# Patient Record
Sex: FEMALE | Race: WHITE | NOT HISPANIC OR LATINO | Employment: PART TIME | URBAN - METROPOLITAN AREA
[De-identification: names, ages, dates, MRNs, and addresses within clinical notes are randomized per-mention and may not be internally consistent; named-entity substitution may affect disease eponyms.]

---

## 2018-06-12 ENCOUNTER — APPOINTMENT (EMERGENCY)
Dept: RADIOLOGY | Facility: HOSPITAL | Age: 28
End: 2018-06-12

## 2018-06-12 ENCOUNTER — HOSPITAL ENCOUNTER (EMERGENCY)
Facility: HOSPITAL | Age: 28
Discharge: HOME | End: 2018-06-12

## 2018-06-12 VITALS
OXYGEN SATURATION: 100 % | WEIGHT: 239 LBS | HEIGHT: 62 IN | TEMPERATURE: 98 F | HEART RATE: 85 BPM | SYSTOLIC BLOOD PRESSURE: 150 MMHG | BODY MASS INDEX: 43.98 KG/M2 | DIASTOLIC BLOOD PRESSURE: 70 MMHG | RESPIRATION RATE: 15 BRPM

## 2018-06-12 DIAGNOSIS — R07.9 CHEST PAIN: Primary | ICD-10-CM

## 2018-06-12 LAB
ALBUMIN SERPL BCP-MCNC: 3.8 G/DL (ref 3.5–5)
ALP SERPL-CCNC: 121 U/L (ref 46–116)
ALT SERPL W P-5'-P-CCNC: 68 U/L (ref 12–78)
ANION GAP SERPL CALCULATED.3IONS-SCNC: 9 MMOL/L (ref 4–13)
AST SERPL W P-5'-P-CCNC: 38 U/L (ref 5–45)
ATRIAL RATE: 87 BPM
BASOPHILS # BLD AUTO: 0.02 THOUSANDS/ΜL (ref 0–0.1)
BASOPHILS NFR BLD AUTO: 0 % (ref 0–1)
BILIRUB SERPL-MCNC: 0.4 MG/DL (ref 0.2–1)
BUN SERPL-MCNC: 15 MG/DL (ref 5–25)
CALCIUM SERPL-MCNC: 9 MG/DL (ref 8.3–10.1)
CHLORIDE SERPL-SCNC: 101 MMOL/L (ref 100–108)
CO2 SERPL-SCNC: 28 MMOL/L (ref 21–32)
CREAT SERPL-MCNC: 0.63 MG/DL (ref 0.6–1.3)
EOSINOPHIL # BLD AUTO: 0.16 THOUSAND/ΜL (ref 0–0.61)
EOSINOPHIL NFR BLD AUTO: 2 % (ref 0–6)
ERYTHROCYTE [DISTWIDTH] IN BLOOD BY AUTOMATED COUNT: 14.9 % (ref 11.6–15.1)
EXT PREG TEST URINE: NEGATIVE
GFR SERPL CREATININE-BSD FRML MDRD: 121 ML/MIN/1.73SQ M
GLUCOSE SERPL-MCNC: 237 MG/DL (ref 65–140)
HCT VFR BLD AUTO: 39.2 % (ref 34.8–46.1)
HGB BLD-MCNC: 12.8 G/DL (ref 11.5–15.4)
LYMPHOCYTES # BLD AUTO: 2.07 THOUSANDS/ΜL (ref 0.6–4.47)
LYMPHOCYTES NFR BLD AUTO: 22 % (ref 14–44)
MCH RBC QN AUTO: 25.4 PG (ref 26.8–34.3)
MCHC RBC AUTO-ENTMCNC: 32.7 G/DL (ref 31.4–37.4)
MCV RBC AUTO: 78 FL (ref 82–98)
MONOCYTES # BLD AUTO: 0.75 THOUSAND/ΜL (ref 0.17–1.22)
MONOCYTES NFR BLD AUTO: 8 % (ref 4–12)
NEUTROPHILS # BLD AUTO: 6.56 THOUSANDS/ΜL (ref 1.85–7.62)
NEUTS SEG NFR BLD AUTO: 68 % (ref 43–75)
P AXIS: 75 DEGREES
PLATELET # BLD AUTO: 221 THOUSANDS/UL (ref 149–390)
PMV BLD AUTO: 11.3 FL (ref 8.9–12.7)
POTASSIUM SERPL-SCNC: 3.9 MMOL/L (ref 3.5–5.3)
PR INTERVAL: 166 MS
PROT SERPL-MCNC: 7.7 G/DL (ref 6.4–8.2)
QRS AXIS: 64 DEGREES
QRSD INTERVAL: 146 MS
QT INTERVAL: 408 MS
QTC INTERVAL: 490 MS
RBC # BLD AUTO: 5.04 MILLION/UL (ref 3.81–5.12)
SODIUM SERPL-SCNC: 138 MMOL/L (ref 136–145)
T WAVE AXIS: 36 DEGREES
TROPONIN I SERPL-MCNC: <0.02 NG/ML
VENTRICULAR RATE: 87 BPM
WBC # BLD AUTO: 9.56 THOUSAND/UL (ref 4.31–10.16)

## 2018-06-12 PROCEDURE — 93010 ELECTROCARDIOGRAM REPORT: CPT | Performed by: INTERNAL MEDICINE

## 2018-06-12 PROCEDURE — 85025 COMPLETE CBC W/AUTO DIFF WBC: CPT

## 2018-06-12 PROCEDURE — 99285 EMERGENCY DEPT VISIT HI MDM: CPT

## 2018-06-12 PROCEDURE — 81025 URINE PREGNANCY TEST: CPT

## 2018-06-12 PROCEDURE — 71046 X-RAY EXAM CHEST 2 VIEWS: CPT

## 2018-06-12 PROCEDURE — 93005 ELECTROCARDIOGRAM TRACING: CPT

## 2018-06-12 PROCEDURE — 84484 ASSAY OF TROPONIN QUANT: CPT

## 2018-06-12 PROCEDURE — 80053 COMPREHEN METABOLIC PANEL: CPT

## 2018-06-12 PROCEDURE — 36415 COLL VENOUS BLD VENIPUNCTURE: CPT

## 2018-06-12 NOTE — DISCHARGE INSTRUCTIONS

## 2018-06-12 NOTE — ED PROVIDER NOTES
History  Chief Complaint   Patient presents with    Chest Pain     Patient stated that she has been having chest pain since this morning  C/o of a stabbing pain midsternal  Hx of cardiac disease     23-year-old female presenting here with a chief complaint of chest pain states she woke up with chest pain and that is pretty much subsided by now  She denies any other associated symptoms no dizziness no nausea no vomiting no diaphoresis  She reports that it is somewhat reproducible with certain movements  Denies any difficulty breathing  No fever no chills  None       Past Medical History:   Diagnosis Date    Cardiac disease     Pulmonary artery congenital abnormality        Past Surgical History:   Procedure Laterality Date    CHOLECYSTECTOMY      CORONARY ARTERY BYPASS GRAFT         History reviewed  No pertinent family history  I have reviewed and agree with the history as documented  Social History   Substance Use Topics    Smoking status: Current Some Day Smoker    Smokeless tobacco: Never Used    Alcohol use No        Review of Systems   Constitutional: Negative for activity change, fatigue and fever  HENT: Negative for congestion, ear pain, rhinorrhea and sore throat  Eyes: Negative  Respiratory: Negative for cough, shortness of breath and wheezing  Cardiovascular: Positive for chest pain  Gastrointestinal: Negative for abdominal pain, diarrhea, nausea and vomiting  Endocrine: Negative  Genitourinary: Negative for difficulty urinating, dyspareunia, dysuria, flank pain, frequency, menstrual problem, pelvic pain, urgency, vaginal bleeding, vaginal discharge and vaginal pain  Musculoskeletal: Negative for arthralgias and myalgias  Skin: Negative for color change and pallor  Neurological: Negative for dizziness, speech difficulty, weakness and headaches  Hematological: Negative for adenopathy  Psychiatric/Behavioral: Negative for confusion         Physical Exam  Physical Exam   Constitutional: She is oriented to person, place, and time  She appears well-developed and well-nourished  She is cooperative  Non-toxic appearance  She does not have a sickly appearance  She does not appear ill  No distress  HENT:   Head: Normocephalic and atraumatic  Right Ear: Tympanic membrane and external ear normal    Left Ear: Tympanic membrane and external ear normal    Nose: No rhinorrhea, sinus tenderness or nasal deformity  No epistaxis  Right sinus exhibits no maxillary sinus tenderness and no frontal sinus tenderness  Left sinus exhibits no maxillary sinus tenderness and no frontal sinus tenderness  Mouth/Throat: Oropharynx is clear and moist and mucous membranes are normal  Normal dentition  Eyes: EOM are normal  Pupils are equal, round, and reactive to light  Neck: Normal range of motion  Neck supple  Cardiovascular: Normal rate, regular rhythm and normal heart sounds  No murmur heard  Pulmonary/Chest: Effort normal and breath sounds normal  No accessory muscle usage  No respiratory distress  She has no wheezes  She has no rales  She exhibits no tenderness  Abdominal: Soft  She exhibits no distension  There is no guarding  Musculoskeletal: Normal range of motion  She exhibits no edema or tenderness  Lymphadenopathy:     She has no cervical adenopathy  Neurological: She is alert and oriented to person, place, and time  She exhibits normal muscle tone  Skin: Skin is warm and dry  No rash noted  No erythema  Psychiatric: She has a normal mood and affect  Nursing note and vitals reviewed        Vital Signs  ED Triage Vitals   Temperature Pulse Respirations Blood Pressure SpO2   06/12/18 0912 06/12/18 0912 06/12/18 0912 06/12/18 0912 06/12/18 0912   98 °F (36 7 °C) 86 22 158/74 98 %      Temp Source Heart Rate Source Patient Position - Orthostatic VS BP Location FiO2 (%)   06/12/18 0912 06/12/18 0912 06/12/18 0912 06/12/18 0912 --   Oral Monitor Sitting Right arm       Pain Score       06/12/18 0909       6           Vitals:    06/12/18 0912 06/12/18 1040 06/12/18 1117   BP: 158/74 143/78 150/70   Pulse: 86 86 85   Patient Position - Orthostatic VS: Sitting Sitting Lying       Visual Acuity      ED Medications  Medications - No data to display    Diagnostic Studies  Results Reviewed     Procedure Component Value Units Date/Time    Comprehensive metabolic panel [[id number]]  (Abnormal) Collected:  06/12/18 0915    Lab Status:  Final result Specimen:  Blood from Arm, Left Updated:  06/12/18 0955     Sodium 138 mmol/L      Potassium 3 9 mmol/L      Chloride 101 mmol/L      CO2 28 mmol/L      Anion Gap 9 mmol/L      BUN 15 mg/dL      Creatinine 0 63 mg/dL      Glucose 237 (H) mg/dL      Calcium 9 0 mg/dL      AST 38 U/L      ALT 68 U/L      Alkaline Phosphatase 121 (H) U/L      Total Protein 7 7 g/dL      Albumin 3 8 g/dL      Total Bilirubin 0 40 mg/dL      eGFR 121 ml/min/1 73sq m     Narrative:         National Kidney Disease Education Program recommendations are as follows:  GFR calculation is accurate only with a steady state creatinine  Chronic Kidney disease less than 60 ml/min/1 73 sq  meters  Kidney failure less than 15 ml/min/1 73 sq  meters  POCT pregnancy, urine [[id number]]  (Normal) Resulted:  06/12/18 0953    Lab Status:  Final result Updated:  06/12/18 0953     EXT PREG TEST UR (Ref: Negative) Negative    Troponin I [[id number]]  (Normal) Collected:  06/12/18 0915    Lab Status:  Final result Specimen:  Blood from Arm, Left Updated:  06/12/18 0941     Troponin I <0 02 ng/mL     Narrative:         [employer] Chemistry analyzer 99% cutoff is > 0 04 ng/mL in network labs    o cTnI 99% cutoff is useful only when applied to patients in the clinical setting of myocardial ischemia  o cTnI 99% cutoff should be interpreted in the context of clinical history, ECG findings and possibly cardiac imaging to establish correct diagnosis    o cTnI 99% cutoff may be suggestive but clearly not indicative of a coronary event without the clinical setting of myocardial ischemia  CBC and differential [[id number]]  (Abnormal) Collected:  06/12/18 0915    Lab Status:  Final result Specimen:  Blood from Arm, Left Updated:  06/12/18 0921     WBC 9 56 Thousand/uL      RBC 5 04 Million/uL      Hemoglobin 12 8 g/dL      Hematocrit 39 2 %      MCV 78 (L) fL      MCH 25 4 (L) pg      MCHC 32 7 g/dL      RDW 14 9 %      MPV 11 3 fL      Platelets 852 Thousands/uL      Neutrophils Relative 68 %      Lymphocytes Relative 22 %      Monocytes Relative 8 %      Eosinophils Relative 2 %      Basophils Relative 0 %      Neutrophils Absolute 6 56 Thousands/µL      Lymphocytes Absolute 2 07 Thousands/µL      Monocytes Absolute 0 75 Thousand/µL      Eosinophils Absolute 0 16 Thousand/µL      Basophils Absolute 0 02 Thousands/µL                  X-ray chest 2 views   Final Result by Jaja Julian MD (06/12 1043)      No acute cardiopulmonary disease              Workstation performed: [id number]                    Procedures  Procedures       Phone Contacts  ED Phone Contact    ED Course         HEART Risk Score      Most Recent Value   History  0 Filed at: 06/12/2018 1113   ECG  0 Filed at: 06/12/2018 1113   Age  0 Filed at: 06/12/2018 1113   Risk Factors  1 Filed at: 06/12/2018 1113   Troponin  0 Filed at: 06/12/2018 1113   Heart Score Risk Calculator   History  0 Filed at: 06/12/2018 1113   ECG  0 Filed at: 06/12/2018 1113   Age  0 Filed at: 06/12/2018 1113   Risk Factors  1 Filed at: 06/12/2018 1113   Troponin  0 Filed at: 06/12/2018 1113   HEART Score  1 Filed at: 06/12/2018 1113   HEART Score  1 Filed at: 06/12/2018 1113                            MDM  Number of Diagnoses or Management Options  Chest pain:     CritCare Time    Disposition  Final diagnoses:   Chest pain     Time reflects when diagnosis was documented in both MDM as applicable and the Disposition within this note     Time User Action Codes Description Comment    6/12/2018 11:08 AM Briseida Nesbitt Add [R07 9] Chest pain       ED Disposition     ED Disposition Condition Comment    Discharge  [facility] [place] discharge to home/self care  Condition at discharge: Good        Follow-up Information     Follow up With Specialties Details Why Contact Info Additional Information    [facility] Emergency Department Emergency Medicine  As needed [address] [address]  [phone] [place] ED, [address] [place], [place], [address]  [facility] Schedule an appointment as soon as possible for a visit For Continued Evaluation [address] [id number] [phone]           There are no discharge medications for this patient  No discharge procedures on file      ED Provider  Electronically Signed by           REJI Zimmerman  06/12/18 0632

## 2018-06-14 ENCOUNTER — TELEPHONE (OUTPATIENT)
Dept: CARDIOLOGY CLINIC | Facility: CLINIC | Age: 28
End: 2018-06-14

## 2018-06-14 NOTE — TELEPHONE ENCOUNTER
PT called to change her appointment time to 4pm  Patient still was complaining of chest pains  PT stated she was on her way to the ER

## 2018-06-14 NOTE — TELEPHONE ENCOUNTER
PT CALLED & SAID THAT SHE NEEDS TO MAKE A HOSP F/UP W/ CARDIO & SAID SHE WENT TO [facility] BUT ONLY WAS IN THE ER  PT SAID THAT SHE IS STILL HAVING CHEST PAINS  SPOKE TO JAQUELIN & SHE SAID TO TELL PT TO GO TO THE ER; RELAYED THE MSG TO PT & SHE SAID THAT SHE WILL GO BUT STILL WANTED TO MAKE A F/UP APPT   PT IS GOING TO [place] ON 7/12/18 TO SEE VK6   PT HAS NO INSUR

## 2018-07-12 ENCOUNTER — OFFICE VISIT (OUTPATIENT)
Dept: CARDIOLOGY CLINIC | Facility: CLINIC | Age: 28
End: 2018-07-12

## 2018-07-12 VITALS
BODY MASS INDEX: 44.2 KG/M2 | SYSTOLIC BLOOD PRESSURE: 142 MMHG | OXYGEN SATURATION: 99 % | DIASTOLIC BLOOD PRESSURE: 76 MMHG | WEIGHT: 240.2 LBS | HEIGHT: 62 IN | HEART RATE: 80 BPM

## 2018-07-12 DIAGNOSIS — Q24.9 CONGENITAL HEART DISEASE IN ADULT: ICD-10-CM

## 2018-07-12 DIAGNOSIS — Q21.0 VSD (VENTRICULAR SEPTAL DEFECT) AND COARCTATION OF AORTA: Primary | ICD-10-CM

## 2018-07-12 DIAGNOSIS — Q25.1 VSD (VENTRICULAR SEPTAL DEFECT) AND COARCTATION OF AORTA: Primary | ICD-10-CM

## 2018-07-12 PROBLEM — Z87.74 H/O AORTIC COARCTATION REPAIR: Status: ACTIVE | Noted: 2018-07-12

## 2018-07-12 PROBLEM — Z87.74 S/P VSD REPAIR: Status: ACTIVE | Noted: 2018-07-12

## 2018-07-12 PROCEDURE — 99215 OFFICE O/P EST HI 40 MIN: CPT | Performed by: INTERNAL MEDICINE

## 2018-07-12 RX ORDER — LISINOPRIL 5 MG/1
20 TABLET ORAL DAILY
COMMUNITY
End: 2018-10-24 | Stop reason: ALTCHOICE

## 2018-07-12 NOTE — PROGRESS NOTES
Cardiology Consultation     Myesha Mtz  [id number]  1990  [address]  [facility] [address] [place] [address]    1  VSD (ventricular septal defect) and coarctation of aorta  Echo complete with contrast if indicated    MRI cardiac  w wo contrast   2  Congenital heart disease in adult       Chief Complaint   Patient presents with    Follow-up     s/p hospital       HPI:  Patient presents to the office for cardiac evaluation after recent ER visit  Patient states she went to the ER because of chest pain  She describes the chest pain in the center of her chest feels like a stabbing  She states it occurs when 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  She also gets dizzy and short of breath  She also has pain through to the back  Patient denies getting the pain with any other activity  She has a personal history of coarctation with repair x2 and VSD with repair as a child  She was following with pediatric cardiologist until age 19 and saw an adult cardiologist 1 time  She denies any recent cardiac testing  Past Medical History:   Diagnosis Date    Hepatitis C     Hypertension     Pulmonary artery congenital abnormality      Social History     Social History    Marital status: Single     Spouse name: N/A    Number of children: N/A    Years of education: N/A     Occupational History    Not on file  Social History Main Topics    Smoking status: Current Some Day Smoker    Smokeless tobacco: Never Used    Alcohol use No    Drug use: Unknown    Sexual activity: Not on file     Other Topics Concern    Not on file     Social History Narrative    No narrative on file      History reviewed  No pertinent family history    Past Surgical History:   Procedure Laterality Date    CARDIAC CATHETERIZATION      no CAD    CHOLECYSTECTOMY      COARCTATION OF AORTA EXCISION Age 7    VSD REPAIR      As a child       Current Outpatient Prescriptions:     lisinopril (ZESTRIL) 5 mg tablet, Take 5 mg by mouth daily, Disp: , Rfl:   Allergies   Allergen Reactions    Prednisone Swelling     Vitals:    07/12/18 1558   BP: 142/76   Pulse: 80   SpO2: 99%   Weight: 109 kg (240 lb 3 2 oz)   Height: 5' 2" (1 575 m)       Imaging: No results found  Review of Systems:  Review of Systems   Constitutional: Negative  HENT: Negative  Respiratory: Negative  Cardiovascular: Positive for chest pain  Genitourinary: Negative  Neurological: Negative  Hematological: Negative  All other systems reviewed and are negative  Physical Exam:  Physical Exam   Constitutional: She is oriented to person, place, and time  She appears well-developed and well-nourished  HENT:   Head: Normocephalic and atraumatic  Cardiovascular: Normal rate, regular rhythm and normal heart sounds  Pulmonary/Chest: Effort normal and breath sounds normal    Musculoskeletal: Normal range of motion  She exhibits no edema  Neurological: She is alert and oriented to person, place, and time  Psychiatric: She has a normal mood and affect  Her behavior is normal  Judgment and thought content normal    Nursing note and vitals reviewed  Discussion/Summary:  1- history of VSD repair as a child with no recent cardiac testing  Check echocardiogram to assess LV function and assess the septum  2-history of coarctation repair x2  Check cardiac MRI to assess the repair  Also refer the patient to dr Rosemarie Root, who will follow-up for congenital heart disease    Check echocardiogram  Check cardiac MRI  Continue all medications  Previous studies reviewed with patient, medications reviewed and possible side effects discussed  Continue risk factor modification  Optimize weight, regular exercise and follow up with appropriate specialists and primary care physician as discussed  All questions answered   Patient advised to report any problems prompting to medical attention   Return for follow up visit in 6 months or earlier if needed

## 2018-07-12 NOTE — PATIENT INSTRUCTIONS
Check echo  Check cardiac MRI  Continue all medications  Previous studies reviewed with patient, medications reviewed and possible side effects discussed  Continue risk factor modification  Optimize weight, regular exercise and follow up with appropriate specialists and primary care physician as discussed  All questions answered  Patient advised to report any problems prompting to medical attention   Return for follow up visit in 6 months or earlier if needed

## 2018-07-17 ENCOUNTER — HOSPITAL ENCOUNTER (EMERGENCY)
Facility: HOSPITAL | Age: 28
Discharge: HOME | End: 2018-07-17
Attending: FAMILY MEDICINE | Admitting: FAMILY MEDICINE

## 2018-07-17 ENCOUNTER — APPOINTMENT (EMERGENCY)
Dept: RADIOLOGY | Facility: HOSPITAL | Age: 28
End: 2018-07-17

## 2018-07-17 ENCOUNTER — TELEPHONE (OUTPATIENT)
Dept: CARDIOLOGY CLINIC | Facility: CLINIC | Age: 28
End: 2018-07-17

## 2018-07-17 VITALS
OXYGEN SATURATION: 95 % | BODY MASS INDEX: 44.16 KG/M2 | HEIGHT: 62 IN | HEART RATE: 99 BPM | DIASTOLIC BLOOD PRESSURE: 71 MMHG | TEMPERATURE: 98.6 F | RESPIRATION RATE: 18 BRPM | WEIGHT: 240 LBS | SYSTOLIC BLOOD PRESSURE: 148 MMHG

## 2018-07-17 DIAGNOSIS — R07.89 ATYPICAL CHEST PAIN: Primary | ICD-10-CM

## 2018-07-17 LAB
ANION GAP SERPL CALCULATED.3IONS-SCNC: 7 MMOL/L (ref 4–13)
BASOPHILS # BLD AUTO: 0.1 THOUSANDS/ΜL (ref 0–0.1)
BASOPHILS NFR BLD AUTO: 1 % (ref 0–2)
BNP SERPL-MCNC: 17 PG/ML (ref 1–100)
BUN SERPL-MCNC: 10 MG/DL (ref 7–25)
CALCIUM SERPL-MCNC: 9.4 MG/DL (ref 8.6–10.5)
CHLORIDE SERPL-SCNC: 102 MMOL/L (ref 98–107)
CO2 SERPL-SCNC: 26 MMOL/L (ref 21–31)
CREAT SERPL-MCNC: 0.71 MG/DL (ref 0.6–1.2)
EOSINOPHIL # BLD AUTO: 0.1 THOUSAND/ΜL (ref 0–0.61)
EOSINOPHIL NFR BLD AUTO: 1 % (ref 0–5)
ERYTHROCYTE [DISTWIDTH] IN BLOOD BY AUTOMATED COUNT: 15.7 % (ref 11.5–14.5)
GFR SERPL CREATININE-BSD FRML MDRD: 115 ML/MIN/1.73SQ M
GLUCOSE SERPL-MCNC: 367 MG/DL (ref 65–99)
HCT VFR BLD AUTO: 38.8 % (ref 34.8–46.1)
HGB BLD-MCNC: 12.9 G/DL (ref 12–16)
LYMPHOCYTES # BLD AUTO: 2.6 THOUSANDS/ΜL (ref 0.6–4.47)
LYMPHOCYTES NFR BLD AUTO: 24 % (ref 21–51)
MCH RBC QN AUTO: 25.3 PG (ref 26–34)
MCHC RBC AUTO-ENTMCNC: 33.3 G/DL (ref 31–37)
MCV RBC AUTO: 76 FL (ref 81–99)
MONOCYTES # BLD AUTO: 0.8 THOUSAND/ΜL (ref 0.17–1.22)
MONOCYTES NFR BLD AUTO: 7 % (ref 2–12)
NEUTROPHILS # BLD AUTO: 7.6 THOUSANDS/ΜL (ref 1.4–6.5)
NEUTS SEG NFR BLD AUTO: 68 % (ref 42–75)
NRBC BLD AUTO-RTO: 0 /100 WBCS
PLATELET # BLD AUTO: 208 THOUSANDS/UL (ref 149–390)
PMV BLD AUTO: 9.5 FL (ref 8.6–11.7)
POTASSIUM SERPL-SCNC: 3.4 MMOL/L (ref 3.5–5.5)
RBC # BLD AUTO: 5.1 MILLION/UL (ref 3.9–5.2)
SODIUM SERPL-SCNC: 135 MMOL/L (ref 134–143)
TROPONIN I SERPL-MCNC: <0.03 NG/ML
WBC # BLD AUTO: 11.2 THOUSAND/UL (ref 4.8–10.8)

## 2018-07-17 PROCEDURE — 96374 THER/PROPH/DIAG INJ IV PUSH: CPT

## 2018-07-17 PROCEDURE — 85025 COMPLETE CBC W/AUTO DIFF WBC: CPT | Performed by: FAMILY MEDICINE

## 2018-07-17 PROCEDURE — 83880 ASSAY OF NATRIURETIC PEPTIDE: CPT | Performed by: FAMILY MEDICINE

## 2018-07-17 PROCEDURE — 80048 BASIC METABOLIC PNL TOTAL CA: CPT | Performed by: FAMILY MEDICINE

## 2018-07-17 PROCEDURE — 99285 EMERGENCY DEPT VISIT HI MDM: CPT

## 2018-07-17 PROCEDURE — 71045 X-RAY EXAM CHEST 1 VIEW: CPT

## 2018-07-17 PROCEDURE — 36415 COLL VENOUS BLD VENIPUNCTURE: CPT | Performed by: FAMILY MEDICINE

## 2018-07-17 PROCEDURE — 84484 ASSAY OF TROPONIN QUANT: CPT | Performed by: FAMILY MEDICINE

## 2018-07-17 PROCEDURE — 93005 ELECTROCARDIOGRAM TRACING: CPT

## 2018-07-17 RX ORDER — POTASSIUM CHLORIDE 20 MEQ/1
40 TABLET, EXTENDED RELEASE ORAL ONCE
Status: DISCONTINUED | OUTPATIENT
Start: 2018-07-17 | End: 2018-07-17 | Stop reason: CLARIF

## 2018-07-17 RX ORDER — POTASSIUM CHLORIDE 750 MG/1
40 TABLET, FILM COATED, EXTENDED RELEASE ORAL ONCE
Status: COMPLETED | OUTPATIENT
Start: 2018-07-17 | End: 2018-07-17

## 2018-07-17 RX ORDER — POTASSIUM CHLORIDE 750 MG/1
10 TABLET, EXTENDED RELEASE ORAL 2 TIMES DAILY
Qty: 6 TABLET | Refills: 0 | Status: SHIPPED | OUTPATIENT
Start: 2018-07-17 | End: 2018-10-10

## 2018-07-17 RX ORDER — KETOROLAC TROMETHAMINE 30 MG/ML
30 INJECTION, SOLUTION INTRAMUSCULAR; INTRAVENOUS ONCE
Status: COMPLETED | OUTPATIENT
Start: 2018-07-17 | End: 2018-07-17

## 2018-07-17 RX ADMIN — KETOROLAC TROMETHAMINE 30 MG: 30 INJECTION, SOLUTION INTRAMUSCULAR; INTRAVENOUS at 14:40

## 2018-07-17 RX ADMIN — POTASSIUM CHLORIDE 40 MEQ: 750 TABLET, FILM COATED, EXTENDED RELEASE ORAL at 16:29

## 2018-07-17 NOTE — DISCHARGE INSTRUCTIONS
Chest Wall Pain, Ambulatory Care   GENERAL INFORMATION:   Chest wall pain  may be caused by problems with the muscles, cartilage, or bones of the chest wall  Chest wall pain may also be caused by pain that spreads to your chest from another part of your body  The pain may be aching, severe, dull, or sharp  It may come and go, or it may be constant  The pain may be worse when you move in certain ways, breathe deeply, or cough  Seek immediate care for the following symptoms:   · Severe pain    · You have any of the following signs of a heart attack:      ¨ Squeezing, pressure, or pain in your chest that lasts longer than 5 minutes or returns    ¨ Discomfort or pain in your back, neck, jaw, stomach, or arm     ¨ Trouble breathing    ¨ Nausea or vomiting    ¨ Lightheadedness or a sudden cold sweat, especially with chest pain or trouble breathing  Treatment  depends on the cause of your chest wall pain  You may need any of the following:  · NSAIDs  help decrease swelling and pain or fever  This medicine is available with or without a doctor's order  NSAIDs can cause stomach bleeding or kidney problems in certain people  If you take blood thinner medicine, always ask your healthcare provider if NSAIDs are safe for you  Always read the medicine label and follow directions  · Acetaminophen  decreases pain  It is available without a doctor's order  Ask how much to take and how often to take it  Follow directions  Acetaminophen can cause liver damage if not taken correctly  · Apply heat  on your chest for 20 to 30 minutes every 2 hours for as many days as directed  Heat helps decrease pain and muscle spasms  · Apply ice  on your chest for 15 to 20 minutes every hour or as directed  Use an ice pack, or put crushed ice in a plastic bag  Cover it with a towel  Ice helps prevent tissue damage and decreases swelling and pain    Follow up with your healthcare provider as directed:  Write down your questions so you remember to ask them during your visits  CARE AGREEMENT:   You have the right to help plan your care  Learn about your health condition and how it may be treated  Discuss treatment options with your caregivers to decide what care you want to receive  You always have the right to refuse treatment  The above information is an  only  It is not intended as medical advice for individual conditions or treatments  Talk to your doctor, nurse or pharmacist before following any medical regimen to see if it is safe and effective for you  © 2014 [address] is for End User's use only and may not be sold, redistributed or otherwise used for commercial purposes  All illustrations and images included in CareNotes® are the copyrighted property of [employer]  or Fran Dumont

## 2018-07-17 NOTE — ED PROVIDER NOTES
History  Chief Complaint   Patient presents with    Chest Pain     Sudden onset at 0800 partient developed 9/10 sharp substernal chest pain radiating to her LUE worsened with deep breathing        History provided by:  Patient and EMS personnel   used: No    Chest Pain   Pain location:  L chest  Pain quality: dull    Pain radiates to:  Does not radiate  Pain radiates to the back: no    Pain severity:  Moderate  Onset quality:  Sudden  Duration:  6 hours  Timing:  Intermittent  Progression:  Waxing and waning  Chronicity:  Recurrent  Context: at rest    Context: not breathing, no drug use, not eating, no intercourse, not lifting, no movement, not raising an arm, no stress and no trauma    Relieved by:  Nitroglycerin  Worsened by:  Deep breathing  Ineffective treatments:  Aspirin and nitroglycerin  Associated symptoms: no abdominal pain, no AICD problem, no altered mental status, no anorexia, no anxiety, no back pain, no claudication, no cough, no diaphoresis, no dizziness, no dysphagia, no fatigue, no fever, no headache, no heartburn, no lower extremity edema, no nausea, no near-syncope, no numbness, no orthopnea, no palpitations, no PND, no shortness of breath, no syncope, not vomiting and no weakness    Risk factors: diabetes mellitus, obesity, smoking and surgery    Risk factors: no birth control, no coronary artery disease, no hypertension, no immobilization and not pregnant     This is a 25year old female presented to ED with complaint of atypical chest pain  Patient had multiple visits to ED for chest pain now was seen by cardiologist   Patient states that she was a advised to get stress test done but did not schedule an appointment  Patient pain is reproducible  Prior to Admission Medications   Prescriptions Last Dose Informant Patient Reported?  Taking?   lisinopril (ZESTRIL) 5 mg tablet 7/17/2018 at Unknown time  Yes Yes   Sig: Take 5 mg by mouth daily      Facility-Administered Medications: None       Past Medical History:   Diagnosis Date    Hepatitis C     Hypertension     Pulmonary artery congenital abnormality        Past Surgical History:   Procedure Laterality Date    CARDIAC CATHETERIZATION      no CAD    CHOLECYSTECTOMY      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 8    LIVER BIOPSY      VSD REPAIR      As a child       No family history on file  I have reviewed and agree with the history as documented  Social History   Substance Use Topics    Smoking status: Former Smoker    Smokeless tobacco: Never Used    Alcohol use No        Review of Systems   Constitutional: Negative for diaphoresis, fatigue and fever  HENT: Negative for trouble swallowing  Respiratory: Negative for cough and shortness of breath  Cardiovascular: Positive for chest pain  Negative for palpitations, orthopnea, claudication, syncope, PND and near-syncope  Gastrointestinal: Negative for abdominal pain, anorexia, heartburn, nausea and vomiting  Musculoskeletal: Negative for back pain  Neurological: Negative for dizziness, weakness, numbness and headaches  Physical Exam  Physical Exam   Constitutional: She is oriented to person, place, and time  She appears well-developed and well-nourished  HENT:   Head: Normocephalic and atraumatic  Right Ear: External ear normal    Left Ear: External ear normal    Nose: Nose normal    Mouth/Throat: Oropharynx is clear and moist  No oropharyngeal exudate  Eyes: Conjunctivae and EOM are normal  Pupils are equal, round, and reactive to light  Right eye exhibits no discharge  Left eye exhibits no discharge  No scleral icterus  Neck: Normal range of motion  Neck supple  Cardiovascular: Regular rhythm  Tachycardia present  Pulmonary/Chest: Effort normal and breath sounds normal  No respiratory distress  She has no wheezes  Abdominal: Soft  Bowel sounds are normal    Lymphadenopathy:     She has no cervical adenopathy     Neurological: She is alert and oriented to person, place, and time  Skin: Skin is warm and dry  Capillary refill takes less than 2 seconds  Psychiatric: She has a normal mood and affect  Her behavior is normal    Nursing note and vitals reviewed  Vital Signs  ED Triage Vitals [07/17/18 1419]   Temperature Pulse Respirations Blood Pressure SpO2   99 2 °F (37 3 °C) (!) 112 18 166/86 98 %      Temp Source Heart Rate Source Patient Position - Orthostatic VS BP Location FiO2 (%)   Temporal Monitor Lying Right arm --      Pain Score       8           Vitals:    07/17/18 1500 07/17/18 1515 07/17/18 1530 07/17/18 1545   BP:  146/69 148/71    Pulse: (!) 111 103 105 (!) 110   Patient Position - Orthostatic VS:           Visual Acuity      ED Medications  Medications   potassium chloride (K-DUR,KLOR-CON) CR tablet 40 mEq (not administered)   ketorolac (TORADOL) injection 30 mg (30 mg Intravenous Given 7/17/18 1440)       Diagnostic Studies  Results Reviewed     Procedure Component Value Units Date/Time    B-Type Natriuretic Peptide [facility] ONLY) [[id number]]  (Normal) Collected:  07/17/18 1435    Lab Status:  Final result Specimen:  Blood from Arm, Left Updated:  07/17/18 1512     BNP 17 pg/mL     Basic metabolic panel [[id number]]  (Abnormal) Collected:  07/17/18 1435    Lab Status:  Final result Specimen:  Blood from Arm, Left Updated:  07/17/18 1512     Sodium 135 mmol/L      Potassium 3 4 (L) mmol/L      Chloride 102 mmol/L      CO2 26 mmol/L      Anion Gap 7 mmol/L      BUN 10 mg/dL      Creatinine 0 71 mg/dL      Glucose 367 (H) mg/dL      Calcium 9 4 mg/dL      eGFR 115 ml/min/1 73sq m     Narrative:         National Kidney Disease Education Program recommendations are as follows:  GFR calculation is accurate only with a steady state creatinine  Chronic Kidney disease less than 60 ml/min/1 73 sq  meters  Kidney failure less than 15 ml/min/1 73 sq  meters      Troponin I [[id number]]  (Normal) Collected:  07/17/18 1435 Lab Status:  Final result Specimen:  Blood from Arm, Left Updated:  07/17/18 1512     Troponin I <0 03 ng/mL     CBC and differential [[id number]]  (Abnormal) Collected:  07/17/18 1435    Lab Status:  Final result Specimen:  Blood from Arm, Left Updated:  07/17/18 1448     WBC 11 20 (H) Thousand/uL      RBC 5 10 Million/uL      Hemoglobin 12 9 g/dL      Hematocrit 38 8 %      MCV 76 (L) fL      MCH 25 3 (L) pg      MCHC 33 3 g/dL      RDW 15 7 (H) %      MPV 9 5 fL      Platelets 743 Thousands/uL      nRBC 0 /100 WBCs      Neutrophils Relative 68 %      Lymphocytes Relative 24 %      Monocytes Relative 7 %      Eosinophils Relative 1 %      Basophils Relative 1 %      Neutrophils Absolute 7 60 (H) Thousands/µL      Lymphocytes Absolute 2 60 Thousands/µL      Monocytes Absolute 0 80 Thousand/µL      Eosinophils Absolute 0 10 Thousand/µL      Basophils Absolute 0 10 Thousands/µL                  XR chest 1 view portable   Final Result by Maria Del Carmen Feliciano (07/17 1511)        No acute findings  No change  Signed by Maria Del Carmen Feliciano                 Procedures  Procedures       Phone Contacts  ED Phone Contact    ED Course  ED Course as of Jul 17 1613   Tue Jul 17, 2018   1607 Lab results are discussed with patient    [phone] I offered admission for observation patient states she is feeling better will discharge home advised to follow up with her cardiologist and get stress test done  Patient verbalized understand the planned treatment  MDM  CritCare Time    Disposition  Final diagnoses:   Atypical chest pain     Time reflects when diagnosis was documented in both MDM as applicable and the Disposition within this note     Time User Action Codes Description Comment    7/17/2018  4:11 PM Janie Mustafa Add [R07 89] Atypical chest pain       ED Disposition     ED Disposition Condition Comment    Discharge  [facility] [place] discharge to home/self care      Condition at discharge: Stable        Follow-up Information     Follow up With Specialties Details Why Contact Info      In 2 days If symptoms worsen Cardiologist          Patient's Medications   Discharge Prescriptions    POTASSIUM CHLORIDE (K-DUR,KLOR-CON) 10 MEQ TABLET    Take 1 tablet (10 mEq total) by mouth 2 (two) times a day for 3 days       Start Date: 7/17/2018 End Date: 7/20/2018       Order Dose: 10 mEq       Quantity: 6 tablet    Refills: 0     No discharge procedures on file      ED Provider  Electronically Signed by           Rafaela Liu MD  07/17/18 5846

## 2018-07-17 NOTE — TELEPHONE ENCOUNTER
Call was transferred to me, pts BP is 180/100, she is dizzy and having chest pain  Pt states that her job sent her home from work  Advised to go to the ER and not to drive  Pt states that her friend will take her to the ER

## 2018-07-18 LAB
ATRIAL RATE: 108 BPM
P AXIS: 63 DEGREES
PR INTERVAL: 180 MS
QRS AXIS: 65 DEGREES
QRSD INTERVAL: 150 MS
QT INTERVAL: 374 MS
QTC INTERVAL: 501 MS
T WAVE AXIS: 43 DEGREES
VENTRICULAR RATE: 108 BPM

## 2018-10-10 ENCOUNTER — HOSPITAL ENCOUNTER (EMERGENCY)
Facility: HOSPITAL | Age: 28
Discharge: HOME | End: 2018-10-10

## 2018-10-10 ENCOUNTER — APPOINTMENT (EMERGENCY)
Dept: CT IMAGING | Facility: HOSPITAL | Age: 28
End: 2018-10-10

## 2018-10-10 ENCOUNTER — APPOINTMENT (EMERGENCY)
Dept: ULTRASOUND IMAGING | Facility: HOSPITAL | Age: 28
End: 2018-10-10

## 2018-10-10 VITALS
OXYGEN SATURATION: 100 % | DIASTOLIC BLOOD PRESSURE: 90 MMHG | HEIGHT: 62 IN | WEIGHT: 245 LBS | TEMPERATURE: 97.5 F | HEART RATE: 98 BPM | BODY MASS INDEX: 45.08 KG/M2 | RESPIRATION RATE: 20 BRPM | SYSTOLIC BLOOD PRESSURE: 144 MMHG

## 2018-10-10 DIAGNOSIS — N83.209 OVARIAN CYST: Primary | ICD-10-CM

## 2018-10-10 LAB
ALBUMIN SERPL BCP-MCNC: 4 G/DL (ref 3.5–5.7)
ALP SERPL-CCNC: 98 U/L (ref 40–150)
ALT SERPL W P-5'-P-CCNC: 81 U/L (ref 7–52)
ANION GAP SERPL CALCULATED.3IONS-SCNC: 9 MMOL/L (ref 4–13)
AST SERPL W P-5'-P-CCNC: 55 U/L (ref 13–39)
BASOPHILS # BLD AUTO: 0.1 THOUSANDS/ΜL (ref 0–0.1)
BASOPHILS NFR BLD AUTO: 1 % (ref 0–2)
BILIRUB SERPL-MCNC: 0.5 MG/DL (ref 0.2–1)
BILIRUB UR QL STRIP: NEGATIVE
BUN SERPL-MCNC: 9 MG/DL (ref 7–25)
CALCIUM SERPL-MCNC: 9.4 MG/DL (ref 8.6–10.5)
CHLORIDE SERPL-SCNC: 99 MMOL/L (ref 98–107)
CLARITY UR: CLEAR
CO2 SERPL-SCNC: 26 MMOL/L (ref 21–31)
COLOR UR: YELLOW
CREAT SERPL-MCNC: 0.58 MG/DL (ref 0.6–1.2)
EOSINOPHIL # BLD AUTO: 0.1 THOUSAND/ΜL (ref 0–0.61)
EOSINOPHIL NFR BLD AUTO: 1 % (ref 0–5)
ERYTHROCYTE [DISTWIDTH] IN BLOOD BY AUTOMATED COUNT: 15.3 % (ref 11.5–14.5)
EXT PREG TEST URINE: NEGATIVE
GFR SERPL CREATININE-BSD FRML MDRD: 124 ML/MIN/1.73SQ M
GLUCOSE SERPL-MCNC: 315 MG/DL (ref 65–99)
GLUCOSE UR STRIP-MCNC: ABNORMAL MG/DL
HCT VFR BLD AUTO: 44.9 % (ref 34.8–46.1)
HGB BLD-MCNC: 14.9 G/DL (ref 12–16)
HGB UR QL STRIP.AUTO: NEGATIVE
KETONES UR STRIP-MCNC: NEGATIVE MG/DL
LEUKOCYTE ESTERASE UR QL STRIP: NEGATIVE
LYMPHOCYTES # BLD AUTO: 2.6 THOUSANDS/ΜL (ref 0.6–4.47)
LYMPHOCYTES NFR BLD AUTO: 24 % (ref 21–51)
MCH RBC QN AUTO: 26.5 PG (ref 26–34)
MCHC RBC AUTO-ENTMCNC: 33.2 G/DL (ref 31–37)
MCV RBC AUTO: 80 FL (ref 81–99)
MONOCYTES # BLD AUTO: 0.8 THOUSAND/ΜL (ref 0.17–1.22)
MONOCYTES NFR BLD AUTO: 7 % (ref 2–12)
NEUTROPHILS # BLD AUTO: 7.5 THOUSANDS/ΜL (ref 1.4–6.5)
NEUTS SEG NFR BLD AUTO: 67 % (ref 42–75)
NITRITE UR QL STRIP: NEGATIVE
NRBC BLD AUTO-RTO: 0 /100 WBCS
PH UR STRIP.AUTO: 6 [PH] (ref 5–8)
PLATELET # BLD AUTO: 197 THOUSANDS/UL (ref 149–390)
PMV BLD AUTO: 9.5 FL (ref 8.6–11.7)
POTASSIUM SERPL-SCNC: 3.6 MMOL/L (ref 3.5–5.5)
PROT SERPL-MCNC: 7.3 G/DL (ref 6.4–8.9)
PROT UR STRIP-MCNC: NEGATIVE MG/DL
RBC # BLD AUTO: 5.62 MILLION/UL (ref 3.9–5.2)
SODIUM SERPL-SCNC: 134 MMOL/L (ref 134–143)
SP GR UR STRIP.AUTO: 1.01 (ref 1–1.03)
UROBILINOGEN UR QL STRIP.AUTO: 0.2 E.U./DL
WBC # BLD AUTO: 11.1 THOUSAND/UL (ref 4.8–10.8)

## 2018-10-10 PROCEDURE — 36415 COLL VENOUS BLD VENIPUNCTURE: CPT | Performed by: PHYSICIAN ASSISTANT

## 2018-10-10 PROCEDURE — 85025 COMPLETE CBC W/AUTO DIFF WBC: CPT | Performed by: PHYSICIAN ASSISTANT

## 2018-10-10 PROCEDURE — 76830 TRANSVAGINAL US NON-OB: CPT

## 2018-10-10 PROCEDURE — 81003 URINALYSIS AUTO W/O SCOPE: CPT | Performed by: PHYSICIAN ASSISTANT

## 2018-10-10 PROCEDURE — 81025 URINE PREGNANCY TEST: CPT | Performed by: PHYSICIAN ASSISTANT

## 2018-10-10 PROCEDURE — 96374 THER/PROPH/DIAG INJ IV PUSH: CPT

## 2018-10-10 PROCEDURE — 74176 CT ABD & PELVIS W/O CONTRAST: CPT

## 2018-10-10 PROCEDURE — 76856 US EXAM PELVIC COMPLETE: CPT

## 2018-10-10 PROCEDURE — 99284 EMERGENCY DEPT VISIT MOD MDM: CPT

## 2018-10-10 PROCEDURE — 80053 COMPREHEN METABOLIC PANEL: CPT | Performed by: PHYSICIAN ASSISTANT

## 2018-10-10 RX ORDER — KETOROLAC TROMETHAMINE 30 MG/ML
30 INJECTION, SOLUTION INTRAMUSCULAR; INTRAVENOUS ONCE
Status: COMPLETED | OUTPATIENT
Start: 2018-10-10 | End: 2018-10-10

## 2018-10-10 RX ORDER — TRAMADOL HYDROCHLORIDE 50 MG/1
50 TABLET ORAL EVERY 6 HOURS PRN
Qty: 10 TABLET | Refills: 0 | Status: SHIPPED | OUTPATIENT
Start: 2018-10-10 | End: 2018-11-08

## 2018-10-10 RX ADMIN — KETOROLAC TROMETHAMINE 30 MG: 30 INJECTION, SOLUTION INTRAMUSCULAR at 19:29

## 2018-10-10 NOTE — ED PROVIDER NOTES
History  Chief Complaint   Patient presents with    Abdominal Pain     patient has left lower abdominal pain  this began this morning and has had mild diarrhea today  Patient reports she developed left lower quadrant pain in her abdomen today radiating to her left side of her back  On exam patient has left lower quadrant pain to palpation  There is no distention  She also has pain to percussion of her left lower back  Prior to Admission Medications   Prescriptions Last Dose Informant Patient Reported? Taking?   lisinopril (ZESTRIL) 5 mg tablet   Yes No   Sig: Take 5 mg by mouth daily      Facility-Administered Medications: None       Past Medical History:   Diagnosis Date    Hepatitis C     Hypertension     Pulmonary artery congenital abnormality        Past Surgical History:   Procedure Laterality Date    CARDIAC CATHETERIZATION      no CAD    CHOLECYSTECTOMY      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 8    LIVER BIOPSY      VSD REPAIR      As a child       History reviewed  No pertinent family history  I have reviewed and agree with the history as documented  Social History   Substance Use Topics    Smoking status: Former Smoker    Smokeless tobacco: Never Used    Alcohol use No        Review of Systems   Constitutional: Positive for activity change  Negative for fatigue and fever  HENT: Negative for congestion, rhinorrhea, sinus pain and sinus pressure  Eyes: Negative for pain, discharge and redness  Respiratory: Negative for apnea, shortness of breath and stridor  Cardiovascular: Negative for chest pain, palpitations and leg swelling  Gastrointestinal: Positive for abdominal pain  Negative for abdominal distention, anal bleeding, constipation, diarrhea, nausea and vomiting  Genitourinary: Negative for difficulty urinating, flank pain and urgency  Musculoskeletal: Negative for back pain, gait problem and joint swelling     Skin: Negative for color change, pallor, rash and wound  Neurological: Negative for dizziness, seizures and light-headedness  Hematological: Negative for adenopathy  Does not bruise/bleed easily  Psychiatric/Behavioral: Negative for agitation and behavioral problems  Physical Exam  Physical Exam   Constitutional: She appears well-developed and well-nourished  HENT:   Head: Normocephalic and atraumatic  Right Ear: External ear normal    Left Ear: External ear normal    Nose: Nose normal    Mouth/Throat: Oropharynx is clear and moist    Eyes: Pupils are equal, round, and reactive to light  EOM are normal    Neck: Normal range of motion  Neck supple  Cardiovascular: Normal rate, regular rhythm, normal heart sounds and intact distal pulses  Pulmonary/Chest: Effort normal and breath sounds normal    Abdominal: Soft  Bowel sounds are normal  She exhibits no distension and no mass  There is tenderness  There is no rebound and no guarding  Musculoskeletal: Normal range of motion  Neurological: She is alert  Skin: Skin is warm  Capillary refill takes less than 2 seconds  Psychiatric: She has a normal mood and affect   Her behavior is normal  Judgment and thought content normal        Vital Signs  ED Triage Vitals [10/10/18 1643]   Temperature Pulse Respirations Blood Pressure SpO2   (!) 97 1 °F (36 2 °C) 99 18 144/77 97 %      Temp Source Heart Rate Source Patient Position - Orthostatic VS BP Location FiO2 (%)   Temporal Monitor Sitting Left arm --      Pain Score       9           Vitals:    10/10/18 1643   BP: 144/77   Pulse: 99   Patient Position - Orthostatic VS: Sitting       Visual Acuity      ED Medications  Medications - No data to display    Diagnostic Studies  Results Reviewed     Procedure Component Value Units Date/Time    Comprehensive metabolic panel [[id number]]  (Abnormal) Collected:  10/10/18 9817    Lab Status:  Final result Specimen:  Blood from Arm, Left Updated:  10/10/18 1220     Sodium 134 mmol/L      Potassium 3 6 mmol/L      Chloride 99 mmol/L      CO2 26 mmol/L      ANION GAP 9 mmol/L      BUN 9 mg/dL      Creatinine 0 58 (L) mg/dL      Glucose 315 (H) mg/dL      Calcium 9 4 mg/dL      AST 55 (H) U/L      ALT 81 (H) U/L      Alkaline Phosphatase 98 U/L      Total Protein 7 3 g/dL      Albumin 4 0 g/dL      Total Bilirubin 0 50 mg/dL      eGFR 124 ml/min/1 73sq m     Narrative:         National Kidney Disease Education Program recommendations are as follows:  GFR calculation is accurate only with a steady state creatinine  Chronic Kidney disease less than 60 ml/min/1 73 sq  meters  Kidney failure less than 15 ml/min/1 73 sq  meters      CBC and differential [[id number]]  (Abnormal) Collected:  10/10/18 1714    Lab Status:  Final result Specimen:  Blood from Arm, Left Updated:  10/10/18 1734     WBC 11 10 (H) Thousand/uL      RBC 5 62 (H) Million/uL      Hemoglobin 14 9 g/dL      Hematocrit 44 9 %      MCV 80 (L) fL      MCH 26 5 pg      MCHC 33 2 g/dL      RDW 15 3 (H) %      MPV 9 5 fL      Platelets 464 Thousands/uL      nRBC 0 /100 WBCs      Neutrophils Relative 67 %      Lymphocytes Relative 24 %      Monocytes Relative 7 %      Eosinophils Relative 1 %      Basophils Relative 1 %      Neutrophils Absolute 7 50 (H) Thousands/µL      Lymphocytes Absolute 2 60 Thousands/µL      Monocytes Absolute 0 80 Thousand/µL      Eosinophils Absolute 0 10 Thousand/µL      Basophils Absolute 0 10 Thousands/µL     UA w Reflex to Microscopic w Reflex to Culture [[id number]]  (Abnormal) Collected:  10/10/18 1724    Lab Status:  Final result Specimen:  Urine from Urine, Clean Catch Updated:  10/10/18 1733     Color, UA Yellow     Clarity, UA Clear     Specific Gravity, UA 1 015     pH, UA 6 0     Leukocytes, UA Negative     Nitrite, UA Negative     Protein, UA Negative mg/dl      Glucose, UA 3+ (A) mg/dl      Ketones, UA Negative mg/dl      Urobilinogen, UA 0 2 E U /dl      Bilirubin, UA Negative     Blood, UA Negative    POCT pregnancy, urine [[id number]]  (Normal) Resulted:  10/10/18 1724    Lab Status:  Final result Specimen:  Urine Updated:  10/10/18 1724     EXT PREG TEST UR (Ref: Negative) negative                 CT abdomen pelvis wo contrast    (Results Pending)   US pelvis complete w transvaginal    (Results Pending)              Procedures  Procedures       Phone Contacts  ED Phone Contact    ED Course                               [facility]  CritCare Time    Disposition  Final diagnoses:   None     ED Disposition     None      Follow-up Information    None         Patient's Medications   Discharge Prescriptions    No medications on file     No discharge procedures on file      ED Provider  Electronically Signed by           Es Delgado PA-C  10/10/18 [address] [place] [address],4 [place]  10/10/18 [address] [place] [address],4 [place]  10/10/18 1944

## 2018-10-10 NOTE — DISCHARGE INSTRUCTIONS
Ovarian Cyst   WHAT YOU NEED TO KNOW:   An ovarian cyst is a sac that grows on an ovary  This sac usually contains fluid, but may sometimes have blood or tissue in it  Most ovarian cysts are harmless and go away without treatment in a few months  Some cysts can grow large, cause pain, or break open  DISCHARGE INSTRUCTIONS:   Call 911 for any of the following:   · You are too weak or dizzy to stand up  Return to the emergency department if:   · You have severe abdominal pain  The pain may be sharp and sudden  · You have a fever  Contact your healthcare provider if:   · Your periods are early, late, or more painful than usual     · You have bleeding from your vagina that is not your period  · You have abdominal pain all the time  · Your abdomen is swollen  · You have feelings of fullness, pressure, or discomfort in your abdomen  · You have trouble urinating or emptying your bladder completely  · You have pain during sex  · You are losing weight without trying  · You have questions or concerns about your condition or care  Medicines: You may need any of the following:  · NSAIDs , such as ibuprofen, help decrease swelling, pain, and fever  This medicine is available with or without a doctor's order  NSAIDs can cause stomach bleeding or kidney problems in certain people  If you take blood thinner medicine, always ask if NSAIDs are safe for you  Always read the medicine label and follow directions  Do not give these medicines to children under 10months of age without direction from your child's healthcare provider  · Birth control pills  may help to control your periods, prevent cysts, or cause them to shrink  · Take your medicine as directed  Contact your healthcare provider if you think your medicine is not helping or if you have side effects  Tell him or her if you are allergic to any medicine  Keep a list of the medicines, vitamins, and herbs you take   Include the amounts, and when and why you take them  Bring the list or the pill bottles to follow-up visits  Carry your medicine list with you in case of an emergency  Follow up with your healthcare provider as directed:  Write down your questions so you remember to ask them during your visits  Apply heat to decrease pain and cramping:  Sit in a warm bath, or place a heating pad (turned on low) or a hot water bottle on your abdomen  Do this for 15 to 20 minutes every hour for as many days as directed  © 2017 [address] [employer] Information is for End User's use only and may not be sold, redistributed or otherwise used for commercial purposes  All illustrations and images included in CareNotes® are the copyrighted property of A D A M , Inc  or Fran Dumont  The above information is an  only  It is not intended as medical advice for individual conditions or treatments  Talk to your doctor, nurse or pharmacist before following any medical regimen to see if it is safe and effective for you  Ovarian Cyst   WHAT YOU NEED TO KNOW:   An ovarian cyst is a sac that grows on an ovary  This sac usually contains fluid, but may sometimes have blood or tissue in it  Most ovarian cysts are harmless and go away without treatment in a few months  Some cysts can grow large, cause pain, or break open  DISCHARGE INSTRUCTIONS:   Call 911 for any of the following:   · You are too weak or dizzy to stand up  Seek care immediately if:   · You have severe abdominal pain  The pain may be sharp and sudden  · You have a fever  Contact your healthcare provider if:   · Your periods are early, late, or more painful than usual     · You have bleeding from your vagina that is not your period  · You have abdominal pain all the time  · Your abdomen is swollen  · You have feelings of fullness, pressure, or discomfort in your abdomen  · You have trouble urinating or emptying your bladder completely      · You have Detail Level: Detailed pain during sex  · You are losing weight without trying  · You have questions or concerns about your condition or care  Medicines: You may need any of the following:  · NSAIDs , such as ibuprofen, help decrease swelling, pain, and fever  This medicine is available with or without a doctor's order  NSAIDs can cause stomach bleeding or kidney problems in certain people  If you take blood thinner medicine, always ask if NSAIDs are safe for you  Always read the medicine label and follow directions  Do not give these medicines to children under 10months of age without direction from your child's healthcare provider  · Birth control pills  may help to control your periods, prevent cysts, or cause them to shrink  · Take your medicine as directed  Contact your healthcare provider if you think your medicine is not helping or if you have side effects  Tell him or her if you are allergic to any medicine  Keep a list of the medicines, vitamins, and herbs you take  Include the amounts, and when and why you take them  Bring the list or the pill bottles to follow-up visits  Carry your medicine list with you in case of an emergency  Follow up with your healthcare provider as directed:  Write down your questions so you remember to ask them during your visits  Apply heat to decrease pain and cramping:  Sit in a warm bath, or place a heating pad (turned on low) or a hot water bottle on your abdomen  Do this for 15 to 20 minutes every hour for as many days as directed  © 2017 [address] Information is for End User's use only and may not be sold, redistributed or otherwise used for commercial purposes  All illustrations and images included in CareNotes® are the copyrighted property of A D A M , Inc  or Fran Dumont  The above information is an  only  It is not intended as medical advice for individual conditions or treatments   Talk to your doctor, nurse or pharmacist Isotretinoin Counseling: Patient should get monthly blood tests, not donate blood, not drive at night if vision affected, not share medication, and not undergo elective surgery for 6 months after tx completed. Side effects reviewed, pt to contact office should one occur. before following any medical regimen to see if it is safe and effective for you  Ovarian Cyst   Alfie GL : Evaluation of Pelvic Pain in the Reproductive Age Patient  In: Jose Maria Delacruz LM, Tal et al, [place]  Collin's Ultrasonography in Obstetrics and Gynecology, 6th ed  [address] [facility], [place], [place], 2017  Lexi Pulling [address] [facility] & Najmaandrew Butler CARL : Benign Conditions and Congenital Anomalies of the Ovaries and Fallopian Tubes  In: Reid Shaikh NF, Adeel Witt CJ, eds  [address] of Obstetrics and Gynecology, 6th ed  [address] [facility], [place], [place], 2016  Marcela GEORGE : Obstetrics and Gynaecology Emergencies  In: Ehsan BURDEN et al, eds  Textbook of Adult Emergency Medicine, 4th ed  [address] [facility], [place], [place], [address]158  King MURPHY & Cony Monreal LM : Ovarian Cyst/Torsion  In: Cynthia Villanueva RM, [employer], et al, eds  [address] [employer], 5th ed  [address] [facility],[address] Floor [facility], [place], [place], [address]  [facility]: Ovarian cysts  Office on [facility], [address] [place] [address]  Available from URL: [employer] es  html#  As accessed 2016-10-14  © 2017 [address] [facility] Information is for End User's use only and may not be sold, redistributed or otherwise used for commercial purposes  All illustrations and images included in CareNotes® are the copyrighted property of A D A [employer]  or Fran Dumont  The above information is an  only  It is not intended as medical advice for individual conditions or treatments  Talk to your doctor, nurse or pharmacist before following any medical regimen to see if it is safe and effective for you  Ovarian Cyst   AMBULATORY CARE:   An ovarian cyst  is a sac that grows on an ovary  This sac usually contains fluid, but may sometimes have blood or tissue in it   Most ovarian cysts are harmless and go away without treatment in a few Topical Sulfur Applications Counseling: Topical Sulfur Counseling: Patient counseled that this medication may cause skin irritation or allergic reactions.  In the event of skin irritation, the patient was advised to reduce the amount of the drug applied or use it less frequently.   The patient verbalized understanding of the proper use and possible adverse effects of topical sulfur application.  All of the patient's questions and concerns were addressed. months  Some cysts can grow large, cause pain, or break open  Signs and symptoms of an ovarian cyst:  You may not feel anything or know that you have a cyst, or you may have any of the following:  · Severe pain in your lower abdomen and pelvic area that may be sharp and sudden or feel like a dull ache    · Fullness and swelling in your lower abdomen     · Infertility (not able to get pregnant)    · Late or painful periods    · Small amounts of bleeding between your periods    · Lower abdominal or pelvic pain during sex    · Nausea or vomiting  Call 911 for any of the following:   · You are too weak or dizzy to stand up  Seek care immediately if:   · You have severe abdominal pain  The pain may be sharp and sudden  · You have a fever  Contact your healthcare provider if:   · Your periods are early, late, or more painful than usual     · You have bleeding from your vagina that is not your period  · You have abdominal pain all the time  · Your abdomen is swollen  · You have feelings of fullness, pressure, or discomfort in your abdomen  · You have trouble urinating or emptying your bladder completely  · You have pain during intercourse  · You are losing weight without trying  · You have questions or concerns about your condition or care  Treatment  will depend on your age, test results, and the kind of cyst you have  Your healthcare provider may wait to see if your ovarian cyst will go away without treatment  You may be given hormone medicine, such as birth control pills  This medicine may help to control your periods, shrink a cyst, and prevent new cysts from forming  You may need surgery to have the cyst removed  Apply heat to decrease pain and cramping:  Sit in a warm bath, or place a heating pad (turned on low) or a hot water bottle on your abdomen  Do this for 15 to 20 minutes every hour for as many days as directed     Follow up with your healthcare provider as directed:  Write down your questions so you remember to ask them during your visits  © 2017 [address] Information is for End User's use only and may not be sold, redistributed or otherwise used for commercial purposes  All illustrations and images included in CareNotes® are the copyrighted property of A D A M , Inc  or Fran Dumont  The above information is an  only  It is not intended as medical advice for individual conditions or treatments  Talk to your doctor, nurse or pharmacist before following any medical regimen to see if it is safe and effective for you  Ovarian Cyst   WHAT YOU NEED TO KNOW:   What is an ovarian cyst?  An ovarian cyst is a sac that grows on an ovary  This sac usually contains fluid, but may sometimes have blood or tissue in it  Most ovarian cysts are harmless and go away without treatment in a few months  Some cysts can grow large, cause pain, or break open  What causes an ovarian cyst?  Most ovarian cysts form during or after ovulation  Right before ovulation, your ovary forms a follicle  A follicle is a fluid-filled blister that has an egg inside  During ovulation, the follicle breaks open and releases the egg  A cyst may form if the follicle does not break open to release the egg  A cyst may form if the follicle opens to release the egg, but then closes and collects fluid  Endometriosis can also cause a cyst to form  Endometriosis is a condition in which tissue from your uterus grows on your ovaries   Some of this tissue may develop into a cyst   What are the signs and symptoms of an ovarian cyst?  You may not feel anything or know that you have a cyst, or you may have any of the following:  · Severe pain in your lower abdomen and pelvic area that may be sharp and sudden or feel like a dull ache    · Fullness and swelling in your lower abdomen     · Infertility (not able to get pregnant)    · Late or painful periods    · Small amounts of bleeding between your Topical Retinoid Pregnancy And Lactation Text: This medication is Pregnancy Category C. It is unknown if this medication is excreted in breast milk. periods    · Lower abdominal or pelvic pain during sex    · Nausea or vomiting  How is an ovarian cyst diagnosed? · Blood tests  may include hormone levels, tests for cancer, and a pregnancy test      · Ultrasound pictures  may show a cyst on your ovary  For this test, an ultrasound wand is inserted into your vagina and guided up toward your uterus  This helps your healthcare provider get a closer look at your ovaries  How is an ovarian cyst treated? Treatment will depend on your age, test results, and the kind of cyst you have  Your healthcare provider may wait to see if your ovarian cyst will go away without treatment  You may be given hormone medicine, such as birth control pills  This medicine may help to control your periods, shrink a cyst, and prevent new cysts from forming  You may need surgery to have the cyst removed  Call 911 for any of the following:   · You are too weak or dizzy to stand up  When should I seek immediate care? · You have severe abdominal pain  The pain may be sharp and sudden  · You have a fever  When should I contact my healthcare provider? · Your periods are early, late, or more painful than usual     · You have bleeding from your vagina that is not your period  · You have abdominal pain all the time  · Your abdomen is swollen  · You have feelings of fullness, pressure, or discomfort in your abdomen  · You have trouble urinating or emptying your bladder completely  · You have pain during intercourse  · You are losing weight without trying  · You have questions or concerns about your condition or care  CARE AGREEMENT:   You have the right to help plan your care  Learn about your health condition and how it may be treated  Discuss treatment options with your caregivers to decide what care you want to receive  You always have the right to refuse treatment  The above information is an  only   It is not intended as medical advice for Tazorac Pregnancy And Lactation Text: This medication is not safe during pregnancy. It is unknown if this medication is excreted in breast milk. individual conditions or treatments  Talk to your doctor, nurse or pharmacist before following any medical regimen to see if it is safe and effective for you  © 2017 [address] [employer] Information is for End User's use only and may not be sold, redistributed or otherwise used for commercial purposes  All illustrations and images included in CareNotes® are the copyrighted property of A D A M , Inc  or Fran Dumont  Benzoyl Peroxide Counseling: Patient counseled that medicine may cause skin irritation and bleach clothing.  In the event of skin irritation, the patient was advised to reduce the amount of the drug applied or use it less frequently.   The patient verbalized understanding of the proper use and possible adverse effects of benzoyl peroxide.  All of the patient's questions and concerns were addressed. Isotretinoin Pregnancy And Lactation Text: This medication is Pregnancy Category X and is considered extremely dangerous during pregnancy. It is unknown if it is excreted in breast milk. Birth Control Pills Pregnancy And Lactation Text: This medication should be avoided if pregnant and for the first 30 days post-partum. Detail Level: Zone High Dose Vitamin A Counseling: Side effects reviewed, pt to contact office should one occur. Erythromycin Pregnancy And Lactation Text: This medication is Pregnancy Category B and is considered safe during pregnancy. It is also excreted in breast milk. Tetracycline Pregnancy And Lactation Text: This medication is Pregnancy Category D and not consider safe during pregnancy. It is also excreted in breast milk. Tetracycline Counseling: Patient counseled regarding possible photosensitivity and increased risk for sunburn.  Patient instructed to avoid sunlight, if possible.  When exposed to sunlight, patients should wear protective clothing, sunglasses, and sunscreen.  The patient was instructed to call the office immediately if the following severe adverse effects occur:  hearing changes, easy bruising/bleeding, severe headache, or vision changes.  The patient verbalized understanding of the proper use and possible adverse effects of tetracycline.  All of the patient's questions and concerns were addressed. Patient understands to avoid pregnancy while on therapy due to potential birth defects. Bactrim Counseling:  I discussed with the patient the risks of sulfa antibiotics including but not limited to GI upset, allergic reaction, drug rash, diarrhea, dizziness, photosensitivity, and yeast infections.  Rarely, more serious reactions can occur including but not limited to aplastic anemia, agranulocytosis, methemoglobinemia, blood dyscrasias, liver or kidney failure, lung infiltrates or desquamative/blistering drug rashes. Dapsone Pregnancy And Lactation Text: This medication is Pregnancy Category C and is not considered safe during pregnancy or breast feeding. Topical Retinoid counseling:  Patient advised to apply a pea-sized amount only at bedtime and wait 30 minutes after washing their face before applying.  If too drying, patient may add a non-comedogenic moisturizer. The patient verbalized understanding of the proper use and possible adverse effects of retinoids.  All of the patient's questions and concerns were addressed. Spironolactone Counseling: Patient advised regarding risks of diarrhea, abdominal pain, hyperkalemia, birth defects (for female patients), liver toxicity and renal toxicity. The patient may need blood work to monitor liver and kidney function and potassium levels while on therapy. The patient verbalized understanding of the proper use and possible adverse effects of spironolactone.  All of the patient's questions and concerns were addressed. Topical Clindamycin Pregnancy And Lactation Text: This medication is Pregnancy Category B and is considered safe during pregnancy. It is unknown if it is excreted in breast milk. Erythromycin Counseling:  I discussed with the patient the risks of erythromycin including but not limited to GI upset, allergic reaction, drug rash, diarrhea, increase in liver enzymes, and yeast infections. Doxycycline Pregnancy And Lactation Text: This medication is Pregnancy Category D and not consider safe during pregnancy. It is also excreted in breast milk but is considered safe for shorter treatment courses. Topical Sulfur Applications Pregnancy And Lactation Text: This medication is Pregnancy Category C and has an unknown safety profile during pregnancy. It is unknown if this topical medication is excreted in breast milk. Birth Control Pills Counseling: Birth Control Pill Counseling: I discussed with the patient the potential side effects of OCPs including but not limited to increased risk of stroke, heart attack, thrombophlebitis, deep venous thrombosis, hepatic adenomas, breast changes, GI upset, headaches, and depression.  The patient verbalized understanding of the proper use and possible adverse effects of OCPs. All of the patient's questions and concerns were addressed. Tazorac Counseling:  Patient advised that medication is irritating and drying.  Patient may need to apply sparingly and wash off after an hour before eventually leaving it on overnight.  The patient verbalized understanding of the proper use and possible adverse effects of tazorac.  All of the patient's questions and concerns were addressed. Spironolactone Pregnancy And Lactation Text: This medication can cause feminization of the male fetus and should be avoided during pregnancy. The active metabolite is also found in breast milk. Dapsone Counseling: I discussed with the patient the risks of dapsone including but not limited to hemolytic anemia, agranulocytosis, rashes, methemoglobinemia, kidney failure, peripheral neuropathy, headaches, GI upset, and liver toxicity.  Patients who start dapsone require monitoring including baseline LFTs and weekly CBCs for the first month, then every month thereafter.  The patient verbalized understanding of the proper use and possible adverse effects of dapsone.  All of the patient's questions and concerns were addressed. Use Enhanced Medication Counseling?: No Doxycycline Counseling:  Patient counseled regarding possible photosensitivity and increased risk for sunburn.  Patient instructed to avoid sunlight, if possible.  When exposed to sunlight, patients should wear protective clothing, sunglasses, and sunscreen.  The patient was instructed to call the office immediately if the following severe adverse effects occur:  hearing changes, easy bruising/bleeding, severe headache, or vision changes.  The patient verbalized understanding of the proper use and possible adverse effects of doxycycline.  All of the patient's questions and concerns were addressed. Azithromycin Pregnancy And Lactation Text: This medication is considered safe during pregnancy and is also secreted in breast milk. Benzoyl Peroxide Pregnancy And Lactation Text: This medication is Pregnancy Category C. It is unknown if benzoyl peroxide is excreted in breast milk. High Dose Vitamin A Pregnancy And Lactation Text: High dose vitamin A therapy is contraindicated during pregnancy and breast feeding. Azithromycin Counseling:  I discussed with the patient the risks of azithromycin including but not limited to GI upset, allergic reaction, drug rash, diarrhea, and yeast infections. Bactrim Pregnancy And Lactation Text: This medication is Pregnancy Category D and is known to cause fetal risk.  It is also excreted in breast milk. Minocycline Counseling: Patient advised regarding possible photosensitivity and discoloration of the teeth, skin, lips, tongue and gums.  Patient instructed to avoid sunlight, if possible.  When exposed to sunlight, patients should wear protective clothing, sunglasses, and sunscreen.  The patient was instructed to call the office immediately if the following severe adverse effects occur:  hearing changes, easy bruising/bleeding, severe headache, or vision changes.  The patient verbalized understanding of the proper use and possible adverse effects of minocycline.  All of the patient's questions and concerns were addressed. Topical Clindamycin Counseling: Patient counseled that this medication may cause skin irritation or allergic reactions.  In the event of skin irritation, the patient was advised to reduce the amount of the drug applied or use it less frequently.   The patient verbalized understanding of the proper use and possible adverse effects of clindamycin.  All of the patient's questions and concerns were addressed.

## 2018-10-12 ENCOUNTER — HOSPITAL ENCOUNTER (EMERGENCY)
Facility: HOSPITAL | Age: 28
End: 2018-10-12
Attending: FAMILY MEDICINE | Admitting: FAMILY MEDICINE
Payer: COMMERCIAL

## 2018-10-12 ENCOUNTER — HOSPITAL ENCOUNTER (INPATIENT)
Facility: HOSPITAL | Age: 28
LOS: 2 days | Discharge: HOME | DRG: 663 | End: 2018-10-14
Attending: INTERNAL MEDICINE | Admitting: INTERNAL MEDICINE
Payer: COMMERCIAL

## 2018-10-12 VITALS
DIASTOLIC BLOOD PRESSURE: 83 MMHG | BODY MASS INDEX: 45.08 KG/M2 | HEART RATE: 89 BPM | TEMPERATURE: 98.3 F | SYSTOLIC BLOOD PRESSURE: 151 MMHG | RESPIRATION RATE: 18 BRPM | WEIGHT: 245 LBS | HEIGHT: 62 IN | OXYGEN SATURATION: 95 %

## 2018-10-12 DIAGNOSIS — R16.1 SPLENOMEGALY: ICD-10-CM

## 2018-10-12 DIAGNOSIS — N83.201 RIGHT OVARIAN CYST: ICD-10-CM

## 2018-10-12 DIAGNOSIS — R73.9 HYPERGLYCEMIA: ICD-10-CM

## 2018-10-12 DIAGNOSIS — I10 HYPERTENSION: ICD-10-CM

## 2018-10-12 DIAGNOSIS — R52 UNCONTROLLED PAIN: Primary | ICD-10-CM

## 2018-10-12 DIAGNOSIS — R10.32 ABDOMINAL PAIN, ACUTE, LEFT LOWER QUADRANT: ICD-10-CM

## 2018-10-12 DIAGNOSIS — R73.09 ELEVATED GLUCOSE: ICD-10-CM

## 2018-10-12 DIAGNOSIS — B19.20 HEPATITIS C: Chronic | ICD-10-CM

## 2018-10-12 DIAGNOSIS — R16.1 SPLENOMEGALY: Primary | ICD-10-CM

## 2018-10-12 DIAGNOSIS — B19.20 HEPATITIS C: ICD-10-CM

## 2018-10-12 DIAGNOSIS — N94.9 ADNEXAL CYST: ICD-10-CM

## 2018-10-12 PROBLEM — E87.1 HYPONATREMIA: Status: ACTIVE | Noted: 2018-10-12

## 2018-10-12 PROBLEM — R10.9 ABDOMINAL PAIN: Status: ACTIVE | Noted: 2018-10-12

## 2018-10-12 LAB
ALBUMIN SERPL BCP-MCNC: 4 G/DL (ref 3.5–5.7)
ALP SERPL-CCNC: 91 U/L (ref 40–150)
ALT SERPL W P-5'-P-CCNC: 86 U/L (ref 7–52)
ANION GAP SERPL CALCULATED.3IONS-SCNC: 6 MMOL/L (ref 4–13)
AST SERPL W P-5'-P-CCNC: 72 U/L (ref 13–39)
BASOPHILS # BLD AUTO: 0 THOUSANDS/ΜL (ref 0–0.1)
BASOPHILS NFR BLD AUTO: 1 % (ref 0–2)
BILIRUB SERPL-MCNC: 0.5 MG/DL (ref 0.2–1)
BILIRUB UR QL STRIP: NEGATIVE
BUN SERPL-MCNC: 9 MG/DL (ref 7–25)
CALCIUM SERPL-MCNC: 9.6 MG/DL (ref 8.6–10.5)
CHLORIDE SERPL-SCNC: 101 MMOL/L (ref 98–107)
CLARITY UR: CLEAR
CO2 SERPL-SCNC: 26 MMOL/L (ref 21–31)
COLOR UR: YELLOW
CREAT SERPL-MCNC: 0.54 MG/DL (ref 0.6–1.2)
EOSINOPHIL # BLD AUTO: 0.1 THOUSAND/ΜL (ref 0–0.61)
EOSINOPHIL NFR BLD AUTO: 2 % (ref 0–5)
ERYTHROCYTE [DISTWIDTH] IN BLOOD BY AUTOMATED COUNT: 15.1 % (ref 11.5–14.5)
GFR SERPL CREATININE-BSD FRML MDRD: 127 ML/MIN/1.73SQ M
GLUCOSE SERPL-MCNC: 269 MG/DL (ref 65–140)
GLUCOSE SERPL-MCNC: 385 MG/DL (ref 65–99)
GLUCOSE UR STRIP-MCNC: ABNORMAL MG/DL
HCT VFR BLD AUTO: 42.9 % (ref 34.8–46.1)
HGB BLD-MCNC: 14.6 G/DL (ref 12–16)
HGB UR QL STRIP.AUTO: NEGATIVE
KETONES UR STRIP-MCNC: NEGATIVE MG/DL
LEUKOCYTE ESTERASE UR QL STRIP: NEGATIVE
LYMPHOCYTES # BLD AUTO: 1.6 THOUSANDS/ΜL (ref 0.6–4.47)
LYMPHOCYTES NFR BLD AUTO: 23 % (ref 21–51)
MCH RBC QN AUTO: 27.2 PG (ref 26–34)
MCHC RBC AUTO-ENTMCNC: 34 G/DL (ref 31–37)
MCV RBC AUTO: 80 FL (ref 81–99)
MONOCYTES # BLD AUTO: 0.7 THOUSAND/ΜL (ref 0.17–1.22)
MONOCYTES NFR BLD AUTO: 10 % (ref 2–12)
NEUTROPHILS # BLD AUTO: 4.4 THOUSANDS/ΜL (ref 1.4–6.5)
NEUTS SEG NFR BLD AUTO: 65 % (ref 42–75)
NITRITE UR QL STRIP: NEGATIVE
NRBC BLD AUTO-RTO: 0 /100 WBCS
PH UR STRIP.AUTO: 5 [PH] (ref 5–8)
PLATELET # BLD AUTO: 175 THOUSANDS/UL (ref 149–390)
PMV BLD AUTO: 9.2 FL (ref 8.6–11.7)
POTASSIUM SERPL-SCNC: 3.8 MMOL/L (ref 3.5–5.5)
PROT SERPL-MCNC: 7.2 G/DL (ref 6.4–8.9)
PROT UR STRIP-MCNC: NEGATIVE MG/DL
RBC # BLD AUTO: 5.38 MILLION/UL (ref 3.9–5.2)
SODIUM SERPL-SCNC: 133 MMOL/L (ref 134–143)
SP GR UR STRIP.AUTO: <=1.005 (ref 1–1.03)
UROBILINOGEN UR QL STRIP.AUTO: 0.2 E.U./DL
WBC # BLD AUTO: 6.8 THOUSAND/UL (ref 4.8–10.8)

## 2018-10-12 PROCEDURE — 96375 TX/PRO/DX INJ NEW DRUG ADDON: CPT

## 2018-10-12 PROCEDURE — 86665 EPSTEIN-BARR CAPSID VCA: CPT | Performed by: PHYSICIAN ASSISTANT

## 2018-10-12 PROCEDURE — 96374 THER/PROPH/DIAG INJ IV PUSH: CPT

## 2018-10-12 PROCEDURE — 99284 EMERGENCY DEPT VISIT MOD MDM: CPT

## 2018-10-12 PROCEDURE — 99223 1ST HOSP IP/OBS HIGH 75: CPT | Performed by: PHYSICIAN ASSISTANT

## 2018-10-12 PROCEDURE — 86645 CMV ANTIBODY IGM: CPT | Performed by: PHYSICIAN ASSISTANT

## 2018-10-12 PROCEDURE — 86644 CMV ANTIBODY: CPT | Performed by: PHYSICIAN ASSISTANT

## 2018-10-12 PROCEDURE — 36415 COLL VENOUS BLD VENIPUNCTURE: CPT | Performed by: PHYSICIAN ASSISTANT

## 2018-10-12 PROCEDURE — 85025 COMPLETE CBC W/AUTO DIFF WBC: CPT | Performed by: PHYSICIAN ASSISTANT

## 2018-10-12 PROCEDURE — 86664 EPSTEIN-BARR NUCLEAR ANTIGEN: CPT | Performed by: PHYSICIAN ASSISTANT

## 2018-10-12 PROCEDURE — 86663 EPSTEIN-BARR ANTIBODY: CPT | Performed by: PHYSICIAN ASSISTANT

## 2018-10-12 PROCEDURE — 80053 COMPREHEN METABOLIC PANEL: CPT | Performed by: PHYSICIAN ASSISTANT

## 2018-10-12 PROCEDURE — 81003 URINALYSIS AUTO W/O SCOPE: CPT | Performed by: PHYSICIAN ASSISTANT

## 2018-10-12 PROCEDURE — 82948 REAGENT STRIP/BLOOD GLUCOSE: CPT

## 2018-10-12 RX ORDER — SODIUM CHLORIDE 9 MG/ML
50 INJECTION, SOLUTION INTRAVENOUS CONTINUOUS
Status: DISCONTINUED | OUTPATIENT
Start: 2018-10-12 | End: 2018-10-14 | Stop reason: HOSPADM

## 2018-10-12 RX ORDER — KETOROLAC TROMETHAMINE 30 MG/ML
30 INJECTION, SOLUTION INTRAMUSCULAR; INTRAVENOUS ONCE
Status: COMPLETED | OUTPATIENT
Start: 2018-10-12 | End: 2018-10-12

## 2018-10-12 RX ORDER — ONDANSETRON 2 MG/ML
4 INJECTION INTRAMUSCULAR; INTRAVENOUS EVERY 6 HOURS PRN
Status: DISCONTINUED | OUTPATIENT
Start: 2018-10-12 | End: 2018-10-14 | Stop reason: HOSPADM

## 2018-10-12 RX ORDER — KETOROLAC TROMETHAMINE 30 MG/ML
15 INJECTION, SOLUTION INTRAMUSCULAR; INTRAVENOUS EVERY 6 HOURS PRN
Status: DISCONTINUED | OUTPATIENT
Start: 2018-10-12 | End: 2018-10-14 | Stop reason: HOSPADM

## 2018-10-12 RX ADMIN — SODIUM CHLORIDE 50 ML/HR: 0.9 INJECTION, SOLUTION INTRAVENOUS at 21:25

## 2018-10-12 RX ADMIN — KETOROLAC TROMETHAMINE 30 MG: 30 INJECTION, SOLUTION INTRAMUSCULAR at 14:48

## 2018-10-12 RX ADMIN — INSULIN LISPRO 2 UNITS: 100 INJECTION, SOLUTION INTRAVENOUS; SUBCUTANEOUS at 21:24

## 2018-10-12 RX ADMIN — MORPHINE SULFATE 2 MG: 2 INJECTION, SOLUTION INTRAMUSCULAR; INTRAVENOUS at 17:20

## 2018-10-12 NOTE — LETTER
[address]  Dept: [phone]    October 14, 2018     Patient: Janessa Suresh   YOB: 1990   Date of Visit: 10/12/2018       To Whom it May Concern:    Janessa Suresh is under my professional care  She was seen in the hospital from 10/12/2018   to 10/14/18  She may return to work on 10/15/2018 with no restrictions  If you have any questions or concerns, please don't hesitate to call           Sincerely,          Darshan Yost, DO

## 2018-10-12 NOTE — ED PROVIDER NOTES
History  Chief Complaint   Patient presents with    Abdominal Pain     lower left abdominal pain  seen here two days ago for same  patient denies N,V,D     Patient presents emergency room with complaint of left sided abdominal pain  She states this began 2 days ago she presented emergency room at that time had a full workup with lab work CT abdomen and pelvis as well as ultrasound of the pelvic region right-sided ovarian supposed cyst was noted but otherwise no acute findings particularly in the left lower quadrant no acute findings  Patient denies fevers or chills she denies nausea vomiting or diarrhea she denies any additional symptoms of lightheaded dizziness chest pain shortness of breath  She has complaints only left lower quadrant pain which she says radiates to her lower back  No history of kidney stone she denies any urinary complaints dysuria frequency urgency gross hematuria  Patient states she has been trying tramadol at home with no improvement  History provided by:  Patient   used: No        Allergies   Allergen Reactions    Prednisone Swelling         Prior to Admission Medications   Prescriptions Last Dose Informant Patient Reported? Taking?   lisinopril (ZESTRIL) 5 mg tablet   Yes No   Sig: Take 5 mg by mouth daily   traMADol (ULTRAM) 50 mg tablet   No No   Sig: Take 1 tablet (50 mg total) by mouth every 6 (six) hours as needed for moderate pain for up to 10 doses      Facility-Administered Medications: None       Past Medical History:   Diagnosis Date    Hepatitis C     Hypertension     Pulmonary artery congenital abnormality        Past Surgical History:   Procedure Laterality Date    CARDIAC CATHETERIZATION      no CAD    CHOLECYSTECTOMY      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 8    LIVER BIOPSY      VSD REPAIR      As a child       History reviewed  No pertinent family history  I have reviewed and agree with the history as documented      Social History   Substance Use Topics    Smoking status: Former Smoker    Smokeless tobacco: Never Used    Alcohol use No        Review of Systems   Constitutional: Negative for chills, diaphoresis, fatigue and fever  HENT: Negative for congestion, ear pain, rhinorrhea, sneezing and sore throat  Respiratory: Negative for cough, shortness of breath, wheezing and stridor  Cardiovascular: Negative for chest pain, palpitations and leg swelling  Gastrointestinal: Positive for abdominal pain  Negative for abdominal distention, blood in stool, constipation, diarrhea, nausea and vomiting  Genitourinary: Negative for difficulty urinating, dysuria, frequency, hematuria and urgency  Musculoskeletal: Positive for back pain (lower)  Negative for gait problem, myalgias and neck pain  Skin: Negative for rash  Neurological: Negative for dizziness, syncope, weakness, light-headedness and headaches  All other systems reviewed and are negative  Physical Exam  Physical Exam   Constitutional: She is oriented to person, place, and time  She appears well-developed and well-nourished  HENT:   Head: Normocephalic and atraumatic  Eyes: Pupils are equal, round, and reactive to light  Conjunctivae and EOM are normal    Neck: Normal range of motion  Neck supple  No tracheal deviation present  Cardiovascular: Normal rate, regular rhythm, normal heart sounds and intact distal pulses  No murmur heard  Pulmonary/Chest: Effort normal and breath sounds normal  No respiratory distress  She has no wheezes  She has no rales  Abdominal: Soft  Bowel sounds are normal  She exhibits no distension and no mass  There is tenderness in the left upper quadrant and left lower quadrant  There is no tenderness at McBurney's point and negative Graf's sign  Musculoskeletal: Normal range of motion  She exhibits no edema  Lumbar back: She exhibits tenderness  She exhibits normal range of motion and no swelling  Neurological: She is alert and oriented to person, place, and time  Skin: Skin is warm and dry  No rash noted  No erythema  Nursing note and vitals reviewed  Vital Signs  ED Triage Vitals [10/12/18 1318]   Temperature Pulse Respirations Blood Pressure SpO2   98 3 °F (36 8 °C) 101 16 145/70 96 %      Temp Source Heart Rate Source Patient Position - Orthostatic VS BP Location FiO2 (%)   Temporal Monitor Sitting Left arm --      Pain Score       9           Vitals:    10/12/18 1318   BP: 145/70   Pulse: 101   Patient Position - Orthostatic VS: Sitting       Visual Acuity      ED Medications  Medications   morphine injection 2 mg (not administered)   ketorolac (TORADOL) injection 30 mg (30 mg Intravenous Given 10/12/18 1448)       Diagnostic Studies  Results Reviewed     Procedure Component Value Units Date/Time    Comprehensive metabolic panel [[id number]]  (Abnormal) Collected:  10/12/18 1430    Lab Status:  Final result Specimen:  Blood from Arm, Right Updated:  10/12/18 1457     Sodium 133 (L) mmol/L      Potassium 3 8 mmol/L      Chloride 101 mmol/L      CO2 26 mmol/L      ANION GAP 6 mmol/L      BUN 9 mg/dL      Creatinine 0 54 (L) mg/dL      Glucose 385 (H) mg/dL      Calcium 9 6 mg/dL      AST 72 (H) U/L      ALT 86 (H) U/L      Alkaline Phosphatase 91 U/L      Total Protein 7 2 g/dL      Albumin 4 0 g/dL      Total Bilirubin 0 50 mg/dL      eGFR 127 ml/min/1 73sq m     Narrative:         National Kidney Disease Education Program recommendations are as follows:  GFR calculation is accurate only with a steady state creatinine  Chronic Kidney disease less than 60 ml/min/1 73 sq  meters  Kidney failure less than 15 ml/min/1 73 sq  meters      CBC and differential [[id number]]  (Abnormal) Collected:  10/12/18 1430    Lab Status:  Final result Specimen:  Blood from Arm, Right Updated:  10/12/18 1449     WBC 6 80 Thousand/uL      RBC 5 38 (H) Million/uL      Hemoglobin 14 6 g/dL      Hematocrit 42 9 %      MCV 80 (L) fL      MCH 27 2 pg      MCHC 34 0 g/dL      RDW 15 1 (H) %      MPV 9 2 fL      Platelets 585 Thousands/uL      nRBC 0 /100 WBCs      Neutrophils Relative 65 %      Lymphocytes Relative 23 %      Monocytes Relative 10 %      Eosinophils Relative 2 %      Basophils Relative 1 %      Neutrophils Absolute 4 40 Thousands/µL      Lymphocytes Absolute 1 60 Thousands/µL      Monocytes Absolute 0 70 Thousand/µL      Eosinophils Absolute 0 10 Thousand/µL      Basophils Absolute 0 00 Thousands/µL     UA w Reflex to Microscopic w Reflex to Culture [[id number]]  (Abnormal) Collected:  10/12/18 1404    Lab Status:  Final result Specimen:  Urine from Urine, Clean Catch Updated:  10/12/18 1433     Color, UA Yellow     Clarity, UA Clear     Specific Gravity, UA <=1 005 (L)     pH, UA 5 0     Leukocytes, UA Negative     Nitrite, UA Negative     Protein, UA Negative mg/dl      Glucose, UA 3+ (A) mg/dl      Ketones, UA Negative mg/dl      Urobilinogen, UA 0 2 E U /dl      Bilirubin, UA Negative     Blood, UA Negative    EBV acute panel [[id number]] Collected:  10/12/18 1430    Lab Status: In process Specimen:  Blood from Arm, Right Updated:  10/12/18 1433    CMV IgG/IgM Antibodies [[id number]] Collected:  10/12/18 1430    Lab Status: In process Specimen:  Blood from Arm, Right Updated:  10/12/18 1433                 No orders to display              Procedures  Procedures       Phone Contacts  ED Phone Contact    ED Course  ED Course as of Oct 12 1622   Fri Oct 12, 2018   1409 Call placed to Dr Raisa Porter of surgery regarding splenomegaly noted on CT and continued pain  Discussed case in detail reviewed labs and CT findings from 10/10/18 ER visit  Albertina Darby recommended admission to Medicine with a hematology consultation  Will contact medicine for admission  [phone] Call placed to hospitalist for admission, awaiting decision based on if hem/onc is available      1538 Return call from hospitalist, heme/onc not available today or this weekend recommended transfer  1558 D/w transfer center, and Dr Roopa Armstrong hospitalist from  [facility]  Awaiting return call if hem/onc is available on the weekends there  If not, will admit here for pain control  1613 Return call from transfer center Dr Roopa Armstrong hospitalist at [address] is admitting physician  1614 Patient's pain is returned will provide 2 mg IV morphine  MDM  Number of Diagnoses or Management Options     Amount and/or Complexity of Data Reviewed  Clinical lab tests: ordered and reviewed  Tests in the radiology section of CPT®: ordered and reviewed  Review and summarize past medical records: yes  Independent visualization of images, tracings, or specimens: yes    Risk of Complications, Morbidity, and/or Mortality  Presenting problems: moderate  Diagnostic procedures: moderate  Management options: moderate    Patient Progress  Patient progress: stable    CritCare Time    Disposition  Final diagnoses:   Uncontrolled pain   Splenomegaly   Hypertension   Hepatitis C   Elevated glucose     Time reflects when diagnosis was documented in both MDM as applicable and the Disposition within this note     Time User Action Codes Description Comment    10/12/2018  3:40 PM Draper Colla Add [R52] Uncontrolled pain     10/12/2018  3:40 PM Draper Colla Add [R16 1] Splenomegaly     10/12/2018  3:40 PM Draper Colla Add [I10] Hypertension     10/12/2018  3:40 PM Leocadia Masker M Add [B19 20] Hepatitis C     10/12/2018  3:40 PM Leocadia Masker M Modify [B19 20] Hepatitis C     10/12/2018  4:19 PM Leocadia Masker M Add [R73 09] Elevated glucose       ED Disposition     ED Disposition Condition Comment    Transfer to [address] should be transferred out to [facility], Dr Roopa Armstrong accepting physician         MD Documentation      Most Recent Value   Patient Condition  The patient has been stabilized such that within reasonable medical probability, no material deterioration of the patient condition or the condition of the unborn child(mayda) is likely to result from the transfer   Reason for Transfer  Other (Include comment)____________________ [hem/onc consult]   Benefits of Transfer  Specialized equipment and/or services available at the receiving facility (Include comment)________________________ [hem/onc]   Risks of Transfer  Potential for delay in receiving treatment, Potential deterioration of medical condition, Increased discomfort during transfer, Loss of IV, Possible worsening of condition or death during transfer, Other: (Include comment)__________________________ [splenic rupture]   Accepting Physician  Dr [facility] [address], [place], [place]    (Name & Tel number)  Fady Lawson [phone]   Transported by (Company and Unit #)  [address] [facility] MD Claribel Wilson, Dr Halina Fernandez attending   Provider Certification  General risk, such as traffic hazards, adverse weather conditions, rough terrain or turbulence, possible failure of equipment (including vehicle or aircraft), or consequences of actions of persons outside the control of the transport personnel      RN Documentation      [address] [facility] [address], [place], [place]    (Name & Tel number)  Fady Lawson [phone]   Transported by (Company and Unit #)  [place]      Follow-up Information    None         Patient's Medications   Discharge Prescriptions    No medications on file     No discharge procedures on file      ED Provider  Electronically Signed by           Alice Rm PA-C  10/12/18 2978

## 2018-10-12 NOTE — ASSESSMENT & PLAN NOTE
Asked for admission and transfer from [place]  In the setting of obesity and known hepatitis C w/hepatic steatosis evidenced by Ct A/P  Surgery was asked to comment at [facility] and felt unlikely risk for spontaneous rupture or infarct of spleen but recommended IP admission and hem/onc consult which is reason for transfer here  -Unlikely source for pt's abdominal pain as pt pain is LLQ and NONPLEURITIC W/O REFERRED SHOULDER PAIN  CT A/P w/o contrast negative for any evidence of infarction or abscess although limited w/o contrast     -no anemia although pt has chronic low mcv so doubt hemolytic process or scd  -no leukocytosis concerning for underlying blood malignancy  -no thrombocytopenia  -no trauma  -test for HIV as noted under hep c  Will check dedicated RUQ u/s to r/o cirrhosis and splenomegaly as early complication of portal HTN  D/w ginette Bob    Will defer hem/onc consultation at this time in lieu of gi consultation     -Consider hem/onc if pt develops evidence for hemolytic anemia, thrombocytopenia, or leukocytosis concerning for splenic sequestration

## 2018-10-12 NOTE — EMTALA/ACUTE CARE TRANSFER
[address] [facility] [address]  [address] [phone]  [phone]  Dept: [phone]      EMTALA TRANSFER CONSENT    NAME Terry Rebollar                                         1990                              MRN [id number]    I have been informed of my rights regarding examination, treatment, and transfer   by Dr Angel Julian MD    Benefits: Specialized equipment and/or services available at the receiving facility (Include comment)________________________ (hem/onc)    Risks: Potential for delay in receiving treatment, Potential deterioration of medical condition, Increased discomfort during transfer, Loss of IV, Possible worsening of condition or death during transfer, Other: (Include comment)__________________________ (splenic rupture)      Consent for Transfer:  I acknowledge that my medical condition has been evaluated and explained to me by the emergency department physician or other qualified medical person and/or my attending physician, who has recommended that I be transferred to the service of  Accepting Physician: Dr Saul Saez at [address], [address] : [place], [place]  The above potential benefits of such transfer, the potential risks associated with such transfer, and the probable risks of not being transferred have been explained to me, and I fully understand them  The doctor has explained that, in my case, the benefits of transfer outweigh the risks  I agree to be transferred  I authorize the performance of emergency medical procedures and treatments upon me in both transit and upon arrival at the receiving facility  Additionally, I authorize the release of any and all medical records to the receiving facility and request they be transported with me, if possible  I understand that the safest mode of transportation during a medical emergency is an ambulance and that the Hospital advocates the use of this mode of transport   Risks of traveling to the receiving facility by car, including absence of medical control, life sustaining equipment, such as oxygen, and medical personnel has been explained to me and I fully understand them  (SARY CORRECT BOX BELOW)  [  ]  I consent to the stated transfer and to be transported by ambulance/helicopter  [  ]  I consent to the stated transfer, but refuse transportation by ambulance and accept full responsibility for my transportation by car  I understand the risks of non-ambulance transfers and I exonerate the Hospital and its staff from any deterioration in my condition that results from this refusal     X___________________________________________    DATE  10/12/18  TIME________  Signature of patient or legally responsible individual signing on patient behalf           RELATIONSHIP TO PATIENT_________________________          Provider Certification    NAME Ally Jesus                                        [facility] 1990                              MRN [id number]    A medical screening exam was performed on the above named patient  Based on the examination:    Condition Necessitating Transfer The primary encounter diagnosis was Uncontrolled pain  Diagnoses of Splenomegaly, Hypertension, Hepatitis C, and Elevated glucose were also pertinent to this visit      Patient Condition: The patient has been stabilized such that within reasonable medical probability, no material deterioration of the patient condition or the condition of the unborn child(mayda) is likely to result from the transfer    Reason for Transfer: Other (Include comment)____________________ (hem/onc consult)    Transfer Requirements: [address]   · Space available and qualified personnel available for treatment as acknowledged by Sanjiv Ruby [phone]  · Agreed to accept transfer and to provide appropriate medical treatment as acknowledged by       Dr Nhan Marques  · Appropriate medical records of the examination and treatment of the patient are provided at the time of transfer   [address] _______  · Transfer will be performed by qualified personnel from [place]  and appropriate transfer equipment as required, including the use of necessary and appropriate life support measures  Provider Certification: I have examined the patient and explained the following risks and benefits of being transferred/refusing transfer to the patient/family:  General risk, such as traffic hazards, adverse weather conditions, rough terrain or turbulence, possible failure of equipment (including vehicle or aircraft), or consequences of actions of persons outside the control of the transport personnel      Based on these reasonable risks and benefits to the patient and/or the unborn child(mayda), and based upon the information available at the time of the patients examination, I certify that the medical benefits reasonably to be expected from the provision of appropriate medical treatments at another medical facility outweigh the increasing risks, if any, to the individuals medical condition, and in the case of labor to the unborn child, from effecting the transfer      X____________________________________________ DATE 10/12/18        TIME_______      ORIGINAL - SEND TO MEDICAL RECORDS   COPY - SEND WITH PATIENT DURING TRANSFER

## 2018-10-12 NOTE — ED NOTES
Multiple attempts made to give nurse to nurse report to  Y499 to phone number [phone]  Busy tones  Will continue to try to attempt to give report        Sandie Keller RN  10/12/18 3549

## 2018-10-13 ENCOUNTER — APPOINTMENT (INPATIENT)
Dept: ULTRASOUND IMAGING | Facility: HOSPITAL | Age: 28
DRG: 663 | End: 2018-10-13
Payer: COMMERCIAL

## 2018-10-13 LAB
ALBUMIN SERPL BCP-MCNC: 2.9 G/DL (ref 3.5–5)
ALP SERPL-CCNC: 98 U/L (ref 46–116)
ALT SERPL W P-5'-P-CCNC: 109 U/L (ref 12–78)
ANION GAP SERPL CALCULATED.3IONS-SCNC: 7 MMOL/L (ref 4–13)
AST SERPL W P-5'-P-CCNC: 94 U/L (ref 5–45)
BILIRUB SERPL-MCNC: 0.49 MG/DL (ref 0.2–1)
BUN SERPL-MCNC: 8 MG/DL (ref 5–25)
CALCIUM SERPL-MCNC: 8.8 MG/DL (ref 8.3–10.1)
CHLORIDE SERPL-SCNC: 103 MMOL/L (ref 100–108)
CMV IGG SERPL IA-ACNC: <0.6 U/ML (ref 0–0.59)
CMV IGM SERPL IA-ACNC: <30 AU/ML (ref 0–29.9)
CO2 SERPL-SCNC: 25 MMOL/L (ref 21–32)
CREAT SERPL-MCNC: 0.57 MG/DL (ref 0.6–1.3)
EBV EA IGG SER-ACNC: <9 U/ML (ref 0–8.9)
EBV NA IGG SER IA-ACNC: >600 U/ML (ref 0–17.9)
EBV PATRN SPEC IB-IMP: ABNORMAL
EBV VCA IGG SER IA-ACNC: 132 U/ML (ref 0–17.9)
EBV VCA IGM SER IA-ACNC: <36 U/ML (ref 0–35.9)
ERYTHROCYTE [DISTWIDTH] IN BLOOD BY AUTOMATED COUNT: 14.6 % (ref 11.6–15.1)
EST. AVERAGE GLUCOSE BLD GHB EST-MCNC: 240 MG/DL
GFR SERPL CREATININE-BSD FRML MDRD: 125 ML/MIN/1.73SQ M
GLUCOSE SERPL-MCNC: 220 MG/DL (ref 65–140)
GLUCOSE SERPL-MCNC: 220 MG/DL (ref 65–140)
GLUCOSE SERPL-MCNC: 229 MG/DL (ref 65–140)
GLUCOSE SERPL-MCNC: 253 MG/DL (ref 65–140)
GLUCOSE SERPL-MCNC: 279 MG/DL (ref 65–140)
HBA1C MFR BLD: 10 % (ref 4.2–6.3)
HBV CORE AB SER QL: ABNORMAL
HBV CORE IGM SER QL: ABNORMAL
HBV SURFACE AG SER QL: ABNORMAL
HCT VFR BLD AUTO: 40.9 % (ref 34.8–46.1)
HCV AB SER QL: ABNORMAL
HGB BLD-MCNC: 13.5 G/DL (ref 11.5–15.4)
HIV 1+2 AB+HIV1 P24 AG SERPL QL IA: NORMAL
HIV1 P24 AG SER QL: NORMAL
MCH RBC QN AUTO: 26.6 PG (ref 26.8–34.3)
MCHC RBC AUTO-ENTMCNC: 33 G/DL (ref 31.4–37.4)
MCV RBC AUTO: 81 FL (ref 82–98)
PLATELET # BLD AUTO: 195 THOUSANDS/UL (ref 149–390)
PMV BLD AUTO: 11 FL (ref 8.9–12.7)
POTASSIUM SERPL-SCNC: 3.7 MMOL/L (ref 3.5–5.3)
PROT SERPL-MCNC: 6.7 G/DL (ref 6.4–8.2)
RBC # BLD AUTO: 5.07 MILLION/UL (ref 3.81–5.12)
SODIUM SERPL-SCNC: 135 MMOL/L (ref 136–145)
WBC # BLD AUTO: 7.85 THOUSAND/UL (ref 4.31–10.16)

## 2018-10-13 PROCEDURE — 99253 IP/OBS CNSLTJ NEW/EST LOW 45: CPT | Performed by: INTERNAL MEDICINE

## 2018-10-13 PROCEDURE — 86803 HEPATITIS C AB TEST: CPT | Performed by: PHYSICIAN ASSISTANT

## 2018-10-13 PROCEDURE — 83036 HEMOGLOBIN GLYCOSYLATED A1C: CPT | Performed by: PHYSICIAN ASSISTANT

## 2018-10-13 PROCEDURE — 80053 COMPREHEN METABOLIC PANEL: CPT | Performed by: PHYSICIAN ASSISTANT

## 2018-10-13 PROCEDURE — 85027 COMPLETE CBC AUTOMATED: CPT | Performed by: PHYSICIAN ASSISTANT

## 2018-10-13 PROCEDURE — 76700 US EXAM ABDOM COMPLETE: CPT

## 2018-10-13 PROCEDURE — 86705 HEP B CORE ANTIBODY IGM: CPT | Performed by: PHYSICIAN ASSISTANT

## 2018-10-13 PROCEDURE — 86704 HEP B CORE ANTIBODY TOTAL: CPT | Performed by: PHYSICIAN ASSISTANT

## 2018-10-13 PROCEDURE — 87340 HEPATITIS B SURFACE AG IA: CPT | Performed by: PHYSICIAN ASSISTANT

## 2018-10-13 PROCEDURE — 99232 SBSQ HOSP IP/OBS MODERATE 35: CPT | Performed by: HOSPITALIST

## 2018-10-13 PROCEDURE — 82948 REAGENT STRIP/BLOOD GLUCOSE: CPT

## 2018-10-13 PROCEDURE — 87806 HIV AG W/HIV1&2 ANTB W/OPTIC: CPT | Performed by: PHYSICIAN ASSISTANT

## 2018-10-13 PROCEDURE — 87522 HEPATITIS C REVRS TRNSCRPJ: CPT | Performed by: PHYSICIAN ASSISTANT

## 2018-10-13 RX ADMIN — KETOROLAC TROMETHAMINE 15 MG: 30 INJECTION, SOLUTION INTRAMUSCULAR at 19:48

## 2018-10-13 RX ADMIN — INSULIN LISPRO 2 UNITS: 100 INJECTION, SOLUTION INTRAVENOUS; SUBCUTANEOUS at 17:14

## 2018-10-13 RX ADMIN — KETOROLAC TROMETHAMINE 15 MG: 30 INJECTION, SOLUTION INTRAMUSCULAR at 13:00

## 2018-10-13 RX ADMIN — INSULIN LISPRO 1 UNITS: 100 INJECTION, SOLUTION INTRAVENOUS; SUBCUTANEOUS at 07:43

## 2018-10-13 RX ADMIN — INSULIN LISPRO 2 UNITS: 100 INJECTION, SOLUTION INTRAVENOUS; SUBCUTANEOUS at 11:48

## 2018-10-13 RX ADMIN — KETOROLAC TROMETHAMINE 15 MG: 30 INJECTION, SOLUTION INTRAMUSCULAR at 05:13

## 2018-10-13 RX ADMIN — INSULIN LISPRO 2 UNITS: 100 INJECTION, SOLUTION INTRAVENOUS; SUBCUTANEOUS at 20:59

## 2018-10-13 NOTE — ASSESSMENT & PLAN NOTE
2* pediatric blood transfusion  Hx of s/p intereferon and addt'l therapy 5 years prior  Has been noncompliant for unclear reasons however    Last bx 5 years prior with myofibrosis

## 2018-10-13 NOTE — PROGRESS NOTES
Progress Note - Martha Ziegler 1990, 25 y o  female MRN: [id number]    Unit/Bed#: E5 -01 Encounter: [id number]    Primary Care Provider: No primary care provider on file  Date and time admitted to hospital: 10/12/2018  6:32 PM        Hepatitis C   Assessment & Plan    2* pediatric blood transfusion  Hx of s/p intereferon and addt'l therapy 5 years prior  Has been noncompliant for unclear reasons however  Last bx 5 years prior with myofibrosis       * Splenomegaly   Assessment & Plan    She complains of LLQ pain  Too me it seems too low to be related to splenomegally  However, I don't see another obvious source  Appreciate gyn and gi eval  Will ask heme onc to see             Subjective:   Still has some mild LLQ abdominal pain  She is having normal stools  No trauma  No fever/chills        Objective:     Vitals:   Temp (24hrs), Av 9 °F (36 6 °C), Min:97 3 °F (36 3 °C), Max:98 3 °F (36 8 °C)    Temp:  [97 3 °F (36 3 °C)-98 3 °F (36 8 °C)] 98 3 °F (36 8 °C)  HR:  [83-95] 95  Resp:  [18-20] 18  BP: (131-155)/(63-83) 155/74  SpO2:  [94 %-96 %] 94 %  There is no height or weight on file to calculate BMI  Input and Output Summary (last 24 hours):     No intake or output data in the 24 hours ending 10/13/18 1501    Physical Exam:     Physical Exam   HENT:   Head: Normocephalic and atraumatic  Eyes: Pupils are equal, round, and reactive to light  EOM are normal    Cardiovascular: Normal rate and regular rhythm  Exam reveals no gallop and no friction rub  No murmur heard  Pulmonary/Chest: Effort normal and breath sounds normal  She has no wheezes  She has no rales  Abdominal: Soft  There is tenderness (mild LLQ pain to deep palpation  ? hard area of the colon)  Musculoskeletal: She exhibits no edema  Nursing note and vitals reviewed              Additional Data:     Labs:      Results from last 7 days  Lab Units 10/13/18  0511 10/12/18  1430   WBC Thousand/uL 7 85 6 80   HEMOGLOBIN g/dL 13 5 14 6   HEMATOCRIT % 40 9 42 9   PLATELETS Thousands/uL 195 175   NEUTROS PCT %  --  65   LYMPHS PCT %  --  23   MONOS PCT %  --  10   EOS PCT %  --  2       Results from last 7 days  Lab Units 10/13/18  0511   SODIUM mmol/L 135*   POTASSIUM mmol/L 3 7   CHLORIDE mmol/L 103   CO2 mmol/L 25   BUN mg/dL 8   CREATININE mg/dL 0 57*   CALCIUM mg/dL 8 8   ALK PHOS U/L 98   ALT U/L 109*   AST U/L 94*           Results from last 7 days  Lab Units 10/13/18  1145 10/13/18  0741 10/12/18  2059   POC GLUCOSE mg/dl 279* 220* 269*       Results from last 7 days  Lab Units 10/13/18  0511   HEMOGLOBIN A1C % 10 0*           * I Have Reviewed All Lab Data     Recent Cultures (last 7 days):             Last 24 Hours Medication List:     Current Facility-Administered Medications:  enoxaparin 40 mg Subcutaneous Daily Laya Zelaya PA-C    insulin lispro 1-5 Units Subcutaneous 4x Daily (AC & HS) Laya Zelaya PA-C    ketorolac 15 mg Intravenous Q6H PRN Laya Zelaya PA-C    ondansetron 4 mg Intravenous Q6H PRN Laya Zelaya PA-C    sodium chloride 50 mL/hr Intravenous Continuous Laya Zelaya PA-C Last Rate: 50 mL/hr (10/12/18 2125)         VTE Pharmacologic Prophylaxis:   Pharmacologic: Enoxaparin (Lovenox)      Current Length of Stay: 1 day(s)    Current Patient Status: Inpatient       Discharge Plan:   Code Status: Level 1 - Full Code           Today, Patient Was Seen By: Melissa Duran DO    ** Please Note: Dictation voice to text software may have been used in the creation of this document   **

## 2018-10-13 NOTE — ASSESSMENT & PLAN NOTE
She complains of LLQ pain  Too me it seems too low to be related to splenomegally    However, I don't see another obvious source  Appreciate gyn and gi taqueria  Will ask heme onc to see

## 2018-10-13 NOTE — ASSESSMENT & PLAN NOTE
LLQ mild abdominal pain of unclear etiology  Consider msk, less likely PID  CT A/P unremarkable, no aortic aneurysm given hx of coarctation of aorta and vsd   Doubt splenic etiology  May be mittelschmerz however given 2 days duration less likely      Worse w/bowel movements but no diarrhea, no constipation  Continue to monitor w/anti-inflammatories  Will kindly ask ob/gyn for evaluation of right adnexal cyst abd pelvic exam to r/o chandalier sign/PID

## 2018-10-13 NOTE — CONSULTS
Consultation - Gynecology   Kvng Quevedo 25 y o  female MRN: [id number]  Unit/Bed#: E5 -01 Encounter: [id number]    Assessment/Plan     Assessment:    25year old [de-identified] with left lower quadrant pain and splenomegaly with an incidentally found complex right ovarian cyst     Plan:  1) complex right ovarian cyst  -likely dermoid  -not likely the source of patient's current pain as the cyst is located in the patient's right ovary her pain is on her lower left  -patient will follow up with her OBGYN as an outpatient for discussion of management:  Surgical versus observation; no need for surgical intervention at this time    History of Present Illness   Physician Requesting Consult: Rocio Lugo DO  Reason for Consult / Principal Problem: Right ovarian cyst  Subspeciality: General GYN  HPI: Kvng Quevedo is a 25y o  year old [de-identified] female with LLQ pain who presents with splenomegaly in the setting of known Hepatitis C with hepatic steatosis  She reports left lower quadrant pain that does not radiate to her right side, pelvis, or back  She reports since her admission her pain has improved  On her imaging there was noted to be a "complex lesion with calcifications noted within the rightadnexa  This lesion measures 3 5 x 2 9 x 1 6 cm  This is partially calcified on the earlier CT and may a partially calcified hemorrhagic cyst or a dermoid " Patient denies knowledge of this in the past  She reports having PCOS; however, has been on Implanon for birth control, which causes irregular periods  She is not currently on her period  She has never been pregnant  She is currently sexually active, but denies any history of STDs or abnormal pap smears  She has follow up with her OBGYN in the near future and understands that her cyst will likely need to surgically be removed, as dermoid cysts tend to grow in size over time without self resolution  Consults    Review of Systems   Constitutional: Negative for chills and fever  Eyes: Negative for visual disturbance  Respiratory: Negative for chest tightness and shortness of breath  Cardiovascular: Negative for chest pain  Gastrointestinal: Positive for abdominal pain  Negative for nausea and vomiting  In LLQ   Genitourinary: Negative for pelvic pain and vaginal bleeding  Neurological: Negative for dizziness and headaches  Historical Information   Past Medical History:   Diagnosis Date    Hepatitis C     Hypertension     Pulmonary artery congenital abnormality      Past Surgical History:   Procedure Laterality Date    CARDIAC CATHETERIZATION      no CAD    CHOLECYSTECTOMY      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 8    LIVER BIOPSY      VSD REPAIR      As a child     OB History   No data available     No family history on file  Social History   History   Alcohol Use No     History   Drug Use No     History   Smoking Status    Former Smoker   Smokeless Tobacco    Never Used       Meds/Allergies   all current active meds have been reviewed    Allergies   Allergen Reactions    Prednisone Swelling       Objective   Vitals: Blood pressure 155/74, pulse 95, temperature 98 3 °F (36 8 °C), temperature source Temporal, resp  rate 18, SpO2 94 %, not currently breastfeeding  There is no height or weight on file to calculate BMI  No intake or output data in the 24 hours ending 10/13/18 1203    Invasive Devices     Peripheral Intravenous Line            Peripheral IV 10/13/18 Left Forearm less than 1 day                Physical Exam   Constitutional: She is oriented to person, place, and time  She appears well-developed and well-nourished  No distress  HENT:   Head: Normocephalic and atraumatic  Neck: Normal range of motion  Cardiovascular: Normal rate and regular rhythm  Exam reveals no gallop and no friction rub  No murmur heard  Pulmonary/Chest: Effort normal  No respiratory distress  She has no wheezes  She has no rales  She exhibits no tenderness  Right breast exhibits no mass  Left breast exhibits no mass  Abdominal: Soft  She exhibits no distension and no mass  There is tenderness  There is no rebound and no guarding  milkd tenderness in LLQ to deep palpation   Musculoskeletal: Normal range of motion  Neurological: She is alert and oriented to person, place, and time  Skin: Skin is warm and dry  She is not diaphoretic  Psychiatric: She has a normal mood and affect  Her behavior is normal        Lab Results: I have personally reviewed pertinent reports  Imaging Studies: I have personally reviewed pertinent reports  EKG, Pathology, and Other Studies: I have personally reviewed pertinent reports        Code Status: Level 1 - Full Code

## 2018-10-13 NOTE — H&P
H&P- Georges iLsa 1990, 25 y o  female MRN: [id number]    Unit/Bed#: E5 -01 Encounter: [id number]    Primary Care Provider: No primary care provider on file  Date and time admitted to hospital: 10/12/2018  6:32 PM    History and Physical - [facility]    Patient Information: Georges Lisa 25 y o  female MRN: [id number]  Unit/Bed#: E5 -01 Encounter: [id number]  Admitting Physician: Rancho Molina PA-C  PCP: No primary care provider on file  Date of Admission:  10/12/18    Assessment/Plan:    Hospital Problem List:     Principal Problem:    Splenomegaly  Active Problems:    Abdominal pain    Hyperglycemia    Hyponatremia    Hypertension    Hepatitis C          * Splenomegaly   Assessment & Plan    Asked for admission and transfer from [place]  In the setting of obesity and known hepatitis C w/hepatic steatosis evidenced by Ct A/P  Surgery was asked to comment at [facility] and felt unlikely risk for spontaneous rupture or infarct of spleen but recommended IP admission and hem/onc consult which is reason for transfer here  -Unlikely source for pt's abdominal pain as pt pain is LLQ and NONPLEURITIC W/O REFERRED SHOULDER PAIN  CT A/P w/o contrast negative for any evidence of infarction or abscess although limited w/o contrast     -no anemia although pt has chronic low mcv so doubt hemolytic process or scd  -no leukocytosis concerning for underlying blood malignancy  -no thrombocytopenia  -no trauma  -test for HIV as noted under hep c  Will check dedicated RUQ u/s to r/o cirrhosis and splenomegaly as early complication of portal HTN  D/w nocturnist D  Prechtel  Will defer hem/onc consultation at this time in lieu of gi consultation     -Consider hem/onc if pt develops evidence for hemolytic anemia, thrombocytopenia, or leukocytosis concerning for splenic sequestration       Abdominal pain   Assessment & Plan    LLQ mild abdominal pain of unclear etiology    Consider msk, less likely PID  CT A/P unremarkable, no aortic aneurysm given hx of coarctation of aorta and vsd   Doubt splenic etiology  May be mittelschmerz however given 2 days duration less likely  Worse w/bowel movements but no diarrhea, no constipation  Continue to monitor w/anti-inflammatories  Will kindly ask ob/gyn for evaluation of right adnexal cyst abd pelvic exam to r/o chandalier sign/PID         Hepatitis C   Assessment & Plan    2* pediatric blood transfusion  Hx of s/p intereferon and addt'l therapy 5 years prior  Has been noncompliant for unclear reasons however  Last bx 5 years prior with myofibrosis  Will check chronic hep c panel for confirmation, check viral load, ruq u/s  Last hiv screen 5 years ago, pt interested in repeat screen although has not been having high risk activities  Consult GI per attending request as pt interested in re-establishment of care  Will appreciate GI recommendations in respect to splenomegaly and whether hem/onc consultation necessarya       Hypertension   Assessment & Plan    Continue ace     Hyponatremia   Assessment & Plan    Mild, likely pseudohyponatremia 2* hyperglycemia  Gentle ivf hydration repeat bmp in am     Hyperglycemia   Assessment & Plan    Undiagnosed DM likely  Check hgb a1c, consult nutrition  Start ssi and poc bs             VTE Prophylaxis: Enoxaparin (Lovenox)  / reason for no mechanical VTE prophylaxis vte screen   Code Status: fc  POLST: There is no POLST form on file for this patient (pre-hospital)    Anticipated Length of Stay:  Patient will be admitted on an Inpatient basis with an anticipated length of stay of  Greater than 2 midnights  Justification for Hospital Stay: splenomegaly, abd pain untreated hep c    Total Time for Visit, including Counseling / Coordination of Care: 45 minutes  Greater than 50% of this total time spent on direct patient counseling and coordination of care      Chief Complaint:   LLQ abd pain x 2 days    History of Present Illness:    Lida Duarte is a 25 y o  female who presents with 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, core taken to the order status post repair, congenital VSD status post repair, hepatitis C in the setting of Pediatric blood transfusion coming to [facility] for LLQ pain x 2 days  Patient was 1st in the on 10/10/2018 for left lower quadrant abdominal pain she reports that sharp constant and severe in slightly worse with bowel movements  She reports her bowel movements are soft due to 3 episodes a day without diarrhea or constipation  She has urinary urgency  She has had 1 episode similar to this 1 year prior which was initially worked up in the ER and the patient was discharged and then found to have a UTI  Her her 1st day of last menstrual period was on 10/1/2018  Her  Last 2 days in duration and typically is anywhere from every one to every one and 1/2 months between cycles  She was diagnosed w/right adnexal cyst, splenomegaly, hepatic steatosis in d/c'd home  She returned to er today for persistence of pain  ER PA at [place] was concerned for possible spontaneous splenic rupture or underlying etiology of splenic nature contributing to her pain  This was then d/w surgeon there who recommended no surgical evaluation at this time but IP admission and possible hem/onc evaluation  The case was d/w [facility] attending and pt was transferred here  On further discussion the pt has no pleuritic component to her pain or referred ipsilateral shoulder pain  No dyspareunia, no vaginal d/c or odor  She minimal nausea 2* not eating all day  She is sexually active w/1 partner whom is male and has vaginal intercourse w/condom use every time  She does not participate in anal intercourse      She was diagnosed w/hepatitis C in 2001 which was ultimately attributed to a transfusion the pt received when she was undergoing pediatric evaluation for 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 s/p repair and pulmonary artery abn s/p 'banding' per pt  She has smoked about 15 pack years and stopped smoking 1 mo ago  Minimal alcohol use  No hx of drug use  No recent hx of risky sexual activities  Review of Systems:    Review of Systems   Constitutional: Negative for appetite change, chills and fever  Respiratory: Negative for cough and shortness of breath  Cardiovascular: Negative for chest pain  Gastrointestinal: Positive for abdominal pain and nausea  Negative for anal bleeding, blood in stool, diarrhea and vomiting  Genitourinary: Positive for urgency  Negative for dyspareunia, dysuria, flank pain, frequency, vaginal bleeding, vaginal discharge and vaginal pain  Musculoskeletal: Negative for neck pain  Neurological: Negative for headaches  All other systems reviewed and are negative  Past Medical and Surgical History:     Past Medical History:   Diagnosis Date    Hepatitis C     Hypertension     Pulmonary artery congenital abnormality        Past Surgical History:   Procedure Laterality Date    CARDIAC CATHETERIZATION      no CAD    CHOLECYSTECTOMY      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 8    LIVER BIOPSY      VSD REPAIR      As a child       Meds/Allergies:    Prior to Admission medications    Medication Sig Start Date End Date Taking? Authorizing Provider   lisinopril (ZESTRIL) 5 mg tablet Take 5 mg by mouth daily    Historical Provider, MD   traMADol (ULTRAM) 50 mg tablet Take 1 tablet (50 mg total) by mouth every 6 (six) hours as needed for moderate pain for up to 10 doses 10/10/18   Tita Snellen, PA-C     I have reviewed home medications with patient personally  Allergies:    Allergies   Allergen Reactions    Prednisone Swelling       Social History:     Marital Status: Single   Occupation:   Patient Pre-hospital Living Situation: Patient Pre-hospital Level of Mobility:   Patient Pre-hospital Diet Restrictions:   Substance Use History:   History   Alcohol Use No     History   Smoking Status    Former Smoker   Smokeless Tobacco    Never Used     History   Drug Use No       Family History:    No family history on file  No family hx of hemolytic anemia thrombocytopenia leukemia  Physical Exam:     Vitals:   Blood Pressure: 145/82 (10/12/18 1842)  Pulse: 90 (10/12/18 1842)  Temperature: 98 1 °F (36 7 °C) (10/12/18 1842)  Temp Source: Temporal (10/12/18 1842)  Respirations: 20 (10/12/18 1842)  SpO2: 96 % (10/12/18 1842)    Physical Exam   Constitutional: She appears well-developed  No distress  Obese  Stated age  Appears in nad   HENT:   Head: Normocephalic and atraumatic  Right Ear: External ear normal    Left Ear: External ear normal    Mouth/Throat: No oropharyngeal exudate  Eyes: Conjunctivae are normal  Right eye exhibits no discharge  Left eye exhibits no discharge  No scleral icterus  Neck: Normal range of motion  Cardiovascular: Normal rate, regular rhythm and normal heart sounds  Exam reveals no gallop and no friction rub  No murmur heard  Pulmonary/Chest: Effort normal and breath sounds normal  No respiratory distress  She has no wheezes  She has no rales  Abdominal: Soft  She exhibits no distension  There is tenderness (mild generalized abd pain w/deep palpation  slightly worse in LLQ   )  There is no rebound and no guarding  No palpable splenomegaly or hepatomegaly 2* habitus  Abdomen is obese   Musculoskeletal: She exhibits no edema  Neurological: She is alert  Skin: Skin is warm and dry  She is not diaphoretic  Psychiatric: She has a normal mood and affect  Vitals reviewed  (  Be Sure to Include Physical Exam: Delete this entire line when you have entered your exam)    Additional Data:     Lab Results: I have personally reviewed pertinent reports          Results from last 7 days  Lab Units 10/12/18  1430   WBC Thousand/uL 6 80   HEMOGLOBIN g/dL 14 6   HEMATOCRIT % 42 9   PLATELETS Thousands/uL 175   NEUTROS PCT % 65   LYMPHS PCT % 23   MONOS PCT % 10   EOS PCT % 2       Results from last 7 days  Lab Units 10/12/18  1430   SODIUM mmol/L 133*   POTASSIUM mmol/L 3 8   CHLORIDE mmol/L 101   CO2 mmol/L 26   BUN mg/dL 9   CREATININE mg/dL 0 54*   CALCIUM mg/dL 9 6   ALK PHOS U/L 91   ALT U/L 86*   AST U/L 72*           Imaging: I have personally reviewed pertinent reports  Ct Abdomen Pelvis Wo Contrast    Result Date: 10/10/2018  Narrative: INDICATION:  Left lower quadrant abdominal pain  ORDERING PROVIDER:  Ami Arredondo  TECHNIQUE:  CT of the abdomen and pelvis was performed without intravenous contrast   RADIATION AMOUNT:  1158 1 mGy-cm  COMPARISON:  12/5/2017 CT AP  FINDINGS: Abdomen: The lung bases are clear  Hepatic steatosis is present  Gallbladder is absent  Spleen is enlarged, measuring 15 3 cm AP  The pancreas, adrenal glands, and kidneys are suboptimally evaluated on these unenhanced images, but demonstrate no acute pathology  There is no free air or lymph node enlargement  Abdominal aorta is not aneurysmal  Pelvis: There is no bowel wall thickening or obstruction  Appendix images normally  There is no free fluid  Lymph nodes are not enlarged  Urinary bladder is unremarkable  Partially calcified right adnexal lesion noted, similar to prior study  This likely represents a complex cyst with prior hemorrhage  It measures 3 4 cm maximally  Skeleton:  There are no acute fractures  No suspicious bony lesions  Impression: 1  Hepatic steatosis  Splenomegaly  Prior cholecystectomy  2   Partially calcified right adnexal lesion, likely benign   Signed by Josephine Sheehan MD    Us Pelvis Complete W Transvaginal    Result Date: 10/10/2018  Narrative: INDICATION:  Left pelvic pain, family history of PCOS Additional History:  LMP: 10/03/2018 TECHNIQUE:  Transabdominal and transvaginal ultrasonography of the pelvis was performed with spectral Doppler evaluation of the ovaries  COMPARISON:  CT 10/10/2018 FINDINGS:    The uterus is anteverted in position and measures 6 9 x 3 2 x 2 3 cm  The uterus demonstrates a normal, homogeneous echotexture  There are no discrete fibroids present  The endometrium measures 0 3 cm  There is a complex lesion with calcifications noted within the right adnexa  This lesion measures 3 5 x 2 9 x 1 6 cm  This is partially calcified on the earlier CT and may a partially calcified hemorrhagic cyst or a dermoid  The left ovary is not visualized  No significant fluid is present within the cul-de-sac  Impression: Complex lesion measuring 3 5 x 2 9 x 1 6 cm seen within the right adnexa is likely ovarian in nature  Concordant with prior CT imaging  The left ovary is not visualized  Signed by Michelle Jenkins MD      EKG, Pathology, and Other Studies Reviewed on Admission:   · EKG:     Allscripts / Epic Records Reviewed: Yes     ** Please Note: This note has been constructed using a voice recognition system   **

## 2018-10-13 NOTE — ASSESSMENT & PLAN NOTE
2* pediatric blood transfusion  Hx of s/p intereferon and addt'l therapy 5 years prior  Has been noncompliant for unclear reasons however  Last bx 5 years prior with myofibrosis  Will check chronic hep c panel for confirmation, check viral load, ruq u/s  Last hiv screen 5 years ago, pt interested in repeat screen although has not been having high risk activities  Consult GI per attending request as pt interested in re-establishment of care    Will appreciate GI recommendations in respect to splenomegaly and whether hem/onc consultation necessarya

## 2018-10-13 NOTE — CONSULTS
Consultation - [facility]  Jaya Ferguson 25 y o  female MRN: [id number]  Unit/Bed#: E5 -01 Encounter: [id number]        ASSESSMENT/PLAN:   1  Splenomegaly  2  Hepatic steatosis   -she presented to the ED for left lower abdominal pain and was found to have splenomegaly with spleen measuring 15 3 cm on CT  Hepatic steatosis was also noted, which has been seen in the past    -Abdominal ultrasound pending  -EBV/CMP pending   -HIV negative   -WBC and hgb WNL, platelet count WNL  -Her labs are not consistent with cirrhosis, features of cirrhosis not noted on CT however this was without contrast and so evaluation was limited  -She notes that she began to have a cold 3 days ago, denies history of mononucleosis  -Follow up on above pending workup and continue to monitor LFTs    3  Hepatitis C   -she has known hepatitis C which she states is from transfusion as a child, she has been treated with interferon and ribavirin in the past but states that she had a high viral load despite treatment  -Viral load pending   -LFTs mildly elevated with , ALT 94, phos 90 and total bilirubin 0 49   -Recommend follow up for discussion regarding treatment of hepatitis C if viral load is positive    -She would benefit from healthy weight loss  4  LLQ abd pain   -GYN evaluation has been requested given ovarian cyst though this is on the right    -She does note some loose stools, denies diarrhea    -Unrelated to food or bowel movements  History is not consistent with GI origin   No bowel abnormalities noted on CT    -Possibly musculoskeletal    Inpatient consult to gastroenterology  Consult performed by: Tim Ames  Consult ordered by: Akosua Narvaez          Reason for Consult / Principal Problem: Splenomegaly    HPI: Jaya Ferguson is a 25y o  year old female with a PMH of chronic hepatitis-C, congenital heart defects and hepatic steatosis presents to the hospital for left lower quadrant abdominal pain  She was found to have splenomegaly and so GI evaluation was requested  She reports that she began to have a cold about 3 days ago with a cough and sore throat, she admits to some loose stools but denies diarrhea  She states the abdominal pain is constant and unchanged by food or bowel movements  She believes it is worsened by movement  It is been ongoing for about 3 days  She denies any nausea, vomiting, fevers, chills, change in appetite, unintended weight loss, early satiety, hematochezia, melena, difficulty swallowing or jaundice  She reports she has a history of hepatitis C secondary to transfusion which she was young, she was treated with interferon and ribavirin in the past but states that she continue to have a high viral load despite this  She has known hepatic steatosis  She does not recall being told that she had splenomegaly in the past   She denies any sick contacts at home  She denies history of mononucleosis  Review of Systems: as per HPI  Review of Systems   Constitutional: Negative for activity change, appetite change, chills, fatigue, fever and unexpected weight change  HENT: Negative for mouth sores, sore throat and trouble swallowing  Respiratory: Negative for shortness of breath  Cardiovascular: Negative for chest pain  Gastrointestinal: Negative for abdominal distention, abdominal pain, blood in stool, constipation, diarrhea, nausea and vomiting  Skin: Negative for color change, pallor, rash and wound  Neurological: Negative for tremors and syncope  All other systems reviewed and are negative        Historical Information   Past Medical History:   Diagnosis Date    Hepatitis C     Hypertension     Pulmonary artery congenital abnormality      Past Surgical History:   Procedure Laterality Date    CARDIAC CATHETERIZATION      no CAD    CHOLECYSTECTOMY      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 8    LIVER BIOPSY      VSD REPAIR      As a child     Social History   History   Alcohol Use No     History   Drug Use No     History   Smoking Status    Former Smoker   Smokeless Tobacco    Never Used     No family history on file  Meds/Allergies     Prescriptions Prior to Admission   Medication    lisinopril (ZESTRIL) 5 mg tablet    traMADol (ULTRAM) 50 mg tablet     Current Facility-Administered Medications   Medication Dose Route Frequency    enoxaparin (LOVENOX) subcutaneous injection 40 mg  40 mg Subcutaneous Daily    insulin lispro (HumaLOG) 100 units/mL subcutaneous injection 1-5 Units  1-5 Units Subcutaneous 4x Daily (AC & HS)    ketorolac (TORADOL) injection 15 mg  15 mg Intravenous Q6H PRN    ondansetron (ZOFRAN) injection 4 mg  4 mg Intravenous Q6H PRN    sodium chloride 0 9 % infusion  50 mL/hr Intravenous Continuous       Allergies   Allergen Reactions    Prednisone Swelling       Objective     Blood pressure 155/74, pulse 95, temperature 98 3 °F (36 8 °C), temperature source Temporal, resp  rate 18, SpO2 94 %, not currently breastfeeding  No intake or output data in the 24 hours ending 10/13/18 1305    PHYSICAL EXAM     Physical Exam   Constitutional: She is oriented to person, place, and time  No distress  Morbidly obese   HENT:   Head: Normocephalic and atraumatic  Eyes: Right eye exhibits no discharge  Left eye exhibits no discharge  No scleral icterus  Neck: Neck supple  No tracheal deviation present  Cardiovascular: Normal rate, regular rhythm and normal heart sounds  Exam reveals no gallop and no friction rub  No murmur heard  Pulmonary/Chest: Effort normal and breath sounds normal  No respiratory distress  She has no wheezes  She has no rales  Abdominal: Soft  Bowel sounds are normal  She exhibits no distension and no mass  There is tenderness (LLQ)  There is no rebound and no guarding  Neurological: She is alert and oriented to person, place, and time  Skin: Skin is warm and dry     Psychiatric: She has a normal mood and affect  Lab Results:   CBC:   Lab Results   Component Value Date    WBC 7 85 10/13/2018    HGB 13 5 10/13/2018    HCT 40 9 10/13/2018    MCV 81 (L) 10/13/2018     10/13/2018    MCH 26 6 (L) 10/13/2018    MCHC 33 0 10/13/2018    RDW 14 6 10/13/2018    MPV 11 0 10/13/2018    NRBC 0 10/12/2018   ,   CMP:   Lab Results   Component Value Date     (L) 10/13/2018    K 3 7 10/13/2018     10/13/2018    CO2 25 10/13/2018    BUN 8 10/13/2018    CREATININE 0 57 (L) 10/13/2018    CALCIUM 8 8 10/13/2018    AST 94 (H) 10/13/2018     (H) 10/13/2018    ALKPHOS 98 10/13/2018    EGFR 125 10/13/2018   ,   Lipase: No results found for: LIPASE,  PT/INR: No results found for: PT, INR,   Troponin: No results found for: TROPONINI,   Magnesium: No results found for: MAG,   Phosphorous: No results found for: PHOS  Imaging Studies: I have personally reviewed pertinent reports  INDICATION:  Left lower quadrant abdominal pain        ORDERING PROVIDER:  Fady Uribe      TECHNIQUE:  CT of the abdomen and pelvis was performed without intravenous  contrast        RADIATION AMOUNT:  1158 1 mGy-cm      COMPARISON:  12/5/2017 CT AP      FINDINGS:   Abdomen: The lung bases are clear  Hepatic steatosis is present  Gallbladder is  absent  Spleen is enlarged, measuring 15 3 cm AP  The pancreas, adrenal  glands, and kidneys are suboptimally evaluated on these unenhanced images,  but demonstrate no acute pathology      There is no free air or lymph node enlargement  Abdominal aorta is not  aneurysmal      Pelvis: There is no bowel wall thickening or obstruction  Appendix images  normally  There is no free fluid  Lymph nodes are not enlarged  Urinary  bladder is unremarkable      Partially calcified right adnexal lesion noted, similar to prior study  This likely represents a complex cyst with prior hemorrhage    It measures  3 4 cm maximally      Skeleton:    There are no acute fractures  No suspicious bony lesions      IMPRESSION:  1  Hepatic steatosis  Splenomegaly  Prior cholecystectomy      2   Partially calcified right adnexal lesion, likely benign  INDICATION:  Left pelvic pain, family history of PCOS     Additional History:  LMP: 10/03/2018     TECHNIQUE:  Transabdominal and transvaginal ultrasonography of the pelvis  was performed with spectral Doppler evaluation of the ovaries      COMPARISON:  CT 10/10/2018     FINDINGS:      The uterus is anteverted in position and measures 6 9 x 3 2 x 2 3 cm  The  uterus demonstrates a normal, homogeneous echotexture  There are no  discrete fibroids present  The endometrium measures 0 3 cm        There is a complex lesion with calcifications noted within the right  adnexa  This lesion measures 3 5 x 2 9 x 1 6 cm  This is partially  calcified on the earlier CT and may a partially calcified hemorrhagic cyst  or a dermoid      The left ovary is not visualized      No significant fluid is present within the cul-de-sac      IMPRESSION:  Complex lesion measuring 3 5 x 2 9 x 1 6 cm seen within the right adnexa  is likely ovarian in nature  Concordant with prior CT imaging      The left ovary is not visualized        Patient was seen and examined by Dr Andrey Tineo  All driver medical decisions were made by Dr Andrey Tineo  Thank you for allowing us to participate in the care of this present patient  We will follow-up with you closely

## 2018-10-14 VITALS
DIASTOLIC BLOOD PRESSURE: 90 MMHG | OXYGEN SATURATION: 95 % | TEMPERATURE: 97.9 F | HEART RATE: 87 BPM | SYSTOLIC BLOOD PRESSURE: 169 MMHG | RESPIRATION RATE: 16 BRPM

## 2018-10-14 LAB
ALBUMIN SERPL BCP-MCNC: 2.7 G/DL (ref 3.5–5)
ALP SERPL-CCNC: 93 U/L (ref 46–116)
ALT SERPL W P-5'-P-CCNC: 98 U/L (ref 12–78)
ANION GAP SERPL CALCULATED.3IONS-SCNC: 9 MMOL/L (ref 4–13)
AST SERPL W P-5'-P-CCNC: 72 U/L (ref 5–45)
BASOPHILS # BLD AUTO: 0.04 THOUSANDS/ΜL (ref 0–0.1)
BASOPHILS NFR BLD AUTO: 1 % (ref 0–1)
BILIRUB SERPL-MCNC: 0.43 MG/DL (ref 0.2–1)
BUN SERPL-MCNC: 12 MG/DL (ref 5–25)
CALCIUM SERPL-MCNC: 8.9 MG/DL (ref 8.3–10.1)
CHLORIDE SERPL-SCNC: 104 MMOL/L (ref 100–108)
CO2 SERPL-SCNC: 23 MMOL/L (ref 21–32)
CREAT SERPL-MCNC: 0.53 MG/DL (ref 0.6–1.3)
EOSINOPHIL # BLD AUTO: 0.25 THOUSAND/ΜL (ref 0–0.61)
EOSINOPHIL NFR BLD AUTO: 3 % (ref 0–6)
ERYTHROCYTE [DISTWIDTH] IN BLOOD BY AUTOMATED COUNT: 14.4 % (ref 11.6–15.1)
GFR SERPL CREATININE-BSD FRML MDRD: 128 ML/MIN/1.73SQ M
GLUCOSE SERPL-MCNC: 226 MG/DL (ref 65–140)
GLUCOSE SERPL-MCNC: 242 MG/DL (ref 65–140)
GLUCOSE SERPL-MCNC: 246 MG/DL (ref 65–140)
HCT VFR BLD AUTO: 38.5 % (ref 34.8–46.1)
HGB BLD-MCNC: 12.8 G/DL (ref 11.5–15.4)
IMM GRANULOCYTES # BLD AUTO: 0.03 THOUSAND/UL (ref 0–0.2)
IMM GRANULOCYTES NFR BLD AUTO: 0 % (ref 0–2)
LYMPHOCYTES # BLD AUTO: 1.93 THOUSANDS/ΜL (ref 0.6–4.47)
LYMPHOCYTES NFR BLD AUTO: 22 % (ref 14–44)
MAGNESIUM SERPL-MCNC: 1.8 MG/DL (ref 1.6–2.6)
MCH RBC QN AUTO: 26.6 PG (ref 26.8–34.3)
MCHC RBC AUTO-ENTMCNC: 33.2 G/DL (ref 31.4–37.4)
MCV RBC AUTO: 80 FL (ref 82–98)
MONOCYTES # BLD AUTO: 0.85 THOUSAND/ΜL (ref 0.17–1.22)
MONOCYTES NFR BLD AUTO: 10 % (ref 4–12)
NEUTROPHILS # BLD AUTO: 5.64 THOUSANDS/ΜL (ref 1.85–7.62)
NEUTS SEG NFR BLD AUTO: 64 % (ref 43–75)
NRBC BLD AUTO-RTO: 0 /100 WBCS
PLATELET # BLD AUTO: 180 THOUSANDS/UL (ref 149–390)
PMV BLD AUTO: 11 FL (ref 8.9–12.7)
POTASSIUM SERPL-SCNC: 3.9 MMOL/L (ref 3.5–5.3)
PROT SERPL-MCNC: 6.3 G/DL (ref 6.4–8.2)
RBC # BLD AUTO: 4.81 MILLION/UL (ref 3.81–5.12)
SODIUM SERPL-SCNC: 136 MMOL/L (ref 136–145)
WBC # BLD AUTO: 8.74 THOUSAND/UL (ref 4.31–10.16)

## 2018-10-14 PROCEDURE — 80053 COMPREHEN METABOLIC PANEL: CPT | Performed by: HOSPITALIST

## 2018-10-14 PROCEDURE — 83735 ASSAY OF MAGNESIUM: CPT | Performed by: HOSPITALIST

## 2018-10-14 PROCEDURE — 82948 REAGENT STRIP/BLOOD GLUCOSE: CPT

## 2018-10-14 PROCEDURE — 99239 HOSP IP/OBS DSCHRG MGMT >30: CPT | Performed by: HOSPITALIST

## 2018-10-14 PROCEDURE — 99252 IP/OBS CONSLTJ NEW/EST SF 35: CPT | Performed by: INTERNAL MEDICINE

## 2018-10-14 PROCEDURE — 85025 COMPLETE CBC W/AUTO DIFF WBC: CPT | Performed by: HOSPITALIST

## 2018-10-14 RX ADMIN — INSULIN LISPRO 2 UNITS: 100 INJECTION, SOLUTION INTRAVENOUS; SUBCUTANEOUS at 08:27

## 2018-10-14 RX ADMIN — INSULIN LISPRO 2 UNITS: 100 INJECTION, SOLUTION INTRAVENOUS; SUBCUTANEOUS at 11:39

## 2018-10-14 NOTE — UTILIZATION REVIEW
Thank you,  Galileo Godwinn  Utilization Review Department  Phone: [phone]; Fax [phone]  ATTENTION: Please call with any questions or concerns to [phone]  and carefully follow the prompts so that you are directed to the right person  Send all requests for admission clinical reviews, approved or denied determinations and any other requests to fax [phone]  All voicemails are confidential    Initial Clinical Review    Admission: Date/Time/Statement:INPT  10/12/18 @ 1856    Orders Placed This Encounter   Procedures    Inpatient Admission     Standing Status:   Standing     Number of Occurrences:   1     Order Specific Question:   Admitting Physician     Answer:   Marv Jolley [[id number]]     Order Specific Question:   Level of Care     Answer:   Med Surg [[id number]]     Order Specific Question:   Estimated length of stay     Answer:   More than 2 Midnights     Order Specific Question:   Certification     Answer:   I certify that inpatient services are medically necessary for this patient for a duration of greater than two midnights  See H&P and MD Progress Notes for additional information about the patient's course of treatment  ED: Date/Time/Mode of Arrival:   ED Arrival Information     Patient not seen in ED                       Chief Complaint: No chief complaint on file  History of Illness: [id number] y o  female who presents with 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, core taken to the order status post repair, congenital VSD status post repair, hepatitis C in the setting of Pediatric blood transfusion coming to [facility] for LLQ pain x 2 days  Patient was 1st in the on 10/10/2018 for left lower quadrant abdominal pain she reports that sharp constant and severe in slightly worse with bowel movements  She reports her bowel movements are soft due to 3 episodes a day without diarrhea or constipation    She has urinary urgency  She has had 1 episode similar to this 1 year prior which was initially worked up in the ER and the patient was discharged and then found to have a UTI  Her her 1st day of last menstrual period was on 10/1/2018  Her  Last 2 days in duration and typically is anywhere from every one to every one and 1/2 months between cycles  She was diagnosed w/right adnexal cyst, splenomegaly, hepatic steatosis in d/c'd home  She returned to er today for persistence of pain  ER PA at [place] was concerned for possible spontaneous splenic rupture or underlying etiology of splenic nature contributing to her pain  This was then d/w surgeon there who recommended no surgical evaluation at this time but IP admission and possible hem/onc evaluation  The case was d/w [facility] attending and pt was transferred here        On further discussion the pt has no pleuritic component to her pain or referred ipsilateral shoulder pain  No dyspareunia, no vaginal d/c or odor  She minimal nausea 2* not eating all day  She is sexually active w/1 partner whom is male and has vaginal intercourse w/condom use every time  She does not participate in anal intercourse      She was diagnosed w/hepatitis C in 2001 which was ultimately attributed to a transfusion the pt received when she was undergoing pediatric evaluation for 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 s/p repair and pulmonary artery abn s/p 'banding' per pt  She has smoked about 15 pack years and stopped smoking 1 mo ago    Minimal alcohol use    ED Vital Signs:   ED Triage Vitals [10/12/18 1842]   Temperature Pulse Respirations Blood Pressure SpO2   98 1 °F (36 7 °C) 90 20 145/82 96 %      Temp Source Heart Rate Source Patient Position - Orthostatic VS BP Location FiO2 (%)   Temporal Monitor Sitting Left arm --      Pain Score       No Pain        Wt Readings from Last 1 Encounters:   10/12/18 111 kg (245 lb)       Vital Signs (abnormal):   10/13/18 1530  98 °F (36 7 °C)  88  18  153/75  96 %  None (Room air)  Lying   10/13/18 0806  98 3 °F (36 8 °C)  95  18  155/74  94 %  None (Room air)  Lying   10/12/18 2315   97 3 °F (36 3 °C)  83  20  131/63  94 %  --  Lying   10/12/18 1842  98 1 °F (36 7 °C)  90  20  145/82  96 %  None (Room air)  Sitting         Abnormal Labs/Diagnostic Test Results:   Na 133  Creat 0 54  Alt 86  ast 72  ALT 98      CT ABD 10/10:1   Hepatic steatosis  Splenomegaly  Prior cholecystectomy  2   Partially calcified right adnexal lesion, likely benign  Us Pelvis Complete W Transvaginal 10/10:Complex lesion measuring 3 5 x 2 9 x 1 6 cm seen within the right adnexa is likely ovarian in nature  Concordant with prior CT imaging  The left ovary is not visualized       U/S ABD:1  No evidence of focal hepatic lesion  Stable hepatomegaly  Mildly increased echogenicity may indicate steatosis  2   Stable splenomegaly    ED Treatment:   Medication Administration - No Administrations Displayed (No Start Event Found)     None          Past Medical/Surgical History: Active Ambulatory Problems     Diagnosis Date Noted    S/P VSD repair 07/12/2018    H/O aortic coarctation repair 07/12/2018     Resolved Ambulatory Problems     Diagnosis Date Noted    No Resolved Ambulatory Problems     Past Medical History:   Diagnosis Date    Hepatitis C     Hypertension     Pulmonary artery congenital abnormality        Admitting Diagnosis: Splenomegaly [R16 1]    Age/Sex: 25 y o  female    Assessment/Plan: 24 yo female [facility] ED FOR SPLENOMEGALY, LLQ ABD PAIN  Assessment/Plan:     Hospital Problem List:      Principal Problem:    Splenomegaly  Active Problems:    Abdominal pain    Hyperglycemia    Hyponatremia    Hypertension    Hepatitis C                  * Splenomegaly   Assessment & Plan     Asked for admission and transfer from [place]    In the setting of obesity and known hepatitis C w/hepatic steatosis evidenced by Ct A/P  Surgery was asked to comment at [facility] and felt unlikely risk for spontaneous rupture or infarct of spleen but recommended IP admission and hem/onc consult which is reason for transfer here  -Unlikely source for pt's abdominal pain as pt pain is LLQ and NONPLEURITIC W/O REFERRED SHOULDER PAIN  CT A/P w/o contrast negative for any evidence of infarction or abscess although limited w/o contrast     -no anemia although pt has chronic low mcv so doubt hemolytic process or scd  -no leukocytosis concerning for underlying blood malignancy  -no thrombocytopenia  -no trauma  -test for HIV as noted under hep c  Will check dedicated RUQ u/s to r/o cirrhosis and splenomegaly as early complication of portal HTN  D/w nocturnist D  Prechtel  Will defer hem/onc consultation at this time in lieu of gi consultation     -Consider hem/onc if pt develops evidence for hemolytic anemia, thrombocytopenia, or leukocytosis concerning for splenic sequestration         Abdominal pain   Assessment & Plan     LLQ mild abdominal pain of unclear etiology  Consider msk, less likely PID  CT A/P unremarkable, no aortic aneurysm given hx of coarctation of aorta and vsd   Doubt splenic etiology  May be mittelschmerz however given 2 days duration less likely  Worse w/bowel movements but no diarrhea, no constipation  Continue to monitor w/anti-inflammatories  Will kindly ask ob/gyn for evaluation of right adnexal cyst abd pelvic exam to r/o chandalier sign/PID            Hepatitis C   Assessment & Plan     2* pediatric blood transfusion  Hx of s/p intereferon and addt'l therapy 5 years prior  Has been noncompliant for unclear reasons however  Last bx 5 years prior with myofibrosis  Will check chronic hep c panel for confirmation, check viral load, ruq u/s  Last hiv screen 5 years ago, pt interested in repeat screen although has not been having high risk activities    Consult GI per attending request as pt interested in re-establishment of care  Will appreciate GI recommendations in respect to splenomegaly and whether hem/onc consultation necessarya         Hypertension   Assessment & Plan     Continue ace      Hyponatremia   Assessment & Plan     Mild, likely pseudohyponatremia 2* hyperglycemia  Gentle ivf hydration repeat bmp in am      Hyperglycemia   Assessment & Plan     Undiagnosed DM likely  Check hgb a1c, consult nutrition  Start ssi and poc bs            Anticipated Length of Stay:  Patient will be admitted on an Inpatient basis with an anticipated length of stay of  Greater than 2 midnights  Justification for Hospital Stay: splenomegaly, abd pain untreated hep c       Consultation - Gynecology   Magdalena Saul 25 y o  female MRN: [id number]  Unit/Bed#: E5 [facility]3-01 Encounter: [id number]        Assessment/Plan         Assessment:     25year old [de-identified] with left lower quadrant pain and splenomegaly with an incidentally found complex right ovarian cyst      Plan:  1) complex right ovarian cyst  -likely dermoid  -not likely the source of patient's current pain as the cyst is located in the patient's right ovary her pain is on her lower left  -patient will follow up with her OBGYN as an outpatient for discussion of management:  Surgical versus observation; no need for surgical intervention at this time      Consultation -  [facility]  Magdalena Saul 25 y o  female MRN: [id number]  Unit/Bed#: E5 [facility]3-01 Encounter: [id number]           ASSESSMENT/PLAN:   1  Splenomegaly  2  Hepatic steatosis              -she presented to the ED for left lower abdominal pain and was found to have splenomegaly with spleen measuring 15 3 cm on CT  Hepatic steatosis was also noted, which has been seen in the past               -Abdominal ultrasound pending  -EBV/CMP pending              -HIV negative              -WBC and hgb WNL, platelet count WNL                -Her labs are not consistent with cirrhosis, features of cirrhosis not noted on CT however this was without contrast and so evaluation was limited  -She notes that she began to have a cold 3 days ago, denies history of mononucleosis  -Follow up on above pending workup and continue to monitor LFTs     3  Hepatitis C              -she has known hepatitis C which she states is from transfusion as a child, she has been treated with interferon and ribavirin in the past but states that she had a high viral load despite treatment  -Viral load pending              -LFTs mildly elevated with , ALT 94, phos 90 and total bilirubin 0 49              -Recommend follow up for discussion regarding treatment of hepatitis C if viral load is positive               -She would benefit from healthy weight loss      4  LLQ abd pain              -GYN evaluation has been requested given ovarian cyst though this is on the right               -She does note some loose stools, denies diarrhea               -Unrelated to food or bowel movements  History is not consistent with GI origin   No bowel abnormalities noted on CT               -Possibly musculoskeletal      Admission Orders:  [address] [facility]  CONS CARB DIET  CONSULT GYN    Scheduled Meds:   Current Facility-Administered Medications:  enoxaparin 40 mg Subcutaneous Daily Laya Zelaya PA-C    insulin lispro 1-5 Units Subcutaneous 4x Daily (AC & HS) Laya Zelaya PA-C    ketorolac 15 mg Intravenous Q6H PRN Laya Zelaya PA-C    ondansetron 4 mg Intravenous Q6H PRN Laya Zelaya PA-C    sodium chloride 50 mL/hr Intravenous Continuous Laya Zelaya PA-C Last Rate: Stopped (10/13/18 1716)     Continuous Infusions:   sodium chloride 50 mL/hr Last Rate: Stopped (10/13/18 1716)     PRN Meds: ketorolac IV X 3    ondansetron

## 2018-10-14 NOTE — ASSESSMENT & PLAN NOTE
This has resolved on its own  Unclear if this was due to splenomegally  She wants to go home as she is asymptomatic  I will have her follow up with GI and hematology as an outpatient

## 2018-10-14 NOTE — CONSULTS
Consultation - Lena Taylor 25 y o  female MRN: [id number]    Unit/Bed#: E5 -01 Encounter: [id number]      Assessment/Plan     Assessment:  In summary, this is a 15-year-old female history of splenomegaly  I do not believe her left lower quadrant drain is related to splenomegaly  Etiology of splenomegaly is unclear  She does have a history of hepatitis C  Imaging showed hepatic steatosis without any suggestion of cirrhosis, however  She tells me she had a liver biopsy which showed myofibrosis  I am not familiar with that disorder  My highest suspicion is that her splenomegaly is secondary to hepatic pathology  Differential diagnosis would include Liver disease, infectious processes, congestion or inflammation, hematologic malignancy such as lymphoma, splenic vein thrombosis, unknown  Given that she has hepatitis-C I am suspicious that this is the cause of her splenomegaly  Observation is favored  Repeat ultrasound in 3-4 months is favored  We will arrange imaging and follow-up in our [address] [place] [address] office at that time  Plan:    See above  History of Present Illness     HPI: Lena Taylor is a 25y o  year old female who presents with Left lower quadrant pain for 2 days prior to admission  Patient has past medical history significant for 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  She also has a history of hepatitis C secondary to pediatric blood transfusion at that time  She also tells me that she had a previous biopsy of the liver which showed myofibrosis  Patient presents to the emergency room with these findings and symptoms  CT of the abdomen pelvis on October 10th showed splenomegaly to 15 3 cm  Partially calcified right adnexal cyst unchanged since December 2017  Hepatic steatosis  WBC is 8 7, hemoglobin 12 8, platelet count 104, normal differential   HIV screen negative    Hepatitis-C antibody positive, hepatitis panel otherwise negative  EBV studies suggest past infection  CMV antibodies negative  Inpatient consult to Hematology  Consult performed by: [facility]  Consult ordered by: Emily Bravo          Review of Systems   Appetite is good, weight stable  She has no fevers chills or sweats  She has no nausea vomiting constipation or diarrhea  She has no urinary complaints  She has no bone pain  She had some diarrhea prior to admission  This has resolved  She has no bleeding symptoms  She had left lower quadrant pain which has improved  She has had this on and off over months lasting for few days and then resolving  She has no melena or hematochezia  She has no shortness of breath or cough  She has no bleeding symptoms  She has no paresthesias  She has no rash  Historical Information   Past Medical History:   Diagnosis Date    Hepatitis C     Hypertension     Pulmonary artery congenital abnormality      Past Surgical History:   Procedure Laterality Date    CARDIAC CATHETERIZATION      no CAD    CHOLECYSTECTOMY      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 7   [facility] LIVER BIOPSY      VSD REPAIR      As a child     Social History   History   Alcohol Use No     History   Drug Use No     History   Smoking Status    Former Smoker   Smokeless Tobacco    Never Used     Family History: No family history on file  Meds/Allergies   all current active meds have been reviewed  Allergies   Allergen Reactions    Prednisone Swelling       Objective   Vitals: Blood pressure 169/90, pulse 87, temperature 97 9 °F (36 6 °C), temperature source Temporal, resp  rate 16, SpO2 95 %, not currently breastfeeding  No intake or output data in the 24 hours ending 10/14/18 1320  Invasive Devices     Peripheral Intravenous Line            Peripheral IV 10/13/18 Left Forearm 1 day                Physical Exam  Patient is awake and alert  The oral close voiced that lesion  The heart is regular rate rhythm    Lungs are clear to auscultation  The abdomen is soft  No rebound  The extremities show no edema  Lymphatics nonpalpable  Skin is anicteric  Lab Results: I have personally reviewed pertinent reports  Imaging Studies: I have personally reviewed pertinent reports  EKG, Pathology, and Other Studies: I have personally reviewed pertinent reports  Code Status: Level 1 - Full Code  Advance Directive and Living Will:      Power of :    POLST:      Counseling / Coordination of Care  Total floor / unit time spent today 40 minutes  Greater than 50% of total time was spent with the patient and / or family counseling and / or coordination of care  A description of the counseling / coordination of care: see note

## 2018-10-14 NOTE — CONSULTS
Pt wasn't willing to discuss diet education during my interview  Family members were in the room at this time  Will return on Monday to follow-up with her

## 2018-10-14 NOTE — DISCHARGE SUMMARY
Discharge- Sharyne Organ 1990, 25 y o  female MRN: [id number]    Unit/Bed#: E5 -01 Encounter: [id number]    Primary Care Provider: No primary care provider on file  Date and time admitted to hospital: 10/12/2018  6:32 PM        Abdominal pain   Assessment & Plan    This has resolved on its own  Unclear if this was due to splenomegally  She wants to go home as she is asymptomatic  I will have her follow up with GI and hematology as an outpatient         Hepatitis C   Assessment & Plan    I would like her to follow up with GI as an outpatient to discuss treatment options     * Splenomegaly   Assessment & Plan    likley related to liver disease  Patient will be worked up as an outpt by GI and hematology           Discharging Physician / Practitioner: Glen Barlow DO  PCP: No primary care provider on file  Admission Date:   Admission Orders     Ordered        10/12/18 1856  Inpatient Admission  Once             Discharge Date: 10/14/18    Resolved Problems  Date Reviewed: 10/14/2018    None            Consultations During Hospital Stay:  · GI  · Hematology      Procedures Performed:     · CT abd at outside hospital      Reason for Admission: Left lower quadrant pain      Hospital Course:     Terry Rebollar is a 25 y o  female patient who originally presented to the hospital on 10/12/2018 due to left lower quadrant pain  Patient was at an outside hospital    She had acute onset mild LLQ pain  She went to the local ED  CT abd was done and showed splenomegaly of unclear cause  They did not have Hematology at the hospital and therefore she was transferred to [facility]  She was seen here by GI and Hematology  It is unclear what the cause of her left lower quadrant pain is  It actually was self-limited and resolved on its own  It was very low in the abdomen  So unclear if this was related to the splenomegaly  Regardless, she still needs this plan likely early worked up    She has started a workup here in the hospital   But as she is asymptomatic now, she would like to go home and continue this as an outpatient workup  I will have her see GI and Hematology as an outpatient in the office  Please see above list of diagnoses and related plan for additional information  Condition at Discharge: good       Discharge Day Visit / Exam:     Subjective:  Feels better  No abdominal pain  Vitals: Blood Pressure: 169/90 (10/14/18 0812)  Pulse: 87 (10/14/18 0812)  Temperature: 97 9 °F (36 6 °C) (10/14/18 0812)  Temp Source: Temporal (10/14/18 8335)  Respirations: 16 (10/14/18 0812)  SpO2: 95 % (10/14/18 4559)    Exam:     Physical Exam   HENT:   Head: Normocephalic and atraumatic  Eyes: Pupils are equal, round, and reactive to light  EOM are normal    Cardiovascular: Normal rate and regular rhythm  Exam reveals no gallop and no friction rub  No murmur heard  Pulmonary/Chest: Effort normal and breath sounds normal  She has no wheezes  She has no rales  Abdominal: Soft  There is no tenderness  Musculoskeletal: She exhibits no edema  Nursing note and vitals reviewed            Discharge instructions/Information to patient and family:   See after visit summary for information provided to patient and family  Provisions for Follow-Up Care:  See after visit summary for information related to follow-up care and any pertinent home health orders  Disposition:     Home       Discharge Statement:  I spent 36 minutes discharging the patient  This time was spent on the day of discharge  I had direct contact with the patient on the day of discharge  Greater than 50% of the total time was spent examining patient, answering all patient questions, arranging and discussing plan of care with patient as well as directly providing post-discharge instructions  Additional time then spent on discharge activities      Discharge Medications:  See after visit summary for reconciled discharge medications provided to patient and family        ** Please Note: This note has been constructed using a voice recognition system **

## 2018-10-15 DIAGNOSIS — R16.1 SPLENOMEGALY: Primary | ICD-10-CM

## 2018-10-15 NOTE — PHYSICIAN ADVISOR
Current patient class: Inpatient  The patient is currently on Hospital Day: 3 at [facility]      The patient was admitted to the hospital at [address] [place] [address] on 10/12/18 for the following diagnosis:  Splenomegaly [R16 1]       There is documentation in the medical record of an expected length of stay of at least 2 midnights  The patient is therefore expected to satisfy the 2 midnight benchmark and given the 2 midnight presumption is appropriate for INPATIENT ADMISSION  Given this expectation of a satisfying stay, CMS instructs us that the patient is most often appropriate for inpatient admission under part A provided medical necessity is documented in the chart  After review of the relevant documentation, labs, vital signs and test results, the patient is appropriate for INPATIENT ADMISSION  Admission to the hospital as an inpatient is a complex decision making process which requires the practitioner to consider the patients presenting complaint, history and physical examination and all relevant testing  With this in mind, in this case, the patient was deemed appropriate for INPATIENT ADMISSION  After review of the documentation and testing available at the time of the admission I concur with this clinical determination of medical necessity  Rationale is as follows: The patient is a 25 yrs old Female who presented to the ED at 10/12/2018  6:32 PM with a chief complaint of splenomegaly and abdominal pain  Given the need for further hospitalization, and along with the documentation of medical necessity present in the chart, the patient is appropriate for inpatient admission  The patient is expected to satisfy the 2 midnight benchmark, and will require further acute medical care  The patient does have comorbid conditions which increases the risk for significant adverse outcome  Given this the patient is appropriate for inpatient admission        The patients vitals on arrival were ED Triage Vitals [10/12/18 1842]   Temperature Pulse Respirations Blood Pressure SpO2   98 1 °F (36 7 °C) 90 20 145/82 96 %      Temp Source Heart Rate Source Patient Position - Orthostatic VS BP Location FiO2 (%)   Temporal Monitor Sitting Left arm --      Pain Score       No Pain           Past Medical History:   Diagnosis Date    Hepatitis C     Hypertension     Pulmonary artery congenital abnormality      Past Surgical History:   Procedure Laterality Date    CARDIAC CATHETERIZATION      no CAD    CHOLECYSTECTOMY      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 7   [facility] LIVER BIOPSY      VSD REPAIR      As a child           Consults have been placed to:   IP CONSULT TO NUTRITION SERVICES  IP CONSULT TO GASTROENTEROLOGY    Vitals:    10/13/18 0806 10/13/18 1530 10/13/18 2328 10/14/18 0812   BP: 155/74 153/75 134/63 169/90   BP Location: Right arm Right arm Right arm Right arm   Pulse: 95 88 76 87   Resp: 18 18 18 16   Temp: 98 3 °F (36 8 °C) 98 °F (36 7 °C) 98 °F (36 7 °C) 97 9 °F (36 6 °C)   TempSrc: Temporal Temporal Temporal Temporal   SpO2: 94% 96% 95% 95%       Most recent labs:    Recent Labs      10/14/18   0510   WBC  8 74   HGB  12 8   HCT  38 5   PLT  180   K  3 9   NA  136   CALCIUM  8 9   BUN  12   CREATININE  0 53*   AST  72*   ALT  98*   ALKPHOS  93       Scheduled Meds:  Continuous Infusions:  No current facility-administered medications for this encounter  PRN Meds:      Surgical procedures (if appropriate):

## 2018-10-16 LAB
HCV RNA SERPL NAA+PROBE-ACNC: NORMAL IU/ML
HCV RNA SERPL NAA+PROBE-LOG IU: 5.87 LOG10 IU/ML
TEST INFORMATION: NORMAL

## 2018-10-17 ENCOUNTER — HOSPITAL ENCOUNTER (EMERGENCY)
Facility: HOSPITAL | Age: 28
Discharge: HOME | End: 2018-10-18
Attending: EMERGENCY MEDICINE | Admitting: EMERGENCY MEDICINE
Payer: COMMERCIAL

## 2018-10-17 DIAGNOSIS — I10 HYPERTENSION: ICD-10-CM

## 2018-10-17 DIAGNOSIS — R79.89 ELEVATED LFTS: ICD-10-CM

## 2018-10-17 DIAGNOSIS — R10.9 ABDOMINAL PAIN: Primary | ICD-10-CM

## 2018-10-17 DIAGNOSIS — R73.9 HYPERGLYCEMIA: ICD-10-CM

## 2018-10-17 PROCEDURE — 99284 EMERGENCY DEPT VISIT MOD MDM: CPT

## 2018-10-17 PROCEDURE — 81025 URINE PREGNANCY TEST: CPT | Performed by: EMERGENCY MEDICINE

## 2018-10-17 RX ORDER — MORPHINE SULFATE 4 MG/ML
4 INJECTION, SOLUTION INTRAMUSCULAR; INTRAVENOUS ONCE
Status: COMPLETED | OUTPATIENT
Start: 2018-10-18 | End: 2018-10-18

## 2018-10-17 RX ORDER — KETOROLAC TROMETHAMINE 30 MG/ML
30 INJECTION, SOLUTION INTRAMUSCULAR; INTRAVENOUS ONCE
Status: COMPLETED | OUTPATIENT
Start: 2018-10-18 | End: 2018-10-18

## 2018-10-17 RX ORDER — ONDANSETRON 2 MG/ML
4 INJECTION INTRAMUSCULAR; INTRAVENOUS ONCE
Status: COMPLETED | OUTPATIENT
Start: 2018-10-18 | End: 2018-10-18

## 2018-10-18 ENCOUNTER — APPOINTMENT (EMERGENCY)
Dept: CT IMAGING | Facility: HOSPITAL | Age: 28
End: 2018-10-18
Payer: COMMERCIAL

## 2018-10-18 VITALS
WEIGHT: 241.6 LBS | DIASTOLIC BLOOD PRESSURE: 68 MMHG | HEART RATE: 83 BPM | RESPIRATION RATE: 16 BRPM | BODY MASS INDEX: 44.46 KG/M2 | HEIGHT: 62 IN | OXYGEN SATURATION: 99 % | SYSTOLIC BLOOD PRESSURE: 120 MMHG | TEMPERATURE: 97 F

## 2018-10-18 LAB
ALBUMIN SERPL BCP-MCNC: 4.1 G/DL (ref 3.5–5.7)
ALP SERPL-CCNC: 91 U/L (ref 40–150)
ALT SERPL W P-5'-P-CCNC: 78 U/L (ref 7–52)
ANION GAP SERPL CALCULATED.3IONS-SCNC: 9 MMOL/L (ref 4–13)
APTT PPP: 32 SECONDS (ref 24–36)
AST SERPL W P-5'-P-CCNC: 48 U/L (ref 13–39)
BASOPHILS # BLD AUTO: 0.1 THOUSANDS/ΜL (ref 0–0.1)
BASOPHILS NFR BLD AUTO: 1 % (ref 0–2)
BILIRUB SERPL-MCNC: 0.5 MG/DL (ref 0.2–1)
BILIRUB UR QL STRIP: NEGATIVE
BUN SERPL-MCNC: 13 MG/DL (ref 7–25)
CALCIUM SERPL-MCNC: 9.7 MG/DL (ref 8.6–10.5)
CHLORIDE SERPL-SCNC: 99 MMOL/L (ref 98–107)
CLARITY UR: CLEAR
CO2 SERPL-SCNC: 24 MMOL/L (ref 21–31)
COLOR UR: YELLOW
CREAT SERPL-MCNC: 0.61 MG/DL (ref 0.6–1.2)
EOSINOPHIL # BLD AUTO: 0.2 THOUSAND/ΜL (ref 0–0.61)
EOSINOPHIL NFR BLD AUTO: 2 % (ref 0–5)
ERYTHROCYTE [DISTWIDTH] IN BLOOD BY AUTOMATED COUNT: 15 % (ref 11.5–14.5)
EXT PREG TEST URINE: NORMAL
GFR SERPL CREATININE-BSD FRML MDRD: 122 ML/MIN/1.73SQ M
GLUCOSE SERPL-MCNC: 298 MG/DL (ref 65–99)
GLUCOSE UR STRIP-MCNC: ABNORMAL MG/DL
HCT VFR BLD AUTO: 43.8 % (ref 34.8–46.1)
HGB BLD-MCNC: 14.9 G/DL (ref 12–16)
HGB UR QL STRIP.AUTO: NEGATIVE
INR PPP: 1.08 (ref 0.9–1.5)
KETONES UR STRIP-MCNC: NEGATIVE MG/DL
LEUKOCYTE ESTERASE UR QL STRIP: NEGATIVE
LIPASE SERPL-CCNC: 21 U/L (ref 11–82)
LYMPHOCYTES # BLD AUTO: 2.7 THOUSANDS/ΜL (ref 0.6–4.47)
LYMPHOCYTES NFR BLD AUTO: 26 % (ref 21–51)
MCH RBC QN AUTO: 26.7 PG (ref 26–34)
MCHC RBC AUTO-ENTMCNC: 34.1 G/DL (ref 31–37)
MCV RBC AUTO: 78 FL (ref 81–99)
MONOCYTES # BLD AUTO: 0.7 THOUSAND/ΜL (ref 0.17–1.22)
MONOCYTES NFR BLD AUTO: 7 % (ref 2–12)
NEUTROPHILS # BLD AUTO: 6.6 THOUSANDS/ΜL (ref 1.4–6.5)
NEUTS SEG NFR BLD AUTO: 64 % (ref 42–75)
NITRITE UR QL STRIP: NEGATIVE
NRBC BLD AUTO-RTO: 0 /100 WBCS
PH UR STRIP.AUTO: 6 [PH] (ref 5–8)
PLATELET # BLD AUTO: 216 THOUSANDS/UL (ref 149–390)
PMV BLD AUTO: 9.3 FL (ref 8.6–11.7)
POTASSIUM SERPL-SCNC: 3.6 MMOL/L (ref 3.5–5.5)
PROT SERPL-MCNC: 7.4 G/DL (ref 6.4–8.9)
PROT UR STRIP-MCNC: NEGATIVE MG/DL
PROTHROMBIN TIME: 12.6 SECONDS (ref 10.1–12.9)
RBC # BLD AUTO: 5.58 MILLION/UL (ref 3.9–5.2)
SODIUM SERPL-SCNC: 132 MMOL/L (ref 134–143)
SP GR UR STRIP.AUTO: 1.01 (ref 1–1.03)
UROBILINOGEN UR QL STRIP.AUTO: 0.2 E.U./DL
WBC # BLD AUTO: 10.3 THOUSAND/UL (ref 4.8–10.8)

## 2018-10-18 PROCEDURE — 81003 URINALYSIS AUTO W/O SCOPE: CPT | Performed by: EMERGENCY MEDICINE

## 2018-10-18 PROCEDURE — 74177 CT ABD & PELVIS W/CONTRAST: CPT

## 2018-10-18 PROCEDURE — 85610 PROTHROMBIN TIME: CPT | Performed by: EMERGENCY MEDICINE

## 2018-10-18 PROCEDURE — 96375 TX/PRO/DX INJ NEW DRUG ADDON: CPT

## 2018-10-18 PROCEDURE — 96361 HYDRATE IV INFUSION ADD-ON: CPT

## 2018-10-18 PROCEDURE — 96374 THER/PROPH/DIAG INJ IV PUSH: CPT

## 2018-10-18 PROCEDURE — 85025 COMPLETE CBC W/AUTO DIFF WBC: CPT | Performed by: EMERGENCY MEDICINE

## 2018-10-18 PROCEDURE — 80053 COMPREHEN METABOLIC PANEL: CPT | Performed by: EMERGENCY MEDICINE

## 2018-10-18 PROCEDURE — 36415 COLL VENOUS BLD VENIPUNCTURE: CPT | Performed by: EMERGENCY MEDICINE

## 2018-10-18 PROCEDURE — 83690 ASSAY OF LIPASE: CPT | Performed by: EMERGENCY MEDICINE

## 2018-10-18 PROCEDURE — 85730 THROMBOPLASTIN TIME PARTIAL: CPT | Performed by: EMERGENCY MEDICINE

## 2018-10-18 RX ADMIN — IOHEXOL 80 ML: 350 INJECTION, SOLUTION INTRAVENOUS at 01:06

## 2018-10-18 RX ADMIN — MORPHINE SULFATE 4 MG: 4 INJECTION INTRAVENOUS at 00:03

## 2018-10-18 RX ADMIN — KETOROLAC TROMETHAMINE 30 MG: 30 INJECTION, SOLUTION INTRAMUSCULAR at 00:03

## 2018-10-18 RX ADMIN — SODIUM CHLORIDE 1000 ML: 0.9 INJECTION, SOLUTION INTRAVENOUS at 00:03

## 2018-10-18 RX ADMIN — ONDANSETRON HYDROCHLORIDE 4 MG: 2 INJECTION INTRAMUSCULAR; INTRAVENOUS at 00:03

## 2018-10-18 NOTE — UTILIZATION REVIEW
URGENT/EMERGENT  INPATIENT/SPU AUTHORIZATION REQUEST    Date: 10/18/18            # Pages in this Request:     X New Request   Additional Information for PA#:     Office Contact Name:  Verna Contreras Title: Utilization Review, Dorie Nurse     Phone: [phone]  Ext  Availability (Date/Time): Wednesday - Friday 8 am- 4 pm    X Inpatient Review  SPU Review        Current       X Late Pick-up   · How your facility was first notified of the Late Pick-up: PATHS  · When your facility was first notified of the Late Pick-up (date): 10/17         RECIPIENT INFORMATION    Recipient TW#:[id number]  Recipient Name: Lonnie Stockton     YOB: 1990  25 y o  Recipient Alias:     Gender:   Male X Female Medicaid Eligibility ([address] [place] [address]): INSURANCE INFORMATION    (All other private or governmental health insurance benefits must be utilized prior to billing the [place] Program)    Was this admission the result of an MVA, other accident, assault, injury, fall, gunshot, bite etc ? Yes X No                   If yes, provide a brief description of the incident  Does the recipient have other insurance coverage? Yes X No        Insurance Company Name/Policy #      Did that insurance pay on this claim? Yes  No        Did that insurance deny this claim? Yes  No    If yes, reason for denial:      Does the recipient have Medicare? Yes X No        Did Medicare exhaust prior to this admission? Yes  No            Did Medicare partially pay this claim? Yes  No        Did that insurance deny this claim? Yes  No    If yes, reason for denial:          Was the recipient a prisoner at the time of admission?   Yes X No            PROVIDER INFORMATION    Hospital Name: [facility] Provider ID#: [id number]    [address] [place] [address] Physician Name: Regulo Rosas Provider ID#: [id number]        ADMISSION INFORMATION    Type of Admission: (please choose one)     ED      Direct    If yes, from where?     X Transfer    If yes, transferring hospital (inpatient, rehab, or psych) Provider Name/Provider ID#: TRANSFER FROM [facility] ED -[id number]    Admission Floor or Unit Type: MED-SURG    Dates/Times:        ED Date/Time:        Observation Date/Time:         Admission Date/Time: 10/12/18 1856        Discharge or Transfer Date/Time: 10/14/2018  1428        DIAGNOSIS/PROCEDURE CODES    Primary Diagnosis Code/Primary Diagnosis Code description: Splenomegaly [R16 1], E87 1 HYPO-OSMOLALITY AND HYPONATREMIA, R73 9 HYPERGLYCEMIA, B19 20 UNSPECIFIED VIRAL HEPATITIS C  (MAY RE-ORDER DX CODES)  Additional Diagnosis Code(s) and Description(s)-(up to three additional codes):     Procedure Code (one) and description: NONE        CLINICAL INFORMATION - [facility]    Is there a prior admission with a discharge date within 30 days of the date of this admission? X No (Proceed to the next section - "Clinical Information - General Review Checklist:)      Yes (Provide the following information)     Prior admission dates:     MA Prior Authorization Number:         Review Outcome:     Diagnosis Code(s)/Description:     Procedure Code/Description:     Findings:      Treatment:     Condition on Discharge:    Vitals:    Labs:   Imaging:   Medications: Follow-up Instructions:     Disposition:         CLINICAL INFORMATION - GENERAL REVIEW CHECKLIST    EMERGENCY DEPARTMENT: (Proceed to "ADMISSION" if Direct Admission)    Presenting Signs/Symptoms:     Medication/treatment prior to arrival in the ED:     Past Medical History:    Active Ambulatory Problems     Diagnosis Date Noted    S/P VSD repair 07/12/2018    H/O aortic coarctation repair 07/12/2018     Resolved Ambulatory Problems     Diagnosis Date Noted    No Resolved Ambulatory Problems     Past Medical History:   Diagnosis Date    Cyst of ovary, right     Hepatitis C     Hypertension     Pulmonary artery congenital abnormality     Spleen enlarged Clinical Exam:     Initial Vital Signs: (Temp, Pulse, Resp, and BP)       Pertinent Repeat Vital Signs: (include times they were obtained)    Pertinent Sustained Findings: (include times they were obtained)    Weight in Kilograms:  Wt Readings from Last 1 Encounters:   10/17/18 110 kg (241 lb 9 6 oz)       Pertinent Labs (results):    Radiology (results):    EKG (results): Other tests (results):    Tests pending final results:    Treatment in the ED:   Medication Administration - No Administrations Displayed (No Start Event Found)     None           Meds: Name, dose, route, time, number of doses given        Nebulizer treatments: Type, total number given      IVs: Type, rate, total amt  given          Other treatments:       Change in condition while in the ED:       Response to ED Treatment:           OBSERVATION: (Proceed to "ADMISSION" if Direct Admission)    Orders written during the observation period  Meds Name, dose, route, time, how may doses given:  PRN Meds Name, dose, route, time, how many doses given within the first 24 hrs :  IVs Type, rate, and total amt  ordered/given:  Labs, imaging, other:  Consults and findings:    Test Results during the observation period  Pertinent Lab tests (dates/results):  Culture results (blood, urine, spinal, wound, respiratory, etc ):  Imaging tests (dates/results):  EKG (dates/results):   Other test (dates/results):  Tests pending (dates/results):    Surgical or Invasive Procedures during the observation period  Name of surgery/procedure:  Date & Time:  Patient Response:  Post-operative orders:  Operative Report/Findings:    Response to Treatment, Major Change in Condition, Major Charge in Treatment during the observation period          ADMISSION:    DIRECT Admissions Only:    Presenting Signs/Symptoms: 25 y o  female who presents with 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, core taken to the order status post repair, congenital VSD status post repair, hepatitis C in the setting of Pediatric blood transfusion coming to [facility] for LLQ pain x 2 days   Patient was 1st in the on 10/10/2018 for left lower quadrant abdominal pain she reports that sharp constant and severe in slightly worse with bowel movements   She reports her bowel movements are soft due to 3 episodes a day without diarrhea or constipation   She has urinary urgency  Rock Boston has had 1 episode similar to this 1 year prior which was initially worked up in the ER and the patient was discharged and then found to have a UTI  Louie Soriano her 1st day of last menstrual period was on 10/1/2018  Shea Smith 2 days in duration and typically is anywhere from every one to every one and 1/2 months between cycles   She was diagnosed w/right adnexal cyst, splenomegaly, hepatic steatosis in d/c'd home   She returned to er today for persistence of pain   ER PA at [facility] was concerned for possible spontaneous splenic rupture or underlying etiology of splenic nature contributing to her pain   This was then d/w surgeon there who recommended no surgical evaluation at this time but IP admission and possible hem/onc evaluation     The case was d/w [facility] attending and pt was transferred here        On further discussion the pt has no pleuritic component to her pain or referred ipsilateral shoulder pain   No dyspareunia, no vaginal d/c or odor   She minimal nausea 2* not eating all day  Rock Boston is sexually active w/1 partner whom is male and has vaginal intercourse w/condom use every time  Rock Boston does not participate in anal intercourse      · She was diagnosed w/hepatitis C in 2001 which was ultimately attributed to a transfusion the pt received when she was undergoing pediatric evaluation for 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 s/p repair and pulmonary artery abn s/p 'banding' per pt   She has smoked about 15 pack years and stopped smoking 1 mo ago  Mor Tompkins alcohol use  ·   · Medication/treatment prior to arrival:  ·   · Past Medical History:  ·   · Clinical Exam on admission:  ·   · Vital Signs on admission: (Temp, Pulse, Resp, and BP)  ED Triage Vitals [10/12/18 1842]   Temperature Pulse Respirations Blood Pressure SpO2   98 1 °F (36 7 °C) 90 20 145/82 96 %      Temp Source Heart Rate Source Patient Position - Orthostatic VS BP Location FiO2 (%)   Temporal Monitor Sitting Left arm --      Pain Score       No Pain       ·   · Weight in kilograms:   10/13/18 1530   98 °F (36 7 °C)   88   18   153/75   96 %   None (Room air)   Lying   10/13/18 0806   98 3 °F (36 8 °C)   95   18   155/74   94 %   None (Room air)   Lying   10/12/18 2315    97 3 °F (36 3 °C)   83   20   131/63   94 %   --   Lying   10/12/18 1842   98 1 °F (36 7 °C)   90   20   145/82   96 %   None (Room air)   Sitting       ALL Admissions:    Admission Orders and Other Orders written within the first 24 hrs after admission  INPT  OOB  CONSULT HEMATOLOGY  CONSULT GI  CONS [address] Name, dose, route, time, how may doses given:  enoxaparin 40 mg Subcutaneous Daily Laya Zelaya PA-C     insulin lispro 1-5 Units Subcutaneous 4x Daily (AC & HS) Laya Zelaya PA-C     ketorolac 15 mg Intravenous Q6H PRN Laya Zelaya PA-C     ondansetron 4 mg Intravenous Q6H PRN Laya Zelaya PA-C     sodium chloride 50 mL/hr Intravenous Continuous Laya Zelaya PA-C Last Rate: Stopped (10/13/18 1716)        PRN Meds Name, dose, route, time, how many doses given within the first 24 hrs : ketorolac IV X 3    ondansetron    IVs Type, rate, and total amt   ordered/given:  sodium chloride 50 mL/hr Last Rate: Stopped (10/13/18 1716)       Labs, imaging, other:  Na 133  Creat 0 54  Alt 86  ast 72  ALT 98      Consults and findings: Consultation -  [facility]  Milly Perera y o  female MRN: [id number]  Unit/Bed#: E5 MS 553-01 Encounter: [id number]           ASSESSMENT/PLAN:   1  Splenomegaly  2  Hepatic steatosis              -she presented to the ED for left lower abdominal pain and was found to have splenomegaly with spleen measuring 15 3 cm on CT   Hepatic steatosis was also noted, which has been seen in the past               -Abdominal ultrasound pending               -EBV/CMP pending              -HIV negative              -WBC and hgb WNL, platelet count WNL              -UEU labs are not consistent with cirrhosis, features of cirrhosis not noted on CT however this was without contrast and so evaluation was limited               -She notes that she began to have a cold 3 days ago, denies history of mononucleosis              -Follow up on above pending workup and continue to monitor LFTs     3  Hepatitis C              -she has known hepatitis C which she states is from transfusion as a child, she has been treated with interferon and ribavirin in the past but states that she had a high viral load despite treatment               -Viral load pending              -LFTs mildly elevated with , ALT 94, phos 90 and total bilirubin 0 49              -Recommend follow up for discussion regarding treatment of hepatitis C if viral load is positive               -She would benefit from healthy weight loss      4  LLQ abd pain              -GYN evaluation has been requested given ovarian cyst though this is on the right               -She does note some loose stools, denies diarrhea               -Unrelated to food or bowel movements  History is not consistent with GI origin  No bowel abnormalities noted on CT               -Possibly musculoskeletal      ONCOLOGY CONSULT:  Assessment:  In summary, this is a 42-year-old female history of splenomegaly  I do not believe her left lower quadrant drain is related to splenomegaly  Etiology of splenomegaly is unclear  She does have a history of hepatitis C   Imaging showed hepatic steatosis without any suggestion of cirrhosis, however  She tells me she had a liver biopsy which showed myofibrosis  I am not familiar with that disorder  My highest suspicion is that her splenomegaly is secondary to hepatic pathology  Differential diagnosis would include Liver disease, infectious processes, congestion or inflammation, hematologic malignancy such as lymphoma, splenic vein thrombosis, unknown  Given that she has hepatitis-C I am suspicious that this is the cause of her splenomegaly  Observation is favored  Repeat ultrasound in 3-4 months is favored  We will arrange imaging and follow-up in our [place] office at that time  Plan:    See above  Test Results after admission  Pertinent Lab tests (dates/results):  Culture results (blood, urine, spinal, wound, respiratory, etc ):    Imaging tests (dates/results):CT ABD 10/10:1   Hepatic steatosis   Splenomegaly   Prior cholecystectomy  2   Partially calcified right adnexal lesion, likely benign       Us Pelvis Complete W Transvaginal 10/10:Complex lesion measuring 3 5 x 2 9 x 1 6 cm seen within the right adnexa is likely ovarian in nature   Concordant with prior CT imaging  The left ovary is not visualized        U/S ABD:1   No evidence of focal hepatic lesion   Stable hepatomegaly   Mildly increased echogenicity may indicate steatosis  2   Stable splenomegaly  EKG (dates/results):   Other test (dates/results):  Tests pending (dates/results):    Surgical or Invasive Procedures  Name of surgery/procedure:  Date & Time:  Patient Response:  Post-operative orders:  Operative Report/Findings:    Response to Treatment, Major Change in Condition, Major Charge in Treatment anytime during admission    Disposition on Discharge  Home, Rehab, SNF, LTC, Shelter, etc : Home/Self Care    Cease to Breathe (CTB)  If a patient expires during an admission, in addition to the above information, please include:    Summary/timeline of the patient's decline in condition:    Medications and treatment:    Patient response to treatment:    Date and time patient ceased to breathe:        Is there a Readmission that follows this admission? Yes X No    If yes, reason for denial:          InterQual Review    InterQual Criteria Met:  Yes X No  N/A        Please include the InterQual Review, InterQual year/version used, and the criteria selected:         PLEASE SUBMIT THE COMPLETED FORM TO [address] [place] [address] [phone] or VIA E-MAIL TO [email or web address]    Signature: David Obando Date:  10/18/18    Confidentiality Notice: The documents accompanying this telecopy may contain confidential information belonging to the sender  The information is intended only for the use of the individual named above  If you are not the intended recipient, you are hereby notified  That any disclosure, copying, distribution or taking of any telecopy is strictly prohibited

## 2018-10-18 NOTE — ED NOTES
Patient given fluids, taking fluids well  Ginger ale and crackers given to patient  MD made aware of same       Anni Caldwell RN  10/18/18 1931

## 2018-10-18 NOTE — DISCHARGE INSTRUCTIONS
Hyperglycemia, Non-Diabetic   WHAT YOU NEED TO KNOW:   Hyperglycemia is a blood glucose (sugar) level that is higher than normal  Hyperglycemia can be short-term, or it can become a long-term condition that leads to diabetes  DISCHARGE INSTRUCTIONS:   Medicines: You may  need any of the following:  · Hypoglycemic medicine  helps to decrease the amount of sugar in your blood  This medicine helps your body move the sugar to your cells, where it is needed for energy  Your healthcare provider will tell you how often to take this medicine and how long to take it  · Insulin  helps to decrease blood sugar levels  You may need 1 or more shots of insulin each day  You or a family member will be taught how to give the insulin shots  Your healthcare provider will tell you how often you need to inject insulin each day  He will also tell you how long you will need to take it  · Take your medicine as directed  Contact your healthcare provider if you think your medicine is not helping or if you have side effects  Tell him of her if you are allergic to any medicine  Keep a list of the medicines, vitamins, and herbs you take  Include the amounts, and when and why you take them  Bring the list or the pill bottles to follow-up visits  Carry your medicine list with you in case of an emergency  Follow up with your healthcare provider as directed: You may need to return for more tests  Your healthcare provider may also need to refer you to a specialist, such as a dietitian  Write down your questions so you remember to ask them during your visits  Manage hyperglycemia:  The following may help keep your blood sugar at the level recommended by your healthcare provider:  · Get regular exercise  This can help to lower your blood sugar levels  It can also improve your heart health and help you stay at a healthy weight  Get at least 30 minutes of exercise 5 days each week   Ask your healthcare provider about the best exercise plan for you  · Lose weight if you are overweight  Even a small loss of 5% to 10% of your body weight can help to decrease your blood sugar levels  It can also improve your heart health  · Eat healthy foods  Include foods that are high in fiber, such as vegetables, fruits, whole grains, and beans  Also include foods that are low in fat, such as low-fat dairy (milk, yogurt, and cheese), fish, and lean meat  Limit foods that are high in calories and sugar, such as sweet desserts, potato chips, and candy  Limit foods that are high in sodium, such as table salt and salty foods  Your healthcare provider may suggest that you limit carbohydrates to lower your blood sugar levels  · Do not smoke  If you smoke, it is never too late to quit  Smoking can worsen the problems that can occur with hyperglycemia  Ask your healthcare provider for information if you need help quitting  · Limit alcohol  Women should limit alcohol to 1 drink a day  Men should limit alcohol to 2 drinks a day  A drink of alcohol is 12 ounces of beer, 5 ounces of wine, or 1½ ounces of liquor  How to check your blood sugar level: You may need to check your blood sugar level with a blood glucose meter  If you take insulin, you may need to check your blood sugar level at least 3 times each day  Ask your healthcare provider when and how often to check during the day  Ask what your blood sugar levels should be before and after you eat  You may need to check for ketones in your urine or blood if your blood sugar level is high  Write down your results and show them to your healthcare provider  Contact your healthcare provider if:   · You have a fever  · Your blood sugar levels continue to be higher than you were told they should be  · You continue to urinate more often than usual      · You continue to be more thirsty than usual      · You continue to have nausea and vomiting       · You have a wound that has signs of infection, such as redness, swelling, and pus  · You have questions or concerns about your condition or care  Return to the emergency department if:   · Your arm or leg feels warm, tender, and painful  It may look swollen and red  · You feel lightheaded, short of breath, and have chest pain  · You cough up blood  © 2017 [employer] Information is for End User's use only and may not be sold, redistributed or otherwise used for commercial purposes  All illustrations and images included in CareNotes® are the copyrighted property of A D [employer] M , Inc  or [employer]  The above information is an  only  It is not intended as medical advice for individual conditions or treatments  Talk to your doctor, nurse or pharmacist before following any medical regimen to see if it is safe and effective for you  Abdominal Pain   WHAT YOU NEED TO KNOW:   Abdominal pain can be dull, achy, or sharp  You may have pain in one area of your abdomen, or in your entire abdomen  Your pain may be caused by a condition such as constipation, food sensitivity or poisoning, infection, or a blockage  Abdominal pain can also be from a hernia, appendicitis, or an ulcer  Liver, gallbladder, or kidney conditions can also cause abdominal pain  The cause of your abdominal pain may be unknown  DISCHARGE INSTRUCTIONS:   Return to the emergency department if:   · You have new chest pain or shortness of breath  · You have pulsing pain in your upper abdomen or lower back that suddenly becomes constant  · Your pain is in the right lower abdominal area and worsens with movement  · You have a fever over 100 4°F (38°C) or shaking chills  · You are vomiting and cannot keep food or liquids down  · Your pain does not improve or gets worse over the next 8 to 12 hours  · You see blood in your vomit or bowel movements, or they look black and tarry       · Your skin or the whites of your eyes turn yellow  · You are a woman and have a large amount of vaginal bleeding that is not your monthly period  Contact your healthcare provider if:   · You have pain in your lower back  · You are a man and have pain in your testicles  · You have pain when you urinate  · You have questions or concerns about your condition or care  Follow up with your healthcare provider within 24 hours or as directed:  Write down your questions so you remember to ask them during your visits  Medicines:   · Medicines  may be given to calm your stomach and prevent vomiting or to decrease pain  Ask how to take pain medicine safely  · Take your medicine as directed  Contact your healthcare provider if you think your medicine is not helping or if you have side effects  Tell him of her if you are allergic to any medicine  Keep a list of the medicines, vitamins, and herbs you take  Include the amounts, and when and why you take them  Bring the list or the pill bottles to follow-up visits  Carry your medicine list with you in case of an emergency  © 2017 [employer] Information is for End User's use only and may not be sold, redistributed or otherwise used for commercial purposes  All illustrations and images included in CareNotes® are the copyrighted property of [employer]  or [employer]  The above information is an  only  It is not intended as medical advice for individual conditions or treatments  Talk to your doctor, nurse or pharmacist before following any medical regimen to see if it is safe and effective for you

## 2018-10-18 NOTE — ED PROVIDER NOTES
History  Chief Complaint   Patient presents with    Abdominal Pain     Left flank pain for 1 week   + nausea and headache  Patient is a 53-year-old female who was recently seen in the ED here for abdominal pain and noted to have enlarged spleen on CT scan was transferred to [address] [place] [address] for admission for further evaluation and treatment of this she was found to have EBV positive and hep C positive and scheduled to follow up with Hematology and GI she returns to the ED today with ongoing recurrent abdominal pain similar to that prior at the time at which she was hospitalized  Abdominal Pain   Pain location:  LLQ, LUQ and epigastric  Pain quality: aching and cramping    Pain radiates to:  Epigastric region  Pain severity:  Moderate  Onset quality:  Gradual  Duration:  1 week  Timing:  Intermittent  Progression:  Waxing and waning  Chronicity:  New  Associated symptoms: nausea    Associated symptoms: no chest pain, no chills, no constipation, no cough, no diarrhea, no dysuria, no fatigue, no fever, no hematuria, no shortness of breath, no sore throat and no vomiting        Prior to Admission Medications   Prescriptions Last Dose Informant Patient Reported? Taking?   lisinopril (ZESTRIL) 5 mg tablet   Yes No   Sig: Take 5 mg by mouth daily   traMADol (ULTRAM) 50 mg tablet   No No   Sig: Take 1 tablet (50 mg total) by mouth every 6 (six) hours as needed for moderate pain for up to 10 doses      Facility-Administered Medications: None       Past Medical History:   Diagnosis Date    Cyst of ovary, right     Hepatitis C     Hypertension     Pulmonary artery congenital abnormality     Spleen enlarged        Past Surgical History:   Procedure Laterality Date    CARDIAC CATHETERIZATION      no CAD    CHOLECYSTECTOMY      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 8    LIVER BIOPSY      LIVER BIOPSY      VSD REPAIR      As a child       History reviewed  No pertinent family history    I have reviewed and agree with the history as documented  Social History   Substance Use Topics    Smoking status: Former Smoker    Smokeless tobacco: Never Used    Alcohol use Yes        Review of Systems   Constitutional: Negative for activity change, appetite change, chills, fatigue and fever  HENT: Negative for congestion, ear pain, rhinorrhea and sore throat  Eyes: Negative for discharge, redness and visual disturbance  Respiratory: Negative for cough, chest tightness, shortness of breath and wheezing  Cardiovascular: Negative for chest pain and palpitations  Gastrointestinal: Positive for abdominal pain and nausea  Negative for constipation, diarrhea and vomiting  Endocrine: Negative for polydipsia and polyuria  Genitourinary: Negative for difficulty urinating, dysuria, frequency, hematuria and urgency  Musculoskeletal: Negative for arthralgias and myalgias  Skin: Negative for color change, pallor and rash  Neurological: Positive for headaches  Negative for dizziness, weakness, light-headedness and numbness  Hematological: Negative for adenopathy  Does not bruise/bleed easily  All other systems reviewed and are negative  Physical Exam  Physical Exam   Constitutional: She is oriented to person, place, and time  She appears well-developed and well-nourished  HENT:   Head: Normocephalic and atraumatic  Right Ear: External ear normal    Left Ear: External ear normal    Nose: Nose normal    Mouth/Throat: Oropharynx is clear and moist    Eyes: Pupils are equal, round, and reactive to light  Conjunctivae and EOM are normal    Neck: Normal range of motion  Neck supple  Cardiovascular: Normal rate, regular rhythm, normal heart sounds and intact distal pulses  Pulmonary/Chest: Effort normal and breath sounds normal  No respiratory distress  She has no wheezes  She has no rales  She exhibits no tenderness  Abdominal: Soft  Bowel sounds are normal  She exhibits no distension   There is tenderness in the right upper quadrant, epigastric area and left upper quadrant  There is no rigidity, no rebound and no guarding  Musculoskeletal: Normal range of motion  Neurological: She is alert and oriented to person, place, and time  No cranial nerve deficit or sensory deficit  Skin: Skin is warm and dry  Psychiatric: She has a normal mood and affect  Nursing note and vitals reviewed        Vital Signs  ED Triage Vitals   Temperature Pulse Respirations Blood Pressure SpO2   10/17/18 2336 10/17/18 2336 10/17/18 2336 10/17/18 2337 10/17/18 2336   (!) 97 °F (36 1 °C) 91 22 (!) 156/101 96 %      Temp Source Heart Rate Source Patient Position - Orthostatic VS BP Location FiO2 (%)   10/17/18 2336 10/17/18 2336 10/17/18 2336 10/17/18 2336 --   Temporal Monitor Sitting Left arm       Pain Score       10/17/18 2336       9           Vitals:    10/17/18 2336 10/17/18 2337   BP:  (!) 156/101   Pulse: 91    Patient Position - Orthostatic VS: Sitting Sitting       Visual Acuity      ED Medications  Medications   sodium chloride 0 9 % bolus 1,000 mL (0 mL Intravenous Stopped 10/18/18 0132)   ketorolac (TORADOL) injection 30 mg (30 mg Intravenous Given 10/18/18 0003)   morphine (PF) 4 mg/mL injection 4 mg (4 mg Intravenous Given 10/18/18 0003)   ondansetron (ZOFRAN) injection 4 mg (4 mg Intravenous Given 10/18/18 0003)   iohexol (OMNIPAQUE) 350 MG/ML injection (MULTI-DOSE) 80 mL (80 mL Intravenous Given 10/18/18 0106)       Diagnostic Studies  Results Reviewed     Procedure Component Value Units Date/Time    Comprehensive metabolic panel [[id number]]  (Abnormal) Collected:  10/18/18 0002    Lab Status:  Final result Specimen:  Blood from Arm, Left Updated:  10/18/18 0037     Sodium 132 (L) mmol/L      Potassium 3 6 mmol/L      Chloride 99 mmol/L      CO2 24 mmol/L      ANION GAP 9 mmol/L      BUN 13 mg/dL      Creatinine 0 61 mg/dL      Glucose 298 (H) mg/dL      Calcium 9 7 mg/dL      AST 48 (H) U/L      ALT 78 (H) U/L      Alkaline Phosphatase 91 U/L      Total Protein 7 4 g/dL      Albumin 4 1 g/dL      Total Bilirubin 0 50 mg/dL      eGFR 122 ml/min/1 73sq m     Narrative:         National Kidney Disease Education Program recommendations are as follows:  GFR calculation is accurate only with a steady state creatinine  Chronic Kidney disease less than 60 ml/min/1 73 sq  meters  Kidney failure less than 15 ml/min/1 73 sq  meters      Lipase [[id number]]  (Normal) Collected:  10/18/18 0002    Lab Status:  Final result Specimen:  Blood from Arm, Left Updated:  10/18/18 0037     Lipase 21 u/L     Protime-INR [[id number]]  (Normal) Collected:  10/18/18 0002    Lab Status:  Final result Specimen:  Blood from Arm, Left Updated:  10/18/18 0028     Protime 12 6 seconds      INR 1 08    APTT [[id number]]  (Normal) Collected:  10/18/18 0002    Lab Status:  Final result Specimen:  Blood from Arm, Left Updated:  10/18/18 0028     PTT 32 seconds     CBC and differential [[id number]]  (Abnormal) Collected:  10/18/18 0002    Lab Status:  Final result Specimen:  Blood from Arm, Left Updated:  10/18/18 0022     WBC 10 30 Thousand/uL      RBC 5 58 (H) Million/uL      Hemoglobin 14 9 g/dL      Hematocrit 43 8 %      MCV 78 (L) fL      MCH 26 7 pg      MCHC 34 1 g/dL      RDW 15 0 (H) %      MPV 9 3 fL      Platelets 202 Thousands/uL      nRBC 0 /100 WBCs      Neutrophils Relative 64 %      Lymphocytes Relative 26 %      Monocytes Relative 7 %      Eosinophils Relative 2 %      Basophils Relative 1 %      Neutrophils Absolute 6 60 (H) Thousands/µL      Lymphocytes Absolute 2 70 Thousands/µL      Monocytes Absolute 0 70 Thousand/µL      Eosinophils Absolute 0 20 Thousand/µL      Basophils Absolute 0 10 Thousands/µL     UA w Reflex to Microscopic w Reflex to Culture [[id number]]  (Abnormal) Collected:  10/18/18 0002    Lab Status:  Final result Specimen:  Urine from Urine, Clean Catch Updated:  10/18/18 0015     Color, UA Yellow     Clarity, UA Clear Specific Gravity, UA 1 015     pH, UA 6 0     Leukocytes, UA Negative     Nitrite, UA Negative     Protein, UA Negative mg/dl      Glucose, UA 3+ (A) mg/dl      Ketones, UA Negative mg/dl      Urobilinogen, UA 0 2 E U /dl      Bilirubin, UA Negative     Blood, UA Negative    POCT pregnancy, urine [[id number]]  (Normal) Resulted:  10/18/18 0014    Lab Status:  Final result Updated:  10/18/18 0014     EXT PREG TEST UR (Ref: Negative) negative for pregnancy                 CT abdomen pelvis with contrast   Final Result by Tommy Virk (10/18 0206)   No evidence of acute inflammatory process in the abdomen or pelvis  Signed by Tommy Virk MD                 Procedures  Procedures       Phone Contacts  ED Phone Contact    ED Course                               MDM  Number of Diagnoses or Management Options  Abdominal pain: new and requires workup  Elevated LFTs: established and improving  Hyperglycemia: established and improving  Hypertension:   Diagnosis management comments: Patient remained stable in the emergency department her abdominal pain was improved with IV meds and fluids given in the emergency department she tolerated oral intake in the ED with no vomiting and felt improved and wished to return home  Repeat CT scan with IV contrast was obtained given hematology differential which included splenic vein thrombosis there is no acute abnormality noted on repeat CT scan and blood work was unchanged from prior workups patient was made aware of her hyperglycemia which she is already aware of and she was given insulin for at recent hospitalization at outside hospital she states that she is attempting to follow up with Dr Richad Nageotte for primary care at this point as well she is working on obtaining follow-up with GI and Hematology as was recommended at her previous hospitalization and discharge     The patient was provided with local referrals for Hematology and Gastroenterology advised to follow up promptly with primary care physician and to keep an eye on her blood sugars and watch her sugar intake and drink plenty of fluids  Advised supportive care for abdominal pain of unknown cause for now return precautions and anticipatory guidance discussed  Amount and/or Complexity of Data Reviewed  Clinical lab tests: ordered and reviewed  Tests in the radiology section of CPT®: ordered and reviewed  Tests in the medicine section of CPT®: ordered and reviewed  Decide to obtain previous medical records or to obtain history from someone other than the patient: yes  Review and summarize past medical records: yes  Independent visualization of images, tracings, or specimens: yes    Risk of Complications, Morbidity, and/or Mortality  Presenting problems: moderate  Management options: moderate    Patient Progress  Patient progress: stable    CritCare Time    Disposition  Final diagnoses:   Abdominal pain   Hyperglycemia   Elevated LFTs   Hypertension     Time reflects when diagnosis was documented in both MDM as applicable and the Disposition within this note     Time User Action Codes Description Comment    10/18/2018 12:45 AM Michael Lipoma Add [R10 9] Abdominal pain     10/18/2018 12:45 AM Jl Valenzuela Add [R73 9] Hyperglycemia     10/18/2018  1:13 AM Michael Lipoma Add [Z87 74] H/O aortic coarctation repair     10/18/2018  1:13 AM Tahir Dykes [place] [Z87 74] H/O aortic coarctation repair     10/18/2018  1:13 AM Daniel Dykes Add [B19 20] Hepatitis C virus infection without hepatic coma, unspecified chronicity     10/18/2018  1:13 AM Jl Dykes Remove [B19 20] Hepatitis C virus infection without hepatic coma, unspecified chronicity     10/18/2018  1:14 AM Jl Dykes Add [R94 5] Elevated LFTs     10/18/2018  1:14 AM Michael Lipoma Add [I10] Hypertension       ED Disposition     ED Disposition Condition Comment    Discharge  [facility] [place] discharge to home/self care      Condition at discharge: Stable Follow-up Information     Follow up With Specialties Details Why Contact Info    Valdo Restrepo MD Nephrology, Internal Medicine Schedule an appointment as soon as possible for a visit in 2 days  [address] [facility]      Kenn Luis MD Gastroenterology Schedule an appointment as soon as possible for a visit in 1 week  [address] [facility] [address]      Vin Gutiérrez MD Hematology and Oncology, Hematology, Oncology Schedule an appointment as soon as possible for a visit in 1 week  [address]  [address] [facility] [address] [facility]  [phone]            Patient's Medications   Discharge Prescriptions    No medications on file     No discharge procedures on file      ED Provider  Electronically Signed by           Adilia Garrido DO  10/18/18 9842

## 2018-10-19 ENCOUNTER — TELEPHONE (OUTPATIENT)
Dept: HEMATOLOGY ONCOLOGY | Facility: CLINIC | Age: 28
End: 2018-10-19

## 2018-10-19 NOTE — TELEPHONE ENCOUNTER
Called Nunu Santos October 16 @ 10:15 am and left message also  I  left a message on Oct 19th @ 9:33 am stating to sched  A follow up appt  int [address] [facility] office for a 3 month follow   Up from seeing Dr Lizbeth Sagastume in the hospital

## 2018-10-22 ENCOUNTER — PATIENT OUTREACH (OUTPATIENT)
Dept: FAMILY MEDICINE CLINIC | Facility: CLINIC | Age: 28
End: 2018-10-22

## 2018-10-22 NOTE — PROGRESS NOTES
Outpatient Care Management Note:  Spoke briefly with patient  She has made an appointment with Dr Suazo Amen  Could not complete assessment because patient did not have time

## 2018-10-23 ENCOUNTER — PATIENT OUTREACH (OUTPATIENT)
Dept: FAMILY MEDICINE CLINIC | Facility: CLINIC | Age: 28
End: 2018-10-23

## 2018-10-23 NOTE — PROGRESS NOTES
Outpatient Care Management Note: Received return call from patient  She would like to establish care in [place]  Scheduled an appointment with Dr Archana Leos on 11/1/18  Her pain is much better but she still has it occasionally  Using Ultram with good results  She has scheduled her follow up appointment with Hematology/Oncology  She is independent with her ADL's and will be starting a new job on 11/29/18  She has no needs at this time  She is aware that she still needs to schedule a GI appointment  Verified that she has my contact information  Encouraged to call me with a change in symptoms,  questions or concerns

## 2018-10-24 ENCOUNTER — TELEPHONE (OUTPATIENT)
Dept: GASTROENTEROLOGY | Facility: CLINIC | Age: 28
End: 2018-10-24

## 2018-10-24 ENCOUNTER — TRANSITIONAL CARE MANAGEMENT (OUTPATIENT)
Dept: FAMILY MEDICINE CLINIC | Facility: CLINIC | Age: 28
End: 2018-10-24

## 2018-10-24 ENCOUNTER — TELEPHONE (OUTPATIENT)
Dept: FAMILY MEDICINE CLINIC | Facility: CLINIC | Age: 28
End: 2018-10-24

## 2018-10-24 ENCOUNTER — OFFICE VISIT (OUTPATIENT)
Dept: FAMILY MEDICINE CLINIC | Facility: CLINIC | Age: 28
End: 2018-10-24
Payer: COMMERCIAL

## 2018-10-24 VITALS
BODY MASS INDEX: 44.16 KG/M2 | HEART RATE: 90 BPM | HEIGHT: 62 IN | SYSTOLIC BLOOD PRESSURE: 158 MMHG | OXYGEN SATURATION: 98 % | DIASTOLIC BLOOD PRESSURE: 88 MMHG | WEIGHT: 240 LBS | RESPIRATION RATE: 16 BRPM | TEMPERATURE: 98.8 F

## 2018-10-24 DIAGNOSIS — B18.2 CHRONIC HEPATITIS C WITHOUT HEPATIC COMA (HCC): ICD-10-CM

## 2018-10-24 DIAGNOSIS — E11.9 TYPE 2 DIABETES MELLITUS WITHOUT COMPLICATION, WITHOUT LONG-TERM CURRENT USE OF INSULIN (HCC): ICD-10-CM

## 2018-10-24 DIAGNOSIS — R73.9 HYPERGLYCEMIA: ICD-10-CM

## 2018-10-24 DIAGNOSIS — R10.32 LEFT LOWER QUADRANT PAIN: Primary | ICD-10-CM

## 2018-10-24 DIAGNOSIS — I10 ESSENTIAL HYPERTENSION: ICD-10-CM

## 2018-10-24 PROCEDURE — 3725F SCREEN DEPRESSION PERFORMED: CPT | Performed by: INTERNAL MEDICINE

## 2018-10-24 PROCEDURE — 99204 OFFICE O/P NEW MOD 45 MIN: CPT | Performed by: INTERNAL MEDICINE

## 2018-10-24 RX ORDER — LANCETS
EACH MISCELLANEOUS
Qty: 100 EACH | Refills: 3 | Status: SHIPPED | OUTPATIENT
Start: 2018-10-24 | End: 2020-11-02 | Stop reason: ALTCHOICE

## 2018-10-24 RX ORDER — LISINOPRIL 20 MG/1
20 TABLET ORAL DAILY
Qty: 30 TABLET | Refills: 3 | Status: SHIPPED | OUTPATIENT
Start: 2018-10-24 | End: 2019-05-01

## 2018-10-24 RX ORDER — INSULIN GLARGINE 100 [IU]/ML
20 INJECTION, SOLUTION SUBCUTANEOUS
Status: DISCONTINUED | OUTPATIENT
Start: 2018-10-25 | End: 2019-11-21

## 2018-10-24 NOTE — TELEPHONE ENCOUNTER
Patient called asking for us to fax a letter to the [facility] stating that she  Is under your care and cleared for work  States she discussed this with you at her appt?   Fax number is [phone] attdasha [email or web address]

## 2018-10-24 NOTE — PROGRESS NOTES
Assessment/Plan:  1  Untreated uncontrolled diabetes of type 2 new onset  Will start on basal insulin Lantus 16-20 units daily with gradual increase depending on fasting sugar  Also started metformin 500 twice daily  2   Uncontrolled hypertension  Will increase lisinopril to 20 mg daily  3   Splenomegaly cause unclear to be evaluated further by Hematology Oncology  4   Untreated hepatitis C with heavy viral load referred to GI for possible treatment  Plan  Patient was instructed about monitoring her blood sugar twice daily  Gradually adjusting Lantus by 2 units every 3 days depending on fasting sugar  Diet instructions  Recheck and follow-up with Dr Marika Carmona as scheduled in 2 weeks         Diagnoses and all orders for this visit:    Left lower quadrant pain    Type 2 diabetes mellitus without complication, without long-term current use of insulin (HCC)  -     insulin glargine (LANTUS) subcutaneous injection 20 Units 0 2 mL; Inject 20 Units under the skin daily at bedtime     Hyperglycemia    Essential hypertension  -     lisinopril (ZESTRIL) 20 mg tablet; Take 1 tablet (20 mg total) by mouth daily  -     metFORMIN (GLUCOPHAGE) 500 mg tablet; Take 1 tablet (500 mg total) by mouth 2 (two) times a day with meals    Chronic hepatitis C without hepatic coma ([facility] )  -     Ambulatory referral to Gastroenterology; Future          Subjective:      Patient ID: Bruno Chapin is a 25 y o  female  This is a 49-year-old female who comes after recent hospital discharge from [address] [id number]  Patient's hospital records were reviewed  In summary this 49-year-old female was admitted for left lower quadrant pain subsequent workup revealed presence of splenomegaly and evidence of chronic active hepatitis C and diabetes  Patient was seen evaluated and seen by GI and Hematology at the hospital and is scheduled to be followed as an outpatient for treatment of hepatitis C    However patient tells me that she was not given any treatment for her diabetes  Her history of diabetes goes back to several months when she was told she has a borderline diabetic her last blood sugar in June was 154 however in the hospital her blood sugar was over 200 and she received insulin coverage  But somehow patient was not given any treatment to be followed at home and hence she came in as a part of transitional care management  Patient's past medical history is quite extensive and is reviewed in detail  Patient has 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 coarctation of aorta operated with open heart surgery  Patient during dose surgical procedures received blood transfusions which according to the patient gave her hepatitis C  patient was treated with interferon therapy at young age and then stop treatment  Patient has not been recently evaluated for hepatitis C  as per her hospital records she she had a significant viral overload with evidence of mildly elevated AST and ALT however her CT scan of the abdomen and ultrasounds did not confirm presence of cirrhosis  Etiology of splenomegaly is unclear from her recent studies  She is scheduled for follow-up with Hematology and Oncology  The meantime patient has diabetes also needs treatment and hence she presented to my office for treatment of diabetes  On my evaluation her blood pressure was also noted to be high and I decided to increase her lisinopril to 20 mg daily  My plan with this patient is to start her on basal insulin with Lantus starting with 16 units daily in the evening  Monitoring of blood sugar twice a day  Gradually titrate Lantus by 2 units every 3 days until her fasting sugar is below 130  Patient was also given metformin 500 twice daily  She was given diabetic supplies with testing and glucometer  She was also given instructions about 1500 calorie diet          The following portions of the patient's history were reviewed and updated as appropriate: She  has a past medical history of Cyst of ovary, right; Hepatitis C; Hypertension; Pulmonary artery congenital abnormality; and Spleen enlarged  Patient Active Problem List    Diagnosis Date Noted    Splenomegaly 10/12/2018    Hyperglycemia 10/12/2018    Hyponatremia 10/12/2018    Hypertension 10/12/2018    Hepatitis C 10/12/2018    Abdominal pain 10/12/2018    S/P VSD repair 07/12/2018    H/O aortic coarctation repair 07/12/2018     She  has a past surgical history that includes Cholecystectomy; VSD repair; Coarctation of aorta excision; Liver biopsy; Cardiac catheterization; and Liver biopsy  Her family history includes Diabetes in her father; Heart attack in her father; Hypertension in her father and mother; Kidney failure in her father; Migraines in her mother; Polycystic kidney disease in her father and paternal grandmother  She  reports that she has quit smoking  She has never used smokeless tobacco  She reports that she drinks alcohol  She reports that she does not use drugs    Current Outpatient Prescriptions   Medication Sig Dispense Refill    traMADol (ULTRAM) 50 mg tablet Take 1 tablet (50 mg total) by mouth every 6 (six) hours as needed for moderate pain for up to 10 doses 10 tablet 0    lisinopril (ZESTRIL) 20 mg tablet Take 1 tablet (20 mg total) by mouth daily 30 tablet 3    metFORMIN (GLUCOPHAGE) 500 mg tablet Take 1 tablet (500 mg total) by mouth 2 (two) times a day with meals 60 tablet 3     Current Facility-Administered Medications   Medication Dose Route Frequency Provider Last Rate Last Dose    [START ON 10/25/2018] insulin glargine (LANTUS) subcutaneous injection 20 Units 0 2 mL  20 Units Subcutaneous HS Saintclair Balboa, MD         Current Outpatient Prescriptions on File Prior to Visit   Medication Sig    traMADol (ULTRAM) 50 mg tablet Take 1 tablet (50 mg total) by mouth every 6 (six) hours as needed for moderate pain for up to 10 doses    [DISCONTINUED] lisinopril (ZESTRIL) 5 mg tablet Take 20 mg by mouth daily       No current facility-administered medications on file prior to visit  She is allergic to prednisone       Review of Systems   Constitutional: Negative  HENT: Negative  Eyes: Negative  Respiratory: Negative  Cardiovascular: Negative  Gastrointestinal: Positive for abdominal pain  Endocrine: Negative  Genitourinary: Negative  Musculoskeletal: Negative  Skin: Negative  Allergic/Immunologic: Negative  Neurological: Negative  Hematological: Negative  Psychiatric/Behavioral: Negative  Objective:      /88   Pulse 90   Temp 98 8 °F (37 1 °C) (Tympanic)   Resp 16   Ht 5' 2" (1 575 m)   Wt 109 kg (240 lb)   LMP 10/03/2018 (Approximate)   SpO2 98%   BMI 43 90 kg/m²          Physical Exam   Constitutional: She is oriented to person, place, and time  She appears well-developed and well-nourished  HENT:   Head: Normocephalic and atraumatic  Eyes: Pupils are equal, round, and reactive to light  Neck: Normal range of motion  Neck supple  No JVD present  No thyromegaly present  Cardiovascular: Normal rate and regular rhythm  Exam reveals no friction rub  Murmur heard  Cardiac examination reveals a well-healed midsternal scar  His systolic ejection murmur grade 2/6 murmur heard all over the precordium  Pulmonary/Chest: Effort normal and breath sounds normal  No respiratory distress  She has no wheezes  She has no rales  Abdominal: Soft  Bowel sounds are normal  She exhibits no mass  There is no tenderness  There is no rebound  Abdominal examination revealed presence of splenomegaly without any tenderness  No rigidity guarding or rebound noted in the abdominal examination  Musculoskeletal: Normal range of motion  She exhibits no edema or tenderness  Lymphadenopathy:     She has no cervical adenopathy  Neurological: She is alert and oriented to person, place, and time   She has normal reflexes  Skin: Skin is warm and dry  Psychiatric: She has a normal mood and affect           Admission on 10/17/2018, Discharged on 10/18/2018   Component Date Value Ref Range Status    WBC 10/18/2018 10 30  4 80 - 10 80 Thousand/uL Final    RBC 10/18/2018 5 58* 3 90 - 5 20 Million/uL Final    Hemoglobin 10/18/2018 14 9  12 0 - 16 0 g/dL Final    Hematocrit 10/18/2018 43 8  34 8 - 46 1 % Final    MCV 10/18/2018 78* 81 - 99 fL Final    MCH 10/18/2018 26 7  26 0 - 34 0 pg Final    MCHC 10/18/2018 34 1  31 0 - 37 0 g/dL Final    RDW 10/18/2018 15 0* 11 5 - 14 5 % Final    MPV 10/18/2018 9 3  8 6 - 11 7 fL Final    Platelets 52/36/9114 216  149 - 390 Thousands/uL Final    nRBC 10/18/2018 0  /100 WBCs Final    Neutrophils Relative 10/18/2018 64  42 - 75 % Final    Lymphocytes Relative 10/18/2018 26  21 - 51 % Final    Monocytes Relative 10/18/2018 7  2 - 12 % Final    Eosinophils Relative 10/18/2018 2  0 - 5 % Final    Basophils Relative 10/18/2018 1  0 - 2 % Final    Neutrophils Absolute 10/18/2018 6 60* 1 40 - 6 50 Thousands/µL Final    Lymphocytes Absolute 10/18/2018 2 70  0 60 - 4 47 Thousands/µL Final    Monocytes Absolute 10/18/2018 0 70  0 17 - 1 22 Thousand/µL Final    Eosinophils Absolute 10/18/2018 0 20  0 00 - 0 61 Thousand/µL Final    Basophils Absolute 10/18/2018 0 10  0 00 - 0 10 Thousands/µL Final    Protime 10/18/2018 12 6  10 1 - 12 9 seconds Final         INR 10/18/2018 1 08  0 90 - 1 50 Final         PTT 10/18/2018 32  24 - 36 seconds Final    Therapeutic Heparin Range =  60-90 seconds    Sodium 10/18/2018 132* 134 - 143 mmol/L Final    Potassium 10/18/2018 3 6  3 5 - 5 5 mmol/L Final    Chloride 10/18/2018 99  98 - 107 mmol/L Final    CO2 10/18/2018 24  21 - 31 mmol/L Final    ANION GAP 10/18/2018 9  4 - 13 mmol/L Final    BUN 10/18/2018 13  7 - 25 mg/dL Final    Creatinine 10/18/2018 0 61  0 60 - 1 20 mg/dL Final    Standardized to IDMS reference method    Glucose 10/18/2018 298* 65 - 99 mg/dL Final      If the patient is fasting, the ADA then defines impaired fasting glucose as > 100 mg/dL and diabetes as > or equal to 123 mg/dL  Specimen collection should occur prior to Sulfasalazine administration due to the potential for falsely depressed results  Specimen collection should occur prior to Sulfapyridine administration due to the potential for falsely elevated results   Calcium 10/18/2018 9 7  8 6 - 10 5 mg/dL Final    AST 10/18/2018 48* 13 - 39 U/L Final      Specimen collection should occur prior to Sulfasalazine administration due to the potential for falsely depressed results   ALT 10/18/2018 78* 7 - 52 U/L Final      Specimen collection should occur prior to Sulfasalazine administration due to the potential for falsely depressed results       Alkaline Phosphatase 10/18/2018 91  40 - 150 U/L Final    Total Protein 10/18/2018 7 4  6 4 - 8 9 g/dL Final    Albumin 10/18/2018 4 1  3 5 - 5 7 g/dL Final    Total Bilirubin 10/18/2018 0 50  0 20 - 1 00 mg/dL Final    eGFR 10/18/2018 122  ml/min/1 73sq m Final    Lipase 10/18/2018 21  11 - 82 u/L Final    Color, UA 10/18/2018 Yellow  Yellow, Straw Final    Clarity, UA 10/18/2018 Clear  Hazy, Clear Final    Specific Gravity, UA 10/18/2018 1 015  >1 005-<1 030 Final    pH, UA 10/18/2018 6 0  5 0-<8 0 Final    Leukocytes, UA 10/18/2018 Negative  Negative Final    Nitrite, UA 10/18/2018 Negative  Negative Final    Protein, UA 10/18/2018 Negative  Negative, Interference- unable to analyze mg/dl Final    Glucose, UA 10/18/2018 3+* Negative mg/dl Final    Ketones, UA 10/18/2018 Negative  Negative mg/dl Final    Urobilinogen, UA 10/18/2018 0 2  0 2, 1 0 E U /dl E U /dl Final    Bilirubin, UA 10/18/2018 Negative  Negative Final    Blood, UA 10/18/2018 Negative  Negative Final    EXT PREG TEST UR (Ref: Negative) 10/18/2018 negative for pregnancy   Final   Admission on 10/12/2018, Discharged on 10/14/2018 Component Date Value Ref Range Status    POC Glucose 10/12/2018 269* 65 - 140 mg/dl Final    Hemoglobin A1C 10/13/2018 10 0* 4 2 - 6 3 % Final    EAG 10/13/2018 240  mg/dl Final    Sodium 10/13/2018 135* 136 - 145 mmol/L Final    Potassium 10/13/2018 3 7  3 5 - 5 3 mmol/L Final    Chloride 10/13/2018 103  100 - 108 mmol/L Final    CO2 10/13/2018 25  21 - 32 mmol/L Final    ANION GAP 10/13/2018 7  4 - 13 mmol/L Final    BUN 10/13/2018 8  5 - 25 mg/dL Final    Creatinine 10/13/2018 0 57* 0 60 - 1 30 mg/dL Final    Standardized to IDMS reference method    Glucose 10/13/2018 220* 65 - 140 mg/dL Final      If the patient is fasting, the ADA then defines impaired fasting glucose as > 100 mg/dL and diabetes as > or equal to 123 mg/dL  Specimen collection should occur prior to Sulfasalazine administration due to the potential for falsely depressed results  Specimen collection should occur prior to Sulfapyridine administration due to the potential for falsely elevated results   Calcium 10/13/2018 8 8  8 3 - 10 1 mg/dL Final    AST 10/13/2018 94* 5 - 45 U/L Final      Specimen collection should occur prior to Sulfasalazine administration due to the potential for falsely depressed results   ALT 10/13/2018 109* 12 - 78 U/L Final      Specimen collection should occur prior to Sulfasalazine administration due to the potential for falsely depressed results       Alkaline Phosphatase 10/13/2018 98  46 - 116 U/L Final    Total Protein 10/13/2018 6 7  6 4 - 8 2 g/dL Final    Albumin 10/13/2018 2 9* 3 5 - 5 0 g/dL Final    Total Bilirubin 10/13/2018 0 49  0 20 - 1 00 mg/dL Final    eGFR 10/13/2018 125  ml/min/1 73sq m Final    WBC 10/13/2018 7 85  4 31 - 10 16 Thousand/uL Final    RBC 10/13/2018 5 07  3 81 - 5 12 Million/uL Final    Hemoglobin 10/13/2018 13 5  11 5 - 15 4 g/dL Final    Hematocrit 10/13/2018 40 9  34 8 - 46 1 % Final    MCV 10/13/2018 81* 82 - 98 fL Final    MCH 10/13/2018 26 6* 26 8 - 34 3 pg Final    MCHC 10/13/2018 33 0  31 4 - 37 4 g/dL Final    RDW 10/13/2018 14 6  11 6 - 15 1 % Final    Platelets 65/08/0177 195  149 - 390 Thousands/uL Final    MPV 10/13/2018 11 0  8 9 - 12 7 fL Final    Hepatitis B Surface Ag 10/13/2018 Non-reactive  Non-reactive, NonReactive - Confirmed Final    Hepatitis C Ab 10/13/2018 High Reactive* Non-reactive Final    Hep B C IgM 10/13/2018 Non-reactive  Non-reactive Final    Hep B Core Total Ab 10/13/2018 Non-reactive  Non-reactive Final    HCV PCR Quantitative 10/13/2018 987551  IU/mL Final    Test Information 10/13/2018 Comment   Final    The quantitative range of this assay is 15 IU/mL to 100 million IU/mL      HCV Quantitative Log 10/13/2018 5 873  log10 IU/mL Final    Rapid HIV 1 AND 2 10/13/2018 Non-Reactive  Non-Reactive Final    HIV-1 P24 Ag Screen 10/13/2018 Non-Reactive  Non-Reactive Final    POC Glucose 10/13/2018 220* 65 - 140 mg/dl Final    POC Glucose 10/13/2018 279* 65 - 140 mg/dl Final    POC Glucose 10/13/2018 229* 65 - 140 mg/dl Final    POC Glucose 10/13/2018 253* 65 - 140 mg/dl Final    WBC 10/14/2018 8 74  4 31 - 10 16 Thousand/uL Final    RBC 10/14/2018 4 81  3 81 - 5 12 Million/uL Final    Hemoglobin 10/14/2018 12 8  11 5 - 15 4 g/dL Final    Hematocrit 10/14/2018 38 5  34 8 - 46 1 % Final    MCV 10/14/2018 80* 82 - 98 fL Final    MCH 10/14/2018 26 6* 26 8 - 34 3 pg Final    MCHC 10/14/2018 33 2  31 4 - 37 4 g/dL Final    RDW 10/14/2018 14 4  11 6 - 15 1 % Final    MPV 10/14/2018 11 0  8 9 - 12 7 fL Final    Platelets 53/68/1118 180  149 - 390 Thousands/uL Final    nRBC 10/14/2018 0  /100 WBCs Final    Neutrophils Relative 10/14/2018 64  43 - 75 % Final    Immat GRANS % 10/14/2018 0  0 - 2 % Final    Lymphocytes Relative 10/14/2018 22  14 - 44 % Final    Monocytes Relative 10/14/2018 10  4 - 12 % Final    Eosinophils Relative 10/14/2018 3  0 - 6 % Final    Basophils Relative 10/14/2018 1  0 - 1 % Final    Neutrophils Absolute 10/14/2018 5 64  1 85 - 7 62 Thousands/µL Final    Immature Grans Absolute 10/14/2018 0 03  0 00 - 0 20 Thousand/uL Final    Lymphocytes Absolute 10/14/2018 1 93  0 60 - 4 47 Thousands/µL Final    Monocytes Absolute 10/14/2018 0 85  0 17 - 1 22 Thousand/µL Final    Eosinophils Absolute 10/14/2018 0 25  0 00 - 0 61 Thousand/µL Final    Basophils Absolute 10/14/2018 0 04  0 00 - 0 10 Thousands/µL Final    Sodium 10/14/2018 136  136 - 145 mmol/L Final    Potassium 10/14/2018 3 9  3 5 - 5 3 mmol/L Final    Chloride 10/14/2018 104  100 - 108 mmol/L Final    CO2 10/14/2018 23  21 - 32 mmol/L Final    ANION GAP 10/14/2018 9  4 - 13 mmol/L Final    BUN 10/14/2018 12  5 - 25 mg/dL Final    Creatinine 10/14/2018 0 53* 0 60 - 1 30 mg/dL Final    Standardized to IDMS reference method    Glucose 10/14/2018 226* 65 - 140 mg/dL Final      If the patient is fasting, the ADA then defines impaired fasting glucose as > 100 mg/dL and diabetes as > or equal to 123 mg/dL  Specimen collection should occur prior to Sulfasalazine administration due to the potential for falsely depressed results  Specimen collection should occur prior to Sulfapyridine administration due to the potential for falsely elevated results   Calcium 10/14/2018 8 9  8 3 - 10 1 mg/dL Final    AST 10/14/2018 72* 5 - 45 U/L Final      Specimen collection should occur prior to Sulfasalazine administration due to the potential for falsely depressed results   ALT 10/14/2018 98* 12 - 78 U/L Final      Specimen collection should occur prior to Sulfasalazine administration due to the potential for falsely depressed results       Alkaline Phosphatase 10/14/2018 93  46 - 116 U/L Final    Total Protein 10/14/2018 6 3* 6 4 - 8 2 g/dL Final    Albumin 10/14/2018 2 7* 3 5 - 5 0 g/dL Final    Total Bilirubin 10/14/2018 0 43  0 20 - 1 00 mg/dL Final    eGFR 10/14/2018 128  ml/min/1 73sq m Final    Magnesium 10/14/2018 1 8  1 6 - 2 6 mg/dL Final    POC Glucose 10/14/2018 242* 65 - 140 mg/dl Final    POC Glucose 10/14/2018 246* 65 - 140 mg/dl Final   Admission on 10/12/2018, Discharged on 10/12/2018   Component Date Value Ref Range Status    WBC 10/12/2018 6 80  4 80 - 10 80 Thousand/uL Final    RBC 10/12/2018 5 38* 3 90 - 5 20 Million/uL Final    Hemoglobin 10/12/2018 14 6  12 0 - 16 0 g/dL Final    Hematocrit 10/12/2018 42 9  34 8 - 46 1 % Final    MCV 10/12/2018 80* 81 - 99 fL Final    MCH 10/12/2018 27 2  26 0 - 34 0 pg Final    MCHC 10/12/2018 34 0  31 0 - 37 0 g/dL Final    RDW 10/12/2018 15 1* 11 5 - 14 5 % Final    MPV 10/12/2018 9 2  8 6 - 11 7 fL Final    Platelets 39/12/5946 175  149 - 390 Thousands/uL Final    nRBC 10/12/2018 0  /100 WBCs Final    Neutrophils Relative 10/12/2018 65  42 - 75 % Final    Lymphocytes Relative 10/12/2018 23  21 - 51 % Final    Monocytes Relative 10/12/2018 10  2 - 12 % Final    Eosinophils Relative 10/12/2018 2  0 - 5 % Final    Basophils Relative 10/12/2018 1  0 - 2 % Final    Neutrophils Absolute 10/12/2018 4 40  1 40 - 6 50 Thousands/µL Final    Lymphocytes Absolute 10/12/2018 1 60  0 60 - 4 47 Thousands/µL Final    Monocytes Absolute 10/12/2018 0 70  0 17 - 1 22 Thousand/µL Final    Eosinophils Absolute 10/12/2018 0 10  0 00 - 0 61 Thousand/µL Final    Basophils Absolute 10/12/2018 0 00  0 00 - 0 10 Thousands/µL Final    Sodium 10/12/2018 133* 134 - 143 mmol/L Final    Potassium 10/12/2018 3 8  3 5 - 5 5 mmol/L Final    Chloride 10/12/2018 101  98 - 107 mmol/L Final    CO2 10/12/2018 26  21 - 31 mmol/L Final    ANION GAP 10/12/2018 6  4 - 13 mmol/L Final    BUN 10/12/2018 9  7 - 25 mg/dL Final    Creatinine 10/12/2018 0 54* 0 60 - 1 20 mg/dL Final    Standardized to IDMS reference method    Glucose 10/12/2018 385* 65 - 99 mg/dL Final      If the patient is fasting, the ADA then defines impaired fasting glucose as > 100 mg/dL and diabetes as > or equal to 123 mg/dL  Specimen collection should occur prior to Sulfasalazine administration due to the potential for falsely depressed results  Specimen collection should occur prior to Sulfapyridine administration due to the potential for falsely elevated results   Calcium 10/12/2018 9 6  8 6 - 10 5 mg/dL Final    AST 10/12/2018 72* 13 - 39 U/L Final      Specimen collection should occur prior to Sulfasalazine administration due to the potential for falsely depressed results   ALT 10/12/2018 86* 7 - 52 U/L Final      Specimen collection should occur prior to Sulfasalazine administration due to the potential for falsely depressed results       Alkaline Phosphatase 10/12/2018 91  40 - 150 U/L Final    Total Protein 10/12/2018 7 2  6 4 - 8 9 g/dL Final    Albumin 10/12/2018 4 0  3 5 - 5 7 g/dL Final    Total Bilirubin 10/12/2018 0 50  0 20 - 1 00 mg/dL Final    eGFR 10/12/2018 127  ml/min/1 73sq m Final    Color, UA 10/12/2018 Yellow  Yellow, Straw Final    Clarity, UA 10/12/2018 Clear  Hazy, Clear Final    Specific Gravity, UA 10/12/2018 <=1 005* >1 005-<1 030 Final    pH, UA 10/12/2018 5 0  5 0-<8 0 Final    Leukocytes, UA 10/12/2018 Negative  Negative Final    Nitrite, UA 10/12/2018 Negative  Negative Final    Protein, UA 10/12/2018 Negative  Negative, Interference- unable to analyze mg/dl Final    Glucose, UA 10/12/2018 3+* Negative mg/dl Final    Ketones, UA 10/12/2018 Negative  Negative mg/dl Final    Urobilinogen, UA 10/12/2018 0 2  0 2, 1 0 E U /dl E U /dl Final    Bilirubin, UA 10/12/2018 Negative  Negative Final    Blood, UA 10/12/2018 Negative  Negative Final    CMV IGG 10/12/2018 <0 60  0 00 - 0 59 U/mL Final                                   Negative          <0 60                                 Equivocal   0 60 - 0 69                                 Positive          >0 69    CMV IgM 10/12/2018 <30 0  0 0 - 29 9 AU/mL Final                                    Negative         <30 0 Equivocal  30 0 - 34 9                                  Positive         >34 9  A positive result is generally indicative of acute  infection, reactivation or persistent IgM production   EBV Early Antigen Ab, IgG 10/12/2018 <9 0  0 0 - 8 9 U/mL Final                                     Negative        < 9 0                                   Equivocal  9 0 - 10 9                                   Positive        >10 9    EBV VCA IgG 10/12/2018 132 0* 0 0 - 17 9 U/mL Final                                     Negative        <18 0                                   Equivocal 18 0 - 21 9                                   Positive        >21 9    EBV VCA IgM 10/12/2018 <36 0  0 0 - 35 9 U/mL Final                                     Negative        <36 0                                   Equivocal 36 0 - 43 9                                   Positive        >43 9    EBV Nuclear Ag Ab 10/12/2018 >600 0* 0 0 - 17 9 U/mL Final                                     Negative        <18 0                                   Equivocal 18 0 - 21 9                                   Positive        >21 9    EBV Interp   10/12/2018 Comment   Final                   EBV Interpretation Chart  Interpretation   EBV-IgM  EA(D)-IgG  VCA-IgG  EBNA-IgG  EBV Seronegative    -        -         -          -  Early Phase         +        -         -          -  Acute Primary       +       +or-       +          -  Infection  Convalescence/Past  -       +or-       +          +  Infection  Reactivated        +or-      +         +          +  Infection         + Antibody Present      - Antibody Absent   Admission on 10/10/2018, Discharged on 10/10/2018   Component Date Value Ref Range Status    WBC 10/10/2018 11 10* 4 80 - 10 80 Thousand/uL Final    RBC 10/10/2018 5 62* 3 90 - 5 20 Million/uL Final    Hemoglobin 10/10/2018 14 9  12 0 - 16 0 g/dL Final    Hematocrit 10/10/2018 44 9  34 8 - 46 1 % Final    MCV 10/10/2018 80* 81 - 99 fL Final    MCH 10/10/2018 26 5  26 0 - 34 0 pg Final    MCHC 10/10/2018 33 2  31 0 - 37 0 g/dL Final    RDW 10/10/2018 15 3* 11 5 - 14 5 % Final    MPV 10/10/2018 9 5  8 6 - 11 7 fL Final    Platelets 79/99/2777 197  149 - 390 Thousands/uL Final    nRBC 10/10/2018 0  /100 WBCs Final    Neutrophils Relative 10/10/2018 67  42 - 75 % Final    Lymphocytes Relative 10/10/2018 24  21 - 51 % Final    Monocytes Relative 10/10/2018 7  2 - 12 % Final    Eosinophils Relative 10/10/2018 1  0 - 5 % Final    Basophils Relative 10/10/2018 1  0 - 2 % Final    Neutrophils Absolute 10/10/2018 7 50* 1 40 - 6 50 Thousands/µL Final    Lymphocytes Absolute 10/10/2018 2 60  0 60 - 4 47 Thousands/µL Final    Monocytes Absolute 10/10/2018 0 80  0 17 - 1 22 Thousand/µL Final    Eosinophils Absolute 10/10/2018 0 10  0 00 - 0 61 Thousand/µL Final    Basophils Absolute 10/10/2018 0 10  0 00 - 0 10 Thousands/µL Final    Sodium 10/10/2018 134  134 - 143 mmol/L Final    Potassium 10/10/2018 3 6  3 5 - 5 5 mmol/L Final    Chloride 10/10/2018 99  98 - 107 mmol/L Final    CO2 10/10/2018 26  21 - 31 mmol/L Final    ANION GAP 10/10/2018 9  4 - 13 mmol/L Final    BUN 10/10/2018 9  7 - 25 mg/dL Final    Creatinine 10/10/2018 0 58* 0 60 - 1 20 mg/dL Final    Standardized to IDMS reference method    Glucose 10/10/2018 315* 65 - 99 mg/dL Final      If the patient is fasting, the ADA then defines impaired fasting glucose as > 100 mg/dL and diabetes as > or equal to 123 mg/dL  Specimen collection should occur prior to Sulfasalazine administration due to the potential for falsely depressed results  Specimen collection should occur prior to Sulfapyridine administration due to the potential for falsely elevated results      Calcium 10/10/2018 9 4  8 6 - 10 5 mg/dL Final    AST 10/10/2018 55* 13 - 39 U/L Final      Specimen collection should occur prior to Sulfasalazine administration due to the potential for falsely depressed results   ALT 10/10/2018 81* 7 - 52 U/L Final      Specimen collection should occur prior to Sulfasalazine administration due to the potential for falsely depressed results       Alkaline Phosphatase 10/10/2018 98  40 - 150 U/L Final    Total Protein 10/10/2018 7 3  6 4 - 8 9 g/dL Final    Albumin 10/10/2018 4 0  3 5 - 5 7 g/dL Final    Total Bilirubin 10/10/2018 0 50  0 20 - 1 00 mg/dL Final    eGFR 10/10/2018 124  ml/min/1 73sq m Final    EXT PREG TEST UR (Ref: Negative) 10/10/2018 negative   Final    Color, UA 10/10/2018 Yellow  Yellow, Straw Final    Clarity, UA 10/10/2018 Clear  Hazy, Clear Final    Specific Gravity, UA 10/10/2018 1 015  >1 005-<1 030 Final    pH, UA 10/10/2018 6 0  5 0-<8 0 Final    Leukocytes, UA 10/10/2018 Negative  Negative Final    Nitrite, UA 10/10/2018 Negative  Negative Final    Protein, UA 10/10/2018 Negative  Negative, Interference- unable to analyze mg/dl Final    Glucose, UA 10/10/2018 3+* Negative mg/dl Final    Ketones, UA 10/10/2018 Negative  Negative mg/dl Final    Urobilinogen, UA 10/10/2018 0 2  0 2, 1 0 E U /dl E U /dl Final    Bilirubin, UA 10/10/2018 Negative  Negative Final    Blood, UA 10/10/2018 Negative  Negative Final   Admission on 07/17/2018, Discharged on 07/17/2018   Component Date Value Ref Range Status    WBC 07/17/2018 11 20* 4 80 - 10 80 Thousand/uL Final    RBC 07/17/2018 5 10  3 90 - 5 20 Million/uL Final    Hemoglobin 07/17/2018 12 9  12 0 - 16 0 g/dL Final    Hematocrit 07/17/2018 38 8  34 8 - 46 1 % Final    MCV 07/17/2018 76* 81 - 99 fL Final    MCH 07/17/2018 25 3* 26 0 - 34 0 pg Final    MCHC 07/17/2018 33 3  31 0 - 37 0 g/dL Final    RDW 07/17/2018 15 7* 11 5 - 14 5 % Final    MPV 07/17/2018 9 5  8 6 - 11 7 fL Final    Platelets 68/08/2916 208  149 - 390 Thousands/uL Final    nRBC 07/17/2018 0  /100 WBCs Final    Neutrophils Relative 07/17/2018 68  42 - 75 % Final    Lymphocytes Relative 07/17/2018 24 21 - 51 % Final    Monocytes Relative 07/17/2018 7  2 - 12 % Final    Eosinophils Relative 07/17/2018 1  0 - 5 % Final    Basophils Relative 07/17/2018 1  0 - 2 % Final    Neutrophils Absolute 07/17/2018 7 60* 1 40 - 6 50 Thousands/µL Final    Lymphocytes Absolute 07/17/2018 2 60  0 60 - 4 47 Thousands/µL Final    Monocytes Absolute 07/17/2018 0 80  0 17 - 1 22 Thousand/µL Final    Eosinophils Absolute 07/17/2018 0 10  0 00 - 0 61 Thousand/µL Final    Basophils Absolute 07/17/2018 0 10  0 00 - 0 10 Thousands/µL Final    Sodium 07/17/2018 135  134 - 143 mmol/L Final    Potassium 07/17/2018 3 4* 3 5 - 5 5 mmol/L Final    Chloride 07/17/2018 102  98 - 107 mmol/L Final    CO2 07/17/2018 26  21 - 31 mmol/L Final    ANION GAP 07/17/2018 7  4 - 13 mmol/L Final    BUN 07/17/2018 10  7 - 25 mg/dL Final    Creatinine 07/17/2018 0 71  0 60 - 1 20 mg/dL Final    Standardized to IDMS reference method    Glucose 07/17/2018 367* 65 - 99 mg/dL Final      If the patient is fasting, the ADA then defines impaired fasting glucose as > 100 mg/dL and diabetes as > or equal to 123 mg/dL  Specimen collection should occur prior to Sulfasalazine administration due to the potential for falsely depressed results  Specimen collection should occur prior to Sulfapyridine administration due to the potential for falsely elevated results   Calcium 07/17/2018 9 4  8 6 - 10 5 mg/dL Final    eGFR 07/17/2018 115  ml/min/1 73sq m Final    Troponin I 07/17/2018 <0 03  <=0 03 ng/mL Final    BNP 07/17/2018 17  1 - 100 pg/mL Final    Ventricular Rate 07/17/2018 108  BPM Final    Atrial Rate 07/17/2018 108  BPM Final    AR Interval 07/17/2018 180  ms Final    QRSD Interval 07/17/2018 150  ms Final    QT Interval 07/17/2018 374  ms Final    QTC Interval 07/17/2018 501  ms Final    P Axis 07/17/2018 63  degrees Final    QRS Axis 07/17/2018 65  degrees Final    T Wave Axis 07/17/2018 43  degrees Final       No results found

## 2018-10-24 NOTE — TELEPHONE ENCOUNTER
Patient will be new to the practice  She needs an appt for Hep c, preferably with Dr Enrique Draper  Please return her call to schedule   [phone]

## 2018-10-24 NOTE — PROGRESS NOTES
Patient was hopsitalized at:  [facility]  Date of admission:  10/12/18  Date of discharge:  10/14/18  Diagnosis:  splenomegaly, dm  Disposition:  Home  Current symptoms:  None  Post hospital issues:  None  Should patient be enrolled in anticoag monitoring?:  No  Scheduled for follow up?:  Yes  Patients specialists:  Other (comment)  Other specialists Name:  dr Jose Lima  Other specialists contact #:  [email or web address]  Did you obtain your prescribed medications:  Yes  Do you need help managing your perscriptions or medications:  No  Is transportation to your appointments needed:  No  Living Arrangements:  Spouse or Significiant other  Support System:  Family  The type of support provided:  Emotional, Physical  Do you have social support:  Yes, as much as I need  Are you recieving outpatient services:  No  Are you recieving home care services:  No  Are you using any community resources:  No  Current waiver service:  No  Have you fallen in the last 12 months:  No  Interperter language line required?:  No  Counseling:  Patient

## 2018-10-25 ENCOUNTER — HOSPITAL ENCOUNTER (EMERGENCY)
Facility: HOSPITAL | Age: 28
Discharge: HOME | End: 2018-10-25
Attending: EMERGENCY MEDICINE
Payer: COMMERCIAL

## 2018-10-25 ENCOUNTER — PATIENT OUTREACH (OUTPATIENT)
Dept: FAMILY MEDICINE CLINIC | Facility: CLINIC | Age: 28
End: 2018-10-25

## 2018-10-25 VITALS
OXYGEN SATURATION: 96 % | TEMPERATURE: 99.1 F | HEIGHT: 62 IN | SYSTOLIC BLOOD PRESSURE: 152 MMHG | HEART RATE: 100 BPM | DIASTOLIC BLOOD PRESSURE: 72 MMHG | BODY MASS INDEX: 44.16 KG/M2 | WEIGHT: 240 LBS | RESPIRATION RATE: 18 BRPM

## 2018-10-25 DIAGNOSIS — L73.9 FOLLICULITIS: Primary | ICD-10-CM

## 2018-10-25 DIAGNOSIS — E11.9 TYPE 2 DIABETES MELLITUS WITHOUT COMPLICATION, WITH LONG-TERM CURRENT USE OF INSULIN (HCC): Primary | ICD-10-CM

## 2018-10-25 DIAGNOSIS — Z79.4 TYPE 2 DIABETES MELLITUS WITHOUT COMPLICATION, WITH LONG-TERM CURRENT USE OF INSULIN (HCC): Primary | ICD-10-CM

## 2018-10-25 DIAGNOSIS — L02.811 ABSCESS, SCALP: ICD-10-CM

## 2018-10-25 LAB — GLUCOSE SERPL-MCNC: 287 MG/DL (ref 65–140)

## 2018-10-25 PROCEDURE — 82948 REAGENT STRIP/BLOOD GLUCOSE: CPT

## 2018-10-25 PROCEDURE — 99283 EMERGENCY DEPT VISIT LOW MDM: CPT

## 2018-10-25 RX ORDER — ACETAMINOPHEN 325 MG/1
650 TABLET ORAL ONCE
Status: COMPLETED | OUTPATIENT
Start: 2018-10-25 | End: 2018-10-25

## 2018-10-25 RX ORDER — SULFAMETHOXAZOLE AND TRIMETHOPRIM 800; 160 MG/1; MG/1
1 TABLET ORAL ONCE
Status: COMPLETED | OUTPATIENT
Start: 2018-10-25 | End: 2018-10-25

## 2018-10-25 RX ORDER — SULFAMETHOXAZOLE AND TRIMETHOPRIM 800; 160 MG/1; MG/1
1 TABLET ORAL 2 TIMES DAILY
Qty: 14 TABLET | Refills: 0 | Status: SHIPPED | OUTPATIENT
Start: 2018-10-25 | End: 2018-11-01

## 2018-10-25 RX ORDER — CEPHALEXIN 500 MG/1
500 CAPSULE ORAL ONCE
Status: COMPLETED | OUTPATIENT
Start: 2018-10-25 | End: 2018-10-25

## 2018-10-25 RX ORDER — CEPHALEXIN 500 MG/1
500 CAPSULE ORAL 4 TIMES DAILY
Qty: 28 CAPSULE | Refills: 0 | Status: SHIPPED | OUTPATIENT
Start: 2018-10-25 | End: 2018-11-01

## 2018-10-25 RX ADMIN — ACETAMINOPHEN 650 MG: 325 TABLET ORAL at 07:46

## 2018-10-25 RX ADMIN — SULFAMETHOXAZOLE AND TRIMETHOPRIM 1 TABLET: 800; 160 TABLET ORAL at 07:46

## 2018-10-25 RX ADMIN — CEPHALEXIN 500 MG: 500 CAPSULE ORAL at 07:46

## 2018-10-25 NOTE — ED PROVIDER NOTES
History  Chief Complaint   Patient presents with    Abscess     25 yr female with c/o painful lump on top of her head for several days, now draining some pus  States her forehead was swollen yesterday, less swollen today  No f/c or n/v   Last Td 6 yrs ago        History provided by:  Patient  Rash   Location:  Head/neck  Relieved by:  Nothing  Worsened by:  Nothing  Associated symptoms: induration    Associated symptoms: no fever, no headaches, no nausea, no periorbital edema, no sore throat, no URI and not vomiting        Prior to Admission Medications   Prescriptions Last Dose Informant Patient Reported? Taking?    Lancets (ACCU-CHEK MULTICLIX) lancets   No No   Sig: Use as instructed   lisinopril (ZESTRIL) 20 mg tablet   No No   Sig: Take 1 tablet (20 mg total) by mouth daily   metFORMIN (GLUCOPHAGE) 500 mg tablet   No No   Sig: Take 1 tablet (500 mg total) by mouth 2 (two) times a day with meals   traMADol (ULTRAM) 50 mg tablet   No No   Sig: Take 1 tablet (50 mg total) by mouth every 6 (six) hours as needed for moderate pain for up to 10 doses      Facility-Administered Medications Last Administration Doses Remaining   insulin glargine (LANTUS) subcutaneous injection 20 Units 0 2 mL None recorded           Past Medical History:   Diagnosis Date    Cyst of ovary, right     Hepatitis C     Hypertension     Pulmonary artery congenital abnormality     Spleen enlarged        Past Surgical History:   Procedure Laterality Date    CARDIAC CATHETERIZATION      no CAD    CHOLECYSTECTOMY      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 8    LIVER BIOPSY      LIVER BIOPSY      VSD REPAIR      As a child       Family History   Problem Relation Age of Onset    Hypertension Mother     Migraines Mother     Diabetes Father     Hypertension Father     Kidney failure Father     Polycystic kidney disease Father     Heart attack Father     Polycystic kidney disease Paternal Grandmother      I have reviewed and agree with the history as documented  Social History   Substance Use Topics    Smoking status: Former Smoker    Smokeless tobacco: Never Used    Alcohol use Yes        Review of Systems   Constitutional: Negative for chills and fever  HENT: Negative for sore throat  Respiratory: Negative for cough  Gastrointestinal: Negative for nausea and vomiting  Skin: Positive for rash  Draining lesion top of head   Neurological: Negative for headaches  Physical Exam  Physical Exam   Constitutional: She is oriented to person, place, and time  She appears well-developed and well-nourished  HENT:   Nose: Nose normal    Mouth/Throat: Oropharynx is clear and moist    Nontoxic appearing; top of head with 1cm raised, indurated area, mild erythema and tenderness, draining small amount of pus, honey-crusted area; minimal edema super aspect of mid-forehead, no erythema   Eyes: Pupils are equal, round, and reactive to light  Conjunctivae are normal    Neck: Normal range of motion  Neck supple  Cardiovascular: Normal rate, regular rhythm and normal heart sounds  No murmur heard  Pulmonary/Chest: Effort normal and breath sounds normal  No respiratory distress  She has no wheezes  She has no rales  Musculoskeletal: Normal range of motion  She exhibits no edema  Neurological: She is alert and oriented to person, place, and time  Skin: Skin is warm and dry  See scalp exam   Psychiatric: She has a normal mood and affect  Her behavior is normal    Nursing note and vitals reviewed        Vital Signs  ED Triage Vitals [10/25/18 0722]   Temperature Pulse Respirations Blood Pressure SpO2   99 1 °F (37 3 °C) 100 18 152/72 96 %      Temp Source Heart Rate Source Patient Position - Orthostatic VS BP Location FiO2 (%)   Tympanic Monitor Lying Left arm --      Pain Score       8           Vitals:    10/25/18 0722   BP: 152/72   Pulse: 100   Patient Position - Orthostatic VS: Lying       Visual Acuity      ED Medications  Medications   sulfamethoxazole-trimethoprim (BACTRIM DS) 800-160 mg per tablet 1 tablet (1 tablet Oral Given 10/25/18 0746)   cephalexin (KEFLEX) capsule 500 mg (500 mg Oral Given 10/25/18 0746)   acetaminophen (TYLENOL) tablet 650 mg (650 mg Oral Given 10/25/18 0746)       Diagnostic Studies  Results Reviewed     Procedure Component Value Units Date/Time    Fingerstick Glucose (POCT) [[id number]]  (Abnormal) Collected:  10/25/18 0746    Lab Status:  Final result Updated:  10/25/18 0746     POC Glucose 287 (H) mg/dl                  No orders to display              Procedures  Procedures       Phone Contacts  ED Phone Contact    ED Course  ED Course as of Oct 25 0813   Thu Oct 25, 2018   0757 Pt showed me a picture of the scalp yesterday - clearly a draining wound, and her superior forehead edema was much worse than today  Pt with folliculitis and small abscess present - abscess is draining  Will rx keflex and bactrim DS  Pt instructed to watch BS closely, return immediately if she worsens                                MDM  CritCare Time    Disposition  Final diagnoses: Folliculitis   Abscess, scalp     Time reflects when diagnosis was documented in both MDM as applicable and the Disposition within this note     Time User Action Codes Description Comment    10/25/2018  7:54 AM Stewart GEORGE Add [G26 6] Folliculitis     07/25/6902  7:54 AM Stewart GEORGE Add [Q40 366] Abscess, scalp       ED Disposition     ED Disposition Condition Comment    Discharge  [facility] [place] discharge to home/self care  Condition at discharge: Stable        Follow-up Information     Follow up With Specialties Details Why José Miguel Arizmendi DO Family Medicine Schedule an appointment as soon as possible for a visit in 1 day Have your scalp rechecked in 1-2 days  Watch your blood sugar closely  Return immediately if you worsen or any new problems occur   [address] [place] [id number]  [phone]            Discharge Medication List as of 10/25/2018  7:57 AM      START taking these medications    Details   cephalexin (KEFLEX) 500 mg capsule Take 1 capsule (500 mg total) by mouth 4 (four) times a day for 7 days, Starting Thu 10/25/2018, Until Thu 11/1/2018, Normal      sulfamethoxazole-trimethoprim (BACTRIM DS) 800-160 mg per tablet Take 1 tablet by mouth 2 (two) times a day for 7 days smx-tmp DS (BACTRIM) 800-160 mg tabs (1tab q12 D10), Starting Thu 10/25/2018, Until Thu 11/1/2018, Normal         CONTINUE these medications which have NOT CHANGED    Details   Lancets (ACCU-CHEK MULTICLIX) lancets Use as instructed, Normal      lisinopril (ZESTRIL) 20 mg tablet Take 1 tablet (20 mg total) by mouth daily, Starting Wed 10/24/2018, Print      metFORMIN (GLUCOPHAGE) 500 mg tablet Take 1 tablet (500 mg total) by mouth 2 (two) times a day with meals, Starting Wed 10/24/2018, Print      traMADol (ULTRAM) 50 mg tablet Take 1 tablet (50 mg total) by mouth every 6 (six) hours as needed for moderate pain for up to 10 doses, Starting Wed 10/10/2018, Print           No discharge procedures on file      ED Provider  Electronically Signed by           Callie Jean Baptiste MD  10/25/18 1331

## 2018-10-25 NOTE — ED TRIAGE NOTES
Patient arrives with complaints of abscess to top of head for the past "few days"  Area is scabbed but states has previously been draining  States new one is forming to forehead  No drainage or open area noted

## 2018-10-25 NOTE — DISCHARGE INSTRUCTIONS
Folliculitis   WHAT YOU NEED TO KNOW:   Folliculitis is inflammation of your hair follicles  A hair follicle is a sac under your skin  Your hair grows out of the follicle  Folliculitis is caused by bacteria or fungus, most commonly a germ called Staph  Folliculitis can occur anywhere you have hair  DISCHARGE INSTRUCTIONS:   Medicines:   · Antibiotics: This medicine is given to fight or prevent an infection caused by bacteria  It may be given as an ointment that you apply to your skin or as a pill  Always take your antibiotics exactly as ordered by your healthcare provider  Never save antibiotics or take leftover antibiotics that were given to you for another illness  · Antifungal medicine: This medicine helps kill fungus that may be causing your folliculitis  It may be given as an cream that you apply to your skin or as a pill  · NSAIDs , such as ibuprofen, help decrease swelling, pain, and fever  This medicine is available with or without a doctor's order  NSAIDs can cause stomach bleeding or kidney problems in certain people  If you take blood thinner medicine, always ask if NSAIDs are safe for you  Always read the medicine label and follow directions  Do not give these medicines to children under 10months of age without direction from your child's healthcare provider  · Antihistamines: This medicine may be given to help decrease itching  · Take your medicine as directed  Contact your healthcare provider if you think your medicine is not helping or if you have side effects  Tell him of her if you are allergic to any medicine  Keep a list of the medicines, vitamins, and herbs you take  Include the amounts, and when and why you take them  Bring the list or the pill bottles to follow-up visits  Carry your medicine list with you in case of an emergency    Follow up with your healthcare provider or dermatologist as directed:  Write down your questions so you remember to ask them during your visits  Manage folliculitis:   · Use warm compresses:  Wet a washcloth with warm water and apply it to the infected skin area to help decrease pain and swelling  Warm compresses may also help drain pus and improve healing  · Clean the area:  Use antibacterial soap to wash the affected area  Change your washcloths and towels every day  · Avoid shaving the area: If possible, do not shave areas that have folliculitis  If you must shave, use an electric razor or new blade every time you shave  Prevent folliculitis:   · Do not share personal items:  Do not share towels, soap, or any personal items with other people  · Do not wear tight clothing:  Do not wear tight-fitting clothes that rub against and irritate your skin  · Treat skin injuries right away:  Treat injuries such as cuts and scrapes right away  Wash them with warm, soapy water, and cover the area to prevent infection  Contact your healthcare provider or dermatologist if:  · You have a fever  · You have foul-smelling pus coming from the bumps on your skin  · Your rash is spreading  · You have questions or concerns about your condition or care  Return to the emergency department if:  · You develop large areas of red, warm, tender skin around the folliculitis  · You develop boils  © 2017 [address] Information is for End User's use only and may not be sold, redistributed or otherwise used for commercial purposes  All illustrations and images included in CareNotes® are the copyrighted property of [employer]  or [employer]  The above information is an  only  It is not intended as medical advice for individual conditions or treatments  Talk to your doctor, nurse or pharmacist before following any medical regimen to see if it is safe and effective for you  Abscess   WHAT YOU NEED TO KNOW:   A warm compress may help your abscess drain   Your healthcare provider may make a cut in the abscess so it can drain  You may need surgery to remove an abscess that is on your hands or buttocks  YOUR ABSCESS IS DRAINING - RETURN IF IT WORSENS  DISCHARGE INSTRUCTIONS:   Return to the emergency department if:   · The area around your abscess becomes very painful, warm, or has red streaks  · You have a fever and chills  · Your heart is beating faster than usual      · You feel faint or confused  Contact your healthcare provider if:   · Your abscess gets bigger or does not get better  · Your abscess returns  · You have questions or concerns about your condition or care  Medicines: You may  need any of the following:  · Antibiotics  help treat a bacterial infection  · Acetaminophen  decreases pain and fever  It is available without a doctor's order  Ask how much to take and how often to take it  Follow directions  Acetaminophen can cause liver damage if not taken correctly  · NSAIDs , such as ibuprofen, help decrease swelling, pain, and fever  This medicine is available with or without a doctor's order  NSAIDs can cause stomach bleeding or kidney problems in certain people  If you take blood thinner medicine, always ask your healthcare provider if NSAIDs are safe for you  Always read the medicine label and follow directions  · Take your medicine as directed  Contact your healthcare provider if you think your medicine is not helping or if you have side effects  Tell him or her if you are allergic to any medicine  Keep a list of the medicines, vitamins, and herbs you take  Include the amounts, and when and why you take them  Bring the list or the pill bottles to follow-up visits  Carry your medicine list with you in case of an emergency  Self-care:   · Apply a warm compress to your abscess  This will help it open and drain  Wet a washcloth in warm, but not hot, water  Apply the compress for 10 minutes  Repeat this 4 times each day   Do not  press on an abscess or try to open it with a needle  You may push the bacteria deeper or into your blood  · Do not share your clothes, towels, or sheets with anyone  This can spread the infection to others  · Wash your hands often  This can help prevent the spread of germs  Use soap and water or an alcohol-based hand rub  Care for your wound after it is drained:   · Care for your wound as directed  If your healthcare provider says it is okay, carefully remove the bandage and gauze packing  You may need to soak the gauze to get it out of your wound  Clean your wound and the area around it as directed  Dry the area and put on new, clean bandages  Change your bandages when they get wet or dirty  · Ask your healthcare provider how to change the gauze in your wound  Keep track of how many pieces of gauze are placed inside the wound  Do not put too much packing in the wound  Do not pack the gauze too tightly in your wound  Follow up with your healthcare provider in 1 to 3 days: You may need to have your packing removed or your bandage changed  Write down your questions so you remember to ask them during your visits  © 2017 [address] Information is for End User's use only and may not be sold, redistributed or otherwise used for commercial purposes  All illustrations and images included in CareNotes® are the copyrighted property of [employer]  or [employer]  The above information is an  only  It is not intended as medical advice for individual conditions or treatments  Talk to your doctor, nurse or pharmacist before following any medical regimen to see if it is safe and effective for you

## 2018-10-25 NOTE — PROGRESS NOTES
Outpatient Care Management Note:  Patient seen by PCP yesterday, started on Metformin and accuchecks  Seen in ED today for raised reddened areas, draining small amount of pus, given scripts for antibiotics  Spoke with patient, she has not picked up any of her medications or glucose monitor at this time  Re-enforced importance of managing her blood sugars  Mailed information on DM and diabetic diet to patient  Verbalized understanding of the importance of picking medications and glucometer up from the pharmacy  Verified that she has my contact information

## 2018-10-25 NOTE — Clinical Note
FYI-see my note  She has an appointment with you in [facility] on 11/8/18  Could probably benefit from a diabetic education consult

## 2018-10-26 ENCOUNTER — HOSPITAL ENCOUNTER (EMERGENCY)
Facility: HOSPITAL | Age: 28
Discharge: HOME | End: 2018-10-26
Attending: FAMILY MEDICINE
Payer: COMMERCIAL

## 2018-10-26 VITALS
HEIGHT: 62 IN | DIASTOLIC BLOOD PRESSURE: 70 MMHG | WEIGHT: 240 LBS | BODY MASS INDEX: 44.16 KG/M2 | SYSTOLIC BLOOD PRESSURE: 140 MMHG | HEART RATE: 96 BPM | RESPIRATION RATE: 18 BRPM | TEMPERATURE: 98.6 F | OXYGEN SATURATION: 98 %

## 2018-10-26 DIAGNOSIS — L73.9 FOLLICULITIS: Primary | ICD-10-CM

## 2018-10-26 PROCEDURE — 99283 EMERGENCY DEPT VISIT LOW MDM: CPT

## 2018-10-26 RX ORDER — INSULIN GLARGINE 100 [IU]/ML
20 INJECTION, SOLUTION SUBCUTANEOUS
Qty: 10 ML | Refills: 3 | Status: SHIPPED | OUTPATIENT
Start: 2018-10-26 | End: 2019-02-22

## 2018-10-26 NOTE — ED PROVIDER NOTES
History  Chief Complaint   Patient presents with    Mass     on top of head (right side) and mid forehead; pt was seen here yesterday and was told to return if it worsens    this is a 76-year-old female presented to ED with the worsening abscess on her scalp  Patient was seen in the ED yesterday and was started on antibiotics  She states that she came to the ED should she notice her abscess is enlarging in size  Denies any fever or chills  She states she did not use warm compress her  Patient is stable patient was diagnosed with folliculitis  Abscess is draining well  History provided by:  Patient   used: No        Prior to Admission Medications   Prescriptions Last Dose Informant Patient Reported? Taking?    Lancets (ACCU-CHEK MULTICLIX) lancets 10/26/2018 at Unknown time  No Yes   Sig: Use as instructed   cephalexin (KEFLEX) 500 mg capsule 10/26/2018 at Unknown time  No Yes   Sig: Take 1 capsule (500 mg total) by mouth 4 (four) times a day for 7 days   insulin glargine (LANTUS) 100 units/mL subcutaneous injection Not Taking at Unknown time  No No   Sig: Inject 20 Units under the skin daily at bedtime   Patient not taking: Reported on 10/26/2018    lisinopril (ZESTRIL) 20 mg tablet 10/26/2018 at Unknown time  No Yes   Sig: Take 1 tablet (20 mg total) by mouth daily   metFORMIN (GLUCOPHAGE) 500 mg tablet 10/26/2018 at Unknown time  No Yes   Sig: Take 1 tablet (500 mg total) by mouth 2 (two) times a day with meals   sulfamethoxazole-trimethoprim (BACTRIM DS) 800-160 mg per tablet 10/26/2018 at Unknown time  No Yes   Sig: Take 1 tablet by mouth 2 (two) times a day for 7 days smx-tmp DS (BACTRIM) 800-160 mg tabs (1tab q12 D10)   traMADol (ULTRAM) 50 mg tablet Not Taking at Unknown time  No No   Sig: Take 1 tablet (50 mg total) by mouth every 6 (six) hours as needed for moderate pain for up to 10 doses   Patient not taking: Reported on 10/26/2018       Facility-Administered Medications Last Administration Doses Remaining   insulin glargine (LANTUS) subcutaneous injection 20 Units 0 2 mL None recorded           Past Medical History:   Diagnosis Date    Cyst of ovary, right     Hepatitis C     Hypertension     Pulmonary artery congenital abnormality     Spleen enlarged        Past Surgical History:   Procedure Laterality Date    CARDIAC CATHETERIZATION      no CAD    CHOLECYSTECTOMY      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 8    LIVER BIOPSY      LIVER BIOPSY      VSD REPAIR      As a child       Family History   Problem Relation Age of Onset    Hypertension Mother     Migraines Mother     Diabetes Father     Hypertension Father     Kidney failure Father     Polycystic kidney disease Father     Heart attack Father     Polycystic kidney disease Paternal Grandmother      I have reviewed and agree with the history as documented  Social History   Substance Use Topics    Smoking status: Current Some Day Smoker     Packs/day: 0 20     Types: Cigarettes    Smokeless tobacco: Never Used    Alcohol use Yes        Review of Systems   Constitutional: Negative  HENT: Negative  Respiratory: Negative  Cardiovascular: Negative  Skin: Positive for wound  Physical Exam  Physical Exam   Constitutional: She appears well-developed and well-nourished  HENT:   Head: Normocephalic  1 x 1 cm abscess is present fluctuant and draining well  Cardiovascular: Normal rate, regular rhythm and normal heart sounds  Pulmonary/Chest: Effort normal and breath sounds normal    Nursing note and vitals reviewed        Vital Signs  ED Triage Vitals [10/26/18 1835]   Temperature Pulse Respirations Blood Pressure SpO2   98 6 °F (37 °C) 96 18 140/70 98 %      Temp Source Heart Rate Source Patient Position - Orthostatic VS BP Location FiO2 (%)   Temporal Monitor Sitting Left arm --      Pain Score       8           Vitals:    10/26/18 1835   BP: 140/70   Pulse: 96   Patient Position - Orthostatic VS: Sitting       Visual Acuity      ED Medications  Medications - No data to display    Diagnostic Studies  Results Reviewed     None                 No orders to display              Procedures  Procedures       Phone Contacts  ED Phone Contact    ED Course        I manually drain the abscess which was draining yellow bloody discharge  Patient tolerated the procedure well states she is feeling much better pressure had relieved   I recommend that she should apply warm compress her on the abscess                      [facility]  CritCare Time    Disposition  Final diagnoses: Folliculitis     Time reflects when diagnosis was documented in both MDM as applicable and the Disposition within this note     Time User Action Codes Description Comment    10/26/2018  8:09 PM Risa Solares Add [L00 1] Folliculitis       ED Disposition     ED Disposition Condition Comment    Discharge  [facility] discharge to home/self care  Condition at discharge: Stable        Follow-up Information     Follow up With Specialties Details Why Contact Osman Glass, DO Family Medicine In 2 days If symptoms worsen, For wound re-check [facility] [address] [place]  [phone]            Patient's Medications   Discharge Prescriptions    No medications on file     No discharge procedures on file      ED Provider  Electronically Signed by           Joe Jarquin MD  10/26/18 2011

## 2018-10-26 NOTE — TELEPHONE ENCOUNTER
Dr Janae Gale does not have any availability, Will contact when her schedule becomes available and put on cancellation list

## 2018-10-27 NOTE — DISCHARGE INSTRUCTIONS
Folliculitis   WHAT YOU NEED TO KNOW:   Folliculitis is inflammation of your hair follicles  A hair follicle is a sac under your skin  Your hair grows out of the follicle  Folliculitis is caused by bacteria or fungus, most commonly a germ called Staph  Folliculitis can occur anywhere you have hair  DISCHARGE INSTRUCTIONS:   Medicines:   · Antibiotics: This medicine is given to fight or prevent an infection caused by bacteria  It may be given as an ointment that you apply to your skin or as a pill  Always take your antibiotics exactly as ordered by your healthcare provider  Never save antibiotics or take leftover antibiotics that were given to you for another illness  · Antifungal medicine: This medicine helps kill fungus that may be causing your folliculitis  It may be given as an cream that you apply to your skin or as a pill  · NSAIDs , such as ibuprofen, help decrease swelling, pain, and fever  This medicine is available with or without a doctor's order  NSAIDs can cause stomach bleeding or kidney problems in certain people  If you take blood thinner medicine, always ask if NSAIDs are safe for you  Always read the medicine label and follow directions  Do not give these medicines to children under 10months of age without direction from your child's healthcare provider  · Antihistamines: This medicine may be given to help decrease itching  · Take your medicine as directed  Contact your healthcare provider if you think your medicine is not helping or if you have side effects  Tell him of her if you are allergic to any medicine  Keep a list of the medicines, vitamins, and herbs you take  Include the amounts, and when and why you take them  Bring the list or the pill bottles to follow-up visits  Carry your medicine list with you in case of an emergency    Follow up with your healthcare provider or dermatologist as directed:  Write down your questions so you remember to ask them during your visits  Manage folliculitis:   · Use warm compresses:  Wet a washcloth with warm water and apply it to the infected skin area to help decrease pain and swelling  Warm compresses may also help drain pus and improve healing  · Clean the area:  Use antibacterial soap to wash the affected area  Change your washcloths and towels every day  · Avoid shaving the area: If possible, do not shave areas that have folliculitis  If you must shave, use an electric razor or new blade every time you shave  Prevent folliculitis:   · Do not share personal items:  Do not share towels, soap, or any personal items with other people  · Do not wear tight clothing:  Do not wear tight-fitting clothes that rub against and irritate your skin  · Treat skin injuries right away:  Treat injuries such as cuts and scrapes right away  Wash them with warm, soapy water, and cover the area to prevent infection  Contact your healthcare provider or dermatologist if:  · You have a fever  · You have foul-smelling pus coming from the bumps on your skin  · Your rash is spreading  · You have questions or concerns about your condition or care  Return to the emergency department if:  · You develop large areas of red, warm, tender skin around the folliculitis  · You develop boils  © 2017 [employer]  Information is for End User's use only and may not be sold, redistributed or otherwise used for commercial purposes  All illustrations and images included in CareNotes® are the copyrighted property of [employer]  or [employer]  The above information is an  only  It is not intended as medical advice for individual conditions or treatments  Talk to your doctor, nurse or pharmacist before following any medical regimen to see if it is safe and effective for you

## 2018-10-29 DIAGNOSIS — E10.9 TYPE 1 DIABETES MELLITUS WITHOUT COMPLICATION (HCC): Primary | ICD-10-CM

## 2018-10-30 ENCOUNTER — PATIENT OUTREACH (OUTPATIENT)
Dept: FAMILY MEDICINE CLINIC | Facility: CLINIC | Age: 28
End: 2018-10-30

## 2018-11-01 ENCOUNTER — PATIENT OUTREACH (OUTPATIENT)
Dept: FAMILY MEDICINE CLINIC | Facility: CLINIC | Age: 28
End: 2018-11-01

## 2018-11-06 DIAGNOSIS — E10.9 TYPE 1 DIABETES MELLITUS WITHOUT COMPLICATION (HCC): Primary | ICD-10-CM

## 2018-11-07 ENCOUNTER — PATIENT OUTREACH (OUTPATIENT)
Dept: FAMILY MEDICINE CLINIC | Facility: CLINIC | Age: 28
End: 2018-11-07

## 2018-11-07 NOTE — PROGRESS NOTES
Outpatient Care Management Note:  Oumar Contreras is checking her blood sugars three times a day  They have continued to run in the 200 and 300's at times  This morning it was 163  Reminded to take a list of her blood sugars and insulin usage to her PCP appointment tomorrow morning  She did receive the information on diabetic diet that I mailed her and is attempting to follow it  She did have an episode of left sided stabbing pain last evening but it is back down to a 4/10 today-will discuss with PCP tomorrow  Reminded to call GI back to schedule an appointment with Dr Margarita Harris, she and the office have been playing "phone tag " She is independent with her ADL's and recently started a new job  The abscess on her forehead has cleared up and she is finished her antibiotics  Verified that she has my contact information  Encouraged to call with a change in symptoms, questions or concerns

## 2018-11-08 ENCOUNTER — OFFICE VISIT (OUTPATIENT)
Dept: FAMILY MEDICINE CLINIC | Facility: CLINIC | Age: 28
End: 2018-11-08
Payer: COMMERCIAL

## 2018-11-08 VITALS
OXYGEN SATURATION: 99 % | HEART RATE: 94 BPM | TEMPERATURE: 98.6 F | WEIGHT: 243 LBS | BODY MASS INDEX: 44.72 KG/M2 | DIASTOLIC BLOOD PRESSURE: 76 MMHG | HEIGHT: 62 IN | RESPIRATION RATE: 16 BRPM | SYSTOLIC BLOOD PRESSURE: 142 MMHG

## 2018-11-08 DIAGNOSIS — Z79.4 TYPE 2 DIABETES MELLITUS WITHOUT COMPLICATION, WITH LONG-TERM CURRENT USE OF INSULIN (HCC): Primary | ICD-10-CM

## 2018-11-08 DIAGNOSIS — B18.2 CHRONIC HEPATITIS C WITHOUT HEPATIC COMA (HCC): ICD-10-CM

## 2018-11-08 DIAGNOSIS — I10 ESSENTIAL HYPERTENSION: ICD-10-CM

## 2018-11-08 DIAGNOSIS — E11.9 TYPE 2 DIABETES MELLITUS WITHOUT COMPLICATION, WITH LONG-TERM CURRENT USE OF INSULIN (HCC): Primary | ICD-10-CM

## 2018-11-08 DIAGNOSIS — E10.9 TYPE 1 DIABETES MELLITUS WITHOUT COMPLICATION (HCC): ICD-10-CM

## 2018-11-08 DIAGNOSIS — G62.9 NEUROPATHY: ICD-10-CM

## 2018-11-08 PROCEDURE — 99214 OFFICE O/P EST MOD 30 MIN: CPT | Performed by: FAMILY MEDICINE

## 2018-11-08 RX ORDER — ONDANSETRON 4 MG/1
4 TABLET, FILM COATED ORAL
COMMUNITY
Start: 2016-10-28 | End: 2018-11-08

## 2018-11-08 RX ORDER — BACLOFEN 10 MG/1
10 TABLET ORAL
COMMUNITY
Start: 2016-10-28 | End: 2018-11-08

## 2018-11-08 RX ORDER — TOPIRAMATE 100 MG/1
TABLET, FILM COATED ORAL
COMMUNITY
Start: 2016-08-16 | End: 2018-11-08

## 2018-11-08 RX ORDER — DICLOFENAC SODIUM 75 MG/1
75 TABLET, DELAYED RELEASE ORAL
COMMUNITY
Start: 2016-08-31 | End: 2018-11-08

## 2018-11-08 RX ORDER — GABAPENTIN 100 MG/1
100 CAPSULE ORAL 2 TIMES DAILY
Qty: 60 CAPSULE | Refills: 3 | Status: SHIPPED | OUTPATIENT
Start: 2018-11-08 | End: 2018-12-17 | Stop reason: SDUPTHER

## 2018-11-08 RX ORDER — QUETIAPINE FUMARATE 400 MG/1
400 TABLET, FILM COATED ORAL
COMMUNITY
End: 2018-11-08

## 2018-11-08 RX ORDER — SERTRALINE HYDROCHLORIDE 100 MG/1
TABLET, FILM COATED ORAL
COMMUNITY
Start: 2016-05-21 | End: 2018-11-08

## 2018-11-08 NOTE — PROGRESS NOTES
Assessment/Plan:    No problem-specific Assessment & Plan notes found for this encounter  Diagnoses and all orders for this visit:    Type 2 diabetes mellitus without complication, with long-term current use of insulin (HCC)  -     C-peptide; Future  -     Glucose, fasting; Future  -     Hemoglobin A1C; Future    Type 1 diabetes mellitus without complication (HCC)  -     Diabetic foot exam; Future    Essential hypertension    Chronic hepatitis C without hepatic coma (HCC)  Comments:  pt set up with gastro in January    Neuropathy  -     gabapentin (NEURONTIN) 100 mg capsule; Take 1 capsule (100 mg total) by mouth 2 (two) times a day    Other orders  -     Discontinue: baclofen 10 mg tablet; Take 10 mg by mouth  -     Discontinue: diclofenac (VOLTAREN) 75 mg EC tablet; Take 75 mg by mouth  -     Discontinue: ondansetron (ZOFRAN) 4 mg tablet; Take 4 mg by mouth  -     Discontinue: QUEtiapine (SEROquel) 400 MG tablet; Take 400 mg by mouth  -     Discontinue: sertraline (ZOLOFT) 100 mg tablet;   -     Discontinue: topiramate (TOPAMAX) 100 mg tablet;           Subjective:      Patient ID: Lida Duarte is a 25 y o  female  New pt with diabetes diagnosed last month, pt is taking metformin 500 mg bid, and lantus 22 units    Diabetes   She presents for her follow-up diabetic visit  She has type 2 diabetes mellitus  There are no hypoglycemic associated symptoms  Pertinent negatives for diabetes include no chest pain  There are no hypoglycemic complications  Symptoms are stable  When asked about current treatments, none were reported  She is following a diabetic diet  An ACE inhibitor/angiotensin II receptor blocker is being taken         The following portions of the patient's history were reviewed and updated as appropriate: allergies, current medications, past family history, past medical history, past social history, past surgical history and problem list     Review of Systems   Constitutional: Negative for chills and fever  Eyes: Negative for visual disturbance  Cardiovascular: Negative for chest pain and palpitations  Gastrointestinal: Negative for abdominal pain, nausea and vomiting  Genitourinary: Positive for frequency  Skin: Negative for wound  Neurological: Negative for numbness  Objective:      /76 (BP Location: Left arm, Patient Position: Supine, Cuff Size: Large)   Pulse 94   Temp 98 6 °F (37 °C) (Tympanic)   Resp 16   Ht 5' 2" (1 575 m)   Wt 110 kg (243 lb)   LMP 10/23/2018   SpO2 99%   BMI 44 45 kg/m²          Physical Exam   Constitutional: She is oriented to person, place, and time  She appears well-developed and well-nourished  No distress  HENT:   Head: Normocephalic and atraumatic  Eyes: Pupils are equal, round, and reactive to light  Conjunctivae and EOM are normal  No scleral icterus  Neck: Normal range of motion  Neck supple  Cardiovascular: Normal rate, regular rhythm and normal heart sounds  Pulses are no weak pulses  No murmur heard  Pulmonary/Chest: Effort normal and breath sounds normal  No respiratory distress  She has no wheezes  She has no rales  Abdominal: Soft  Bowel sounds are normal  She exhibits no distension and no mass  There is no tenderness  There is no rebound and no guarding  Musculoskeletal: Normal range of motion  She exhibits no edema or deformity  Feet:   Right Foot:   Skin Integrity: Negative for ulcer, skin breakdown, erythema, warmth, callus or dry skin  Left Foot:   Skin Integrity: Negative for ulcer, skin breakdown, erythema, warmth, callus or dry skin  Lymphadenopathy:     She has no cervical adenopathy  Neurological: She is alert and oriented to person, place, and time  She exhibits normal muscle tone  Skin: Skin is warm and dry  No rash noted  She is not diaphoretic  No erythema  No pallor  Psychiatric: She has a normal mood and affect   Her behavior is normal  Judgment and thought content normal    Nursing note and vitals reviewed  Patient's shoes and socks removed  Right Foot/Ankle   Right Foot Inspection  Skin Exam: skin normal and skin intact no dry skin, no warmth, no callus, no erythema, no maceration, no abnormal color, no pre-ulcer, no ulcer and no callus                          Toe Exam: ROM and strength within normal limits    Vascular  Capillary refills: < 3 seconds      Left Foot/Ankle  Left Foot Inspection  Skin Exam: skin normal and skin intactno dry skin, no warmth, no erythema, no maceration, normal color, no pre-ulcer, no ulcer and no callus                         Toe Exam: ROM and strength within normal limits                     Vascular  Capillary refills: < 3 seconds    Assign Risk Category:  No deformity present; No loss of protective sensation;  No weak pulses       Risk: 0

## 2018-11-16 ENCOUNTER — TELEPHONE (OUTPATIENT)
Dept: FAMILY MEDICINE CLINIC | Facility: CLINIC | Age: 28
End: 2018-11-16

## 2018-11-20 ENCOUNTER — PATIENT OUTREACH (OUTPATIENT)
Dept: FAMILY MEDICINE CLINIC | Facility: CLINIC | Age: 28
End: 2018-11-20

## 2018-11-20 NOTE — PROGRESS NOTES
Outpatient Care Management Note: Blood sugars have improved  Sees Dr Deysi Mcfadden tomorrow  She is going to be attending Diabetic education classes but has not heard anything yet  Instructed to remind Dr Deysi Mcfadden to place an order so they call and schedule it  Doing OK with her diet  Abdominal pain is minimal at this time  Independent with her ADL's and has no needs at this time  Verified that she has my contact information  Encouraged to call with any change in symptoms, questions or concerns

## 2018-11-21 ENCOUNTER — TRANSCRIBE ORDERS (OUTPATIENT)
Dept: LAB | Facility: CLINIC | Age: 28
End: 2018-11-21

## 2018-11-21 ENCOUNTER — OFFICE VISIT (OUTPATIENT)
Dept: FAMILY MEDICINE CLINIC | Facility: CLINIC | Age: 28
End: 2018-11-21
Payer: COMMERCIAL

## 2018-11-21 ENCOUNTER — APPOINTMENT (OUTPATIENT)
Dept: LAB | Facility: HOSPITAL | Age: 28
End: 2018-11-21
Payer: COMMERCIAL

## 2018-11-21 ENCOUNTER — TRANSCRIBE ORDERS (OUTPATIENT)
Dept: ADMINISTRATIVE | Facility: HOSPITAL | Age: 28
End: 2018-11-21

## 2018-11-21 VITALS
SYSTOLIC BLOOD PRESSURE: 126 MMHG | HEIGHT: 62 IN | WEIGHT: 238 LBS | DIASTOLIC BLOOD PRESSURE: 72 MMHG | TEMPERATURE: 98.5 F | OXYGEN SATURATION: 98 % | BODY MASS INDEX: 43.79 KG/M2 | HEART RATE: 97 BPM

## 2018-11-21 DIAGNOSIS — B19.20 HEPATITIS C VIRUS INFECTION WITHOUT HEPATIC COMA, UNSPECIFIED CHRONICITY: Primary | ICD-10-CM

## 2018-11-21 DIAGNOSIS — E13.9 DIABETES MELLITUS OF OTHER TYPE WITHOUT COMPLICATION, UNSPECIFIED WHETHER LONG TERM INSULIN USE (HCC): Primary | ICD-10-CM

## 2018-11-21 DIAGNOSIS — Z79.4 TYPE 2 DIABETES MELLITUS WITHOUT COMPLICATION, WITH LONG-TERM CURRENT USE OF INSULIN (HCC): Primary | ICD-10-CM

## 2018-11-21 DIAGNOSIS — E11.9 TYPE 2 DIABETES MELLITUS WITHOUT COMPLICATION, WITH LONG-TERM CURRENT USE OF INSULIN (HCC): Primary | ICD-10-CM

## 2018-11-21 DIAGNOSIS — E11.9 TYPE 2 DIABETES MELLITUS WITHOUT COMPLICATION, WITH LONG-TERM CURRENT USE OF INSULIN (HCC): ICD-10-CM

## 2018-11-21 DIAGNOSIS — Z79.4 TYPE 2 DIABETES MELLITUS WITHOUT COMPLICATION, WITH LONG-TERM CURRENT USE OF INSULIN (HCC): ICD-10-CM

## 2018-11-21 DIAGNOSIS — B18.2 CHRONIC HEPATITIS C WITHOUT HEPATIC COMA (HCC): ICD-10-CM

## 2018-11-21 DIAGNOSIS — I10 ESSENTIAL HYPERTENSION: ICD-10-CM

## 2018-11-21 DIAGNOSIS — B19.20 HEPATITIS C VIRUS INFECTION WITHOUT HEPATIC COMA, UNSPECIFIED CHRONICITY: ICD-10-CM

## 2018-11-21 LAB
ALBUMIN SERPL BCP-MCNC: 4.2 G/DL (ref 3.5–5.7)
ALP SERPL-CCNC: 84 U/L (ref 40–150)
ALT SERPL W P-5'-P-CCNC: 70 U/L (ref 7–52)
AST SERPL W P-5'-P-CCNC: 45 U/L (ref 13–39)
BASOPHILS # BLD AUTO: 0 THOUSANDS/ΜL (ref 0–0.1)
BASOPHILS NFR BLD AUTO: 1 % (ref 0–2)
BILIRUB DIRECT SERPL-MCNC: 0.1 MG/DL (ref 0–0.2)
BILIRUB SERPL-MCNC: 0.5 MG/DL (ref 0.2–1)
EOSINOPHIL # BLD AUTO: 0.1 THOUSAND/ΜL (ref 0–0.61)
EOSINOPHIL NFR BLD AUTO: 1 % (ref 0–5)
ERYTHROCYTE [DISTWIDTH] IN BLOOD BY AUTOMATED COUNT: 14.9 % (ref 11.5–14.5)
EST. AVERAGE GLUCOSE BLD GHB EST-MCNC: 229 MG/DL
GLUCOSE P FAST SERPL-MCNC: 222 MG/DL (ref 65–99)
HAV IGM SER QL: ABNORMAL
HBA1C MFR BLD: 9.6 % (ref 4.2–6.3)
HBV CORE IGM SER QL: ABNORMAL
HBV SURFACE AG SER QL: ABNORMAL
HCT VFR BLD AUTO: 42.3 % (ref 34.8–46.1)
HCV AB SER QL: ABNORMAL
HGB BLD-MCNC: 13.8 G/DL (ref 12–16)
INR PPP: 1.08 (ref 0.9–1.5)
LYMPHOCYTES # BLD AUTO: 2.2 THOUSANDS/ΜL (ref 0.6–4.47)
LYMPHOCYTES NFR BLD AUTO: 24 % (ref 21–51)
MCH RBC QN AUTO: 26.2 PG (ref 26–34)
MCHC RBC AUTO-ENTMCNC: 32.6 G/DL (ref 31–37)
MCV RBC AUTO: 80 FL (ref 81–99)
MONOCYTES # BLD AUTO: 0.7 THOUSAND/ΜL (ref 0.17–1.22)
MONOCYTES NFR BLD AUTO: 8 % (ref 2–12)
NEUTROPHILS # BLD AUTO: 5.8 THOUSANDS/ΜL (ref 1.4–6.5)
NEUTS SEG NFR BLD AUTO: 66 % (ref 42–75)
NRBC BLD AUTO-RTO: 0 /100 WBCS
PLATELET # BLD AUTO: 231 THOUSANDS/UL (ref 149–390)
PMV BLD AUTO: 9.3 FL (ref 8.6–11.7)
PROT SERPL-MCNC: 7.6 G/DL (ref 6.4–8.9)
PROTHROMBIN TIME: 12.5 SECONDS (ref 10.2–13)
RBC # BLD AUTO: 5.28 MILLION/UL (ref 3.9–5.2)
WBC # BLD AUTO: 8.8 THOUSAND/UL (ref 4.8–10.8)

## 2018-11-21 PROCEDURE — 83010 ASSAY OF HAPTOGLOBIN QUANT: CPT

## 2018-11-21 PROCEDURE — 84460 ALANINE AMINO (ALT) (SGPT): CPT

## 2018-11-21 PROCEDURE — 82977 ASSAY OF GGT: CPT

## 2018-11-21 PROCEDURE — 82172 ASSAY OF APOLIPOPROTEIN: CPT

## 2018-11-21 PROCEDURE — 87522 HEPATITIS C REVRS TRNSCRPJ: CPT

## 2018-11-21 PROCEDURE — 80074 ACUTE HEPATITIS PANEL: CPT

## 2018-11-21 PROCEDURE — 82947 ASSAY GLUCOSE BLOOD QUANT: CPT

## 2018-11-21 PROCEDURE — 80076 HEPATIC FUNCTION PANEL: CPT

## 2018-11-21 PROCEDURE — 84681 ASSAY OF C-PEPTIDE: CPT

## 2018-11-21 PROCEDURE — 83036 HEMOGLOBIN GLYCOSYLATED A1C: CPT

## 2018-11-21 PROCEDURE — 3008F BODY MASS INDEX DOCD: CPT | Performed by: FAMILY MEDICINE

## 2018-11-21 PROCEDURE — 36415 COLL VENOUS BLD VENIPUNCTURE: CPT

## 2018-11-21 PROCEDURE — 85025 COMPLETE CBC W/AUTO DIFF WBC: CPT

## 2018-11-21 PROCEDURE — 83883 ASSAY NEPHELOMETRY NOT SPEC: CPT

## 2018-11-21 PROCEDURE — 99213 OFFICE O/P EST LOW 20 MIN: CPT | Performed by: FAMILY MEDICINE

## 2018-11-21 PROCEDURE — 82247 BILIRUBIN TOTAL: CPT

## 2018-11-21 PROCEDURE — 85610 PROTHROMBIN TIME: CPT

## 2018-11-21 RX ORDER — LISINOPRIL 20 MG/1
20 TABLET ORAL DAILY
Qty: 30 TABLET | Refills: 0 | Status: CANCELLED | OUTPATIENT
Start: 2018-11-21

## 2018-11-21 NOTE — PROGRESS NOTES
Assessment/Plan:    No problem-specific Assessment & Plan notes found for this encounter  Diagnoses and all orders for this visit:    Type 2 diabetes mellitus without complication, with long-term current use of insulin (HCC)  Comments:  HbA1c is decreasing no change in the medication  Orders:  -     Microalbumin / creatinine urine ratio  -     C-peptide; Future  -     Glucose, fasting; Future  -     Hemoglobin A1C; Future    Chronic hepatitis C without hepatic coma (HCC)  Comments:  pt is being set up for antiviral therapy          Subjective:      Patient ID: Vandana Nickerson is a 25 y o  female  Follow up for chronic hepatitis C, pt had recent lab work      Diabetes   She presents for her follow-up diabetic visit  She has type 2 diabetes mellitus  Her disease course has been improving  There are no hypoglycemic associated symptoms  Pertinent negatives for diabetes include no chest pain  There are no hypoglycemic complications  Symptoms are stable  Current diabetic treatment includes oral agent (monotherapy) and insulin injections  She is compliant with treatment most of the time  Her overall blood glucose range is 180-200 mg/dl  An ACE inhibitor/angiotensin II receptor blocker is being taken  The following portions of the patient's history were reviewed and updated as appropriate: allergies, current medications, past family history, past medical history, past social history, past surgical history and problem list     Review of Systems   Eyes: Negative for visual disturbance  Cardiovascular: Negative for chest pain and palpitations  Gastrointestinal: Negative for abdominal pain, nausea and vomiting  Genitourinary: Negative for frequency  Skin: Negative for wound  Neurological: Negative for numbness           Objective:      /72 (BP Location: Left arm, Patient Position: Sitting, Cuff Size: Large)   Pulse 97   Temp 98 5 °F (36 9 °C) (Tympanic)   Ht 5' 2" (1 575 m)   Wt 108 kg (238 lb) LMP 10/23/2018   SpO2 98%   BMI 43 53 kg/m²          Physical Exam   Constitutional: She is oriented to person, place, and time  She appears well-developed and well-nourished  No distress  HENT:   Head: Normocephalic and atraumatic  Eyes: Pupils are equal, round, and reactive to light  Conjunctivae and EOM are normal  No scleral icterus  Neck: Normal range of motion  Neck supple  Cardiovascular: Normal rate, regular rhythm and normal heart sounds  No murmur heard  Pulmonary/Chest: Effort normal and breath sounds normal  No respiratory distress  She has no wheezes  She has no rales  Abdominal: Soft  Bowel sounds are normal  She exhibits no distension and no mass  There is no tenderness  There is no rebound and no guarding  Musculoskeletal: Normal range of motion  She exhibits no edema or deformity  Lymphadenopathy:     She has no cervical adenopathy  Neurological: She is alert and oriented to person, place, and time  She exhibits normal muscle tone  Skin: Skin is warm and dry  No rash noted  She is not diaphoretic  No erythema  No pallor  Psychiatric: She has a normal mood and affect  Her behavior is normal  Judgment and thought content normal    Nursing note and vitals reviewed

## 2018-11-22 LAB — C PEPTIDE SERPL-MCNC: 3.6 NG/ML (ref 1.1–4.4)

## 2018-11-24 LAB
A2 MACROGLOB SERPL-MCNC: 239 MG/DL (ref 110–276)
ALT SERPL W P-5'-P-CCNC: 88 IU/L (ref 0–40)
APO A-I SERPL-MCNC: 133 MG/DL (ref 116–209)
BILIRUB SERPL-MCNC: 0.3 MG/DL (ref 0–1.2)
COMMENT: ABNORMAL
FIBROSIS SCORING:: ABNORMAL
FIBROSIS STAGE SERPL QL: ABNORMAL
GGT SERPL-CCNC: 39 IU/L (ref 0–60)
HAPTOGLOB SERPL-MCNC: 147 MG/DL (ref 34–200)
INTERPRETATIONS: ABNORMAL
LIVER FIBR SCORE SERPL CALC.FIBROSURE: 0.13 (ref 0–0.21)
NECROINFLAMM ACTIVITY SCORING:: ABNORMAL
NECROINFLAMMATORY ACT GRADE SERPL QL: ABNORMAL
NECROINFLAMMATORY ACT SCORE SERPL: 0.45 (ref 0–0.17)
SERVICE CMNT-IMP: ABNORMAL

## 2018-11-26 LAB
HCV RNA SERPL NAA+PROBE-ACNC: NORMAL IU/ML
HCV RNA SERPL NAA+PROBE-LOG IU: 5.75 LOG10 IU/ML
TEST INFORMATION: NORMAL

## 2018-11-30 ENCOUNTER — APPOINTMENT (OUTPATIENT)
Dept: LAB | Facility: HOSPITAL | Age: 28
End: 2018-11-30
Attending: PHYSICIAN ASSISTANT
Payer: COMMERCIAL

## 2018-11-30 ENCOUNTER — OFFICE VISIT (OUTPATIENT)
Dept: FAMILY MEDICINE CLINIC | Facility: CLINIC | Age: 28
End: 2018-11-30
Payer: COMMERCIAL

## 2018-11-30 VITALS
RESPIRATION RATE: 16 BRPM | HEIGHT: 62 IN | BODY MASS INDEX: 44.27 KG/M2 | TEMPERATURE: 96.1 F | OXYGEN SATURATION: 94 % | DIASTOLIC BLOOD PRESSURE: 78 MMHG | SYSTOLIC BLOOD PRESSURE: 122 MMHG | HEART RATE: 97 BPM | WEIGHT: 240.6 LBS

## 2018-11-30 DIAGNOSIS — A08.4 VIRAL GASTROENTERITIS: Primary | ICD-10-CM

## 2018-11-30 DIAGNOSIS — B19.20 HEPATITIS C VIRUS INFECTION WITHOUT HEPATIC COMA, UNSPECIFIED CHRONICITY: ICD-10-CM

## 2018-11-30 LAB
CREAT UR-MCNC: 68.6 MG/DL
MICROALBUMIN UR-MCNC: 5.9 MG/L (ref 0–20)
MICROALBUMIN/CREAT 24H UR: 9 MG/G CREATININE (ref 0–30)

## 2018-11-30 PROCEDURE — 87902 NFCT AGT GNTYP ALYS HEP C: CPT

## 2018-11-30 PROCEDURE — 99213 OFFICE O/P EST LOW 20 MIN: CPT | Performed by: NURSE PRACTITIONER

## 2018-11-30 PROCEDURE — 82043 UR ALBUMIN QUANTITATIVE: CPT | Performed by: FAMILY MEDICINE

## 2018-11-30 PROCEDURE — 82570 ASSAY OF URINE CREATININE: CPT | Performed by: FAMILY MEDICINE

## 2018-11-30 PROCEDURE — 36415 COLL VENOUS BLD VENIPUNCTURE: CPT

## 2018-11-30 RX ORDER — ONDANSETRON 4 MG/1
4 TABLET, FILM COATED ORAL EVERY 4 HOURS PRN
Qty: 20 TABLET | Refills: 0 | Status: SHIPPED | OUTPATIENT
Start: 2018-11-30 | End: 2019-02-28

## 2018-11-30 NOTE — PATIENT INSTRUCTIONS
Discussed viral vs bacterial infection  Continue OTC antidiarrhea medications as directed  Call or return for problems/concerns  Increase fluids/rest  Zofran as directed if needed for nausea/vomiting  Work note provided for today

## 2018-11-30 NOTE — PROGRESS NOTES
[address] [place] [address] Primary Care        NAME: Vandana Nickerson is a 25 y o  female  : 1990    MRN: [id number]  DATE: 2018  TIME: 2:15 PM    Assessment and Plan   Viral gastroenteritis [A08 4]  1  Viral gastroenteritis  ondansetron (ZOFRAN) 4 mg tablet         Patient Instructions     Patient Instructions   Discussed viral vs bacterial infection  Continue OTC antidiarrhea medications as directed  Call or return for problems/concerns  Increase fluids/rest  Zofran as directed if needed for nausea/vomiting  Work note provided for today            Chief Complaint     Chief Complaint   Patient presents with    Nausea    Diarrhea         History of Present Illness       Patient here with complaints of nausea that started this morning  One episode of emesis today after eating soup  Also complaining of diarrhea  She had a pap smear done earlier today which made her feel worse  Reports that she had similar s/s when she had the flu  Has taken Zofran in the past for nausea  Review of Systems   Review of Systems   Constitutional: Negative for activity change, chills, fatigue and fever  HENT: Negative for congestion, ear pain, postnasal drip, rhinorrhea, sinus pressure and sore throat  Eyes: Negative for pain, discharge and redness  Respiratory: Negative for cough and wheezing  Cardiovascular: Negative for chest pain  Gastrointestinal: Positive for diarrhea, nausea and vomiting  Negative for abdominal pain and constipation  Musculoskeletal: Negative for myalgias  Skin: Negative for rash  Neurological: Negative for dizziness and headaches           Current Medications       Current Outpatient Prescriptions:     etonogestrel (NEXPLANON) subdermal implant, Inject 68 mg under the skin, Disp: , Rfl:     gabapentin (NEURONTIN) 100 mg capsule, Take 1 capsule (100 mg total) by mouth 2 (two) times a day, Disp: 60 capsule, Rfl: 3    glucose blood (ACCU-CHEK GUIDE) test strip, Test three times per day, Disp: 100 each, Rfl: 0    insulin glargine (LANTUS) 100 units/mL subcutaneous injection, Inject 20 Units under the skin daily at bedtime, Disp: 10 mL, Rfl: 3    Insulin Pen Needle 31G X 8 MM MISC, by Does not apply route daily, Disp: 100 each, Rfl: 0    Lancets (ACCU-CHEK MULTICLIX) lancets, Use as instructed, Disp: 100 each, Rfl: 3    lisinopril (ZESTRIL) 20 mg tablet, Take 1 tablet (20 mg total) by mouth daily, Disp: 30 tablet, Rfl: 3    metFORMIN (GLUCOPHAGE) 500 mg tablet, Take 1 tablet (500 mg total) by mouth 2 (two) times a day with meals, Disp: 60 tablet, Rfl: 3    ondansetron (ZOFRAN) 4 mg tablet, Take 1 tablet (4 mg total) by mouth every 4 (four) hours as needed for nausea or vomiting, Disp: 20 tablet, Rfl: 0    Current Facility-Administered Medications:     insulin glargine (LANTUS) subcutaneous injection 20 Units 0 2 mL, 20 Units, Subcutaneous, HS, Darvin Frazier MD    Current Allergies     Allergies as of 11/30/2018 - Reviewed 11/30/2018   Allergen Reaction Noted    Corticosteroids Anaphylaxis 01/13/2009    Prednisone Swelling 06/12/2018            The following portions of the patient's history were reviewed and updated as appropriate: allergies, current medications, past family history, past medical history, past social history, past surgical history and problem list      Past Medical History:   Diagnosis Date    Cyst of ovary, right     Hepatitis C     Hypertension     Pulmonary artery congenital abnormality     Spleen enlarged        Past Surgical History:   Procedure Laterality Date    CARDIAC CATHETERIZATION      no CAD    CHOLECYSTECTOMY      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 8    LIVER BIOPSY      LIVER BIOPSY      VSD REPAIR      As a child       Family History   Problem Relation Age of Onset    Hypertension Mother     Migraines Mother     Diabetes Father     Hypertension Father     Kidney failure Father     Polycystic kidney disease Father     Heart attack Father     Polycystic kidney disease Paternal Grandmother          Medications have been verified  Objective   /78   Pulse 97   Temp (!) 96 1 °F (35 6 °C) (Tympanic)   Resp 16   Ht 5' 2" (1 575 m)   Wt 109 kg (240 lb 9 6 oz)   SpO2 94%   BMI 44 01 kg/m²        Physical Exam     Physical Exam   Constitutional: She is oriented to person, place, and time  She appears well-developed and well-nourished  No distress  HENT:   Head: Normocephalic and atraumatic  Neck: Normal range of motion  Cardiovascular: Normal rate and regular rhythm  Exam reveals no gallop and no friction rub  Murmur heard  Systolic murmur is present with a grade of 3/6   Pulmonary/Chest: Effort normal and breath sounds normal  No respiratory distress  She has no wheezes  She has no rales  Abdominal: Soft  Bowel sounds are normal  She exhibits no distension and no mass  There is no tenderness  There is no rebound and no guarding  Musculoskeletal: Normal range of motion  She exhibits no edema, tenderness or deformity  Neurological: She is alert and oriented to person, place, and time  No cranial nerve deficit  Coordination normal    Skin: Skin is warm and dry  No rash noted  She is not diaphoretic  No erythema  Psychiatric: She has a normal mood and affect  Her behavior is normal  Judgment and thought content normal    Nursing note and vitals reviewed

## 2018-12-02 ENCOUNTER — APPOINTMENT (EMERGENCY)
Dept: CT IMAGING | Facility: HOSPITAL | Age: 28
End: 2018-12-02
Payer: COMMERCIAL

## 2018-12-02 ENCOUNTER — HOSPITAL ENCOUNTER (EMERGENCY)
Facility: HOSPITAL | Age: 28
Discharge: HOME | End: 2018-12-02
Payer: COMMERCIAL

## 2018-12-02 VITALS
WEIGHT: 240.3 LBS | DIASTOLIC BLOOD PRESSURE: 81 MMHG | HEIGHT: 62 IN | RESPIRATION RATE: 20 BRPM | TEMPERATURE: 98 F | SYSTOLIC BLOOD PRESSURE: 138 MMHG | BODY MASS INDEX: 44.22 KG/M2 | OXYGEN SATURATION: 100 % | HEART RATE: 92 BPM

## 2018-12-02 DIAGNOSIS — R51.9 HEADACHE: Primary | ICD-10-CM

## 2018-12-02 LAB
ALBUMIN SERPL BCP-MCNC: 4 G/DL (ref 3.5–5.7)
ALP SERPL-CCNC: 89 U/L (ref 40–150)
ALT SERPL W P-5'-P-CCNC: 57 U/L (ref 7–52)
ANION GAP SERPL CALCULATED.3IONS-SCNC: 9 MMOL/L (ref 4–13)
APTT PPP: 34 SECONDS (ref 26–38)
AST SERPL W P-5'-P-CCNC: 40 U/L (ref 13–39)
BASOPHILS # BLD AUTO: 0.1 THOUSANDS/ΜL (ref 0–0.1)
BASOPHILS NFR BLD AUTO: 1 % (ref 0–2)
BILIRUB SERPL-MCNC: 0.5 MG/DL (ref 0.2–1)
BUN SERPL-MCNC: 10 MG/DL (ref 7–25)
CALCIUM SERPL-MCNC: 9.5 MG/DL (ref 8.6–10.5)
CHLORIDE SERPL-SCNC: 101 MMOL/L (ref 98–107)
CO2 SERPL-SCNC: 25 MMOL/L (ref 21–31)
CREAT SERPL-MCNC: 0.57 MG/DL (ref 0.6–1.2)
EOSINOPHIL # BLD AUTO: 0.1 THOUSAND/ΜL (ref 0–0.61)
EOSINOPHIL NFR BLD AUTO: 1 % (ref 0–5)
ERYTHROCYTE [DISTWIDTH] IN BLOOD BY AUTOMATED COUNT: 15 % (ref 11.5–14.5)
GFR SERPL CREATININE-BSD FRML MDRD: 125 ML/MIN/1.73SQ M
GLUCOSE SERPL-MCNC: 207 MG/DL (ref 65–140)
GLUCOSE SERPL-MCNC: 233 MG/DL (ref 65–99)
HCG SERPL QL: NEGATIVE
HCT VFR BLD AUTO: 42.5 % (ref 34.8–46.1)
HGB BLD-MCNC: 14.2 G/DL (ref 12–16)
INR PPP: 1.06 (ref 0.9–1.5)
LYMPHOCYTES # BLD AUTO: 2.6 THOUSANDS/ΜL (ref 0.6–4.47)
LYMPHOCYTES NFR BLD AUTO: 22 % (ref 21–51)
MCH RBC QN AUTO: 26.6 PG (ref 26–34)
MCHC RBC AUTO-ENTMCNC: 33.4 G/DL (ref 31–37)
MCV RBC AUTO: 80 FL (ref 81–99)
MONOCYTES # BLD AUTO: 0.8 THOUSAND/ΜL (ref 0.17–1.22)
MONOCYTES NFR BLD AUTO: 7 % (ref 2–12)
NEUTROPHILS # BLD AUTO: 8 THOUSANDS/ΜL (ref 1.4–6.5)
NEUTS SEG NFR BLD AUTO: 70 % (ref 42–75)
NRBC BLD AUTO-RTO: 0 /100 WBCS
PLATELET # BLD AUTO: 204 THOUSANDS/UL (ref 149–390)
PMV BLD AUTO: 9.3 FL (ref 8.6–11.7)
POTASSIUM SERPL-SCNC: 3.8 MMOL/L (ref 3.5–5.5)
PROT SERPL-MCNC: 6.9 G/DL (ref 6.4–8.9)
PROTHROMBIN TIME: 12.3 SECONDS (ref 10.2–13)
RBC # BLD AUTO: 5.32 MILLION/UL (ref 3.9–5.2)
SODIUM SERPL-SCNC: 135 MMOL/L (ref 134–143)
WBC # BLD AUTO: 11.5 THOUSAND/UL (ref 4.8–10.8)

## 2018-12-02 PROCEDURE — 96375 TX/PRO/DX INJ NEW DRUG ADDON: CPT

## 2018-12-02 PROCEDURE — 96374 THER/PROPH/DIAG INJ IV PUSH: CPT

## 2018-12-02 PROCEDURE — 82948 REAGENT STRIP/BLOOD GLUCOSE: CPT

## 2018-12-02 PROCEDURE — 70450 CT HEAD/BRAIN W/O DYE: CPT

## 2018-12-02 PROCEDURE — 36415 COLL VENOUS BLD VENIPUNCTURE: CPT

## 2018-12-02 PROCEDURE — 99284 EMERGENCY DEPT VISIT MOD MDM: CPT

## 2018-12-02 PROCEDURE — 85610 PROTHROMBIN TIME: CPT

## 2018-12-02 PROCEDURE — 80053 COMPREHEN METABOLIC PANEL: CPT

## 2018-12-02 PROCEDURE — 85025 COMPLETE CBC W/AUTO DIFF WBC: CPT

## 2018-12-02 PROCEDURE — 84703 CHORIONIC GONADOTROPIN ASSAY: CPT

## 2018-12-02 PROCEDURE — 96361 HYDRATE IV INFUSION ADD-ON: CPT

## 2018-12-02 PROCEDURE — 85730 THROMBOPLASTIN TIME PARTIAL: CPT

## 2018-12-02 RX ORDER — METOCLOPRAMIDE HYDROCHLORIDE 5 MG/ML
10 INJECTION INTRAMUSCULAR; INTRAVENOUS ONCE
Status: COMPLETED | OUTPATIENT
Start: 2018-12-02 | End: 2018-12-02

## 2018-12-02 RX ORDER — KETOROLAC TROMETHAMINE 30 MG/ML
30 INJECTION, SOLUTION INTRAMUSCULAR; INTRAVENOUS ONCE
Status: COMPLETED | OUTPATIENT
Start: 2018-12-02 | End: 2018-12-02

## 2018-12-02 RX ORDER — DIPHENHYDRAMINE HYDROCHLORIDE 50 MG/ML
50 INJECTION INTRAMUSCULAR; INTRAVENOUS ONCE
Status: COMPLETED | OUTPATIENT
Start: 2018-12-02 | End: 2018-12-02

## 2018-12-02 RX ADMIN — DIPHENHYDRAMINE HYDROCHLORIDE 50 MG: 50 INJECTION, SOLUTION INTRAMUSCULAR; INTRAVENOUS at 16:39

## 2018-12-02 RX ADMIN — METOCLOPRAMIDE 10 MG: 5 INJECTION, SOLUTION INTRAMUSCULAR; INTRAVENOUS at 16:38

## 2018-12-02 RX ADMIN — KETOROLAC TROMETHAMINE 30 MG: 30 INJECTION, SOLUTION INTRAMUSCULAR; INTRAVENOUS at 16:38

## 2018-12-02 RX ADMIN — SODIUM CHLORIDE 2000 ML: 0.9 INJECTION, SOLUTION INTRAVENOUS at 16:33

## 2018-12-02 NOTE — DISCHARGE INSTRUCTIONS
Acute Headache   WHAT YOU NEED TO KNOW:   An acute headache is pain or discomfort that starts suddenly and gets worse quickly  You may have an acute headache only when you feel stress or eat certain foods  Other acute headache pain can happen every day, and sometimes several times a day  DISCHARGE INSTRUCTIONS:   Return to the emergency department if:   · You have severe pain  · You have numbness or weakness on one side of your face or body  · You have a headache that occurs after a blow to the head, a fall, or other trauma  · You have a headache, are forgetful or confused, or have trouble speaking  · You have a headache, stiff neck, and a fever  Contact your healthcare provider if:   · You have a constant headache and are vomiting  · You have a headache each day that does not get better, even after treatment  · You have changes in your headaches, or new symptoms that occur when you have a headache  · You have questions or concerns about your condition or care  Medicines: You may need any of the following:  · Prescription pain medicine  may be given  The medicine your healthcare provider recommends will depend on the kind of headaches you have  You will need to take prescription headache medicines as directed to prevent a problem called rebound headache  These headaches happen with regular use of pain relievers for headache disorders  · NSAIDs , such as ibuprofen, help decrease swelling, pain, and fever  This medicine is available with or without a doctor's order  NSAIDs can cause stomach bleeding or kidney problems in certain people  If you take blood thinner medicine, always ask your healthcare provider if NSAIDs are safe for you  Always read the medicine label and follow directions  · Acetaminophen  decreases pain and fever  It is available without a doctor's order  Ask how much to take and how often to take it  Follow directions   Read the labels of all other medicines you are using to see if they also contain acetaminophen, or ask your doctor or pharmacist  Acetaminophen can cause liver damage if not taken correctly  Do not use more than 3 grams (3,000 milligrams) total of acetaminophen in one day  · Antidepressants  may be given for some kinds of headaches  · Take your medicine as directed  Contact your healthcare provider if you think your medicine is not helping or if you have side effects  Tell him or her if you are allergic to any medicine  Keep a list of the medicines, vitamins, and herbs you take  Include the amounts, and when and why you take them  Bring the list or the pill bottles to follow-up visits  Carry your medicine list with you in case of an emergency  Manage your symptoms:   · Apply heat or ice  on the headache area  Use a heat or ice pack  For an ice pack, you can also put crushed ice in a plastic bag  Cover the pack or bag with a towel before you apply it to your skin  Ice and heat both help decrease pain, and heat also helps decrease muscle spasms  Apply heat for 20 to 30 minutes every 2 hours  Apply ice for 15 to 20 minutes every hour  Apply heat or ice for as long and for as many days as directed  You may alternate heat and ice  · Relax your muscles  Lie down in a comfortable position and close your eyes  Relax your muscles slowly  Start at your toes and work your way up your body  · Keep a record of your headaches  Write down when your headaches start and stop  Include your symptoms and what you were doing when the headache began  Record what you ate or drank for 24 hours before the headache started  Describe the pain and where it hurts  Keep track of what you did to treat your headache and if it worked  Prevent an acute headache:   · Avoid anything that triggers an acute headache  Examples include exposure to chemicals, going to high altitude, or not getting enough sleep  Create a regular sleep routine   Go to sleep at the same time and wake up at the same time each day  Do not use electronic devices before bedtime  These may trigger a headache or prevent you from sleeping well  · Do not smoke  Nicotine and other chemicals in cigarettes and cigars can trigger an acute headache or make it worse  Ask your healthcare provider for information if you currently smoke and need help to quit  E-cigarettes or smokeless tobacco still contain nicotine  Talk to your healthcare provider before you use these products  · Limit alcohol as directed  Alcohol can trigger an acute headache or make it worse  If you have cluster headaches, do not drink alcohol during an episode  For other types of headaches, ask your healthcare provider if it is safe for you to drink alcohol  Ask how much is safe for you to drink, and how often  · Exercise as directed  Exercise can reduce tension and help with headache pain  Aim for 30 minutes of physical activity on most days of the week  Your healthcare provider can help you create an exercise plan  · Eat a variety of healthy foods  Healthy foods include fruits, vegetables, low-fat dairy products, lean meats, fish, whole grains, and cooked beans  Your healthcare provider or dietitian can help you create meals plans if you need to avoid foods that trigger headaches  Follow up with your healthcare provider as directed:  Bring your headache record with you when you see your healthcare provider  Write down your questions so you remember to ask them during your visits  © 2017 [facility] Information is for End User's use only and may not be sold, redistributed or otherwise used for commercial purposes  All illustrations and images included in CareNotes® are the copyrighted property of [employer]  or [employer]  The above information is an  only  It is not intended as medical advice for individual conditions or treatments   Talk to your doctor, nurse or pharmacist before following any medical regimen to see if it is safe and effective for you  Acute Headache   WHAT YOU NEED TO KNOW:   What is an acute headache? An acute headache is pain or discomfort that starts suddenly and gets worse quickly  You may have an acute headache only when you feel stress or eat certain foods  Other acute headache pain can happen every day, and sometimes several times a day  What are the most common types of acute headache? · Tension headache  is the most common type of headache  These headaches typically occur in the late afternoon and go away by evening  The pain is usually mild or moderate  You may have problems tolerating bright light or loud noise  The pain is usually across the forehead or in the back of the head, often only on one side  These headaches may occur every day  · Migraine headaches  cause moderate or severe pain  The headache generally lasts from 1 to 3 days and tends to come back  Pain is usually on only one side, but it may change sides  Migraines often occur in the temple, the back of the head, or behind the eye  The pain may throb or be sharp and steady  · A migraine with aura  means you see or feel something before a migraine  You may see a small spot surrounded by bright zigzag lines  Other signs or symptoms may follow the aura  · Cluster headache  pain is usually only on one side  It often causes severe pain, and can last for 30 minutes to 2 hours  These headaches may occur 1 or 2 times each day, more often at night  The pain may wake you  What causes acute headaches? The cause of your headache may not be known   The following conditions can trigger a headache:  · Stress or tension, hours or even days after stressful events    · Fatigue, a lack of sleep or changes in your usual sleep pattern, or a nap during the day    · Menstruation, especially after pregnancy, or use of birth control pills or hormone replacement therapy    · Food such as cured meats, artificial sweeteners, alcohol, dark chocolate, and MSG     · Suddenly not having caffeine if you usually have larger amounts    · A medical problem, such as an infection, tooth pain, neck or sinus pain, thyroid problems, or a tumor    · A head injury  How is the type of acute headache diagnosed and treated? Your healthcare provider will ask you to describe your pain and rate it on a scale from 1 to 10  Tell the provider how often you have headaches and how long they last  Also describe any other symptoms you have along with headaches, such as dizziness or blurred vision  · Medicines  may be given to manage or prevent headaches  The medicine will depend on the type of acute headache you have  Do not wait until the pain is severe before you take your medicine  You may be able to take over-the-counter pain medicines as needed  Examples include NSAIDs and acetaminophen  Ask your healthcare provider which medicine is right for you  Ask how much to take and when to take it  Follow directions  These medicines can cause stomach bleeding or kidney or liver damage if not taken correctly  · Biofeedback  may be used to help you manage stress  Electrodes (wires) are placed on your body and attached to a monitor  You will learn how to change stress reactions  For example, you learn to slow your heart rate when you become upset  · Cognitive behavior therapy,  or stress management, may be used with other therapies to prevent headaches  What can I do to manage my symptoms? · Apply heat or ice  on the headache area  Use a heat or ice pack  For an ice pack, you can also put crushed ice in a plastic bag  Cover the pack or bag with a towel before you apply it to your skin  Ice and heat both help decrease pain, and heat also helps decrease muscle spasms  Apply heat for 20 to 30 minutes every 2 hours  Apply ice for 15 to 20 minutes every hour  Apply heat or ice for as long and for as many days as directed   You may alternate heat and ice     · Relax your muscles  Lie down in a comfortable position and close your eyes  Relax your muscles slowly  Start at your toes and work your way up your body  · Keep a record of your headaches  Write down when your headaches start and stop  Include your symptoms and what you were doing when the headache began  Record what you ate or drank for 24 hours before the headache started  Describe the pain and where it hurts  Keep track of what you did to treat your headache and if it worked  What can I do to prevent an acute headache? · Avoid anything that triggers an acute headache  Examples include exposure to chemicals, going to high altitude, or not getting enough sleep  Create a regular sleep routine  Go to sleep at the same time and wake up at the same time each day  Do not use electronic devices before bedtime  These may trigger a headache or prevent you from sleeping well  · Do not smoke  Nicotine and other chemicals in cigarettes and cigars can trigger an acute headache or make it worse  Ask your healthcare provider for information if you currently smoke and need help to quit  E-cigarettes or smokeless tobacco still contain nicotine  Talk to your healthcare provider before you use these products  · Limit alcohol as directed  Alcohol can trigger an acute headache or make it worse  If you have cluster headaches, do not drink alcohol during an episode  For other types of headaches, ask your healthcare provider if it is safe for you to drink alcohol  Ask how much is safe for you to drink, and how often  · Exercise as directed  Exercise can reduce tension and help with headache pain  Aim for 30 minutes of physical activity on most days of the week  Your healthcare provider can help you create an exercise plan  · Eat a variety of healthy foods  Healthy foods include fruits, vegetables, low-fat dairy products, lean meats, fish, whole grains, and cooked beans   Your healthcare provider or dietitian can help you create meals plans if you need to avoid foods that trigger headaches  When should I seek immediate care? · You have severe pain  · You have numbness or weakness on one side of your face or body  · You have a headache that occurs after a blow to the head, a fall, or other trauma  · You have a headache, are forgetful or confused, or have trouble speaking  · You have a headache, stiff neck, and a fever  When should I contact my healthcare provider? · You have a constant headache and are vomiting  · You have a headache each day that does not get better, even after treatment  · You have changes in your headaches, or new symptoms that occur when you have a headache  · You have questions or concerns about your condition or care  CARE AGREEMENT:   You have the right to help plan your care  Learn about your health condition and how it may be treated  Discuss treatment options with your caregivers to decide what care you want to receive  You always have the right to refuse treatment  The above information is an  only  It is not intended as medical advice for individual conditions or treatments  Talk to your doctor, nurse or pharmacist before following any medical regimen to see if it is safe and effective for you  © 2017 [employer]  Information is for End User's use only and may not be sold, redistributed or otherwise used for commercial purposes  All illustrations and images included in CareNotes® are the copyrighted property of [employer]  or [employer]

## 2018-12-02 NOTE — ED PROVIDER NOTES
History  Chief Complaint   Patient presents with    Headache     Nita Alegria is a 56-year-old female who came to the emergency department due to worsening headache for the last 4 days prior to arrival   He was associated with vomiting  There was no diarrhea  She denies fever or chills  History provided by:  Patient   used: No    Headache   Pain location:  Generalized  Quality:  Sharp  Radiates to:  Does not radiate  Severity currently:  10/10  Severity at highest:  10/10  Onset quality:  Gradual  Duration:  4 days  Timing:  Constant  Progression:  Unchanged  Chronicity:  Recurrent  Similar to prior headaches: no    Context: not activity, not exposure to bright light, not caffeine, not coughing, not defecating, not eating, not stress, not exposure to cold air, not intercourse, not loud noise and not straining    Relieved by:  Nothing  Worsened by:  Nothing  Ineffective treatments:  None tried  Associated symptoms: no abdominal pain, no back pain, no blurred vision, no congestion, no cough, no diarrhea, no dizziness, no drainage, no ear pain, no eye pain, no facial pain, no fatigue, no fever, no focal weakness, no hearing loss, no loss of balance, no myalgias, no nausea, no near-syncope, no neck pain, no neck stiffness, no numbness, no paresthesias, no photophobia, no seizures, no sinus pressure, no sore throat, no swollen glands, no syncope, no tingling, no URI, no visual change, no vomiting and no weakness        Prior to Admission Medications   Prescriptions Last Dose Informant Patient Reported? Taking?    Insulin Pen Needle 31G X 8 MM MISC   No Yes   Sig: by Does not apply route daily   Lancets (ACCU-CHEK MULTICLIX) lancets   No Yes   Sig: Use as instructed   etonogestrel (NEXPLANON) subdermal implant   Yes Yes   Sig: Inject 68 mg under the skin   gabapentin (NEURONTIN) 100 mg capsule   No Yes   Sig: Take 1 capsule (100 mg total) by mouth 2 (two) times a day   glucose blood (ACCU-CHEK GUIDE) test strip   No Yes   Sig: Test three times per day   insulin glargine (LANTUS) 100 units/mL subcutaneous injection   No Yes   Sig: Inject 20 Units under the skin daily at bedtime   lisinopril (ZESTRIL) 20 mg tablet   No Yes   Sig: Take 1 tablet (20 mg total) by mouth daily   metFORMIN (GLUCOPHAGE) 500 mg tablet   No Yes   Sig: Take 1 tablet (500 mg total) by mouth 2 (two) times a day with meals   ondansetron (ZOFRAN) 4 mg tablet   No Yes   Sig: Take 1 tablet (4 mg total) by mouth every 4 (four) hours as needed for nausea or vomiting      Facility-Administered Medications Last Administration Doses Remaining   insulin glargine (LANTUS) subcutaneous injection 20 Units 0 2 mL None recorded           Past Medical History:   Diagnosis Date    Cyst of ovary, right     Hepatitis C     Hypertension     Pulmonary artery congenital abnormality     Spleen enlarged        Past Surgical History:   Procedure Laterality Date    CARDIAC CATHETERIZATION      no CAD    CHOLECYSTECTOMY      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 8    LIVER BIOPSY      LIVER BIOPSY      VSD REPAIR      As a child       Family History   Problem Relation Age of Onset    Hypertension Mother     Migraines Mother     Diabetes Father     Hypertension Father     Kidney failure Father     Polycystic kidney disease Father     Heart attack Father     Polycystic kidney disease Paternal Grandmother      I have reviewed and agree with the history as documented  Social History   Substance Use Topics    Smoking status: Former Smoker     Packs/day: 0 20     Types: Cigarettes    Smokeless tobacco: Never Used    Alcohol use Yes        Review of Systems   Constitutional: Negative for fatigue and fever  HENT: Negative for congestion, ear pain, hearing loss, postnasal drip, sinus pressure and sore throat  Eyes: Negative for blurred vision, photophobia and pain  Respiratory: Negative for cough      Cardiovascular: Negative for syncope and near-syncope  Gastrointestinal: Negative for abdominal pain, diarrhea, nausea and vomiting  Endocrine: Negative  Genitourinary: Negative  Musculoskeletal: Negative for back pain, myalgias, neck pain and neck stiffness  Skin: Negative  Allergic/Immunologic: Negative  Neurological: Positive for headaches  Negative for dizziness, focal weakness, seizures, weakness, numbness, paresthesias and loss of balance  Hematological: Negative  Psychiatric/Behavioral: Negative  Physical Exam  Physical Exam   Constitutional: She is oriented to person, place, and time  She appears well-developed and well-nourished  No distress  HENT:   Head: Normocephalic and atraumatic  Right Ear: External ear normal    Left Ear: External ear normal    Nose: Nose normal    Mouth/Throat: Oropharynx is clear and moist  No oropharyngeal exudate  Eyes: Pupils are equal, round, and reactive to light  Conjunctivae and EOM are normal  Right eye exhibits no discharge  Left eye exhibits no discharge  No scleral icterus  Neck: Normal range of motion  Neck supple  No tracheal deviation present  No thyromegaly present  Cardiovascular: Normal rate, regular rhythm, normal heart sounds and intact distal pulses  Pulmonary/Chest: Effort normal and breath sounds normal  No respiratory distress  Abdominal: Soft  Bowel sounds are normal  She exhibits no distension  Musculoskeletal: Normal range of motion  She exhibits no edema, tenderness or deformity  Lymphadenopathy:     She has no cervical adenopathy  Neurological: She is alert and oriented to person, place, and time  No cranial nerve deficit  Coordination normal    Skin: Skin is warm and dry  No rash noted  She is not diaphoretic  No erythema  No pallor  Psychiatric: She has a normal mood and affect  Her behavior is normal  Judgment and thought content normal    Nursing note and vitals reviewed        Vital Signs  ED Triage Vitals   Temperature Pulse Respirations Blood Pressure SpO2   12/02/18 1603 12/02/18 1603 12/02/18 1603 12/02/18 1603 12/02/18 1603   98 °F (36 7 °C) 98 20 135/73 100 %      Temp Source Heart Rate Source Patient Position - Orthostatic VS BP Location FiO2 (%)   12/02/18 1601 -- 12/02/18 1603 12/02/18 1603 --   Temporal  Sitting Left arm       Pain Score       12/02/18 1638       8           Vitals:    12/02/18 1603 12/02/18 1615   BP: 135/73 135/73   Pulse: 98    Patient Position - Orthostatic VS: Sitting        Visual Acuity      ED Medications  Medications   sodium chloride 0 9 % bolus 2,000 mL (2,000 mL Intravenous New Bag 12/2/18 1633)   metoclopramide (REGLAN) injection 10 mg (10 mg Intravenous Given 12/2/18 1638)   diphenhydrAMINE (BENADRYL) injection 50 mg (50 mg Intravenous Given 12/2/18 1639)   ketorolac (TORADOL) injection 30 mg (30 mg Intravenous Given 12/2/18 1638)       Diagnostic Studies  Results Reviewed     Procedure Component Value Units Date/Time    hCG, qualitative pregnancy [[id number]]  (Normal) Collected:  12/02/18 1637    Lab Status:  Final result Specimen:  Blood from Arm, Left Updated:  12/02/18 1726     Preg, Serum Negative    Comprehensive metabolic panel [[id number]]  (Abnormal) Collected:  12/02/18 1637    Lab Status:  Final result Specimen:  Blood from Arm, Left Updated:  12/02/18 1721     Sodium 135 mmol/L      Potassium 3 8 mmol/L      Chloride 101 mmol/L      CO2 25 mmol/L      ANION GAP 9 mmol/L      BUN 10 mg/dL      Creatinine 0 57 (L) mg/dL      Glucose 233 (H) mg/dL      Calcium 9 5 mg/dL      AST 40 (H) U/L      ALT 57 (H) U/L      Alkaline Phosphatase 89 U/L      Total Protein 6 9 g/dL      Albumin 4 0 g/dL      Total Bilirubin 0 50 mg/dL      eGFR 125 ml/min/1 73sq m     Narrative:         National Kidney Disease Education Program recommendations are as follows:  GFR calculation is accurate only with a steady state creatinine  Chronic Kidney disease less than 60 ml/min/1 73 sq  meters  Kidney failure less than 15 ml/min/1 73 sq  meters  Protime-INR [[id number]]  (Normal) Collected:  12/02/18 1637    Lab Status:  Final result Specimen:  Blood from Arm, Left Updated:  12/02/18 1719     Protime 12 3 seconds      INR 1 06    APTT [[id number]]  (Normal) Collected:  12/02/18 1637    Lab Status:  Final result Specimen:  Blood from Arm, Left Updated:  12/02/18 1719     PTT 34 seconds     CBC and differential [[id number]]  (Abnormal) Collected:  12/02/18 1637    Lab Status:  Final result Specimen:  Blood from Arm, Left Updated:  12/02/18 1658     WBC 11 50 (H) Thousand/uL      RBC 5 32 (H) Million/uL      Hemoglobin 14 2 g/dL      Hematocrit 42 5 %      MCV 80 (L) fL      MCH 26 6 pg      MCHC 33 4 g/dL      RDW 15 0 (H) %      MPV 9 3 fL      Platelets 107 Thousands/uL      nRBC 0 /100 WBCs      Neutrophils Relative 70 %      Lymphocytes Relative 22 %      Monocytes Relative 7 %      Eosinophils Relative 1 %      Basophils Relative 1 %      Neutrophils Absolute 8 00 (H) Thousands/µL      Lymphocytes Absolute 2 60 Thousands/µL      Monocytes Absolute 0 80 Thousand/µL      Eosinophils Absolute 0 10 Thousand/µL      Basophils Absolute 0 10 Thousands/µL     Fingerstick Glucose (POCT) [[id number]]  (Abnormal) Collected:  12/02/18 1648    Lab Status:  Final result Updated:  12/02/18 1650     POC Glucose 207 (H) mg/dl                  CT head without contrast   Final Result by Jovon Naik (12/02 1837)   No acute intracranial process is identified  The cause of the patient's headache is not identified on this examination             Signed by Jovon Naik MD                 Procedures  Procedures       Phone Contacts  ED Phone Contact    ED Course  ED Course as of Dec 02 1852   Sun Dec 02, 2018   [facility] Patient is sitting comfortably on the stretcher and is feeling better  I discussed with her the results of the CT scan of her head as well as the results of her blood work                                  MDM  Number of Diagnoses or Management Options  Headache: established and worsening     Amount and/or Complexity of Data Reviewed  Clinical lab tests: ordered and reviewed  Tests in the radiology section of CPT®: ordered and reviewed  Tests in the medicine section of CPT®: ordered and reviewed  Decide to obtain previous medical records or to obtain history from someone other than the patient: yes  Review and summarize past medical records: yes  Independent visualization of images, tracings, or specimens: yes    Risk of Complications, Morbidity, and/or Mortality  Presenting problems: low  Diagnostic procedures: low  Management options: low    Patient Progress  Patient progress: improved    CritCare Time    Disposition  Final diagnoses:   Headache     Time reflects when diagnosis was documented in both MDM as applicable and the Disposition within this note     Time User Action Codes Description Comment    12/2/2018  6:51 PM Montana Hannah Add [R51] Headache       ED Disposition     ED Disposition Condition Comment    Discharge  [facility] discharge to home/self care  Condition at discharge: Good        Follow-up Information     Follow up With Specialties Details Why [address],  Family Medicine In 3 days  [address] [facility] [address]  [phone]            Patient's Medications   Discharge Prescriptions    No medications on file     No discharge procedures on file      ED Provider  Electronically Signed by           Jazzmine Orellana MD  12/02/18 6426

## 2018-12-02 NOTE — ED NOTES
Ct head completed    Pt in tariq, iv fluids infusing, pt reports small amount head pain relief after meds     Leonardo Boyd, RN  12/02/18 5955

## 2018-12-03 ENCOUNTER — OFFICE VISIT (OUTPATIENT)
Dept: FAMILY MEDICINE CLINIC | Facility: CLINIC | Age: 28
End: 2018-12-03
Payer: COMMERCIAL

## 2018-12-03 ENCOUNTER — PATIENT OUTREACH (OUTPATIENT)
Dept: FAMILY MEDICINE CLINIC | Facility: CLINIC | Age: 28
End: 2018-12-03

## 2018-12-03 VITALS
DIASTOLIC BLOOD PRESSURE: 90 MMHG | BODY MASS INDEX: 44.72 KG/M2 | TEMPERATURE: 96.7 F | WEIGHT: 243 LBS | RESPIRATION RATE: 16 BRPM | SYSTOLIC BLOOD PRESSURE: 168 MMHG | OXYGEN SATURATION: 98 % | HEART RATE: 95 BPM | HEIGHT: 62 IN

## 2018-12-03 DIAGNOSIS — G89.29 CHRONIC NONINTRACTABLE HEADACHE, UNSPECIFIED HEADACHE TYPE: Primary | ICD-10-CM

## 2018-12-03 DIAGNOSIS — R51.9 CHRONIC NONINTRACTABLE HEADACHE, UNSPECIFIED HEADACHE TYPE: Primary | ICD-10-CM

## 2018-12-03 PROCEDURE — 3008F BODY MASS INDEX DOCD: CPT | Performed by: NURSE PRACTITIONER

## 2018-12-03 PROCEDURE — 99214 OFFICE O/P EST MOD 30 MIN: CPT | Performed by: NURSE PRACTITIONER

## 2018-12-03 RX ORDER — BUTALBITAL, ACETAMINOPHEN AND CAFFEINE 50; 325; 40 MG/1; MG/1; MG/1
1 TABLET ORAL EVERY 4 HOURS PRN
Qty: 30 TABLET | Refills: 0 | Status: SHIPPED | OUTPATIENT
Start: 2018-12-03 | End: 2019-03-08 | Stop reason: ALTCHOICE

## 2018-12-03 NOTE — PATIENT INSTRUCTIONS
Discussed headaches may possibly be related to food sensitivities   Information provided regarding gluten free dietary options  Reviewed benefits of healthy diet and lifestyle changes to decrease headaches and complications of diabetes  Start Fioricet as directed for headaches  Can follow-up with eye doctor for blurry vision if persists, may be related to hyperglycemia and headaches

## 2018-12-03 NOTE — PROGRESS NOTES
[facility]        NAME: Magdalena Saul is a 25 y o  female  : 1990    MRN: [id number]  DATE: 2018  TIME: 9:22 AM    Assessment and Plan   Chronic nonintractable headache, unspecified headache type [R51]  1  Chronic nonintractable headache, unspecified headache type  butalbital-acetaminophen-caffeine (FIORICET,ESGIC) -40 mg per tablet         Patient Instructions     Patient Instructions   Discussed headaches may possibly be related to food sensitivities  Information provided regarding gluten free dietary options  Reviewed benefits of healthy diet and lifestyle changes to decrease headaches and complications of diabetes  Start Fioricet as directed for headaches  Can follow-up with eye doctor for blurry vision if persists, may be related to hyperglycemia and headaches            Chief Complaint     Chief Complaint   Patient presents with    Headache     C/O worsening headache  Patient was seen in [facility] 2018   Blurred Vision     C/O nausea with headaches  Patient states the Zofran does not help  Has not had an eye exam for a few years  Does wear prescription eye glasses  Patient C/O the headache originating in the frontal lobe and radiation around the sides to occipital          History of Present Illness       Patient here d/t complaints of persistent headache x4-5 days  Was seen in the ED yesterday for this  CT scan of head was performed and was WNL  She was given Zofran, Toradol, and Benadryl cocktail which provided some relief  Headache is 9/10 at its worse, currently 5/10  Headache is associated with blurry vision, no light sensitivity  History of migraines, took Topamax as a child  Headache episodes have been ongoing since age 13  Has never seen a neurologist  Also continues to experience nausea, unrelieved with Zofran  Patient eager to discuss lifestyle changes and nutrition          Review of Systems   Review of Systems   Constitutional: Negative for activity change, diaphoresis, fatigue and fever  HENT: Negative for congestion, facial swelling, hearing loss, rhinorrhea, sinus pain, sinus pressure, sneezing, sore throat and voice change  Eyes: Positive for visual disturbance (blurry vision associated with headache)  Negative for photophobia and discharge  Respiratory: Negative for cough, choking, chest tightness, shortness of breath, wheezing and stridor  Cardiovascular: Negative for chest pain, palpitations and leg swelling  Gastrointestinal: Positive for nausea  Negative for abdominal distention, abdominal pain, constipation, diarrhea and vomiting  Endocrine: Negative for polydipsia, polyphagia and polyuria  Genitourinary: Negative for difficulty urinating, dysuria, frequency and urgency  Musculoskeletal: Negative for arthralgias, back pain, gait problem, joint swelling, myalgias, neck pain and neck stiffness  Skin: Negative for color change, rash and wound  Neurological: Positive for headaches  Negative for dizziness, syncope, speech difficulty, weakness and light-headedness  Hematological: Negative for adenopathy  Does not bruise/bleed easily  Psychiatric/Behavioral: Negative for agitation, behavioral problems, confusion, hallucinations, sleep disturbance and suicidal ideas  The patient is not nervous/anxious            Current Medications       Current Outpatient Prescriptions:     etonogestrel (NEXPLANON) subdermal implant, Inject 68 mg under the skin, Disp: , Rfl:     glucose blood (ACCU-CHEK GUIDE) test strip, Test three times per day, Disp: 100 each, Rfl: 0    insulin glargine (LANTUS) 100 units/mL subcutaneous injection, Inject 20 Units under the skin daily at bedtime, Disp: 10 mL, Rfl: 3    Insulin Pen Needle 31G X 8 MM MISC, by Does not apply route daily, Disp: 100 each, Rfl: 0    Lancets (ACCU-CHEK MULTICLIX) lancets, Use as instructed, Disp: 100 each, Rfl: 3    lisinopril (ZESTRIL) 20 mg tablet, Take 1 tablet (20 mg total) by mouth daily, Disp: 30 tablet, Rfl: 3    metFORMIN (GLUCOPHAGE) 500 mg tablet, Take 1 tablet (500 mg total) by mouth 2 (two) times a day with meals, Disp: 60 tablet, Rfl: 3    ondansetron (ZOFRAN) 4 mg tablet, Take 1 tablet (4 mg total) by mouth every 4 (four) hours as needed for nausea or vomiting, Disp: 20 tablet, Rfl: 0    butalbital-acetaminophen-caffeine (FIORICET,ESGIC) -40 mg per tablet, Take 1 tablet by mouth every 4 (four) hours as needed for headaches, Disp: 30 tablet, Rfl: 0    gabapentin (NEURONTIN) 100 mg capsule, Take 3 capsules (300 mg total) by mouth 2 (two) times a day, Disp: 60 capsule, Rfl: 1    ibuprofen (MOTRIN) 600 mg tablet, Take 1 tablet (600 mg total) by mouth every 8 (eight) hours as needed for moderate pain or headaches, Disp: 90 tablet, Rfl: 1    Current Facility-Administered Medications:     insulin glargine (LANTUS) subcutaneous injection 20 Units 0 2 mL, 20 Units, Subcutaneous, HS, Vivek Flynn MD    Current Allergies     Allergies as of 12/03/2018 - Reviewed 12/03/2018   Allergen Reaction Noted    Corticosteroids Anaphylaxis 01/13/2009    Prednisone Swelling 06/12/2018            The following portions of the patient's history were reviewed and updated as appropriate: allergies, current medications, past family history, past medical history, past social history, past surgical history and problem list      Past Medical History:   Diagnosis Date    Allergic     Arthritis     Cyst of ovary, right     Diabetes mellitus ([facility] [address] ) 10/10/18    Heart murmur 01/19/88    Hepatitis C     Hypertension     Obesity 1995    Pulmonary artery congenital abnormality     Spleen enlarged        Past Surgical History:   Procedure Laterality Date    CARDIAC CATHETERIZATION      no CAD    CHOLECYSTECTOMY      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 8    LIVER BIOPSY      LIVER BIOPSY      VSD REPAIR      As a child       Family History   Problem Relation Age of Onset    Hypertension Mother     Migraines Mother     Diabetes Father     Hypertension Father     Kidney failure Father     Polycystic kidney disease Father     Heart attack Father     Arthritis Father     Stroke Father     Polycystic kidney disease Paternal Grandmother     Stroke Paternal Grandmother     Arthritis Sister     Asthma Sister     Arthritis Maternal Grandmother     Cancer Maternal Grandmother     Learning disabilities Cousin     Learning disabilities Sister     ADD / ADHD Cousin          Medications have been verified  Objective   /90   Pulse 95   Temp (!) 96 7 °F (35 9 °C) (Tympanic)   Resp 16   Ht 5' 2" (1 575 m)   Wt 110 kg (243 lb)   LMP 11/19/2018   SpO2 98%   BMI 44 45 kg/m²        Physical Exam     Physical Exam   Constitutional: She is oriented to person, place, and time  Vital signs are normal  She appears well-developed and well-nourished  She is active and cooperative  No distress  Eyes: EOM are normal    Cardiovascular: Normal rate and regular rhythm  Murmur (3/6 systolic) heard  Pulmonary/Chest: Effort normal and breath sounds normal  No respiratory distress  She has no wheezes  Neurological: She is alert and oriented to person, place, and time  Skin: Skin is warm and dry  No rash noted  She is not diaphoretic  No erythema  Psychiatric: She has a normal mood and affect  Her behavior is normal  Judgment and thought content normal    Nursing note and vitals reviewed

## 2018-12-04 ENCOUNTER — PATIENT OUTREACH (OUTPATIENT)
Dept: FAMILY MEDICINE CLINIC | Facility: CLINIC | Age: 28
End: 2018-12-04

## 2018-12-04 ENCOUNTER — TELEPHONE (OUTPATIENT)
Dept: FAMILY MEDICINE CLINIC | Facility: CLINIC | Age: 28
End: 2018-12-04

## 2018-12-04 LAB
HCV GENTYP SERPL NAA+PROBE: NORMAL
HCV PLEASE NOTE: NORMAL

## 2018-12-04 NOTE — TELEPHONE ENCOUNTER
Patient was started on Fioricet yesterday and she noticed that the prescription warning says to be careful if driving  She wants to make sure it is ok if she drives and works while taking the medication

## 2018-12-04 NOTE — TELEPHONE ENCOUNTER
She can drive and work while taking Fioricet, If she is concerned, she should take it when she does not need to drive or work for 8 hours to see if it makes her feel drowsy  She can take a 1/2 tablet if she prefers, that would make drowsiness less

## 2018-12-04 NOTE — PROGRESS NOTES
Outpatient Care Management Note:  Continues to have HA, currently 8/10 but has not taken Fioricet yet this morning  Encouraged to eat and take her medication  Has not had an eye exam in several years  Discussed importance of having her eyes checked as a potential source of the headaches  Doing OK with her diabetic diet, meets with nutrition on 12/11/18  Has no other needs currently  Verified that she has my contact information  Encouraged to call with a change in symptoms, questions or concerns

## 2018-12-11 ENCOUNTER — CLINICAL SUPPORT (OUTPATIENT)
Dept: NUTRITION | Facility: HOSPITAL | Age: 28
End: 2018-12-11
Payer: COMMERCIAL

## 2018-12-11 VITALS — HEIGHT: 62 IN | BODY MASS INDEX: 44.5 KG/M2 | WEIGHT: 241.8 LBS

## 2018-12-11 DIAGNOSIS — E13.9 DIABETES MELLITUS OF OTHER TYPE WITHOUT COMPLICATION, UNSPECIFIED WHETHER LONG TERM INSULIN USE (HCC): Primary | ICD-10-CM

## 2018-12-11 PROCEDURE — 97802 MEDICAL NUTRITION INDIV IN: CPT | Performed by: DIETITIAN, REGISTERED

## 2018-12-11 NOTE — PROGRESS NOTES
Initial Nutrition Assessment Form    Patient Name: Ana Schneider    YOB: 1990    Sex: Female     Assessment Date: 12/11/2018  Start Time: 9:10 Stop Time: 9:55 Total Minutes: 39     Data:  Present at session: self   Parent Concerns:    Medical Dx/Reason for Referral: Diabetes  Of other type without complications and without specification long-term insulin use   Past Medical History:   Diagnosis Date    Cyst of ovary, right     Hepatitis C     Hypertension     Pulmonary artery congenital abnormality     Spleen enlarged        Current Outpatient Prescriptions   Medication Sig Dispense Refill    butalbital-acetaminophen-caffeine (FIORICET,ESGIC) -40 mg per tablet Take 1 tablet by mouth every 4 (four) hours as needed for headaches 30 tablet 0    etonogestrel (NEXPLANON) subdermal implant Inject 68 mg under the skin      gabapentin (NEURONTIN) 100 mg capsule Take 1 capsule (100 mg total) by mouth 2 (two) times a day 60 capsule 3    glucose blood (ACCU-CHEK GUIDE) test strip Test three times per day 100 each 0    insulin glargine (LANTUS) 100 units/mL subcutaneous injection Inject 20 Units under the skin daily at bedtime 10 mL 3    Insulin Pen Needle 31G X 8 MM MISC by Does not apply route daily 100 each 0    Lancets (ACCU-CHEK MULTICLIX) lancets Use as instructed 100 each 3    lisinopril (ZESTRIL) 20 mg tablet Take 1 tablet (20 mg total) by mouth daily 30 tablet 3    metFORMIN (GLUCOPHAGE) 500 mg tablet Take 1 tablet (500 mg total) by mouth 2 (two) times a day with meals 60 tablet 3    ondansetron (ZOFRAN) 4 mg tablet Take 1 tablet (4 mg total) by mouth every 4 (four) hours as needed for nausea or vomiting 20 tablet 0     Current Facility-Administered Medications   Medication Dose Route Frequency Provider Last Rate Last Dose    insulin glargine (LANTUS) subcutaneous injection 20 Units 0 2 mL  20 Units Subcutaneous HS Sammie Robles MD            Additional Meds/Supplements: no Special Learning Needs: Visual and verbal learner   Height: HC Readings from Last 3 Encounters:   No data found for [facility]       Weight: Wt Readings from Last 3 Encounters:   12/11/18 110 kg (241 lb 12 8 oz)   12/03/18 110 kg (243 lb)   12/02/18 109 kg (240 lb 4 8 oz)     Body mass index is 44 23 kg/m²  Recent Weight Change: []Yes     [x]No  Amount:       Energy Needs: No calculations performed for this visit   Allergies   Allergen Reactions    Corticosteroids Anaphylaxis    Prednisone Swelling    Milk-Related Compounds Sneezing       History   Alcohol Use    Yes       History   Smoking Status    Former Smoker    Packs/day: 0 20    Types: Cigarettes   Smokeless Tobacco    Never Used       Who shops? patient and boyfriend   Who cooks? patient and boyfriend   Exercise: None    Prior Counseling? [x]Yes     []No  When: 7 years ago   Why:  consideration of weight loss surgery         Diet Hx:  Breakfast: a m  no breakfast    Lunch: 11 a m  Usually gets up at 10 on days that works different shift  Toast, or leftovers from dinner  Chicken, pork, hot dogs or burgers leftover mashed potatoes apple sauce        Dinner: 6 p m  Tacos, pork chops, chicken hamburgers or spaghetti  Sometimes garlic bread  Try for at least one vegetable or starch with other meals, except tacos(soft shell tacos)        Snacks: AM - none  PM - half hour after eat  Chips, recently eating fruit or peanuts   HS - bowl of ice cream after dinner 2-3 scoops of ice cream with pb in it          Nutrition Diagnosis:   Excess carbohydrate intake  related to Physiological causes requiring modified carbohydrate intake (i e  diabetes mellitus) as  evidenced by Hemoglobin A1C >6%       Medical Nutrition Therapy Intervention:  []Individualized Meal Plan []Understanding Lab Values   []Basic Pathophysiology of Disease []Food/Medication Interactions   []Food Diary []Exercise   []Lifestyle/Behavior Modification Techniques []Medication, Mechanism of Action []Label Reading []Self Blood Glucose Monitoring   []Weight/BMI Goals []Other -    Other Notes: Drinks sugary beverages , regular soda, zimmermans iced tea  Only sleeps 3-4 hours, wakes up and is tired  Sometimes 12 hour shifts  Works five days a week  On days off bakes homemade cookies  Doesn't like spinach, asparagus and egg plant  Drinks 3 tea pints and 4 16 oz sodas per day, some flavored water  Patient interested portion control and lifestyle modifications  Comprehension: [x]Excellent  []Very Good  []Good  []Fair   []Poor    Receptivity: []Excellent  [x]Very Good  []Good  []Fair   []Poor    Expected Compliance: []Excellent  [x]Very Good  []Good  []Fair   []Poor        Goals:  1  Patient to choose greek or soy yogurt 6 oz portion with 0 5 cup blueberries every other night instead of choosing ice cream with peanut butter  2  Patient to use a smaller plate 9-7 inch at meals daily for her meals  3   Patient to choose a casimiro in place of chips at snack times          December 27, 2018      Labs:  [facility]  Lab Results   Component Value Date     06/14/2018    K 3 8 12/02/2018     12/02/2018    CO2 25 12/02/2018    ANIONGAP 13 0 06/14/2018    BUN 10 12/02/2018    CREATININE 0 57 (L) 12/02/2018    GLUF 222 (H) 11/21/2018    CALCIUM 9 5 12/02/2018    AST 40 (H) 12/02/2018    ALT 57 (H) 12/02/2018    ALKPHOS 89 12/02/2018    EGFR 125 12/02/2018       BMP  Lab Results   Component Value Date    CALCIUM 9 5 12/02/2018     06/14/2018    K 3 8 12/02/2018    CO2 25 12/02/2018     12/02/2018    BUN 10 12/02/2018    CREATININE 0 57 (L) 12/02/2018       Lipids  No results found for: CHOL  No results found for: HDL  No results found for: LDLCALC  No results found for: TRIG  No results found for: CHOLHDL    Hemoglobin A1C  Lab Results   Component Value Date    HGBA1C 9 6 (H) 11/21/2018       Fasting Glucose  Lab Results   Component Value Date    GLUF 222 (H) 11/21/2018       Insulin Thyroid  No results found for: TSH, Q4RIKAO, E4BMUUM, THYROIDAB    Hepatic Function Panel  Lab Results   Component Value Date    ALT 57 (H) 12/02/2018    AST 40 (H) 12/02/2018    GGT 39 11/21/2018    ALKPHOS 89 12/02/2018       Celiac Disease Antibody Panel  No results found for: ENDOMYSIAL IGA, GLIADIN IGA, GLIADIN IGG, IGA, TISSUE TRANSGLUT AB, TTG IGA   Iron  No results found for: IRON, TIBC, FERRITIN    Vitamins  No results found for: VITAMIN B2   No results found for: NICOTINAMIDE, NICOTINIC ACID   No results found for: VITAMINB6  No results found for: ATWVJOGS64  No results found for: VITB5  No results found for: B7MMADZA  No results found for: THYROGLB  No results found for: VITAMIN K   No results found for: 25-HYDROXY VIT D   No components found for: GUSTAVO Clemente RDN LDN  [address] [facility] CLINICAL NUTRITION SERVICES  [facility] [address]  [address] [facility] [address] [phone]

## 2018-12-11 NOTE — LETTER
12/11/2018  Viktor Mayo    1990     Dear Dr Malcom Ceron,     Thank you for referring Viktor Mayo to the Outpatient Nutrition Program at [address] [place] [address]  Medical Nutrition Therapy was delivered on Visit date not found and included individualized nutritional diagnostic therapy and counseling for the purposes of managing the following dx/disease:   1  Diabetes mellitus of other type without complication, unspecified whether long term insulin use ([address] )  Ambulatory referral to Nutrition Services     Nutrition Diagnosis and Intervention:     Nutrition Diagnosis:   Excess carbohydrate intake  related to Physiological causes requiring modified carbohydrate intake (i e  diabetes mellitus) as  evidenced by Hemoglobin A1C >6%     Therapy Goals:      Goals:  1  Patient to choose greek or soy yogurt 6 oz portion with 0 5 cup blueberries every other night instead of choosing ice cream with peanut butter  2  Patient to use a smaller plate 9-7 inch at meals daily for her meals  3   Patient to choose a casimiro in place of chips at snack times  Follow up planned for monitoring and evaluation:  December 27, 2018    Please contact me with questions/concerns  Thank you for supporting Medical Nutrition Therapy  Katy DECKERN   [address] [facility] CLINICAL NUTRITION SERVICES  [facility] [address] [facility] [address] [phone]

## 2018-12-17 ENCOUNTER — OFFICE VISIT (OUTPATIENT)
Dept: FAMILY MEDICINE CLINIC | Facility: CLINIC | Age: 28
End: 2018-12-17
Payer: COMMERCIAL

## 2018-12-17 VITALS
OXYGEN SATURATION: 98 % | HEIGHT: 62 IN | HEART RATE: 96 BPM | RESPIRATION RATE: 16 BRPM | BODY MASS INDEX: 43.98 KG/M2 | TEMPERATURE: 98.2 F | WEIGHT: 239 LBS | SYSTOLIC BLOOD PRESSURE: 140 MMHG | DIASTOLIC BLOOD PRESSURE: 88 MMHG

## 2018-12-17 DIAGNOSIS — M79.671 PAIN IN BOTH FEET: ICD-10-CM

## 2018-12-17 DIAGNOSIS — G62.9 NEUROPATHY: ICD-10-CM

## 2018-12-17 DIAGNOSIS — F31.32 BIPOLAR AFFECTIVE DISORDER, CURRENTLY DEPRESSED, MODERATE (HCC): ICD-10-CM

## 2018-12-17 DIAGNOSIS — G44.221 CHRONIC TENSION-TYPE HEADACHE, INTRACTABLE: Primary | ICD-10-CM

## 2018-12-17 DIAGNOSIS — M79.672 PAIN IN BOTH FEET: ICD-10-CM

## 2018-12-17 PROCEDURE — 3008F BODY MASS INDEX DOCD: CPT | Performed by: NURSE PRACTITIONER

## 2018-12-17 PROCEDURE — 99214 OFFICE O/P EST MOD 30 MIN: CPT | Performed by: NURSE PRACTITIONER

## 2018-12-17 RX ORDER — IBUPROFEN 600 MG/1
600 TABLET ORAL EVERY 8 HOURS PRN
Qty: 90 TABLET | Refills: 1 | Status: SHIPPED | OUTPATIENT
Start: 2018-12-17 | End: 2019-03-14

## 2018-12-17 RX ORDER — GABAPENTIN 100 MG/1
300 CAPSULE ORAL 2 TIMES DAILY
Qty: 60 CAPSULE | Refills: 1 | Status: SHIPPED | OUTPATIENT
Start: 2018-12-17 | End: 2018-12-17 | Stop reason: SDUPTHER

## 2018-12-17 RX ORDER — GABAPENTIN 300 MG/1
300 CAPSULE ORAL 2 TIMES DAILY
Qty: 90 CAPSULE | Refills: 1 | Status: SHIPPED | OUTPATIENT
Start: 2018-12-17 | End: 2019-11-21

## 2018-12-17 NOTE — PATIENT INSTRUCTIONS
1  Headaches- neurology consult as requested by pt  Stop Fioricet if not working, consider gluten elimination, consider getting sugar levels under control for possible cause of Headaches, ibuprofen as directed  2  Keep appt with Nutritionist- every 2 weeks if possible  3  Continue to monitor blood sugar levels, attempt to keep around 120-130 at all times, less carbs, more protein and vegetables, get blood work as directed by Dr Gwen Sheth  4  Increase Gabapentin as directed for foot pain, add ibuprofen when needed as directed  5  Make appt with Psychiatry asap, info/referral given- if symptoms worsen, call our office- I offered to start pt   On medication- but with her many side effects we both decided she could wait until seen by Psychiatry  Return in 2 weeks for f/u  Call or return sooner if needed

## 2018-12-18 ENCOUNTER — PATIENT OUTREACH (OUTPATIENT)
Dept: FAMILY MEDICINE CLINIC | Facility: CLINIC | Age: 28
End: 2018-12-18

## 2018-12-18 NOTE — PROGRESS NOTES
Outpatient Care Management Note:  Received return call from patient  She is doing well  She met with the dietician and they developed a plan for her to decrease her sugar intake as well as add some fruits and vegetables  She has cut her soda intake by half and is replacing it with flavored water  She is eating yogurt with fruit for an evening snack every other night instead of ice cream   Saw her PCP yesterday and has lost 4 pounds since she began the changes  She continues to have chronic headaches and will be seeing neurology in the future for same  Her morning blood sugars are 120-130 but continue to be higher at night  Encouraged to continue to make small changes in her diet and complimented on her positive changes to date  Reviewed upcoming appointments  Verified that she has my contact information  Encouraged to call with a change in symptoms, questions or concerns  Outpatient Care Management Note:  Message left for patient to please return call  Contact information left on message

## 2018-12-21 ENCOUNTER — HOSPITAL ENCOUNTER (OUTPATIENT)
Dept: NON INVASIVE DIAGNOSTICS | Facility: CLINIC | Age: 28
Discharge: HOME | End: 2018-12-21
Payer: COMMERCIAL

## 2018-12-21 DIAGNOSIS — Q21.0 VSD (VENTRICULAR SEPTAL DEFECT) AND COARCTATION OF AORTA: ICD-10-CM

## 2018-12-21 DIAGNOSIS — Q25.1 VSD (VENTRICULAR SEPTAL DEFECT) AND COARCTATION OF AORTA: ICD-10-CM

## 2018-12-21 PROCEDURE — 93306 TTE W/DOPPLER COMPLETE: CPT

## 2018-12-21 RX ADMIN — PERFLUTREN 0.8 ML/MIN: 6.52 INJECTION, SUSPENSION INTRAVENOUS at 14:44

## 2018-12-23 PROCEDURE — 93325 DOPPLER ECHO COLOR FLOW MAPG: CPT | Performed by: INTERNAL MEDICINE

## 2018-12-23 PROCEDURE — 93321 DOPPLER ECHO F-UP/LMTD STD: CPT | Performed by: INTERNAL MEDICINE

## 2018-12-23 PROCEDURE — 93308 TTE F-UP OR LMTD: CPT | Performed by: INTERNAL MEDICINE

## 2018-12-27 ENCOUNTER — CLINICAL SUPPORT (OUTPATIENT)
Dept: NUTRITION | Facility: HOSPITAL | Age: 28
End: 2018-12-27
Payer: COMMERCIAL

## 2018-12-27 VITALS — BODY MASS INDEX: 44.5 KG/M2 | WEIGHT: 241.8 LBS | HEIGHT: 62 IN

## 2018-12-27 DIAGNOSIS — E13.9 DIABETES MELLITUS OF OTHER TYPE WITHOUT COMPLICATION, UNSPECIFIED WHETHER LONG TERM INSULIN USE (HCC): ICD-10-CM

## 2018-12-27 PROCEDURE — 97803 MED NUTRITION INDIV SUBSEQ: CPT

## 2018-12-27 NOTE — PROGRESS NOTES
Follow-Up Nutrition Assessment Form    Patient Name: Nestor Palmer    YOB: 1990    Sex: Female      Follow Up Date: 12/27/2018  Start Time: 9:50 Stop Time: 10:20 Total Minutes: 30     Data:  Present at session: self   Parent Concerns:    Medical Dx/Reason for Referral:     Diabetes  Of other type without complications and without specification long-term insulin      Past Medical History:   Diagnosis Date    Allergic     Arthritis     Cyst of ovary, right     Diabetes mellitus ([address] ) 10/10/18    Heart murmur 01/19/88    Hepatitis C     Hypertension     Obesity 1995    Pulmonary artery congenital abnormality     Spleen enlarged        Current Outpatient Prescriptions   Medication Sig Dispense Refill    butalbital-acetaminophen-caffeine (FIORICET,ESGIC) -40 mg per tablet Take 1 tablet by mouth every 4 (four) hours as needed for headaches 30 tablet 0    etonogestrel (NEXPLANON) subdermal implant Inject 68 mg under the skin      gabapentin (NEURONTIN) 300 mg capsule Take 1 capsule (300 mg total) by mouth 2 (two) times a day 90 capsule 1    glucose blood (ACCU-CHEK GUIDE) test strip Test three times per day 100 each 0    ibuprofen (MOTRIN) 600 mg tablet Take 1 tablet (600 mg total) by mouth every 8 (eight) hours as needed for moderate pain or headaches 90 tablet 1    insulin glargine (LANTUS) 100 units/mL subcutaneous injection Inject 20 Units under the skin daily at bedtime 10 mL 3    Insulin Pen Needle 31G X 8 MM MISC by Does not apply route daily 100 each 0    Lancets (ACCU-CHEK MULTICLIX) lancets Use as instructed 100 each 3    lisinopril (ZESTRIL) 20 mg tablet Take 1 tablet (20 mg total) by mouth daily 30 tablet 3    metFORMIN (GLUCOPHAGE) 500 mg tablet Take 1 tablet (500 mg total) by mouth 2 (two) times a day with meals 60 tablet 3    ondansetron (ZOFRAN) 4 mg tablet Take 1 tablet (4 mg total) by mouth every 4 (four) hours as needed for nausea or vomiting 20 tablet 0 Current Facility-Administered Medications   Medication Dose Route Frequency Provider Last Rate Last Dose    insulin glargine (LANTUS) subcutaneous injection 20 Units 0 2 mL  20 Units Subcutaneous HS Jane Neville MD            Additional Meds/Supplements:    Barriers to Learning: None   Labs:    Height: Ht Readings from Last 3 Encounters:   12/27/18 5' 2" (1 575 m)   12/17/18 5' 2" (1 575 m)   12/11/18 5' 2" (1 575 m)      Weight: Wt Readings from Last 3 Encounters:   12/27/18 110 kg (241 lb 12 8 oz)   12/17/18 108 kg (239 lb)   12/11/18 110 kg (241 lb 12 8 oz)     Body mass index is 44 23 kg/m²  Wt  Change Since Last Visit: []Yes     [x]No  Amount:       Energy Needs: No calculations performed for this visit   Pain Screen: Are you having pain now? Location: yes; R side, L lower abdominal  Severity: DISCOMFORT SCALE NUMBER 6/ 10 for pain      Goals Achieved:     New Goals:   1  Leonard Petmarky to choose a side dish vegetable at dinner daily  2  Leonard Petite to choose air popped popcorn instead of packaged popcorn for snack at night  3  Leonard Petite to walk 10 minutes on the treadmill three days per week  Initial PES:    Excess carbohydrate intake  related to Physiological causes requiring modified carbohydrate intake (i e  diabetes mellitus) as  evidenced by Hemoglobin A1C >6%   New PES: No Change      New Problem List:  1  Patient substituting high sodium foods/lower calorie foods for high calorie foods    2  Patient concerned about muscle soreness from trying new exercises I e  Sit-ups    3  Assessment:  Only drank 4 bottles of soda, drinking more flavored water  Drank less than half of the iced tea half gallon since last visit two weeks ago  However, she did report choosing gatorade sometimes  This was discussed as an unhealthy choice to due to added sugars similar to soda  Leonard Berg stated she is more aware of portion sizes  She is choosing to eat more fruit    Not eating ice cream at night, eating prepackaged popcorn, (half a bag) instead  Nohleia Vilchis is going to get a planner for next year to help her track days she exercises, help her with meal planning choices  She stated her boyfriend is supporting her by eating meals she is preparing and trying to get healthy with her  He unfortunately is not able to exercise with her due to a foot injury  She stated she has stopped baking cookies at home to help decrease her sugar intake   She is concerned that she needs to bake today to take items to a family member's       Medical Nutrition Therapy Intervention:  [x]Individualized Meal Plan []Understanding Lab Values   []Basic Pathophysiology of Disease []Food/Medication Interactions   []Food Diary [x]Exercise   [x]Lifestyle/Behavior Modification Techniques []Medication, Mechanism of Action   []Label Reading []Self Blood Glucose Monitoring   []Weight/BMI Goals []Other -    Other Notes:        Comprehension: [x]Excellent  []Very Good  []Good  []Fair   []Poor    Receptivity: []Excellent  [x]Very Good  []Good  []Fair   []Poor    Expected Compliance: []Excellent  [x]Very Good  []Good  []Fair   []Poor      Labs:  CMP  Lab Results   Component Value Date     2018    K 3 8 2018     2018    CO2 25 2018    ANIONGAP 13 0 2018    BUN 10 2018    CREATININE 0 57 (L) 2018    GLUF 222 (H) 2018    CALCIUM 9 5 2018    AST 40 (H) 2018    ALT 57 (H) 2018    ALKPHOS 89 2018    EGFR 125 2018       BMP  Lab Results   Component Value Date    CALCIUM 9 5 2018     2018    K 3 8 2018    CO2 25 2018     2018    BUN 10 2018    CREATININE 0 57 (L) 2018       Lipids  No results found for: CHOL  No results found for: HDL  No results found for: LDLCALC  No results found for: TRIG  No results found for: CHOLHDL    Hemoglobin A1C  Lab Results   Component Value Date    HGBA1C 9 6 (H) 2018 Fasting Glucose  Lab Results   Component Value Date    KIIZ 596 (H) 11/21/2018       Insulin     Thyroid  No results found for: TSH, S5XDSZW, F9USIKT, THYROIDAB    Hepatic Function Panel  Lab Results   Component Value Date    ALT 57 (H) 12/02/2018    AST 40 (H) 12/02/2018    GGT 39 11/21/2018    ALKPHOS 89 12/02/2018       Celiac Disease Antibody Panel  No results found for: ENDOMYSIAL IGA, GLIADIN IGA, GLIADIN IGG, IGA, TISSUE TRANSGLUT AB, TTG IGA   Iron  No results found for: IRON, TIBC, FERRITIN    Vitamins  No results found for: VITAMIN B2   No results found for: NICOTINAMIDE, NICOTINIC ACID   No results found for: VITAMINB6  No results found for: [id number]  No results found for: VITB5  No results found for: H2OSIHDX  No results found for: THYROGLB  No results found for: VITAMIN K   No results found for: 25-HYDROXY VIT D   No components found for: VITAMINE     January 10, 2019 follow up    [facility]   Chyna DECKERN  [address] [facility] CLINICAL NUTRITION SERVICES  [facility] [address]  [address] [facility] [address] [phone]

## 2018-12-27 NOTE — LETTER
12/27/2018  Margot Buchanan    1990     Dear Dr Salinas Jones,     Thank you for referring Margot Buchanan to the Outpatient Nutrition Program at [address] [place] [address]  Medical Nutrition Therapy was delivered on 12/27/2018 and included individualized nutritional diagnostic therapy and counseling for the purposes of managing the following dx/disease: No diagnosis found       Therapy Goals:     Goals Achieved:     New Goals:   1  Jose Carlos Alvarez to choose a side dish vegetable at dinner daily  2  Jose Carlos Alvarez to choose air popped popcorn instead of packaged popcorn for snack at night  3  Jose Carlos Alvarez to walk 10 minutes on the treadmill three days per week  New Problem List:     New Problem List:  1  Patient substituting high sodium foods/lower calorie foods for high calorie foods    2  Patient concerned about muscle soreness from trying new exercises I e  Sit-ups    3  Follow up planned for monitoring and evaluation: January 10, 2019    Please contact me with questions/concerns  Thank you for supporting Medical Nutrition Therapy  Katy Price RD [address] [facility] CLINICAL NUTRITION SERVICES  [address] [address] [phone]

## 2018-12-31 DIAGNOSIS — Q25.1 COARCTATION OF AORTA: Primary | ICD-10-CM

## 2018-12-31 DIAGNOSIS — Q25.42 VSD (VENTRICULAR SEPTAL DEFECT AND AORTIC ARCH HYPOPLASIA: ICD-10-CM

## 2018-12-31 DIAGNOSIS — Q21.0 VSD (VENTRICULAR SEPTAL DEFECT AND AORTIC ARCH HYPOPLASIA: ICD-10-CM

## 2018-12-31 DIAGNOSIS — R93.1 ABNORMAL ECHOCARDIOGRAM: ICD-10-CM

## 2019-01-03 ENCOUNTER — PATIENT OUTREACH (OUTPATIENT)
Dept: FAMILY MEDICINE CLINIC | Facility: CLINIC | Age: 29
End: 2019-01-03

## 2019-01-03 NOTE — PROGRESS NOTES
Outpatient Care Management Note:  Received return call from [facility]  She is doing "ok" with her diet  Her blood sugars are running 130-200  Discussed the importance of blood sugar control and the effects high blood sugars can have on the rest of the body  Verbalized understanding of same  Encouraged the positive changes that she has made in her diet as well as the added exercise she has been doing  She saw GI and was started on medication for her Hepatitis C  She had a brief period of non radiating chest pain a few days ago  Encouraged to call her PCP if it occurs again  Reviewed the rest of her medications and upcoming appointments  Verified that she has my contact information  Encouraged to call with a change in symptoms, questions or concerns  Outpatient Care Management Note:  Message left for patient to please return call  Contact information left on message

## 2019-01-07 ENCOUNTER — HOSPITAL ENCOUNTER (EMERGENCY)
Facility: HOSPITAL | Age: 29
Discharge: HOME | End: 2019-01-08
Payer: COMMERCIAL

## 2019-01-07 ENCOUNTER — HOSPITAL ENCOUNTER (OUTPATIENT)
Dept: ULTRASOUND IMAGING | Facility: HOSPITAL | Age: 29
Discharge: HOME | End: 2019-01-07
Payer: COMMERCIAL

## 2019-01-07 VITALS
OXYGEN SATURATION: 96 % | DIASTOLIC BLOOD PRESSURE: 97 MMHG | SYSTOLIC BLOOD PRESSURE: 181 MMHG | HEART RATE: 105 BPM | RESPIRATION RATE: 16 BRPM | BODY MASS INDEX: 43.98 KG/M2 | HEIGHT: 62 IN | WEIGHT: 239 LBS | TEMPERATURE: 97.9 F

## 2019-01-07 DIAGNOSIS — R16.1 SPLENOMEGALY: ICD-10-CM

## 2019-01-07 DIAGNOSIS — H60.90 OTITIS EXTERNA: Primary | ICD-10-CM

## 2019-01-07 PROCEDURE — 99282 EMERGENCY DEPT VISIT SF MDM: CPT

## 2019-01-07 PROCEDURE — 76700 US EXAM ABDOM COMPLETE: CPT

## 2019-01-07 RX ORDER — SULFAMETHOXAZOLE AND TRIMETHOPRIM 800; 160 MG/1; MG/1
1 TABLET ORAL 2 TIMES DAILY
Qty: 14 TABLET | Refills: 0 | Status: SHIPPED | OUTPATIENT
Start: 2019-01-07 | End: 2019-01-10 | Stop reason: ALTCHOICE

## 2019-01-08 NOTE — DISCHARGE INSTRUCTIONS
Otitis Externa   WHAT YOU NEED TO KNOW:   Otitis externa, or swimmer's ear, is an infection in the outer ear canal  This canal goes from the outside of the ear to the eardrum  DISCHARGE INSTRUCTIONS:   Return to the emergency department if:   · You have severe ear pain  · You are suddenly unable to hear at all  · You have new swelling in your face, behind your ears, or in your neck  · You suddenly cannot move part of your face  · Your face suddenly feels numb  Contact your healthcare provider if:   · You have a fever  · Your signs and symptoms do not get better after 2 days of treatment  · Your signs and symptoms go away for a time, but then come back  · You have questions or concerns about your condition or care  Medicines:   · NSAIDs , such as ibuprofen, help decrease swelling, pain, and fever  This medicine is available with or without a doctor's order  NSAIDs can cause stomach bleeding or kidney problems in certain people  If you take blood thinner medicine, always ask if NSAIDs are safe for you  Always read the medicine label and follow directions  Do not give these medicines to children under 10months of age without direction from your child's healthcare provider  · Acetaminophen  decreases pain and fever  It is available without a doctor's order  Ask how much to take and how often to take it  Follow directions  Acetaminophen can cause liver damage if not taken correctly  · Ear drops  that contain an antibiotic may be given  The antibiotic helps treat a bacterial infection  You may also be given steroid medicine  The steroid helps decrease redness, swelling, and pain  · Take your medicine as directed  Contact your healthcare provider if you think your medicine is not helping or if you have side effects  Tell him or her if you are allergic to any medicine  Keep a list of the medicines, vitamins, and herbs you take  Include the amounts, and when and why you take them  Bring the list or the pill bottles to follow-up visits  Carry your medicine list with you in case of an emergency  Follow up with your healthcare provider as directed:  Write down your questions so you remember to ask them during your visits  How to use eardrops:   · Lie down on your side with your infected ear facing up  · Carefully drip the correct number of eardrops into your ear  Have another person help you if possible  · Gently move the outside part of your ear back and forth to help the medicine reach your ear canal      · Stay lying down in the same position (with your ear facing up) for 3 to 5 minutes  Prevent otitis externa:   · Do not put cotton swabs or foreign objects in your ears  · Wrap a clean moist washcloth around your finger, and use it to clean your outer ear and remove extra ear wax  · Use ear plugs when you swim  Dry your outer ears completely after you swim or bathe  © 2017 [facility] [place] Information is for End User's use only and may not be sold, redistributed or otherwise used for commercial purposes  All illustrations and images included in CareNotes® are the copyrighted property of [employer]  or [employer]  The above information is an  only  It is not intended as medical advice for individual conditions or treatments  Talk to your doctor, nurse or pharmacist before following any medical regimen to see if it is safe and effective for you  Otitis Externa   WHAT YOU NEED TO KNOW:   What is otitis externa? Otitis externa, or swimmer's ear, is an infection in the outer ear canal  This canal goes from the outside of the ear to the eardrum  What causes otitis externa? Otitis externa is most commonly caused by bacteria  It can also be caused by damage to the skin lining your outer ear canal  You can scratch or damage the skin lining when you put cotton swabs or other objects in your ears     What increases my risk for otitis externa? · Swimming    · Hot, humid weather    · Hearing aid use    · A lot of ear wax    · Allergic skin disorders, such as eczema    · Medical conditions that make it easier to get infections, such as diabetes  What are the signs and symptoms of otitis externa? · You have ear pain  · Your outer ear canal is red and swollen  · You have clear fluid or pus leaking out of your ear  · Your outer ear canal is itchy and you see a rash  · You have trouble hearing because your ear is plugged  · You feel a bump in your ear canal, called a polyp  · Flakes of skin fall from your ear  How is otitis externa diagnosed? Your healthcare provider will ask about your signs and symptoms  He will look inside your ears  He may blow a puff of air inside your ears  These tests tell healthcare providers if your eardrums look healthy  You may also need a hearing test    How is otitis externa treated? · NSAIDs , such as ibuprofen, help decrease swelling, pain, and fever  This medicine is available with or without a doctor's order  NSAIDs can cause stomach bleeding or kidney problems in certain people  If you take blood thinner medicine, always ask if NSAIDs are safe for you  Always read the medicine label and follow directions  Do not give these medicines to children under 10months of age without direction from your child's healthcare provider  · Acetaminophen  decreases pain and fever  It is available without a doctor's order  Ask how much to take and how often to take it  Follow directions  Acetaminophen can cause liver damage if not taken correctly  · Ear drops  that contain an antibiotic may be given  The antibiotic helps treat a bacterial infection  You may also be given steroid medicine  The steroid helps decrease redness, swelling, and pain  · Ear wicking  removes fluid or wax from your outer ear canal  Healthcare providers may insert a small tube, called a wick, into your ear to help drain fluid  A wick also may be used to put medicine into your ear canal if the canal is blocked  How do I use eardrops? · Lie down on your side with your infected ear facing up  · Carefully drip the correct number of eardrops into your ear  Have another person help you if possible  · Gently move the outside part of your ear back and forth to help the medicine reach your ear canal      · Stay lying down in the same position (with your ear facing up) for 3 to 5 minutes  How can I prevent otitis externa? · Do not put cotton swabs or foreign objects in your ears  · Wrap a clean moist washcloth around your finger, and use it to clean your outer ear and remove extra ear wax  · Use ear plugs when you swim  Dry your outer ears completely after you swim or bathe  When should I seek immediate care? · You have severe ear pain  · You are suddenly unable to hear at all  · You have new swelling in your face, behind your ears, or in your neck  · You suddenly cannot move part of your face  · Your face suddenly feels numb  When should I contact my healthcare provider? · You have a fever  · Your signs and symptoms do not get better after 2 days of treatment  · Your signs and symptoms go away for a time, but then come back  · You have questions or concerns about your condition or care  CARE AGREEMENT:   You have the right to help plan your care  Learn about your health condition and how it may be treated  Discuss treatment options with your caregivers to decide what care you want to receive  You always have the right to refuse treatment  The above information is an  only  It is not intended as medical advice for individual conditions or treatments  Talk to your doctor, nurse or pharmacist before following any medical regimen to see if it is safe and effective for you    © 2017 [employer] Information is for End User's use only and may not be sold, redistributed or otherwise used for commercial purposes  All illustrations and images included in CareNotes® are the copyrighted property of A [employer] A M [employer] Inc  or [employer]

## 2019-01-08 NOTE — ED PROVIDER NOTES
History  Chief Complaint   Patient presents with    Earache     SAMANTA Araya Salvage is a 49-year-old female came to the emergency department due to pain and mild swelling on the right ear which started about several days prior to arrival   Patient denies ear discharge, fever or chills  History provided by:  Patient   used: No    Earache   Location:  Right  Behind ear:  Redness and swelling  Quality:  Aching  Severity:  Moderate  Onset quality:  Gradual  Duration: Several   Timing:  Constant  Progression:  Worsening  Chronicity:  New  Context: not direct blow, not elevation change, not foreign body in ear, not loud noise, not recent URI and not water in ear    Relieved by:  Nothing  Worsened by:  Nothing  Ineffective treatments:  None tried  Associated symptoms: no abdominal pain, no congestion, no cough, no diarrhea, no ear discharge, no fever, no headaches, no hearing loss, no neck pain, no rash, no rhinorrhea, no sore throat, no tinnitus and no vomiting    Risk factors: no recent travel, no chronic ear infection and no prior ear surgery        Prior to Admission Medications   Prescriptions Last Dose Informant Patient Reported? Taking?    Glecaprevir-Pibrentasvir (MAVYRET PO)   Yes Yes   Sig: Take by mouth   Insulin Pen Needle 31G X 8 MM MISC   No Yes   Sig: by Does not apply route daily   Lancets (ACCU-CHEK MULTICLIX) lancets   No Yes   Sig: Use as instructed   butalbital-acetaminophen-caffeine (FIORICET,ESGIC) -40 mg per tablet   No Yes   Sig: Take 1 tablet by mouth every 4 (four) hours as needed for headaches   etonogestrel (NEXPLANON) subdermal implant   Yes Yes   Sig: Inject 68 mg under the skin   gabapentin (NEURONTIN) 300 mg capsule   No Yes   Sig: Take 1 capsule (300 mg total) by mouth 2 (two) times a day   glucose blood (ACCU-CHEK GUIDE) test strip   No Yes   Sig: Test three times per day   ibuprofen (MOTRIN) 600 mg tablet   No Yes   Sig: Take 1 tablet (600 mg total) by mouth every 8 (eight) hours as needed for moderate pain or headaches   insulin glargine (LANTUS) 100 units/mL subcutaneous injection   No Yes   Sig: Inject 20 Units under the skin daily at bedtime   lisinopril (ZESTRIL) 20 mg tablet   No Yes   Sig: Take 1 tablet (20 mg total) by mouth daily   metFORMIN (GLUCOPHAGE) 500 mg tablet   No Yes   Sig: Take 1 tablet (500 mg total) by mouth 2 (two) times a day with meals   ondansetron (ZOFRAN) 4 mg tablet   No Yes   Sig: Take 1 tablet (4 mg total) by mouth every 4 (four) hours as needed for nausea or vomiting      Facility-Administered Medications Last Administration Doses Remaining   insulin glargine (LANTUS) subcutaneous injection 20 Units 0 2 mL None recorded           Past Medical History:   Diagnosis Date    Allergic     Arthritis     Cyst of ovary, right     Diabetes mellitus ([facility] [address] ) 10/10/18    Heart murmur 01/19/88    Hepatitis C     Hypertension     Obesity 1995    Pulmonary artery congenital abnormality     Spleen enlarged        Past Surgical History:   Procedure Laterality Date    CARDIAC CATHETERIZATION      no CAD    CHOLECYSTECTOMY      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 8    LIVER BIOPSY      LIVER BIOPSY      VSD REPAIR      As a child       Family History   Problem Relation Age of Onset    Hypertension Mother     Migraines Mother     Diabetes Father     Hypertension Father     Kidney failure Father     Polycystic kidney disease Father     Heart attack Father     Arthritis Father     Stroke Father     Polycystic kidney disease Paternal Grandmother     Stroke Paternal Grandmother     Arthritis Sister     Asthma Sister     Arthritis Maternal Grandmother     Cancer Maternal Grandmother     Learning disabilities Cousin     Learning disabilities Sister     ADD / ADHD Cousin      I have reviewed and agree with the history as documented      Social History   Substance Use Topics    Smoking status: Current Some Day Smoker Packs/day: 0 20     Types: Cigarettes    Smokeless tobacco: Never Used    Alcohol use Yes        Review of Systems   Constitutional: Negative for fever  HENT: Positive for ear pain  Negative for congestion, ear discharge, hearing loss, rhinorrhea, sore throat and tinnitus  Eyes: Negative  Respiratory: Negative for cough  Gastrointestinal: Negative for abdominal pain, diarrhea and vomiting  Endocrine: Negative  Genitourinary: Negative  Musculoskeletal: Negative for neck pain  Skin: Negative for rash  Allergic/Immunologic: Negative  Neurological: Negative for headaches  Hematological: Negative  Psychiatric/Behavioral: Negative  Physical Exam  Physical Exam   Constitutional: She is oriented to person, place, and time  She appears well-developed and well-nourished  No distress  HENT:   Head: Normocephalic and atraumatic  Left Ear: External ear normal    Nose: Nose normal    Mouth/Throat: No oropharyngeal exudate  Erythematous tender post stool on the right external auditory canal anteriorly  Eyes: Pupils are equal, round, and reactive to light  Conjunctivae and EOM are normal  Right eye exhibits no discharge  Left eye exhibits no discharge  No scleral icterus  Neck: Normal range of motion  Neck supple  No tracheal deviation present  No thyromegaly present  Cardiovascular: Normal rate, regular rhythm, normal heart sounds and intact distal pulses  Pulmonary/Chest: Effort normal and breath sounds normal  No respiratory distress  Abdominal: Soft  Bowel sounds are normal  She exhibits no distension  There is no tenderness  Musculoskeletal: Normal range of motion  She exhibits no edema, tenderness or deformity  Lymphadenopathy:     She has no cervical adenopathy  Neurological: She is alert and oriented to person, place, and time  No cranial nerve deficit or sensory deficit  She exhibits normal muscle tone  Coordination normal    Skin: Skin is warm and dry   No rash noted  She is not diaphoretic  No erythema  No pallor  Psychiatric: She has a normal mood and affect  Her behavior is normal  Judgment and thought content normal    Nursing note and vitals reviewed  Vital Signs  ED Triage Vitals [01/07/19 2143]   Temperature Pulse Respirations Blood Pressure SpO2   97 9 °F (36 6 °C) 105 16 (!) 181/97 96 %      Temp src Heart Rate Source Patient Position - Orthostatic VS BP Location FiO2 (%)   -- -- -- -- --      Pain Score       9           Vitals:    01/07/19 2143   BP: (!) 181/97   Pulse: 105       Visual Acuity      ED Medications  Medications - No data to display    Diagnostic Studies  Results Reviewed     None                 No orders to display              Procedures  Procedures       Phone Contacts  ED Phone Contact    ED Course                               MDM  Number of Diagnoses or Management Options  Otitis externa: minor  Risk of Complications, Morbidity, and/or Mortality  Presenting problems: minimal  Management options: minimal    Patient Progress  Patient progress: stable    CritCare Time    Disposition  Final diagnoses:   Otitis externa     Time reflects when diagnosis was documented in both MDM as applicable and the Disposition within this note     Time User Action Codes Description Comment    1/7/2019 11:56 PM Lucina Burnett Add [H60 90] Otitis externa       ED Disposition     ED Disposition Condition Comment    Discharge  [facility] [place] discharge to home/self care      Condition at discharge: Good        Follow-up Information     Follow up With Specialties Details Why Contact Osman Glass DO Family Medicine In 3 days  [address] [address]  [phone]            Patient's Medications   Discharge Prescriptions    SULFAMETHOXAZOLE-TRIMETHOPRIM (BACTRIM DS) 800-160 MG PER TABLET    Take 1 tablet by mouth 2 (two) times a day for 7 days smx-tmp DS (BACTRIM) 800-160 mg tabs (1tab q12 D10)       Start Date: 1/7/2019  End Date: 1/14/2019       Order Dose: 1 tablet       Quantity: 14 tablet    Refills: 0     No discharge procedures on file      ED Provider  Electronically Signed by           Alcon Castillo MD  01/08/19 0001

## 2019-01-09 PROCEDURE — 99282 EMERGENCY DEPT VISIT SF MDM: CPT

## 2019-01-10 ENCOUNTER — HOSPITAL ENCOUNTER (EMERGENCY)
Facility: HOSPITAL | Age: 29
Discharge: HOME | End: 2019-01-10
Attending: EMERGENCY MEDICINE | Admitting: EMERGENCY MEDICINE
Payer: COMMERCIAL

## 2019-01-10 ENCOUNTER — PATIENT OUTREACH (OUTPATIENT)
Dept: FAMILY MEDICINE CLINIC | Facility: CLINIC | Age: 29
End: 2019-01-10

## 2019-01-10 ENCOUNTER — OFFICE VISIT (OUTPATIENT)
Dept: FAMILY MEDICINE CLINIC | Facility: CLINIC | Age: 29
End: 2019-01-10
Payer: COMMERCIAL

## 2019-01-10 VITALS
RESPIRATION RATE: 22 BRPM | HEIGHT: 62 IN | OXYGEN SATURATION: 98 % | HEART RATE: 98 BPM | BODY MASS INDEX: 44.09 KG/M2 | DIASTOLIC BLOOD PRESSURE: 72 MMHG | TEMPERATURE: 98.5 F | WEIGHT: 239.6 LBS | SYSTOLIC BLOOD PRESSURE: 144 MMHG

## 2019-01-10 VITALS
OXYGEN SATURATION: 96 % | DIASTOLIC BLOOD PRESSURE: 88 MMHG | TEMPERATURE: 97.3 F | SYSTOLIC BLOOD PRESSURE: 158 MMHG | WEIGHT: 240.3 LBS | BODY MASS INDEX: 43.95 KG/M2 | RESPIRATION RATE: 16 BRPM | HEART RATE: 112 BPM

## 2019-01-10 DIAGNOSIS — L02.93 CARBUNCLE: Primary | ICD-10-CM

## 2019-01-10 DIAGNOSIS — L50.9 URTICARIA: Primary | ICD-10-CM

## 2019-01-10 PROCEDURE — 99213 OFFICE O/P EST LOW 20 MIN: CPT | Performed by: FAMILY MEDICINE

## 2019-01-10 RX ORDER — DIPHENHYDRAMINE HCL 25 MG
25 TABLET ORAL ONCE
Status: COMPLETED | OUTPATIENT
Start: 2019-01-10 | End: 2019-01-10

## 2019-01-10 RX ORDER — FAMOTIDINE 20 MG/1
20 TABLET, FILM COATED ORAL ONCE
Status: COMPLETED | OUTPATIENT
Start: 2019-01-10 | End: 2019-01-10

## 2019-01-10 RX ORDER — CEPHALEXIN 500 MG/1
500 CAPSULE ORAL EVERY 8 HOURS SCHEDULED
Qty: 21 CAPSULE | Refills: 0 | Status: SHIPPED | OUTPATIENT
Start: 2019-01-10 | End: 2019-01-17

## 2019-01-10 RX ORDER — DIPHENHYDRAMINE HCL 25 MG
25 TABLET ORAL ONCE
Qty: 30 TABLET | Refills: 0 | Status: SHIPPED | OUTPATIENT
Start: 2019-01-10 | End: 2019-02-28

## 2019-01-10 RX ADMIN — FAMOTIDINE 20 MG: 20 TABLET, FILM COATED ORAL at 00:55

## 2019-01-10 RX ADMIN — DIPHENHYDRAMINE HCL 25 MG: 25 TABLET ORAL at 00:55

## 2019-01-10 NOTE — ED PROVIDER NOTES
History  Chief Complaint   Patient presents with    Rash     started bactrim today     23-year-old female presents to the ED complaining of a rash after starting Bactrim today for infection on the external ear  Urticaria   Location:  Upper back and chest  Severity:  Mild  Onset quality:  Gradual  Duration:  1 day  Progression:  Unchanged  Chronicity:  New  Context:  Started on Bactrim  Relieved by:  Did not take anything  Associated symptoms: rash    Associated symptoms: no abdominal pain, no chest pain, no congestion, no cough, no diarrhea, no fatigue, no fever, no headaches, no loss of consciousness, no myalgias, no nausea, no shortness of breath, no sore throat, no vomiting and no wheezing        Prior to Admission Medications   Prescriptions Last Dose Informant Patient Reported? Taking?    Glecaprevir-Pibrentasvir (MAVYRET PO)   Yes No   Sig: Take by mouth   Insulin Pen Needle 31G X 8 MM MISC   No No   Sig: by Does not apply route daily   Lancets (ACCU-CHEK MULTICLIX) lancets   No No   Sig: Use as instructed   butalbital-acetaminophen-caffeine (FIORICET,ESGIC) -40 mg per tablet   No No   Sig: Take 1 tablet by mouth every 4 (four) hours as needed for headaches   etonogestrel (NEXPLANON) subdermal implant   Yes No   Sig: Inject 68 mg under the skin   gabapentin (NEURONTIN) 300 mg capsule   No No   Sig: Take 1 capsule (300 mg total) by mouth 2 (two) times a day   glucose blood (ACCU-CHEK GUIDE) test strip   No No   Sig: Test three times per day   ibuprofen (MOTRIN) 600 mg tablet   No No   Sig: Take 1 tablet (600 mg total) by mouth every 8 (eight) hours as needed for moderate pain or headaches   insulin glargine (LANTUS) 100 units/mL subcutaneous injection   No No   Sig: Inject 20 Units under the skin daily at bedtime   lisinopril (ZESTRIL) 20 mg tablet   No No   Sig: Take 1 tablet (20 mg total) by mouth daily   metFORMIN (GLUCOPHAGE) 500 mg tablet   No No   Sig: Take 1 tablet (500 mg total) by mouth 2 (two) times a day with meals   ondansetron (ZOFRAN) 4 mg tablet   No No   Sig: Take 1 tablet (4 mg total) by mouth every 4 (four) hours as needed for nausea or vomiting   sulfamethoxazole-trimethoprim (BACTRIM DS) 800-160 mg per tablet   No No   Sig: Take 1 tablet by mouth 2 (two) times a day for 7 days smx-tmp DS (BACTRIM) 800-160 mg tabs (1tab q12 D10)      Facility-Administered Medications Last Administration Doses Remaining   insulin glargine (LANTUS) subcutaneous injection 20 Units 0 2 mL None recorded           Past Medical History:   Diagnosis Date    Allergic     Arthritis     Cyst of ovary, right     Diabetes mellitus ([facility] [address] ) 10/10/18    Heart murmur 01/19/88    Hepatitis C     Hypertension     Obesity 1995    Pulmonary artery congenital abnormality     Spleen enlarged        Past Surgical History:   Procedure Laterality Date    CARDIAC CATHETERIZATION      no CAD    CHOLECYSTECTOMY      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 8    LIVER BIOPSY      LIVER BIOPSY      VSD REPAIR      As a child       Family History   Problem Relation Age of Onset    Hypertension Mother     Migraines Mother     Diabetes Father     Hypertension Father     Kidney failure Father     Polycystic kidney disease Father     Heart attack Father     Arthritis Father     Stroke Father     Polycystic kidney disease Paternal Grandmother     Stroke Paternal Grandmother     Arthritis Sister     Asthma Sister     Arthritis Maternal Grandmother     Cancer Maternal Grandmother     Learning disabilities Cousin     Learning disabilities Sister     ADD / ADHD Cousin      I have reviewed and agree with the history as documented  Social History   Substance Use Topics    Smoking status: Current Some Day Smoker     Packs/day: 0 20     Types: Cigarettes    Smokeless tobacco: Never Used    Alcohol use Yes        Review of Systems   Constitutional: Negative for fatigue and fever     HENT: Negative for congestion, drooling, sore throat, trouble swallowing and voice change  Respiratory: Negative for cough, shortness of breath, wheezing and stridor  Cardiovascular: Negative for chest pain  Gastrointestinal: Negative for abdominal pain, diarrhea, nausea and vomiting  Musculoskeletal: Negative for myalgias  Skin: Positive for rash  Neurological: Negative for dizziness, loss of consciousness and headaches  Physical Exam  Physical Exam   Constitutional: She is oriented to person, place, and time  She appears well-developed and well-nourished  No distress  HENT:   Head: Normocephalic and atraumatic  Right Ear: External ear normal    Left Ear: External ear normal    Mouth/Throat: Oropharynx is clear and moist    There is no swelling of the tongues, lips, uvula   Neck: Normal range of motion  Neck supple  No thyromegaly present  Cardiovascular: Normal rate, regular rhythm, normal heart sounds and intact distal pulses  Pulmonary/Chest: Effort normal and breath sounds normal  No stridor  No respiratory distress  She has no wheezes  She has no rales  Abdominal: Soft  She exhibits no distension  There is no tenderness  There is no guarding  Musculoskeletal: She exhibits no edema or deformity  Neurological: She is alert and oriented to person, place, and time  Strength is 5/5 x4 extremities  Patient is ambulating with strong and steady gait   Skin: Capillary refill takes less than 2 seconds  Rash noted  Diffuse urticarial rash on the back and upper trunk, no petechiae, no involvement of the palms or soles of the feet         Vital Signs  ED Triage Vitals [01/09/19 2224]   Temperature Pulse Respirations Blood Pressure SpO2   (!) 97 3 °F (36 3 °C) (!) 112 16 158/88 96 %      Temp src Heart Rate Source Patient Position - Orthostatic VS BP Location FiO2 (%)   -- -- -- -- --      Pain Score       No Pain           Vitals:    01/09/19 2224   BP: 158/88   Pulse: (!) 112       Visual Acuity      ED Medications  Medications   diphenhydrAMINE (BENADRYL) tablet 25 mg (25 mg Oral Given 1/10/19 0055)   famotidine (PEPCID) tablet 20 mg (20 mg Oral Given 1/10/19 0055)       Diagnostic Studies  Results Reviewed     None                 No orders to display              Procedures  Procedures       Phone Contacts  ED Phone Contact    ED Course                               MDM  Number of Diagnoses or Management Options  Urticaria:   Diagnosis management comments: Presenting symptoms consistent with allergic reaction  There is no airway involvement nor difficulty swallowing  Patient looks great  The patient is tolerating fluids  The patient looks great, the findings are minimal and due to nonprogression of symptoms here the patient is safe to discharge home  The patient feels comfortable with plan and will return immediately if symptoms begin to worsen  The rash is not consistent with erythema muliforme at this time  The patient is to continue histamine 1 and 2 blockade  The patient was instructed to discontinue Bactrim and to follow up with their regular physician within 1 days for recheck and antibiotic change, and follow up with allergy specialist for definitive allergy testing  The patient agrees with plan  Risk of Complications, Morbidity, and/or Mortality  Presenting problems: low  Diagnostic procedures: low  Management options: low    Patient Progress  Patient progress: stable    CritCare Time    Disposition  Final diagnoses:   Urticaria     Time reflects when diagnosis was documented in both MDM as applicable and the Disposition within this note     Time User Action Codes Description Comment    1/10/2019  2:14 AM Janet Stone Add [L50 9] Urticaria       ED Disposition     ED Disposition Condition Comment    Discharge  [facility] [place] discharge to home/self care      Condition at discharge: Good        Follow-up Information    None         Discharge Medication List as of 1/10/2019  2:18 AM      START taking these medications    Details   diphenhydrAMINE (BENADRYL) 25 mg tablet Take 1 tablet (25 mg total) by mouth once for 1 dose, Starting Thu 1/10/2019, Normal         CONTINUE these medications which have NOT CHANGED    Details   butalbital-acetaminophen-caffeine (FIORICET,ESGIC) -40 mg per tablet Take 1 tablet by mouth every 4 (four) hours as needed for headaches, Starting Mon 12/3/2018, Normal      etonogestrel (NEXPLANON) subdermal implant Inject 68 mg under the skin, Historical Med      gabapentin (NEURONTIN) 300 mg capsule Take 1 capsule (300 mg total) by mouth 2 (two) times a day, Starting Mon 12/17/2018, Normal      Glecaprevir-Pibrentasvir (MAVYRET PO) Take by mouth, Historical Med      glucose blood (ACCU-CHEK GUIDE) test strip Test three times per day, Normal      ibuprofen (MOTRIN) 600 mg tablet Take 1 tablet (600 mg total) by mouth every 8 (eight) hours as needed for moderate pain or headaches, Starting Mon 12/17/2018, Normal      insulin glargine (LANTUS) 100 units/mL subcutaneous injection Inject 20 Units under the skin daily at bedtime, Starting Fri 10/26/2018, Normal      Insulin Pen Needle 31G X 8 MM MISC by Does not apply route daily, Starting Mon 10/29/2018, Normal      Lancets (ACCU-CHEK MULTICLIX) lancets Use as instructed, Normal      lisinopril (ZESTRIL) 20 mg tablet Take 1 tablet (20 mg total) by mouth daily, Starting Wed 10/24/2018, Print      metFORMIN (GLUCOPHAGE) 500 mg tablet Take 1 tablet (500 mg total) by mouth 2 (two) times a day with meals, Starting Wed 10/24/2018, Print      ondansetron (ZOFRAN) 4 mg tablet Take 1 tablet (4 mg total) by mouth every 4 (four) hours as needed for nausea or vomiting, Starting Fri 11/30/2018, Normal      sulfamethoxazole-trimethoprim (BACTRIM DS) 800-160 mg per tablet Take 1 tablet by mouth 2 (two) times a day for 7 days smx-tmp DS (BACTRIM) 800-160 mg tabs (1tab q12 D10), Starting Mon 1/7/2019, Until Mon 1/14/2019, Print           No discharge procedures on file      ED Provider  Electronically Signed by           Brianna Loza,   01/10/19 3232

## 2019-01-10 NOTE — PROGRESS NOTES
Assessment/Plan:    No problem-specific Assessment & Plan notes found for this encounter  Diagnoses and all orders for this visit:    Carbuncle  -     cephalexin (KEFLEX) 500 mg capsule; Take 1 capsule (500 mg total) by mouth every 8 (eight) hours for 7 days          Subjective:      Patient ID: Gisela Gunderson is a 25 y o  female  ER follow up for boil in the right ear, pt was given bactrim pt took the medication an broke out in hives, pt went back to the ER and was given benadryl, pt is still itchy        The following portions of the patient's history were reviewed and updated as appropriate: allergies, current medications, past family history, past medical history, past social history, past surgical history and problem list     Review of Systems   Constitutional: Negative for chills and fever  Respiratory: Negative for shortness of breath and wheezing  Objective:      /72   Pulse 98   Temp 98 5 °F (36 9 °C) (Tympanic)   Resp 22   Ht 5' 2" (1 575 m)   Wt 109 kg (239 lb 9 6 oz)   LMP 12/16/2018   SpO2 98%   BMI 43 82 kg/m²          Physical Exam   Constitutional: She is oriented to person, place, and time  She appears well-developed and well-nourished  No distress  HENT:   Head: Normocephalic and atraumatic  Right Ear: External ear normal    Left Ear: External ear normal    Nose: Nose normal    Mouth/Throat: Oropharynx is clear and moist    Eyes: Pupils are equal, round, and reactive to light  Conjunctivae and EOM are normal  No scleral icterus  Neck: Normal range of motion  Neck supple  Cardiovascular: Normal rate, regular rhythm and normal heart sounds  No murmur heard  Pulmonary/Chest: Effort normal and breath sounds normal  No respiratory distress  She has no wheezes  She has no rales  Musculoskeletal: She exhibits no edema  Lymphadenopathy:     She has no cervical adenopathy  Neurological: She is alert and oriented to person, place, and time   She exhibits normal muscle tone  Skin: Skin is warm and dry  She is not diaphoretic  Psychiatric: She has a normal mood and affect  Her behavior is normal  Judgment and thought content normal    Nursing note and vitals reviewed

## 2019-01-10 NOTE — DISCHARGE INSTRUCTIONS
Urticaria   WHAT YOU NEED TO KNOW:   Urticaria is also called hives  Hives can change size and shape, and appear anywhere on your skin  They can be mild or severe and last from a few minutes to a few days  Hives may be a sign of a severe allergic reaction called anaphylaxis that needs immediate treatment  Urticaria that lasts longer than 6 weeks may be a chronic condition that needs long-term treatment  DISCHARGE INSTRUCTIONS:   Call 911 for signs or symptoms of anaphylaxis,  such as trouble breathing, swelling in your mouth or throat, or wheezing  You may also have itching, a rash, or feel like you are going to faint  Return to the emergency department if:   · Your heart is beating faster than it normally does  · You have cramping or severe pain in your abdomen  Contact your healthcare provider if:   · You have a fever  · Your skin still itches 24 hours after you take your medicine  · You still have hives after 7 days  · Your joints are painful and swollen  · You have questions or concerns about your condition or care  Medicines:   · Epinephrine  is used to treat severe allergic reactions such as anaphylaxis  · Antihistamines  decrease mild symptoms such as itching or a rash  · Steroids  decrease redness, pain, and swelling  · Take your medicine as directed  Contact your healthcare provider if you think your medicine is not helping or if you have side effects  Tell him of her if you are allergic to any medicine  Keep a list of the medicines, vitamins, and herbs you take  Include the amounts, and when and why you take them  Bring the list or the pill bottles to follow-up visits  Carry your medicine list with you in case of an emergency  Steps to take for signs or symptoms of anaphylaxis:   · Immediately  give 1 shot of epinephrine only into the outer thigh muscle  · Leave the shot in place  as directed   Your healthcare provider may recommend you leave it in place for up to 10 seconds before you remove it  This helps make sure all of the epinephrine is delivered  · Call 911 and go to the emergency department,  even if the shot improved symptoms  Do not drive yourself  Bring the used epinephrine shot with you  Safety precautions to take if you are at risk for anaphylaxis:   · Keep 2 shots of epinephrine with you at all times  You may need a second shot, because epinephrine only works for about 20 minutes and symptoms may return  Your healthcare provider can show you and family members how to give the shot  Check the expiration date every month and replace it before it expires  · Create an action plan  Your healthcare provider can help you create a written plan that explains the allergy and an emergency plan to treat a reaction  The plan explains when to give a second epinephrine shot if symptoms return or do not improve after the first  Give copies of the action plan and emergency instructions to family members, work and school staff, and  providers  Show them how to give a shot of epinephrine  · Be careful when you exercise  If you have had exercise-induced anaphylaxis, do not exercise right after you eat  Stop exercising right away if you start to develop any signs or symptoms of anaphylaxis  You may first feel tired, warm, or have itchy skin  Hives, swelling, and severe breathing problems may develop if you continue to exercise  · Carry medical alert identification  Wear medical alert jewelry or carry a card that explains the allergy  Ask your healthcare provider where to get these items  · Keep a record of triggers and symptoms  Record everything you eat, drink, or apply to your skin for 3 weeks  Include stressful events and what you were doing right before your hives started  Bring the record with you to follow-up visits with your healthcare provider  Manage urticaria:   · Cool your skin  This may help decrease itching  Apply a cool pack to your hives  Dip a hand towel in cool water, wring it out, and place it on your hives  You may also soak your skin in a cool oatmeal bath  · Do not rub your hives  This can irritate your skin and cause more hives  · Wear loose clothing  Tight clothes may irritate your skin and cause more hives  · Manage stress  Stress may trigger hives, or make them worse  Learn new ways to relax, such as deep breathing  Follow up with your healthcare provider as directed:  Write down your questions so you remember to ask them during your visits  © 2017 [employer] Information is for End User's use only and may not be sold, redistributed or otherwise used for commercial purposes  All illustrations and images included in CareNotes® are the copyrighted property of [employer]  or [employer]  The above information is an  only  It is not intended as medical advice for individual conditions or treatments  Talk to your doctor, nurse or pharmacist before following any medical regimen to see if it is safe and effective for you

## 2019-01-14 ENCOUNTER — OFFICE VISIT (OUTPATIENT)
Dept: FAMILY MEDICINE CLINIC | Facility: CLINIC | Age: 29
End: 2019-01-14
Payer: COMMERCIAL

## 2019-01-14 VITALS
WEIGHT: 242 LBS | HEART RATE: 81 BPM | HEIGHT: 62 IN | DIASTOLIC BLOOD PRESSURE: 84 MMHG | TEMPERATURE: 97.6 F | RESPIRATION RATE: 16 BRPM | OXYGEN SATURATION: 98 % | SYSTOLIC BLOOD PRESSURE: 140 MMHG | BODY MASS INDEX: 44.53 KG/M2

## 2019-01-14 DIAGNOSIS — J32.9 CHRONIC CONGESTION OF PARANASAL SINUS: ICD-10-CM

## 2019-01-14 DIAGNOSIS — E11.9 TYPE 2 DIABETES MELLITUS WITHOUT COMPLICATION, WITHOUT LONG-TERM CURRENT USE OF INSULIN (HCC): Primary | ICD-10-CM

## 2019-01-14 PROCEDURE — 99214 OFFICE O/P EST MOD 30 MIN: CPT | Performed by: NURSE PRACTITIONER

## 2019-01-14 NOTE — PATIENT INSTRUCTIONS
Keep appt with Nutritionist- keep food diary until you see her- take with you so she can help you come up with better choices  Continue Metformin BID as ordered  Referral to Allergist given  Call or return for problems/concerns  Encouraged pt   To continue weight loss and improving lifestyle (eating/exercise)

## 2019-01-14 NOTE — PROGRESS NOTES
[address] [place] [address] Primary Care        NAME: Luc Alvarado is a 25 y o  female  : 1990    MRN: [id number]  DATE: 2019  TIME: 10:17 AM    Assessment and Plan   Type 2 diabetes mellitus without complication, without long-term current use of insulin (HCC) [E11 9]  1  Type 2 diabetes mellitus without complication, without long-term current use of insulin (HCC)  metFORMIN (GLUCOPHAGE) 500 mg tablet   2  Chronic congestion of paranasal sinus  Ambulatory referral to Allergy         Patient Instructions     Patient Instructions   Keep appt with Nutritionist- keep food diary until you see her- take with you so she can help you come up with better choices  Continue Metformin BID as ordered  Referral to Allergist given  Call or return for problems/concerns  Encouraged pt  To continue weight loss and improving lifestyle (eating/exercise)          Chief Complaint     Chief Complaint   Patient presents with    Follow-up     headaches         History of Present Illness       Headaches have improved  Continues to eat primarily carbohydrates (had cheerios with sprinkled Splenda on for breakfast or eats toast)  Has met with the nutritionist- she has her next appt on   Has not been keeping a food diary to take with her  Needs her Metformin refilled  Pt  Would also like a referral to an allergist- would like to know what her allergies are because she has chronic congestion year round for years        Review of Systems   Review of Systems   Constitutional: Negative for activity change, diaphoresis, fatigue and fever  HENT: Positive for congestion and rhinorrhea  Negative for facial swelling, hearing loss, sinus pain, sinus pressure, sneezing, sore throat and voice change  Eyes: Negative for discharge and visual disturbance  Respiratory: Negative for cough, choking, chest tightness, shortness of breath, wheezing and stridor  Cardiovascular: Negative for chest pain, palpitations and leg swelling  Gastrointestinal: Negative for abdominal distention, abdominal pain, constipation, diarrhea, nausea and vomiting  Endocrine: Negative for polydipsia, polyphagia and polyuria  Genitourinary: Negative for difficulty urinating, dysuria, frequency and urgency  Musculoskeletal: Negative for arthralgias, back pain, gait problem, joint swelling, myalgias, neck pain and neck stiffness  Skin: Negative for color change, rash and wound  Neurological: Positive for headaches (improving)  Negative for dizziness, syncope, speech difficulty, weakness and light-headedness  Hematological: Negative for adenopathy  Does not bruise/bleed easily  Psychiatric/Behavioral: Negative for agitation, behavioral problems, confusion, hallucinations, sleep disturbance and suicidal ideas  The patient is not nervous/anxious            Current Medications       Current Outpatient Prescriptions:     butalbital-acetaminophen-caffeine (FIORICET,ESGIC) -40 mg per tablet, Take 1 tablet by mouth every 4 (four) hours as needed for headaches, Disp: 30 tablet, Rfl: 0    cephalexin (KEFLEX) 500 mg capsule, Take 1 capsule (500 mg total) by mouth every 8 (eight) hours for 7 days, Disp: 21 capsule, Rfl: 0    etonogestrel (NEXPLANON) subdermal implant, Inject 68 mg under the skin, Disp: , Rfl:     gabapentin (NEURONTIN) 300 mg capsule, Take 1 capsule (300 mg total) by mouth 2 (two) times a day, Disp: 90 capsule, Rfl: 1    Glecaprevir-Pibrentasvir (MAVYRET PO), Take by mouth, Disp: , Rfl:     glucose blood (ACCU-CHEK GUIDE) test strip, Test three times per day, Disp: 100 each, Rfl: 0    ibuprofen (MOTRIN) 600 mg tablet, Take 1 tablet (600 mg total) by mouth every 8 (eight) hours as needed for moderate pain or headaches, Disp: 90 tablet, Rfl: 1    insulin glargine (LANTUS) 100 units/mL subcutaneous injection, Inject 20 Units under the skin daily at bedtime, Disp: 10 mL, Rfl: 3    Insulin Pen Needle 31G X 8 MM MISC, by Does not apply route daily, Disp: 100 each, Rfl: 0    Lancets (ACCU-CHEK MULTICLIX) lancets, Use as instructed, Disp: 100 each, Rfl: 3    lisinopril (ZESTRIL) 20 mg tablet, Take 1 tablet (20 mg total) by mouth daily, Disp: 30 tablet, Rfl: 3    ondansetron (ZOFRAN) 4 mg tablet, Take 1 tablet (4 mg total) by mouth every 4 (four) hours as needed for nausea or vomiting, Disp: 20 tablet, Rfl: 0    diphenhydrAMINE (BENADRYL) 25 mg tablet, Take 1 tablet (25 mg total) by mouth once for 1 dose, Disp: 30 tablet, Rfl: 0    metFORMIN (GLUCOPHAGE) 500 mg tablet, Take 1 tablet (500 mg total) by mouth 2 (two) times a day with meals, Disp: 60 tablet, Rfl: 2    Current Facility-Administered Medications:     insulin glargine (LANTUS) subcutaneous injection 20 Units 0 2 mL, 20 Units, Subcutaneous, HS, Nandini Hicks MD    Current Allergies     Allergies as of 01/14/2019 - Reviewed 01/14/2019   Allergen Reaction Noted    Corticosteroids Anaphylaxis 01/13/2009    Prednisone Swelling 06/12/2018    Sulfa antibiotics Hives 01/14/2019    Milk-related compounds Sneezing 12/11/2018            The following portions of the patient's history were reviewed and updated as appropriate: allergies, current medications, past family history, past medical history, past social history, past surgical history and problem list      Past Medical History:   Diagnosis Date    Allergic     Arthritis     Cyst of ovary, right     Diabetes mellitus ([facility] [address] ) 10/10/18    Heart murmur 01/19/88    Hepatitis C     Hypertension     Obesity 1995    Pulmonary artery congenital abnormality     Spleen enlarged        Past Surgical History:   Procedure Laterality Date    CARDIAC CATHETERIZATION      no CAD    CHOLECYSTECTOMY      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 8    LIVER BIOPSY      LIVER BIOPSY      VSD REPAIR      As a child       Family History   Problem Relation Age of Onset    Hypertension Mother     Migraines Mother     Diabetes Father    Lisa Faustin Hypertension Father     Kidney failure Father     Polycystic kidney disease Father     Heart attack Father     Arthritis Father     Stroke Father     Polycystic kidney disease Paternal Grandmother     Stroke Paternal Grandmother     Arthritis Sister     Asthma Sister     Arthritis Maternal Grandmother     Cancer Maternal Grandmother     Learning disabilities Cousin     Learning disabilities Sister     ADD / ADHD Cousin          Medications have been verified  Objective   /84 (BP Location: Right arm, Patient Position: Sitting, Cuff Size: Large)   Pulse 81   Temp 97 6 °F (36 4 °C) (Tympanic)   Resp 16   Ht 5' 2" (1 575 m)   Wt 110 kg (242 lb)   LMP 12/16/2018   SpO2 98%   BMI 44 26 kg/m²        Physical Exam     Physical Exam   Constitutional: She is oriented to person, place, and time  Vital signs are normal  She appears well-developed and well-nourished  She is active and cooperative  No distress  Eyes: EOM are normal    Cardiovascular: Normal rate, regular rhythm and normal heart sounds  No murmur heard  Pulmonary/Chest: Effort normal and breath sounds normal  No respiratory distress  She has no wheezes  Neurological: She is alert and oriented to person, place, and time  Skin: Skin is warm and dry  No rash noted  She is not diaphoretic  No erythema  Psychiatric: She has a normal mood and affect  Her behavior is normal  Judgment and thought content normal    Nursing note and vitals reviewed

## 2019-01-15 ENCOUNTER — OFFICE VISIT (OUTPATIENT)
Dept: HEMATOLOGY ONCOLOGY | Facility: CLINIC | Age: 29
End: 2019-01-15
Payer: COMMERCIAL

## 2019-01-15 VITALS
OXYGEN SATURATION: 97 % | DIASTOLIC BLOOD PRESSURE: 80 MMHG | TEMPERATURE: 98.5 F | WEIGHT: 241.2 LBS | BODY MASS INDEX: 44.39 KG/M2 | HEART RATE: 98 BPM | HEIGHT: 62 IN | SYSTOLIC BLOOD PRESSURE: 124 MMHG | RESPIRATION RATE: 18 BRPM

## 2019-01-15 DIAGNOSIS — R16.1 SPLENOMEGALY: Primary | ICD-10-CM

## 2019-01-15 PROCEDURE — 99244 OFF/OP CNSLTJ NEW/EST MOD 40: CPT | Performed by: INTERNAL MEDICINE

## 2019-01-15 NOTE — PROGRESS NOTES
Hematology/Oncology Outpatient Follow-up  Viktor Mayo 25 y o  female 1990 [id number]    Date:  1/15/2019    Assessment and Plan:  1  Splenomegaly  The patient was told that her splenomegaly has improved in size in comparison to the previous measurement according to the most recent ultrasound  The etiology of her mild splenomegaly is most likely related to her liver disease/hepatitis-C with inflammatory process  The patient was told that we will repeat the ultrasound in of 4 months from now for close monitoring  I did advise her to consider healthy dieting and the continuation of the hepatitis C treatment  She also was told that if she continues to have left abdominal pain was lifting heavy objects, spinal evaluation should be considered for the pain etiology  - CBC and differential; Future  - Comprehensive metabolic panel; Future  - LD,Blood; Future  - C-reactive protein; Future  - Sedimentation rate, automated; Future  - US abdomen limited; Future      HPI:  This is a 78-year-old female with history of diabetes mellitus, hypertension, obesity, pulmonary artery congenital abnormality, history of hepatitis C which was transmitted after multiple heart surgeries during her childhood for ventricular septal defect  She is status post 2 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  She was treated for her hepatitis C on 2 different occasions in the past without success  She is now on a new anti hepatitis treatment orally  Geraldene Staple was started about 4 weeks ago for hepatitis C treatment by the GI team will be continued for a total of 8 weeks  The patient apparently had significant abdominal pain in October mainly located in the left upper quadrant area which was investigated through multiple CT scans of the abdomen and pelvis  She was then admitted to the hospital on the 12 of October 2018 where she had an ultrasound of the abdomen a day later    The ultrasound showed a spleen measuring 15 9 cm in greatest dimension with the mild hepatomegaly possibly due to steatosis  The most recent CT scan of the abdomen and pelvis was with contrast on the 18th of October and did not reveal any hint of adenopathy or abnormalities in the liver or spleen  She did have a complex cyst on the right ovary which is being monitored by her GYN  She then had a repeated ultrasound of the abdomen on the 7th of January 2019 which showed a decrease of the size of the spleen down from 15 9 to 14 2 cm in greatest dimension which was thought to be borderline prominent splenic size  She also was found to have mildly prominent liver size as well without lesions  Interval history:  The patient came in today for further evaluation of her mild hepatosplenomegaly  She continues to have some pain in the left upper quadrant area when she lifts heavy objects  ROS: Review of Systems   Constitutional: Positive for fatigue  Negative for activity change, appetite change, chills, diaphoresis, fever and unexpected weight change  HENT: Negative for congestion, dental problem, ear discharge, ear pain, facial swelling, hearing loss, mouth sores, nosebleeds, postnasal drip, sore throat, tinnitus and trouble swallowing  Eyes: Negative for discharge, redness, itching and visual disturbance  Respiratory: Positive for cough and shortness of breath  Negative for chest tightness and wheezing  Cardiovascular: Negative for chest pain, palpitations and leg swelling  Gastrointestinal: Positive for abdominal pain (Occasional left upper quadrant abdominal pain when she carries heavy objects) and diarrhea  Negative for abdominal distention, anal bleeding, blood in stool, constipation, nausea and vomiting  Genitourinary: Negative for difficulty urinating, dysuria, flank pain, frequency, hematuria and urgency  Musculoskeletal: Negative for arthralgias, back pain, gait problem, joint swelling, myalgias and neck pain  Skin: Negative for color change, pallor, rash and wound  Neurological: Positive for numbness and headaches  Negative for dizziness, syncope, speech difficulty, weakness and light-headedness  Hematological: Negative for adenopathy  Does not bruise/bleed easily  Psychiatric/Behavioral: Positive for sleep disturbance  Negative for agitation, behavioral problems and confusion         Past Medical History:   Diagnosis Date    Allergic     Arthritis     Cyst of ovary, right     Diabetes mellitus ([facility] [address] ) 10/10/18    Heart murmur 01/19/88    Hepatitis C     Hypertension     Obesity 1995    Pulmonary artery congenital abnormality     Spleen enlarged        Past Surgical History:   Procedure Laterality Date    CARDIAC CATHETERIZATION      no CAD    CHOLECYSTECTOMY      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 7   [facility] LIVER BIOPSY      LIVER BIOPSY      VSD REPAIR      As a child       Social History     Social History    Marital status: Single     Spouse name: N/A    Number of children: N/A    Years of education: N/A     Social History Main Topics    Smoking status: Current Some Day Smoker     Packs/day: 0 20     Types: Cigarettes    Smokeless tobacco: Never Used    Alcohol use Yes    Drug use: No    Sexual activity: Yes     Partners: Male     Birth control/ protection: Condom Male, Implant     Other Topics Concern    None     Social History Narrative    None       Family History   Problem Relation Age of Onset    Hypertension Mother     Migraines Mother     Diabetes Father     Hypertension Father     Kidney failure Father     Polycystic kidney disease Father     Heart attack Father     Arthritis Father     Stroke Father     Polycystic kidney disease Paternal [de-identified]     Stroke Paternal Grandmother     Arthritis Sister     Asthma Sister     Arthritis Maternal Grandmother     Cancer Maternal Grandmother     Learning disabilities Cousin     Learning disabilities Sister     ADD / ADHD Cousin        Allergies   Allergen Reactions    Corticosteroids Anaphylaxis    Prednisone Swelling    Sulfa Antibiotics Hives    Milk-Related Compounds Sneezing         Current Outpatient Prescriptions:     butalbital-acetaminophen-caffeine (FIORICET,ESGIC) -40 mg per tablet, Take 1 tablet by mouth every 4 (four) hours as needed for headaches, Disp: 30 tablet, Rfl: 0    cephalexin (KEFLEX) 500 mg capsule, Take 1 capsule (500 mg total) by mouth every 8 (eight) hours for 7 days, Disp: 21 capsule, Rfl: 0    etonogestrel (NEXPLANON) subdermal implant, Inject 68 mg under the skin, Disp: , Rfl:     gabapentin (NEURONTIN) 300 mg capsule, Take 1 capsule (300 mg total) by mouth 2 (two) times a day, Disp: 90 capsule, Rfl: 1    Glecaprevir-Pibrentasvir (MAVYRET PO), Take by mouth, Disp: , Rfl:     glucose blood (ACCU-CHEK GUIDE) test strip, Test three times per day, Disp: 100 each, Rfl: 0    ibuprofen (MOTRIN) 600 mg tablet, Take 1 tablet (600 mg total) by mouth every 8 (eight) hours as needed for moderate pain or headaches, Disp: 90 tablet, Rfl: 1    insulin glargine (LANTUS) 100 units/mL subcutaneous injection, Inject 20 Units under the skin daily at bedtime, Disp: 10 mL, Rfl: 3    Insulin Pen Needle 31G X 8 MM MISC, by Does not apply route daily, Disp: 100 each, Rfl: 0    Lancets (ACCU-CHEK MULTICLIX) lancets, Use as instructed, Disp: 100 each, Rfl: 3    lisinopril (ZESTRIL) 20 mg tablet, Take 1 tablet (20 mg total) by mouth daily, Disp: 30 tablet, Rfl: 3    metFORMIN (GLUCOPHAGE) 500 mg tablet, Take 1 tablet (500 mg total) by mouth 2 (two) times a day with meals, Disp: 60 tablet, Rfl: 2    ondansetron (ZOFRAN) 4 mg tablet, Take 1 tablet (4 mg total) by mouth every 4 (four) hours as needed for nausea or vomiting, Disp: 20 tablet, Rfl: 0    diphenhydrAMINE (BENADRYL) 25 mg tablet, Take 1 tablet (25 mg total) by mouth once for 1 dose, Disp: 30 tablet, Rfl: 0    Current Facility-Administered Medications:     insulin glargine (LANTUS) subcutaneous injection 20 Units 0 2 mL, 20 Units, Subcutaneous, HS, Woody Evans MD      Physical Exam:  /80 (BP Location: Left arm, Cuff Size: Large)   Pulse 98   Temp 98 5 °F (36 9 °C) (Tympanic)   Resp 18   Ht 5' 2" (1 575 m)   Wt 109 kg (241 lb 3 2 oz)   LMP 12/16/2018   SpO2 97%   BMI 44 12 kg/m²     Physical Exam   Constitutional: She is oriented to person, place, and time  She appears well-developed and well-nourished  No distress  HENT:   Head: Normocephalic and atraumatic  Nose: Nose normal    Mouth/Throat: Oropharynx is clear and moist    Eyes: Pupils are equal, round, and reactive to light  Conjunctivae and EOM are normal  Right eye exhibits no discharge  Left eye exhibits no discharge  No scleral icterus  Neck: Normal range of motion  Neck supple  No JVD present  No tracheal deviation present  No thyromegaly present  Cardiovascular: Normal rate, regular rhythm and normal heart sounds  Exam reveals no friction rub  No murmur heard  Pulmonary/Chest: Effort normal and breath sounds normal  No stridor  No respiratory distress  She has no wheezes  She has no rales  She exhibits no tenderness  Abdominal: Soft  Bowel sounds are normal  She exhibits no distension and no mass  There is no hepatosplenomegaly, splenomegaly or hepatomegaly  There is tenderness (Mild tenderness on deep palpation in the left upper quadrant abdominal area  Obese abdomen)  There is no rebound and no guarding  Musculoskeletal: Normal range of motion  She exhibits no edema, tenderness or deformity  Lymphadenopathy:     She has no cervical adenopathy  Neurological: She is alert and oriented to person, place, and time  She has normal reflexes  No cranial nerve deficit  Coordination normal    Skin: Skin is warm and dry  No rash noted  She is not diaphoretic  No erythema  No pallor  Psychiatric: She has a normal mood and affect   Her behavior is normal  Judgment and thought content normal          Labs:  Lab Results   Component Value Date    WBC 11 50 (H) 12/02/2018    HGB 14 2 12/02/2018    HCT 42 5 12/02/2018    MCV 80 (L) 12/02/2018     12/02/2018     Lab Results   Component Value Date     06/14/2018    K 3 8 12/02/2018     12/02/2018    CO2 25 12/02/2018    ANIONGAP 13 0 06/14/2018    BUN 10 12/02/2018    CREATININE 0 57 (L) 12/02/2018    GLUF 222 (H) 11/21/2018    CALCIUM 9 5 12/02/2018    AST 40 (H) 12/02/2018    ALT 57 (H) 12/02/2018    ALKPHOS 89 12/02/2018    EGFR 125 12/02/2018       Patient voiced understanding and agreement in the above discussion  Aware to contact our office with questions/symptoms in the interim

## 2019-01-17 ENCOUNTER — CLINICAL SUPPORT (OUTPATIENT)
Dept: FAMILY MEDICINE CLINIC | Facility: CLINIC | Age: 29
End: 2019-01-17
Payer: COMMERCIAL

## 2019-01-17 DIAGNOSIS — Z23 ENCOUNTER FOR IMMUNIZATION: Primary | ICD-10-CM

## 2019-01-17 PROCEDURE — 90471 IMMUNIZATION ADMIN: CPT

## 2019-01-17 PROCEDURE — 90746 HEPB VACCINE 3 DOSE ADULT IM: CPT

## 2019-01-21 ENCOUNTER — OFFICE VISIT (OUTPATIENT)
Dept: FAMILY MEDICINE CLINIC | Facility: CLINIC | Age: 29
End: 2019-01-21
Payer: COMMERCIAL

## 2019-01-21 VITALS
WEIGHT: 240.6 LBS | DIASTOLIC BLOOD PRESSURE: 88 MMHG | TEMPERATURE: 97 F | OXYGEN SATURATION: 98 % | SYSTOLIC BLOOD PRESSURE: 136 MMHG | HEART RATE: 93 BPM | HEIGHT: 62 IN | RESPIRATION RATE: 18 BRPM | BODY MASS INDEX: 44.27 KG/M2

## 2019-01-21 DIAGNOSIS — A08.4 VIRAL GASTROENTERITIS: Primary | ICD-10-CM

## 2019-01-21 PROCEDURE — 99213 OFFICE O/P EST LOW 20 MIN: CPT | Performed by: NURSE PRACTITIONER

## 2019-01-21 NOTE — LETTER
January 21, 2019     Patient: Raya Burnett   YOB: 1990   Date of Visit: 1/21/2019       To Whom it May Concern:    Raya Burnett is under my professional care  She was seen in my office on 1/21/2019  She may return to work on 1/22/19, please excuse 1/21/19  If you have any questions or concerns, please don't hesitate to call           Sincerely,          REJI Jones        CC: No Recipients

## 2019-01-21 NOTE — PROGRESS NOTES
[address] [facility]        NAME: Nestor Palmer is a 30 y o  female  : 1990    MRN: [id number]  DATE: 2019  TIME: 11:01 AM    Assessment and Plan   Viral gastroenteritis [A08 4]  1  Viral gastroenteritis           Patient Instructions     Patient Instructions   Work note given  Consider Hepatitis C medication as cause of symptoms- consult Specialist if continues (at Dr Derrick Harden office- GI)  Take metformin with 2 biggest meals of the day  Increase fluids and rest  Call or return if problems/concerns            Chief Complaint     Chief Complaint   Patient presents with    Nausea     Patient states she did take Zofran   Vomiting    Fatigue    Diarrhea     Took imodium  Patient states she is pushing fluids  History of Present Illness       Pt  Here with nausea, vomiting, and diarrhea since 5:30am yesterday  She believes she has a GI bug  Needs a note for work today  Pt  Is unsure if related to her Hepatitis C medication (Oriana Merl)- has been taking 4 weeks, has 4 more weeks to complete treatment  Pt  Takes her Metformin in the morning and before bed        Review of Systems   Review of Systems   Constitutional: Positive for activity change and fatigue  Negative for chills and fever  HENT: Negative for congestion, ear pain, postnasal drip, rhinorrhea, sinus pressure and sore throat  Eyes: Negative for pain, discharge and redness  Respiratory: Negative for cough and wheezing  Cardiovascular: Negative for chest pain  Gastrointestinal: Positive for abdominal pain (RUQ), diarrhea, nausea and vomiting  Negative for constipation  Musculoskeletal: Negative for myalgias  Skin: Negative for rash  Neurological: Negative for dizziness and headaches           Current Medications       Current Outpatient Prescriptions:     butalbital-acetaminophen-caffeine (FIORICET,ESGIC) -40 mg per tablet, Take 1 tablet by mouth every 4 (four) hours as needed for headaches, Disp: 30 tablet, Rfl: 0    etonogestrel (NEXPLANON) subdermal implant, Inject 68 mg under the skin, Disp: , Rfl:     gabapentin (NEURONTIN) 300 mg capsule, Take 1 capsule (300 mg total) by mouth 2 (two) times a day, Disp: 90 capsule, Rfl: 1    Glecaprevir-Pibrentasvir (MAVYRET PO), Take by mouth, Disp: , Rfl:     glucose blood (ACCU-CHEK GUIDE) test strip, Test three times per day, Disp: 100 each, Rfl: 0    ibuprofen (MOTRIN) 600 mg tablet, Take 1 tablet (600 mg total) by mouth every 8 (eight) hours as needed for moderate pain or headaches, Disp: 90 tablet, Rfl: 1    insulin glargine (LANTUS) 100 units/mL subcutaneous injection, Inject 20 Units under the skin daily at bedtime, Disp: 10 mL, Rfl: 3    Insulin Pen Needle 31G X 8 MM MISC, by Does not apply route daily, Disp: 100 each, Rfl: 0    Lancets (ACCU-CHEK MULTICLIX) lancets, Use as instructed, Disp: 100 each, Rfl: 3    lisinopril (ZESTRIL) 20 mg tablet, Take 1 tablet (20 mg total) by mouth daily, Disp: 30 tablet, Rfl: 3    metFORMIN (GLUCOPHAGE) 500 mg tablet, Take 1 tablet (500 mg total) by mouth 2 (two) times a day with meals, Disp: 60 tablet, Rfl: 2    ondansetron (ZOFRAN) 4 mg tablet, Take 1 tablet (4 mg total) by mouth every 4 (four) hours as needed for nausea or vomiting, Disp: 20 tablet, Rfl: 0    diphenhydrAMINE (BENADRYL) 25 mg tablet, Take 1 tablet (25 mg total) by mouth once for 1 dose, Disp: 30 tablet, Rfl: 0    Current Facility-Administered Medications:     insulin glargine (LANTUS) subcutaneous injection 20 Units 0 2 mL, 20 Units, Subcutaneous, HS, Josie Bocanegra MD    Current Allergies     Allergies as of 01/21/2019 - Reviewed 01/21/2019   Allergen Reaction Noted    Corticosteroids Anaphylaxis 01/13/2009    Prednisone Swelling 06/12/2018    Sulfa antibiotics Hives 01/14/2019    Milk-related compounds Sneezing 12/11/2018            The following portions of the patient's history were reviewed and updated as appropriate: allergies, current medications, past family history, past medical history, past social history, past surgical history and problem list      Past Medical History:   Diagnosis Date    Allergic     Arthritis     Cyst of ovary, right     Diabetes mellitus ([facility] [address] ) 10/10/18    Heart murmur 01/19/88    Hepatitis C     Hypertension     Obesity 1995    Pulmonary artery congenital abnormality     Spleen enlarged        Past Surgical History:   Procedure Laterality Date    CARDIAC CATHETERIZATION      no CAD    CHOLECYSTECTOMY      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 8    LIVER BIOPSY      LIVER BIOPSY      VSD REPAIR      As a child       Family History   Problem Relation Age of Onset    Hypertension Mother     Migraines Mother     Diabetes Father     Hypertension Father     Kidney failure Father     Polycystic kidney disease Father     Heart attack Father     Arthritis Father     Stroke Father     Polycystic kidney disease Paternal Grandmother     Stroke Paternal Grandmother     Arthritis Sister     Asthma Sister     Arthritis Maternal Grandmother     Cancer Maternal Grandmother     Learning disabilities Cousin     Learning disabilities Sister     ADD / ADHD Cousin          Medications have been verified  Objective   /88   Pulse 93   Temp (!) 97 °F (36 1 °C) (Tympanic)   Resp 18   Ht 5' 2" (1 575 m)   Wt 109 kg (240 lb 9 6 oz)   SpO2 98%   BMI 44 01 kg/m²        Physical Exam     Physical Exam   Constitutional: She is oriented to person, place, and time  She appears well-developed and well-nourished  She has a sickly appearance  She appears ill  No distress  HENT:   Head: Normocephalic and atraumatic  Neck: Normal range of motion  Cardiovascular: Normal rate, regular rhythm and normal heart sounds  Exam reveals no gallop and no friction rub  No murmur heard  Pulmonary/Chest: Effort normal and breath sounds normal  No respiratory distress  She has no wheezes   She has no rales    Abdominal: Soft  Bowel sounds are normal  She exhibits no distension and no mass  There is tenderness in the right upper quadrant  There is no rebound and no guarding  Musculoskeletal: Normal range of motion  She exhibits no edema, tenderness or deformity  Neurological: She is alert and oriented to person, place, and time  No cranial nerve deficit  Coordination normal    Skin: Skin is warm and dry  No rash noted  She is not diaphoretic  No erythema  Psychiatric: She has a normal mood and affect  Her behavior is normal  Judgment and thought content normal    Nursing note and vitals reviewed

## 2019-01-21 NOTE — PATIENT INSTRUCTIONS
Work note given  Consider Hepatitis C medication as cause of symptoms- consult Specialist if continues (at Dr Dieudonne Bernal office- GI)  Take metformin with 2 biggest meals of the day  Increase fluids and rest  Call or return if problems/concerns

## 2019-01-24 ENCOUNTER — PATIENT OUTREACH (OUTPATIENT)
Dept: FAMILY MEDICINE CLINIC | Facility: CLINIC | Age: 29
End: 2019-01-24

## 2019-01-24 ENCOUNTER — CLINICAL SUPPORT (OUTPATIENT)
Dept: NUTRITION | Facility: HOSPITAL | Age: 29
End: 2019-01-24
Payer: COMMERCIAL

## 2019-01-24 VITALS — BODY MASS INDEX: 43.57 KG/M2 | WEIGHT: 238.2 LBS

## 2019-01-24 DIAGNOSIS — E13.9 DIABETES MELLITUS OF OTHER TYPE WITHOUT COMPLICATION, UNSPECIFIED WHETHER LONG TERM INSULIN USE (HCC): ICD-10-CM

## 2019-01-24 PROCEDURE — 97803 MED NUTRITION INDIV SUBSEQ: CPT | Performed by: DIETITIAN, REGISTERED

## 2019-01-24 NOTE — PROGRESS NOTES
Follow-Up Nutrition Assessment Form    Patient Name: Bruno Chapin    YOB: 1990    Sex: Female      Follow Up Date: 1/24/2019  Start Time: 955 Stop Time: 1025 Total Minutes: 27     Data:  Present at session: self   Parent Concerns:    Medical Dx/Reason for Referral: Diabetes  Of other type without complications and without specification long-term insulin    Past Medical History:   Diagnosis Date    Allergic     Arthritis     Cyst of ovary, right     Diabetes mellitus ([address] ) 10/10/18    Heart murmur 01/19/88    Hepatitis C     Hypertension     Obesity 1995    Pulmonary artery congenital abnormality     Spleen enlarged        Current Outpatient Prescriptions   Medication Sig Dispense Refill    butalbital-acetaminophen-caffeine (FIORICET,ESGIC) -40 mg per tablet Take 1 tablet by mouth every 4 (four) hours as needed for headaches 30 tablet 0    diphenhydrAMINE (BENADRYL) 25 mg tablet Take 1 tablet (25 mg total) by mouth once for 1 dose 30 tablet 0    etonogestrel (NEXPLANON) subdermal implant Inject 68 mg under the skin      gabapentin (NEURONTIN) 300 mg capsule Take 1 capsule (300 mg total) by mouth 2 (two) times a day 90 capsule 1    Glecaprevir-Pibrentasvir (MAVYRET PO) Take by mouth      glucose blood (ACCU-CHEK GUIDE) test strip Test three times per day 100 each 0    ibuprofen (MOTRIN) 600 mg tablet Take 1 tablet (600 mg total) by mouth every 8 (eight) hours as needed for moderate pain or headaches 90 tablet 1    insulin glargine (LANTUS) 100 units/mL subcutaneous injection Inject 20 Units under the skin daily at bedtime 10 mL 3    Insulin Pen Needle 31G X 8 MM MISC by Does not apply route daily 100 each 0    Lancets (ACCU-CHEK MULTICLIX) lancets Use as instructed 100 each 3    lisinopril (ZESTRIL) 20 mg tablet Take 1 tablet (20 mg total) by mouth daily 30 tablet 3    metFORMIN (GLUCOPHAGE) 500 mg tablet Take 1 tablet (500 mg total) by mouth 2 (two) times a day with meals 60 tablet 2    ondansetron (ZOFRAN) 4 mg tablet Take 1 tablet (4 mg total) by mouth every 4 (four) hours as needed for nausea or vomiting 20 tablet 0     Current Facility-Administered Medications   Medication Dose Route Frequency Provider Last Rate Last Dose    insulin glargine (LANTUS) subcutaneous injection 20 Units 0 2 mL  20 Units Subcutaneous HS Sita Nicole MD            Additional Meds/Supplements: none   Barriers to Learning: Lack of Family Support   Labs: none   Height: Ht Readings from Last 3 Encounters:   01/21/19 5' 2" (1 575 m)   01/15/19 5' 2" (1 575 m)   01/14/19 5' 2" (1 575 m)      Weight: Wt Readings from Last 3 Encounters:   01/24/19 108 kg (238 lb 3 2 oz)   01/21/19 109 kg (240 lb 9 6 oz)   01/15/19 109 kg (241 lb 3 2 oz)     Body mass index is 43 57 kg/m²  Wt  Change Since Last Visit: [x]Yes     []No  Amount: 2lbs      Energy Needs: No calculations performed for this visit   Pain Screen: Are you having pain now? No      Goals Achieved:     New Goals:   1  Patient to use fitness/food dairy cliff to track daily calorie intake to 1500 kcal/day  2  Patient to walk her dog daily for 15 minutes three times per week  3        Initial PES:    Excess carbohydrate intake  related to Physiological causes requiring modified carbohydrate intake (i e  diabetes mellitus) as  evidenced by Hemoglobin A1C >6   New PES: No Change      New Problem List:  1  Having diarrhea, nausea  Went to doctor, may be med related    2  Boyfriend not supporting her meal planning efforts to be healthier  3        Assessment:  Ciro Neves started a new job yesterday  She will have off two days per week  She is working in a kitchen as a cook, concerned about eating more food at work  She has tried making a calendar to do her meal planning but her boyfriend is using it for other things  She is going to talk to him more about her health goals for better control of her diabetes and weight loss    She lost two pounds since her last doctor visit  She has a new dog she adopted, she has been walking with her dog sometimes  She is interested in following a low calorie diet for improved weight loss, 1500 kcal diet prescribed with example meal plans       Medical Nutrition Therapy Intervention:  [x]Individualized Meal Plan []Understanding Lab Values   []Basic Pathophysiology of Disease []Food/Medication Interactions   [x]Food Diary [x]Exercise   [x]Lifestyle/Behavior Modification Techniques []Medication, Mechanism of Action   []Label Reading []Self Blood Glucose Monitoring   []Weight/BMI Goals []Other -    Other Notes:        Comprehension: []Excellent  [x]Very Good  []Good  []Fair   []Poor    Receptivity: [x]Excellent  []Very Good  []Good  []Fair   []Poor    Expected Compliance: []Excellent  [x]Very Good  []Good  []Fair   []Poor      Labs:  CMP  Lab Results   Component Value Date     06/14/2018    K 3 8 12/02/2018     12/02/2018    CO2 25 12/02/2018    ANIONGAP 13 0 06/14/2018    BUN 10 12/02/2018    CREATININE 0 57 (L) 12/02/2018    GLUF 222 (H) 11/21/2018    CALCIUM 9 5 12/02/2018    AST 40 (H) 12/02/2018    ALT 57 (H) 12/02/2018    ALKPHOS 89 12/02/2018    EGFR 125 12/02/2018       BMP  Lab Results   Component Value Date    CALCIUM 9 5 12/02/2018     06/14/2018    K 3 8 12/02/2018    CO2 25 12/02/2018     12/02/2018    BUN 10 12/02/2018    CREATININE 0 57 (L) 12/02/2018       Lipids  No results found for: CHOL  No results found for: HDL  No results found for: LDLCALC  No results found for: TRIG  No results found for: CHOLHDL    Hemoglobin A1C  Lab Results   Component Value Date    HGBA1C 9 6 (H) 11/21/2018       Fasting Glucose  Lab Results   Component Value Date    GLUF 222 (H) 11/21/2018       Insulin     Thyroid  No results found for: TSH, D9ZBMDM, Y8CBCFH, THYROIDAB    Hepatic Function Panel  Lab Results   Component Value Date    ALT 57 (H) 12/02/2018    AST 40 (H) 12/02/2018    GGT 39 11/21/2018    ALKPHOS 89 12/02/2018       Celiac Disease Antibody Panel  No results found for: ENDOMYSIAL IGA, GLIADIN IGA, GLIADIN IGG, IGA, TISSUE TRANSGLUT AB, TTG IGA   Iron  No results found for: IRON, TIBC, FERRITIN    Vitamins  No results found for: VITAMIN B2   No results found for: NICOTINAMIDE, NICOTINIC ACID   No results found for: VITAMINB6  No results found for: CCFNRTIY49  No results found for: VITB5  No results found for: F3HUHYBE  No results found for: THYROGLB  No results found for: VITAMIN K   No results found for: 25-HYDROXY VIT D   No components found for: VITAMINE     No Follow-up on file  Katy Clemente Sc RDN LDN  [address] [facility] CLINICAL NUTRITION SERVICES  [facility] [address] [facility] [address] [phone]

## 2019-01-24 NOTE — LETTER
1/24/2019  Carmen Riley    1990     Dear Dr Mirna Perez,     Thank you for referring Carmen Riley to the Outpatient Nutrition Program at [facility]  Medical Nutrition Therapy was delivered on 1/24/19 and included individualized nutritional diagnostic therapy and counseling for the purposes of managing the following dx/disease: diabetes of other type without complications and without specification long-term insulin use  Therapy Goals:     Goals Achieved:     New Goals:   1  Patient to use fitness/food dairy cliff to track daily calorie intake to 1500 kcal/day  2  Patient to walk her dog daily for 15 minutes three times per week  3      New Problem List:     New Problem List:  1  Having diarrhea, nausea  Went to doctor, may be med related    2  Boyfriend not supporting her meal planning efforts to be healthier  3        Follow up planned for monitoring and evaluation: Patient to call for next appointment in three weeks  Please contact me with questions/concerns  Thank you for supporting Medical Nutrition Therapy  Katy Clemente MHSc RDN LDN   [address] [facility] CLINICAL NUTRITION SERVICES  [place] [address]  [address] [facility] [address] [phone]

## 2019-01-24 NOTE — PROGRESS NOTES
Outpatient Care Management Note;  Message left for patient to please return call  Contact information left on message

## 2019-01-25 ENCOUNTER — PATIENT OUTREACH (OUTPATIENT)
Dept: FAMILY MEDICINE CLINIC | Facility: CLINIC | Age: 29
End: 2019-01-25

## 2019-01-25 NOTE — PROGRESS NOTES
Outpatient Care Management Note:  Michael Bell is doing well, she admits to having trouble sticking to her diabetic diet  Discussed food logs and advanced meal planning as well as making sure she is eating breakfast and including protein at each meal   She did meet with the dietician on 1/24/19 and her goal is 1500 calories a day  She has downloaded a calorie tracking cliff to assist , reminded her that she also needed to be aware of carbs and protein, verbalized understanding  She recently adopted a dog so she has increased her activity by taking the dog for walks a few times a week, encouraged to increase the frequency of the walks when the weather permits  She is still having nausea but believes it is from her [address], she has 3 more weeks on the medication  Agreeable to information regarding Diabetes and complications in the mail  I am including a  Blood sugar log that I have asked her to complete and bring to her next office visit  Her blood sugars are currently running 130-190 which is an improvement  Verified that she has my contact information, encouraged to call with questions or concerns

## 2019-01-25 NOTE — PATIENT INSTRUCTIONS
Diabetic Gastroparesis   WHAT YOU NEED TO KNOW:   What is diabetic gastroparesis? Diabetic gastroparesis is a type of nerve damage that slows digestion  High blood sugar levels from diabetes can damage nerves and tissues in your stomach  The damage prevents your stomach from emptying normally  Gastroparesis is also called delayed gastric emptying  What increases my risk for diabetic gastroparesis? · History of type 1 or type 2 diabetes for at least 10 years    · Eye, nerve, or kidney problems due to diabetes  What are the signs and symptoms of diabetic gastroparesis? Your symptoms may be worse if you drink alcohol or smoke  You may have any of the following:  · Constipation that may be replaced, at times, by diarrhea    · High or low blood sugar levels that you cannot control    · Nausea, vomiting, or loss of appetite    · Bloated or early full feeling while you eat    · Sudden cramps, swelling, or pain in your abdomen    · Weight loss without trying     · Heartburn  How is diabetic gastroparesis diagnosed? Your healthcare provider will feel your abdomen and ask about your diabetes  You may need any of the following tests:  · A gastric emptying breath test (GEBT)  will show how fast food moves from your stomach to your small intestine  The test measures the amount of carbon in your breath after you eat a meal prepared by healthcare providers  · An x-ray or ultrasound  may show how your stomach is working  You may be given a chalky liquid to drink before the pictures are taken  This liquid helps your stomach and intestines show up clearly on the monitor  · An endoscopy  may show what is causing your digestive problems  A scope is used to show images of the inside of your stomach  A scope is a thin, bendable tube with a light and camera on the end  Samples may be taken from your digestive tract and sent to a lab for tests       · A scintigraphy , or gastric-emptying scan, measures how quickly food moves out of your stomach  A slightly radioactive substance is placed in food  The amount of radiation is small and safe  You eat the food and then lie under a machine that takes pictures of the food inside your stomach  Pictures will be taken every 15 minutes, up to 4 hours after you eat, or as directed  How is diabetic gastroparesis treated? · Medicines:      ¨ Motility medicines  help your stomach muscles move food and liquids out of your stomach faster  These medicines also may help you digest food better  ¨ Nausea medicine  helps calm your stomach and prevent vomiting  · A feeding tube  may be needed if your stomach cannot process food  You may need the feeding tube for a short time, until your stomach starts working properly  You may instead need a long-term feeding tube if your gastroparesis is severe  Ask for more information about feeding tubes  How can I manage my symptoms? · Walk after you eat  This may help speed digestion  · Follow the meal plan  that your healthcare or dietitian gave you  This meal plan can help decrease your symptoms  The following may also help you manage your symptoms:     ¨ Eat less fat and fiber  High-fat and high-fiber foods may be hard for your stomach to digest  You may need to avoid fruits and vegetables such as oranges and broccoli  ¨ Eat 4 to 6 small meals a day  Smaller, more frequent meals are easier for your stomach to handle  ¨ Drink more liquids with your meals  Your healthcare provider may recommend liquid meals, such as soup  Liquid is easier to digest than solid food  ¨ Ask if you should prepare your food in a   Blended foods are easier to digest  Ask for directions on which foods to use and how to blend the food correctly  · Do not smoke  Nicotine can damage blood vessels, slow your digestion, and make it more difficult to manage your diabetes  Do not use e-cigarettes or smokeless tobacco in place of cigarettes or to help you quit  They still contain nicotine  Ask your healthcare provider for information if you currently smoke and need help quitting  · Do not drink alcohol  Alcohol may slow your digestion more  · Follow your diabetes treatment plan  You may need to check your blood sugar more often  High blood sugar levels slow digestion and can make your symptoms worse  When should I seek immediate care? · You are vomiting more severely or for a longer period than usual      · You urinate less than usual, and your mouth is dry  · You feel dizzy and weak, or you have fainted  · You have severe pain in your stomach or abdomen  When should I contact my healthcare provider? · Your blood sugar level is higher or lower than healthcare providers have told you it should be  · You continue to have pain and bloating in your abdomen  · You continue to have nausea and vomiting, or you are not able to eat  · You have questions or concerns about your condition or care  CARE AGREEMENT:   You have the right to help plan your care  Learn about your health condition and how it may be treated  Discuss treatment options with your caregivers to decide what care you want to receive  You always have the right to refuse treatment  The above information is an  only  It is not intended as medical advice for individual conditions or treatments  Talk to your doctor, nurse or pharmacist before following any medical regimen to see if it is safe and effective for you  © 2017 [employer] [employer]  Information is for End User's use only and may not be sold, redistributed or otherwise used for commercial purposes  All illustrations and images included in CareNotes® are the copyrighted property of [employer]  or Fran Dumont  Foot Care for People with Diabetes   WHAT YOU NEED TO KNOW:   What do I need to know about foot care? · Foot care helps protect your feet and prevent foot ulcers or sores   Long-term high blood sugar levels can damage the blood vessels and nerves in your legs and feet  This damage makes it hard to feel pressure, pain, temperature, and touch  You may not be able to feel a cut or sore, or shoes that are too tight  Foot care is needed to prevent serious problems, such as an infection or amputation  · Diabetes may cause your toes to become crooked or curved under  These changes may affect the way you walk and can lead to increased pressure on your foot  The pressure can decrease blood flow to your feet  Lack of blood flow increases your risk for a foot ulcer  Do not ignore small problems, such as dry skin or small wounds  These can become life-threatening over time without proper care  How do I care for my feet? · Check your feet each day  Look at your whole foot, including the bottom, and between and under your toes  Check for wounds, corns, and calluses  Use a mirror to see the bottom of your feet  The skin on your feet may be shiny, tight, or darker than normal  Your feet may also be cold and pale  Feel your feet by running your hands along the tops, bottoms, sides, and between your toes  Redness, swelling, and warmth are signs of blood flow problems that can lead to a foot ulcer  Do not try to remove corns or calluses yourself  · Wash your feet each day with soap and warm water  Do not use hot water, because this can injure your foot  Dry your feet gently with a towel after you wash them  Dry between and under your toes  · Apply lotion or a moisturizer on your dry feet  Ask your healthcare provider what lotions are best to use  Do not put lotion or moisturizer between your toes  · Cut your toenails correctly  File or cut your toenails straight across  Use a soft brush to clean around your toenails  If your toenails are very thick, you may need to have a healthcare provider or specialist cut them  · Protect your feet    Do not walk barefoot or wear your shoes without socks  Check your shoes for rocks or other objects that can hurt your feet  Wear cotton socks to help keep your feet dry  Wear socks without toe seams, or wear them with the seams inside out  Change your socks each day  Do not wear socks that are dirty or damp  · Wear shoes that fit well  Wear shoes that do not rub against any area of your feet  Your shoes should be ½ to ¾ inch (1 to 2 centimeters) longer than your feet  Your shoes should also have extra space around the widest part of your feet  Walking or athletic shoes with laces or straps that adjust are best  Ask your healthcare provider for help to choose shoes that fit you best  Ask him if you need to wear an insert, orthotic, or bandage on your feet  · Go to your follow-up visits  Your healthcare provider will do a foot exam at least once a year  You may need a foot exam more often if you have nerve damage, foot deformities, or ulcers  He will check for nerve damage and how well you can feel your feet  He will check your shoes to see if they fit well  When should I contact my healthcare provider? · Your feet become numb, weak, or hard to move  · You have pus draining from a sore on your foot  · You have a wound on your foot that gets bigger, deeper, or does not heal      · You see blisters, cuts, scratches, calluses, or sores on your foot  · You have a fever, and your feet become red, warm, and swollen  · Your toenails become thick, curled, or yellow  · You find it hard to check your feet because your vision is poor  · You have questions or concerns about your condition or care  CARE AGREEMENT:   You have the right to help plan your care  Learn about your health condition and how it may be treated  Discuss treatment options with your caregivers to decide what care you want to receive  You always have the right to refuse treatment  The above information is an  only   It is not intended as medical advice for individual conditions or treatments  Talk to your doctor, nurse or pharmacist before following any medical regimen to see if it is safe and effective for you  © 2017 [employer] Information is for End User's use only and may not be sold, redistributed or otherwise used for commercial purposes  All illustrations and images included in CareNotes® are the copyrighted property of [employer]  or Fran Dumont  Managing Diabetes During Sick Days   WHAT YOU NEED TO KNOW:   What is sick day management? Sick day management is a plan to control your blood sugar levels while you are sick  You develop this plan with your healthcare providers  Why do I need a sick day plan? Your blood sugar levels can increase because of stress from illness, surgery, or injury  Your plan will help prevent high blood sugar levels and other serious health conditions such as the following:  · Diabetic ketoacidosis (DKA)  is a medical condition that forces your body to use fat instead of sugar for fuel  DKA happens when your body does not have enough insulin and your blood sugar levels get very high  As fats are broken down, they leave chemicals called ketones that build up in your blood  Ketones are dangerous at high levels  DKA can lead to coma, and can be life-threatening if not treated  · Hyperosmolar hyperglycemic syndrome (HHS)  is another medical condition that occurs when your blood sugar gets too high  Your body gets rid of the extra sugar through your urine  This leads to severe dehydration  What are some things I should do on days when I am sick? · Continue to take your medicines as directed  Your healthcare provider will tell you if you need to make any changes  If you normally do not use insulin, you may need to use it while you are sick  If you already use insulin, you may need to increase the amount you take   Talk to your healthcare provider before you take any over-the-counter medicines  · Check your blood sugar level more often than usual   If you have type 2 diabetes, check at least 4 times each day  If you have type 1 diabetes, check every 4 hours  · Check your urine or blood for ketones  Ask your healthcare provider which type of ketone testing is best for you  Ketone urine test kits are sold in pharmacies and some stores  You can also buy a meter to check the amount of ketones in your blood  Ask when and how often to check ketones  Do not exercise if you have ketones in your urine or blood  · Drink liquids as directed  You may need to drink about 8 ounces (1 cup) of liquid each hour  Drink liquids that do not contain sugar or caffeine  Ask your healthcare provider which liquids are best for you  · Follow your usual meal plan as closely as possible  If you cannot follow your meal plan, eat other foods that are easy for your body to digest  If you are eating less food than normal or cannot eat any foods, drink liquids that contain calories  · Tell others about your sick day plan  Tell others who help you while you are sick about your sick day plan  Put your plan in a place that is easy to find  Your sick day plan may change over time based on your needs  What can I drink and eat while I am sick? If your stomach is upset or you are vomiting, the following may be easier to drink and eat  Each of the foods listed below has about 10 to 15 grams of carbohydrate    · Liquids:      ¨ ? to ½ cup of fruit juice     ¨ ½ cup of regular soda     ¨ 1 cup of milk     ¨ 1 double-stick popsicle     ¨ 1 cup of a sports drink    · Foods:      ¨ ½ cup of regular gelatin or cooked, hot cereal     ¨ ½ cup of sugar-free pudding or ¼ cup of regular pudding     ¨ ½ cup of mashed potatoes, macaroni, or noodles     ¨ ¼ cup of sherbet     ¨ ½ cup of regular ice cream     ¨ 1 slice of dry toast, 6 saltine crackers, or 3 meghan crackers  Call 911 for any of the following:   · You have trouble breathing  When should I seek immediate care? · You cannot keep food and liquids down at all for a few hours  · You are drowsy or confused  · You are breathing faster than normal      · Your heartbeat is faster than normal, or your heart is pounding  · You are weak or dizzy  When should I contact my healthcare provider? · You have leg cramps  · Your mouth or eyes are dry  · You are vomiting or have diarrhea  · You have a fever  · Your ketone level is higher than healthcare providers have told you it should be  · Your blood sugar level is higher than healthcare providers have told you it should be  · You have questions or concerns about your condition or care  CARE AGREEMENT:   You have the right to help plan your care  Learn about your health condition and how it may be treated  Discuss treatment options with your caregivers to decide what care you want to receive  You always have the right to refuse treatment  The above information is an  only  It is not intended as medical advice for individual conditions or treatments  Talk to your doctor, nurse or pharmacist before following any medical regimen to see if it is safe and effective for you  © 2017 [address] [facility] Information is for End User's use only and may not be sold, redistributed or otherwise used for commercial purposes  All illustrations and images included in CareNotes® are the copyrighted property of [employer]  or Fran Dumont  Type 2 Diabetes in Adults   WHAT YOU NEED TO KNOW:   What is type 2 diabetes? Type 2 diabetes is a disease that affects how your body uses glucose (sugar)  Normally, when the blood sugar level increases, the pancreas makes more insulin  Insulin helps move sugar out of the blood so it can be used for energy  Type 2 diabetes develops because either the body cannot make enough insulin, or it cannot use the insulin correctly   After many years, your pancreas may stop making insulin  What increases my risk for type 2 diabetes? · Obesity    · Physical inactivity    · Older age    · High blood pressure or high cholesterol    · A history of heart disease, gestational diabetes, or polycystic ovary syndrome     · A family member with diabetes    · Being [place] American, , , [place] American, or [place]  What are the signs and symptoms of type 2 diabetes? You may have high blood sugar levels for a long time before symptoms appear  You may have any of the following:  · More hunger or thirst than usual     · Frequent urination     · Weight loss without trying     · Blurred vision  How is type 2 diabetes diagnosed? You may need tests to check for type 2 diabetes starting at age 39  You may need any of the following:  · An A1c test  shows the average amount of sugar in your blood over the past 2 to 3 months  Your healthcare provider will tell you the A1c level that is right for you  The goal for your A1c is usually below 7%  Your provider can help you make changes if a check shows the A1c is too high  · A fasting plasma glucose test  is when your blood sugar level is tested after you have not eaten for 8 hours  · A 2-hour plasma glucose test  starts with a blood sugar level check after you have not eaten for 8 hours  You are then given a glucose drink  Your blood sugar level is checked after 2 hours  · A random glucose test  may be done any time of day, no matter how long ago you ate  How is type 2 diabetes treated? Type 2 diabetes can be controlled to prevent damage to your heart, blood vessels, and other organs  The goal is to keep your blood sugar at a normal level  You must eat the right foods, and exercise regularly  You may need 1 or more hypoglycemic medicines or insulin if you cannot control your blood sugar level with nutrition and exercise  You may also need medicine to lower your risk for heart disease  An example includes medicine to lower or control your cholesterol  How do I check my blood sugar level? You will be taught how to check a small drop of blood in a glucose monitor  You will need to check your blood sugar level at least 3 times each day if you are on insulin  Ask your healthcare provider when and how often to check during the day  If you check your blood sugar level before a meal , it should be between 80 and 130 mg/dL  If you check your blood sugar level 1 to 2 hours after a meal , it should be less than 180 mg/dL  Ask your healthcare provider if these are good goals for you  Write down your results, and show them to your healthcare provider  Your provider may use the results to make changes to your medicine, food, and exercise schedules  What should I do if my blood sugar level is too low? Your blood sugar level is too low if it goes below 70 mg/dL  If the level is too low, eat or drink 15 grams of fast-acting carbohydrate  These are found naturally in fruits  Fast-acting carbohydrates will raise your blood sugar level quickly  Examples of 15 grams of fast-acting carbohydrate are 4 ounces (½ cup) of fruit juice or 4 ounces of regular soda  Other examples are 2 tablespoons of raisins or 3 to 4 glucose tablets  Check your blood sugar level 15 minutes later  If the level is still low (less than 100 mg/dL), eat another 15 grams of carbohydrate  When the level returns to 100 mg/dL, eat a snack or meal that contains carbohydrates  This will help prevent another drop in blood sugar  Always carefully follow your healthcare provider's instructions on how to treat low blood sugar levels  What do I need to know about nutrition? A dietitian will help you make a meal plan to keep your blood sugar level steady  Do not skip meals  Your blood sugar level may drop too low if you have taken diabetes medicine and do not eat  · Keep track of carbohydrates (sugar and starchy foods)    Your blood sugar level can get too high if you eat too many carbohydrates  Eat fruits, legumes, vegetables, and whole grains  Your dietitian will help you plan meals and snacks that have the right amount of carbohydrates  · Eat low-fat foods , such as skinless chicken and low-fat milk  · Eat less sodium (salt)  Limit high-sodium foods, such as soy sauce, potato chips, and soup  Do not add salt to food you cook  Limit your use of table salt  You should have less than 2,300 mg of sodium per day  · Eat high-fiber foods , such as vegetables, whole-grain breads, and beans  · Limit alcohol  Alcohol affects your blood sugar level and can make it harder to manage your diabetes  Limit alcohol to 1 drink a day if you are a woman  Limit alcohol to 2 drinks a day if you are a man  A drink of alcohol is 12 ounces of beer, 5 ounces of wine, or 1½ ounces of liquor  How much exercise do I need? Exercise can help keep your blood sugar level steady, decrease your risk of heart disease, and help you lose weight  Stretch before and after you exercise  Exercise for at least 150 minutes every week  Spread this amount of exercise over at least 3 days a week  Do not skip exercise more than 2 days in a row  Include muscle strengthening activities 2 to 3 days each week  Older adults should include balance training 2 to 3 times each week  Activities that help increase balance include yoga and carmelo chi  Work with your healthcare provider to create an exercise plan  · Check your blood sugar level before and after exercise  Healthcare providers may tell you to change the amount of insulin you take or food you eat  If your blood sugar level is high, check your blood or urine for ketones before you exercise  Do not exercise if your blood sugar level is high and you have ketones  · If your blood sugar level is less than 100 mg/dL, have a carbohydrate snack before you exercise   Examples are 4 to 6 crackers, ½ banana, 8 ounces (1 cup) of milk, or 4 ounces (½ cup) of juice  Drink water or liquids that do not contain sugar before, during, and after exercise  Ask your dietitian or healthcare provider which liquids you should drink when you exercise  · Do not sit for longer than 30 minutes  If you cannot walk around, at least stand up  This will help you stay active and keep your blood circulating  What else can I do to manage type 2 diabetes? · Check your feet each day for sores  Wear shoes and socks that fit correctly  Do not trim your toenails  Ask your healthcare provider for more information about foot care  · Maintain a healthy weight  Ask your healthcare provider how much you should weigh  A healthy weight can help you control your diabetes and prevent heart disease  Ask your provider to help you create a weight loss plan if you are overweight  Together you can set manageable weight loss goals  · Do not smoke  Nicotine and other chemicals in cigarettes and cigars can cause lung damage and make it more difficult to manage your diabetes  Ask your healthcare provider for information if you currently smoke and need help to quit  Do not use e-cigarettes or smokeless tobacco in place of cigarettes or to help you quit  They still contain nicotine  · Check your blood pressure as directed  Ask your healthcare provider what your blood pressure should be  Most adults with diabetes and high blood pressure should have a systolic blood pressure (first number) less than 140  Your diastolic blood pressure (second number) should be less than 90  · Wear medical alert identification  Wear medical alert jewelry or carry a card that says you have diabetes  Ask your healthcare provider where to get these items  · Ask about vaccines  You have a higher risk for serious illness if you get the flu, pneumonia, or hepatitis   Ask your healthcare provider if you should get a flu, pneumonia, or hepatitis B vaccine, and when to get the vaccine  What are the risks of type 2 diabetes? Uncontrolled diabetes can damage your nerves, veins, and arteries  High blood sugar levels may damage other body tissue and organs over time  Damage to arteries may increase your risk for heart attack and stroke  Nerve damage may also lead to other heart, stomach, and nerve problems  Diabetes is life-threatening if it is not controlled  Control your blood glucose levels to prevent health problems  Call 911 for any of the following:   · You have any of the following signs of a stroke:      ¨ Numbness or drooping on one side of your face     ¨ Weakness in an arm or leg    ¨ Confusion or difficulty speaking    ¨ Dizziness, a severe headache, or vision loss    · You have any of the following signs of a heart attack:      ¨ Squeezing, pressure, or pain in your chest that lasts longer than 5 minutes or returns    ¨ Discomfort or pain in your back, neck, jaw, stomach, or arm     ¨ Trouble breathing    ¨ Nausea or vomiting    ¨ Lightheadedness or a sudden cold sweat, especially with chest pain or trouble breathing  When should I seek immediate care? · You have severe abdominal pain, or the pain spreads to your back  You may also be vomiting  · You have trouble staying awake or focusing  · You are shaking or sweating  · You have blurred or double vision  · Your breath has a fruity, sweet smell  · Your breathing is deep and labored, or rapid and shallow  · Your heartbeat is fast and weak  When should I contact my healthcare provider? · You are vomiting or have diarrhea  · You have an upset stomach and cannot eat the foods on your meal plan  · You feel weak or more tired than usual      · You feel dizzy, have headaches, or are easily irritated  · Your skin is red, warm, dry, or swollen  · You have a wound that does not heal      · You have numbness in your arms or legs       · You have trouble coping with your illness, or you feel anxious or depressed  · You have questions or concerns about your condition or care  CARE AGREEMENT:   You have the right to help plan your care  Learn about your health condition and how it may be treated  Discuss treatment options with your caregivers to decide what care you want to receive  You always have the right to refuse treatment  The above information is an  only  It is not intended as medical advice for individual conditions or treatments  Talk to your doctor, nurse or pharmacist before following any medical regimen to see if it is safe and effective for you  © 2017 [employer]  Information is for End User's use only and may not be sold, redistributed or otherwise used for commercial purposes  All illustrations and images included in CareNotes® are the copyrighted property of [employer]  or [employer]

## 2019-01-26 ENCOUNTER — HOSPITAL ENCOUNTER (EMERGENCY)
Facility: HOSPITAL | Age: 29
Discharge: HOME | End: 2019-01-26
Payer: COMMERCIAL

## 2019-01-26 ENCOUNTER — OFFICE VISIT (OUTPATIENT)
Dept: URGENT CARE | Facility: CLINIC | Age: 29
End: 2019-01-26
Payer: COMMERCIAL

## 2019-01-26 ENCOUNTER — APPOINTMENT (EMERGENCY)
Dept: ULTRASOUND IMAGING | Facility: HOSPITAL | Age: 29
End: 2019-01-26
Payer: COMMERCIAL

## 2019-01-26 VITALS
OXYGEN SATURATION: 96 % | BODY MASS INDEX: 43.79 KG/M2 | WEIGHT: 238 LBS | TEMPERATURE: 97.1 F | HEART RATE: 110 BPM | RESPIRATION RATE: 16 BRPM | DIASTOLIC BLOOD PRESSURE: 90 MMHG | HEIGHT: 62 IN | SYSTOLIC BLOOD PRESSURE: 156 MMHG

## 2019-01-26 VITALS
WEIGHT: 238 LBS | HEART RATE: 94 BPM | HEIGHT: 62 IN | OXYGEN SATURATION: 96 % | DIASTOLIC BLOOD PRESSURE: 94 MMHG | SYSTOLIC BLOOD PRESSURE: 156 MMHG | BODY MASS INDEX: 43.79 KG/M2 | RESPIRATION RATE: 18 BRPM | TEMPERATURE: 98.2 F

## 2019-01-26 DIAGNOSIS — E11.9 INSULIN DEPENDENT TYPE 2 DIABETES MELLITUS (HCC): ICD-10-CM

## 2019-01-26 DIAGNOSIS — Z79.4 INSULIN DEPENDENT TYPE 2 DIABETES MELLITUS (HCC): ICD-10-CM

## 2019-01-26 DIAGNOSIS — R10.31 RIGHT LOWER QUADRANT ABDOMINAL PAIN: Primary | ICD-10-CM

## 2019-01-26 DIAGNOSIS — N83.201 RIGHT OVARIAN CYST: ICD-10-CM

## 2019-01-26 DIAGNOSIS — I10 HYPERTENSION: ICD-10-CM

## 2019-01-26 DIAGNOSIS — R10.2 PELVIC PAIN IN FEMALE: Primary | ICD-10-CM

## 2019-01-26 LAB
ALBUMIN SERPL BCP-MCNC: 4.2 G/DL (ref 3.5–5.7)
ALP SERPL-CCNC: 106 U/L (ref 40–150)
ALT SERPL W P-5'-P-CCNC: 33 U/L (ref 7–52)
ANION GAP SERPL CALCULATED.3IONS-SCNC: 10 MMOL/L (ref 4–13)
APTT PPP: 36 SECONDS (ref 26–38)
AST SERPL W P-5'-P-CCNC: 16 U/L (ref 13–39)
BACTERIA UR QL AUTO: ABNORMAL /HPF
BASOPHILS # BLD AUTO: 0 THOUSANDS/ΜL (ref 0–0.1)
BASOPHILS NFR BLD AUTO: 1 % (ref 0–2)
BILIRUB SERPL-MCNC: 1.8 MG/DL (ref 0.2–1)
BILIRUB UR QL STRIP: NEGATIVE
BUN SERPL-MCNC: 9 MG/DL (ref 7–25)
CALCIUM SERPL-MCNC: 9.4 MG/DL (ref 8.6–10.5)
CHLORIDE SERPL-SCNC: 98 MMOL/L (ref 98–107)
CLARITY UR: ABNORMAL
CO2 SERPL-SCNC: 23 MMOL/L (ref 21–31)
COLOR UR: YELLOW
CREAT SERPL-MCNC: 0.59 MG/DL (ref 0.6–1.2)
EOSINOPHIL # BLD AUTO: 0.2 THOUSAND/ΜL (ref 0–0.61)
EOSINOPHIL NFR BLD AUTO: 2 % (ref 0–5)
ERYTHROCYTE [DISTWIDTH] IN BLOOD BY AUTOMATED COUNT: 14.9 % (ref 11.5–14.5)
EXT PREG TEST URINE: NEGATIVE
GFR SERPL CREATININE-BSD FRML MDRD: 123 ML/MIN/1.73SQ M
GLUCOSE SERPL-MCNC: 375 MG/DL (ref 65–99)
GLUCOSE UR STRIP-MCNC: ABNORMAL MG/DL
HCT VFR BLD AUTO: 44.7 % (ref 34.8–46.1)
HGB BLD-MCNC: 14.8 G/DL (ref 12–16)
HGB UR QL STRIP.AUTO: NEGATIVE
INR PPP: 1.05 (ref 0.9–1.5)
KETONES UR STRIP-MCNC: ABNORMAL MG/DL
LACTATE SERPL-SCNC: 1.7 MMOL/L (ref 0.5–2)
LEUKOCYTE ESTERASE UR QL STRIP: ABNORMAL
LIPASE SERPL-CCNC: 17 U/L (ref 11–82)
LYMPHOCYTES # BLD AUTO: 2.3 THOUSANDS/ΜL (ref 0.6–4.47)
LYMPHOCYTES NFR BLD AUTO: 27 % (ref 21–51)
MCH RBC QN AUTO: 26.7 PG (ref 26–34)
MCHC RBC AUTO-ENTMCNC: 33.1 G/DL (ref 31–37)
MCV RBC AUTO: 81 FL (ref 81–99)
MONOCYTES # BLD AUTO: 0.7 THOUSAND/ΜL (ref 0.17–1.22)
MONOCYTES NFR BLD AUTO: 8 % (ref 2–12)
NEUTROPHILS # BLD AUTO: 5.5 THOUSANDS/ΜL (ref 1.4–6.5)
NEUTS SEG NFR BLD AUTO: 63 % (ref 42–75)
NITRITE UR QL STRIP: NEGATIVE
NON-SQ EPI CELLS URNS QL MICRO: ABNORMAL /HPF
NRBC BLD AUTO-RTO: 0 /100 WBCS
OTHER STN SPEC: ABNORMAL
PH UR STRIP.AUTO: 5.5 [PH] (ref 5–8)
PLATELET # BLD AUTO: 228 THOUSANDS/UL (ref 149–390)
PMV BLD AUTO: 9.8 FL (ref 8.6–11.7)
POTASSIUM SERPL-SCNC: 3.7 MMOL/L (ref 3.5–5.5)
PROT SERPL-MCNC: 7.2 G/DL (ref 6.4–8.9)
PROT UR STRIP-MCNC: NEGATIVE MG/DL
PROTHROMBIN TIME: 12.2 SECONDS (ref 10.2–13)
RBC # BLD AUTO: 5.55 MILLION/UL (ref 3.9–5.2)
RBC #/AREA URNS AUTO: ABNORMAL /HPF
SODIUM SERPL-SCNC: 131 MMOL/L (ref 134–143)
SP GR UR STRIP.AUTO: 1.01 (ref 1–1.03)
UROBILINOGEN UR QL STRIP.AUTO: 0.2 E.U./DL
WBC # BLD AUTO: 8.8 THOUSAND/UL (ref 4.8–10.8)
WBC #/AREA URNS AUTO: ABNORMAL /HPF

## 2019-01-26 PROCEDURE — 36415 COLL VENOUS BLD VENIPUNCTURE: CPT | Performed by: PHYSICIAN ASSISTANT

## 2019-01-26 PROCEDURE — 96375 TX/PRO/DX INJ NEW DRUG ADDON: CPT

## 2019-01-26 PROCEDURE — 85730 THROMBOPLASTIN TIME PARTIAL: CPT | Performed by: PHYSICIAN ASSISTANT

## 2019-01-26 PROCEDURE — G0382 LEV 3 HOSP TYPE B ED VISIT: HCPCS | Performed by: PHYSICIAN ASSISTANT

## 2019-01-26 PROCEDURE — 85025 COMPLETE CBC W/AUTO DIFF WBC: CPT | Performed by: PHYSICIAN ASSISTANT

## 2019-01-26 PROCEDURE — 99203 OFFICE O/P NEW LOW 30 MIN: CPT | Performed by: PHYSICIAN ASSISTANT

## 2019-01-26 PROCEDURE — 96374 THER/PROPH/DIAG INJ IV PUSH: CPT

## 2019-01-26 PROCEDURE — 80053 COMPREHEN METABOLIC PANEL: CPT | Performed by: PHYSICIAN ASSISTANT

## 2019-01-26 PROCEDURE — 81001 URINALYSIS AUTO W/SCOPE: CPT | Performed by: PHYSICIAN ASSISTANT

## 2019-01-26 PROCEDURE — 99283 EMERGENCY DEPT VISIT LOW MDM: CPT | Performed by: PHYSICIAN ASSISTANT

## 2019-01-26 PROCEDURE — 83690 ASSAY OF LIPASE: CPT | Performed by: PHYSICIAN ASSISTANT

## 2019-01-26 PROCEDURE — 85610 PROTHROMBIN TIME: CPT | Performed by: PHYSICIAN ASSISTANT

## 2019-01-26 PROCEDURE — 87147 CULTURE TYPE IMMUNOLOGIC: CPT | Performed by: PHYSICIAN ASSISTANT

## 2019-01-26 PROCEDURE — 76856 US EXAM PELVIC COMPLETE: CPT

## 2019-01-26 PROCEDURE — 87086 URINE CULTURE/COLONY COUNT: CPT | Performed by: PHYSICIAN ASSISTANT

## 2019-01-26 PROCEDURE — 81025 URINE PREGNANCY TEST: CPT | Performed by: PHYSICIAN ASSISTANT

## 2019-01-26 PROCEDURE — 99284 EMERGENCY DEPT VISIT MOD MDM: CPT

## 2019-01-26 PROCEDURE — 83605 ASSAY OF LACTIC ACID: CPT | Performed by: PHYSICIAN ASSISTANT

## 2019-01-26 PROCEDURE — 96361 HYDRATE IV INFUSION ADD-ON: CPT

## 2019-01-26 PROCEDURE — 76830 TRANSVAGINAL US NON-OB: CPT

## 2019-01-26 RX ORDER — ONDANSETRON 2 MG/ML
4 INJECTION INTRAMUSCULAR; INTRAVENOUS ONCE
Status: COMPLETED | OUTPATIENT
Start: 2019-01-26 | End: 2019-01-26

## 2019-01-26 RX ORDER — KETOROLAC TROMETHAMINE 30 MG/ML
15 INJECTION, SOLUTION INTRAMUSCULAR; INTRAVENOUS ONCE
Status: COMPLETED | OUTPATIENT
Start: 2019-01-26 | End: 2019-01-26

## 2019-01-26 RX ADMIN — ONDANSETRON 4 MG: 2 INJECTION INTRAMUSCULAR; INTRAVENOUS at 13:24

## 2019-01-26 RX ADMIN — SODIUM CHLORIDE 1000 ML: 0.9 INJECTION, SOLUTION INTRAVENOUS at 13:25

## 2019-01-26 RX ADMIN — KETOROLAC TROMETHAMINE 15 MG: 30 INJECTION, SOLUTION INTRAMUSCULAR at 13:57

## 2019-01-26 NOTE — PATIENT INSTRUCTIONS
Recommend patient go to the emergency room for further evaluation of abdominal pain  She agrees and is going to [facility]

## 2019-01-26 NOTE — PROGRESS NOTES
[facility]    NAME: Raya Burnett is a 30 y o  female  : 1990    MRN: [id number]  DATE: 2019  TIME: 12:21 PM    Assessment and Plan   Right lower quadrant abdominal pain [R10 31]  1  Right lower quadrant abdominal pain  Transfer to other facility       Patient Instructions     Patient Instructions   Recommend patient go to the emergency room for further evaluation of abdominal pain  She agrees and is going to [facility]  Chief Complaint     Chief Complaint   Patient presents with    Abdominal Pain     right lower, 20 min ago       History of Present Illness   24-year-old female here with complaint of right lower quadrant pain that started earlier today  She denies any fever chills  Patient does have some nausea and had taken Zofran but she does not feel like it is helping  No diarrhea or vomiting  Denies any urinary complaints  No changes in her bowels or any menstrual issues  Review of Systems   Review of Systems   Constitutional: Negative for activity change, appetite change, chills, diaphoresis, fatigue, fever and unexpected weight change  HENT: Negative for congestion, dental problem, hearing loss, sinus pressure, sneezing, sore throat, tinnitus, trouble swallowing and voice change  Eyes: Negative for photophobia, redness and visual disturbance  Respiratory: Negative for apnea, cough, chest tightness, shortness of breath, wheezing and stridor  Cardiovascular: Negative for chest pain, palpitations and leg swelling  Gastrointestinal: Positive for abdominal pain and nausea  Negative for abdominal distention, blood in stool, constipation, diarrhea and vomiting  Endocrine: Negative for cold intolerance, heat intolerance, polydipsia, polyphagia and polyuria  Genitourinary: Negative for difficulty urinating, dysuria, flank pain, frequency, hematuria and urgency     Musculoskeletal: Negative for arthralgias, back pain, gait problem, joint swelling, myalgias, neck pain and neck stiffness  Skin: Negative for pallor, rash and wound  Neurological: Negative for dizziness, tremors, seizures, speech difficulty, weakness and headaches  Hematological: Negative for adenopathy  Does not bruise/bleed easily  Psychiatric/Behavioral: Negative for agitation, confusion, dysphoric mood and sleep disturbance  The patient is not nervous/anxious  All other systems reviewed and are negative        Current Medications     Current Outpatient Prescriptions:     butalbital-acetaminophen-caffeine (FIORICET,ESGIC) -40 mg per tablet, Take 1 tablet by mouth every 4 (four) hours as needed for headaches, Disp: 30 tablet, Rfl: 0    etonogestrel (NEXPLANON) subdermal implant, Inject 68 mg under the skin, Disp: , Rfl:     gabapentin (NEURONTIN) 300 mg capsule, Take 1 capsule (300 mg total) by mouth 2 (two) times a day, Disp: 90 capsule, Rfl: 1    Glecaprevir-Pibrentasvir (MAVYRET PO), Take by mouth, Disp: , Rfl:     glucose blood (ACCU-CHEK GUIDE) test strip, Test three times per day, Disp: 100 each, Rfl: 0    insulin glargine (LANTUS) 100 units/mL subcutaneous injection, Inject 20 Units under the skin daily at bedtime, Disp: 10 mL, Rfl: 3    Insulin Pen Needle 31G X 8 MM MISC, by Does not apply route daily, Disp: 100 each, Rfl: 0    Lancets (ACCU-CHEK MULTICLIX) lancets, Use as instructed, Disp: 100 each, Rfl: 3    lisinopril (ZESTRIL) 20 mg tablet, Take 1 tablet (20 mg total) by mouth daily, Disp: 30 tablet, Rfl: 3    metFORMIN (GLUCOPHAGE) 500 mg tablet, Take 1 tablet (500 mg total) by mouth 2 (two) times a day with meals, Disp: 60 tablet, Rfl: 2    ondansetron (ZOFRAN) 4 mg tablet, Take 1 tablet (4 mg total) by mouth every 4 (four) hours as needed for nausea or vomiting, Disp: 20 tablet, Rfl: 0    diphenhydrAMINE (BENADRYL) 25 mg tablet, Take 1 tablet (25 mg total) by mouth once for 1 dose, Disp: 30 tablet, Rfl: 0    ibuprofen (MOTRIN) 600 mg tablet, Take 1 tablet (600 mg total) by mouth every 8 (eight) hours as needed for moderate pain or headaches, Disp: 90 tablet, Rfl: 1    Current Facility-Administered Medications:     insulin glargine (LANTUS) subcutaneous injection 20 Units 0 2 mL, 20 Units, Subcutaneous, HS, Jeffrey Sahu MD    Current Allergies     Allergies as of 01/26/2019 - Reviewed 01/26/2019   Allergen Reaction Noted    Corticosteroids Anaphylaxis 01/13/2009    Prednisone Swelling 06/12/2018    Sulfa antibiotics Hives 01/14/2019    Milk-related compounds Sneezing 12/11/2018          The following portions of the patient's history were reviewed and updated as appropriate: allergies, current medications, past family history, past medical history, past social history, past surgical history and problem list    Past Medical History:   Diagnosis Date    Allergic     Arthritis     Cyst of ovary, right     Diabetes mellitus ([facility] [address] ) 10/10/18    Heart murmur 01/19/88    Hepatitis C     Hypertension     Obesity 1995    Pulmonary artery congenital abnormality     Spleen enlarged      Past Surgical History:   Procedure Laterality Date    CARDIAC CATHETERIZATION      no CAD    CHOLECYSTECTOMY      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 8    LIVER BIOPSY      LIVER BIOPSY      VSD REPAIR      As a child     Family History   Problem Relation Age of Onset    Hypertension Mother     Migraines Mother     Diabetes Father     Hypertension Father     Kidney failure Father     Polycystic kidney disease Father     Heart attack Father     Arthritis Father     Stroke Father     Polycystic kidney disease Paternal Grandmother     Stroke Paternal Grandmother     Arthritis Sister     Asthma Sister     Arthritis Maternal Grandmother     Cancer Maternal Grandmother     Learning disabilities Cousin     Learning disabilities Sister     ADD / ADHD Cousin      Social History     Social History    Marital status: Single     Spouse name: N/A    Number of children: N/A    Years of education: N/A     Occupational History    Not on file  Social History Main Topics    Smoking status: Current Some Day Smoker     Packs/day: 0 20     Types: Cigarettes     Start date: 1/17/2019    Smokeless tobacco: Never Used      Comment: smoking 1-3 cigarettes/day    Alcohol use 1 8 oz/week     3 Standard drinks or equivalent per week      Comment: socially    Drug use: No    Sexual activity: Yes     Partners: Male     Birth control/ protection: Condom Male, Implant     Other Topics Concern    Not on file     Social History Narrative    No narrative on file     Medications have been verified  Objective   /90   Pulse (!) 110   Temp (!) 97 1 °F (36 2 °C)   Resp 16   Ht 5' 2" (1 575 m)   Wt 108 kg (238 lb)   SpO2 96%   BMI 43 53 kg/m²      Physical Exam   Physical Exam   Constitutional: She appears well-developed and well-nourished  No distress  HENT:   Head: Normocephalic  Right Ear: External ear normal    Left Ear: External ear normal    Nose: Nose normal    Mouth/Throat: Oropharynx is clear and moist  No oropharyngeal exudate  Neck: Normal range of motion  Neck supple  Cardiovascular: Normal rate, regular rhythm and normal heart sounds  No murmur heard  Pulmonary/Chest: Effort normal and breath sounds normal  No respiratory distress  She has no wheezes  She has no rales  Abdominal: Soft  Bowel sounds are normal  There is tenderness in the right lower quadrant  There is no rigidity, no rebound, no guarding and no CVA tenderness  Musculoskeletal: Normal range of motion  Lymphadenopathy:     She has no cervical adenopathy  Skin: Skin is warm  No rash noted  Nursing note and vitals reviewed

## 2019-01-26 NOTE — DISCHARGE INSTRUCTIONS
Ovarian Cyst   WHAT YOU NEED TO KNOW:   An ovarian cyst is a sac that grows on an ovary  This sac usually contains fluid, but may sometimes have blood or tissue in it  Most ovarian cysts are harmless and go away without treatment in a few months  Some cysts can grow large, cause pain, or break open  DISCHARGE INSTRUCTIONS:   Call 911 for any of the following:   · You are too weak or dizzy to stand up  Return to the emergency department if:   · You have severe abdominal pain  The pain may be sharp and sudden  · You have a fever  Contact your healthcare provider if:   · Your periods are early, late, or more painful than usual     · You have bleeding from your vagina that is not your period  · You have abdominal pain all the time  · Your abdomen is swollen  · You have feelings of fullness, pressure, or discomfort in your abdomen  · You have trouble urinating or emptying your bladder completely  · You have pain during sex  · You are losing weight without trying  · You have questions or concerns about your condition or care  Medicines: You may need any of the following:  · NSAIDs , such as ibuprofen, help decrease swelling, pain, and fever  This medicine is available with or without a doctor's order  NSAIDs can cause stomach bleeding or kidney problems in certain people  If you take blood thinner medicine, always ask if NSAIDs are safe for you  Always read the medicine label and follow directions  Do not give these medicines to children under 10months of age without direction from your child's healthcare provider  · Birth control pills  may help to control your periods, prevent cysts, or cause them to shrink  · Take your medicine as directed  Contact your healthcare provider if you think your medicine is not helping or if you have side effects  Tell him or her if you are allergic to any medicine  Keep a list of the medicines, vitamins, and herbs you take   Include the amounts, and when and why you take them  Bring the list or the pill bottles to follow-up visits  Carry your medicine list with you in case of an emergency  Follow up with your healthcare provider as directed:  Write down your questions so you remember to ask them during your visits  Apply heat to decrease pain and cramping:  Sit in a warm bath, or place a heating pad (turned on low) or a hot water bottle on your abdomen  Do this for 15 to 20 minutes every hour for as many days as directed  © 2017 [address] [employer] Information is for End User's use only and may not be sold, redistributed or otherwise used for commercial purposes  All illustrations and images included in CareNotes® are the copyrighted property of [employer]  or [employer]  The above information is an  only  It is not intended as medical advice for individual conditions or treatments  Talk to your doctor, nurse or pharmacist before following any medical regimen to see if it is safe and effective for you

## 2019-01-26 NOTE — ED NOTES
Pt given large cup of water to fill bladder for ultrasound     Farhan Stockton, GABINO  01/26/19 [id number]

## 2019-01-26 NOTE — ED PROVIDER NOTES
History  Chief Complaint   Patient presents with    Abdominal Pain     Strated an hour ago RLQ     Patient's history of similar pain in the past in the right pelvic region with history of ovarian cyst on imaging October 2018  She has not seen gyn since that time  She denies any fevers chills nausea vomiting symptoms just began prior to arrival which she describes as aching and similar to previous episodes with finding the ovarian cyst at that time  History provided by:  Patient and friend   used: No    Abdominal Pain   Pain location:  RLQ  Pain quality: aching and cramping    Pain radiates to:  Does not radiate  Pain severity:  Mild  Onset quality:  Sudden  Duration:  2 hours  Timing:  Intermittent  Progression:  Waxing and waning  Chronicity:  Recurrent  Context: not diet changes, not recent illness, not recent travel, not sick contacts, not suspicious food intake and not trauma    Relieved by:  NSAIDs  Worsened by:  Nothing  Associated symptoms: no chest pain, no chills, no constipation, no cough, no diarrhea, no dysuria, no fatigue, no fever, no hematuria, no nausea, no shortness of breath, no sore throat and no vomiting    Risk factors comment:  History of right ovarian cyst    Allergies   Allergen Reactions    Corticosteroids Anaphylaxis    Prednisone Swelling    Sulfa Antibiotics Hives    Milk-Related Compounds Sneezing         Prior to Admission Medications   Prescriptions Last Dose Informant Patient Reported? Taking?    Glecaprevir-Pibrentasvir (MAVYRET PO) 1/25/2019 at Unknown time  Yes Yes   Sig: Take by mouth   Insulin Pen Needle 31G X 8 MM MISC 1/26/2019 at Unknown time  No Yes   Sig: by Does not apply route daily   Lancets (ACCU-CHEK MULTICLIX) lancets 1/26/2019 at Unknown time  No Yes   Sig: Use as instructed   butalbital-acetaminophen-caffeine (FIORICET,ESGIC) -40 mg per tablet More than a month at Unknown time  No No   Sig: Take 1 tablet by mouth every 4 (four) hours as needed for headaches   diphenhydrAMINE (BENADRYL) 25 mg tablet   No No   Sig: Take 1 tablet (25 mg total) by mouth once for 1 dose   etonogestrel (NEXPLANON) subdermal implant 1/26/2019 at Unknown time  Yes Yes   Sig: Inject 68 mg under the skin   gabapentin (NEURONTIN) 300 mg capsule 1/26/2019 at Unknown time  No Yes   Sig: Take 1 capsule (300 mg total) by mouth 2 (two) times a day   glucose blood (ACCU-CHEK GUIDE) test strip 1/26/2019 at Unknown time  No Yes   Sig: Test three times per day   ibuprofen (MOTRIN) 600 mg tablet Not Taking at Unknown time  No No   Sig: Take 1 tablet (600 mg total) by mouth every 8 (eight) hours as needed for moderate pain or headaches   Patient not taking: Reported on 1/26/2019    insulin glargine (LANTUS) 100 units/mL subcutaneous injection 1/25/2019 at Unknown time  No Yes   Sig: Inject 20 Units under the skin daily at bedtime   Patient taking differently: Inject 22 Units under the skin daily at bedtime     lisinopril (ZESTRIL) 20 mg tablet 1/26/2019 at Unknown time  No Yes   Sig: Take 1 tablet (20 mg total) by mouth daily   metFORMIN (GLUCOPHAGE) 500 mg tablet 1/26/2019 at Unknown time  No Yes   Sig: Take 1 tablet (500 mg total) by mouth 2 (two) times a day with meals   ondansetron (ZOFRAN) 4 mg tablet More than a month at Unknown time  No No   Sig: Take 1 tablet (4 mg total) by mouth every 4 (four) hours as needed for nausea or vomiting      Facility-Administered Medications Last Administration Doses Remaining   insulin glargine (LANTUS) subcutaneous injection 20 Units 0 2 mL None recorded           Past Medical History:   Diagnosis Date    Allergic     Arthritis     Cyst of ovary, right     Diabetes mellitus ([facility] [address] ) 10/10/18    Heart murmur 01/19/88    Hepatitis C     Hypertension     Obesity 1995    Pulmonary artery congenital abnormality     Spleen enlarged        Past Surgical History:   Procedure Laterality Date    CARDIAC CATHETERIZATION      no CAD 10days, 4 weeks 22months old     CHOLECYSTECTOMY      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 7   Beckie Camacho LIVER BIOPSY      LIVER BIOPSY      VSD REPAIR      As a child       Family History   Problem Relation Age of Onset    Hypertension Mother     Migraines Mother     Diabetes Father     Hypertension Father     Kidney failure Father     Polycystic kidney disease Father     Heart attack Father     Arthritis Father     Stroke Father     Polycystic kidney disease Paternal Grandmother     Stroke Paternal Grandmother     Arthritis Sister     Asthma Sister     Arthritis Maternal Grandmother     Cancer Maternal Grandmother     Learning disabilities Cousin     Learning disabilities Sister     ADD / ADHD Cousin      I have reviewed and agree with the history as documented  Social History   Substance Use Topics    Smoking status: Current Some Day Smoker     Packs/day: 0 25     Types: Cigarettes     Start date: 1/17/2019    Smokeless tobacco: Never Used      Comment: smoking 1-3 cigarettes/day    Alcohol use 1 8 oz/week     3 Standard drinks or equivalent per week      Comment: socially        Review of Systems   Constitutional: Negative for chills, diaphoresis, fatigue and fever  HENT: Negative for congestion, ear pain, rhinorrhea, sneezing and sore throat  Respiratory: Negative for cough, shortness of breath, wheezing and stridor  Cardiovascular: Negative for chest pain, palpitations and leg swelling  Gastrointestinal: Positive for abdominal pain  Negative for abdominal distention, blood in stool, constipation, diarrhea, nausea and vomiting  Genitourinary: Negative for difficulty urinating, dysuria, frequency, hematuria and urgency  Musculoskeletal: Negative for gait problem, myalgias and neck pain  Skin: Negative for rash  Neurological: Negative for dizziness, syncope, weakness, light-headedness and headaches  All other systems reviewed and are negative        Physical Exam  Physical Exam Constitutional: She is oriented to person, place, and time  She appears well-developed and well-nourished  HENT:   Head: Normocephalic and atraumatic  Eyes: Pupils are equal, round, and reactive to light  EOM are normal    Neck: Normal range of motion  Neck supple  No JVD present  No tracheal deviation present  Cardiovascular: Normal rate, regular rhythm, normal heart sounds and intact distal pulses  No murmur heard  Pulmonary/Chest: Effort normal and breath sounds normal  No respiratory distress  She has no wheezes  She has no rales  Abdominal: Soft  Bowel sounds are normal  She exhibits no distension and no mass  There is tenderness in the right lower quadrant  There is no CVA tenderness, no tenderness at McBurney's point and negative Graf's sign  Musculoskeletal: Normal range of motion  She exhibits no edema or tenderness  Neurological: She is alert and oriented to person, place, and time  Skin: Skin is warm and dry  No rash noted  No erythema  Nursing note and vitals reviewed        Vital Signs  ED Triage Vitals [01/26/19 1252]   Temperature Pulse Respirations Blood Pressure SpO2   98 2 °F (36 8 °C) 99 18 (!) 183/104 96 %      Temp Source Heart Rate Source Patient Position - Orthostatic VS BP Location FiO2 (%)   Temporal Monitor Sitting Left arm --      Pain Score       9           Vitals:    01/26/19 1252 01/26/19 1300 01/26/19 1534   BP: (!) 183/104  156/94   Pulse: 99 101 94   Patient Position - Orthostatic VS: Sitting         Visual Acuity      ED Medications  Medications   sodium chloride 0 9 % bolus 1,000 mL (1,000 mL Intravenous New Bag 1/26/19 1325)   ondansetron (ZOFRAN) injection 4 mg (4 mg Intravenous Given 1/26/19 1324)   ketorolac (TORADOL) injection 15 mg (15 mg Intravenous Given 1/26/19 1357)       Diagnostic Studies  Results Reviewed     Procedure Component Value Units Date/Time    Lactic acid, plasma [[id number]]  (Normal) Collected:  01/26/19 1320    Lab Status:  Final result Specimen:  Blood from Arm, Left Updated:  01/26/19 1342     LACTIC ACID 1 7 mmol/L     Narrative:         Result may be elevated if tourniquet was used during collection  Lipase [[id number]]  (Normal) Collected:  01/26/19 1320    Lab Status:  Final result Specimen:  Blood from Arm, Left Updated:  01/26/19 1342     Lipase 17 u/L     Comprehensive metabolic panel [[id number]]  (Abnormal) Collected:  01/26/19 1320    Lab Status:  Final result Specimen:  Blood from Arm, Left Updated:  01/26/19 1342     Sodium 131 (L) mmol/L      Potassium 3 7 mmol/L      Chloride 98 mmol/L      CO2 23 mmol/L      ANION GAP 10 mmol/L      BUN 9 mg/dL      Creatinine 0 59 (L) mg/dL      Glucose 375 (H) mg/dL      Calcium 9 4 mg/dL      AST 16 U/L      ALT 33 U/L      Alkaline Phosphatase 106 U/L      Total Protein 7 2 g/dL      Albumin 4 2 g/dL      Total Bilirubin 1 80 (H) mg/dL      eGFR 123 ml/min/1 73sq m     Narrative:         National Kidney Disease Education Program recommendations are as follows:  GFR calculation is accurate only with a steady state creatinine  Chronic Kidney disease less than 60 ml/min/1 73 sq  meters  Kidney failure less than 15 ml/min/1 73 sq  meters      Protime-INR [[id number]]  (Normal) Collected:  01/26/19 1320    Lab Status:  Final result Specimen:  Blood from Arm, Left Updated:  01/26/19 1337     Protime 12 2 seconds      INR 1 05    APTT [[id number]]  (Normal) Collected:  01/26/19 1320    Lab Status:  Final result Specimen:  Blood from Arm, Left Updated:  01/26/19 1337     PTT 36 seconds     CBC and differential [[id number]]  (Abnormal) Collected:  01/26/19 1320    Lab Status:  Final result Specimen:  Blood from Arm, Left Updated:  01/26/19 1330     WBC 8 80 Thousand/uL      RBC 5 55 (H) Million/uL      Hemoglobin 14 8 g/dL      Hematocrit 44 7 %      MCV 81 fL      MCH 26 7 pg      MCHC 33 1 g/dL      RDW 14 9 (H) %      MPV 9 8 fL      Platelets 585 Thousands/uL      nRBC 0 /100 WBCs Neutrophils Relative 63 %      Lymphocytes Relative 27 %      Monocytes Relative 8 %      Eosinophils Relative 2 %      Basophils Relative 1 %      Neutrophils Absolute 5 50 Thousands/µL      Lymphocytes Absolute 2 30 Thousands/µL      Monocytes Absolute 0 70 Thousand/µL      Eosinophils Absolute 0 20 Thousand/µL      Basophils Absolute 0 00 Thousands/µL     Urine Microscopic [[id number]]  (Abnormal) Collected:  01/26/19 1310    Lab Status:  Final result Specimen:  Urine from Urine, Clean Catch Updated:  01/26/19 1328     RBC, UA 2-4 (A) /hpf      WBC, UA 20-30 (A) /hpf      Epithelial Cells Moderate (A) /hpf      Bacteria, UA Occasional /hpf      OTHER OBSERVATIONS Yeast Cells Present    Urine culture [[id number]] Collected:  01/26/19 1310    Lab Status: In process Specimen:  Urine from Urine, Clean Catch Updated:  01/26/19 1327    UA w Reflex to Microscopic w Reflex to Culture [[id number]]  (Abnormal) Collected:  01/26/19 1310    Lab Status:  Final result Specimen:  Urine from Urine, Clean Catch Updated:  01/26/19 1319     Color, UA Yellow     Clarity, UA Hazy     Specific Wade, UA 1 015     pH, UA 5 5     Leukocytes, UA Trace (A)     Nitrite, UA Negative     Protein, UA Negative mg/dl      Glucose, UA 3+ (A) mg/dl      Ketones, UA Trace (A) mg/dl      Urobilinogen, UA 0 2 E U /dl      Bilirubin, UA Negative     Blood, UA Negative    POCT pregnancy, urine [[id number]]  (Normal) Resulted:  01/26/19 1315    Lab Status:  Final result Updated:  01/26/19 1315     EXT PREG TEST UR (Ref: Negative) negative                 US pelvis complete w transvaginal   Final Result by Jennifer Crawford MD (01/26 1515)          1  Marginal interval increase in the size of the complex right paraovarian lesion  No associated ovarian torsion or free fluid within the right adnexa  GYN consultation and follow-up recommended  Differential includes partially calcified hemorrhagic    cyst, endometrioma or dermoid    Consider follow-up MRI imaging for additional characterization  2   Normal appearance of the ovaries and uterus otherwise  The study was marked in EPIC for significant notification  Workstation performed: [id number]                    Procedures  Procedures       Phone Contacts  ED Phone Contact    ED Course                               MDM  Number of Diagnoses or Management Options  Hypertension:   Insulin dependent type 2 diabetes mellitus ([facility] [address] ): established and worsening  Pelvic pain in female: established and worsening  Right ovarian cyst: established and worsening     Amount and/or Complexity of Data Reviewed  Clinical lab tests: ordered and reviewed  Tests in the radiology section of CPT®: ordered and reviewed  Independent visualization of images, tracings, or specimens: yes    Risk of Complications, Morbidity, and/or Mortality  Presenting problems: moderate  Diagnostic procedures: moderate  Management options: moderate    Patient Progress  Patient progress: stable    CritCare Time    Disposition  Final diagnoses:   Pelvic pain in female   Right ovarian cyst   Insulin dependent type 2 diabetes mellitus ([facility] [address] )   Hypertension     Time reflects when diagnosis was documented in both MDM as applicable and the Disposition within this note     Time User Action Codes Description Comment    1/26/2019  3:28 PM Wendy [place] Add [R10 2] Pelvic pain in female     1/26/2019  3:28 PM Wendy Pam Add [N83 201] Right ovarian cyst     1/26/2019  3:28 PM Karla GROVES Add [E11 9,  Z79 4] Insulin dependent type 2 diabetes mellitus ([facility] [address] )     1/26/2019  3:29 PM Wendy Pam Add [I10] Hypertension       ED Disposition     ED Disposition Condition Comment    Discharge  [facility] [place] discharge to home/self care      Condition at discharge: Stable        Follow-up Information     Follow up With Specialties Details [address], [address] In 1 week  [address]  [facility] [address]  [phone]       Zohra Colon 2 Gynecology Schedule an appointment as soon as possible for a visit in 2 days If symptoms worsen return to ER  [address] [place] [address]  [facility] [address] [address], [place], [place], [address]          Patient's Medications   Discharge Prescriptions    No medications on file     No discharge procedures on file      ED Provider  Electronically Signed by           Octavio Fishman PA-C  01/26/19 3218

## 2019-01-27 LAB
BACTERIA UR CULT: ABNORMAL
BACTERIA UR CULT: ABNORMAL

## 2019-02-14 ENCOUNTER — PATIENT OUTREACH (OUTPATIENT)
Dept: FAMILY MEDICINE CLINIC | Facility: CLINIC | Age: 29
End: 2019-02-14

## 2019-02-14 ENCOUNTER — OFFICE VISIT (OUTPATIENT)
Dept: FAMILY MEDICINE CLINIC | Facility: CLINIC | Age: 29
End: 2019-02-14
Payer: COMMERCIAL

## 2019-02-14 VITALS
DIASTOLIC BLOOD PRESSURE: 82 MMHG | BODY MASS INDEX: 43.39 KG/M2 | RESPIRATION RATE: 18 BRPM | WEIGHT: 235.8 LBS | OXYGEN SATURATION: 98 % | TEMPERATURE: 98.6 F | HEIGHT: 62 IN | SYSTOLIC BLOOD PRESSURE: 130 MMHG | HEART RATE: 90 BPM

## 2019-02-14 DIAGNOSIS — E66.01 CLASS 3 SEVERE OBESITY DUE TO EXCESS CALORIES WITH SERIOUS COMORBIDITY AND BODY MASS INDEX (BMI) OF 40.0 TO 44.9 IN ADULT (HCC): ICD-10-CM

## 2019-02-14 DIAGNOSIS — I37.0 PULMONARY VALVE STENOSIS, UNSPECIFIED ETIOLOGY: ICD-10-CM

## 2019-02-14 DIAGNOSIS — N83.201 OVARIAN CYST, RIGHT: Primary | ICD-10-CM

## 2019-02-14 PROBLEM — E66.813 CLASS 3 SEVERE OBESITY DUE TO EXCESS CALORIES WITH SERIOUS COMORBIDITY AND BODY MASS INDEX (BMI) OF 40.0 TO 44.9 IN ADULT (HCC): Status: ACTIVE | Noted: 2019-02-14

## 2019-02-14 PROCEDURE — 99214 OFFICE O/P EST MOD 30 MIN: CPT | Performed by: NURSE PRACTITIONER

## 2019-02-14 NOTE — PROGRESS NOTES
[address] [facility]        NAME: Carol Lopez is a 30 y o  female  : 1990    MRN: [id number]  DATE: 2019  TIME: 10:39 AM    Assessment and Plan   Ovarian cyst, right [N83 201]  1  Ovarian cyst, right  Ambulatory referral to Gynecology    referral made to [address] [place] [address] Ave   2  Class 3 severe obesity due to excess calories with serious comorbidity and body mass index (BMI) of 40 0 to 44 9 in adult [facility])      continue weight loss- healthier eating choices and increase exercise   3  Pulmonary valve stenosis, unspecified etiology      make appt with Cardiology for f/u Echo         Patient Instructions     Patient Instructions   Referral given for GYN in [address] [facility]  Make appt with Cardiologist to discuss enlarging ovarian cyst  Continue weight loss- continue counting calories, maintain diabetic diet, increase exercise  Call or return for problems/concerns          Chief Complaint     Chief Complaint   Patient presents with    Diabetes    Hypertension         History of Present Illness       Pt  Here for weight check  Also recently seen in ER for abd pain  Told her ovarian cyst is getting larger- pt  States that Dr Marika Talavera told her there is nothing he can do for her  Pt  Would like another opinion  Pt  Also has questions about her recent echocardiogram  I discussed the results with her- EF 65%, mild stenosis of Pulmonic Valve  Pt  Concerned she needs another procedure  Review of Systems   Review of Systems   Constitutional: Negative for activity change, diaphoresis, fatigue and fever  HENT: Negative for congestion, facial swelling, hearing loss, rhinorrhea, sinus pressure, sinus pain, sneezing, sore throat and voice change  Eyes: Negative for discharge and visual disturbance  Respiratory: Negative for cough, choking, chest tightness, shortness of breath, wheezing and stridor  Cardiovascular: Negative for chest pain, palpitations and leg swelling     Gastrointestinal: Positive for abdominal pain (on/off- see recent ED visit)  Negative for abdominal distention, constipation, diarrhea, nausea and vomiting  Endocrine: Negative for polydipsia, polyphagia and polyuria  Genitourinary: Negative for difficulty urinating, dysuria, frequency and urgency  Musculoskeletal: Negative for arthralgias, back pain, gait problem, joint swelling, myalgias, neck pain and neck stiffness  Skin: Negative for color change, rash and wound  Neurological: Negative for dizziness, syncope, speech difficulty, weakness, light-headedness and headaches  Hematological: Negative for adenopathy  Does not bruise/bleed easily  Psychiatric/Behavioral: Negative for agitation, behavioral problems, confusion, hallucinations, sleep disturbance and suicidal ideas  The patient is not nervous/anxious            Current Medications       Current Outpatient Medications:     butalbital-acetaminophen-caffeine (FIORICET,ESGIC) -40 mg per tablet, Take 1 tablet by mouth every 4 (four) hours as needed for headaches, Disp: 30 tablet, Rfl: 0    diphenhydrAMINE (BENADRYL) 25 mg tablet, Take 1 tablet (25 mg total) by mouth once for 1 dose, Disp: 30 tablet, Rfl: 0    etonogestrel (NEXPLANON) subdermal implant, Inject 68 mg under the skin, Disp: , Rfl:     gabapentin (NEURONTIN) 300 mg capsule, Take 1 capsule (300 mg total) by mouth 2 (two) times a day, Disp: 90 capsule, Rfl: 1    Glecaprevir-Pibrentasvir (MAVYRET PO), Take by mouth, Disp: , Rfl:     glucose blood (ACCU-CHEK GUIDE) test strip, Test three times per day, Disp: 100 each, Rfl: 0    ibuprofen (MOTRIN) 600 mg tablet, Take 1 tablet (600 mg total) by mouth every 8 (eight) hours as needed for moderate pain or headaches (Patient not taking: Reported on 1/26/2019 ), Disp: 90 tablet, Rfl: 1    insulin glargine (LANTUS) 100 units/mL subcutaneous injection, Inject 20 Units under the skin daily at bedtime (Patient taking differently: Inject 22 Units under the skin daily at bedtime  ), Disp: 10 mL, Rfl: 3    Insulin Pen Needle 31G X 8 MM MISC, by Does not apply route daily, Disp: 100 each, Rfl: 0    Lancets (ACCU-CHEK MULTICLIX) lancets, Use as instructed, Disp: 100 each, Rfl: 3    lisinopril (ZESTRIL) 20 mg tablet, Take 1 tablet (20 mg total) by mouth daily, Disp: 30 tablet, Rfl: 3    metFORMIN (GLUCOPHAGE) 500 mg tablet, Take 1 tablet (500 mg total) by mouth 2 (two) times a day with meals, Disp: 60 tablet, Rfl: 2    ondansetron (ZOFRAN) 4 mg tablet, Take 1 tablet (4 mg total) by mouth every 4 (four) hours as needed for nausea or vomiting, Disp: 20 tablet, Rfl: 0    Current Facility-Administered Medications:     insulin glargine (LANTUS) subcutaneous injection 20 Units 0 2 mL, 20 Units, Subcutaneous, HS, Sarah Kincaid MD    Current Allergies     Allergies as of 02/14/2019 - Reviewed 02/14/2019   Allergen Reaction Noted    Corticosteroids Anaphylaxis 01/13/2009    Prednisone Swelling 06/12/2018    Sulfa antibiotics Hives 01/14/2019    Milk-related compounds Sneezing 12/11/2018            The following portions of the patient's history were reviewed and updated as appropriate: allergies, current medications, past family history, past medical history, past social history, past surgical history and problem list      Past Medical History:   Diagnosis Date    Allergic     Arthritis     Cyst of ovary, right     Diabetes mellitus ([facility] [address] ) 10/10/18    Heart murmur 01/19/88    Hepatitis C     Hypertension     Obesity 1995    Pulmonary artery congenital abnormality     Spleen enlarged        Past Surgical History:   Procedure Laterality Date    CARDIAC CATHETERIZATION      no CAD 10days, 4 weeks 22months old     CHOLECYSTECTOMY      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 7   Brett Vera LIVER BIOPSY      LIVER BIOPSY      VSD REPAIR      As a child       Family History   Problem Relation Age of Onset    Hypertension Mother     Migraines Mother     Diabetes Father  Hypertension Father     Kidney failure Father     Polycystic kidney disease Father     Heart attack Father     Arthritis Father     Stroke Father     Polycystic kidney disease Paternal Grandmother     Stroke Paternal Grandmother     Arthritis Sister     Asthma Sister     Arthritis Maternal Grandmother     Cancer Maternal Grandmother     Learning disabilities Cousin     Learning disabilities Sister     ADD / ADHD Cousin          Medications have been verified  Objective   /82 (BP Location: Right arm, Patient Position: Sitting, Cuff Size: Large)   Pulse 90   Temp 98 6 °F (37 °C) (Tympanic)   Resp 18   Ht 5' 2" (1 575 m)   Wt 107 kg (235 lb 12 8 oz)   LMP 01/19/2019 (Exact Date)   SpO2 98%   BMI 43 13 kg/m²        Physical Exam     Physical Exam   Constitutional: She is oriented to person, place, and time  Vital signs are normal  She appears well-developed and well-nourished  She is active and cooperative  No distress  Eyes: EOM are normal    Cardiovascular: Normal rate, regular rhythm and normal heart sounds  No murmur heard  Pulmonary/Chest: Effort normal and breath sounds normal  No respiratory distress  She has no wheezes  Neurological: She is alert and oriented to person, place, and time  Skin: Skin is warm and dry  No rash noted  She is not diaphoretic  No erythema  Psychiatric: She has a normal mood and affect  Her behavior is normal  Judgment and thought content normal    Nursing note and vitals reviewed

## 2019-02-14 NOTE — PROGRESS NOTES
Outpatient Care Management Note:  Met with Kody Nettles at PCP office  She has lost five pounds but is still struggling with her diet  Encouragement given for weight loss  She is still skipping breakfast at times, reinforced the importance of eating regular meals  Encouraged to keep protein bars on hand if she is not going to be home, verbalized understanding of same  Continues to have abdominal pain from her ovarian cyst, scheduled an appointment with OB/GYn for same  Had questions about the results of her echocardiogram, she never completed the CTA that was ordered or made a follow up appointment with cardiology  Both appointments scheduled  She received the information that I sent to her and has no questions  List of appointments made as well as lab slips for lab work needed prior to her appointment next week  Remains independent with her ADL;s   Verified that she has my contact information, encouraged to call with questions or concerns

## 2019-02-14 NOTE — PATIENT INSTRUCTIONS
Referral given for GYN in [address] [facility]  Make appt with Cardiologist to discuss enlarging ovarian cyst  Continue weight loss- continue counting calories, maintain diabetic diet, increase exercise  Call or return for problems/concerns

## 2019-02-19 ENCOUNTER — TRANSCRIBE ORDERS (OUTPATIENT)
Dept: ADMINISTRATIVE | Facility: HOSPITAL | Age: 29
End: 2019-02-19

## 2019-02-19 ENCOUNTER — HOSPITAL ENCOUNTER (OUTPATIENT)
Dept: CT IMAGING | Facility: HOSPITAL | Age: 29
Discharge: HOME | End: 2019-02-19
Payer: COMMERCIAL

## 2019-02-19 ENCOUNTER — APPOINTMENT (OUTPATIENT)
Dept: LAB | Facility: HOSPITAL | Age: 29
End: 2019-02-19
Attending: PHYSICIAN ASSISTANT
Payer: COMMERCIAL

## 2019-02-19 ENCOUNTER — APPOINTMENT (OUTPATIENT)
Dept: LAB | Facility: HOSPITAL | Age: 29
End: 2019-02-19
Payer: COMMERCIAL

## 2019-02-19 DIAGNOSIS — R19.7 DIARRHEA, UNSPECIFIED TYPE: ICD-10-CM

## 2019-02-19 DIAGNOSIS — Q21.0 VSD (VENTRICULAR SEPTAL DEFECT AND AORTIC ARCH HYPOPLASIA: ICD-10-CM

## 2019-02-19 DIAGNOSIS — R19.7 DIARRHEA, UNSPECIFIED TYPE: Primary | ICD-10-CM

## 2019-02-19 DIAGNOSIS — R16.1 SPLENOMEGALY: ICD-10-CM

## 2019-02-19 DIAGNOSIS — E11.9 TYPE 2 DIABETES MELLITUS WITHOUT COMPLICATION, WITH LONG-TERM CURRENT USE OF INSULIN (HCC): ICD-10-CM

## 2019-02-19 DIAGNOSIS — R93.1 ABNORMAL ECHOCARDIOGRAM: ICD-10-CM

## 2019-02-19 DIAGNOSIS — Q25.42 VSD (VENTRICULAR SEPTAL DEFECT AND AORTIC ARCH HYPOPLASIA: ICD-10-CM

## 2019-02-19 DIAGNOSIS — Z79.4 TYPE 2 DIABETES MELLITUS WITHOUT COMPLICATION, WITH LONG-TERM CURRENT USE OF INSULIN (HCC): ICD-10-CM

## 2019-02-19 DIAGNOSIS — Q25.1 COARCTATION OF AORTA: ICD-10-CM

## 2019-02-19 LAB
ALBUMIN SERPL BCP-MCNC: 4.1 G/DL (ref 3.5–5.7)
ALP SERPL-CCNC: 87 U/L (ref 40–150)
ALT SERPL W P-5'-P-CCNC: 22 U/L (ref 7–52)
ANION GAP SERPL CALCULATED.3IONS-SCNC: 8 MMOL/L (ref 4–13)
AST SERPL W P-5'-P-CCNC: 15 U/L (ref 13–39)
BASOPHILS # BLD AUTO: 0 THOUSANDS/ΜL (ref 0–0.1)
BASOPHILS NFR BLD AUTO: 1 % (ref 0–2)
BILIRUB SERPL-MCNC: 0.5 MG/DL (ref 0.2–1)
BUN SERPL-MCNC: 11 MG/DL (ref 7–25)
CALCIUM SERPL-MCNC: 9.7 MG/DL (ref 8.6–10.5)
CHLORIDE SERPL-SCNC: 99 MMOL/L (ref 98–107)
CO2 SERPL-SCNC: 27 MMOL/L (ref 21–31)
CREAT SERPL-MCNC: 0.68 MG/DL (ref 0.6–1.2)
CRP SERPL QL: 11.8 MG/L
EOSINOPHIL # BLD AUTO: 0.1 THOUSAND/ΜL (ref 0–0.61)
EOSINOPHIL NFR BLD AUTO: 2 % (ref 0–5)
ERYTHROCYTE [DISTWIDTH] IN BLOOD BY AUTOMATED COUNT: 14.4 % (ref 11.5–14.5)
ERYTHROCYTE [SEDIMENTATION RATE] IN BLOOD: 15 MM/HOUR (ref 0–20)
EST. AVERAGE GLUCOSE BLD GHB EST-MCNC: 260 MG/DL
GFR SERPL CREATININE-BSD FRML MDRD: 117 ML/MIN/1.73SQ M
GLUCOSE P FAST SERPL-MCNC: 495 MG/DL (ref 65–99)
HBA1C MFR BLD: 10.7 % (ref 4.2–6.3)
HCT VFR BLD AUTO: 46.8 % (ref 42–47)
HGB BLD-MCNC: 15.2 G/DL (ref 12–16)
LDH SERPL-CCNC: 221 U/L (ref 84–246)
LYMPHOCYTES # BLD AUTO: 2.1 THOUSANDS/ΜL (ref 0.6–4.47)
LYMPHOCYTES NFR BLD AUTO: 25 % (ref 21–51)
MCH RBC QN AUTO: 26.3 PG (ref 26–34)
MCHC RBC AUTO-ENTMCNC: 32.4 G/DL (ref 31–37)
MCV RBC AUTO: 81 FL (ref 81–99)
MONOCYTES # BLD AUTO: 0.6 THOUSAND/ΜL (ref 0.17–1.22)
MONOCYTES NFR BLD AUTO: 7 % (ref 2–12)
NEUTROPHILS # BLD AUTO: 5.4 THOUSANDS/ΜL (ref 1.4–6.5)
NEUTS SEG NFR BLD AUTO: 66 % (ref 42–75)
NRBC BLD AUTO-RTO: 0 /100 WBCS
PLATELET # BLD AUTO: 209 THOUSANDS/UL (ref 149–390)
PMV BLD AUTO: 10 FL (ref 8.6–11.7)
POTASSIUM SERPL-SCNC: 4.1 MMOL/L (ref 3.5–5.5)
PROT SERPL-MCNC: 7.2 G/DL (ref 6.4–8.9)
RBC # BLD AUTO: 5.75 MILLION/UL (ref 3.9–5.2)
SODIUM SERPL-SCNC: 134 MMOL/L (ref 134–143)
WBC # BLD AUTO: 8.2 THOUSAND/UL (ref 4.8–10.8)

## 2019-02-19 PROCEDURE — 83036 HEMOGLOBIN GLYCOSYLATED A1C: CPT

## 2019-02-19 PROCEDURE — 87493 C DIFF AMPLIFIED PROBE: CPT

## 2019-02-19 PROCEDURE — 86140 C-REACTIVE PROTEIN: CPT

## 2019-02-19 PROCEDURE — 71275 CT ANGIOGRAPHY CHEST: CPT

## 2019-02-19 PROCEDURE — 80053 COMPREHEN METABOLIC PANEL: CPT

## 2019-02-19 PROCEDURE — 74175 CTA ABDOMEN W/CONTRAST: CPT

## 2019-02-19 PROCEDURE — 85652 RBC SED RATE AUTOMATED: CPT

## 2019-02-19 PROCEDURE — 87177 OVA AND PARASITES SMEARS: CPT

## 2019-02-19 PROCEDURE — 84681 ASSAY OF C-PEPTIDE: CPT

## 2019-02-19 PROCEDURE — 83615 LACTATE (LD) (LDH) ENZYME: CPT

## 2019-02-19 PROCEDURE — 87209 SMEAR COMPLEX STAIN: CPT

## 2019-02-19 PROCEDURE — 36415 COLL VENOUS BLD VENIPUNCTURE: CPT

## 2019-02-19 PROCEDURE — 87505 NFCT AGENT DETECTION GI: CPT

## 2019-02-19 PROCEDURE — 85025 COMPLETE CBC W/AUTO DIFF WBC: CPT

## 2019-02-19 RX ADMIN — IOHEXOL 100 ML: 350 INJECTION, SOLUTION INTRAVENOUS at 14:00

## 2019-02-20 LAB
C DIFF TOX GENS STL QL NAA+PROBE: NORMAL
C PEPTIDE SERPL-MCNC: 4.6 NG/ML (ref 1.1–4.4)
CAMPYLOBACTER DNA SPEC NAA+PROBE: NORMAL
O+P STL CONC: NORMAL
SALMONELLA DNA SPEC QL NAA+PROBE: NORMAL
SHIGA TOXIN STX GENE SPEC NAA+PROBE: NORMAL
SHIGELLA DNA SPEC QL NAA+PROBE: NORMAL

## 2019-02-22 ENCOUNTER — OFFICE VISIT (OUTPATIENT)
Dept: FAMILY MEDICINE CLINIC | Facility: CLINIC | Age: 29
End: 2019-02-22
Payer: COMMERCIAL

## 2019-02-22 ENCOUNTER — TELEPHONE (OUTPATIENT)
Dept: CARDIOLOGY CLINIC | Facility: CLINIC | Age: 29
End: 2019-02-22

## 2019-02-22 ENCOUNTER — APPOINTMENT (OUTPATIENT)
Dept: LAB | Facility: HOSPITAL | Age: 29
End: 2019-02-22
Attending: PHYSICIAN ASSISTANT
Payer: COMMERCIAL

## 2019-02-22 ENCOUNTER — TRANSCRIBE ORDERS (OUTPATIENT)
Dept: ADMINISTRATIVE | Facility: HOSPITAL | Age: 29
End: 2019-02-22

## 2019-02-22 VITALS
OXYGEN SATURATION: 95 % | BODY MASS INDEX: 42.91 KG/M2 | TEMPERATURE: 98 F | HEIGHT: 62 IN | HEART RATE: 106 BPM | DIASTOLIC BLOOD PRESSURE: 92 MMHG | WEIGHT: 233.2 LBS | SYSTOLIC BLOOD PRESSURE: 138 MMHG

## 2019-02-22 DIAGNOSIS — E11.9 TYPE 2 DIABETES MELLITUS WITHOUT COMPLICATION, WITHOUT LONG-TERM CURRENT USE OF INSULIN (HCC): Primary | ICD-10-CM

## 2019-02-22 DIAGNOSIS — Z23 NEED FOR VACCINATION: ICD-10-CM

## 2019-02-22 DIAGNOSIS — E66.01 CLASS 3 SEVERE OBESITY DUE TO EXCESS CALORIES WITH SERIOUS COMORBIDITY AND BODY MASS INDEX (BMI) OF 40.0 TO 44.9 IN ADULT (HCC): ICD-10-CM

## 2019-02-22 DIAGNOSIS — R19.7 DIARRHEA, UNSPECIFIED TYPE: ICD-10-CM

## 2019-02-22 DIAGNOSIS — R16.1 SPLENOMEGALY: ICD-10-CM

## 2019-02-22 DIAGNOSIS — R19.7 DIARRHEA, UNSPECIFIED TYPE: Primary | ICD-10-CM

## 2019-02-22 LAB
ALBUMIN SERPL BCP-MCNC: 4.3 G/DL (ref 3.5–5.7)
ALP SERPL-CCNC: 96 U/L (ref 40–150)
ALT SERPL W P-5'-P-CCNC: 22 U/L (ref 7–52)
ANION GAP SERPL CALCULATED.3IONS-SCNC: 9 MMOL/L (ref 4–13)
AST SERPL W P-5'-P-CCNC: 13 U/L (ref 13–39)
BASOPHILS # BLD AUTO: 0 THOUSANDS/ΜL (ref 0–0.1)
BASOPHILS NFR BLD AUTO: 0 % (ref 0–2)
BILIRUB SERPL-MCNC: 0.6 MG/DL (ref 0.2–1)
BUN SERPL-MCNC: 9 MG/DL (ref 7–25)
CALCIUM SERPL-MCNC: 9.7 MG/DL (ref 8.6–10.5)
CHLORIDE SERPL-SCNC: 98 MMOL/L (ref 98–107)
CO2 SERPL-SCNC: 28 MMOL/L (ref 21–31)
CREAT SERPL-MCNC: 0.64 MG/DL (ref 0.6–1.2)
EOSINOPHIL # BLD AUTO: 0.1 THOUSAND/ΜL (ref 0–0.61)
EOSINOPHIL NFR BLD AUTO: 2 % (ref 0–5)
ERYTHROCYTE [DISTWIDTH] IN BLOOD BY AUTOMATED COUNT: 14.3 % (ref 11.5–14.5)
GFR SERPL CREATININE-BSD FRML MDRD: 119 ML/MIN/1.73SQ M
GLUCOSE P FAST SERPL-MCNC: 362 MG/DL (ref 65–99)
HCT VFR BLD AUTO: 46 % (ref 42–47)
HGB BLD-MCNC: 15.6 G/DL (ref 12–16)
LYMPHOCYTES # BLD AUTO: 2.3 THOUSANDS/ΜL (ref 0.6–4.47)
LYMPHOCYTES NFR BLD AUTO: 25 % (ref 21–51)
MCH RBC QN AUTO: 26.6 PG (ref 26–34)
MCHC RBC AUTO-ENTMCNC: 33.9 G/DL (ref 31–37)
MCV RBC AUTO: 79 FL (ref 81–99)
MONOCYTES # BLD AUTO: 0.7 THOUSAND/ΜL (ref 0.17–1.22)
MONOCYTES NFR BLD AUTO: 8 % (ref 2–12)
NEUTROPHILS # BLD AUTO: 5.9 THOUSANDS/ΜL (ref 1.4–6.5)
NEUTS SEG NFR BLD AUTO: 66 % (ref 42–75)
NRBC BLD AUTO-RTO: 0 /100 WBCS
PLATELET # BLD AUTO: 227 THOUSANDS/UL (ref 149–390)
PMV BLD AUTO: 9.8 FL (ref 8.6–11.7)
POTASSIUM SERPL-SCNC: 3.9 MMOL/L (ref 3.5–5.5)
PROT SERPL-MCNC: 7.5 G/DL (ref 6.4–8.9)
RBC # BLD AUTO: 5.85 MILLION/UL (ref 3.9–5.2)
SODIUM SERPL-SCNC: 135 MMOL/L (ref 134–143)
WBC # BLD AUTO: 9.1 THOUSAND/UL (ref 4.8–10.8)

## 2019-02-22 PROCEDURE — 90471 IMMUNIZATION ADMIN: CPT

## 2019-02-22 PROCEDURE — 99213 OFFICE O/P EST LOW 20 MIN: CPT | Performed by: FAMILY MEDICINE

## 2019-02-22 PROCEDURE — 85025 COMPLETE CBC W/AUTO DIFF WBC: CPT

## 2019-02-22 PROCEDURE — 36415 COLL VENOUS BLD VENIPUNCTURE: CPT

## 2019-02-22 PROCEDURE — 90746 HEPB VACCINE 3 DOSE ADULT IM: CPT

## 2019-02-22 PROCEDURE — 87522 HEPATITIS C REVRS TRNSCRPJ: CPT

## 2019-02-22 PROCEDURE — 80053 COMPREHEN METABOLIC PANEL: CPT

## 2019-02-22 NOTE — PROGRESS NOTES
Assessment/Plan:    No problem-specific Assessment & Plan notes found for this encounter  Diagnoses and all orders for this visit:    Type 2 diabetes mellitus without complication, without long-term current use of insulin (HCC)  -     metFORMIN (GLUCOPHAGE) 1000 MG tablet; Take 1 tablet (1,000 mg total) by mouth 2 (two) times a day with meals    Need for vaccination  -     HEPATITIS B VACCINE ADULT IM    Class 3 severe obesity due to excess calories with serious comorbidity and body mass index (BMI) of 40 0 to 44 9 in adult [facility])  Comments:  pt counseled on diet and exercise        BMI Counseling: Body mass index is 42 65 kg/m²  Discussed the patient's BMI with her  The BMI is above average  BMI counseling and education was provided to the patient  Nutrition recommendations include reducing portion sizes, decreasing overall calorie intake, 3-5 servings of fruits/vegetables daily, reducing fast food intake, consuming healthier snacks, decreasing soda and/or juice intake, moderation in carbohydrate intake, increasing intake of lean protein, reducing intake of saturated fat and trans fat and reducing intake of cholesterol  Exercise recommendations include moderate aerobic physical activity for 150 minutes/week and exercising 3-5 times per week  Subjective:      Patient ID: Viktor Mayo is a 30 y o  female  Follow up for obesity pt has been successful with losing weight    Diabetes   She presents for her follow-up diabetic visit  She has type 2 diabetes mellitus  There are no hypoglycemic associated symptoms  Pertinent negatives for diabetes include no chest pain  There are no hypoglycemic complications  Symptoms are stable  Risk factors for coronary artery disease include obesity and sedentary lifestyle  Current diabetic treatment includes insulin injections and oral agent (monotherapy)  She is compliant with treatment some of the time  She is following a diabetic diet   Her overall blood glucose range is 180-200 mg/dl  The following portions of the patient's history were reviewed and updated as appropriate: allergies, current medications, past family history, past medical history, past social history, past surgical history and problem list     Review of Systems   Eyes: Negative for visual disturbance  Cardiovascular: Negative for chest pain and palpitations  Gastrointestinal: Negative for abdominal pain, nausea and vomiting  Genitourinary: Negative for frequency  Skin: Negative for wound  Neurological: Negative for numbness  Objective:      /92   Pulse (!) 106   Temp 98 °F (36 7 °C) (Tympanic)   Ht 5' 2" (1 575 m)   Wt 106 kg (233 lb 3 2 oz)   SpO2 95%   BMI 42 65 kg/m²          Physical Exam   Constitutional: She is oriented to person, place, and time  She appears well-developed and well-nourished  No distress  HENT:   Head: Normocephalic and atraumatic  Eyes: Pupils are equal, round, and reactive to light  Conjunctivae and EOM are normal  No scleral icterus  Neck: Normal range of motion  Neck supple  Cardiovascular: Normal rate, regular rhythm and normal heart sounds  No murmur heard  Pulmonary/Chest: Effort normal and breath sounds normal  No respiratory distress  She has no wheezes  She has no rales  Abdominal: Soft  Bowel sounds are normal  She exhibits no distension and no mass  There is no tenderness  There is no rebound and no guarding  Musculoskeletal: Normal range of motion  She exhibits no edema or deformity  Lymphadenopathy:     She has no cervical adenopathy  Neurological: She is alert and oriented to person, place, and time  Skin: Skin is warm and dry  She is not diaphoretic  Psychiatric: She has a normal mood and affect  Her behavior is normal  Judgment and thought content normal    Nursing note and vitals reviewed

## 2019-02-22 NOTE — TELEPHONE ENCOUNTER
----- Message from Bharat Mares MA sent at 2/22/2019  8:30 AM EST -----  Spoke to patient, per Bere Moreno she has 50% stenosis at coarctation of aorta repair, please call to schedule with Dr Phyllis Gonzales as new patient per Bere Moreno

## 2019-02-22 NOTE — TELEPHONE ENCOUNTER
I just spoke to patient and Dr Darrell Cho  He said he can see the patient in [facility] he the as soon as possible  Please make an appointment with Dr Darrell Cho it [facility]

## 2019-02-26 LAB
HCV RNA SERPL NAA+PROBE-ACNC: NORMAL IU/ML
TEST INFORMATION: NORMAL

## 2019-02-28 ENCOUNTER — OFFICE VISIT (OUTPATIENT)
Dept: FAMILY MEDICINE CLINIC | Facility: CLINIC | Age: 29
End: 2019-02-28
Payer: COMMERCIAL

## 2019-02-28 ENCOUNTER — PATIENT OUTREACH (OUTPATIENT)
Dept: FAMILY MEDICINE CLINIC | Facility: CLINIC | Age: 29
End: 2019-02-28

## 2019-02-28 VITALS
SYSTOLIC BLOOD PRESSURE: 142 MMHG | OXYGEN SATURATION: 98 % | DIASTOLIC BLOOD PRESSURE: 84 MMHG | BODY MASS INDEX: 43.61 KG/M2 | HEART RATE: 106 BPM | TEMPERATURE: 98.2 F | HEIGHT: 62 IN | RESPIRATION RATE: 18 BRPM | WEIGHT: 237 LBS

## 2019-02-28 DIAGNOSIS — E11.9 TYPE 2 DIABETES MELLITUS WITHOUT COMPLICATION, WITHOUT LONG-TERM CURRENT USE OF INSULIN (HCC): Primary | ICD-10-CM

## 2019-02-28 DIAGNOSIS — R11.0 NAUSEA: ICD-10-CM

## 2019-02-28 DIAGNOSIS — K21.9 GASTROESOPHAGEAL REFLUX DISEASE WITHOUT ESOPHAGITIS: ICD-10-CM

## 2019-02-28 DIAGNOSIS — R42 DIZZINESS: ICD-10-CM

## 2019-02-28 DIAGNOSIS — H81.11 BENIGN PAROXYSMAL POSITIONAL VERTIGO OF RIGHT EAR: ICD-10-CM

## 2019-02-28 PROCEDURE — 99214 OFFICE O/P EST MOD 30 MIN: CPT | Performed by: FAMILY MEDICINE

## 2019-02-28 RX ORDER — ONDANSETRON HYDROCHLORIDE 8 MG/1
8 TABLET, FILM COATED ORAL EVERY 8 HOURS PRN
Qty: 30 TABLET | Refills: 1 | Status: SHIPPED | OUTPATIENT
Start: 2019-02-28 | End: 2020-05-19 | Stop reason: SDUPTHER

## 2019-02-28 RX ORDER — PANTOPRAZOLE SODIUM 40 MG/1
40 TABLET, DELAYED RELEASE ORAL DAILY
Qty: 30 TABLET | Refills: 6 | Status: SHIPPED | OUTPATIENT
Start: 2019-02-28 | End: 2019-11-21

## 2019-02-28 NOTE — PROGRESS NOTES
Outpatient Care Management Note:  Received return call from Ronny Reynolds  She is doing well  She has been having episodes of nausea and abdominal pain and has an appointment with her PCP today to discuss  Her fasting sugar this morning was 190  Her insulin was discontinued and she is only taking Metformin at this time  She had a bowl of cereal for breakfast   Discussed that it was all carbohydrates that would urn to sugar and she needs to add a protein at every meal   Verbalized understanding of same  Also discussed that her elevated sugars could be affecting the nerves in her stomach and be partially responsible for her nausea, verbalized that she was aware of same  She is aware of the results of her CTA of the chest and has an appointment with Dr Amelia Hansen on 3/4/19  She denies any other needs at this time  Encouraged to call with questions or concerns  Outpatient Care Management Note  Ronny Reynolds is doing well and her blood sugars are good  Requested to call me back because she was busy  Agreed to same

## 2019-02-28 NOTE — PROGRESS NOTES
Assessment/Plan:    No problem-specific Assessment & Plan notes found for this encounter  Diagnoses and all orders for this visit:    Type 2 diabetes mellitus without complication, without long-term current use of insulin ([facility])  Comments:  marked improvement    Dizziness    Nausea  -     ondansetron (ZOFRAN) 8 mg tablet; Take 1 tablet (8 mg total) by mouth every 8 (eight) hours as needed for nausea or vomiting    Gastroesophageal reflux disease without esophagitis  -     ondansetron (ZOFRAN) 8 mg tablet; Take 1 tablet (8 mg total) by mouth every 8 (eight) hours as needed for nausea or vomiting  -     pantoprazole (PROTONIX) 40 mg tablet; Take 1 tablet (40 mg total) by mouth daily    Benign paroxysmal positional vertigo of right ear  Comments:  pt cannot take steroids          Subjective:      Patient ID: Gisela Gunderson is a 30 y o  female  Follow up for sugars pt is checking them at home and seeing an average of 160-190, pt is complaining today of GERD, nausea and mild abdominal pain, pt also has mild dizziness consisting of seeing the room spinning lasting about a minute, it occurs when she turns her head to the right    Dizziness   This is a new problem  The current episode started in the past 7 days  The problem occurs intermittently  Pertinent negatives include no chest pain  Nothing aggravates the symptoms  She has tried nothing for the symptoms  The following portions of the patient's history were reviewed and updated as appropriate: allergies, current medications, past family history, past medical history, past social history, past surgical history and problem list     Review of Systems   Respiratory: Negative for shortness of breath and wheezing  Cardiovascular: Negative for chest pain and palpitations  Neurological: Positive for dizziness           Objective:      /84   Pulse (!) 106   Temp 98 2 °F (36 8 °C) (Tympanic)   Resp 18   Ht 5' 2" (1 575 m)   Wt 108 kg (237 lb)   SpO2 98%   BMI 43 35 kg/m²          Physical Exam   Constitutional: She is oriented to person, place, and time  She appears well-developed and well-nourished  No distress  HENT:   Head: Normocephalic and atraumatic  Eyes: Pupils are equal, round, and reactive to light  Conjunctivae and EOM are normal  No scleral icterus  Neck: Normal range of motion  Neck supple  Cardiovascular: Normal rate, regular rhythm and normal heart sounds  No murmur heard  Pulmonary/Chest: Effort normal and breath sounds normal  No respiratory distress  She has no wheezes  She has no rales  Abdominal: Soft  Bowel sounds are normal  She exhibits no distension and no mass  There is no tenderness  There is no rebound and no guarding  Musculoskeletal: She exhibits no edema  Lymphadenopathy:     She has no cervical adenopathy  Neurological: She is alert and oriented to person, place, and time  Skin: Skin is warm and dry  She is not diaphoretic  Psychiatric: She has a normal mood and affect  Her behavior is normal  Judgment and thought content normal    Nursing note and vitals reviewed

## 2019-03-05 ENCOUNTER — OFFICE VISIT (OUTPATIENT)
Dept: CARDIOLOGY CLINIC | Facility: CLINIC | Age: 29
End: 2019-03-05
Payer: COMMERCIAL

## 2019-03-05 VITALS
WEIGHT: 238 LBS | DIASTOLIC BLOOD PRESSURE: 90 MMHG | BODY MASS INDEX: 43.79 KG/M2 | HEART RATE: 94 BPM | SYSTOLIC BLOOD PRESSURE: 140 MMHG | OXYGEN SATURATION: 98 % | HEIGHT: 62 IN

## 2019-03-05 DIAGNOSIS — Q25.1 COARCTATION OF AORTA: Primary | ICD-10-CM

## 2019-03-05 PROCEDURE — 99215 OFFICE O/P EST HI 40 MIN: CPT | Performed by: INTERNAL MEDICINE

## 2019-03-05 NOTE — PROGRESS NOTES
Heart Failure Outpatient Consult Note - Kvng Quevedo 30 y o  female MRN: [id number]    @ Encounter: [id number]  Assessment/Plan:    Patient Active Problem List    Diagnosis Date Noted    Pulmonary valve stenosis 02/14/2019    Class 3 severe obesity due to excess calories with serious comorbidity and body mass index (BMI) of 40 0 to 44 9 in adult [facility]) 02/14/2019    Ovarian cyst, right 02/14/2019    Chronic tension-type headache, intractable 12/17/2018    Bipolar affective disorder, currently depressed, moderate ([address] ) 12/17/2018    Chronic nonintractable headache 12/03/2018    Splenomegaly 10/12/2018    Hyperglycemia 10/12/2018    Hyponatremia 10/12/2018    Hypertension 10/12/2018    Hepatitis C 10/12/2018    Abdominal pain 10/12/2018    S/P VSD repair 07/12/2018    H/O aortic coarctation repair 07/12/2018     # Congenital coarctation s/p BPA: There she had a lissette patch placed on the coarctation (10 days)  CTA shows recurrent coarctation of the aorta with weblike 50% stenosis of the proximal descending thoracic aorta  Rest of vasculature appears normal per CTA report (2/19/19)  TTE 12/21/18, 70-80 mmHg gradient (although patient had increased flow and tachycardia at the time)  --Obtain records from [place] [id number] in [place]  --Repeat TTE after next visit (or post stent placement)  --Will refer for stent placement (COAST 1 trial)    # H/o VSD repair  At 2 weeks old she had a pulmonary band placed  At 22 months, she had the band removed and the VSD repaired  At 7years of age, she had balloon angioplasty of the coarctation done  --Will consider cMRI in the future    # Mild pulmonic stenosis: Imaging suboptimal  Repeat TTE after next visit  # Morbid obesity  # Hep C: Continue Mavyret  # HTN: Well controlled on lisinopril 20 mg PO daily    RTC in 6 weeks    HPI: Very pleasant 29 y/o woman w/ PMHx of VSD and coarctation s/p repair referred for management of coarctation   TTE 12/21/18 showed LVEF 65% w/ @ least mild pulmonic stenosis w/  50% stenosis of the proximal descending thoracic aorta  She was born in the [facility] and was sent to [address] [place] [address] in [place]  There she had a lissette patch placed on the coarctation (10 days)  At 2 weeks old she had a pulmonary band placed  At 22 months, she had the band removed and the VSD repaired  At 7years of age, she had balloon angioplasty of the coarctation done  At age [age], there was a concern that she may have contracted Hep C (from surgery at 22 months)  She went on treatment as she had mild fibrosis  During her treatment, her physician left  Initially she was responding, but then stable  She is currently on treatment with Mavyret, and her Hep C is undetectable  She states that she has been having episodes of chest pain that is unrelated to exertion  Otherwise, feels well  Able to ambulate on flat ground without difficulty  Past Medical History:   Diagnosis Date    Allergic     Arthritis     Cyst of ovary, right     Diabetes mellitus ([facility] ) 10/10/18    Heart murmur 01/19/88    Hepatitis C     Hypertension     Obesity 1995    Pulmonary artery congenital abnormality     Spleen enlarged      Review of Systems - 12 point ROS was done and is negative, except as noted above  Allergies   Allergen Reactions    Corticosteroids Anaphylaxis    Prednisone Swelling    Sulfa Antibiotics Hives    Milk-Related Compounds Sneezing           Current Outpatient Medications:     etonogestrel (NEXPLANON) subdermal implant, Inject 68 mg under the skin, Disp: , Rfl:     gabapentin (NEURONTIN) 300 mg capsule, Take 1 capsule (300 mg total) by mouth 2 (two) times a day, Disp: 90 capsule, Rfl: 1    Glecaprevir-Pibrentasvir (MAVYRET PO), Take by mouth, Disp: , Rfl:     glucose blood (ACCU-CHEK GUIDE) test strip, Test three times per day, Disp: 100 each, Rfl: 0    lisinopril (ZESTRIL) 20 mg tablet, Take 1 tablet (20 mg total) by mouth daily, Disp: 30 tablet, Rfl: 3    metFORMIN (GLUCOPHAGE) 1000 MG tablet, Take 1 tablet (1,000 mg total) by mouth 2 (two) times a day with meals, Disp: 60 tablet, Rfl: 6    ondansetron (ZOFRAN) 8 mg tablet, Take 1 tablet (8 mg total) by mouth every 8 (eight) hours as needed for nausea or vomiting, Disp: 30 tablet, Rfl: 1    pantoprazole (PROTONIX) 40 mg tablet, Take 1 tablet (40 mg total) by mouth daily, Disp: 30 tablet, Rfl: 6    butalbital-acetaminophen-caffeine (FIORICET,ESGIC) -40 mg per tablet, Take 1 tablet by mouth every 4 (four) hours as needed for headaches (Patient not taking: Reported on 3/5/2019), Disp: 30 tablet, Rfl: 0    ibuprofen (MOTRIN) 600 mg tablet, Take 1 tablet (600 mg total) by mouth every 8 (eight) hours as needed for moderate pain or headaches (Patient not taking: Reported on 3/5/2019), Disp: 90 tablet, Rfl: 1    Insulin Pen Needle 31G X 8 MM MISC, by Does not apply route daily (Patient not taking: Reported on 3/5/2019), Disp: 100 each, Rfl: 0    Lancets (ACCU-CHEK MULTICLIX) lancets, Use as instructed (Patient not taking: Reported on 3/5/2019), Disp: 100 each, Rfl: 3    Current Facility-Administered Medications:     insulin glargine (LANTUS) subcutaneous injection 20 Units 0 2 mL, 20 Units, Subcutaneous, HS, Nila Mtz MD    Social History     Socioeconomic History    Marital status: Single     Spouse name: Not on file    Number of children: Not on file    Years of education: Not on file    Highest education level: Not on file   Occupational History    Not on file   Social Needs    Financial resource strain: Not on file    Food insecurity:     Worry: Not on file     Inability: Not on file    Transportation needs:     Medical: Not on file     Non-medical: Not on file   Tobacco Use    Smoking status: Current Some Day Smoker     Packs/day: 0 25     Types: Cigarettes     Start date: 1/17/2019    Smokeless tobacco: Never Used    Tobacco comment: smoking 1-3 cigarettes/day Substance and Sexual Activity    Alcohol use:  Yes     Alcohol/week: 1 8 oz     Types: 3 Standard drinks or equivalent per week     Comment: socially    Drug use: No    Sexual activity: Yes     Partners: Male     Birth control/protection: Condom Male, Implant   Lifestyle    Physical activity:     Days per week: Not on file     Minutes per session: Not on file    Stress: Not on file   Relationships    Social connections:     Talks on phone: Not on file     Gets together: Not on file     Attends [employer] service: Not on file     Active member of club or organization: Not on file     Attends meetings of clubs or organizations: Not on file     Relationship status: Not on file    Intimate partner violence:     Fear of current or ex partner: Not on file     Emotionally abused: Not on file     Physically abused: Not on file     Forced sexual activity: Not on file   Other Topics Concern    Not on file   Social History Narrative    Not on file       Family History   Problem Relation Age of Onset    Hypertension Mother     Migraines Mother     Diabetes Father     Hypertension Father     Kidney failure Father     Polycystic kidney disease Father     Heart attack Father     Arthritis Father     Stroke Father     Polycystic kidney disease Paternal Grandmother     Stroke Paternal Grandmother     Arthritis Sister     Asthma Sister     Arthritis Maternal Grandmother     Cancer Maternal Grandmother     Learning disabilities Cousin     Learning disabilities Sister     ADD / ADHD Cousin        Physical Exam:    Vitals: Blood pressure 140/90, pulse 94, height 5' 2" (1 575 m), weight 108 kg (238 lb), SpO2 98 %, not currently breastfeeding , Body mass index is 43 53 kg/m² ,   Wt Readings from Last 3 Encounters:   03/05/19 108 kg (238 lb)   02/28/19 108 kg (237 lb)   02/22/19 106 kg (233 lb 3 2 oz)       Vitals:    03/05/19 0918 03/05/19 0938 03/05/19 0939 03/05/19 0940   BP: 138/70 132/70 138/92 140/90   BP Location: Right leg Left arm Right leg Left leg   Patient Position: Sitting Sitting Supine Supine   Cuff Size: Standard Standard Thigh Thigh   Pulse: 94      SpO2: 98%      Weight: 108 kg (238 lb)      Height: 5' 2" (1 575 m)          GEN: Janessa Suresh appears well, alert and oriented x 3, pleasant and cooperative   HEENT: pupils equal, round, and reactive to light; extraocular muscles intact  NECK: supple, no carotid bruits   HEART: regular rhythm, normal S1 and S2, no murmurs, clicks, gallops or rubs, JVP is    LUNGS: clear to auscultation bilaterally; no wheezes, rales, or rhonchi   ABDOMEN: normal bowel sounds, soft, no tenderness, no distention  EXTREMITIES: peripheral pulses normal; no clubbing, cyanosis, or edema  NEURO: no focal findings   SKIN: normal without suspicious lesions on exposed skin    Labs & Results:  Lab Results   Component Value Date    WBC 9 10 02/22/2019    HGB 15 6 02/22/2019    HCT 46 0 02/22/2019    MCV 79 (L) 02/22/2019     02/22/2019       Chemistry        Component Value Date/Time     06/14/2018 1620    K 3 9 02/22/2019 1050    K 4 0 06/14/2018 1620    CL 98 02/22/2019 1050     06/14/2018 1620    CO2 28 02/22/2019 1050    CO2 28 06/14/2018 1620    BUN 9 02/22/2019 1050    BUN 15 06/14/2018 1620    CREATININE 0 64 02/22/2019 1050    CREATININE 0 65 06/14/2018 1620        Component Value Date/Time    CALCIUM 9 7 02/22/2019 1050    CALCIUM 9 9 06/14/2018 1620    ALKPHOS 96 02/22/2019 1050    AST 13 02/22/2019 1050    ALT 22 02/22/2019 1050        Lab Results   Component Value Date    INR 1 05 01/26/2019    INR 1 06 12/02/2018    INR 1 08 11/21/2018    PROTIME 12 2 01/26/2019    PROTIME 12 3 12/02/2018    PROTIME 12 5 11/21/2018     EKG personally reviewed by Antoine Molina MD      Counseling / Coordination of Care  Total floor / unit time spent today 40 minutes    Greater than 50% of total time was spent with the patient and / or family counseling and / or coordination of care  A description of the counseling / coordination of care: 25 min  Thank you for the opportunity to participate in the care of this patient      Yancy Medrano MD, PhD   Lizzette Ma

## 2019-03-05 NOTE — LETTER
March 5, 2019     Patient: Carol Lopez   YOB: 1990   Date of Visit: 3/5/2019       To Whom it May Concern:    Carol Lopez is under my professional care  Her mother, Praveena Ang, accompanied her to her office visit  She was seen in my office on 3/5/2019  If you have any questions or concerns, please don't hesitate to call           Sincerely,          Jolynn Villagomez MD        CC: No Recipients

## 2019-03-08 ENCOUNTER — OFFICE VISIT (OUTPATIENT)
Dept: NEUROLOGY | Facility: CLINIC | Age: 29
End: 2019-03-08
Payer: COMMERCIAL

## 2019-03-08 ENCOUNTER — OFFICE VISIT (OUTPATIENT)
Dept: OBGYN CLINIC | Facility: CLINIC | Age: 29
End: 2019-03-08
Payer: COMMERCIAL

## 2019-03-08 ENCOUNTER — APPOINTMENT (OUTPATIENT)
Dept: LAB | Facility: CLINIC | Age: 29
End: 2019-03-08
Payer: COMMERCIAL

## 2019-03-08 VITALS
DIASTOLIC BLOOD PRESSURE: 81 MMHG | WEIGHT: 239.1 LBS | HEART RATE: 102 BPM | SYSTOLIC BLOOD PRESSURE: 136 MMHG | HEIGHT: 62 IN | BODY MASS INDEX: 44 KG/M2

## 2019-03-08 VITALS — DIASTOLIC BLOOD PRESSURE: 84 MMHG | BODY MASS INDEX: 43.79 KG/M2 | SYSTOLIC BLOOD PRESSURE: 136 MMHG | WEIGHT: 239.4 LBS

## 2019-03-08 DIAGNOSIS — G43.711 INTRACTABLE CHRONIC MIGRAINE WITHOUT AURA AND WITH STATUS MIGRAINOSUS: Primary | ICD-10-CM

## 2019-03-08 DIAGNOSIS — N83.201 RIGHT OVARIAN CYST: ICD-10-CM

## 2019-03-08 DIAGNOSIS — M54.2 CERVICALGIA: ICD-10-CM

## 2019-03-08 DIAGNOSIS — R10.2 PELVIC PAIN IN FEMALE: Primary | ICD-10-CM

## 2019-03-08 DIAGNOSIS — E55.9 VITAMIN D DEFICIENCY: ICD-10-CM

## 2019-03-08 DIAGNOSIS — G24.3 CERVICAL DYSTONIA: ICD-10-CM

## 2019-03-08 DIAGNOSIS — Z97.5 PRESENCE OF SUBDERMAL CONTRACEPTIVE IMPLANT: ICD-10-CM

## 2019-03-08 DIAGNOSIS — G43.711 INTRACTABLE CHRONIC MIGRAINE WITHOUT AURA AND WITH STATUS MIGRAINOSUS: ICD-10-CM

## 2019-03-08 DIAGNOSIS — F31.32 BIPOLAR AFFECTIVE DISORDER, CURRENTLY DEPRESSED, MODERATE (HCC): ICD-10-CM

## 2019-03-08 DIAGNOSIS — G44.221 CHRONIC TENSION-TYPE HEADACHE, INTRACTABLE: ICD-10-CM

## 2019-03-08 PROBLEM — R51.9 CHRONIC NONINTRACTABLE HEADACHE: Status: RESOLVED | Noted: 2018-12-03 | Resolved: 2019-03-08

## 2019-03-08 PROBLEM — G89.29 CHRONIC NONINTRACTABLE HEADACHE: Status: RESOLVED | Noted: 2018-12-03 | Resolved: 2019-03-08

## 2019-03-08 LAB
25(OH)D3 SERPL-MCNC: 15.1 NG/ML (ref 30–100)
BUN SERPL-MCNC: 6 MG/DL (ref 5–25)
CREAT SERPL-MCNC: 0.66 MG/DL (ref 0.6–1.3)
GFR SERPL CREATININE-BSD FRML MDRD: 118 ML/MIN/1.73SQ M
VIT B12 SERPL-MCNC: 316 PG/ML (ref 100–900)

## 2019-03-08 PROCEDURE — 84520 ASSAY OF UREA NITROGEN: CPT

## 2019-03-08 PROCEDURE — 82607 VITAMIN B-12: CPT

## 2019-03-08 PROCEDURE — 82565 ASSAY OF CREATININE: CPT

## 2019-03-08 PROCEDURE — 99245 OFF/OP CONSLTJ NEW/EST HI 55: CPT | Performed by: PSYCHIATRY & NEUROLOGY

## 2019-03-08 PROCEDURE — 99203 OFFICE O/P NEW LOW 30 MIN: CPT | Performed by: OBSTETRICS & GYNECOLOGY

## 2019-03-08 PROCEDURE — 82306 VITAMIN D 25 HYDROXY: CPT

## 2019-03-08 PROCEDURE — 36415 COLL VENOUS BLD VENIPUNCTURE: CPT

## 2019-03-08 RX ORDER — TOPIRAMATE 50 MG/1
TABLET, FILM COATED ORAL
Qty: 60 TABLET | Refills: 1 | Status: SHIPPED | OUTPATIENT
Start: 2019-03-08 | End: 2019-05-10 | Stop reason: SDUPTHER

## 2019-03-08 RX ORDER — PROCHLORPERAZINE MALEATE 10 MG
TABLET ORAL
Qty: 20 TABLET | Refills: 3 | Status: SHIPPED | OUTPATIENT
Start: 2019-03-08 | End: 2019-11-21

## 2019-03-08 RX ORDER — CYCLOBENZAPRINE HCL 10 MG
TABLET ORAL
Qty: 60 TABLET | Refills: 3 | Status: SHIPPED | OUTPATIENT
Start: 2019-03-08 | End: 2019-04-17

## 2019-03-08 NOTE — PROGRESS NOTES
Assessment There are no diagnoses linked to this encounter  Plan  1  Right pelvic pain and right ovarian cyst: Will do expectant management for now and pelvic US in 6 months  Patient will call if she is continuously symptomatic with repetitive ER visits due to pain  And if so will consider possible surgical management with laparoscopic removal of her ovarian cyst  Needs to come for surgical consultation then    2  Missing contraceptive implant placed 10 years ago: XR of the upper arm script given today to find it, unable to feel o an exam today  If present she may need removal in the OR  Subjective   Godwin Siddiqui is a 30 y o  female G0 with LMP 2/14  Here for a problem visit  Patient is complaining of RLQ pain that has happened on 1 occassion in 1/26 bringing her to the ER (again having a 3 cm ovarian cyst) and another ocassion of LLQ pain in 10/2018 when she was first diagnosed with a right ovarian cyst (3cm)  She comes today to know the possible management options of the cyst and if it can be removed  Upon further questioning to the patient about her medical and surgical hx she is not a great candidate for surgery: She has CHTN currently on Lisinopril 20 mg QD, TIIDM currently on metformin, morbid obesity BMI 43, surgical hx (open heart surgery at 22 months for coartation of the aorta, pulmonary artery repai as well as VSD repair) , hx of hepatitis C possibly due tu blood trasnfussion was started n Mayvret on 12/2018 and she says she is cured now, has a hx of Cholecystectomy and 2 liver biopsies in 2001 and 2011  She also has a hx of PCOS  And had a Nexplanon placed 10 year ago on the outer upper left arm and needs to be removed   It cant be felt on exam and per patient is located in the outside of her left upper arm    Patient Active Problem List   Diagnosis    S/P VSD repair    H/O aortic coarctation repair    Splenomegaly    Hyperglycemia    Hyponatremia    Hypertension    Hepatitis C  Abdominal pain    Chronic nonintractable headache    Chronic tension-type headache, intractable    Bipolar affective disorder, currently depressed, moderate (HCC)    Pulmonary valve stenosis    Class 3 severe obesity due to excess calories with serious comorbidity and body mass index (BMI) of 40 0 to 44 9 in adult [facility])    Ovarian cyst, right       Gynecologic History  Patient's last menstrual period was 2019  The current method of family planning is nexplanon but       Past Medical History:   Diagnosis Date    Allergic     Arthritis     Cyst of ovary, right     Diabetes mellitus ([facility] [address] ) 10/10/18    Heart murmur 88    Hepatitis C     Hypertension     Obesity     Pulmonary artery congenital abnormality     Spleen enlarged      Past Surgical History:   Procedure Laterality Date    CARDIAC CATHETERIZATION      no CAD 10days, 4 weeks 22months old     CHOLECYSTECTOMY      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 7   [facility] LIVER BIOPSY      LIVER BIOPSY      VSD REPAIR      As a child     Family History   Problem Relation Age of Onset    Hypertension Mother     Migraines Mother     Diabetes Father     Hypertension Father     Kidney failure Father     Polycystic kidney disease Father     Heart attack Father     Arthritis Father     Stroke Father     Polycystic kidney disease Paternal Grandmother     Stroke Paternal Grandmother     Arthritis Sister     Asthma Sister     Arthritis Maternal Grandmother     Cancer Maternal Grandmother     Learning disabilities Cousin     Learning disabilities Sister     ADD / ADHD Cousin      Social History     Socioeconomic History    Marital status: Single     Spouse name: Not on file    Number of children: Not on file    Years of education: Not on file    Highest education level: Not on file   Occupational History    Not on file   Social Needs    Financial resource strain: Not on file    Food insecurity:     Worry: Not on file Inability: Not on file    Transportation needs:     Medical: Not on file     Non-medical: Not on file   Tobacco Use    Smoking status: Current Some Day Smoker     Packs/day: 0 25     Types: Cigarettes     Start date: 1/17/2019    Smokeless tobacco: Never Used    Tobacco comment: smoking 1-3 cigarettes/day   Substance and Sexual Activity    Alcohol use:  Yes     Alcohol/week: 1 8 oz     Types: 3 Standard drinks or equivalent per week     Comment: socially    Drug use: No    Sexual activity: Yes     Partners: Male     Birth control/protection: Condom Male, Implant   Lifestyle    Physical activity:     Days per week: Not on file     Minutes per session: Not on file    Stress: Not on file   Relationships    Social connections:     Talks on phone: Not on file     Gets together: Not on file     Attends [employer] service: Not on file     Active member of club or organization: Not on file     Attends meetings of clubs or organizations: Not on file     Relationship status: Not on file    Intimate partner violence:     Fear of current or ex partner: Not on file     Emotionally abused: Not on file     Physically abused: Not on file     Forced sexual activity: Not on file   Other Topics Concern    Not on file   Social History Narrative    Not on file     Allergies   Allergen Reactions    Corticosteroids Anaphylaxis    Prednisone Swelling    Sulfa Antibiotics Hives    Milk-Related Compounds Sneezing       Current Outpatient Medications:     butalbital-acetaminophen-caffeine (FIORICET,ESGIC) -40 mg per tablet, Take 1 tablet by mouth every 4 (four) hours as needed for headaches, Disp: 30 tablet, Rfl: 0    etonogestrel (NEXPLANON) subdermal implant, Inject 68 mg under the skin, Disp: , Rfl:     gabapentin (NEURONTIN) 300 mg capsule, Take 1 capsule (300 mg total) by mouth 2 (two) times a day, Disp: 90 capsule, Rfl: 1    Glecaprevir-Pibrentasvir (MAVYRET PO), Take by mouth, Disp: , Rfl:     glucose blood (ACCU-CHEK GUIDE) test strip, Test three times per day, Disp: 100 each, Rfl: 0    ibuprofen (MOTRIN) 600 mg tablet, Take 1 tablet (600 mg total) by mouth every 8 (eight) hours as needed for moderate pain or headaches, Disp: 90 tablet, Rfl: 1    Insulin Pen Needle 31G X 8 MM MISC, by Does not apply route daily, Disp: 100 each, Rfl: 0    Lancets (ACCU-CHEK MULTICLIX) lancets, Use as instructed, Disp: 100 each, Rfl: 3    lisinopril (ZESTRIL) 20 mg tablet, Take 1 tablet (20 mg total) by mouth daily, Disp: 30 tablet, Rfl: 3    metFORMIN (GLUCOPHAGE) 1000 MG tablet, Take 1 tablet (1,000 mg total) by mouth 2 (two) times a day with meals, Disp: 60 tablet, Rfl: 6    ondansetron (ZOFRAN) 8 mg tablet, Take 1 tablet (8 mg total) by mouth every 8 (eight) hours as needed for nausea or vomiting, Disp: 30 tablet, Rfl: 1    pantoprazole (PROTONIX) 40 mg tablet, Take 1 tablet (40 mg total) by mouth daily, Disp: 30 tablet, Rfl: 6    Current Facility-Administered Medications:     insulin glargine (LANTUS) subcutaneous injection 20 Units 0 2 mL, 20 Units, Subcutaneous, , Eulalio Mckeon MD    Review of Systems  Constitutional :no fever, feels well, no tiredness, no recent weight gain or loss  ENT: no ear ache, no loss of hearing, no nosebleeds or nasal discharge, no sore throat or hoarseness  Cardiovascular: no complaints of slow or fast heart beat, no chest pain, no palpitations, no leg claudication or lower extremity edema  Respiratory: no complaints of shortness of shortness of breath, no WEBB  Breasts:no complaints of breast pain, breast lump, or nipple discharge  Gastrointestinal: no complaints of abdominal pain, constipation, nausea, vomiting, or diarrhea or bloody stools  Genitourinary : no complaints of dysuria, incontinence, pelvic pain, no dysmenorrhea, vaginal discharge or abnormal vaginal bleeding and as noted in HPI    Musculoskeletal: no complaints of arthralgia, no myalgia, no joint swelling or stiffness, no limb pain or swelling  Integumentary: no complaints of skin rash or lesion, itching or dry skin  Neurological: no complaints of headache, no confusion, no numbness or tingling, no dizziness or fainting     Objective     /84 (BP Location: Left arm, Patient Position: Sitting, Cuff Size: Adult)   Wt 109 kg (239 lb 6 4 oz)   LMP 02/14/2019   BMI 43 79 kg/m²     General:   appears stated age, cooperative, alert normal mood and affect   Neck: normal, supple,trachea midline, no masses   Heart: regular rate and rhythm, S1, S2 normal, no murmur, click, rub or gallop   Lungs: clear to auscultation bilaterally   Breasts: normal appearance, no masses or tenderness   Abdomen: soft, non-tender, without masses or organomegaly   Lymphatic palpation of lymph nodes in neck, axilla, groin and/or other locations: no lymphadenopathy or masses noted   Skin normal skin turgor and no rashes  Psychiatric orientation to person, place, and time: normal  mood and affect: normal   I have spent 35 minutes with Patient  today in which greater than 50% of this time was spent in counseling/coordination of care regarding Diagnostic results, Prognosis, Risks and benefits of tx options, Intructions for management, Patient and family education, Importance of tx compliance, Risk factor reductions and Impressions

## 2019-03-08 NOTE — PATIENT INSTRUCTIONS
Diagnosis: Suspect patient has  Chronic migraine headaches without aura  Cervicalgia  Cervical dystonia    Preventive therapy for headaches:   -will start patient on Topamax 50 mg at bedtime for 1 week then 2 tabs after that  Need to continue to increase Topamax as this is to help her with her headaches and for weight loss  Abortive therapy for headaches:   - at the onset of migraine headache patient is to take Compazine and if this fails then Fioricet  I do not want patient getting more than 5 Fioricet a month  If we need to break headache we can try Depakote or olanzapine  Neck pain:   - We discussed the role of neck pathology and how it can cause headaches  Patient has head tilt to the right with elevated left shoulder   - will refer patient to physical therapy  - started patient on Flexeril 10 mg increase up slowly to 10 mg twice a day  -  Headache management instructions  - When patient has a moderate to severe headache, they should seek rest, initiate relaxation and apply cold compresses to the head  - Maintain regular sleep schedule  Adults need at least 7-8 hours of uninterrupted a night  - Limit over the counter medications such as Tylenol, Ibuprofen, Aleve, Excedrin  (No more than 3 times a week)  - Maintain headache diary  We discussed an YUDY for a smart phone is "Migraine esteban"  - Limit caffeine to 1-2 cups 8 to 16 oz a day or less  - Avoid dietary trigger  (aged cheese, peanuts, MSG, aspartame and nitrates)  - Patient is to have regular frequent meals to prevent headache onset  - Please drink at least 64 ounces of water a day to help remain hydrated  Please call with any questions or concerns   Office number is [phone]

## 2019-03-08 NOTE — PROGRESS NOTES
[address] [facility]  PATIENT:  Nita Alegria  MRN:  [id number]  :  1990  DATE OF SERVICE:  3/9/2019      Assessment/Plan:        Problem List Items Addressed This Visit        Cardiovascular and Mediastinum    Intractable chronic migraine without aura and with status migrainosus - Primary    Relevant Medications    topiramate (TOPAMAX) 50 MG tablet    cyclobenzaprine (FLEXERIL) 10 mg tablet    prochlorperazine (COMPAZINE) 10 mg tablet    Other Relevant Orders    Vitamin B12 (Completed)    MRI brain with and without contrast    BUN (Completed)    Creatinine, serum (Completed)       Nervous and Auditory    Cervical dystonia    Relevant Medications    topiramate (TOPAMAX) 50 MG tablet    cyclobenzaprine (FLEXERIL) 10 mg tablet    Other Relevant Orders    Ambulatory referral to Physical Therapy    RESOLVED: Chronic tension-type headache, intractable    Relevant Medications    topiramate (TOPAMAX) 50 MG tablet    cyclobenzaprine (FLEXERIL) 10 mg tablet       Other    Cervicalgia    Relevant Medications    cyclobenzaprine (FLEXERIL) 10 mg tablet    Other Relevant Orders    Ambulatory referral to Physical Therapy    RESOLVED: Bipolar affective disorder, currently depressed, moderate (HCC)    Relevant Medications    prochlorperazine (COMPAZINE) 10 mg tablet      Other Visit Diagnoses     Vitamin D deficiency        Relevant Orders    Vitamin D 25 hydroxy (Completed)          Diagnosis: Suspect patient has  Chronic migraine headaches without aura  Cervicalgia  Cervical dystonia    Preventive therapy for headaches:   -will start patient on Topamax 50 mg at bedtime for 1 week then 2 tabs after that  Need to continue to increase Topamax as this is to help her with her headaches and for weight loss  Abortive therapy for headaches:   - at the onset of migraine headache patient is to take Compazine and if this fails then Fioricet  I do not want patient getting more than 5 Fioricet a month    If we need to break headache we can try Depakote or olanzapine  Neck pain:   - We discussed the role of neck pathology and how it can cause headaches  Patient has head tilt to the right with elevated left shoulder   - will refer patient to physical therapy  - started patient on Flexeril 10 mg increase up slowly to 10 mg twice a day  -  Headache management instructions  - When patient has a moderate to severe headache, they should seek rest, initiate relaxation and apply cold compresses to the head  - Maintain regular sleep schedule  Adults need at least 7-8 hours of uninterrupted a night  - Limit over the counter medications such as Tylenol, Ibuprofen, Aleve, Excedrin  (No more than 3 times a week)  - Maintain headache diary  We discussed an YUDY for a smart phone is "Migraine esteban"  - Limit caffeine to 1-2 cups 8 to 16 oz a day or less  - Avoid dietary trigger  (aged cheese, peanuts, MSG, aspartame and nitrates)  - Patient is to have regular frequent meals to prevent headache onset  - Please drink at least 64 ounces of water a day to help remain hydrated  Please call with any questions or concerns  Office number is [phone]      History of Present Illness: We had the pleasure of evaluating Margarita Limon in neurological consultation today for headaches  As you know,  she is a 30 y o  right handed  female  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  She is here today for evaluation of headaches  Cigarettes: she was smoking half a pack per day and now is smoking none since jan of 2019  Past medical history:  - lissette patch placed on the coarctation  (CTA shows recurrent coarctation of the aorta with weblike 50% stenosis of the proximal descending thoracic aorta)  - H/o VSD repair  At 2 weeks old she had a pulmonary band placed  At 22 months, she had the band removed and the VSD repaired  At 7years of age, she had balloon angioplasty of the coarctation done    - Mild pulmonic stenosis: Imaging suboptimal  Repeat TTE after next visit  - Hep C on Mavyret (from blood transfusion when she was a baby)  - HTN  - type 2 diabetes  -bipolar disorder  - neuropathy    Any family history of migraines? Yes, mother  Any family history of aneurysms? No    Chronic migraine headaches without aura: What is your current pain level - 6/10    Headaches started at what age? 13years old and started her menstruation at age 6  How often do the headaches occur? 2 a week  What time of the day do the headaches start? Anytime of the day  How long do the headaches last? Few day to a month  Are you ever headache free? Yes    Aura and how long does it last - blurry vision for few minutes    Describe your usual headache - Throbbing, Pounding, Pressure, stabbing, dull pain  Where is your headache located? Right orbital, bitemporal, neck and shoulder, sinus and at time apex of her head  What is the intensity of pain? 3 to 10/10    Associated symptoms:   - Decrease of appetite, nausea, vomiting, diarrhea  - Insomnia  - Photophobia, phonophobia,   - Problem with concentration  - Hands or feet tingle or feel numb  - Lacrimation,   - light-headed or dizzy, stiff or sore neck,   - prefer to be alone and in a dark room, unable to work    Number of days missed per month because of headaches:  Work (or school) days: 2 a month  Social or Family activities: 4 a month    Headache are worse if the patient: cough, sneeze, bending over,   Headache triggers: stress, chewing, sexual intercourse, sunlight, fatigue  What time of the year do headaches occur more frequently? None    Have you seen someone else for headaches or pain? No  Have you had trigger point injection performed and how often? No  Have you had Botox injection performed and how often? No   Have you had epidural injections or transforaminal injections performed? No    What medications do you take or have you taken for your headaches? PREVENTATIVE:  -gabapentin, Topamax 100 mg  -Zoloft 100 mg, Seroquel 400 mg,  -lisinopril  Baclofen  ABORTIVE:  -ketorolac, ibuprofen, Tylenol, diclofenac  - Fioricet, morphine  -metoprolol  -Zofran  - Benadryl      Alternative therapies used in the past for headaches? none  Have you used CBD or THC for your headaches and how often? Yes  Are you current pregnant or planning on getting pregnant? No  Have you ever had any Brain imaging? no  I personally reviewed these images      Past Medical History:   Diagnosis Date    Allergic     Arthritis     Bipolar affective disorder, currently depressed, moderate ([facility] [address] ) 12/17/2018    Cyst of ovary, right     Diabetes mellitus ([facility] [address] ) 10/10/18    Heart murmur 01/19/88    Hepatitis C     Hypertension     Obesity 1995    Pulmonary artery congenital abnormality     Spleen enlarged        Patient Active Problem List   Diagnosis    S/P VSD repair    H/O aortic coarctation repair    Splenomegaly    Hyperglycemia    Hyponatremia    Hypertension    Hepatitis C    Abdominal pain    Pulmonary valve stenosis    Class 3 severe obesity due to excess calories with serious comorbidity and body mass index (BMI) of 40 0 to 44 9 in adult [facility])    Ovarian cyst, right    Intractable chronic migraine without aura and with status migrainosus    Cervicalgia    Cervical dystonia       Medications:      Current Outpatient Medications   Medication Sig Dispense Refill    etonogestrel (NEXPLANON) subdermal implant Inject 68 mg under the skin      gabapentin (NEURONTIN) 300 mg capsule Take 1 capsule (300 mg total) by mouth 2 (two) times a day 90 capsule 1    Glecaprevir-Pibrentasvir (MAVYRET PO) Take 3 tablets by mouth daily       glucose blood (ACCU-CHEK GUIDE) test strip Test three times per day 100 each 0    ibuprofen (MOTRIN) 600 mg tablet Take 1 tablet (600 mg total) by mouth every 8 (eight) hours as needed for moderate pain or headaches 90 tablet 1    lisinopril (ZESTRIL) 20 mg tablet Take 1 tablet (20 mg total) by mouth daily 30 tablet 3    metFORMIN (GLUCOPHAGE) 1000 MG tablet Take 1 tablet (1,000 mg total) by mouth 2 (two) times a day with meals 60 tablet 6    ondansetron (ZOFRAN) 8 mg tablet Take 1 tablet (8 mg total) by mouth every 8 (eight) hours as needed for nausea or vomiting 30 tablet 1    pantoprazole (PROTONIX) 40 mg tablet Take 1 tablet (40 mg total) by mouth daily 30 tablet 6    cyclobenzaprine (FLEXERIL) 10 mg tablet One tab at bedtime for 5 days then one tab twice a day after that  60 tablet 3    Insulin Pen Needle 31G X 8 MM MISC by Does not apply route daily (Patient not taking: Reported on 3/8/2019) 100 each 0    Lancets (ACCU-CHEK MULTICLIX) lancets Use as instructed (Patient not taking: Reported on 3/8/2019) 100 each 3    prochlorperazine (COMPAZINE) 10 mg tablet 1 tab at onset of migraine, can repeat in 8 hours, can take with NSAID 20 tablet 3    topiramate (TOPAMAX) 50 MG tablet One tab at bedtime for 5 days then two tabs after that  60 tablet 1     Current Facility-Administered Medications   Medication Dose Route Frequency Provider Last Rate Last Dose    insulin glargine (LANTUS) subcutaneous injection 20 Units 0 2 mL  20 Units Subcutaneous HS Darrell Carney MD            Allergies:       Allergies   Allergen Reactions    Corticosteroids Anaphylaxis    Prednisone Swelling    Sulfa Antibiotics Hives    Milk-Related Compounds Sneezing       Family History:     Family History   Problem Relation Age of Onset    Hypertension Mother    Alex [place] Migraines Mother     Diabetes Father     Hypertension Father     Kidney failure Father     Polycystic kidney disease Father     Heart attack Father     Arthritis Father     Stroke Father     Polycystic kidney disease Paternal Grandmother     Stroke Paternal Grandmother     Arthritis Sister     Asthma Sister     Arthritis Maternal Grandmother     Cancer Maternal Grandmother     Learning disabilities Cousin  Learning disabilities Sister     ADD / ADHD Cousin        Social History:     Social History     Socioeconomic History    Marital status: Single     Spouse name: Not on file    Number of children: Not on file    Years of education: Not on file    Highest education level: Not on file   Occupational History    Not on file   Social Needs    Financial resource strain: Not on file    Food insecurity:     Worry: Not on file     Inability: Not on file    Transportation needs:     Medical: Not on file     Non-medical: Not on file   Tobacco Use    Smoking status: Current Some Day Smoker     Packs/day: 0 25     Types: Cigarettes     Start date: 1/17/2019    Smokeless tobacco: Never Used    Tobacco comment: smoking 1-3 cigarettes/day   Substance and Sexual Activity    Alcohol use:  Yes     Alcohol/week: 1 8 oz     Types: 3 Standard drinks or equivalent per week     Comment: socially    Drug use: No    Sexual activity: Yes     Partners: Male     Birth control/protection: Condom Male, Implant   Lifestyle    Physical activity:     Days per week: Not on file     Minutes per session: Not on file    Stress: Not on file   Relationships    Social connections:     Talks on phone: Not on file     Gets together: Not on file     Attends [employer] service: Not on file     Active member of club or organization: Not on file     Attends meetings of clubs or organizations: Not on file     Relationship status: Not on file    Intimate partner violence:     Fear of current or ex partner: Not on file     Emotionally abused: Not on file     Physically abused: Not on file     Forced sexual activity: Not on file   Other Topics Concern    Not on file   Social History Narrative    Not on file         Objective:   Physical Exam:                                                                   Vitals:               /81 (BP Location: Left arm, Patient Position: Sitting, Cuff Size: Large)   Pulse 102   Ht 5' 2" (1 575 m) Wt 108 kg (239 lb 1 6 oz)   LMP 02/14/2019   BMI 43 73 kg/m²   BP Readings from Last 3 Encounters:   03/08/19 136/81   03/08/19 136/84   03/05/19 140/90     Pulse Readings from Last 3 Encounters:   03/08/19 102   03/05/19 94   02/28/19 (!) 106                     Review of Systems:   Review of Systems   Constitutional: Positive for fatigue  HENT: Negative  Eyes: Positive for photophobia and pain  Respiratory: Negative  Cardiovascular: Negative  Gastrointestinal: Positive for abdominal pain and nausea  Endocrine: Negative  Genitourinary: Positive for frequency  Musculoskeletal: Negative  Skin: Negative  Allergic/Immunologic: Negative  Neurological: Positive for dizziness, light-headedness, numbness and headaches  Hematological: Bruises/bleeds easily  Psychiatric/Behavioral: Positive for confusion, decreased concentration and sleep disturbance (Trouble falling and staying asleep )  I personally reviewed and updated the ROS that was entered by the medical assistant       I have spent 60 minutes with Patient  today in which greater than 50% of this time was spent in counseling/coordination of care regarding Intructions for management, Patient and family education, Importance of tx compliance, Risk factor reductions, Impressions and plan of care as above          Santo Warner MD 3/9/2019 11:00 AM

## 2019-03-12 ENCOUNTER — TELEPHONE (OUTPATIENT)
Dept: NEUROLOGY | Facility: CLINIC | Age: 29
End: 2019-03-12

## 2019-03-12 DIAGNOSIS — E55.9 VITAMIN D DEFICIENCY: Primary | ICD-10-CM

## 2019-03-12 RX ORDER — ERGOCALCIFEROL 1.25 MG/1
50000 CAPSULE ORAL WEEKLY
Qty: 8 CAPSULE | Refills: 0 | Status: SHIPPED | OUTPATIENT
Start: 2019-03-12 | End: 2019-12-12 | Stop reason: ALTCHOICE

## 2019-03-12 NOTE — TELEPHONE ENCOUNTER
----- Message from [facility], RN sent at 3/12/2019 11:41 AM EDT -----      ----- Message -----  From: Latrell Short PA-C  Sent: 3/12/2019   9:38 AM  To: [facility]    Please have patient start OTC vit D3 2000 units and 50,000 units from the pharmacy weekly

## 2019-03-14 ENCOUNTER — OFFICE VISIT (OUTPATIENT)
Dept: FAMILY MEDICINE CLINIC | Facility: CLINIC | Age: 29
End: 2019-03-14
Payer: COMMERCIAL

## 2019-03-14 ENCOUNTER — TRANSCRIBE ORDERS (OUTPATIENT)
Dept: ADMINISTRATIVE | Facility: HOSPITAL | Age: 29
End: 2019-03-14

## 2019-03-14 VITALS
BODY MASS INDEX: 43.06 KG/M2 | SYSTOLIC BLOOD PRESSURE: 122 MMHG | RESPIRATION RATE: 16 BRPM | DIASTOLIC BLOOD PRESSURE: 78 MMHG | WEIGHT: 234 LBS | HEART RATE: 102 BPM | HEIGHT: 62 IN | TEMPERATURE: 97.3 F | OXYGEN SATURATION: 98 %

## 2019-03-14 DIAGNOSIS — B18.2 CHRONIC HEPATITIS C WITHOUT HEPATIC COMA (HCC): ICD-10-CM

## 2019-03-14 DIAGNOSIS — R19.7 DIARRHEA, UNSPECIFIED TYPE: ICD-10-CM

## 2019-03-14 DIAGNOSIS — G44.209 TENSION HEADACHE: Primary | ICD-10-CM

## 2019-03-14 DIAGNOSIS — B18.9 CHRONIC VIRAL HEPATITIS, UNSPECIFIED VIRAL HEPATITIS TYPE (HCC): Primary | ICD-10-CM

## 2019-03-14 PROCEDURE — 99213 OFFICE O/P EST LOW 20 MIN: CPT | Performed by: FAMILY MEDICINE

## 2019-03-14 PROCEDURE — 3008F BODY MASS INDEX DOCD: CPT | Performed by: FAMILY MEDICINE

## 2019-03-14 RX ORDER — IBUPROFEN 800 MG/1
800 TABLET ORAL EVERY 6 HOURS PRN
Qty: 30 TABLET | Refills: 3 | Status: SHIPPED | OUTPATIENT
Start: 2019-03-14 | End: 2019-05-02 | Stop reason: ALTCHOICE

## 2019-03-14 RX ORDER — GABAPENTIN 100 MG/1
CAPSULE ORAL
Refills: 0 | COMMUNITY
Start: 2019-02-22 | End: 2019-03-15

## 2019-03-14 NOTE — PROGRESS NOTES
Assessment/Plan:    No problem-specific Assessment & Plan notes found for this encounter  Diagnoses and all orders for this visit:    Tension headache  -     ibuprofen (MOTRIN) 800 mg tablet; Take 1 tablet (800 mg total) by mouth every 6 (six) hours as needed for mild pain or moderate pain    Other orders  -     gabapentin (NEURONTIN) 100 mg capsule          Subjective:      Patient ID: Tyra Schroeder is a 30 y o  female  Pt saw the neurologist who put her on flexeril, compazine and topamax which she started last week pt presents with a headache today but did not call the neurologist about this, pt is set up for an MRI on the 25th, pt took ibuprofen for the pain which did not help    Headache    This is a new problem  The current episode started today  The problem occurs constantly  The pain is located in the occipital region  Radiates to: down the back of her neck  The quality of the pain is described as band-like  The pain is moderate  Pertinent negatives include no dizziness, fever, nausea or vomiting  The following portions of the patient's history were reviewed and updated as appropriate: allergies, current medications, past family history, past medical history, past social history, past surgical history and problem list     Review of Systems   Constitutional: Negative for chills and fever  Gastrointestinal: Negative for nausea and vomiting  Neurological: Positive for headaches  Negative for dizziness and light-headedness  Objective:      /78   Pulse 102   Temp (!) 97 3 °F (36 3 °C) (Tympanic)   Resp 16   Ht 5' 2" (1 575 m)   Wt 106 kg (234 lb)   LMP 02/14/2019   SpO2 98%   BMI 42 80 kg/m²          Physical Exam   Constitutional: She is oriented to person, place, and time  She appears well-developed and well-nourished  No distress  HENT:   Head: Normocephalic and atraumatic  Eyes: Pupils are equal, round, and reactive to light   Conjunctivae and EOM are normal  No scleral icterus  Neck: Normal range of motion  Neck supple  Cardiovascular: Normal rate, regular rhythm and normal heart sounds  No murmur heard  Pulmonary/Chest: Effort normal and breath sounds normal  No respiratory distress  She has no wheezes  She has no rales  Musculoskeletal: She exhibits no edema  Lymphadenopathy:     She has no cervical adenopathy  Neurological: She is alert and oriented to person, place, and time  Skin: Skin is warm and dry  She is not diaphoretic  Psychiatric: She has a normal mood and affect  Her behavior is normal  Judgment and thought content normal    Nursing note and vitals reviewed

## 2019-03-15 ENCOUNTER — HOSPITAL ENCOUNTER (EMERGENCY)
Facility: HOSPITAL | Age: 29
Discharge: HOME | End: 2019-03-15
Attending: EMERGENCY MEDICINE | Admitting: EMERGENCY MEDICINE
Payer: COMMERCIAL

## 2019-03-15 VITALS
HEART RATE: 103 BPM | RESPIRATION RATE: 18 BRPM | OXYGEN SATURATION: 95 % | HEIGHT: 62 IN | BODY MASS INDEX: 43.06 KG/M2 | DIASTOLIC BLOOD PRESSURE: 74 MMHG | SYSTOLIC BLOOD PRESSURE: 154 MMHG | WEIGHT: 234 LBS | TEMPERATURE: 97.8 F

## 2019-03-15 DIAGNOSIS — R11.2 NAUSEA & VOMITING: Primary | ICD-10-CM

## 2019-03-15 LAB
ALBUMIN SERPL BCP-MCNC: 3.9 G/DL (ref 3.5–5.7)
ALP SERPL-CCNC: 76 U/L (ref 40–150)
ALT SERPL W P-5'-P-CCNC: 22 U/L (ref 7–52)
ANION GAP SERPL CALCULATED.3IONS-SCNC: 8 MMOL/L (ref 4–13)
AST SERPL W P-5'-P-CCNC: 17 U/L (ref 13–39)
BASOPHILS # BLD AUTO: 0.1 THOUSANDS/ΜL (ref 0–0.1)
BASOPHILS NFR BLD AUTO: 1 % (ref 0–2)
BILIRUB SERPL-MCNC: 0.4 MG/DL (ref 0.2–1)
BILIRUB UR QL STRIP: NEGATIVE
BUN SERPL-MCNC: 13 MG/DL (ref 7–25)
CALCIUM SERPL-MCNC: 9.3 MG/DL (ref 8.6–10.5)
CHLORIDE SERPL-SCNC: 101 MMOL/L (ref 98–107)
CLARITY UR: CLEAR
CO2 SERPL-SCNC: 24 MMOL/L (ref 21–31)
COLOR UR: ABNORMAL
CREAT SERPL-MCNC: 0.65 MG/DL (ref 0.6–1.2)
EOSINOPHIL # BLD AUTO: 0.2 THOUSAND/ΜL (ref 0–0.61)
EOSINOPHIL NFR BLD AUTO: 2 % (ref 0–5)
ERYTHROCYTE [DISTWIDTH] IN BLOOD BY AUTOMATED COUNT: 15 % (ref 11.5–14.5)
EXT PREG TEST URINE: NEGATIVE
GFR SERPL CREATININE-BSD FRML MDRD: 119 ML/MIN/1.73SQ M
GLUCOSE SERPL-MCNC: 374 MG/DL (ref 65–99)
GLUCOSE UR STRIP-MCNC: ABNORMAL MG/DL
HCT VFR BLD AUTO: 42.2 % (ref 42–47)
HGB BLD-MCNC: 14.2 G/DL (ref 12–16)
HGB UR QL STRIP.AUTO: NEGATIVE
KETONES UR STRIP-MCNC: NEGATIVE MG/DL
LEUKOCYTE ESTERASE UR QL STRIP: NEGATIVE
LIPASE SERPL-CCNC: 21 U/L (ref 11–82)
LYMPHOCYTES # BLD AUTO: 2.4 THOUSANDS/ΜL (ref 0.6–4.47)
LYMPHOCYTES NFR BLD AUTO: 23 % (ref 21–51)
MCH RBC QN AUTO: 27 PG (ref 26–34)
MCHC RBC AUTO-ENTMCNC: 33.7 G/DL (ref 31–37)
MCV RBC AUTO: 80 FL (ref 81–99)
MONOCYTES # BLD AUTO: 0.7 THOUSAND/ΜL (ref 0.17–1.22)
MONOCYTES NFR BLD AUTO: 7 % (ref 2–12)
NEUTROPHILS # BLD AUTO: 6.9 THOUSANDS/ΜL (ref 1.4–6.5)
NEUTS SEG NFR BLD AUTO: 68 % (ref 42–75)
NITRITE UR QL STRIP: NEGATIVE
NRBC BLD AUTO-RTO: 0 /100 WBCS
PH UR STRIP.AUTO: 6.5 [PH]
PLATELET # BLD AUTO: 210 THOUSANDS/UL (ref 149–390)
PMV BLD AUTO: 9.6 FL (ref 8.6–11.7)
POTASSIUM SERPL-SCNC: 3.8 MMOL/L (ref 3.5–5.5)
PROT SERPL-MCNC: 7 G/DL (ref 6.4–8.9)
PROT UR STRIP-MCNC: NEGATIVE MG/DL
RBC # BLD AUTO: 5.26 MILLION/UL (ref 3.9–5.2)
SODIUM SERPL-SCNC: 133 MMOL/L (ref 134–143)
SP GR UR STRIP.AUTO: 1.01 (ref 1–1.03)
UROBILINOGEN UR QL STRIP.AUTO: 0.2 E.U./DL
WBC # BLD AUTO: 10.3 THOUSAND/UL (ref 4.8–10.8)

## 2019-03-15 PROCEDURE — 96374 THER/PROPH/DIAG INJ IV PUSH: CPT

## 2019-03-15 PROCEDURE — 99283 EMERGENCY DEPT VISIT LOW MDM: CPT

## 2019-03-15 PROCEDURE — 36415 COLL VENOUS BLD VENIPUNCTURE: CPT | Performed by: EMERGENCY MEDICINE

## 2019-03-15 PROCEDURE — 81025 URINE PREGNANCY TEST: CPT | Performed by: EMERGENCY MEDICINE

## 2019-03-15 PROCEDURE — 83690 ASSAY OF LIPASE: CPT | Performed by: EMERGENCY MEDICINE

## 2019-03-15 PROCEDURE — 81003 URINALYSIS AUTO W/O SCOPE: CPT | Performed by: EMERGENCY MEDICINE

## 2019-03-15 PROCEDURE — 85025 COMPLETE CBC W/AUTO DIFF WBC: CPT | Performed by: EMERGENCY MEDICINE

## 2019-03-15 PROCEDURE — 80053 COMPREHEN METABOLIC PANEL: CPT | Performed by: EMERGENCY MEDICINE

## 2019-03-15 PROCEDURE — 96375 TX/PRO/DX INJ NEW DRUG ADDON: CPT

## 2019-03-15 RX ORDER — ONDANSETRON 2 MG/ML
4 INJECTION INTRAMUSCULAR; INTRAVENOUS ONCE
Status: COMPLETED | OUTPATIENT
Start: 2019-03-15 | End: 2019-03-15

## 2019-03-15 RX ADMIN — ONDANSETRON 4 MG: 2 INJECTION INTRAMUSCULAR; INTRAVENOUS at 17:28

## 2019-03-15 RX ADMIN — FAMOTIDINE 20 MG: 10 INJECTION INTRAVENOUS at 17:28

## 2019-03-15 NOTE — ED PROVIDER NOTES
History  Chief Complaint   Patient presents with    Vomiting     "bright red blood and bile" 2 episodes  arrives nauseous stating she is 1 day late on mestrual cycle "I hope this isn't morning sickness"     From home, had two episodes earlier today of N/V (mostly bile w/streaks of blood), w/mild epigastric pain, no chest pain or SOB, no lower abdominal pain  Denies prior similar symptoms, recent alcohol or tobacco use  Prior to Admission Medications   Prescriptions Last Dose Informant Patient Reported? Taking?    Insulin Pen Needle 31G X 8 MM MISC 3/15/2019 at Unknown time  No Yes   Sig: by Does not apply route daily   Lancets (ACCU-CHEK MULTICLIX) lancets 3/15/2019 at Unknown time Self No Yes   Sig: Use as instructed   cyclobenzaprine (FLEXERIL) 10 mg tablet 3/15/2019 at Unknown time  No Yes   Sig: One tab at bedtime for 5 days then one tab twice a day after that    ergocalciferol (VITAMIN D2) 50,000 units 3/15/2019 at Unknown time  No Yes   Sig: Take 1 capsule (50,000 Units total) by mouth once a week   etonogestrel (NEXPLANON) subdermal implant   Yes No   Sig: Inject 68 mg under the skin   gabapentin (NEURONTIN) 300 mg capsule 3/15/2019 at Unknown time  No Yes   Sig: Take 1 capsule (300 mg total) by mouth 2 (two) times a day   glucose blood (ACCU-CHEK GUIDE) test strip 3/15/2019 at Unknown time  No Yes   Sig: Test three times per day   ibuprofen (MOTRIN) 800 mg tablet 3/15/2019 at Unknown time  No Yes   Sig: Take 1 tablet (800 mg total) by mouth every 6 (six) hours as needed for mild pain or moderate pain   lisinopril (ZESTRIL) 20 mg tablet 3/15/2019 at Unknown time  No Yes   Sig: Take 1 tablet (20 mg total) by mouth daily   metFORMIN (GLUCOPHAGE) 1000 MG tablet 3/15/2019 at Unknown time  No Yes   Sig: Take 1 tablet (1,000 mg total) by mouth 2 (two) times a day with meals   ondansetron (ZOFRAN) 8 mg tablet Past Week at Unknown time  No Yes   Sig: Take 1 tablet (8 mg total) by mouth every 8 (eight) hours as needed for nausea or vomiting   pantoprazole (PROTONIX) 40 mg tablet 3/15/2019 at Unknown time  No Yes   Sig: Take 1 tablet (40 mg total) by mouth daily   prochlorperazine (COMPAZINE) 10 mg tablet 3/14/2019 at Unknown time  No Yes   Si tab at onset of migraine, can repeat in 8 hours, can take with NSAID   topiramate (TOPAMAX) 50 MG tablet 3/15/2019 at Unknown time  No Yes   Sig: One tab at bedtime for 5 days then two tabs after that  Facility-Administered Medications Last Administration Doses Remaining   insulin glargine (LANTUS) subcutaneous injection 20 Units 0 2 mL None recorded           Past Medical History:   Diagnosis Date    Allergic     Arthritis     Bipolar affective disorder, currently depressed, moderate ([facility] [address] ) 2018    Cyst of ovary, right     Diabetes mellitus ([facility] [address] ) 10/10/18    Heart murmur 88    Hepatitis C     Hepatitis C virus infection cured after antiviral drug therapy     Hypertension     Obesity     Pulmonary artery congenital abnormality     Spleen enlarged        Past Surgical History:   Procedure Laterality Date    CARDIAC CATHETERIZATION      no CAD 10days, 4 weeks 22months old     CHOLECYSTECTOMY      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 7   Meredith De La O LIVER BIOPSY      LIVER BIOPSY      VSD REPAIR      As a child       Family History   Problem Relation Age of Onset    Hypertension Mother     Migraines Mother     Diabetes Father     Hypertension Father     Kidney failure Father     Polycystic kidney disease Father     Heart attack Father     Arthritis Father     Stroke Father     Polycystic kidney disease Paternal Grandmother     Stroke Paternal Grandmother     Arthritis Sister     Asthma Sister     Arthritis Maternal Grandmother     Cancer Maternal Grandmother     Learning disabilities Cousin     Learning disabilities Sister     ADD / ADHD Cousin      I have reviewed and agree with the history as documented      Social History Tobacco Use    Smoking status: Former Smoker     Packs/day: 0 00     Start date: 1/17/2019    Smokeless tobacco: Never Used    Tobacco comment: smoking 1-3 cigarettes/day   Substance Use Topics    Alcohol use: Yes     Alcohol/week: 1 8 oz     Types: 3 Standard drinks or equivalent per week     Comment: socially    Drug use: Yes     Types: Marijuana        Review of Systems   Constitutional: Negative for chills and fever  HENT: Negative for rhinorrhea and sore throat  Eyes: Negative for visual disturbance  Respiratory: Negative for cough and shortness of breath  Cardiovascular: Negative for chest pain and leg swelling  Gastrointestinal: Positive for nausea  Negative for blood in stool, diarrhea and vomiting  Genitourinary: Negative for dysuria  Musculoskeletal: Negative for back pain and myalgias  Skin: Negative for rash  Neurological: Negative for dizziness and headaches  Psychiatric/Behavioral: Negative for confusion  All other systems reviewed and are negative  Physical Exam  Physical Exam   Constitutional: She is oriented to person, place, and time  She appears well-developed and well-nourished  HENT:   Nose: Nose normal    Mouth/Throat: Oropharynx is clear and moist  No oropharyngeal exudate  Eyes: Pupils are equal, round, and reactive to light  Conjunctivae and EOM are normal  No scleral icterus  Neck: Normal range of motion  Neck supple  No JVD present  No tracheal deviation present  Cardiovascular: Normal rate, regular rhythm and normal heart sounds  No murmur heard  Pulmonary/Chest: Effort normal and breath sounds normal  No respiratory distress  She has no wheezes  She has no rales  Abdominal: Soft  Bowel sounds are normal  There is no tenderness  There is no guarding  Musculoskeletal: Normal range of motion  She exhibits no edema or tenderness  Neurological: She is alert and oriented to person, place, and time   No cranial nerve deficit or sensory deficit  She exhibits normal muscle tone  5/5 motor, nl sens   Skin: Skin is warm and dry  Psychiatric: She has a normal mood and affect  Her behavior is normal    Nursing note and vitals reviewed        Vital Signs  ED Triage Vitals   Temperature Pulse Respirations Blood Pressure SpO2   03/15/19 1644 03/15/19 1645 03/15/19 1645 03/15/19 1645 03/15/19 1645   97 8 °F (36 6 °C) 103 18 154/74 95 %      Temp Source Heart Rate Source Patient Position - Orthostatic VS BP Location FiO2 (%)   03/15/19 1644 -- 03/15/19 1645 03/15/19 1645 --   Temporal  Sitting Left arm       Pain Score       03/15/19 1645       No Pain           Vitals:    03/15/19 1645   BP: 154/74   Pulse: 103   Patient Position - Orthostatic VS: Sitting       qSOFA     Row Name 03/15/19 1645                Altered mental status GCS < 15  --        Respiratory Rate > / =82  0        Systolic BP < / =417  0        Q Sofa Score  0              Visual Acuity      ED Medications  Medications   ondansetron (ZOFRAN) injection 4 mg (4 mg Intravenous Given 3/15/19 1728)   famotidine (PEPCID) injection 20 mg (20 mg Intravenous Given 3/15/19 1728)       Diagnostic Studies  Results Reviewed     Procedure Component Value Units Date/Time    Comprehensive metabolic panel [[id number]]  (Abnormal) Collected:  03/15/19 1719    Lab Status:  Final result Specimen:  Blood from Arm, Right Updated:  03/15/19 1800     Sodium 133 mmol/L      Potassium 3 8 mmol/L      Chloride 101 mmol/L      CO2 24 mmol/L      ANION GAP 8 mmol/L      BUN 13 mg/dL      Creatinine 0 65 mg/dL      Glucose 374 mg/dL      Calcium 9 3 mg/dL      AST 17 U/L      ALT 22 U/L      Alkaline Phosphatase 76 U/L      Total Protein 7 0 g/dL      Albumin 3 9 g/dL      Total Bilirubin 0 40 mg/dL      eGFR 119 ml/min/1 73sq m     Narrative:       National Kidney Disease Education Program recommendations are as follows:  GFR calculation is accurate only with a steady state creatinine  Chronic Kidney disease less than 60 ml/min/1 73 sq  meters  Kidney failure less than 15 ml/min/1 73 sq  meters      Lipase [[id number]]  (Normal) Collected:  03/15/19 1719    Lab Status:  Final result Specimen:  Blood from Arm, Right Updated:  03/15/19 1800     Lipase 21 u/L     UA w Reflex to Microscopic [[id number]]  (Abnormal) Collected:  03/15/19 1718    Lab Status:  Final result Specimen:  Urine, Clean Catch Updated:  03/15/19 1738     Color, UA Straw     Clarity, UA Clear     Specific Gravity, UA 1 010     pH, UA 6 5     Leukocytes, UA Negative     Nitrite, UA Negative     Protein, UA Negative mg/dl      Glucose, UA 3+ mg/dl      Ketones, UA Negative mg/dl      Urobilinogen, UA 0 2 E U /dl      Bilirubin, UA Negative     Blood, UA Negative    CBC and differential [[id number]]  (Abnormal) Collected:  03/15/19 1719    Lab Status:  Final result Specimen:  Blood from Arm, Right Updated:  03/15/19 1732     WBC 10 30 Thousand/uL      RBC 5 26 Million/uL      Hemoglobin 14 2 g/dL      Hematocrit 42 2 %      MCV 80 fL      MCH 27 0 pg      MCHC 33 7 g/dL      RDW 15 0 %      MPV 9 6 fL      Platelets 371 Thousands/uL      nRBC 0 /100 WBCs      Neutrophils Relative 68 %      Lymphocytes Relative 23 %      Monocytes Relative 7 %      Eosinophils Relative 2 %      Basophils Relative 1 %      Neutrophils Absolute 6 90 Thousands/µL      Lymphocytes Absolute 2 40 Thousands/µL      Monocytes Absolute 0 70 Thousand/µL      Eosinophils Absolute 0 20 Thousand/µL      Basophils Absolute 0 10 Thousands/µL     POCT pregnancy, urine [[id number]]  (Normal) Resulted:  03/15/19 1730    Lab Status:  Final result Updated:  03/15/19 1730     EXT PREG TEST UR (Ref: Negative) negative                 No orders to display              Procedures  Procedures       Phone Contacts  ED Phone Contact    ED Course  ED Course as of Mar 15 1841   Fri Mar 15, 2019   1812 Work-up unremarkable; no anemia; has mild hyperglycemia w/known hx Dm; no vomiting in Emergency Department; is appropriate for dc w/outpatient f/u; is on PPI already                                  MDM  Number of Diagnoses or Management Options  Diagnosis management comments: Initial Impression: N/V, reports blood-streaked emesis, benign appearance and exam; will screen with labs, and give Zofran IV and Pepcid IV      Disposition  Final diagnoses:   Nausea & vomiting     Time reflects when diagnosis was documented in both MDM as applicable and the Disposition within this note     Time User Action Codes Description Comment    3/15/2019  6:15 PM Crystal Locus Add [R11 2] Nausea & vomiting       ED Disposition     ED Disposition Condition Date/Time Comment    Discharge Stable Fri Mar 15, 2019  6:15 PM Dania  discharge to home/self care              Follow-up Information     Follow up With Specialties Details Why Contact Info    Feli Tee DO Family Medicine Schedule an appointment as soon as possible for a visit   [address] [address]  [phone]            Discharge Medication List as of 3/15/2019  6:15 PM      CONTINUE these medications which have NOT CHANGED    Details   cyclobenzaprine (FLEXERIL) 10 mg tablet One tab at bedtime for 5 days then one tab twice a day after that , Normal      ergocalciferol (VITAMIN D2) 50,000 units Take 1 capsule (50,000 Units total) by mouth once a week, Starting Tue 3/12/2019, Normal      gabapentin (NEURONTIN) 300 mg capsule Take 1 capsule (300 mg total) by mouth 2 (two) times a day, Starting Mon 12/17/2018, Normal      glucose blood (ACCU-CHEK GUIDE) test strip Test three times per day, Normal      ibuprofen (MOTRIN) 800 mg tablet Take 1 tablet (800 mg total) by mouth every 6 (six) hours as needed for mild pain or moderate pain, Starting Thu 3/14/2019, Normal      Insulin Pen Needle 31G X 8 MM MISC by Does not apply route daily, Starting Mon 10/29/2018, Normal      Lancets (ACCU-CHEK MULTICLIX) lancets Use as instructed, Normal      lisinopril (ZESTRIL) 20 mg tablet Take 1 tablet (20 mg total) by mouth daily, Starting Wed 10/24/2018, Print      metFORMIN (GLUCOPHAGE) 1000 MG tablet Take 1 tablet (1,000 mg total) by mouth 2 (two) times a day with meals, Starting Fri 2/22/2019, Normal      ondansetron (ZOFRAN) 8 mg tablet Take 1 tablet (8 mg total) by mouth every 8 (eight) hours as needed for nausea or vomiting, Starting Thu 2/28/2019, Normal      pantoprazole (PROTONIX) 40 mg tablet Take 1 tablet (40 mg total) by mouth daily, Starting Thu 2/28/2019, Normal      prochlorperazine (COMPAZINE) 10 mg tablet 1 tab at onset of migraine, can repeat in 8 hours, can take with NSAID, Normal      topiramate (TOPAMAX) 50 MG tablet One tab at bedtime for 5 days then two tabs after that , Normal      etonogestrel (NEXPLANON) subdermal implant Inject 68 mg under the skin, Historical Med           No discharge procedures on file      ED Provider  Electronically Signed by           Rosalba Bourne MD  03/15/19 8643

## 2019-03-18 ENCOUNTER — CONSULT (OUTPATIENT)
Dept: VASCULAR SURGERY | Facility: CLINIC | Age: 29
End: 2019-03-18
Payer: COMMERCIAL

## 2019-03-18 VITALS
WEIGHT: 233 LBS | DIASTOLIC BLOOD PRESSURE: 70 MMHG | BODY MASS INDEX: 42.88 KG/M2 | HEART RATE: 100 BPM | HEIGHT: 62 IN | SYSTOLIC BLOOD PRESSURE: 136 MMHG | RESPIRATION RATE: 18 BRPM | TEMPERATURE: 98 F

## 2019-03-18 DIAGNOSIS — Q25.1 COARCTATION OF AORTA: ICD-10-CM

## 2019-03-18 PROCEDURE — 99243 OFF/OP CNSLTJ NEW/EST LOW 30: CPT | Performed by: SURGERY

## 2019-03-18 RX ORDER — ONDANSETRON 8 MG/1
8 TABLET, ORALLY DISINTEGRATING ORAL EVERY 8 HOURS PRN
COMMUNITY
End: 2019-05-01 | Stop reason: SDUPTHER

## 2019-03-18 NOTE — PROGRESS NOTES
Assessment/Plan:    Coarctation of aorta  There she had a lissette patch placed on the coarctation (10 days)  CTA shows recurrent coarctation of the aorta with weblike 50% stenosis of the proximal descending thoracic aorta  Rest of vasculature appears normal per CTA report (2/19/19)  TTE 12/21/18, 70-80 mmHg gradient (although patient had increased flow and tachycardia at the time)  At 2 weeks old she had a pulmonary band placed  At 22 months, she had the band removed and the VSD repaired  At 7years of age, she had balloon angioplasty of the coarctation done  After discussion with the patient and her accompanying mother they wish to seek further evaluation and subsequent treatment at [facility]  Patient will follow with her cardiologist for referral to CT surgery at [facility]  Diagnoses and all orders for this visit:    Coarctation of aorta  -     Ambulatory referral to Vascular Surgery    Other orders  -     ondansetron (ZOFRAN ODT) 8 mg disintegrating tablet; Take 8 mg by mouth every 8 (eight) hours as needed for nausea or vomiting          Subjective:      Patient ID: Dania Davis is a 30 y o  female  Patient had CTA of chest and ABD on 2/19/19  PAtient complains of low back and abd pain at times  Her bp has been regular except when she is in pain her BP becomes elevated  Patient is diabetic  The following portions of the patient's history were reviewed and updated as appropriate: allergies, current medications, past family history, past medical history, past social history, past surgical history and problem list     Review of Systems   Constitutional: Negative  HENT: Negative  Eyes: Negative  Respiratory: Negative  Cardiovascular: Negative  Gastrointestinal: Positive for abdominal pain  Endocrine: Negative  Genitourinary: Negative  Musculoskeletal: Positive for back pain  Skin: Negative  Allergic/Immunologic: Negative  Neurological: Positive for headaches  Hematological: Negative  Psychiatric/Behavioral: Negative            Objective:      /70 (BP Location: Right arm)   Pulse 100   Temp 98 °F (36 7 °C)   Resp 18   Ht 5' 2" (1 575 m)   Wt 106 kg (233 lb)   BMI 42 62 kg/m²          Physical Exam

## 2019-03-18 NOTE — ASSESSMENT & PLAN NOTE
There she had a lissette patch placed on the coarctation (10 days)  CTA shows recurrent coarctation of the aorta with weblike 50% stenosis of the proximal descending thoracic aorta  Rest of vasculature appears normal per CTA report (2/19/19)  TTE 12/21/18, 70-80 mmHg gradient (although patient had increased flow and tachycardia at the time)  At 2 weeks old she had a pulmonary band placed  At 22 months, she had the band removed and the VSD repaired  At 7years of age, she had balloon angioplasty of the coarctation done  After discussion with the patient and her accompanying mother they wish to seek further evaluation and subsequent treatment at [facility]  Patient will follow with her cardiologist for referral to CT surgery at [facility]

## 2019-03-18 NOTE — LETTER
March 18, 2019     Jarome Habermann, MD  [address] [address]    Patient: Alicia Bustamante   YOB: 1990   Date of Visit: 3/18/2019       Dear Dr Sam Meckel: Thank you for referring Alicia Bustamante to me for evaluation  Below are my notes for this consultation  If you have questions, please do not hesitate to call me  I look forward to following your patient along with you  Sincerely,        Kristi Rodriguez MD        CC: DO Jen Singer RN Hardy Share, MD Devora Landsman, MD  3/18/2019  6:19 PM  Incomplete  Assessment/Plan:    Coarctation of aorta  There she had a lissette patch placed on the coarctation (10 days)  CTA shows recurrent coarctation of the aorta with weblike 50% stenosis of the proximal descending thoracic aorta  Rest of vasculature appears normal per CTA report (2/19/19)  TTE 12/21/18, 70-80 mmHg gradient (although patient had increased flow and tachycardia at the time)  At 2 weeks old she had a pulmonary band placed  At 22 months, she had the band removed and the VSD repaired  At 7years of age, she had balloon angioplasty of the coarctation done  After discussion with the patient and her accompanying mother they wish to seek further evaluation and subsequent treatment at [facility]  Patient will follow with her cardiologist for referral to CT surgery at [facility]  Diagnoses and all orders for this visit:    Coarctation of aorta  -     Ambulatory referral to Vascular Surgery    Other orders  -     ondansetron (ZOFRAN ODT) 8 mg disintegrating tablet; Take 8 mg by mouth every 8 (eight) hours as needed for nausea or vomiting          Subjective:      Patient ID: Alicia Bustamante is a 30 y o  female  Patient had CTA of chest and ABD on 2/19/19  PAtient complains of low back and abd pain at times  Her bp has been regular except when she is in pain her BP becomes elevated  Patient is diabetic          The following portions of the patient's history were reviewed and updated as appropriate: allergies, current medications, past family history, past medical history, past social history, past surgical history and problem list     Review of Systems   Constitutional: Negative  HENT: Negative  Eyes: Negative  Respiratory: Negative  Cardiovascular: Negative  Gastrointestinal: Positive for abdominal pain  Endocrine: Negative  Genitourinary: Negative  Musculoskeletal: Positive for back pain  Skin: Negative  Allergic/Immunologic: Negative  Neurological: Positive for headaches  Hematological: Negative  Psychiatric/Behavioral: Negative  Objective:      /70 (BP Location: Right arm)   Pulse 100   Temp 98 °F (36 7 °C)   Resp 18   Ht 5' 2" (1 575 m)   Wt 106 kg (233 lb)   BMI 42 62 kg/m²           Physical Exam      Rd Dewitt MD  3/18/2019  3:10 PM  Sign at close encounter  Assessment/Plan:    No problem-specific Assessment & Plan notes found for this encounter  Diagnoses and all orders for this visit:    Coarctation of aorta  -     Ambulatory referral to Vascular Surgery    Other orders  -     ondansetron (ZOFRAN ODT) 8 mg disintegrating tablet; Take 8 mg by mouth every 8 (eight) hours as needed for nausea or vomiting          Subjective:      Patient ID: Nita Alegria is a 30 y o  female  Patient had CTA of chest and ABD on 2/19/19  PAtient complains of low back and abd pain at times  Her bp has been regular except when she is in pain her BP becomes elevated  Patient is diabetic  The following portions of the patient's history were reviewed and updated as appropriate: allergies, current medications, past family history, past medical history, past social history, past surgical history and problem list     Review of Systems   Constitutional: Negative  HENT: Negative  Eyes: Negative  Respiratory: Negative  Cardiovascular: Negative  Gastrointestinal: Positive for abdominal pain  Endocrine: Negative  Genitourinary: Negative  Musculoskeletal: Positive for back pain  Skin: Negative  Allergic/Immunologic: Negative  Neurological: Positive for headaches  Hematological: Negative  Psychiatric/Behavioral: Negative  Objective: There were no vitals taken for this visit           Physical Exam

## 2019-03-19 ENCOUNTER — OFFICE VISIT (OUTPATIENT)
Dept: CARDIOLOGY CLINIC | Facility: CLINIC | Age: 29
End: 2019-03-19
Payer: COMMERCIAL

## 2019-03-19 ENCOUNTER — PATIENT OUTREACH (OUTPATIENT)
Dept: FAMILY MEDICINE CLINIC | Facility: CLINIC | Age: 29
End: 2019-03-19

## 2019-03-19 VITALS
HEART RATE: 90 BPM | SYSTOLIC BLOOD PRESSURE: 120 MMHG | HEIGHT: 62 IN | OXYGEN SATURATION: 99 % | DIASTOLIC BLOOD PRESSURE: 70 MMHG | WEIGHT: 236 LBS | BODY MASS INDEX: 43.43 KG/M2

## 2019-03-19 DIAGNOSIS — Q25.1 COARCTATION OF AORTA: Primary | ICD-10-CM

## 2019-03-19 PROCEDURE — 99214 OFFICE O/P EST MOD 30 MIN: CPT | Performed by: INTERNAL MEDICINE

## 2019-03-19 NOTE — PROGRESS NOTES
Outpatient Care Management Note;  Spoke with Leticia Hoguee, her headaches are improving but still occurring  Reminded that she needs to take compazine when she feels that the headache is starting in addition to her Topamax,  Verbalized understanding of same  Discussed the continuing issue of nausea and vomiting and having a  Gastric emptying study done to determine if she was developing gastroparesis  She would like to wait until she gets the results of her small bowel study  Discussed her blood sugar being greater than 350 at there last ED visit  Her grandmother and father both had polycystic kidney disease, her father was on dialysis  She is concerned this will happen to her  Re-assured her that her CT of the abdomen and pelvis showed no signs of renal cysts but that her elevated blood sugars could cause renall damage over time which makes blood sugar control important  Instructed to write down her fasting blood sugar as well as her blood sugar two hours after her evening meal and bring them to her next appointment with Dr Bigg Dunbar  Instructed to call the office today to schedule the 1 month follow up that was recommended at her last visit  Verbalized understanding of both  She is seeing Dr Will Dumont today to discuss a referral to [address] [place] [address] for stenting of the coarctation of her Aorta  Emotional support given  Verified that she has my contact information  Encouraged to call with questions or concerns

## 2019-03-19 NOTE — LETTER
March 19, 2019     Patient: Vandana Nickerson   YOB: 1990   Date of Visit: 3/19/2019       To Whom it May Concern:    Vandana Nickerson is under my professional care  She was seen in my office on 3/19/2019  Please excuse her from work for today  If you have any questions or concerns, please don't hesitate to call           Sincerely,          Rikki Dias MD        CC: No Recipients

## 2019-03-19 NOTE — LETTER
March 19, 2019     Jimena Hernandez, [address] [place] [address] [address] [facility] [address]    Patient: Terry Rebollar   YOB: 1990   Date of Visit: 3/19/2019       Dear Dr Pascal Reason:    Thank you for referring Terry Rebollar to me for evaluation  Below are my notes for this consultation  If you have questions, please do not hesitate to call me  I look forward to following your patient along with you  Sincerely,        Lloyd Westfall MD        CC: MD Lloyd Alexander MD  3/19/2019  1:47 PM  Sign at close encounter  Heart Failure Outpatient Progress Note - Terry Rebollar 30 y o  female MRN: [id number]    @ Encounter: [id number]  Assessment/Plan:    Patient Active Problem List    Diagnosis Date Noted    Coarctation of aorta 03/18/2019    Intractable chronic migraine without aura and with status migrainosus 03/08/2019    Cervicalgia 03/08/2019    Cervical dystonia 03/08/2019    Pulmonary valve stenosis 02/14/2019    Class 3 severe obesity due to excess calories with serious comorbidity and body mass index (BMI) of 40 0 to 44 9 in [facility]) 02/14/2019    Ovarian cyst, right 02/14/2019    Splenomegaly 10/12/2018    Hyperglycemia 10/12/2018    Hyponatremia 10/12/2018    Hypertension 10/12/2018    Hepatitis C 10/12/2018    Abdominal pain 10/12/2018    S/P VSD repair 07/12/2018    H/O aortic coarctation repair 07/12/2018     # Congenital coarctation s/p BPA: There she had a lissette patch placed on the coarctation (10 days)  CTA shows recurrent coarctation of the aorta with weblike 50% stenosis of the proximal descending thoracic aorta  Rest of vasculature appears normal per CTA report (2/19/19)  TTE 12/21/18, 70-80 mmHg gradient (although patient had increased flow and tachycardia at the time)    --Obtain records from [address] in [place]  --Repeat TTE after next visit (or post stent placement)  --Will refer for stent placement (COAST 1 trial) to [facility]    # H/o VSD repair  At 2 weeks old she had a pulmonary band placed  At 22 months, she had the band removed and the VSD repaired  At 7years of age, she had balloon angioplasty of the coarctation done  --Will consider cMRI in the future    # Mild pulmonic stenosis: Imaging suboptimal  Repeat TTE after next visit  # Morbid obesity  # Hep C: Continue Mavyret  # HTN: Well controlled on lisinopril 20 mg PO daily    RTC in 3 months    HPI: Very pleasant 30 y o  woman w/ PMHx of VSD and coarctation s/p repair referred for management of coarctation  TTE 12/21/18 showed LVEF 65% w/ @ least mild pulmonic stenosis w/  50% stenosis of the proximal descending thoracic aorta  She was born in the [facility] and was sent to [address] [place] [address]  There she had a lissette patch placed on the coarctation (10 days)  At 2 weeks old she had a pulmonary band placed  At 22 months, she had the band removed and the VSD repaired  At 7years of age, she had balloon angioplasty of the coarctation done  At age 13 or 13, there was a concern that she may have contracted Hep C (from surgery at 22 months)  She went on treatment as she had mild fibrosis  During her treatment, her physician left  Initially she was responding, but then stable  She is currently on treatment with Mavyret, and her Hep C is undetectable  She states that she has been having episodes of chest pain that is unrelated to exertion  Otherwise, feels well  Able to ambulate on flat ground without difficulty  Today, 3/19/19, she returns for f/u  She was seen by vascular who would prefer referral to [facility]  She feels the same as last visit  Rare CP       Past Medical History:   Diagnosis Date    Allergic     Arthritis     Bipolar affective disorder, currently depressed, moderate ([address] ) 12/17/2018    Cyst of ovary, right     Diabetes mellitus ([address] ) 10/10/18    Heart murmur 01/19/88    Hepatitis C     Hepatitis C virus infection cured after antiviral drug therapy     Hypertension     Obesity 1995    Pulmonary artery congenital abnormality     Spleen enlarged      Review of Systems - 12 point ROS was done and is negative, except as noted above  Allergies   Allergen Reactions    Corticosteroids Anaphylaxis    Prednisone Swelling    Sulfa Antibiotics Hives    Milk-Related Compounds Sneezing           Current Outpatient Medications:     cyclobenzaprine (FLEXERIL) 10 mg tablet, One tab at bedtime for 5 days then one tab twice a day after that , Disp: 60 tablet, Rfl: 3    ergocalciferol (VITAMIN D2) 50,000 units, Take 1 capsule (50,000 Units total) by mouth once a week, Disp: 8 capsule, Rfl: 0    etonogestrel (NEXPLANON) subdermal implant, Inject 68 mg under the skin, Disp: , Rfl:     gabapentin (NEURONTIN) 300 mg capsule, Take 1 capsule (300 mg total) by mouth 2 (two) times a day, Disp: 90 capsule, Rfl: 1    glucose blood (ACCU-CHEK GUIDE) test strip, Test three times per day, Disp: 100 each, Rfl: 0    Lancets (ACCU-CHEK MULTICLIX) lancets, Use as instructed, Disp: 100 each, Rfl: 3    lisinopril (ZESTRIL) 20 mg tablet, Take 1 tablet (20 mg total) by mouth daily, Disp: 30 tablet, Rfl: 3    metFORMIN (GLUCOPHAGE) 1000 MG tablet, Take 1 tablet (1,000 mg total) by mouth 2 (two) times a day with meals, Disp: 60 tablet, Rfl: 6    pantoprazole (PROTONIX) 40 mg tablet, Take 1 tablet (40 mg total) by mouth daily, Disp: 30 tablet, Rfl: 6    prochlorperazine (COMPAZINE) 10 mg tablet, 1 tab at onset of migraine, can repeat in 8 hours, can take with NSAID, Disp: 20 tablet, Rfl: 3    topiramate (TOPAMAX) 50 MG tablet, One tab at bedtime for 5 days then two tabs after that , Disp: 60 tablet, Rfl: 1    ibuprofen (MOTRIN) 800 mg tablet, Take 1 tablet (800 mg total) by mouth every 6 (six) hours as needed for mild pain or moderate pain (Patient not taking: Reported on 3/19/2019), Disp: 30 tablet, Rfl: 3    Insulin Pen Needle 31G X 8 MM MISC, by Does not apply route daily (Patient not taking: Reported on 3/19/2019), Disp: 100 each, Rfl: 0    ondansetron (ZOFRAN ODT) 8 mg disintegrating tablet, Take 8 mg by mouth every 8 (eight) hours as needed for nausea or vomiting, Disp: , Rfl:     ondansetron (ZOFRAN) 8 mg tablet, Take 1 tablet (8 mg total) by mouth every 8 (eight) hours as needed for nausea or vomiting (Patient not taking: Reported on 3/18/2019), Disp: 30 tablet, Rfl: 1    Current Facility-Administered Medications:     insulin glargine (LANTUS) subcutaneous injection 20 Units 0 2 mL, 20 Units, Subcutaneous, HS, Estrada Romero MD    Social History     Socioeconomic History    Marital status: Single     Spouse name: Not on file    Number of children: Not on file    Years of education: Not on file    Highest education level: Not on file   Occupational History    Not on file   Social Needs    Financial resource strain: Not on file    Food insecurity:     Worry: Not on file     Inability: Not on file    Transportation needs:     Medical: Not on file     Non-medical: Not on file   Tobacco Use    Smoking status: Former Smoker     Packs/day: 0 00     Start date: 1/17/2019    Smokeless tobacco: Never Used    Tobacco comment: smoking 1-3 cigarettes/day   Substance and Sexual Activity    Alcohol use:  Yes     Alcohol/week: 1 8 oz     Types: 3 Standard drinks or equivalent per week     Comment: socially    Drug use: Yes     Types: Marijuana    Sexual activity: Yes     Partners: Male     Birth control/protection: Condom Male, Implant     Comment: implant "should have been taken out years ago"    Lifestyle    Physical activity:     Days per week: Not on file     Minutes per session: Not on file    Stress: Not on file   Relationships    Social connections:     Talks on phone: Not on file     Gets together: Not on file     Attends [employer] service: Not on file     Active member of club or organization: Not on file     Attends meetings of clubs or organizations: Not on file Relationship status: Not on file    Intimate partner violence:     Fear of current or ex partner: Not on file     Emotionally abused: Not on file     Physically abused: Not on file     Forced sexual activity: Not on file   Other Topics Concern    Not on file   Social History Narrative    Not on file       Family History   Problem Relation Age of Onset    Hypertension Mother     Migraines Mother     Diabetes Father     Hypertension Father     Kidney failure Father     Polycystic kidney disease Father     Heart attack Father     Arthritis Father     Stroke Father     Polycystic kidney disease Paternal Grandmother     Stroke Paternal Grandmother     Arthritis Sister     Asthma Sister     Arthritis Maternal Grandmother     Cancer Maternal Grandmother     Learning disabilities Cousin     Learning disabilities Sister     ADD / ADHD Cousin      Physical Exam:    Vitals: Blood pressure 120/70, pulse 90, height 5' 2" (1 575 m), weight 107 kg (236 lb), SpO2 99 %, not currently breastfeeding , Body mass index is 43 16 kg/m² ,   Wt Readings from Last 3 Encounters:   03/19/19 107 kg (236 lb)   03/18/19 106 kg (233 lb)   03/15/19 106 kg (234 lb)     Vitals:    03/19/19 1336   BP: 120/70   BP Location: Right arm   Patient Position: Sitting   Cuff Size: Standard   Pulse: 90   SpO2: 99%   Weight: 107 kg (236 lb)   Height: 5' 2" (1 575 m)     GEN: Dania  appears well, alert and oriented x 3, pleasant and cooperative   HEENT: pupils equal, round, and reactive to light; extraocular muscles intact  NECK: supple, no carotid bruits   HEART: regular rhythm, normal S1 and S2, no murmurs, clicks, gallops or rubs, JVP is    LUNGS: clear to auscultation bilaterally; no wheezes, rales, or rhonchi   ABDOMEN: normal bowel sounds, soft, no tenderness, no distention  EXTREMITIES: peripheral pulses normal; no clubbing, cyanosis, or edema  NEURO: no focal findings   SKIN: normal without suspicious lesions on exposed skin    Labs & Results:  Lab Results   Component Value Date    WBC 10 30 03/15/2019    HGB 14 2 03/15/2019    HCT 42 2 03/15/2019    MCV 80 (L) 03/15/2019     03/15/2019       Chemistry        Component Value Date/Time     06/14/2018 1620    K 3 8 03/15/2019 1719    K 4 0 06/14/2018 1620     03/15/2019 1719     06/14/2018 1620    CO2 24 03/15/2019 1719    CO2 28 06/14/2018 1620    BUN 13 03/15/2019 1719    BUN 15 06/14/2018 1620    CREATININE 0 65 03/15/2019 1719    CREATININE 0 65 06/14/2018 1620        Component Value Date/Time    CALCIUM 9 3 03/15/2019 1719    CALCIUM 9 9 06/14/2018 1620    ALKPHOS 76 03/15/2019 1719    AST 17 03/15/2019 1719    ALT 22 03/15/2019 1719        Lab Results   Component Value Date    INR 1 05 01/26/2019    INR 1 06 12/02/2018    INR 1 08 11/21/2018    PROTIME 12 2 01/26/2019    PROTIME 12 3 12/02/2018    PROTIME 12 5 11/21/2018     EKG personally reviewed by Cesilia Ayers MD      Counseling / Coordination of Care  Total floor / unit time spent today 40 minutes  Greater than 50% of total time was spent with the patient and / or family counseling and / or coordination of care  A description of the counseling / coordination of care: 25 min  Thank you for the opportunity to participate in the care of this patient      Cesilia Ayers MD, PhD   Gerard Arteaga

## 2019-03-19 NOTE — PROGRESS NOTES
Heart Failure Outpatient Progress Note - Lida Duarte 30 y o  female MRN: [id number]    @ Encounter: [id number]  Assessment/Plan:    Patient Active Problem List    Diagnosis Date Noted    Coarctation of aorta 03/18/2019    Intractable chronic migraine without aura and with status migrainosus 03/08/2019    Cervicalgia 03/08/2019    Cervical dystonia 03/08/2019    Pulmonary valve stenosis 02/14/2019    Class 3 severe obesity due to excess calories with serious comorbidity and body mass index (BMI) of 40 0 to 44 9 in adult [facility]) 02/14/2019    Ovarian cyst, right 02/14/2019    Splenomegaly 10/12/2018    Hyperglycemia 10/12/2018    Hyponatremia 10/12/2018    Hypertension 10/12/2018    Hepatitis C 10/12/2018    Abdominal pain 10/12/2018    S/P VSD repair 07/12/2018    H/O aortic coarctation repair 07/12/2018     # Congenital coarctation s/p BPA: There she had a lissette patch placed on the coarctation (10 days)  CTA shows recurrent coarctation of the aorta with weblike 50% stenosis of the proximal descending thoracic aorta  Rest of vasculature appears normal per CTA report (2/19/19)  TTE 12/21/18, 70-80 mmHg gradient (although patient had increased flow and tachycardia at the time)  --Obtain records from [place] [address] in [place]  --Repeat TTE after next visit (or post stent placement)  --Will refer for stent placement (COAST 1 trial) to [facility]    # H/o VSD repair  At 2 weeks old she had a pulmonary band placed  At 22 months, she had the band removed and the VSD repaired  At 7years of age, she had balloon angioplasty of the coarctation done  --Will consider cMRI in the future    # Mild pulmonic stenosis: Imaging suboptimal  Repeat TTE after next visit  # Morbid obesity  # Hep C: Continue Mavyret  # HTN: Well controlled on lisinopril 20 mg PO daily    RTC in 3 months    HPI: Very pleasant 30 y o  woman w/ PMHx of VSD and coarctation s/p repair referred for management of coarctation   TTE 12/21/18 showed LVEF 65% w/ @ least mild pulmonic stenosis w/  50% stenosis of the proximal descending thoracic aorta  She was born in the [facility] and was sent to [address] [place] [address]  There she had a lissette patch placed on the coarctation (10 days)  At 2 weeks old she had a pulmonary band placed  At 22 months, she had the band removed and the VSD repaired  At 7years of age, she had balloon angioplasty of the coarctation done  At age 13 or 13, there was a concern that she may have contracted Hep C (from surgery at 22 months)  She went on treatment as she had mild fibrosis  During her treatment, her physician left  Initially she was responding, but then stable  She is currently on treatment with Mavyret, and her Hep C is undetectable  She states that she has been having episodes of chest pain that is unrelated to exertion  Otherwise, feels well  Able to ambulate on flat ground without difficulty  Today, 3/19/19, she returns for f/u  She was seen by vascular who would prefer referral to [facility]  She feels the same as last visit  Rare CP  Past Medical History:   Diagnosis Date    Allergic     Arthritis     Bipolar affective disorder, currently depressed, moderate ([facility] ) 12/17/2018    Cyst of ovary, right     Diabetes mellitus ([facility] ) 10/10/18    Heart murmur 01/19/88    Hepatitis C     Hepatitis C virus infection cured after antiviral drug therapy     Hypertension     Obesity 1995    Pulmonary artery congenital abnormality     Spleen enlarged      Review of Systems - 12 point ROS was done and is negative, except as noted above  Allergies   Allergen Reactions    Corticosteroids Anaphylaxis    Prednisone Swelling    Sulfa Antibiotics Hives    Milk-Related Compounds Sneezing           Current Outpatient Medications:     cyclobenzaprine (FLEXERIL) 10 mg tablet, One tab at bedtime for 5 days then one tab twice a day after that , Disp: 60 tablet, Rfl: 3    ergocalciferol (VITAMIN D2) 50,000 units, Take 1 capsule (50,000 Units total) by mouth once a week, Disp: 8 capsule, Rfl: 0    etonogestrel (NEXPLANON) subdermal implant, Inject 68 mg under the skin, Disp: , Rfl:     gabapentin (NEURONTIN) 300 mg capsule, Take 1 capsule (300 mg total) by mouth 2 (two) times a day, Disp: 90 capsule, Rfl: 1    glucose blood (ACCU-CHEK GUIDE) test strip, Test three times per day, Disp: 100 each, Rfl: 0    Lancets (ACCU-CHEK MULTICLIX) lancets, Use as instructed, Disp: 100 each, Rfl: 3    lisinopril (ZESTRIL) 20 mg tablet, Take 1 tablet (20 mg total) by mouth daily, Disp: 30 tablet, Rfl: 3    metFORMIN (GLUCOPHAGE) 1000 MG tablet, Take 1 tablet (1,000 mg total) by mouth 2 (two) times a day with meals, Disp: 60 tablet, Rfl: 6    pantoprazole (PROTONIX) 40 mg tablet, Take 1 tablet (40 mg total) by mouth daily, Disp: 30 tablet, Rfl: 6    prochlorperazine (COMPAZINE) 10 mg tablet, 1 tab at onset of migraine, can repeat in 8 hours, can take with NSAID, Disp: 20 tablet, Rfl: 3    topiramate (TOPAMAX) 50 MG tablet, One tab at bedtime for 5 days then two tabs after that , Disp: 60 tablet, Rfl: 1    ibuprofen (MOTRIN) 800 mg tablet, Take 1 tablet (800 mg total) by mouth every 6 (six) hours as needed for mild pain or moderate pain (Patient not taking: Reported on 3/19/2019), Disp: 30 tablet, Rfl: 3    Insulin Pen Needle 31G X 8 MM MISC, by Does not apply route daily (Patient not taking: Reported on 3/19/2019), Disp: 100 each, Rfl: 0    ondansetron (ZOFRAN ODT) 8 mg disintegrating tablet, Take 8 mg by mouth every 8 (eight) hours as needed for nausea or vomiting, Disp: , Rfl:     ondansetron (ZOFRAN) 8 mg tablet, Take 1 tablet (8 mg total) by mouth every 8 (eight) hours as needed for nausea or vomiting (Patient not taking: Reported on 3/18/2019), Disp: 30 tablet, Rfl: 1    Current Facility-Administered Medications:     insulin glargine (LANTUS) subcutaneous injection 20 Units 0 2 mL, 20 Units, Subcutaneous, Yonas DELEON Blossom Sorto MD    Social History     Socioeconomic History    Marital status: Single     Spouse name: Not on file    Number of children: Not on file    Years of education: Not on file    Highest education level: Not on file   Occupational History    Not on file   Social Needs    Financial resource strain: Not on file    Food insecurity:     Worry: Not on file     Inability: Not on file    Transportation needs:     Medical: Not on file     Non-medical: Not on file   Tobacco Use    Smoking status: Former Smoker     Packs/day: 0 00     Start date: 1/17/2019    Smokeless tobacco: Never Used    Tobacco comment: smoking 1-3 cigarettes/day   Substance and Sexual Activity    Alcohol use:  Yes     Alcohol/week: 1 8 oz     Types: 3 Standard drinks or equivalent per week     Comment: socially    Drug use: Yes     Types: Marijuana    Sexual activity: Yes     Partners: Male     Birth control/protection: Condom Male, Implant     Comment: implant "should have been taken out years ago"    Lifestyle    Physical activity:     Days per week: Not on file     Minutes per session: Not on file    Stress: Not on file   Relationships    Social connections:     Talks on phone: Not on file     Gets together: Not on file     Attends [employer]ism service: Not on file     Active member of club or organization: Not on file     Attends meetings of clubs or organizations: Not on file     Relationship status: Not on file    Intimate partner violence:     Fear of current or ex partner: Not on file     Emotionally abused: Not on file     Physically abused: Not on file     Forced sexual activity: Not on file   Other Topics Concern    Not on file   Social History Narrative    Not on file       Family History   Problem Relation Age of Onset    Hypertension Mother     Migraines Mother     Diabetes Father     Hypertension Father     Kidney failure Father     Polycystic kidney disease Father     Heart attack Father     Arthritis Father  Stroke Father     Polycystic kidney disease Paternal Grandmother     Stroke Paternal Grandmother     Arthritis Sister     Asthma Sister     Arthritis Maternal Grandmother     Cancer Maternal Grandmother     Learning disabilities Cousin     Learning disabilities Sister     ADD / ADHD Cousin      Physical Exam:    Vitals: Blood pressure 120/70, pulse 90, height 5' 2" (1 575 m), weight 107 kg (236 lb), SpO2 99 %, not currently breastfeeding , Body mass index is 43 16 kg/m² ,   Wt Readings from Last 3 Encounters:   03/19/19 107 kg (236 lb)   03/18/19 106 kg (233 lb)   03/15/19 106 kg (234 lb)     Vitals:    03/19/19 1336   BP: 120/70   BP Location: Right arm   Patient Position: Sitting   Cuff Size: Standard   Pulse: 90   SpO2: 99%   Weight: 107 kg (236 lb)   Height: 5' 2" (1 575 m)     GEN: Margarita Braxton appears well, alert and oriented x 3, pleasant and cooperative   HEENT: pupils equal, round, and reactive to light; extraocular muscles intact  NECK: supple, no carotid bruits   HEART: regular rhythm, normal S1 and S2, no murmurs, clicks, gallops or rubs, JVP is    LUNGS: clear to auscultation bilaterally; no wheezes, rales, or rhonchi   ABDOMEN: normal bowel sounds, soft, no tenderness, no distention  EXTREMITIES: peripheral pulses normal; no clubbing, cyanosis, or edema  NEURO: no focal findings   SKIN: normal without suspicious lesions on exposed skin    Labs & Results:  Lab Results   Component Value Date    WBC 10 30 03/15/2019    HGB 14 2 03/15/2019    HCT 42 2 03/15/2019    MCV 80 (L) 03/15/2019     03/15/2019       Chemistry        Component Value Date/Time     06/14/2018 1620    K 3 8 03/15/2019 1719    K 4 0 06/14/2018 1620     03/15/2019 1719     06/14/2018 1620    CO2 24 03/15/2019 1719    CO2 28 06/14/2018 1620    BUN 13 03/15/2019 1719    BUN 15 06/14/2018 1620    CREATININE 0 65 03/15/2019 1719    CREATININE 0 65 06/14/2018 1620        Component Value Date/Time CALCIUM 9 3 03/15/2019 1719    CALCIUM 9 9 06/14/2018 1620    ALKPHOS 76 03/15/2019 1719    AST 17 03/15/2019 1719    ALT 22 03/15/2019 1719        Lab Results   Component Value Date    INR 1 05 01/26/2019    INR 1 06 12/02/2018    INR 1 08 11/21/2018    PROTIME 12 2 01/26/2019    PROTIME 12 3 12/02/2018    PROTIME 12 5 11/21/2018     EKG personally reviewed by Antoine Molina MD      Counseling / Coordination of Care  Total floor / unit time spent today 40 minutes  Greater than 50% of total time was spent with the patient and / or family counseling and / or coordination of care  A description of the counseling / coordination of care: 25 min  Thank you for the opportunity to participate in the care of this patient      Antoine Molina MD, PhD   Pastora Mcgraw

## 2019-03-20 ENCOUNTER — TELEPHONE (OUTPATIENT)
Dept: CARDIOLOGY CLINIC | Facility: CLINIC | Age: 29
End: 2019-03-20

## 2019-03-20 NOTE — TELEPHONE ENCOUNTER
----- Message from Antoine Molina MD sent at 3/19/2019  1:51 PM EDT -----  I need to refer this patient to [facility] congenital clinic, to Emir Tadeo Poultney  I put in the referral  Can you please make sure she gets an appointment? It is for coarctation of the aorta  Thanks!

## 2019-03-20 NOTE — TELEPHONE ENCOUNTER
S/w Drew Smith advised of cliff  Keegan called her this morning as well  Aware of appt and echo  Mailed her release form for care everywhere  She will mail back to me once received and signed

## 2019-03-20 NOTE — TELEPHONE ENCOUNTER
Appt made for 7/2 @ 3 pm w/ Dr Junior Clancy  They will be calling patient to set up an echo through their facility  Patient will be mailed out new patient p/w  I will call patient to let her know of appointment day and time

## 2019-03-22 NOTE — PROGRESS NOTES
PT Evaluation     Today's date: 3/22/2019  Patient name: Georges Lisa  : 1990  MRN: [id number]  Referring provider: Wm Sampson MD  Dx:   Encounter Diagnosis     ICD-10-CM    1  Cervical dystonia G24 3    2  Cervicalgia M54 2                   Assessment  Assessment details: Georges Lisa is a 30 y o  female with a history of anxiety/depression, DM II, heart disease, BMI>30  that presents for a moderate complexity physical therapy initial evaluation  The patient demonstrates signs and symptoms consistent with cervicalgia  During the examination the patient demonstrated decreased postural/core strength, decreased neck ROM, and neck pain  The patient's impairments are causing the following functional limitations: difficulty looking up in the izzy, donning/doffing a sweat shirt, lifting items above her head > 20 pounds  The patient's clinical presentation is evolving due to patient report of worsening of pain, functional deficits via FOTO (63% function), and significant medical history  The patient will benefit from skilled PT services to address impairments, work towards goals, and restore PLOF  Impairments: abnormal or restricted ROM, activity intolerance, impaired physical strength, lacks appropriate home exercise program, pain with function and poor posture   Functional limitations: difficulty reaching over head, looking up, don/doff sweatshirt  lifting items over head  Symptom irritability: lowBarriers to therapy: Significant medical history  Understanding of Dx/Px/POC: good   Prognosis: fair    Goals  STG: Achieve in 4 weeks  1  Patient's cervical/head pain at worst is no greater than 2/10 to reduce sleep disturbances    2   Patient's cervical extension ROM improve by 10-15 degrees all motions tested to allow for function ROM with ADL's   3   Patient's shoulder middle traps/ lower traps, serratus anterior MMT improve by 1/2-1 muscle grade to allow for completion of over head activities without difficulty  LTG: Achieve in 6-8 weeks  1  Patient tolerate reach over head  without neck pain greater than 3/10 to achieve their personal therapy goal   2   Patient's strength at cervical spine and shoulders improve to WNL to allow completion of leisure activities  3    Patient to be independent with home exercise plan at time of discharge  Plan  Patient would benefit from: skilled physical therapy  Planned modality interventions: ultrasound, traction, thermotherapy: hydrocollator packs, unattended electrical stimulation and cryotherapy  Planned therapy interventions: joint mobilization, manual therapy, massage, neuromuscular re-education, patient education, postural training, self care, strengthening, stretching, therapeutic activities, therapeutic exercise, home exercise program, abdominal trunk stabilization and body mechanics training  Frequency: 2x week  Duration in weeks: 6  Plan of Care beginning date: 3/25/2019  Plan of Care expiration date: 2019  Treatment plan discussed with: PTA and patient        Subjective Evaluation    History of Present Illness  Date of onset: 12/10/2018  Mechanism of injury: Gisela Gunderson is a 30 y o  female that presents to outpatient physical therapy with complaints of intermittent B/L shoulder pain and postural imbalances at her neck  The patient reports 1 shoulder is usually higher than the other shoulder  This occurs without the patient realizing it  The patient does not have neck pain to report  The patient saw her neurologist who recommended she attend physical therapy to improve her posture and reduce neck related shoulder pain  The patient reports the pain started 3 months ago and she denies any specific mechanism of injury  The patient's main goal for physical therapy is to move her neck without pain and use her arms to lift items without difficulty       Pain  Current pain rating: 3  At best pain ratin  At worst pain rating: 10  Location: midline C7  Quality: sharp, knife-like and burning  Relieving factors: medications  Aggravating factors: overhead activity  Progression: worsening    Social Support  Steps to enter house: yes  1  Stairs in house: yes   6  Lives in: [place]  Lives with: significant other    Employment status: working ([employer])  Hand dominance: right    Treatments  Previous treatment: medication  Current treatment: physical therapy  Patient Goals  Patient goals for therapy: decreased pain, increased motion, increased strength, independence with ADLs/IADLs and return to sport/leisure activities  Patient goal: reach over head        Objective     Concurrent Complaints  Positive for disturbed sleep and headaches  Negative for night pain, dizziness, faints, nausea/motion sickness, tinnitus, trouble swallowing, difficulty breathing, shortness of breath, respiratory pain, visual change, history of cancer, history of trauma and infection    Static Posture     Head  Forward  Shoulders  Asymmetric shoulders  Scapulae  Right depressed and left elevated  Thoracic Spine  Hyperkyphosis  Lumbar Spine   Flattened  Postural Observations  Seated posture: poor  Correction of posture: makes symptoms better        Palpation   Left   No palpable tenderness to the middle trapezius, rhomboids, sternocleidomastoid and suboccipitals  Tenderness of the levator scapulae and upper trapezius  Right   No palpable tenderness to the middle trapezius, rhomboids, sternocleidomastoid and suboccipitals  Tenderness of the levator scapulae and upper trapezius  Tenderness   Cervical Spine   Tenderness in the spinous process (C7)       Neurological Testing     Sensation   Cervical/Thoracic   Left   Intact: light touch    Right   Intact: light touch    Reflexes   Left   Biceps (C5/C6): normal (2+)  Love's reflex: negative    Right   Biceps (C5/C6): normal (2+)  Love's reflex: negative    Active Range of Motion Cervical/Thoracic Spine       Cervical    Flexion: 40 degrees  Restriction level: moderate  Extension: 30 degrees     with pain Restriction level: maximal  Left lateral flexion: 30 degrees     Restriction level: minimal  Right lateral flexion: 40 degrees     Restriction level minimal    Thoracic    Flexion:  Restriction level: minimal  Extension:  Restriction level: moderate  Mechanical Assessment    Cervical    Seated Protrusion: repeated movements   Pain location: no change  Seated retraction: repeated movements   Pain location: no change  Seated Flexion:  repeated movements  Pain location: no change  Seated Extension: repeated movements  Pain location: no change  Pain intensity: worse  Pain level: increased  Seated Left Sidebend: repeated movements   Pain location:no change  Seated right sidebend: repeated movements  Pain location: no change    Thoracic      Lumbar      Strength/Myotome Testing   Cervical Spine   Neck extension: 4  Neck flexion: 4    Left   Interossei strength (t1): 4    Right   Interossei strength (t1): 4    Left Shoulder     Planes of Motion   Abduction: 4     Isolated Muscles   Lower trapezius: 3-   Middle trapezius: 3   Serratus anterior: 3-     Right Shoulder     Planes of Motion   Abduction: 4     Isolated Muscles   Lower trapezius: 3-   Middle trapezius: 3   Serratus anterior: 3-     Left Elbow   Flexion: 4+  Extension: 4+    Right Elbow   Flexion: 4+  Extension: 4+    Left Wrist/Hand   Wrist extension: 4+  Wrist flexion: 4+  Thumb extension: 4    Right Wrist/Hand   Wrist extension: 4+  Wrist flexion: 4+  Thumb extension: 4    Tests   Cervical   Positive vertical compression  Negative repeated extension, repeated flexion, lumbar distraction and VBI  Left   Negative Spurling's Test A  Right   Negative Spurling's Test A  Thoracic   Negative slump test      Lumbar   Positive vertical compression        Additional Tests Details  FOTO: 63% function (predicted 69%)  Neuro Exam: Headaches   Patient reports headaches: Yes  RE-EVAL: 4/22        Manual   3/25/19       Massage B/L upper trapezius         Manual C-spine Traction        Sub Occipital Release                            Exercise Diary  3/25/19       UBE stand ALT  *      Nu-step   S=9        Abdominal Hollow  *      Bridges        Corner Pec stretch 4x:20       Scapular retracts x9 :05       Chin tucks  *      Quad DLS                          Levator Scapulae stretch B/L    *      B/L Upper Trap stretch 4x:20       Sit T-spine Extensions  *      Supine Pectoralis Minor stretch  *      Wall slides ITY  *                                                  Modalities        CP/MHP c-spine                          The patient was given a new home exercise plan with written handout, pictures, and verbal instruction  The patient accepts and understands the new home activities

## 2019-03-25 ENCOUNTER — EVALUATION (OUTPATIENT)
Dept: PHYSICAL THERAPY | Facility: CLINIC | Age: 29
End: 2019-03-25
Payer: COMMERCIAL

## 2019-03-25 DIAGNOSIS — M79.672 PAIN IN BOTH FEET: ICD-10-CM

## 2019-03-25 DIAGNOSIS — M54.2 CERVICALGIA: ICD-10-CM

## 2019-03-25 DIAGNOSIS — G24.3 CERVICAL DYSTONIA: Primary | ICD-10-CM

## 2019-03-25 DIAGNOSIS — M79.671 PAIN IN BOTH FEET: ICD-10-CM

## 2019-03-25 PROCEDURE — 97162 PT EVAL MOD COMPLEX 30 MIN: CPT | Performed by: PHYSICAL THERAPIST

## 2019-03-25 PROCEDURE — 97110 THERAPEUTIC EXERCISES: CPT | Performed by: PHYSICAL THERAPIST

## 2019-03-25 PROCEDURE — 97535 SELF CARE MNGMENT TRAINING: CPT | Performed by: PHYSICAL THERAPIST

## 2019-03-25 RX ORDER — IBUPROFEN 600 MG/1
TABLET ORAL
Qty: 90 TABLET | Refills: 1 | OUTPATIENT
Start: 2019-03-25

## 2019-03-26 ENCOUNTER — HOSPITAL ENCOUNTER (OUTPATIENT)
Dept: MRI IMAGING | Facility: HOSPITAL | Age: 29
Discharge: HOME | End: 2019-03-26
Attending: PSYCHIATRY & NEUROLOGY
Payer: COMMERCIAL

## 2019-03-26 ENCOUNTER — PATIENT OUTREACH (OUTPATIENT)
Dept: FAMILY MEDICINE CLINIC | Facility: CLINIC | Age: 29
End: 2019-03-26

## 2019-03-26 DIAGNOSIS — G43.711 INTRACTABLE CHRONIC MIGRAINE WITHOUT AURA AND WITH STATUS MIGRAINOSUS: ICD-10-CM

## 2019-03-26 PROCEDURE — A9585 GADOBUTROL INJECTION: HCPCS | Performed by: PSYCHIATRY & NEUROLOGY

## 2019-03-26 PROCEDURE — 70553 MRI BRAIN STEM W/O & W/DYE: CPT

## 2019-03-26 RX ADMIN — GADOBUTROL 11 ML: 604.72 INJECTION INTRAVENOUS at 13:09

## 2019-03-26 NOTE — PROGRESS NOTES
Outpatient Care Management Note:  Very tearful today  Has a lot going on both medically and personally  Her blood sugar this morning was 210, yesterday's was 250  Having a hard time with her diet and does not feel the dietician was helping her  Had an MRI today and feels "lousy" from the noise and position  She is seeing physical therapy for her neck pain  She has an appointment at the [facility] in July for a consultation for an aortic stent placement  She is working in a food prep job and finds it difficult to manage her diet since she needs to taste the foods  Discussed limiting her carbohydrates and allowing for the calories from work in her other meals  Verified that she has my contact information  Encouraged to call with questions or concerns

## 2019-03-27 ENCOUNTER — OFFICE VISIT (OUTPATIENT)
Dept: PHYSICAL THERAPY | Facility: CLINIC | Age: 29
End: 2019-03-27
Payer: COMMERCIAL

## 2019-03-27 DIAGNOSIS — M54.2 CERVICALGIA: ICD-10-CM

## 2019-03-27 DIAGNOSIS — G24.3 CERVICAL DYSTONIA: Primary | ICD-10-CM

## 2019-03-27 PROCEDURE — 97110 THERAPEUTIC EXERCISES: CPT

## 2019-03-27 NOTE — PROGRESS NOTES
Daily Note     Today's date: 3/27/2019  Patient name: uLc Alvarado  : 1990  MRN: [id number]  Referring provider: Cony Coburn MD  Dx:   Encounter Diagnosis     ICD-10-CM    1  Cervical dystonia G24 3    2  Cervicalgia M54 2                   Subjective: Patient states she has a "little headache" (6/10) with no neck pain  Patient states she has been attempting the exercises at home without pain  She feels the size of her chest is not helping with her back/ neck pain she has  Objective: See treatment diary below  RE-EVAL:         Manual   3/25/19 3/27/19      Massage B/L upper trapezius         Manual C-spine Traction        Sub Occipital Release                            Exercise Diary  3/25/19 3/27/19      UBE stand ALT  *90/70 x2min      Nu-step   S=9        Abdominal Hollow  *:05x10      Bridges        Corner Pec stretch 4x:20 4x:30      Scapular retracts x9 :05 Mirror  :05x 10      Chin tucks  *:05x 10      Quad DLS                          Levator Scapulae stretch B/L    *:30x4 ea      B/L Upper Trap stretch 4x:20 :30x4      Sit T-spine Extensions  *:05x 10      Supine Pectoralis Minor stretch  *x2 min      Wall slides ITY  *x10 ea                                                  Modalities        CP/MHP c-spine                          Patient's home exercise program updated to include additional exercises  Handout explained and issued  Assessment: Tolerated treatment well  Patient would benefit from continued PT for stretching and strengthening  Patient was able to add and increase exercises without difficulty  Patient understood all education about new exercises  Plan: Continue per plan of care  Progress treatment as tolerated

## 2019-03-29 ENCOUNTER — TELEPHONE (OUTPATIENT)
Dept: CARDIOLOGY CLINIC | Facility: CLINIC | Age: 29
End: 2019-03-29

## 2019-04-01 ENCOUNTER — APPOINTMENT (OUTPATIENT)
Dept: PHYSICAL THERAPY | Facility: CLINIC | Age: 29
End: 2019-04-01
Payer: COMMERCIAL

## 2019-04-02 ENCOUNTER — PATIENT OUTREACH (OUTPATIENT)
Dept: FAMILY MEDICINE CLINIC | Facility: CLINIC | Age: 29
End: 2019-04-02

## 2019-04-03 ENCOUNTER — OFFICE VISIT (OUTPATIENT)
Dept: PHYSICAL THERAPY | Facility: CLINIC | Age: 29
End: 2019-04-03
Payer: COMMERCIAL

## 2019-04-03 ENCOUNTER — PATIENT OUTREACH (OUTPATIENT)
Dept: FAMILY MEDICINE CLINIC | Facility: CLINIC | Age: 29
End: 2019-04-03

## 2019-04-03 DIAGNOSIS — M54.2 CERVICALGIA: ICD-10-CM

## 2019-04-03 DIAGNOSIS — E11.65 TYPE 2 DIABETES MELLITUS WITH HYPERGLYCEMIA, WITHOUT LONG-TERM CURRENT USE OF INSULIN (HCC): Primary | ICD-10-CM

## 2019-04-03 DIAGNOSIS — G24.3 CERVICAL DYSTONIA: Primary | ICD-10-CM

## 2019-04-03 PROCEDURE — 97110 THERAPEUTIC EXERCISES: CPT

## 2019-04-08 ENCOUNTER — OFFICE VISIT (OUTPATIENT)
Dept: PHYSICAL THERAPY | Facility: CLINIC | Age: 29
End: 2019-04-08
Payer: COMMERCIAL

## 2019-04-08 DIAGNOSIS — G24.3 CERVICAL DYSTONIA: Primary | ICD-10-CM

## 2019-04-08 DIAGNOSIS — M54.2 CERVICALGIA: ICD-10-CM

## 2019-04-08 PROCEDURE — 97110 THERAPEUTIC EXERCISES: CPT

## 2019-04-09 ENCOUNTER — PATIENT OUTREACH (OUTPATIENT)
Dept: FAMILY MEDICINE CLINIC | Facility: CLINIC | Age: 29
End: 2019-04-09

## 2019-04-11 ENCOUNTER — APPOINTMENT (OUTPATIENT)
Dept: PHYSICAL THERAPY | Facility: CLINIC | Age: 29
End: 2019-04-11
Payer: COMMERCIAL

## 2019-04-15 ENCOUNTER — PATIENT OUTREACH (OUTPATIENT)
Dept: FAMILY MEDICINE CLINIC | Facility: CLINIC | Age: 29
End: 2019-04-15

## 2019-04-15 ENCOUNTER — OFFICE VISIT (OUTPATIENT)
Dept: PHYSICAL THERAPY | Facility: CLINIC | Age: 29
End: 2019-04-15
Payer: COMMERCIAL

## 2019-04-15 DIAGNOSIS — G24.3 CERVICAL DYSTONIA: Primary | ICD-10-CM

## 2019-04-15 DIAGNOSIS — M54.2 CERVICALGIA: ICD-10-CM

## 2019-04-15 PROCEDURE — 97110 THERAPEUTIC EXERCISES: CPT

## 2019-04-15 PROCEDURE — 97140 MANUAL THERAPY 1/> REGIONS: CPT

## 2019-04-17 ENCOUNTER — TELEPHONE (OUTPATIENT)
Dept: FAMILY MEDICINE CLINIC | Facility: CLINIC | Age: 29
End: 2019-04-17

## 2019-04-17 ENCOUNTER — HOSPITAL ENCOUNTER (EMERGENCY)
Facility: HOSPITAL | Age: 29
Discharge: HOME | End: 2019-04-17
Attending: EMERGENCY MEDICINE | Admitting: EMERGENCY MEDICINE
Payer: COMMERCIAL

## 2019-04-17 ENCOUNTER — APPOINTMENT (OUTPATIENT)
Dept: PHYSICAL THERAPY | Facility: CLINIC | Age: 29
End: 2019-04-17
Payer: COMMERCIAL

## 2019-04-17 VITALS
RESPIRATION RATE: 18 BRPM | HEIGHT: 62 IN | TEMPERATURE: 97.5 F | WEIGHT: 240 LBS | HEART RATE: 98 BPM | SYSTOLIC BLOOD PRESSURE: 154 MMHG | OXYGEN SATURATION: 99 % | DIASTOLIC BLOOD PRESSURE: 102 MMHG | BODY MASS INDEX: 44.16 KG/M2

## 2019-04-17 DIAGNOSIS — M62.838 MUSCLE SPASM OF SHOULDER REGION: Primary | ICD-10-CM

## 2019-04-17 DIAGNOSIS — R51.9 HEADACHE: ICD-10-CM

## 2019-04-17 PROCEDURE — 99284 EMERGENCY DEPT VISIT MOD MDM: CPT

## 2019-04-17 RX ORDER — METHOCARBAMOL 500 MG/1
500 TABLET, FILM COATED ORAL ONCE
Status: COMPLETED | OUTPATIENT
Start: 2019-04-17 | End: 2019-04-17

## 2019-04-17 RX ORDER — METHOCARBAMOL 500 MG/1
500 TABLET, FILM COATED ORAL 2 TIMES DAILY
Qty: 20 TABLET | Refills: 0 | Status: SHIPPED | OUTPATIENT
Start: 2019-04-17 | End: 2019-11-21

## 2019-04-17 RX ORDER — IBUPROFEN 600 MG/1
600 TABLET ORAL ONCE
Status: COMPLETED | OUTPATIENT
Start: 2019-04-17 | End: 2019-04-17

## 2019-04-17 RX ADMIN — IBUPROFEN 600 MG: 600 TABLET ORAL at 17:53

## 2019-04-17 RX ADMIN — METHOCARBAMOL 500 MG: 500 TABLET, FILM COATED ORAL at 17:53

## 2019-04-22 ENCOUNTER — TELEPHONE (OUTPATIENT)
Dept: CARDIOLOGY CLINIC | Facility: CLINIC | Age: 29
End: 2019-04-22

## 2019-04-22 ENCOUNTER — EVALUATION (OUTPATIENT)
Dept: PHYSICAL THERAPY | Facility: CLINIC | Age: 29
End: 2019-04-22
Payer: COMMERCIAL

## 2019-04-22 DIAGNOSIS — M54.2 CERVICALGIA: ICD-10-CM

## 2019-04-22 DIAGNOSIS — G24.3 CERVICAL DYSTONIA: Primary | ICD-10-CM

## 2019-04-22 PROCEDURE — 97535 SELF CARE MNGMENT TRAINING: CPT | Performed by: PHYSICAL THERAPIST

## 2019-04-22 PROCEDURE — 97140 MANUAL THERAPY 1/> REGIONS: CPT | Performed by: PHYSICAL THERAPIST

## 2019-04-22 PROCEDURE — 97110 THERAPEUTIC EXERCISES: CPT | Performed by: PHYSICAL THERAPIST

## 2019-04-24 ENCOUNTER — TELEPHONE (OUTPATIENT)
Dept: FAMILY MEDICINE CLINIC | Facility: CLINIC | Age: 29
End: 2019-04-24

## 2019-04-24 ENCOUNTER — PATIENT OUTREACH (OUTPATIENT)
Dept: FAMILY MEDICINE CLINIC | Facility: CLINIC | Age: 29
End: 2019-04-24

## 2019-05-01 ENCOUNTER — OFFICE VISIT (OUTPATIENT)
Dept: CARDIOLOGY CLINIC | Facility: CLINIC | Age: 29
End: 2019-05-01

## 2019-05-01 VITALS
HEIGHT: 62 IN | DIASTOLIC BLOOD PRESSURE: 70 MMHG | WEIGHT: 230 LBS | SYSTOLIC BLOOD PRESSURE: 152 MMHG | HEART RATE: 103 BPM | BODY MASS INDEX: 42.33 KG/M2 | OXYGEN SATURATION: 97 %

## 2019-05-01 DIAGNOSIS — Q25.1 COARCTATION OF AORTA: Primary | ICD-10-CM

## 2019-05-01 DIAGNOSIS — I10 ESSENTIAL HYPERTENSION: ICD-10-CM

## 2019-05-01 PROCEDURE — 99214 OFFICE O/P EST MOD 30 MIN: CPT | Performed by: INTERNAL MEDICINE

## 2019-05-01 RX ORDER — LISINOPRIL 20 MG/1
30 TABLET ORAL DAILY
Qty: 180 TABLET | Refills: 3 | Status: SHIPPED | OUTPATIENT
Start: 2019-05-01 | End: 2019-11-21

## 2019-05-02 ENCOUNTER — OFFICE VISIT (OUTPATIENT)
Dept: FAMILY MEDICINE CLINIC | Facility: CLINIC | Age: 29
End: 2019-05-02

## 2019-05-02 VITALS
OXYGEN SATURATION: 98 % | HEIGHT: 62 IN | RESPIRATION RATE: 16 BRPM | TEMPERATURE: 98.7 F | BODY MASS INDEX: 42.33 KG/M2 | WEIGHT: 230 LBS | SYSTOLIC BLOOD PRESSURE: 138 MMHG | HEART RATE: 107 BPM | DIASTOLIC BLOOD PRESSURE: 68 MMHG

## 2019-05-02 DIAGNOSIS — E11.9 TYPE 2 DIABETES MELLITUS WITHOUT COMPLICATION, WITHOUT LONG-TERM CURRENT USE OF INSULIN (HCC): Primary | ICD-10-CM

## 2019-05-02 DIAGNOSIS — E78.00 HYPERCHOLESTEREMIA: ICD-10-CM

## 2019-05-02 PROCEDURE — 99214 OFFICE O/P EST MOD 30 MIN: CPT | Performed by: NURSE PRACTITIONER

## 2019-05-06 ENCOUNTER — TELEPHONE (OUTPATIENT)
Dept: FAMILY MEDICINE CLINIC | Facility: CLINIC | Age: 29
End: 2019-05-06

## 2019-05-07 ENCOUNTER — TELEPHONE (OUTPATIENT)
Dept: FAMILY MEDICINE CLINIC | Facility: CLINIC | Age: 29
End: 2019-05-07

## 2019-05-08 DIAGNOSIS — E55.9 VITAMIN D DEFICIENCY: ICD-10-CM

## 2019-05-08 RX ORDER — ERGOCALCIFEROL 1.25 MG/1
CAPSULE ORAL
Qty: 8 CAPSULE | Refills: 0 | OUTPATIENT
Start: 2019-05-08

## 2019-05-10 DIAGNOSIS — G43.711 INTRACTABLE CHRONIC MIGRAINE WITHOUT AURA AND WITH STATUS MIGRAINOSUS: ICD-10-CM

## 2019-05-10 RX ORDER — TOPIRAMATE 50 MG/1
100 TABLET, FILM COATED ORAL
Qty: 60 TABLET | Refills: 1 | Status: SHIPPED | OUTPATIENT
Start: 2019-05-10 | End: 2019-11-21

## 2019-05-15 ENCOUNTER — PATIENT OUTREACH (OUTPATIENT)
Dept: FAMILY MEDICINE CLINIC | Facility: CLINIC | Age: 29
End: 2019-05-15

## 2019-05-17 ENCOUNTER — CONSULT (OUTPATIENT)
Dept: CARDIOLOGY CLINIC | Facility: CLINIC | Age: 29
End: 2019-05-17

## 2019-05-17 VITALS
DIASTOLIC BLOOD PRESSURE: 80 MMHG | OXYGEN SATURATION: 99 % | WEIGHT: 229.4 LBS | BODY MASS INDEX: 42.21 KG/M2 | HEART RATE: 94 BPM | SYSTOLIC BLOOD PRESSURE: 122 MMHG | HEIGHT: 62 IN

## 2019-05-17 DIAGNOSIS — Q25.1 COARCTATION OF AORTA: ICD-10-CM

## 2019-05-17 DIAGNOSIS — I10 ESSENTIAL HYPERTENSION: ICD-10-CM

## 2019-05-17 DIAGNOSIS — Z87.74 H/O AORTIC COARCTATION REPAIR: ICD-10-CM

## 2019-05-17 DIAGNOSIS — Z87.74 S/P VSD REPAIR: Primary | ICD-10-CM

## 2019-05-17 PROCEDURE — 99205 OFFICE O/P NEW HI 60 MIN: CPT | Performed by: PEDIATRICS

## 2019-05-20 ENCOUNTER — TELEPHONE (OUTPATIENT)
Dept: HEMATOLOGY ONCOLOGY | Facility: CLINIC | Age: 29
End: 2019-05-20

## 2019-05-20 ENCOUNTER — TELEPHONE (OUTPATIENT)
Dept: CARDIOLOGY CLINIC | Facility: CLINIC | Age: 29
End: 2019-05-20

## 2019-05-22 ENCOUNTER — PATIENT OUTREACH (OUTPATIENT)
Dept: FAMILY MEDICINE CLINIC | Facility: CLINIC | Age: 29
End: 2019-05-22

## 2019-05-22 PROCEDURE — 93000 ELECTROCARDIOGRAM COMPLETE: CPT | Performed by: PEDIATRICS

## 2019-05-24 ENCOUNTER — TELEPHONE (OUTPATIENT)
Dept: CARDIOLOGY CLINIC | Facility: CLINIC | Age: 29
End: 2019-05-24

## 2019-05-29 ENCOUNTER — HOSPITAL ENCOUNTER (EMERGENCY)
Facility: HOSPITAL | Age: 29
Discharge: HOME | End: 2019-05-29
Admitting: EMERGENCY MEDICINE

## 2019-05-29 ENCOUNTER — PATIENT OUTREACH (OUTPATIENT)
Dept: FAMILY MEDICINE CLINIC | Facility: CLINIC | Age: 29
End: 2019-05-29

## 2019-05-29 VITALS
RESPIRATION RATE: 18 BRPM | HEIGHT: 62 IN | BODY MASS INDEX: 42.14 KG/M2 | HEART RATE: 118 BPM | OXYGEN SATURATION: 98 % | WEIGHT: 229 LBS | DIASTOLIC BLOOD PRESSURE: 115 MMHG | TEMPERATURE: 98.2 F | SYSTOLIC BLOOD PRESSURE: 149 MMHG

## 2019-05-29 DIAGNOSIS — L02.91 ABSCESS: ICD-10-CM

## 2019-05-29 DIAGNOSIS — L02.92 FURUNCLE: Primary | ICD-10-CM

## 2019-05-29 PROCEDURE — 99283 EMERGENCY DEPT VISIT LOW MDM: CPT

## 2019-05-29 RX ORDER — CLINDAMYCIN HYDROCHLORIDE 150 MG/1
300 CAPSULE ORAL ONCE
Status: COMPLETED | OUTPATIENT
Start: 2019-05-29 | End: 2019-05-29

## 2019-05-29 RX ORDER — CLINDAMYCIN HYDROCHLORIDE 300 MG/1
300 CAPSULE ORAL 4 TIMES DAILY
Qty: 28 CAPSULE | Refills: 0 | Status: SHIPPED | OUTPATIENT
Start: 2019-05-29 | End: 2019-05-30 | Stop reason: SDUPTHER

## 2019-05-29 RX ORDER — LIDOCAINE HYDROCHLORIDE AND EPINEPHRINE BITARTRATE 20; .01 MG/ML; MG/ML
1 INJECTION, SOLUTION SUBCUTANEOUS ONCE
Status: COMPLETED | OUTPATIENT
Start: 2019-05-29 | End: 2019-05-29

## 2019-05-29 RX ADMIN — LIDOCAINE HYDROCHLORIDE,EPINEPHRINE BITARTRATE 1 ML: 20; .01 INJECTION, SOLUTION INFILTRATION; PERINEURAL at 20:35

## 2019-05-29 RX ADMIN — CLINDAMYCIN HYDROCHLORIDE 300 MG: 150 CAPSULE ORAL at 20:57

## 2019-05-29 RX ADMIN — SODIUM BICARBONATE 50 MEQ: 84 INJECTION, SOLUTION INTRAVENOUS at 20:35

## 2019-05-30 ENCOUNTER — OFFICE VISIT (OUTPATIENT)
Dept: FAMILY MEDICINE CLINIC | Facility: CLINIC | Age: 29
End: 2019-05-30

## 2019-05-30 VITALS
SYSTOLIC BLOOD PRESSURE: 138 MMHG | DIASTOLIC BLOOD PRESSURE: 74 MMHG | BODY MASS INDEX: 41.22 KG/M2 | TEMPERATURE: 96.7 F | HEIGHT: 62 IN | OXYGEN SATURATION: 98 % | HEART RATE: 97 BPM | WEIGHT: 224 LBS | RESPIRATION RATE: 19 BRPM

## 2019-05-30 DIAGNOSIS — L02.91 ABSCESS: ICD-10-CM

## 2019-05-30 DIAGNOSIS — L02.92 FURUNCLE: ICD-10-CM

## 2019-05-30 DIAGNOSIS — L02.01 ABSCESS OF CHIN: Primary | ICD-10-CM

## 2019-05-30 PROCEDURE — 99213 OFFICE O/P EST LOW 20 MIN: CPT | Performed by: NURSE PRACTITIONER

## 2019-05-30 RX ORDER — CLINDAMYCIN HYDROCHLORIDE 300 MG/1
300 CAPSULE ORAL 4 TIMES DAILY
Qty: 28 CAPSULE | Refills: 0 | Status: SHIPPED | OUTPATIENT
Start: 2019-05-30 | End: 2019-06-06

## 2019-05-30 RX ORDER — CLINDAMYCIN HYDROCHLORIDE 300 MG/1
300 CAPSULE ORAL 4 TIMES DAILY
Qty: 28 CAPSULE | Refills: 0 | Status: CANCELLED | OUTPATIENT
Start: 2019-05-30 | End: 2019-06-06

## 2019-05-30 RX ORDER — CEPHALEXIN 500 MG/1
500 CAPSULE ORAL EVERY 6 HOURS SCHEDULED
Qty: 40 CAPSULE | Refills: 0 | Status: SHIPPED | OUTPATIENT
Start: 2019-05-30 | End: 2019-06-09

## 2019-05-31 ENCOUNTER — TELEPHONE (OUTPATIENT)
Dept: SURGERY | Facility: CLINIC | Age: 29
End: 2019-05-31

## 2019-05-31 ENCOUNTER — OFFICE VISIT (OUTPATIENT)
Dept: SURGERY | Facility: CLINIC | Age: 29
End: 2019-05-31
Payer: COMMERCIAL

## 2019-05-31 VITALS
DIASTOLIC BLOOD PRESSURE: 68 MMHG | SYSTOLIC BLOOD PRESSURE: 140 MMHG | TEMPERATURE: 97.5 F | BODY MASS INDEX: 41.22 KG/M2 | HEART RATE: 92 BPM | HEIGHT: 62 IN | RESPIRATION RATE: 18 BRPM | WEIGHT: 224 LBS

## 2019-05-31 DIAGNOSIS — L02.01 ABSCESS OF CHIN: Primary | ICD-10-CM

## 2019-05-31 PROCEDURE — 10060 I&D ABSCESS SIMPLE/SINGLE: CPT | Performed by: SURGERY

## 2019-05-31 PROCEDURE — 87205 SMEAR GRAM STAIN: CPT | Performed by: SURGERY

## 2019-05-31 PROCEDURE — 87070 CULTURE OTHR SPECIMN AEROBIC: CPT | Performed by: SURGERY

## 2019-05-31 PROCEDURE — 99242 OFF/OP CONSLTJ NEW/EST SF 20: CPT | Performed by: SURGERY

## 2019-05-31 PROCEDURE — 87186 SC STD MICRODIL/AGAR DIL: CPT | Performed by: SURGERY

## 2019-05-31 PROCEDURE — 87147 CULTURE TYPE IMMUNOLOGIC: CPT | Performed by: SURGERY

## 2019-05-31 RX ORDER — MONTELUKAST SODIUM 4 MG/1
TABLET, CHEWABLE ORAL
Refills: 0 | COMMUNITY
Start: 2019-05-18 | End: 2019-11-21

## 2019-06-02 LAB
BACTERIA WND AEROBE CULT: ABNORMAL
GRAM STN SPEC: ABNORMAL
GRAM STN SPEC: ABNORMAL

## 2019-06-03 ENCOUNTER — TELEPHONE (OUTPATIENT)
Dept: HEMATOLOGY ONCOLOGY | Facility: CLINIC | Age: 29
End: 2019-06-03

## 2019-06-07 ENCOUNTER — OFFICE VISIT (OUTPATIENT)
Dept: SURGERY | Facility: CLINIC | Age: 29
End: 2019-06-07

## 2019-06-07 DIAGNOSIS — L02.01 ABSCESS OF CHIN: Primary | ICD-10-CM

## 2019-06-07 PROCEDURE — 99024 POSTOP FOLLOW-UP VISIT: CPT | Performed by: PHYSICIAN ASSISTANT

## 2019-07-09 ENCOUNTER — OFFICE VISIT (OUTPATIENT)
Dept: FAMILY MEDICINE CLINIC | Facility: CLINIC | Age: 29
End: 2019-07-09
Payer: COMMERCIAL

## 2019-07-09 VITALS
BODY MASS INDEX: 42.47 KG/M2 | SYSTOLIC BLOOD PRESSURE: 142 MMHG | HEART RATE: 84 BPM | DIASTOLIC BLOOD PRESSURE: 86 MMHG | HEIGHT: 62 IN | WEIGHT: 230.8 LBS | RESPIRATION RATE: 18 BRPM | TEMPERATURE: 97.3 F

## 2019-07-09 DIAGNOSIS — R07.9 CHEST PAIN, UNSPECIFIED TYPE: Primary | ICD-10-CM

## 2019-07-09 DIAGNOSIS — E11.9 TYPE 2 DIABETES MELLITUS WITHOUT COMPLICATION, WITHOUT LONG-TERM CURRENT USE OF INSULIN (HCC): ICD-10-CM

## 2019-07-09 DIAGNOSIS — R10.9 ACUTE RIGHT FLANK PAIN: ICD-10-CM

## 2019-07-09 DIAGNOSIS — Q25.1 COARCTATION OF AORTA: ICD-10-CM

## 2019-07-09 LAB — SL AMB POCT HEMOGLOBIN AIC: 12.2 (ref ?–6.5)

## 2019-07-09 PROCEDURE — 83036 HEMOGLOBIN GLYCOSYLATED A1C: CPT | Performed by: FAMILY MEDICINE

## 2019-07-09 PROCEDURE — 93000 ELECTROCARDIOGRAM COMPLETE: CPT | Performed by: FAMILY MEDICINE

## 2019-07-09 PROCEDURE — 99214 OFFICE O/P EST MOD 30 MIN: CPT | Performed by: FAMILY MEDICINE

## 2019-07-09 RX ORDER — IBUPROFEN 800 MG/1
800 TABLET ORAL EVERY 8 HOURS PRN
Qty: 60 TABLET | Refills: 2 | Status: SHIPPED | OUTPATIENT
Start: 2019-07-09 | End: 2019-11-21

## 2019-07-09 NOTE — PROGRESS NOTES
Assessment/Plan:    No problem-specific Assessment & Plan notes found for this encounter  Diagnoses and all orders for this visit:    Chest pain, unspecified type  -     POCT ECG  -     ibuprofen (MOTRIN) 800 mg tablet; Take 1 tablet (800 mg total) by mouth every 8 (eight) hours as needed for mild pain or moderate pain    Coarctation of aorta    Type 2 diabetes mellitus without complication, without long-term current use of insulin ([facility])  Comments:  pt counseled on diet and exercise  Orders:  -     POCT hemoglobin A1c  -     Ambulatory Referral to Ophthalmology; Future  -     sitaGLIPtin (JANUVIA) 50 mg tablet; Take 1 tablet (50 mg total) by mouth daily    Acute right flank pain  -     XR ribs 2 vw right; Future  -     ibuprofen (MOTRIN) 800 mg tablet; Take 1 tablet (800 mg total) by mouth every 8 (eight) hours as needed for mild pain or moderate pain          Subjective:      Patient ID: Shannan Hare is a 30 y o  female  Pt has a 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, pt was recently found to have a recoarctation with testing by cardiology back in May, pt was set up for an MRI to better elucidate this however failed to do so, pt has been having chest pains which cardiology feels is scar tissue from previous chest surgeries, pt has not followed up with cardiology and has been missing appointments, case management has been working with her to encourage her to follow up with her care particularly in light of this recoarctation of the aorta, pt presents today with chest pain in her right flank pt was cleaning a bathtub and felt a "crack" in her right side 1 week ago, the pain is getting worse, pt tried tried ibuprofen 600mg which helps a little    Chest Pain    This is a recurrent problem  Pertinent negatives include no palpitations or shortness of breath  Diabetes   She presents for her follow-up diabetic visit  She has type 2 diabetes mellitus   There are no hypoglycemic associated symptoms  Associated symptoms include chest pain  There are no hypoglycemic complications  There are no diabetic complications  Risk factors for coronary artery disease include obesity and sedentary lifestyle  The following portions of the patient's history were reviewed and updated as appropriate: allergies, current medications, past family history, past medical history, past social history, past surgical history and problem list     Review of Systems   Respiratory: Negative for shortness of breath and wheezing  Cardiovascular: Positive for chest pain  Negative for palpitations  Objective:      /86   Pulse 84   Temp (!) 97 3 °F (36 3 °C) (Tympanic)   Resp 18   Ht 5' 2" (1 575 m)   Wt 105 kg (230 lb 12 8 oz)   BMI 42 21 kg/m²          Physical Exam   Constitutional: She is oriented to person, place, and time  She appears well-developed and well-nourished  No distress  HENT:   Head: Normocephalic and atraumatic  Eyes: Pupils are equal, round, and reactive to light  Conjunctivae and EOM are normal  No scleral icterus  Neck: Normal range of motion  Neck supple  Cardiovascular: Normal rate, regular rhythm and normal heart sounds  No murmur heard  Pulmonary/Chest: Effort normal and breath sounds normal  No respiratory distress  She has no wheezes  She has no rales  Abdominal: Soft  Bowel sounds are normal  She exhibits no distension and no mass  There is no tenderness  There is no rebound and no guarding  Musculoskeletal: Normal range of motion  She exhibits no edema or deformity  Lymphadenopathy:     She has no cervical adenopathy  Neurological: She is alert and oriented to person, place, and time  Skin: Skin is warm and dry  She is not diaphoretic  Psychiatric: She has a normal mood and affect  Her behavior is normal  Judgment and thought content normal    Nursing note and vitals reviewed

## 2019-07-15 ENCOUNTER — TELEPHONE (OUTPATIENT)
Dept: FAMILY MEDICINE CLINIC | Facility: CLINIC | Age: 29
End: 2019-07-15

## 2019-07-15 NOTE — TELEPHONE ENCOUNTER
Patient called requesting our office to call her electric company to have her electric kept on  Patient stated she had allergies to mold when she was a child and if she has no electric her refrigerator can get moldy  She is unaware if she has this allergy currently as an adult  Per patient she is on no medications that would require refrigeration or medical equipment which would require electric  I reviewed the criteria for us to write a medical certification with the patient  She agreed she doesn't meet this medical criteria

## 2019-07-19 ENCOUNTER — TELEPHONE (OUTPATIENT)
Dept: PEDIATRIC CARDIOLOGY | Facility: CLINIC | Age: 29
End: 2019-07-19

## 2019-07-19 NOTE — TELEPHONE ENCOUNTER
----- Message from Judah Malave DO sent at 6/13/2019  1:01 PM EDT -----  Would someone please reach out to her to see if she scheduled the cardiac MRI yet?     Thanks,  Shimon Judge

## 2019-07-19 NOTE — TELEPHONE ENCOUNTER
Spoke with Yoav Diaz to see if she has scheduled her Cardiac MRI  She informed me she did not have the number to schedule  I gave her central scheduling number [phone]

## 2019-07-24 ENCOUNTER — APPOINTMENT (EMERGENCY)
Dept: RADIOLOGY | Facility: HOSPITAL | Age: 29
End: 2019-07-24
Payer: COMMERCIAL

## 2019-07-24 ENCOUNTER — HOSPITAL ENCOUNTER (EMERGENCY)
Facility: HOSPITAL | Age: 29
Discharge: HOME | End: 2019-07-24
Payer: COMMERCIAL

## 2019-07-24 VITALS
WEIGHT: 231.48 LBS | DIASTOLIC BLOOD PRESSURE: 75 MMHG | SYSTOLIC BLOOD PRESSURE: 158 MMHG | OXYGEN SATURATION: 97 % | TEMPERATURE: 97.4 F | BODY MASS INDEX: 42.6 KG/M2 | HEIGHT: 62 IN | RESPIRATION RATE: 20 BRPM | HEART RATE: 98 BPM

## 2019-07-24 DIAGNOSIS — R07.9 CHEST PAIN, UNSPECIFIED: Primary | ICD-10-CM

## 2019-07-24 LAB
ALBUMIN SERPL BCP-MCNC: 4.1 G/DL (ref 3.5–5.7)
ALP SERPL-CCNC: 100 U/L (ref 40–150)
ALT SERPL W P-5'-P-CCNC: 15 U/L (ref 7–52)
ANION GAP SERPL CALCULATED.3IONS-SCNC: 10 MMOL/L (ref 4–13)
APTT PPP: 36 SECONDS (ref 23–37)
AST SERPL W P-5'-P-CCNC: 13 U/L (ref 13–39)
BASOPHILS # BLD AUTO: 0.1 THOUSANDS/ΜL (ref 0–0.1)
BASOPHILS NFR BLD AUTO: 1 % (ref 0–2)
BILIRUB SERPL-MCNC: 0.5 MG/DL (ref 0.2–1)
BUN SERPL-MCNC: 11 MG/DL (ref 7–25)
CALCIUM SERPL-MCNC: 9.3 MG/DL (ref 8.6–10.5)
CHLORIDE SERPL-SCNC: 100 MMOL/L (ref 98–107)
CO2 SERPL-SCNC: 24 MMOL/L (ref 21–31)
CREAT SERPL-MCNC: 0.63 MG/DL (ref 0.6–1.2)
EOSINOPHIL # BLD AUTO: 0.3 THOUSAND/ΜL (ref 0–0.61)
EOSINOPHIL NFR BLD AUTO: 2 % (ref 0–5)
ERYTHROCYTE [DISTWIDTH] IN BLOOD BY AUTOMATED COUNT: 14.3 % (ref 11.5–14.5)
GFR SERPL CREATININE-BSD FRML MDRD: 120 ML/MIN/1.73SQ M
GLUCOSE SERPL-MCNC: 309 MG/DL (ref 65–99)
HCG SERPL QL: NEGATIVE
HCT VFR BLD AUTO: 44.4 % (ref 42–47)
HGB BLD-MCNC: 15 G/DL (ref 12–16)
INR PPP: 1.06 (ref 0.9–1.5)
LYMPHOCYTES # BLD AUTO: 3.4 THOUSANDS/ΜL (ref 0.6–4.47)
LYMPHOCYTES NFR BLD AUTO: 28 % (ref 21–51)
MCH RBC QN AUTO: 26 PG (ref 26–34)
MCHC RBC AUTO-ENTMCNC: 33.6 G/DL (ref 31–37)
MCV RBC AUTO: 77 FL (ref 81–99)
MONOCYTES # BLD AUTO: 0.8 THOUSAND/ΜL (ref 0.17–1.22)
MONOCYTES NFR BLD AUTO: 6 % (ref 2–12)
NEUTROPHILS # BLD AUTO: 7.5 THOUSANDS/ΜL (ref 1.4–6.5)
NEUTS SEG NFR BLD AUTO: 62 % (ref 42–75)
PLATELET # BLD AUTO: 212 THOUSANDS/UL (ref 149–390)
PMV BLD AUTO: 9.2 FL (ref 8.6–11.7)
POTASSIUM SERPL-SCNC: 3.8 MMOL/L (ref 3.5–5.5)
PROT SERPL-MCNC: 7 G/DL (ref 6.4–8.9)
PROTHROMBIN TIME: 12.3 SECONDS (ref 10.2–13)
RBC # BLD AUTO: 5.76 MILLION/UL (ref 3.9–5.2)
SODIUM SERPL-SCNC: 134 MMOL/L (ref 134–143)
TROPONIN I SERPL-MCNC: <0.03 NG/ML
WBC # BLD AUTO: 12 THOUSAND/UL (ref 4.8–10.8)

## 2019-07-24 PROCEDURE — 36415 COLL VENOUS BLD VENIPUNCTURE: CPT

## 2019-07-24 PROCEDURE — 96375 TX/PRO/DX INJ NEW DRUG ADDON: CPT

## 2019-07-24 PROCEDURE — 85025 COMPLETE CBC W/AUTO DIFF WBC: CPT

## 2019-07-24 PROCEDURE — 84484 ASSAY OF TROPONIN QUANT: CPT

## 2019-07-24 PROCEDURE — 84703 CHORIONIC GONADOTROPIN ASSAY: CPT

## 2019-07-24 PROCEDURE — 85610 PROTHROMBIN TIME: CPT

## 2019-07-24 PROCEDURE — 96374 THER/PROPH/DIAG INJ IV PUSH: CPT

## 2019-07-24 PROCEDURE — 80053 COMPREHEN METABOLIC PANEL: CPT

## 2019-07-24 PROCEDURE — 71045 X-RAY EXAM CHEST 1 VIEW: CPT

## 2019-07-24 PROCEDURE — 85730 THROMBOPLASTIN TIME PARTIAL: CPT

## 2019-07-24 PROCEDURE — 93005 ELECTROCARDIOGRAM TRACING: CPT

## 2019-07-24 PROCEDURE — 99285 EMERGENCY DEPT VISIT HI MDM: CPT

## 2019-07-24 RX ORDER — LABETALOL 20 MG/4 ML (5 MG/ML) INTRAVENOUS SYRINGE
10 ONCE
Status: COMPLETED | OUTPATIENT
Start: 2019-07-24 | End: 2019-07-24

## 2019-07-24 RX ORDER — ASPIRIN 81 MG/1
324 TABLET, CHEWABLE ORAL ONCE
Status: COMPLETED | OUTPATIENT
Start: 2019-07-24 | End: 2019-07-24

## 2019-07-24 RX ORDER — KETOROLAC TROMETHAMINE 30 MG/ML
30 INJECTION, SOLUTION INTRAMUSCULAR; INTRAVENOUS ONCE
Status: COMPLETED | OUTPATIENT
Start: 2019-07-24 | End: 2019-07-24

## 2019-07-24 RX ORDER — LORAZEPAM 2 MG/ML
0.5 INJECTION INTRAMUSCULAR ONCE
Status: COMPLETED | OUTPATIENT
Start: 2019-07-24 | End: 2019-07-24

## 2019-07-24 RX ADMIN — LABETALOL 20 MG/4 ML (5 MG/ML) INTRAVENOUS SYRINGE 10 MG: at 17:24

## 2019-07-24 RX ADMIN — LORAZEPAM 0.5 MG: 2 INJECTION INTRAMUSCULAR; INTRAVENOUS at 19:54

## 2019-07-24 RX ADMIN — KETOROLAC TROMETHAMINE 30 MG: 30 INJECTION, SOLUTION INTRAMUSCULAR; INTRAVENOUS at 19:43

## 2019-07-24 RX ADMIN — ASPIRIN 81 MG 324 MG: 81 TABLET ORAL at 17:24

## 2019-07-24 NOTE — ED PROVIDER NOTES
History  Chief Complaint   Patient presents with    Chest Pain     Sonya Hamilton is a 66-year-old female who came to the emergency department due to substernal chest pain which started 1 day prior to arrival   She denies shortness of breath  She denies nausea or vomiting  She denies cough, fever or chills  Patient has a known history of coarctation of the aorta and diabetes  Incidentally patient also developed hives yesterday  History provided by:  Patient   used: No    Chest Pain   Pain location:  Substernal area  Pain quality: aching    Pain radiates to:  Does not radiate  Pain radiates to the back: no    Pain severity:  Mild  Onset quality:  Gradual  Timing:  Constant  Progression:  Unchanged  Chronicity:  Recurrent  Context: not breathing, no drug use, not eating, no intercourse, not lifting, no movement, not raising an arm, not at rest, no stress and no trauma    Relieved by:  Nothing  Worsened by:  Nothing tried  Ineffective treatments:  None tried  Associated symptoms: no abdominal pain, no AICD problem, no altered mental status, no anorexia, no anxiety, no back pain, no claudication, no cough, no diaphoresis, no dizziness, no dysphagia, no fatigue, no fever, no headache, no heartburn, no lower extremity edema, no nausea, no near-syncope, no numbness, no orthopnea, no palpitations, no PND, no shortness of breath, no syncope, not vomiting and no weakness    Risk factors: obesity        Prior to Admission Medications   Prescriptions Last Dose Informant Patient Reported? Taking?    Lancets (ACCU-CHEK MULTICLIX) lancets More than a month at Unknown time Self No No   Sig: Use as instructed   colestipol (COLESTID) 1 g tablet 7/23/2019 at Unknown time Self Yes Yes   ergocalciferol (VITAMIN D2) 50,000 units 7/24/2019 at Unknown time Self No Yes   Sig: Take 1 capsule (50,000 Units total) by mouth once a week   etonogestrel (NEXPLANON) subdermal implant 7/24/2019 at Unknown time Self Yes Yes Sig: Inject 68 mg under the skin   gabapentin (NEURONTIN) 300 mg capsule 2019 at Unknown time Self No Yes   Sig: Take 1 capsule (300 mg total) by mouth 2 (two) times a day   glucose blood (ACCU-CHEK GUIDE) test strip More than a month at Unknown time Self No No   Sig: Test three times per day   ibuprofen (MOTRIN) 800 mg tablet Past Week at Unknown time  No Yes   Sig: Take 1 tablet (800 mg total) by mouth every 8 (eight) hours as needed for mild pain or moderate pain   lisinopril (ZESTRIL) 20 mg tablet 2019 at Unknown time Self No Yes   Sig: Take 1 5 tablets (30 mg total) by mouth daily   metFORMIN (GLUCOPHAGE) 1000 MG tablet 2019 at Unknown time Self No Yes   Sig: Take 1 tablet (1,000 mg total) by mouth 2 (two) times a day with meals   methocarbamol (ROBAXIN) 500 mg tablet  Self No No   Sig: Take 1 tablet (500 mg total) by mouth 2 (two) times a day for 5 days   ondansetron (ZOFRAN) 8 mg tablet 2019 at Unknown time Self No Yes   Sig: Take 1 tablet (8 mg total) by mouth every 8 (eight) hours as needed for nausea or vomiting   pantoprazole (PROTONIX) 40 mg tablet 2019 at Unknown time Self No Yes   Sig: Take 1 tablet (40 mg total) by mouth daily   prochlorperazine (COMPAZINE) 10 mg tablet Past Week at Unknown time Self No Yes   Si tab at onset of migraine, can repeat in 8 hours, can take with NSAID   sitaGLIPtin (JANUVIA) 50 mg tablet Not Taking at Unknown time  No No   Sig: Take 1 tablet (50 mg total) by mouth daily   Patient not taking: Reported on 2019   topiramate (TOPAMAX) 50 MG tablet 2019 at Unknown time Self No Yes   Sig: Take 2 tablets (100 mg total) by mouth daily at bedtime      Facility-Administered Medications Last Administration Doses Remaining   insulin glargine (LANTUS) subcutaneous injection 20 Units 0 2 mL None recorded           Past Medical History:   Diagnosis Date    Allergic     Arthritis     Bipolar affective disorder, currently depressed, moderate ([facility] [address] ) 2018    Cyst of ovary, right     Diabetes mellitus ([facility] [address] ) 10/10/18    type 2    Heart murmur 88    Hepatitis C     Hepatitis C virus infection cured after antiviral drug therapy     Hypertension     Obesity 1995    Pulmonary artery congenital abnormality     Spleen enlarged        Past Surgical History:   Procedure Laterality Date    CARDIAC CATHETERIZATION      no CAD 10days, 4 weeks 22months old     CHOLECYSTECTOMY      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 7   [place] LIVER BIOPSY      LIVER BIOPSY      VSD REPAIR      As a child       Family History   Problem Relation Age of Onset    Hypertension Mother     Migraines Mother     Diabetes Father     Hypertension Father     Kidney failure Father     Polycystic kidney disease Father     Heart attack Father     Arthritis Father     Stroke Father     Polycystic kidney disease Paternal [de-identified]     Stroke Paternal Grandmother     Arthritis Sister     Asthma Sister     Thyroid disease Sister     Arthritis Maternal Grandmother     Cancer Maternal Grandmother     Learning disabilities Cousin     Learning disabilities Sister     ADD / ADHD Cousin      I have reviewed and agree with the history as documented  Social History     Tobacco Use    Smoking status: Former Smoker     Packs/day: 0 00     Start date: 2019     Last attempt to quit: 2019     Years since quittin 5    Smokeless tobacco: Never Used    Tobacco comment: smoking 1-3 cigarettes/day   Substance Use Topics    Alcohol use: Yes     Alcohol/week: 3 0 standard drinks     Types: 3 Standard drinks or equivalent per week     Comment: socially    Drug use: Yes     Types: Marijuana        Review of Systems   Constitutional: Negative for diaphoresis, fatigue and fever  HENT: Negative for trouble swallowing  Eyes: Negative  Respiratory: Negative for cough and shortness of breath  Cardiovascular: Positive for chest pain   Negative for palpitations, orthopnea, claudication, syncope, PND and near-syncope  Gastrointestinal: Negative for abdominal pain, anorexia, heartburn, nausea and vomiting  Endocrine: Negative  Genitourinary: Negative  Musculoskeletal: Negative for back pain  Skin: Positive for rash  Allergic/Immunologic: Negative  Neurological: Negative for dizziness, weakness, numbness and headaches  Hematological: Negative  Psychiatric/Behavioral: Negative  Physical Exam  Physical Exam   Constitutional: She is oriented to person, place, and time  She appears well-developed and well-nourished  Non-toxic appearance  She does not appear ill  No distress  HENT:   Head: Normocephalic and atraumatic  Eyes: Pupils are equal, round, and reactive to light  EOM are normal    Neck: Normal range of motion  Neck supple  No hepatojugular reflux and no JVD present  No tracheal deviation present  No thyromegaly present  Cardiovascular: Normal rate, regular rhythm and normal pulses  Pulmonary/Chest: Effort normal and breath sounds normal  No accessory muscle usage or stridor  No tachypnea  No respiratory distress  Abdominal: Soft  Bowel sounds are normal  She exhibits no distension and no mass  There is no tenderness  There is no guarding  Musculoskeletal: Normal range of motion  Right lower leg: Normal  She exhibits no tenderness and no edema  Left lower leg: Normal  She exhibits no tenderness and no edema  Lymphadenopathy:     She has no cervical adenopathy  Neurological: She is alert and oriented to person, place, and time  She is not disoriented  No cranial nerve deficit  Skin: Skin is warm and dry  No abrasion, no ecchymosis and no rash noted  She is not diaphoretic  No cyanosis or erythema  No pallor  Nails show no clubbing  Psychiatric: She has a normal mood and affect  Her behavior is normal  Her mood appears not anxious  She is not agitated  Nursing note and vitals reviewed        Vital Signs  ED Triage Vitals   Temperature Pulse Respirations Blood Pressure SpO2   07/24/19 1654 07/24/19 1654 07/24/19 1654 07/24/19 1655 07/24/19 1654   (!) 97 4 °F (36 3 °C) (!) 107 18 162/92 98 %      Temp Source Heart Rate Source Patient Position - Orthostatic VS BP Location FiO2 (%)   07/24/19 1654 07/24/19 1654 07/24/19 1654 07/24/19 1654 --   Temporal Monitor Sitting Left arm       Pain Score       07/24/19 1654       7           Vitals:    07/24/19 1730 07/24/19 1800 07/24/19 1815 07/24/19 2100   BP: 141/88 130/76 126/72 158/75   Pulse: 93 92 91 97   Patient Position - Orthostatic VS:    Lying         Visual Acuity      ED Medications  Medications   aspirin chewable tablet 324 mg (324 mg Oral Given 7/24/19 1724)   Labetalol HCl (NORMODYNE) injection 10 mg (10 mg Intravenous Given 7/24/19 1724)   ketorolac (TORADOL) injection 30 mg (30 mg Intravenous Given 7/24/19 1943)   LORazepam (ATIVAN) 2 mg/mL injection 0 5 mg (0 5 mg Intravenous Given 7/24/19 1954)       Diagnostic Studies  Results Reviewed     Procedure Component Value Units Date/Time    Troponin I [[id number]]  (Normal) Collected:  07/24/19 2040    Lab Status:  Final result Specimen:  Blood from Arm, Left Updated:  07/24/19 2107     Troponin I <0 03 ng/mL     Troponin I [[id number]]     Lab Status:  No result Specimen:  Blood     hCG, qualitative pregnancy [[id number]]  (Normal) Collected:  07/24/19 1719    Lab Status:  Final result Specimen:  Blood from Arm, Left Updated:  07/24/19 1751     Preg, Serum Negative    Comprehensive metabolic panel [[id number]]  (Abnormal) Collected:  07/24/19 1719    Lab Status:  Final result Specimen:  Blood from Arm, Left Updated:  07/24/19 1750     Sodium 134 mmol/L      Potassium 3 8 mmol/L      Chloride 100 mmol/L      CO2 24 mmol/L      ANION GAP 10 mmol/L      BUN 11 mg/dL      Creatinine 0 63 mg/dL      Glucose 309 mg/dL      Calcium 9 3 mg/dL      AST 13 U/L      ALT 15 U/L      Alkaline Phosphatase 100 U/L      Total Protein 7 0 g/dL      Albumin 4 1 g/dL      Total Bilirubin 0 50 mg/dL      eGFR 120 ml/min/1 73sq m     Narrative:       [place] guidelines for Chronic Kidney Disease (CKD):     Stage 1 with normal or high GFR (GFR > 90 mL/min/1 73 square meters)    Stage 2 Mild CKD (GFR = 60-89 mL/min/1 73 square meters)    Stage 3A Moderate CKD (GFR = 45-59 mL/min/1 73 square meters)    Stage 3B Moderate CKD (GFR = 30-44 mL/min/1 73 square meters)    Stage 4 Severe CKD (GFR = 15-29 mL/min/1 73 square meters)    Stage 5 End Stage CKD (GFR <15 mL/min/1 73 square meters)  Note: GFR calculation is accurate only with a steady state creatinine    Troponin I [[id number]]  (Normal) Collected:  07/24/19 1719    Lab Status:  Final result Specimen:  Blood from Arm, Left Updated:  07/24/19 1748     Troponin I <0 03 ng/mL     Troponin I [[id number]]  (Normal) Collected:  07/24/19 1719    Lab Status:  Final result Specimen:  Blood from Arm, Left Updated:  07/24/19 1746     Troponin I <0 03 ng/mL     Protime-INR [[id number]]  (Normal) Collected:  07/24/19 1719    Lab Status:  Final result Specimen:  Blood from Arm, Left Updated:  07/24/19 1741     Protime 12 3 seconds      INR 1 06    APTT [[id number]]  (Normal) Collected:  07/24/19 1719    Lab Status:  Final result Specimen:  Blood from Arm, Left Updated:  07/24/19 1741     PTT 36 seconds     CBC and differential [[id number]]  (Abnormal) Collected:  07/24/19 1719    Lab Status:  Final result Specimen:  Blood from Arm, Left Updated:  07/24/19 1726     WBC 12 00 Thousand/uL      RBC 5 76 Million/uL      Hemoglobin 15 0 g/dL      Hematocrit 44 4 %      MCV 77 fL      MCH 26 0 pg      MCHC 33 6 g/dL      RDW 14 3 %      MPV 9 2 fL      Platelets 681 Thousands/uL      Neutrophils Relative 62 %      Lymphocytes Relative 28 %      Monocytes Relative 6 %      Eosinophils Relative 2 %      Basophils Relative 1 %      Neutrophils Absolute 7 50 Thousands/µL      Lymphocytes Absolute 3 40 Thousands/µL      Monocytes Absolute 0 80 Thousand/µL      Eosinophils Absolute 0 30 Thousand/µL      Basophils Absolute 0 10 Thousands/µL                  XR chest 1 view portable   ED Interpretation by Leandra To MD (07/24 1859)   No acute disease                 Procedures  ECG 12 Lead Documentation Only  Date/Time: 7/24/2019 5:14 PM  Performed by: Leandra To MD  Authorized by: Leandra To MD     Indications / Diagnosis:  Chest pain  ECG reviewed by me, the ED Provider: yes    Patient location:  ED  Previous ECG:     Previous ECG:  Compared to current    Comparison ECG info:  July 17, 2018    Similarity:  No change    Comparison to cardiac monitor: Yes    Interpretation:     Interpretation: abnormal    Rate:     ECG rate:  101 beats per minute    ECG rate assessment: tachycardic    Rhythm:     Rhythm: sinus tachycardia    Ectopy:     Ectopy: none    QRS:     QRS axis:  Normal    QRS intervals:  Normal  Conduction:     Conduction: abnormal      Abnormal conduction: complete RBBB    ST segments:     ST segments:  Non-specific  T waves:     T waves: non-specific             ED Course  ED Course as of Jul 24 2113   Wed Jul 24, 2019 2109 Patient remains comfortable on the stretcher  I discussed with her the results of her blood work including 2 sets of troponin which were normal, chest x-ray and EKG              HEART Risk Score      Most Recent Value   History  0 Filed at: 07/24/2019 1754   ECG  0 Filed at: 07/24/2019 1754   Age  0 Filed at: 07/24/2019 1754   Risk Factors  0 Filed at: 07/24/2019 1754   Troponin  0 Filed at: 07/24/2019 1754   Heart Score Risk Calculator   History  0 Filed at: 07/24/2019 1754   ECG  0 Filed at: 07/24/2019 1754   Age  0 Filed at: 07/24/2019 1754   Risk Factors  0 Filed at: 07/24/2019 1754   Troponin  0 Filed at: 07/24/2019 1754   HEART Score  0 Filed at: 07/24/2019 1754   HEART Score  0 Filed at: 07/24/2019 1754                            [facility]  Number of Diagnoses or Management Options  Chest pain, unspecified: established and worsening     Amount and/or Complexity of Data Reviewed  Clinical lab tests: ordered and reviewed  Tests in the radiology section of CPT®: ordered and reviewed  Tests in the medicine section of CPT®: ordered and reviewed  Decide to obtain previous medical records or to obtain history from someone other than the patient: yes  Obtain history from someone other than the patient: yes  Review and summarize past medical records: yes  Independent visualization of images, tracings, or specimens: yes    Risk of Complications, Morbidity, and/or Mortality  Presenting problems: moderate  Diagnostic procedures: moderate  Management options: moderate    Patient Progress  Patient progress: improved      Disposition  Final diagnoses:   Chest pain, unspecified     Time reflects when diagnosis was documented in both MDM as applicable and the Disposition within this note     Time User Action Codes Description Comment    7/24/2019  5:02 PM Kristygail Nobles Add [R07 9] Chest pain, unspecified       ED Disposition     ED Disposition Condition Date/Time Comment    Discharge Stable Wed Jul 24, 2019  9:12 PM Matthew Taylor discharge to home/self care  Follow-up Information     Follow up With Specialties Details Why Contact Info    Heather Roque, DO Family Medicine In 3 days  [address] [facility] [address] [place]  [phone]            Patient's Medications   Discharge Prescriptions    No medications on file     No discharge procedures on file      ED Provider  Electronically Signed by           Danilo Leonard MD  07/24/19 [id number]

## 2019-07-25 ENCOUNTER — TELEPHONE (OUTPATIENT)
Dept: CARDIOLOGY CLINIC | Facility: CLINIC | Age: 29
End: 2019-07-25

## 2019-07-25 LAB
ATRIAL RATE: 101 BPM
P AXIS: 38 DEGREES
PR INTERVAL: 182 MS
QRS AXIS: 32 DEGREES
QRSD INTERVAL: 154 MS
QT INTERVAL: 402 MS
QTC INTERVAL: 521 MS
T WAVE AXIS: 7 DEGREES
VENTRICULAR RATE: 101 BPM

## 2019-07-25 PROCEDURE — 93010 ELECTROCARDIOGRAM REPORT: CPT | Performed by: INTERNAL MEDICINE

## 2019-07-25 NOTE — TELEPHONE ENCOUNTER
Pt in ED yesterday with CP  Pt discharged with recommendations to complete a stress test, but to call cardiology first   Still having pain today  #8/10, pain shooting across the chest into the abdomen occuring mostly with inspiration

## 2019-07-25 NOTE — TELEPHONE ENCOUNTER
Chloe Karen has her MRI scheduled for 08/29/19 and she is scheduled with Dr Janine Brown in [place] office on 09/09/19

## 2019-07-25 NOTE — TELEPHONE ENCOUNTER
S/W Dr Shruthi Go, she is out of the office and will contact pt tomorrow  She recommended calling PCP in the meantime   S/W Leticia Joseph and advised

## 2019-07-26 NOTE — TELEPHONE ENCOUNTER
Returned pt call  Pt states pain has now resolved, thinks perhaps it was stress  I also reviewed ED documentation and labs, and nothing that I reviewed was concerning for a cardiac etiology for the chest pain  Jeevan Joseph [place], [place]    Pediatric Cardiology  Adult Congenital Heart Disease  [email or web address]  [phone]

## 2019-08-05 ENCOUNTER — OFFICE VISIT (OUTPATIENT)
Dept: FAMILY MEDICINE CLINIC | Facility: CLINIC | Age: 29
End: 2019-08-05
Payer: COMMERCIAL

## 2019-08-05 VITALS
HEART RATE: 104 BPM | DIASTOLIC BLOOD PRESSURE: 68 MMHG | BODY MASS INDEX: 41.22 KG/M2 | WEIGHT: 224 LBS | OXYGEN SATURATION: 98 % | TEMPERATURE: 100 F | HEIGHT: 62 IN | RESPIRATION RATE: 20 BRPM | SYSTOLIC BLOOD PRESSURE: 142 MMHG

## 2019-08-05 DIAGNOSIS — L02.91 ABSCESS: Primary | ICD-10-CM

## 2019-08-05 PROCEDURE — 99213 OFFICE O/P EST LOW 20 MIN: CPT | Performed by: NURSE PRACTITIONER

## 2019-08-05 RX ORDER — DOXYCYCLINE 100 MG/1
100 CAPSULE ORAL 2 TIMES DAILY
Qty: 28 CAPSULE | Refills: 0 | Status: SHIPPED | OUTPATIENT
Start: 2019-08-05 | End: 2019-08-20 | Stop reason: ALTCHOICE

## 2019-08-05 NOTE — PROGRESS NOTES
[address] [place] [address] Primary Care        NAME: Claudeen Paras is a 30 y o  female  : 1990    MRN: [id number]  DATE: 2019  TIME: 3:41 PM    Assessment and Plan   Abscess [L02 91]  1  Abscess  doxycycline monohydrate (MONODOX) 100 mg capsule    Ambulatory referral to General Surgery         Patient Instructions     Patient Instructions   Start Doxycycline as directed  Referral given to see surgery for multiple abscesses, unhealing infection  Wash 1-2x/day with soap and water, keep covered  Call or return for problems/concerns          Chief Complaint     Chief Complaint   Patient presents with    patient reports she has boils and rash in her armpits    Rash     lower abdomen         History of Present Illness       Pt  Has multiple abscess in her left axillae and a large draining abscess under abd fold x several days  Reports very painful      Review of Systems   Review of Systems   Skin: Positive for wound          See hpi         Current Medications       Current Outpatient Medications:     colestipol (COLESTID) 1 g tablet, , Disp: , Rfl: 0    ergocalciferol (VITAMIN D2) 50,000 units, Take 1 capsule (50,000 Units total) by mouth once a week, Disp: 8 capsule, Rfl: 0    etonogestrel (NEXPLANON) subdermal implant, Inject 68 mg under the skin, Disp: , Rfl:     gabapentin (NEURONTIN) 300 mg capsule, Take 1 capsule (300 mg total) by mouth 2 (two) times a day, Disp: 90 capsule, Rfl: 1    glucose blood (ACCU-CHEK GUIDE) test strip, Test three times per day, Disp: 100 each, Rfl: 0    ibuprofen (MOTRIN) 800 mg tablet, Take 1 tablet (800 mg total) by mouth every 8 (eight) hours as needed for mild pain or moderate pain, Disp: 60 tablet, Rfl: 2    Lancets (ACCU-CHEK MULTICLIX) lancets, Use as instructed, Disp: 100 each, Rfl: 3    lisinopril (ZESTRIL) 20 mg tablet, Take 1 5 tablets (30 mg total) by mouth daily, Disp: 180 tablet, Rfl: 3    metFORMIN (GLUCOPHAGE) 1000 MG tablet, Take 1 tablet (1,000 mg total) by mouth 2 (two) times a day with meals, Disp: 60 tablet, Rfl: 6    ondansetron (ZOFRAN) 8 mg tablet, Take 1 tablet (8 mg total) by mouth every 8 (eight) hours as needed for nausea or vomiting, Disp: 30 tablet, Rfl: 1    pantoprazole (PROTONIX) 40 mg tablet, Take 1 tablet (40 mg total) by mouth daily, Disp: 30 tablet, Rfl: 6    prochlorperazine (COMPAZINE) 10 mg tablet, 1 tab at onset of migraine, can repeat in 8 hours, can take with NSAID, Disp: 20 tablet, Rfl: 3    sitaGLIPtin (JANUVIA) 50 mg tablet, Take 1 tablet (50 mg total) by mouth daily, Disp: 30 tablet, Rfl: 6    topiramate (TOPAMAX) 50 MG tablet, Take 2 tablets (100 mg total) by mouth daily at bedtime, Disp: 60 tablet, Rfl: 1    doxycycline monohydrate (MONODOX) 100 mg capsule, Take 1 capsule (100 mg total) by mouth 2 (two) times a day for 14 days, Disp: 28 capsule, Rfl: 0    methocarbamol (ROBAXIN) 500 mg tablet, Take 1 tablet (500 mg total) by mouth 2 (two) times a day for 5 days, Disp: 20 tablet, Rfl: 0    Current Facility-Administered Medications:     insulin glargine (LANTUS) subcutaneous injection 20 Units 0 2 mL, 20 Units, Subcutaneous, , Bertha Martino MD    Current Allergies     Allergies as of 08/05/2019 - Reviewed 08/05/2019   Allergen Reaction Noted    Corticosteroids Anaphylaxis 01/13/2009    Prednisone Swelling 06/12/2018    Sulfa antibiotics Hives 01/14/2019    Milk-related compounds Sneezing 12/11/2018            The following portions of the patient's history were reviewed and updated as appropriate: allergies, current medications, past family history, past medical history, past social history, past surgical history and problem list      Past Medical History:   Diagnosis Date    Allergic     Arthritis     Bipolar affective disorder, currently depressed, moderate ([address] ) 12/17/2018    Cyst of ovary, right     Diabetes mellitus ([address] ) 10/10/18    type 2    Heart murmur 01/19/88    Hepatitis C     Hepatitis C virus infection cured after antiviral drug therapy     Hypertension     Obesity 1995    Pulmonary artery congenital abnormality     Spleen enlarged        Past Surgical History:   Procedure Laterality Date    CARDIAC CATHETERIZATION      no CAD 10days, 4 weeks 22months old     CHOLECYSTECTOMY      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 7   [employer] LIVER BIOPSY      LIVER BIOPSY      VSD REPAIR      As a child       Family History   Problem Relation Age of Onset    Hypertension Mother     Migraines Mother     Diabetes Father     Hypertension Father     Kidney failure Father     Polycystic kidney disease Father     Heart attack Father     Arthritis Father     Stroke Father     Polycystic kidney disease Paternal [de-identified]     Stroke Paternal Grandmother     Arthritis Sister     Asthma Sister     Thyroid disease Sister     Arthritis Maternal Grandmother     Cancer Maternal Grandmother     Learning disabilities Cousin     Learning disabilities Sister     ADD / ADHD Cousin          Medications have been verified  Objective   /68   Pulse 104   Temp 100 °F (37 8 °C) (Tympanic)   Resp 20   Ht 5' 2" (1 575 m)   Wt 102 kg (224 lb)   SpO2 98%   BMI 40 97 kg/m²        Physical Exam     Physical Exam   Constitutional: She is oriented to person, place, and time  Vital signs are normal  She appears well-developed and well-nourished  She is active and cooperative  No distress  Eyes: EOM are normal    Pulmonary/Chest: Effort normal  No respiratory distress  Neurological: She is alert and oriented to person, place, and time  Skin: Skin is warm and dry  No rash noted  She is not diaphoretic  No erythema  Psychiatric: She has a normal mood and affect  Her behavior is normal  Judgment and thought content normal    Nursing note and vitals reviewed

## 2019-08-05 NOTE — PATIENT INSTRUCTIONS
Start Doxycycline as directed  Referral given to see surgery for multiple abscesses, unhealing infection  Wash 1-2x/day with soap and water, keep covered  Call or return for problems/concerns

## 2019-08-07 DIAGNOSIS — Q25.1 COARCTATION OF AORTA: Primary | ICD-10-CM

## 2019-08-07 DIAGNOSIS — R07.9 CHEST PAIN, UNSPECIFIED TYPE: ICD-10-CM

## 2019-08-11 ENCOUNTER — HOSPITAL ENCOUNTER (EMERGENCY)
Facility: HOSPITAL | Age: 29
Discharge: HOME | End: 2019-08-12
Attending: EMERGENCY MEDICINE | Admitting: EMERGENCY MEDICINE

## 2019-08-11 DIAGNOSIS — L02.91 ABSCESS: Primary | ICD-10-CM

## 2019-08-11 PROCEDURE — 99283 EMERGENCY DEPT VISIT LOW MDM: CPT

## 2019-08-11 RX ORDER — KETOROLAC TROMETHAMINE 30 MG/ML
30 INJECTION, SOLUTION INTRAMUSCULAR; INTRAVENOUS ONCE
Status: COMPLETED | OUTPATIENT
Start: 2019-08-12 | End: 2019-08-12

## 2019-08-11 RX ORDER — OXYCODONE HYDROCHLORIDE AND ACETAMINOPHEN 5; 325 MG/1; MG/1
2 TABLET ORAL ONCE
Status: COMPLETED | OUTPATIENT
Start: 2019-08-11 | End: 2019-08-11

## 2019-08-11 RX ADMIN — OXYCODONE HYDROCHLORIDE AND ACETAMINOPHEN 2 TABLET: 5; 325 TABLET ORAL at 23:25

## 2019-08-12 ENCOUNTER — TELEPHONE (OUTPATIENT)
Dept: SURGERY | Facility: CLINIC | Age: 29
End: 2019-08-12

## 2019-08-12 VITALS
TEMPERATURE: 98.9 F | OXYGEN SATURATION: 96 % | DIASTOLIC BLOOD PRESSURE: 76 MMHG | SYSTOLIC BLOOD PRESSURE: 148 MMHG | HEIGHT: 62 IN | BODY MASS INDEX: 41.22 KG/M2 | WEIGHT: 224 LBS | RESPIRATION RATE: 18 BRPM | HEART RATE: 94 BPM

## 2019-08-12 PROCEDURE — 96372 THER/PROPH/DIAG INJ SC/IM: CPT

## 2019-08-12 RX ORDER — OXYCODONE HYDROCHLORIDE AND ACETAMINOPHEN 5; 325 MG/1; MG/1
1 TABLET ORAL EVERY 4 HOURS PRN
Qty: 10 TABLET | Refills: 0 | Status: SHIPPED | OUTPATIENT
Start: 2019-08-12 | End: 2019-08-13 | Stop reason: SDUPTHER

## 2019-08-12 RX ADMIN — KETOROLAC TROMETHAMINE 30 MG: 30 INJECTION, SOLUTION INTRAMUSCULAR; INTRAVENOUS at 00:03

## 2019-08-12 NOTE — TELEPHONE ENCOUNTER
Patient called to reschedule her appointment for 08/16/2019  I did offer an appointment for today but patient could not make it in  I explained that the doctor's are in surgery on Tuesdays & Thursdays, Dr Skye Edouard is away on Wednesday and Florecita Potts has training  Patent decided to keep her appointment for Friday

## 2019-08-12 NOTE — DISCHARGE INSTRUCTIONS
KEEP THE DRESSINGS IN PLACE, CHANGE AS NEEDED  CALL THE GENERAL SURGEON IN THE A M  FOR A FOLLOW-UP APPOINTMENT  CONTINUE YOUR DOXYCYCLINE    USE THE PAIN MEDICINE ONLY FOR UNRELIEVED PAIN    MG IBUPROFEN 3 TIMES DAILY IF NEEDED FOR MILD-TO-MODERATE PAIN

## 2019-08-12 NOTE — ED NOTES
[facility] rt axilla incised and drained by dr Hines Bloch  Very large amount of purulent drainage obtained  1/2 inch   Iodoform packing placed  Wound dressed with sterile 4x4 gauze, abd dressing, taped in place  Pt also asked for dresing to be changed at [facility] lt axilla   Pt had lanced this [facility], several small areas draining pus  Same dressing applied at rt axilla       Gregor Tsang RN  08/12/19 [id number]

## 2019-08-12 NOTE — ED PROVIDER NOTES
History  Chief Complaint   Patient presents with    Abscess     Pt has hx of boils in axilla  Pt states she has appt with gen surg for boil in her RT axilla but is in too much pain to wait  PATIENT PRESENTS WITH SEVERE PAIN IN HER RIGHT AXILLA  SHE INDICATES SHE HAS AN APPOINTMENT SCHEDULED FOR INCISION AND DRAINAGE WITH A LOCAL GENERAL SURGEON LATER THIS WEEK BUT CANNOT WAIT BECAUSE OF THE SEVERE PAIN  SHE TOOK SOME IBUPROFEN THIS MORNING BUT NO OTHER MEDICATIONS FOR PAIN  SHE IS TAKING DOXYCYCLINE  SHE HAS BEEN APPLYING WARM COMPRESSES BUT NO DRAINAGE  SHE DENIES FEVER CHILLS          Prior to Admission Medications   Prescriptions Last Dose Informant Patient Reported? Taking?    Lancets (ACCU-CHEK MULTICLIX) lancets 8/11/2019 at Unknown time Self No Yes   Sig: Use as instructed   colestipol (COLESTID) 1 g tablet 8/11/2019 at Unknown time Self Yes Yes   doxycycline monohydrate (MONODOX) 100 mg capsule 8/11/2019 at Unknown time  No Yes   Sig: Take 1 capsule (100 mg total) by mouth 2 (two) times a day for 14 days   ergocalciferol (VITAMIN D2) 50,000 units 8/11/2019 at Unknown time Self No Yes   Sig: Take 1 capsule (50,000 Units total) by mouth once a week   etonogestrel (NEXPLANON) subdermal implant 8/11/2019 at Unknown time Self Yes Yes   Sig: Inject 68 mg under the skin   gabapentin (NEURONTIN) 300 mg capsule 8/11/2019 at Unknown time Self No Yes   Sig: Take 1 capsule (300 mg total) by mouth 2 (two) times a day   glucose blood (ACCU-CHEK GUIDE) test strip 8/11/2019 at Unknown time Self No Yes   Sig: Test three times per day   ibuprofen (MOTRIN) 800 mg tablet 8/11/2019 at Unknown time  No Yes   Sig: Take 1 tablet (800 mg total) by mouth every 8 (eight) hours as needed for mild pain or moderate pain   lisinopril (ZESTRIL) 20 mg tablet 8/11/2019 at Unknown time Self No Yes   Sig: Take 1 5 tablets (30 mg total) by mouth daily   metFORMIN (GLUCOPHAGE) 1000 MG tablet 8/11/2019 at Unknown time Self No Yes   Sig: Take 1 tablet (1,000 mg total) by mouth 2 (two) times a day with meals   methocarbamol (ROBAXIN) 500 mg tablet  Self No No   Sig: Take 1 tablet (500 mg total) by mouth 2 (two) times a day for 5 days   ondansetron (ZOFRAN) 8 mg tablet 2019 at Unknown time Self No Yes   Sig: Take 1 tablet (8 mg total) by mouth every 8 (eight) hours as needed for nausea or vomiting   pantoprazole (PROTONIX) 40 mg tablet 2019 at Unknown time Self No Yes   Sig: Take 1 tablet (40 mg total) by mouth daily   prochlorperazine (COMPAZINE) 10 mg tablet 2019 at Unknown time Self No Yes   Si tab at onset of migraine, can repeat in 8 hours, can take with NSAID   sitaGLIPtin (JANUVIA) 50 mg tablet 2019 at Unknown time  No Yes   Sig: Take 1 tablet (50 mg total) by mouth daily   topiramate (TOPAMAX) 50 MG tablet 2019 at Unknown time Self No Yes   Sig: Take 2 tablets (100 mg total) by mouth daily at bedtime      Facility-Administered Medications Last Administration Doses Remaining   insulin glargine (LANTUS) subcutaneous injection 20 Units 0 2 mL None recorded           Past Medical History:   Diagnosis Date    Allergic     Arthritis     Bipolar affective disorder, currently depressed, moderate ([facility] ) 2018    Cyst of ovary, right     Diabetes mellitus ([facility] [address] [facility] ) 10/10/18    type 2    Heart murmur 88    Hepatitis C     Hepatitis C virus infection cured after antiviral drug therapy     Hypertension     Migraines     Obesity     Pulmonary artery congenital abnormality     Spleen enlarged        Past Surgical History:   Procedure Laterality Date    CARDIAC CATHETERIZATION      no CAD 10days, 4 weeks 22months old     CHOLECYSTECTOMY      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 7   Jef Fuentes LIVER BIOPSY      LIVER BIOPSY      VSD REPAIR      As a child       Family History   Problem Relation Age of Onset    Hypertension Mother     Migraines Mother     Diabetes Father     Hypertension Father     Kidney failure Father     Polycystic kidney disease Father     Heart attack Father     Arthritis Father     Stroke Father     Polycystic kidney disease Paternal [de-identified]     Stroke Paternal Grandmother     Arthritis Sister     Asthma Sister     Thyroid disease Sister     Arthritis Maternal Grandmother     Cancer Maternal Grandmother     Learning disabilities Cousin     Learning disabilities Sister     ADD / ADHD Cousin      I have reviewed and agree with the history as documented  Social History     Tobacco Use    Smoking status: Current Every Day Smoker     Packs/day: 0 20     Types: Cigarettes     Start date: 2019     Last attempt to quit: 2019     Years since quittin 5    Smokeless tobacco: Never Used   Substance Use Topics    Alcohol use: Yes     Alcohol/week: 3 0 standard drinks     Types: 3 Standard drinks or equivalent per week     Comment: socially    Drug use: Not Currently     Types: Marijuana        Review of Systems   Constitutional: Negative for chills and fever  Musculoskeletal: Negative  Skin: Positive for rash and wound  SWELLING, PAIN, BOILS IN THE RIGHT ARMPIT   Neurological: Negative  Physical Exam  Physical Exam   Constitutional: She is oriented to person, place, and time  AMBULATORY MODERATELY OBESE 30YEAR-OLD FEMALE WHO IS NONTOXIC BUT IN PAIN, HOLDING HER RIGHT ARM BECAUSE OF THE AXILLARY PAIN  Neck: Normal range of motion  Neck supple  Lymphadenopathy:     She has no cervical adenopathy  Neurological: She is alert and oriented to person, place, and time  No cranial nerve deficit  She exhibits normal muscle tone  Skin: Skin is warm  Capillary refill takes less than 2 seconds  No rash noted  No erythema  PATIENT HAS A VERY LARGE CYSTIC STRUCTURE, WITH MILD ERYTHEMA  NO PUSTULAR POINTING  QUITE FLUCTUANT AS WELL AS TENDER  NO ADENOPATHY APPRECIATED TO PALPATION   Vitals reviewed        Vital Signs  ED Triage Vitals [19 5065] Temperature Pulse Respirations Blood Pressure SpO2   98 9 °F (37 2 °C) 104 18 156/75 94 %      Temp Source Heart Rate Source Patient Position - Orthostatic VS BP Location FiO2 (%)   Temporal Monitor -- Left arm --      Pain Score       Worst Possible Pain           Vitals:    08/11/19 2247   BP: 156/75   Pulse: 104         Visual Acuity      ED Medications  Medications   oxyCODONE-acetaminophen (PERCOCET) 5-325 mg per tablet 2 tablet (2 tablets Oral Given 8/11/19 0205)   ketorolac (TORADOL) injection 30 mg (30 mg Intramuscular Given 8/12/19 0003)       Diagnostic Studies  Results Reviewed     None                 No orders to display              Procedures  Incision and drain  Date/Time: 8/12/2019 12:13 AM  Performed by: Velma Dumont DO  Authorized by: Velma Dumont DO     Patient location:  ED  Other Assisting Provider: No    Consent:     Consent obtained:  Verbal    Consent given by:  Patient    Risks discussed:  Bleeding and pain    Alternatives discussed:  No treatment  Universal protocol:     Patient identity confirmed:  Verbally with patient  Location:     Type:  Abscess    Location:  Upper extremity  Pre-procedure details:     Skin preparation:  Antiseptic wash and Betadine  Procedure details:     Incision types:  Stab incision    Scalpel blade:  11    Approach:  Open    Incision depth:  Submucosal    Wound management:  Probed and deloculated and irrigated with saline    Drainage:  Purulent    Drainage amount:  Copious    Wound treatment:  Wound left open and packing placed    Packing materials:  1/2 in iodoform gauze    Amount 1/2" iodoform:  24 IN  Post-procedure details:     Patient tolerance of procedure: Tolerated well, no immediate complications  Comments:      FLEXION AREA IN SIZE, DURING 0 5 CM INCISION  PROMPT EXPRESSION OF PURULENCE LIQUID FRACTION  VERY COPIOUS, ESTIMATED 50 MLs  PACKING THEN PLACED  DRESSED BY THE RN  ED Course    EXAMINATION EVALUATION    INCISION AND DRAIN AS DOCUMENTED  CONTINUE DOXYCYCLINE AND FOLLOW UP WITH GENERAL SURGEON AS SCHEDULED  MDM    Disposition  Final diagnoses:   Abscess     Time reflects when diagnosis was documented in both MDM as applicable and the Disposition within this note     Time User Action Codes Description Comment    8/12/2019 12:17 AM Sophia Candido Kerr [L02 91] Abscess       ED Disposition     ED Disposition Condition Date/Time Comment    Discharge Stable Mon Aug 12, 2019 12:17 AM Penny Cui discharge to home/self care  Follow-up Information    None         Patient's Medications   Discharge Prescriptions    OXYCODONE-ACETAMINOPHEN (PERCOCET) 5-325 MG PER TABLET    Take 1 tablet by mouth every 4 (four) hours as needed for moderate pain for up to 10 dosesMax Daily Amount: 6 tablets       Start Date: 8/12/2019 End Date: --       Order Dose: 1 tablet       Quantity: 10 tablet    Refills: 0     No discharge procedures on file      ED Provider  Electronically Signed by           Pelon Salmon DO  08/12/19 0020

## 2019-08-13 ENCOUNTER — HOSPITAL ENCOUNTER (EMERGENCY)
Facility: HOSPITAL | Age: 29
Discharge: HOME | End: 2019-08-13

## 2019-08-13 VITALS
RESPIRATION RATE: 16 BRPM | HEART RATE: 78 BPM | WEIGHT: 223 LBS | TEMPERATURE: 98.3 F | SYSTOLIC BLOOD PRESSURE: 126 MMHG | HEIGHT: 62 IN | BODY MASS INDEX: 41.04 KG/M2 | OXYGEN SATURATION: 98 % | DIASTOLIC BLOOD PRESSURE: 59 MMHG

## 2019-08-13 DIAGNOSIS — L02.411 ABSCESS OF RIGHT AXILLA: Primary | ICD-10-CM

## 2019-08-13 PROCEDURE — 99282 EMERGENCY DEPT VISIT SF MDM: CPT

## 2019-08-13 RX ORDER — CLINDAMYCIN HYDROCHLORIDE 150 MG/1
150 CAPSULE ORAL EVERY 6 HOURS
Qty: 28 CAPSULE | Refills: 0 | Status: SHIPPED | OUTPATIENT
Start: 2019-08-13 | End: 2019-08-13 | Stop reason: SDUPTHER

## 2019-08-13 RX ORDER — CLINDAMYCIN HYDROCHLORIDE 150 MG/1
150 CAPSULE ORAL EVERY 6 HOURS SCHEDULED
Qty: 28 CAPSULE | Refills: 0 | Status: SHIPPED | OUTPATIENT
Start: 2019-08-13 | End: 2019-08-20

## 2019-08-13 RX ORDER — OXYCODONE HYDROCHLORIDE AND ACETAMINOPHEN 5; 325 MG/1; MG/1
1 TABLET ORAL EVERY 4 HOURS PRN
Qty: 10 TABLET | Refills: 0 | Status: SHIPPED | OUTPATIENT
Start: 2019-08-13 | End: 2019-08-13 | Stop reason: SDUPTHER

## 2019-08-13 RX ORDER — LIDOCAINE HYDROCHLORIDE AND EPINEPHRINE BITARTRATE 20; .01 MG/ML; MG/ML
20 INJECTION, SOLUTION SUBCUTANEOUS ONCE
Status: DISCONTINUED | OUTPATIENT
Start: 2019-08-13 | End: 2019-08-13 | Stop reason: HOSPADM

## 2019-08-13 RX ORDER — OXYCODONE HYDROCHLORIDE AND ACETAMINOPHEN 5; 325 MG/1; MG/1
1 TABLET ORAL EVERY 6 HOURS PRN
Qty: 10 TABLET | Refills: 0 | Status: SHIPPED | OUTPATIENT
Start: 2019-08-13 | End: 2019-11-09

## 2019-08-13 NOTE — ED PROVIDER NOTES
History  Chief Complaint   Patient presents with    Abscess     right arm, taking doxyclycline, not improving not draining at home  Feels chills and then hot      Claudeen Paras is a 23-year-old female who returns to the emergency department due to an additional abscess which appeared on the right dank axillary area with started 2 days prior to arrival   Patient was given doxycycline which she has been taking for at least 3 days now for a previous abscess with no significant improvement noted  History provided by:  Patient   used: No    Abscess   Location:  Shoulder/arm  Shoulder/arm abscess location:  R axilla  Size:  3 cm  Abscess quality: fluctuance, induration, painful, redness and warmth    Red streaking: no    Duration:  2 days  Progression:  Worsening  Pain details:     Quality:  Throbbing    Severity:  Moderate    Duration:  2 days    Timing:  Constant    Progression:  Worsening  Chronicity:  Recurrent  Context: diabetes    Relieved by:  Nothing  Worsened by:  Nothing  Ineffective treatments:  Oral antibiotics  Associated symptoms: no anorexia, no fatigue, no fever, no headaches, no nausea and no vomiting    Risk factors: prior abscess    Risk factors: no hx of MRSA        Prior to Admission Medications   Prescriptions Last Dose Informant Patient Reported? Taking?    Lancets (ACCU-CHEK MULTICLIX) lancets  Self No No   Sig: Use as instructed   colestipol (COLESTID) 1 g tablet  Self Yes No   doxycycline monohydrate (MONODOX) 100 mg capsule   No No   Sig: Take 1 capsule (100 mg total) by mouth 2 (two) times a day for 14 days   ergocalciferol (VITAMIN D2) 50,000 units  Self No No   Sig: Take 1 capsule (50,000 Units total) by mouth once a week   etonogestrel (NEXPLANON) subdermal implant  Self Yes No   Sig: Inject 68 mg under the skin   gabapentin (NEURONTIN) 300 mg capsule  Self No No   Sig: Take 1 capsule (300 mg total) by mouth 2 (two) times a day   glucose blood (ACCU-CHEK GUIDE) test strip  Self No No   Sig: Test three times per day   ibuprofen (MOTRIN) 800 mg tablet   No No   Sig: Take 1 tablet (800 mg total) by mouth every 8 (eight) hours as needed for mild pain or moderate pain   lisinopril (ZESTRIL) 20 mg tablet  Self No No   Sig: Take 1 5 tablets (30 mg total) by mouth daily   metFORMIN (GLUCOPHAGE) 1000 MG tablet  Self No No   Sig: Take 1 tablet (1,000 mg total) by mouth 2 (two) times a day with meals   methocarbamol (ROBAXIN) 500 mg tablet  Self No No   Sig: Take 1 tablet (500 mg total) by mouth 2 (two) times a day for 5 days   ondansetron (ZOFRAN) 8 mg tablet  Self No No   Sig: Take 1 tablet (8 mg total) by mouth every 8 (eight) hours as needed for nausea or vomiting   pantoprazole (PROTONIX) 40 mg tablet  Self No No   Sig: Take 1 tablet (40 mg total) by mouth daily   prochlorperazine (COMPAZINE) 10 mg tablet  Self No No   Si tab at onset of migraine, can repeat in 8 hours, can take with NSAID   sitaGLIPtin (JANUVIA) 50 mg tablet   No No   Sig: Take 1 tablet (50 mg total) by mouth daily   topiramate (TOPAMAX) 50 MG tablet  Self No No   Sig: Take 2 tablets (100 mg total) by mouth daily at bedtime      Facility-Administered Medications Last Administration Doses Remaining   insulin glargine (LANTUS) subcutaneous injection 20 Units 0 2 mL None recorded           Past Medical History:   Diagnosis Date    Allergic     Arthritis     Bipolar affective disorder, currently depressed, moderate ([facility]) 2018    Cyst of ovary, right     Diabetes mellitus ([facility] [address] ) 10/10/18    type 2    Heart murmur 88    Hepatitis C     Hepatitis C virus infection cured after antiviral drug therapy     Hypertension     Migraines     Obesity     Pulmonary artery congenital abnormality     Spleen enlarged        Past Surgical History:   Procedure Laterality Date    CARDIAC CATHETERIZATION      no CAD 10days, 4 weeks 22months old     CHOLECYSTECTOMY      COARCTATION OF AORTA EXCISION Age 7   Mandi Londono LIVER BIOPSY      LIVER BIOPSY      VSD REPAIR      As a child       Family History   Problem Relation Age of Onset    Hypertension Mother     Migraines Mother     Diabetes Father     Hypertension Father     Kidney failure Father     Polycystic kidney disease Father     Heart attack Father     Arthritis Father     Stroke Father     Polycystic kidney disease Paternal Grandmother     Stroke Paternal Grandmother     Arthritis Sister     Asthma Sister     Thyroid disease Sister     Arthritis Maternal Grandmother     Cancer Maternal Grandmother     Learning disabilities Cousin     Learning disabilities Sister     ADD / ADHD Cousin      I have reviewed and agree with the history as documented  Social History     Tobacco Use    Smoking status: Current Every Day Smoker     Packs/day: 0 20     Types: Cigarettes     Start date: 2019     Last attempt to quit: 2019     Years since quittin 5    Smokeless tobacco: Never Used   Substance Use Topics    Alcohol use: Not Currently     Alcohol/week: 3 0 standard drinks     Types: 3 Standard drinks or equivalent per week     Comment: socially    Drug use: Not Currently        Review of Systems   Constitutional: Negative for fatigue and fever  HENT: Negative  Eyes: Negative  Respiratory: Negative  Cardiovascular: Negative  Gastrointestinal: Negative for anorexia, nausea and vomiting  Endocrine: Negative  Genitourinary: Negative  Musculoskeletal: Negative  Skin: Positive for rash  Allergic/Immunologic: Negative  Neurological: Negative for headaches  Hematological: Negative  Psychiatric/Behavioral: Negative  Physical Exam  Physical Exam   Constitutional: She is oriented to person, place, and time  She appears well-developed and well-nourished  No distress  HENT:   Head: Normocephalic and atraumatic     Right Ear: External ear normal    Left Ear: External ear normal    Nose: Nose normal  Mouth/Throat: Oropharynx is clear and moist  No oropharyngeal exudate  Eyes: Pupils are equal, round, and reactive to light  Conjunctivae and EOM are normal  Right eye exhibits no discharge  Left eye exhibits no discharge  No scleral icterus  Neck: Normal range of motion  Neck supple  No tracheal deviation present  No thyromegaly present  Cardiovascular: Normal rate, regular rhythm and normal heart sounds  Pulmonary/Chest: Effort normal and breath sounds normal  No respiratory distress  Abdominal: Soft  Bowel sounds are normal  She exhibits no distension  There is no tenderness  Musculoskeletal: Normal range of motion  She exhibits no edema, tenderness or deformity  Arms:  Lymphadenopathy:     She has no cervical adenopathy  Neurological: She is alert and oriented to person, place, and time  No cranial nerve deficit or sensory deficit  She exhibits normal muscle tone  Coordination normal    Skin: Skin is warm and dry  Rash noted  She is not diaphoretic  There is erythema  No pallor  Nursing note and vitals reviewed        Vital Signs  ED Triage Vitals [08/13/19 1248]   Temperature Pulse Respirations Blood Pressure SpO2   98 3 °F (36 8 °C) 90 16 126/59 98 %      Temp Source Heart Rate Source Patient Position - Orthostatic VS BP Location FiO2 (%)   Temporal Monitor Sitting Left arm --      Pain Score       8           Vitals:    08/13/19 1248   BP: 126/59   Pulse: 90   Patient Position - Orthostatic VS: Sitting         Visual Acuity      ED Medications  Medications   lidocaine-epinephrine (XYLOCAINE/EPINEPHRINE) 2 %-1:100,000 injection 20 mL (has no administration in time range)       Diagnostic Studies  Results Reviewed     None                 No orders to display              Procedures  Incision and drain  Date/Time: 8/13/2019 1:37 PM  Performed by: Sis Murillo MD  Authorized by: Sis Murillo MD     Patient location:  Bedside  Other Assisting Provider: No    Consent:     Consent obtained:  Verbal    Consent given by:  Patient    Risks discussed:  Bleeding, incomplete drainage, pain and infection  Universal protocol:     Procedure explained and questions answered to patient or proxy's satisfaction: yes      Site/side marked: yes      Immediately prior to procedure a time out was called: yes      Patient identity confirmed:  Verbally with patient, arm band and provided demographic data  Location:     Type:  Abscess    Size:  3 cm    Location:  Upper extremity    Upper extremity location: Right axilla  Pre-procedure details:     Skin preparation:  Betadine  Anesthesia (see MAR for exact dosages): Anesthesia method:  Local infiltration    Local anesthetic:  Lidocaine 2% WITH epi  Procedure details:     Complexity:  Simple    Needle aspiration: no      Incision types:  Stab incision    Scalpel blade:  11    Approach:  Open    Incision depth:  Subcutaneous    Wound management:  Probed and deloculated and extensive cleaning    Drainage:  Purulent    Drainage amount: Moderate    Wound treatment:  Packing placed    Packing materials:  1/4 in iodoform gauze    Amount 1/4" iodoform:  4 inches  Post-procedure details:     Patient tolerance of procedure:   Tolerated well, no immediate complications           ED Course                               MDM  Number of Diagnoses or Management Options  Abscess of right axilla: established and worsening  Risk of Complications, Morbidity, and/or Mortality  Presenting problems: moderate  Management options: moderate    Patient Progress  Patient progress: improved      Disposition  Final diagnoses:   Abscess of right axilla     Time reflects when diagnosis was documented in both MDM as applicable and the Disposition within this note     Time User Action Codes Description Comment    8/13/2019  1:39 PM Christen Lares Add [L02 411] Abscess of right axilla       ED Disposition     ED Disposition Condition Date/Time Comment    Discharge Stable Tue Aug 13, 2019  1:39 PM Parker Mendoza discharge to home/self care  Follow-up Information     Follow up With Specialties Details Why [address] [facility]  Emergency Department Emergency Medicine  For wound re-check and removal of packing [address] [facility] [address] [phone]          Patient's Medications   Discharge Prescriptions    CLINDAMYCIN (CLEOCIN) 150 MG CAPSULE    Take 1 capsule (150 mg total) by mouth every 6 (six) hours for 7 days       Start Date: 8/13/2019 End Date: 8/20/2019       Order Dose: 150 mg       Quantity: 28 capsule    Refills: 0    OXYCODONE-ACETAMINOPHEN (PERCOCET) 5-325 MG PER TABLET    Take 1 tablet by mouth every 4 (four) hours as needed for moderate pain for up to 10 dosesMax Daily Amount: 6 tablets       Start Date: 8/13/2019 End Date: --       Order Dose: 1 tablet       Quantity: 10 tablet    Refills: 0     No discharge procedures on file      ED Provider  Electronically Signed by           So Liu MD  08/13/19 8475

## 2019-08-16 ENCOUNTER — PROCEDURE VISIT (OUTPATIENT)
Dept: SURGERY | Facility: CLINIC | Age: 29
End: 2019-08-16

## 2019-08-16 VITALS
WEIGHT: 222 LBS | DIASTOLIC BLOOD PRESSURE: 72 MMHG | BODY MASS INDEX: 40.85 KG/M2 | TEMPERATURE: 98.3 F | SYSTOLIC BLOOD PRESSURE: 124 MMHG | HEART RATE: 76 BPM | RESPIRATION RATE: 16 BRPM | HEIGHT: 62 IN

## 2019-08-16 DIAGNOSIS — L73.2 HIDRADENITIS SUPPURATIVA: Primary | ICD-10-CM

## 2019-08-16 DIAGNOSIS — L02.91 ABSCESS: ICD-10-CM

## 2019-08-16 DIAGNOSIS — L02.411 ABSCESS OF RIGHT AXILLA: ICD-10-CM

## 2019-08-16 PROCEDURE — 10061 I&D ABSCESS COMP/MULTIPLE: CPT | Performed by: SURGERY

## 2019-08-16 PROCEDURE — 99243 OFF/OP CNSLTJ NEW/EST LOW 30: CPT | Performed by: SURGERY

## 2019-08-16 NOTE — LETTER
August 16, 2019     Patient: Kayy Callejas   YOB: 1990   Date of Visit: 8/16/2019       To Whom it May Concern:    Kayy Callejas is under my professional care  She was seen in my office on 8/16/2019  Please excuse her from work on 08/16/2019  If you have any questions or concerns, please don't hesitate to call           Sincerely,          Gurjit Chen PA-C        CC: No Recipients

## 2019-08-16 NOTE — ASSESSMENT & PLAN NOTE
Patient has a large cutaneous abscess of the right axilla for which incision and drainage is now indicated  Details of the procedure and risks related to anesthesia, bleeding, recurrent infection were reviewed  Informed verbal consent obtained procedure completed in the office today under local anesthesia  Large pocket of purulent material encountered with communication to the most superior drainage site from her I&D 3 days ago  Packing placed in wound care instructions reviewed  Advised to continue taking antibiotics as prescribed  Will see back in a few days for re-evaluation  Instructed to proceed to the ER if symptoms worsen over the weekend

## 2019-08-16 NOTE — ASSESSMENT & PLAN NOTE
Patient has signs and symptoms consistent with a diagnosis of hidradenitis suppurative affecting her axilla bilaterally  Risk factors include obesity and diabetes  Appears to be active with abscess affecting the right axilla for which I&D is now indicated  Instructed to continue using doxycycline as prescribed  Advised to cleanse the area at least once daily with warm soak in the tub  Advised to avoid shaving or use of deodorant  Referral made to Dermatology for further evaluation

## 2019-08-16 NOTE — PROGRESS NOTES
Consult - General Surgery   Antwon Covert 30 y o  female MRN: [id number]  Encounter: [id number]    Assessment/Plan    Hidradenitis suppurativa  Patient has signs and symptoms consistent with a diagnosis of hidradenitis suppurative affecting her axilla bilaterally  Risk factors include obesity and diabetes  Appears to be active with abscess affecting the right axilla for which I&D is now indicated  Instructed to continue using doxycycline as prescribed  Advised to cleanse the area at least once daily with warm soak in the tub  Advised to avoid shaving or use of deodorant  Referral made to Dermatology for further evaluation  Abscess of right axilla  Patient has a large cutaneous abscess of the right axilla for which incision and drainage is now indicated  Details of the procedure and risks related to anesthesia, bleeding, recurrent infection were reviewed  Informed verbal consent obtained procedure completed in the office today under local anesthesia  Large pocket of purulent material encountered with communication to the most superior drainage site from her I&D 3 days ago  Packing placed in wound care instructions reviewed  Advised to continue taking antibiotics as prescribed  Will see back in a few days for re-evaluation  Instructed to proceed to the ER if symptoms worsen over the weekend  Diagnoses and all orders for this visit:    Hidradenitis suppurativa  -     Ambulatory referral to Dermatology; Future    Abscess  -     Ambulatory referral to General Surgery    Abscess of right axilla    Other orders  -     Incision and Drainage        History of Present Illness   Chief Complaint   Patient presents with    Abscess     under right arn     08- Patient is here today for packing change, s/p I & D of right axilla 8-13-19 @ [facility] ER and no culture was sent  Stated that she is having pain, chills, nausea with vomiting  She said she usually vomits with or without eating with her antibiotic  Packing was removed from the right axilla and had a yellow/greenish tinge with redness around the wound  [facility] ALEAH Martinez 24-year-old female with obesity and diabetes here for evaluation of abscess of her right axilla  Has been having ongoing issues with infections of her bilateral axilla as well as suprapubic space  Currently on doxycycline twice daily  Was prescribed clindamycin, but this was not started due to the cost of the prescription  Most recently was treated with an incision and drainage of the superior right axilla in the ER on 08/13/2019  She reports ongoing pain of the right axilla as well as mild irritation of the suprapubic area and left axilla  No recent shaving  Not using deodorant  Had some nausea and vomiting following her antibiotic taken on an empty stomach this morning  No fevers or chills  Review of Systems   Constitutional: Negative for chills and fever  Respiratory: Negative for shortness of breath  Cardiovascular: Negative for chest pain  Gastrointestinal: Negative for abdominal pain  Skin: Negative for rash  Neurological: Negative for headaches         Historical Information   Past Medical History:   Diagnosis Date    Allergic     Arthritis     Bipolar affective disorder, currently depressed, moderate ([address] ) 12/17/2018    Cyst of ovary, right     Diabetes mellitus [address] [address] ) 10/10/18    type 2    Heart murmur 01/19/88    Hepatitis C     Hepatitis C virus infection cured after antiviral drug therapy     Hypertension     Migraines     Obesity 1995    Pulmonary artery congenital abnormality     Spleen enlarged      Past Surgical History:   Procedure Laterality Date    CARDIAC CATHETERIZATION      no CAD 10days, 4 weeks 22months old     CHOLECYSTECTOMY      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 7   Susana Soto LIVER BIOPSY      LIVER BIOPSY      VSD REPAIR      As a child     Social History   Social History     Substance and Sexual Activity   Alcohol Use Not Currently    Alcohol/week: 3 0 standard drinks    Types: 3 Standard drinks or equivalent per week    Comment: socially     Social History     Substance and Sexual Activity   Drug Use Not Currently     Social History     Tobacco Use   Smoking Status Current Every Day Smoker    Packs/day: 0 20    Types: Cigarettes    Start date: 2019    Last attempt to quit: 2019    Years since quittin 5   Smokeless Tobacco Never Used     Family History   Problem Relation Age of Onset    Hypertension Mother     Migraines Mother     Diabetes Father     Hypertension Father     Kidney failure Father     Polycystic kidney disease Father     Heart attack Father     Arthritis Father     Stroke Father     Polycystic kidney disease Paternal Grandmother     Stroke Paternal Grandmother     Arthritis Sister     Asthma Sister     Thyroid disease Sister     Arthritis Maternal Grandmother     Cancer Maternal Grandmother     Learning disabilities Cousin     Learning disabilities Sister     ADD / ADHD Cousin        Meds/Allergies     Current Outpatient Medications:     clindamycin (CLEOCIN) 150 mg capsule, Take 1 capsule (150 mg total) by mouth every 6 (six) hours for 7 days, Disp: 28 capsule, Rfl: 0    colestipol (COLESTID) 1 g tablet, , Disp: , Rfl: 0    doxycycline monohydrate (MONODOX) 100 mg capsule, Take 1 capsule (100 mg total) by mouth 2 (two) times a day for 14 days, Disp: 28 capsule, Rfl: 0    ergocalciferol (VITAMIN D2) 50,000 units, Take 1 capsule (50,000 Units total) by mouth once a week, Disp: 8 capsule, Rfl: 0    etonogestrel (NEXPLANON) subdermal implant, Inject 68 mg under the skin, Disp: , Rfl:     gabapentin (NEURONTIN) 300 mg capsule, Take 1 capsule (300 mg total) by mouth 2 (two) times a day, Disp: 90 capsule, Rfl: 1    glucose blood (ACCU-CHEK GUIDE) test strip, Test three times per day, Disp: 100 each, Rfl: 0    ibuprofen (MOTRIN) 800 mg tablet, Take 1 tablet (800 mg total) by mouth every 8 (eight) hours as needed for mild pain or moderate pain, Disp: 60 tablet, Rfl: 2    Lancets (ACCU-CHEK MULTICLIX) lancets, Use as instructed, Disp: 100 each, Rfl: 3    lisinopril (ZESTRIL) 20 mg tablet, Take 1 5 tablets (30 mg total) by mouth daily, Disp: 180 tablet, Rfl: 3    metFORMIN (GLUCOPHAGE) 1000 MG tablet, Take 1 tablet (1,000 mg total) by mouth 2 (two) times a day with meals, Disp: 60 tablet, Rfl: 6    ondansetron (ZOFRAN) 8 mg tablet, Take 1 tablet (8 mg total) by mouth every 8 (eight) hours as needed for nausea or vomiting, Disp: 30 tablet, Rfl: 1    oxyCODONE-acetaminophen (PERCOCET) 5-325 mg per tablet, Take 1 tablet by mouth every 6 (six) hours as needed for moderate pain for up to 10 dosesMax Daily Amount: 4 tablets, Disp: 10 tablet, Rfl: 0    pantoprazole (PROTONIX) 40 mg tablet, Take 1 tablet (40 mg total) by mouth daily, Disp: 30 tablet, Rfl: 6    prochlorperazine (COMPAZINE) 10 mg tablet, 1 tab at onset of migraine, can repeat in 8 hours, can take with NSAID, Disp: 20 tablet, Rfl: 3    sitaGLIPtin (JANUVIA) 50 mg tablet, Take 1 tablet (50 mg total) by mouth daily, Disp: 30 tablet, Rfl: 6    topiramate (TOPAMAX) 50 MG tablet, Take 2 tablets (100 mg total) by mouth daily at bedtime (Patient taking differently: Take 20 mg by mouth daily at bedtime ), Disp: 60 tablet, Rfl: 1    methocarbamol (ROBAXIN) 500 mg tablet, Take 1 tablet (500 mg total) by mouth 2 (two) times a day for 5 days, Disp: 20 tablet, Rfl: 0    Current Facility-Administered Medications:     insulin glargine (LANTUS) subcutaneous injection 20 Units 0 2 mL, 20 Units, Subcutaneous, HS, Lissett Ch MD  Allergies   Allergen Reactions    Corticosteroids Swelling     Pt states this does not cause problems breathing, she just has generalized swelling    Cortisone     Lactose     Prednisone Swelling    Sulfa Antibiotics Hives    Milk-Related Compounds Sneezing       The following portions of the patient's history were reviewed and updated as appropriate: allergies, current medications, past family history, past medical history, past social history, past surgical history and problem list     Objective   Current Vitals:   Blood pressure 124/72, pulse 76, temperature 98 3 °F (36 8 °C), temperature source Tympanic, resp  rate 16, height 5' 2" (1 575 m), weight 101 kg (222 lb), last menstrual period 08/05/2019, not currently breastfeeding  Physical Exam   Constitutional: She is oriented to person, place, and time  She appears well-developed and well-nourished  No distress  HENT:   Head: Normocephalic and atraumatic  Eyes: Pupils are equal, round, and reactive to light  EOM are normal    Neck: Normal range of motion  Pulmonary/Chest: No respiratory distress  Abdominal:       Musculoskeletal: Normal range of motion  Neurological: She is alert and oriented to person, place, and time  Skin: Skin is warm and dry  No rash noted  She is not diaphoretic  Psychiatric: She has a normal mood and affect  Her behavior is normal    Nursing note and vitals reviewed  Incision and Drainage  Date/Time: 8/16/2019 10:24 AM  Performed by: Frances Chen PA-C  Authorized by: Frances Chen PA-C     Patient location:  Clinic  Other Assisting Provider: No    Universal protocol:     Procedure explained and questions answered to patient or proxy's satisfaction: yes      Patient identity confirmed:  Verbally with patient  Location:     Type:  Abscess    Size:  3 cm    Location:  Trunk    Trunk location: R axilla  Pre-procedure details:     Skin preparation:  Betadine  Anesthesia (see MAR for exact dosages):      Anesthesia method:  Local infiltration    Local anesthetic:  Lidocaine 1% WITH epi  Procedure details:     Complexity:  Complex    Needle aspiration: no      Incision types:  Cruciate    Scalpel blade:  11    Approach:  Open    Incision depth:  Subcutaneous    Wound management: Probed and deloculated and irrigated with saline    Drainage:  Purulent    Drainage amount:  Copious    Wound treatment:  Packing placed    Packing materials:  1/2 in gauze    Amount 1/2":  18"  Post-procedure details:     Patient tolerance of procedure: Tolerated well, no immediate complications  Comments:      Informed verbal consent obtained  Patient positioned supine with right and above head  Area prepped with Betadine swabs x3  Area draped  Local anesthesia provided with 1% xylocaine with epinephrine and bicarbonate  15 mL used of local anesthesia in a field block  Cruciate incision made through the point of maximal fluctuance at the center of the abscess  Copious purulent drainage encountered  This was manually drained with palpation of the surrounding axilla  There was some communication to the superior previous incision site  Cavity was flushed with 20 cc sterile saline  No residual areas of fluctuance identified  Wound was packed with approximately 18 in of 0 5 in plain strip gauze packing  Surrounding skin was cleansed  Wound was dressed with 4x4s and ABD pads  Wound care instructions provided to the patient  Blood loss approximately 5 mL  Hemostasis good  No complications  No cultures or specimens  Tolerated well          Signature:  Elia Chen PA-C  Date: 8/16/2019 Time: 12:17 PM

## 2019-08-16 NOTE — PATIENT INSTRUCTIONS
Wound care instructions:  Remove packing on 08/17/2019  Soak area at least once daily in tub  Gently clean with soap and water  Keep covered to collect drainage and change dressings at least twice daily  Do not use deodorant or shave the area until infection is resolved  Take antibiotics as directed  Call with questions or concerns: [phone]  Abscess Incision and Drainage   WHAT YOU SHOULD KNOW:   An abscess is an area under the skin where pus collects  An abscess incision and drainage (I and D) is a procedure to drain the pus  An abscess is most commonly caused by bacteria and can occur anywhere on the body  INSTRUCTIONS:   Medicines:   · Pain medicine: You may be given medicine to take away or decrease pain  Do not wait until the pain is severe before you take your medicine  · Take your medicine as directed  Call your healthcare provider if you think your medicine is not helping or if you have side effects  Tell him if you are allergic to any medicine  Keep a list of the medicines, vitamins, and herbs you take  Include the amounts, and when and why you take them  Bring the list or the pill bottles to follow-up visits  Carry your medicine list with you in case of an emergency  Wound care:   · Bandage:  Do not remove your bandage unless your primary healthcare provider [facility]) says it is okay  Keep the bandage clean and dry  Remove your bandage and clean the wound once your PHP gives you directions  · Packing:  Ask your PHP how to pack and change the gauze in your wound  Keep track of how many gauze dressings are inside the wound when you remove the packing  Do not pack more gauze than directed into the wound  This can damage the tissue  · Warm soaks:  Soak your abscess in warm, clean water for 20 to 30 minutes every day, for 1 week  Ask your PHP when to start warm soaks  Wear a splint:  You may need a splint if the abscess is on your arm, hand, or leg   A splint limits movement and helps your wound heal  Do not remove the splint until your first follow-up visit or as directed  Elevate your wound: If your wound is on your arm or leg, keep it raised above the level of your heart as often as you can  This will help reduce swelling  Prop your arm or leg on 2 pillows  Follow up with your primary healthcare provider or surgeon in 1 to 3 days:  Write down your questions so you remember to ask them during your visits  You may need to have your packing removed or your bandage changed  Contact your primary healthcare provider or surgeon if:   · You have a fever or chills  · You feel more tired than usual      · Your abscess returns  · You have questions or concerns about your procedure  Return to the emergency department if:   · The area around your abscess has red streaks or is warm and painful  · You are bleeding from your wound and cannot stop it  · You have back or stomach pain  · Your muscles or joints ache  © 2014 [address] [employer] is for End User's use only and may not be sold, redistributed or otherwise used for commercial purposes  All illustrations and images included in CareNotes® are the copyrighted property of [employer]  or [employer]  The above information is an  only  It is not intended as medical advice for individual conditions or treatments  Talk to your doctor, nurse or pharmacist before following any medical regimen to see if it is safe and effective for you  Hidradenitis Suppurativa   WHAT YOU NEED TO KNOW:   What is hidradenitis suppurativa? Hidradenitis suppurativa (HS) is a chronic (long-term) skin disease that causes your sweat glands or hair follicles to get clogged and inflamed  HS causes red bumps that look like pimples or small boils to develop on your skin  The cause of HS is unknown  It may run in families  Being overweight and smoking worsens signs and symptoms of HS     What are the signs and symptoms of HS?  HS usually occurs in areas around skin folds or where sweat glands or hair follicles are  This includes the armpits, groin, genital or anal area, and under breasts in women  Your signs and symptoms may come and go  They may also get worse over time  You may have any of the following:  · Mild or early signs of HS:  One or more tender, red bumps that look like pimples or boils    · Worsening signs and symptoms of HS:      ¨ Painful, hard bumps that get larger, break open, and drain pus that smells bad    ¨ Bumps that form deep under your skin and connect to each other and form a tunnel     ¨ Deep, thick scars caused by bumps that heal and reappear over time in the same area  How is HS diagnosed? Your healthcare provider will ask about your symptoms and examine your skin  The provider will diagnose HS based on your signs and symptoms  How is HS treated? The goal of treatment is to control symptoms, prevent the development of new bumps, and limit scarring  You may need any of the following:  · Antibiotics  are used to treat or prevent a bacterial infection  Antibiotics may be used long-term  Antibiotics may be given as a cream or pill  · NSAIDs , such as ibuprofen, help decrease swelling, pain, and fever  This medicine is available with or without a doctor's order  NSAIDs can cause stomach bleeding or kidney problems in certain people  If you take blood thinner medicine, always ask your healthcare provider if NSAIDs are safe for you  Always read the medicine label and follow directions  · Acetaminophen  decreases pain and fever  It is available without a doctor's order  Ask how much to take and how often to take it  Follow directions  Acetaminophen can cause liver damage if not taken correctly  · Other medicines  may be used to treat HS  These may include hormones, acne medicines, steroids, biologic therapy, and medicines that slow your immune system       · Incision and drainage is a procedure to drain pus from an HS wound and clean it out so it can heal     · Surgery  may be needed if medicines do not work  Surgery may be done to remove areas of skin affected by HS  How can I manage symptoms and decrease flare-ups? · Apply warm, moist compresses  This may help to decrease pain  Keep the compress on your skin for 10 minutes  Sitz baths may be recommended if your genital or anal area is affected by HS  To do a sitz bath, fill a bathtub with 4 to 6 inches of warm water  You may also use a sitz bath pan that fits inside a toilet bowl  Sit in the sitz bath for 15 minutes  Do this 3 times a day, and after each bowel movement  The warm water can help decrease pain and swelling  · Wash your skin gently  Use cleansers recommended by your healthcare provider  Antibacterial soap may be helpful  · Lose weight if you are overweight  Weight loss may help to improve signs and symptoms of HS  · Do not smoke  Smoking can make it more difficult to treat HS and worsen symptoms  Ask your healthcare provider for information if you currently smoke and need help to quit  E-cigarettes or smokeless tobacco still contain nicotine  Talk to your healthcare provider before you use these products  · Do not wear tight clothing  Tight clothing rubs against your skin and causes irritation that can worsen HS  · Do not shave or use deodorant in areas of skin affected by HS  Ask your healthcare provider about safe deodorant or hair removal options  · Keep your skin cool  Overheating and sweating can cause an HS flare-up  · Ask your healthcare provider if you should make any changes to the foods you eat  Your healthcare provider may recommend that you avoid dairy foods  A dairy-free diet may help decrease your symptoms  Dairy foods include milk, cheese, yogurt, and ice cream      · Tell your healthcare provider if HS is causing you to feel depressed    Your healthcare provider may recommend counseling to help you cope with HS  When should I contact my healthcare provider? · Your symptoms get worse, even with treatment  · You have questions or concerns about your condition or care  CARE AGREEMENT:   You have the right to help plan your care  Learn about your health condition and how it may be treated  Discuss treatment options with your caregivers to decide what care you want to receive  You always have the right to refuse treatment  The above information is an  only  It is not intended as medical advice for individual conditions or treatments  Talk to your doctor, nurse or pharmacist before following any medical regimen to see if it is safe and effective for you  © 2017 [address]  Information is for End User's use only and may not be sold, redistributed or otherwise used for commercial purposes  All illustrations and images included in CareNotes® are the copyrighted property of [employer]  or [employer]

## 2019-08-19 ENCOUNTER — OFFICE VISIT (OUTPATIENT)
Dept: SURGERY | Facility: CLINIC | Age: 29
End: 2019-08-19

## 2019-08-19 DIAGNOSIS — L02.411 ABSCESS OF RIGHT AXILLA: ICD-10-CM

## 2019-08-19 DIAGNOSIS — L73.2 HIDRADENITIS SUPPURATIVA: Primary | ICD-10-CM

## 2019-08-19 PROCEDURE — 99024 POSTOP FOLLOW-UP VISIT: CPT | Performed by: PHYSICIAN ASSISTANT

## 2019-08-19 NOTE — ASSESSMENT & PLAN NOTE
Patient's presentation suspicious for the diagnosis of hidradenitis suppurativa  Acute infection of the right axilla appears to be controlled  Referral has been made for Dermatology and she has been encouraged to call today and schedule an appointment

## 2019-08-19 NOTE — ASSESSMENT & PLAN NOTE
Cellulitis and abscess resolving  Packing was removed over the weekend  Wound is clean and open with scant drainage now  Wound care instructions reviewed again with the patient  Continue antibiotics to completion  Could follow up in 2 weeks for wound check or just as needed  Avoid shaving or using deodorant until full resolution of the inflammation

## 2019-08-19 NOTE — PROGRESS NOTES
Post-Op Note - General Surgery   Antwon Covert 30 y o  female MRN: [id number]  Encounter: [id number]    Assessment/Plan    Abscess of right axilla  Cellulitis and abscess resolving  Packing was removed over the weekend  Wound is clean and open with scant drainage now  Wound care instructions reviewed again with the patient  Continue antibiotics to completion  Could follow up in 2 weeks for wound check or just as needed  Avoid shaving or using deodorant until full resolution of the inflammation  Hidradenitis suppurativa  Patient's presentation suspicious for the diagnosis of hidradenitis suppurativa  Acute infection of the right axilla appears to be controlled  Referral has been made for Dermatology and she has been encouraged to call today and schedule an appointment  Diagnoses and all orders for this visit:    Hidradenitis suppurativa    Abscess of right axilla        Subjective      Chief Complaint   Patient presents with    Follow-up     I & D of right axilla 8-16-19 08- Patient is here today in follow up s/p I & D of right axilla wound 08-  The patient's right axilla is healing nicely and she is having some reddish brown drainage  Denied any fever or chills  ALIX Restrepo 33-year-old female with history of obesity and diabetes presents for follow-up after incision and drainage of a right axillary abscess  Over the weekend no issues  Drainage now minimal   Packing was removed postop day 1  Continues to take oral antibiotics as prescribed  Able to tolerate wound care at home with the assistance of her boyfriend  Plans to call Dermatology as soon as today to schedule consultation  Review of Systems   Constitutional: Negative for chills and fever  Respiratory: Negative for shortness of breath  Cardiovascular: Negative for chest pain  Gastrointestinal: Negative for abdominal pain  Skin: Negative for rash  Neurological: Negative for headaches  The following portions of the patient's history were reviewed and updated as appropriate: allergies, current medications, past family history, past medical history, past social history, past surgical history and problem list     Objective      Last menstrual period 08/05/2019, not currently breastfeeding  Physical Exam   Constitutional: She is oriented to person, place, and time  She appears well-developed and well-nourished  No distress  HENT:   Head: Normocephalic and atraumatic  Eyes: Pupils are equal, round, and reactive to light  Conjunctivae and EOM are normal    Neck: Normal range of motion  Pulmonary/Chest: No respiratory distress  Musculoskeletal: Normal range of motion  Neurological: She is alert and oriented to person, place, and time  Skin: Skin is warm and dry  Capillary refill takes less than 2 seconds  She is not diaphoretic  Psychiatric: She has a normal mood and affect   Her behavior is normal        Signature:  Gurjit Chen PA-C  Date: 8/19/2019 Time: 11:08 AM

## 2019-08-20 ENCOUNTER — OFFICE VISIT (OUTPATIENT)
Dept: CARDIOLOGY CLINIC | Facility: CLINIC | Age: 29
End: 2019-08-20
Payer: COMMERCIAL

## 2019-08-20 VITALS
BODY MASS INDEX: 41.22 KG/M2 | SYSTOLIC BLOOD PRESSURE: 140 MMHG | WEIGHT: 224 LBS | HEIGHT: 62 IN | DIASTOLIC BLOOD PRESSURE: 88 MMHG | HEART RATE: 80 BPM

## 2019-08-20 DIAGNOSIS — R07.9 CHEST PAIN, UNSPECIFIED TYPE: ICD-10-CM

## 2019-08-20 DIAGNOSIS — Q25.1 COARCTATION OF AORTA: ICD-10-CM

## 2019-08-20 PROCEDURE — 99215 OFFICE O/P EST HI 40 MIN: CPT | Performed by: INTERNAL MEDICINE

## 2019-08-20 NOTE — PROGRESS NOTES
Advanced Heart Failure/Pulmonary Hypertension Outpatient Progress Note - Bigg Hutchinson 30 y o  female MRN: [id number]    @ Encounter: [id number]  Assessment/Plan:    Patient Active Problem List    Diagnosis Date Noted    Hidradenitis suppurativa 08/16/2019    Abscess of right axilla 08/13/2019    Chest pain, unspecified 07/24/2019    Abscess of chin 05/31/2019    Type 2 diabetes mellitus without complication, without long-term current use of insulin ([facility] [address] ) 05/02/2019    Hypercholesteremia 05/02/2019    Coarctation of aorta 03/18/2019    Intractable chronic migraine without aura and with status migrainosus 03/08/2019    Cervicalgia 03/08/2019    Cervical dystonia 03/08/2019    Pulmonary valve stenosis 02/14/2019    Class 3 severe obesity due to excess calories with serious comorbidity and body mass index (BMI) of 40 0 to 44 9 in adult [facility]) 02/14/2019    Ovarian cyst, right 02/14/2019    Splenomegaly 10/12/2018    Hyperglycemia 10/12/2018    Hyponatremia 10/12/2018    Hypertension 10/12/2018    Hepatitis C 10/12/2018    Abdominal pain 10/12/2018    S/P VSD repair 07/12/2018    H/O aortic coarctation repair 07/12/2018     # Congenital coarctation s/p BPA: There she had a lissette patch placed on the coarctation (10 days)  CTA shows recurrent coarctation of the aorta with weblike 50% stenosis of the proximal descending thoracic aorta  Rest of vasculature appears normal per CTA report (2/19/19)  TTE 12/21/18, 70-80 mmHg gradient (although patient had increased flow and tachycardia at the time)  --Obtain records from [place] [id number] in [place]  --Referred for stent placement (COAST 1 trial) to [facility] -> plan for cardiac cath w/ gradient assessment w/ possible stent placement to follow  --Continue to f/u with Dr Guererro Files [facility] Cardiologist)    # H/o VSD repair  At 2 weeks old she had a pulmonary band placed  At 22 months, she had the band removed and the VSD repaired   At 7years of age, she had balloon angioplasty of the coarctation done  --cMRI per Dr Cassandra Cruz  # Mild pulmonic stenosis: Imaging suboptimal   # Morbid obesity  # Hep C: Continue Mavyret  # HTN: BP mildly elevated  Continue lisinopril 30 mg PO daily    F/U prn, will transfer care to Dr Cassandra Cruz and [facility]    HPI: Very pleasant 30 y o  woman w/ PMHx of VSD and coarctation s/p repair referred for management of coarctation  TTE 12/21/18 showed LVEF 65% w/ @ least mild pulmonic stenosis w/  50% stenosis of the proximal descending thoracic aorta  She was born in the [facility] and was sent to [address] [place] [address] in [place]  There she had a lissette patch placed on the coarctation (10 days)  At 2 weeks old she had a pulmonary band placed  At 22 months, she had the band removed and the VSD repaired  At 7years of age, she had balloon angioplasty of the coarctation done  At age 13 or 13, there was a concern that she may have contracted Hep C (from surgery at 22 months)  She went on treatment as she had mild fibrosis  During her treatment, her physician left  Initially she was responding, but then stable  She is currently on treatment with Mavyret, and her Hep C is undetectable  She states that she has been having episodes of chest pain that is unrelated to exertion  Otherwise, feels well  Able to ambulate on flat ground without difficulty  Today, 3/19/19, she returns for f/u  She was seen by vascular who would prefer referral to [facility]  She feels the same as last visit  Rare CP  Today, 5/1/19, she returns for f/u  Continues to have rare CP  Today, 8/20/19, she returns for f/u  She has established with Dr Payal Trejo here @ [facility] (Congenital specialist) and was seen @ [facility]  Feels well currently  Here for f/u  Continues to have rare CP  Exercise tolerance is preserved         Past Medical History:   Diagnosis Date    Allergic     Arthritis     Bipolar affective disorder, currently depressed, moderate ([facility] [address] ) 12/17/2018    Cyst of ovary, right     Diabetes mellitus ([facility] [address] ) 10/10/18    type 2    Heart murmur 01/19/88    Hepatitis C     Hepatitis C virus infection cured after antiviral drug therapy     Hypertension     Migraines     Obesity 1995    Pulmonary artery congenital abnormality     Spleen enlarged      Review of Systems - 12 point ROS was done and is negative, except as noted above  Allergies   Allergen Reactions    Corticosteroids Swelling     Pt states this does not cause problems breathing, she just has generalized swelling    Cortisone     Lactose     Prednisone Swelling    Sulfa Antibiotics Hives    Milk-Related Compounds Sneezing           Current Outpatient Medications:     clindamycin (CLEOCIN) 150 mg capsule, Take 1 capsule (150 mg total) by mouth every 6 (six) hours for 7 days, Disp: 28 capsule, Rfl: 0    colestipol (COLESTID) 1 g tablet, , Disp: , Rfl: 0    ergocalciferol (VITAMIN D2) 50,000 units, Take 1 capsule (50,000 Units total) by mouth once a week, Disp: 8 capsule, Rfl: 0    etonogestrel (NEXPLANON) subdermal implant, Inject 68 mg under the skin, Disp: , Rfl:     gabapentin (NEURONTIN) 300 mg capsule, Take 1 capsule (300 mg total) by mouth 2 (two) times a day, Disp: 90 capsule, Rfl: 1    ibuprofen (MOTRIN) 800 mg tablet, Take 1 tablet (800 mg total) by mouth every 8 (eight) hours as needed for mild pain or moderate pain, Disp: 60 tablet, Rfl: 2    lisinopril (ZESTRIL) 20 mg tablet, Take 1 5 tablets (30 mg total) by mouth daily, Disp: 180 tablet, Rfl: 3    metFORMIN (GLUCOPHAGE) 1000 MG tablet, Take 1 tablet (1,000 mg total) by mouth 2 (two) times a day with meals, Disp: 60 tablet, Rfl: 6    methocarbamol (ROBAXIN) 500 mg tablet, Take 1 tablet (500 mg total) by mouth 2 (two) times a day for 5 days, Disp: 20 tablet, Rfl: 0    ondansetron (ZOFRAN) 8 mg tablet, Take 1 tablet (8 mg total) by mouth every 8 (eight) hours as needed for nausea or vomiting, Disp: 30 tablet, Rfl: 1   oxyCODONE-acetaminophen (PERCOCET) 5-325 mg per tablet, Take 1 tablet by mouth every 6 (six) hours as needed for moderate pain for up to 10 dosesMax Daily Amount: 4 tablets, Disp: 10 tablet, Rfl: 0    pantoprazole (PROTONIX) 40 mg tablet, Take 1 tablet (40 mg total) by mouth daily, Disp: 30 tablet, Rfl: 6    prochlorperazine (COMPAZINE) 10 mg tablet, 1 tab at onset of migraine, can repeat in 8 hours, can take with NSAID, Disp: 20 tablet, Rfl: 3    topiramate (TOPAMAX) 50 MG tablet, Take 2 tablets (100 mg total) by mouth daily at bedtime (Patient taking differently: Take 20 mg by mouth daily at bedtime ), Disp: 60 tablet, Rfl: 1    glucose blood (ACCU-CHEK GUIDE) test strip, Test three times per day, Disp: 100 each, Rfl: 0    Lancets (ACCU-CHEK MULTICLIX) lancets, Use as instructed, Disp: 100 each, Rfl: 3    sitaGLIPtin (JANUVIA) 50 mg tablet, Take 1 tablet (50 mg total) by mouth daily (Patient not taking: Reported on 2019), Disp: 30 tablet, Rfl: 6    Current Facility-Administered Medications:     insulin glargine (LANTUS) subcutaneous injection 20 Units 0 2 mL, 20 Units, Subcutaneous, HS, Edenilson Salazar MD    Social History     Socioeconomic History    Marital status: Single     Spouse name: Not on file    Number of children: Not on file    Years of education: Not on file    Highest education level: Not on file   Occupational History    Not on file   Social Needs    Financial resource strain: Not on file    Food insecurity:     Worry: Not on file     Inability: Not on file    Transportation needs:     Medical: Not on file     Non-medical: Not on file   Tobacco Use    Smoking status: Current Every Day Smoker     Packs/day: 0 20     Types: Cigarettes     Start date: 2019     Last attempt to quit: 2019     Years since quittin 5    Smokeless tobacco: Never Used   Substance and Sexual Activity    Alcohol use: Not Currently     Alcohol/week: 3 0 standard drinks     Types: 3 Standard drinks or equivalent per week     Comment: socially    Drug use: Not Currently    Sexual activity: Yes     Partners: Male     Birth control/protection: Condom Male, Implant     Comment: implant "should have been taken out years ago"    Lifestyle    Physical activity:     Days per week: Not on file     Minutes per session: Not on file    Stress: Not on file   Relationships    Social connections:     Talks on phone: Not on file     Gets together: Not on file     Attends Sikhism service: Not on file     Active member of club or organization: Not on file     Attends meetings of clubs or organizations: Not on file     Relationship status: Not on file    Intimate partner violence:     Fear of current or ex partner: Not on file     Emotionally abused: Not on file     Physically abused: Not on file     Forced sexual activity: Not on file   Other Topics Concern    Not on file   Social History Narrative    Not on file       Family History   Problem Relation Age of Onset    Hypertension Mother     Migraines Mother     Diabetes Father     Hypertension Father     Kidney failure Father     Polycystic kidney disease Father     Heart attack Father     Arthritis Father     Stroke Father     Polycystic kidney disease Paternal Grandmother     Stroke Paternal Grandmother     Arthritis Sister     Asthma Sister     Thyroid disease Sister     Arthritis Maternal Grandmother     Cancer Maternal Grandmother     Learning disabilities Cousin     Learning disabilities Sister     ADD / ADHD Cousin      Physical Exam:    Vitals: Blood pressure 140/88, pulse 80, height 5' 2" (1 575 m), weight 102 kg (224 lb), last menstrual period 08/05/2019, not currently breastfeeding , Body mass index is 40 97 kg/m² ,   Wt Readings from Last 3 Encounters:   08/20/19 102 kg (224 lb)   08/16/19 101 kg (222 lb)   08/13/19 101 kg (223 lb)     Vitals:    08/20/19 1500   BP: 140/88   BP Location: Right arm   Cuff Size: Standard   Pulse: 80   Weight: 102 kg (224 lb)   Height: 5' 2" (1 575 m)       GEN: Bigg Hutchinson appears well, alert and oriented x 3, pleasant and cooperative   HEENT: pupils equal, round, and reactive to light; extraocular muscles intact  NECK: supple, no carotid bruits   HEART: regular rhythm, normal S1 and S2, no murmurs, clicks, gallops or rubs, JVP is    LUNGS: clear to auscultation bilaterally; no wheezes, rales, or rhonchi   ABDOMEN: normal bowel sounds, soft, no tenderness, no distention  EXTREMITIES: peripheral pulses normal; no clubbing, cyanosis, or edema  NEURO: no focal findings   SKIN: normal without suspicious lesions on exposed skin    Labs & Results:  Lab Results   Component Value Date    WBC 12 00 (H) 07/24/2019    HGB 15 0 07/24/2019    HCT 44 4 07/24/2019    MCV 77 (L) 07/24/2019     07/24/2019       Chemistry        Component Value Date/Time     06/14/2018 1620    K 3 8 07/24/2019 1719    K 4 0 06/14/2018 1620     07/24/2019 1719     06/14/2018 1620    CO2 24 07/24/2019 1719    CO2 28 06/14/2018 1620    BUN 11 07/24/2019 1719    BUN 15 06/14/2018 1620    CREATININE 0 63 07/24/2019 1719    CREATININE 0 65 06/14/2018 1620        Component Value Date/Time    CALCIUM 9 3 07/24/2019 1719    CALCIUM 9 9 06/14/2018 1620    ALKPHOS 100 07/24/2019 1719    AST 13 07/24/2019 1719    ALT 15 07/24/2019 1719        Lab Results   Component Value Date    INR 1 06 07/24/2019    INR 1 05 01/26/2019    INR 1 06 12/02/2018    PROTIME 12 3 07/24/2019    PROTIME 12 2 01/26/2019    PROTIME 12 3 12/02/2018     EKG personally reviewed by Lili Rodriguez MD      Counseling / Coordination of Care  Total floor / unit time spent today 40 minutes  Greater than 50% of total time was spent with the patient and / or family counseling and / or coordination of care  A description of the counseling / coordination of care: 25 min  Thank you for the opportunity to participate in the care of this patient      Zeny Peters Divina Stauffer MD, PhD   Mary Cook

## 2019-08-20 NOTE — LETTER
August 20, 2019     Yesika Kasper, [address] [place] [id number]    Patient: Bigg Hutchinson   YOB: 1990   Date of Visit: 8/20/2019       Dear Dr Katharine Aggarwal:    Thank you for referring Bigg Hutchinson to me for evaluation  Below are my notes for this consultation  If you have questions, please do not hesitate to call me  I look forward to following your patient along with you  Sincerely,        Lili Rodriguez MD        CC: REJI Perea, DO Lili Rodriguez MD  8/20/2019  3:56 PM  Sign at close encounter  Advanced Heart Failure/Pulmonary Hypertension Outpatient Progress Note - Bigg Hutchinson 30 y o  female MRN: [id number]    @ Encounter: [id number]  Assessment/Plan:    Patient Active Problem List    Diagnosis Date Noted    Hidradenitis suppurativa 08/16/2019    Abscess of right axilla 08/13/2019    Chest pain, unspecified 07/24/2019    Abscess of chin 05/31/2019    Type 2 diabetes mellitus without complication, without long-term current use of insulin ([facility] [address] ) 05/02/2019    Hypercholesteremia 05/02/2019    Coarctation of aorta 03/18/2019    Intractable chronic migraine without aura and with status migrainosus 03/08/2019    Cervicalgia 03/08/2019    Cervical dystonia 03/08/2019    Pulmonary valve stenosis 02/14/2019    Class 3 severe obesity due to excess calories with serious comorbidity and body mass index (BMI) of 40 0 to 44 9 in adult [facility]) 02/14/2019    Ovarian cyst, right 02/14/2019    Splenomegaly 10/12/2018    Hyperglycemia 10/12/2018    Hyponatremia 10/12/2018    Hypertension 10/12/2018    Hepatitis C 10/12/2018    Abdominal pain 10/12/2018    S/P VSD repair 07/12/2018    H/O aortic coarctation repair 07/12/2018     # Congenital coarctation s/p BPA: There she had a lissette patch placed on the coarctation (10 days)  CTA shows recurrent coarctation of the aorta with weblike 50% stenosis of the proximal descending thoracic aorta   Rest of vasculature appears normal per CTA report (2/19/19)  TTE 12/21/18, 70-80 mmHg gradient (although patient had increased flow and tachycardia at the time)  --Obtain records from [address] [place] [address] in [place]  --Referred for stent placement (COAST 1 trial) to [facility] -> plan for cardiac cath w/ gradient assessment w/ possible stent placement to follow  --Continue to f/u with Dr Bhavana Rubin [facility] Cardiologist)    # H/o VSD repair  At 2 weeks old she had a pulmonary band placed  At 22 months, she had the band removed and the VSD repaired  At 7years of age, she had balloon angioplasty of the coarctation done  --cMRI per Dr Sarah Darden  # Mild pulmonic stenosis: Imaging suboptimal   # Morbid obesity  # Hep C: Continue Mavyret  # HTN: BP mildly elevated  Continue lisinopril 30 mg PO daily    F/U prn, will transfer care to Dr Sarah Darden and [facility]    HPI: Very pleasant 30 y o  woman w/ PMHx of VSD and coarctation s/p repair referred for management of coarctation  TTE 12/21/18 showed LVEF 65% w/ @ least mild pulmonic stenosis w/  50% stenosis of the proximal descending thoracic aorta  She was born in the [place] and was sent to [address] [place] [address] in [place]  There she had a lissette patch placed on the coarctation (10 days)  At 2 weeks old she had a pulmonary band placed  At 22 months, she had the band removed and the VSD repaired  At 7years of age, she had balloon angioplasty of the coarctation done  At age 13 or 13, there was a concern that she may have contracted Hep C (from surgery at 22 months)  She went on treatment as she had mild fibrosis  During her treatment, her physician left  Initially she was responding, but then stable  She is currently on treatment with Mavyret, and her Hep C is undetectable  She states that she has been having episodes of chest pain that is unrelated to exertion  Otherwise, feels well  Able to ambulate on flat ground without difficulty  Today, 3/19/19, she returns for f/u  She was seen by vascular who would prefer referral to [facility]  She feels the same as last visit  Rare CP  Today, 5/1/19, she returns for f/u  Continues to have rare CP  Today, 8/20/19, she returns for f/u  She has established with Dr Aarti Young here @ [facility] ([facility] specialist) and was seen @ [facility]  Feels well currently  Here for f/u  Continues to have rare CP  Exercise tolerance is preserved  Past Medical History:   Diagnosis Date    Allergic     Arthritis     Bipolar affective disorder, currently depressed, moderate ([address] ) 12/17/2018    Cyst of ovary, right     Diabetes mellitus ([facility] [address] ) 10/10/18    type 2    Heart murmur 01/19/88    Hepatitis C     Hepatitis C virus infection cured after antiviral drug therapy     Hypertension     Migraines     Obesity 1995    Pulmonary artery congenital abnormality     Spleen enlarged      Review of Systems - 12 point ROS was done and is negative, except as noted above  Allergies   Allergen Reactions    Corticosteroids Swelling     Pt states this does not cause problems breathing, she just has generalized swelling    Cortisone     Lactose     Prednisone Swelling    Sulfa Antibiotics Hives    Milk-Related Compounds Sneezing           Current Outpatient Medications:     clindamycin (CLEOCIN) 150 mg capsule, Take 1 capsule (150 mg total) by mouth every 6 (six) hours for 7 days, Disp: 28 capsule, Rfl: 0    colestipol (COLESTID) 1 g tablet, , Disp: , Rfl: 0    ergocalciferol (VITAMIN D2) 50,000 units, Take 1 capsule (50,000 Units total) by mouth once a week, Disp: 8 capsule, Rfl: 0    etonogestrel (NEXPLANON) subdermal implant, Inject 68 mg under the skin, Disp: , Rfl:     gabapentin (NEURONTIN) 300 mg capsule, Take 1 capsule (300 mg total) by mouth 2 (two) times a day, Disp: 90 capsule, Rfl: 1    ibuprofen (MOTRIN) 800 mg tablet, Take 1 tablet (800 mg total) by mouth every 8 (eight) hours as needed for mild pain or moderate pain, Disp: 60 tablet, Rfl: 2    lisinopril (ZESTRIL) 20 mg tablet, Take 1 5 tablets (30 mg total) by mouth daily, Disp: 180 tablet, Rfl: 3    metFORMIN (GLUCOPHAGE) 1000 MG tablet, Take 1 tablet (1,000 mg total) by mouth 2 (two) times a day with meals, Disp: 60 tablet, Rfl: 6    methocarbamol (ROBAXIN) 500 mg tablet, Take 1 tablet (500 mg total) by mouth 2 (two) times a day for 5 days, Disp: 20 tablet, Rfl: 0    ondansetron (ZOFRAN) 8 mg tablet, Take 1 tablet (8 mg total) by mouth every 8 (eight) hours as needed for nausea or vomiting, Disp: 30 tablet, Rfl: 1    oxyCODONE-acetaminophen (PERCOCET) 5-325 mg per tablet, Take 1 tablet by mouth every 6 (six) hours as needed for moderate pain for up to 10 dosesMax Daily Amount: 4 tablets, Disp: 10 tablet, Rfl: 0    pantoprazole (PROTONIX) 40 mg tablet, Take 1 tablet (40 mg total) by mouth daily, Disp: 30 tablet, Rfl: 6    prochlorperazine (COMPAZINE) 10 mg tablet, 1 tab at onset of migraine, can repeat in 8 hours, can take with NSAID, Disp: 20 tablet, Rfl: 3    topiramate (TOPAMAX) 50 MG tablet, Take 2 tablets (100 mg total) by mouth daily at bedtime (Patient taking differently: Take 20 mg by mouth daily at bedtime ), Disp: 60 tablet, Rfl: 1    glucose blood (ACCU-CHEK GUIDE) test strip, Test three times per day, Disp: 100 each, Rfl: 0    Lancets (ACCU-CHEK MULTICLIX) lancets, Use as instructed, Disp: 100 each, Rfl: 3    sitaGLIPtin (JANUVIA) 50 mg tablet, Take 1 tablet (50 mg total) by mouth daily (Patient not taking: Reported on 8/20/2019), Disp: 30 tablet, Rfl: 6    Current Facility-Administered Medications:     insulin glargine (LANTUS) subcutaneous injection 20 Units 0 2 mL, 20 Units, Subcutaneous, ADRIENNE, Peter Aldana MD    Social History     Socioeconomic History    Marital status: Single     Spouse name: Not on file    Number of children: Not on file    Years of education: Not on file    Highest education level: Not on file   Occupational History    Not on file Social Needs    Financial resource strain: Not on file    Food insecurity:     Worry: Not on file     Inability: Not on file    Transportation needs:     Medical: Not on file     Non-medical: Not on file   Tobacco Use    Smoking status: Current Every Day Smoker     Packs/day: 0 20     Types: Cigarettes     Start date: 2019     Last attempt to quit: 2019     Years since quittin 5    Smokeless tobacco: Never Used   Substance and Sexual Activity    Alcohol use: Not Currently     Alcohol/week: 3 0 standard drinks     Types: 3 Standard drinks or equivalent per week     Comment: socially    Drug use: Not Currently    Sexual activity: Yes     Partners: Male     Birth control/protection: Condom Male, Implant     Comment: implant "should have been taken out years ago"    Lifestyle    Physical activity:     Days per week: Not on file     Minutes per session: Not on file    Stress: Not on file   Relationships    Social connections:     Talks on phone: Not on file     Gets together: Not on file     Attends Anabaptist service: Not on file     Active member of club or organization: Not on file     Attends meetings of clubs or organizations: Not on file     Relationship status: Not on file    Intimate partner violence:     Fear of current or ex partner: Not on file     Emotionally abused: Not on file     Physically abused: Not on file     Forced sexual activity: Not on file   Other Topics Concern    Not on file   Social History Narrative    Not on file       Family History   Problem Relation Age of Onset    Hypertension Mother     Migraines Mother     Diabetes Father     Hypertension Father     Kidney failure Father     Polycystic kidney disease Father     Heart attack Father     Arthritis Father     Stroke Father     Polycystic kidney disease Paternal Grandmother     Stroke Paternal Grandmother     Arthritis Sister     Asthma Sister     Thyroid disease Sister     Arthritis Maternal Grandmother     Cancer Maternal Grandmother     Learning disabilities Cousin     Learning disabilities Sister     ADD / ADHD Cousin      Physical Exam:    Vitals: Blood pressure 140/88, pulse 80, height 5' 2" (1 575 m), weight 102 kg (224 lb), last menstrual period 08/05/2019, not currently breastfeeding , Body mass index is 40 97 kg/m² ,   Wt Readings from Last 3 Encounters:   08/20/19 102 kg (224 lb)   08/16/19 101 kg (222 lb)   08/13/19 101 kg (223 lb)     Vitals:    08/20/19 1500   BP: 140/88   BP Location: Right arm   Cuff Size: Standard   Pulse: 80   Weight: 102 kg (224 lb)   Height: 5' 2" (1 575 m)       GEN: Horace Rodríguez appears well, alert and oriented x 3, pleasant and cooperative   HEENT: pupils equal, round, and reactive to light; extraocular muscles intact  NECK: supple, no carotid bruits   HEART: regular rhythm, normal S1 and S2, no murmurs, clicks, gallops or rubs, JVP is    LUNGS: clear to auscultation bilaterally; no wheezes, rales, or rhonchi   ABDOMEN: normal bowel sounds, soft, no tenderness, no distention  EXTREMITIES: peripheral pulses normal; no clubbing, cyanosis, or edema  NEURO: no focal findings   SKIN: normal without suspicious lesions on exposed skin    Labs & Results:  Lab Results   Component Value Date    WBC 12 00 (H) 07/24/2019    HGB 15 0 07/24/2019    HCT 44 4 07/24/2019    MCV 77 (L) 07/24/2019     07/24/2019       Chemistry        Component Value Date/Time     06/14/2018 1620    K 3 8 07/24/2019 1719    K 4 0 06/14/2018 1620     07/24/2019 1719     06/14/2018 1620    CO2 24 07/24/2019 1719    CO2 28 06/14/2018 1620    BUN 11 07/24/2019 1719    BUN 15 06/14/2018 1620    CREATININE 0 63 07/24/2019 1719    CREATININE 0 65 06/14/2018 1620        Component Value Date/Time    CALCIUM 9 3 07/24/2019 1719    CALCIUM 9 9 06/14/2018 1620    ALKPHOS 100 07/24/2019 1719    AST 13 07/24/2019 1719    ALT 15 07/24/2019 1719        Lab Results   Component Value Date INR 1 06 07/24/2019    INR 1 05 01/26/2019    INR 1 06 12/02/2018    PROTIME 12 3 07/24/2019    PROTIME 12 2 01/26/2019    PROTIME 12 3 12/02/2018     EKG personally reviewed by Stephanie Martin MD      Counseling / Coordination of Care  Total floor / unit time spent today 40 minutes  Greater than 50% of total time was spent with the patient and / or family counseling and / or coordination of care  A description of the counseling / coordination of care: 25 min  Thank you for the opportunity to participate in the care of this patient      Stephanie Martin MD, PhD   Yaniv Martin

## 2019-08-23 ENCOUNTER — OFFICE VISIT (OUTPATIENT)
Dept: OBGYN CLINIC | Facility: CLINIC | Age: 29
End: 2019-08-23
Payer: COMMERCIAL

## 2019-08-23 VITALS
WEIGHT: 223 LBS | SYSTOLIC BLOOD PRESSURE: 130 MMHG | HEIGHT: 62 IN | DIASTOLIC BLOOD PRESSURE: 80 MMHG | BODY MASS INDEX: 41.04 KG/M2

## 2019-08-23 DIAGNOSIS — Z30.46 ENCOUNTER FOR REMOVAL OF SUBDERMAL CONTRACEPTIVE IMPLANT: ICD-10-CM

## 2019-08-23 DIAGNOSIS — N83.201 OVARIAN CYST, RIGHT: ICD-10-CM

## 2019-08-23 DIAGNOSIS — R10.2 PELVIC PAIN: Primary | ICD-10-CM

## 2019-08-23 PROCEDURE — 11982 REMOVE DRUG IMPLANT DEVICE: CPT | Performed by: OBSTETRICS & GYNECOLOGY

## 2019-08-23 PROCEDURE — 99213 OFFICE O/P EST LOW 20 MIN: CPT | Performed by: OBSTETRICS & GYNECOLOGY

## 2019-08-23 NOTE — PROGRESS NOTES
Remove and insert drug implant  Date/Time: 8/23/2019 10:31 AM  Performed by: Kyung Watkins MD  Authorized by: Kyung Watkins MD     Consent:     Consent obtained:  Written    Consent given by:  Patient    Procedural risks discussed:  Bleeding, damage to other organs, failure rate, infection and repeat procedure (parasthesias)    Patient questions answered: yes      Patient agrees, verbalizes understanding, and wants to proceed: yes    Indication:     Indication: Presence of non-biodegradable drug delivery implant    Pre-procedure:     Prepped with: povidone-iodine      Local anesthetic: lidocaine 2%    The site was cleaned and prepped in a sterile fashion: yes    Procedure:     Procedure:  Removal    Small stab incision was made in arm: yes      Left/right:  Left  Comments:      Skin cleansed with alcohol  Local anesthesia infiltrated (2mL)  Skin prepped with betadine and sterile gloves donned  Small stab incision made over implant  Implant brought to incision with palpation and grasped with [employer]  Removed with gentle traction without resistance  Incision closed with Steristrips and sterile bandage applied  Patient tolerated procedure well with no immediate complications

## 2019-08-23 NOTE — PROGRESS NOTES
Assessment:  30 y o  Kinan Hoop presenting with pelvic pain and known ovarian cyst      Plan:  Diagnoses and all orders for this visit:    Pelvic pain  Ovarian cyst, right  -     US pelvis complete non OB; Future  - Return 1wk s/p ultrasound to review findings    Encounter for removal of subdermal contraceptive implant  -     Removed implant in office today          __________________________________________________________________    Subjective   Forrestine Arun is a 30 y o  Luellen Hoop with regular menses that are unusually short who is sexually active and currently using contraception ( nexplanon) presenting with complaints of pelvic pain  Patient reports she has been dealing with this problem for many months  Seen in January and dx with ovarian cyst  Cyst is on right, but pain is on left  Radiates up and down left size  Worsening with time and this last few days has been associated with nausea and vomiting  Otherwise no acute change from prior evaluation  Menses remain regular in interval, but unusually short (2-3d)  She did not get repeat US done; counseled need to assess change in cyst and if still present can continue surgery  Reviewed surgical hx, which may predispose her to higher likelihood of adhesive disease  Patient also with  implantable contraceptive  Left arm, 10yr old and "it was good for 5 yrs " Last visit could not be palpated by patient or provider  Did not get imaging done, but she says she can feel it now  Wants it out if we're able  The following portions of the patient's history were reviewed and updated as appropriate: allergies, current medications, past medical history, past social history, past surgical history and problem list     Review of Systems  Review of Systems   Constitutional: Negative for fever  Gastrointestinal: Positive for abdominal distention, nausea and vomiting  Negative for diarrhea  Genitourinary: Positive for menstrual problem and pelvic pain  Negative for flank pain, vaginal bleeding, vaginal discharge and vaginal pain  Objective  /80 (BP Location: Right arm, Patient Position: Sitting, Cuff Size: Standard)   Ht 5' 2" (1 575 m)   Wt 101 kg (223 lb)   LMP 08/05/2019   BMI 40 79 kg/m²      Physical Exam:  Physical Exam   Constitutional: She is oriented to person, place, and time  She appears well-developed and well-nourished  No distress  HENT:   Head: Normocephalic and atraumatic  Eyes: No scleral icterus  Cardiovascular: Normal rate  Pulmonary/Chest: Effort normal  No accessory muscle usage  No respiratory distress  Abdominal: Soft  She exhibits no distension  There is no tenderness  There is no rebound and no guarding  Musculoskeletal: She exhibits deformity (implant palpable on left arm, but in unusual spot  Mid-arm on anterior surface (not inner biceps groove))  She exhibits no edema or tenderness  Arms:  Neurological: She is alert and oriented to person, place, and time  Skin: Skin is warm and dry  No rash noted  No erythema  Psychiatric: She has a normal mood and affect   Her behavior is normal
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
## 2019-08-27 ENCOUNTER — TELEPHONE (OUTPATIENT)
Dept: OBGYN CLINIC | Facility: MEDICAL CENTER | Age: 29
End: 2019-08-27

## 2019-08-27 NOTE — TELEPHONE ENCOUNTER
----- Message from Nithin Fournier sent at 8/27/2019 11:17 AM EDT -----  I contacted pt's insurance  Effective 6/1/19  Pt is covered for removal at 100%  No PA needed  Kaylee ESPINO  Ref# [id number]     ----- Message -----  From: Tawanna Habermann  Sent: 8/26/2019   8:37 AM EDT  To: Dr Linda Galan removed a nexplanon on this pt  Can you check to see if she was cover? It was done on 08/23/19  I know the date passed already  I will talk to [address] [facility] [address]

## 2019-08-27 NOTE — TELEPHONE ENCOUNTER
----- Message from Shaniqua Wolf sent at 8/27/2019 11:17 AM EDT -----  I contacted pt's insurance  Effective 6/1/19  Pt is covered for removal at 100%  No PA needed  Kaylee ESPINO  Ref# [id number]     ----- Message -----  From: Dougie Nelson  Sent: 8/26/2019   8:37 AM EDT  To: Dr Tish Colón removed a nexplanon on this pt  Can you check to see if she was cover? It was done on 08/23/19  I know the date passed already  I will talk to [address]

## 2019-08-29 ENCOUNTER — HOSPITAL ENCOUNTER (OUTPATIENT)
Dept: RADIOLOGY | Facility: HOSPITAL | Age: 29
Discharge: HOME | End: 2019-08-29
Attending: OBSTETRICS & GYNECOLOGY
Payer: COMMERCIAL

## 2019-08-29 ENCOUNTER — HOSPITAL ENCOUNTER (OUTPATIENT)
Dept: RADIOLOGY | Facility: HOSPITAL | Age: 29
Discharge: HOME | End: 2019-08-29
Attending: PEDIATRICS
Payer: COMMERCIAL

## 2019-08-29 ENCOUNTER — TRANSCRIBE ORDERS (OUTPATIENT)
Dept: RADIOLOGY | Facility: HOSPITAL | Age: 29
End: 2019-08-29

## 2019-08-29 DIAGNOSIS — N83.201 OVARIAN CYST, RIGHT: ICD-10-CM

## 2019-08-29 DIAGNOSIS — Z87.74 S/P VSD REPAIR: ICD-10-CM

## 2019-08-29 DIAGNOSIS — Z87.74 H/O AORTIC COARCTATION REPAIR: ICD-10-CM

## 2019-08-29 DIAGNOSIS — R10.2 PELVIC PAIN: ICD-10-CM

## 2019-08-29 PROCEDURE — 75557 CARDIAC MRI FOR MORPH: CPT

## 2019-08-29 PROCEDURE — 76830 TRANSVAGINAL US NON-OB: CPT

## 2019-08-29 PROCEDURE — 76856 US EXAM PELVIC COMPLETE: CPT

## 2019-09-06 ENCOUNTER — OFFICE VISIT (OUTPATIENT)
Dept: OBGYN CLINIC | Facility: CLINIC | Age: 29
End: 2019-09-06
Payer: COMMERCIAL

## 2019-09-06 VITALS
DIASTOLIC BLOOD PRESSURE: 90 MMHG | WEIGHT: 228 LBS | SYSTOLIC BLOOD PRESSURE: 140 MMHG | BODY MASS INDEX: 41.96 KG/M2 | HEIGHT: 62 IN

## 2019-09-06 DIAGNOSIS — N83.291 COMPLEX CYST OF RIGHT OVARY: Primary | ICD-10-CM

## 2019-09-06 DIAGNOSIS — Z30.2 REQUEST FOR STERILIZATION: ICD-10-CM

## 2019-09-06 PROCEDURE — 99213 OFFICE O/P EST LOW 20 MIN: CPT | Performed by: OBSTETRICS & GYNECOLOGY

## 2019-09-06 NOTE — PROGRESS NOTES
Assessment:  30 y o  Karla Finder who presents as a follow up for right ovarian cyst        Plan:  Diagnoses and all orders for this visit:    Complex cyst of right ovary  Request for sterilization  - Chart to surgical scheduling for laparoscopic right oophorectomy, bilateral salpingectomy, possible right ovarian cystectomy  - Patient requires medical clearance  Has appointment with cardiology upcoming  - Return for preop H&P      Total time of 18min was spent with the patient, >50% of which was spent on counseling    __________________________________________________________________    Alondra Godinez is a 30 y o  Karla Finder who presents as a follow up for right ovarian cyst      Patient reports her pain is still present, but less intense  She did have a menses over the last week which resulted in a self-limited exacerbation of her pain  Bleeding was moderate and 3d in duration  Not unlike her typical  Pain resolved as menses resolved, now at her baseline discomfort again  I reviewed with patient the ultrasound finding  The ovarian cyst remains present and largely unchanged in size or characteristic  I discussed that given its persistence, it is reasonable to consider removal  Patient remains desiring of removal  She says "take it all out, I don't want kids anyway " Reviewed that her uterus and left ovary are normal and shes had no menstrual issues and no prior hx of cysts like this, and these structures do not require removal  Reviewed increased surgical risk with hysterectomy compared to oophorectomy  Reviewed benefits of ovarian preservation -- cardiovascular and osteoporosis protective benefits  I recommend surgically completing only what is necessary  She understands and is agreeable to this  We discussed completing an oophorectomy on the right at her request, as opposed to cystectomy  She would prefer this, agreeable to cystectomy if oophorectomy not safely possible       We discussed her lack of desire for childbearing  She says "I've never wanted kids  Ever since I was 7yo I've known this " Discussed tubal sterilization via salpingectomy during this procedure  She is very much agreeable to this  Discussed in detail the risk of regret, which her nulliparity increases her risk for  She confirms her certainty  She was counseled on LARCs, failure rates, ectopic pregnancy, and theoretical ovarian ca risk reduction  The following portions of the patient's history were reviewed and updated as appropriate: allergies, current medications, past medical history, past social history, past surgical history and problem list         Objective   /90 (BP Location: Right arm, Patient Position: Sitting, Cuff Size: Standard)   Ht 5' 2" (1 575 m)   Wt 103 kg (228 lb)   LMP 09/03/2019 (Exact Date)   BMI 41 70 kg/m²      Physical Exam:  Physical Exam   Constitutional: She appears well-developed and well-nourished  Eyes: No scleral icterus  Pulmonary/Chest: Effort normal  No respiratory distress  Skin: Skin is warm and dry  No rash noted  She is not diaphoretic  No pallor  Psychiatric: She has a normal mood and affect   Her behavior is normal  Judgment and thought content normal

## 2019-09-09 ENCOUNTER — OFFICE VISIT (OUTPATIENT)
Dept: PEDIATRIC CARDIOLOGY | Facility: CLINIC | Age: 29
End: 2019-09-09
Payer: COMMERCIAL

## 2019-09-09 VITALS
DIASTOLIC BLOOD PRESSURE: 70 MMHG | HEART RATE: 87 BPM | BODY MASS INDEX: 42.56 KG/M2 | OXYGEN SATURATION: 97 % | HEIGHT: 61 IN | WEIGHT: 225.4 LBS | SYSTOLIC BLOOD PRESSURE: 143 MMHG

## 2019-09-09 DIAGNOSIS — N80.9 ENDOMETRIOSIS: Primary | ICD-10-CM

## 2019-09-09 DIAGNOSIS — Z87.74 H/O AORTIC COARCTATION REPAIR: ICD-10-CM

## 2019-09-09 DIAGNOSIS — R07.9 CHEST PAIN, UNSPECIFIED TYPE: ICD-10-CM

## 2019-09-09 DIAGNOSIS — Z87.74 S/P VSD REPAIR: ICD-10-CM

## 2019-09-09 DIAGNOSIS — Q25.1 COARCTATION OF AORTA: ICD-10-CM

## 2019-09-09 PROCEDURE — 99215 OFFICE O/P EST HI 40 MIN: CPT | Performed by: PEDIATRICS

## 2019-09-09 NOTE — LETTER
September 9, 2019     Patient: Antwon Urrutia   YOB: 1990   Date of Visit: 9/9/2019       To Whom it May Concern:    Antwon Urrutia is under my professional care  She was seen in my office on 9/9/2019  Patient was accompanied by mother Vikram Bustillo  This visit is related to her May 17, 2019 visit with Dr Makenzie Chavez  If you have any questions or concerns, please don't hesitate to call           Sincerely,          Lucina Holcomb DO        CC: No Recipients

## 2019-09-09 NOTE — PROGRESS NOTES
9/15/2019    Referring provider: No ref  provider found    Dear Dr Umesh Martinez, DO,    I had the pleasure of seeing your patient, Dinah Adams,  on 9/15/2019  in the pediatric cardiology clinic of the [address] [place] [address]  As you know, Abdi Carreno is a 30 y o  old female with the following diagnoses:    Coarctation of the aorta, s/p surgical repair ([facility], first month of life)  PA band placement ([facility], about 1 month of age)  VSD, s/p surgical repair ([facility], 18 month of age)  PA band takedown ([facility], 18 month of age)  Balloon dilation of recoarctation ([facility], age 7)    Abdi Carreno is being seen in the office today for follow-up and comes in with her mother, as before  As you recall, Abdi Carreno was born with coarctation and VSD and had surgical repairs in the 1st several years of life  She required balloon dilation of a recoarctation at age 7  She has been followed here in the [facility] system for some time and I met her for the 1st time in May of this year  She has also had some care through the adult congenital program at [facility]  When I saw Abdi Carreno in May I was concerned about a significant re-coarctation and I asked her to have a cardiac MRI and see me thereafter  Since her last visit Abdi Carreno continues to have difficulties with intermittent chest discomfort which are felt to be noncardiac in origin  She says she has actually been pretty good over the last month  She does not difficult difficulties with palpitation or syncope nor she had significant changes in her exercise tolerance  As you know, she has a number of other medical problems most notably diabetes and difficulties with weight management  There have been no changes in her overall medical history family history or social history since she was last seen      Review of Systems   Constitutional: Negative for activity change, appetite change, chills, diaphoresis, fatigue, fever and unexpected weight change  HENT: Negative for nosebleeds  Respiratory: Negative for cough, chest tightness, shortness of breath, wheezing and stridor  Cardiovascular: Positive for chest pain  Negative for palpitations and leg swelling  Gastrointestinal: Positive for abdominal pain  Negative for abdominal distention, diarrhea, nausea and vomiting  Endocrine: Negative for cold intolerance and heat intolerance  Musculoskeletal: Negative for arthralgias, joint swelling and myalgias  Skin: Negative for color change, pallor and rash  Neurological: Negative for dizziness, syncope, speech difficulty, weakness, light-headedness, numbness and headaches  Hematological: Does not bruise/bleed easily  Psychiatric/Behavioral: Negative for behavioral problems  The patient is not nervous/anxious           Past Medical History:   Diagnosis Date    Allergic     Arthritis     Bipolar affective disorder, currently depressed, moderate ([facility] [address] ) 12/17/2018    Cyst of ovary, right     Diabetes mellitus ([facility] [address] ) 10/10/18    type 2    Heart murmur 01/19/88    Hepatitis C     Hepatitis C virus infection cured after antiviral drug therapy     Hypertension     Migraines     Obesity 1995    Pulmonary artery congenital abnormality     Spleen enlarged    /  Past Surgical History:   Procedure Laterality Date    CARDIAC CATHETERIZATION      no CAD 10days, 4 weeks 22months old     CHOLECYSTECTOMY      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 7   [facility] LIVER BIOPSY      LIVER BIOPSY      VSD REPAIR      As a child       Family History   Problem Relation Age of Onset    Hypertension Mother     Migraines Mother     Diabetes Father     Hypertension Father     Kidney failure Father     Polycystic kidney disease Father     Heart attack Father     Arthritis Father     Stroke Father     Polycystic kidney disease Paternal Grandmother     Stroke Paternal Grandmother     Arthritis Sister     Asthma Sister    [facility] Thyroid disease Sister     Arthritis Maternal Grandmother     Cancer Maternal Grandmother     Learning disabilities Cousin     Learning disabilities Sister     ADD / ADHD Cousin        Social History     Tobacco Use    Smoking status: Current Every Day Smoker     Packs/day: 0 20     Types: Cigarettes     Start date: 2019     Last attempt to quit: 2019     Years since quittin 6    Smokeless tobacco: Never Used   Substance Use Topics    Alcohol use: Yes     Alcohol/week: 3 0 standard drinks     Types: 3 Standard drinks or equivalent per week     Comment: socially    Drug use: Not Currently         Physical examination:      Vitals:    19 1103 19 1104 19 1112   BP: (!) 181/81 (!) 181/80 143/70   BP Location: Left arm Right arm Right leg   Patient Position: Sitting Sitting Supine   Cuff Size: Extra-Large Extra-Large Extra-Large   Pulse: 87     SpO2: 97%     Weight: 102 kg (225 lb 6 4 oz)     Height: 5' 0 83" (1 545 m)          In general, Marcie Figueroa is a well-developed well-nourished, overweight woman no acute distress  She is acyanotic and non- dysmorphic  HEENT exam is benign  Pupils are equal, round and reactive  Mucous membranes are moist   There is no thyromegaly or JVD  Lungs are clear to auscultation in all fields with no wheezes, rales or ronchi  There is a well-healed median sternotomy scar  Cardiovascular exam demonstrates a regular and rhythm  There is a fairly harsh grade 3/6 long systolic ejection murmur heard best at her right upper sternal border which radiates to the back and her between the scapulae  Diastole is clear  There is a normal first heart sound and the second heart sound is physiologically split  There are no significant clicks,  rubs or gallops noted  The abdomen is soft nontender  and non-distended with no organomegaly  Pulses are 2+ in upper and lower extremities with no disparity  There is  no brachiofemoral delay   Extremities are warm and well perfused  There is no  cyanosis, clubbing or edema  EKG:  EKG demonstrates a normal sinus rhythm at a rate of  81 bpm   There was no ectopy  Complete right bundle branch block with a QRS duration of 148 milliseconds  The QTc was 483 sec which likely reflects the underlying QRS anomaly  Echocardiogram:  1  Normal four chamber intracardiac anatomy  2  Qualitatively normal biventricular systolic function  3  There was no residual VSD  4  All valves are normal in structure and function  5  There is a significant recoarctation in the juxtaductal region with a peak gradient of almost 90 mm Hg through the descending thoracic aorta  Aorta itself measures around 5 mm in this area        Other testing:  Cardiac MRI done recently, results pending    Impression:    Nini Ramon is a 57-year-old woman with a history of VSD and coarctation status post repair and subsequent balloon dilation of a re-coarctation at age 7 who once again has evidence of re-coarctation on physical exam and by echocardiogram   The echocardiogram that we did in the office today demonstrates at least a 90 mm gradient through her aortic arch  She also has a 40 mm of mercury blood pressure difference between arms and legs, with arms being higher  Nini Ramon and I discussed the fact that it is time to address the re-coarctation and I believe that this can be done interventionally in the cath lab  Less likely, a surgical repair would be required  Nini Ramon has been working with the [facility] center down at [facility] and they are currently in the process of determining optimal management for her tight coarctation and will proceed with intervention  There is certainly an excellent Center and I am pleased that she is working with them for intervention that is not possible here at  [facility]  I am making no changes in her overall medical management at today's visit    She is hypertensive however her hypertension is related to her coarctation and medication is not going to be successful in optimizing her blood pressure until the coarctation is relieved  I spent some time talking with her about weight management and efforts to lose weight  I believe it is in her best interest to find a weight loss program that she can stick with as her weight and diabetes are certainly comorbidities that she does not need given her underlying heart disease  I will plan to follow up with her in 3 months time and I suspect that this will be after she has intervention done at [facility]  We again discussed the fact that she can see me locally for routine visits and for follow-up after procedures and can be seen at [facility] for interventions and additional testing may not be offered here at [facility]  Her back and her mother verbalized understanding  SBE prophylaxis is indicated at times of predictable risk  Betty Pereyra should have a follow up visit in 3 months  Thank you for allowing me to participate in Jaycee's care  If I can be of assistance in any way please feel free to contact me through the office  [address] [facility]  Pediatric Cardiology  Adult Congenital Heart Disease  [email or web address]  [phone]

## 2019-09-15 PROBLEM — I37.0 PULMONARY VALVE STENOSIS: Status: RESOLVED | Noted: 2019-02-14 | Resolved: 2019-09-15

## 2019-09-15 PROCEDURE — 93000 ELECTROCARDIOGRAM COMPLETE: CPT | Performed by: PEDIATRICS

## 2019-09-25 ENCOUNTER — OFFICE VISIT (OUTPATIENT)
Dept: FAMILY MEDICINE CLINIC | Facility: CLINIC | Age: 29
End: 2019-09-25
Payer: COMMERCIAL

## 2019-09-25 VITALS
OXYGEN SATURATION: 99 % | HEART RATE: 91 BPM | HEIGHT: 60 IN | TEMPERATURE: 98.7 F | WEIGHT: 220 LBS | DIASTOLIC BLOOD PRESSURE: 84 MMHG | BODY MASS INDEX: 43.19 KG/M2 | SYSTOLIC BLOOD PRESSURE: 132 MMHG | RESPIRATION RATE: 20 BRPM

## 2019-09-25 DIAGNOSIS — R22.0 MASS OF CHIN: Primary | ICD-10-CM

## 2019-09-25 DIAGNOSIS — R59.9 LYMPH NODES ENLARGED: ICD-10-CM

## 2019-09-25 PROCEDURE — 99214 OFFICE O/P EST MOD 30 MIN: CPT | Performed by: NURSE PRACTITIONER

## 2019-09-25 RX ORDER — NAPROXEN 500 MG/1
500 TABLET ORAL 2 TIMES DAILY WITH MEALS
Qty: 30 TABLET | Refills: 0 | Status: SHIPPED | OUTPATIENT
Start: 2019-09-25 | End: 2019-11-21

## 2019-09-25 NOTE — PROGRESS NOTES
[address] [facility]        NAME: Paul Jackman is a 30 y o  female  : 1990    MRN: [id number]  DATE: 2019  TIME: 10:57 AM    Assessment and Plan   Mass of chin [R22 0]  1  Mass of chin  naproxen (NAPROSYN) 500 mg tablet    US head neck soft tissue   2  Lymph nodes enlarged  naproxen (NAPROSYN) 500 mg tablet    US head neck soft tissue     Enlarged lymph node vs salivary gland swelling  Suspect lymph node swelling but no recent cause and sudden swelling are uncharacteristic  Will treat with NSAIDS as patient has reaction of swelling to steroids  Also treat for symptomatically for blocked salivary gland  If no improvement patient to get U/S done  Patient Instructions     Patient Instructions   Increase fluids  Suck on sour candies ( lemon drops to increase saliva)  Take naproxen as prescribed  If no improvement in 1 week, get U/S done  Chief Complaint     Chief Complaint   Patient presents with    swelling under chin for 2 days         History of Present Illness        Patient here with c/o lump under chin x 2-3 days  Swelling occurred suddenly, started with pain then started swelling  No home treatments tried  Denies any illness in the past 2-3 weeks  Review of Systems   Review of Systems   Constitutional: Negative for activity change, appetite change, chills, fatigue and fever  HENT: Positive for facial swelling  Negative for congestion, ear pain, nosebleeds, rhinorrhea and sore throat  Chin swelling   Eyes: Negative for photophobia, pain, redness and visual disturbance  Respiratory: Negative for cough, shortness of breath and wheezing  Cardiovascular: Negative  Negative for chest pain  Gastrointestinal: Negative  Negative for abdominal pain, constipation, diarrhea and vomiting  Endocrine: Negative  Genitourinary: Negative for difficulty urinating, dysuria and flank pain  Musculoskeletal: Negative      Skin: Negative for color change and rash  Neurological: Negative for dizziness, weakness, numbness and headaches  Hematological: Positive for adenopathy  Psychiatric/Behavioral: Negative for agitation and confusion  The patient is not nervous/anxious          PHQ-9 Depression Screening    PHQ-9:    Frequency of the following problems over the past two weeks:       Little interest or pleasure in doing things:  0 - not at all  Feeling down, depressed, or hopeless:  0 - not at all  PHQ-2 Score:  0        Current Medications       Current Outpatient Medications:     colestipol (COLESTID) 1 g tablet, , Disp: , Rfl: 0    ergocalciferol (VITAMIN D2) 50,000 units, Take 1 capsule (50,000 Units total) by mouth once a week, Disp: 8 capsule, Rfl: 0    gabapentin (NEURONTIN) 300 mg capsule, Take 1 capsule (300 mg total) by mouth 2 (two) times a day, Disp: 90 capsule, Rfl: 1    glucose blood (ACCU-CHEK GUIDE) test strip, Test three times per day, Disp: 100 each, Rfl: 0    ibuprofen (MOTRIN) 800 mg tablet, Take 1 tablet (800 mg total) by mouth every 8 (eight) hours as needed for mild pain or moderate pain, Disp: 60 tablet, Rfl: 2    Lancets (ACCU-CHEK MULTICLIX) lancets, Use as instructed, Disp: 100 each, Rfl: 3    lisinopril (ZESTRIL) 20 mg tablet, Take 1 5 tablets (30 mg total) by mouth daily, Disp: 180 tablet, Rfl: 3    metFORMIN (GLUCOPHAGE) 1000 MG tablet, Take 1 tablet (1,000 mg total) by mouth 2 (two) times a day with meals, Disp: 60 tablet, Rfl: 6    ondansetron (ZOFRAN) 8 mg tablet, Take 1 tablet (8 mg total) by mouth every 8 (eight) hours as needed for nausea or vomiting, Disp: 30 tablet, Rfl: 1    pantoprazole (PROTONIX) 40 mg tablet, Take 1 tablet (40 mg total) by mouth daily, Disp: 30 tablet, Rfl: 6    prochlorperazine (COMPAZINE) 10 mg tablet, 1 tab at onset of migraine, can repeat in 8 hours, can take with NSAID, Disp: 20 tablet, Rfl: 3    topiramate (TOPAMAX) 50 MG tablet, Take 2 tablets (100 mg total) by mouth daily at bedtime, Disp: 60 tablet, Rfl: 1    methocarbamol (ROBAXIN) 500 mg tablet, Take 1 tablet (500 mg total) by mouth 2 (two) times a day for 5 days, Disp: 20 tablet, Rfl: 0    naproxen (NAPROSYN) 500 mg tablet, Take 1 tablet (500 mg total) by mouth 2 (two) times a day with meals, Disp: 30 tablet, Rfl: 0    oxyCODONE-acetaminophen (PERCOCET) 5-325 mg per tablet, Take 1 tablet by mouth every 6 (six) hours as needed for moderate pain for up to 10 dosesMax Daily Amount: 4 tablets (Patient not taking: Reported on 9/25/2019), Disp: 10 tablet, Rfl: 0    Current Facility-Administered Medications:     insulin glargine (LANTUS) subcutaneous injection 20 Units 0 2 mL, 20 Units, Subcutaneous, HS, Carlos العلي MD    Current Allergies     Allergies as of 09/25/2019 - Reviewed 09/25/2019   Allergen Reaction Noted    Corticosteroids Swelling 01/13/2009    Cortisone  08/12/2019    Lactose  08/12/2019    Prednisone Swelling 06/12/2018    Sulfa antibiotics Hives 01/14/2019    Milk-related compounds Sneezing 12/11/2018            The following portions of the patient's history were reviewed and updated as appropriate: allergies, current medications, past family history, past medical history, past social history, past surgical history and problem list      Past Medical History:   Diagnosis Date    Allergic     Arthritis     Bipolar affective disorder, currently depressed, moderate ([address] ) 12/17/2018    Cyst of ovary, right     Diabetes mellitus ([facility] [address] ) 10/10/18    type 2    Heart murmur 01/19/88    Hepatitis C     Hepatitis C virus infection cured after antiviral drug therapy     Hypertension     Migraines     Obesity 1995    Pulmonary artery congenital abnormality     Spleen enlarged        Past Surgical History:   Procedure Laterality Date    CARDIAC CATHETERIZATION      no CAD 10days, 4 weeks 22months old     CHOLECYSTECTOMY      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 7   [place] LIVER BIOPSY      LIVER BIOPSY  VSD REPAIR      As a child       Family History   Problem Relation Age of Onset    Hypertension Mother     Migraines Mother     Diabetes Father     Hypertension Father     Kidney failure Father     Polycystic kidney disease Father     Heart attack Father     Arthritis Father     Stroke Father     Polycystic kidney disease Paternal [de-identified]     Stroke Paternal Grandmother     Arthritis Sister     Asthma Sister     Thyroid disease Sister     Arthritis Maternal Grandmother     Cancer Maternal Grandmother     Learning disabilities Cousin     Learning disabilities Sister     ADD / ADHD Cousin          Medications have been verified  Objective   /84   Pulse 91   Temp 98 7 °F (37 1 °C) (Tympanic)   Resp 20   Ht 5' (1 524 m)   Wt 99 8 kg (220 lb)   LMP 09/03/2019 (Exact Date)   SpO2 99%   BMI 42 97 kg/m²        Physical Exam     Physical Exam   Constitutional: She is oriented to person, place, and time  Vital signs are normal  She appears well-developed and well-nourished  She is cooperative  She does not have a sickly appearance  She does not appear ill  No distress  HENT:   Head: Normocephalic and atraumatic  Right Ear: Tympanic membrane, external ear and ear canal normal    Left Ear: Tympanic membrane, external ear and ear canal normal    Nose: Nose normal  No mucosal edema, rhinorrhea or nose lacerations  Mouth/Throat: Uvula is midline and oropharynx is clear and moist    Neck:       Cardiovascular: Normal rate, regular rhythm and normal heart sounds  Exam reveals no gallop and no friction rub  No murmur heard  Pulmonary/Chest: Effort normal  No accessory muscle usage or stridor  No bradypnea  No respiratory distress  She has no decreased breath sounds  She has no wheezes  Neurological: She is alert and oriented to person, place, and time  Skin: Skin is warm  Capillary refill takes less than 2 seconds  No rash noted  She is not diaphoretic  No erythema   No pallor  Psychiatric: She has a normal mood and affect  Her behavior is normal  Judgment and thought content normal    Nursing note and vitals reviewed

## 2019-09-30 ENCOUNTER — HOSPITAL ENCOUNTER (OUTPATIENT)
Dept: ULTRASOUND IMAGING | Facility: HOSPITAL | Age: 29
Discharge: HOME | End: 2019-09-30
Payer: COMMERCIAL

## 2019-09-30 DIAGNOSIS — R59.9 LYMPH NODES ENLARGED: ICD-10-CM

## 2019-09-30 DIAGNOSIS — R22.0 MASS OF CHIN: ICD-10-CM

## 2019-09-30 PROCEDURE — 76536 US EXAM OF HEAD AND NECK: CPT

## 2019-10-02 ENCOUNTER — TELEPHONE (OUTPATIENT)
Dept: OBGYN CLINIC | Facility: MEDICAL CENTER | Age: 29
End: 2019-10-02

## 2019-10-02 NOTE — TELEPHONE ENCOUNTER
I contacted pt's insurance about upcoming outpt sx  Pt effective 6/1/19 and active  Pt has a $6,600 deductible 1$[phone] met) and a $7,900 oop ($[phone] met) Once deductible is met, pt will be covered at 100%  No PA needed for [id number]   [facility] Ref# [id number]

## 2019-10-05 ENCOUNTER — APPOINTMENT (OUTPATIENT)
Dept: LAB | Facility: HOSPITAL | Age: 29
End: 2019-10-05
Payer: COMMERCIAL

## 2019-10-05 ENCOUNTER — TRANSCRIBE ORDERS (OUTPATIENT)
Dept: LAB | Facility: HOSPITAL | Age: 29
End: 2019-10-05

## 2019-10-05 DIAGNOSIS — E78.00 HYPERCHOLESTEREMIA: ICD-10-CM

## 2019-10-05 DIAGNOSIS — Z87.74 PAST HISTORY OF TETRALOGY OF FALLOT, POST SURGICAL REPAIR: Primary | ICD-10-CM

## 2019-10-05 DIAGNOSIS — E11.9 TYPE 2 DIABETES MELLITUS WITHOUT COMPLICATION, WITHOUT LONG-TERM CURRENT USE OF INSULIN (HCC): ICD-10-CM

## 2019-10-05 DIAGNOSIS — Z87.74 PAST HISTORY OF TETRALOGY OF FALLOT, POST SURGICAL REPAIR: ICD-10-CM

## 2019-10-05 LAB
ALBUMIN SERPL BCP-MCNC: 4 G/DL (ref 3.5–5.7)
ALP SERPL-CCNC: 88 U/L (ref 40–150)
ALT SERPL W P-5'-P-CCNC: 10 U/L (ref 7–52)
ANION GAP SERPL CALCULATED.3IONS-SCNC: 9 MMOL/L (ref 4–13)
AST SERPL W P-5'-P-CCNC: 9 U/L (ref 13–39)
BILIRUB SERPL-MCNC: 0.3 MG/DL (ref 0.2–1)
BUN SERPL-MCNC: 16 MG/DL (ref 7–25)
CALCIUM SERPL-MCNC: 9.7 MG/DL (ref 8.6–10.5)
CHLORIDE SERPL-SCNC: 99 MMOL/L (ref 98–107)
CHOLEST SERPL-MCNC: 190 MG/DL (ref 0–200)
CO2 SERPL-SCNC: 26 MMOL/L (ref 21–31)
CREAT SERPL-MCNC: 0.65 MG/DL (ref 0.6–1.2)
CREAT UR-MCNC: 63.5 MG/DL
EST. AVERAGE GLUCOSE BLD GHB EST-MCNC: 269 MG/DL
GFR SERPL CREATININE-BSD FRML MDRD: 119 ML/MIN/1.73SQ M
GLUCOSE P FAST SERPL-MCNC: 323 MG/DL (ref 65–99)
HBA1C MFR BLD: 11 % (ref 4.2–6.3)
HDLC SERPL-MCNC: 37 MG/DL (ref 40–60)
LDLC SERPL CALC-MCNC: 87 MG/DL (ref 0–100)
MICROALBUMIN UR-MCNC: 8.2 MG/L (ref 0–20)
MICROALBUMIN/CREAT 24H UR: 13 MG/G CREATININE (ref 0–30)
NONHDLC SERPL-MCNC: 153 MG/DL
POTASSIUM SERPL-SCNC: 3.8 MMOL/L (ref 3.5–5.5)
PROT SERPL-MCNC: 7.3 G/DL (ref 6.4–8.9)
SODIUM SERPL-SCNC: 134 MMOL/L (ref 134–143)
TRIGL SERPL-MCNC: 332 MG/DL (ref 44–166)

## 2019-10-05 PROCEDURE — 83036 HEMOGLOBIN GLYCOSYLATED A1C: CPT

## 2019-10-05 PROCEDURE — 36415 COLL VENOUS BLD VENIPUNCTURE: CPT

## 2019-10-05 PROCEDURE — 82043 UR ALBUMIN QUANTITATIVE: CPT | Performed by: NURSE PRACTITIONER

## 2019-10-05 PROCEDURE — 80061 LIPID PANEL: CPT

## 2019-10-05 PROCEDURE — 80053 COMPREHEN METABOLIC PANEL: CPT

## 2019-10-05 PROCEDURE — 82570 ASSAY OF URINE CREATININE: CPT | Performed by: NURSE PRACTITIONER

## 2019-10-08 ENCOUNTER — OFFICE VISIT (OUTPATIENT)
Dept: OBGYN CLINIC | Facility: MEDICAL CENTER | Age: 29
End: 2019-10-08

## 2019-10-08 VITALS
SYSTOLIC BLOOD PRESSURE: 140 MMHG | DIASTOLIC BLOOD PRESSURE: 82 MMHG | HEIGHT: 60 IN | BODY MASS INDEX: 44.57 KG/M2 | WEIGHT: 227 LBS

## 2019-10-08 DIAGNOSIS — N83.201 OVARIAN CYST, RIGHT: Primary | ICD-10-CM

## 2019-10-08 DIAGNOSIS — Z30.2 REQUEST FOR STERILIZATION: ICD-10-CM

## 2019-10-08 DIAGNOSIS — R10.2 PELVIC PAIN: ICD-10-CM

## 2019-10-08 PROCEDURE — PREOP: Performed by: OBSTETRICS & GYNECOLOGY

## 2019-10-08 NOTE — PROGRESS NOTES
History & Physical - OB/GYN   Nima Oreilly 30 y o  female MRN: [id number]  Unit/Bed#:  Encounter: [id number]      Chief complaint:  Scheduled surgery     HPI:  Ms Duwaine Riedel is a 30 y o   with known, persistent right ovarian cyst and pelvic pain  She presents today for scheduled surgery  She is continuing to report same pelvic pain, but managable at home  She is very much looking forward to anticipated relief s/p surgery  She denies fever/chills, chest pain, shortness of breath, nausea/vomiting, vaginal discharge, dysuria, or other pelvic complaints  Reviewed with patient our prior discussion regarding medical clearance  Reviewed note by Dr Annalisa Gustafson, Pediatric Cardiology  Reviewed that her aortic stenosis appears to have worsened, and that her physician is recommending reassessment and, likely, redilation at [facility]  Discussed that her upcoming surgery was not specifically commented on, but that this finding takes precedence to her upcoming GYN surgery  Patient notes that Dr Annalisa Gustafson "said I was fine for surgery, she said its up to anesthesia " I discussed with the patient that "up to anesthesia" is not cleared, and that she likely would be cancelled day-of without strict clearance  Also, a discussion regarding surgery is not documented  Discussed with patient I will reach out to her doctor, and if truly cleared and pending review by anesthesia, would refer her for preop assessment  Discussed trying to avoid cancellation on the day of the procedure  Reviewed surgery in detail  Reviewed procedure, risks/benefits/expected outcomes, and recovery  Risks discussed include, but are not limited to, bleeding, infection, injury to surrounding structures (bowel, bladder, neurovascular structures, or ureters), non-resolution of pain, or diagnosis of occult pathology  Discussed in detail the risk of regret of salpingectomy for sterilization  Reviewed need for IVF if desires pregnancy in the future   Reviewed that her nulliparity increases this risk  Reviewed LARCs as an alternative  She understands and wants to proceed with salpingectomy  She inquires about endometriosis, and asks that any identified by removed/destroyed  Discussed will do so as long as it is safe to proceed  She is in agreement       Active Problems:  Patient Active Problem List   Diagnosis    S/P VSD repair    H/O aortic coarctation repair    Splenomegaly    Hyperglycemia    Hyponatremia    Hypertension    Hepatitis C    Abdominal pain    Class 3 severe obesity due to excess calories with serious comorbidity and body mass index (BMI) of 40 0 to 44 9 in adult [facility])    Ovarian cyst, right    Intractable chronic migraine without aura and with status migrainosus    Cervicalgia    Cervical dystonia    Coarctation of aorta    Type 2 diabetes mellitus without complication, without long-term current use of insulin (HCC)    Hypercholesteremia    Abscess of chin    Chest pain, unspecified    Abscess of right axilla    Hidradenitis suppurativa         PMH:  Past Medical History:   Diagnosis Date    Allergic     Arthritis     Bipolar affective disorder, currently depressed, moderate ([facility] [address] ) 12/17/2018    Cyst of ovary, right     Diabetes mellitus ([facility] [address] ) 10/10/18    type 2    Heart murmur 01/19/88    Hepatitis C     Hepatitis C virus infection cured after antiviral drug therapy     Hypertension     Migraines     Obesity 1995    Pulmonary artery congenital abnormality     Spleen enlarged        PSH:  Past Surgical History:   Procedure Laterality Date    CARDIAC CATHETERIZATION      no CAD 10days, 4 weeks 22months old     CHOLECYSTECTOMY      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 7   [place] LIVER BIOPSY      LIVER BIOPSY      VSD REPAIR      As a child       Social Hx:  Social History     Tobacco Use    Smoking status: Current Every Day Smoker     Packs/day: 0 20     Types: Cigarettes     Start date: 1/17/2019     Last attempt to quit: 1/17/2019     Years since quittin 7    Smokeless tobacco: Never Used   Substance Use Topics    Alcohol use:  Yes     Alcohol/week: 3 0 standard drinks     Types: 3 Standard drinks or equivalent per week     Comment: socially    Drug use: Not Currently     Comment: hx of THC use for migraines         OB Hx:  OB History    Para Term  AB Living   0 0 0 0 0 0   SAB TAB Ectopic Multiple Live Births   0 0 0 0 0       Meds:  Current Outpatient Medications on File Prior to Visit   Medication Sig Dispense Refill    colestipol (COLESTID) 1 g tablet   0    ergocalciferol (VITAMIN D2) 50,000 units Take 1 capsule (50,000 Units total) by mouth once a week 8 capsule 0    gabapentin (NEURONTIN) 300 mg capsule Take 1 capsule (300 mg total) by mouth 2 (two) times a day 90 capsule 1    glucose blood (ACCU-CHEK GUIDE) test strip Test three times per day 100 each 0    ibuprofen (MOTRIN) 800 mg tablet Take 1 tablet (800 mg total) by mouth every 8 (eight) hours as needed for mild pain or moderate pain 60 tablet 2    lisinopril (ZESTRIL) 20 mg tablet Take 1 5 tablets (30 mg total) by mouth daily 180 tablet 3    metFORMIN (GLUCOPHAGE) 1000 MG tablet Take 1 tablet (1,000 mg total) by mouth 2 (two) times a day with meals 60 tablet 6    naproxen (NAPROSYN) 500 mg tablet Take 1 tablet (500 mg total) by mouth 2 (two) times a day with meals 30 tablet 0    ondansetron (ZOFRAN) 8 mg tablet Take 1 tablet (8 mg total) by mouth every 8 (eight) hours as needed for nausea or vomiting 30 tablet 1    pantoprazole (PROTONIX) 40 mg tablet Take 1 tablet (40 mg total) by mouth daily 30 tablet 6    prochlorperazine (COMPAZINE) 10 mg tablet 1 tab at onset of migraine, can repeat in 8 hours, can take with NSAID 20 tablet 3    topiramate (TOPAMAX) 50 MG tablet Take 2 tablets (100 mg total) by mouth daily at bedtime 60 tablet 1    Lancets (ACCU-CHEK MULTICLIX) lancets Use as instructed 100 each 3    methocarbamol (ROBAXIN) 500 mg tablet Take 1 tablet (500 mg total) by mouth 2 (two) times a day for 5 days 20 tablet 0    oxyCODONE-acetaminophen (PERCOCET) 5-325 mg per tablet Take 1 tablet by mouth every 6 (six) hours as needed for moderate pain for up to 10 dosesMax Daily Amount: 4 tablets (Patient not taking: Reported on 9/25/2019) 10 tablet 0     Current Facility-Administered Medications on File Prior to Visit   Medication Dose Route Frequency Provider Last Rate Last Dose    insulin glargine (LANTUS) subcutaneous injection 20 Units 0 2 mL  20 Units Subcutaneous HS Sanford Dowling MD           Allergies: Allergies   Allergen Reactions    Corticosteroids Swelling     Pt states this does not cause problems breathing, she just has generalized swelling    Cortisone     Lactose     Prednisone Swelling    Sulfa Antibiotics Hives    Milk-Related Compounds Sneezing         Physical Exam:  /82 (BP Location: Right arm, Patient Position: Sitting, Cuff Size: Standard)   Ht 5' (1 524 m)   Wt 103 kg (227 lb)   LMP 09/25/2019   BMI 44 33 kg/m²     Physical Exam   Constitutional: She is oriented to person, place, and time  She appears well-developed and well-nourished  No distress  HENT:   Head: Normocephalic and atraumatic  Eyes: No scleral icterus  Cardiovascular: Normal rate and regular rhythm  Exam reveals no gallop and no friction rub  Murmur (systolic) heard  Pulmonary/Chest: Effort normal  No accessory muscle usage or stridor  No respiratory distress  She has no wheezes  She has no rales  Abdominal: Soft  She exhibits no distension  There is no tenderness  There is no rebound and no guarding  Musculoskeletal: She exhibits no edema or tenderness (LE non-tender to palpation bilaterally)  Neurological: She is alert and oriented to person, place, and time  Skin: Skin is warm and dry  No rash noted  No erythema  Psychiatric: She has a normal mood and affect   Her behavior is normal            Assessment:   30 y o  Chalmer Kipper with pelvic pain due to right ovarian cyst and request for sterilization  Plan:   1  Proceed with laparoscopic right oophorectomy, psb cystectomy, and bilateral salpingectomy  Also requests fulguration of endometriosis if identified, which was added to consent today  2  NPO from midnight preprocedure  3  Discussed hibiclens scrub  4  Discussed medication management  5  Discussed need for preoperative clearance and that surgery will not proceed without it  6   Return 10-14d postop for exam

## 2019-10-10 ENCOUNTER — PREP FOR PROCEDURE (OUTPATIENT)
Dept: OTHER | Facility: HOSPITAL | Age: 29
End: 2019-10-10

## 2019-10-10 ENCOUNTER — OFFICE VISIT (OUTPATIENT)
Dept: FAMILY MEDICINE CLINIC | Facility: CLINIC | Age: 29
End: 2019-10-10
Payer: COMMERCIAL

## 2019-10-10 VITALS
SYSTOLIC BLOOD PRESSURE: 128 MMHG | HEIGHT: 60 IN | RESPIRATION RATE: 18 BRPM | WEIGHT: 224 LBS | HEART RATE: 98 BPM | OXYGEN SATURATION: 99 % | DIASTOLIC BLOOD PRESSURE: 70 MMHG | BODY MASS INDEX: 43.98 KG/M2 | TEMPERATURE: 98 F

## 2019-10-10 DIAGNOSIS — Z13.31 NEGATIVE DEPRESSION SCREENING: ICD-10-CM

## 2019-10-10 DIAGNOSIS — E11.65 TYPE 2 DIABETES MELLITUS WITH HYPERGLYCEMIA, WITHOUT LONG-TERM CURRENT USE OF INSULIN (HCC): Primary | ICD-10-CM

## 2019-10-10 DIAGNOSIS — G47.00 INSOMNIA, UNSPECIFIED TYPE: ICD-10-CM

## 2019-10-10 DIAGNOSIS — R10.2 PELVIC PAIN: ICD-10-CM

## 2019-10-10 DIAGNOSIS — N83.201 CYST OF RIGHT OVARY: Primary | ICD-10-CM

## 2019-10-10 PROCEDURE — 99213 OFFICE O/P EST LOW 20 MIN: CPT | Performed by: FAMILY MEDICINE

## 2019-10-10 RX ORDER — SODIUM CHLORIDE 9 MG/ML
125 INJECTION, SOLUTION INTRAVENOUS CONTINUOUS
Status: CANCELLED | OUTPATIENT
Start: 2019-10-10

## 2019-10-10 RX ORDER — ZOLPIDEM TARTRATE 10 MG/1
10 TABLET ORAL
Qty: 30 TABLET | Refills: 0 | Status: SHIPPED | OUTPATIENT
Start: 2019-10-10 | End: 2019-11-21

## 2019-10-10 NOTE — PROGRESS NOTES
Assessment/Plan:    No problem-specific Assessment & Plan notes found for this encounter  Diagnoses and all orders for this visit:    Type 2 diabetes mellitus with hyperglycemia, without long-term current use of insulin (HCC)  -     saxagliptin (ONGLYZA) 2 5 MG tablet; Take 1 tablet (2 5 mg total) by mouth daily  -     C-peptide; Future  -     Glucose, fasting; Future  -     Hemoglobin A1C; Future    Negative depression screening    Insomnia, unspecified type  -     zolpidem (AMBIEN) 10 mg tablet; Take 1 tablet (10 mg total) by mouth daily at bedtime as needed for sleep          PHQ-9 Depression Screening    PHQ-9:    Frequency of the following problems over the past two weeks:       Little interest or pleasure in doing things:  0 - not at all  Feeling down, depressed, or hopeless:  0 - not at all  PHQ-2 Score:  0            Subjective:      Patient ID: Lisa Melvin is a 30 y o  female  Diabetes   She presents for her follow-up diabetic visit  She has type 2 diabetes mellitus  Her disease course has been worsening  There are no hypoglycemic associated symptoms  There are no diabetic associated symptoms  Pertinent negatives for diabetes include no chest pain  There are no hypoglycemic complications  Symptoms are stable  There are no diabetic complications  Risk factors for coronary artery disease include sedentary lifestyle and obesity  Current diabetic treatment includes oral agent (monotherapy)  She is compliant with treatment most of the time  Her weight is stable  She is following a diabetic diet  She participates in exercise intermittently  Her overall blood glucose range is >200 mg/dl  An ACE inhibitor/angiotensin II receptor blocker is being taken  Eye exam is not current         The following portions of the patient's history were reviewed and updated as appropriate: allergies, current medications, past family history, past medical history, past social history, past surgical history and problem list     Review of Systems   Eyes: Negative for visual disturbance  Cardiovascular: Negative for chest pain and palpitations  Gastrointestinal: Negative for abdominal pain, nausea and vomiting  Genitourinary: Positive for frequency  Skin: Negative for wound  Neurological: Negative for numbness  Objective:    /70   Pulse 98   Temp 98 °F (36 7 °C)   Resp 18   Ht 5' (1 524 m)   Wt 102 kg (224 lb)   LMP 09/25/2019   SpO2 99%   BMI 43 75 kg/m²      Physical Exam   Constitutional: She is oriented to person, place, and time  She appears well-developed and well-nourished  No distress  HENT:   Head: Normocephalic and atraumatic  Eyes: Pupils are equal, round, and reactive to light  Conjunctivae and EOM are normal  No scleral icterus  Neck: Normal range of motion  Neck supple  Cardiovascular: Normal rate, regular rhythm and normal heart sounds  No murmur heard  Pulmonary/Chest: Effort normal and breath sounds normal  No respiratory distress  She has no wheezes  She has no rales  Abdominal: Soft  Bowel sounds are normal  She exhibits no distension and no mass  There is no tenderness  There is no rebound and no guarding  Musculoskeletal: Normal range of motion  She exhibits no edema or deformity  Lymphadenopathy:     She has no cervical adenopathy  Neurological: She is alert and oriented to person, place, and time  Skin: Skin is warm and dry  She is not diaphoretic  Psychiatric: She has a normal mood and affect  Her behavior is normal  Judgment and thought content normal    Nursing note and vitals reviewed

## 2019-10-11 ENCOUNTER — TELEPHONE (OUTPATIENT)
Dept: SURGERY | Facility: CLINIC | Age: 29
End: 2019-10-11

## 2019-10-11 PROBLEM — Z30.2 REQUEST FOR STERILIZATION: Status: ACTIVE | Noted: 2019-10-10

## 2019-10-16 ENCOUNTER — TELEPHONE (OUTPATIENT)
Dept: OBGYN CLINIC | Facility: MEDICAL CENTER | Age: 29
End: 2019-10-16

## 2019-10-16 NOTE — TELEPHONE ENCOUNTER
----- Message from Anamaria Sensing sent at 10/16/2019  9:19 AM EDT -----  Spoke with pt  States she called her insurance  States she needs to pay her premium and they will reinstate it right away  Pt is going to pay it on 10/22/19  Pt states she tried calling her cardiologist but had to lm  Will try calling again for clearance  ----- Message -----  From: Anamaria Sensing  Sent: 10/16/2019   9:13 AM EDT  To: Anamaria Sensing    lmovm to cb   ----- Message -----  From: Anamaria Sensing  Sent: 10/14/2019   1:11 PM EDT  To: Valeria Padilla MD    I spoke with pt  She is going to call her cardiologists office for clearance  In regards to sx coverage, they are going to call her tomorrow to discuss what pt needs to do  Once pt knows information, will call me to update    ----- Message -----  From: Anamaria Sensing  Sent: 10/14/2019  11:14 AM EDT  To: Anamaria Sensing    lmovm for pt to cb in regards to clearance  Pt's sx is also inactive  Please confirm she still has coverage  ----- Message -----  From: Sharon Jacome MD  Sent: 10/11/2019  10:06 AM EDT  To: Anamaria Goel    That's probably why the others didn't show up previously  I was taught previously to do "prep for surgery" in the surgery context so that's how I always did it  But it makes sense that you wouldn't see it from there  Putting it through again now :)    ----- Message -----  From: Anamaria Goel  Sent: 10/11/2019   9:25 AM EDT  To: Sharon Jacome MD    So I called IT  They said that you scheduled it in the sx physician dept and not our office dept and that is why it will not show up     ----- Message -----  From: Sharon Jacome MD  Sent: 10/10/2019   4:12 PM EDT  To: Anamaria Goel    Hmm, check again and if not call IT for this one  On my end it looks correct, more-so than it did previously   I actually see my request, which I didn't the last couple times, but its just "not scheduled " Previously I didn't see my request or my orders linking       ----- Message -----  From: Martin Sharif  Sent: 10/10/2019   2:32 PM EDT  To: Quinten Powell MD    Her case request didn't pop up :(  ----- Message -----  From: Quinten Powell MD  Sent: 10/10/2019   2:18 PM EDT  To: Whitley Davis think this one worked right!!! Did it the way Amira Whitney showed me and I was able to link the orders to my case right away  One problem with her though    I'm not confident she's been cleared medically  She has a hx of aortic coarctation and her cardiologist says she needs re-stenting  Wayne Strickland (her cardiologist) but didn't get a reply  She also saw her PCP today and they don't seem to mention her upcoming surgery    Can we reach out to them and see what they suggest/recommend? I will NOT do her surgery without their clearance and I did tell her this  Thank you!      ----- Message -----  From: Martin Sharif  Sent: 10/10/2019   9:20 AM EDT  To: Quinten Powell MD    Please complete case request thanks !

## 2019-10-21 ENCOUNTER — TELEPHONE (OUTPATIENT)
Dept: OBGYN CLINIC | Facility: MEDICAL CENTER | Age: 29
End: 2019-10-21

## 2019-10-21 NOTE — TELEPHONE ENCOUNTER
Call placed to patient to discuss cancellation of surgery  Reviewed case with patient's cardiologist and after weighing her current cardiac condition (with impending repair) in light of the stressors of surgery, I am recommending that we postpone her surgery until after this is repaired  After reviewing my recommendation, patient reports that she was going to call our office to cancel surgery herself  Unfortunately her insurance coverage lapsed and she is without coverage  She is in the process of getting this back in order, but has not completed this yet and does not think it will be active in 1wks time  She will be applying through the healthcare exchange  Encouraged her to complete this process and offered her assistance if our office can be of help  Discussed our office will be reaching out to her in a few days to discuss rescheduling  Advised to call office in interim (insurance coverage or not) if symptoms change and she needs help      Aris Méndez MD  10/21/19  5:21 PM

## 2019-10-22 ENCOUNTER — TELEPHONE (OUTPATIENT)
Dept: OBGYN CLINIC | Facility: MEDICAL CENTER | Age: 29
End: 2019-10-22

## 2019-10-22 NOTE — TELEPHONE ENCOUNTER
----- Message from Antonio Wetzel sent at 10/22/2019 10:34 AM EDT -----  Regarding: RE: Reschedule surgery  Tried calling pt to r/s, but vm was full    ----- Message -----  From: Ned Villanueva MD  Sent: 10/21/2019   2:39 PM EDT  To: Antonio Wetzel  Subject: Reschedule surgery                               Rdaha Yoder will need her upcoming surgery on 10/28 cancelled/rescheduled  She has aortic stenosis and this need to be corrected first  I believe shes planning to have it corrected in December, but will confirm  I will call her after office hours today and explain this change  I will tell her to expect a call from you later this week

## 2019-10-30 ENCOUNTER — TELEPHONE (OUTPATIENT)
Dept: OBGYN CLINIC | Facility: MEDICAL CENTER | Age: 29
End: 2019-10-30

## 2019-10-30 NOTE — TELEPHONE ENCOUNTER
I spoke with pt about rescheduling her sx  States she is having some issues with her insurance and does not have coverage  Once pt straightens everything out with her insurance, states she will call us

## 2019-10-31 ENCOUNTER — TELEPHONE (OUTPATIENT)
Dept: PEDIATRIC CARDIOLOGY | Facility: CLINIC | Age: 29
End: 2019-10-31

## 2019-10-31 NOTE — TELEPHONE ENCOUNTER
Returned pt's call  She has not heard from the center at [facility] about scheduling heart cath/ possible stent procedure  I asked her to reach out to them directly, and if she is not able to get any info, I can attempt to contact the physician team there  Pt will call them tomorrow and get back to me with outcome  Tamsen Gentles Merlinda [place], [address] [facility]    Pediatric Cardiology  Adult Congenital Heart Disease  [email or web address]  [phone]

## 2019-10-31 NOTE — TELEPHONE ENCOUNTER
Mrs German Ferreira left a  seeking information on why her right ovarian surgery was canceled  Patient stated Dr Ruth Barney informed her, she was cleared to have the surgery  Also, Mrs German Ferreira would like an update on Heart procedure to be performed at [facility], patient sates she has not heard anything since she last spoke with Dr Ruth Barney

## 2019-11-09 ENCOUNTER — HOSPITAL ENCOUNTER (EMERGENCY)
Facility: HOSPITAL | Age: 29
Discharge: HOME | End: 2019-11-09
Attending: FAMILY MEDICINE | Admitting: FAMILY MEDICINE
Payer: COMMERCIAL

## 2019-11-09 VITALS
HEIGHT: 60 IN | WEIGHT: 220 LBS | SYSTOLIC BLOOD PRESSURE: 154 MMHG | BODY MASS INDEX: 43.19 KG/M2 | DIASTOLIC BLOOD PRESSURE: 88 MMHG | RESPIRATION RATE: 16 BRPM | OXYGEN SATURATION: 98 % | HEART RATE: 80 BPM | TEMPERATURE: 98.3 F

## 2019-11-09 DIAGNOSIS — L02.211 ABSCESS OF SKIN OF ABDOMEN: Primary | ICD-10-CM

## 2019-11-09 PROCEDURE — 99284 EMERGENCY DEPT VISIT MOD MDM: CPT | Performed by: FAMILY MEDICINE

## 2019-11-09 PROCEDURE — 99283 EMERGENCY DEPT VISIT LOW MDM: CPT

## 2019-11-09 RX ORDER — CLINDAMYCIN HYDROCHLORIDE 150 MG/1
300 CAPSULE ORAL ONCE
Status: COMPLETED | OUTPATIENT
Start: 2019-11-09 | End: 2019-11-09

## 2019-11-09 RX ORDER — ACETAMINOPHEN 325 MG/1
650 TABLET ORAL ONCE
Status: COMPLETED | OUTPATIENT
Start: 2019-11-09 | End: 2019-11-09

## 2019-11-09 RX ORDER — CLINDAMYCIN HYDROCHLORIDE 300 MG/1
300 CAPSULE ORAL 3 TIMES DAILY
Qty: 21 CAPSULE | Refills: 0 | Status: SHIPPED | OUTPATIENT
Start: 2019-11-09 | End: 2019-11-16

## 2019-11-09 RX ADMIN — CLINDAMYCIN HYDROCHLORIDE 300 MG: 150 CAPSULE ORAL at 15:17

## 2019-11-09 RX ADMIN — ACETAMINOPHEN 650 MG: 325 TABLET ORAL at 15:18

## 2019-11-09 NOTE — ED PROVIDER NOTES
History  Chief Complaint   Patient presents with    Wound Infection     had two areas, open and draining purulent drainage above and to the right of the umbilicus  Foul odor and redness in the skin fold of the stomach  History provided by:  Patient   used: No    Abscess   Location:  Torso  Torso abscess location: middle of the abdomen   Size:  4x4  Abscess quality: draining, fluctuance, induration, painful, redness and weeping    Red streaking: yes    Duration:  2 days  Progression:  Worsening  Pain details:     Quality:  Pressure, aching and dull    Severity:  Moderate    Duration:  2 days    Timing:  Constant    Progression:  Worsening  Chronicity:  New  Context: skin injury    Relieved by:  None tried  Worsened by:  Nothing  Ineffective treatments:  None tried  Associated symptoms: no anorexia, no fever, no nausea and no vomiting    Risk factors: hx of MRSA and prior abscess        Prior to Admission Medications   Prescriptions Last Dose Informant Patient Reported? Taking?    Lancets (ACCU-CHEK MULTICLIX) lancets  Self No No   Sig: Use as instructed   colestipol (COLESTID) 1 g tablet  Self Yes No   ergocalciferol (VITAMIN D2) 50,000 units  Self No No   Sig: Take 1 capsule (50,000 Units total) by mouth once a week   gabapentin (NEURONTIN) 300 mg capsule  Self No No   Sig: Take 1 capsule (300 mg total) by mouth 2 (two) times a day   glucose blood (ACCU-CHEK GUIDE) test strip  Self No No   Sig: Test three times per day   ibuprofen (MOTRIN) 800 mg tablet  Self No No   Sig: Take 1 tablet (800 mg total) by mouth every 8 (eight) hours as needed for mild pain or moderate pain   lisinopril (ZESTRIL) 20 mg tablet  Self No No   Sig: Take 1 5 tablets (30 mg total) by mouth daily   metFORMIN (GLUCOPHAGE) 1000 MG tablet  Self No No   Sig: Take 1 tablet (1,000 mg total) by mouth 2 (two) times a day with meals   methocarbamol (ROBAXIN) 500 mg tablet  Self No No   Sig: Take 1 tablet (500 mg total) by mouth 2 (two) times a day for 5 days   naproxen (NAPROSYN) 500 mg tablet   No No   Sig: Take 1 tablet (500 mg total) by mouth 2 (two) times a day with meals   ondansetron (ZOFRAN) 8 mg tablet  Self No No   Sig: Take 1 tablet (8 mg total) by mouth every 8 (eight) hours as needed for nausea or vomiting   pantoprazole (PROTONIX) 40 mg tablet  Self No No   Sig: Take 1 tablet (40 mg total) by mouth daily   prochlorperazine (COMPAZINE) 10 mg tablet  Self No No   Si tab at onset of migraine, can repeat in 8 hours, can take with NSAID   saxagliptin (ONGLYZA) 2 5 MG tablet Not Taking at Unknown time  No No   Sig: Take 1 tablet (2 5 mg total) by mouth daily   Patient not taking: Reported on 2019   topiramate (TOPAMAX) 50 MG tablet  Self No No   Sig: Take 2 tablets (100 mg total) by mouth daily at bedtime   zolpidem (AMBIEN) 10 mg tablet Not Taking at Unknown time  No No   Sig: Take 1 tablet (10 mg total) by mouth daily at bedtime as needed for sleep   Patient not taking: Reported on 2019      Facility-Administered Medications Last Administration Doses Remaining   insulin glargine (LANTUS) subcutaneous injection 20 Units 0 2 mL None recorded           Past Medical History:   Diagnosis Date    Allergic     Arthritis     Bipolar affective disorder, currently depressed, moderate ([facility] [address] ) 2018    Cyst of ovary, right     Diabetes mellitus ([facility] [address] ) 10/10/18    type 2    Endometriosis     Heart murmur 88    Hepatitis C     Hepatitis C virus infection cured after antiviral drug therapy     Hypertension     Migraines     Obesity     Pulmonary artery congenital abnormality     Spleen enlarged        Past Surgical History:   Procedure Laterality Date    CARDIAC CATHETERIZATION      no CAD 10days, 4 weeks 22months old     CHOLECYSTECTOMY      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 7   Marino Montero LIVER BIOPSY      LIVER BIOPSY      VSD REPAIR      As a child       Family History   Problem Relation Age of Onset    Hypertension Mother     Migraines Mother     Diabetes Father     Hypertension Father     Kidney failure Father     Polycystic kidney disease Father     Heart attack Father     Arthritis Father     Stroke Father     Polycystic kidney disease Paternal [de-identified]     Stroke Paternal Grandmother     Arthritis Sister     Asthma Sister     Thyroid disease Sister     Arthritis Maternal Grandmother     Cancer Maternal Grandmother     Learning disabilities Cousin     Learning disabilities Sister     ADD / ADHD Cousin      I have reviewed and agree with the history as documented  Social History     Tobacco Use    Smoking status: Current Every Day Smoker     Packs/day: 0 20     Types: Cigarettes     Start date: 2019     Last attempt to quit: 2019     Years since quittin 8    Smokeless tobacco: Never Used   Substance Use Topics    Alcohol use: Yes     Alcohol/week: 3 0 standard drinks     Types: 3 Standard drinks or equivalent per week     Comment: socially    Drug use: Not Currently     Comment: hx of THC use for migraines        Review of Systems   Constitutional: Negative for fever  HENT: Negative  Respiratory: Negative  Cardiovascular: Negative  Gastrointestinal: Negative for anorexia, nausea and vomiting  Skin: Positive for wound  abscess       Physical Exam  Physical Exam   Constitutional: She is oriented to person, place, and time  She appears well-developed and well-nourished  HENT:   Head: Normocephalic and atraumatic  Cardiovascular: Normal rate, regular rhythm and normal heart sounds  Pulmonary/Chest: Effort normal and breath sounds normal    Neurological: She is alert and oriented to person, place, and time  Skin: There is erythema  Nursing note and vitals reviewed        Vital Signs  ED Triage Vitals   Temperature Pulse Respirations Blood Pressure SpO2   19 0900 19 0900 19 0900 19 1445 19 0900   98 3 °F (36 8 °C) 80 16 154/88 98 %      Temp Source Heart Rate Source Patient Position - Orthostatic VS BP Location FiO2 (%)   11/09/19 0900 11/09/19 0900 11/09/19 1445 11/09/19 1445 --   Temporal Monitor Sitting Left arm       Pain Score       11/09/19 0900       8           Vitals:    11/09/19 0900 11/09/19 1445   BP:  154/88   Pulse: 80    Patient Position - Orthostatic VS:  Sitting         Visual Acuity      ED Medications  Medications   clindamycin (CLEOCIN) capsule 300 mg (300 mg Oral Given 11/9/19 1517)   acetaminophen (TYLENOL) tablet 650 mg (650 mg Oral Given 11/9/19 1518)       Diagnostic Studies  Results Reviewed     None                 No orders to display              Procedures  Procedures       ED Course        patient is refusing I&D at this time states that she only wants antibiotics  I recommended patient should take antibiotics and if does not improve in 24 hours should return to the ED  MDM    Disposition  Final diagnoses:   Abscess of skin of abdomen     Time reflects when diagnosis was documented in both MDM as applicable and the Disposition within this note     Time User Action Codes Description Comment    11/9/2019  3:12 PM Sanchez Patel Add [L02 211] Abscess of skin of abdomen       ED Disposition     ED Disposition Condition Date/Time Comment    Discharge Stable Sat Nov 9, 2019  3:12 PM Soy Summers discharge to home/self care              Follow-up Information     Follow up With Specialties Details Why Contact Osman Nichols DO Family Medicine Schedule an appointment as soon as possible for a visit in 2 days For wound re-check [address] [address]  [phone]            Discharge Medication List as of 11/9/2019  3:15 PM      START taking these medications    Details   clindamycin (CLEOCIN) 300 MG capsule Take 1 capsule (300 mg total) by mouth 3 (three) times a day for 7 days, Starting Sat 11/9/2019, Until Sat 11/16/2019, Normal CONTINUE these medications which have NOT CHANGED    Details   colestipol (COLESTID) 1 g tablet Starting Sat 5/18/2019, Historical Med      ergocalciferol (VITAMIN D2) 50,000 units Take 1 capsule (50,000 Units total) by mouth once a week, Starting Tue 3/12/2019, Normal      gabapentin (NEURONTIN) 300 mg capsule Take 1 capsule (300 mg total) by mouth 2 (two) times a day, Starting Mon 12/17/2018, Normal      glucose blood (ACCU-CHEK GUIDE) test strip Test three times per day, Normal      ibuprofen (MOTRIN) 800 mg tablet Take 1 tablet (800 mg total) by mouth every 8 (eight) hours as needed for mild pain or moderate pain, Starting Tue 7/9/2019, Normal      Lancets (ACCU-CHEK MULTICLIX) lancets Use as instructed, Normal      lisinopril (ZESTRIL) 20 mg tablet Take 1 5 tablets (30 mg total) by mouth daily, Starting Wed 5/1/2019, Normal      metFORMIN (GLUCOPHAGE) 1000 MG tablet Take 1 tablet (1,000 mg total) by mouth 2 (two) times a day with meals, Starting Fri 2/22/2019, Normal      methocarbamol (ROBAXIN) 500 mg tablet Take 1 tablet (500 mg total) by mouth 2 (two) times a day for 5 days, Starting Wed 4/17/2019, Until Mon 9/9/2019, Normal      naproxen (NAPROSYN) 500 mg tablet Take 1 tablet (500 mg total) by mouth 2 (two) times a day with meals, Starting Wed 9/25/2019, Normal      ondansetron (ZOFRAN) 8 mg tablet Take 1 tablet (8 mg total) by mouth every 8 (eight) hours as needed for nausea or vomiting, Starting Thu 2/28/2019, Normal      pantoprazole (PROTONIX) 40 mg tablet Take 1 tablet (40 mg total) by mouth daily, Starting Thu 2/28/2019, Normal      prochlorperazine (COMPAZINE) 10 mg tablet 1 tab at onset of migraine, can repeat in 8 hours, can take with NSAID, Normal      saxagliptin (ONGLYZA) 2 5 MG tablet Take 1 tablet (2 5 mg total) by mouth daily, Starting Thu 10/10/2019, Normal      topiramate (TOPAMAX) 50 MG tablet Take 2 tablets (100 mg total) by mouth daily at bedtime, Starting Fri 5/10/2019, Normal zolpidem (AMBIEN) 10 mg tablet Take 1 tablet (10 mg total) by mouth daily at bedtime as needed for sleep, Starting Thu 10/10/2019, Normal           No discharge procedures on file      ED Provider  Electronically Signed by           Barbra Rincon MD  11/10/19 7415

## 2019-11-09 NOTE — ED NOTES
Abdominal wound cleansed and dried, non-adherent and abdominal pad applied        Lorena Lorenzana RN  11/09/19 9723

## 2019-11-15 ENCOUNTER — LAB (OUTPATIENT)
Dept: LAB | Facility: CLINIC | Age: 29
End: 2019-11-15
Payer: COMMERCIAL

## 2019-11-15 DIAGNOSIS — Z32.01 POSITIVE URINE PREGNANCY TEST: Primary | ICD-10-CM

## 2019-11-15 DIAGNOSIS — Z32.01 POSITIVE URINE PREGNANCY TEST: ICD-10-CM

## 2019-11-15 LAB
ABO GROUP BLD: NORMAL
B-HCG SERPL-ACNC: 9123 MIU/ML
BLD GP AB SCN SERPL QL: NEGATIVE
PROGEST SERPL-MCNC: 7.1 NG/ML
RH BLD: POSITIVE
SPECIMEN EXPIRATION DATE: NORMAL

## 2019-11-15 PROCEDURE — 86900 BLOOD TYPING SEROLOGIC ABO: CPT

## 2019-11-15 PROCEDURE — 36415 COLL VENOUS BLD VENIPUNCTURE: CPT

## 2019-11-15 PROCEDURE — 84702 CHORIONIC GONADOTROPIN TEST: CPT

## 2019-11-15 PROCEDURE — 86850 RBC ANTIBODY SCREEN: CPT

## 2019-11-15 PROCEDURE — 86901 BLOOD TYPING SEROLOGIC RH(D): CPT

## 2019-11-15 PROCEDURE — 84144 ASSAY OF PROGESTERONE: CPT

## 2019-11-16 ENCOUNTER — HOSPITAL ENCOUNTER (EMERGENCY)
Facility: HOSPITAL | Age: 29
Discharge: HOME | End: 2019-11-16
Attending: EMERGENCY MEDICINE
Payer: COMMERCIAL

## 2019-11-16 DIAGNOSIS — S31.109D WOUND, OPEN, ABDOMINAL WALL, ANTERIOR, SUBSEQUENT ENCOUNTER: ICD-10-CM

## 2019-11-16 DIAGNOSIS — Z34.91 FIRST TRIMESTER PREGNANCY: ICD-10-CM

## 2019-11-16 DIAGNOSIS — O21.9 NAUSEA AND VOMITING IN PREGNANCY PRIOR TO 22 WEEKS GESTATION: Primary | ICD-10-CM

## 2019-11-16 LAB
ALBUMIN SERPL BCP-MCNC: 4.1 G/DL (ref 3.5–5.7)
ALP SERPL-CCNC: 86 U/L (ref 40–150)
ALT SERPL W P-5'-P-CCNC: 10 U/L (ref 7–52)
ANION GAP SERPL CALCULATED.3IONS-SCNC: 8 MMOL/L (ref 4–13)
APTT PPP: 35 SECONDS (ref 23–37)
AST SERPL W P-5'-P-CCNC: 12 U/L (ref 13–39)
B-HCG SERPL-ACNC: ABNORMAL MIU/ML (ref 0–11.6)
BASOPHILS # BLD AUTO: 0.1 THOUSANDS/ΜL (ref 0–0.1)
BASOPHILS NFR BLD AUTO: 1 % (ref 0–2)
BILIRUB SERPL-MCNC: 0.7 MG/DL (ref 0.2–1)
BUN SERPL-MCNC: 12 MG/DL (ref 7–25)
CALCIUM SERPL-MCNC: 9.3 MG/DL (ref 8.6–10.5)
CHLORIDE SERPL-SCNC: 100 MMOL/L (ref 98–107)
CO2 SERPL-SCNC: 25 MMOL/L (ref 21–31)
CREAT SERPL-MCNC: 0.55 MG/DL (ref 0.6–1.2)
EOSINOPHIL # BLD AUTO: 0.1 THOUSAND/ΜL (ref 0–0.61)
EOSINOPHIL NFR BLD AUTO: 1 % (ref 0–5)
ERYTHROCYTE [DISTWIDTH] IN BLOOD BY AUTOMATED COUNT: 14.4 % (ref 11.5–14.5)
GFR SERPL CREATININE-BSD FRML MDRD: 125 ML/MIN/1.73SQ M
GLUCOSE SERPL-MCNC: 236 MG/DL (ref 65–99)
HCT VFR BLD AUTO: 41.8 % (ref 42–47)
HGB BLD-MCNC: 14.1 G/DL (ref 12–16)
INR PPP: 1.14 (ref 0.9–1.5)
LIPASE SERPL-CCNC: 14 U/L (ref 11–82)
LYMPHOCYTES # BLD AUTO: 2 THOUSANDS/ΜL (ref 0.6–4.47)
LYMPHOCYTES NFR BLD AUTO: 22 % (ref 21–51)
MCH RBC QN AUTO: 25.8 PG (ref 26–34)
MCHC RBC AUTO-ENTMCNC: 33.7 G/DL (ref 31–37)
MCV RBC AUTO: 77 FL (ref 81–99)
MONOCYTES # BLD AUTO: 0.6 THOUSAND/ΜL (ref 0.17–1.22)
MONOCYTES NFR BLD AUTO: 7 % (ref 2–12)
NEUTROPHILS # BLD AUTO: 6.5 THOUSANDS/ΜL (ref 1.4–6.5)
NEUTS SEG NFR BLD AUTO: 70 % (ref 42–75)
PLATELET # BLD AUTO: 191 THOUSANDS/UL (ref 149–390)
PMV BLD AUTO: 8.8 FL (ref 8.6–11.7)
POTASSIUM SERPL-SCNC: 3.9 MMOL/L (ref 3.5–5.5)
PROT SERPL-MCNC: 6.7 G/DL (ref 6.4–8.9)
PROTHROMBIN TIME: 13.2 SECONDS (ref 10.2–13)
RBC # BLD AUTO: 5.46 MILLION/UL (ref 3.9–5.2)
SODIUM SERPL-SCNC: 133 MMOL/L (ref 134–143)
WBC # BLD AUTO: 9.2 THOUSAND/UL (ref 4.8–10.8)

## 2019-11-16 PROCEDURE — 85610 PROTHROMBIN TIME: CPT | Performed by: PHYSICIAN ASSISTANT

## 2019-11-16 PROCEDURE — 83690 ASSAY OF LIPASE: CPT | Performed by: PHYSICIAN ASSISTANT

## 2019-11-16 PROCEDURE — 85025 COMPLETE CBC W/AUTO DIFF WBC: CPT | Performed by: PHYSICIAN ASSISTANT

## 2019-11-16 PROCEDURE — 84702 CHORIONIC GONADOTROPIN TEST: CPT | Performed by: PHYSICIAN ASSISTANT

## 2019-11-16 PROCEDURE — 99284 EMERGENCY DEPT VISIT MOD MDM: CPT

## 2019-11-16 PROCEDURE — 99284 EMERGENCY DEPT VISIT MOD MDM: CPT | Performed by: PHYSICIAN ASSISTANT

## 2019-11-16 PROCEDURE — 80053 COMPREHEN METABOLIC PANEL: CPT | Performed by: PHYSICIAN ASSISTANT

## 2019-11-16 PROCEDURE — 36415 COLL VENOUS BLD VENIPUNCTURE: CPT | Performed by: PHYSICIAN ASSISTANT

## 2019-11-16 PROCEDURE — 85730 THROMBOPLASTIN TIME PARTIAL: CPT | Performed by: PHYSICIAN ASSISTANT

## 2019-11-16 PROCEDURE — 96360 HYDRATION IV INFUSION INIT: CPT

## 2019-11-16 RX ORDER — GINSENG 100 MG
1 CAPSULE ORAL ONCE
Status: COMPLETED | OUTPATIENT
Start: 2019-11-16 | End: 2019-11-16

## 2019-11-16 RX ORDER — ONDANSETRON 2 MG/ML
4 INJECTION INTRAMUSCULAR; INTRAVENOUS ONCE
Status: DISCONTINUED | OUTPATIENT
Start: 2019-11-16 | End: 2019-11-16

## 2019-11-16 RX ADMIN — BACITRACIN ZINC 1 SMALL APPLICATION: 500 OINTMENT TOPICAL at 11:30

## 2019-11-16 RX ADMIN — SODIUM CHLORIDE 1000 ML: 0.9 INJECTION, SOLUTION INTRAVENOUS at 11:12

## 2019-11-16 NOTE — ED PROVIDER NOTES
History  Chief Complaint   Patient presents with    Abdominal Pain     vomiting x1 yesterday, abdominal pain to the left quadrants  55-year-old female presents emergency room with complaint generalized abdominal pain and intermittent nausea over the past several days with a few episodes of vomiting  She denies fevers chills denies chest pain shortness of breath runny nose sore throat  Patient states she has not had her period since October 4th and took 2 pregnancy tests at home and they are positive and she went yesterday and had blood work drawn that was ordered by her GYN but does not know the results yet  Patient denies urinary complaints and diarrhea  Additionally patient was recently seen emergency room for to abdominal skin wounds and she has been on clindamycin for the past week  Patient admits to improvement in these 2 areas and performs daily dressing changes  Again she denies fevers or chills  History provided by:  Patient   used: No    Nausea   The primary symptoms include abdominal pain, nausea and vomiting  Primary symptoms do not include fever, fatigue, diarrhea, dysuria, myalgias or rash  The illness began 3 to 5 days ago  The onset was gradual  The problem has not changed since onset  Nausea began 3 to 5 days ago  Associated with: missed menstrual cycle  The illness is also significant for anorexia, bloating and back pain  The illness does not include chills or constipation  Allergies   Allergen Reactions    Corticosteroids Swelling     Pt states this does not cause problems breathing, she just has generalized swelling    Bactrim [Sulfamethoxazole-Trimethoprim]     Cortisone     Lactose     Prednisone Swelling    Sulfa Antibiotics Hives    Milk-Related Compounds Sneezing         Prior to Admission Medications   Prescriptions Last Dose Informant Patient Reported? Taking?    Lancets (ACCU-CHEK MULTICLIX) lancets  Self No No   Sig: Use as instructed clindamycin (CLEOCIN) 300 MG capsule   No No   Sig: Take 1 capsule (300 mg total) by mouth 3 (three) times a day for 7 days   colestipol (COLESTID) 1 g tablet  Self Yes No   ergocalciferol (VITAMIN D2) 50,000 units  Self No No   Sig: Take 1 capsule (50,000 Units total) by mouth once a week   gabapentin (NEURONTIN) 300 mg capsule  Self No No   Sig: Take 1 capsule (300 mg total) by mouth 2 (two) times a day   glucose blood (ACCU-CHEK GUIDE) test strip  Self No No   Sig: Test three times per day   ibuprofen (MOTRIN) 800 mg tablet  Self No No   Sig: Take 1 tablet (800 mg total) by mouth every 8 (eight) hours as needed for mild pain or moderate pain   lisinopril (ZESTRIL) 20 mg tablet  Self No No   Sig: Take 1 5 tablets (30 mg total) by mouth daily   metFORMIN (GLUCOPHAGE) 1000 MG tablet  Self No No   Sig: Take 1 tablet (1,000 mg total) by mouth 2 (two) times a day with meals   methocarbamol (ROBAXIN) 500 mg tablet  Self No No   Sig: Take 1 tablet (500 mg total) by mouth 2 (two) times a day for 5 days   naproxen (NAPROSYN) 500 mg tablet   No No   Sig: Take 1 tablet (500 mg total) by mouth 2 (two) times a day with meals   ondansetron (ZOFRAN) 8 mg tablet  Self No No   Sig: Take 1 tablet (8 mg total) by mouth every 8 (eight) hours as needed for nausea or vomiting   pantoprazole (PROTONIX) 40 mg tablet  Self No No   Sig: Take 1 tablet (40 mg total) by mouth daily   prochlorperazine (COMPAZINE) 10 mg tablet  Self No No   Si tab at onset of migraine, can repeat in 8 hours, can take with NSAID   saxagliptin (ONGLYZA) 2 5 MG tablet   No No   Sig: Take 1 tablet (2 5 mg total) by mouth daily   Patient not taking: Reported on 2019   topiramate (TOPAMAX) 50 MG tablet  Self No No   Sig: Take 2 tablets (100 mg total) by mouth daily at bedtime   zolpidem (AMBIEN) 10 mg tablet   No No   Sig: Take 1 tablet (10 mg total) by mouth daily at bedtime as needed for sleep   Patient not taking: Reported on 2019 Facility-Administered Medications Last Administration Doses Remaining   insulin glargine (LANTUS) subcutaneous injection 20 Units 0 2 mL None recorded           Past Medical History:   Diagnosis Date    Allergic     Arthritis     Bipolar affective disorder, currently depressed, moderate ([facility] [address] ) 2018    Cyst of ovary, right     Diabetes mellitus ([facility] [address] ) 10/10/18    type 2    Endometriosis     Heart murmur 88    Hepatitis C     Hepatitis C virus infection cured after antiviral drug therapy     Hypertension     Migraines     Obesity     Pulmonary artery congenital abnormality     Spleen enlarged        Past Surgical History:   Procedure Laterality Date    CARDIAC CATHETERIZATION      no CAD 10days, 4 weeks 22months old     CHOLECYSTECTOMY      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 7   [facility] LIVER BIOPSY      LIVER BIOPSY      VSD REPAIR      As a child       Family History   Problem Relation Age of Onset    Hypertension Mother     Migraines Mother     Diabetes Father     Hypertension Father     Kidney failure Father     Polycystic kidney disease Father     Heart attack Father     Arthritis Father     Stroke Father     Polycystic kidney disease Paternal [de-identified]     Stroke Paternal Grandmother     Arthritis Sister     Asthma Sister     Thyroid disease Sister     Arthritis Maternal Grandmother     Cancer Maternal Grandmother     Learning disabilities Cousin     Learning disabilities Sister     ADD / ADHD Cousin      I have reviewed and agree with the history as documented  Social History     Tobacco Use    Smoking status: Current Every Day Smoker     Packs/day: 0 20     Types: Cigarettes     Start date: 2019     Last attempt to quit: 2019     Years since quittin 8    Smokeless tobacco: Never Used   Substance Use Topics    Alcohol use:  Yes     Alcohol/week: 3 0 standard drinks     Types: 3 Standard drinks or equivalent per week     Comment: socially  Drug use: Not Currently     Comment: hx of THC use for migraines        Review of Systems   Constitutional: Negative for chills, diaphoresis, fatigue and fever  HENT: Negative for congestion, ear pain, rhinorrhea, sneezing and sore throat  Respiratory: Negative for cough, shortness of breath, wheezing and stridor  Cardiovascular: Negative for chest pain, palpitations and leg swelling  Gastrointestinal: Positive for abdominal pain, anorexia, bloating, nausea and vomiting  Negative for abdominal distention, blood in stool, constipation and diarrhea  Genitourinary: Positive for menstrual problem (As noted in HPI)  Negative for difficulty urinating, dysuria, frequency, hematuria and urgency  Musculoskeletal: Positive for back pain  Negative for gait problem, myalgias and neck pain  Skin: Positive for wound ( two abdominal wounds as noted in HPI)  Negative for rash  Neurological: Negative for dizziness, syncope, weakness, light-headedness and headaches  All other systems reviewed and are negative  Physical Exam  Physical Exam   Constitutional: She is oriented to person, place, and time  She appears well-developed and well-nourished  HENT:   Head: Normocephalic and atraumatic  Mouth/Throat: Oropharynx is clear and moist    Eyes: Pupils are equal, round, and reactive to light  EOM are normal    Neck: Normal range of motion  Neck supple  No JVD present  No tracheal deviation present  Cardiovascular: Normal rate, regular rhythm and intact distal pulses  Murmur heard  Pulmonary/Chest: Effort normal and breath sounds normal  No respiratory distress  She has no wheezes  Abdominal: Soft  Bowel sounds are normal  She exhibits no distension and no mass  There is no tenderness  There is no CVA tenderness and negative Graf's sign  Musculoskeletal: Normal range of motion  She exhibits no edema or tenderness  Neurological: She is alert and oriented to person, place, and time     Skin: Skin is warm and dry  Lesion noted  No rash noted  No erythema  Nursing note and vitals reviewed  Vital Signs  ED Triage Vitals   Temp Pulse Resp BP SpO2   -- -- -- -- --      Temp src Heart Rate Source Patient Position - Orthostatic VS BP Location FiO2 (%)   -- -- -- -- --      Pain Score       --           There were no vitals filed for this visit        Visual Acuity      ED Medications  Medications   sodium chloride 0 9 % bolus 1,000 mL (0 mL Intravenous Stopped 11/16/19 1219)   bacitracin topical ointment 1 small application (1 small application Topical Given 11/16/19 1130)       Diagnostic Studies  Results Reviewed     Procedure Component Value Units Date/Time    hCG, quantitative [[id number]]  (Abnormal) Collected:  11/16/19 1111    Lab Status:  Final result Specimen:  Blood from Arm, Right Updated:  11/16/19 1203     HCG, Quant 16,846 mIU/mL     Narrative:        Expected Ranges:     Approximate               Approximate HCG  Gestation age          Concentration ( mIU/mL)  _____________          ______________________   Bimal [place]                      HCG values  0 2-1                       5-50  1-2                           2-3                         100-5000  3-4                         500-50056  4-5                         1000-53675  5-6                         [id number]  6-8                         [id number]  8-12                        [id number]      Lipase [[id number]]  (Normal) Collected:  11/16/19 1111    Lab Status:  Final result Specimen:  Blood from Arm, Right Updated:  11/16/19 1141     Lipase 14 u/L     Comprehensive metabolic panel [[id number]]  (Abnormal) Collected:  11/16/19 1111    Lab Status:  Final result Specimen:  Blood from Arm, Right Updated:  11/16/19 1141     Sodium 133 mmol/L      Potassium 3 9 mmol/L      Chloride 100 mmol/L      CO2 25 mmol/L      ANION GAP 8 mmol/L      BUN 12 mg/dL      Creatinine 0 55 mg/dL      Glucose 236 mg/dL      Calcium 9 3 mg/dL      AST 12 U/L      ALT 10 U/L      Alkaline Phosphatase 86 U/L      Total Protein 6 7 g/dL      Albumin 4 1 g/dL      Total Bilirubin 0 70 mg/dL      eGFR 125 ml/min/1 73sq m     Narrative:       [place] guidelines for Chronic Kidney Disease (CKD):     Stage 1 with normal or high GFR (GFR > 90 mL/min/1 73 square meters)    Stage 2 Mild CKD (GFR = 60-89 mL/min/1 73 square meters)    Stage 3A Moderate CKD (GFR = 45-59 mL/min/1 73 square meters)    Stage 3B Moderate CKD (GFR = 30-44 mL/min/1 73 square meters)    Stage 4 Severe CKD (GFR = 15-29 mL/min/1 73 square meters)    Stage 5 End Stage CKD (GFR <15 mL/min/1 73 square meters)  Note: GFR calculation is accurate only with a steady state creatinine    Protime-INR [[id number]]  (Abnormal) Collected:  11/16/19 1111    Lab Status:  Final result Specimen:  Blood from Arm, Right Updated:  11/16/19 1131     Protime 13 2 seconds      INR 1 14    APTT [[id number]]  (Normal) Collected:  11/16/19 1111    Lab Status:  Final result Specimen:  Blood from Arm, Right Updated:  11/16/19 1131     PTT 35 seconds     CBC and differential [[id number]]  (Abnormal) Collected:  11/16/19 1111    Lab Status:  Final result Specimen:  Blood from Arm, Right Updated:  11/16/19 1122     WBC 9 20 Thousand/uL      RBC 5 46 Million/uL      Hemoglobin 14 1 g/dL      Hematocrit 41 8 %      MCV 77 fL      MCH 25 8 pg      MCHC 33 7 g/dL      RDW 14 4 %      MPV 8 8 fL      Platelets 362 Thousands/uL      Neutrophils Relative 70 %      Lymphocytes Relative 22 %      Monocytes Relative 7 %      Eosinophils Relative 1 %      Basophils Relative 1 %      Neutrophils Absolute 6 50 Thousands/µL      Lymphocytes Absolute 2 00 Thousands/µL      Monocytes Absolute 0 60 Thousand/µL      Eosinophils Absolute 0 10 Thousand/µL      Basophils Absolute 0 10 Thousands/µL                  No orders to display              Procedures  Procedures       ED Course MDM  Number of Diagnoses or Management Options  First trimester pregnancy: new and requires workup  Nausea and vomiting in pregnancy prior to 22 weeks gestation: new and requires workup  Wound, open, abdominal wall, anterior, subsequent encounter: established and improving     Amount and/or Complexity of Data Reviewed  Clinical lab tests: ordered and reviewed    Risk of Complications, Morbidity, and/or Mortality  Presenting problems: low  Diagnostic procedures: low  Management options: low  General comments: 58-year-old female presents with nausea and vomiting with positive pregnancy test   Quantitative HCG increasing since last blood draw confirming pregnancy  Remainder of labs stable, Patient received IV fluids and discharge stable condition recommendation to follow with gyn ASAP outpatient in begin prenatal vitamins  Patient Progress  Patient progress: stable      Disposition  Final diagnoses:   First trimester pregnancy   Nausea and vomiting in pregnancy prior to 22 weeks gestation   Wound, open, abdominal wall, anterior, subsequent encounter     Time reflects when diagnosis was documented in both MDM as applicable and the Disposition within this note     Time User Action Codes Description Comment    11/16/2019 12:05 PM Dave Montero Add [Z34 91] First trimester pregnancy     11/16/2019 12:05 PM Ilsa GROVES Add [O21 9] Nausea and vomiting in pregnancy prior to 22 weeks gestation     11/16/2019 12:06 PM Ilsa GROVES Modify [Z34 91] First trimester pregnancy     11/16/2019 12:06 PM Ilsa GROVES Modify [O21 9] Nausea and vomiting in pregnancy prior to 22 weeks gestation     11/16/2019 12:07 PM Ilsa GROVES Add [S31 109D] Wound, open, abdominal wall, anterior, subsequent encounter       ED Disposition     ED Disposition Condition Date/Time Comment    Discharge Stable Sat Nov 16, 2019 12:05 PM Carole Castrejon discharge to home/self care              Follow-up Information Follow up With Specialties Details Why Contact Info Additional Information    Eufemia Duffy DO Family Medicine Schedule an appointment as soon as possible for a visit in 1 week For follow-up of abdominal wall wound  , For wound re-check Kimberly Metzger [phone] [place]  [address] Obstetrics and Gynecology Schedule an appointment as soon as possible for a visit   [address] [facility] [id number]  [address] [place] [address], [address] [place] [address], [facility] [address]          Discharge Medication List as of 11/16/2019 12:09 PM      CONTINUE these medications which have NOT CHANGED    Details   clindamycin (CLEOCIN) 300 MG capsule Take 1 capsule (300 mg total) by mouth 3 (three) times a day for 7 days, Starting Sat 11/9/2019, Until Sat 11/16/2019, Normal      colestipol (COLESTID) 1 g tablet Starting Sat 5/18/2019, Historical Med      ergocalciferol (VITAMIN D2) 50,000 units Take 1 capsule (50,000 Units total) by mouth once a week, Starting Tue 3/12/2019, Normal      gabapentin (NEURONTIN) 300 mg capsule Take 1 capsule (300 mg total) by mouth 2 (two) times a day, Starting Mon 12/17/2018, Normal      glucose blood (ACCU-CHEK GUIDE) test strip Test three times per day, Normal      ibuprofen (MOTRIN) 800 mg tablet Take 1 tablet (800 mg total) by mouth every 8 (eight) hours as needed for mild pain or moderate pain, Starting Tue 7/9/2019, Normal      Lancets (ACCU-CHEK MULTICLIX) lancets Use as instructed, Normal      lisinopril (ZESTRIL) 20 mg tablet Take 1 5 tablets (30 mg total) by mouth daily, Starting Wed 5/1/2019, Normal      metFORMIN (GLUCOPHAGE) 1000 MG tablet Take 1 tablet (1,000 mg total) by mouth 2 (two) times a day with meals, Starting Fri 2/22/2019, Normal      methocarbamol (ROBAXIN) 500 mg tablet Take 1 tablet (500 mg total) by mouth 2 (two) times a day for 5 days, Starting Wed 4/17/2019, Until Mon 9/9/2019, Normal      naproxen (NAPROSYN) 500 mg tablet Take 1 tablet (500 mg total) by mouth 2 (two) times a day with meals, Starting Wed 9/25/2019, Normal      ondansetron (ZOFRAN) 8 mg tablet Take 1 tablet (8 mg total) by mouth every 8 (eight) hours as needed for nausea or vomiting, Starting Thu 2/28/2019, Normal      pantoprazole (PROTONIX) 40 mg tablet Take 1 tablet (40 mg total) by mouth daily, Starting Thu 2/28/2019, Normal      prochlorperazine (COMPAZINE) 10 mg tablet 1 tab at onset of migraine, can repeat in 8 hours, can take with NSAID, Normal      saxagliptin (ONGLYZA) 2 5 MG tablet Take 1 tablet (2 5 mg total) by mouth daily, Starting Thu 10/10/2019, Normal      topiramate (TOPAMAX) 50 MG tablet Take 2 tablets (100 mg total) by mouth daily at bedtime, Starting Fri 5/10/2019, Normal      zolpidem (AMBIEN) 10 mg tablet Take 1 tablet (10 mg total) by mouth daily at bedtime as needed for sleep, Starting Thu 10/10/2019, Normal           No discharge procedures on file      ED Provider  Electronically Signed by           Sherren Polite, PA-C  11/16/19 1956

## 2019-11-16 NOTE — DISCHARGE INSTRUCTIONS
Drink plenty of fluids, begin prenatal vitamins, follow-up with OBGYN promptly  Continue antibiotics as previously prescribed for abdominal skin wound, follow-up with PCP for wound healing  Return to ER for any new acute worsening symptoms

## 2019-11-18 ENCOUNTER — TELEPHONE (OUTPATIENT)
Dept: PEDIATRIC CARDIOLOGY | Facility: CLINIC | Age: 29
End: 2019-11-18

## 2019-11-18 ENCOUNTER — TELEPHONE (OUTPATIENT)
Dept: OBGYN CLINIC | Facility: MEDICAL CENTER | Age: 29
End: 2019-11-18

## 2019-11-18 DIAGNOSIS — Z32.01 POSITIVE BLOOD PREGNANCY TEST: Primary | ICD-10-CM

## 2019-11-18 NOTE — TELEPHONE ENCOUNTER
Returned pt's call  Discussed pregnancy and cardiology needs in brief  Will plan to see her in the office in about 2 months, toward the end of first trimester  Other than stopping lisinopril, no immediate changes to meds  I will also reach out to Dr Bebo Kwon at [facility] to figure out how we can coordinate care  Sarmad Aguilar [place], [place]    Pediatric Cardiology  Adult Congenital Heart Disease  [email or web address]  [phone]

## 2019-11-18 NOTE — TELEPHONE ENCOUNTER
Pt had cardiac cath  scheduled for 11/26/2019, pt had to cancel procedure due to recent pregnancy, pt states she's about 6 weeks pregnant  Would like to know if any cardiac follow-ups are needed?

## 2019-11-19 ENCOUNTER — HOSPITAL ENCOUNTER (OUTPATIENT)
Dept: ULTRASOUND IMAGING | Facility: HOSPITAL | Age: 29
Discharge: HOME | End: 2019-11-19
Payer: COMMERCIAL

## 2019-11-19 DIAGNOSIS — Z32.01 POSITIVE BLOOD PREGNANCY TEST: ICD-10-CM

## 2019-11-19 PROCEDURE — 76801 OB US < 14 WKS SINGLE FETUS: CPT

## 2019-11-20 NOTE — TELEPHONE ENCOUNTER
LV to schedule Mrs Han Angel for a follow-up as per Dr Love Frankel request, January Sessions are open at [facility] to schedule pt

## 2019-11-21 ENCOUNTER — OFFICE VISIT (OUTPATIENT)
Dept: FAMILY MEDICINE CLINIC | Facility: CLINIC | Age: 29
End: 2019-11-21
Payer: COMMERCIAL

## 2019-11-21 VITALS
TEMPERATURE: 98.2 F | SYSTOLIC BLOOD PRESSURE: 122 MMHG | HEART RATE: 86 BPM | HEIGHT: 60 IN | RESPIRATION RATE: 18 BRPM | WEIGHT: 225 LBS | BODY MASS INDEX: 44.17 KG/M2 | DIASTOLIC BLOOD PRESSURE: 70 MMHG

## 2019-11-21 DIAGNOSIS — E11.65 TYPE 2 DIABETES MELLITUS WITH HYPERGLYCEMIA, WITHOUT LONG-TERM CURRENT USE OF INSULIN (HCC): Primary | ICD-10-CM

## 2019-11-21 DIAGNOSIS — Z3A.01 LESS THAN 8 WEEKS GESTATION OF PREGNANCY: ICD-10-CM

## 2019-11-21 DIAGNOSIS — E11.9 ENCOUNTER FOR DIABETIC FOOT EXAM (HCC): ICD-10-CM

## 2019-11-21 LAB — SL AMB POCT GLUCOSE BLD: 378

## 2019-11-21 PROCEDURE — 82948 REAGENT STRIP/BLOOD GLUCOSE: CPT | Performed by: NURSE PRACTITIONER

## 2019-11-21 PROCEDURE — 99214 OFFICE O/P EST MOD 30 MIN: CPT | Performed by: NURSE PRACTITIONER

## 2019-11-21 RX ORDER — INSULIN GLARGINE 100 [IU]/ML
12 INJECTION, SOLUTION SUBCUTANEOUS
COMMUNITY
End: 2019-12-12 | Stop reason: CLARIF

## 2019-11-21 NOTE — Clinical Note
Rodo Pennington, Can we discuss this patient in the morning? She is 6 weeks pregnant with no insurance  We had 1 Lantus sample (100ml) so we gave her 14 needles with it  She has applied for Medicaid but has not heard back  Thanks! Rachel

## 2019-11-21 NOTE — PATIENT INSTRUCTIONS
Start Lantus 10units at bedtime, increase every couple days by 2-4 units if blood sugars are higher than 180 to 20 units   Take blood sugars at home and keep log- bring with to next visit  Get blood pressure medication ordered by cardiologist and start  Return in 2 weeks or sooner if needed for problems/concerns

## 2019-11-21 NOTE — PROGRESS NOTES
[facility]        NAME: Soy Summers is a 30 y o  female  : 1990    MRN: [id number]  DATE: 2019  TIME: 3:38 PM    Assessment and Plan   Type 2 diabetes mellitus with hyperglycemia, without long-term current use of insulin ([facility] ) [E11 65]  1  Type 2 diabetes mellitus with hyperglycemia, without long-term current use of insulin ([facility])  POCT blood glucose   2  Less than 8 weeks gestation of pregnancy     3  Encounter for diabetic foot exam [facility])           Patient Instructions     Patient Instructions   Start Lantus 10units at bedtime, increase every couple days by 2-4 units if blood sugars are higher than 180 to 20 units   Take blood sugars at home and keep log- bring with to next visit  Get blood pressure medication ordered by cardiologist and start  Return in 2 weeks or sooner if needed for problems/concerns          Chief Complaint     Chief Complaint   Patient presents with    discuss medications     would like to discuss meds         History of Present Illness       Here for medication review- stopped medications because she is 6 weeks pregnant- no insurance  Today's random blood glucose is 378  Pt  Reports her cardiologist took her off of her Lisinopril and Metformin and started her on a new blood pressure pill- she thinks it might be Metoprolol  Pt  Has done Lantus in the past and has a glucometer at home- is willing to check blood sugars  Review of Systems   Review of Systems   Constitutional: Negative for activity change, diaphoresis, fatigue and fever  HENT: Negative for congestion, facial swelling, hearing loss, rhinorrhea, sinus pressure, sinus pain, sneezing, sore throat and voice change  Eyes: Negative for discharge and visual disturbance  Respiratory: Negative for cough, choking, chest tightness, shortness of breath, wheezing and stridor  Cardiovascular: Negative for chest pain, palpitations and leg swelling     Gastrointestinal: Negative for abdominal distention, constipation, diarrhea, nausea and vomiting  Endocrine: Negative for polydipsia, polyphagia and polyuria  Genitourinary: Negative for difficulty urinating, dysuria, frequency and urgency  Musculoskeletal: Negative for arthralgias, back pain, gait problem, joint swelling, myalgias, neck pain and neck stiffness  Skin: Negative for color change, rash and wound  Neurological: Negative for dizziness, syncope, speech difficulty, weakness, light-headedness and headaches  Hematological: Negative for adenopathy  Does not bruise/bleed easily  Psychiatric/Behavioral: Negative for agitation, behavioral problems, confusion, hallucinations, sleep disturbance and suicidal ideas  The patient is not nervous/anxious  Current Medications       Current Outpatient Medications:     ergocalciferol (VITAMIN D2) 50,000 units, Take 1 capsule (50,000 Units total) by mouth once a week, Disp: 8 capsule, Rfl: 0    glucose blood (ACCU-CHEK GUIDE) test strip, Test three times per day, Disp: 100 each, Rfl: 0    insulin glargine (LANTUS) 100 units/mL subcutaneous injection, Inject 20 Units under the skin daily at bedtime, Disp: , Rfl:     Lancets (ACCU-CHEK MULTICLIX) lancets, Use as instructed, Disp: 100 each, Rfl: 3    ondansetron (ZOFRAN) 8 mg tablet, Take 1 tablet (8 mg total) by mouth every 8 (eight) hours as needed for nausea or vomiting, Disp: 30 tablet, Rfl: 1    metFORMIN (GLUCOPHAGE) 1000 MG tablet, Take 1 tablet (1,000 mg total) by mouth 2 (two) times a day with meals (Patient not taking: Reported on 11/21/2019), Disp: 60 tablet, Rfl: 6  No current facility-administered medications for this visit       Current Allergies     Allergies as of 11/21/2019 - Reviewed 11/21/2019   Allergen Reaction Noted    Corticosteroids Swelling 01/13/2009    Bactrim [sulfamethoxazole-trimethoprim]  11/16/2019    Cortisone  08/12/2019    Lactose  08/12/2019    Prednisone Swelling 06/12/2018    Sulfa antibiotics Hives 01/14/2019    Milk-related compounds Sneezing 12/11/2018            The following portions of the patient's history were reviewed and updated as appropriate: allergies, current medications, past family history, past medical history, past social history, past surgical history and problem list      Past Medical History:   Diagnosis Date    Allergic     Arthritis     Bipolar affective disorder, currently depressed, moderate ([facility] ) 12/17/2018    Cyst of ovary, right     Diabetes mellitus ([facility] ) 10/10/18    type 2    Endometriosis     Heart murmur 01/19/88    Hepatitis C     Hepatitis C virus infection cured after antiviral drug therapy     Hypertension     Migraines     Obesity 1995    Pulmonary artery congenital abnormality     Spleen enlarged        Past Surgical History:   Procedure Laterality Date    CARDIAC CATHETERIZATION      no CAD 10days, 4 weeks 22months old     CHOLECYSTECTOMY      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 7   Emilie Credit LIVER BIOPSY      LIVER BIOPSY      VSD REPAIR      As a child       Family History   Problem Relation Age of Onset    Hypertension Mother     Migraines Mother     Diabetes Father     Hypertension Father     Kidney failure Father     Polycystic kidney disease Father     Heart attack Father     Arthritis Father     Stroke Father     Polycystic kidney disease Paternal [de-identified]     Stroke Paternal Grandmother     Arthritis Sister     Asthma Sister     Thyroid disease Sister     Arthritis Maternal Grandmother     Cancer Maternal Grandmother     Learning disabilities Cousin     Learning disabilities Sister     ADD / ADHD Cousin          Medications have been verified  Objective   /70   Pulse 86   Temp 98 2 °F (36 8 °C)   Resp 18   Ht 5' (1 524 m)   Wt 102 kg (225 lb)   LMP 10/05/2019   BMI 43 94 kg/m²        Physical Exam     Physical Exam   Constitutional: She is oriented to person, place, and time   Vital signs are normal  She appears well-developed and well-nourished  She is active and cooperative  No distress  Eyes: EOM are normal    Cardiovascular: Normal rate and regular rhythm  Pulses are no weak pulses  Murmur (known-chronic) heard  Pulses:       Dorsalis pedis pulses are 2+ on the right side, and 2+ on the left side  Pulmonary/Chest: Effort normal and breath sounds normal  No respiratory distress  She has no wheezes  Feet:   Right Foot:   Skin Integrity: Positive for dry skin  Negative for ulcer, skin breakdown, erythema, warmth or callus  Left Foot:   Skin Integrity: Positive for dry skin  Negative for ulcer, skin breakdown, erythema, warmth or callus  Neurological: She is alert and oriented to person, place, and time  Skin: Skin is warm and dry  No rash noted  She is not diaphoretic  No erythema  Psychiatric: She has a normal mood and affect  Her behavior is normal  Judgment and thought content normal    Nursing note and vitals reviewed  Patient's shoes and socks removed  Right Foot/Ankle   Right Foot Inspection  Skin Exam: skin normal, skin intact and dry skin no warmth, no callus, no erythema, no maceration, no abnormal color, no pre-ulcer, no ulcer and no callus                          Toe Exam: ROM and strength within normal limits  Sensory       Monofilament testing: intact  Vascular  Capillary refills: < 3 seconds  The right DP pulse is 2+  Left Foot/Ankle  Left Foot Inspection  Skin Exam: skin normal, skin intact and dry skinno warmth, no erythema, no maceration, normal color, no pre-ulcer, no ulcer and no callus                         Toe Exam: ROM and strength within normal limits                   Sensory       Monofilament: intact  Vascular  Capillary refills: < 3 seconds  The left DP pulse is 2+  Assign Risk Category:  No deformity present; No loss of protective sensation;  No weak pulses       Risk: 0

## 2019-11-22 ENCOUNTER — PATIENT OUTREACH (OUTPATIENT)
Dept: FAMILY MEDICINE CLINIC | Facility: CLINIC | Age: 29
End: 2019-11-22

## 2019-11-22 DIAGNOSIS — Z71.89 ENCOUNTER FOR COUNSELING FOR CARE MANAGEMENT OF PATIENT WITH CHRONIC CONDITIONS AND COMPLEX HEALTH NEEDS USING NURSE-BASED MODEL: Primary | ICD-10-CM

## 2019-11-22 RX ORDER — LABETALOL 200 MG/1
200 TABLET, FILM COATED ORAL 2 TIMES DAILY
COMMUNITY
End: 2022-02-03 | Stop reason: ALTCHOICE

## 2019-11-22 NOTE — PATIENT INSTRUCTIONS
Diabetes and Pregnancy   WHAT YOU NEED TO KNOW:   What do I need to know about diabetes and pregnancy? Plan your pregnancy so healthcare providers can help you have a healthy pregnancy and baby  Control your blood sugar levels before and during pregnancy to decrease your risk of health problems  Your healthcare provider may recommend A1c levels less than 6 5% before you get pregnant  During pregnancy, your A1c levels may need to be between 6 and 7%  How can I manage my diabetes during pregnancy? · Check your blood sugar level  You may need to test your blood sugar level at least 3 times each day  Ask your healthcare providers when and how often you should check your blood sugar  He will tell you what your blood sugar levels should be at different times throughout the day  He may want your blood sugar levels to be 60 mg/dL to 99 mg/dL before meals, at bedtime, and during the night  He may want them to be 100 mg/dL to 129 mg/dL after meals  Your healthcare provider can show you how to use a blood glucose meter to check your levels  · Ask about medicines  Some medicines are not safe to take when you are trying to get pregnant or become pregnant  If you have type 2 diabetes and you take diabetes pills, you may need to stop taking them and start using insulin  Insulin is safe to use during pregnancy  · Check your blood pressure often  High blood pressure can cause problems with your health and your pregnancy  Blood pressure readings are usually written as 2 numbers  If you do not have high blood pressure before pregnancy, your systolic blood pressure (the first number) should be between 110 and 129  Your diastolic blood pressure (the second number) should be between 65 and 79  If you have high blood pressure before pregnancy, your systolic blood pressure should be between 120 and 160  Your diastolic blood pressure should be between 80 and 105  · Maintain a healthy weight    The recommended weight gain for women who are overweight is 15 to 25 pounds  The recommended weight gain for women who are obese is 10 to 20 pounds  Your risk of problems such as high blood pressure and premature labor are higher if you are overweight  · Do not drink alcohol  Alcohol is dangerous for your unborn baby  Alcohol can also increase your blood sugar levels and make your diabetes more difficult to manage  Ask your healthcare provider for information if you need help to quit drinking alcohol  · Help prevent hypoglycemia  Your risk of hypoglycemia is higher during pregnancy because you may not feel the symptoms  This risk is highest during the first trimester  Eat regular meals and snacks to avoid hypoglycemia  Always keep glucose tablets with you in case your blood sugar level gets low  If you do not have glucose tablets, drink milk, juice, or regular soda  Tell your family about the symptoms of hypoglycemia so they can help you if you cannot help yourself  Ask your healthcare provider how to manage hypoglycemia  · Help prevent diabetic ketoacidosis (DKA)  This is a serious condition that can happen when your blood sugar level gets too high  Pregnancy increases your risk for DKA  The symptoms of DKA include stomach pain, nausea, vomiting, and confusion  Your healthcare provider may suggest that you test the levels of ketones in your urine when your blood sugar level is high  He may also ask you to check your ketones regularly if you are sick  What do I need to know about nutrition and pregnancy? The amount of calories you need depends on your weight before you got pregnant  Your healthcare provider or dietitian will tell you how many calories you need each day  You may need more calories while you are pregnant  You may need fewer calories if you are overweight  How much exercise do I need? Exercise can help you keep your blood sugar level steady  Exercise can also help you lose weight if you are overweight   Ask your healthcare provider how much exercise you should get each day  Ask what types of physical activity you can do safely while you are pregnant  What can I do to have a healthy baby? · Keep all appointments with your healthcare providers  They will help you manage your diabetes during pregnancy  You may need to see your healthcare provider every 1 to 2 weeks during the first and second trimesters of your pregnancy  As the end of your pregnancy gets closer, you may need to see your healthcare provider each week  During these exams, he may check your eyes, your A1c levels, and how you and your baby are doing  · Take folic acid supplements  Start taking folic acid before you get pregnant and continue until you are at least 12 weeks pregnant  Folic acid decreases the risk that your baby will have birth defects  Ask your healthcare provider how much folic acid you should take  · Do not smoke  Nicotine is harmful to your baby and makes it more difficult to manage your diabetes  Do not use e-cigarettes or smokeless tobacco in place of cigarettes or to help you quit  They still contain nicotine  Ask your healthcare provider for information if you currently smoke and need help quitting  How will my diabetes be managed during and after delivery? Healthcare providers will check your blood sugar levels while you are in labor  They will give you insulin or glucose throughout your labor to keep your blood sugar at the right level  Do the following after delivery:  · Go to all follow-up appointments  Healthcare providers will continue to help you manage your diabetes after delivery  Talk with your healthcare provider about birth control options  It is important to prepare for the next pregnancy if you plan to have another child  · Take your medicine as directed  If you have type 2 diabetes, you may be able to take your diabetes medicine again   If you have type 1 diabetes, the amount of insulin you need will decrease after you have your baby  There are certain medicines that you may not be able to take if you breastfeed  Ask your healthcare provider when and how often to test your blood sugar level  You may need to check at least 3 times each day  · Help prevent hypoglycemia if you breastfeed  Your risk of hypoglycemia is higher if you breastfeed  Your dietitian will help you create a meal plan that works for you  Eat a snack before you breastfeed  Eat regular meals and snacks to keep your blood sugar level steady  What are the risks of diabetes and pregnancy? You have an increased risk for high blood pressure, eye disease, premature (early) labor, and miscarriage  A miscarriage is the loss of a fetus before 20 weeks of pregnancy  High blood sugar levels can increase your risk of having a large baby, a baby with birth defects, and stillbirth  Stillbirth is the loss of a fetus (unborn baby) after 20 weeks of pregnancy  When should I seek immediate care? · Your blood sugar level is over 200 mg/dL and you have stomach pain, nausea, vomiting, and confusion  When should I contact my healthcare provider? · You are shaky or dizzy  · You are sweaty or have a headache  · You have changes in your vision  · You have questions or concerns about your condition or care  CARE AGREEMENT:   You have the right to help plan your care  Learn about your health condition and how it may be treated  Discuss treatment options with your caregivers to decide what care you want to receive  You always have the right to refuse treatment  The above information is an  only  It is not intended as medical advice for individual conditions or treatments  Talk to your doctor, nurse or pharmacist before following any medical regimen to see if it is safe and effective for you    © 2017 [employer] Information is for End User's use only and may not be sold, redistributed or otherwise used for commercial purposes  All illustrations and images included in CareNotes® are the copyrighted property of [employer]  or [employer]  Managing Diabetes During Sick Days   WHAT YOU NEED TO KNOW:   What is sick day management? Sick day management is a plan to control your blood sugar levels while you are sick  You develop this plan with your healthcare providers  Why do I need a sick day plan? Your blood sugar levels can increase because of stress from illness, surgery, or injury  Your plan will help prevent high blood sugar levels and other serious health conditions such as the following:  · Diabetic ketoacidosis (DKA)  is a medical condition that forces your body to use fat instead of sugar for fuel  DKA happens when your body does not have enough insulin and your blood sugar levels get very high  As fats are broken down, they leave chemicals called ketones that build up in your blood  Ketones are dangerous at high levels  DKA can lead to coma, and can be life-threatening if not treated  · Hyperosmolar hyperglycemic syndrome (HHS)  is another medical condition that occurs when your blood sugar gets too high  Your body gets rid of the extra sugar through your urine  This leads to severe dehydration  What are some things I should do on days when I am sick? · Continue to take your medicines as directed  Your healthcare provider will tell you if you need to make any changes  If you normally do not use insulin, you may need to use it while you are sick  If you already use insulin, you may need to increase the amount you take  Talk to your healthcare provider before you take any over-the-counter medicines  · Check your blood sugar level more often than usual   If you have type 2 diabetes, check at least 4 times each day  If you have type 1 diabetes, check every 4 hours  · Check your urine or blood for ketones    Ask your healthcare provider which type of ketone testing is best for you  Ketone urine test kits are sold in pharmacies and some stores  You can also buy a meter to check the amount of ketones in your blood  Ask when and how often to check ketones  Do not exercise if you have ketones in your urine or blood  · Drink liquids as directed  You may need to drink about 8 ounces (1 cup) of liquid each hour  Drink liquids that do not contain sugar or caffeine  Ask your healthcare provider which liquids are best for you  · Follow your usual meal plan as closely as possible  If you cannot follow your meal plan, eat other foods that are easy for your body to digest  If you are eating less food than normal or cannot eat any foods, drink liquids that contain calories  · Tell others about your sick day plan  Tell others who help you while you are sick about your sick day plan  Put your plan in a place that is easy to find  Your sick day plan may change over time based on your needs  What can I drink and eat while I am sick? If your stomach is upset or you are vomiting, the following may be easier to drink and eat  Each of the foods listed below has about 10 to 15 grams of carbohydrate  · Liquids:      ¨ ? to ½ cup of fruit juice     ¨ ½ cup of regular soda     ¨ 1 cup of milk     ¨ 1 double-stick popsicle     ¨ 1 cup of a sports drink    · Foods:      ¨ ½ cup of regular gelatin or cooked, hot cereal     ¨ ½ cup of sugar-free pudding or ¼ cup of regular pudding     ¨ ½ cup of mashed potatoes, macaroni, or noodles     ¨ ¼ cup of sherbet     ¨ ½ cup of regular ice cream     ¨ 1 slice of dry toast, 6 saltine crackers, or 3 meghan crackers  Call 911 for any of the following:   · You have trouble breathing  When should I seek immediate care? · You cannot keep food and liquids down at all for a few hours  · You are drowsy or confused  · You are breathing faster than normal      · Your heartbeat is faster than normal, or your heart is pounding       · You are weak or dizzy   When should I contact my healthcare provider? · You have leg cramps  · Your mouth or eyes are dry  · You are vomiting or have diarrhea  · You have a fever  · Your ketone level is higher than healthcare providers have told you it should be  · Your blood sugar level is higher than healthcare providers have told you it should be  · You have questions or concerns about your condition or care  CARE AGREEMENT:   You have the right to help plan your care  Learn about your health condition and how it may be treated  Discuss treatment options with your caregivers to decide what care you want to receive  You always have the right to refuse treatment  The above information is an  only  It is not intended as medical advice for individual conditions or treatments  Talk to your doctor, nurse or pharmacist before following any medical regimen to see if it is safe and effective for you  © 2017 [employer] Information is for End User's use only and may not be sold, redistributed or otherwise used for commercial purposes  All illustrations and images included in CareNotes® are the copyrighted property of A [employer] M , Inc  or [employer]

## 2019-11-22 NOTE — PROGRESS NOTES
Outpatient Care Management Note:  Received return call from [facility]  She is currently amost seven weeks pregnant  She had not been checking her blood sugars but re-started last week and is trying to check them three times a day  Per [facility], she is taking her Metformin twice a day and took 10 units of insulin at bedtime as instructed  Her fasting glucose this morning was 200  Discussed the increased iportance of blood sugar control during pregnancy  Agreeable to receiving information on Diabetes and pregnancy in the mail  Advised her to notify Ashtyn's office if her blood sugars are consistently higher than 250 as her insulin dose may need to be increased  Verbalized understanding of same  She  spoke with the PATHS specialist about having her MA reinstated earlier this week  She has follow up appointments scheduled with OB  Arranged to meet with patient at her next office visit  She feels that she is following her diabetic diet appropriately, will review at upcoming appointment  Denies any other needs at this time  Encouraged to call with any questions or concerns  Outpatient Care Management Note: Message left for patient to please return call  Contact information left on message

## 2019-12-03 ENCOUNTER — INITIAL PRENATAL (OUTPATIENT)
Dept: OBGYN CLINIC | Facility: MEDICAL CENTER | Age: 29
End: 2019-12-03
Payer: COMMERCIAL

## 2019-12-03 DIAGNOSIS — Z34.91 ENCOUNTER FOR PREGNANCY RELATED EXAMINATION IN FIRST TRIMESTER: Primary | ICD-10-CM

## 2019-12-03 DIAGNOSIS — Z86.32 PERSONAL HISTORY OF GESTATIONAL DIABETES: ICD-10-CM

## 2019-12-03 PROCEDURE — 99211 OFF/OP EST MAY X REQ PHY/QHP: CPT | Performed by: OBSTETRICS & GYNECOLOGY

## 2019-12-03 NOTE — PROGRESS NOTES
OB INTAKE INTERVIEW      Pt presents for OB intake    OB History    Para Term  AB Living   1 0 0 0 0 0   SAB TAB Ectopic Multiple Live Births   0 0 0 0 0      # Outcome Date GA Lbr Twin/2nd Weight Sex Delivery Anes PTL Lv   1 Current                  Hx of  delivery prior to 36 weeks 6 days: NO     Last Menstrual Period:   Patient's last menstrual period was 10/07/2019  Ultrasound date:   2019  6 weeks 5 days  Estimated date of delivery:   Estimated Date of Delivery: 2020  confirmed by LMP? History of Diabetes: YES  History of Hypertension: YES      Infection Screening: Does the pt have a hx of MRSA? DENIES    H&P visit scheduled  20    ? Interview education  Information on [facility] Pregnancy Essentials reviewed  Handouts given: Baby and Me phone cliff guide  Baby and Me support center  [facility] in Pregnancy information sheet   [facility]  Discussed genetic testing-    - pt interested  In Seq  Screen  Referral given for genetic counselor and diabetes in pregnancy program   Information on CF and SMA carrier screening given-patient will let us know at next appointment if desired  Discussed Tdap and Influenza vaccines         Depression Screening Follow-up Plan: Patient's depression screening was NEGATIVE  with an [place] score of  10              Lab slip given for PN panel and Hgb [id number] complete at later date d/t need for fasting  The patient was oriented to our practice and all questions were answered    Interviewed by: Yadi Spain RN 19

## 2019-12-03 NOTE — PATIENT INSTRUCTIONS
Pregnancy at 5 to [address]:   What changes are happening to your body:  Pregnancy hormones may cause your body to go through many changes during this stage of your pregnancy  You may feel more tired than usual, and have mood swings, nausea and vomiting, and headaches  Your breasts may feel tender and swollen and you may urinate more frequently  Seek care immediately if:   · You have pain or cramping in your abdomen or low back  · You have heavy vaginal bleeding or clotting  · You pass material that looks like tissue or large clots  Collect the material and bring it with you  Contact your healthcare provider if:   · You have light bleeding  · You have chills or a fever  · You have vaginal itching, burning, or pain  · You have yellow, green, white, or foul-smelling vaginal discharge  · You have pain or burning when you urinate, less urine than usual, or pink or bloody urine  · You have questions or concerns about your condition or care  How to care for yourself at this stage of your pregnancy:   · Manage nausea and vomiting  Avoid fatty and spicy foods  Eat small meals throughout the day instead of large meals  Alise may help to decrease nausea  Ask your healthcare provider about other ways of decreasing nausea and vomiting  · Eat a variety of healthy foods  Healthy foods include fruits, vegetables, whole-grain breads, low-fat dairy foods, beans, lean meats, and fish  Drink liquids as directed  Ask how much liquid to drink each day and which liquids are best for you  Limit caffeine to less than 200 milligrams each day  Limit your intake of fish to 2 servings each week  Choose fish low in mercury such as canned light tuna, shrimp, salmon, cod, or tilapia  Do not  eat fish high in mercury such as swordfish, tilefish, radha mackerel, and shark  · Take prenatal vitamins as directed    Your need for certain vitamins and minerals, such as folic acid, increases during pregnancy  Prenatal vitamins provide some of the extra vitamins and minerals you need  Prenatal vitamins may also help to decrease the risk of certain birth defects  · Ask how much weight you should gain each month  Too much or too little weight gain can be unhealthy for you and your baby  · Do not smoke  If you smoke, it is never too late to quit  Smoking increases your risk of a miscarriage and other health problems during your pregnancy  Smoking can cause your baby to be born too early or weigh less at birth  Ask your healthcare provider for information if you need help quitting  · Do not drink alcohol  Alcohol passes from your body to your baby through the placenta  It can affect your baby's brain development and cause fetal alcohol syndrome (FAS)  FAS is a group of conditions that causes mental, behavior, and growth problems  · Talk to your healthcare provider before you take any medicines  Many medicines may harm your baby if you take them when you are pregnant  Do not take any medicines, vitamins, herbs, or supplements without first talking to your healthcare provider  Never use illegal or street drugs (such as marijuana or cocaine) while you are pregnant  Safety tips during pregnancy:   · Avoid hot tubs and saunas  Do not use a hot tub or sauna while you are pregnant, especially during your first trimester  Hot tubs and saunas may raise your baby's temperature and increase the risk of birth defects  · Avoid toxoplasmosis  This is an infection caused by eating raw meat or being around infected cat feces  It can cause birth defects, miscarriages, and other problems  Wash your hands after you touch raw meat  Make sure any meat is well-cooked before you eat it  Avoid raw eggs and unpasteurized milk  Use gloves or ask someone else to clean your cat's litter box while you are pregnant    Changes that are happening with your baby:  By 10 weeks, your baby will be about 2 ½ inches long from the top of the head to the rump (baby's bottom)  Your baby weighs about ½ ounce  Major body organs, such as the brain, heart, and lungs, are forming  Your baby's facial features are also starting to form  What you need to know about prenatal care:  Prenatal care is a series of visits with your healthcare provider throughout your pregnancy  During the first 28 weeks of your pregnancy, you will see your healthcare provider once a month  Prenatal care can help prevent problems during pregnancy and childbirth  Your healthcare provider will ask questions about your health and any previous pregnancies you have had  He will also ask about any medicines you are taking  You may also need any of the following:  · A pap smear  will be done to check your cervix for abnormal cells  The cervix is the narrow opening at the bottom of your uterus  The cervix meets the top part of the vagina  · A pelvic exam  allows your healthcare provider to see your cervix (the bottom part of your uterus)  Your healthcare provider uses a speculum to gently open your vagina  He will check the size and shape of your uterus  · Blood tests  may be done to check for anemia or blood type  Your healthcare provider may also order other blood tests to check if you are immune to certain diseases such as Hepatitis B  He may also recommend an HIV test     · Urine tests  may also be done to check for signs of infection  · Your blood pressure and weight  will be checked  © 2017 [facility] Information is for End User's use only and may not be sold, redistributed or otherwise used for commercial purposes  All illustrations and images included in CareNotes® are the copyrighted property of [employer]  or [employer]  The above information is an  only  It is not intended as medical advice for individual conditions or treatments   Talk to your doctor, nurse or pharmacist before following any medical regimen to see if it is safe and effective for you  Pregnancy at 11 to 14 Weeks   AMBULATORY CARE:   What changes are happening to your body: You are now at the end of your first trimester and entering your second trimester  Morning sickness usually goes away by this time  You may have other symptoms such as fatigue, frequent urination, and headaches  You may have gained between 2 to 4 pounds by now  Seek care immediately if:   · You have pain or cramping in your abdomen or low back  · You have heavy vaginal bleeding or clotting  · You pass material that looks like tissue or large clots  Collect the material and bring it with you  Contact your healthcare provider if:   · You cannot keep food or drinks down, and you are losing weight  · You have light bleeding  · You have chills or a fever  · You have vaginal itching, burning, or pain  · You have yellow, green, white, or foul-smelling vaginal discharge  · You have pain or burning when you urinate, less urine than usual, or pink or bloody urine  · You have questions or concerns about your condition or care  How to care for yourself at this stage of your pregnancy:   · Get plenty of rest   You may feel more tired than normal  You may need to take naps or go to bed earlier  · Manage nausea and vomiting  Avoid fatty and spicy foods  Eat small meals throughout the day instead of large meals  Alise may help to decrease nausea  Ask your healthcare provider about other ways of decreasing nausea and vomiting  · Eat a variety of healthy foods  Healthy foods include fruits, vegetables, whole-grain breads, low-fat dairy foods, beans, lean meats, and fish  Drink liquids as directed  Ask how much liquid to drink each day and which liquids are best for you  Limit caffeine to less than 200 milligrams each day  Limit your intake of fish to 2 servings each week  Choose fish low in mercury such as canned light tuna, shrimp, salmon, cod, or tilapia   Do not  eat fish high in mercury such as swordfish, tilefish, radha mackerel, and shark  · Take prenatal vitamins as directed  Your need for certain vitamins and minerals, such as folic acid, increases during pregnancy  Prenatal vitamins provide some of the extra vitamins and minerals you need  Prenatal vitamins may also help to decrease the risk of certain birth defects  · Do not smoke  If you smoke, it is never too late to quit  Smoking increases your risk of a miscarriage and other health problems during your pregnancy  Smoking can cause your baby to be born too early or weigh less at birth  Ask your healthcare provider for information if you need help quitting  · Do not drink alcohol  Alcohol passes from your body to your baby through the placenta  It can affect your baby's brain development and cause fetal alcohol syndrome (FAS)  FAS is a group of conditions that causes mental, behavior, and growth problems  · Talk to your healthcare provider before you take any medicines  Many medicines may harm your baby if you take them when you are pregnant  Do not take any medicines, vitamins, herbs, or supplements without first talking to your healthcare provider  Never use illegal or street drugs (such as marijuana or cocaine) while you are pregnant  Safety tips during pregnancy:   · Avoid hot tubs and saunas  Do not use a hot tub or sauna while you are pregnant, especially during your first trimester  Hot tubs and saunas may raise your baby's temperature and increase the risk of birth defects  · Avoid toxoplasmosis  This is an infection caused by eating raw meat or being around infected cat feces  It can cause birth defects, miscarriages, and other problems  Wash your hands after you touch raw meat  Make sure any meat is well-cooked before you eat it  Avoid raw eggs and unpasteurized milk  Use gloves or ask someone else to clean your cat's litter box while you are pregnant    Changes that are happening with your baby: Your baby has fully formed fingernails and toenails  Your baby's heartbeat can now be heard  Ask your healthcare provider if you can listen to your baby's heartbeat  By week 14, your baby is over 4 inches long from the top of the head to the rump (baby's bottom)  Your baby weighs over 3 ounces  What you need to know about prenatal care:  During the first 28 weeks of your pregnancy, you will see your healthcare provider once a month  Prenatal care can help prevent problems during pregnancy and childbirth  Your healthcare provider will check your blood pressure and weight  You may also need any of the following:  · A urine test  may also be done to check for sugar and protein  These can be signs of gestational diabetes or infection  · Genetic disorders screening tests  may be offered to you  This screening test checks your baby's risk of genetic disorders such as Down syndrome  The screening test includes a blood test and ultrasound  · Your baby's heart rate  will be checked  © 2017 [facility] Information is for End User's use only and may not be sold, redistributed or otherwise used for commercial purposes  All illustrations and images included in CareNotes® are the copyrighted property of [employer]  or [employer]  The above information is an  only  It is not intended as medical advice for individual conditions or treatments  Talk to your doctor, nurse or pharmacist before following any medical regimen to see if it is safe and effective for you

## 2019-12-05 NOTE — TELEPHONE ENCOUNTER
Pt scheduled for 2 month f/u as per Dr Mariel Mart request, on 1/17/2020 at 12:00pm at the [facility]

## 2019-12-06 ENCOUNTER — OFFICE VISIT (OUTPATIENT)
Dept: FAMILY MEDICINE CLINIC | Facility: CLINIC | Age: 29
End: 2019-12-06
Payer: COMMERCIAL

## 2019-12-06 ENCOUNTER — HOSPITAL ENCOUNTER (EMERGENCY)
Facility: HOSPITAL | Age: 29
Discharge: HOME | End: 2019-12-06
Attending: FAMILY MEDICINE | Admitting: FAMILY MEDICINE
Payer: COMMERCIAL

## 2019-12-06 VITALS
OXYGEN SATURATION: 98 % | HEIGHT: 60 IN | RESPIRATION RATE: 18 BRPM | BODY MASS INDEX: 44.76 KG/M2 | SYSTOLIC BLOOD PRESSURE: 124 MMHG | WEIGHT: 228 LBS | TEMPERATURE: 98.1 F | HEART RATE: 82 BPM | DIASTOLIC BLOOD PRESSURE: 72 MMHG

## 2019-12-06 VITALS
HEART RATE: 94 BPM | OXYGEN SATURATION: 100 % | TEMPERATURE: 98.3 F | BODY MASS INDEX: 44.53 KG/M2 | SYSTOLIC BLOOD PRESSURE: 158 MMHG | DIASTOLIC BLOOD PRESSURE: 88 MMHG | RESPIRATION RATE: 18 BRPM | WEIGHT: 228 LBS

## 2019-12-06 DIAGNOSIS — Z34.90 PREGNANCY AT EARLY STAGE: ICD-10-CM

## 2019-12-06 DIAGNOSIS — Z20.6 CONTACT WITH AND (SUSPECTED) EXPOSURE TO HUMAN IMMUNODEFICIENCY VIRUS (HIV): Primary | ICD-10-CM

## 2019-12-06 DIAGNOSIS — E11.65 TYPE 2 DIABETES MELLITUS WITH HYPERGLYCEMIA, WITHOUT LONG-TERM CURRENT USE OF INSULIN (HCC): Primary | ICD-10-CM

## 2019-12-06 LAB — SL AMB POCT GLUCOSE BLD: 250

## 2019-12-06 PROCEDURE — 99213 OFFICE O/P EST LOW 20 MIN: CPT | Performed by: NURSE PRACTITIONER

## 2019-12-06 PROCEDURE — 99282 EMERGENCY DEPT VISIT SF MDM: CPT | Performed by: FAMILY MEDICINE

## 2019-12-06 PROCEDURE — 3725F SCREEN DEPRESSION PERFORMED: CPT | Performed by: NURSE PRACTITIONER

## 2019-12-06 PROCEDURE — 87536 HIV-1 QUANT&REVRSE TRNSCRPJ: CPT | Performed by: FAMILY MEDICINE

## 2019-12-06 PROCEDURE — 36415 COLL VENOUS BLD VENIPUNCTURE: CPT | Performed by: FAMILY MEDICINE

## 2019-12-06 PROCEDURE — 99283 EMERGENCY DEPT VISIT LOW MDM: CPT

## 2019-12-06 PROCEDURE — 82948 REAGENT STRIP/BLOOD GLUCOSE: CPT | Performed by: NURSE PRACTITIONER

## 2019-12-06 NOTE — PROGRESS NOTES
[address] [place] [address] Primary Care        NAME: Carlos Worthy is a 30 y o  female  : 1990    MRN: [id number]  DATE: 2019  TIME: 9:54 AM    Assessment and Plan   Type 2 diabetes mellitus with hyperglycemia, without long-term current use of insulin ([facility] [address] ) [[address] 65]  1  Type 2 diabetes mellitus with hyperglycemia, without long-term current use of insulin ([facility])  POCT blood glucose   2  Pregnancy at early stage           Patient Instructions     Patient Instructions   Increase Lantus to 16 units daily x 1 week, then 20 units daily if blood sugars are over 200  Continue Metformin 1000mg twice daily  Keep appointment with specialist  Return in 2 weeks with blood sugar log for repeat blood sugar in office and possible medication adjustment          Chief Complaint     Chief Complaint   Patient presents with    Follow-up     Patient is 9 weeks pregnant     Diabetes         History of Present Illness       Pt  Is taking Metformin 1000 twice daily and Lantus 12 units daily  Has blood sugar log with her- 400 on , 370 on , 300 on , 219 on   Pt  Is willing to increase Lantus  Has an appointment with a specialist for "Pregnant Women with Diabetes"    Blood sugar today is 250 in office      Review of Systems   Review of Systems   Constitutional: Negative for activity change, diaphoresis, fatigue and fever  HENT: Positive for sore throat  Negative for congestion, facial swelling, hearing loss, rhinorrhea, sinus pressure, sinus pain, sneezing and voice change  Eyes: Negative for discharge and visual disturbance  Respiratory: Negative for cough, choking, chest tightness, shortness of breath, wheezing and stridor  Cardiovascular: Negative for chest pain, palpitations and leg swelling  Gastrointestinal: Positive for nausea and vomiting  Negative for abdominal distention, abdominal pain, constipation and diarrhea  Endocrine: Negative for polydipsia, polyphagia and polyuria  Genitourinary: Negative for difficulty urinating, dysuria, frequency and urgency  Musculoskeletal: Negative for arthralgias, back pain, gait problem, joint swelling, myalgias, neck pain and neck stiffness  Skin: Negative for color change, rash and wound  Neurological: Negative for dizziness, syncope, speech difficulty, weakness, light-headedness and headaches  Hematological: Negative for adenopathy  Does not bruise/bleed easily  Psychiatric/Behavioral: Negative for agitation, behavioral problems, confusion, hallucinations, sleep disturbance and suicidal ideas  The patient is not nervous/anxious            Current Medications       Current Outpatient Medications:     ergocalciferol (VITAMIN D2) 50,000 units, Take 1 capsule (50,000 Units total) by mouth once a week, Disp: 8 capsule, Rfl: 0    glucose blood (ACCU-CHEK GUIDE) test strip, Test three times per day, Disp: 100 each, Rfl: 0    insulin glargine (LANTUS) 100 units/mL subcutaneous injection, Inject 12 Units under the skin daily at bedtime , Disp: , Rfl:     labetalol (NORMODYNE) 100 mg tablet, Take 100 mg by mouth 2 (two) times a day, Disp: , Rfl:     Lancets (ACCU-CHEK MULTICLIX) lancets, Use as instructed, Disp: 100 each, Rfl: 3    metFORMIN (GLUCOPHAGE) 1000 MG tablet, Take 1 tablet (1,000 mg total) by mouth 2 (two) times a day with meals, Disp: 60 tablet, Rfl: 6    ondansetron (ZOFRAN) 8 mg tablet, Take 1 tablet (8 mg total) by mouth every 8 (eight) hours as needed for nausea or vomiting, Disp: 30 tablet, Rfl: 1    Current Allergies     Allergies as of 12/06/2019 - Reviewed 12/06/2019   Allergen Reaction Noted    Corticosteroids Swelling 01/13/2009    Bactrim [sulfamethoxazole-trimethoprim]  11/16/2019    Cortisone  08/12/2019    Lactose  08/12/2019    Prednisone Swelling 06/12/2018    Sulfa antibiotics Hives 01/14/2019    Milk-related compounds Sneezing 12/11/2018            The following portions of the patient's history were reviewed and updated as appropriate: allergies, current medications, past family history, past medical history, past social history, past surgical history and problem list      Past Medical History:   Diagnosis Date    Allergic     Arthritis     Bipolar affective disorder, currently depressed, moderate ([facility] [address] ) 12/17/2018    Cyst of ovary, right     Diabetes mellitus ([facility] [address] ) 10/10/18    type 2    Endometriosis     Heart murmur 01/19/88    Hepatitis C     Hepatitis C virus infection cured after antiviral drug therapy     History of transfusion     Hypertension     Migraines     Obesity 1995    Pulmonary artery congenital abnormality     Spleen enlarged     Varicella        Past Surgical History:   Procedure Laterality Date    CARDIAC CATHETERIZATION      no CAD 10days, 4 weeks 22months old     CHOLECYSTECTOMY      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 7   [facility] LIVER BIOPSY      LIVER BIOPSY      VSD REPAIR      As a child       Family History   Problem Relation Age of Onset    Hypertension Mother     Migraines Mother     JENNY disease Mother     Depression Mother     Hyperlipidemia Mother     Diabetes Mother     Diabetes Father     Hypertension Father     Kidney failure Father     Heart attack Father     Arthritis Father     Stroke Father     Polycystic kidney disease Paternal [de-identified]     Stroke Paternal [de-identified]     Heart disease Paternal Grandmother     Arthritis Sister     Asthma Sister     Thyroid disease Sister     Diabetes Sister     Arthritis Maternal Grandmother     Breast cancer Maternal Grandmother     Diabetes Maternal Grandmother     Hypertension Maternal Grandmother     Heart Valve Disease Maternal Grandmother     Learning disabilities Cousin     Learning disabilities Sister     ADD / ADHD Cousin     Lung cancer Brother     Diabetes Maternal Grandfather     Hypertension Maternal Grandfather     JENNY disease Maternal Grandfather     Stroke Maternal Grandfather          Medications have been verified  Objective   /72   Pulse 82   Temp 98 1 °F (36 7 °C) (Tympanic)   Resp 18   Ht 5' (1 524 m)   Wt 103 kg (228 lb)   LMP 10/07/1910   SpO2 98%   BMI 44 53 kg/m²        Physical Exam     Physical Exam   Constitutional: She is oriented to person, place, and time  Vital signs are normal  She appears well-developed and well-nourished  She is active and cooperative  No distress  Eyes: EOM are normal    Cardiovascular: Normal rate and regular rhythm  Murmur heard  Pulmonary/Chest: Effort normal and breath sounds normal  No respiratory distress  She has no wheezes  Neurological: She is alert and oriented to person, place, and time  Skin: Skin is warm and dry  No rash noted  She is not diaphoretic  No erythema  Psychiatric: She has a normal mood and affect  Her behavior is normal  Judgment and thought content normal    Nursing note and vitals reviewed

## 2019-12-06 NOTE — PATIENT INSTRUCTIONS
Increase Lantus to 16 units daily x 1 week, then 20 units daily if blood sugars are over 200   Continue Metformin 1000mg twice daily  Keep appointment with specialist  Return in 2 weeks with blood sugar log for repeat blood sugar in office and possible medication adjustment

## 2019-12-06 NOTE — ED PROVIDER NOTES
History  Chief Complaint   Patient presents with    Exposure to STD     HPI  This is a 68-year-old female  obese at 9 weeks presented to ED for HIV testing  Patient states that his boyfriend thinks that she is exposed to HIV and have transfer the HIV to him and wants her tested  Patient states she has an appointment with her OB for blood work but came to the ED for her boyfriend  Patient is denying any complaints this time  Prior to Admission Medications   Prescriptions Last Dose Informant Patient Reported? Taking?    Lancets (ACCU-CHEK MULTICLIX) lancets  Self No No   Sig: Use as instructed   ergocalciferol (VITAMIN D2) 50,000 units Not Taking at Unknown time Self No No   Sig: Take 1 capsule (50,000 Units total) by mouth once a week   Patient not taking: Reported on 2019   glucose blood (ACCU-CHEK GUIDE) test strip  Self No No   Sig: Test three times per day   insulin glargine (LANTUS) 100 units/mL subcutaneous injection 2019 at Unknown time  Yes Yes   Sig: Inject 12 Units under the skin daily at bedtime    labetalol (NORMODYNE) 100 mg tablet 2019 at Unknown time Outside Facility ([address] [place] [address]) Yes Yes   Sig: Take 100 mg by mouth 2 (two) times a day   metFORMIN (GLUCOPHAGE) 1000 MG tablet 2019 at Unknown time Self No Yes   Sig: Take 1 tablet (1,000 mg total) by mouth 2 (two) times a day with meals   ondansetron (ZOFRAN) 8 mg tablet  Self No No   Sig: Take 1 tablet (8 mg total) by mouth every 8 (eight) hours as needed for nausea or vomiting      Facility-Administered Medications: None       Past Medical History:   Diagnosis Date    Allergic     Arthritis     Bipolar affective disorder, currently depressed, moderate ([address] ) 2018    Cyst of ovary, right     Diabetes mellitus ([facility] [address] ) 10/10/18    type 2    Endometriosis     Heart murmur 88    Hepatitis C     Hepatitis C virus infection cured after antiviral drug therapy     History of transfusion     Hypertension     Migraines  Obesity 1995    Pulmonary artery congenital abnormality     Spleen enlarged     Varicella        Past Surgical History:   Procedure Laterality Date    CARDIAC CATHETERIZATION      no CAD 10days, 4 weeks 22months old     CHOLECYSTECTOMY      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 7   [address] LIVER BIOPSY      LIVER BIOPSY      VSD REPAIR      As a child       Family History   Problem Relation Age of Onset    Hypertension Mother     Migraines Mother     JENNY disease Mother     Depression Mother     Hyperlipidemia Mother     Diabetes Mother     Diabetes Father     Hypertension Father     Kidney failure Father     Heart attack Father     Arthritis Father     Stroke Father     Polycystic kidney disease Paternal Grandmother     Stroke Paternal Grandmother     Heart disease Paternal Grandmother     Arthritis Sister     Asthma Sister     Thyroid disease Sister     Diabetes Sister     Arthritis Maternal Grandmother     Breast cancer Maternal Grandmother     Diabetes Maternal Grandmother     Hypertension Maternal Grandmother     Heart Valve Disease Maternal Grandmother     Learning disabilities Cousin     Learning disabilities Sister     ADD / ADHD Cousin     Lung cancer Brother     Diabetes Maternal Grandfather     Hypertension Maternal Grandfather     JENNY disease Maternal Grandfather     Stroke Maternal Grandfather      I have reviewed and agree with the history as documented      Social History     Tobacco Use    Smoking status: Former Smoker     Packs/day: 0 20     Types: Cigarettes     Start date: 2019     Last attempt to quit: 2019     Years since quittin 8    Smokeless tobacco: Never Used   Substance Use Topics    Alcohol use: Not Currently     Alcohol/week: 3 0 standard drinks     Types: 3 Standard drinks or equivalent per week     Comment: socially/prior to knowledge of pregnancy    Drug use: Not Currently     Comment: hx of THC use for migraines        Review of Systems   Constitutional: Negative  HENT: Negative  Respiratory: Negative  Cardiovascular: Negative  Gastrointestinal: Negative  Genitourinary: Negative  Musculoskeletal: Negative  Neurological: Negative  Psychiatric/Behavioral: Negative  Physical Exam  Physical Exam   Constitutional: She is oriented to person, place, and time  She appears well-developed and well-nourished  HENT:   Head: Normocephalic and atraumatic  Neck: Normal range of motion  Neck supple  Cardiovascular: Normal rate, regular rhythm and normal heart sounds  Pulmonary/Chest: Effort normal and breath sounds normal    Neurological: She is alert and oriented to person, place, and time  Skin: Skin is warm  Psychiatric: She has a normal mood and affect  Nursing note and vitals reviewed  Vital Signs  ED Triage Vitals [12/06/19 1014]   Temperature Pulse Respirations Blood Pressure SpO2   98 3 °F (36 8 °C) 94 18 158/88 100 %      Temp Source Heart Rate Source Patient Position - Orthostatic VS BP Location FiO2 (%)   Temporal Monitor Sitting Left arm --      Pain Score       No Pain           Vitals:    12/06/19 1014   BP: 158/88   Pulse: 94   Patient Position - Orthostatic VS: Sitting         Visual Acuity      ED Medications  Medications - No data to display    Diagnostic Studies  Results Reviewed     Procedure Component Value Units Date/Time    HIV-1 RNA, quantitative, PCR [[id number]] Collected:  12/06/19 1044    Lab Status: In process Specimen:  Blood from Arm, Right Updated:  12/06/19 1046                 No orders to display              Procedures  Procedures         ED Course        recommend  patient should follow up with the her OBGYN if positive for HIV    Patient will receive a call from the hospital                         MDM      Disposition  Final diagnoses:   Contact with and (suspected) exposure to human immunodeficiency virus (hiv)     Time reflects when diagnosis was documented in both MDM as applicable and the Disposition within this note     Time User Action Codes Description Comment    12/6/2019 10:36 AM Calista Larsen Add [Z20 6] Contact with and (suspected) exposure to human immunodeficiency virus (hiv)       ED Disposition     ED Disposition Condition Date/Time Comment    Discharge Stable Fri Dec 6, 2019 10:35 AM Lisa Melvin discharge to home/self care  Follow-up Information     Follow up With Specialties Details Why Contact Info    Maksim Moncada DO Family Medicine Schedule an appointment as soon as possible for a visit in 2 days If symptoms worsen [facility] [address] [facility]  [phone]            Patient's Medications   Discharge Prescriptions    No medications on file     No discharge procedures on file      ED Provider  Electronically Signed by           Hemal Valdez MD  12/06/19 9744

## 2019-12-09 LAB
HIV1 RNA # SERPL NAA+PROBE: <20 COPIES/ML
HIV1 RNA SERPL NAA+PROBE-LOG#: NORMAL LOG10COPY/ML

## 2019-12-10 ENCOUNTER — PATIENT OUTREACH (OUTPATIENT)
Dept: FAMILY MEDICINE CLINIC | Facility: CLINIC | Age: 29
End: 2019-12-10

## 2019-12-12 ENCOUNTER — OFFICE VISIT (OUTPATIENT)
Dept: PERINATAL CARE | Facility: CLINIC | Age: 29
End: 2019-12-12
Payer: COMMERCIAL

## 2019-12-12 VITALS
SYSTOLIC BLOOD PRESSURE: 143 MMHG | WEIGHT: 226 LBS | DIASTOLIC BLOOD PRESSURE: 82 MMHG | HEIGHT: 60 IN | BODY MASS INDEX: 44.37 KG/M2 | HEART RATE: 96 BPM

## 2019-12-12 DIAGNOSIS — I10 ESSENTIAL HYPERTENSION: ICD-10-CM

## 2019-12-12 DIAGNOSIS — O99.211 OBESITY AFFECTING PREGNANCY IN FIRST TRIMESTER: ICD-10-CM

## 2019-12-12 DIAGNOSIS — Z3A.09 9 WEEKS GESTATION OF PREGNANCY: ICD-10-CM

## 2019-12-12 DIAGNOSIS — E11.65 TYPE 2 DIABETES MELLITUS WITH HYPERGLYCEMIA, WITHOUT LONG-TERM CURRENT USE OF INSULIN (HCC): ICD-10-CM

## 2019-12-12 DIAGNOSIS — E55.9 VITAMIN D DEFICIENCY: ICD-10-CM

## 2019-12-12 DIAGNOSIS — E11.65 TYPE 2 DIABETES MELLITUS WITH HYPERGLYCEMIA, WITHOUT LONG-TERM CURRENT USE OF INSULIN (HCC): Primary | ICD-10-CM

## 2019-12-12 DIAGNOSIS — O24.111 PRE-EXISTING TYPE 2 DIABETES MELLITUS WITH HYPERGLYCEMIA DURING PREGNANCY IN FIRST TRIMESTER (HCC): Primary | ICD-10-CM

## 2019-12-12 DIAGNOSIS — E11.65 PRE-EXISTING TYPE 2 DIABETES MELLITUS WITH HYPERGLYCEMIA DURING PREGNANCY IN FIRST TRIMESTER (HCC): Primary | ICD-10-CM

## 2019-12-12 DIAGNOSIS — E73.9 LACTOSE INTOLERANCE: ICD-10-CM

## 2019-12-12 PROBLEM — E66.813 CLASS 3 SEVERE OBESITY DUE TO EXCESS CALORIES WITH SERIOUS COMORBIDITY AND BODY MASS INDEX (BMI) OF 40.0 TO 44.9 IN ADULT (HCC): Status: RESOLVED | Noted: 2019-02-14 | Resolved: 2019-12-12

## 2019-12-12 PROBLEM — Q21.0 VENTRICULAR SEPTAL DEFECT: Status: ACTIVE | Noted: 2018-02-28

## 2019-12-12 PROBLEM — E66.01 MORBID OBESITY (HCC): Status: ACTIVE | Noted: 2018-02-28

## 2019-12-12 PROBLEM — E66.01 CLASS 3 SEVERE OBESITY DUE TO EXCESS CALORIES WITH SERIOUS COMORBIDITY AND BODY MASS INDEX (BMI) OF 40.0 TO 44.9 IN ADULT (HCC): Status: RESOLVED | Noted: 2019-02-14 | Resolved: 2019-12-12

## 2019-12-12 PROBLEM — N80.9 ENDOMETRIOSIS: Status: ACTIVE | Noted: 2019-08-30

## 2019-12-12 PROBLEM — R10.9 ABDOMINAL PAIN: Status: RESOLVED | Noted: 2018-10-12 | Resolved: 2019-12-12

## 2019-12-12 PROCEDURE — G0108 DIAB MANAGE TRN  PER INDIV: HCPCS | Performed by: DIETITIAN, REGISTERED

## 2019-12-12 PROCEDURE — 99215 OFFICE O/P EST HI 40 MIN: CPT | Performed by: NURSE PRACTITIONER

## 2019-12-12 RX ORDER — INSULIN LISPRO 100 [IU]/ML
INJECTION, SOLUTION INTRAVENOUS; SUBCUTANEOUS
Qty: 15 ML | Refills: 0 | Status: SHIPPED | OUTPATIENT
Start: 2019-12-12 | End: 2021-03-04 | Stop reason: SDUPTHER

## 2019-12-12 NOTE — PROGRESS NOTES
Assessment/Plan:     Diagnoses and all orders for this visit:    Pre-existing type 2 diabetes mellitus with hyperglycemia during pregnancy in first trimester [facility])  -     Ambulatory referral to Diabetic Education  -     Hemoglobin A1C; Standing  -     TSH, 3rd generation with Free T4 reflex; Future  -     Protein / creatinine ratio, urine  -     Basic metabolic panel; Future  -     insulin glargine (LANTUS SOLOSTAR) 100 units/mL injection pen; Inject SC 30 units at 9 PM daily  To be titrated  -     Insulin Pen Needle 31G X 5 MM MISC; Inject under the skin daily at bedtime Use one a day or as directed  -     insulin lispro (HUMALOG KWIKPEN) 100 units/mL injection pen; Inject SC 10 units before breakfast, before lunch and dinner  To be titrated  Type 2 diabetes mellitus with hyperglycemia, without long-term current use of insulin (HCC)  -     Hemoglobin A1C; Standing  -     TSH, 3rd generation with Free T4 reflex; Future  -     Protein / creatinine ratio, urine  -     Basic metabolic panel; Future  -     insulin glargine (LANTUS SOLOSTAR) 100 units/mL injection pen; Inject SC 30 units at 9 PM daily  To be titrated  -     Insulin Pen Needle 31G X 5 MM MISC; Inject under the skin daily at bedtime Use one a day or as directed  -     insulin lispro (HUMALOG KWIKPEN) 100 units/mL injection pen; Inject SC 10 units before breakfast, before lunch and dinner  To be titrated  Obesity affecting pregnancy in first trimester  -     Hemoglobin A1C; Standing  -     TSH, 3rd generation with Free T4 reflex; Future  -     Protein / creatinine ratio, urine  -     Basic metabolic panel; Future  -     insulin glargine (LANTUS SOLOSTAR) 100 units/mL injection pen; Inject SC 30 units at 9 PM daily  To be titrated  -     Insulin Pen Needle 31G X 5 MM MISC; Inject under the skin daily at bedtime Use one a day or as directed  -     insulin lispro (HUMALOG KWIKPEN) 100 units/mL injection pen;  Inject SC 10 units before breakfast, before lunch and dinner  To be titrated  Essential hypertension  -     Hemoglobin A1C; Standing  -     TSH, 3rd generation with Free T4 reflex; Future  -     Protein / creatinine ratio, urine  -     Basic metabolic panel; Future  -     insulin glargine (LANTUS SOLOSTAR) 100 units/mL injection pen; Inject SC 30 units at 9 PM daily  To be titrated  -     Insulin Pen Needle 31G X 5 MM MISC; Inject under the skin daily at bedtime Use one a day or as directed  -     insulin lispro (HUMALOG KWIKPEN) 100 units/mL injection pen; Inject SC 10 units before breakfast, before lunch and dinner  To be titrated  Vitamin D deficiency  -     Hemoglobin A1C; Standing  -     TSH, 3rd generation with Free T4 reflex; Future  -     Protein / creatinine ratio, urine  -     Basic metabolic panel; Future  -     insulin glargine (LANTUS SOLOSTAR) 100 units/mL injection pen; Inject SC 30 units at 9 PM daily  To be titrated  -     Insulin Pen Needle 31G X 5 MM MISC; Inject under the skin daily at bedtime Use one a day or as directed  -     insulin lispro (HUMALOG KWIKPEN) 100 units/mL injection pen; Inject SC 10 units before breakfast, before lunch and dinner  To be titrated  9 weeks gestation of pregnancy  -     Hemoglobin A1C; Standing  -     TSH, 3rd generation with Free T4 reflex; Future  -     Protein / creatinine ratio, urine  -     Basic metabolic panel; Future  -     insulin glargine (LANTUS SOLOSTAR) 100 units/mL injection pen; Inject SC 30 units at 9 PM daily  To be titrated  -     Insulin Pen Needle 31G X 5 MM MISC; Inject under the skin daily at bedtime Use one a day or as directed  -     insulin lispro (HUMALOG KWIKPEN) 100 units/mL injection pen; Inject SC 10 units before breakfast, before lunch and dinner  To be titrated  Other orders  -     Prenatal Vit-Fe Fumarate-FA (PRENATAL VITAMIN PO); Take 1 tablet by mouth daily      1   Due to reported fasting glucose 160 to 200 and last A1c 11%, increase Lantus 30 units at 9 PM daily  Add 3 AM glucose check and always have bedtime snack that includes protein and carbohydrates  2  Start Humalog 10 units before breakfast, before lunch and before dinner  Try to inject 15 minutes before meal   3  Continue Metformin 1000 mg with breakfast and 1000 mg with dinner  4  Continue GDM diet with 3 meals and 3 snacks including recommended combination of carb, protein and fat per meal/snack  5  Please eat meal or snack every 2-3 5 hours while awake  No more than 8 to 10 hours of fasting overnight  6  Start self monitoring blood glucose fasting and 2 hours after start of each meal  Keep glucose log  Glucose goals: fasting 60-90 mg/dL, 135 mg/dL or less 1 hour post meals, and 120 mg/dL or less 2 hours post meal    7  Report glucose readings weekly via [employer] every Monday, 19    8  Stay active if no restriction from your OB, walk up to 30 minutes a day  9  Always have glucose available to treat hypoglycemia  Use 15:15 rule  Refer to hypoglycemia patient education sheet  Test blood sugar when experiencing signs and symptoms of hypoglycemia and prior to driving  10  Continue prenatal vitamin as recommended  Information given on prevention of pre-eclampsia with use of baby aspirin, to be discussed with physician  11  Continue follow-up with your OB and MFM as recommended  12  Follow up in: 1-2 weeks  13  Schedule ultrasound with MFM  14  Check A1c, BMP, TSH with reflex free T4 and urine protein/creatinine ratio  Goal A1c 6% with minimal hypoglycemia  15  Insulin requirements during pregnancy and basal/bolus concept discussed  16  Importance of tight glucose control during pregnancy and risk factors such as macrosomia,  hypoglycemia, polyhydramnios, delivery complications, etc        Subjective:      Patient ID: Lisa Melvin is a 30 y o  female  , JORGE 20, currently 9 2/7 weeks gestation   Diagnosed with T2DM , currently on Lantus 12 units at bedtime and Metformin 1000 mg twice a day with meals  Hypertension- on Labetalol  History of VSD with 3 surgeries as a child  Coarction of Aorta- has a cardiologist  Allergy to steroids  History of Hep C with effective treatment and zero viral load per patient, last visit 2/2019  Referred here for diabetes management during pregnancy  No glucose meter or log during visit, reports testing fasting, before lunch and bedtime  Fasting readings 160-200, before lunch 200-230 and bedtime 300  Denies hypoglycemia, reports lowest glucose reading of 100  Does have a strong family history of diabetes         The following portions of the patient's history were reviewed and updated as appropriate: allergies, current medications, past family history, past medical history, past social history, past surgical history and problem list     Allergies   Allergen Reactions    Corticosteroids Swelling     Pt states this does not cause problems breathing, she just has generalized swelling    Bactrim [Sulfamethoxazole-Trimethoprim]     Cortisone     Lactose     Prednisone Swelling    Sulfa Antibiotics Hives    Milk-Related Compounds Sneezing     Current Outpatient Medications on File Prior to Visit   Medication Sig Dispense Refill    glucose blood (ACCU-CHEK GUIDE) test strip Test three times per day 100 each 0    labetalol (NORMODYNE) 100 mg tablet Take 100 mg by mouth 2 (two) times a day      Lancets (ACCU-CHEK MULTICLIX) lancets Use as instructed 100 each 3    metFORMIN (GLUCOPHAGE) 1000 MG tablet Take 1 tablet (1,000 mg total) by mouth 2 (two) times a day with meals 60 tablet 6    ondansetron (ZOFRAN) 8 mg tablet Take 1 tablet (8 mg total) by mouth every 8 (eight) hours as needed for nausea or vomiting 30 tablet 1    Prenatal Vit-Fe Fumarate-FA (PRENATAL VITAMIN PO) Take 1 tablet by mouth daily      [DISCONTINUED] insulin glargine (LANTUS) 100 units/mL subcutaneous injection Inject 12 Units under the skin daily at bedtime  [DISCONTINUED] ergocalciferol (VITAMIN D2) 50,000 units Take 1 capsule (50,000 Units total) by mouth once a week (Patient not taking: Reported on 12/6/2019) 8 capsule 0     No current facility-administered medications on file prior to visit  Review of Systems   Constitutional: Positive for fatigue  Negative for fever  HENT: Negative for congestion and trouble swallowing  Eyes: Negative for visual disturbance  Last eye exam years ago   Respiratory: Negative for cough and shortness of breath  Cardiovascular: Positive for chest pain (intermittently)  Negative for palpitations and leg swelling  Gastrointestinal: Positive for nausea  Negative for constipation, diarrhea and vomiting  Endocrine: Positive for cold intolerance, polydipsia, polyphagia and polyuria  Negative for heat intolerance  Genitourinary: Negative for difficulty urinating and vaginal bleeding  Musculoskeletal: Negative for arthralgias and myalgias  Skin: Negative for rash  Allergic/Immunologic: Positive for food allergies  Negative for environmental allergies  Neurological: Negative for dizziness, light-headedness, numbness and headaches  Psychiatric/Behavioral: Positive for sleep disturbance  Objective:        Component Value Date/Time    HGBA1C 11 0 (H) 10/05/2019 0717    HGBA1C 12 2 (A) 07/09/2019 0756    HGBA1C 10 7 (H) 02/19/2019 1119      /82 (BP Location: Right arm, Patient Position: Sitting, Cuff Size: Standard)   Pulse 96   Ht 5' (1 524 m)   Wt 103 kg (226 lb)   LMP 10/07/1910   BMI 44 14 kg/m²        Physical Exam   Constitutional: She is oriented to person, place, and time  She appears well-developed and well-nourished  She is cooperative  HENT:   Head: Normocephalic  Eyes: Conjunctivae and lids are normal    Neck: Normal range of motion     Cardiovascular: Normal rate, regular rhythm, S1 normal and S2 normal    Pulmonary/Chest: Effort normal and breath sounds normal  Musculoskeletal: Normal range of motion  Neurological: She is alert and oriented to person, place, and time  Skin: Skin is warm, dry and intact  Psychiatric: She has a normal mood and affect   Her speech is normal and behavior is normal  Thought content normal          Time in:09:05 am  Time out:10:25 AM

## 2019-12-12 NOTE — PATIENT INSTRUCTIONS
1  Due to reported fasting glucose 160 to 200 and last A1c 11%, increase Lantus 30 units at 9 PM daily  Add 3 AM glucose check and always have bedtime snack that includes protein and carbohydrates  2  Start Humalog 10 units before breakfast, before lunch and before dinner  Try to inject 15 minutes before meal   3  Continue Metformin 1000 mg with breakfast and 1000 mg with dinner  4  Continue GDM diet with 3 meals and 3 snacks including recommended combination of carb, protein and fat per meal/snack  5  Please eat meal or snack every 2-3 5 hours while awake  No more than 8 to 10 hours of fasting overnight  6  Start self monitoring blood glucose fasting and 2 hours after start of each meal  Keep glucose log  Glucose goals: fasting 60-90 mg/dL, 135 mg/dL or less 1 hour post meals, and 120 mg/dL or less 2 hours post meal    7  Report glucose readings weekly via [employer] every Monday, 19    8  Stay active if no restriction from your OB, walk up to 30 minutes a day  9  Always have glucose available to treat hypoglycemia  Use 15:15 rule  Refer to hypoglycemia patient education sheet  Test blood sugar when experiencing signs and symptoms of hypoglycemia and prior to driving  10  Continue prenatal vitamin as recommended  Information given on prevention of pre-eclampsia with use of baby aspirin, to be discussed with physician  11  Continue follow-up with your OB and MFM as recommended  12  Follow up in: 1-2 weeks  13  Schedule ultrasound with MFM  14  Check A1c, BMP, TSH with reflex free T4 and urine protein/creatinine ratio  Goal A1c 6% with minimal hypoglycemia  15  Insulin requirements during pregnancy and basal/bolus concept discussed     16  Importance of tight glucose control during pregnancy and risk factors such as macrosomia,  hypoglycemia, polyhydramnios, delivery complications, etc

## 2019-12-12 NOTE — PROGRESS NOTES
19  Lisa Melvin   1990  Estimated Date of Delivery: 20   JORGE: Yareli Martinez    Dear Dr Callie Delgado:    Thank you for referring your patient to the Diabetes and Pregnancy Program at [address] [place] [address]  The patient attended class 1 and patient & sister received the following education for Type 2 Diabetes:     Pathophysiology of diabetes and pregnancy  This includes maternal-fetal complications such as fetal macrosomia,  hypoglycemia, polyhydramnios, increased incidence of  section, pre-term labor and in severe cases, fetal demise and stillbirth   Medical Nutrition Therapy for diabetes and pregnancy  The patient was provided with a 1700  Calorie for 1st trimester & 1900 calorie for 2nd/3rd trimesters meal plan and the following was reviewed:     o Basic review of macronutrients   o Meal pattern should consist of three small meals and three snacks daily  o Carbohydrate gram amounts per meal   o Instructions on how to read a food label  o Appropriate serving sizes for carbohydrates and proteins  o Incorporating protein at each meal and snack  o Maintain a three day food diary and bring to class 2    Report blood glucose levels to the [facility] weekly or as directed:  o Phone : [phone]  If no response in 24 hours, call [phone]   o Fax: [phone]  o Email: [email or web address]  The patient is scheduled to attend class 2 on Monday, 19  Additionally, fetal ultrasound evaluation by the Perinatologist has been scheduled to assure continuity of care  Please contact the Diabetes and Pregnancy Program at [phone] if you have any questions  Time spent with patient 11:30-11:40 AM; time spent face to face counseling greater than 50% of the appointment      Sincerely,   Haley Marin  Diabetes Educator   Diabetes and Pregnancy Program

## 2019-12-17 ENCOUNTER — PATIENT OUTREACH (OUTPATIENT)
Dept: FAMILY MEDICINE CLINIC | Facility: CLINIC | Age: 29
End: 2019-12-17

## 2019-12-17 ENCOUNTER — HOSPITAL ENCOUNTER (EMERGENCY)
Facility: HOSPITAL | Age: 29
Discharge: HOME | End: 2019-12-17
Attending: EMERGENCY MEDICINE | Admitting: EMERGENCY MEDICINE
Payer: COMMERCIAL

## 2019-12-17 VITALS
BODY MASS INDEX: 44.14 KG/M2 | TEMPERATURE: 98.6 F | OXYGEN SATURATION: 99 % | WEIGHT: 226 LBS | RESPIRATION RATE: 16 BRPM | HEART RATE: 95 BPM | SYSTOLIC BLOOD PRESSURE: 156 MMHG | DIASTOLIC BLOOD PRESSURE: 74 MMHG

## 2019-12-17 DIAGNOSIS — J02.0 PHARYNGITIS DUE TO STREPTOCOCCUS SPECIES: Primary | ICD-10-CM

## 2019-12-17 DIAGNOSIS — E11.65 PRE-EXISTING TYPE 2 DIABETES MELLITUS WITH HYPERGLYCEMIA DURING PREGNANCY IN FIRST TRIMESTER (HCC): ICD-10-CM

## 2019-12-17 DIAGNOSIS — O24.111 PRE-EXISTING TYPE 2 DIABETES MELLITUS WITH HYPERGLYCEMIA DURING PREGNANCY IN FIRST TRIMESTER (HCC): ICD-10-CM

## 2019-12-17 DIAGNOSIS — O99.211 OBESITY AFFECTING PREGNANCY IN FIRST TRIMESTER: ICD-10-CM

## 2019-12-17 DIAGNOSIS — Z3A.09 9 WEEKS GESTATION OF PREGNANCY: ICD-10-CM

## 2019-12-17 DIAGNOSIS — E55.9 VITAMIN D DEFICIENCY: ICD-10-CM

## 2019-12-17 LAB — S PYO DNA THROAT QL NAA+PROBE: DETECTED

## 2019-12-17 PROCEDURE — 87651 STREP A DNA AMP PROBE: CPT | Performed by: EMERGENCY MEDICINE

## 2019-12-17 PROCEDURE — 99283 EMERGENCY DEPT VISIT LOW MDM: CPT

## 2019-12-17 PROCEDURE — 99284 EMERGENCY DEPT VISIT MOD MDM: CPT | Performed by: EMERGENCY MEDICINE

## 2019-12-17 RX ORDER — AMOXICILLIN 500 MG/1
1000 CAPSULE ORAL EVERY 24 HOURS
Qty: 40 CAPSULE | Refills: 0 | Status: SHIPPED | OUTPATIENT
Start: 2019-12-17 | End: 2019-12-27

## 2019-12-17 RX ORDER — AMOXICILLIN 500 MG/1
1000 CAPSULE ORAL ONCE
Status: COMPLETED | OUTPATIENT
Start: 2019-12-17 | End: 2019-12-17

## 2019-12-17 RX ADMIN — AMOXICILLIN 1000 MG: 500 CAPSULE ORAL at 22:20

## 2019-12-17 NOTE — PROGRESS NOTES
Outpatient Care Management Note:  Follow up call attempted  Voice mail box full, unable to leave message  Reviewed notes from [facility]

## 2019-12-18 NOTE — ED PROVIDER NOTES
History  Chief Complaint   Patient presents with    Sore Throat     Sore throat for one day, aw raspy voice  She has had cough and runny nose for three days  She feels she has laryngitis  No trouble swallowing, does report it hurts but is tolerating secretions well  Sx are reported as mild  No fever, trouble breathing, stridor like sounds reproted  Prior to Admission Medications   Prescriptions Last Dose Informant Patient Reported? Taking? Insulin Pen Needle 31G X 5 MM MISC   No Yes   Sig: Inject under the skin daily at bedtime Use one a day or as directed  Lancets (ACCU-CHEK MULTICLIX) lancets  Self No Yes   Sig: Use as instructed   Prenatal Vit-Fe Fumarate-FA (PRENATAL VITAMIN PO)  Self Yes Yes   Sig: Take 1 tablet by mouth daily   glucose blood (ACCU-CHEK GUIDE) test strip  Self No Yes   Sig: Test three times per day   insulin glargine (LANTUS SOLOSTAR) 100 units/mL injection pen   No Yes   Sig: Inject SC 30 units at 9 PM daily  To be titrated  insulin lispro (HUMALOG KWIKPEN) 100 units/mL injection pen   No No   Sig: Inject SC 10 units before breakfast, before lunch and dinner  To be titrated     labetalol (NORMODYNE) 100 mg tablet  Outside Facility (Specify) Yes Yes   Sig: Take 100 mg by mouth 2 (two) times a day   metFORMIN (GLUCOPHAGE) 1000 MG tablet  Self No Yes   Sig: Take 1 tablet (1,000 mg total) by mouth 2 (two) times a day with meals   ondansetron (ZOFRAN) 8 mg tablet  Self No Yes   Sig: Take 1 tablet (8 mg total) by mouth every 8 (eight) hours as needed for nausea or vomiting      Facility-Administered Medications: None       Past Medical History:   Diagnosis Date    Allergic     Arthritis     Bipolar affective disorder, currently depressed, moderate ([facility] [address] ) 12/17/2018    Cyst of ovary, right     Diabetes mellitus ([facility] [address] ) 10/10/18    type 2    Endometriosis     Heart murmur 01/19/88    Hepatitis C     Hepatitis C virus infection cured after antiviral drug therapy     History of transfusion     Hypertension     Migraines     Obesity     Pulmonary artery congenital abnormality     Spleen enlarged     Varicella        Past Surgical History:   Procedure Laterality Date    CARDIAC CATHETERIZATION      no CAD 10days, 4 weeks 22months old     CHOLECYSTECTOMY      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 7   [facility] LIVER BIOPSY      LIVER BIOPSY      VSD REPAIR      As a child       Family History   Problem Relation Age of Onset    Hypertension Mother     Migraines Mother     JENNY disease Mother     Depression Mother     Hyperlipidemia Mother     Diabetes Mother     Diabetes Father     Hypertension Father     Kidney failure Father     Heart attack Father     Arthritis Father     Stroke Father     Polycystic kidney disease Paternal Grandmother     Stroke Paternal Grandmother     Heart disease Paternal Grandmother     Arthritis Sister     Asthma Sister     Thyroid disease Sister     Diabetes Sister     Arthritis Maternal Grandmother     Breast cancer Maternal Grandmother     Diabetes Maternal Grandmother     Hypertension Maternal Grandmother     Heart Valve Disease Maternal Grandmother     Learning disabilities Cousin     Learning disabilities Sister     ADD / ADHD Cousin     Lung cancer Brother     Diabetes Maternal Grandfather     Hypertension Maternal Grandfather     JENNY disease Maternal Grandfather     Stroke Maternal Grandfather      I have reviewed and agree with the history as documented      Social History     Tobacco Use    Smoking status: Former Smoker     Packs/day: 0 20     Types: Cigarettes     Start date: 2019     Last attempt to quit: 2019     Years since quittin 9    Smokeless tobacco: Never Used   Substance Use Topics    Alcohol use: Never     Alcohol/week: 3 0 standard drinks     Types: 3 Standard drinks or equivalent per week     Frequency: Never     Comment: socially/prior to knowledge of pregnancy    Drug use: Not Currently Comment: hx of THC use for migraines        Review of Systems   All other systems reviewed and are negative  Physical Exam  Physical Exam   Constitutional: She is oriented to person, place, and time  She appears well-developed and well-nourished  HENT:   Head: Normocephalic and atraumatic  Right Ear: External ear normal    Left Ear: External ear normal    Post pharynx is erythremic, symmetrical airways wo masses, exudates, midline uvula, raspy sounding voice, no stridor, tolerating secretion well  Eyes: Pupils are equal, round, and reactive to light  Right eye exhibits no discharge  Left eye exhibits no discharge  Neck: Normal range of motion  Neck supple  No JVD present  No tracheal deviation present  No thyromegaly present  Cardiovascular: Normal rate, regular rhythm and normal heart sounds  Exam reveals no gallop and no friction rub  No murmur heard  Pulmonary/Chest: Effort normal and breath sounds normal  No stridor  No respiratory distress  She has no wheezes  She has no rales  She exhibits no tenderness  Abdominal: She exhibits no distension  There is no tenderness  There is no rebound  No hernia  Musculoskeletal: Normal range of motion  She exhibits no edema, tenderness or deformity  Lymphadenopathy:     She has no cervical adenopathy  Neurological: She is alert and oriented to person, place, and time  She displays normal reflexes  No cranial nerve deficit or sensory deficit  She exhibits normal muscle tone  Coordination normal    Skin: Skin is warm  Capillary refill takes less than 2 seconds  No rash noted  No erythema  Psychiatric: She has a normal mood and affect         Vital Signs  ED Triage Vitals [12/17/19 2101]   Temperature Pulse Respirations Blood Pressure SpO2   98 6 °F (37 °C) 95 16 160/83 99 %      Temp src Heart Rate Source Patient Position - Orthostatic VS BP Location FiO2 (%)   -- -- -- -- --      Pain Score       6           Vitals:    12/17/19 2101 12/17/19 2145 12/17/19 2222   BP: 160/83  156/74   Pulse: 95 97 95         Visual Acuity      ED Medications  Medications   amoxicillin (AMOXIL) capsule 1,000 mg (1,000 mg Oral Given 12/17/19 2220)       Diagnostic Studies  Results Reviewed     Procedure Component Value Units Date/Time    Strep A PCR [[id number]]  (Abnormal) Collected:  12/17/19 2115    Lab Status:  Final result Specimen:  Throat Updated:  12/17/19 2154     STREP A PCR Detected                 No orders to display              Procedures  Procedures         ED Course                               MDM  Number of Diagnoses or Management Options  Diagnosis management comments: With sore throat for the last day, cough for the last 3  She is losing her voice  No airway issues, tolerating secretions, symmetrical airway  I have discuss the need for fu and when to return to er and she has voiced understanding  Disposition  Final diagnoses:   Pharyngitis due to Streptococcus species     Time reflects when diagnosis was documented in both MDM as applicable and the Disposition within this note     Time User Action Codes Description Comment    12/17/2019 10:13 PM Francisco Javier Corral Add [J02 0] Pharyngitis due to Streptococcus species       ED Disposition     ED Disposition Condition Date/Time Comment    Discharge Stable Tue Dec 17, 2019 10:13 PM Shanita Pino discharge to home/self care              Follow-up Information     Follow up With Specialties Details Why Contact Info    Antolin Eaton,  Family Medicine In 3 days  [facility] [address] [facility]  [phone]            Discharge Medication List as of 12/17/2019 10:15 PM      START taking these medications    Details   amoxicillin (AMOXIL) 500 mg capsule Take 2 capsules (1,000 mg total) by mouth every 24 hours for 10 days, Starting Tue 12/17/2019, Until Fri 12/27/2019, Normal         CONTINUE these medications which have NOT CHANGED    Details   glucose blood (ACCU-CHEK GUIDE) test strip Test three times per day, Normal      insulin glargine (LANTUS SOLOSTAR) 100 units/mL injection pen Inject SC 30 units at 9 PM daily  To be titrated , Normal      Insulin Pen Needle 31G X 5 MM MISC Inject under the skin daily at bedtime Use one a day or as directed , Starting Thu 12/12/2019, Until Sat 1/11/2020, Normal      labetalol (NORMODYNE) 100 mg tablet Take 100 mg by mouth 2 (two) times a day, Historical Med      Lancets (ACCU-CHEK MULTICLIX) lancets Use as instructed, Normal      metFORMIN (GLUCOPHAGE) 1000 MG tablet Take 1 tablet (1,000 mg total) by mouth 2 (two) times a day with meals, Starting Fri 2/22/2019, Normal      ondansetron (ZOFRAN) 8 mg tablet Take 1 tablet (8 mg total) by mouth every 8 (eight) hours as needed for nausea or vomiting, Starting Thu 2/28/2019, Normal      Prenatal Vit-Fe Fumarate-FA (PRENATAL VITAMIN PO) Take 1 tablet by mouth daily, Historical Med      insulin lispro (HUMALOG KWIKPEN) 100 units/mL injection pen Inject SC 10 units before breakfast, before lunch and dinner  To be titrated , Normal           No discharge procedures on file      ED Provider  Electronically Signed by           Nils Mcnamara MD  12/18/19 6447

## 2019-12-18 NOTE — DISCHARGE INSTRUCTIONS
Return to er with trouble swallowing, trouble breathing, failure to improve in three days or with worse symptoms any any point / any other concerns  See pcp for follow up this week

## 2019-12-26 ENCOUNTER — PATIENT OUTREACH (OUTPATIENT)
Dept: FAMILY MEDICINE CLINIC | Facility: CLINIC | Age: 29
End: 2019-12-26

## 2019-12-26 DIAGNOSIS — Z3A.09 9 WEEKS GESTATION OF PREGNANCY: ICD-10-CM

## 2019-12-26 DIAGNOSIS — E55.9 VITAMIN D DEFICIENCY: ICD-10-CM

## 2019-12-26 DIAGNOSIS — E11.65 PRE-EXISTING TYPE 2 DIABETES MELLITUS WITH HYPERGLYCEMIA DURING PREGNANCY IN FIRST TRIMESTER (HCC): ICD-10-CM

## 2019-12-26 DIAGNOSIS — O99.211 OBESITY AFFECTING PREGNANCY IN FIRST TRIMESTER: ICD-10-CM

## 2019-12-26 DIAGNOSIS — O24.111 PRE-EXISTING TYPE 2 DIABETES MELLITUS WITH HYPERGLYCEMIA DURING PREGNANCY IN FIRST TRIMESTER (HCC): ICD-10-CM

## 2019-12-26 NOTE — PROGRESS NOTES
Outpatient Care Management Note:  Follow up call placed to [facility]  She is seeing a diabetic educator at the  center and does not feel that she needs continued outreach from me at this time  She stated that she will call if she has concerns that I can assist with  Agreeable to same

## 2020-01-06 ENCOUNTER — TRANSCRIBE ORDERS (OUTPATIENT)
Dept: ADMINISTRATIVE | Facility: HOSPITAL | Age: 30
End: 2020-01-06

## 2020-01-06 ENCOUNTER — APPOINTMENT (OUTPATIENT)
Dept: LAB | Facility: MEDICAL CENTER | Age: 30
End: 2020-01-06
Payer: COMMERCIAL

## 2020-01-06 ENCOUNTER — INITIAL PRENATAL (OUTPATIENT)
Dept: OBGYN CLINIC | Facility: MEDICAL CENTER | Age: 30
End: 2020-01-06
Payer: COMMERCIAL

## 2020-01-06 VITALS — SYSTOLIC BLOOD PRESSURE: 126 MMHG | WEIGHT: 227 LBS | DIASTOLIC BLOOD PRESSURE: 70 MMHG | BODY MASS INDEX: 44.33 KG/M2

## 2020-01-06 DIAGNOSIS — O99.211 OBESITY AFFECTING PREGNANCY IN FIRST TRIMESTER: ICD-10-CM

## 2020-01-06 DIAGNOSIS — I10 ESSENTIAL HYPERTENSION: ICD-10-CM

## 2020-01-06 DIAGNOSIS — Z23 NEED FOR INFLUENZA VACCINATION: Primary | ICD-10-CM

## 2020-01-06 DIAGNOSIS — O24.111 PRE-EXISTING TYPE 2 DIABETES MELLITUS WITH HYPERGLYCEMIA DURING PREGNANCY IN FIRST TRIMESTER (HCC): ICD-10-CM

## 2020-01-06 DIAGNOSIS — O09.91 SUPERVISION OF HIGH RISK PREGNANCY IN FIRST TRIMESTER: ICD-10-CM

## 2020-01-06 DIAGNOSIS — E11.65 TYPE 2 DIABETES MELLITUS WITH HYPERGLYCEMIA, WITHOUT LONG-TERM CURRENT USE OF INSULIN (HCC): ICD-10-CM

## 2020-01-06 DIAGNOSIS — Z34.91 FIRST TRIMESTER PREGNANCY: ICD-10-CM

## 2020-01-06 DIAGNOSIS — Z11.3 SCREENING EXAMINATION FOR STD (SEXUALLY TRANSMITTED DISEASE): ICD-10-CM

## 2020-01-06 DIAGNOSIS — E55.9 AVITAMINOSIS D: ICD-10-CM

## 2020-01-06 DIAGNOSIS — E11.65 PRE-EXISTING TYPE 2 DIABETES MELLITUS WITH HYPERGLYCEMIA DURING PREGNANCY IN FIRST TRIMESTER (HCC): ICD-10-CM

## 2020-01-06 DIAGNOSIS — E55.9 VITAMIN D DEFICIENCY: ICD-10-CM

## 2020-01-06 DIAGNOSIS — Z34.91 FIRST TRIMESTER PREGNANCY: Primary | ICD-10-CM

## 2020-01-06 DIAGNOSIS — Z3A.09 9 WEEKS GESTATION OF PREGNANCY: ICD-10-CM

## 2020-01-06 DIAGNOSIS — Z3A.12 12 WEEKS GESTATION OF PREGNANCY: ICD-10-CM

## 2020-01-06 DIAGNOSIS — E11.65 UNCONTROLLED TYPE 2 DIABETES MELLITUS WITH HYPERGLYCEMIA (HCC): Primary | ICD-10-CM

## 2020-01-06 LAB
ANION GAP SERPL CALCULATED.3IONS-SCNC: 7 MMOL/L (ref 4–13)
BACTERIA UR QL AUTO: ABNORMAL /HPF
BASOPHILS # BLD AUTO: 0.04 THOUSANDS/ΜL (ref 0–0.1)
BASOPHILS NFR BLD AUTO: 0 % (ref 0–1)
BILIRUB UR QL STRIP: NEGATIVE
BUN SERPL-MCNC: 7 MG/DL (ref 5–25)
CALCIUM SERPL-MCNC: 10 MG/DL (ref 8.3–10.1)
CHLORIDE SERPL-SCNC: 105 MMOL/L (ref 100–108)
CLARITY UR: ABNORMAL
CO2 SERPL-SCNC: 24 MMOL/L (ref 21–32)
COLOR UR: YELLOW
CREAT SERPL-MCNC: 0.48 MG/DL (ref 0.6–1.3)
CREAT UR-MCNC: 120 MG/DL
EOSINOPHIL # BLD AUTO: 0.06 THOUSAND/ΜL (ref 0–0.61)
EOSINOPHIL NFR BLD AUTO: 1 % (ref 0–6)
ERYTHROCYTE [DISTWIDTH] IN BLOOD BY AUTOMATED COUNT: 14.7 % (ref 11.6–15.1)
EST. AVERAGE GLUCOSE BLD GHB EST-MCNC: 177 MG/DL
GFR SERPL CREATININE-BSD FRML MDRD: 131 ML/MIN/1.73SQ M
GLUCOSE SERPL-MCNC: 88 MG/DL (ref 65–140)
GLUCOSE UR STRIP-MCNC: NEGATIVE MG/DL
HBA1C MFR BLD: 7.8 % (ref 4.2–6.3)
HBV SURFACE AG SER QL: NORMAL
HCT VFR BLD AUTO: 40.9 % (ref 34.8–46.1)
HGB BLD-MCNC: 13.1 G/DL (ref 11.5–15.4)
HGB UR QL STRIP.AUTO: NEGATIVE
HYALINE CASTS #/AREA URNS LPF: ABNORMAL /LPF
IMM GRANULOCYTES # BLD AUTO: 0.07 THOUSAND/UL (ref 0–0.2)
IMM GRANULOCYTES NFR BLD AUTO: 1 % (ref 0–2)
KETONES UR STRIP-MCNC: NEGATIVE MG/DL
LEUKOCYTE ESTERASE UR QL STRIP: ABNORMAL
LYMPHOCYTES # BLD AUTO: 2.53 THOUSANDS/ΜL (ref 0.6–4.47)
LYMPHOCYTES NFR BLD AUTO: 21 % (ref 14–44)
MCH RBC QN AUTO: 25.1 PG (ref 26.8–34.3)
MCHC RBC AUTO-ENTMCNC: 32 G/DL (ref 31.4–37.4)
MCV RBC AUTO: 78 FL (ref 82–98)
MONOCYTES # BLD AUTO: 0.62 THOUSAND/ΜL (ref 0.17–1.22)
MONOCYTES NFR BLD AUTO: 5 % (ref 4–12)
NEUTROPHILS # BLD AUTO: 8.94 THOUSANDS/ΜL (ref 1.85–7.62)
NEUTS SEG NFR BLD AUTO: 72 % (ref 43–75)
NITRITE UR QL STRIP: NEGATIVE
NON-SQ EPI CELLS URNS QL MICRO: ABNORMAL /HPF
NRBC BLD AUTO-RTO: 0 /100 WBCS
PH UR STRIP.AUTO: 6 [PH]
PLATELET # BLD AUTO: 237 THOUSANDS/UL (ref 149–390)
PMV BLD AUTO: 11 FL (ref 8.9–12.7)
POTASSIUM SERPL-SCNC: 3.5 MMOL/L (ref 3.5–5.3)
PROT UR STRIP-MCNC: NEGATIVE MG/DL
PROT UR-MCNC: 10 MG/DL
PROT/CREAT UR: 0.08 MG/G{CREAT} (ref 0–0.1)
RBC # BLD AUTO: 5.22 MILLION/UL (ref 3.81–5.12)
RBC #/AREA URNS AUTO: ABNORMAL /HPF
RUBV IGG SERPL IA-ACNC: 1.8 IU/ML
SODIUM SERPL-SCNC: 136 MMOL/L (ref 136–145)
SP GR UR STRIP.AUTO: 1.02 (ref 1–1.03)
TSH SERPL DL<=0.05 MIU/L-ACNC: 1.7 UIU/ML (ref 0.36–3.74)
UROBILINOGEN UR QL STRIP.AUTO: 0.2 E.U./DL
WBC # BLD AUTO: 12.26 THOUSAND/UL (ref 4.31–10.16)
WBC #/AREA URNS AUTO: ABNORMAL /HPF

## 2020-01-06 PROCEDURE — 84443 ASSAY THYROID STIM HORMONE: CPT

## 2020-01-06 PROCEDURE — 83036 HEMOGLOBIN GLYCOSYLATED A1C: CPT

## 2020-01-06 PROCEDURE — 90471 IMMUNIZATION ADMIN: CPT | Performed by: OBSTETRICS & GYNECOLOGY

## 2020-01-06 PROCEDURE — 80048 BASIC METABOLIC PNL TOTAL CA: CPT

## 2020-01-06 PROCEDURE — 90686 IIV4 VACC NO PRSV 0.5 ML IM: CPT | Performed by: OBSTETRICS & GYNECOLOGY

## 2020-01-06 PROCEDURE — 84156 ASSAY OF PROTEIN URINE: CPT | Performed by: NURSE PRACTITIONER

## 2020-01-06 PROCEDURE — 81001 URINALYSIS AUTO W/SCOPE: CPT

## 2020-01-06 PROCEDURE — 87591 N.GONORRHOEAE DNA AMP PROB: CPT | Performed by: NURSE PRACTITIONER

## 2020-01-06 PROCEDURE — 99213 OFFICE O/P EST LOW 20 MIN: CPT | Performed by: NURSE PRACTITIONER

## 2020-01-06 PROCEDURE — 36415 COLL VENOUS BLD VENIPUNCTURE: CPT

## 2020-01-06 PROCEDURE — 87491 CHLMYD TRACH DNA AMP PROBE: CPT | Performed by: NURSE PRACTITIONER

## 2020-01-06 PROCEDURE — 80081 OBSTETRIC PANEL INC HIV TSTG: CPT

## 2020-01-06 PROCEDURE — 87086 URINE CULTURE/COLONY COUNT: CPT

## 2020-01-06 PROCEDURE — 82570 ASSAY OF URINE CREATININE: CPT | Performed by: NURSE PRACTITIONER

## 2020-01-07 ENCOUNTER — ROUTINE PRENATAL (OUTPATIENT)
Dept: PERINATAL CARE | Facility: OTHER | Age: 30
End: 2020-01-07
Payer: COMMERCIAL

## 2020-01-07 VITALS
BODY MASS INDEX: 44.52 KG/M2 | HEIGHT: 60 IN | HEART RATE: 91 BPM | WEIGHT: 226.8 LBS | SYSTOLIC BLOOD PRESSURE: 138 MMHG | DIASTOLIC BLOOD PRESSURE: 70 MMHG

## 2020-01-07 DIAGNOSIS — Z36.82 ENCOUNTER FOR ANTENATAL SCREENING FOR NUCHAL TRANSLUCENCY: ICD-10-CM

## 2020-01-07 DIAGNOSIS — O35.2XX0 HEREDITARY FAMILIAL DISEASE AFFECTING MANAGEMENT OF MOTHER AND POSSIBLY AFFECTING FETUS, ANTEPARTUM, SINGLE OR UNSPECIFIED FETUS: ICD-10-CM

## 2020-01-07 DIAGNOSIS — E11.65 PRE-EXISTING TYPE 2 DIABETES MELLITUS WITH HYPERGLYCEMIA DURING PREGNANCY IN FIRST TRIMESTER (HCC): Primary | ICD-10-CM

## 2020-01-07 DIAGNOSIS — Z34.91 ENCOUNTER FOR PREGNANCY RELATED EXAMINATION IN FIRST TRIMESTER: ICD-10-CM

## 2020-01-07 DIAGNOSIS — E66.01 MATERNAL MORBID OBESITY IN FIRST TRIMESTER, ANTEPARTUM (HCC): ICD-10-CM

## 2020-01-07 DIAGNOSIS — O10.911 MATERNAL CHRONIC HYPERTENSION, FIRST TRIMESTER: ICD-10-CM

## 2020-01-07 DIAGNOSIS — O99.211 MATERNAL MORBID OBESITY IN FIRST TRIMESTER, ANTEPARTUM (HCC): ICD-10-CM

## 2020-01-07 DIAGNOSIS — Z3A.13 13 WEEKS GESTATION OF PREGNANCY: ICD-10-CM

## 2020-01-07 DIAGNOSIS — O24.111 PRE-EXISTING TYPE 2 DIABETES MELLITUS WITH HYPERGLYCEMIA DURING PREGNANCY IN FIRST TRIMESTER (HCC): Primary | ICD-10-CM

## 2020-01-07 LAB
ABO GROUP BLD: NORMAL
BACTERIA UR CULT: NORMAL
BLD GP AB SCN SERPL QL: NEGATIVE
C TRACH DNA SPEC QL NAA+PROBE: NEGATIVE
N GONORRHOEA DNA SPEC QL NAA+PROBE: NEGATIVE
RH BLD: POSITIVE
RPR SER QL: NORMAL
SPECIMEN EXPIRATION DATE: NORMAL

## 2020-01-07 PROCEDURE — 76813 OB US NUCHAL MEAS 1 GEST: CPT | Performed by: OBSTETRICS & GYNECOLOGY

## 2020-01-07 PROCEDURE — 99242 OFF/OP CONSLTJ NEW/EST SF 20: CPT | Performed by: OBSTETRICS & GYNECOLOGY

## 2020-01-07 NOTE — PROGRESS NOTES
Pt has not reported her bloodsugars to Diabetes Ed and did not bring them to this appt  I instructed Kathy to call/fax or email her bloodsugars TODAY when she arrives back to her home  I stressed importance for her to do this and that she should continue to notify Diabetes Ed throughout the pregnancy as instructed    Pt receptive and verbalized understanding

## 2020-01-07 NOTE — PROGRESS NOTES
Rodolfo Bolden is a 30y o  year old  at 13w0d for first prenatal visit  Presents with her mother   Pregnancy was desired  She is currently taking PNV  Was planning on getting a btl, but it was delayed because of other surgery needed  Nausea No Vomiting No   Exam done today - see OB flowsheet  Pap done No normal in 2018  Gonorrhea and Chlamydia sent  Recent neg hiv, done b/c boyfriend thought she transferred it to him  Labs reviewed  Will go for prenatal panel and pre eclamptic labs tomorrow, Will check ins  For cf and sma  Coverage  Genetic testing : seq/screen   Given flu vaccine  OB complications : morbid obesity, T2DM on insulin since 2018, Hypertension  Hx of VSD, 3 heart surgeries and COA  Depend on mother for transportation  Pt has been counseled re diet, exercise, weight gain, foods to avoid, vaccines in pregnancy, trisomy screening, travel precautions to include seat belt use and VTE risk reduction  She has been provided our pregnancy packet which includes how and when to contact providers, medication recommendations, dietary suggestions, breastfeeding information as well as websites for additional information, hospital and delivery concerns

## 2020-01-07 NOTE — LETTER
January 9, 2020     Brian Flores MD  [address]    Patient: Rachel Alatorre   YOB: 1990   Date of Visit: 1/7/2020       Dear Dr Julienne Plata:    Thank you for referring Rachel Alatorre to me for evaluation  Below are my notes for this consultation  If you have questions, please do not hesitate to call me  I look forward to following your patient along with you           Sincerely,        Randall Velazquez MD        CC: No Recipients

## 2020-01-08 LAB — HIV 1+2 AB+HIV1 P24 AG SERPL QL IA: NORMAL

## 2020-01-09 NOTE — PROGRESS NOTES
Thank you very much for your kind referral of Hugo Melgoza for first-trimester ultrasound evaluation, genetic screening, and MFM consult at [facility] on January 7, 2020  St mahan is a 75-year-old primigravida who is currently at 15 and 1/7 weeks gestation by an estimated due date of July 13, 2020 which is based upon menstrual dating  Consultation is performed for the indications of type 2 diabetes, maternal congenital heart disease, and chronic hypertension  St mahan has no complaints today  She denies vaginal bleeding, nausea and vomiting  She denies shortness of breath or chest pain  Her fasting blood glucose this morning was 70 mg/dL  A hemoglobin A1c obtained yesterday was elevated at 7 8%  A TSH obtained yesterday was normal at 1 700 uIU/mL  St mahan has a history of type 2 diabetes , diagnosed in 2018, currently treated with 30 units of Lantus at night, and 10 units of Humalog before each meal   She also takes metformin  She has had initial evaluation recently within the Diabetes and Pregnancy Program in the [facility] [place] [facility], [facility]  St mahan has a history of chronic hypertension, diagnosed last year and treated with lisinopril which was recently discontinued and converted to treatment with 100 mg of labetalol twice a day  A recent baseline urine protein to creatinine ratio was normal at 0 08  St mahan has a history of congenital heart disease , including coarctation of the aorta and a VSD  She had initial coarctation repair and PDA ligation at about 10 days of life  At four weeks of life she had a PA band placed  Her VSD was closed and PA taken down at 25months of age  Each of these surgeries was performed at [facility]  At age [de-identified], she underwent balloon dilation of recurrent coarctation  Cardiac catheterization was additionally performed at age 10    She is currently followed by the [address] [place] [address], and has recently been seen by Dr of Gracelyn Sampson at [facility] her most recent EKG was performed on December 9, 2019 which revealed right bundle-branch block, unchanged from prior evaluations  Most recent echocardiography was performed in September of 2019 which revealed a normal left ventricle size and no obvious VSD  The ejection fraction of the left ventricle was 60%  The right ventricle was mildly  dilated, with normal left atrium and right atrium sizes  The aortic root was normal in size, with a normal aortic arch  CT in February of 2019 revealed a web-like 50% stenosis of the proximal descending thoracic aorta, consistent with her history of coarctation   She was referred in early December of 2019 for catheterization and stenting, but conceived prior to the procedure and was not performed  It was a feeling of her cardiologist in [place] that the presence of choroid today shin was not an absolute indication first termination of pregnancy  Edie Winkler is scheduled for follow-up cardiology evaluation and echocardiography in [place] in February  Edie Winkler has a history of hepatitis-C virus infection, never treated medically, and a history depression   Her past surgical history is significant for a cholecystectomy and liver biopsy otherwise  She takes no other medication with the exception of a prenatal vitamin on a daily basis  She has a history of tobacco use pre dating this pregnancy but denies current tobacco, alcohol, or illicit drug use  The family genetic history is negative with respect to genetic abnormalities, birth defects, or mental retardation  Her mom had a history of preeclampsia in one of her pregnancies  Her dad has diabetes  Her dad, mom, and four sisters have hypertension  There is no first-degree family member with a diagnosis of venous thromboembolism  Today's ultrasound findings and suggested follow-up were discussed  with Edie Winkler  The Sequential Screen was discussed in detail, including the sensitivity for detection of Down syndrome  Definitive prenatal diagnosis is possible only through genetic amniocentesis or CVS   Ladi Vanegas had a fingerstick blood collection for hCG and JORJE-A to complete the initial component of the Sequential Screen  Results should be available within one week  Follow-up multiple marker serum screening at 16 to 20 weeks gestation is recommended to complete the Sequential Screen  Her history of pre gestational diabetes with poor first-trimester control is associated with an increased risk for fetal structural abnormalities, including cardiac anomalies  Her personal history of a congenital heart defect is also associated with an increased risk for this fetus to have a congenital heart defect  She will return in four weeks for early second trimester fetal anatomy assessment  Fetal Level II ultrasound imaging is scheduled at about 21 weeks gestation  Fetal echocardiography will be performed at about 24 weeks gestation  Serial fetal interval growth assessment are recommended during the second half of the pregnancy, as she has multiple risk factors for fetal growth abnormalities  Non stress testing is recommended for additional pregnancy surveillance beginning at 32 weeks gestation, sooner if otherwise clinically indicated  Ladi Vanegas has multiple risk factors for preeclampsia, including type 2 diabetes, chronic hypertension, and morbid obesity  I recommended prophylaxis with 162 mg of aspirin a day, which will significantly reduce her risk  Continuation of daily low dose aspirin therapy is recommended until delivery  Ladi Vanegas should continue to maintain contact with the Diabetes and Pregnancy program in the [facility]  at least once a week for review of her blood glucose values and adjustment of insulin doses   Hemoglobin A1c studies are recommended every 6 to 8 weeks  Co-management with Adult Congenital Heart Disease specialists is recommended throughout this pregnancy    Anesthesiology consultation is recommended during the prenatal course to address concerns related to anesthesia care during her labor and delivery course, given her history of multiple comorbidities which are associated with an increased risk for anesthesia complications  Luke Damian has consultations scheduled with Maternal-Fetal Medicine at the [address] [place] [address], given the possibility that her complex cardiac history may result in delivery in [place]  Evaluation of HCV RNA is recommended given her history of hepatitis-C virus infection  The face to face time, in addition to time spent discussing ultrasound results, was approximately 30 minutes, greater than 50% of which was spent during counseling and coordination of care

## 2020-01-15 ENCOUNTER — TELEPHONE (OUTPATIENT)
Dept: PERINATAL CARE | Facility: CLINIC | Age: 30
End: 2020-01-15

## 2020-01-15 NOTE — TELEPHONE ENCOUNTER
I attempted to call Terra Maza today using the number on her communication consent to give her results of her part 1 sequential screen  However, her "has user restrictions and cannot accept phone calls at this time  TRF mailed

## 2020-01-15 NOTE — LETTER
01/15/20  Val Stark  1990    Thank you for completing Part 1 of your Sequential Screen  To obtain a complete test result, please complete blood work for Part 2 Sequential Screen between the weeks of 1/25/20 to 2/8/20  Based on your insurance coverage, please use one of the following locations  Call our office for any questions at [phone]      [address] [place] [address] Avenue  [address] [facility], [facility], [address]    [address] [facility], [facility] [address]       Phone: [phone]      Phone: [phone]    [facility] 82  [facility] 6 [facility], [address] [place] [address]  Phone: [phone]      Phone: [phone] Leo Rutledge for lab)    [address], [facility], [address] [place] [address], [place] [address] [address]  Phone: [phone]      Phone:  [phone]  Hours: Monday-Friday 6a-6p, Saturday 7a-12    [address]  14[address] [facility] 6   [address]  Phone: [phone]      Phone:  793 [facility],[address]  207 [facility], [facility], [address]   [address]  Phone: [phone]      Phone: [phone]      Sincerely,    Julia Kirby RN

## 2020-01-15 NOTE — TELEPHONE ENCOUNTER
----- Message from Gulshan Mitchell MD sent at 1/14/2020 11:08 AM EST -----  I reviewed the lab study today and the results are normal

## 2020-01-16 ENCOUNTER — OFFICE VISIT (OUTPATIENT)
Dept: FAMILY MEDICINE CLINIC | Facility: CLINIC | Age: 30
End: 2020-01-16
Payer: COMMERCIAL

## 2020-01-16 VITALS
DIASTOLIC BLOOD PRESSURE: 70 MMHG | WEIGHT: 225 LBS | BODY MASS INDEX: 44.17 KG/M2 | HEART RATE: 104 BPM | TEMPERATURE: 98.2 F | OXYGEN SATURATION: 98 % | SYSTOLIC BLOOD PRESSURE: 136 MMHG | HEIGHT: 60 IN | RESPIRATION RATE: 16 BRPM

## 2020-01-16 DIAGNOSIS — Z01.00 DIABETIC EYE EXAM (HCC): ICD-10-CM

## 2020-01-16 DIAGNOSIS — E11.9 DIABETIC EYE EXAM (HCC): ICD-10-CM

## 2020-01-16 DIAGNOSIS — E11.65 TYPE 2 DIABETES MELLITUS WITH HYPERGLYCEMIA, WITHOUT LONG-TERM CURRENT USE OF INSULIN (HCC): Primary | ICD-10-CM

## 2020-01-16 PROCEDURE — 3008F BODY MASS INDEX DOCD: CPT | Performed by: FAMILY MEDICINE

## 2020-01-16 PROCEDURE — 99213 OFFICE O/P EST LOW 20 MIN: CPT | Performed by: FAMILY MEDICINE

## 2020-01-16 NOTE — PROGRESS NOTES
Assessment/Plan:    No problem-specific Assessment & Plan notes found for this encounter  Diagnoses and all orders for this visit:    Type 2 diabetes mellitus with hyperglycemia, without long-term current use of insulin (HCC)  -     Hemoglobin A1C; Future  -     C-peptide; Future  -     Glucose, fasting; Future    Diabetic eye exam [facility])  -     Ambulatory referral to Ophthalmology; Future    Other orders  -     Cancel: POCT hemoglobin A1c          PHQ-9 Depression Screening    PHQ-9:    Frequency of the following problems over the past two weeks:       Little interest or pleasure in doing things:  0 - not at all  Feeling down, depressed, or hopeless:  0 - not at all  PHQ-2 Score:  0            Subjective:      Patient ID: Tony Chou is a 30 y o  female  Diabetes   She presents for her follow-up diabetic visit  She has type 2 diabetes mellitus  Her disease course has been improving  There are no hypoglycemic associated symptoms  Pertinent negatives for diabetes include no chest pain  There are no hypoglycemic complications  Symptoms are stable  There are no diabetic complications  Risk factors for coronary artery disease include obesity and sedentary lifestyle  Current diabetic treatment includes insulin injections and oral agent (monotherapy)  She is compliant with treatment most of the time  Her weight is stable  She is following a diabetic diet  Her home blood glucose trend is decreasing steadily  An ACE inhibitor/angiotensin II receptor blocker is being taken  The following portions of the patient's history were reviewed and updated as appropriate: allergies, current medications, past family history, past medical history, past social history, past surgical history and problem list     Review of Systems   Eyes: Negative for visual disturbance  Cardiovascular: Negative for chest pain and palpitations  Gastrointestinal: Negative for abdominal pain, nausea and vomiting     Genitourinary: Negative for frequency  Skin: Negative for wound  Neurological: Negative for numbness  Objective:    /70   Pulse 104   Temp 98 2 °F (36 8 °C) (Tympanic)   Resp 16   Ht 5' (1 524 m)   Wt 102 kg (225 lb)   LMP 10/07/1910   SpO2 98%   BMI 43 94 kg/m²      Physical Exam   Constitutional: She is oriented to person, place, and time  She appears well-developed and well-nourished  No distress  HENT:   Head: Normocephalic and atraumatic  Eyes: Pupils are equal, round, and reactive to light  Conjunctivae and EOM are normal  Right eye exhibits no discharge  Left eye exhibits no discharge  No scleral icterus  Neck: Normal range of motion  Neck supple  Cardiovascular: Normal rate, regular rhythm and normal heart sounds  No murmur heard  Pulmonary/Chest: Effort normal and breath sounds normal  No respiratory distress  She has no wheezes  She has no rales  Abdominal: Soft  Bowel sounds are normal  She exhibits no distension and no mass  There is no tenderness  There is no rebound and no guarding  Musculoskeletal: Normal range of motion  She exhibits no edema or deformity  Lymphadenopathy:     She has no cervical adenopathy  Neurological: She is alert and oriented to person, place, and time  Skin: Skin is warm and dry  She is not diaphoretic  Psychiatric: She has a normal mood and affect  Her behavior is normal  Judgment and thought content normal    Nursing note and vitals reviewed  Octavio Villeda

## 2020-01-29 ENCOUNTER — TELEPHONE (OUTPATIENT)
Dept: OBGYN CLINIC | Facility: MEDICAL CENTER | Age: 30
End: 2020-01-29

## 2020-01-29 NOTE — TELEPHONE ENCOUNTER
Per Ni Velasquez at [facility] no precert required for global maternity  All services will be covered at 100%, no deductible  Reference # [id number]

## 2020-02-03 ENCOUNTER — ROUTINE PRENATAL (OUTPATIENT)
Dept: OBGYN CLINIC | Facility: MEDICAL CENTER | Age: 30
End: 2020-02-03
Payer: COMMERCIAL

## 2020-02-03 ENCOUNTER — TRANSCRIBE ORDERS (OUTPATIENT)
Dept: ADMINISTRATIVE | Facility: HOSPITAL | Age: 30
End: 2020-02-03

## 2020-02-03 ENCOUNTER — APPOINTMENT (OUTPATIENT)
Dept: LAB | Facility: MEDICAL CENTER | Age: 30
End: 2020-02-03
Payer: COMMERCIAL

## 2020-02-03 VITALS — WEIGHT: 225.6 LBS | DIASTOLIC BLOOD PRESSURE: 72 MMHG | SYSTOLIC BLOOD PRESSURE: 126 MMHG | BODY MASS INDEX: 44.06 KG/M2

## 2020-02-03 DIAGNOSIS — Z36.9 UNSPECIFIED ANTENATAL SCREENING: ICD-10-CM

## 2020-02-03 DIAGNOSIS — Q25.1 COARCTATION OF AORTA: ICD-10-CM

## 2020-02-03 DIAGNOSIS — O24.912 DIABETES MELLITUS AFFECTING PREGNANCY IN SECOND TRIMESTER: ICD-10-CM

## 2020-02-03 DIAGNOSIS — O09.92 HIGH-RISK PREGNANCY IN SECOND TRIMESTER: Primary | ICD-10-CM

## 2020-02-03 DIAGNOSIS — Z33.1 PREGNANT STATE, INCIDENTAL: Primary | ICD-10-CM

## 2020-02-03 DIAGNOSIS — Z33.1 PREGNANT STATE, INCIDENTAL: ICD-10-CM

## 2020-02-03 DIAGNOSIS — Z87.74 H/O AORTIC COARCTATION REPAIR: ICD-10-CM

## 2020-02-03 DIAGNOSIS — Z87.74 S/P VSD REPAIR: ICD-10-CM

## 2020-02-03 PROBLEM — O99.212 OBESITY AFFECTING PREGNANCY IN SECOND TRIMESTER: Status: ACTIVE | Noted: 2020-01-07

## 2020-02-03 PROCEDURE — 36415 COLL VENOUS BLD VENIPUNCTURE: CPT

## 2020-02-03 PROCEDURE — 1036F TOBACCO NON-USER: CPT | Performed by: OBSTETRICS & GYNECOLOGY

## 2020-02-03 PROCEDURE — 99215 OFFICE O/P EST HI 40 MIN: CPT | Performed by: OBSTETRICS & GYNECOLOGY

## 2020-02-03 RX ORDER — ASPIRIN 325 MG
81 TABLET ORAL DAILY
COMMUNITY
End: 2021-03-29 | Stop reason: ALTCHOICE

## 2020-02-04 ENCOUNTER — ULTRASOUND (OUTPATIENT)
Dept: PERINATAL CARE | Facility: OTHER | Age: 30
End: 2020-02-04
Payer: COMMERCIAL

## 2020-02-04 VITALS
WEIGHT: 226.4 LBS | DIASTOLIC BLOOD PRESSURE: 80 MMHG | HEIGHT: 60 IN | HEART RATE: 103 BPM | BODY MASS INDEX: 44.45 KG/M2 | SYSTOLIC BLOOD PRESSURE: 139 MMHG

## 2020-02-04 DIAGNOSIS — O99.412 MATERNAL CONGENITAL HEART DISEASE IN SECOND TRIMESTER, ANTEPARTUM: ICD-10-CM

## 2020-02-04 DIAGNOSIS — O24.119 TYPE 2 DIABETES MELLITUS AFFECTING PREGNANCY, ANTEPARTUM: ICD-10-CM

## 2020-02-04 DIAGNOSIS — Q24.9 MATERNAL CONGENITAL HEART DISEASE IN SECOND TRIMESTER, ANTEPARTUM: ICD-10-CM

## 2020-02-04 DIAGNOSIS — Z3A.17 17 WEEKS GESTATION OF PREGNANCY: ICD-10-CM

## 2020-02-04 DIAGNOSIS — O35.2XX0 HEREDITARY FAMILIAL DISEASE AFFECTING MANAGEMENT OF MOTHER AND POSSIBLY AFFECTING FETUS, ANTEPARTUM, SINGLE OR UNSPECIFIED FETUS: Primary | ICD-10-CM

## 2020-02-04 PROBLEM — E11.65 TYPE 2 DIABETES MELLITUS WITH HYPERGLYCEMIA, WITHOUT LONG-TERM CURRENT USE OF INSULIN (HCC): Status: RESOLVED | Noted: 2019-05-02 | Resolved: 2020-02-04

## 2020-02-04 PROBLEM — O99.210 OBESITY AFFECTING PREGNANCY, ANTEPARTUM: Status: ACTIVE | Noted: 2020-01-07

## 2020-02-04 PROBLEM — Z36.89 NON-STRESS TEST REACTIVE ON FETAL SURVEILLANCE: Status: ACTIVE | Noted: 2020-01-14

## 2020-02-04 PROBLEM — F31.9 BIPOLAR DISORDER (HCC): Status: ACTIVE | Noted: 2020-01-10

## 2020-02-04 PROBLEM — Z23 NEED FOR PROPHYLACTIC VACCINATION WITH MEASLES-MUMPS-RUBELLA (MMR) VACCINE: Status: ACTIVE | Noted: 2020-01-14

## 2020-02-04 PROBLEM — O09.90 SUPERVISION OF HIGH RISK PREGNANCY, UNSPECIFIED, UNSPECIFIED TRIMESTER: Status: ACTIVE | Noted: 2020-01-10

## 2020-02-04 PROBLEM — O99.211 OBESITY AFFECTING PREGNANCY IN FIRST TRIMESTER: Status: RESOLVED | Noted: 2019-12-12 | Resolved: 2020-02-04

## 2020-02-04 PROBLEM — O10.919 CHRONIC HYPERTENSION AFFECTING PREGNANCY: Status: ACTIVE | Noted: 2020-01-07

## 2020-02-04 LAB — SCAN RESULT: NORMAL

## 2020-02-04 PROCEDURE — 76805 OB US >/= 14 WKS SNGL FETUS: CPT | Performed by: OBSTETRICS & GYNECOLOGY

## 2020-02-04 PROCEDURE — 99212 OFFICE O/P EST SF 10 MIN: CPT | Performed by: OBSTETRICS & GYNECOLOGY

## 2020-02-04 PROCEDURE — 1036F TOBACCO NON-USER: CPT | Performed by: OBSTETRICS & GYNECOLOGY

## 2020-02-04 NOTE — PROGRESS NOTES
The patient was seen today for an ultrasound  Please see ultrasound report (located under Ob Procedures) for additional details  Thank you very much for allowing us to participate in the care of this very nice patient  Should you have any questions, please do not hesitate to contact me  Lucas Nichols MD [place]  Attending Physician, Janet

## 2020-02-04 NOTE — PROGRESS NOTES
Assessment Renuka Siddiqi was seen today for routine prenatal visit  Diagnoses and all orders for this visit:    High-risk pregnancy in second trimester    Diabetes mellitus affecting pregnancy in second trimester:   Logs not present at todays visit / states she has been calling them in to [facility] diabetic educator / has not had a any recent adjustments on insulin  10 unit novolog with meals and lantus 30 units nighttime   States has been careful with her diet   Aware last HgA1C is 7 8 - we discussed importance of excellent control to optimize maternal and fetal outcomes     Coarctation of aorta:   Most recently diagnosed with re  Stenosis for which stent was recommended   Subsequently found out about pregnancy and procedure was placed on hold until second trimester  Was evaluated at [facility] and will be undergoing stent placement this upcoming Friday   We discussed high risk pregnancy with need of specialist readily available at time of delivery given strain on heart with fluid shifts that occur during delivery   I recommend delivery where her specialist are available   States she has plan to deliver at [facility] and has already been seen by MFM and OBGYN there / however does want to continue care here as well secondary to commute  I did explain to patient if urgent evaluation needed and cannot reach [facility] then should be evaluated at our [address] [place] [address] or [address] [facility]   Has fetal echo scheduled given maternal congenital defect       S/P VSD repair    H/O aortic coarctation repair      Renuka Siddiqi is a 26y o  year old  at [place] [address] for routine prenatal visit    + FM, no vaginal bleeding, contractions, or LOF  Complaints: No   Most recent ultrasound and labs reviewed    States going today for second part of sequential screen

## 2020-02-06 ENCOUNTER — TELEPHONE (OUTPATIENT)
Dept: PERINATAL CARE | Facility: CLINIC | Age: 30
End: 2020-02-06

## 2020-02-06 NOTE — TELEPHONE ENCOUNTER
----- Message from Rocío Simmons MD sent at 2/6/2020  5:23 PM EST -----  I reviewed the lab study today and the results are normal

## 2020-02-06 NOTE — TELEPHONE ENCOUNTER
I attempted to call the patient on her cell phone numerous times, per her communication consent, but the line was busy each time  I was unable to give the patient her part 2 sequential screen results

## 2020-02-13 ENCOUNTER — HOSPITAL ENCOUNTER (EMERGENCY)
Facility: HOSPITAL | Age: 30
Discharge: HOME | End: 2020-02-14
Attending: INTERNAL MEDICINE
Payer: COMMERCIAL

## 2020-02-13 ENCOUNTER — APPOINTMENT (EMERGENCY)
Dept: RADIOLOGY | Facility: HOSPITAL | Age: 30
End: 2020-02-13
Payer: COMMERCIAL

## 2020-02-13 DIAGNOSIS — R07.9 CHEST PAIN, UNSPECIFIED TYPE: Primary | ICD-10-CM

## 2020-02-13 LAB
ALBUMIN SERPL BCP-MCNC: 3.9 G/DL (ref 3.5–5.7)
ALP SERPL-CCNC: 53 U/L (ref 40–150)
ALT SERPL W P-5'-P-CCNC: 8 U/L (ref 7–52)
ANION GAP SERPL CALCULATED.3IONS-SCNC: 10 MMOL/L (ref 4–13)
AST SERPL W P-5'-P-CCNC: 10 U/L (ref 13–39)
BASOPHILS # BLD AUTO: 0.1 THOUSANDS/ΜL (ref 0–0.1)
BASOPHILS NFR BLD AUTO: 1 % (ref 0–2)
BILIRUB SERPL-MCNC: 0.2 MG/DL (ref 0.2–1)
BUN SERPL-MCNC: 12 MG/DL (ref 7–25)
CALCIUM SERPL-MCNC: 9.7 MG/DL (ref 8.6–10.5)
CHLORIDE SERPL-SCNC: 100 MMOL/L (ref 98–107)
CO2 SERPL-SCNC: 24 MMOL/L (ref 21–31)
CREAT SERPL-MCNC: 0.54 MG/DL (ref 0.6–1.2)
EOSINOPHIL # BLD AUTO: 0.1 THOUSAND/ΜL (ref 0–0.61)
EOSINOPHIL NFR BLD AUTO: 1 % (ref 0–5)
ERYTHROCYTE [DISTWIDTH] IN BLOOD BY AUTOMATED COUNT: 15.9 % (ref 11.5–14.5)
GFR SERPL CREATININE-BSD FRML MDRD: 125 ML/MIN/1.73SQ M
GLUCOSE SERPL-MCNC: 161 MG/DL (ref 65–99)
HCT VFR BLD AUTO: 38.6 % (ref 42–47)
HGB BLD-MCNC: 12.6 G/DL (ref 12–16)
HOLD SPECIMEN: NORMAL
HOLD SPECIMEN: NORMAL
LYMPHOCYTES # BLD AUTO: 2.5 THOUSANDS/ΜL (ref 0.6–4.47)
LYMPHOCYTES NFR BLD AUTO: 22 % (ref 21–51)
MCH RBC QN AUTO: 25.6 PG (ref 26–34)
MCHC RBC AUTO-ENTMCNC: 32.6 G/DL (ref 31–37)
MCV RBC AUTO: 78 FL (ref 81–99)
MONOCYTES # BLD AUTO: 0.7 THOUSAND/ΜL (ref 0.17–1.22)
MONOCYTES NFR BLD AUTO: 6 % (ref 2–12)
NEUTROPHILS # BLD AUTO: 8 THOUSANDS/ΜL (ref 1.4–6.5)
NEUTS SEG NFR BLD AUTO: 71 % (ref 42–75)
PLATELET # BLD AUTO: 212 THOUSANDS/UL (ref 149–390)
PMV BLD AUTO: 8.9 FL (ref 8.6–11.7)
POTASSIUM SERPL-SCNC: 3.6 MMOL/L (ref 3.5–5.5)
PROT SERPL-MCNC: 7.4 G/DL (ref 6.4–8.9)
RBC # BLD AUTO: 4.93 MILLION/UL (ref 3.9–5.2)
SODIUM SERPL-SCNC: 134 MMOL/L (ref 134–143)
TROPONIN I SERPL-MCNC: <0.03 NG/ML
WBC # BLD AUTO: 11.4 THOUSAND/UL (ref 4.8–10.8)

## 2020-02-13 PROCEDURE — 84484 ASSAY OF TROPONIN QUANT: CPT | Performed by: INTERNAL MEDICINE

## 2020-02-13 PROCEDURE — 36415 COLL VENOUS BLD VENIPUNCTURE: CPT | Performed by: INTERNAL MEDICINE

## 2020-02-13 PROCEDURE — 93005 ELECTROCARDIOGRAM TRACING: CPT

## 2020-02-13 PROCEDURE — 80053 COMPREHEN METABOLIC PANEL: CPT | Performed by: INTERNAL MEDICINE

## 2020-02-13 PROCEDURE — 85025 COMPLETE CBC W/AUTO DIFF WBC: CPT | Performed by: INTERNAL MEDICINE

## 2020-02-13 PROCEDURE — 99285 EMERGENCY DEPT VISIT HI MDM: CPT

## 2020-02-13 PROCEDURE — 99283 EMERGENCY DEPT VISIT LOW MDM: CPT | Performed by: INTERNAL MEDICINE

## 2020-02-14 ENCOUNTER — APPOINTMENT (EMERGENCY)
Dept: RADIOLOGY | Facility: HOSPITAL | Age: 30
End: 2020-02-14
Payer: COMMERCIAL

## 2020-02-14 VITALS
WEIGHT: 225 LBS | OXYGEN SATURATION: 98 % | TEMPERATURE: 97.7 F | BODY MASS INDEX: 42.48 KG/M2 | HEART RATE: 103 BPM | DIASTOLIC BLOOD PRESSURE: 71 MMHG | HEIGHT: 61 IN | SYSTOLIC BLOOD PRESSURE: 128 MMHG | RESPIRATION RATE: 16 BRPM

## 2020-02-14 LAB
ATRIAL RATE: 100 BPM
P AXIS: 68 DEGREES
PR INTERVAL: 166 MS
QRS AXIS: 35 DEGREES
QRSD INTERVAL: 142 MS
QT INTERVAL: 386 MS
QTC INTERVAL: 497 MS
T WAVE AXIS: 29 DEGREES
TROPONIN I SERPL-MCNC: <0.03 NG/ML
VENTRICULAR RATE: 100 BPM

## 2020-02-14 PROCEDURE — 36415 COLL VENOUS BLD VENIPUNCTURE: CPT | Performed by: INTERNAL MEDICINE

## 2020-02-14 PROCEDURE — 84484 ASSAY OF TROPONIN QUANT: CPT | Performed by: INTERNAL MEDICINE

## 2020-02-14 PROCEDURE — 93010 ELECTROCARDIOGRAM REPORT: CPT | Performed by: INTERNAL MEDICINE

## 2020-02-14 PROCEDURE — 71046 X-RAY EXAM CHEST 2 VIEWS: CPT

## 2020-02-14 NOTE — DISCHARGE INSTRUCTIONS
Patient was advised to call her physician at [address] [place] [address] tomorrow  If the patient's chest pain returns or changes to return to the emergency room

## 2020-02-14 NOTE — ED PROVIDER NOTES
History  Chief Complaint   Patient presents with    Chest Pain     CP for a few days; had stent placed last friday 2/7/20; denies SOB     18-year-old female presents with midsternal chest pain radiating across precordium  Patient had a stent placed this week at [facility]  She has had chest pain no chest pressure no shortness of breath no lightheadedness dizziness no nausea or vomiting  The patient is 18 weeks pregnant with midsternal chest pain and status post stent with within the past week  Prior to Admission Medications   Prescriptions Last Dose Informant Patient Reported? Taking? Insulin Pen Needle 31G X 5 MM MISC  Self No No   Sig: Inject under the skin daily at bedtime Use one a day or as directed  Lancets (ACCU-CHEK MULTICLIX) lancets  Self No No   Sig: Use as instructed   Prenatal Vit-Fe Fumarate-FA (PRENATAL VITAMIN PO)  Self Yes No   Sig: Take 1 tablet by mouth daily   aspirin 325 mg tablet  Self Yes No   Sig: Take 162 mg by mouth daily   glucose blood (ACCU-CHEK GUIDE) test strip  Self No No   Sig: Test three times per day   insulin glargine (LANTUS SOLOSTAR) 100 units/mL injection pen  Self No No   Sig: Inject SC 30 units at 9 PM daily  To be titrated  insulin lispro (HUMALOG KWIKPEN) 100 units/mL injection pen  Self No No   Sig: Inject SC 10 units before breakfast, before lunch and dinner  To be titrated     labetalol (NORMODYNE) 200 mg tablet  Self Yes No   Sig: Take 200 mg by mouth 2 (two) times a day    metFORMIN (GLUCOPHAGE) 1000 MG tablet  Self No No   Sig: Take 1 tablet (1,000 mg total) by mouth 2 (two) times a day with meals   ondansetron (ZOFRAN) 8 mg tablet  Self No No   Sig: Take 1 tablet (8 mg total) by mouth every 8 (eight) hours as needed for nausea or vomiting      Facility-Administered Medications: None       Past Medical History:   Diagnosis Date    Allergic     Arthritis     Bipolar affective disorder, currently depressed, moderate ([facility] [address] ) 12/17/2018    Cyst of ovary, right     Diabetes mellitus ([facility] [address] ) 10/10/18    type 2    Endometriosis     Heart murmur 88    Hepatitis C     Hepatitis C virus infection cured after antiviral drug therapy     History of transfusion     Hypertension     Migraines     Obesity     Pulmonary artery congenital abnormality     Spleen enlarged     Varicella        Past Surgical History:   Procedure Laterality Date    CARDIAC CATHETERIZATION      no CAD 10days, 4 weeks 22months old    [facility]      Age 7   [place] CORONARY STENT PLACEMENT      LIVER BIOPSY      LIVER BIOPSY      VSD REPAIR      As a child       Family History   Problem Relation Age of Onset    Hypertension Mother     Migraines Mother     JENNY disease Mother     Depression Mother     Hyperlipidemia Mother     Diabetes Mother     Diabetes Father     Hypertension Father     Kidney failure Father     Heart attack Father     Arthritis Father     Stroke Father     Polycystic kidney disease Paternal [de-identified]     Stroke Paternal Grandmother     Heart disease Paternal Grandmother     Arthritis Sister     Asthma Sister     Thyroid disease Sister     Diabetes Sister     Arthritis Maternal Grandmother     Breast cancer Maternal Grandmother     Diabetes Maternal Grandmother     Hypertension Maternal Grandmother     Heart Valve Disease Maternal Grandmother     Learning disabilities Cousin     Learning disabilities Sister     ADD / ADHD Cousin     Lung cancer Brother     Diabetes Maternal Grandfather     Hypertension Maternal Grandfather     JENNY disease Maternal Grandfather     Stroke Maternal Grandfather      I have reviewed and agree with the history as documented      Social History     Tobacco Use    Smoking status: Former Smoker     Packs/day: 0 20     Types: Cigarettes     Start date: 2019     Last attempt to quit: 2019     Years since quittin 0    Smokeless tobacco: Never Used   Substance Use Topics    Alcohol use: Never     Alcohol/week: 3 0 standard drinks     Types: 3 Standard drinks or equivalent per week     Frequency: Never     Comment: socially/prior to knowledge of pregnancy    Drug use: Not Currently     Comment: hx of THC use for migraines       Review of Systems   Constitutional: Negative  HENT: Negative  Respiratory: Negative  Cardiovascular: Negative  Gastrointestinal: Negative  Endocrine: Negative  Musculoskeletal: Negative  Neurological: Negative  Psychiatric/Behavioral: Negative  Physical Exam  Physical Exam   Constitutional: She is oriented to person, place, and time  She appears well-developed and well-nourished  HENT:   Head: Normocephalic and atraumatic  Neck: Normal range of motion  Neck supple  Cardiovascular: Normal rate and regular rhythm  Pulmonary/Chest: Effort normal and breath sounds normal    Abdominal: Soft  Bowel sounds are normal    Neurological: She is alert and oriented to person, place, and time  Skin: Skin is warm and dry  Nursing note and vitals reviewed        Vital Signs  ED Triage Vitals   Temperature Pulse Respirations Blood Pressure SpO2   02/13/20 2251 02/13/20 2256 02/13/20 2256 02/13/20 2256 02/13/20 2256   97 7 °F (36 5 °C) 103 18 142/88 97 %      Temp Source Heart Rate Source Patient Position - Orthostatic VS BP Location FiO2 (%)   02/13/20 2251 02/13/20 2251 02/13/20 2256 02/13/20 2256 --   Temporal Monitor Lying Left arm       Pain Score       02/13/20 2256       6           Vitals:    02/13/20 2256 02/14/20 0000 02/14/20 0145   BP: 142/88 136/84 136/74   Pulse: 103 104 102   Patient Position - Orthostatic VS: Lying           Visual Acuity      ED Medications  Medications - No data to display    Diagnostic Studies  Results Reviewed     Procedure Component Value Units Date/Time    Troponin I [[id number]]  (Normal) Collected:  02/14/20 0211    Lab Status:  Final result Specimen:  Blood from Line Updated:  02/14/20 0240     Troponin I <0 03 ng/mL     [place] draw [[id number]] Collected:  02/13/20 2306    Lab Status:  Final result Specimen:  Blood from Arm, Right Updated:  02/13/20 2359    Narrative: The following orders were created for panel order [place] draw  Procedure                               Abnormality         Status                     ---------                               -----------         ------                     Paul Presume Top on KEYF[[id number]]                           Final result               Gold top on DJBC[[id number]]                                 Final result               Green / Yellow tube on VRNC[[id number]]                      Final result               Lavender Top 3 ml on JOGP[[id number]]                        Final result                 Please view results for these tests on the individual orders      Comprehensive metabolic panel [[id number]]  (Abnormal) Collected:  02/13/20 2304    Lab Status:  Final result Specimen:  Blood from Arm, Right Updated:  02/13/20 2332     Sodium 134 mmol/L      Potassium 3 6 mmol/L      Chloride 100 mmol/L      CO2 24 mmol/L      ANION GAP 10 mmol/L      BUN 12 mg/dL      Creatinine 0 54 mg/dL      Glucose 161 mg/dL      Calcium 9 7 mg/dL      AST 10 U/L      ALT 8 U/L      Alkaline Phosphatase 53 U/L      Total Protein 7 4 g/dL      Albumin 3 9 g/dL      Total Bilirubin 0 20 mg/dL      eGFR 125 ml/min/1 73sq m     Narrative:       National Kidney Disease Foundation guidelines for Chronic Kidney Disease (CKD):     Stage 1 with normal or high GFR (GFR > 90 mL/min/1 73 square meters)    Stage 2 Mild CKD (GFR = 60-89 mL/min/1 73 square meters)    Stage 3A Moderate CKD (GFR = 45-59 mL/min/1 73 square meters)    Stage 3B Moderate CKD (GFR = 30-44 mL/min/1 73 square meters)    Stage 4 Severe CKD (GFR = 15-29 mL/min/1 73 square meters)    Stage 5 End Stage CKD (GFR <15 mL/min/1 73 square meters)  Note: GFR calculation is accurate only with a steady state creatinine    Troponin I [[id number]]  (Normal) Collected:  02/13/20 2304    Lab Status:  Final result Specimen:  Blood from Arm, Right Updated:  02/13/20 2331     Troponin I <0 03 ng/mL     CBC and differential [[id number]]  (Abnormal) Collected:  02/13/20 2304    Lab Status:  Final result Specimen:  Blood from Arm, Right Updated:  02/13/20 2311     WBC 11 40 Thousand/uL      RBC 4 93 Million/uL      Hemoglobin 12 6 g/dL      Hematocrit 38 6 %      MCV 78 fL      MCH 25 6 pg      MCHC 32 6 g/dL      RDW 15 9 %      MPV 8 9 fL      Platelets 034 Thousands/uL      Neutrophils Relative 71 %      Lymphocytes Relative 22 %      Monocytes Relative 6 %      Eosinophils Relative 1 %      Basophils Relative 1 %      Neutrophils Absolute 8 00 Thousands/µL      Lymphocytes Absolute 2 50 Thousands/µL      Monocytes Absolute 0 70 Thousand/µL      Eosinophils Absolute 0 10 Thousand/µL      Basophils Absolute 0 10 Thousands/µL                  XR chest 2 views   ED Interpretation by Gail Calixto MD (02/14 8329)      Technically limited study  No definite evidence of acute pulmonary disease  An aortic stent is present, new compared with July 24, 2019  Workstation performed: [id number]         Final Result by Alyse Byers MD (02/14 0244)      Technically limited study  No definite evidence of acute pulmonary disease  An aortic stent is present, new compared with July 24, 2019              Workstation performed: [id number]                    Procedures  ECG 12 Lead Documentation Only  Date/Time: 2/14/2020 2:12 AM  Performed by: Gail Calixto MD  Authorized by: Gail Calixto MD     Indications / Diagnosis:  CP  ECG reviewed by me, the ED Provider: yes    Patient location:  ED  Previous ECG:     Previous ECG:  Compared to current    Similarity:  No change    Comparison to cardiac monitor: Yes    Interpretation:     Interpretation: abnormal    Rate:     ECG rate:  100    ECG rate assessment: tachycardic    Rhythm:     Rhythm: sinus rhythm and sinus tachycardia    Ectopy:     Ectopy: none    Conduction:     Conduction: abnormal      Abnormal conduction: complete RBBB    ST segments:     ST segments:  Abnormal  T waves:     T waves: non-specific               ED Course         HEART Risk Score      Most Recent Value   History  1 Filed at: 02/14/2020 0209   ECG  1 Filed at: 02/14/2020 4708   Age  0 Filed at: 02/14/2020 0209   Risk Factors  1 Filed at: 02/14/2020 0209   Troponin  0 Filed at: 02/14/2020 0209   Heart Score Risk Calculator   History  1 Filed at: 02/14/2020 0209   ECG  1 Filed at: 02/14/2020 2116   Age  0 Filed at: 02/14/2020 0209   Risk Factors  1 Filed at: 02/14/2020 0209   Troponin  0 Filed at: 02/14/2020 0209   HEART Score  3 Filed at: 02/14/2020 0209   HEART Score  3 Filed at: 02/14/2020 0209                            MDM      Disposition  Final diagnoses:   Chest pain, unspecified type     Time reflects when diagnosis was documented in both MDM as applicable and the Disposition within this note     Time User Action Codes Description Comment    2/14/2020  2:52 AM Yolis Brennan Add [R07 9] Chest pain, unspecified type       ED Disposition     ED Disposition Condition Date/Time Comment    Discharge Stable Fri Feb 14, 2020  2:52 AM Eric Stiles discharge to home/self care  Follow-up Information     Follow up With Specialties Details Why José Miguel Arizmendi, DO Family Medicine In 1 day  [facility] [address] [facility]  [phone]      Matthew Acosta MD Cardiology In 1 day  [address] [facility]  [phone]            Patient's Medications   Discharge Prescriptions    No medications on file     No discharge procedures on file      PDMP Review     None          ED Provider  Electronically Signed by           Marisa He MD  02/14/20 7514       Marisa He MD  02/14/20 0239

## 2020-02-27 ENCOUNTER — TELEPHONE (OUTPATIENT)
Dept: FAMILY MEDICINE CLINIC | Facility: CLINIC | Age: 30
End: 2020-02-27

## 2020-02-28 ENCOUNTER — HOSPITAL ENCOUNTER (OUTPATIENT)
Facility: HOSPITAL | Age: 30
Discharge: HOME | End: 2020-02-29
Attending: OBSTETRICS & GYNECOLOGY | Admitting: OBSTETRICS & GYNECOLOGY
Payer: COMMERCIAL

## 2020-02-28 ENCOUNTER — NURSE TRIAGE (OUTPATIENT)
Dept: OTHER | Facility: OTHER | Age: 30
End: 2020-02-28

## 2020-02-28 DIAGNOSIS — B37.3 VAGINAL CANDIDA: Primary | ICD-10-CM

## 2020-02-28 LAB
BILIRUB UR QL STRIP: NEGATIVE
CLARITY UR: CLEAR
COLOR UR: YELLOW
GLUCOSE UR STRIP-MCNC: ABNORMAL MG/DL
HGB UR QL STRIP.AUTO: NEGATIVE
KETONES UR STRIP-MCNC: ABNORMAL MG/DL
LEUKOCYTE ESTERASE UR QL STRIP: ABNORMAL
NITRITE UR QL STRIP: NEGATIVE
PH UR STRIP.AUTO: 5.5 [PH] (ref 4.5–8)
PROT UR STRIP-MCNC: NEGATIVE MG/DL
SP GR UR STRIP.AUTO: >=1.03 (ref 1–1.03)
UROBILINOGEN UR QL STRIP.AUTO: 0.2 E.U./DL

## 2020-02-28 PROCEDURE — NC001 PR NO CHARGE: Performed by: OBSTETRICS & GYNECOLOGY

## 2020-02-28 PROCEDURE — 81001 URINALYSIS AUTO W/SCOPE: CPT

## 2020-02-28 NOTE — LETTER
[address] AND DELIVERY  [address] [place] [address]  Dept: [phone]    February 29, 2020     Patient: Carlos Worthy   YOB: 1990   Date of Visit: 2/28/2020       To Whom it May Concern:    Carlos Worthy is under my professional care  She was seen in the hospital from 2/28/2020   to 02/29/20  She was in company of her family including her father  Nick Loo    If you have any questions or concerns, please don't hesitate to call           Sincerely,          Shyla Eason MD

## 2020-02-29 VITALS
RESPIRATION RATE: 18 BRPM | DIASTOLIC BLOOD PRESSURE: 66 MMHG | BODY MASS INDEX: 44.96 KG/M2 | SYSTOLIC BLOOD PRESSURE: 118 MMHG | OXYGEN SATURATION: 97 % | WEIGHT: 229 LBS | HEIGHT: 60 IN | HEART RATE: 109 BPM | TEMPERATURE: 98.2 F

## 2020-02-29 PROBLEM — Z3A.20 20 WEEKS GESTATION OF PREGNANCY: Status: ACTIVE | Noted: 2020-02-29

## 2020-02-29 PROBLEM — B37.3 VAGINAL CANDIDA: Status: ACTIVE | Noted: 2020-02-29

## 2020-02-29 PROBLEM — N94.9 ROUND LIGAMENT PAIN: Status: ACTIVE | Noted: 2020-02-29

## 2020-02-29 PROBLEM — B37.31 VAGINAL CANDIDA: Status: ACTIVE | Noted: 2020-02-29

## 2020-02-29 LAB
ALBUMIN SERPL BCP-MCNC: 2.6 G/DL (ref 3.5–5)
ALP SERPL-CCNC: 57 U/L (ref 46–116)
ALT SERPL W P-5'-P-CCNC: 17 U/L (ref 12–78)
AMYLASE SERPL-CCNC: 34 IU/L (ref 25–115)
ANION GAP SERPL CALCULATED.3IONS-SCNC: 12 MMOL/L (ref 4–13)
AST SERPL W P-5'-P-CCNC: 7 U/L (ref 5–45)
BACTERIA UR QL AUTO: ABNORMAL /HPF
BASOPHILS # BLD AUTO: 0.02 THOUSANDS/ΜL (ref 0–0.1)
BASOPHILS NFR BLD AUTO: 0 % (ref 0–1)
BILIRUB SERPL-MCNC: 0.17 MG/DL (ref 0.2–1)
BUN SERPL-MCNC: 12 MG/DL (ref 5–25)
CALCIUM SERPL-MCNC: 8.5 MG/DL (ref 8.3–10.1)
CAOX CRY URNS QL MICRO: ABNORMAL /HPF
CHLORIDE SERPL-SCNC: 108 MMOL/L (ref 100–108)
CO2 SERPL-SCNC: 20 MMOL/L (ref 21–32)
CREAT SERPL-MCNC: 0.52 MG/DL (ref 0.6–1.3)
EOSINOPHIL # BLD AUTO: 0.08 THOUSAND/ΜL (ref 0–0.61)
EOSINOPHIL NFR BLD AUTO: 1 % (ref 0–6)
ERYTHROCYTE [DISTWIDTH] IN BLOOD BY AUTOMATED COUNT: 15.6 % (ref 11.6–15.1)
GFR SERPL CREATININE-BSD FRML MDRD: 127 ML/MIN/1.73SQ M
GLUCOSE SERPL-MCNC: 115 MG/DL (ref 65–140)
HCT VFR BLD AUTO: 35.7 % (ref 34.8–46.1)
HGB BLD-MCNC: 11.7 G/DL (ref 11.5–15.4)
IMM GRANULOCYTES # BLD AUTO: 0.05 THOUSAND/UL (ref 0–0.2)
IMM GRANULOCYTES NFR BLD AUTO: 0 % (ref 0–2)
LIPASE SERPL-CCNC: 146 U/L (ref 73–393)
LYMPHOCYTES # BLD AUTO: 2.33 THOUSANDS/ΜL (ref 0.6–4.47)
LYMPHOCYTES NFR BLD AUTO: 20 % (ref 14–44)
MCH RBC QN AUTO: 25.9 PG (ref 26.8–34.3)
MCHC RBC AUTO-ENTMCNC: 32.8 G/DL (ref 31.4–37.4)
MCV RBC AUTO: 79 FL (ref 82–98)
MONOCYTES # BLD AUTO: 0.79 THOUSAND/ΜL (ref 0.17–1.22)
MONOCYTES NFR BLD AUTO: 7 % (ref 4–12)
MUCOUS THREADS UR QL AUTO: ABNORMAL
NEUTROPHILS # BLD AUTO: 8.19 THOUSANDS/ΜL (ref 1.85–7.62)
NEUTS SEG NFR BLD AUTO: 72 % (ref 43–75)
NON-SQ EPI CELLS URNS QL MICRO: ABNORMAL /HPF
NRBC BLD AUTO-RTO: 0 /100 WBCS
PLATELET # BLD AUTO: 222 THOUSANDS/UL (ref 149–390)
PMV BLD AUTO: 11 FL (ref 8.9–12.7)
POTASSIUM SERPL-SCNC: 3.4 MMOL/L (ref 3.5–5.3)
PROT SERPL-MCNC: 6.8 G/DL (ref 6.4–8.2)
RBC # BLD AUTO: 4.51 MILLION/UL (ref 3.81–5.12)
RBC #/AREA URNS AUTO: ABNORMAL /HPF
SODIUM SERPL-SCNC: 140 MMOL/L (ref 136–145)
WBC # BLD AUTO: 11.46 THOUSAND/UL (ref 4.31–10.16)
WBC #/AREA URNS AUTO: ABNORMAL /HPF

## 2020-02-29 PROCEDURE — NC001 PR NO CHARGE: Performed by: OBSTETRICS & GYNECOLOGY

## 2020-02-29 PROCEDURE — 85025 COMPLETE CBC W/AUTO DIFF WBC: CPT | Performed by: OBSTETRICS & GYNECOLOGY

## 2020-02-29 PROCEDURE — 80053 COMPREHEN METABOLIC PANEL: CPT | Performed by: OBSTETRICS & GYNECOLOGY

## 2020-02-29 PROCEDURE — 99213 OFFICE O/P EST LOW 20 MIN: CPT

## 2020-02-29 PROCEDURE — 76817 TRANSVAGINAL US OBSTETRIC: CPT | Performed by: OBSTETRICS & GYNECOLOGY

## 2020-02-29 PROCEDURE — 82150 ASSAY OF AMYLASE: CPT | Performed by: OBSTETRICS & GYNECOLOGY

## 2020-02-29 PROCEDURE — 83690 ASSAY OF LIPASE: CPT | Performed by: OBSTETRICS & GYNECOLOGY

## 2020-02-29 RX ADMIN — SODIUM CHLORIDE: 0.9 INJECTION, SOLUTION INTRAVENOUS at 01:09

## 2020-02-29 NOTE — DISCHARGE INSTRUCTIONS
Round Ligament Pain   AMBULATORY CARE:   Round ligament pain  is caused when ligaments are stretched as your uterus (womb) gets bigger during pregnancy  Round ligaments are found on each side of your uterus  They are bands of tissue that hold the uterus in place  Round ligament pain happens most often during the second trimester  It is a normal part of pregnancy and should stop by the third trimester  The pain is not serious and will not hurt your baby  Common signs and symptoms of round ligament pain:   · Pain on one or both sides of your lower abdomen or groin that may move up to your hip    · Spasms in the muscles in your abdomen    · Pain that lasts a few seconds    · Pain that happens when you exercise, sneeze, change positions, or stand quickly  Contact your obstetrician if:   · You have pain that is spreading to other parts of your body  · You have new or worsening pain  · You have pain that lasts longer than a few minutes at a time  · You have questions or concerns about your condition or care  Manage your pain:  Round ligament pain does not need to be treated  The following may help make you more comfortable:  · Rest as often as you can  Rest can help relieve round ligament pain  You might want to lie on the side that has pain  Place a pillow under your abdomen  Keep another pillow between your knees  · Move slowly  Sudden movement can stretch the ligaments and cause pain  Stand, sit, and change positions slowly  Try to tighten the muscles in your hips before you sneeze or laugh  You can also sit down and bring your knees up toward your abdomen  This can help relieve tension on the ligaments  · Exercise as directed  Gentle exercise can keep the ligaments loose and strengthen core (abdominal) muscles  An example is swimming, or a yoga program designed for pregnancy  Ask your healthcare provider which exercises are safe for you and how often to exercise   For most healthy women, a good goal is to try to get at least 30 minutes of exercise every day  If activity causes pain, try not to walk too long or too far at one time  Break your exercise up into short amounts  · Apply a warm compress to the area  Warmth can relieve pain and muscle spasms  Ask your healthcare provider if you can take a warm bath or use a heating pad  Keep all heat settings low  High heat can be dangerous for your baby  Do not sit in a hot tub or use hot water in your bath  You may also be able to massage the area gently while you are applying heat  Massage can help relieve pain  · Ask about pain medicines  Ask your healthcare provider before you take any medicine during pregnancy, including over-the-counter pain medicines  Your healthcare provider may recommend acetaminophen to relieve the pain  Ask how much to take and how often to take it  Follow directions  Acetaminophen can cause liver damage  Too much medicine can be harmful to your baby  Follow up with your obstetrician as directed:  Write down your questions so you remember to ask them during your visits  © 2017 [facility] Information is for End User's use only and may not be sold, redistributed or otherwise used for commercial purposes  All illustrations and images included in CareNotes® are the copyrighted property of A D A M , Inc  or [employer]  The above information is an  only  It is not intended as medical advice for individual conditions or treatments  Talk to your doctor, nurse or pharmacist before following any medical regimen to see if it is safe and effective for you  Early Labor Signs   WHAT YOU SHOULD KNOW:   Early labor signs are changes in your body that allow your baby to pass through your birth canal   INSTRUCTIONS:   Signs and symptoms of early labor:   · Lightening  occurs when your baby drops inside your pelvis  You may feel increased pressure in your pelvis   This may happen a few weeks to a few hours before your labor begins  · Contractions  are cramps and tightening that occur in your uterus to help move the baby through your birth canal  Contractions occur regularly and more often each time  Each one lasts about 30 to 70 seconds, and gets stronger and more painful until you deliver your baby  Contractions do not go away with movement  They start in your lower back and move to the front in your abdomen  · Effacement  occurs when your cervix softens and thins, so it can easily open for the baby  Your primary healthcare provider [facility] or obstetrician will examine your cervix for effacement  · Dilation  is widening of your cervix, also for the baby's passage  Your PHP or obstetrician will examine your cervix for dilation  Your cervix will be fully opened and ready for delivery when it is dilated to 10 centimeters  · Increased discharge  from your vagina may occur  It may be pink, clear, or slightly bloody  This discharge may also be called bloody show  Bloody show is a mucus plug that forms and blocks your cervix during pregnancy  · Rupture of membranes  is a sudden release of clear fluid from your vagina  It is also known as when your water breaks  Your PHP or obstetrician may need to break your water if it does not break on its own  False labor: You may have false labor signs, which are also called Dheeraj Draper contractions  False labor is common and may happen several weeks or days before your actual labor  The contractions are not regular, and do not get closer together  The pain is usually mild, does not worsen, and is felt only in front  Altoona Draper contractions may happen later in the day, and stop after you change position, walk, or rest   Contact your PHP or obstetrician if:   · You have pain in your lower back or abdomen  · You have bloody mucus or show  · You have questions or concerns about your condition or care    Return to the emergency department if:   · You have regular, painful contractions that are less than 5 minutes apart, and last 30 to 70 seconds each  · You have heavy vaginal bleeding  · You have a constant trickle or sudden gush of clear fluid from your vagina  · You notice a sudden decrease in your baby's movement  © 2014 [address] [employer] is for End User's use only and may not be sold, redistributed or otherwise used for commercial purposes  All illustrations and images included in CareNotes® are the copyrighted property of [employer]  or Fran Dumont  The above information is an  only  It is not intended as medical advice for individual conditions or treatments  Talk to your doctor, nurse or pharmacist before following any medical regimen to see if it is safe and effective for you

## 2020-02-29 NOTE — PROCEDURES
Brennon Sweeney, a  at 20w4d with an JORGE of 2020, Alternate JORGE Entry, was seen at [address] for the following procedure(s): $Procedure Type: US - Transvaginal]                   Ultrasound Other  Fetal Presentation: Breech  Cervical Length: 3 5  Funnel: No  Debris: No  Placenta Previa: No         Ultrasound Probe Disinfection    A transvaginal ultrasound was performed     Prior to use, disinfection was performed with High Level Disinfection Process (Trophon)      Isabelle Schwab, MD  20  4:12 AM

## 2020-02-29 NOTE — TELEPHONE ENCOUNTER
Regarding: Pregnant with sharp pain  ----- Message from Rodolfo Berg sent at 2/28/2020  8:12 PM EST -----  I am 5 months and 4 days pregnant  I am having sharp lower stomach pain

## 2020-02-29 NOTE — PROGRESS NOTES
Triage Note - OB  Truong Lizarraga 26 y o  female MRN: [id number]  Unit/Bed#: LD TRIAGE  Encounter: [id number]    Patient of Dr Fernando Lay,  Dr Kortney Hong covering this case     OB TRIAGE NOTE  Truong Lizarraga  [id number]  2020  12:38 AM  LD TRIAGE 4/LD TRIAGE 4-*    ASSESS:  26 y o  Ana Cain 20w4d with   at 20 weeks and 3 days, comes in today for right lower quadrant abdominal pain, patient states pain started at 6:00 p m when she was on her couch, stabbing constant pain, intensity 8/10, acute onset, no irradiated, it get worse with mobilization  no improved with with anything, she have not take any pain medication  She denies leakage of fluid, vaginal bleeding, decreased fetal movement or contractions  Patient with history of:  1  Congenital cardiac malformation: coarctation gore-darlin patch repair and PDA ligation (1491), Pulmonary Artery band placement (), VSD closure, PA band take down, PA plasty (), recurrent coartcation balloon dilation (), and catheterization () Catheterization with stent and angioplasty of descending aorta at [facility] 2020  EKG 02/15 with right bundle branch block  ECHO P 20 HU eft ventricular ejection fraction is 60%,with evidence of trace mitral and tricuspid regurgitation  No evidence of mitral, aortic  Stenosis  The aortic valve is trileaflet  The aortic arch has post-surgical changes  Fetal Echo with plan to be performed ar 24 weeks      2  History of chronic hypertension, last protein creatinine ratio 2020 0 08  Medications: labetalol 100mg twice daily, baby ASA, normal blood pressures in this visit   3  History of type 2 diabetes: currently on metformin 1000mg twice daily, lantus 30u daily at bedtime, Humalog 10/10/10 w/ meals  HgbA1C: 11 0 on 10/05/19 -> 7 8 on 20  She have been following with [facility] program in [facility]     4  Maternal hepatitis C, never treated medically     5   Maternal obesity BMI 43     Plan to start NST at 32 weeks and delivery at 39 weeks  Last fetal ultrasound 2020  g, breech presentation, posterior placenta  PLAN  Patient G1Po at 20 weeks with multiple co-morbilities (see above)     labor rule out  · Urine dip with trace leucocytes, glucosuria and trace ketones, negative nitrites speculum examination: close/think/high  · KOH/wet mount without evidence of clue cells or trichomona   · Positive fetal heart rate on abdominal ultrasound 155  · Cervical length of 3 5-4 0  · Symptoms with low probability to be related with labor  We discharged her with instructions of call if experiencing worsening contractions, vaginal bleeding, loss of fluid or decreased fetal movement  · Will follow up with OBGYN on 10/1/19     Vaginal candida  · Physical examination with significant amount of white discharge,   · KOH wet mount positive for hyphae   · Monistat ordered  · Indications of application were given to patient    Abdominal pain  · No peritoneal irritative signs  · CBC 11 46  · Lipase 146  Amylase 34: normals  · CMP Na 140 k 3 4, Cr 0 52, LFTs WNL  · Pain with low probability related with peritoneal irritation  in relation to acute surgical processes      Round ligament pain   · Patient was instructed about pain relieved with Tylenol as necessity and stretching exercises in addition to try warm heating pads or warm baths      · Patient instructed to call if experiencing  contractions, vaginal bleeding, loss of fluid or decreased fetal movement  · Will follow up with OBGYN on     D/w Dr Anabella Nash  ______________    SUBJECTIVE    JORGE: Estimated Date of Delivery: 20    HPI Chronology:  26 y o  Lee Ann McCaskill 20w4d presents with complaint of right lower quadrant abdominal pain, patient states pain started at 6:00 p m when she was on her couch, stabbing constant pain, intensity 8/10, acute onset, no irradiated, it get worse with mobilization  no improved with with anything, she have not take any pain medication  Contractions: no  Leakage: no  Bleeding: no  Fetal Movement: yes  Pelvic pain: no    Vitals:   /60 (BP Location: Right arm)   Pulse (!) 106   Temp (!) 96 7 °F (35 9 °C) (Temporal)   Resp 20   Ht 5' (1 524 m)   Wt 104 kg (229 lb)   LMP 10/07/1910   BMI 44 72 kg/m²   Body mass index is 44 72 kg/m²  Review of Systems   Constitutional: Negative  HENT: Negative  Eyes: Negative  Respiratory: Negative  Cardiovascular: Negative  Gastrointestinal: Positive for abdominal pain  Endocrine: Negative  Genitourinary: Negative  Musculoskeletal: Negative  Allergic/Immunologic: Negative  Neurological: Negative  Hematological: Negative  Psychiatric/Behavioral: Negative  Physical Exam   Constitutional: She is oriented to person, place, and time  She appears well-developed and well-nourished  HENT:   Head: Normocephalic and atraumatic  Eyes: Pupils are equal, round, and reactive to light  Conjunctivae and EOM are normal    Neck: Normal range of motion  Neck supple  Cardiovascular: Normal rate, regular rhythm, normal heart sounds and intact distal pulses  Pulmonary/Chest: Effort normal    Abdominal: Soft  She exhibits no mass  There is no tenderness  There is no rebound and no guarding  Bilateral Inguinal palpation without evidence of hematoma or indurations  Genitourinary: Vagina normal    Genitourinary Comments: Speculum examination with cervix closed thick and high  Cervical lenght between 3 5-4  Evidence of significant amount of white vaginal discharge  Musculoskeletal: Normal range of motion  Neurological: She is alert and oriented to person, place, and time  Psychiatric: She has a normal mood and affect         FHT: 155        Wet mount/KOH:  Positive for Candida  Nitrazine:   Pooling:  Negative  Urine Dip:  Positive for ketone bodies, glucose, leukocytes  Rh:  Positive  GBS:  Unknown    TRANSVAGINAL ULTRASOUND  Cervical length:  3 5-4      Speculum examination :  Closed thick and high    Labs:   Recent Results (from the past 24 hour(s))   Urine Macroscopic, POC    Collection Time: 02/28/20 11:39 PM   Result Value Ref Range    Color, UA Yellow     Clarity, UA Clear     pH, UA 5 5 4 5 - 8 0    Leukocytes, UA Trace (A) Negative    Nitrite, UA Negative Negative    Protein, UA Negative Negative mg/dl    Glucose,  (1/2%) (A) Negative mg/dl    Ketones, UA Trace (A) Negative mg/dl    Urobilinogen, UA 0 2 0 2, 1 0 E U /dl E U /dl    Bilirubin, UA Negative Negative    Blood, UA Negative Negative    Specific Gravity, UA >=1 030 1 003 - 1 030   Urine Microscopic    Collection Time: 02/28/20 11:39 PM   Result Value Ref Range    RBC, UA None Seen None Seen, 0-5 /hpf    WBC, UA 2-4 (A) None Seen, 0-5, 5-55, 5-65 /hpf    Epithelial Cells Occasional None Seen, Occasional /hpf    Bacteria, UA Occasional None Seen, Occasional /hpf    Ca Oxalate Helene, UA Occasional (A) None Seen /hpf    MUCUS THREADS Occasional (A) None Seen       Lab, Imaging and other studies: I have personally reviewed pertinent reports      Rachaelkeisha Smith MD    2/29/2020  12:38 AM

## 2020-02-29 NOTE — TELEPHONE ENCOUNTER
Reason for Disposition   [1] SEVERE abdominal pain (e g , excruciating) AND [2] constant AND [3] present > 1 hour    Answer Assessment - Initial Assessment Questions  1  LOCATION: "Where does it hurt?"       Low abdomen right side   2  RADIATION: "Does the pain shoot anywhere else?" (e g , chest, back)      Back   3  ONSET: "When did the pain begin?" (Minutes, hours or days ago)       2 hours ago   4  ONSET: "Gradual or sudden onset?"      "It started suddenly"   5  PATTERN: "Does the pain come and go, or has it been constant since it started?"       Constant   6  SEVERITY: "How bad is the pain?" "What does it keep you from doing?"  (e g , Scale 1-10; mild, moderate, or severe)   - SEVERE (8-10): excruciating pain, doubled over, unable to do any normal activities       Patient stated that pain is 9,5        7  RECURRENT SYMPTOM: "Have you ever had this type of abdominal pain before?" If so, ask: "When was the last time?" and "What happened that time?"       First time   8  CAUSE: "What do you think is causing the abdominal pain? Patient doesn't know   9  RELIEVING/AGGRAVATING FACTORS: "What makes it better or worse?" (e g , antacids, bowel movement, movement)      "just moving around makes it worse"   10  OTHER SYMPTOMS: "Has there been any vaginal bleeding, fever, vomiting, diarrhea, or urine problems?"        Patient denies any other symptoms   11   JORGE: "What date are you expecting to deliver?"        7/13/2020    Protocols used: PREGNANCY - ABDOMINAL PAIN GREATER THAN 20 WEEKS [facility]

## 2020-02-29 NOTE — TELEPHONE ENCOUNTER
Patient agreed to call 911  Called patient 3 minutes after, the phone was off  Called 911, verified that ambulance is on a way to patient's home

## 2020-03-02 PROBLEM — Z23 NEED FOR PROPHYLACTIC VACCINATION WITH MEASLES-MUMPS-RUBELLA (MMR) VACCINE: Status: RESOLVED | Noted: 2020-01-14 | Resolved: 2020-03-02

## 2020-03-02 NOTE — PATIENT INSTRUCTIONS
Thank you for choosing us for your  care today  If you have any questions about your ultrasound or care, please do not hesitate to contact us or your primary obstetrician  Continue taking your aspirin 162mg daily for prevention of preeclampsia  If you have asthma and notice an increase in symptom frequency or severity, consider stopping this medication  Some general instructions for your pregnancy are:     Exercise: we encourage most pregnant women to get regular physical activity in pregnancy  Exercise has been shown to reduce the risk of several pregnancy-related complications  Unless instructed otherwise, you can aim for 22 minutes per day (150 minutes per week! )   Nutrition: aim for calcium-rich and iron-rich foods as well as healthy sources of protein   Weight: ask your doctor what is the appropriate amount of weight for you to gain in pregnancy  We have nutritionists here if you would like to meet with them   Protect against the flu: get yourself and your entire household vaccinated against influenza  Tell your partner to get vaccinated as well  Good hand hygiene can reduce the spread of this potentially deadly virus  Insist that everyone who is going to hold or be around your baby get vaccinated   Learn about Preeclampsia: preeclampsia is a common, serious complication in pregnancy  A blood pressure of 140mmHg (top number or systolic) OR 52DCTI (bottom number or diastolic) is elevated and needs evaluation by your doctor  Ask your doctor early in pregnancy if you should take aspirin (not motrin or tylenol) to prevent preeclampsia  If you were advised to take aspirin to prevent preeclampsia, a daily dose of 162mg or 81mg is advised  One resource to learn more is www  preeclampsia org    If you smoke, try to reduce how many cigarettes you smoke or quit completely  Do not vape       Other warning signs to watch out for in pregnancy or postpartum: chest pain, obstructed breathing or shortness of breath, seizures, thoughts of hurting yourself or your baby, bleeding, a painful or swollen leg, fever, or headache ([facility] POST-BIRTH Warning Signs campaign)  If these happen call 911  Itching is also not normal in pregnancy and if you experience this, especially over your hands and feet, potentially worse at night, notify your doctors

## 2020-03-02 NOTE — PROGRESS NOTES
[address]: Ms Gloria Cabral was seen today at 21w0d for anatomic survey and cervical length screening ultrasound  See ultrasound report under "OB Procedures" tab  Please don't hesitate to contact our office with any concerns or questions    Hailey Jean MD

## 2020-03-03 ENCOUNTER — ROUTINE PRENATAL (OUTPATIENT)
Dept: OBGYN CLINIC | Facility: MEDICAL CENTER | Age: 30
End: 2020-03-03
Payer: COMMERCIAL

## 2020-03-03 ENCOUNTER — ROUTINE PRENATAL (OUTPATIENT)
Dept: PERINATAL CARE | Facility: OTHER | Age: 30
End: 2020-03-03
Payer: COMMERCIAL

## 2020-03-03 VITALS
DIASTOLIC BLOOD PRESSURE: 82 MMHG | WEIGHT: 233.8 LBS | HEIGHT: 61 IN | SYSTOLIC BLOOD PRESSURE: 114 MMHG | HEART RATE: 101 BPM | BODY MASS INDEX: 44.14 KG/M2

## 2020-03-03 VITALS — DIASTOLIC BLOOD PRESSURE: 80 MMHG | BODY MASS INDEX: 45.11 KG/M2 | SYSTOLIC BLOOD PRESSURE: 120 MMHG | WEIGHT: 231 LBS

## 2020-03-03 DIAGNOSIS — O09.92 SUPERVISION OF HIGH-RISK PREGNANCY, SECOND TRIMESTER: ICD-10-CM

## 2020-03-03 DIAGNOSIS — O10.919 CHRONIC HYPERTENSION AFFECTING PREGNANCY: ICD-10-CM

## 2020-03-03 DIAGNOSIS — E66.01 MORBID OBESITY (HCC): ICD-10-CM

## 2020-03-03 DIAGNOSIS — Q21.0 VENTRICULAR SEPTAL DEFECT: ICD-10-CM

## 2020-03-03 DIAGNOSIS — O99.412 MATERNAL CONGENITAL HEART DISEASE IN SECOND TRIMESTER, ANTEPARTUM: Primary | ICD-10-CM

## 2020-03-03 DIAGNOSIS — IMO0001: ICD-10-CM

## 2020-03-03 DIAGNOSIS — Q25.1 AORTIC COARCTATION: ICD-10-CM

## 2020-03-03 DIAGNOSIS — Z3A.21 21 WEEKS GESTATION OF PREGNANCY: Primary | ICD-10-CM

## 2020-03-03 DIAGNOSIS — Q24.9 MATERNAL CONGENITAL HEART DISEASE IN SECOND TRIMESTER, ANTEPARTUM: Primary | ICD-10-CM

## 2020-03-03 DIAGNOSIS — Z36.86 ENCOUNTER FOR ANTENATAL SCREENING FOR CERVICAL LENGTH: ICD-10-CM

## 2020-03-03 DIAGNOSIS — O99.210 MATERNAL MORBID OBESITY, ANTEPARTUM (HCC): ICD-10-CM

## 2020-03-03 DIAGNOSIS — E66.01 MATERNAL MORBID OBESITY, ANTEPARTUM (HCC): ICD-10-CM

## 2020-03-03 DIAGNOSIS — O35.2XX0 HEREDITARY FAMILIAL DISEASE AFFECTING MANAGEMENT OF MOTHER AND POSSIBLY AFFECTING FETUS, ANTEPARTUM, SINGLE OR UNSPECIFIED FETUS: ICD-10-CM

## 2020-03-03 DIAGNOSIS — Z36.3 ENCOUNTER FOR ANTENATAL SCREENING FOR MALFORMATIONS: ICD-10-CM

## 2020-03-03 DIAGNOSIS — O99.412 MATERNAL CONGENITAL HEART DISEASE IN SECOND TRIMESTER, ANTEPARTUM: ICD-10-CM

## 2020-03-03 DIAGNOSIS — Z3A.21 21 WEEKS GESTATION OF PREGNANCY: ICD-10-CM

## 2020-03-03 DIAGNOSIS — Q24.9 MATERNAL CONGENITAL HEART DISEASE IN SECOND TRIMESTER, ANTEPARTUM: ICD-10-CM

## 2020-03-03 DIAGNOSIS — B18.2 CHRONIC HEPATITIS C AFFECTING PREGNANCY, ANTEPARTUM (HCC): ICD-10-CM

## 2020-03-03 DIAGNOSIS — O98.419 CHRONIC HEPATITIS C AFFECTING PREGNANCY, ANTEPARTUM (HCC): ICD-10-CM

## 2020-03-03 DIAGNOSIS — O24.119 TYPE 2 DIABETES MELLITUS AFFECTING PREGNANCY, ANTEPARTUM: ICD-10-CM

## 2020-03-03 PROBLEM — L02.01 ABSCESS OF CHIN: Status: RESOLVED | Noted: 2019-05-31 | Resolved: 2020-03-03

## 2020-03-03 PROBLEM — R07.9 CHEST PAIN, UNSPECIFIED: Status: RESOLVED | Noted: 2019-07-24 | Resolved: 2020-03-03

## 2020-03-03 PROBLEM — Z30.2 REQUEST FOR STERILIZATION: Status: RESOLVED | Noted: 2019-10-10 | Resolved: 2020-03-03

## 2020-03-03 PROCEDURE — 76811 OB US DETAILED SNGL FETUS: CPT | Performed by: OBSTETRICS & GYNECOLOGY

## 2020-03-03 PROCEDURE — 99214 OFFICE O/P EST MOD 30 MIN: CPT | Performed by: OBSTETRICS & GYNECOLOGY

## 2020-03-03 PROCEDURE — 1036F TOBACCO NON-USER: CPT | Performed by: OBSTETRICS & GYNECOLOGY

## 2020-03-03 PROCEDURE — 76817 TRANSVAGINAL US OBSTETRIC: CPT | Performed by: OBSTETRICS & GYNECOLOGY

## 2020-03-03 NOTE — PROGRESS NOTES
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 and possibly affecting fetus, antepartum, single or unspecified fetus  - Following with MFM at [facility] and [address] [place] [address] to deliver at [place]  - s/p dilation of coarctation during this pregnancy  - Asymptomatic; continue planned cardiology follow up (next 2020)    Type 2 diabetes mellitus affecting pregnancy, antepartum  - continue insulin  - Continue glucose monitoring and reporting    Chronic hypertension affecting pregnancy  - BP well controlled  - Continue routine monitoring    Morbid obesity ([facility] [address] )  - Following with MFM      ____________________________________________________________        Subjective    Carole Castrejon is a 26 y o  Damari Fox at 21w0d who presents for routine prenatal visit  She is reporting mild pelvic pain  Improved since starting Monistat  Was evaluated on L&D and diagnosed with yeast infection  She denies contractions, loss of fluid, or vaginal bleeding  She feels fetal movements       Pregnancy Problems:  Patient Active Problem List   Diagnosis    S/P VSD repair    H/O aortic coarctation repair    Splenomegaly    Hypertension    Hepatitis C    Ovarian cyst, right    Intractable chronic migraine without aura and with status migrainosus    Cervicalgia    Cervical dystonia    Aortic coarctation    Hypercholesteremia    Abscess of right axilla    Hidradenitis suppurativa    Cyst of right ovary    Pelvic pain    Endometriosis    Ventricular septal defect    Morbid obesity (HCC)    Type 2 diabetes mellitus affecting pregnancy, antepartum    21 weeks gestation of pregnancy    Vitamin D deficiency    Obesity affecting pregnancy, antepartum    Hereditary disease in family possibly affecting fetus, affecting management of mother, antepartum condition or complication    Chronic hypertension affecting pregnancy    Bipolar disorder ([facility] [address] )    Non-stress test reactive on fetal surveillance    Supervision of high-risk pregnancy, second trimester    Maternal congenital heart 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 trimester, antepartum    Round ligament pain    Vaginal candida         Objective  /80   Wt 105 kg (231 lb)   LMP 10/07/1910   BMI 45 11 kg/m²     FHT: 152 BPM   Uterine Size: size equals dates     Physical Exam:  Physical Exam   Constitutional: She appears well-developed and well-nourished  No distress  HENT:   Head: Normocephalic and atraumatic  Eyes: No scleral icterus  Pulmonary/Chest: Effort normal  No accessory muscle usage  No respiratory distress  Abdominal: She exhibits distension (gravid)  There is no tenderness  There is no rebound and no guarding  Skin: Skin is warm and dry  No rash noted  No erythema  Psychiatric: She has a normal mood and affect   Her behavior is normal

## 2020-03-03 NOTE — PROGRESS NOTES
A transvaginal ultrasound was performed  Sonographer note on use of High Level Disinfection Process (Trophon) for transvaginal probe# 2 used, serial [id number]    Emily Counter, RDMS

## 2020-03-05 ENCOUNTER — TELEPHONE (OUTPATIENT)
Dept: PERINATAL CARE | Facility: OTHER | Age: 30
End: 2020-03-05

## 2020-03-05 NOTE — TELEPHONE ENCOUNTER
Left message on pt's voicemail we are not seeing late pts in [place] on Monday 4/20/20  [place] message that [place] [facility] [place]20 at 4:45  Left voicemail for pt to call back if that appt isn't ok

## 2020-03-23 ENCOUNTER — NURSE TRIAGE (OUTPATIENT)
Dept: OTHER | Facility: OTHER | Age: 30
End: 2020-03-23

## 2020-03-24 ENCOUNTER — HOSPITAL ENCOUNTER (EMERGENCY)
Facility: HOSPITAL | Age: 30
Discharge: HOME | End: 2020-03-24
Attending: EMERGENCY MEDICINE | Admitting: EMERGENCY MEDICINE
Payer: COMMERCIAL

## 2020-03-24 ENCOUNTER — APPOINTMENT (EMERGENCY)
Dept: CT IMAGING | Facility: HOSPITAL | Age: 30
End: 2020-03-24
Payer: COMMERCIAL

## 2020-03-24 ENCOUNTER — NURSE TRIAGE (OUTPATIENT)
Dept: OTHER | Facility: OTHER | Age: 30
End: 2020-03-24

## 2020-03-24 VITALS
HEART RATE: 101 BPM | DIASTOLIC BLOOD PRESSURE: 77 MMHG | OXYGEN SATURATION: 96 % | BODY MASS INDEX: 44.59 KG/M2 | RESPIRATION RATE: 28 BRPM | SYSTOLIC BLOOD PRESSURE: 138 MMHG | WEIGHT: 236 LBS

## 2020-03-24 DIAGNOSIS — M62.838 MUSCLE SPASMS OF LOWER EXTREMITY, UNSPECIFIED LATERALITY: Primary | ICD-10-CM

## 2020-03-24 DIAGNOSIS — E83.42 HYPOMAGNESEMIA: ICD-10-CM

## 2020-03-24 DIAGNOSIS — M25.512 ACUTE PAIN OF LEFT SHOULDER: Primary | ICD-10-CM

## 2020-03-24 LAB
ALBUMIN SERPL BCP-MCNC: 3.5 G/DL (ref 3.5–5.7)
ALP SERPL-CCNC: 62 U/L (ref 40–150)
ALT SERPL W P-5'-P-CCNC: 6 U/L (ref 7–52)
ANION GAP SERPL CALCULATED.3IONS-SCNC: 9 MMOL/L (ref 4–13)
AST SERPL W P-5'-P-CCNC: 8 U/L (ref 13–39)
ATRIAL RATE: 115 BPM
BASOPHILS # BLD AUTO: 0.1 THOUSANDS/ΜL (ref 0–0.1)
BASOPHILS NFR BLD AUTO: 0 % (ref 0–2)
BILIRUB SERPL-MCNC: 0.2 MG/DL (ref 0.2–1)
BUN SERPL-MCNC: 8 MG/DL (ref 7–25)
CALCIUM SERPL-MCNC: 9 MG/DL (ref 8.6–10.5)
CHLORIDE SERPL-SCNC: 104 MMOL/L (ref 98–107)
CO2 SERPL-SCNC: 22 MMOL/L (ref 21–31)
CREAT SERPL-MCNC: 0.52 MG/DL (ref 0.6–1.2)
EOSINOPHIL # BLD AUTO: 0.1 THOUSAND/ΜL (ref 0–0.61)
EOSINOPHIL NFR BLD AUTO: 1 % (ref 0–5)
ERYTHROCYTE [DISTWIDTH] IN BLOOD BY AUTOMATED COUNT: 15.8 % (ref 11.5–14.5)
GFR SERPL CREATININE-BSD FRML MDRD: 127 ML/MIN/1.73SQ M
GLUCOSE SERPL-MCNC: 122 MG/DL (ref 65–140)
GLUCOSE SERPL-MCNC: 220 MG/DL (ref 65–99)
HCT VFR BLD AUTO: 36.3 % (ref 42–47)
HGB BLD-MCNC: 11.7 G/DL (ref 12–16)
LYMPHOCYTES # BLD AUTO: 2.1 THOUSANDS/ΜL (ref 0.6–4.47)
LYMPHOCYTES NFR BLD AUTO: 15 % (ref 21–51)
MAGNESIUM SERPL-MCNC: 1.8 MG/DL (ref 1.9–2.7)
MCH RBC QN AUTO: 25.2 PG (ref 26–34)
MCHC RBC AUTO-ENTMCNC: 32.3 G/DL (ref 31–37)
MCV RBC AUTO: 78 FL (ref 81–99)
MONOCYTES # BLD AUTO: 0.8 THOUSAND/ΜL (ref 0.17–1.22)
MONOCYTES NFR BLD AUTO: 6 % (ref 2–12)
NEUTROPHILS # BLD AUTO: 11.2 THOUSANDS/ΜL (ref 1.4–6.5)
NEUTS SEG NFR BLD AUTO: 79 % (ref 42–75)
P AXIS: 68 DEGREES
PLATELET # BLD AUTO: 202 THOUSANDS/UL (ref 149–390)
PMV BLD AUTO: 8.8 FL (ref 8.6–11.7)
POTASSIUM SERPL-SCNC: 3 MMOL/L (ref 3.5–5.5)
PR INTERVAL: 164 MS
PROT SERPL-MCNC: 6.7 G/DL (ref 6.4–8.9)
QRS AXIS: 31 DEGREES
QRSD INTERVAL: 144 MS
QT INTERVAL: 358 MS
QTC INTERVAL: 495 MS
RBC # BLD AUTO: 4.66 MILLION/UL (ref 3.9–5.2)
SODIUM SERPL-SCNC: 135 MMOL/L (ref 134–143)
T WAVE AXIS: 26 DEGREES
TROPONIN I SERPL-MCNC: <0.03 NG/ML
VENTRICULAR RATE: 115 BPM
WBC # BLD AUTO: 14.3 THOUSAND/UL (ref 4.8–10.8)

## 2020-03-24 PROCEDURE — 99285 EMERGENCY DEPT VISIT HI MDM: CPT

## 2020-03-24 PROCEDURE — 93010 ELECTROCARDIOGRAM REPORT: CPT | Performed by: INTERNAL MEDICINE

## 2020-03-24 PROCEDURE — 93005 ELECTROCARDIOGRAM TRACING: CPT

## 2020-03-24 PROCEDURE — 82948 REAGENT STRIP/BLOOD GLUCOSE: CPT

## 2020-03-24 PROCEDURE — 71275 CT ANGIOGRAPHY CHEST: CPT

## 2020-03-24 PROCEDURE — 84484 ASSAY OF TROPONIN QUANT: CPT | Performed by: EMERGENCY MEDICINE

## 2020-03-24 PROCEDURE — 80053 COMPREHEN METABOLIC PANEL: CPT | Performed by: EMERGENCY MEDICINE

## 2020-03-24 PROCEDURE — 99285 EMERGENCY DEPT VISIT HI MDM: CPT | Performed by: EMERGENCY MEDICINE

## 2020-03-24 PROCEDURE — 85025 COMPLETE CBC W/AUTO DIFF WBC: CPT | Performed by: EMERGENCY MEDICINE

## 2020-03-24 PROCEDURE — 83735 ASSAY OF MAGNESIUM: CPT | Performed by: EMERGENCY MEDICINE

## 2020-03-24 PROCEDURE — 96365 THER/PROPH/DIAG IV INF INIT: CPT

## 2020-03-24 PROCEDURE — 36415 COLL VENOUS BLD VENIPUNCTURE: CPT | Performed by: EMERGENCY MEDICINE

## 2020-03-24 RX ORDER — SODIUM CHLORIDE 9 MG/ML
3 INJECTION INTRAVENOUS AS NEEDED
Status: DISCONTINUED | OUTPATIENT
Start: 2020-03-24 | End: 2020-03-24 | Stop reason: HOSPADM

## 2020-03-24 RX ORDER — ACETAMINOPHEN 325 MG/1
650 TABLET ORAL ONCE
Status: COMPLETED | OUTPATIENT
Start: 2020-03-24 | End: 2020-03-24

## 2020-03-24 RX ORDER — MAGNESIUM SULFATE 1 G/100ML
1 INJECTION INTRAVENOUS ONCE
Status: COMPLETED | OUTPATIENT
Start: 2020-03-24 | End: 2020-03-24

## 2020-03-24 RX ADMIN — ACETAMINOPHEN 650 MG: 325 TABLET ORAL at 04:01

## 2020-03-24 RX ADMIN — IOHEXOL 100 ML: 350 INJECTION, SOLUTION INTRAVENOUS at 02:12

## 2020-03-24 RX ADMIN — MAGNESIUM SULFATE HEPTAHYDRATE 1 G: 1 INJECTION, SOLUTION INTRAVENOUS at 04:02

## 2020-03-24 NOTE — TELEPHONE ENCOUNTER
Reason for Disposition   SEVERE chest pain    Answer Assessment - Initial Assessment Questions  1  LOCATION:  L upper chest "near my rotor cuff"  2  RADIATION: No  3  ONSET: Since Monday (23 rd) am  4  PATTERN Constant and sharp  5  DURATION: Dopes not go away  Constant  6  SEVERITY: MODERATE 6-7 "I can not settle to sleep "  7  CARDIAC RISK FACTORS:Had a stent placed @ [facility] on 2-7-20 for a Aortic Coarctation  8  PULMONARY RISK FACTORS: None  9  CAUSE:Unsure  10  OTHER SYMPTOMS: No  11   PREGNANCY:   [facility] 7/14/20 @ 24 1/7 week gestation  Primigravid    Protocols used: CHEST PAIN-ADULT-

## 2020-03-24 NOTE — ED PROVIDER NOTES
History  Chief Complaint   Patient presents with    Chest Pain     31yo F  , 23 weeks pregnant    Pt here for Left shoulder pain since yesterday morning  She is concerned it could be from her Coarctation stent that was placed at the beginning of last month  She was in communication with CAROLANN and her cardiologist at [facility] who referred her in for evaluation    She states that the pain is worse w/ deep breath  She has no cp or sob  No back pain    She has no cough/fever/chills    No abdominal pain    Pt has tried Tylenol at home for pain with some improvement       History provided by:  Patient  Arm Pain   Location:  Left shoulder  Severity:  Moderate  Onset quality:  Gradual  Duration:  1 day  Timing:  Constant  Progression:  Unchanged  Associated symptoms: no abdominal pain, no chest pain, no congestion, no cough, no fatigue, no fever, no headaches, no myalgias, no nausea, no rash, no rhinorrhea, no shortness of breath, no sore throat, no vomiting and no wheezing        Prior to Admission Medications   Prescriptions Last Dose Informant Patient Reported? Taking? Insulin Pen Needle 31G X 5 MM MISC  Self No No   Sig: Inject under the skin daily at bedtime Use one a day or as directed  Lancets (ACCU-CHEK MULTICLIX) lancets  Self No No   Sig: Use as instructed   Prenatal Vit-Fe Fumarate-FA (PRENATAL VITAMIN PO)  Self Yes Yes   Sig: Take 1 tablet by mouth daily   aspirin 325 mg tablet  Self Yes Yes   Sig: Take 81 mg by mouth daily    glucose blood (ACCU-CHEK GUIDE) test strip  Self No No   Sig: Test three times per day   insulin glargine (LANTUS SOLOSTAR) 100 units/mL injection pen  Self No Yes   Sig: Inject SC 30 units at 9 PM daily  To be titrated  insulin lispro (HUMALOG KWIKPEN) 100 units/mL injection pen  Self No Yes   Sig: Inject SC 10 units before breakfast, before lunch and dinner  To be titrated     labetalol (NORMODYNE) 200 mg tablet  Self Yes Yes   Sig: Take 200 mg by mouth 2 (two) times a day metFORMIN (GLUCOPHAGE) 1000 MG tablet  Self No No   Sig: Take 1 tablet (1,000 mg total) by mouth 2 (two) times a day with meals   Patient not taking: Reported on 2/28/2020   miconazole (MONISTAT-7) 2 % vaginal cream  Self No No   Sig: Insert 1 applicator into the vagina daily at bedtime   ondansetron (ZOFRAN) 8 mg tablet  Self No Yes   Sig: Take 1 tablet (8 mg total) by mouth every 8 (eight) hours as needed for nausea or vomiting      Facility-Administered Medications: None       Past Medical History:   Diagnosis Date    Allergic     Arthritis     Bipolar affective disorder, currently depressed, moderate ([facility] [address] ) 12/17/2018    Cyst of ovary, right     Diabetes mellitus ([facility] [address] ) 10/10/18    type 2    Endometriosis     Heart murmur 01/19/88    Hepatitis C     Hepatitis C virus infection cured after antiviral drug therapy     History of transfusion     Hypertension     Migraines     Obesity 1995    Pulmonary artery congenital abnormality     Spleen enlarged     Varicella        Past Surgical History:   Procedure Laterality Date    CARDIAC CATHETERIZATION      no CAD 10days, 4 weeks 22months old     CHOLECYSTECTOMY      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 7   [facility] CORONARY STENT PLACEMENT      LIVER BIOPSY      LIVER BIOPSY      VSD REPAIR      As a child       Family History   Problem Relation Age of Onset    Hypertension Mother     Migraines Mother     JENNY disease Mother     Depression Mother     Hyperlipidemia Mother     Diabetes Mother     Diabetes Father     Hypertension Father     Kidney failure Father     Heart attack Father     Arthritis Father     Stroke Father     Polycystic kidney disease Paternal [de-identified]     Stroke Paternal Grandmother     Heart disease Paternal Grandmother     Arthritis Sister     Asthma Sister     Thyroid disease Sister     Diabetes Sister     Arthritis Maternal Grandmother     Breast cancer Maternal Grandmother     Diabetes Maternal Grandmother  Hypertension Maternal Grandmother     Heart Valve Disease Maternal Grandmother     Learning disabilities Cousin     Learning disabilities Sister     ADD / ADHD Cousin     Lung cancer Brother     Diabetes Maternal Grandfather     Hypertension Maternal Grandfather     JENNY disease Maternal Grandfather     Stroke Maternal Grandfather      I have reviewed and agree with the history as documented  E-Cigarette/Vaping    E-Cigarette Use Never User      E-Cigarette/Vaping Substances     Social History     Tobacco Use    Smoking status: Former Smoker     Packs/day: 0 20     Types: Cigarettes     Start date: 2019     Last attempt to quit: 2019     Years since quittin 1    Smokeless tobacco: Never Used   Substance Use Topics    Alcohol use: Never     Alcohol/week: 3 0 standard drinks     Types: 3 Standard drinks or equivalent per week     Frequency: Never     Comment: socially/prior to knowledge of pregnancy    Drug use: Not Currently     Comment: hx of THC use for migraines       Review of Systems   Constitutional: Negative for chills, diaphoresis, fatigue and fever  HENT: Negative for congestion, rhinorrhea and sore throat  Respiratory: Negative for cough, shortness of breath, wheezing and stridor  Cardiovascular: Negative for chest pain, palpitations and leg swelling  Gastrointestinal: Negative for abdominal pain, blood in stool, nausea and vomiting  Genitourinary: Negative for difficulty urinating, dysuria, flank pain and frequency  Musculoskeletal: Negative for arthralgias, back pain, gait problem, joint swelling, myalgias, neck pain and neck stiffness  Left shoulder pain   Skin: Negative for rash and wound  Neurological: Negative for dizziness, light-headedness and headaches  All other systems reviewed and are negative  Physical Exam  Physical Exam   Constitutional: She is oriented to person, place, and time  She appears well-developed and well-nourished  Non-toxic appearance  She does not appear ill  No distress  HENT:   Head: Normocephalic and atraumatic  Nose: Nose normal    Mouth/Throat: Oropharynx is clear and moist  No oropharyngeal exudate  Eyes: Pupils are equal, round, and reactive to light  Conjunctivae and EOM are normal  Right eye exhibits no discharge  Left eye exhibits no discharge  No scleral icterus  Neck: Normal range of motion  Neck supple  No JVD present  No tracheal deviation present  Cardiovascular: Normal rate, regular rhythm, normal heart sounds and intact distal pulses  Exam reveals no gallop and no friction rub  No murmur heard  Pulses:       Carotid pulses are 2+ on the right side, and 2+ on the left side  Radial pulses are 2+ on the right side, and 2+ on the left side  Pulmonary/Chest: Effort normal and breath sounds normal  No accessory muscle usage or stridor  No tachypnea  No respiratory distress  She has no decreased breath sounds  She has no wheezes  She has no rhonchi  She has no rales  She exhibits no tenderness  Abdominal: Soft  Bowel sounds are normal  She exhibits no distension and no mass  There is no tenderness  There is no rebound and no guarding  No hernia  Musculoskeletal: Normal range of motion  She exhibits no edema, tenderness or deformity  Right lower leg: Normal         Left lower leg: Normal    Lymphadenopathy:     She has no cervical adenopathy  Neurological: She is alert and oriented to person, place, and time  She is not disoriented  No cranial nerve deficit or sensory deficit  She exhibits normal muscle tone  Coordination normal    Skin: Skin is warm  Capillary refill takes less than 2 seconds  No rash noted  She is not diaphoretic  No erythema  No pallor  Psychiatric: She has a normal mood and affect  Her behavior is normal  Judgment and thought content normal  Her mood appears not anxious  She is not agitated         Vital Signs  ED Triage Vitals [03/24/20 0046]   Temp Pulse Respirations Blood Pressure SpO2   -- (!) 120 18 160/80 99 %      Temp src Heart Rate Source Patient Position - Orthostatic VS BP Location FiO2 (%)   -- -- -- -- --      Pain Score       7           Vitals:    03/24/20 0415 03/24/20 0430 03/24/20 0445 03/24/20 0500   BP:  120/71  130/75   Pulse: 103 104 98 98         Visual Acuity      ED Medications  Medications   sodium chloride (PF) 0 9 % injection 3 mL (has no administration in time range)   iohexol (OMNIPAQUE) 350 MG/ML injection (SINGLE-DOSE) 100 mL (100 mL Intravenous Given 3/24/20 0212)   magnesium sulfate IVPB (premix) SOLN 1 g (0 g Intravenous Stopped 3/24/20 0517)   acetaminophen (TYLENOL) tablet 650 mg (650 mg Oral Given 3/24/20 0401)       Diagnostic Studies  Results Reviewed     Procedure Component Value Units Date/Time    Fingerstick Glucose (POCT) [[id number]]  (Normal) Collected:  03/24/20 0527    Lab Status:  Final result Updated:  03/24/20 0528     POC Glucose 122 mg/dl     Comprehensive metabolic panel [[id number]]  (Abnormal) Collected:  03/24/20 0118    Lab Status:  Final result Specimen:  Blood from Arm, Right Updated:  03/24/20 0145     Sodium 135 mmol/L      Potassium 3 0 mmol/L      Chloride 104 mmol/L      CO2 22 mmol/L      ANION GAP 9 mmol/L      BUN 8 mg/dL      Creatinine 0 52 mg/dL      Glucose 220 mg/dL      Calcium 9 0 mg/dL      AST 8 U/L      ALT 6 U/L      Alkaline Phosphatase 62 U/L      Total Protein 6 7 g/dL      Albumin 3 5 g/dL      Total Bilirubin 0 20 mg/dL      eGFR 127 ml/min/1 73sq m     Narrative:       [place] guidelines for Chronic Kidney Disease (CKD):     Stage 1 with normal or high GFR (GFR > 90 mL/min/1 73 square meters)    Stage 2 Mild CKD (GFR = 60-89 mL/min/1 73 square meters)    Stage 3A Moderate CKD (GFR = 45-59 mL/min/1 73 square meters)    Stage 3B Moderate CKD (GFR = 30-44 mL/min/1 73 square meters)    Stage 4 Severe CKD (GFR = 15-29 mL/min/1 73 square meters)    Stage 5 End Stage CKD (GFR <15 mL/min/1 73 square meters)  Note: GFR calculation is accurate only with a steady state creatinine    Magnesium [[id number]]  (Abnormal) Collected:  03/24/20 0118    Lab Status:  Final result Specimen:  Blood from Arm, Right Updated:  03/24/20 0145     Magnesium 1 8 mg/dL     Troponin I [[id number]]  (Normal) Collected:  03/24/20 0118    Lab Status:  Final result Specimen:  Blood from Arm, Right Updated:  03/24/20 0143     Troponin I <0 03 ng/mL     CBC and differential [[id number]]  (Abnormal) Collected:  03/24/20 0118    Lab Status:  Final result Specimen:  Blood from Arm, Right Updated:  03/24/20 0129     WBC 14 30 Thousand/uL      RBC 4 66 Million/uL      Hemoglobin 11 7 g/dL      Hematocrit 36 3 %      MCV 78 fL      MCH 25 2 pg      MCHC 32 3 g/dL      RDW 15 8 %      MPV 8 8 fL      Platelets 272 Thousands/uL      Neutrophils Relative 79 %      Lymphocytes Relative 15 %      Monocytes Relative 6 %      Eosinophils Relative 1 %      Basophils Relative 0 %      Neutrophils Absolute 11 20 Thousands/µL      Lymphocytes Absolute 2 10 Thousands/µL      Monocytes Absolute 0 80 Thousand/µL      Eosinophils Absolute 0 10 Thousand/µL      Basophils Absolute 0 10 Thousands/µL                  CTA ED chest PE Study   Final Result by Yu Lamar MD (03/24 9069)      1  No evidence of pulmonary embolism  2   The esophagus is patulous and mildly thickened which may be due to gastroesophageal reflux or esophagitis, correlate with endoscopy        Workstation performed: [id number]                    Procedures  Procedures         ED Course  ED Course as of Mar 24 0537   [facility] Mar 24, 2020   [address] Regis Villeda at [facility], covering for Dr Maksim Branham the case    She Has reviewed prior visits  She has had shoulder pain with prior visits    Recommends CTA to evaluate for dissection around the coarctation       0322 CTA - CHEST WITH IV CONTRAST - PULMONARY ANGIOGRAM     INDICATION:   PE suspected, high pretest prob  Chest pain      IV Contrast:  100 mL of iohexol (OMNIPAQUE)  was administered intravenously without immediate adverse reaction  FINDINGS:     PULMONARY ARTERIAL TREE:  No pulmonary embolus is seen       LUNGS:  Stable mild volume loss in the left lung  Lungs are clear  There is no tracheal or endobronchial lesion      PLEURA:  Unremarkable      HEART/GREAT VESSELS:  Unremarkable for patient's age  There is aortic coarctation repair with aortic stent in the descending arch      MEDIASTINUM AND LUIS:  Unremarkable      CHEST WALL AND LOWER NECK: There is a 8mm left thyroid nodule  Incidental discovery of one or more thyroid nodule(s) measuring less than 1 cm and without suspicious features is noted in this patient who is younger than 28years old; according to   guidelines published in the February 2015 white paper on incidental thyroid nodules in the Journal of the [employer] of Radiology [facility]), no further evaluation is recommended       VISUALIZED STRUCTURES IN THE UPPER ABDOMEN:  The esophagus is patulous and mildly thickened which may be due to gastroesophageal reflux      OSSEOUS STRUCTURES:  No acute fracture or destructive osseous lesion  Healed left-sided rib deformities      IMPRESSION:     1  No evidence of pulmonary embolism  2   The esophagus is patulous and mildly thickened which may be due to gastroesophageal reflux or esophagitis, correlate with endoscopy  5958 Patient understands results of her workup  She understands my conversation with her specialist at [facility]    Being that her study is unremarkable she will be safe to go home  FHTs are normal here  VSS      0521 D/w Dr Petar shell to send home  She will f/u on her insulin regime being that her glucose is 220      0524 Repeat fingerstick is 122    Pt remains very stable  She understands my conversations w/ MFM and Cardiology  She is ready to manage from home                              [address] for PE      Most Recent Value   Wells' Criteria for PE   Clinical signs and symptoms of DVT  3 Filed at: 03/24/2020 0140   PE is primary diagnosis or equally likely  3 Filed at: 03/24/2020 0140   HR >100  1 5 Filed at: 03/24/2020 0140   Immobilization at least 3 days or Surgery in the previous 4 weeks  1 5 Filed at: 03/24/2020 0140   Previous, objectively diagnosed PE or DVT  1 5 Filed at: 03/24/2020 0140   Hemoptysis  0 Filed at: 03/24/2020 0140   Malignancy with treatment within 6 months or palliative  0 Filed at: 03/24/2020 0140   Wells' Criteria Total  10 5 Filed at: 03/24/2020 0140            MDM      Disposition  Final diagnoses:   Acute pain of left shoulder   Hypomagnesemia     Time reflects when diagnosis was documented in both MDM as applicable and the Disposition within this note     Time User Action Codes Description Comment    3/24/2020  4:16 AM Lucien Mangle Add [M25 512] Acute pain of left shoulder     3/24/2020  4:17 AM Lucien Mangle Add [E83 42] Hypomagnesemia       ED Disposition     ED Disposition Condition Date/Time Comment    Discharge Stable Tue Mar 24, 2020  4:16 AM Carlos Worthy discharge to home/self care  Follow-up Information     Follow up With Specialties Details Why Contact Osman Haque DO Family Medicine Call today  [address]  [phone]      Your [facility] Cardiologist  Call today            Patient's Medications   Discharge Prescriptions    No medications on file     No discharge procedures on file      PDMP Review     None          ED Provider  Electronically Signed by           Anamaria Huertas MD  03/24/20 2777

## 2020-03-24 NOTE — ED NOTES
A0, denies vaginal bleeding or d/c states no complications thus far with pregnancy       Laura Carpenter RN  91/58/57 5426

## 2020-03-24 NOTE — TELEPHONE ENCOUNTER
Pt called in stating yesterday she was having L shoulder pain and went to the ER and now today she is having mid lower back pain  Pt states she tried tylenol and took a 20 min shower with no relief  Denies any urinary sxs or contrxns  States pain comes and goes but believes it is only the pain  I spoke with Dr Stephanie Tavarez and rx for flexeril pended   Pt aware to cb if sxs persist

## 2020-03-24 NOTE — ED NOTES
Pt states pain in L upper chest near shoulder started in am did had some increase with movement, denies sob or cough   No nvd     Luci Meza, GABINO  26/59/73 9464

## 2020-03-24 NOTE — TELEPHONE ENCOUNTER
Regarding: pain in shoulder  ----- Message from [facility] sent at 3/23/2020 11:16 PM EDT -----  "I am 24 wks pregnant and I am having pain in my left shoulder  The pain is so intense I cannot sleep   I just want to know what I should do "

## 2020-03-24 NOTE — DISCHARGE INSTRUCTIONS
RETURN IF WORSE IN ANY WAY: CHEST PAIN, SHORTNESS OF BREATH, INCREASED PAIN, FEVER OR FLU LIKE SYMPTOMS, OR NEW AND CONCERNING SYMPTOMS SIGNS OR SYMPTOMS:    PLEASE CALL YOUR PRIMARY DOCTOR IN THE MORNING TO SET UP FOLLOW UP   PLEASE REVIEW THE WORK UP RESULTS WITH YOUR DOCTOR

## 2020-03-25 RX ORDER — CYCLOBENZAPRINE HCL 5 MG
5 TABLET ORAL 3 TIMES DAILY PRN
Qty: 15 TABLET | Refills: 0 | Status: SHIPPED | OUTPATIENT
Start: 2020-03-25 | End: 2020-04-14 | Stop reason: SDUPTHER

## 2020-03-25 NOTE — TELEPHONE ENCOUNTER
Regarding: Meds Refill  ----- Message from Jabari Melton sent at 3/24/2020  7:44 PM EDT -----  The Dr  Was supped to send my medication (Flexeral) to the Pharmacy

## 2020-03-27 DIAGNOSIS — M62.838 MUSCLE SPASMS OF LOWER EXTREMITY, UNSPECIFIED LATERALITY: ICD-10-CM

## 2020-03-27 RX ORDER — CYCLOBENZAPRINE HCL 5 MG
TABLET ORAL
Qty: 15 TABLET | Refills: 0 | OUTPATIENT
Start: 2020-03-27

## 2020-03-29 ENCOUNTER — HOSPITAL ENCOUNTER (OUTPATIENT)
Facility: HOSPITAL | Age: 30
Discharge: HOME | End: 2020-03-29
Attending: OBSTETRICS & GYNECOLOGY | Admitting: OBSTETRICS & GYNECOLOGY
Payer: COMMERCIAL

## 2020-03-29 ENCOUNTER — TELEPHONE (OUTPATIENT)
Dept: OTHER | Facility: OTHER | Age: 30
End: 2020-03-29

## 2020-03-29 VITALS
WEIGHT: 233 LBS | TEMPERATURE: 98.3 F | HEART RATE: 106 BPM | SYSTOLIC BLOOD PRESSURE: 138 MMHG | HEIGHT: 61 IN | RESPIRATION RATE: 20 BRPM | OXYGEN SATURATION: 99 % | BODY MASS INDEX: 43.99 KG/M2 | DIASTOLIC BLOOD PRESSURE: 77 MMHG

## 2020-03-29 DIAGNOSIS — B37.3 VAGINAL CANDIDA: Primary | ICD-10-CM

## 2020-03-29 PROBLEM — Z3A.24 24 WEEKS GESTATION OF PREGNANCY: Status: ACTIVE | Noted: 2019-12-12

## 2020-03-29 PROCEDURE — NC001 PR NO CHARGE: Performed by: OBSTETRICS & GYNECOLOGY

## 2020-03-29 PROCEDURE — 99214 OFFICE O/P EST MOD 30 MIN: CPT

## 2020-03-29 PROCEDURE — 76817 TRANSVAGINAL US OBSTETRIC: CPT | Performed by: OBSTETRICS & GYNECOLOGY

## 2020-03-29 RX ORDER — FLUCONAZOLE 150 MG/1
150 TABLET ORAL DAILY
Qty: 2 TABLET | Refills: 0 | Status: SHIPPED | OUTPATIENT
Start: 2020-03-29 | End: 2020-03-31

## 2020-03-31 ENCOUNTER — TELEMEDICINE (OUTPATIENT)
Dept: OBGYN CLINIC | Facility: MEDICAL CENTER | Age: 30
End: 2020-03-31
Payer: COMMERCIAL

## 2020-03-31 DIAGNOSIS — O99.412 MATERNAL CONGENITAL HEART DISEASE IN SECOND TRIMESTER, ANTEPARTUM: ICD-10-CM

## 2020-03-31 DIAGNOSIS — Z34.92 SECOND TRIMESTER PREGNANCY: ICD-10-CM

## 2020-03-31 DIAGNOSIS — O10.919 CHRONIC HYPERTENSION AFFECTING PREGNANCY: ICD-10-CM

## 2020-03-31 DIAGNOSIS — Q24.9 MATERNAL CONGENITAL HEART DISEASE IN SECOND TRIMESTER, ANTEPARTUM: ICD-10-CM

## 2020-03-31 DIAGNOSIS — Q21.0 VENTRICULAR SEPTAL DEFECT: ICD-10-CM

## 2020-03-31 DIAGNOSIS — I10 ESSENTIAL HYPERTENSION: ICD-10-CM

## 2020-03-31 DIAGNOSIS — Q25.1 AORTIC COARCTATION: ICD-10-CM

## 2020-03-31 DIAGNOSIS — Z3A.25 25 WEEKS GESTATION OF PREGNANCY: Primary | ICD-10-CM

## 2020-03-31 DIAGNOSIS — O24.119 TYPE 2 DIABETES MELLITUS AFFECTING PREGNANCY, ANTEPARTUM: ICD-10-CM

## 2020-03-31 PROCEDURE — 99214 OFFICE O/P EST MOD 30 MIN: CPT | Performed by: OBSTETRICS & GYNECOLOGY

## 2020-03-31 RX ORDER — MEDICAL SUPPLY, MISCELLANEOUS
EACH MISCELLANEOUS
Qty: 1 EACH | Refills: 0 | Status: SHIPPED | OUTPATIENT
Start: 2020-03-31

## 2020-03-31 NOTE — PROGRESS NOTES
Virtual Regular Visit         Reason for visit is 25 weeks pregnant, prenatal care    Encounter provider Jolie Bartlett MD    Provider located at [address] [facility]  [facility] [address]  [address] [facility]      Recent Visits  No visits were found meeting these conditions  Showing recent visits within past 7 days and meeting all other requirements     Today's Visits  Date Type Provider Dept   20 iJan Tenorio MD [facility] today's visits and meeting all other requirements     Future Appointments  No visits were found meeting these conditions  Showing future appointments within next 150 days and meeting all other requirements        The patient was identified by name and date of birth  Sherry Cummings was informed that this is a telemedicine visit and that the visit is being conducted through [employer]  My office door was closed  No one else was in the room  She acknowledged consent and understanding of privacy and security of the video platform  The patient has agreed to participate and understands they can discontinue the visit at any time  Patient is aware this is a billable service  Subjective  Sherry Cummings is a 26 y o  female  at 25w0d who presents for routine prenatal visit  She is without complaint  Seen on L&D recently for pelvic pressure and diagnosed with yeast infection  She notes she feels better since starting treatment  She denies contractions, loss of fluid, or vaginal bleeding  She feels regular fetal movements  Reviewed recent consult with [facility]  Patient reports they want to follow her diabetic care personally, so shes been checking blood glucose and sending there  No acute changes reported  Antepartum surveillance recommended starting at 32wks BID  Will help coordinate this care between our office and [facility]         Past Medical History:   Diagnosis Date    Allergic  Arthritis     Bipolar affective disorder, currently depressed, moderate ([facility] [address] ) 12/17/2018    Cyst of ovary, right     Diabetes mellitus ([facility] [address] ) 10/10/18    type 2    Endometriosis     Heart murmur 01/19/88    Hepatitis C     Hepatitis C virus infection cured after antiviral drug therapy     History of transfusion     Hypertension     Migraines     Obesity 1995    Pulmonary artery congenital abnormality     Spleen enlarged     Varicella        Past Surgical History:   Procedure Laterality Date    CARDIAC CATHETERIZATION      no CAD 10days, 4 weeks 22months old     CHOLECYSTECTOMY      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 7   [facility] CORONARY STENT PLACEMENT      LIVER BIOPSY      LIVER BIOPSY      VSD REPAIR      As a child       Current Outpatient Medications   Medication Sig Dispense Refill    aspirin 325 mg tablet Take 81 mg by mouth daily       cyclobenzaprine (FLEXERIL) 5 mg tablet Take 1 tablet (5 mg total) by mouth 3 (three) times a day as needed for muscle spasms 15 tablet 0    fluconazole (DIFLUCAN) 150 mg tablet Take 1 tablet (150 mg total) by mouth daily for 2 doses 2 tablet 0    insulin glargine (LANTUS SOLOSTAR) 100 units/mL injection pen Inject SC 30 units at 9 PM daily  To be titrated  15 mL 0    insulin lispro (HUMALOG KWIKPEN) 100 units/mL injection pen Inject SC 10 units before breakfast, before lunch and dinner  To be titrated   15 mL 0    labetalol (NORMODYNE) 200 mg tablet Take 200 mg by mouth 2 (two) times a day       miconazole (MONISTAT-7) 2 % vaginal cream Insert 1 applicator into the vagina daily at bedtime 45 g 0    ondansetron (ZOFRAN) 8 mg tablet Take 1 tablet (8 mg total) by mouth every 8 (eight) hours as needed for nausea or vomiting 30 tablet 1    Prenatal Vit-Fe Fumarate-FA (PRENATAL VITAMIN PO) Take 1 tablet by mouth daily      Blood Pressure Monitoring (B-D ASSURE BPM/AUTO WRIST CUFF) MISC Check blood pressure prior to each OB visit, or as directed by your physician  1 each 0    glucose blood (ACCU-CHEK GUIDE) test strip Test three times per day 100 each 0    Insulin Pen Needle 31G X 5 MM MISC Inject under the skin daily at bedtime Use one a day or as directed  100 each 1    Lancets (ACCU-CHEK MULTICLIX) lancets Use as instructed 100 each 3     No current facility-administered medications for this visit  Allergies   Allergen Reactions    Prednisone Swelling    Bactrim [Sulfamethoxazole-Trimethoprim] Hives    Corticosteroids Swelling     Pt states this does not cause problems breathing, she just has generalized swelling    Other Hives     Paper tape    Sulfa Antibiotics Hives    Cortisone     Lactose     Milk-Related Compounds Sneezing           Video Exam    There were no vitals filed for this visit  Physical Exam   Constitutional: She appears well-developed and well-nourished  No distress  Eyes: Conjunctivae are normal  Right eye exhibits no discharge  Left eye exhibits no discharge  No scleral icterus  Pulmonary/Chest: Effort normal  No respiratory distress  Neurological: She is alert  Skin: She is not diaphoretic  Psychiatric: She has a normal mood and affect  Her behavior is normal       Assessment  26 y o  Anam Hudson at 25w0d presenting for routine prenatal visit  Plan  Diagnoses and all orders for this visit:    25 weeks gestation of pregnancy  Second trimester pregnancy  -  labor precautions  - Normal movement discussed  - Already following for diabetic care; needs CBC only at 29wks  - Reviewed recent US by Keegan  - Return in 4wks for PN - advised can do virtual visits, but with hx HTN should have home BP cuff    Chronic hypertension affecting pregnancy  Aortic coarctation  Ventricular septal defect  Maternal congenital heart 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

## 2020-04-02 ENCOUNTER — HOSPITAL ENCOUNTER (EMERGENCY)
Facility: HOSPITAL | Age: 30
Discharge: HOME | End: 2020-04-02
Attending: EMERGENCY MEDICINE | Admitting: EMERGENCY MEDICINE
Payer: COMMERCIAL

## 2020-04-02 VITALS
DIASTOLIC BLOOD PRESSURE: 78 MMHG | TEMPERATURE: 98.6 F | RESPIRATION RATE: 16 BRPM | OXYGEN SATURATION: 98 % | SYSTOLIC BLOOD PRESSURE: 140 MMHG | HEART RATE: 107 BPM

## 2020-04-02 DIAGNOSIS — T23.251A PARTIAL THICKNESS BURN OF PALM OF RIGHT HAND, INITIAL ENCOUNTER: Primary | ICD-10-CM

## 2020-04-02 PROCEDURE — 99284 EMERGENCY DEPT VISIT MOD MDM: CPT

## 2020-04-02 PROCEDURE — 99284 EMERGENCY DEPT VISIT MOD MDM: CPT | Performed by: EMERGENCY MEDICINE

## 2020-04-02 RX ORDER — ACETAMINOPHEN 325 MG/1
650 TABLET ORAL ONCE
Status: COMPLETED | OUTPATIENT
Start: 2020-04-02 | End: 2020-04-02

## 2020-04-02 RX ORDER — GINSENG 100 MG
1 CAPSULE ORAL ONCE
Status: COMPLETED | OUTPATIENT
Start: 2020-04-02 | End: 2020-04-02

## 2020-04-02 RX ADMIN — ACETAMINOPHEN 650 MG: 325 TABLET ORAL at 19:25

## 2020-04-02 RX ADMIN — BACITRACIN 1 SMALL APPLICATION: 500 OINTMENT TOPICAL at 19:19

## 2020-04-03 ENCOUNTER — TELEPHONE (OUTPATIENT)
Dept: PERINATAL CARE | Facility: CLINIC | Age: 30
End: 2020-04-03

## 2020-04-08 ENCOUNTER — NURSE TRIAGE (OUTPATIENT)
Dept: OTHER | Facility: OTHER | Age: 30
End: 2020-04-08

## 2020-04-11 ENCOUNTER — NURSE TRIAGE (OUTPATIENT)
Dept: OTHER | Facility: OTHER | Age: 30
End: 2020-04-11

## 2020-04-13 ENCOUNTER — TELEPHONE (OUTPATIENT)
Dept: OBGYN CLINIC | Facility: MEDICAL CENTER | Age: 30
End: 2020-04-13

## 2020-04-14 ENCOUNTER — TELEMEDICINE (OUTPATIENT)
Dept: OBGYN CLINIC | Facility: MEDICAL CENTER | Age: 30
End: 2020-04-14
Payer: COMMERCIAL

## 2020-04-14 VITALS — DIASTOLIC BLOOD PRESSURE: 78 MMHG | SYSTOLIC BLOOD PRESSURE: 133 MMHG

## 2020-04-14 DIAGNOSIS — O09.92 SUPERVISION OF HIGH-RISK PREGNANCY, SECOND TRIMESTER: ICD-10-CM

## 2020-04-14 DIAGNOSIS — O99.891 BACK PAIN AFFECTING PREGNANCY IN SECOND TRIMESTER: ICD-10-CM

## 2020-04-14 DIAGNOSIS — Z3A.27 27 WEEKS GESTATION OF PREGNANCY: Primary | ICD-10-CM

## 2020-04-14 DIAGNOSIS — M62.838 MUSCLE SPASMS OF LOWER EXTREMITY, UNSPECIFIED LATERALITY: ICD-10-CM

## 2020-04-14 DIAGNOSIS — M54.9 BACK PAIN AFFECTING PREGNANCY IN SECOND TRIMESTER: ICD-10-CM

## 2020-04-14 PROCEDURE — 99213 OFFICE O/P EST LOW 20 MIN: CPT | Performed by: OBSTETRICS & GYNECOLOGY

## 2020-04-14 RX ORDER — CYCLOBENZAPRINE HCL 5 MG
5 TABLET ORAL 3 TIMES DAILY PRN
Qty: 30 TABLET | Refills: 1 | Status: SHIPPED | OUTPATIENT
Start: 2020-04-14 | End: 2020-11-02 | Stop reason: SDUPTHER

## 2020-04-18 ENCOUNTER — TELEPHONE (OUTPATIENT)
Dept: OTHER | Facility: OTHER | Age: 30
End: 2020-04-18

## 2020-04-20 ENCOUNTER — TELEPHONE (OUTPATIENT)
Dept: PERINATAL CARE | Facility: CLINIC | Age: 30
End: 2020-04-20

## 2020-04-21 ENCOUNTER — ROUTINE PRENATAL (OUTPATIENT)
Dept: PERINATAL CARE | Facility: CLINIC | Age: 30
End: 2020-04-21
Payer: COMMERCIAL

## 2020-04-21 VITALS
DIASTOLIC BLOOD PRESSURE: 80 MMHG | HEART RATE: 107 BPM | BODY MASS INDEX: 43.84 KG/M2 | TEMPERATURE: 97.7 F | WEIGHT: 232.2 LBS | SYSTOLIC BLOOD PRESSURE: 122 MMHG | HEIGHT: 61 IN

## 2020-04-21 DIAGNOSIS — Q25.1 AORTIC COARCTATION: ICD-10-CM

## 2020-04-21 PROCEDURE — 76825 ECHO EXAM OF FETAL HEART: CPT | Performed by: PEDIATRICS

## 2020-04-21 PROCEDURE — 93325 DOPPLER ECHO COLOR FLOW MAPG: CPT | Performed by: PEDIATRICS

## 2020-04-21 PROCEDURE — 76827 ECHO EXAM OF FETAL HEART: CPT | Performed by: PEDIATRICS

## 2020-04-21 RX ORDER — ACETAMINOPHEN 500 MG
500 TABLET ORAL
COMMUNITY
End: 2020-05-19 | Stop reason: SDUPTHER

## 2020-04-22 ENCOUNTER — TELEPHONE (OUTPATIENT)
Dept: PERINATAL CARE | Facility: CLINIC | Age: 30
End: 2020-04-22

## 2020-04-28 ENCOUNTER — ROUTINE PRENATAL (OUTPATIENT)
Dept: OBGYN CLINIC | Facility: MEDICAL CENTER | Age: 30
End: 2020-04-28
Payer: COMMERCIAL

## 2020-04-28 VITALS — WEIGHT: 234 LBS | DIASTOLIC BLOOD PRESSURE: 84 MMHG | SYSTOLIC BLOOD PRESSURE: 142 MMHG | BODY MASS INDEX: 44.21 KG/M2

## 2020-04-28 DIAGNOSIS — Q24.9 MATERNAL CONGENITAL HEART DISEASE IN THIRD TRIMESTER, ANTEPARTUM: ICD-10-CM

## 2020-04-28 DIAGNOSIS — Q25.1 AORTIC COARCTATION: ICD-10-CM

## 2020-04-28 DIAGNOSIS — O09.93 SUPERVISION OF HIGH RISK PREGNANCY, ANTEPARTUM, THIRD TRIMESTER: ICD-10-CM

## 2020-04-28 DIAGNOSIS — O10.919 CHRONIC HYPERTENSION AFFECTING PREGNANCY: ICD-10-CM

## 2020-04-28 DIAGNOSIS — Q21.0 VENTRICULAR SEPTAL DEFECT: ICD-10-CM

## 2020-04-28 DIAGNOSIS — O24.119 TYPE 2 DIABETES MELLITUS AFFECTING PREGNANCY, ANTEPARTUM: ICD-10-CM

## 2020-04-28 DIAGNOSIS — Z23 NEED FOR DIPHTHERIA-TETANUS-PERTUSSIS (TDAP) VACCINE: ICD-10-CM

## 2020-04-28 DIAGNOSIS — Z3A.29 29 WEEKS GESTATION OF PREGNANCY: Primary | ICD-10-CM

## 2020-04-28 DIAGNOSIS — O99.413 MATERNAL CONGENITAL HEART DISEASE IN THIRD TRIMESTER, ANTEPARTUM: ICD-10-CM

## 2020-04-28 LAB
SL AMB  POCT GLUCOSE, UA: 500
SL AMB LEUKOCYTE ESTERASE,UA: NEGATIVE
SL AMB POCT URINE PROTEIN: NEGATIVE

## 2020-04-28 PROCEDURE — 90715 TDAP VACCINE 7 YRS/> IM: CPT

## 2020-04-28 PROCEDURE — 99213 OFFICE O/P EST LOW 20 MIN: CPT | Performed by: OBSTETRICS & GYNECOLOGY

## 2020-04-28 PROCEDURE — 1036F TOBACCO NON-USER: CPT | Performed by: OBSTETRICS & GYNECOLOGY

## 2020-04-28 PROCEDURE — 81002 URINALYSIS NONAUTO W/O SCOPE: CPT | Performed by: OBSTETRICS & GYNECOLOGY

## 2020-04-28 PROCEDURE — 90471 IMMUNIZATION ADMIN: CPT

## 2020-04-29 ENCOUNTER — EVALUATION (OUTPATIENT)
Dept: PHYSICAL THERAPY | Age: 30
End: 2020-04-29
Payer: COMMERCIAL

## 2020-04-29 DIAGNOSIS — M54.9 BACK PAIN AFFECTING PREGNANCY IN SECOND TRIMESTER: Primary | ICD-10-CM

## 2020-04-29 DIAGNOSIS — O99.891 BACK PAIN AFFECTING PREGNANCY IN SECOND TRIMESTER: Primary | ICD-10-CM

## 2020-04-29 PROBLEM — R10.2 PELVIC PAIN: Status: RESOLVED | Noted: 2019-10-10 | Resolved: 2020-04-29

## 2020-04-29 PROBLEM — O35.8XX0 FETAL VENTRICULAR SEPTAL DEFECT AFFECTING ANTEPARTUM CARE OF MOTHER: Status: ACTIVE | Noted: 2018-02-28

## 2020-04-29 PROBLEM — O35.BXX0 FETAL VENTRICULAR SEPTAL DEFECT AFFECTING ANTEPARTUM CARE OF MOTHER: Status: ACTIVE | Noted: 2018-02-28

## 2020-04-29 PROBLEM — N83.201 OVARIAN CYST, RIGHT: Status: RESOLVED | Noted: 2019-02-14 | Resolved: 2020-04-29

## 2020-04-29 PROBLEM — Z36.89 NON-STRESS TEST REACTIVE ON FETAL SURVEILLANCE: Status: RESOLVED | Noted: 2020-01-14 | Resolved: 2020-04-29

## 2020-04-29 PROBLEM — Z87.74 H/O AORTIC COARCTATION REPAIR: Status: RESOLVED | Noted: 2018-07-12 | Resolved: 2020-04-29

## 2020-04-29 PROBLEM — Z87.74 S/P VSD REPAIR: Status: RESOLVED | Noted: 2018-07-12 | Resolved: 2020-04-29

## 2020-04-29 PROBLEM — N94.9 ROUND LIGAMENT PAIN: Status: RESOLVED | Noted: 2020-02-29 | Resolved: 2020-04-29

## 2020-04-29 PROCEDURE — 97161 PT EVAL LOW COMPLEX 20 MIN: CPT | Performed by: PHYSICAL THERAPIST

## 2020-04-29 PROCEDURE — 97112 NEUROMUSCULAR REEDUCATION: CPT | Performed by: PHYSICAL THERAPIST

## 2020-04-29 PROCEDURE — 97140 MANUAL THERAPY 1/> REGIONS: CPT | Performed by: PHYSICAL THERAPIST

## 2020-05-01 ENCOUNTER — NURSE TRIAGE (OUTPATIENT)
Dept: OTHER | Facility: OTHER | Age: 30
End: 2020-05-01

## 2020-05-01 ENCOUNTER — OFFICE VISIT (OUTPATIENT)
Dept: PHYSICAL THERAPY | Age: 30
End: 2020-05-01
Payer: COMMERCIAL

## 2020-05-01 ENCOUNTER — HOSPITAL ENCOUNTER (OUTPATIENT)
Facility: HOSPITAL | Age: 30
Setting detail: OBSERVATION
Discharge: HOME | End: 2020-05-02
Attending: OBSTETRICS & GYNECOLOGY | Admitting: OBSTETRICS & GYNECOLOGY
Payer: COMMERCIAL

## 2020-05-01 DIAGNOSIS — O16.3 HYPERTENSION AFFECTING PREGNANCY IN THIRD TRIMESTER: ICD-10-CM

## 2020-05-01 DIAGNOSIS — O21.9 NAUSEA AND VOMITING IN PREGNANCY: Primary | ICD-10-CM

## 2020-05-01 DIAGNOSIS — O99.891 BACK PAIN AFFECTING PREGNANCY IN SECOND TRIMESTER: Primary | ICD-10-CM

## 2020-05-01 DIAGNOSIS — Z3A.29 29 WEEKS GESTATION OF PREGNANCY: ICD-10-CM

## 2020-05-01 DIAGNOSIS — M54.9 BACK PAIN AFFECTING PREGNANCY IN SECOND TRIMESTER: Primary | ICD-10-CM

## 2020-05-01 LAB
ALBUMIN SERPL BCP-MCNC: 2.6 G/DL (ref 3.5–5)
ALP SERPL-CCNC: 82 U/L (ref 46–116)
ALT SERPL W P-5'-P-CCNC: 15 U/L (ref 12–78)
ANION GAP SERPL CALCULATED.3IONS-SCNC: 9 MMOL/L (ref 4–13)
AST SERPL W P-5'-P-CCNC: 9 U/L (ref 5–45)
BASOPHILS # BLD AUTO: 0.02 THOUSANDS/ΜL (ref 0–0.1)
BASOPHILS NFR BLD AUTO: 0 % (ref 0–1)
BILIRUB SERPL-MCNC: 0.24 MG/DL (ref 0.2–1)
BILIRUB UR QL STRIP: NEGATIVE
BUN SERPL-MCNC: 5 MG/DL (ref 5–25)
CALCIUM SERPL-MCNC: 9.9 MG/DL (ref 8.3–10.1)
CHLORIDE SERPL-SCNC: 102 MMOL/L (ref 100–108)
CLARITY UR: CLEAR
CO2 SERPL-SCNC: 24 MMOL/L (ref 21–32)
COLOR UR: YELLOW
CREAT SERPL-MCNC: 0.58 MG/DL (ref 0.6–1.3)
EOSINOPHIL # BLD AUTO: 0.02 THOUSAND/ΜL (ref 0–0.61)
EOSINOPHIL NFR BLD AUTO: 0 % (ref 0–6)
ERYTHROCYTE [DISTWIDTH] IN BLOOD BY AUTOMATED COUNT: 15.8 % (ref 11.6–15.1)
GFR SERPL CREATININE-BSD FRML MDRD: 122 ML/MIN/1.73SQ M
GLUCOSE SERPL-MCNC: 112 MG/DL (ref 65–140)
GLUCOSE UR STRIP-MCNC: NEGATIVE MG/DL
HCT VFR BLD AUTO: 38.5 % (ref 34.8–46.1)
HGB BLD-MCNC: 12.7 G/DL (ref 11.5–15.4)
HGB UR QL STRIP.AUTO: NEGATIVE
IMM GRANULOCYTES # BLD AUTO: 0.05 THOUSAND/UL (ref 0–0.2)
IMM GRANULOCYTES NFR BLD AUTO: 0 % (ref 0–2)
KETONES UR STRIP-MCNC: ABNORMAL MG/DL
LEUKOCYTE ESTERASE UR QL STRIP: NEGATIVE
LYMPHOCYTES # BLD AUTO: 2.04 THOUSANDS/ΜL (ref 0.6–4.47)
LYMPHOCYTES NFR BLD AUTO: 18 % (ref 14–44)
MCH RBC QN AUTO: 25.3 PG (ref 26.8–34.3)
MCHC RBC AUTO-ENTMCNC: 33 G/DL (ref 31.4–37.4)
MCV RBC AUTO: 77 FL (ref 82–98)
MONOCYTES # BLD AUTO: 0.94 THOUSAND/ΜL (ref 0.17–1.22)
MONOCYTES NFR BLD AUTO: 8 % (ref 4–12)
NEUTROPHILS # BLD AUTO: 8.23 THOUSANDS/ΜL (ref 1.85–7.62)
NEUTS SEG NFR BLD AUTO: 74 % (ref 43–75)
NITRITE UR QL STRIP: NEGATIVE
NRBC BLD AUTO-RTO: 0 /100 WBCS
PH UR STRIP.AUTO: 6 [PH]
PLATELET # BLD AUTO: 245 THOUSANDS/UL (ref 149–390)
PMV BLD AUTO: 10.5 FL (ref 8.9–12.7)
POTASSIUM SERPL-SCNC: 3.4 MMOL/L (ref 3.5–5.3)
PROT SERPL-MCNC: 7.1 G/DL (ref 6.4–8.2)
PROT UR STRIP-MCNC: NEGATIVE MG/DL
RBC # BLD AUTO: 5.01 MILLION/UL (ref 3.81–5.12)
SARS-COV-2 RNA RESP QL NAA+PROBE: NEGATIVE
SODIUM SERPL-SCNC: 135 MMOL/L (ref 136–145)
SP GR UR STRIP.AUTO: 1.02 (ref 1–1.03)
UROBILINOGEN UR QL STRIP.AUTO: 0.2 E.U./DL
WBC # BLD AUTO: 11.3 THOUSAND/UL (ref 4.31–10.16)

## 2020-05-01 PROCEDURE — 80053 COMPREHEN METABOLIC PANEL: CPT | Performed by: EMERGENCY MEDICINE

## 2020-05-01 PROCEDURE — 99285 EMERGENCY DEPT VISIT HI MDM: CPT

## 2020-05-01 PROCEDURE — 96374 THER/PROPH/DIAG INJ IV PUSH: CPT

## 2020-05-01 PROCEDURE — 36415 COLL VENOUS BLD VENIPUNCTURE: CPT | Performed by: EMERGENCY MEDICINE

## 2020-05-01 PROCEDURE — 87635 SARS-COV-2 COVID-19 AMP PRB: CPT | Performed by: EMERGENCY MEDICINE

## 2020-05-01 PROCEDURE — 97113 AQUATIC THERAPY/EXERCISES: CPT | Performed by: PHYSICAL THERAPIST

## 2020-05-01 PROCEDURE — 85025 COMPLETE CBC W/AUTO DIFF WBC: CPT | Performed by: EMERGENCY MEDICINE

## 2020-05-01 PROCEDURE — 96361 HYDRATE IV INFUSION ADD-ON: CPT

## 2020-05-01 PROCEDURE — 84156 ASSAY OF PROTEIN URINE: CPT | Performed by: OBSTETRICS & GYNECOLOGY

## 2020-05-01 PROCEDURE — 82570 ASSAY OF URINE CREATININE: CPT | Performed by: OBSTETRICS & GYNECOLOGY

## 2020-05-01 PROCEDURE — 81003 URINALYSIS AUTO W/O SCOPE: CPT | Performed by: EMERGENCY MEDICINE

## 2020-05-01 PROCEDURE — 99285 EMERGENCY DEPT VISIT HI MDM: CPT | Performed by: EMERGENCY MEDICINE

## 2020-05-01 RX ORDER — LABETALOL 200 MG/1
200 TABLET, FILM COATED ORAL ONCE
Status: COMPLETED | OUTPATIENT
Start: 2020-05-02 | End: 2020-05-01

## 2020-05-01 RX ORDER — METOCLOPRAMIDE HYDROCHLORIDE 5 MG/ML
10 INJECTION INTRAMUSCULAR; INTRAVENOUS ONCE
Status: COMPLETED | OUTPATIENT
Start: 2020-05-01 | End: 2020-05-01

## 2020-05-01 RX ORDER — LABETALOL 20 MG/4 ML (5 MG/ML) INTRAVENOUS SYRINGE
10 ONCE
Status: DISCONTINUED | OUTPATIENT
Start: 2020-05-01 | End: 2020-05-01

## 2020-05-01 RX ORDER — LABETALOL 20 MG/4 ML (5 MG/ML) INTRAVENOUS SYRINGE
20 ONCE
Status: COMPLETED | OUTPATIENT
Start: 2020-05-02 | End: 2020-05-01

## 2020-05-01 RX ORDER — LABETALOL 200 MG/1
200 TABLET, FILM COATED ORAL 2 TIMES DAILY
Status: DISCONTINUED | OUTPATIENT
Start: 2020-05-02 | End: 2020-05-01

## 2020-05-01 RX ADMIN — SODIUM CHLORIDE 1000 ML: 0.9 INJECTION, SOLUTION INTRAVENOUS at 21:27

## 2020-05-01 RX ADMIN — LABETALOL 20 MG/4 ML (5 MG/ML) INTRAVENOUS SYRINGE 20 MG: at 23:53

## 2020-05-01 RX ADMIN — METOCLOPRAMIDE HYDROCHLORIDE 10 MG: 5 INJECTION INTRAMUSCULAR; INTRAVENOUS at 21:34

## 2020-05-01 RX ADMIN — LABETALOL HCL 200 MG: 200 TABLET, FILM COATED ORAL at 23:53

## 2020-05-02 VITALS
HEIGHT: 61 IN | TEMPERATURE: 98.2 F | HEART RATE: 88 BPM | BODY MASS INDEX: 44.18 KG/M2 | OXYGEN SATURATION: 97 % | SYSTOLIC BLOOD PRESSURE: 132 MMHG | DIASTOLIC BLOOD PRESSURE: 70 MMHG | RESPIRATION RATE: 18 BRPM | WEIGHT: 234 LBS

## 2020-05-02 PROBLEM — O16.3 HYPERTENSION AFFECTING PREGNANCY IN THIRD TRIMESTER: Status: ACTIVE | Noted: 2018-10-12

## 2020-05-02 LAB
ABO GROUP BLD: NORMAL
BLD GP AB SCN SERPL QL: NEGATIVE
CREAT UR-MCNC: 114 MG/DL
GLUCOSE SERPL-MCNC: 102 MG/DL (ref 65–140)
GLUCOSE SERPL-MCNC: 113 MG/DL (ref 65–140)
PROT UR-MCNC: 32 MG/DL
PROT/CREAT UR: 0.28 MG/G{CREAT} (ref 0–0.1)
RH BLD: POSITIVE
SPECIMEN EXPIRATION DATE: NORMAL

## 2020-05-02 PROCEDURE — 82948 REAGENT STRIP/BLOOD GLUCOSE: CPT

## 2020-05-02 PROCEDURE — 86900 BLOOD TYPING SEROLOGIC ABO: CPT | Performed by: OBSTETRICS & GYNECOLOGY

## 2020-05-02 PROCEDURE — 99214 OFFICE O/P EST MOD 30 MIN: CPT

## 2020-05-02 PROCEDURE — NC001 PR NO CHARGE: Performed by: OBSTETRICS & GYNECOLOGY

## 2020-05-02 PROCEDURE — 86901 BLOOD TYPING SEROLOGIC RH(D): CPT | Performed by: OBSTETRICS & GYNECOLOGY

## 2020-05-02 PROCEDURE — 86850 RBC ANTIBODY SCREEN: CPT | Performed by: OBSTETRICS & GYNECOLOGY

## 2020-05-02 PROCEDURE — 99243 OFF/OP CNSLTJ NEW/EST LOW 30: CPT | Performed by: OBSTETRICS & GYNECOLOGY

## 2020-05-02 RX ORDER — LABETALOL 200 MG/1
200 TABLET, FILM COATED ORAL 2 TIMES DAILY
Status: DISCONTINUED | OUTPATIENT
Start: 2020-05-02 | End: 2020-05-02 | Stop reason: HOSPADM

## 2020-05-02 RX ORDER — INSULIN GLARGINE 100 [IU]/ML
30 INJECTION, SOLUTION SUBCUTANEOUS
Status: DISCONTINUED | OUTPATIENT
Start: 2020-05-02 | End: 2020-05-02 | Stop reason: HOSPADM

## 2020-05-02 RX ORDER — ONDANSETRON 4 MG/1
4 TABLET, ORALLY DISINTEGRATING ORAL EVERY 6 HOURS PRN
Qty: 20 TABLET | Refills: 0 | Status: SHIPPED | OUTPATIENT
Start: 2020-05-02 | End: 2020-11-02 | Stop reason: ALTCHOICE

## 2020-05-02 RX ORDER — ACETAMINOPHEN 325 MG/1
650 TABLET ORAL EVERY 4 HOURS PRN
Status: DISCONTINUED | OUTPATIENT
Start: 2020-05-02 | End: 2020-05-02 | Stop reason: HOSPADM

## 2020-05-02 RX ORDER — PROMETHAZINE HYDROCHLORIDE 25 MG/ML
12.5 INJECTION, SOLUTION INTRAMUSCULAR; INTRAVENOUS ONCE
Status: COMPLETED | OUTPATIENT
Start: 2020-05-02 | End: 2020-05-02

## 2020-05-02 RX ORDER — ONDANSETRON 4 MG/1
4 TABLET, ORALLY DISINTEGRATING ORAL EVERY 6 HOURS PRN
Status: DISCONTINUED | OUTPATIENT
Start: 2020-05-02 | End: 2020-05-02 | Stop reason: HOSPADM

## 2020-05-02 RX ORDER — ASPIRIN 325 MG
325 TABLET ORAL DAILY
Status: DISCONTINUED | OUTPATIENT
Start: 2020-05-02 | End: 2020-05-02 | Stop reason: HOSPADM

## 2020-05-02 RX ORDER — CALCIUM CARBONATE 200(500)MG
500 TABLET,CHEWABLE ORAL DAILY PRN
Status: DISCONTINUED | OUTPATIENT
Start: 2020-05-02 | End: 2020-05-02 | Stop reason: HOSPADM

## 2020-05-02 RX ADMIN — ONDANSETRON 4 MG: 4 TABLET, ORALLY DISINTEGRATING ORAL at 00:56

## 2020-05-02 RX ADMIN — CALCIUM CARBONATE (ANTACID) CHEW TAB 500 MG 500 MG: 500 CHEW TAB at 07:25

## 2020-05-02 RX ADMIN — Medication 1 TABLET: at 09:22

## 2020-05-02 RX ADMIN — INSULIN LISPRO 10 UNITS: 100 INJECTION, SOLUTION INTRAVENOUS; SUBCUTANEOUS at 08:44

## 2020-05-02 RX ADMIN — ASPIRIN 325 MG ORAL TABLET 325 MG: 325 PILL ORAL at 09:22

## 2020-05-02 RX ADMIN — LABETALOL HCL 200 MG: 200 TABLET, FILM COATED ORAL at 09:22

## 2020-05-02 RX ADMIN — CALCIUM CARBONATE (ANTACID) CHEW TAB 500 MG 500 MG: 500 CHEW TAB at 03:42

## 2020-05-02 RX ADMIN — ONDANSETRON 4 MG: 4 TABLET, ORALLY DISINTEGRATING ORAL at 12:58

## 2020-05-02 RX ADMIN — INSULIN LISPRO 10 UNITS: 100 INJECTION, SOLUTION INTRAVENOUS; SUBCUTANEOUS at 13:50

## 2020-05-02 RX ADMIN — PROMETHAZINE HYDROCHLORIDE 12.5 MG: 25 INJECTION INTRAMUSCULAR; INTRAVENOUS at 03:32

## 2020-05-02 RX ADMIN — INSULIN GLARGINE 30 UNITS: 100 INJECTION, SOLUTION SUBCUTANEOUS at 00:56

## 2020-05-02 RX ADMIN — ACETAMINOPHEN 650 MG: 325 TABLET, FILM COATED ORAL at 03:41

## 2020-05-05 ENCOUNTER — DOCUMENTATION (OUTPATIENT)
Dept: PERINATAL CARE | Facility: CLINIC | Age: 30
End: 2020-05-05

## 2020-05-06 ENCOUNTER — APPOINTMENT (OUTPATIENT)
Dept: PHYSICAL THERAPY | Age: 30
End: 2020-05-06
Payer: COMMERCIAL

## 2020-05-08 ENCOUNTER — OFFICE VISIT (OUTPATIENT)
Dept: PHYSICAL THERAPY | Age: 30
End: 2020-05-08
Payer: COMMERCIAL

## 2020-05-08 DIAGNOSIS — O99.891 BACK PAIN AFFECTING PREGNANCY IN SECOND TRIMESTER: Primary | ICD-10-CM

## 2020-05-08 DIAGNOSIS — M54.9 BACK PAIN AFFECTING PREGNANCY IN SECOND TRIMESTER: Primary | ICD-10-CM

## 2020-05-08 PROCEDURE — 97113 AQUATIC THERAPY/EXERCISES: CPT

## 2020-05-11 ENCOUNTER — OFFICE VISIT (OUTPATIENT)
Dept: PHYSICAL THERAPY | Age: 30
End: 2020-05-11
Payer: COMMERCIAL

## 2020-05-11 DIAGNOSIS — O99.891 BACK PAIN AFFECTING PREGNANCY IN SECOND TRIMESTER: Primary | ICD-10-CM

## 2020-05-11 DIAGNOSIS — M54.9 BACK PAIN AFFECTING PREGNANCY IN SECOND TRIMESTER: Primary | ICD-10-CM

## 2020-05-11 PROCEDURE — 97113 AQUATIC THERAPY/EXERCISES: CPT | Performed by: PHYSICAL THERAPIST

## 2020-05-15 ENCOUNTER — OFFICE VISIT (OUTPATIENT)
Dept: PHYSICAL THERAPY | Age: 30
End: 2020-05-15
Payer: COMMERCIAL

## 2020-05-15 DIAGNOSIS — M54.9 BACK PAIN AFFECTING PREGNANCY IN SECOND TRIMESTER: Primary | ICD-10-CM

## 2020-05-15 DIAGNOSIS — O99.891 BACK PAIN AFFECTING PREGNANCY IN SECOND TRIMESTER: Primary | ICD-10-CM

## 2020-05-15 PROCEDURE — 97113 AQUATIC THERAPY/EXERCISES: CPT | Performed by: PHYSICAL THERAPIST

## 2020-05-19 ENCOUNTER — ROUTINE PRENATAL (OUTPATIENT)
Dept: OBGYN CLINIC | Facility: MEDICAL CENTER | Age: 30
End: 2020-05-19
Payer: COMMERCIAL

## 2020-05-19 VITALS — WEIGHT: 248 LBS | SYSTOLIC BLOOD PRESSURE: 130 MMHG | BODY MASS INDEX: 46.86 KG/M2 | DIASTOLIC BLOOD PRESSURE: 80 MMHG

## 2020-05-19 DIAGNOSIS — O35.8XX0 FETAL VENTRICULAR SEPTAL DEFECT AFFECTING ANTEPARTUM CARE OF MOTHER, SINGLE OR UNSPECIFIED FETUS: ICD-10-CM

## 2020-05-19 DIAGNOSIS — O99.413 MATERNAL CONGENITAL HEART DISEASE IN THIRD TRIMESTER, ANTEPARTUM: ICD-10-CM

## 2020-05-19 DIAGNOSIS — O16.3 HYPERTENSION AFFECTING PREGNANCY IN THIRD TRIMESTER: ICD-10-CM

## 2020-05-19 DIAGNOSIS — O09.93 SUPERVISION OF HIGH RISK PREGNANCY, ANTEPARTUM, THIRD TRIMESTER: ICD-10-CM

## 2020-05-19 DIAGNOSIS — M54.9 BACK PAIN AFFECTING PREGNANCY IN THIRD TRIMESTER: ICD-10-CM

## 2020-05-19 DIAGNOSIS — O99.891 BACK PAIN AFFECTING PREGNANCY IN THIRD TRIMESTER: ICD-10-CM

## 2020-05-19 DIAGNOSIS — Q25.1 AORTIC COARCTATION: ICD-10-CM

## 2020-05-19 DIAGNOSIS — O24.119 TYPE 2 DIABETES MELLITUS AFFECTING PREGNANCY, ANTEPARTUM: ICD-10-CM

## 2020-05-19 DIAGNOSIS — Q24.9 MATERNAL CONGENITAL HEART DISEASE IN THIRD TRIMESTER, ANTEPARTUM: ICD-10-CM

## 2020-05-19 DIAGNOSIS — Z3A.32 32 WEEKS GESTATION OF PREGNANCY: Primary | ICD-10-CM

## 2020-05-19 PROCEDURE — 3079F DIAST BP 80-89 MM HG: CPT | Performed by: OBSTETRICS & GYNECOLOGY

## 2020-05-19 PROCEDURE — 99213 OFFICE O/P EST LOW 20 MIN: CPT | Performed by: OBSTETRICS & GYNECOLOGY

## 2020-05-19 PROCEDURE — 3075F SYST BP GE 130 - 139MM HG: CPT | Performed by: OBSTETRICS & GYNECOLOGY

## 2020-05-19 PROCEDURE — 1036F TOBACCO NON-USER: CPT | Performed by: OBSTETRICS & GYNECOLOGY

## 2020-05-19 RX ORDER — ACETAMINOPHEN 500 MG
1000 TABLET ORAL
COMMUNITY
Start: 2020-05-06

## 2020-05-19 RX ORDER — INSULIN GLARGINE 100 [IU]/ML
12 INJECTION, SOLUTION SUBCUTANEOUS
COMMUNITY
Start: 2020-05-06 | End: 2021-03-04 | Stop reason: SDUPTHER

## 2020-05-19 RX ORDER — LABETALOL 300 MG/1
300 TABLET, FILM COATED ORAL 3 TIMES DAILY
COMMUNITY
Start: 2020-05-06 | End: 2020-10-02

## 2020-05-19 RX ORDER — FAMOTIDINE 20 MG/1
20 TABLET, FILM COATED ORAL DAILY
COMMUNITY
Start: 2020-05-14 | End: 2020-06-13

## 2020-05-20 ENCOUNTER — TELEPHONE (OUTPATIENT)
Dept: PERINATAL CARE | Facility: CLINIC | Age: 30
End: 2020-05-20

## 2020-05-20 ENCOUNTER — NURSE TRIAGE (OUTPATIENT)
Dept: OTHER | Facility: OTHER | Age: 30
End: 2020-05-20

## 2020-05-20 ENCOUNTER — APPOINTMENT (OUTPATIENT)
Dept: PHYSICAL THERAPY | Age: 30
End: 2020-05-20
Payer: COMMERCIAL

## 2020-05-20 ENCOUNTER — HOSPITAL ENCOUNTER (OUTPATIENT)
Facility: HOSPITAL | Age: 30
Discharge: HOME | End: 2020-05-20
Attending: OBSTETRICS & GYNECOLOGY | Admitting: OBSTETRICS & GYNECOLOGY
Payer: COMMERCIAL

## 2020-05-20 VITALS
DIASTOLIC BLOOD PRESSURE: 81 MMHG | SYSTOLIC BLOOD PRESSURE: 143 MMHG | HEIGHT: 61 IN | HEART RATE: 72 BPM | WEIGHT: 248 LBS | TEMPERATURE: 97.6 F | RESPIRATION RATE: 20 BRPM | BODY MASS INDEX: 46.82 KG/M2

## 2020-05-20 DIAGNOSIS — O99.413 MATERNAL CONGENITAL HEART DISEASE IN THIRD TRIMESTER, ANTEPARTUM: Primary | ICD-10-CM

## 2020-05-20 DIAGNOSIS — Q24.9 MATERNAL CONGENITAL HEART DISEASE IN THIRD TRIMESTER, ANTEPARTUM: Primary | ICD-10-CM

## 2020-05-20 LAB
AMPHETAMINES SERPL QL SCN: NEGATIVE
BACTERIA UR QL AUTO: ABNORMAL /HPF
BARBITURATES UR QL: NEGATIVE
BENZODIAZ UR QL: NEGATIVE
BILIRUB UR QL STRIP: NEGATIVE
CLARITY UR: ABNORMAL
COCAINE UR QL: NEGATIVE
COLOR UR: YELLOW
FINE GRAN CASTS URNS QL MICRO: ABNORMAL /LPF
GLUCOSE SERPL-MCNC: 66 MG/DL (ref 65–140)
GLUCOSE UR STRIP-MCNC: NEGATIVE MG/DL
HGB UR QL STRIP.AUTO: ABNORMAL
KETONES UR STRIP-MCNC: NEGATIVE MG/DL
LEUKOCYTE ESTERASE UR QL STRIP: NEGATIVE
METHADONE UR QL: NEGATIVE
MUCOUS THREADS UR QL AUTO: ABNORMAL
NITRITE UR QL STRIP: NEGATIVE
NON-SQ EPI CELLS URNS QL MICRO: ABNORMAL /HPF
OPIATES UR QL SCN: NEGATIVE
PCP UR QL: NEGATIVE
PH UR STRIP.AUTO: 6.5 [PH]
PROT UR STRIP-MCNC: ABNORMAL MG/DL
RBC #/AREA URNS AUTO: ABNORMAL /HPF
SP GR UR STRIP.AUTO: 1.02 (ref 1–1.03)
THC UR QL: NEGATIVE
UROBILINOGEN UR QL STRIP.AUTO: 0.2 E.U./DL
WBC #/AREA URNS AUTO: ABNORMAL /HPF

## 2020-05-20 PROCEDURE — 99214 OFFICE O/P EST MOD 30 MIN: CPT | Performed by: OBSTETRICS & GYNECOLOGY

## 2020-05-20 PROCEDURE — 87591 N.GONORRHOEAE DNA AMP PROB: CPT | Performed by: STUDENT IN AN ORGANIZED HEALTH CARE EDUCATION/TRAINING PROGRAM

## 2020-05-20 PROCEDURE — 87653 STREP B DNA AMP PROBE: CPT | Performed by: STUDENT IN AN ORGANIZED HEALTH CARE EDUCATION/TRAINING PROGRAM

## 2020-05-20 PROCEDURE — 99203 OFFICE O/P NEW LOW 30 MIN: CPT

## 2020-05-20 PROCEDURE — 80307 DRUG TEST PRSMV CHEM ANLYZR: CPT | Performed by: STUDENT IN AN ORGANIZED HEALTH CARE EDUCATION/TRAINING PROGRAM

## 2020-05-20 PROCEDURE — 81001 URINALYSIS AUTO W/SCOPE: CPT | Performed by: STUDENT IN AN ORGANIZED HEALTH CARE EDUCATION/TRAINING PROGRAM

## 2020-05-20 PROCEDURE — 82948 REAGENT STRIP/BLOOD GLUCOSE: CPT

## 2020-05-20 PROCEDURE — NC001 PR NO CHARGE: Performed by: STUDENT IN AN ORGANIZED HEALTH CARE EDUCATION/TRAINING PROGRAM

## 2020-05-20 PROCEDURE — 87491 CHLMYD TRACH DNA AMP PROBE: CPT | Performed by: STUDENT IN AN ORGANIZED HEALTH CARE EDUCATION/TRAINING PROGRAM

## 2020-05-20 RX ORDER — AZITHROMYCIN 250 MG/1
1000 TABLET, FILM COATED ORAL ONCE
Status: COMPLETED | OUTPATIENT
Start: 2020-05-20 | End: 2020-05-20

## 2020-05-20 RX ORDER — SODIUM CHLORIDE, SODIUM LACTATE, POTASSIUM CHLORIDE, CALCIUM CHLORIDE 600; 310; 30; 20 MG/100ML; MG/100ML; MG/100ML; MG/100ML
125 INJECTION, SOLUTION INTRAVENOUS CONTINUOUS
Status: DISCONTINUED | OUTPATIENT
Start: 2020-05-20 | End: 2020-05-20 | Stop reason: HOSPADM

## 2020-05-20 RX ORDER — NIFEDIPINE 10 MG/1
10 CAPSULE ORAL EVERY 8 HOURS SCHEDULED
Status: DISCONTINUED | OUTPATIENT
Start: 2020-05-20 | End: 2020-05-20 | Stop reason: HOSPADM

## 2020-05-20 RX ADMIN — AZITHROMYCIN 1000 MG: 250 TABLET, FILM COATED ORAL at 04:33

## 2020-05-20 RX ADMIN — SODIUM CHLORIDE, SODIUM LACTATE, POTASSIUM CHLORIDE, AND CALCIUM CHLORIDE 125 ML/HR: .6; .31; .03; .02 INJECTION, SOLUTION INTRAVENOUS at 04:31

## 2020-05-20 RX ADMIN — AMPICILLIN SODIUM 2000 MG: 1 INJECTION, POWDER, FOR SOLUTION INTRAMUSCULAR; INTRAVENOUS at 04:33

## 2020-05-21 LAB
C TRACH DNA SPEC QL NAA+PROBE: NEGATIVE
N GONORRHOEA DNA SPEC QL NAA+PROBE: NEGATIVE

## 2020-05-22 ENCOUNTER — APPOINTMENT (OUTPATIENT)
Dept: PHYSICAL THERAPY | Age: 30
End: 2020-05-22
Payer: COMMERCIAL

## 2020-05-22 LAB — GP B STREP DNA SPEC QL NAA+PROBE: ABNORMAL

## 2020-10-02 ENCOUNTER — POSTPARTUM VISIT (OUTPATIENT)
Dept: OBGYN CLINIC | Facility: MEDICAL CENTER | Age: 30
End: 2020-10-02
Payer: COMMERCIAL

## 2020-10-02 VITALS
BODY MASS INDEX: 43.84 KG/M2 | SYSTOLIC BLOOD PRESSURE: 122 MMHG | DIASTOLIC BLOOD PRESSURE: 78 MMHG | TEMPERATURE: 96.8 F | WEIGHT: 232 LBS

## 2020-10-02 PROCEDURE — 1036F TOBACCO NON-USER: CPT | Performed by: OBSTETRICS & GYNECOLOGY

## 2020-10-02 PROCEDURE — 99214 OFFICE O/P EST MOD 30 MIN: CPT | Performed by: OBSTETRICS & GYNECOLOGY

## 2020-10-02 RX ORDER — LISINOPRIL 30 MG/1
30 TABLET ORAL DAILY
COMMUNITY
Start: 2020-05-25

## 2020-10-02 RX ORDER — ALPRAZOLAM 0.5 MG/1
0.5 TABLET ORAL
Qty: 15 TABLET | Refills: 0 | Status: SHIPPED | OUTPATIENT
Start: 2020-10-02 | End: 2021-04-01 | Stop reason: SDUPTHER

## 2020-10-02 RX ORDER — BUPROPION HYDROCHLORIDE 150 MG/1
150 TABLET ORAL DAILY
Qty: 30 TABLET | Refills: 3 | Status: SHIPPED | OUTPATIENT
Start: 2020-10-02 | End: 2020-11-02 | Stop reason: SDUPTHER

## 2020-11-02 ENCOUNTER — OFFICE VISIT (OUTPATIENT)
Dept: OBGYN CLINIC | Facility: MEDICAL CENTER | Age: 30
End: 2020-11-02
Payer: COMMERCIAL

## 2020-11-02 VITALS
BODY MASS INDEX: 44.93 KG/M2 | DIASTOLIC BLOOD PRESSURE: 80 MMHG | SYSTOLIC BLOOD PRESSURE: 130 MMHG | HEIGHT: 61 IN | WEIGHT: 238 LBS

## 2020-11-02 DIAGNOSIS — M54.6 THORACOLUMBAR BACK PAIN: ICD-10-CM

## 2020-11-02 DIAGNOSIS — M54.50 THORACOLUMBAR BACK PAIN: ICD-10-CM

## 2020-11-02 PROCEDURE — 3008F BODY MASS INDEX DOCD: CPT | Performed by: OBSTETRICS & GYNECOLOGY

## 2020-11-02 PROCEDURE — 1036F TOBACCO NON-USER: CPT | Performed by: OBSTETRICS & GYNECOLOGY

## 2020-11-02 PROCEDURE — 99214 OFFICE O/P EST MOD 30 MIN: CPT | Performed by: OBSTETRICS & GYNECOLOGY

## 2020-11-02 PROCEDURE — 3075F SYST BP GE 130 - 139MM HG: CPT | Performed by: OBSTETRICS & GYNECOLOGY

## 2020-11-02 PROCEDURE — 3079F DIAST BP 80-89 MM HG: CPT | Performed by: OBSTETRICS & GYNECOLOGY

## 2020-11-02 RX ORDER — BUPROPION HYDROCHLORIDE 150 MG/1
150 TABLET ORAL DAILY
Qty: 30 TABLET | Refills: 11 | Status: SHIPPED | OUTPATIENT
Start: 2020-11-02 | End: 2022-01-18 | Stop reason: SDUPTHER

## 2020-11-02 RX ORDER — CYCLOBENZAPRINE HCL 5 MG
5 TABLET ORAL 3 TIMES DAILY PRN
Qty: 30 TABLET | Refills: 1 | Status: SHIPPED | OUTPATIENT
Start: 2020-11-02 | End: 2021-04-01 | Stop reason: SDUPTHER

## 2020-11-03 PROBLEM — E11.9 TYPE 2 DIABETES MELLITUS (HCC): Status: ACTIVE | Noted: 2019-12-12

## 2020-12-20 ENCOUNTER — APPOINTMENT (OUTPATIENT)
Dept: RADIOLOGY | Facility: CLINIC | Age: 30
End: 2020-12-20
Payer: COMMERCIAL

## 2020-12-20 ENCOUNTER — OFFICE VISIT (OUTPATIENT)
Dept: URGENT CARE | Facility: CLINIC | Age: 30
End: 2020-12-20
Payer: COMMERCIAL

## 2020-12-20 VITALS
RESPIRATION RATE: 18 BRPM | OXYGEN SATURATION: 97 % | DIASTOLIC BLOOD PRESSURE: 94 MMHG | TEMPERATURE: 98.2 F | HEART RATE: 103 BPM | SYSTOLIC BLOOD PRESSURE: 178 MMHG

## 2020-12-20 DIAGNOSIS — S97.122A CRUSHING INJURY OF FIFTH TOE OF LEFT FOOT, INITIAL ENCOUNTER: ICD-10-CM

## 2020-12-20 DIAGNOSIS — S90.122A CONTUSION OF FIFTH TOE OF LEFT FOOT, INITIAL ENCOUNTER: Primary | ICD-10-CM

## 2020-12-20 PROCEDURE — 99283 EMERGENCY DEPT VISIT LOW MDM: CPT | Performed by: NURSE PRACTITIONER

## 2020-12-20 PROCEDURE — 73660 X-RAY EXAM OF TOE(S): CPT

## 2020-12-20 PROCEDURE — G0382 LEV 3 HOSP TYPE B ED VISIT: HCPCS | Performed by: NURSE PRACTITIONER

## 2020-12-20 PROCEDURE — 99203 OFFICE O/P NEW LOW 30 MIN: CPT | Performed by: NURSE PRACTITIONER

## 2020-12-28 DIAGNOSIS — R50.9 FEVER, UNSPECIFIED FEVER CAUSE: ICD-10-CM

## 2020-12-28 DIAGNOSIS — R50.9 FEVER, UNSPECIFIED FEVER CAUSE: Primary | ICD-10-CM

## 2020-12-28 PROCEDURE — U0003 INFECTIOUS AGENT DETECTION BY NUCLEIC ACID (DNA OR RNA); SEVERE ACUTE RESPIRATORY SYNDROME CORONAVIRUS 2 (SARS-COV-2) (CORONAVIRUS DISEASE [COVID-19]), AMPLIFIED PROBE TECHNIQUE, MAKING USE OF HIGH THROUGHPUT TECHNOLOGIES AS DESCRIBED BY CMS-2020-01-R: HCPCS | Performed by: FAMILY MEDICINE

## 2020-12-29 LAB — SARS-COV-2 RNA SPEC QL NAA+PROBE: DETECTED

## 2021-02-18 DIAGNOSIS — Z23 ENCOUNTER FOR IMMUNIZATION: ICD-10-CM

## 2021-02-23 DIAGNOSIS — E11.9 TYPE 2 DIABETES MELLITUS WITHOUT COMPLICATION, WITHOUT LONG-TERM CURRENT USE OF INSULIN (HCC): Primary | ICD-10-CM

## 2021-02-23 DIAGNOSIS — E78.00 HYPERCHOLESTEREMIA: ICD-10-CM

## 2021-02-23 DIAGNOSIS — E55.9 VITAMIN D DEFICIENCY: ICD-10-CM

## 2021-03-03 ENCOUNTER — LAB (OUTPATIENT)
Dept: LAB | Facility: HOSPITAL | Age: 31
End: 2021-03-03
Payer: COMMERCIAL

## 2021-03-03 DIAGNOSIS — E78.00 HYPERCHOLESTEREMIA: ICD-10-CM

## 2021-03-03 DIAGNOSIS — E55.9 VITAMIN D DEFICIENCY: ICD-10-CM

## 2021-03-03 DIAGNOSIS — O98.419 CHRONIC HEPATITIS C AFFECTING PREGNANCY, ANTEPARTUM (HCC): ICD-10-CM

## 2021-03-03 DIAGNOSIS — E11.9 TYPE 2 DIABETES MELLITUS WITHOUT COMPLICATION, WITHOUT LONG-TERM CURRENT USE OF INSULIN (HCC): ICD-10-CM

## 2021-03-03 DIAGNOSIS — B18.2 CHRONIC HEPATITIS C AFFECTING PREGNANCY, ANTEPARTUM (HCC): ICD-10-CM

## 2021-03-03 LAB
25(OH)D3 SERPL-MCNC: 8.6 NG/ML (ref 30–100)
ALBUMIN SERPL BCP-MCNC: 4.1 G/DL (ref 3.5–5.7)
ALP SERPL-CCNC: 76 U/L (ref 40–150)
ALT SERPL W P-5'-P-CCNC: 15 U/L (ref 7–52)
ANION GAP SERPL CALCULATED.3IONS-SCNC: 11 MMOL/L (ref 4–13)
AST SERPL W P-5'-P-CCNC: 12 U/L (ref 13–39)
BASOPHILS # BLD AUTO: 0 THOUSANDS/ΜL (ref 0–0.1)
BASOPHILS NFR BLD AUTO: 0 % (ref 0–2)
BILIRUB SERPL-MCNC: 0.5 MG/DL (ref 0.2–1)
BUN SERPL-MCNC: 11 MG/DL (ref 7–25)
CALCIUM SERPL-MCNC: 9.6 MG/DL (ref 8.6–10.5)
CHLORIDE SERPL-SCNC: 100 MMOL/L (ref 98–107)
CHOLEST SERPL-MCNC: 196 MG/DL (ref 0–200)
CO2 SERPL-SCNC: 26 MMOL/L (ref 21–31)
CREAT SERPL-MCNC: 0.57 MG/DL (ref 0.6–1.2)
EOSINOPHIL # BLD AUTO: 0.1 THOUSAND/ΜL (ref 0–0.61)
EOSINOPHIL NFR BLD AUTO: 2 % (ref 0–5)
ERYTHROCYTE [DISTWIDTH] IN BLOOD BY AUTOMATED COUNT: 16.9 % (ref 11.5–14.5)
EST. AVERAGE GLUCOSE BLD GHB EST-MCNC: 235 MG/DL
GFR SERPL CREATININE-BSD FRML MDRD: 122 ML/MIN/1.73SQ M
GLUCOSE P FAST SERPL-MCNC: 289 MG/DL (ref 65–99)
HBA1C MFR BLD: 9.8 %
HBV SURFACE AB SER-ACNC: >1000 MIU/ML
HCT VFR BLD AUTO: 41.1 % (ref 42–47)
HDLC SERPL-MCNC: 34 MG/DL
HGB BLD-MCNC: 13.3 G/DL (ref 12–16)
LYMPHOCYTES # BLD AUTO: 1.8 THOUSANDS/ΜL (ref 0.6–4.47)
LYMPHOCYTES NFR BLD AUTO: 25 % (ref 21–51)
MCH RBC QN AUTO: 24.5 PG (ref 26–34)
MCHC RBC AUTO-ENTMCNC: 32.3 G/DL (ref 31–37)
MCV RBC AUTO: 76 FL (ref 81–99)
MONOCYTES # BLD AUTO: 0.5 THOUSAND/ΜL (ref 0.17–1.22)
MONOCYTES NFR BLD AUTO: 7 % (ref 2–12)
NEUTROPHILS # BLD AUTO: 4.6 THOUSANDS/ΜL (ref 1.4–6.5)
NEUTS SEG NFR BLD AUTO: 66 % (ref 42–75)
NONHDLC SERPL-MCNC: 162 MG/DL
PLATELET # BLD AUTO: 218 THOUSANDS/UL (ref 149–390)
PMV BLD AUTO: 9.7 FL (ref 8.6–11.7)
POTASSIUM SERPL-SCNC: 4.1 MMOL/L (ref 3.5–5.5)
PROT SERPL-MCNC: 7.2 G/DL (ref 6.4–8.9)
RBC # BLD AUTO: 5.42 MILLION/UL (ref 3.9–5.2)
SODIUM SERPL-SCNC: 137 MMOL/L (ref 134–143)
TRIGL SERPL-MCNC: 407 MG/DL (ref 44–166)
TSH SERPL DL<=0.05 MIU/L-ACNC: 1.44 UIU/ML (ref 0.45–5.33)
WBC # BLD AUTO: 7 THOUSAND/UL (ref 4.8–10.8)

## 2021-03-03 PROCEDURE — 80053 COMPREHEN METABOLIC PANEL: CPT

## 2021-03-03 PROCEDURE — 82306 VITAMIN D 25 HYDROXY: CPT

## 2021-03-03 PROCEDURE — 86706 HEP B SURFACE ANTIBODY: CPT

## 2021-03-03 PROCEDURE — 36415 COLL VENOUS BLD VENIPUNCTURE: CPT

## 2021-03-03 PROCEDURE — 3046F HEMOGLOBIN A1C LEVEL >9.0%: CPT | Performed by: FAMILY MEDICINE

## 2021-03-03 PROCEDURE — 83036 HEMOGLOBIN GLYCOSYLATED A1C: CPT

## 2021-03-03 PROCEDURE — 84443 ASSAY THYROID STIM HORMONE: CPT

## 2021-03-03 PROCEDURE — 85025 COMPLETE CBC W/AUTO DIFF WBC: CPT

## 2021-03-03 PROCEDURE — 80061 LIPID PANEL: CPT

## 2021-03-04 ENCOUNTER — OFFICE VISIT (OUTPATIENT)
Dept: FAMILY MEDICINE CLINIC | Facility: CLINIC | Age: 31
End: 2021-03-04
Payer: COMMERCIAL

## 2021-03-04 VITALS
TEMPERATURE: 96.8 F | HEART RATE: 105 BPM | SYSTOLIC BLOOD PRESSURE: 116 MMHG | WEIGHT: 233 LBS | BODY MASS INDEX: 43.99 KG/M2 | DIASTOLIC BLOOD PRESSURE: 79 MMHG | HEIGHT: 61 IN

## 2021-03-04 DIAGNOSIS — B35.3 TINEA PEDIS OF BOTH FEET: ICD-10-CM

## 2021-03-04 DIAGNOSIS — B18.2 CHRONIC HEPATITIS C WITHOUT HEPATIC COMA (HCC): ICD-10-CM

## 2021-03-04 DIAGNOSIS — E66.01 MORBID OBESITY (HCC): ICD-10-CM

## 2021-03-04 DIAGNOSIS — E55.9 VITAMIN D DEFICIENCY: ICD-10-CM

## 2021-03-04 DIAGNOSIS — Z12.4 SCREENING FOR CERVICAL CANCER: ICD-10-CM

## 2021-03-04 DIAGNOSIS — E11.65 TYPE 2 DIABETES MELLITUS WITH HYPERGLYCEMIA, WITHOUT LONG-TERM CURRENT USE OF INSULIN (HCC): Primary | ICD-10-CM

## 2021-03-04 DIAGNOSIS — I10 ESSENTIAL HYPERTENSION: ICD-10-CM

## 2021-03-04 DIAGNOSIS — Z23 ENCOUNTER FOR IMMUNIZATION: ICD-10-CM

## 2021-03-04 PROCEDURE — 90471 IMMUNIZATION ADMIN: CPT

## 2021-03-04 PROCEDURE — 90732 PPSV23 VACC 2 YRS+ SUBQ/IM: CPT

## 2021-03-04 PROCEDURE — 99214 OFFICE O/P EST MOD 30 MIN: CPT | Performed by: FAMILY MEDICINE

## 2021-03-04 RX ORDER — CLOTRIMAZOLE 1 %
CREAM (GRAM) TOPICAL 2 TIMES DAILY
Qty: 30 G | Refills: 0 | Status: SHIPPED | OUTPATIENT
Start: 2021-03-04

## 2021-03-04 RX ORDER — INSULIN LISPRO 100 [IU]/ML
INJECTION, SOLUTION INTRAVENOUS; SUBCUTANEOUS
Qty: 15 ML | Refills: 0 | Status: SHIPPED | OUTPATIENT
Start: 2021-03-04 | End: 2021-07-09

## 2021-03-04 RX ORDER — INSULIN GLARGINE 100 [IU]/ML
12 INJECTION, SOLUTION SUBCUTANEOUS
Qty: 360 UNITS | Refills: 0 | Status: SHIPPED | OUTPATIENT
Start: 2021-03-04 | End: 2021-07-09

## 2021-03-04 RX ORDER — ERGOCALCIFEROL 1.25 MG/1
50000 CAPSULE ORAL WEEKLY
Qty: 4 CAPSULE | Refills: 1 | Status: SHIPPED | OUTPATIENT
Start: 2021-03-04 | End: 2021-05-05

## 2021-03-04 NOTE — PATIENT INSTRUCTIONS
Medication for diabetes resent to pharmacy      Lantus 12 units should be continued    Increase the mealtime humalog to 8 units from the 6 you use to take

## 2021-03-04 NOTE — PROGRESS NOTES
Assessment/Plan:      Diagnoses and all orders for this visit:    Type 2 diabetes mellitus with hyperglycemia, without long-term current use of insulin (HCC)  Comments:  Continue lantus  Increase mealtime insulin from 6 units to 8 units TID  She was on 10 prior to pregnancy and may need to go back  Orders:  -     Diabetic foot exam; Future  -     Ambulatory referral to Podiatry; Future  -     insulin glargine (LANTUS) 100 units/mL subcutaneous injection; Inject 12 Units under the skin daily at bedtime  -     insulin lispro (HumaLOG KwikPen) 100 units/mL injection pen; Inject SC 8 units before breakfast, before lunch and dinner  To be titrated  -     Ambulatory referral to Ophthalmology; Future    Encounter for immunization  -     PNEUMOCOCCAL POLYSACCHARIDE VACCINE 23-VALENT =>1YO SQ IM    Screening for cervical cancer  Comments:  patient reports she had negative pap during pregnancy  Orders:  -     Ambulatory referral to Obstetrics / Gynecology; Future    Chronic hepatitis C without hepatic coma (HCC)  Comments:  Treated and negative viral load during most recent pregnancy    Essential hypertension  Comments:  Stable on medications  continue    Vitamin D deficiency  Comments:  Labs reviewed, <9  Orders:  -     ergocalciferol (VITAMIN D2) 50,000 units; Take 1 capsule (50,000 Units total) by mouth once a week for 8 doses    Morbid obesity ([address] )  Comments:  Advised on diet and exercise  Declined weight management    Tinea pedis of both feet  Comments:  patient declined tx stating she has had lesions on foot since infanthood  Will send treatment  Orders:  -     clotrimazole (LOTRIMIN) 1 % cream; Apply topically 2 (two) times a day    Other orders  -     Cancel: [facility] Diabetic eye exam  -     Cancel: Liquid-based pap, screening ([facility]); Future  -     Cancel: Hemoglobin A1C ([facility]); Future  -     Cancel: Ambulatory Referral to Ophthalmology;  Future  -     Cancel: influenza vaccine, quadrivalent, 0 5 mL, preservative-free, for adult and pediatric patients 6 mos+ (AFLURIA, FLUARIX, FLULAVAL, FLUZONE)  -     Cancel: Ambulatory referral to Obstetrics / Gynecology; Future        BMI Counseling: Body mass index is 44 02 kg/m²  The BMI is above normal  Nutrition recommendations include decreasing portion sizes, encouraging healthy choices of fruits and vegetables, decreasing fast food intake, consuming healthier snacks, limiting drinks that contain sugar and moderation in carbohydrate intake  Exercise recommendations include moderate physical activity 150 minutes/week and exercising 3-5 times per week  No pharmacotherapy was ordered  BMI Counseling: Body mass index is 44 02 kg/m²  Follow-up plan was not completed due to patient refusing BMI follow-up plan  Depression Screening and Follow-up Plan: Patient's depression screening was positive with a PHQ-2 score of 2  Clincally patient does not have depression  No treatment is required  The following portions of the patient's history were reviewed and updated as appropriate: allergies, current medications, past family history, past medical history, past social history, past surgical history, and problem list      Subjective:     Patient ID: Werner Siemens is a 33 y o  female  Diabetes  She presents for her follow-up diabetic visit  She has type 2 diabetes mellitus  Her disease course has been worsening  Pertinent negatives for hypoglycemia include no headaches  Pertinent negatives for diabetes include no chest pain, no polydipsia, no polyphagia, no polyuria and no weakness  Current diabetic treatment includes insulin injections  She is currently taking insulin at bedtime, pre-breakfast, pre-lunch and pre-dinner (lantus 12 units, humolog 6 units)  Insulin injections are given by patient  Rotation sites for injection include the thighs and abdominal wall  Diabetic current diet: trying to follow diabetic  When asked about meal planning, she reported none   She has not had a previous visit with a dietitian (seen previously did not like)  She rarely participates in exercise  Her breakfast blood glucose is taken between 7-8 am  Her breakfast blood glucose range is generally 140-180 mg/dl  Her lunch blood glucose is taken between 12-1 pm  Her lunch blood glucose range is generally 180-200 mg/dl  Her dinner blood glucose is taken between 6-7 pm  Her dinner blood glucose range is generally 130-140 mg/dl  Her overall blood glucose range is 140-180 mg/dl  An ACE inhibitor/angiotensin II receptor blocker is being taken  She sees a podiatrist Eye exam is not current  Review of Systems   Eyes: Negative for visual disturbance  Respiratory: Negative for cough, shortness of breath and wheezing  Cardiovascular: Negative for chest pain, palpitations and leg swelling  Gastrointestinal: Negative for abdominal pain, constipation, diarrhea, nausea and vomiting  Endocrine: Negative for polydipsia, polyphagia and polyuria  Genitourinary: Negative for dysuria and hematuria  Musculoskeletal: Negative for arthralgias and myalgias  Skin: Negative for rash  Neurological: Negative for weakness, light-headedness and headaches  Objective:     Physical Exam  Vitals signs and nursing note reviewed  Constitutional:       General: She is not in acute distress  Appearance: Normal appearance  She is not ill-appearing or toxic-appearing  HENT:      Head: Normocephalic and atraumatic  Right Ear: External ear normal       Left Ear: External ear normal    Eyes:      General: No scleral icterus  Right eye: No discharge  Left eye: No discharge  Extraocular Movements: Extraocular movements intact  Conjunctiva/sclera: Conjunctivae normal    Cardiovascular:      Rate and Rhythm: Normal rate and regular rhythm  Pulses: no weak pulses          Dorsalis pedis pulses are 1+ on the right side and 1+ on the left side          Posterior tibial pulses are 1+ on the right side and 1+ on the left side  Heart sounds: Normal heart sounds  No murmur  No friction rub  No gallop  Pulmonary:      Effort: Pulmonary effort is normal  No respiratory distress  Breath sounds: Normal breath sounds  No wheezing or rales  Feet:      Right foot:      Protective Sensation: 10 sites tested  10 sites sensed  Skin integrity: Erythema and dry skin present  No ulcer, warmth or callus  Left foot:      Protective Sensation: 10 sites tested  10 sites sensed  Skin integrity: Erythema and dry skin present  No ulcer or callus  Neurological:      Mental Status: She is alert  Patient's shoes and socks removed  Right Foot/Ankle   Right Foot Inspection  Skin Exam: dry skin and erythema no warmth, no callus, no ulcer and no callus                          Toe Exam: ROM and strength within normal limits  Sensory       Monofilament testing: diminished  Vascular    The right DP pulse is 1+  The right PT pulse is 1+  Left Foot/Ankle  Left Foot Inspection  Skin Exam: dry skin and erythemano pre-ulcer, no ulcer and no callus                         Toe Exam: ROM and strength within normal limits                   Sensory       Monofilament: diminished  Vascular    The left DP pulse is 1+  The left PT pulse is 1+  Assign Risk Category:  No deformity present; Loss of protective sensation;  No weak pulses       Risk: 2

## 2021-03-11 DIAGNOSIS — E11.9 TYPE 2 DIABETES MELLITUS WITHOUT COMPLICATION, WITHOUT LONG-TERM CURRENT USE OF INSULIN (HCC): Primary | ICD-10-CM

## 2021-03-11 RX ORDER — PEN NEEDLE, DIABETIC 31 GX5/16"
NEEDLE, DISPOSABLE MISCELLANEOUS
Qty: 100 EACH | Refills: 6 | Status: SHIPPED | OUTPATIENT
Start: 2021-03-11

## 2021-03-15 ENCOUNTER — IMMUNIZATIONS (OUTPATIENT)
Dept: FAMILY MEDICINE CLINIC | Facility: HOSPITAL | Age: 31
End: 2021-03-15

## 2021-03-15 DIAGNOSIS — Z23 ENCOUNTER FOR IMMUNIZATION: Primary | ICD-10-CM

## 2021-03-15 PROCEDURE — 91301 SARS-COV-2 / COVID-19 MRNA VACCINE (MODERNA) 100 MCG: CPT

## 2021-03-15 PROCEDURE — 0011A SARS-COV-2 / COVID-19 MRNA VACCINE (MODERNA) 100 MCG: CPT

## 2021-04-01 ENCOUNTER — OFFICE VISIT (OUTPATIENT)
Dept: FAMILY MEDICINE CLINIC | Facility: CLINIC | Age: 31
End: 2021-04-01
Payer: COMMERCIAL

## 2021-04-01 VITALS
HEIGHT: 61 IN | BODY MASS INDEX: 44.07 KG/M2 | HEART RATE: 91 BPM | OXYGEN SATURATION: 98 % | TEMPERATURE: 97.3 F | SYSTOLIC BLOOD PRESSURE: 124 MMHG | DIASTOLIC BLOOD PRESSURE: 82 MMHG | WEIGHT: 233.4 LBS

## 2021-04-01 DIAGNOSIS — M54.50 THORACOLUMBAR BACK PAIN: ICD-10-CM

## 2021-04-01 DIAGNOSIS — Z79.4 TYPE 2 DIABETES MELLITUS WITHOUT COMPLICATION, WITH LONG-TERM CURRENT USE OF INSULIN (HCC): ICD-10-CM

## 2021-04-01 DIAGNOSIS — N80.9 ENDOMETRIOSIS: ICD-10-CM

## 2021-04-01 DIAGNOSIS — E55.9 VITAMIN D DEFICIENCY: ICD-10-CM

## 2021-04-01 DIAGNOSIS — B18.2 CHRONIC HEPATITIS C WITHOUT HEPATIC COMA (HCC): ICD-10-CM

## 2021-04-01 DIAGNOSIS — Z00.00 ANNUAL PHYSICAL EXAM: Primary | ICD-10-CM

## 2021-04-01 DIAGNOSIS — E66.01 MORBID OBESITY (HCC): ICD-10-CM

## 2021-04-01 DIAGNOSIS — E11.9 TYPE 2 DIABETES MELLITUS WITHOUT COMPLICATION, WITH LONG-TERM CURRENT USE OF INSULIN (HCC): ICD-10-CM

## 2021-04-01 DIAGNOSIS — M54.6 THORACOLUMBAR BACK PAIN: ICD-10-CM

## 2021-04-01 PROCEDURE — 99214 OFFICE O/P EST MOD 30 MIN: CPT | Performed by: FAMILY MEDICINE

## 2021-04-01 PROCEDURE — 99395 PREV VISIT EST AGE 18-39: CPT | Performed by: FAMILY MEDICINE

## 2021-04-01 PROCEDURE — 3725F SCREEN DEPRESSION PERFORMED: CPT | Performed by: FAMILY MEDICINE

## 2021-04-01 PROCEDURE — 1036F TOBACCO NON-USER: CPT | Performed by: FAMILY MEDICINE

## 2021-04-01 RX ORDER — ALPRAZOLAM 0.5 MG/1
0.5 TABLET ORAL
Qty: 15 TABLET | Refills: 0 | Status: SHIPPED | OUTPATIENT
Start: 2021-04-01 | End: 2022-01-18 | Stop reason: SDUPTHER

## 2021-04-01 RX ORDER — CYCLOBENZAPRINE HCL 5 MG
5 TABLET ORAL 3 TIMES DAILY PRN
Qty: 30 TABLET | Refills: 1 | Status: SHIPPED | OUTPATIENT
Start: 2021-04-01 | End: 2021-12-27 | Stop reason: SDUPTHER

## 2021-04-01 NOTE — PROGRESS NOTES
[address] [facility]    NAME: Ana Schneider  AGE: 33 y o  SEX: female  : 1990     DATE: 2021     Assessment and Plan:     Problem List Items Addressed This Visit     None      Visit Diagnoses     Annual physical exam    -  Primary          Immunizations and preventive care screenings were discussed with patient today  Appropriate education was printed on patient's after visit summary  Counseling:  Alcohol/drug use: discussed moderation in alcohol intake, the recommendations for healthy alcohol use, and avoidance of illicit drug use  Dental Health: discussed importance of regular tooth brushing, flossing, and dental visits  Injury prevention: discussed safety/seat belts, safety helmets, smoke detectors, carbon dioxide detectors, and smoking near bedding or upholstery  Sexual health: discussed sexually transmitted diseases, partner selection, use of condoms, avoidance of unintended pregnancy, and contraceptive alternatives  · Exercise: the importance of regular exercise/physical activity was discussed  Recommend exercise 3-5 times per week for at least 30 minutes  Return in 2 months (on 2021) for diabetes follow up  Chief Complaint:     No chief complaint on file  History of Present Illness:     Adult Annual Physical   Patient here for a comprehensive physical exam  The patient reports no problems  Diet and Physical Activity  · Diet/Nutrition: poor diet, frequent junk food and limited fruits/vegetables  · Exercise: no formal exercise  Depression Screening  PHQ-9 Depression Screening    PHQ-9:   Frequency of the following problems over the past two weeks:           General Health  · Sleep: sleeps well  · Hearing: normal - bilateral   · Vision: vision problems: blurry vision when staring at a screen for long periods of time, most recent eye exam >1 year ago and wears glasses     · Dental: no dental visits for >1 year, brushes teeth once daily and does not floss  /GYN Health  · Last menstrual period: 1-2 weeks ago  · Contraceptive method: none    · History of STDs?: no      Review of Systems:     Review of Systems   Past Medical History:     Past Medical History:   Diagnosis Date    Allergic     Arthritis     Bipolar affective disorder, currently depressed, moderate ([address] ) 2018    Cyst of ovary, right     Diabetes mellitus ([address] ) 10/10/18    type 2    Endometriosis     Heart murmur 88    Hepatitis C     Hepatitis C virus infection cured after antiviral drug therapy     History of transfusion     Hypertension     Migraines     Obesity     Pulmonary artery congenital abnormality     Spleen enlarged     Varicella       Past Surgical History:     Past Surgical History:   Procedure Laterality Date    CARDIAC CATHETERIZATION      no CAD 10days, 4 weeks 20months old      SECTION, LOW TRANSVERSE      CHOLECYSTECTOMY      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 7   [employer] CORONARY STENT PLACEMENT      LIVER BIOPSY      LIVER BIOPSY      TUBAL LIGATION Bilateral     VSD REPAIR      As a child      Social History:     E-Cigarette/Vaping    E-Cigarette Use Never User      E-Cigarette/Vaping Substances    Nicotine No     THC No     CBD No     Flavoring No      Social History     Socioeconomic History    Marital status: Single     Spouse name: Not on file    Number of children: Not on file    Years of education: Not on file    Highest education level: Not on file   Occupational History    Not on file   Social Needs    Financial resource strain: Not on file    Food insecurity     Worry: Not on file     Inability: Not on file    Transportation needs     Medical: Not on file     Non-medical: Not on file   Tobacco Use    Smoking status: Former Smoker     Packs/day: 0 20     Types: Cigarettes     Start date: 2019     Quit date: 2019     Years since quittin 7    Smokeless tobacco: Never Used   Substance and Sexual Activity    Alcohol use: Not Currently     Alcohol/week: 3 0 standard drinks     Types: 3 Standard drinks or equivalent per week     Frequency: Monthly or less     Binge frequency: Never     Comment: socially/prior to knowledge of pregnancy    Drug use: Not Currently     Comment: hx of THC use for migraines    Sexual activity: Yes     Partners: Male     Birth control/protection: Female Sterilization   Lifestyle    Physical activity     Days per week: Not on file     Minutes per session: Not on file    Stress: Not on file   Relationships    Social connections     Talks on phone: Not on file     Gets together: Not on file     Attends Alevism service: Not on file     Active member of club or organization: Not on file     Attends meetings of clubs or organizations: Not on file     Relationship status: Not on file    Intimate partner violence     Fear of current or ex partner: Not on file     Emotionally abused: Not on file     Physically abused: Not on file     Forced sexual activity: Not on file   Other Topics Concern    Not on file   Social History Narrative    Not on file      Family History:     Family History   Problem Relation Age of Onset    Hypertension Mother     Migraines Mother     JENNY disease Mother     Depression Mother     Hyperlipidemia Mother     Diabetes Mother     Diabetes Father     Hypertension Father     Kidney failure Father     Heart attack Father     Arthritis Father     Stroke Father     Polycystic kidney disease Paternal Grandmother     Stroke Paternal Grandmother     Heart disease Paternal Grandmother     Arthritis Sister     Asthma Sister     Thyroid disease Sister     Diabetes Sister     Arthritis Maternal Grandmother     Breast cancer Maternal Grandmother     Diabetes Maternal Grandmother     Hypertension Maternal Grandmother     Heart Valve Disease Maternal Grandmother     Learning disabilities Cousin     Learning disabilities Sister     ADD / ADHD Cousin     Lung cancer Brother     Diabetes Maternal Grandfather     Hypertension Maternal Grandfather     JENNY disease Maternal Grandfather     Stroke Maternal Grandfather       Current Medications:     Current Outpatient Medications   Medication Sig Dispense Refill    acetaminophen (TYLENOL) 500 mg tablet Take 1,000 mg by mouth      ALPRAZolam (XANAX) 0 5 mg tablet Take 1 tablet (0 5 mg total) by mouth daily at bedtime as needed for anxiety 15 tablet 0    B-D UF III MINI PEN NEEDLES 31G X 5 MM MISC INJECT UNDER THE SKIN DAILY AT BEDTIME USE ONE A DAY OR AS DIRECTED 100 each 6    Blood Pressure Monitoring (B-D ASSURE BPM/AUTO WRIST CUFF) MISC Check blood pressure prior to each OB visit, or as directed by your physician  1 each 0    buPROPion (WELLBUTRIN XL) 150 mg 24 hr tablet Take 1 tablet (150 mg total) by mouth daily 30 tablet 11    clotrimazole (LOTRIMIN) 1 % cream Apply topically 2 (two) times a day 30 g 0    cyclobenzaprine (FLEXERIL) 5 mg tablet Take 1 tablet (5 mg total) by mouth 3 (three) times a day as needed for muscle spasms 30 tablet 1    ergocalciferol (VITAMIN D2) 50,000 units Take 1 capsule (50,000 Units total) by mouth once a week for 8 doses 4 capsule 1    glucose blood (ACCU-CHEK GUIDE) test strip Test three times per day 100 each 0    ibuprofen (MOTRIN) 200 mg tablet Take 200 mg by mouth      insulin glargine (LANTUS) 100 units/mL subcutaneous injection Inject 12 Units under the skin daily at bedtime 360 Units 0    insulin lispro (HumaLOG KwikPen) 100 units/mL injection pen Inject SC 8 units before breakfast, before lunch and dinner  To be titrated  15 mL 0    labetalol (NORMODYNE) 200 mg tablet Take 200 mg by mouth 2 (two) times a day       lisinopril (ZESTRIL) 30 mg tablet Take 30 mg by mouth daily       No current facility-administered medications for this visit  Allergies:      Allergies Allergen Reactions    Prednisone Swelling    Bactrim [Sulfamethoxazole-Trimethoprim] Hives    Corticosteroids Swelling     Pt states this does not cause problems breathing, she just has generalized swelling    Cortisone Swelling     Generalized; no impairment with breathing reported      Medical Tape Hives     Allergic to Paper Tape    Other Hives     Paper tape    Sulfa Antibiotics Hives      Physical Exam:     There were no vitals taken for this visit  Physical Exam  Vitals signs and nursing note reviewed  Constitutional:       General: She is not in acute distress  Appearance: Normal appearance  She is well-developed  She is obese  She is not ill-appearing, toxic-appearing or diaphoretic  HENT:      Head: Normocephalic and atraumatic  Right Ear: Tympanic membrane, ear canal and external ear normal       Left Ear: Tympanic membrane, ear canal and external ear normal       Nose: Nose normal  No congestion or rhinorrhea  Mouth/Throat:      Mouth: Mucous membranes are moist       Pharynx: Oropharynx is clear  No oropharyngeal exudate or posterior oropharyngeal erythema  Eyes:      Extraocular Movements: Extraocular movements intact  Conjunctiva/sclera: Conjunctivae normal       Pupils: Pupils are equal, round, and reactive to light  Neck:      Musculoskeletal: Neck supple  No muscular tenderness  Vascular: No carotid bruit  Cardiovascular:      Rate and Rhythm: Normal rate and regular rhythm  Pulses: Normal pulses  Heart sounds: Normal heart sounds  No murmur  No friction rub  No gallop  Pulmonary:      Effort: Pulmonary effort is normal  No respiratory distress  Breath sounds: Normal breath sounds  No wheezing or rales  Abdominal:      General: Bowel sounds are normal       Palpations: Abdomen is soft  Tenderness: There is abdominal tenderness  There is no guarding  Musculoskeletal: Normal range of motion  Right lower leg: No edema  Left lower leg: No edema  Lymphadenopathy:      Cervical: No cervical adenopathy  Skin:     General: Skin is warm and dry  Findings: No lesion  Neurological:      General: No focal deficit present  Mental Status: She is alert  Cranial Nerves: No cranial nerve deficit  Sensory: No sensory deficit  Motor: No weakness            Maureen Ortiz MD   [id number] [facility]

## 2021-04-01 NOTE — PATIENT INSTRUCTIONS
Wellness Visit for Adults   AMBULATORY CARE:   A wellness visit  is when you see your healthcare provider to get screened for health problems  Your healthcare provider will also give you advice on how to stay healthy  Write down your questions so you remember to ask them  Ask your healthcare provider how often you should have a wellness visit  What happens at a wellness visit:  Your healthcare provider will ask about your health, and your family history of health problems  This includes high blood pressure, heart disease, and cancer  He or she will ask if you have symptoms that concern you, if you smoke, and about your mood  You may also be asked about your intake of medicines, supplements, food, and alcohol  Any of the following may be done:  · Your weight  will be checked  Your height may also be checked so your body mass index (BMI) can be calculated  Your BMI shows if you are at a healthy weight  · Your blood pressure  and heart rate will be checked  Your temperature may also be checked  · Blood and urine tests  may be done  Blood tests may be done to check your cholesterol levels  Abnormal cholesterol levels increase your risk for heart disease and stroke  You may also need a blood or urine test to check for diabetes if you are at increased risk  Urine tests may be done to look for signs of an infection or kidney disease  · A physical exam  includes checking your heartbeat and lungs with a stethoscope  Your healthcare provider may also check your skin to look for sun damage  · Screening tests  may be recommended  A screening test is done to check for diseases that may not cause symptoms  The screening tests you may need depend on your age, gender, family history, and lifestyle habits  For example, colorectal screening may be recommended if you are 48years old or older  Screening tests you need if you are a woman:   · A Pap smear  is used to screen for cervical cancer   Pap smears are usually done every 3 to 5 years depending on your age  You may need them more often if you have had abnormal Pap smear test results in the past  Ask your healthcare provider how often you should have a Pap smear  · A mammogram  is an x-ray of your breasts to screen for breast cancer  Experts recommend mammograms every 2 years starting at age 48 years  You may need a mammogram at age 52 years or younger if you have an increased risk for breast cancer  Talk to your healthcare provider about when you should start having mammograms and how often you need them  Vaccines you may need:   · Get an influenza vaccine  every year  The influenza vaccine protects you from the flu  Several types of viruses cause the flu  The viruses change over time, so new vaccines are made each year  · Get a tetanus-diphtheria (Td) booster vaccine  every 10 years  This vaccine protects you against tetanus and diphtheria  Tetanus is a severe infection that may cause painful muscle spasms and lockjaw  Diphtheria is a severe bacterial infection that causes a thick covering in the back of your mouth and throat  · Get a human papillomavirus (HPV) vaccine  if you are female and aged 23 to 32 or male 23 to 24 and never received it  This vaccine protects you from HPV infection  HPV is the most common infection spread by sexual contact  HPV may also cause vaginal, penile, and anal cancers  · Get a pneumococcal vaccine  if you are aged 72 years or older  The pneumococcal vaccine is an injection given to protect you from pneumococcal disease  Pneumococcal disease is an infection caused by pneumococcal bacteria  The infection may cause pneumonia, meningitis, or an ear infection  · Get a shingles vaccine  if you are 60 or older, even if you have had shingles before  The shingles vaccine is an injection to protect you from the varicella-zoster virus  This is the same virus that causes chickenpox   Shingles is a painful rash that develops in people who had chickenpox or have been exposed to the virus  How to eat healthy:  My Plate is a model for planning healthy meals  It shows the types and amounts of foods that should go on your plate  Fruits and vegetables make up about half of your plate, and grains and protein make up the other half  A serving of dairy is included on the side of your plate  The amount of calories and serving sizes you need depends on your age, gender, weight, and height  Examples of healthy foods are listed below:  · Eat a variety of vegetables  such as dark green, red, and orange vegetables  You can also include canned vegetables low in sodium (salt) and frozen vegetables without added butter or sauces  · Eat a variety of fresh fruits , canned fruit in 100% juice, frozen fruit, and dried fruit  · Include whole grains  At least half of the grains you eat should be whole grains  Examples include whole-wheat bread, wheat pasta, brown rice, and whole-grain cereals such as oatmeal     · Eat a variety of protein foods such as seafood (fish and shellfish), lean meat, and poultry without skin (turkey and chicken)  Examples of lean meats include pork leg, shoulder, or tenderloin, and beef round, sirloin, tenderloin, and extra lean ground beef  Other protein foods include eggs and egg substitutes, beans, peas, soy products, nuts, and seeds  · Choose low-fat dairy products such as skim or 1% milk or low-fat yogurt, cheese, and cottage cheese  · Limit unhealthy fats  such as butter, hard margarine, and shortening  Exercise:  Exercise at least 30 minutes per day on most days of the week  Some examples of exercise include walking, biking, dancing, and swimming  You can also fit in more physical activity by taking the stairs instead of the elevator or parking farther away from stores  Include muscle strengthening activities 2 days each week  Regular exercise provides many health benefits   It helps you manage your weight, and decreases your risk for type 2 diabetes, heart disease, stroke, and high blood pressure  Exercise can also help improve your mood  Ask your healthcare provider about the best exercise plan for you  General health and safety guidelines:   · Do not smoke  Nicotine and other chemicals in cigarettes and cigars can cause lung damage  Ask your healthcare provider for information if you currently smoke and need help to quit  E-cigarettes or smokeless tobacco still contain nicotine  Talk to your healthcare provider before you use these products  · Limit alcohol  A drink of alcohol is 12 ounces of beer, 5 ounces of wine, or 1½ ounces of liquor  · Lose weight, if needed  Being overweight increases your risk of certain health conditions  These include heart disease, high blood pressure, type 2 diabetes, and certain types of cancer  · Protect your skin  Do not sunbathe or use tanning beds  Use sunscreen with a SPF 15 or higher  Apply sunscreen at least 15 minutes before you go outside  Reapply sunscreen every 2 hours  Wear protective clothing, hats, and sunglasses when you are outside  · Drive safely  Always wear your seatbelt  Make sure everyone in your car wears a seatbelt  A seatbelt can save your life if you are in an accident  Do not use your cell phone when you are driving  This could distract you and cause an accident  Pull over if you need to make a call or send a text message  · Practice safe sex  Use latex condoms if are sexually active and have more than one partner  Your healthcare provider may recommend screening tests for sexually transmitted infections (STIs)  · Wear helmets, lifejackets, and protective gear  Always wear a helmet when you ride a bike or motorcycle, go skiing, or play sports that could cause a head injury  Wear protective equipment when you play sports  Wear a lifejacket when you are on a boat or doing water sports      © Copyright [employer] 2020 Information is for End User's use only and may not be sold, redistributed or otherwise used for commercial purposes  All illustrations and images included in CareNotes® are the copyrighted property of [employer] D [employer] M [employer] Inc  or Myriam Plascencia  The above information is an  only  It is not intended as medical advice for individual conditions or treatments  Talk to your doctor, nurse or pharmacist before following any medical regimen to see if it is safe and effective for you  Obesity   AMBULATORY CARE:   Obesity  is when your body mass index (BMI) is greater than 30  Your healthcare provider will use your height and weight to measure your BMI  The risks of obesity include  many health problems, such as injuries or physical disability  You may need tests to check for the following:  · Diabetes    · High blood pressure or high cholesterol    · Heart disease    · Gallbladder or liver disease    · Cancer of the colon, breast, prostate, liver, or kidney    · Sleep apnea    · Arthritis or gout    Seek care immediately if:   · You have a severe headache, confusion, or difficulty speaking  · You have weakness on one side of your body  · You have chest pain, sweating, or shortness of breath  Contact your healthcare provider if:   · You have symptoms of gallbladder or liver disease, such as pain in your upper abdomen  · You have knee or hip pain and discomfort while walking  · You have symptoms of diabetes, such as intense hunger and thirst, and frequent urination  · You have symptoms of sleep apnea, such as snoring or daytime sleepiness  · You have questions or concerns about your condition or care  Treatment for obesity  focuses on helping you lose weight to improve your health  Even a small decrease in BMI can reduce the risk for many health problems  Your healthcare provider will help you set a weight-loss goal   · Lifestyle changes  are the first step in treating obesity   These include making healthy food choices and getting regular physical activity  Your healthcare provider may suggest a weight-loss program that involves coaching, education, and therapy  · Medicine  may help you lose weight when it is used with a healthy diet and physical activity  · Surgery  can help you lose weight if you are very obese and have other health problems  There are several types of weight-loss surgery  Ask your healthcare provider for more information  Be successful losing weight:   · Set small, realistic goals  An example of a small goal is to walk for 20 minutes 5 days a week  Anther goal is to lose 5% of your body weight  · Tell friends, family members, and coworkers about your goals  and ask for their support  Ask a friend to lose weight with you, or join a weight-loss support group  · Identify foods or triggers that may cause you to overeat , and find ways to avoid them  Remove tempting high-calorie foods from your home and workplace  Place a bowl of fresh fruit on your kitchen counter  If stress causes you to eat, then find other ways to cope with stress  · Keep a diary to track what you eat and drink  Also write down how many minutes of physical activity you do each day  Weigh yourself once a week and record it in your diary  Eating changes: You will need to eat 500 to 1,000 fewer calories each day than you currently eat to lose 1 to 2 pounds a week  The following changes will help you cut calories:  · Eat smaller portions  Use small plates, no larger than 9 inches in diameter  Fill your plate half full of fruits and vegetables  Measure your food using measuring cups until you know what a serving size looks like  · Eat 3 meals and 1 or 2 snacks each day  Plan your meals in advance  [place] and eat at home most of the time  Eat slowly  Do not skip meals  Skipping meals can lead to overeating later in the day  This can make it harder for you to lose weight   Talk with a dietitian to help you make a meal plan and schedule that is right for you  · Eat fruits and vegetables at every meal   They are low in calories and high in fiber, which makes you feel full  Do not add butter, margarine, or cream sauce to vegetables  Use herbs to season steamed vegetables  · Eat less fat and fewer fried foods  Eat more baked or grilled chicken and fish  These protein sources are lower in calories and fat than red meat  Limit fast food  Dress your salads with olive oil and vinegar instead of bottled dressing  · Limit the amount of sugar you eat  Do not drink sugary beverages  Limit alcohol  Activity changes:  Physical activity is good for your body in many ways  It helps you burn calories and build strong muscles  It decreases stress and depression, and improves your mood  It can also help you sleep better  Talk to your healthcare provider before you begin an exercise program   · Exercise for at least 30 minutes 5 days a week  Start slowly  Set aside time each day for physical activity that you enjoy and that is convenient for you  It is best to do both weight training and an activity that increases your heart rate, such as walking, bicycling, or swimming  · Find ways to be more active  Do yard work and housecleaning  Walk up the stairs instead of using elevators  Spend your leisure time going to events that require walking, such as outdoor festivals or fairs  This extra physical activity can help you lose weight and keep it off  Follow up with your healthcare provider as directed: You may need to meet with a dietitian  Write down your questions so you remember to ask them during your visits  © Copyright [address] Information is for End User's use only and may not be sold, redistributed or otherwise used for commercial purposes   All illustrations and images included in CareNotes® are the copyrighted property of A D A M , Inc  or [facility] Antoine Velazquez   The above information is an  only  It is not intended as medical advice for individual conditions or treatments  Talk to your doctor, nurse or pharmacist before following any medical regimen to see if it is safe and effective for you  Wellness Visit for Adults   AMBULATORY CARE:   A wellness visit  is when you see your healthcare provider to get screened for health problems  Your healthcare provider will also give you advice on how to stay healthy  Write down your questions so you remember to ask them  Ask your healthcare provider how often you should have a wellness visit  What happens at a wellness visit:  Your healthcare provider will ask about your health, and your family history of health problems  This includes high blood pressure, heart disease, and cancer  He or she will ask if you have symptoms that concern you, if you smoke, and about your mood  You may also be asked about your intake of medicines, supplements, food, and alcohol  Any of the following may be done:  · Your weight  will be checked  Your height may also be checked so your body mass index (BMI) can be calculated  Your BMI shows if you are at a healthy weight  · Your blood pressure  and heart rate will be checked  Your temperature may also be checked  · Blood and urine tests  may be done  Blood tests may be done to check your cholesterol levels  Abnormal cholesterol levels increase your risk for heart disease and stroke  You may also need a blood or urine test to check for diabetes if you are at increased risk  Urine tests may be done to look for signs of an infection or kidney disease  · A physical exam  includes checking your heartbeat and lungs with a stethoscope  Your healthcare provider may also check your skin to look for sun damage  · Screening tests  may be recommended  A screening test is done to check for diseases that may not cause symptoms   The screening tests you may need depend on your age, gender, family history, and lifestyle habits  For example, colorectal screening may be recommended if you are 48years old or older  Screening tests you need if you are a woman:   · A Pap smear  is used to screen for cervical cancer  Pap smears are usually done every 3 to 5 years depending on your age  You may need them more often if you have had abnormal Pap smear test results in the past  Ask your healthcare provider how often you should have a Pap smear  · A mammogram  is an x-ray of your breasts to screen for breast cancer  Experts recommend mammograms every 2 years starting at age 48 years  You may need a mammogram at age 52 years or younger if you have an increased risk for breast cancer  Talk to your healthcare provider about when you should start having mammograms and how often you need them  Vaccines you may need:   · Get an influenza vaccine  every year  The influenza vaccine protects you from the flu  Several types of viruses cause the flu  The viruses change over time, so new vaccines are made each year  · Get a tetanus-diphtheria (Td) booster vaccine  every 10 years  This vaccine protects you against tetanus and diphtheria  Tetanus is a severe infection that may cause painful muscle spasms and lockjaw  Diphtheria is a severe bacterial infection that causes a thick covering in the back of your mouth and throat  · Get a human papillomavirus (HPV) vaccine  if you are female and aged 23 to 32 or male 23 to 24 and never received it  This vaccine protects you from HPV infection  HPV is the most common infection spread by sexual contact  HPV may also cause vaginal, penile, and anal cancers  · Get a pneumococcal vaccine  if you are aged 72 years or older  The pneumococcal vaccine is an injection given to protect you from pneumococcal disease  Pneumococcal disease is an infection caused by pneumococcal bacteria  The infection may cause pneumonia, meningitis, or an ear infection      · Get a shingles vaccine  if you are 61 or older, even if you have had shingles before  The shingles vaccine is an injection to protect you from the varicella-zoster virus  This is the same virus that causes chickenpox  Shingles is a painful rash that develops in people who had chickenpox or have been exposed to the virus  How to eat healthy:  My Plate is a model for planning healthy meals  It shows the types and amounts of foods that should go on your plate  Fruits and vegetables make up about half of your plate, and grains and protein make up the other half  A serving of dairy is included on the side of your plate  The amount of calories and serving sizes you need depends on your age, gender, weight, and height  Examples of healthy foods are listed below:  · Eat a variety of vegetables  such as dark green, red, and orange vegetables  You can also include canned vegetables low in sodium (salt) and frozen vegetables without added butter or sauces  · Eat a variety of fresh fruits , canned fruit in 100% juice, frozen fruit, and dried fruit  · Include whole grains  At least half of the grains you eat should be whole grains  Examples include whole-wheat bread, wheat pasta, brown rice, and whole-grain cereals such as oatmeal     · Eat a variety of protein foods such as seafood (fish and shellfish), lean meat, and poultry without skin (turkey and chicken)  Examples of lean meats include pork leg, shoulder, or tenderloin, and beef round, sirloin, tenderloin, and extra lean ground beef  Other protein foods include eggs and egg substitutes, beans, peas, soy products, nuts, and seeds  · Choose low-fat dairy products such as skim or 1% milk or low-fat yogurt, cheese, and cottage cheese  · Limit unhealthy fats  such as butter, hard margarine, and shortening  Exercise:  Exercise at least 30 minutes per day on most days of the week  Some examples of exercise include walking, biking, dancing, and swimming   You can also fit in more physical activity by taking the stairs instead of the elevator or parking farther away from stores  Include muscle strengthening activities 2 days each week  Regular exercise provides many health benefits  It helps you manage your weight, and decreases your risk for type 2 diabetes, heart disease, stroke, and high blood pressure  Exercise can also help improve your mood  Ask your healthcare provider about the best exercise plan for you  General health and safety guidelines:   · Do not smoke  Nicotine and other chemicals in cigarettes and cigars can cause lung damage  Ask your healthcare provider for information if you currently smoke and need help to quit  E-cigarettes or smokeless tobacco still contain nicotine  Talk to your healthcare provider before you use these products  · Limit alcohol  A drink of alcohol is 12 ounces of beer, 5 ounces of wine, or 1½ ounces of liquor  · Lose weight, if needed  Being overweight increases your risk of certain health conditions  These include heart disease, high blood pressure, type 2 diabetes, and certain types of cancer  · Protect your skin  Do not sunbathe or use tanning beds  Use sunscreen with a SPF 15 or higher  Apply sunscreen at least 15 minutes before you go outside  Reapply sunscreen every 2 hours  Wear protective clothing, hats, and sunglasses when you are outside  · Drive safely  Always wear your seatbelt  Make sure everyone in your car wears a seatbelt  A seatbelt can save your life if you are in an accident  Do not use your cell phone when you are driving  This could distract you and cause an accident  Pull over if you need to make a call or send a text message  · Practice safe sex  Use latex condoms if are sexually active and have more than one partner  Your healthcare provider may recommend screening tests for sexually transmitted infections (STIs)  · Wear helmets, lifejackets, and protective gear    Always wear a helmet when you ride a bike or motorcycle, go skiing, or play sports that could cause a head injury  Wear protective equipment when you play sports  Wear a lifejacket when you are on a boat or doing water sports  © Copyright [address] Information is for End User's use only and may not be sold, redistributed or otherwise used for commercial purposes  All illustrations and images included in CareNotes® are the copyrighted property of [employer] M [employer] Inc  or [facility] Antoine Velazquez   The above information is an  only  It is not intended as medical advice for individual conditions or treatments  Talk to your doctor, nurse or pharmacist before following any medical regimen to see if it is safe and effective for you

## 2021-04-01 NOTE — PROGRESS NOTES
Assessment/Plan:      Diagnoses and all orders for this visit:    Annual physical exam    Postpartum depression  -     ALPRAZolam (XANAX) 0 5 mg tablet; Take 1 tablet (0 5 mg total) by mouth daily at bedtime as needed for anxiety    Thoracolumbar back pain  -     cyclobenzaprine (FLEXERIL) 5 mg tablet; Take 1 tablet (5 mg total) by mouth 3 (three) times a day as needed for muscle spasms    Type 2 diabetes mellitus without complication, with long-term current use of insulin ([facility])  Comments:  Continue 8 units t i d  and 12 at bedtime  Repeat lab in 3 months and change likely mealtime insulin to 10  consider increase to bedtime Lantus to 15  Orders:  -     HEMOGLOBIN A1C W/ EAG ESTIMATION; Future    Vitamin D deficiency  -     Vitamin D 25 hydroxy; Future    Chronic hepatitis C without hepatic coma ([facility] )  Comments:  Patient has previously been treated  She is worried if she has been reinfected  We will check a hepatitis-C viral load  Orders:  -     Hepatitis C RNA, quantitative, PCR; Future    Morbid obesity ([facility] )    Endometriosis  Comments:  Feels she is having a flare of her endometriosis  Will call gyn to follow-up  Advised on Motrin for pain  She is currently not on any form of contraceptive          Return in 2 months (on 6/1/2021) for diabetes follow up  The following portions of the patient's history were reviewed and updated as appropriate: allergies, current medications, past family history, past medical history, past social history, past surgical history, and problem list      Subjective:     Patient ID: Jaye Rangel is a 33 y o  female  Back pain: patient reports she has had mostly left sided thoracolumbar back pain since her pregnancy  She reports she has been taking Flexeril since then  She reports the Flexeril helps with her symptoms  She reports that she takes the Flexeril  once to twice weekly  Diabetes:  Patient's hemoglobin A1c returned at 9 8    Her fasting glucose was also elevated at 298 on her CMP  Her last hemoglobin A1c was 7 6 back in January of 2020  Patient reports that she works anywhere from 50-80 hours weekly and usually will have [employer] or some other form of takeout while at work  She has a primary meal maker at home as it is just her and her daughter  She does not see 3-5 servings of fruits and vegetables daily and finds it hard to do so  We talked about meal prep the night before as well as trying to include more vegetables at dinner time  Patient reports that she is planning on changing jobs and hopefully will have more time to spend on preparing meals if that happens  Patient's mealtime insulin was increased to 8 units t i d  at the last visit  Will repeat hemoglobin A1c for the next visit in 3 months and consider further increase at that time  Postpartum depression: Patient reports taking Xanax once every couple weeks  She reports she has been doing well on the medication and only takes as needed  We discussed further improvement if she were to be on a medication that she could take daily and will discuss more at the next visit        vitamin-D deficiency:  Patient reports compliance with vitamin-D medication  She is on the 60831 units for 8 weeks  She has completed 3 doses already  We will repeat labs at the 3 month   Visit to see if patient can then be put on maintenance medication  obesity:  Patient rarely follows a  Diabetic diet  Weeks she eats junk food and take out frequently  Patient does not do any form of formal exercise  We discussed doing a 30 minutes walk daily  We also discussed the negative affects not only on MSK complaints and future risk of osteoarthritis but also improvement 2 diabetes, risk of heart disease and overall   Health  reviewed   Labs including CBC, CMP, lipids, TSH, hemoglobin A1c  Review of Systems      Objective:     Physical Exam    Vitals signs and nursing note reviewed     Constitutional: General: She is not in acute distress  Appearance: Normal appearance  She is well-developed  She is obese  She is not ill-appearing, toxic-appearing or diaphoretic  HENT:      Head: Normocephalic and atraumatic  Right Ear: Tympanic membrane, ear canal and external ear normal       Left Ear: Tympanic membrane, ear canal and external ear normal       Nose: Nose normal  No congestion or rhinorrhea  Mouth/Throat:      Mouth: Mucous membranes are moist       Pharynx: Oropharynx is clear  No oropharyngeal exudate or posterior oropharyngeal erythema  Eyes:      Extraocular Movements: Extraocular movements intact  Conjunctiva/sclera: Conjunctivae normal       Pupils: Pupils are equal, round, and reactive to light  Neck:      Musculoskeletal: Neck supple  No muscular tenderness  Vascular: No carotid bruit  Cardiovascular:      Rate and Rhythm: Normal rate and regular rhythm  Pulses: Normal pulses  Heart sounds: Normal heart sounds  No murmur  No friction rub  No gallop  Pulmonary:      Effort: Pulmonary effort is normal  No respiratory distress  Breath sounds: Normal breath sounds  No wheezing or rales  Abdominal:      General: Bowel sounds are normal       Palpations: Abdomen is soft  Tenderness: There is abdominal tenderness  There is no guarding  Musculoskeletal: Normal range of motion  Right lower leg: No edema  Left lower leg: No edema  Lymphadenopathy:      Cervical: No cervical adenopathy  Skin:     General: Skin is warm and dry  Findings: No lesion  Neurological:      General: No focal deficit present  Mental Status: She is alert  Cranial Nerves: No cranial nerve deficit  Sensory: No sensory deficit  Motor: No weakness

## 2021-04-02 LAB
LEFT EYE DIABETIC RETINOPATHY: NORMAL
RIGHT EYE DIABETIC RETINOPATHY: NORMAL

## 2021-04-15 ENCOUNTER — IMMUNIZATIONS (OUTPATIENT)
Dept: FAMILY MEDICINE CLINIC | Facility: HOSPITAL | Age: 31
End: 2021-04-15

## 2021-04-15 DIAGNOSIS — Z23 ENCOUNTER FOR IMMUNIZATION: Primary | ICD-10-CM

## 2021-04-15 PROCEDURE — 91301 SARS-COV-2 / COVID-19 MRNA VACCINE (MODERNA) 100 MCG: CPT

## 2021-04-15 PROCEDURE — 0012A SARS-COV-2 / COVID-19 MRNA VACCINE (MODERNA) 100 MCG: CPT

## 2021-04-26 DIAGNOSIS — E55.9 VITAMIN D DEFICIENCY: ICD-10-CM

## 2021-05-05 RX ORDER — ERGOCALCIFEROL 1.25 MG/1
CAPSULE ORAL
Qty: 4 CAPSULE | Refills: 1 | Status: SHIPPED | OUTPATIENT
Start: 2021-05-05 | End: 2021-07-09

## 2021-05-25 ENCOUNTER — OFFICE VISIT (OUTPATIENT)
Dept: FAMILY MEDICINE CLINIC | Facility: CLINIC | Age: 31
End: 2021-05-25
Payer: COMMERCIAL

## 2021-05-25 VITALS
HEART RATE: 102 BPM | DIASTOLIC BLOOD PRESSURE: 83 MMHG | SYSTOLIC BLOOD PRESSURE: 130 MMHG | WEIGHT: 227 LBS | TEMPERATURE: 96.3 F | BODY MASS INDEX: 42.86 KG/M2 | HEIGHT: 61 IN

## 2021-05-25 DIAGNOSIS — F31.9 BIPOLAR AFFECTIVE DISORDER, REMISSION STATUS UNSPECIFIED (HCC): ICD-10-CM

## 2021-05-25 DIAGNOSIS — Z79.4 TYPE 2 DIABETES MELLITUS WITHOUT COMPLICATION, WITH LONG-TERM CURRENT USE OF INSULIN (HCC): Primary | ICD-10-CM

## 2021-05-25 DIAGNOSIS — E11.9 TYPE 2 DIABETES MELLITUS WITHOUT COMPLICATION, WITH LONG-TERM CURRENT USE OF INSULIN (HCC): Primary | ICD-10-CM

## 2021-05-25 PROCEDURE — 99213 OFFICE O/P EST LOW 20 MIN: CPT | Performed by: NURSE PRACTITIONER

## 2021-05-25 PROCEDURE — 3725F SCREEN DEPRESSION PERFORMED: CPT | Performed by: NURSE PRACTITIONER

## 2021-05-25 PROCEDURE — 1036F TOBACCO NON-USER: CPT | Performed by: NURSE PRACTITIONER

## 2021-05-25 PROCEDURE — 83036 HEMOGLOBIN GLYCOSYLATED A1C: CPT | Performed by: NURSE PRACTITIONER

## 2021-05-25 PROCEDURE — 3008F BODY MASS INDEX DOCD: CPT | Performed by: NURSE PRACTITIONER

## 2021-05-25 RX ORDER — BLOOD SUGAR DIAGNOSTIC
STRIP MISCELLANEOUS
Qty: 100 EACH | Refills: 0 | Status: SHIPPED | OUTPATIENT
Start: 2021-05-25 | End: 2021-05-25

## 2021-05-25 RX ORDER — BLOOD SUGAR DIAGNOSTIC
STRIP MISCELLANEOUS
Qty: 100 EACH | Refills: 0 | Status: SHIPPED | OUTPATIENT
Start: 2021-05-25 | End: 2022-02-03 | Stop reason: ALTCHOICE

## 2021-05-25 NOTE — PROGRESS NOTES
Assessment/Plan:    No problem-specific Assessment & Plan notes found for this encounter  Diagnoses and all orders for this visit:    Type 2 diabetes mellitus without complication, with long-term current use of insulin ([facility])  -     Discontinue: glucose blood (Accu-Chek Guide) test strip; Test three times per day  -     metFORMIN (GLUCOPHAGE) 1000 MG tablet; Take 1 tablet (1,000 mg total) by mouth 2 (two) times a day with meals  -     Comprehensive metabolic panel; Future  -     glucose blood (Accu-Chek Guide) test strip; Test three times per day  -     POCT hemoglobin A1c  -     Glucometer    Bipolar affective disorder, remission status unspecified ([facility])  Comments:  Continue Wellbutrin and xanax prn     Other orders  -     Cancel: Glucometer  -     Cancel: POCT hemoglobin A1c          Subjective:      Patient ID: Carol Lopez is a 33 y o  female  Patient is currently taking 8 units of inulin with meals and 12 before bedtime  She is not currently taking her Metformin BID since having her baby  Patient states she does not want to have be on insulin anymore as she does not like doing injections and would be more compliant with oral medications  Reviewed importance of staying on insulin to control sugars  Not following a diabetic diet- reviewed importance of carbohydrate monitoring and sugars in diet along with exercise  HGA1C in office 10 8  Diabetes  She presents for her follow-up diabetic visit  She has type 2 diabetes mellitus  No MedicAlert identification noted  Her disease course has been worsening  There are no hypoglycemic associated symptoms  Pertinent negatives for hypoglycemia include no dizziness or headaches  There are no diabetic associated symptoms  Pertinent negatives for diabetes include no blurred vision, no chest pain, no fatigue, no foot paresthesias, no foot ulcerations, no polydipsia, no polyphagia, no polyuria, no visual change, no weakness and no weight loss   There are no hypoglycemic complications  Symptoms are stable  There are no diabetic complications  Risk factors for coronary artery disease include diabetes mellitus, family history, dyslipidemia, stress and sedentary lifestyle  Current diabetic treatment includes insulin injections  She is compliant with treatment some of the time  She is currently taking insulin pre-breakfast, pre-lunch, pre-dinner and at bedtime  Insulin injections are given by patient  Rotation sites for injection include the abdominal wall, arms and back  She is following a generally unhealthy diet  When asked about meal planning, she reported none  She has not had a previous visit with a dietitian  She never participates in exercise  Home blood sugar record trend: does not have a glucometer at home- will order for patient, importance of checking blood sugars reviewed, hypglycemia smyptoms reviewed  An ACE inhibitor/angiotensin II receptor blocker is being taken  She does not see a podiatrist Eye exam is not current  The following portions of the patient's history were reviewed and updated as appropriate: allergies, current medications, past family history, past medical history, past social history, past surgical history and problem list     Review of Systems   Constitutional: Negative for appetite change, fatigue, unexpected weight change and weight loss  HENT: Negative for congestion, rhinorrhea, sneezing and sore throat  Eyes: Negative for blurred vision and visual disturbance  Respiratory: Negative for cough, chest tightness, shortness of breath and wheezing  Cardiovascular: Negative for chest pain, palpitations and leg swelling  Gastrointestinal: Negative for abdominal pain, blood in stool, constipation, diarrhea, nausea and vomiting  Endocrine: Negative for polydipsia, polyphagia and polyuria  Genitourinary: Negative for difficulty urinating, dysuria and urgency     Musculoskeletal: Negative for arthralgias, back pain and joint swelling  Skin: Negative for color change and rash  Neurological: Negative for dizziness, weakness and headaches  Hematological: Negative for adenopathy  Does not bruise/bleed easily  Objective:      /83   Pulse 102   Temp (!) 96 3 °F (35 7 °C)   Ht 5' 1" (1 549 m)   Wt 103 kg (227 lb)   LMP 05/08/2021 (Approximate)   BMI 42 89 kg/m²          Physical Exam  Constitutional:       General: She is not in acute distress  Appearance: Normal appearance  She is obese  She is not ill-appearing, toxic-appearing or diaphoretic  HENT:      Head: Normocephalic and atraumatic  Cardiovascular:      Rate and Rhythm: Normal rate and regular rhythm  Pulses: Normal pulses  Heart sounds: Normal heart sounds  No murmur  No friction rub  Pulmonary:      Effort: Pulmonary effort is normal  No respiratory distress  Breath sounds: Normal breath sounds  No wheezing  Chest:      Chest wall: No tenderness  Abdominal:      General: Abdomen is flat  Bowel sounds are normal       Palpations: Abdomen is soft  There is no mass  Tenderness: There is no abdominal tenderness  There is no guarding or rebound  Skin:     General: Skin is warm and dry  Coloration: Skin is not jaundiced or pale  Findings: No bruising or erythema  Neurological:      General: No focal deficit present  Mental Status: She is alert and oriented to person, place, and time  Mental status is at baseline  Psychiatric:         Mood and Affect: Mood normal          Behavior: Behavior normal          Thought Content:  Thought content normal          Judgment: Judgment normal

## 2021-05-26 ENCOUNTER — TELEPHONE (OUTPATIENT)
Dept: ADMINISTRATIVE | Facility: OTHER | Age: 31
End: 2021-05-26

## 2021-05-26 DIAGNOSIS — Z79.4 TYPE 2 DIABETES MELLITUS WITHOUT COMPLICATION, WITH LONG-TERM CURRENT USE OF INSULIN (HCC): Primary | ICD-10-CM

## 2021-05-26 DIAGNOSIS — E11.9 TYPE 2 DIABETES MELLITUS WITHOUT COMPLICATION, WITH LONG-TERM CURRENT USE OF INSULIN (HCC): Primary | ICD-10-CM

## 2021-05-26 LAB — SL AMB POCT HEMOGLOBIN AIC: 10.7 (ref ?–6.5)

## 2021-05-26 PROCEDURE — 3046F HEMOGLOBIN A1C LEVEL >9.0%: CPT | Performed by: NURSE PRACTITIONER

## 2021-05-26 RX ORDER — BLOOD-GLUCOSE METER
EACH MISCELLANEOUS ONCE
Qty: 1 KIT | Refills: 0 | Status: SHIPPED | OUTPATIENT
Start: 2021-05-26 | End: 2022-02-03 | Stop reason: ALTCHOICE

## 2021-05-26 NOTE — TELEPHONE ENCOUNTER
----- Message from Jamas Severin sent at 5/24/2021  3:48 PM EDT -----  05/24/21 3:48 PM    Hello, our patient Brielle Perez has had Pap Smear (HPV) aka Cervical Cancer Screening completed/performed  Please assist in updating the patient chart by pulling the Care Everywhere (CE) document  The date of service is 11/30/2018       Thank you,  Jamas Severin [facility]

## 2021-05-27 ENCOUNTER — TELEPHONE (OUTPATIENT)
Dept: ADMINISTRATIVE | Facility: OTHER | Age: 31
End: 2021-05-27

## 2021-05-27 NOTE — LETTER
Diabetic Eye Exam Form    Date Requested: 21  Patient: Lida Duarte  Patient : 1990   Referring Provider: REJI Bloom    Dilated Retinal Exam, Optomap-Iris Exam, or Fundus Photography Done         Yes ([place]q one above)         No     Date of Diabetic Eye Exam ______________________________  Left Eye      Exam did show retinopathy    Exam did not show retinopathy         Mild       Moderate       None       Proliferative       Severe     Right Eye     Exam did show retinopathy    Exam did not show retinopathy         Mild       Moderate       None       Proliferative       Severe     Comments __________________________________________________________    Practice Providing Exam ______________________________________________    Exam Performed By (print name) _______________________________________      Provider Signature ___________________________________________________      These reports are needed for  compliance    Please fax this completed form and a copy of the Diabetic Eye Exam report to our office located at [place] [address] as soon as possible to [phone] garfield Ontiveros: Phone [phone]    We thank you for your assistance in treating our mutual patient

## 2021-05-27 NOTE — TELEPHONE ENCOUNTER
Upon review of the In Basket request and the patient's chart, initial outreach has been made via fax, please see Contacts section for details        [phone]    Thank you  Sherryle Breen, MA

## 2021-05-28 NOTE — TELEPHONE ENCOUNTER
Upon review of the In Basket request we were able to locate, review, and update the patient chart as requested for Pap Smear (HPV) aka Cervical Cancer Screening  Any additional questions or concerns should be emailed to the Practice Liaisons via [email or web address] email, please do not reply via In Basket      Thank you  Judy Elias

## 2021-06-03 NOTE — TELEPHONE ENCOUNTER
Upon review of the In Basket request we were able to locate, review, and update the patient chart as requested for Diabetic Eye Exam     Any additional questions or concerns should be emailed to the Practice Liaisons via [email or web address] email, please do not reply via In Basket      Thank you  Daniel Elias MA

## 2021-06-24 ENCOUNTER — APPOINTMENT (EMERGENCY)
Dept: CT IMAGING | Facility: HOSPITAL | Age: 31
End: 2021-06-24
Payer: COMMERCIAL

## 2021-06-24 ENCOUNTER — HOSPITAL ENCOUNTER (EMERGENCY)
Facility: HOSPITAL | Age: 31
Discharge: HOME | End: 2021-06-25
Attending: EMERGENCY MEDICINE
Payer: COMMERCIAL

## 2021-06-24 DIAGNOSIS — S09.90XA CLOSED HEAD INJURY, INITIAL ENCOUNTER: Primary | ICD-10-CM

## 2021-06-24 PROCEDURE — 99284 EMERGENCY DEPT VISIT MOD MDM: CPT | Performed by: EMERGENCY MEDICINE

## 2021-06-24 PROCEDURE — 99284 EMERGENCY DEPT VISIT MOD MDM: CPT

## 2021-06-24 PROCEDURE — 70450 CT HEAD/BRAIN W/O DYE: CPT

## 2021-06-24 PROCEDURE — G1004 CDSM NDSC: HCPCS

## 2021-06-24 RX ORDER — OXYCODONE HYDROCHLORIDE AND ACETAMINOPHEN 5; 325 MG/1; MG/1
1 TABLET ORAL ONCE
Status: COMPLETED | OUTPATIENT
Start: 2021-06-24 | End: 2021-06-24

## 2021-06-24 RX ORDER — ONDANSETRON 4 MG/1
4 TABLET, ORALLY DISINTEGRATING ORAL ONCE
Status: COMPLETED | OUTPATIENT
Start: 2021-06-24 | End: 2021-06-24

## 2021-06-24 RX ADMIN — OXYCODONE HYDROCHLORIDE AND ACETAMINOPHEN 1 TABLET: 5; 325 TABLET ORAL at 23:42

## 2021-06-24 RX ADMIN — ONDANSETRON 4 MG: 4 TABLET, ORALLY DISINTEGRATING ORAL at 23:42

## 2021-06-25 VITALS
TEMPERATURE: 97 F | HEART RATE: 91 BPM | SYSTOLIC BLOOD PRESSURE: 190 MMHG | OXYGEN SATURATION: 97 % | WEIGHT: 227 LBS | RESPIRATION RATE: 18 BRPM | BODY MASS INDEX: 41.77 KG/M2 | HEIGHT: 62 IN | DIASTOLIC BLOOD PRESSURE: 101 MMHG

## 2021-06-25 NOTE — ED PROVIDER NOTES
History  Chief Complaint   Patient presents with    Head Injury     Pt states she was hit in the head with a softball at 7pm, pt states she feels dizzy and nausea  66-YEAR-OLD FEMALE    PAST MEDICAL HISTORY:  DM  Hep C  HTN      CC:   PATIENT IS HERE FOR HEAD INJURY AND ASSOCIATED HEADACHE AND DIZZINESS and Nausea      Mechanism:  About 4 hours ago she was struck in the forehead by a pop up softball       THERE WAS NO LOC    THERE WERE NO OTHER INJURIES  Pain is rated as 7/10      THERE WAS NO DRUGS OR ETOH INVOLVED      SHE  HAS NO OTHER SOURCES OF PAIN:   NO CP OR BACK PAIN OR ABDOMINAL PAIN      PT IS MOVING ALL EXTREMITIES  PT IS AMBULATORY  PT HAS EQUAL  STRENGTH, SHE IS NOT HAVING TROUBLE WALKING  PT HAS NO LOSS OF BOWEL OR BLADDER    INTERVENTIONS:   None            History provided by:  Patient  Head Injury w/unknown LOC  Location:  Frontal  Pain details:     Quality:  Aching    Radiates to: Face    Severity:  Severe    Timing:  Constant    Progression:  Worsening  Chronicity:  New  Relieved by:  Nothing  Worsened by:  Nothing  Ineffective treatments:  None tried  Associated symptoms: headache and nausea    Associated symptoms: no neck pain and no vomiting        Prior to Admission Medications   Prescriptions Last Dose Informant Patient Reported? Taking? ALPRAZolam (XANAX) 0 5 mg tablet   No No   Sig: Take 1 tablet (0 5 mg total) by mouth daily at bedtime as needed for anxiety   B-D UF III MINI PEN NEEDLES 31G X 5 MM MISC   No No   Sig: INJECT UNDER THE SKIN DAILY AT BEDTIME USE ONE A DAY OR AS DIRECTED   Blood Glucose Monitoring Suppl (Accu-Chek Niecy SmartView) w/Device KIT   No No   Sig: Use once for 1 dose   Blood Pressure Monitoring (B-D ASSURE BPM/AUTO WRIST CUFF) MISC   No No   Sig: Check blood pressure prior to each OB visit, or as directed by your physician     acetaminophen (TYLENOL) 500 mg tablet   Yes No   Sig: Take 1,000 mg by mouth   buPROPion (WELLBUTRIN XL) 150 mg 24 hr tablet No No   Sig: Take 1 tablet (150 mg total) by mouth daily   clotrimazole (LOTRIMIN) 1 % cream   No No   Sig: Apply topically 2 (two) times a day   cyclobenzaprine (FLEXERIL) 5 mg tablet   No No   Sig: Take 1 tablet (5 mg total) by mouth 3 (three) times a day as needed for muscle spasms   ergocalciferol (VITAMIN D2) 50,000 units   No No   Sig: take 1 capsule by mouth every week   glucose blood (Accu-Chek Guide) test strip   No No   Sig: Test three times per day   insulin glargine (LANTUS) 100 units/mL subcutaneous injection   No No   Sig: Inject 12 Units under the skin daily at bedtime   insulin lispro (HumaLOG KwikPen) 100 units/mL injection pen   No No   Sig: Inject SC 8 units before breakfast, before lunch and dinner  To be titrated     labetalol (NORMODYNE) 200 mg tablet  Self Yes No   Sig: Take 200 mg by mouth 2 (two) times a day    lisinopril (ZESTRIL) 30 mg tablet   Yes No   Sig: Take 30 mg by mouth daily   metFORMIN (GLUCOPHAGE) 1000 MG tablet   No No   Sig: Take 1 tablet (1,000 mg total) by mouth 2 (two) times a day with meals      Facility-Administered Medications: None       Past Medical History:   Diagnosis Date    Allergic     Arthritis     Bipolar affective disorder, currently depressed, moderate ([facility] [address] ) 2018    Cyst of ovary, right     Diabetes mellitus ([facility] [address] ) 10/10/18    type 2    Endometriosis     Heart murmur 88    Hepatitis C     Hepatitis C virus infection cured after antiviral drug therapy     History of transfusion     Hypertension     Migraines     Obesity     Pulmonary artery congenital abnormality     Spleen enlarged     Varicella        Past Surgical History:   Procedure Laterality Date    CARDIAC CATHETERIZATION      no CAD 10days, 4 weeks 22months old      SECTION, LOW TRANSVERSE      CHOLECYSTECTOMY      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 8    CORONARY STENT PLACEMENT      LIVER BIOPSY      LIVER BIOPSY      TUBAL LIGATION Bilateral   VSD REPAIR      As a child       Family History   Problem Relation Age of Onset    Hypertension Mother     Migraines Mother     JENNY disease Mother     Depression Mother     Hyperlipidemia Mother     Diabetes Mother     Diabetes Father     Hypertension Father     Kidney failure Father     Heart attack Father     Arthritis Father     Stroke Father     Polycystic kidney disease Paternal Grandmother     Stroke Paternal Grandmother     Heart disease Paternal Grandmother     Arthritis Sister     Asthma Sister     Thyroid disease Sister     Diabetes Sister     Arthritis Maternal Grandmother     Breast cancer Maternal Grandmother     Diabetes Maternal Grandmother     Hypertension Maternal Grandmother     Heart Valve Disease Maternal Grandmother     Learning disabilities Cousin     Learning disabilities Sister     ADD / ADHD Cousin     Lung cancer Brother     Diabetes Maternal Grandfather     Hypertension Maternal Grandfather     JENNY disease Maternal Grandfather     Stroke Maternal Grandfather      I have reviewed and agree with the history as documented  E-Cigarette/Vaping    E-Cigarette Use Never User      E-Cigarette/Vaping Substances    Nicotine No     THC No     CBD No     Flavoring No      Social History     Tobacco Use    Smoking status: Former Smoker     Packs/day: 0 20     Types: Cigarettes     Start date: 2019     Quit date: 2019     Years since quittin 9    Smokeless tobacco: Never Used   Vaping Use    Vaping Use: Never used   Substance Use Topics    Alcohol use: Not Currently     Alcohol/week: 3 0 standard drinks     Types: 3 Standard drinks or equivalent per week     Comment: socially/prior to knowledge of pregnancy    Drug use: Not Currently     Comment: hx of THC use for migraines       Review of Systems   Constitutional: Negative for chills, diaphoresis, fatigue and fever     Respiratory: Negative for cough, shortness of breath, wheezing and stridor  Cardiovascular: Negative for chest pain, palpitations and leg swelling  Gastrointestinal: Positive for nausea  Negative for abdominal pain, blood in stool and vomiting  Genitourinary: Negative for difficulty urinating, dysuria, flank pain and frequency  Musculoskeletal: Negative for arthralgias, back pain, gait problem, joint swelling, myalgias, neck pain and neck stiffness  Skin: Negative for rash and wound  Neurological: Positive for light-headedness and headaches  Negative for dizziness  All other systems reviewed and are negative  Physical Exam  Physical Exam  Constitutional:       General: She is not in acute distress  Appearance: She is well-developed  She is not ill-appearing, toxic-appearing or diaphoretic  HENT:      Head: Normocephalic  Comments: Frontal forehead small contusion     Nose: Nose normal       Mouth/Throat:      Pharynx: No oropharyngeal exudate  Eyes:      General: No scleral icterus  Right eye: No discharge  Left eye: No discharge  Conjunctiva/sclera: Conjunctivae normal       Pupils: Pupils are equal, round, and reactive to light  Neck:      Vascular: No JVD  Trachea: No tracheal deviation  Cardiovascular:      Rate and Rhythm: Normal rate and regular rhythm  Heart sounds: Normal heart sounds  No murmur heard  No friction rub  No gallop  Pulmonary:      Effort: Pulmonary effort is normal  No respiratory distress  Breath sounds: Normal breath sounds  No stridor  No wheezing, rhonchi or rales  Chest:      Chest wall: No tenderness  Abdominal:      General: Bowel sounds are normal  There is no distension  Palpations: Abdomen is soft  There is no mass  Tenderness: There is no abdominal tenderness  There is no right CVA tenderness, left CVA tenderness, guarding or rebound  Hernia: No hernia is present  Musculoskeletal:         General: No swelling, tenderness, deformity or signs of injury  Normal range of motion  Cervical back: Normal range of motion and neck supple  No rigidity or tenderness  Right lower leg: No edema  Left lower leg: No edema  Lymphadenopathy:      Cervical: No cervical adenopathy  Skin:     General: Skin is warm  Capillary Refill: Capillary refill takes less than 2 seconds  Coloration: Skin is not jaundiced or pale  Findings: No bruising, erythema, lesion or rash  Neurological:      General: No focal deficit present  Mental Status: She is alert and oriented to person, place, and time  Cranial Nerves: No cranial nerve deficit  Sensory: No sensory deficit  Motor: No weakness or abnormal muscle tone  Coordination: Coordination normal    Psychiatric:         Mood and Affect: Mood normal          Behavior: Behavior normal          Thought Content:  Thought content normal          Judgment: Judgment normal          Vital Signs  ED Triage Vitals   Temperature Pulse Respirations Blood Pressure SpO2   06/24/21 2324 06/24/21 2324 06/24/21 2324 06/24/21 2324 06/24/21 2324   (!) 97 °F (36 1 °C) 96 18 (!) 187/116 97 %      Temp Source Heart Rate Source Patient Position - Orthostatic VS BP Location FiO2 (%)   06/24/21 2324 06/24/21 2324 06/25/21 0040 -- --   Temporal Monitor Sitting        Pain Score       06/24/21 2324       7           Vitals:    06/24/21 2324 06/25/21 0040   BP: (!) 187/116 (!) 190/101   Pulse: 96 91   Patient Position - Orthostatic VS:  Sitting         Visual Acuity  Visual Acuity      Most Recent Value   L Pupil Size (mm)  4   R Pupil Size (mm)  4          ED Medications  Medications   ondansetron (ZOFRAN-ODT) dispersible tablet 4 mg (4 mg Oral Given 6/24/21 2342)   oxyCODONE-acetaminophen (PERCOCET) 5-325 mg per tablet 1 tablet (1 tablet Oral Given 6/24/21 2342)       Diagnostic Studies  Results Reviewed     None                 CT head without contrast   Final Result by Lissy Stokes DO (06/25 0038) Small right frontal scalp hematoma but no calvarial fracture or acute intracranial  abnormality seen  Workstation performed: [id number]                    Procedures  Procedures         ED Course  ED Course as of Jun 25 0100   Fri Jun 25, 2021   0053 CT BRAIN - WITHOUT CONTRAST     INDICATION:   Headache, post traumatic  headache, dizziness, nausea      COMPARISON:  None      TECHNIQUE:  CT examination of the brain was performed  In addition to axial images, sagittal and coronal 2D reformatted images were created and submitted for interpretation      Radiation dose length product (DLP) for this visit:  793 3 mGy-cm   This examination, like all CT scans performed in the [facility], was performed utilizing techniques to minimize radiation dose exposure, including the use of iterative   reconstruction and automated exposure control        IMAGE QUALITY:  Diagnostic      FINDINGS:     PARENCHYMA:  No intracranial mass, mass effect or midline shift  No CT signs of acute territorial infarction  No acute parenchymal hemorrhage  Gray-white differentiation appears maintained     VENTRICLES AND EXTRA-AXIAL SPACES:  Normal for the patient's age      VISUALIZED ORBITS AND PARANASAL SINUSES:  Unremarkable      CALVARIUM AND EXTRACRANIAL SOFT TISSUES:  Small right frontal scalp hematoma    No calvarial fracture      IMPRESSION:     Small right frontal scalp hematoma but no calvarial fracture or acute intracranial  abnormality seen          0056 Pt looks great  She is ready to manage from home  She is appreciative of her ED care                                              MDM    Disposition  Final diagnoses:   Closed head injury, initial encounter     Time reflects when diagnosis was documented in both MDM as applicable and the Disposition within this note     Time User Action Codes Description Comment    6/25/2021 12:56 AM Michael Lutz Add [[id number]] Closed head injury, initial encounter ED Disposition     ED Disposition Condition Date/Time Comment    Discharge Stable Fri Jun 25, 2021 12:56 AM Shannan Hare discharge to home/self care  Follow-up Information     Follow up With Specialties Details Why Contact Osman Leonardo, [address] Nurse Practitioner Call today  [address] [address]  [phone]            Patient's Medications   Discharge Prescriptions    No medications on file     No discharge procedures on file      PDMP Review       Value Time User    PDMP Reviewed  Yes 10/2/2020 11:54 AM Liam Summers MD          ED Provider  Electronically Signed by           Bety Tinoco MD  06/25/21 0100

## 2021-06-25 NOTE — DISCHARGE INSTRUCTIONS
RETURN IF WORSE IN ANY WAY:   WORSENING PAIN,   FEVER OR FLU LIKE SYMPTOMS,   OR NEW AND CONCERNING SYMPTOMS SIGNS OR SYMPTOMS      PLEASE CALL YOUR PRIMARY DOCTOR IN THE MORNING TO SET UP FOLLOW UP   PLEASE REVIEW THE WORK UP RESULTS WITH YOUR DOCTOR    Your blood pressure was 190/101 today  Please continue to monitor at home  Please have your doctor reassesses

## 2021-07-07 ENCOUNTER — APPOINTMENT (OUTPATIENT)
Dept: LAB | Facility: HOSPITAL | Age: 31
End: 2021-07-07
Attending: FAMILY MEDICINE
Payer: COMMERCIAL

## 2021-07-07 DIAGNOSIS — E11.9 TYPE 2 DIABETES MELLITUS WITHOUT COMPLICATION, WITH LONG-TERM CURRENT USE OF INSULIN (HCC): ICD-10-CM

## 2021-07-07 DIAGNOSIS — Z79.4 TYPE 2 DIABETES MELLITUS WITHOUT COMPLICATION, WITH LONG-TERM CURRENT USE OF INSULIN (HCC): ICD-10-CM

## 2021-07-07 DIAGNOSIS — E55.9 VITAMIN D DEFICIENCY: ICD-10-CM

## 2021-07-07 DIAGNOSIS — B18.2 CHRONIC HEPATITIS C WITHOUT HEPATIC COMA (HCC): ICD-10-CM

## 2021-07-07 LAB
25(OH)D3 SERPL-MCNC: 22.7 NG/ML (ref 30–100)
ALBUMIN SERPL BCP-MCNC: 3.8 G/DL (ref 3.5–5.7)
ALP SERPL-CCNC: 79 U/L (ref 40–150)
ALT SERPL W P-5'-P-CCNC: 16 U/L (ref 7–52)
ANION GAP SERPL CALCULATED.3IONS-SCNC: 10 MMOL/L (ref 4–13)
AST SERPL W P-5'-P-CCNC: 11 U/L (ref 13–39)
BILIRUB SERPL-MCNC: 0.2 MG/DL (ref 0.2–1)
BUN SERPL-MCNC: 14 MG/DL (ref 7–25)
CALCIUM SERPL-MCNC: 9 MG/DL (ref 8.6–10.5)
CHLORIDE SERPL-SCNC: 102 MMOL/L (ref 98–107)
CO2 SERPL-SCNC: 26 MMOL/L (ref 21–31)
CREAT SERPL-MCNC: 0.63 MG/DL (ref 0.6–1.2)
EST. AVERAGE GLUCOSE BLD GHB EST-MCNC: 237 MG/DL
GFR SERPL CREATININE-BSD FRML MDRD: 118 ML/MIN/1.73SQ M
GLUCOSE P FAST SERPL-MCNC: 239 MG/DL (ref 65–99)
HBA1C MFR BLD: 9.9 %
POTASSIUM SERPL-SCNC: 3.8 MMOL/L (ref 3.5–5.5)
PROT SERPL-MCNC: 7 G/DL (ref 6.4–8.9)
SODIUM SERPL-SCNC: 138 MMOL/L (ref 134–143)

## 2021-07-07 PROCEDURE — 36415 COLL VENOUS BLD VENIPUNCTURE: CPT

## 2021-07-07 PROCEDURE — 80053 COMPREHEN METABOLIC PANEL: CPT

## 2021-07-07 PROCEDURE — 82306 VITAMIN D 25 HYDROXY: CPT

## 2021-07-07 PROCEDURE — 3046F HEMOGLOBIN A1C LEVEL >9.0%: CPT | Performed by: NURSE PRACTITIONER

## 2021-07-07 PROCEDURE — 83036 HEMOGLOBIN GLYCOSYLATED A1C: CPT

## 2021-07-07 PROCEDURE — 87522 HEPATITIS C REVRS TRNSCRPJ: CPT

## 2021-07-09 ENCOUNTER — OFFICE VISIT (OUTPATIENT)
Dept: FAMILY MEDICINE CLINIC | Facility: CLINIC | Age: 31
End: 2021-07-09
Payer: COMMERCIAL

## 2021-07-09 VITALS
WEIGHT: 226 LBS | BODY MASS INDEX: 41.59 KG/M2 | TEMPERATURE: 97.2 F | DIASTOLIC BLOOD PRESSURE: 72 MMHG | HEIGHT: 62 IN | HEART RATE: 96 BPM | SYSTOLIC BLOOD PRESSURE: 124 MMHG | OXYGEN SATURATION: 98 %

## 2021-07-09 DIAGNOSIS — E11.65 TYPE 2 DIABETES MELLITUS WITH HYPERGLYCEMIA, WITHOUT LONG-TERM CURRENT USE OF INSULIN (HCC): Primary | ICD-10-CM

## 2021-07-09 DIAGNOSIS — E55.9 VITAMIN D DEFICIENCY: ICD-10-CM

## 2021-07-09 LAB
HCV RNA SERPL NAA+PROBE-ACNC: NORMAL IU/ML
TEST INFORMATION: NORMAL

## 2021-07-09 PROCEDURE — 99213 OFFICE O/P EST LOW 20 MIN: CPT | Performed by: NURSE PRACTITIONER

## 2021-07-09 RX ORDER — INSULIN GLARGINE 100 [IU]/ML
15 INJECTION, SOLUTION SUBCUTANEOUS
Qty: 4.5 ML | Refills: 3 | Status: SHIPPED | OUTPATIENT
Start: 2021-07-09 | End: 2021-11-06

## 2021-07-09 RX ORDER — INSULIN LISPRO 100 [IU]/ML
INJECTION, SOLUTION INTRAVENOUS; SUBCUTANEOUS
Qty: 15 ML | Refills: 3 | Status: SHIPPED | OUTPATIENT
Start: 2021-07-09 | End: 2021-11-19

## 2021-07-09 NOTE — PATIENT INSTRUCTIONS
Vitamin D 2,00-4,000 units a day  Come in for  Follow up visit in 1 5 months with a log of your blood sugars at home  Increase mealtime insulin to 10 units a day and bedtime insulin to 15 units  Call with any episodes of low blood sugars  You do not need to have labs until three months

## 2021-07-09 NOTE — PROGRESS NOTES
Assessment/Plan:     Diagnoses and all orders for this visit:    Type 2 diabetes mellitus with hyperglycemia, without long-term current use of insulin (HCC)  Comments:  Increase lantus to 15 units at bedtime  Increase mealtime insulin to 10 units  Keep log of blood sugars to bring to next visit  Continue metformin   Orders:  -     insulin glargine (LANTUS) 100 units/mL subcutaneous injection; Inject 15 Units under the skin daily at bedtime  -     insulin lispro (HumaLOG KwikPen) 100 units/mL injection pen; Inject SC 10 units before breakfast, before lunch and dinner  To be titrated  -     HEMOGLOBIN A1C W/ EAG ESTIMATION; Future  -     Comprehensive metabolic panel; Future    Vitamin D deficiency  Comments:  switch from weekly to vit d daily           Subjective:      Patient ID: Dinah Adams is a 33 y o  female  Diabetes  She presents for her follow-up diabetic visit  She has type 2 diabetes mellitus  Her disease course has been fluctuating  There are no hypoglycemic associated symptoms  Pertinent negatives for hypoglycemia include no confusion, dizziness, headaches, hunger, mood changes, nervousness/anxiousness, pallor, seizures, sleepiness, speech difficulty, sweats or tremors  Associated symptoms include polydipsia (states due to heat)  Pertinent negatives for diabetes include no blurred vision, no chest pain, no fatigue, no foot paresthesias, no foot ulcerations, no polyphagia and no polyuria  There are no hypoglycemic complications  Symptoms are stable  There are no diabetic complications  Risk factors for coronary artery disease include diabetes mellitus, family history, obesity and sedentary lifestyle  Current diabetic treatment includes oral agent (monotherapy) and insulin injections  She is compliant with treatment some of the time  She is currently taking insulin pre-breakfast, pre-lunch, pre-dinner and at bedtime  Insulin injections are given by patient   Rotation sites for injection include the abdominal wall  Her weight is stable  She is following a generally unhealthy diet  When asked about meal planning, she reported none  She has not had a previous visit with a dietitian  She never (states she is active with her child ) participates in exercise  Her breakfast blood glucose is taken between 8-9 am  Her breakfast blood glucose range is generally 130-140 mg/dl  Her lunch blood glucose is taken between 1-2 pm  Her lunch blood glucose range is generally 110-130 mg/dl  Her bedtime blood glucose is taken between 10-11 pm  Her bedtime blood glucose range is generally 140-180 mg/dl  An ACE inhibitor/angiotensin II receptor blocker is being taken  Eye exam is current  The following portions of the patient's history were reviewed and updated as appropriate: allergies, current medications, past family history, past medical history, past social history, past surgical history and problem list     Review of Systems   Constitutional: Negative for activity change, appetite change, fatigue, fever and unexpected weight change  Eyes: Negative for blurred vision  Respiratory: Negative for apnea, cough, shortness of breath and wheezing  Cardiovascular: Negative for chest pain, palpitations and leg swelling  Gastrointestinal: Negative for constipation, diarrhea and nausea  Endocrine: Positive for polydipsia (states due to heat)  Negative for cold intolerance, heat intolerance, polyphagia and polyuria  Skin: Negative for pallor  Neurological: Negative for dizziness, tremors, seizures, speech difficulty and headaches  Psychiatric/Behavioral: Negative for confusion  The patient is not nervous/anxious            Objective:      /72 (BP Location: Left arm, Patient Position: Sitting)   Pulse 96   Temp (!) 97 2 °F (36 2 °C) (Tympanic)   Ht 5' 2" (1 575 m)   Wt 103 kg (226 lb)   LMP 07/02/2021   SpO2 98%   BMI 41 34 kg/m²          Physical Exam  Constitutional:       General: She is not in acute distress  Appearance: Normal appearance  She is obese  She is not ill-appearing, toxic-appearing or diaphoretic  HENT:      Head: Normocephalic and atraumatic  Cardiovascular:      Rate and Rhythm: Normal rate and regular rhythm  Pulses: Normal pulses  Heart sounds: Normal heart sounds  No murmur heard  No friction rub  No gallop  Pulmonary:      Effort: Pulmonary effort is normal  No respiratory distress  Breath sounds: Normal breath sounds  No stridor  No wheezing, rhonchi or rales  Chest:      Chest wall: No tenderness  Abdominal:      General: Abdomen is flat  Bowel sounds are normal       Palpations: Abdomen is soft  There is no mass  Tenderness: There is no abdominal tenderness  There is no guarding  Musculoskeletal:      Cervical back: Normal range of motion and neck supple  No rigidity  Lymphadenopathy:      Cervical: No cervical adenopathy  Skin:     General: Skin is warm and dry  Neurological:      General: No focal deficit present  Mental Status: She is alert and oriented to person, place, and time  Mental status is at baseline  Motor: No weakness  Coordination: Coordination normal    Psychiatric:         Mood and Affect: Mood normal          Behavior: Behavior normal          Thought Content:  Thought content normal          Judgment: Judgment normal

## 2021-07-13 ENCOUNTER — APPOINTMENT (EMERGENCY)
Dept: RADIOLOGY | Facility: HOSPITAL | Age: 31
End: 2021-07-13
Payer: COMMERCIAL

## 2021-07-13 ENCOUNTER — HOSPITAL ENCOUNTER (EMERGENCY)
Facility: HOSPITAL | Age: 31
Discharge: HOME | End: 2021-07-13
Attending: EMERGENCY MEDICINE | Admitting: EMERGENCY MEDICINE
Payer: COMMERCIAL

## 2021-07-13 VITALS
OXYGEN SATURATION: 99 % | TEMPERATURE: 97.6 F | HEART RATE: 81 BPM | DIASTOLIC BLOOD PRESSURE: 65 MMHG | RESPIRATION RATE: 18 BRPM | SYSTOLIC BLOOD PRESSURE: 123 MMHG

## 2021-07-13 DIAGNOSIS — R07.9 CHEST PAIN: Primary | ICD-10-CM

## 2021-07-13 DIAGNOSIS — M94.0 COSTOCHONDRITIS: ICD-10-CM

## 2021-07-13 LAB
ANION GAP SERPL CALCULATED.3IONS-SCNC: 6 MMOL/L (ref 4–13)
BASOPHILS # BLD AUTO: 0 THOUSANDS/ΜL (ref 0–0.1)
BASOPHILS NFR BLD AUTO: 1 % (ref 0–2)
BUN SERPL-MCNC: 13 MG/DL (ref 7–25)
CALCIUM SERPL-MCNC: 9.3 MG/DL (ref 8.6–10.5)
CHLORIDE SERPL-SCNC: 104 MMOL/L (ref 98–107)
CO2 SERPL-SCNC: 26 MMOL/L (ref 21–31)
CREAT SERPL-MCNC: 0.56 MG/DL (ref 0.6–1.2)
EOSINOPHIL # BLD AUTO: 0.1 THOUSAND/ΜL (ref 0–0.61)
EOSINOPHIL NFR BLD AUTO: 1 % (ref 0–5)
ERYTHROCYTE [DISTWIDTH] IN BLOOD BY AUTOMATED COUNT: 15.4 % (ref 11.5–14.5)
GFR SERPL CREATININE-BSD FRML MDRD: 123 ML/MIN/1.73SQ M
GLUCOSE SERPL-MCNC: 272 MG/DL (ref 65–99)
HCT VFR BLD AUTO: 39.9 % (ref 42–47)
HGB BLD-MCNC: 13 G/DL (ref 12–16)
LYMPHOCYTES # BLD AUTO: 2.2 THOUSANDS/ΜL (ref 0.6–4.47)
LYMPHOCYTES NFR BLD AUTO: 29 % (ref 21–51)
MCH RBC QN AUTO: 24.6 PG (ref 26–34)
MCHC RBC AUTO-ENTMCNC: 32.7 G/DL (ref 31–37)
MCV RBC AUTO: 75 FL (ref 81–99)
MONOCYTES # BLD AUTO: 0.5 THOUSAND/ΜL (ref 0.17–1.22)
MONOCYTES NFR BLD AUTO: 6 % (ref 2–12)
NEUTROPHILS # BLD AUTO: 4.8 THOUSANDS/ΜL (ref 1.4–6.5)
NEUTS SEG NFR BLD AUTO: 63 % (ref 42–75)
PLATELET # BLD AUTO: 248 THOUSANDS/UL (ref 149–390)
PMV BLD AUTO: 9.1 FL (ref 8.6–11.7)
POTASSIUM SERPL-SCNC: 3.7 MMOL/L (ref 3.5–5.5)
RBC # BLD AUTO: 5.29 MILLION/UL (ref 3.9–5.2)
SODIUM SERPL-SCNC: 136 MMOL/L (ref 134–143)
TROPONIN I SERPL-MCNC: <0.03 NG/ML
WBC # BLD AUTO: 7.6 THOUSAND/UL (ref 4.8–10.8)

## 2021-07-13 PROCEDURE — 93005 ELECTROCARDIOGRAM TRACING: CPT

## 2021-07-13 PROCEDURE — 80048 BASIC METABOLIC PNL TOTAL CA: CPT | Performed by: PHYSICIAN ASSISTANT

## 2021-07-13 PROCEDURE — 85025 COMPLETE CBC W/AUTO DIFF WBC: CPT | Performed by: PHYSICIAN ASSISTANT

## 2021-07-13 PROCEDURE — 84484 ASSAY OF TROPONIN QUANT: CPT | Performed by: PHYSICIAN ASSISTANT

## 2021-07-13 PROCEDURE — 96374 THER/PROPH/DIAG INJ IV PUSH: CPT

## 2021-07-13 PROCEDURE — 99284 EMERGENCY DEPT VISIT MOD MDM: CPT | Performed by: PHYSICIAN ASSISTANT

## 2021-07-13 PROCEDURE — 99285 EMERGENCY DEPT VISIT HI MDM: CPT

## 2021-07-13 PROCEDURE — 71045 X-RAY EXAM CHEST 1 VIEW: CPT

## 2021-07-13 PROCEDURE — 36415 COLL VENOUS BLD VENIPUNCTURE: CPT | Performed by: PHYSICIAN ASSISTANT

## 2021-07-13 RX ORDER — SODIUM CHLORIDE 9 MG/ML
3 INJECTION INTRAVENOUS
Status: DISCONTINUED | OUTPATIENT
Start: 2021-07-13 | End: 2021-07-13 | Stop reason: HOSPADM

## 2021-07-13 RX ORDER — MELOXICAM 15 MG/1
15 TABLET ORAL DAILY
Qty: 14 TABLET | Refills: 0 | Status: SHIPPED | OUTPATIENT
Start: 2021-07-13 | End: 2021-07-29 | Stop reason: SDUPTHER

## 2021-07-13 RX ORDER — ASPIRIN 81 MG/1
324 TABLET, CHEWABLE ORAL ONCE
Status: COMPLETED | OUTPATIENT
Start: 2021-07-13 | End: 2021-07-13

## 2021-07-13 RX ORDER — KETOROLAC TROMETHAMINE 30 MG/ML
30 INJECTION, SOLUTION INTRAMUSCULAR; INTRAVENOUS ONCE
Status: COMPLETED | OUTPATIENT
Start: 2021-07-13 | End: 2021-07-13

## 2021-07-13 RX ADMIN — KETOROLAC TROMETHAMINE 30 MG: 30 INJECTION, SOLUTION INTRAMUSCULAR; INTRAVENOUS at 17:57

## 2021-07-13 RX ADMIN — ASPIRIN 81 MG CHEWABLE TABLET 324 MG: 81 TABLET CHEWABLE at 17:42

## 2021-07-14 LAB
ATRIAL RATE: 85 BPM
P AXIS: 68 DEGREES
PR INTERVAL: 160 MS
QRS AXIS: 1 DEGREES
QRSD INTERVAL: 160 MS
QT INTERVAL: 398 MS
QTC INTERVAL: 473 MS
T WAVE AXIS: 29 DEGREES
VENTRICULAR RATE: 85 BPM

## 2021-07-14 PROCEDURE — 93010 ELECTROCARDIOGRAM REPORT: CPT | Performed by: INTERNAL MEDICINE

## 2021-07-15 ENCOUNTER — OFFICE VISIT (OUTPATIENT)
Dept: FAMILY MEDICINE CLINIC | Facility: CLINIC | Age: 31
End: 2021-07-15
Payer: COMMERCIAL

## 2021-07-15 VITALS
SYSTOLIC BLOOD PRESSURE: 130 MMHG | WEIGHT: 230 LBS | TEMPERATURE: 96.9 F | BODY MASS INDEX: 42.33 KG/M2 | HEIGHT: 62 IN | HEART RATE: 91 BPM | OXYGEN SATURATION: 97 % | DIASTOLIC BLOOD PRESSURE: 68 MMHG

## 2021-07-15 DIAGNOSIS — R07.9 CHEST PAIN, UNSPECIFIED TYPE: Primary | ICD-10-CM

## 2021-07-15 DIAGNOSIS — Q21.0 VSD (VENTRICULAR SEPTAL DEFECT) AND COARCTATION OF AORTA: ICD-10-CM

## 2021-07-15 DIAGNOSIS — Q25.1 VSD (VENTRICULAR SEPTAL DEFECT) AND COARCTATION OF AORTA: ICD-10-CM

## 2021-07-15 DIAGNOSIS — E04.1 THYROID NODULE: ICD-10-CM

## 2021-07-15 DIAGNOSIS — Q25.1 COARCTATION OF AORTA: ICD-10-CM

## 2021-07-15 PROCEDURE — 3008F BODY MASS INDEX DOCD: CPT | Performed by: NURSE PRACTITIONER

## 2021-07-15 PROCEDURE — 93000 ELECTROCARDIOGRAM COMPLETE: CPT | Performed by: NURSE PRACTITIONER

## 2021-07-15 PROCEDURE — 99214 OFFICE O/P EST MOD 30 MIN: CPT | Performed by: NURSE PRACTITIONER

## 2021-07-15 NOTE — LETTER
July 15, 2021     Patient: Sky Mendez   YOB: 1990   Date of Visit: 7/15/2021       To Whom it May Concern:    Sky Mendez is under my professional care  She was seen in my office on 7/15/2021  She may return to work on 7/17/2021, also off 7/14  If you have any questions or concerns, please don't hesitate to call           Sincerely,          Urban RabunREJI        CC: Hiram Seip

## 2021-07-15 NOTE — PROGRESS NOTES
Assessment/Plan:    No problem-specific Assessment & Plan notes found for this encounter  Diagnoses and all orders for this visit:    Chest pain, unspecified type  -     POCT ECG  -     Echo stress test w contrast if indicated; Future    Coarctation of aorta  -     Echo stress test w contrast if indicated; Future    VSD (ventricular septal defect) and coarctation of aorta    Thyroid nodule  -     US thyroid; Future          Subjective:      Patient ID: Amanda Godinez is a 33 y o  female  Ongoing for several monhts, intermittent pain, across the center of the chest, stabbing pain in the center, nothing makes it better or worse  Constant for two days, before this it would only last five minutes and then it would resolve  However, over the last few days it has been constant pain that doesn ot resolve  Soemtimes motrin will help slightly with the pain but relief is very mild  She denies recent injury, strains or repetitive movements  Started on Mobic in the ER and it helps for about 1-2 hours and then the pain will come back  Smokes about 1-2 cigarettes a day  Last echo was 2 years ago  Reviewed cardiology note from January  She states that she saw them last she did not have this type of pain  She tried to contact them the other day when this pain started however she states that she still did not hear back from this  She had a CT scan one year ago which showed that her stent remains patent  Spoke with patient about workup options  Agreeable to stress/echo at this time but encouraged to call cardiology to ensure that they know her symptoms and her plan of treatment  On Ct scan- thyroid nodule was noted and patient has not yet had ultrasound evaluation- will order for patient  EKG: unchanged from previous tracings, normal sinus rhythm, RBBB  Chest Pain   This is a recurrent problem  The current episode started 1 to 4 weeks ago  The onset quality is sudden  The problem occurs constantly   The pain is at a severity of 8/10  The pain is moderate  The quality of the pain is described as stabbing  Pertinent negatives include no abdominal pain, back pain, claudication, cough, diaphoresis, dizziness, exertional chest pressure, fever, headaches, hemoptysis, irregular heartbeat, leg pain, lower extremity edema, malaise/fatigue, nausea, near-syncope, numbness, orthopnea, palpitations, PND, shortness of breath, sputum production, syncope, vomiting or weakness  The pain is aggravated by nothing  She has tried NSAIDs for the symptoms  The treatment provided mild relief  Risk factors include lack of exercise, obesity and smoking/tobacco exposure  Her past medical history is significant for anxiety/panic attacks and congenital heart disease  Pertinent negatives for past medical history include no aortic dissection, no arrhythmia, no bicuspid aortic valve, no connective tissue disease, no COPD, no CHF, no diabetes, no thyroid problem, 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  Past medical history comments: coartation of the aorta with stent palcement last year in February , VSD (repaired)        The following portions of the patient's history were reviewed and updated as appropriate: allergies, current medications, past family history, past medical history, past social history, past surgical history and problem list     Review of Systems   Constitutional: Negative for appetite change, diaphoresis, fatigue, fever, malaise/fatigue and unexpected weight change  HENT: Negative for congestion, rhinorrhea, sneezing and sore throat  Eyes: Negative for visual disturbance  Respiratory: Negative for cough, hemoptysis, sputum production, chest tightness, shortness of breath and wheezing  Cardiovascular: Positive for chest pain  Negative for palpitations, orthopnea, claudication, leg swelling, syncope, PND and near-syncope  Gastrointestinal: Negative for abdominal pain, blood in stool, constipation, diarrhea, nausea and vomiting  Genitourinary: Negative for difficulty urinating, dysuria and urgency  Musculoskeletal: Negative for arthralgias, back pain and joint swelling  Skin: Negative for color change and rash  Neurological: Negative for dizziness, weakness, numbness and headaches  Hematological: Negative for adenopathy  Does not bruise/bleed easily  Objective:      /68   Pulse 91   Temp (!) 96 9 °F (36 1 °C)   Ht 5' 2" (1 575 m)   Wt 104 kg (230 lb)   LMP 07/02/2021   SpO2 97%   BMI 42 07 kg/m²          Physical Exam  Constitutional:       General: She is not in acute distress  Appearance: She is well-developed  She is obese  She is not ill-appearing or toxic-appearing  HENT:      Head: Normocephalic and atraumatic  Neck:      Thyroid: No thyromegaly  Vascular: No carotid bruit  Cardiovascular:      Rate and Rhythm: Normal rate and regular rhythm  No extrasystoles are present  Chest Wall: PMI is not displaced  Pulses: Normal pulses  Carotid pulses are 2+ on the right side and 2+ on the left side  Radial pulses are 2+ on the right side and 2+ on the left side  Dorsalis pedis pulses are 2+ on the right side and 2+ on the left side  Posterior tibial pulses are 2+ on the right side and 2+ on the left side  Heart sounds: Normal heart sounds  Heart sounds not distant  No murmur heard  No diastolic murmur is present  No friction rub  No gallop  Pulmonary:      Effort: Pulmonary effort is normal  No tachypnea, accessory muscle usage or respiratory distress  Breath sounds: Normal breath sounds  No decreased breath sounds, wheezing, rhonchi or rales  Chest:      Chest wall: Tenderness (sternum and right/left upper chest ) present  No mass, deformity or edema  There is no dullness to percussion  Abdominal:      General: Bowel sounds are normal  There is no distension or abdominal bruit  Palpations: Abdomen is soft   There is no fluid wave, hepatomegaly, splenomegaly or mass  Tenderness: There is no abdominal tenderness  There is no guarding or rebound  Genitourinary:     Rectum: Guaiac result negative  Musculoskeletal:         General: Normal range of motion  Cervical back: Normal range of motion and neck supple  No tenderness  Right lower leg: No tenderness  No edema  Left lower leg: No edema  Lymphadenopathy:      Cervical: No cervical adenopathy  Skin:     General: Skin is warm and dry  Capillary Refill: Capillary refill takes less than 2 seconds  Coloration: Skin is not cyanotic  Findings: No bruising, ecchymosis, erythema, lesion or rash  Neurological:      General: No focal deficit present  Mental Status: She is alert and oriented to person, place, and time  Mental status is at baseline  Cranial Nerves: No cranial nerve deficit  Motor: No weakness  Psychiatric:         Mood and Affect: Mood normal  Mood is not anxious  Behavior: Behavior normal  Behavior is not agitated

## 2021-07-15 NOTE — PATIENT INSTRUCTIONS
Continue to take Mobic for pain  If you feel like the mobic is not as effective take motrin instead but do not take mobic and motrin together  Use ice/heat on the chest to see if pain improves  Call cardiology again to let them know that you continue to have pain after negative cardiac work up in the ER

## 2021-07-15 NOTE — ED PROVIDER NOTES
History  Chief Complaint   Patient presents with    Chest Pain     78-year-old female presents to the emergency department seeking evaluation for left-sided reproducible chest pain worse with movement and exertion  She states this has been on off issue for the last several weeks  She does have a history of right bundle branch block  She denies diaphoresis nausea vomiting shortness of breath weakness fatigue syncope  Allergies reviewed          Prior to Admission Medications   Prescriptions Last Dose Informant Patient Reported? Taking? ALPRAZolam (XANAX) 0 5 mg tablet   No Yes   Sig: Take 1 tablet (0 5 mg total) by mouth daily at bedtime as needed for anxiety   B-D UF III MINI PEN NEEDLES 31G X 5 MM MISC   No No   Sig: INJECT UNDER THE SKIN DAILY AT BEDTIME USE ONE A DAY OR AS DIRECTED   Blood Glucose Monitoring Suppl (Accu-Chek Niecy SmartView) w/Device KIT   No No   Sig: Use once for 1 dose   Blood Pressure Monitoring (B-D ASSURE BPM/AUTO WRIST CUFF) MISC   No No   Sig: Check blood pressure prior to each OB visit, or as directed by your physician  VITAMIN D PO   Yes Yes   Sig: Take by mouth daily   acetaminophen (TYLENOL) 500 mg tablet   Yes Yes   Sig: Take 1,000 mg by mouth   buPROPion (WELLBUTRIN XL) 150 mg 24 hr tablet   No Yes   Sig: Take 1 tablet (150 mg total) by mouth daily   clotrimazole (LOTRIMIN) 1 % cream   No Yes   Sig: Apply topically 2 (two) times a day   cyclobenzaprine (FLEXERIL) 5 mg tablet   No Yes   Sig: Take 1 tablet (5 mg total) by mouth 3 (three) times a day as needed for muscle spasms   glucose blood (Accu-Chek Guide) test strip   No No   Sig: Test three times per day   insulin glargine (LANTUS) 100 units/mL subcutaneous injection   No Yes   Sig: Inject 15 Units under the skin daily at bedtime   insulin lispro (HumaLOG KwikPen) 100 units/mL injection pen   No Yes   Sig: Inject SC 10 units before breakfast, before lunch and dinner  To be titrated     labetalol (NORMODYNE) 200 mg tablet  Self Yes Yes   Sig: Take 200 mg by mouth 2 (two) times a day    lisinopril (ZESTRIL) 30 mg tablet   Yes Yes   Sig: Take 30 mg by mouth daily   metFORMIN (GLUCOPHAGE) 1000 MG tablet   No Yes   Sig: Take 1 tablet (1,000 mg total) by mouth 2 (two) times a day with meals      Facility-Administered Medications: None       Past Medical History:   Diagnosis Date    Allergic     Arthritis     Bipolar affective disorder, currently depressed, moderate ([facility] [address] ) 2018    Cyst of ovary, right     Diabetes mellitus ([facility] [address] ) 10/10/18    type 2    Endometriosis     Heart murmur 88    Hepatitis C     Hepatitis C virus infection cured after antiviral drug therapy     History of transfusion     Hypertension     Migraines     Obesity     Pulmonary artery congenital abnormality     Spleen enlarged     Status post surgical removal of both fallopian tubes     Varicella        Past Surgical History:   Procedure Laterality Date    CARDIAC CATHETERIZATION      no CAD 10days, 4 weeks 22months old      SECTION, LOW TRANSVERSE      CHOLECYSTECTOMY      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 7   [facility] CORONARY STENT PLACEMENT      LIVER BIOPSY      LIVER BIOPSY      TUBAL LIGATION Bilateral 2020    VSD REPAIR      As a child       Family History   Problem Relation Age of Onset    Hypertension Mother     Migraines Mother     JENNY disease Mother     Depression Mother     Hyperlipidemia Mother     Diabetes Mother     Diabetes Father     Hypertension Father     Kidney failure Father     Heart attack Father     Arthritis Father     Stroke Father     Polycystic kidney disease Paternal [de-identified]     Stroke Paternal Grandmother     Heart disease Paternal Grandmother     Arthritis Sister     Asthma Sister     Thyroid disease Sister     Diabetes Sister     Arthritis Maternal Grandmother     Breast cancer Maternal Grandmother     Diabetes Maternal Grandmother     Hypertension Maternal Grandmother     Heart Valve Disease Maternal Grandmother     Learning disabilities Cousin     Learning disabilities Sister     ADD / ADHD Cousin     Lung cancer Brother     Diabetes Maternal Grandfather     Hypertension Maternal Grandfather     JENNY disease Maternal Grandfather     Stroke Maternal Grandfather      I have reviewed and agree with the history as documented  E-Cigarette/Vaping    E-Cigarette Use Never User      E-Cigarette/Vaping Substances    Nicotine No     THC No     CBD No     Flavoring No      Social History     Tobacco Use    Smoking status: Current Some Day Smoker     Packs/day: 0 20     Types: Cigarettes    Smokeless tobacco: Never Used   Vaping Use    Vaping Use: Never used   Substance Use Topics    Alcohol use: Not Currently     Alcohol/week: 3 0 standard drinks     Types: 3 Standard drinks or equivalent per week     Comment: socially/prior to knowledge of pregnancy    Drug use: Not Currently     Comment: hx of THC use for migraines       Review of Systems   Constitutional: Negative for chills, fatigue and fever  HENT: Negative for congestion, ear pain, rhinorrhea, sinus pressure, sneezing, sore throat and trouble swallowing  Eyes: Negative for discharge and itching  Respiratory: Negative for cough, chest tightness, shortness of breath, wheezing and stridor  Cardiovascular: Positive for chest pain  Negative for palpitations  Gastrointestinal: Negative for abdominal pain, diarrhea, nausea and vomiting  Neurological: Negative for dizziness, syncope, numbness and headaches  All other systems reviewed and are negative  Physical Exam  Physical Exam  Vitals and nursing note reviewed  Constitutional:       General: She is not in acute distress  Appearance: She is well-developed  She is not diaphoretic  HENT:      Head: Normocephalic and atraumatic        Right Ear: External ear normal       Left Ear: External ear normal       Nose: Nose normal    Eyes:      Conjunctiva/sclera: Conjunctivae normal       Pupils: Pupils are equal, round, and reactive to light  Cardiovascular:      Rate and Rhythm: Normal rate and regular rhythm  Pulses: Normal pulses  Heart sounds: Normal heart sounds  No murmur heard  No friction rub  No gallop  Pulmonary:      Effort: Pulmonary effort is normal  No respiratory distress  Breath sounds: Normal breath sounds  No stridor  No wheezing or rales  Chest:      Chest wall: Tenderness present  Comments: Stabbing chest pains of the left sternal border easily reproducible with deep breathing in AP compression of the chest   No evidence of trauma or injury  Abdominal:      General: Bowel sounds are normal  There is no distension  Palpations: Abdomen is soft  Tenderness: There is no abdominal tenderness  There is no guarding  Musculoskeletal:         General: No tenderness  Normal range of motion  Cervical back: Normal range of motion  Skin:     General: Skin is warm  Capillary Refill: Capillary refill takes less than 2 seconds  Neurological:      Mental Status: She is alert and oriented to person, place, and time           Vital Signs  ED Triage Vitals   Temperature Pulse Respirations Blood Pressure SpO2   07/13/21 1745 07/13/21 1725 07/13/21 1725 07/13/21 1725 07/13/21 1725   97 6 °F (36 4 °C) 84 16 (!) 176/100 97 %      Temp Source Heart Rate Source Patient Position - Orthostatic VS BP Location FiO2 (%)   07/13/21 1745 07/13/21 1725 07/13/21 1829 07/13/21 1829 --   Temporal Monitor Sitting Left arm       Pain Score       07/13/21 1757       8           Vitals:    07/13/21 1725 07/13/21 1745 07/13/21 1829   BP: (!) 176/100 156/86 123/65   Pulse: 84 86 81   Patient Position - Orthostatic VS:   Sitting         Visual Acuity      ED Medications  Medications   aspirin chewable tablet 324 mg (324 mg Oral Given 7/13/21 1742)   ketorolac (TORADOL) injection 30 mg (30 mg Intravenous Given 7/13/21 1757)       Diagnostic Studies  Results Reviewed     Procedure Component Value Units Date/Time    Troponin I [[id number]]  (Normal) Collected: 07/13/21 1734    Lab Status: Final result Specimen: Blood from Arm, Right Updated: 07/13/21 1758     Troponin I <0 03 ng/mL     Basic metabolic panel [[id number]]  (Abnormal) Collected: 07/13/21 1734    Lab Status: Final result Specimen: Blood from Arm, Right Updated: 07/13/21 1755     Sodium 136 mmol/L      Potassium 3 7 mmol/L      Chloride 104 mmol/L      CO2 26 mmol/L      ANION GAP 6 mmol/L      BUN 13 mg/dL      Creatinine 0 56 mg/dL      Glucose 272 mg/dL      Calcium 9 3 mg/dL      eGFR 123 ml/min/1 73sq m     Narrative:      [place] guidelines for Chronic Kidney Disease (CKD):     Stage 1 with normal or high GFR (GFR > 90 mL/min/1 73 square meters)    Stage 2 Mild CKD (GFR = 60-89 mL/min/1 73 square meters)    Stage 3A Moderate CKD (GFR = 45-59 mL/min/1 73 square meters)    Stage 3B Moderate CKD (GFR = 30-44 mL/min/1 73 square meters)    Stage 4 Severe CKD (GFR = 15-29 mL/min/1 73 square meters)    Stage 5 End Stage CKD (GFR <15 mL/min/1 73 square meters)  Note: GFR calculation is accurate only with a steady state creatinine    CBC and differential [[id number]]  (Abnormal) Collected: 07/13/21 1734    Lab Status: Final result Specimen: Blood from Arm, Right Updated: 07/13/21 1739     WBC 7 60 Thousand/uL      RBC 5 29 Million/uL      Hemoglobin 13 0 g/dL      Hematocrit 39 9 %      MCV 75 fL      MCH 24 6 pg      MCHC 32 7 g/dL      RDW 15 4 %      MPV 9 1 fL      Platelets 483 Thousands/uL      Neutrophils Relative 63 %      Lymphocytes Relative 29 %      Monocytes Relative 6 %      Eosinophils Relative 1 %      Basophils Relative 1 %      Neutrophils Absolute 4 80 Thousands/µL      Lymphocytes Absolute 2 20 Thousands/µL      Monocytes Absolute 0 50 Thousand/µL      Eosinophils Absolute 0 10 Thousand/µL Basophils Absolute 0 00 Thousands/µL                  X-ray chest 1 view portable   Final Result by Sebastian Cook MD (07/14 0915)   No acute cardiopulmonary disease  Findings are stable            Workstation performed: [id number]                    Procedures  Procedures         ED Course  ED Course as of Jul 15 1435   Tue Jul 13, 2021   1743 EKG shows right bundle-branch block at a rate of 85  Similar to previous  No acute changes identified  [id number] Patient reporting left-sided stabbing chest pains reproduced with movement deep breathing and AP compression of the chest   She has not trialed any NSAIDs for her pain  She states that her pains have been an on and off issue for several weeks however today it is more persistent than usual   Pain has been ongoing for approximately 5-6 hours  HEART Risk Score      Most Recent Value   Heart Score Risk Calculator   History  0 Filed at: 07/13/2021 1810   ECG  0 Filed at: 07/13/2021 1810   Age  0 Filed at: 07/13/2021 1810   Risk Factors  1 Filed at: 07/13/2021 1810   Troponin  0 Filed at: 07/13/2021 1810   HEART Score  1 Filed at: 07/13/2021 1810                      SBIRT 22yo+      Most Recent Value   SBIRT (23 yo +)   In order to provide better care to our patients, we are screening all of our patients for alcohol and drug use  Would it be okay to ask you these screening questions? Unable to answer at this time Filed at: 07/13/2021 1736                    MDM  Number of Diagnoses or Management Options  Chest pain  Costochondritis  Diagnosis management comments: Patient reports her pain has been present for 5-6 hours  Cardiac workup unremarkable  Baseline right bundle branch block  Suspect costochondritis as the cause for the patient's pain  Improved with NSAIDs  Patient educated regarding their diagnosis and given return and follow-up instructions  Patient was advised to returned to the ED with worsening symptoms or concerns  Patient is understanding of and in agreement with the treatment plan  There are no questions at the time of discharge  Amount and/or Complexity of Data Reviewed  Clinical lab tests: ordered and reviewed  Tests in the radiology section of CPT®: ordered and reviewed    Risk of Complications, Morbidity, and/or Mortality  Presenting problems: moderate  Diagnostic procedures: low  Management options: low    Patient Progress  Patient progress: stable      Disposition  Final diagnoses:   Chest pain   Costochondritis     Time reflects when diagnosis was documented in both MDM as applicable and the Disposition within this note     Time User Action Codes Description Comment    7/13/2021  6:23 PM Alanna Kerr [R07 9] Chest pain     7/13/2021  6:23 PM Alanna Mast Add [M94 0] Costochondritis       ED Disposition     ED Disposition Condition Date/Time Comment    Discharge Stable Tue Jul 13, 2021  6:23 PM Ronny Angelucci discharge to home/self care  Follow-up Information     Follow up With Specialties Details Why Contact Info    Nini Westfall DO Family Medicine   [address] [place] [address]  [phone]            Discharge Medication List as of 7/13/2021  6:24 PM      START taking these medications    Details   meloxicam (MOBIC) 15 mg tablet Take 1 tablet (15 mg total) by mouth daily, Starting Tue 7/13/2021, Normal         CONTINUE these medications which have NOT CHANGED    Details   acetaminophen (TYLENOL) 500 mg tablet Take 1,000 mg by mouth, Starting Wed 5/6/2020, Historical Med      ALPRAZolam (XANAX) 0 5 mg tablet Take 1 tablet (0 5 mg total) by mouth daily at bedtime as needed for anxiety, Starting Thu 4/1/2021, Normal      buPROPion (WELLBUTRIN XL) 150 mg 24 hr tablet Take 1 tablet (150 mg total) by mouth daily, Starting Mon 11/2/2020, Normal      clotrimazole (LOTRIMIN) 1 % cream Apply topically 2 (two) times a day, Starting Thu 3/4/2021, Normal      cyclobenzaprine (FLEXERIL) 5 mg tablet Take 1 tablet (5 mg total) by mouth 3 (three) times a day as needed for muscle spasms, Starting Thu 4/1/2021, Normal      insulin glargine (LANTUS) 100 units/mL subcutaneous injection Inject 15 Units under the skin daily at bedtime, Starting Fri 7/9/2021, Until Sat 11/6/2021, Normal      insulin lispro (HumaLOG KwikPen) 100 units/mL injection pen Inject SC 10 units before breakfast, before lunch and dinner  To be titrated , Normal      labetalol (NORMODYNE) 200 mg tablet Take 200 mg by mouth 2 (two) times a day , Historical Med      lisinopril (ZESTRIL) 30 mg tablet Take 30 mg by mouth daily, Starting Mon 5/25/2020, Historical Med      metFORMIN (GLUCOPHAGE) 1000 MG tablet Take 1 tablet (1,000 mg total) by mouth 2 (two) times a day with meals, Starting Tue 5/25/2021, Normal      VITAMIN D PO Take by mouth daily, Historical Med      B-D UF III MINI PEN NEEDLES 31G X 5 MM MISC INJECT UNDER THE SKIN DAILY AT BEDTIME USE ONE A DAY OR AS DIRECTED, Normal      Blood Glucose Monitoring Suppl (Accu-Chek Niecy SmartView) w/Device KIT Use once for 1 dose, Starting Wed 5/26/2021, Normal      Blood Pressure Monitoring (B-D ASSURE BPM/AUTO WRIST CUFF) MISC Check blood pressure prior to each OB visit, or as directed by your physician , Normal      glucose blood (Accu-Chek Guide) test strip Test three times per day, Normal           No discharge procedures on file      PDMP Review       Value Time User    PDMP Reviewed  Yes 10/2/2020 11:54 AM Jhon Razo MD          ED Provider  Electronically Signed by           Bridger Avila PA-C  07/15/21 1864

## 2021-07-15 NOTE — LETTER
July 15, 2021     Patient: Sneha Dooley   YOB: 1990   Date of Visit: 7/15/2021       To Whom it May Concern:    Sneha Dooley is under my professional care  She was seen in my office on 7/15/2021  She may return to work on 7/17/2021  If you have any questions or concerns, please don't hesitate to call           Sincerely,          REJI Rios        CC: No Recipients

## 2021-07-21 ENCOUNTER — HOSPITAL ENCOUNTER (EMERGENCY)
Facility: HOSPITAL | Age: 31
Discharge: HOME | End: 2021-07-21
Attending: INTERNAL MEDICINE | Admitting: INTERNAL MEDICINE
Payer: COMMERCIAL

## 2021-07-21 ENCOUNTER — APPOINTMENT (EMERGENCY)
Dept: CT IMAGING | Facility: HOSPITAL | Age: 31
End: 2021-07-21
Payer: COMMERCIAL

## 2021-07-21 VITALS
HEART RATE: 97 BPM | WEIGHT: 230 LBS | BODY MASS INDEX: 42.33 KG/M2 | OXYGEN SATURATION: 96 % | HEIGHT: 62 IN | RESPIRATION RATE: 18 BRPM | SYSTOLIC BLOOD PRESSURE: 157 MMHG | DIASTOLIC BLOOD PRESSURE: 76 MMHG | TEMPERATURE: 98.3 F

## 2021-07-21 DIAGNOSIS — R10.9 ABDOMINAL PAIN: Primary | ICD-10-CM

## 2021-07-21 LAB
ALBUMIN SERPL BCP-MCNC: 4 G/DL (ref 3.5–5.7)
ALP SERPL-CCNC: 74 U/L (ref 40–150)
ALT SERPL W P-5'-P-CCNC: 16 U/L (ref 7–52)
ANION GAP SERPL CALCULATED.3IONS-SCNC: 9 MMOL/L (ref 4–13)
AST SERPL W P-5'-P-CCNC: 12 U/L (ref 13–39)
BASOPHILS # BLD AUTO: 0 THOUSANDS/ΜL (ref 0–0.1)
BASOPHILS NFR BLD AUTO: 0 % (ref 0–2)
BILIRUB SERPL-MCNC: 0.4 MG/DL (ref 0.2–1)
BILIRUB UR QL STRIP: NEGATIVE
BUN SERPL-MCNC: 12 MG/DL (ref 7–25)
CALCIUM SERPL-MCNC: 9.2 MG/DL (ref 8.6–10.5)
CHLORIDE SERPL-SCNC: 101 MMOL/L (ref 98–107)
CLARITY UR: CLEAR
CO2 SERPL-SCNC: 26 MMOL/L (ref 21–31)
COLOR UR: YELLOW
CREAT SERPL-MCNC: 0.67 MG/DL (ref 0.6–1.2)
EOSINOPHIL # BLD AUTO: 0.1 THOUSAND/ΜL (ref 0–0.61)
EOSINOPHIL NFR BLD AUTO: 1 % (ref 0–5)
ERYTHROCYTE [DISTWIDTH] IN BLOOD BY AUTOMATED COUNT: 15.5 % (ref 11.5–14.5)
EXT PREG TEST URINE: NEGATIVE
EXT. CONTROL ED NAV: NORMAL
GFR SERPL CREATININE-BSD FRML MDRD: 116 ML/MIN/1.73SQ M
GLUCOSE SERPL-MCNC: 328 MG/DL (ref 65–99)
GLUCOSE UR STRIP-MCNC: ABNORMAL MG/DL
HCT VFR BLD AUTO: 37.7 % (ref 42–47)
HGB BLD-MCNC: 12.3 G/DL (ref 12–16)
HGB UR QL STRIP.AUTO: NEGATIVE
KETONES UR STRIP-MCNC: NEGATIVE MG/DL
LEUKOCYTE ESTERASE UR QL STRIP: NEGATIVE
LIPASE SERPL-CCNC: 22 U/L (ref 11–82)
LYMPHOCYTES # BLD AUTO: 1.7 THOUSANDS/ΜL (ref 0.6–4.47)
LYMPHOCYTES NFR BLD AUTO: 23 % (ref 21–51)
MCH RBC QN AUTO: 24.8 PG (ref 26–34)
MCHC RBC AUTO-ENTMCNC: 32.6 G/DL (ref 31–37)
MCV RBC AUTO: 76 FL (ref 81–99)
MONOCYTES # BLD AUTO: 0.5 THOUSAND/ΜL (ref 0.17–1.22)
MONOCYTES NFR BLD AUTO: 6 % (ref 2–12)
NEUTROPHILS # BLD AUTO: 5.2 THOUSANDS/ΜL (ref 1.4–6.5)
NEUTS SEG NFR BLD AUTO: 69 % (ref 42–75)
NITRITE UR QL STRIP: NEGATIVE
PH UR STRIP.AUTO: 5.5 [PH]
PLATELET # BLD AUTO: 215 THOUSANDS/UL (ref 149–390)
PMV BLD AUTO: 9 FL (ref 8.6–11.7)
POTASSIUM SERPL-SCNC: 3.6 MMOL/L (ref 3.5–5.5)
PROT SERPL-MCNC: 6.9 G/DL (ref 6.4–8.9)
PROT UR STRIP-MCNC: NEGATIVE MG/DL
RBC # BLD AUTO: 4.97 MILLION/UL (ref 3.9–5.2)
SODIUM SERPL-SCNC: 136 MMOL/L (ref 134–143)
SP GR UR STRIP.AUTO: 1.02 (ref 1–1.03)
UROBILINOGEN UR QL STRIP.AUTO: 0.2 E.U./DL
WBC # BLD AUTO: 7.6 THOUSAND/UL (ref 4.8–10.8)

## 2021-07-21 PROCEDURE — 83690 ASSAY OF LIPASE: CPT | Performed by: INTERNAL MEDICINE

## 2021-07-21 PROCEDURE — 74177 CT ABD & PELVIS W/CONTRAST: CPT

## 2021-07-21 PROCEDURE — 81003 URINALYSIS AUTO W/O SCOPE: CPT | Performed by: INTERNAL MEDICINE

## 2021-07-21 PROCEDURE — 80053 COMPREHEN METABOLIC PANEL: CPT | Performed by: INTERNAL MEDICINE

## 2021-07-21 PROCEDURE — 85025 COMPLETE CBC W/AUTO DIFF WBC: CPT | Performed by: INTERNAL MEDICINE

## 2021-07-21 PROCEDURE — 99284 EMERGENCY DEPT VISIT MOD MDM: CPT

## 2021-07-21 PROCEDURE — 81025 URINE PREGNANCY TEST: CPT | Performed by: INTERNAL MEDICINE

## 2021-07-21 PROCEDURE — 99284 EMERGENCY DEPT VISIT MOD MDM: CPT | Performed by: INTERNAL MEDICINE

## 2021-07-21 PROCEDURE — G1004 CDSM NDSC: HCPCS

## 2021-07-21 PROCEDURE — 96374 THER/PROPH/DIAG INJ IV PUSH: CPT

## 2021-07-21 PROCEDURE — 96361 HYDRATE IV INFUSION ADD-ON: CPT

## 2021-07-21 PROCEDURE — 36415 COLL VENOUS BLD VENIPUNCTURE: CPT | Performed by: INTERNAL MEDICINE

## 2021-07-21 RX ORDER — ONDANSETRON 4 MG/1
4 TABLET, FILM COATED ORAL EVERY 6 HOURS
Qty: 12 TABLET | Refills: 0 | Status: SHIPPED | OUTPATIENT
Start: 2021-07-21 | End: 2022-03-01 | Stop reason: SDUPTHER

## 2021-07-21 RX ORDER — DICYCLOMINE HCL 20 MG
20 TABLET ORAL 3 TIMES DAILY PRN
Qty: 20 TABLET | Refills: 0 | Status: SHIPPED | OUTPATIENT
Start: 2021-07-21 | End: 2022-02-03 | Stop reason: ALTCHOICE

## 2021-07-21 RX ORDER — KETOROLAC TROMETHAMINE 30 MG/ML
30 INJECTION, SOLUTION INTRAMUSCULAR; INTRAVENOUS ONCE
Status: COMPLETED | OUTPATIENT
Start: 2021-07-21 | End: 2021-07-21

## 2021-07-21 RX ORDER — ONDANSETRON 2 MG/ML
4 INJECTION INTRAMUSCULAR; INTRAVENOUS ONCE
Status: COMPLETED | OUTPATIENT
Start: 2021-07-21 | End: 2021-07-21

## 2021-07-21 RX ADMIN — IOHEXOL 100 ML: 350 INJECTION, SOLUTION INTRAVENOUS at 18:13

## 2021-07-21 RX ADMIN — ONDANSETRON 4 MG: 2 INJECTION INTRAMUSCULAR; INTRAVENOUS at 17:20

## 2021-07-21 RX ADMIN — KETOROLAC TROMETHAMINE 30 MG: 30 INJECTION, SOLUTION INTRAMUSCULAR; INTRAVENOUS at 17:21

## 2021-07-21 RX ADMIN — SODIUM CHLORIDE 1000 ML: 0.9 INJECTION, SOLUTION INTRAVENOUS at 17:18

## 2021-07-21 NOTE — DISCHARGE INSTRUCTIONS
Return if her symptoms worsen  Zofran as needed for nausea  Bentyl as needed for abdominal cramping    Follow-up with your primary care doctor if symptoms persist  Return if symptoms worsen or persist

## 2021-07-21 NOTE — ED PROVIDER NOTES
History  Chief Complaint   Patient presents with    Abdominal Pain     According to the patient she has had abdominal pain, headache and nausea starting about 2 hrs ago   Headache     52-year-old female presents with abdominal pain  Rather sudden onset today  She is an insulin-requiring diabetic  Also has a history of hepatitis-C  In the remote past she had a coarctation of the aorta which had surgical correction, also has known VSD  She denies any vomiting or diarrhea, has some nausea  Describes the pain as colicky in nature coming and going  Cramp like  She has had her gallbladder removed  She still has her appendix  Both fallopian tubes were removed  She does not think she has fever  She has a headache associated with this which is frontal, sometimes in the back her neck as well  No shaking chills  Has often had neck issues in the past   Also has had ovarian cyst rupture  Has endometriosis by history as well  Prior to Admission Medications   Prescriptions Last Dose Informant Patient Reported? Taking? ALPRAZolam (XANAX) 0 5 mg tablet   No No   Sig: Take 1 tablet (0 5 mg total) by mouth daily at bedtime as needed for anxiety   B-D UF III MINI PEN NEEDLES 31G X 5 MM MISC   No No   Sig: INJECT UNDER THE SKIN DAILY AT BEDTIME USE ONE A DAY OR AS DIRECTED   Blood Glucose Monitoring Suppl (Accu-Chek Niecy SmartView) w/Device KIT   No No   Sig: Use once for 1 dose   Blood Pressure Monitoring (B-D ASSURE BPM/AUTO WRIST CUFF) MISC   No No   Sig: Check blood pressure prior to each OB visit, or as directed by your physician     VITAMIN D PO   Yes No   Sig: Take by mouth daily   acetaminophen (TYLENOL) 500 mg tablet   Yes No   Sig: Take 1,000 mg by mouth   buPROPion (WELLBUTRIN XL) 150 mg 24 hr tablet   No No   Sig: Take 1 tablet (150 mg total) by mouth daily   clotrimazole (LOTRIMIN) 1 % cream   No No   Sig: Apply topically 2 (two) times a day   cyclobenzaprine (FLEXERIL) 5 mg tablet   No No Sig: Take 1 tablet (5 mg total) by mouth 3 (three) times a day as needed for muscle spasms   glucose blood (Accu-Chek Guide) test strip   No No   Sig: Test three times per day   insulin glargine (LANTUS) 100 units/mL subcutaneous injection   No No   Sig: Inject 15 Units under the skin daily at bedtime   insulin lispro (HumaLOG KwikPen) 100 units/mL injection pen   No No   Sig: Inject SC 10 units before breakfast, before lunch and dinner  To be titrated     labetalol (NORMODYNE) 200 mg tablet  Self Yes No   Sig: Take 200 mg by mouth 2 (two) times a day    lisinopril (ZESTRIL) 30 mg tablet   Yes No   Sig: Take 30 mg by mouth daily   meloxicam (MOBIC) 15 mg tablet   No No   Sig: Take 1 tablet (15 mg total) by mouth daily   metFORMIN (GLUCOPHAGE) 1000 MG tablet   No No   Sig: Take 1 tablet (1,000 mg total) by mouth 2 (two) times a day with meals      Facility-Administered Medications: None       Past Medical History:   Diagnosis Date    Allergic     Arthritis     Bipolar affective disorder, currently depressed, moderate ([facility] [address] ) 2018    Cyst of ovary, right     Diabetes mellitus ([facility] [address] ) 10/10/18    type 2    Endometriosis     Heart murmur 88    Hepatitis C     Hepatitis C virus infection cured after antiviral drug therapy     History of transfusion     Hypertension     Migraines     Obesity     Pulmonary artery congenital abnormality     Spleen enlarged     Status post surgical removal of both fallopian tubes     Varicella        Past Surgical History:   Procedure Laterality Date    CARDIAC CATHETERIZATION      no CAD 10days, 4 weeks 22months old      SECTION, LOW TRANSVERSE      CHOLECYSTECTOMY      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 8    CORONARY STENT PLACEMENT      LIVER BIOPSY      LIVER BIOPSY      TUBAL LIGATION Bilateral 2020    VSD REPAIR      As a child       Family History   Problem Relation Age of Onset    Hypertension Mother    Vena Chris Migraines Mother    Vena Chris JENNY disease Mother     Depression Mother     Hyperlipidemia Mother     Diabetes Mother     Diabetes Father     Hypertension Father     Kidney failure Father     Heart attack Father     Arthritis Father     Stroke Father     Polycystic kidney disease Paternal Grandmother     Stroke Paternal Grandmother     Heart disease Paternal Grandmother     Arthritis Sister     Asthma Sister     Thyroid disease Sister     Diabetes Sister     Arthritis Maternal Grandmother     Breast cancer Maternal Grandmother     Diabetes Maternal Grandmother     Hypertension Maternal Grandmother     Heart Valve Disease Maternal Grandmother     Learning disabilities Cousin     Learning disabilities Sister     ADD / ADHD Cousin     Lung cancer Brother     Diabetes Maternal Grandfather     Hypertension Maternal Grandfather     JENNY disease Maternal Grandfather     Stroke Maternal Grandfather      I have reviewed and agree with the history as documented  E-Cigarette/Vaping    E-Cigarette Use Never User      E-Cigarette/Vaping Substances    Nicotine No     THC No     CBD No     Flavoring No      Social History     Tobacco Use    Smoking status: Current Some Day Smoker     Packs/day: 0 20     Types: Cigarettes    Smokeless tobacco: Never Used   Vaping Use    Vaping Use: Never used   Substance Use Topics    Alcohol use: Not Currently     Alcohol/week: 3 0 standard drinks     Types: 3 Standard drinks or equivalent per week     Comment: socially/prior to knowledge of pregnancy    Drug use: Not Currently     Comment: hx of THC use for migraines       Review of Systems   Constitutional: Negative for chills and fever  HENT: Negative for rhinorrhea and sore throat  Eyes: Negative for visual disturbance  Respiratory: Negative for cough and shortness of breath  Cardiovascular: Negative for chest pain and leg swelling  Gastrointestinal: Positive for abdominal pain and nausea   Negative for diarrhea and vomiting  Genitourinary: Negative for dysuria  Musculoskeletal: Negative for back pain and myalgias  Skin: Positive for pallor  Negative for rash  Neurological: Positive for headaches  Negative for dizziness  Psychiatric/Behavioral: Negative for confusion  All other systems reviewed and are negative  Physical Exam  Physical Exam  Vitals and nursing note reviewed  Constitutional:       General: She is not in acute distress  Appearance: She is well-developed  She is obese  She is not ill-appearing  HENT:      Nose: Nose normal       Mouth/Throat:      Pharynx: No oropharyngeal exudate  Eyes:      General: No scleral icterus  Conjunctiva/sclera: Conjunctivae normal       Pupils: Pupils are equal, round, and reactive to light  Neck:      Vascular: No JVD  Trachea: No tracheal deviation  Cardiovascular:      Rate and Rhythm: Normal rate and regular rhythm  Heart sounds: Murmur heard  Pulmonary:      Effort: Pulmonary effort is normal  No respiratory distress  Breath sounds: Normal breath sounds  No wheezing or rales  Abdominal:      General: Bowel sounds are normal       Palpations: Abdomen is soft  Tenderness: There is generalized abdominal tenderness  There is no guarding or rebound  Negative signs include Graf's sign  Comments: Abdomen is soft, but diffusely tender upon palpation  No rebound is noted and no real guarding  Musculoskeletal:         General: No tenderness  Normal range of motion  Cervical back: Normal range of motion and neck supple  Skin:     General: Skin is warm and dry  Neurological:      Mental Status: She is alert and oriented to person, place, and time  Cranial Nerves: No cranial nerve deficit  Sensory: No sensory deficit  Motor: No abnormal muscle tone        Comments: 5/5 motor, nl sens   Psychiatric:         Behavior: Behavior normal          Vital Signs  ED Triage Vitals [07/21/21 1624]   Temperature Pulse Respirations Blood Pressure SpO2   98 3 °F (36 8 °C) 97 18 157/76 96 %      Temp Source Heart Rate Source Patient Position - Orthostatic VS BP Location FiO2 (%)   Temporal Monitor Lying Right arm --      Pain Score       8           Vitals:    07/21/21 1624   BP: 157/76   Pulse: 97   Patient Position - Orthostatic VS: Lying         Visual Acuity      ED Medications  Medications   sodium chloride 0 9 % bolus 1,000 mL (0 mL Intravenous Stopped 7/21/21 1818)   ondansetron (ZOFRAN) injection 4 mg (4 mg Intravenous Given 7/21/21 1720)   ketorolac (TORADOL) injection 30 mg (30 mg Intravenous Given 7/21/21 1721)   iohexol (OMNIPAQUE) 350 MG/ML injection (SINGLE-DOSE) 100 mL (100 mL Intravenous Given 7/21/21 1813)       Diagnostic Studies  Results Reviewed     Procedure Component Value Units Date/Time    CMP [[id number]]  (Abnormal) Collected: 07/21/21 1713    Lab Status: Final result Specimen: Blood from Arm, Right Updated: 07/21/21 1737     Sodium 136 mmol/L      Potassium 3 6 mmol/L      Chloride 101 mmol/L      CO2 26 mmol/L      ANION GAP 9 mmol/L      BUN 12 mg/dL      Creatinine 0 67 mg/dL      Glucose 328 mg/dL      Calcium 9 2 mg/dL      AST 12 U/L      ALT 16 U/L      Alkaline Phosphatase 74 U/L      Total Protein 6 9 g/dL      Albumin 4 0 g/dL      Total Bilirubin 0 40 mg/dL      eGFR 116 ml/min/1 73sq m     Narrative:      [place] guidelines for Chronic Kidney Disease (CKD):     Stage 1 with normal or high GFR (GFR > 90 mL/min/1 73 square meters)    Stage 2 Mild CKD (GFR = 60-89 mL/min/1 73 square meters)    Stage 3A Moderate CKD (GFR = 45-59 mL/min/1 73 square meters)    Stage 3B Moderate CKD (GFR = 30-44 mL/min/1 73 square meters)    Stage 4 Severe CKD (GFR = 15-29 mL/min/1 73 square meters)    Stage 5 End Stage CKD (GFR <15 mL/min/1 73 square meters)  Note: GFR calculation is accurate only with a steady state creatinine    Lipase [[id number]]  (Normal) Collected: 07/21/21 1713    Lab Status: Final result Specimen: Blood from Arm, Right Updated: 07/21/21 1736     Lipase 22 u/L     UA w Reflex to Microscopic w Reflex to Culture [[id number]]  (Abnormal) Collected: 07/21/21 1718    Lab Status: Final result Specimen: Urine, Clean Catch Updated: 07/21/21 1734     Color, UA Yellow     Clarity, UA Clear     Specific [place], UA 1 020     pH, UA 5 5     Leukocytes, UA Negative     Nitrite, UA Negative     Protein, UA Negative mg/dl      Glucose, UA 3+ mg/dl      Ketones, UA Negative mg/dl      Urobilinogen, UA 0 2 E U /dl      Bilirubin, UA Negative     Blood, UA Negative    POCT pregnancy, urine [[id number]]  (Normal) Resulted: 07/21/21 1721    Lab Status: Final result Specimen: Urine Updated: 07/21/21 1721     EXT PREG TEST UR (Ref: Negative) negative     Control valid    CBC and differential [[id number]]  (Abnormal) Collected: 07/21/21 1713    Lab Status: Final result Specimen: Blood from Arm, Right Updated: 07/21/21 1720     WBC 7 60 Thousand/uL      RBC 4 97 Million/uL      Hemoglobin 12 3 g/dL      Hematocrit 37 7 %      MCV 76 fL      MCH 24 8 pg      MCHC 32 6 g/dL      RDW 15 5 %      MPV 9 0 fL      Platelets 988 Thousands/uL      Neutrophils Relative 69 %      Lymphocytes Relative 23 %      Monocytes Relative 6 %      Eosinophils Relative 1 %      Basophils Relative 0 %      Neutrophils Absolute 5 20 Thousands/µL      Lymphocytes Absolute 1 70 Thousands/µL      Monocytes Absolute 0 50 Thousand/µL      Eosinophils Absolute 0 10 Thousand/µL      Basophils Absolute 0 00 Thousands/µL                  CT abdomen pelvis with contrast   Final Result by Jennifer Eastman MD (07/21 1845)      No acute inflammatory process identified in the abdomen or pelvis  Stable splenomegaly  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        Stable calcified lesion in the right ovary measuring 3 1 cm possibly a dermoid or calcified hemorrhagic cyst   Continued follow-up by annual ultrasound is recommended  Workstation performed: [id number]                    Procedures  Procedures         ED Course  ED Course as of Jul 21 2147 Wed Jul 21, 2021 1919 Patient's pain is much better  CT scan unremarkable expect the enlarged spleen as well as the dermoid cyst   She will follow-up with her primary care doctor in gyn with regards to management of these  She is also aware of the adrenal gland slightly enlarged  She is aware of her sugar and will follow-up with her primary care doctor                                              MDM    Disposition  Final diagnoses:   Abdominal pain     Time reflects when diagnosis was documented in both MDM as applicable and the Disposition within this note     Time User Action Codes Description Comment    7/21/2021  7:20 PM Db Eckert Add [R10 9] Abdominal pain       ED Disposition     ED Disposition Condition Date/Time Comment    Discharge Stable Wed Jul 21, 2021  7:20 PM Bartolo Soliman discharge to home/self care  Follow-up Information     Follow up With Specialties Details Why Contact Info    Jacqueline Hagen DO Family Medicine   [address] [place] [address]  [phone]            Discharge Medication List as of 7/21/2021  7:22 PM      START taking these medications    Details   dicyclomine (BENTYL) 20 mg tablet Take 1 tablet (20 mg total) by mouth 3 (three) times a day as needed (abdominal pain), Starting Wed 7/21/2021, Normal      ondansetron (ZOFRAN) 4 mg tablet Take 1 tablet (4 mg total) by mouth every 6 (six) hours, Starting Wed 7/21/2021, Normal         CONTINUE these medications which have NOT CHANGED    Details   acetaminophen (TYLENOL) 500 mg tablet Take 1,000 mg by mouth, Starting Wed 5/6/2020, Historical Med      ALPRAZolam (XANAX) 0 5 mg tablet Take 1 tablet (0 5 mg total) by mouth daily at bedtime as needed for anxiety, Starting u 4/1/2021, Normal      B-D UF III MINI PEN NEEDLES 31G X 5 MM MISC INJECT UNDER THE SKIN DAILY AT BEDTIME USE ONE A DAY OR AS DIRECTED, Normal      Blood Glucose Monitoring Suppl (Accu-Chek Niecy SmartView) w/Device KIT Use once for 1 dose, Starting Wed 5/26/2021, Normal      Blood Pressure Monitoring (B-D ASSURE BPM/AUTO WRIST CUFF) MISC Check blood pressure prior to each OB visit, or as directed by your physician , Normal      buPROPion (WELLBUTRIN XL) 150 mg 24 hr tablet Take 1 tablet (150 mg total) by mouth daily, Starting Mon 11/2/2020, Normal      clotrimazole (LOTRIMIN) 1 % cream Apply topically 2 (two) times a day, Starting Thu 3/4/2021, Normal      cyclobenzaprine (FLEXERIL) 5 mg tablet Take 1 tablet (5 mg total) by mouth 3 (three) times a day as needed for muscle spasms, Starting Thu 4/1/2021, Normal      glucose blood (Accu-Chek Guide) test strip Test three times per day, Normal      insulin glargine (LANTUS) 100 units/mL subcutaneous injection Inject 15 Units under the skin daily at bedtime, Starting Fri 7/9/2021, Until Sat 11/6/2021, Normal      insulin lispro (HumaLOG KwikPen) 100 units/mL injection pen Inject SC 10 units before breakfast, before lunch and dinner  To be titrated , Normal      labetalol (NORMODYNE) 200 mg tablet Take 200 mg by mouth 2 (two) times a day , Historical Med      lisinopril (ZESTRIL) 30 mg tablet Take 30 mg by mouth daily, Starting Mon 5/25/2020, Historical Med      meloxicam (MOBIC) 15 mg tablet Take 1 tablet (15 mg total) by mouth daily, Starting Tue 7/13/2021, Normal      metFORMIN (GLUCOPHAGE) 1000 MG tablet Take 1 tablet (1,000 mg total) by mouth 2 (two) times a day with meals, Starting Tue 5/25/2021, Normal      VITAMIN D PO Take by mouth daily, Historical Med           No discharge procedures on file      PDMP Review       Value Time User    PDMP Reviewed  Yes 10/2/2020 11:54 AM Verena Vazquez MD          ED Provider  Electronically Signed by           Hortencia Stallings DO  07/21/21 8885

## 2021-07-21 NOTE — Clinical Note
Abida Almonte was seen and treated in our emergency department on 7/21/2021  Diagnosis:     [facility] to work on return date  She may return on this date: 07/22/2021         If you have any questions or concerns, please don't hesitate to call        Dharmesh Jo DO    ______________________________           _______________          _______________  Hospital Representative                              Date                                Time

## 2021-07-29 ENCOUNTER — OFFICE VISIT (OUTPATIENT)
Dept: FAMILY MEDICINE CLINIC | Facility: CLINIC | Age: 31
End: 2021-07-29
Payer: COMMERCIAL

## 2021-07-29 VITALS
HEART RATE: 93 BPM | TEMPERATURE: 97.7 F | DIASTOLIC BLOOD PRESSURE: 70 MMHG | OXYGEN SATURATION: 98 % | SYSTOLIC BLOOD PRESSURE: 100 MMHG | BODY MASS INDEX: 41.22 KG/M2 | WEIGHT: 224 LBS | HEIGHT: 62 IN

## 2021-07-29 DIAGNOSIS — R07.9 CHEST PAIN: ICD-10-CM

## 2021-07-29 DIAGNOSIS — M94.0 COSTOCHONDRITIS: ICD-10-CM

## 2021-07-29 PROCEDURE — 99213 OFFICE O/P EST LOW 20 MIN: CPT | Performed by: NURSE PRACTITIONER

## 2021-07-29 PROCEDURE — 3725F SCREEN DEPRESSION PERFORMED: CPT | Performed by: NURSE PRACTITIONER

## 2021-07-29 PROCEDURE — 3008F BODY MASS INDEX DOCD: CPT | Performed by: NURSE PRACTITIONER

## 2021-07-29 RX ORDER — MELOXICAM 15 MG/1
15 TABLET ORAL DAILY
Qty: 14 TABLET | Refills: 0 | Status: SHIPPED | OUTPATIENT
Start: 2021-07-29 | End: 2021-11-30 | Stop reason: ALTCHOICE

## 2021-07-29 NOTE — PATIENT INSTRUCTIONS
Bring a log of your blood sugars to your next visit  Take mobic for pain  Use ice or heat as needed

## 2021-07-29 NOTE — PROGRESS NOTES
Assessment/Plan:     Diagnoses and all orders for this visit:    Chest pain  -     meloxicam (MOBIC) 15 mg tablet; Take 1 tablet (15 mg total) by mouth daily    Costochondritis  -     meloxicam (MOBIC) 15 mg tablet; Take 1 tablet (15 mg total) by mouth daily          Subjective:      Patient ID: Ronny Angelucci is a 33 y o  female  Patient presents for follow up on her chest pain  She states that her chest pain is more intermittent  More about every 3-4 days, lasts a couple of minutes  Still taking the Mobic and it is helping her with her pain August third has echo  She has called her cardiologist and they are aware that her echo/stress is scheduled for August 3rd  Will f/u with her on results  Has not noticed any specific pattern to her pain  She states it continues to be substernal pressure  Chest Pain   This is a recurrent problem  The current episode started 1 to 4 weeks ago  The onset quality is sudden  The problem occurs every several days  The problem has been gradually improving  The pain is present in the substernal region  The pain is at a severity of 4/10  The quality of the pain is described as pressure  The pain does not radiate  Pertinent negatives include no abdominal pain, back pain, claudication, cough, diaphoresis, dizziness, exertional chest pressure, fever, headaches, hemoptysis, irregular heartbeat, leg pain, lower extremity edema, malaise/fatigue, nausea, near-syncope, numbness, orthopnea, palpitations, PND, shortness of breath, sputum production, syncope, vomiting or weakness  The pain is aggravated by nothing  She has tried nothing for the symptoms  Risk factors include stress, sedentary lifestyle, smoking/tobacco exposure and obesity  Her past medical history is significant for congenital heart disease  Pertinent negatives for past medical history include no cancer, no connective tissue disease and no COPD  Prior diagnostic workup includes stress echo (scheduled on 8/3/21)  The following portions of the patient's history were reviewed and updated as appropriate: allergies, current medications, past family history, past medical history, past social history, past surgical history and problem list     Review of Systems   Constitutional: Negative for appetite change, diaphoresis, fatigue, fever, malaise/fatigue and unexpected weight change  HENT: Negative for congestion, rhinorrhea, sneezing and sore throat  Eyes: Negative for visual disturbance  Respiratory: Negative for cough, hemoptysis, sputum production, chest tightness, shortness of breath and wheezing  Cardiovascular: Positive for chest pain  Negative for palpitations, orthopnea, claudication, leg swelling, syncope, PND and near-syncope  Gastrointestinal: Negative for abdominal pain, blood in stool, constipation, diarrhea, nausea and vomiting  Genitourinary: Negative for difficulty urinating, dysuria and urgency  Musculoskeletal: Negative for arthralgias, back pain and joint swelling  Skin: Negative for color change and rash  Neurological: Negative for dizziness, weakness, numbness and headaches  Hematological: Negative for adenopathy  Does not bruise/bleed easily  Objective:      /70   Pulse 93   Temp 97 7 °F (36 5 °C)   Ht 5' 2" (1 575 m)   Wt 102 kg (224 lb)   LMP 07/02/2021   SpO2 98%   BMI 40 97 kg/m²          Physical Exam  Constitutional:       General: She is not in acute distress  Appearance: Normal appearance  She is not ill-appearing  HENT:      Head: Normocephalic and atraumatic  Cardiovascular:      Rate and Rhythm: Normal rate and regular rhythm  Pulses: Normal pulses  Heart sounds: Normal heart sounds  No murmur heard  No friction rub  Pulmonary:      Effort: Pulmonary effort is normal  No respiratory distress  Breath sounds: Normal breath sounds  No wheezing  Chest:      Chest wall: No tenderness         Abdominal:      General: Abdomen is flat  Bowel sounds are normal       Palpations: Abdomen is soft  There is no mass  Tenderness: There is no abdominal tenderness  There is no guarding or rebound  Musculoskeletal:         General: No swelling, tenderness or deformity  Normal range of motion  Cervical back: Normal range of motion and neck supple  No rigidity or tenderness  Right lower leg: No edema  Left lower leg: No edema  Lymphadenopathy:      Cervical: No cervical adenopathy  Skin:     General: Skin is warm and dry  Capillary Refill: Capillary refill takes less than 2 seconds  Coloration: Skin is not pale  Findings: No bruising, erythema or lesion  Neurological:      General: No focal deficit present  Mental Status: She is alert and oriented to person, place, and time  Mental status is at baseline  Sensory: No sensory deficit  Motor: No weakness  Gait: Gait normal    Psychiatric:         Mood and Affect: Mood normal          Behavior: Behavior normal          Thought Content:  Thought content normal          Judgment: Judgment normal

## 2021-08-03 ENCOUNTER — HOSPITAL ENCOUNTER (OUTPATIENT)
Dept: NON INVASIVE DIAGNOSTICS | Facility: CLINIC | Age: 31
Discharge: HOME | End: 2021-08-03
Payer: COMMERCIAL

## 2021-08-03 DIAGNOSIS — R07.9 CHEST PAIN, UNSPECIFIED TYPE: ICD-10-CM

## 2021-08-03 DIAGNOSIS — Q25.1 COARCTATION OF AORTA: ICD-10-CM

## 2021-08-03 PROCEDURE — 93350 STRESS TTE ONLY: CPT

## 2021-08-03 PROCEDURE — 93351 STRESS TTE COMPLETE: CPT | Performed by: INTERNAL MEDICINE

## 2021-08-03 RX ADMIN — PERFLUTREN 1.2 ML/MIN: 6.52 INJECTION, SUSPENSION INTRAVENOUS at 16:58

## 2021-08-04 LAB
ARRHY DURING EX: NORMAL
CHEST PAIN STATEMENT: NORMAL
ECG INTERP BEFORE EX: NORMAL
MAX DIASTOLIC BP: 94 MMHG
MAX HEART RATE: 171 BPM
MAX PREDICTED HEART RATE: 187 BPM
MAX. SYSTOLIC BP: 186 MMHG
PROTOCOL NAME: NORMAL
TARGET HR FORMULA: NORMAL
TEST INDICATION: NORMAL
TIME IN EXERCISE PHASE: NORMAL

## 2021-08-16 ENCOUNTER — OFFICE VISIT (OUTPATIENT)
Dept: URGENT CARE | Facility: CLINIC | Age: 31
End: 2021-08-16
Payer: COMMERCIAL

## 2021-08-16 ENCOUNTER — APPOINTMENT (OUTPATIENT)
Dept: RADIOLOGY | Facility: CLINIC | Age: 31
End: 2021-08-16
Payer: COMMERCIAL

## 2021-08-16 VITALS
WEIGHT: 226 LBS | OXYGEN SATURATION: 97 % | HEIGHT: 61 IN | TEMPERATURE: 97.7 F | HEART RATE: 84 BPM | BODY MASS INDEX: 42.67 KG/M2 | RESPIRATION RATE: 18 BRPM | SYSTOLIC BLOOD PRESSURE: 124 MMHG | DIASTOLIC BLOOD PRESSURE: 76 MMHG

## 2021-08-16 DIAGNOSIS — Z11.59 SCREENING FOR VIRAL DISEASE: ICD-10-CM

## 2021-08-16 DIAGNOSIS — Z87.01 HISTORY OF PNEUMONIA: ICD-10-CM

## 2021-08-16 DIAGNOSIS — J45.21 ASTHMATIC BRONCHITIS, MILD INTERMITTENT, WITH ACUTE EXACERBATION: ICD-10-CM

## 2021-08-16 DIAGNOSIS — J45.21 ASTHMATIC BRONCHITIS, MILD INTERMITTENT, WITH ACUTE EXACERBATION: Primary | ICD-10-CM

## 2021-08-16 PROCEDURE — 99213 OFFICE O/P EST LOW 20 MIN: CPT | Performed by: NURSE PRACTITIONER

## 2021-08-16 PROCEDURE — G0382 LEV 3 HOSP TYPE B ED VISIT: HCPCS | Performed by: NURSE PRACTITIONER

## 2021-08-16 PROCEDURE — U0005 INFEC AGEN DETEC AMPLI PROBE: HCPCS | Performed by: NURSE PRACTITIONER

## 2021-08-16 PROCEDURE — U0003 INFECTIOUS AGENT DETECTION BY NUCLEIC ACID (DNA OR RNA); SEVERE ACUTE RESPIRATORY SYNDROME CORONAVIRUS 2 (SARS-COV-2) (CORONAVIRUS DISEASE [COVID-19]), AMPLIFIED PROBE TECHNIQUE, MAKING USE OF HIGH THROUGHPUT TECHNOLOGIES AS DESCRIBED BY CMS-2020-01-R: HCPCS | Performed by: NURSE PRACTITIONER

## 2021-08-16 PROCEDURE — 71046 X-RAY EXAM CHEST 2 VIEWS: CPT

## 2021-08-16 RX ORDER — AZITHROMYCIN 250 MG/1
TABLET, FILM COATED ORAL
Qty: 6 TABLET | Refills: 0 | Status: SHIPPED | OUTPATIENT
Start: 2021-08-16 | End: 2021-08-20

## 2021-08-16 RX ORDER — ALBUTEROL SULFATE 90 UG/1
2 AEROSOL, METERED RESPIRATORY (INHALATION) EVERY 4 HOURS PRN
Qty: 18 G | Refills: 2 | Status: SHIPPED | OUTPATIENT
Start: 2021-08-16 | End: 2021-10-18 | Stop reason: SDUPTHER

## 2021-08-16 RX ORDER — BENZONATATE 100 MG/1
CAPSULE ORAL
Qty: 30 CAPSULE | Refills: 0 | Status: SHIPPED | OUTPATIENT
Start: 2021-08-16 | End: 2021-12-08

## 2021-08-16 NOTE — PATIENT INSTRUCTIONS
No pneumonia seen on x-ray  Radiology does the final read within 24 hours; if they would see anything acute and significant, we will call you  Treat this like it is covid-19 until results are received  Eat well, drink water, rest when you need it, take a multivitamin, vitamin C 1,000 mg BID, vitamin D 2,000 IU daily  Use over the counter medications to treat symptoms  Take the azithromycin (antibiotic) as ordered until completed  Use the albuterol inhaler every 4-6 hours as needed for shortness of breath, chest tightness, wheezing or persistent cough  Use the Tessalon Perles up to 3x/day as needed for cough  Eat yogurt or take a probiotic to restore good bacteria to your gut; this helps prevent stomach irritation/diarrhea while on an antibiotic  Acute Bronchitis   AMBULATORY CARE:   Acute bronchitis  is swelling and irritation in your lungs  It is usually caused by a virus and most often happens in the winter  Bronchitis may also be caused by bacteria or by a chemical irritant, such as smoke  Common symptoms include the following:   · Cough that lasts up to 3 weeks, stuffy nose    · Hoarseness, sore throat    · A fever and chills    · Feeling more tired than usual, and body aches    · Wheezing or pain when you breathe or cough    Seek care immediately if:   · You cough up blood  · Your lips or fingernails turn blue  · You feel like you are not getting enough air when you breathe  Call your doctor if:   · Your symptoms do not go away or get worse, even after treatment  · Your cough does not get better within 4 weeks  · You have questions or concerns about your condition or care  Medicines: You may  need any of the following:  · Cough suppressants  decrease your urge to cough  · Decongestants  help loosen mucus in your lungs and make it easier to cough up  This can help you breathe easier  · Inhalers  may be given   Your healthcare provider may give you one or more inhalers to help you breathe easier and cough less  An inhaler gives your medicine to open your airways  Ask your healthcare provider to show you how to use your inhaler correctly  · Acetaminophen  decreases pain and fever  It is available without a doctor's order  Ask how much to take and how often to take it  Follow directions  Read the labels of all other medicines you are using to see if they also contain acetaminophen, or ask your doctor or pharmacist  Acetaminophen can cause liver damage if not taken correctly  Do not use more than 4 grams (4,000 milligrams) total of acetaminophen in one day  · NSAIDs  help decrease swelling and pain or fever  This medicine is available with or without a doctor's order  NSAIDs can cause stomach bleeding or kidney problems in certain people  If you take blood thinner medicine, always ask your healthcare provider if NSAIDs are safe for you  Always read the medicine label and follow directions  · Take your medicine as directed  Contact your healthcare provider if you think your medicine is not helping or if you have side effects  Tell him of her if you are allergic to any medicine  Keep a list of the medicines, vitamins, and herbs you take  Include the amounts, and when and why you take them  Bring the list or the pill bottles to follow-up visits  Carry your medicine list with you in case of an emergency  Self-care:   · Drink liquids as directed  You may need to drink more liquids than usual to stay hydrated  Ask how much liquid to drink each day and which liquids are best for you  · Use a cool mist humidifier  to increase air moisture in your home  This may make it easier for you to breathe and help decrease your cough  · Get more rest   Rest helps your body to heal  Slowly start to do more each day  Rest when you feel it is needed  · Avoid irritants in the air  Avoid chemicals, fumes, and dust  Wear a face mask if you must work around dust or fumes   Stay inside on days when air pollution levels are high  If you have allergies, stay inside when pollen counts are high  Do not use aerosol products, such as spray-on deodorant, bug spray, and hair spray  · Do not smoke or be around others who are smoking  Nicotine and other chemicals in cigarettes and cigars can cause lung damage  Ask your healthcare provider for information if you currently smoke and need help to quit  E-cigarettes or smokeless tobacco still contain nicotine  Talk to your healthcare provider before you use these products  Prevent acute bronchitis:       · Get the vaccinations you need  Ask your healthcare provider if you should get the flu or pneumonia vaccines  · Prevent the spread of germs  You can decrease your risk for acute bronchitis and other illnesses by doing the following:     ? Wash your hands often with soap and water  Carry germ-killing hand lotion or gel with you  You can use the lotion or gel to clean your hands when soap and water are not available  ? Do not touch your eyes, nose, or mouth unless you have washed your hands first     ? Always cover your mouth when you cough to prevent the spread of germs  It is best to cough into a tissue or your shirt sleeve instead of into your hand  Ask those around you to cover their mouths when they cough  ? Try to avoid people who have a cold or the flu  If you are sick, stay away from others as much as possible  Follow up with your doctor as directed:  Write down questions you have so you will remember to ask them during your follow-up visits  © Copyright [employer] 2021 Information is for End User's use only and may not be sold, redistributed or otherwise used for commercial purposes  All illustrations and images included in CareNotes® are the copyrighted property of [employer]  or Myriam Plascencia  The above information is an  only   It is not intended as medical advice for individual conditions or treatments  Talk to your doctor, nurse or pharmacist before following any medical regimen to see if it is safe and effective for you  COVID-19 and Chronic Health Conditions   AMBULATORY CARE:   What you need to know about coronavirus disease 2019 (COVID-19) and chronic health conditions: Your chronic condition may increase your risk for COVID-19 or serious problems it can cause  Healthcare providers might need to make changes that affect how you usually manage your chronic health condition  Providers may change hours of operation or not have patients come in to be seen  You may not be able to make appointments to get blood drawn or to have tests or procedures  This may continue until the virus that causes COVID-19 is controlled  Until then, you can take steps to manage your condition  The steps will also lower your risk for COVID-19 or the serious problems it causes  If you do develop COVID-19, healthcare providers will tell you when it is okay to be around others after you recover  This depends on your chronic condition, any symptoms of COVID-19 that developed, and how severe the symptoms were  What you need to know about serious problems from the virus:  Serious health problems may improve or continue for weeks or months, and possibly years  Health problems that continue may be called long COVID  · The virus can affect many parts of the body  Information is still being learned  You may develop these or other health problems:    ? Serious lower respiratory conditions, such as pneumonia or acute respiratory distress syndrome (ARDS)    ? Blood vessel damage, leading to blood clots    ? Organ damage from a lack of oxygen or from blood clots    ? Sleep problems    ? Problems thinking clearly, remembering information, or concentrating    ? Mood changes, depression, or anxiety    · Anyone can develop serious problems from the virus,  but some health conditions increase the risk   Healthcare professionals are still learning how the virus affects a person who has a chronic health condition  Protect yourself from infection, even if your chronic condition is not listed below  More is being learned about the virus every day  Health conditions known to increase the risk for COVID-19 or its serious complications include the following:    ? Obesity, diabetes, cancer, Down syndrome, or a liver disease    ? Kidney failure, chronic kidney disease, or sickle cell disease    ? Lung disease, COPD, moderate-to-severe asthma, cystic fibrosis, or pulmonary fibrosis (scarring in your lungs)    ? A severe heart disease, such as coronary artery disease or heart failure    ? A brain blood vessel disorder or disease, or a condition such as dementia    ? Hypertension (or high blood pressure), or thalassemia    ? An immune system problem, HIV or AIDS, or a blood, bone marrow, or organ transplant    · Other factors that increase your risk  are age 72 or older or living in a long-term care facility  Pregnancy, cigarette smoking, or long-term corticosteroid use can also increase your risk  If you think you may be infected with the coronavirus,  do the following to protect others:  · If emergency care is needed,  tell the  about the possible infection, or call ahead and tell the emergency department  · Call a healthcare provider  for instructions if symptoms are mild  Do not  arrive without calling first  Your provider will need to protect staff members and other patients  · Cover your mouth and nose  while you are getting medical care  This will help lower the risk of infecting others  Call your local emergency number [phone] in the [address] [place] [address],[address]) or an emergency department if:   · You have trouble breathing or shortness of breath  · You have chest pain or pressure that lasts longer than 5 minutes  · You become confused or hard to wake  · Your lips or face are blue  · You have a fever of 104°F (40°C) or higher      Call your doctor or healthcare provider if:   · You do not  have symptoms of COVID-19 but had close physical contact within 14 days with someone who tested positive  · You have questions or concerns about your COVID-19 or your chronic condition  How the 2019 coronavirus spreads: The virus spreads quickly and easily  The virus can be passed starting 2 days before symptoms begin or before a positive test if symptoms never begin  The following are ways the virus is thought to spread, but more information may be coming:  · Droplets are the main way all coronaviruses spread  The virus travels in droplets that form when a person talks, coughs, or sneezes  The droplets can also float in the air for minutes or hours  Infection happens when you breathe in the droplets or get them in your eyes or nose  Close personal contact with an infected person increases your risk for infection  This means being within 6 feet (2 meters) of the person for at least 15 minutes over 24 hours  · Person-to-person contact can spread the virus  For example, a person with the virus on his or her hands can spread it by shaking hands with someone  · The virus can stay on objects and surfaces for a short time  You may become infected by touching the object or surface and then touching your eyes or mouth  · An infected animal may be able to infect a person who touches it  This may happen at live markets or on a farm  Manage your chronic health condition during this time:  If you do not have a regular healthcare provider, experts recommend you contact a local [facility] health center or health department  The following can help you manage your condition and prevent COVID-19:  · Get emergency care for your condition if needed  Talk to your healthcare providers about symptoms of your chronic condition that need immediate care  Your providers can help you create a plan or add exacerbation management to your plan   The plan will include when to go to an emergency department and when to call your local emergency number  This will depend on where you live and the services that are available during this time  · Go to dialysis appointments as scheduled  It is important to stay on schedule  You will need to have enough food to be able to follow the emergency diet plan if you must miss a session  The emergency diet needs to be part of the management plan for your condition  · Reschedule any upcoming appointments as needed  Medical facilities may be closed until the coronavirus is better controlled  This means you may need to reschedule a surgery, procedure, or check-up appointment  If you cannot have a phone or video appointment, you will need to make a new appointment  Some providers may be scheduling appointments several months in advance  Some surgeries and procedures will happen as scheduled  This depends on the medical condition and the reason for the surgery or procedure  You may need to have extra testing for COVID-19 several days before  · Follow any regular management plan you use  Your healthcare provider will tell you if you need to make any changes to your regular management plan  For example, if you have asthma, continue to follow your asthma action plan  If you have diabetes, you may need to check your blood sugar level more often  Stress and illness can make blood sugar levels go up  You may need to adjust medicine such as insulin  If you have a heart condition or high blood pressure, you may need to check your blood pressure more often  Stress and illness can also raise your blood pressure  · Talk to your healthcare providers about your medicines  You may be able to get more than 1 month of medicine at a time  This will lower the number of times you need to go to a pharmacy to get your medicines  Make sure you have enough medicine if you have a condition that can lead to an emergency   Examples include asthma medicines, insulin, or an epinephrine pen  Check the expiration dates on the medicines you currently have  Ask for refills as soon as possible, if needed  If it is not time to refill prescriptions, you may be able to get an emergency supply of some medicines  Medicine plans vary, so ask your healthcare provider or pharmacist for options  · Have supplies available in your home  If possible, get extra supplies you use regularly  Examples include absorbent pads, syringes, and wound cleaning solutions  This will limit the number of trips out of your home to get supplies  · Know the signs and symptoms of COVID-19  Signs and symptoms usually start about 5 days after infection but can take 2 to 14 days  Signs and symptoms range from mild to severe  You may feel like you have the flu or a bad cold  Your chronic health condition may cause some of the same symptoms COVID-19 causes  This can make it hard to know if a symptom is from COVID-19 or your chronic condition  Keep a record of any new or worsening symptom you have  This is especially important if you have a condition that often causes shortness of breath  Your provider can tell you if you should be tested for COVID-19  Information is still being learned  Tell your healthcare provider if you think you were infected but develop signs or symptoms not listed below:    ? A cough    ? Shortness of breath or trouble breathing that may become severe    ? A fever of at least 100 4°F, or 38°C (may be lower in adults 65 or older)    ? Chills that might include shaking    ? Muscle pain, body aches, or a headache    ? A sore throat    ? Suddenly not being able to taste or smell anything    ? Feeling very tired (fatigue)    ? Congestion (stuffy head and nose), or a runny nose    ? Diarrhea, nausea, or vomiting    What you can do to prevent having to go out of your home during this time:   · Ask your healthcare provider for other ways to have appointments    Some providers offer phone, video, or other types of appointments  · Have food, medicines, and other supplies delivered  Some pharmacies can send certain medicines to you through the mail  Grocery stores and restaurants may be able to deliver food and other items  If possible, have delivered items placed somewhere  Try not to have someone hand you an item  You will be so close to the person that the virus can spread between you  · Ask someone to get items you need  The person can get groceries, medicines, or other needed items for you  Choose a person who does not have signs or symptoms of COVID-19 or has tested negative for it  The person should not be waiting for test results  He or she should not have a condition that increases the risk for COVID-19 or serious problems it causes  Lower your risk for COVID-19:  The best way to prevent infection is to avoid anyone who is infected, but this can be hard to do  An infected person can spread the virus before signs or symptoms begin, or even if signs or symptoms never develop  The following are ways to prevent the spread of the virus and lower your risk for COVID-19:      · Wash your hands often throughout the day  Use soap and water  Rub your soapy hands together, lacing your fingers  Wash the front and back of each hand, and in between your fingers  Use the fingers of one hand to scrub under the fingernails of the other hand  Wash for at least 20 seconds  Rinse with warm, running water for several seconds  Then dry your hands with a clean towel or paper towel  Use hand  that contains alcohol if soap and water are not available  Do not touch your eyes, nose, or mouth without washing your hands first  Teach children how to wash their hands  · Cover sneezes and coughs  Turn your face away and cover your mouth and nose with a tissue  Throw the tissue away  Use the bend of your arm if a tissue is not available  Then wash your hands well with soap and water or use hand    Turn your head and cover your face if someone near you is coughing or sneezing  Teach children how to cover a cough or sneeze  Remind them to wash their hands  · Make a habit of not touching your face  If you get the virus on your hands, you can transfer it to your eyes, nose, or mouth and become infected  · Wear a face covering (mask) around anyone who does not live in your home  A covering helps protect the person wearing it from being infected or passing the virus to others  Use a cloth covering with at least 2 layers  You can also create layers by putting a cloth face covering over a disposable non-medical mask  Cover your mouth and your nose  The covering should fit snugly against the bridge of your nose  Securely fasten it under your chin and on the sides of your face  Do not use coverings on children younger than 2 years or on anyone who has breathing problems or cannot remove it  Your healthcare provider can tell you what to do if you cannot wear a face covering  · Clean and disinfect high-touch surfaces and objects in your home often  Some surfaces and objects may need to be cleaned several times each day, depending on how often they are used  Use disinfecting wipes, or make a solution by mixing 4 teaspoons of bleach with 1 quart (4 cups) of water  Do not  use any cleaning or disinfecting products that can trigger an asthma attack or other breathing problems  Open windows or have circulating air as you clean  Do not  mix ammonia with bleach  This will create toxic fumes  How to follow social distancing guidelines:  Social distancing means people avoid close personal contact so the virus cannot spread from one person to another  Close personal contact means being within 6 feet (2 meters) of someone for at least 15 minutes over 24 hours  National and local social distancing rules vary   Rules may change over time as restrictions are lifted, but they may return if an outbreak happens where you live  It is important to know and follow all current social distancing rules in your area  The following are general guidelines:  · Stay home if you are sick or think you may have COVID-19  It is important to stay home if you are waiting for a testing appointment or for test results  Even if you do not have symptoms, you can pass the virus to others  · Limit trips out of your home  Plan your route so you make the fewest stops possible to limit contact  Keep track of places you go  This will help contact tracers notify others if you become infected  Wear a face covering while you are out  You will need to wear the covering on mass transit, such as a bus or the subway  You will also need to wear it while you travel on an airplane  · Stay at least 6 feet (2 meters) away from anyone who does not live in your home  Do not shake hands with, hug, or kiss a person as a greeting  Stand or walk as far from others as possible, especially around anyone who is sneezing or coughing  If you must use public transportation (such as a bus or subway), try to sit or stand away from others  Check in on anyone who lives alone  · Do not have anyone over to your home unless it is necessary  It is okay to have medical workers or other helpers in to provide care  Wear a face covering, and remind others to wear a mask or face covering  Remind them to wash their hands when they arrive and before they leave  Do not  have anyone over just to visit, even if you both do not feel sick  The virus can pass from one of you to the other before symptoms of COVID-19 begin  Some people never even develop symptoms  It is important that you continue social distancing with everyone, including children  It may be hard to tell a child not to hug or kiss you  Explain that this is how he or she can help you stay healthy  · Do not go to someone else's home unless it is necessary  Do not go over to visit, even if the person is lonely   Only go if you need to help him or her  · Avoid in-person gatherings and crowds  Gatherings or crowds of 10 or more individuals can cause the virus to spread  Avoid places such as [place], beaches, sporting events, and tourist attractions  For events such as parties, holiday meals, Sabianist services, and conferences, attend virtually (on a computer), if possible  · Stay safe if you must go out to work  Keep physical distance between you and other workers as much as possible  Follow your employer's rules so everyone stays safe  Help strengthen your immune system:   · Ask about vaccines you may need  Talk to your healthcare provider about COVID-19 vaccines  The current vaccines are given as a shot in 1 or 2 doses  Your healthcare provider can give you more information about what to expect, depending on your chronic condition  Get the influenza (flu) vaccine as soon as recommended each year, usually starting in September or October  Get the pneumonia vaccine if recommended  · Do not smoke  Nicotine and other chemicals in cigarettes and cigars can increase your risk for infection and for serious COVID-19 effects  Ask your healthcare provider for information if you currently smoke and need help to quit  E-cigarettes or smokeless tobacco still contain nicotine  Talk to your healthcare provider before you use these products  · Eat a variety of healthy foods  Examples include vegetables, fruits, whole-grain breads and cereals, lean meats and poultry, fish, low-fat dairy products, and cooked beans  Healthy foods contain nutrients that help keep your immune system strong  · Find ways to manage stress  You may be feeling more stressed than usual because of the COVID-19 outbreak  The situation is very stressful to many people  Talk to your healthcare providers about ways to manage stress during this time  Stress can lead to breathing problems or make the problems worse   Stress can trigger an attack or exacerbation of many health conditions  It is important to do things that help you feel more relaxed, such as the following:     ? Pick 1 or 2 times a day to watch the news  Constant news watching can increase your stress levels  ? Talk to a friend on the phone or through a video chat  ? Take a warm, soothing bath  ? Listen to music  ? Exercise can also help relieve stress  This may be hard if your regular gym or outdoor exercise area is closed  If you do not have exercise equipment at home, try walking inside your home  You can walk quickly or turn on music and dance  Follow up with your doctor or healthcare provider as directed: Your providers will tell you when you can come in for tests, procedures, or check-ups  Bring your symptom record with you to all appointments  Write down your questions so you remember to ask them during your visits  For more information:   · Centers for Disease Control and Prevention  [address] , [address]  Phone: [phone]  Phone: [phone]  Web Address: [employer]    © [address] [place] [address] 2021 Information is for End User's use only and may not be sold, redistributed or otherwise used for commercial purposes  All illustrations and images included in CareNotes® are the copyrighted property of [employer]  or [facility] Antoine Velazquez   The above information is an  only  It is not intended as medical advice for individual conditions or treatments  Talk to your doctor, nurse or pharmacist before following any medical regimen to see if it is safe and effective for you

## 2021-08-16 NOTE — PROGRESS NOTES
[address]        NAME: Abida Almonte is a 33 y o  female  : 1990    MRN: [id number]  DATE: 2021  TIME: 12:46 PM      Assessment and Plan     Asthmatic bronchitis, mild intermittent, with acute exacerbation [J45 21]  1  Asthmatic bronchitis, mild intermittent, with acute exacerbation  XR chest pa & lateral    azithromycin (ZITHROMAX) 250 mg tablet    albuterol (Ventolin HFA) 90 mcg/act inhaler    benzonatate (TESSALON PERLES) 100 mg capsule    Novel Coron[facility]Covid-19),[facility]   2  History of pneumonia  XR chest pa & lateral    azithromycin (ZITHROMAX) 250 mg tablet    albuterol (Ventolin HFA) 90 mcg/act inhaler    benzonatate (TESSALON PERLES) 100 mg capsule    Novel Coronavirus (Covid-19),[facility]   3  Screening for viral disease  Novel Coronavirus (Covid-19),PCR [facility] - Office Collection         Patient Instructions   Patient Instructions     No pneumonia seen on x-ray  Radiology does the final read within 24 hours; if they would see anything acute and significant, we will call you  Treat this like it is covid-19 until results are received  Eat well, drink water, rest when you need it, take a multivitamin, vitamin C 1,000 mg BID, vitamin D 2,000 IU daily  Use over the counter medications to treat symptoms  Take the azithromycin (antibiotic) as ordered until completed  Use the albuterol inhaler every 4-6 hours as needed for shortness of breath, chest tightness, wheezing or persistent cough  Use the Tessalon Perles up to 3x/day as needed for cough  Eat yogurt or take a probiotic to restore good bacteria to your gut; this helps prevent stomach irritation/diarrhea while on an antibiotic  Acute Bronchitis   AMBULATORY CARE:   Acute bronchitis  is swelling and irritation in your lungs  It is usually caused by a virus and most often happens in the winter   Bronchitis may also be caused by bacteria or by a chemical irritant, such as smoke   Common symptoms include the following:   · Cough that lasts up to 3 weeks, stuffy nose    · Hoarseness, sore throat    · A fever and chills    · Feeling more tired than usual, and body aches    · Wheezing or pain when you breathe or cough    Seek care immediately if:   · You cough up blood  · Your lips or fingernails turn blue  · You feel like you are not getting enough air when you breathe  Call your doctor if:   · Your symptoms do not go away or get worse, even after treatment  · Your cough does not get better within 4 weeks  · You have questions or concerns about your condition or care  Medicines: You may  need any of the following:  · Cough suppressants  decrease your urge to cough  · Decongestants  help loosen mucus in your lungs and make it easier to cough up  This can help you breathe easier  · Inhalers  may be given  Your healthcare provider may give you one or more inhalers to help you breathe easier and cough less  An inhaler gives your medicine to open your airways  Ask your healthcare provider to show you how to use your inhaler correctly  · Acetaminophen  decreases pain and fever  It is available without a doctor's order  Ask how much to take and how often to take it  Follow directions  Read the labels of all other medicines you are using to see if they also contain acetaminophen, or ask your doctor or pharmacist  Acetaminophen can cause liver damage if not taken correctly  Do not use more than 4 grams (4,000 milligrams) total of acetaminophen in one day  · NSAIDs  help decrease swelling and pain or fever  This medicine is available with or without a doctor's order  NSAIDs can cause stomach bleeding or kidney problems in certain people  If you take blood thinner medicine, always ask your healthcare provider if NSAIDs are safe for you  Always read the medicine label and follow directions  · Take your medicine as directed    Contact your healthcare provider if you think your medicine is not helping or if you have side effects  Tell him of her if you are allergic to any medicine  Keep a list of the medicines, vitamins, and herbs you take  Include the amounts, and when and why you take them  Bring the list or the pill bottles to follow-up visits  Carry your medicine list with you in case of an emergency  Self-care:   · Drink liquids as directed  You may need to drink more liquids than usual to stay hydrated  Ask how much liquid to drink each day and which liquids are best for you  · Use a cool mist humidifier  to increase air moisture in your home  This may make it easier for you to breathe and help decrease your cough  · Get more rest   Rest helps your body to heal  Slowly start to do more each day  Rest when you feel it is needed  · Avoid irritants in the air  Avoid chemicals, fumes, and dust  Wear a face mask if you must work around dust or fumes  Stay inside on days when air pollution levels are high  If you have allergies, stay inside when pollen counts are high  Do not use aerosol products, such as spray-on deodorant, bug spray, and hair spray  · Do not smoke or be around others who are smoking  Nicotine and other chemicals in cigarettes and cigars can cause lung damage  Ask your healthcare provider for information if you currently smoke and need help to quit  E-cigarettes or smokeless tobacco still contain nicotine  Talk to your healthcare provider before you use these products  Prevent acute bronchitis:       · Get the vaccinations you need  Ask your healthcare provider if you should get the flu or pneumonia vaccines  · Prevent the spread of germs  You can decrease your risk for acute bronchitis and other illnesses by doing the following:     ? Wash your hands often with soap and water  Carry germ-killing hand lotion or gel with you  You can use the lotion or gel to clean your hands when soap and water are not available  ?  Do not touch your eyes, nose, or mouth unless you have washed your hands first     ? Always cover your mouth when you cough to prevent the spread of germs  It is best to cough into a tissue or your shirt sleeve instead of into your hand  Ask those around you to cover their mouths when they cough  ? Try to avoid people who have a cold or the flu  If you are sick, stay away from others as much as possible  Follow up with your doctor as directed:  Write down questions you have so you will remember to ask them during your follow-up visits  © Copyright [employer] 2021 Information is for End User's use only and may not be sold, redistributed or otherwise used for commercial purposes  All illustrations and images included in CareNotes® are the copyrighted property of A [employer] M [employer]  or Myriam Plascencia  The above information is an  only  It is not intended as medical advice for individual conditions or treatments  Talk to your doctor, nurse or pharmacist before following any medical regimen to see if it is safe and effective for you  COVID-19 and Chronic Health Conditions   AMBULATORY CARE:   What you need to know about coronavirus disease 2019 (COVID-19) and chronic health conditions: Your chronic condition may increase your risk for COVID-19 or serious problems it can cause  Healthcare providers might need to make changes that affect how you usually manage your chronic health condition  Providers may change hours of operation or not have patients come in to be seen  You may not be able to make appointments to get blood drawn or to have tests or procedures  This may continue until the virus that causes COVID-19 is controlled  Until then, you can take steps to manage your condition  The steps will also lower your risk for COVID-19 or the serious problems it causes  If you do develop COVID-19, healthcare providers will tell you when it is okay to be around others after you recover   This depends on your chronic condition, any symptoms of COVID-19 that developed, and how severe the symptoms were  What you need to know about serious problems from the virus:  Serious health problems may improve or continue for weeks or months, and possibly years  Health problems that continue may be called long COVID  · The virus can affect many parts of the body  Information is still being learned  You may develop these or other health problems:    ? Serious lower respiratory conditions, such as pneumonia or acute respiratory distress syndrome (ARDS)    ? Blood vessel damage, leading to blood clots    ? Organ damage from a lack of oxygen or from blood clots    ? Sleep problems    ? Problems thinking clearly, remembering information, or concentrating    ? Mood changes, depression, or anxiety    · Anyone can develop serious problems from the virus,  but some health conditions increase the risk  Healthcare professionals are still learning how the virus affects a person who has a chronic health condition  Protect yourself from infection, even if your chronic condition is not listed below  More is being learned about the virus every day  Health conditions known to increase the risk for COVID-19 or its serious complications include the following:    ? Obesity, diabetes, cancer, Down syndrome, or a liver disease    ? Kidney failure, chronic kidney disease, or sickle cell disease    ? Lung disease, COPD, moderate-to-severe asthma, cystic fibrosis, or pulmonary fibrosis (scarring in your lungs)    ? A severe heart disease, such as coronary artery disease or heart failure    ? A brain blood vessel disorder or disease, or a condition such as dementia    ? Hypertension (or high blood pressure), or thalassemia    ? An immune system problem, HIV or AIDS, or a blood, bone marrow, or organ transplant    · Other factors that increase your risk  are age 72 or older or living in a long-term care facility   Pregnancy, cigarette smoking, or long-term corticosteroid use can also increase your risk  If you think you may be infected with the coronavirus,  do the following to protect others:  · If emergency care is needed,  tell the  about the possible infection, or call ahead and tell the emergency department  · Call a healthcare provider  for instructions if symptoms are mild  Do not  arrive without calling first  Your provider will need to protect staff members and other patients  · Cover your mouth and nose  while you are getting medical care  This will help lower the risk of infecting others  Call your local emergency number [phone] in the [address] [place] [address] [address]) or an emergency department if:   · You have trouble breathing or shortness of breath  · You have chest pain or pressure that lasts longer than 5 minutes  · You become confused or hard to wake  · Your lips or face are blue  · You have a fever of 104°F (40°C) or higher  Call your doctor or healthcare provider if:   · You do not  have symptoms of COVID-19 but had close physical contact within 14 days with someone who tested positive  · You have questions or concerns about your COVID-19 or your chronic condition  How the 2019 coronavirus spreads: The virus spreads quickly and easily  The virus can be passed starting 2 days before symptoms begin or before a positive test if symptoms never begin  The following are ways the virus is thought to spread, but more information may be coming:  · Droplets are the main way all coronaviruses spread  The virus travels in droplets that form when a person talks, coughs, or sneezes  The droplets can also float in the air for minutes or hours  Infection happens when you breathe in the droplets or get them in your eyes or nose  Close personal contact with an infected person increases your risk for infection  This means being within 6 feet (2 meters) of the person for at least 15 minutes over 24 hours      · Person-to-person contact can spread the virus  For example, a person with the virus on his or her hands can spread it by shaking hands with someone  · The virus can stay on objects and surfaces for a short time  You may become infected by touching the object or surface and then touching your eyes or mouth  · An infected animal may be able to infect a person who touches it  This may happen at live markets or on a farm  Manage your chronic health condition during this time:  If you do not have a regular healthcare provider, experts recommend you contact a local [facility] health center or health department  The following can help you manage your condition and prevent COVID-19:  · Get emergency care for your condition if needed  Talk to your healthcare providers about symptoms of your chronic condition that need immediate care  Your providers can help you create a plan or add exacerbation management to your plan  The plan will include when to go to an emergency department and when to call your local emergency number  This will depend on where you live and the services that are available during this time  · Go to dialysis appointments as scheduled  It is important to stay on schedule  You will need to have enough food to be able to follow the emergency diet plan if you must miss a session  The emergency diet needs to be part of the management plan for your condition  · Reschedule any upcoming appointments as needed  Medical facilities may be closed until the coronavirus is better controlled  This means you may need to reschedule a surgery, procedure, or check-up appointment  If you cannot have a phone or video appointment, you will need to make a new appointment  Some providers may be scheduling appointments several months in advance  Some surgeries and procedures will happen as scheduled  This depends on the medical condition and the reason for the surgery or procedure   You may need to have extra testing for COVID-19 several days before  · Follow any regular management plan you use  Your healthcare provider will tell you if you need to make any changes to your regular management plan  For example, if you have asthma, continue to follow your asthma action plan  If you have diabetes, you may need to check your blood sugar level more often  Stress and illness can make blood sugar levels go up  You may need to adjust medicine such as insulin  If you have a heart condition or high blood pressure, you may need to check your blood pressure more often  Stress and illness can also raise your blood pressure  · Talk to your healthcare providers about your medicines  You may be able to get more than 1 month of medicine at a time  This will lower the number of times you need to go to a pharmacy to get your medicines  Make sure you have enough medicine if you have a condition that can lead to an emergency  Examples include asthma medicines, insulin, or an epinephrine pen  Check the expiration dates on the medicines you currently have  Ask for refills as soon as possible, if needed  If it is not time to refill prescriptions, you may be able to get an emergency supply of some medicines  Medicine plans vary, so ask your healthcare provider or pharmacist for options  · Have supplies available in your home  If possible, get extra supplies you use regularly  Examples include absorbent pads, syringes, and wound cleaning solutions  This will limit the number of trips out of your home to get supplies  · Know the signs and symptoms of COVID-19  Signs and symptoms usually start about 5 days after infection but can take 2 to 14 days  Signs and symptoms range from mild to severe  You may feel like you have the flu or a bad cold  Your chronic health condition may cause some of the same symptoms COVID-19 causes  This can make it hard to know if a symptom is from COVID-19 or your chronic condition  Keep a record of any new or worsening symptom you have  This is especially important if you have a condition that often causes shortness of breath  Your provider can tell you if you should be tested for COVID-19  Information is still being learned  Tell your healthcare provider if you think you were infected but develop signs or symptoms not listed below:    ? A cough    ? Shortness of breath or trouble breathing that may become severe    ? A fever of at least 100 4°F, or 38°C (may be lower in adults 65 or older)    ? Chills that might include shaking    ? Muscle pain, body aches, or a headache    ? A sore throat    ? Suddenly not being able to taste or smell anything    ? Feeling very tired (fatigue)    ? Congestion (stuffy head and nose), or a runny nose    ? Diarrhea, nausea, or vomiting    What you can do to prevent having to go out of your home during this time:   · Ask your healthcare provider for other ways to have appointments  Some providers offer phone, video, or other types of appointments  · Have food, medicines, and other supplies delivered  Some pharmacies can send certain medicines to you through the mail  Grocery stores and restaurants may be able to deliver food and other items  If possible, have delivered items placed somewhere  Try not to have someone hand you an item  You will be so close to the person that the virus can spread between you  · Ask someone to get items you need  The person can get groceries, medicines, or other needed items for you  Choose a person who does not have signs or symptoms of COVID-19 or has tested negative for it  The person should not be waiting for test results  He or she should not have a condition that increases the risk for COVID-19 or serious problems it causes  Lower your risk for COVID-19:  The best way to prevent infection is to avoid anyone who is infected, but this can be hard to do  An infected person can spread the virus before signs or symptoms begin, or even if signs or symptoms never develop   The following are ways to prevent the spread of the virus and lower your risk for COVID-19:      · Wash your hands often throughout the day  Use soap and water  Rub your soapy hands together, lacing your fingers  Wash the front and back of each hand, and in between your fingers  Use the fingers of one hand to scrub under the fingernails of the other hand  Wash for at least 20 seconds  Rinse with warm, running water for several seconds  Then dry your hands with a clean towel or paper towel  Use hand  that contains alcohol if soap and water are not available  Do not touch your eyes, nose, or mouth without washing your hands first  Teach children how to wash their hands  · Cover sneezes and coughs  Turn your face away and cover your mouth and nose with a tissue  Throw the tissue away  Use the bend of your arm if a tissue is not available  Then wash your hands well with soap and water or use hand   Turn your head and cover your face if someone near you is coughing or sneezing  Teach children how to cover a cough or sneeze  Remind them to wash their hands  · Make a habit of not touching your face  If you get the virus on your hands, you can transfer it to your eyes, nose, or mouth and become infected  · Wear a face covering (mask) around anyone who does not live in your home  A covering helps protect the person wearing it from being infected or passing the virus to others  Use a cloth covering with at least 2 layers  You can also create layers by putting a cloth face covering over a disposable non-medical mask  Cover your mouth and your nose  The covering should fit snugly against the bridge of your nose  Securely fasten it under your chin and on the sides of your face  Do not use coverings on children younger than 2 years or on anyone who has breathing problems or cannot remove it  Your healthcare provider can tell you what to do if you cannot wear a face covering           · Clean and disinfect high-touch surfaces and objects in your home often  Some surfaces and objects may need to be cleaned several times each day, depending on how often they are used  Use disinfecting wipes, or make a solution by mixing 4 teaspoons of bleach with 1 quart (4 cups) of water  Do not  use any cleaning or disinfecting products that can trigger an asthma attack or other breathing problems  Open windows or have circulating air as you clean  Do not  mix ammonia with bleach  This will create toxic fumes  How to follow social distancing guidelines:  Social distancing means people avoid close personal contact so the virus cannot spread from one person to another  Close personal contact means being within 6 feet (2 meters) of someone for at least 15 minutes over 24 hours  National and local social distancing rules vary  Rules may change over time as restrictions are lifted, but they may return if an outbreak happens where you live  It is important to know and follow all current social distancing rules in your area  The following are general guidelines:  · Stay home if you are sick or think you may have COVID-19  It is important to stay home if you are waiting for a testing appointment or for test results  Even if you do not have symptoms, you can pass the virus to others  · Limit trips out of your home  Plan your route so you make the fewest stops possible to limit contact  Keep track of places you go  This will help contact tracers notify others if you become infected  Wear a face covering while you are out  You will need to wear the covering on mass transit, such as a bus or the subway  You will also need to wear it while you travel on an airplane  · Stay at least 6 feet (2 meters) away from anyone who does not live in your home  Do not shake hands with, hug, or kiss a person as a greeting  Stand or walk as far from others as possible, especially around anyone who is sneezing or coughing   If you must use public transportation (such as a bus or subway), try to sit or stand away from others  Check in on anyone who lives alone  · Do not have anyone over to your home unless it is necessary  It is okay to have medical workers or other helpers in to provide care  Wear a face covering, and remind others to wear a mask or face covering  Remind them to wash their hands when they arrive and before they leave  Do not  have anyone over just to visit, even if you both do not feel sick  The virus can pass from one of you to the other before symptoms of COVID-19 begin  Some people never even develop symptoms  It is important that you continue social distancing with everyone, including children  It may be hard to tell a child not to hug or kiss you  Explain that this is how he or she can help you stay healthy  · Do not go to someone else's home unless it is necessary  Do not go over to visit, even if the person is lonely  Only go if you need to help him or her  · Avoid in-person gatherings and crowds  Gatherings or crowds of 10 or more individuals can cause the virus to spread  Avoid places such as [place], beaches, sporting events, and tourist attractions  For events such as parties, holiday meals, Sikh services, and conferences, attend virtually (on a computer), if possible  · Stay safe if you must go out to work  Keep physical distance between you and other workers as much as possible  Follow your employer's rules so everyone stays safe  Help strengthen your immune system:   · Ask about vaccines you may need  Talk to your healthcare provider about COVID-19 vaccines  The current vaccines are given as a shot in 1 or 2 doses  Your healthcare provider can give you more information about what to expect, depending on your chronic condition  Get the influenza (flu) vaccine as soon as recommended each year, usually starting in September or October  Get the pneumonia vaccine if recommended  · Do not smoke  Nicotine and other chemicals in cigarettes and cigars can increase your risk for infection and for serious COVID-19 effects  Ask your healthcare provider for information if you currently smoke and need help to quit  E-cigarettes or smokeless tobacco still contain nicotine  Talk to your healthcare provider before you use these products  · Eat a variety of healthy foods  Examples include vegetables, fruits, whole-grain breads and cereals, lean meats and poultry, fish, low-fat dairy products, and cooked beans  Healthy foods contain nutrients that help keep your immune system strong  · Find ways to manage stress  You may be feeling more stressed than usual because of the COVID-19 outbreak  The situation is very stressful to many people  Talk to your healthcare providers about ways to manage stress during this time  Stress can lead to breathing problems or make the problems worse  Stress can trigger an attack or exacerbation of many health conditions  It is important to do things that help you feel more relaxed, such as the following:     ? Pick 1 or 2 times a day to watch the news  Constant news watching can increase your stress levels  ? Talk to a friend on the phone or through a video chat  ? Take a warm, soothing bath  ? Listen to music  ? Exercise can also help relieve stress  This may be hard if your regular gym or outdoor exercise area is closed  If you do not have exercise equipment at home, try walking inside your home  You can walk quickly or turn on music and dance  Follow up with your doctor or healthcare provider as directed: Your providers will tell you when you can come in for tests, procedures, or check-ups  Bring your symptom record with you to all appointments  Write down your questions so you remember to ask them during your visits    For more information:   · Centers for Disease Control and Prevention  [address] , [address]  Phone: [phone]  Phone: [phone]  Web Address: [employer]    © [address] [place] [address] 2021 Information is for End User's use only and may not be sold, redistributed or otherwise used for commercial purposes  All illustrations and images included in CareNotes® are the copyrighted property of [employer]  or Myriam Plascencia  The above information is an  only  It is not intended as medical advice for individual conditions or treatments  Talk to your doctor, nurse or pharmacist before following any medical regimen to see if it is safe and effective for you  Follow up with PCP in 3-5 days  Proceed to  ER if symptoms worsen  Chief Complaint     Chief Complaint   Patient presents with    Cold Like Symptoms     cough, wheezing for 1 5 weeks, denies fever         History of Present Illness     Symptoms for about 1 5 weeks--started with the weather change when it went from being really chilly to really hot  Had an asthma attack Friday night at work; does not have an inhaler currently but has in the past   Has a history of recurrent pneumonia, bronchitis and asthmatic symptoms as a child; had an inhaler then  Prior smoker, quit about a month ago  Congested, has chronic allergies--seems consistent with usually allergic symptoms  Had confirmed covid-19 in Dec 2020  Is also fully vaccinated  No fever, chills, body aches  Cough is dry and non-productive  Review of Systems     Review of Systems   Constitutional: Negative for chills and fever  HENT: Positive for congestion and sore throat (mild irritation after a coughing fit, but not a consistent sore throat)  Respiratory: Positive for cough, chest tightness, shortness of breath and wheezing (when cough)  Gastrointestinal: Negative  Musculoskeletal: Negative for arthralgias and myalgias  Allergic/Immunologic: Positive for environmental allergies  All other systems reviewed and are negative          Current Medications Current Outpatient Medications:     acetaminophen (TYLENOL) 500 mg tablet, Take 1,000 mg by mouth, Disp: , Rfl:     ALPRAZolam (XANAX) 0 5 mg tablet, Take 1 tablet (0 5 mg total) by mouth daily at bedtime as needed for anxiety, Disp: 15 tablet, Rfl: 0    B-D UF III MINI PEN NEEDLES 31G X 5 MM MISC, INJECT UNDER THE SKIN DAILY AT BEDTIME USE ONE A DAY OR AS DIRECTED, Disp: 100 each, Rfl: 6    Blood Pressure Monitoring (B-D ASSURE BPM/AUTO WRIST CUFF) MISC, Check blood pressure prior to each OB visit, or as directed by your physician , Disp: 1 each, Rfl: 0    buPROPion (WELLBUTRIN XL) 150 mg 24 hr tablet, Take 1 tablet (150 mg total) by mouth daily, Disp: 30 tablet, Rfl: 11    clotrimazole (LOTRIMIN) 1 % cream, Apply topically 2 (two) times a day, Disp: 30 g, Rfl: 0    cyclobenzaprine (FLEXERIL) 5 mg tablet, Take 1 tablet (5 mg total) by mouth 3 (three) times a day as needed for muscle spasms, Disp: 30 tablet, Rfl: 1    dicyclomine (BENTYL) 20 mg tablet, Take 1 tablet (20 mg total) by mouth 3 (three) times a day as needed (abdominal pain), Disp: 20 tablet, Rfl: 0    glucose blood (Accu-Chek Guide) test strip, Test three times per day, Disp: 100 each, Rfl: 0    insulin glargine (LANTUS) 100 units/mL subcutaneous injection, Inject 15 Units under the skin daily at bedtime, Disp: 4 5 mL, Rfl: 3    insulin lispro (HumaLOG KwikPen) 100 units/mL injection pen, Inject SC 10 units before breakfast, before lunch and dinner  To be titrated  , Disp: 15 mL, Rfl: 3    labetalol (NORMODYNE) 200 mg tablet, Take 200 mg by mouth 2 (two) times a day , Disp: , Rfl:     lisinopril (ZESTRIL) 30 mg tablet, Take 30 mg by mouth daily, Disp: , Rfl:     meloxicam (MOBIC) 15 mg tablet, Take 1 tablet (15 mg total) by mouth daily, Disp: 14 tablet, Rfl: 0    metFORMIN (GLUCOPHAGE) 1000 MG tablet, Take 1 tablet (1,000 mg total) by mouth 2 (two) times a day with meals, Disp: 180 tablet, Rfl: 3    ondansetron (ZOFRAN) 4 mg tablet, Take 1 tablet (4 mg total) by mouth every 6 (six) hours, Disp: 12 tablet, Rfl: 0    VITAMIN D PO, Take by mouth daily, Disp: , Rfl:     albuterol (Ventolin HFA) 90 mcg/act inhaler, Inhale 2 puffs every 4 (four) hours as needed for wheezing or shortness of breath, Disp: 18 g, Rfl: 2    azithromycin (ZITHROMAX) 250 mg tablet, Take 2 tablets today then 1 tablet daily x 4 days, Disp: 6 tablet, Rfl: 0    benzonatate (TESSALON PERLES) 100 mg capsule, 1-2 caps every 8 hours as needed for cough, Disp: 30 capsule, Rfl: 0    Blood Glucose Monitoring Suppl (Accu-Chek [employer] SmartView) w/Device KIT, Use once for 1 dose, Disp: 1 kit, Rfl: 0    Current Allergies     Allergies as of 08/16/2021 - Reviewed 08/16/2021   Allergen Reaction Noted    Prednisone Swelling 06/12/2018    Bactrim [sulfamethoxazole-trimethoprim] Hives 11/16/2019    Corticosteroids Swelling 01/13/2009    Cortisone Swelling 08/12/2019    Medical tape Hives 04/19/2020    Other Hives 02/29/2020    Sulfa antibiotics Hives 01/14/2019              The following portions of the patient's history were reviewed and updated as appropriate: allergies, current medications, past family history, past medical history, past social history, past surgical history and problem list      Past Medical History:   Diagnosis Date    Allergic     Arthritis     Bipolar affective disorder, currently depressed, moderate ([address] ) 12/17/2018    Cyst of ovary, right     Diabetes mellitus ([address] ) 10/10/18    type 2    Endometriosis     Heart murmur 01/19/88    Hepatitis C     Hepatitis C virus infection cured after antiviral drug therapy     History of transfusion     Hypertension     Migraines     Obesity 1995    Pulmonary artery congenital abnormality     Spleen enlarged     Status post surgical removal of both fallopian tubes     Varicella        Past Surgical History:   Procedure Laterality Date    CARDIAC CATHETERIZATION      no CAD 10days, 4 weeks 20months old    [facility]  SECTION, LOW TRANSVERSE  2020    CHOLECYSTECTOMY      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 8    CORONARY STENT PLACEMENT      LIVER BIOPSY      LIVER BIOPSY      TUBAL LIGATION Bilateral 2020    VSD REPAIR      As a child       Family History   Problem Relation Age of Onset    Hypertension Mother     Migraines Mother     JENNY disease Mother     Depression Mother     Hyperlipidemia Mother     Diabetes Mother     Diabetes Father     Hypertension Father     Kidney failure Father     Heart attack Father     Arthritis Father     Stroke Father     Polycystic kidney disease Paternal Grandmother     Stroke Paternal Grandmother     Heart disease Paternal Grandmother     Arthritis Sister     Asthma Sister     Thyroid disease Sister     Diabetes Sister     Arthritis Maternal Grandmother     Breast cancer Maternal Grandmother     Diabetes Maternal Grandmother     Hypertension Maternal Grandmother     Heart Valve Disease Maternal Grandmother     Learning disabilities Cousin     Learning disabilities Sister     ADD / ADHD Cousin     Lung cancer Brother     Diabetes Maternal Grandfather     Hypertension Maternal Grandfather     JENNY disease Maternal Grandfather     Stroke Maternal Grandfather          Medications have been verified  Objective     /76   Pulse 84   Temp 97 7 °F (36 5 °C) (Temporal)   Resp 18   Ht 5' 1" (1 549 m)   Wt 103 kg (226 lb)   SpO2 97%   BMI 42 70 kg/m²   No LMP recorded  Physical Exam     Physical Exam  Vitals and nursing note reviewed  Constitutional:       General: She is not in acute distress  Appearance: Normal appearance  She is well-developed  She is not ill-appearing, toxic-appearing or diaphoretic  HENT:      Head: Normocephalic and atraumatic  Nose: Congestion (mild) present  No nasal tenderness  Mouth/Throat:      Mouth: Mucous membranes are moist       Pharynx: Oropharynx is clear  Uvula midline   No oropharyngeal exudate or posterior oropharyngeal erythema  Tonsils: 1+ on the right  1+ on the left  Eyes:      Pupils: Pupils are equal, round, and reactive to light  Pulmonary:      Effort: Pulmonary effort is normal  No tachypnea, bradypnea, accessory muscle usage, prolonged expiration, respiratory distress or retractions  Breath sounds: No stridor or decreased air movement  Decreased breath sounds and wheezing present  No rhonchi or rales  Comments: Slightly decreased throughout with a few mild scattered I&E wheezes  Abdominal:      General: There is no distension  Palpations: Abdomen is soft  Musculoskeletal:         General: Normal range of motion  Cervical back: Normal range of motion and neck supple  Skin:     General: Skin is warm and dry  Capillary Refill: Capillary refill takes less than 2 seconds  Neurological:      General: No focal deficit present  Mental Status: She is alert and oriented to person, place, and time  Psychiatric:         Mood and Affect: Mood normal          Behavior: Behavior normal          Thought Content:  Thought content normal          Judgment: Judgment normal

## 2021-08-16 NOTE — LETTER
August 16, 2021     Patient: Steve Kelley   YOB: 1990   Date of Visit: 8/16/2021       To Whom It May Concern: It is my medical opinion that Steve Kelley should remain out of work until symptoms have improved for at least 24 hours along with being fever free for 24 hours  Please excuse for time missed due to acute illness  If you have any questions or concerns, please don't hesitate to call           Sincerely,        REJI Sanchez    CC: No Recipients

## 2021-08-17 ENCOUNTER — OFFICE VISIT (OUTPATIENT)
Dept: FAMILY MEDICINE CLINIC | Facility: CLINIC | Age: 31
End: 2021-08-17
Payer: COMMERCIAL

## 2021-08-17 VITALS
WEIGHT: 228.2 LBS | OXYGEN SATURATION: 99 % | HEIGHT: 61 IN | HEART RATE: 100 BPM | DIASTOLIC BLOOD PRESSURE: 70 MMHG | BODY MASS INDEX: 43.08 KG/M2 | SYSTOLIC BLOOD PRESSURE: 122 MMHG | TEMPERATURE: 97.9 F

## 2021-08-17 DIAGNOSIS — J45.21 MILD INTERMITTENT ASTHMATIC BRONCHITIS WITH ACUTE EXACERBATION: Primary | ICD-10-CM

## 2021-08-17 LAB — SARS-COV-2 RNA RESP QL NAA+PROBE: NEGATIVE

## 2021-08-17 PROCEDURE — 99213 OFFICE O/P EST LOW 20 MIN: CPT | Performed by: FAMILY MEDICINE

## 2021-08-17 NOTE — PROGRESS NOTES
Assessment/Plan:    No problem-specific Assessment & Plan notes found for this encounter  Diagnoses and all orders for this visit:    Mild intermittent asthmatic bronchitis with acute exacerbation  Comments:  Finish Zithromax, continue tessalon as needed  Continue albuterol as needed  F/U on covid swab          PHQ-9 Depression Screening    PHQ-9:   Frequency of the following problems over the past two weeks:      Little interest or pleasure in doing things: 0 - not at all  Feeling down, depressed, or hopeless: 0 - not at all  PHQ-2 Score: 0            Subjective:      Patient ID: Sonya Hamilton is a 33 y o  female  Patient presents for cold sx for 10 days, she had an asthma exacerbation Friday, she went to [facility] yesterday and was told she has asthmatic bronchitis  That note was reviewed, she had a normal chest x-ray, a covid test is pending and she was place on zithromax and tessalon perles  She made this appt prior to being seen at [facility] yesterday and she is following up today  She does feel a little better, the asthma has improved, she feels slight wheezing when she coughs  She has mild chest tightness, she is using her albuterol MDI, she last used it this am  No new onset loss of taste or smell, no contact with anyone with covid or PUI  She did receive both Moderna vaccines in March and April  The following portions of the patient's history were reviewed and updated as appropriate: allergies, current medications, past family history, past medical history, past social history, past surgical history and problem list     Review of Systems   Constitutional: Positive for fatigue  Negative for chills and fever  HENT: Positive for congestion, postnasal drip, rhinorrhea and sneezing  Negative for ear pain, sinus pressure, sinus pain and sore throat  Eyes: Negative  Respiratory: Positive for cough, chest tightness, shortness of breath and wheezing  Cardiovascular: Negative for chest pain  Objective:    /70 (BP Location: Left arm, Patient Position: Sitting)   Pulse 100   Temp 97 9 °F (36 6 °C) (Tympanic)   Ht 5' 1" (1 549 m)   Wt 104 kg (228 lb 3 2 oz)   LMP 08/03/2021   SpO2 99%   BMI 43 12 kg/m²      Physical Exam  Vitals and nursing note reviewed  HENT:      Head: Normocephalic and atraumatic  Right Ear: Tympanic membrane, ear canal and external ear normal       Left Ear: Tympanic membrane, ear canal and external ear normal       Nose: Congestion and rhinorrhea present  Mouth/Throat:      Mouth: Mucous membranes are moist       Pharynx: Oropharynx is clear  Eyes:      Conjunctiva/sclera: Conjunctivae normal    Cardiovascular:      Rate and Rhythm: Normal rate and regular rhythm  Heart sounds: Murmur heard  Pulmonary:      Effort: Pulmonary effort is normal  No respiratory distress  Breath sounds: Normal breath sounds  No wheezing, rhonchi or rales  Musculoskeletal:      Cervical back: Neck supple  Lymphadenopathy:      Cervical: No cervical adenopathy  Neurological:      Mental Status: She is alert and oriented to person, place, and time  Psychiatric:         Mood and Affect: Mood normal          Behavior: Behavior normal          Thought Content:  Thought content normal          Judgment: Judgment normal

## 2021-08-17 NOTE — LETTER
August 17, 2021     Patient: Patricia Foster   YOB: 1990   Date of Visit: 8/17/2021       To Whom it May Concern:    Patricia Foster is under my professional care  She was seen in my office on 8/17/2021  She may return to work on 8/18/2021  If you have any questions or concerns, please don't hesitate to call           Sincerely,          John Rosa DO        CC: No Recipients

## 2021-08-20 ENCOUNTER — OFFICE VISIT (OUTPATIENT)
Dept: FAMILY MEDICINE CLINIC | Facility: CLINIC | Age: 31
End: 2021-08-20
Payer: COMMERCIAL

## 2021-08-20 VITALS
BODY MASS INDEX: 42.86 KG/M2 | TEMPERATURE: 97.7 F | HEART RATE: 103 BPM | HEIGHT: 61 IN | WEIGHT: 227 LBS | SYSTOLIC BLOOD PRESSURE: 130 MMHG | OXYGEN SATURATION: 100 % | DIASTOLIC BLOOD PRESSURE: 80 MMHG

## 2021-08-20 DIAGNOSIS — J45.21 MILD INTERMITTENT ASTHMA WITH ACUTE EXACERBATION: Primary | ICD-10-CM

## 2021-08-20 PROCEDURE — 99213 OFFICE O/P EST LOW 20 MIN: CPT | Performed by: FAMILY MEDICINE

## 2021-08-20 PROCEDURE — 3075F SYST BP GE 130 - 139MM HG: CPT | Performed by: FAMILY MEDICINE

## 2021-08-20 PROCEDURE — 3079F DIAST BP 80-89 MM HG: CPT | Performed by: FAMILY MEDICINE

## 2021-08-20 RX ORDER — FEXOFENADINE HCL 180 MG/1
180 TABLET ORAL DAILY
Qty: 30 TABLET | Refills: 5 | Status: SHIPPED | OUTPATIENT
Start: 2021-08-20

## 2021-08-20 RX ORDER — MONTELUKAST SODIUM 10 MG/1
10 TABLET ORAL
Qty: 30 TABLET | Refills: 5 | Status: SHIPPED | OUTPATIENT
Start: 2021-08-20

## 2021-08-20 NOTE — LETTER
August 20, 2021     Patient: Myesha Mtz   YOB: 1990   Date of Visit: 8/20/2021       To Whom it May Concern:    Myesha Mtz is under my professional care  She was seen in my office on 8/20/2021  She may return to work on 8/21/2021  If you have any questions or concerns, please don't hesitate to call           Sincerely,          Sharifa Walker DO        CC: No Recipients

## 2021-08-20 NOTE — PROGRESS NOTES
Assessment/Plan:    No problem-specific Assessment & Plan notes found for this encounter  Diagnoses and all orders for this visit:    Mild intermittent asthma with acute exacerbation  -     montelukast (SINGULAIR) 10 mg tablet; Take 1 tablet (10 mg total) by mouth daily at bedtime  -     fexofenadine (ALLEGRA) 180 MG tablet; Take 1 tablet (180 mg total) by mouth daily          PHQ-9 Depression Screening    PHQ-9:   Frequency of the following problems over the past two weeks:      Little interest or pleasure in doing things: 0 - not at all  Feeling down, depressed, or hopeless: 0 - not at all  PHQ-2 Score: 0            Subjective:      Patient ID: Kayy Callejas is a 33 y o  female  Patient presents for follow up of bronchitis and asthma exacerbation  She feels her symptoms have all improved except her asthma continues to bother her, she is using her inhaler 3-4 x daily and only getting short term relief  She reports having an allergy to prednisone due to diffuse swelling  She reports not being able to take any steroids in any way  The following portions of the patient's history were reviewed and updated as appropriate: allergies, current medications, past family history, past medical history, past social history, past surgical history and problem list     Review of Systems   Constitutional: Negative  HENT: Negative  Negative for congestion, sinus pressure, sneezing and sore throat  Respiratory: Positive for cough, chest tightness, shortness of breath and wheezing  Cardiovascular: Negative for chest pain, palpitations and leg swelling  Neurological: Negative for dizziness, light-headedness and headaches  Objective:    /80   Pulse 103   Temp 97 7 °F (36 5 °C)   Ht 5' 1" (1 549 m)   Wt 103 kg (227 lb)   LMP 08/03/2021   SpO2 100%   BMI 42 89 kg/m²      Physical Exam  Vitals and nursing note reviewed  Constitutional:       General: She is not in acute distress  Appearance: Normal appearance  She is not ill-appearing  HENT:      Head: Normocephalic and atraumatic  Mouth/Throat:      Mouth: Mucous membranes are moist       Pharynx: No oropharyngeal exudate  Eyes:      Conjunctiva/sclera: Conjunctivae normal    Cardiovascular:      Rate and Rhythm: Normal rate and regular rhythm  Heart sounds: Normal heart sounds  No murmur heard  Pulmonary:      Effort: Pulmonary effort is normal  No respiratory distress  Breath sounds: Normal breath sounds  No decreased breath sounds, wheezing, rhonchi or rales  Abdominal:      General: There is no distension  Musculoskeletal:      Cervical back: Normal range of motion and neck supple  No rigidity  Right lower leg: No edema  Left lower leg: No edema  Lymphadenopathy:      Cervical: No cervical adenopathy  Skin:     General: Skin is warm and dry  Neurological:      Mental Status: She is alert and oriented to person, place, and time  Psychiatric:         Mood and Affect: Mood normal          Behavior: Behavior normal          Thought Content:  Thought content normal          Judgment: Judgment normal

## 2021-08-26 ENCOUNTER — HOSPITAL ENCOUNTER (EMERGENCY)
Facility: HOSPITAL | Age: 31
Discharge: LEFT BEFORE DISCHARGE | End: 2021-08-26
Payer: COMMERCIAL

## 2021-08-26 VITALS
OXYGEN SATURATION: 95 % | SYSTOLIC BLOOD PRESSURE: 134 MMHG | DIASTOLIC BLOOD PRESSURE: 84 MMHG | TEMPERATURE: 99.7 F | RESPIRATION RATE: 18 BRPM | HEART RATE: 98 BPM

## 2021-08-26 LAB
ALBUMIN SERPL BCP-MCNC: 3.9 G/DL (ref 3.5–5)
ALP SERPL-CCNC: 107 U/L (ref 46–116)
ALT SERPL W P-5'-P-CCNC: 22 U/L (ref 12–78)
ANION GAP SERPL CALCULATED.3IONS-SCNC: 11 MMOL/L (ref 4–13)
AST SERPL W P-5'-P-CCNC: 11 U/L (ref 5–45)
BASOPHILS # BLD AUTO: 0.03 THOUSANDS/ΜL (ref 0–0.1)
BASOPHILS NFR BLD AUTO: 0 % (ref 0–1)
BILIRUB SERPL-MCNC: 0.26 MG/DL (ref 0.2–1)
BUN SERPL-MCNC: 12 MG/DL (ref 5–25)
CALCIUM SERPL-MCNC: 9.9 MG/DL (ref 8.3–10.1)
CHLORIDE SERPL-SCNC: 102 MMOL/L (ref 100–108)
CO2 SERPL-SCNC: 25 MMOL/L (ref 21–32)
CREAT SERPL-MCNC: 0.83 MG/DL (ref 0.6–1.3)
EOSINOPHIL # BLD AUTO: 0.14 THOUSAND/ΜL (ref 0–0.61)
EOSINOPHIL NFR BLD AUTO: 1 % (ref 0–6)
ERYTHROCYTE [DISTWIDTH] IN BLOOD BY AUTOMATED COUNT: 15.2 % (ref 11.6–15.1)
GFR SERPL CREATININE-BSD FRML MDRD: 93 ML/MIN/1.73SQ M
GLUCOSE SERPL-MCNC: 271 MG/DL (ref 65–140)
HCT VFR BLD AUTO: 44.1 % (ref 34.8–46.1)
HGB BLD-MCNC: 14 G/DL (ref 11.5–15.4)
IMM GRANULOCYTES # BLD AUTO: 0.03 THOUSAND/UL (ref 0–0.2)
IMM GRANULOCYTES NFR BLD AUTO: 0 % (ref 0–2)
LYMPHOCYTES # BLD AUTO: 3.04 THOUSANDS/ΜL (ref 0.6–4.47)
LYMPHOCYTES NFR BLD AUTO: 30 % (ref 14–44)
MCH RBC QN AUTO: 24.7 PG (ref 26.8–34.3)
MCHC RBC AUTO-ENTMCNC: 31.7 G/DL (ref 31.4–37.4)
MCV RBC AUTO: 78 FL (ref 82–98)
MONOCYTES # BLD AUTO: 0.63 THOUSAND/ΜL (ref 0.17–1.22)
MONOCYTES NFR BLD AUTO: 6 % (ref 4–12)
NEUTROPHILS # BLD AUTO: 6.26 THOUSANDS/ΜL (ref 1.85–7.62)
NEUTS SEG NFR BLD AUTO: 63 % (ref 43–75)
NRBC BLD AUTO-RTO: 0 /100 WBCS
PLATELET # BLD AUTO: 286 THOUSANDS/UL (ref 149–390)
PMV BLD AUTO: 10.7 FL (ref 8.9–12.7)
POTASSIUM SERPL-SCNC: 4.5 MMOL/L (ref 3.5–5.3)
PROT SERPL-MCNC: 8.3 G/DL (ref 6.4–8.2)
RBC # BLD AUTO: 5.66 MILLION/UL (ref 3.81–5.12)
SODIUM SERPL-SCNC: 138 MMOL/L (ref 136–145)
TROPONIN I SERPL-MCNC: <0.02 NG/ML
WBC # BLD AUTO: 10.13 THOUSAND/UL (ref 4.31–10.16)

## 2021-08-26 PROCEDURE — 93005 ELECTROCARDIOGRAM TRACING: CPT

## 2021-08-26 PROCEDURE — 36415 COLL VENOUS BLD VENIPUNCTURE: CPT

## 2021-08-26 PROCEDURE — 84484 ASSAY OF TROPONIN QUANT: CPT

## 2021-08-26 PROCEDURE — 80053 COMPREHEN METABOLIC PANEL: CPT

## 2021-08-26 PROCEDURE — 85025 COMPLETE CBC W/AUTO DIFF WBC: CPT

## 2021-08-26 PROCEDURE — 99283 EMERGENCY DEPT VISIT LOW MDM: CPT

## 2021-08-27 LAB
ATRIAL RATE: 102 BPM
P AXIS: 66 DEGREES
PR INTERVAL: 162 MS
QRS AXIS: 77 DEGREES
QRSD INTERVAL: 146 MS
QT INTERVAL: 362 MS
QTC INTERVAL: 463 MS
T WAVE AXIS: 38 DEGREES
VENTRICULAR RATE: 98 BPM

## 2021-08-27 PROCEDURE — 93010 ELECTROCARDIOGRAM REPORT: CPT | Performed by: INTERNAL MEDICINE

## 2021-09-08 ENCOUNTER — HOSPITAL ENCOUNTER (EMERGENCY)
Facility: HOSPITAL | Age: 31
Discharge: HOME | End: 2021-09-09
Attending: EMERGENCY MEDICINE
Payer: COMMERCIAL

## 2021-09-08 ENCOUNTER — APPOINTMENT (EMERGENCY)
Dept: RADIOLOGY | Facility: HOSPITAL | Age: 31
End: 2021-09-08
Payer: COMMERCIAL

## 2021-09-08 VITALS
TEMPERATURE: 97.7 F | HEART RATE: 85 BPM | WEIGHT: 231.7 LBS | BODY MASS INDEX: 43.78 KG/M2 | RESPIRATION RATE: 18 BRPM | DIASTOLIC BLOOD PRESSURE: 79 MMHG | OXYGEN SATURATION: 99 % | SYSTOLIC BLOOD PRESSURE: 132 MMHG

## 2021-09-08 DIAGNOSIS — R07.9 CHEST PAIN: Primary | ICD-10-CM

## 2021-09-08 LAB
ALBUMIN SERPL BCP-MCNC: 3.8 G/DL (ref 3.5–5)
ALP SERPL-CCNC: 96 U/L (ref 46–116)
ALT SERPL W P-5'-P-CCNC: 20 U/L (ref 12–78)
ANION GAP SERPL CALCULATED.3IONS-SCNC: 8 MMOL/L (ref 4–13)
AST SERPL W P-5'-P-CCNC: 11 U/L (ref 5–45)
BASOPHILS # BLD AUTO: 0.02 THOUSANDS/ΜL (ref 0–0.1)
BASOPHILS NFR BLD AUTO: 0 % (ref 0–1)
BILIRUB SERPL-MCNC: 0.26 MG/DL (ref 0.2–1)
BUN SERPL-MCNC: 9 MG/DL (ref 5–25)
CALCIUM SERPL-MCNC: 9.2 MG/DL (ref 8.3–10.1)
CHLORIDE SERPL-SCNC: 102 MMOL/L (ref 100–108)
CO2 SERPL-SCNC: 25 MMOL/L (ref 21–32)
CREAT SERPL-MCNC: 0.89 MG/DL (ref 0.6–1.3)
EOSINOPHIL # BLD AUTO: 0.11 THOUSAND/ΜL (ref 0–0.61)
EOSINOPHIL NFR BLD AUTO: 1 % (ref 0–6)
ERYTHROCYTE [DISTWIDTH] IN BLOOD BY AUTOMATED COUNT: 14.9 % (ref 11.6–15.1)
EXT PREG TEST URINE: NEGATIVE
EXT. CONTROL ED NAV: NORMAL
GFR SERPL CREATININE-BSD FRML MDRD: 85 ML/MIN/1.73SQ M
GLUCOSE SERPL-MCNC: 231 MG/DL (ref 65–140)
HCT VFR BLD AUTO: 41.1 % (ref 34.8–46.1)
HGB BLD-MCNC: 12.9 G/DL (ref 11.5–15.4)
IMM GRANULOCYTES # BLD AUTO: 0.05 THOUSAND/UL (ref 0–0.2)
IMM GRANULOCYTES NFR BLD AUTO: 1 % (ref 0–2)
LYMPHOCYTES # BLD AUTO: 2.25 THOUSANDS/ΜL (ref 0.6–4.47)
LYMPHOCYTES NFR BLD AUTO: 23 % (ref 14–44)
MCH RBC QN AUTO: 24.4 PG (ref 26.8–34.3)
MCHC RBC AUTO-ENTMCNC: 31.4 G/DL (ref 31.4–37.4)
MCV RBC AUTO: 78 FL (ref 82–98)
MONOCYTES # BLD AUTO: 0.53 THOUSAND/ΜL (ref 0.17–1.22)
MONOCYTES NFR BLD AUTO: 5 % (ref 4–12)
NEUTROPHILS # BLD AUTO: 6.96 THOUSANDS/ΜL (ref 1.85–7.62)
NEUTS SEG NFR BLD AUTO: 70 % (ref 43–75)
NRBC BLD AUTO-RTO: 0 /100 WBCS
PLATELET # BLD AUTO: 274 THOUSANDS/UL (ref 149–390)
PMV BLD AUTO: 10.6 FL (ref 8.9–12.7)
POTASSIUM SERPL-SCNC: 3.6 MMOL/L (ref 3.5–5.3)
PROT SERPL-MCNC: 7.6 G/DL (ref 6.4–8.2)
RBC # BLD AUTO: 5.28 MILLION/UL (ref 3.81–5.12)
SODIUM SERPL-SCNC: 135 MMOL/L (ref 136–145)
TROPONIN I SERPL-MCNC: <0.02 NG/ML
WBC # BLD AUTO: 9.92 THOUSAND/UL (ref 4.31–10.16)

## 2021-09-08 PROCEDURE — 36415 COLL VENOUS BLD VENIPUNCTURE: CPT

## 2021-09-08 PROCEDURE — 99285 EMERGENCY DEPT VISIT HI MDM: CPT

## 2021-09-08 PROCEDURE — 85025 COMPLETE CBC W/AUTO DIFF WBC: CPT | Performed by: EMERGENCY MEDICINE

## 2021-09-08 PROCEDURE — 81025 URINE PREGNANCY TEST: CPT | Performed by: EMERGENCY MEDICINE

## 2021-09-08 PROCEDURE — 93005 ELECTROCARDIOGRAM TRACING: CPT

## 2021-09-08 PROCEDURE — 71045 X-RAY EXAM CHEST 1 VIEW: CPT

## 2021-09-08 PROCEDURE — 84484 ASSAY OF TROPONIN QUANT: CPT | Performed by: EMERGENCY MEDICINE

## 2021-09-08 PROCEDURE — 80053 COMPREHEN METABOLIC PANEL: CPT | Performed by: EMERGENCY MEDICINE

## 2021-09-08 PROCEDURE — 99285 EMERGENCY DEPT VISIT HI MDM: CPT | Performed by: EMERGENCY MEDICINE

## 2021-09-08 RX ORDER — ASPIRIN 325 MG
325 TABLET ORAL ONCE
Status: COMPLETED | OUTPATIENT
Start: 2021-09-08 | End: 2021-09-08

## 2021-09-08 RX ORDER — ACETAMINOPHEN 325 MG/1
975 TABLET ORAL ONCE
Status: COMPLETED | OUTPATIENT
Start: 2021-09-08 | End: 2021-09-08

## 2021-09-08 RX ADMIN — ASPIRIN 325 MG: 325 TABLET ORAL at 21:22

## 2021-09-08 RX ADMIN — ACETAMINOPHEN 975 MG: 325 TABLET, FILM COATED ORAL at 21:22

## 2021-09-08 NOTE — Clinical Note
Lucina Peña was seen and treated in our emergency department on 9/8/2021  Diagnosis:     Betty Pereyra  may return to work on return date  She may return on this date: 09/10/2021         If you have any questions or concerns, please don't hesitate to call        Raffy Bosotn MD    ______________________________           _______________          _______________  Hospital Representative                              Date                                Time

## 2021-09-08 NOTE — Clinical Note
Patricia Foster was seen and treated in our emergency department on 9/8/2021  Diagnosis:     Nusrat  may return to work on return date  She may return on this date: 09/10/2021         If you have any questions or concerns, please don't hesitate to call        Lyubov Jackson MD    ______________________________           _______________          _______________  Hospital Representative                              Date                                Time

## 2021-09-09 LAB
ATRIAL RATE: 91 BPM
P AXIS: 59 DEGREES
PR INTERVAL: 178 MS
QRS AXIS: 24 DEGREES
QRSD INTERVAL: 152 MS
QT INTERVAL: 370 MS
QTC INTERVAL: 446 MS
T WAVE AXIS: 24 DEGREES
TROPONIN I SERPL-MCNC: <0.02 NG/ML
VENTRICULAR RATE: 87 BPM

## 2021-09-09 PROCEDURE — 93010 ELECTROCARDIOGRAM REPORT: CPT | Performed by: INTERNAL MEDICINE

## 2021-09-09 PROCEDURE — 36415 COLL VENOUS BLD VENIPUNCTURE: CPT | Performed by: EMERGENCY MEDICINE

## 2021-09-09 PROCEDURE — 84484 ASSAY OF TROPONIN QUANT: CPT | Performed by: EMERGENCY MEDICINE

## 2021-09-09 NOTE — ED PROVIDER NOTES
History  Chief Complaint   Patient presents with    Chest Pain     Patient reports intermittiant chest pain starting at 1330 today becoming constant at 1830  Has history of congenital heart defect  -dizziness/diaphorisis  History provided by:  Patient   used: No        Patient presents for evaluation of left-sided chest pain  Intermittent since 13/3 0  Patient states she was at work, reaching for an object when he began the left-sided chest pain  No shortness of breath  Pain initially improved, worsened and became constant  No aggravating or alleviating symptoms  No medications taken at home prior to my evaluation, does have history of congenital heart defect  No history of coronary artery disease    Prior to Admission Medications   Prescriptions Last Dose Informant Patient Reported? Taking? ALPRAZolam (XANAX) 0 5 mg tablet   No No   Sig: Take 1 tablet (0 5 mg total) by mouth daily at bedtime as needed for anxiety   B-D UF III MINI PEN NEEDLES 31G X 5 MM MISC   No No   Sig: INJECT UNDER THE SKIN DAILY AT BEDTIME USE ONE A DAY OR AS DIRECTED   Blood Glucose Monitoring Suppl (Accu-Chek Niecy SmartView) w/Device KIT   No No   Sig: Use once for 1 dose   Blood Pressure Monitoring (B-D ASSURE BPM/AUTO WRIST CUFF) MISC   No No   Sig: Check blood pressure prior to each OB visit, or as directed by your physician     VITAMIN D PO   Yes No   Sig: Take by mouth daily   acetaminophen (TYLENOL) 500 mg tablet   Yes No   Sig: Take 1,000 mg by mouth   albuterol (Ventolin HFA) 90 mcg/act inhaler   No No   Sig: Inhale 2 puffs every 4 (four) hours as needed for wheezing or shortness of breath   benzonatate (TESSALON PERLES) 100 mg capsule   No No   Si-2 caps every 8 hours as needed for cough   buPROPion (WELLBUTRIN XL) 150 mg 24 hr tablet   No No   Sig: Take 1 tablet (150 mg total) by mouth daily   clotrimazole (LOTRIMIN) 1 % cream   No No   Sig: Apply topically 2 (two) times a day cyclobenzaprine (FLEXERIL) 5 mg tablet   No No   Sig: Take 1 tablet (5 mg total) by mouth 3 (three) times a day as needed for muscle spasms   dicyclomine (BENTYL) 20 mg tablet   No No   Sig: Take 1 tablet (20 mg total) by mouth 3 (three) times a day as needed (abdominal pain)   fexofenadine (ALLEGRA) 180 MG tablet   No No   Sig: Take 1 tablet (180 mg total) by mouth daily   glucose blood (Accu-Chek Guide) test strip   No No   Sig: Test three times per day   insulin glargine (LANTUS) 100 units/mL subcutaneous injection   No No   Sig: Inject 15 Units under the skin daily at bedtime   insulin lispro (HumaLOG KwikPen) 100 units/mL injection pen   No No   Sig: Inject SC 10 units before breakfast, before lunch and dinner  To be titrated     labetalol (NORMODYNE) 200 mg tablet  Self Yes No   Sig: Take 200 mg by mouth 2 (two) times a day    lisinopril (ZESTRIL) 30 mg tablet   Yes No   Sig: Take 30 mg by mouth daily   meloxicam (MOBIC) 15 mg tablet   No No   Sig: Take 1 tablet (15 mg total) by mouth daily   metFORMIN (GLUCOPHAGE) 1000 MG tablet   No No   Sig: Take 1 tablet (1,000 mg total) by mouth 2 (two) times a day with meals   montelukast (SINGULAIR) 10 mg tablet   No No   Sig: Take 1 tablet (10 mg total) by mouth daily at bedtime   ondansetron (ZOFRAN) 4 mg tablet   No No   Sig: Take 1 tablet (4 mg total) by mouth every 6 (six) hours      Facility-Administered Medications: None       Past Medical History:   Diagnosis Date    Allergic     Arthritis     Bipolar affective disorder, currently depressed, moderate (HCC) 12/17/2018    Cyst of ovary, right     Diabetes mellitus ([facility] [address] ) 10/10/18    type 2    Endometriosis     Heart murmur 01/19/88    Hepatitis C     Hepatitis C virus infection cured after antiviral drug therapy     History of transfusion     Hypertension     Migraines     Obesity 1995    Pulmonary artery congenital abnormality     Spleen enlarged     Status post surgical removal of both fallopian tubes     Varicella        Past Surgical History:   Procedure Laterality Date    CARDIAC CATHETERIZATION      no CAD 10days, 4 weeks 22months old      SECTION, LOW TRANSVERSE  2020    CHOLECYSTECTOMY      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 7   Arslan Stacy CORONARY STENT PLACEMENT      LIVER BIOPSY      LIVER BIOPSY      TUBAL LIGATION Bilateral 2020    VSD REPAIR      As a child       Family History   Problem Relation Age of Onset    Hypertension Mother     Migraines Mother     JENNY disease Mother     Depression Mother     Hyperlipidemia Mother     Diabetes Mother     Diabetes Father     Hypertension Father     Kidney failure Father     Heart attack Father     Arthritis Father     Stroke Father     Polycystic kidney disease Paternal Grandmother     Stroke Paternal Grandmother     Heart disease Paternal Grandmother     Arthritis Sister     Asthma Sister     Thyroid disease Sister     Diabetes Sister     Arthritis Maternal Grandmother     Breast cancer Maternal Grandmother     Diabetes Maternal Grandmother     Hypertension Maternal Grandmother     Heart Valve Disease Maternal Grandmother     Learning disabilities Cousin     Learning disabilities Sister     ADD / ADHD Cousin     Lung cancer Brother     Diabetes Maternal Grandfather     Hypertension Maternal Grandfather     JENNY disease Maternal Grandfather     Stroke Maternal Grandfather      I have reviewed and agree with the history as documented      E-Cigarette/Vaping    E-Cigarette Use Never User      E-Cigarette/Vaping Substances    Nicotine No     THC No     CBD No     Flavoring No      Social History     Tobacco Use    Smoking status: Former Smoker     Packs/day: 0 20     Types: Cigarettes    Smokeless tobacco: Never Used   Vaping Use    Vaping Use: Never used   Substance Use Topics    Alcohol use: Not Currently     Alcohol/week: 3 0 standard drinks     Types: 3 Standard drinks or equivalent per week     Comment: socially/prior to knowledge of pregnancy    Drug use: Not Currently     Comment: hx of THC use for migraines       Review of Systems   Cardiovascular: Positive for chest pain  All other systems reviewed and are negative  Physical Exam  Physical Exam  Vitals and nursing note reviewed  Constitutional:       General: She is not in acute distress  Appearance: She is well-developed  HENT:      Head: Normocephalic and atraumatic  Eyes:      Pupils: Pupils are equal, round, and reactive to light  Cardiovascular:      Rate and Rhythm: Normal rate and regular rhythm  Heart sounds: Normal heart sounds  No murmur heard  Pulmonary:      Effort: Pulmonary effort is normal  No respiratory distress  Breath sounds: Normal breath sounds  No wheezing or rales  Comments: Equal, bilateral breath sounds  Abdominal:      General: Bowel sounds are normal  There is no distension  Palpations: Abdomen is soft  Tenderness: There is no abdominal tenderness  There is no guarding or rebound  Musculoskeletal:         General: No deformity  Normal range of motion  Cervical back: Normal range of motion and neck supple  Comments: Tenderness left chest wall  No crepitus  Lymphadenopathy:      Cervical: No cervical adenopathy  Skin:     Capillary Refill: Capillary refill takes less than 2 seconds  Findings: No erythema or rash  Neurological:      Mental Status: She is alert and oriented to person, place, and time  Cranial Nerves: No cranial nerve deficit  Motor: No abnormal muscle tone        Coordination: Coordination normal    Psychiatric:         Behavior: Behavior normal          Vital Signs  ED Triage Vitals   Temperature Pulse Respirations Blood Pressure SpO2   09/08/21 2032 09/08/21 2032 09/08/21 2032 09/08/21 2032 09/08/21 2032   97 7 °F (36 5 °C) 94 18 154/74 98 %      Temp Source Heart Rate Source Patient Position - Orthostatic VS BP Location FiO2 (%) 09/08/21 2032 09/08/21 2032 09/08/21 2032 09/08/21 2032 --   Oral Monitor Sitting Left arm       Pain Score       09/08/21 2104       Worst Possible Pain           Vitals:    09/08/21 2032 09/08/21 2149 09/08/21 2308   BP: 154/74 139/77 132/79   Pulse: 94 84 85   Patient Position - Orthostatic VS: Sitting Lying          Visual Acuity      ED Medications  Medications   aspirin tablet 325 mg (325 mg Oral Given 9/8/21 2122)   acetaminophen (TYLENOL) tablet 975 mg (975 mg Oral Given 9/8/21 2122)       Diagnostic Studies  Results Reviewed     Procedure Component Value Units Date/Time    Troponin I [[id number]]  (Normal) Collected: 09/09/21 0003    Lab Status: Final result Specimen: Blood from Arm, Right Updated: 09/09/21 0102     Troponin I <0 02 ng/mL     POCT pregnancy, urine [[id number]]  (Normal) Resulted: 09/08/21 2244    Lab Status: Final result Updated: 09/08/21 2244     EXT PREG TEST UR (Ref: Negative) Negative     Control Valid    Troponin I [[id number]]  (Normal) Collected: 09/08/21 2045    Lab Status: Final result Specimen: Blood from Arm, Right Updated: 09/08/21 2111     Troponin I <0 02 ng/mL     Comprehensive metabolic panel [[id number]]  (Abnormal) Collected: 09/08/21 2045    Lab Status: Final result Specimen: Blood from Arm, Right Updated: 09/08/21 2109     Sodium 135 mmol/L      Potassium 3 6 mmol/L      Chloride 102 mmol/L      CO2 25 mmol/L      ANION GAP 8 mmol/L      BUN 9 mg/dL      Creatinine 0 89 mg/dL      Glucose 231 mg/dL      Calcium 9 2 mg/dL      AST 11 U/L      ALT 20 U/L      Alkaline Phosphatase 96 U/L      Total Protein 7 6 g/dL      Albumin 3 8 g/dL      Total Bilirubin 0 26 mg/dL      eGFR 85 ml/min/1 73sq m     Narrative:      Barber guidelines for Chronic Kidney Disease (CKD):     Stage 1 with normal or high GFR (GFR > 90 mL/min/1 73 square meters)    Stage 2 Mild CKD (GFR = 60-89 mL/min/1 73 square meters)    Stage 3A Moderate CKD (GFR = 45-59 mL/min/1 73 square meters)    Stage 3B Moderate CKD (GFR = 30-44 mL/min/1 73 square meters)    Stage 4 Severe CKD (GFR = 15-29 mL/min/1 73 square meters)    Stage 5 End Stage CKD (GFR <15 mL/min/1 73 square meters)  Note: GFR calculation is accurate only with a steady state creatinine    CBC and differential [[id number]]  (Abnormal) Collected: 09/08/21 2045    Lab Status: Final result Specimen: Blood from Arm, Right Updated: 09/08/21 2057     WBC 9 92 Thousand/uL      RBC 5 28 Million/uL      Hemoglobin 12 9 g/dL      Hematocrit 41 1 %      MCV 78 fL      MCH 24 4 pg      MCHC 31 4 g/dL      RDW 14 9 %      MPV 10 6 fL      Platelets 025 Thousands/uL      nRBC 0 /100 WBCs      Neutrophils Relative 70 %      Immat GRANS % 1 %      Lymphocytes Relative 23 %      Monocytes Relative 5 %      Eosinophils Relative 1 %      Basophils Relative 0 %      Neutrophils Absolute 6 96 Thousands/µL      Immature Grans Absolute 0 05 Thousand/uL      Lymphocytes Absolute 2 25 Thousands/µL      Monocytes Absolute 0 53 Thousand/µL      Eosinophils Absolute 0 11 Thousand/µL      Basophils Absolute 0 02 Thousands/µL                  XR chest 1 view portable   ED Interpretation by Tuan Rai MD (09/09 1945)   No acute cardiopulmonary disease  Procedures  ECG 12 Lead Documentation Only    Date/Time: 9/8/2021 8:41 PM  Performed by: Tuan Rai MD  Authorized by: Tuan Rai MD     Indications / Diagnosis:  Left-sided chest pain  ECG reviewed by me, the ED Provider: yes    Patient location:  ED  Interpretation:     Interpretation: normal    Rate:     ECG rate:  87    ECG rate assessment: normal    Rhythm:     Rhythm: sinus rhythm    QRS:     QRS axis:  Normal    QRS intervals:   Wide  Conduction:     Conduction: abnormal      Abnormal conduction: complete RBBB    ST segments:     ST segments:  Normal  T waves:     T waves: normal               ED Course             HEART Risk Score      Most Recent Value Heart Score Risk Calculator   History  0 Filed at: 09/09/2021 0058   ECG  0 Filed at: 09/09/2021 0058   Age  0 Filed at: 09/09/2021 0058   Risk Factors  1 Filed at: 09/09/2021 0058   Troponin  0 Filed at: 09/09/2021 0058   HEART Score  1 Filed at: 09/09/2021 0058                      SBIRT 20yo+      Most Recent Value   SBIRT (25 yo +)   In order to provide better care to our patients, we are screening all of our patients for alcohol and drug use  Would it be okay to ask you these screening questions? Unable to answer at this time Filed at: 09/09/2021 0112                    MDM  Number of Diagnoses or Management Options  Chest pain: new and requires workup  Diagnosis management comments: Left-sided reproducible pain  Chest x-ray with no signs of acute cardiopulmonary abnormality  Heart score 1  EKG similar to previous baseline  Patient's pain improved with aspirin and Tylenol  Initial troponin negative  Will check 2nd troponin due to patient's cardiac history  Heart score 1  No shortness of breath, no leg swelling, no hypoxia or tachycardia, doubt PE  Second troponin negative  Patient reassessed  Feels well and wishes to be discharged home  Atypical pain especially in setting of negative troponin, heart score 1  Patient asymptomatic at time of discharge  Return precautions discussed, PCP follow-up recommended           Amount and/or Complexity of Data Reviewed  Clinical lab tests: ordered and reviewed  Tests in the radiology section of CPT®: reviewed and ordered  Tests in the medicine section of CPT®: ordered and reviewed  Independent visualization of images, tracings, or specimens: yes    Risk of Complications, Morbidity, and/or Mortality  Presenting problems: high  Diagnostic procedures: moderate  Management options: high    Patient Progress  Patient progress: improved      Disposition  Final diagnoses:   Chest pain     Time reflects when diagnosis was documented in both MDM as applicable and the Disposition within this note     Time User Action Codes Description Comment    9/9/2021  1:02 AM Sudhakar Ernst Add [R07 9] Chest pain       ED Disposition     ED Disposition Condition Date/Time Comment    Discharge Stable Thu Sep 9, 2021  1:03 AM Michelle Turner discharge to home/self care  Follow-up Information     Follow up With Specialties Details Why Contact Info [address], [address] [place] [address], Nurse Practitioner Schedule an appointment as soon as possible for a visit in 2 days As needed [facility]    [address] [address]  [phone]       [address] [facility] Emergency Department Emergency Medicine Go to  If symptoms worsen [address] [facility] [address] [facility] Emergency Department, [address], [place], [place], [address]          Discharge Medication List as of 9/9/2021  1:04 AM      CONTINUE these medications which have NOT CHANGED    Details   acetaminophen (TYLENOL) 500 mg tablet Take 1,000 mg by mouth, Starting Wed 5/6/2020, Historical Med      albuterol (Ventolin HFA) 90 mcg/act inhaler Inhale 2 puffs every 4 (four) hours as needed for wheezing or shortness of breath, Starting Mon 8/16/2021, Normal      ALPRAZolam (XANAX) 0 5 mg tablet Take 1 tablet (0 5 mg total) by mouth daily at bedtime as needed for anxiety, Starting Thu 4/1/2021, Normal      B-D UF III MINI PEN NEEDLES 31G X 5 MM MISC INJECT UNDER THE SKIN DAILY AT BEDTIME USE ONE A DAY OR AS DIRECTED, Normal      benzonatate (TESSALON PERLES) 100 mg capsule 1-2 caps every 8 hours as needed for cough, Normal      Blood Glucose Monitoring Suppl (Accu-Chek Niecy SmartView) w/Device KIT Use once for 1 dose, Starting Wed 5/26/2021, Normal      Blood Pressure Monitoring (B-D ASSURE BPM/AUTO WRIST CUFF) MISC Check blood pressure prior to each OB visit, or as directed by your physician , Normal      buPROPion (WELLBUTRIN XL) 150 mg 24 hr tablet Take 1 tablet (150 mg total) by mouth daily, Starting Mon 11/2/2020, Normal      clotrimazole (LOTRIMIN) 1 % cream Apply topically 2 (two) times a day, Starting Thu 3/4/2021, Normal      cyclobenzaprine (FLEXERIL) 5 mg tablet Take 1 tablet (5 mg total) by mouth 3 (three) times a day as needed for muscle spasms, Starting Thu 4/1/2021, Normal      dicyclomine (BENTYL) 20 mg tablet Take 1 tablet (20 mg total) by mouth 3 (three) times a day as needed (abdominal pain), Starting Wed 7/21/2021, Normal      fexofenadine (ALLEGRA) 180 MG tablet Take 1 tablet (180 mg total) by mouth daily, Starting Fri 8/20/2021, Normal      glucose blood (Accu-Chek Guide) test strip Test three times per day, Normal      insulin glargine (LANTUS) 100 units/mL subcutaneous injection Inject 15 Units under the skin daily at bedtime, Starting Fri 7/9/2021, Until Sat 11/6/2021, Normal      insulin lispro (HumaLOG KwikPen) 100 units/mL injection pen Inject SC 10 units before breakfast, before lunch and dinner  To be titrated , Normal      labetalol (NORMODYNE) 200 mg tablet Take 200 mg by mouth 2 (two) times a day , Historical Med      lisinopril (ZESTRIL) 30 mg tablet Take 30 mg by mouth daily, Starting Mon 5/25/2020, Historical Med      meloxicam (MOBIC) 15 mg tablet Take 1 tablet (15 mg total) by mouth daily, Starting Thu 7/29/2021, Normal      metFORMIN (GLUCOPHAGE) 1000 MG tablet Take 1 tablet (1,000 mg total) by mouth 2 (two) times a day with meals, Starting Tue 5/25/2021, Normal      montelukast (SINGULAIR) 10 mg tablet Take 1 tablet (10 mg total) by mouth daily at bedtime, Starting Fri 8/20/2021, Normal      ondansetron (ZOFRAN) 4 mg tablet Take 1 tablet (4 mg total) by mouth every 6 (six) hours, Starting Wed 7/21/2021, Normal      VITAMIN D PO Take by mouth daily, Historical Med           No discharge procedures on file      PDMP Review       Value Time User    PDMP Reviewed  Yes 10/2/2020 11:54 AM Mishel Hirsch MD ED Provider  Electronically Signed by           Monse Armstrong MD  09/09/21 2022

## 2021-09-30 ENCOUNTER — OFFICE VISIT (OUTPATIENT)
Dept: FAMILY MEDICINE CLINIC | Facility: CLINIC | Age: 31
End: 2021-09-30
Payer: COMMERCIAL

## 2021-09-30 VITALS
HEART RATE: 63 BPM | OXYGEN SATURATION: 98 % | WEIGHT: 232 LBS | DIASTOLIC BLOOD PRESSURE: 78 MMHG | BODY MASS INDEX: 43.8 KG/M2 | TEMPERATURE: 98.2 F | SYSTOLIC BLOOD PRESSURE: 138 MMHG | HEIGHT: 61 IN

## 2021-09-30 DIAGNOSIS — Q21.0 VSD (VENTRICULAR SEPTAL DEFECT) AND COARCTATION OF AORTA: ICD-10-CM

## 2021-09-30 DIAGNOSIS — I10 HYPERTENSION, UNSPECIFIED TYPE: Primary | ICD-10-CM

## 2021-09-30 DIAGNOSIS — Z23 ENCOUNTER FOR IMMUNIZATION: ICD-10-CM

## 2021-09-30 DIAGNOSIS — Q25.1 VSD (VENTRICULAR SEPTAL DEFECT) AND COARCTATION OF AORTA: ICD-10-CM

## 2021-09-30 DIAGNOSIS — E04.2 MULTIPLE THYROID NODULES: ICD-10-CM

## 2021-09-30 DIAGNOSIS — Q25.1 AORTIC COARCTATION: ICD-10-CM

## 2021-09-30 PROBLEM — Z87.74 H/O AORTIC COARCTATION REPAIR: Status: ACTIVE | Noted: 2018-07-12

## 2021-09-30 PROBLEM — B19.20 VIRAL HEPATITIS C: Status: ACTIVE | Noted: 2018-10-12

## 2021-09-30 PROCEDURE — 90686 IIV4 VACC NO PRSV 0.5 ML IM: CPT

## 2021-09-30 PROCEDURE — 99213 OFFICE O/P EST LOW 20 MIN: CPT | Performed by: NURSE PRACTITIONER

## 2021-09-30 PROCEDURE — 90471 IMMUNIZATION ADMIN: CPT

## 2021-09-30 NOTE — PROGRESS NOTES
Assessment/Plan:    No problem-specific Assessment & Plan notes found for this encounter  Diagnoses and all orders for this visit:    Hypertension, unspecified type    Encounter for immunization  -     influenza vaccine, quadrivalent, 0 5 mL, preservative-free, for adult and pediatric patients 6 mos+ (AFLURIA, FLUARIX, FLULAVAL, FLUZONE)    Aortic coarctation    VSD (ventricular septal defect) and coarctation of aorta    Multiple thyroid nodules          Subjective:      Patient ID: Emmanuel Aly is a 33 y o  female  Patient presents for follow up  Patient was found to have thyroid nodules on CT scan- ultrasound of thyroid was ordered but not yet completed- reminded patient  Patient has hx of coarctation of the aorta and vsd which she sees cardiology for at [facility]  She had follow up with them due to intermittent chest pain with both rest and exertion  After evaluation, they do not feel it is cardiac in nature  Her BP was elevated but she was not on her lisinopril  Patient was instructed by cardiology to restart lisinopril 30 mg and call if BP at home running >130 consistently  Patient states that she has not yet picked up her Lisinopril from the pharmacy  Patient states that it was sent in from her cardiologist to the [facility] in [place]  She was instructed to pick this up today and start it right away  Will f/u with patient in 6 weeks to recheck her blood pressure  Patient does have a cuff at home and check it as needed  She states that she has surgery scheduled in [facility] for broken sternal wire that is causing her chest pain  Patient states that she was not aware that I ordered her ultrasound of her thyroid on 7/15 and does have it schedule at [facility] tomorrow         The following portions of the patient's history were reviewed and updated as appropriate: allergies, current medications, past family history, past medical history, past social history, past surgical history and problem list     Review of Systems   Constitutional: Negative for appetite change, fatigue and unexpected weight change  HENT: Negative for congestion, rhinorrhea, sneezing and sore throat  Eyes: Negative for visual disturbance  Respiratory: Negative for cough, chest tightness, shortness of breath and wheezing  Cardiovascular: Positive for chest pain  Negative for palpitations and leg swelling  Gastrointestinal: Negative for abdominal pain, blood in stool, constipation, diarrhea, nausea and vomiting  Genitourinary: Negative for difficulty urinating, dysuria and urgency  Musculoskeletal: Negative for arthralgias, back pain and joint swelling  Skin: Negative for color change and rash  Neurological: Negative for dizziness, weakness and headaches  Hematological: Negative for adenopathy  Does not bruise/bleed easily  Objective:      /78 (BP Location: Left arm, Patient Position: Sitting, Cuff Size: Standard)   Pulse 63   Temp 98 2 °F (36 8 °C) (Tympanic)   Ht 5' 1" (1 549 m)   Wt 105 kg (232 lb)   LMP 09/23/2021 (Approximate)   SpO2 98%   BMI 43 84 kg/m²          Physical Exam  Vitals and nursing note reviewed  Constitutional:       General: She is not in acute distress  Appearance: Normal appearance  She is obese  She is not ill-appearing or toxic-appearing  Cardiovascular:      Rate and Rhythm: Normal rate and regular rhythm  Pulses: Normal pulses  Heart sounds: Normal heart sounds  No murmur heard  No gallop  Pulmonary:      Effort: Pulmonary effort is normal  No respiratory distress  Breath sounds: No wheezing  Neurological:      General: No focal deficit present  Mental Status: She is alert and oriented to person, place, and time  Mental status is at baseline  Motor: No weakness  Psychiatric:         Mood and Affect: Mood normal          Behavior: Behavior normal          Thought Content:  Thought content normal          Judgment: Judgment normal

## 2021-11-15 ENCOUNTER — OFFICE VISIT (OUTPATIENT)
Dept: FAMILY MEDICINE CLINIC | Facility: CLINIC | Age: 31
End: 2021-11-15
Payer: COMMERCIAL

## 2021-11-15 VITALS
BODY MASS INDEX: 43.43 KG/M2 | WEIGHT: 230 LBS | HEIGHT: 61 IN | SYSTOLIC BLOOD PRESSURE: 128 MMHG | DIASTOLIC BLOOD PRESSURE: 76 MMHG | HEART RATE: 106 BPM | OXYGEN SATURATION: 97 % | TEMPERATURE: 97 F

## 2021-11-15 DIAGNOSIS — Z79.4 TYPE 2 DIABETES MELLITUS WITHOUT COMPLICATION, WITH LONG-TERM CURRENT USE OF INSULIN (HCC): ICD-10-CM

## 2021-11-15 DIAGNOSIS — F41.9 ANXIETY: ICD-10-CM

## 2021-11-15 DIAGNOSIS — R42 DIZZINESS: Primary | ICD-10-CM

## 2021-11-15 DIAGNOSIS — E11.9 TYPE 2 DIABETES MELLITUS WITHOUT COMPLICATION, WITH LONG-TERM CURRENT USE OF INSULIN (HCC): ICD-10-CM

## 2021-11-15 PROCEDURE — 99214 OFFICE O/P EST MOD 30 MIN: CPT | Performed by: NURSE PRACTITIONER

## 2021-11-15 RX ORDER — MECLIZINE HCL 12.5 MG/1
12.5 TABLET ORAL 3 TIMES DAILY PRN
Qty: 30 TABLET | Refills: 0 | Status: SHIPPED | OUTPATIENT
Start: 2021-11-15

## 2021-11-16 PROCEDURE — 93000 ELECTROCARDIOGRAM COMPLETE: CPT | Performed by: NURSE PRACTITIONER

## 2021-11-18 ENCOUNTER — APPOINTMENT (OUTPATIENT)
Dept: LAB | Facility: HOSPITAL | Age: 31
End: 2021-11-18
Payer: COMMERCIAL

## 2021-11-18 DIAGNOSIS — R42 DIZZINESS: ICD-10-CM

## 2021-11-18 DIAGNOSIS — Z79.4 TYPE 2 DIABETES MELLITUS WITHOUT COMPLICATION, WITH LONG-TERM CURRENT USE OF INSULIN (HCC): ICD-10-CM

## 2021-11-18 DIAGNOSIS — E11.9 TYPE 2 DIABETES MELLITUS WITHOUT COMPLICATION, WITH LONG-TERM CURRENT USE OF INSULIN (HCC): ICD-10-CM

## 2021-11-18 DIAGNOSIS — E11.65 TYPE 2 DIABETES MELLITUS WITH HYPERGLYCEMIA, WITHOUT LONG-TERM CURRENT USE OF INSULIN (HCC): ICD-10-CM

## 2021-11-18 LAB
ALBUMIN SERPL BCP-MCNC: 4.1 G/DL (ref 3.5–5.7)
ALP SERPL-CCNC: 71 U/L (ref 40–150)
ALT SERPL W P-5'-P-CCNC: 16 U/L (ref 7–52)
ANION GAP SERPL CALCULATED.3IONS-SCNC: 8 MMOL/L (ref 4–13)
AST SERPL W P-5'-P-CCNC: 13 U/L (ref 13–39)
BASOPHILS # BLD AUTO: 0.1 THOUSANDS/ΜL (ref 0–0.1)
BASOPHILS NFR BLD AUTO: 1 % (ref 0–2)
BILIRUB SERPL-MCNC: 0.4 MG/DL (ref 0.2–1)
BUN SERPL-MCNC: 16 MG/DL (ref 7–25)
CALCIUM SERPL-MCNC: 9.4 MG/DL (ref 8.6–10.5)
CHLORIDE SERPL-SCNC: 101 MMOL/L (ref 98–107)
CO2 SERPL-SCNC: 27 MMOL/L (ref 21–31)
CREAT SERPL-MCNC: 0.59 MG/DL (ref 0.6–1.2)
EOSINOPHIL # BLD AUTO: 0.2 THOUSAND/ΜL (ref 0–0.61)
EOSINOPHIL NFR BLD AUTO: 2 % (ref 0–5)
ERYTHROCYTE [DISTWIDTH] IN BLOOD BY AUTOMATED COUNT: 16.7 % (ref 11.5–14.5)
EST. AVERAGE GLUCOSE BLD GHB EST-MCNC: 229 MG/DL
GFR SERPL CREATININE-BSD FRML MDRD: 121 ML/MIN/1.73SQ M
GLUCOSE P FAST SERPL-MCNC: 261 MG/DL (ref 65–99)
HBA1C MFR BLD: 9.6 %
HCT VFR BLD AUTO: 41.5 % (ref 42–47)
HGB BLD-MCNC: 13.6 G/DL (ref 12–16)
LYMPHOCYTES # BLD AUTO: 2.6 THOUSANDS/ΜL (ref 0.6–4.47)
LYMPHOCYTES NFR BLD AUTO: 34 % (ref 21–51)
MCH RBC QN AUTO: 25 PG (ref 26–34)
MCHC RBC AUTO-ENTMCNC: 32.8 G/DL (ref 31–37)
MCV RBC AUTO: 76 FL (ref 81–99)
MONOCYTES # BLD AUTO: 0.7 THOUSAND/ΜL (ref 0.17–1.22)
MONOCYTES NFR BLD AUTO: 9 % (ref 2–12)
NEUTROPHILS # BLD AUTO: 4.2 THOUSANDS/ΜL (ref 1.4–6.5)
NEUTS SEG NFR BLD AUTO: 55 % (ref 42–75)
PLATELET # BLD AUTO: 291 THOUSANDS/UL (ref 149–390)
PMV BLD AUTO: 9.1 FL (ref 8.6–11.7)
POTASSIUM SERPL-SCNC: 4.1 MMOL/L (ref 3.5–5.5)
PROT SERPL-MCNC: 7.3 G/DL (ref 6.4–8.9)
RBC # BLD AUTO: 5.45 MILLION/UL (ref 3.9–5.2)
SODIUM SERPL-SCNC: 136 MMOL/L (ref 134–143)
WBC # BLD AUTO: 7.8 THOUSAND/UL (ref 4.8–10.8)

## 2021-11-18 PROCEDURE — 83036 HEMOGLOBIN GLYCOSYLATED A1C: CPT

## 2021-11-18 PROCEDURE — 85025 COMPLETE CBC W/AUTO DIFF WBC: CPT

## 2021-11-18 PROCEDURE — 36415 COLL VENOUS BLD VENIPUNCTURE: CPT

## 2021-11-18 PROCEDURE — 80053 COMPREHEN METABOLIC PANEL: CPT

## 2021-11-19 ENCOUNTER — OFFICE VISIT (OUTPATIENT)
Dept: FAMILY MEDICINE CLINIC | Facility: CLINIC | Age: 31
End: 2021-11-19
Payer: COMMERCIAL

## 2021-11-19 VITALS
HEART RATE: 95 BPM | HEIGHT: 61 IN | OXYGEN SATURATION: 98 % | WEIGHT: 228.2 LBS | SYSTOLIC BLOOD PRESSURE: 126 MMHG | TEMPERATURE: 96.7 F | DIASTOLIC BLOOD PRESSURE: 72 MMHG | BODY MASS INDEX: 43.08 KG/M2

## 2021-11-19 DIAGNOSIS — R42 DIZZINESS: ICD-10-CM

## 2021-11-19 DIAGNOSIS — E11.9 TYPE 2 DIABETES MELLITUS WITHOUT COMPLICATION, WITH LONG-TERM CURRENT USE OF INSULIN (HCC): Primary | ICD-10-CM

## 2021-11-19 DIAGNOSIS — Z79.4 TYPE 2 DIABETES MELLITUS WITHOUT COMPLICATION, WITH LONG-TERM CURRENT USE OF INSULIN (HCC): Primary | ICD-10-CM

## 2021-11-19 DIAGNOSIS — E11.9 TYPE 2 DIABETES MELLITUS WITHOUT COMPLICATION, WITH LONG-TERM CURRENT USE OF INSULIN (HCC): ICD-10-CM

## 2021-11-19 DIAGNOSIS — R09.81 NASAL CONGESTION: ICD-10-CM

## 2021-11-19 DIAGNOSIS — Z79.4 TYPE 2 DIABETES MELLITUS WITHOUT COMPLICATION, WITH LONG-TERM CURRENT USE OF INSULIN (HCC): ICD-10-CM

## 2021-11-19 PROCEDURE — 99214 OFFICE O/P EST MOD 30 MIN: CPT | Performed by: NURSE PRACTITIONER

## 2021-11-19 RX ORDER — LANCETS
EACH MISCELLANEOUS
Qty: 100 EACH | Refills: 3 | Status: SHIPPED | OUTPATIENT
Start: 2021-11-19 | End: 2022-02-03 | Stop reason: ALTCHOICE

## 2021-11-19 RX ORDER — BLOOD-GLUCOSE METER
EACH MISCELLANEOUS ONCE
Qty: 1 KIT | Refills: 0 | Status: SHIPPED | OUTPATIENT
Start: 2021-11-19 | End: 2021-11-22

## 2021-11-19 RX ORDER — BLOOD SUGAR DIAGNOSTIC
STRIP MISCELLANEOUS
Qty: 100 STRIP | Refills: 3 | Status: SHIPPED | OUTPATIENT
Start: 2021-11-19 | End: 2022-02-03 | Stop reason: ALTCHOICE

## 2021-11-19 RX ORDER — AZELASTINE 1 MG/ML
1 SPRAY, METERED NASAL 2 TIMES DAILY
Qty: 30 ML | Refills: 3 | Status: SHIPPED | OUTPATIENT
Start: 2021-11-19

## 2021-11-22 DIAGNOSIS — Z79.4 TYPE 2 DIABETES MELLITUS WITHOUT COMPLICATION, WITH LONG-TERM CURRENT USE OF INSULIN (HCC): ICD-10-CM

## 2021-11-22 DIAGNOSIS — E11.9 TYPE 2 DIABETES MELLITUS WITHOUT COMPLICATION, WITH LONG-TERM CURRENT USE OF INSULIN (HCC): ICD-10-CM

## 2021-11-22 RX ORDER — BLOOD-GLUCOSE METER
EACH MISCELLANEOUS
Qty: 1 KIT | Refills: 0 | Status: SHIPPED | OUTPATIENT
Start: 2021-11-22 | End: 2021-11-22

## 2021-11-22 RX ORDER — BLOOD-GLUCOSE METER
EACH MISCELLANEOUS
Qty: 1 KIT | Refills: 0 | Status: SHIPPED | OUTPATIENT
Start: 2021-11-22 | End: 2022-02-03 | Stop reason: ALTCHOICE

## 2021-11-23 DIAGNOSIS — E11.9 TYPE 2 DIABETES MELLITUS (HCC): Primary | ICD-10-CM

## 2021-11-23 RX ORDER — BLOOD SUGAR DIAGNOSTIC
STRIP MISCELLANEOUS
Qty: 100 EACH | Refills: 3 | Status: SHIPPED | OUTPATIENT
Start: 2021-11-23

## 2021-11-23 RX ORDER — LANCETS
EACH MISCELLANEOUS
Qty: 100 EACH | Refills: 3 | Status: SHIPPED | OUTPATIENT
Start: 2021-11-23 | End: 2022-02-03 | Stop reason: ALTCHOICE

## 2021-11-23 RX ORDER — BLOOD-GLUCOSE METER
EACH MISCELLANEOUS DAILY
Qty: 1 KIT | Refills: 0 | Status: SHIPPED | OUTPATIENT
Start: 2021-11-23

## 2021-11-30 ENCOUNTER — HOSPITAL ENCOUNTER (EMERGENCY)
Facility: HOSPITAL | Age: 31
Discharge: HOME | End: 2021-11-30
Attending: EMERGENCY MEDICINE
Payer: COMMERCIAL

## 2021-11-30 VITALS
HEART RATE: 100 BPM | DIASTOLIC BLOOD PRESSURE: 92 MMHG | SYSTOLIC BLOOD PRESSURE: 162 MMHG | RESPIRATION RATE: 18 BRPM | TEMPERATURE: 97.3 F | OXYGEN SATURATION: 97 %

## 2021-11-30 DIAGNOSIS — S39.012A STRAIN OF MUSCLE, FASCIA AND TENDON OF LOWER BACK, INITIAL ENCOUNTER: Primary | ICD-10-CM

## 2021-11-30 PROCEDURE — 99284 EMERGENCY DEPT VISIT MOD MDM: CPT | Performed by: EMERGENCY MEDICINE

## 2021-11-30 PROCEDURE — 96372 THER/PROPH/DIAG INJ SC/IM: CPT

## 2021-11-30 PROCEDURE — 99283 EMERGENCY DEPT VISIT LOW MDM: CPT

## 2021-11-30 RX ORDER — LIDOCAINE 50 MG/G
1 PATCH TOPICAL ONCE
Status: DISCONTINUED | OUTPATIENT
Start: 2021-11-30 | End: 2021-12-01 | Stop reason: HOSPADM

## 2021-11-30 RX ORDER — KETOROLAC TROMETHAMINE 30 MG/ML
60 INJECTION, SOLUTION INTRAMUSCULAR; INTRAVENOUS ONCE
Status: COMPLETED | OUTPATIENT
Start: 2021-11-30 | End: 2021-11-30

## 2021-11-30 RX ORDER — CYCLOBENZAPRINE HCL 10 MG
10 TABLET ORAL 2 TIMES DAILY PRN
Qty: 20 TABLET | Refills: 0 | Status: SHIPPED | OUTPATIENT
Start: 2021-11-30 | End: 2021-12-27

## 2021-11-30 RX ORDER — IBUPROFEN 800 MG/1
800 TABLET ORAL 3 TIMES DAILY
Qty: 21 TABLET | Refills: 0 | Status: SHIPPED | OUTPATIENT
Start: 2021-11-30 | End: 2022-02-03 | Stop reason: ALTCHOICE

## 2021-11-30 RX ORDER — LIDOCAINE 50 MG/G
1 PATCH TOPICAL DAILY
Qty: 20 PATCH | Refills: 0 | Status: SHIPPED | OUTPATIENT
Start: 2021-11-30

## 2021-11-30 RX ADMIN — KETOROLAC TROMETHAMINE 60 MG: 30 INJECTION, SOLUTION INTRAMUSCULAR at 21:47

## 2021-11-30 RX ADMIN — LIDOCAINE 1 PATCH: 50 PATCH TOPICAL at 21:47

## 2021-12-08 ENCOUNTER — OFFICE VISIT (OUTPATIENT)
Dept: FAMILY MEDICINE CLINIC | Facility: CLINIC | Age: 31
End: 2021-12-08
Payer: COMMERCIAL

## 2021-12-08 VITALS
HEIGHT: 61 IN | WEIGHT: 224.38 LBS | OXYGEN SATURATION: 98 % | TEMPERATURE: 96.5 F | HEART RATE: 117 BPM | BODY MASS INDEX: 42.36 KG/M2 | DIASTOLIC BLOOD PRESSURE: 78 MMHG | SYSTOLIC BLOOD PRESSURE: 122 MMHG

## 2021-12-08 DIAGNOSIS — J06.9 VIRAL URI WITH COUGH: Primary | ICD-10-CM

## 2021-12-08 PROCEDURE — 99213 OFFICE O/P EST LOW 20 MIN: CPT | Performed by: FAMILY MEDICINE

## 2021-12-08 PROCEDURE — 3078F DIAST BP <80 MM HG: CPT | Performed by: FAMILY MEDICINE

## 2021-12-08 PROCEDURE — 3074F SYST BP LT 130 MM HG: CPT | Performed by: FAMILY MEDICINE

## 2021-12-08 RX ORDER — OXYMETAZOLINE HYDROCHLORIDE 0.05 G/100ML
2 SPRAY NASAL 2 TIMES DAILY
Qty: 1.2 ML | Refills: 0 | Status: SHIPPED | OUTPATIENT
Start: 2021-12-08 | End: 2022-01-18 | Stop reason: SDUPTHER

## 2021-12-27 ENCOUNTER — OFFICE VISIT (OUTPATIENT)
Dept: FAMILY MEDICINE CLINIC | Facility: CLINIC | Age: 31
End: 2021-12-27
Payer: COMMERCIAL

## 2021-12-27 VITALS
OXYGEN SATURATION: 98 % | HEIGHT: 61 IN | WEIGHT: 228.38 LBS | TEMPERATURE: 96.5 F | DIASTOLIC BLOOD PRESSURE: 78 MMHG | HEART RATE: 101 BPM | BODY MASS INDEX: 43.12 KG/M2 | SYSTOLIC BLOOD PRESSURE: 122 MMHG

## 2021-12-27 DIAGNOSIS — M54.6 THORACOLUMBAR BACK PAIN: ICD-10-CM

## 2021-12-27 DIAGNOSIS — E11.9 TYPE 2 DIABETES MELLITUS WITHOUT COMPLICATION, WITH LONG-TERM CURRENT USE OF INSULIN (HCC): Primary | ICD-10-CM

## 2021-12-27 DIAGNOSIS — M54.50 THORACOLUMBAR BACK PAIN: ICD-10-CM

## 2021-12-27 DIAGNOSIS — Z79.4 TYPE 2 DIABETES MELLITUS WITHOUT COMPLICATION, WITH LONG-TERM CURRENT USE OF INSULIN (HCC): Primary | ICD-10-CM

## 2021-12-27 DIAGNOSIS — E78.5 HYPERLIPIDEMIA, UNSPECIFIED HYPERLIPIDEMIA TYPE: ICD-10-CM

## 2021-12-27 DIAGNOSIS — Z13.29 SCREENING FOR THYROID DISORDER: ICD-10-CM

## 2021-12-27 PROCEDURE — 99214 OFFICE O/P EST MOD 30 MIN: CPT | Performed by: NURSE PRACTITIONER

## 2021-12-27 RX ORDER — CYCLOBENZAPRINE HCL 5 MG
5 TABLET ORAL 3 TIMES DAILY PRN
Qty: 30 TABLET | Refills: 1 | Status: SHIPPED | OUTPATIENT
Start: 2021-12-27 | End: 2022-07-05 | Stop reason: SDUPTHER

## 2022-01-18 ENCOUNTER — OFFICE VISIT (OUTPATIENT)
Dept: FAMILY MEDICINE CLINIC | Facility: CLINIC | Age: 32
End: 2022-01-18
Payer: COMMERCIAL

## 2022-01-18 VITALS
HEART RATE: 106 BPM | HEIGHT: 61 IN | SYSTOLIC BLOOD PRESSURE: 120 MMHG | WEIGHT: 226 LBS | DIASTOLIC BLOOD PRESSURE: 72 MMHG | TEMPERATURE: 96.5 F | BODY MASS INDEX: 42.67 KG/M2 | OXYGEN SATURATION: 97 %

## 2022-01-18 DIAGNOSIS — J06.9 VIRAL URI WITH COUGH: Primary | ICD-10-CM

## 2022-01-18 PROCEDURE — 3074F SYST BP LT 130 MM HG: CPT | Performed by: FAMILY MEDICINE

## 2022-01-18 PROCEDURE — 3078F DIAST BP <80 MM HG: CPT | Performed by: FAMILY MEDICINE

## 2022-01-18 PROCEDURE — 99214 OFFICE O/P EST MOD 30 MIN: CPT | Performed by: FAMILY MEDICINE

## 2022-01-18 RX ORDER — ALPRAZOLAM 0.5 MG/1
0.5 TABLET ORAL
Qty: 15 TABLET | Refills: 0 | Status: SHIPPED | OUTPATIENT
Start: 2022-01-18 | End: 2022-01-24

## 2022-01-18 RX ORDER — OXYMETAZOLINE HYDROCHLORIDE 0.05 G/100ML
2 SPRAY NASAL 2 TIMES DAILY
Qty: 1.2 ML | Refills: 0 | Status: SHIPPED | OUTPATIENT
Start: 2022-01-18 | End: 2022-01-24

## 2022-01-18 RX ORDER — BUPROPION HYDROCHLORIDE 150 MG/1
150 TABLET ORAL DAILY
Qty: 30 TABLET | Refills: 11 | Status: SHIPPED | OUTPATIENT
Start: 2022-01-18

## 2022-01-18 NOTE — PATIENT INSTRUCTIONS
Use the afrin - the nasal decongestant  Twice daily for 3 days and then stop   You should never use a decongestant for more than 3 days straight reflexive worsening of symptoms      Nasal saline 4-6 times daily as needed for the runny nose and congestion until your symptoms resolve

## 2022-01-18 NOTE — PROGRESS NOTES
Assessment/Plan:      Diagnoses and all orders for this visit:    Viral URI with cough  -     oxymetazoline (AFRIN) 0 05 % nasal spray; 2 sprays by Each Nare route 2 (two) times a day for 3 days  -     sodium chloride (OCEAN) 0 65 % nasal spray; 1 spray into each nostril as needed for congestion or rhinitis    Postpartum depression  -     ALPRAZolam (XANAX) 0 5 mg tablet; Take 1 tablet (0 5 mg total) by mouth daily at bedtime as needed for anxiety  -     buPROPion (WELLBUTRIN XL) 150 mg 24 hr tablet; Take 1 tablet (150 mg total) by mouth daily          Return for Next scheduled follow up with pcp  The following portions of the patient's history were reviewed and updated as appropriate: allergies, current medications, past family history, past medical history, past social history, past surgical history, and problem list      Subjective:     Patient ID: Adrienne Guajardo is a 33 y o  female  HPI    Congestion started Sunday  Runny nose, slight cough, headache  No fevers, shortness of breath, chest pain, abdominal pain  Reports tried ibuprofen at home  Has tried cough drops, hot tea with honey,  vicks as well  Reports no improvement  Reports daughter has similar URI symptoms  Covid x 2 no booster  Reports no known coivd exposures  Depression and anxiety: wellbutrin 150 mg once daily  No missed doses  Thinks it has helped symptoms  No side effects  Takes xanax as well  Reports takes once or twice weekly  Sometimes goes month without needing it       PHQ-9 Depression Screening    Little interest or pleasure in doing things: 0 - not at all  Feeling down, depressed, or hopeless: 0 - not at all          Current Outpatient Medications on File Prior to Visit   Medication Sig Dispense Refill    acetaminophen (TYLENOL) 500 mg tablet Take 1,000 mg by mouth      albuterol (Ventolin HFA) 90 mcg/act inhaler Inhale 2 puffs every 4 (four) hours as needed for wheezing or shortness of breath 18 g 2    azelastine (ASTELIN) 0 1 % nasal spray 1 spray into each nostril 2 (two) times a day Use in each nostril as directed 30 mL 3    B-D UF III MINI PEN NEEDLES 31G X 5 MM MISC INJECT UNDER THE SKIN DAILY AT BEDTIME USE ONE A DAY OR AS DIRECTED 100 each 6    Blood Glucose Monitoring Suppl (OneTouch Verio) w/Device KIT Use daily 1 kit 0    Blood Pressure Monitoring (B-D ASSURE BPM/AUTO WRIST CUFF) MISC Check blood pressure prior to each OB visit, or as directed by your physician   1 each 0    clotrimazole (LOTRIMIN) 1 % cream Apply topically 2 (two) times a day 30 g 0    cyclobenzaprine (FLEXERIL) 5 mg tablet Take 1 tablet (5 mg total) by mouth 3 (three) times a day as needed for muscle spasms 30 tablet 1    fexofenadine (ALLEGRA) 180 MG tablet Take 1 tablet (180 mg total) by mouth daily 30 tablet 5    glucose blood (OneTouch Verio) test strip Test once daily 100 each 3    lidocaine (Lidoderm) 5 % Apply 1 patch topically daily Remove & Discard patch within 12 hours or as directed by MD 20 patch 0    lisinopril (ZESTRIL) 30 mg tablet Take 30 mg by mouth daily      meclizine (ANTIVERT) 12 5 MG tablet Take 1 tablet (12 5 mg total) by mouth 3 (three) times a day as needed for dizziness 30 tablet 0    metFORMIN (GLUCOPHAGE) 1000 MG tablet Take 1 tablet (1,000 mg total) by mouth 2 (two) times a day with meals 180 tablet 3    montelukast (SINGULAIR) 10 mg tablet Take 1 tablet (10 mg total) by mouth daily at bedtime 30 tablet 5    ondansetron (ZOFRAN) 4 mg tablet Take 1 tablet (4 mg total) by mouth every 6 (six) hours 12 tablet 0    sertraline (Zoloft) 50 mg tablet Take 1 tablet (50 mg total) by mouth daily 90 tablet 3    sitaGLIPtin (JANUVIA) 50 mg tablet Take 1 tablet (50 mg total) by mouth daily 90 tablet 3    VITAMIN D PO Take by mouth daily      [DISCONTINUED] ALPRAZolam (XANAX) 0 5 mg tablet Take 1 tablet (0 5 mg total) by mouth daily at bedtime as needed for anxiety 15 tablet 0    [DISCONTINUED] buPROPion (WELLBUTRIN XL) 150 mg 24 hr tablet Take 1 tablet (150 mg total) by mouth daily 30 tablet 11    Blood Glucose Monitoring Suppl (Accu-Chek Dolly Plus) w/Device KIT USE AS DIRECTED 1 kit 0    Blood Glucose Monitoring Suppl (Accu-Chek Niecy SmartView) w/Device KIT Use once for 1 dose 1 kit 0    dicyclomine (BENTYL) 20 mg tablet Take 1 tablet (20 mg total) by mouth 3 (three) times a day as needed (abdominal pain) 20 tablet 0    glucose blood (Accu-Chek Dolly Plus) test strip Test  strip 3    glucose blood (Accu-Chek Guide) test strip Test three times per day 100 each 0    ibuprofen (MOTRIN) 800 mg tablet Take 1 tablet (800 mg total) by mouth 3 (three) times a day 21 tablet 0    insulin glargine (LANTUS) 100 units/mL subcutaneous injection Inject 15 Units under the skin daily at bedtime 4 5 mL 3    labetalol (NORMODYNE) 200 mg tablet Take 200 mg by mouth 2 (two) times a day       Lancets (accu-chek multiclix) lancets Test  each 3    Lancets (onetouch ultrasoft) lancets Test once daily 100 each 3    [DISCONTINUED] oxymetazoline (AFRIN) 0 05 % nasal spray 2 sprays by Each Nare route 2 (two) times a day for 3 days 1 2 mL 0     No current facility-administered medications on file prior to visit  Review of Systems      Objective:    Vitals:    01/18/22 1337   BP: 120/72   BP Location: Left arm   Patient Position: Sitting   Pulse: (!) 106   Temp: (!) 96 5 °F (35 8 °C)   TempSrc: Tympanic   SpO2: 97%   Weight: 103 kg (226 lb)   Height: 5' 1" (1 549 m)         Physical Exam  Vitals and nursing note reviewed  Constitutional:       General: She is not in acute distress  Appearance: Normal appearance  She is not ill-appearing or toxic-appearing  HENT:      Head: Normocephalic and atraumatic  Right Ear: Hearing, ear canal and external ear normal  A middle ear effusion is present  Left Ear: Hearing, ear canal and external ear normal  A middle ear effusion is present  Nose: Congestion present        Right Turbinates: Swollen  Left Turbinates: Swollen  Right Sinus: No maxillary sinus tenderness or frontal sinus tenderness  Left Sinus: No maxillary sinus tenderness or frontal sinus tenderness  Mouth/Throat:      Mouth: Mucous membranes are moist       Pharynx: Oropharynx is clear  Posterior oropharyngeal erythema present  No oropharyngeal exudate  Eyes:      General: No scleral icterus  Right eye: No discharge  Left eye: No discharge  Extraocular Movements: Extraocular movements intact  Conjunctiva/sclera: Conjunctivae normal       Pupils: Pupils are equal, round, and reactive to light  Cardiovascular:      Rate and Rhythm: Normal rate and regular rhythm  Heart sounds: Normal heart sounds  No murmur heard  No friction rub  No gallop  Pulmonary:      Effort: Pulmonary effort is normal  No respiratory distress  Breath sounds: Normal breath sounds  No wheezing or rales  Abdominal:      General: Bowel sounds are normal       Palpations: Abdomen is soft  Tenderness: There is no abdominal tenderness  Lymphadenopathy:      Cervical:      Right cervical: No superficial or deep cervical adenopathy  Left cervical: No superficial or deep cervical adenopathy  Neurological:      Mental Status: She is alert

## 2022-01-23 DIAGNOSIS — J06.9 VIRAL URI WITH COUGH: ICD-10-CM

## 2022-01-24 RX ORDER — SODIUM CHLORIDE 0.65 %
AEROSOL, SPRAY (ML) NASAL
Qty: 90 ML | Refills: 3 | Status: SHIPPED | OUTPATIENT
Start: 2022-01-24

## 2022-01-24 RX ORDER — ALPRAZOLAM 0.5 MG/1
TABLET ORAL
Qty: 15 TABLET | Refills: 0 | Status: SHIPPED | OUTPATIENT
Start: 2022-01-24

## 2022-01-24 RX ORDER — OXYMETAZOLINE HCL 0.05 G/100ML
SPRAY NASAL
Qty: 30 ML | Refills: 3 | Status: SHIPPED | OUTPATIENT
Start: 2022-01-24 | End: 2022-03-01

## 2022-02-02 ENCOUNTER — APPOINTMENT (EMERGENCY)
Dept: RADIOLOGY | Facility: HOSPITAL | Age: 32
End: 2022-02-02
Payer: COMMERCIAL

## 2022-02-02 ENCOUNTER — HOSPITAL ENCOUNTER (EMERGENCY)
Facility: HOSPITAL | Age: 32
Discharge: HOME | End: 2022-02-02
Attending: EMERGENCY MEDICINE | Admitting: EMERGENCY MEDICINE
Payer: COMMERCIAL

## 2022-02-02 VITALS
HEART RATE: 108 BPM | RESPIRATION RATE: 16 BRPM | WEIGHT: 226 LBS | BODY MASS INDEX: 42.67 KG/M2 | TEMPERATURE: 99 F | OXYGEN SATURATION: 96 % | SYSTOLIC BLOOD PRESSURE: 130 MMHG | DIASTOLIC BLOOD PRESSURE: 73 MMHG | HEIGHT: 61 IN

## 2022-02-02 DIAGNOSIS — R07.9 CHEST PAIN, UNSPECIFIED: Primary | ICD-10-CM

## 2022-02-02 LAB
2HR DELTA HS TROPONIN: 1 NG/L
ANION GAP SERPL CALCULATED.3IONS-SCNC: 11 MMOL/L (ref 4–13)
BASOPHILS # BLD AUTO: 0.04 THOUSANDS/ΜL (ref 0–0.1)
BASOPHILS NFR BLD AUTO: 0 % (ref 0–1)
BUN SERPL-MCNC: 10 MG/DL (ref 5–25)
CALCIUM SERPL-MCNC: 9.5 MG/DL (ref 8.4–10.2)
CARDIAC TROPONIN I PNL SERPL HS: 3 NG/L
CARDIAC TROPONIN I PNL SERPL HS: 4 NG/L
CHLORIDE SERPL-SCNC: 97 MMOL/L (ref 96–108)
CO2 SERPL-SCNC: 24 MMOL/L (ref 21–32)
CREAT SERPL-MCNC: 0.78 MG/DL (ref 0.6–1.3)
D DIMER PPP FEU-MCNC: <0.27 UG/ML FEU
EOSINOPHIL # BLD AUTO: 0.12 THOUSAND/ΜL (ref 0–0.61)
EOSINOPHIL NFR BLD AUTO: 1 % (ref 0–6)
ERYTHROCYTE [DISTWIDTH] IN BLOOD BY AUTOMATED COUNT: 14.9 % (ref 11.6–15.1)
EXT PREG TEST URINE: NEGATIVE
EXT. CONTROL ED NAV: NORMAL
GFR SERPL CREATININE-BSD FRML MDRD: 99 ML/MIN/1.73SQ M
GLUCOSE SERPL-MCNC: 476 MG/DL (ref 65–140)
HCG SERPL QL: NEGATIVE
HCT VFR BLD AUTO: 42.1 % (ref 34.8–46.1)
HGB BLD-MCNC: 13.5 G/DL (ref 11.5–15.4)
IMM GRANULOCYTES # BLD AUTO: 0.03 THOUSAND/UL (ref 0–0.2)
IMM GRANULOCYTES NFR BLD AUTO: 0 % (ref 0–2)
LYMPHOCYTES # BLD AUTO: 2.92 THOUSANDS/ΜL (ref 0.6–4.47)
LYMPHOCYTES NFR BLD AUTO: 33 % (ref 14–44)
MCH RBC QN AUTO: 25.5 PG (ref 26.8–34.3)
MCHC RBC AUTO-ENTMCNC: 32.1 G/DL (ref 31.4–37.4)
MCV RBC AUTO: 80 FL (ref 82–98)
MONOCYTES # BLD AUTO: 0.6 THOUSAND/ΜL (ref 0.17–1.22)
MONOCYTES NFR BLD AUTO: 7 % (ref 4–12)
NEUTROPHILS # BLD AUTO: 5.19 THOUSANDS/ΜL (ref 1.85–7.62)
NEUTS SEG NFR BLD AUTO: 59 % (ref 43–75)
NRBC BLD AUTO-RTO: 0 /100 WBCS
PLATELET # BLD AUTO: 243 THOUSANDS/UL (ref 149–390)
PMV BLD AUTO: 11.7 FL (ref 8.9–12.7)
POTASSIUM SERPL-SCNC: 3.7 MMOL/L (ref 3.5–5.3)
RBC # BLD AUTO: 5.29 MILLION/UL (ref 3.81–5.12)
SODIUM SERPL-SCNC: 132 MMOL/L (ref 135–147)
WBC # BLD AUTO: 8.9 THOUSAND/UL (ref 4.31–10.16)

## 2022-02-02 PROCEDURE — 71045 X-RAY EXAM CHEST 1 VIEW: CPT

## 2022-02-02 PROCEDURE — 84484 ASSAY OF TROPONIN QUANT: CPT | Performed by: EMERGENCY MEDICINE

## 2022-02-02 PROCEDURE — 99285 EMERGENCY DEPT VISIT HI MDM: CPT

## 2022-02-02 PROCEDURE — 84703 CHORIONIC GONADOTROPIN ASSAY: CPT | Performed by: EMERGENCY MEDICINE

## 2022-02-02 PROCEDURE — 81025 URINE PREGNANCY TEST: CPT

## 2022-02-02 PROCEDURE — 93005 ELECTROCARDIOGRAM TRACING: CPT

## 2022-02-02 PROCEDURE — 80048 BASIC METABOLIC PNL TOTAL CA: CPT | Performed by: EMERGENCY MEDICINE

## 2022-02-02 PROCEDURE — 96374 THER/PROPH/DIAG INJ IV PUSH: CPT

## 2022-02-02 PROCEDURE — 36415 COLL VENOUS BLD VENIPUNCTURE: CPT | Performed by: EMERGENCY MEDICINE

## 2022-02-02 PROCEDURE — 85379 FIBRIN DEGRADATION QUANT: CPT | Performed by: EMERGENCY MEDICINE

## 2022-02-02 PROCEDURE — 99284 EMERGENCY DEPT VISIT MOD MDM: CPT | Performed by: EMERGENCY MEDICINE

## 2022-02-02 PROCEDURE — 85025 COMPLETE CBC W/AUTO DIFF WBC: CPT | Performed by: EMERGENCY MEDICINE

## 2022-02-02 RX ORDER — KETOROLAC TROMETHAMINE 30 MG/ML
15 INJECTION, SOLUTION INTRAMUSCULAR; INTRAVENOUS ONCE
Status: COMPLETED | OUTPATIENT
Start: 2022-02-02 | End: 2022-02-02

## 2022-02-02 RX ORDER — MAGNESIUM HYDROXIDE/ALUMINUM HYDROXICE/SIMETHICONE 120; 1200; 1200 MG/30ML; MG/30ML; MG/30ML
30 SUSPENSION ORAL ONCE
Status: COMPLETED | OUTPATIENT
Start: 2022-02-02 | End: 2022-02-02

## 2022-02-02 RX ORDER — SUCRALFATE 1 G/1
1 TABLET ORAL ONCE
Status: COMPLETED | OUTPATIENT
Start: 2022-02-02 | End: 2022-02-02

## 2022-02-02 RX ORDER — SODIUM CHLORIDE 9 MG/ML
3 INJECTION INTRAVENOUS
Status: DISCONTINUED | OUTPATIENT
Start: 2022-02-02 | End: 2022-02-03 | Stop reason: HOSPADM

## 2022-02-02 RX ORDER — ACETAMINOPHEN 325 MG/1
975 TABLET ORAL ONCE
Status: COMPLETED | OUTPATIENT
Start: 2022-02-02 | End: 2022-02-02

## 2022-02-02 RX ADMIN — ALUMINUM HYDROXIDE, MAGNESIUM HYDROXIDE, AND SIMETHICONE 30 ML: 200; 200; 20 SUSPENSION ORAL at 20:18

## 2022-02-02 RX ADMIN — KETOROLAC TROMETHAMINE 15 MG: 30 INJECTION, SOLUTION INTRAMUSCULAR; INTRAVENOUS at 20:19

## 2022-02-02 RX ADMIN — ACETAMINOPHEN 975 MG: 325 TABLET ORAL at 20:18

## 2022-02-02 RX ADMIN — SUCRALFATE 1 G: 1 TABLET ORAL at 20:18

## 2022-02-03 LAB
ATRIAL RATE: 105 BPM
P AXIS: 57 DEGREES
PR INTERVAL: 182 MS
QRS AXIS: 32 DEGREES
QRSD INTERVAL: 152 MS
QT INTERVAL: 378 MS
QTC INTERVAL: 499 MS
T WAVE AXIS: 42 DEGREES
VENTRICULAR RATE: 105 BPM

## 2022-02-03 PROCEDURE — 93010 ELECTROCARDIOGRAM REPORT: CPT | Performed by: INTERNAL MEDICINE

## 2022-02-03 NOTE — ED CARE HANDOFF
Emergency Department Sign Out Note        Sign out and transfer of care from Dr Erwin Mcqueen  See Separate Emergency Department note  The patient, Adrienne Guajardo, was evaluated by the previous provider for chest pain  Workup Completed:  Troponin x2, EKG, chest x-ray, lab work    ED Course / Workup Pending (followup):  38-year-old female chest pain  Heart score low risk  Second troponin was not significantly increased  Patient appears clinically well  Discussed warning signs and symptoms with the patient as well as when to return to the emergency department versus follow up with PC P  Patient states understanding and agreement with the plan  Patient care delayed due to critical capacity in the emergency department  This note was completed using dictation software  HEART Risk Score      Most Recent Value   Heart Score Risk Calculator    History 0 Filed at: 02/02/2022 2251   ECG 1 Filed at: 02/02/2022 2251   Age 0 Filed at: 02/02/2022 2251   Risk Factors 1 Filed at: 02/02/2022 2251   Troponin 0 Filed at: 02/02/2022 2251   HEART Score 2 Filed at: 02/02/2022 2251                                  ED Course as of 02/03/22 0449   Wed Feb 02, 2022 2133 Awaiting 2nd troponin  If negative discharge home  Procedures  MDM        Disposition  Final diagnoses:   Chest pain, unspecified     Time reflects when diagnosis was documented in both MDM as applicable and the Disposition within this note     Time User Action Codes Description Comment    2/2/2022  9:56 PM Anuradha [place] Add [R40 20] Loss of consciousness ([address] )     2/2/2022 10:14 PM Anuradha [place] Remove [R40 20] Loss of consciousness ([address] )     2/2/2022 10:14 PM Anuradha [place] Add [R07 9] Chest pain, unspecified       ED Disposition     ED Disposition Condition Date/Time Comment    Discharge Stable Wed Feb 2, 2022  9:56 PM Adrienne Guajardo discharge to home/self care              Follow-up Information     Follow up With Specialties Details Why Contact Info    [facility], Nurse Practitioner In 1 day  [address]  [address] [address]  [phone]          Discharge Medication List as of 2/2/2022 10:51 PM      CONTINUE these medications which have NOT CHANGED    Details   acetaminophen (TYLENOL) 500 mg tablet Take 1,000 mg by mouth, Starting Wed 5/6/2020, Historical Med      albuterol (Ventolin HFA) 90 mcg/act inhaler Inhale 2 puffs every 4 (four) hours as needed for wheezing or shortness of breath, Starting Mon 10/18/2021, Normal      ALPRAZolam (XANAX) 0 5 mg tablet TAKE 1 TABLET BY MOUTH DAILY AT BEDTIME AS NEEDED FOR ANXIETY, Normal      azelastine (ASTELIN) 0 1 % nasal spray 1 spray into each nostril 2 (two) times a day Use in each nostril as directed, Starting Fri 11/19/2021, Normal      B-D UF III MINI PEN NEEDLES 31G X 5 MM MISC INJECT UNDER THE SKIN DAILY AT BEDTIME USE ONE A DAY OR AS DIRECTED, Normal      !! Blood Glucose Monitoring Suppl (Accu-Chek Dolly Plus) w/Device KIT USE AS DIRECTED, Normal      !!  Blood Glucose Monitoring Suppl (OneTouch Verio) w/Device KIT Use daily, Starting Tue 11/23/2021, Normal      Blood Pressure Monitoring (B-D ASSURE BPM/AUTO WRIST CUFF) MISC Check blood pressure prior to each OB visit, or as directed by your physician , Normal      buPROPion (WELLBUTRIN XL) 150 mg 24 hr tablet Take 1 tablet (150 mg total) by mouth daily, Starting Tue 1/18/2022, Normal      clotrimazole (LOTRIMIN) 1 % cream Apply topically 2 (two) times a day, Starting Thu 3/4/2021, Normal      cyclobenzaprine (FLEXERIL) 5 mg tablet Take 1 tablet (5 mg total) by mouth 3 (three) times a day as needed for muscle spasms, Starting Mon 12/27/2021, Normal      dicyclomine (BENTYL) 20 mg tablet Take 1 tablet (20 mg total) by mouth 3 (three) times a day as needed (abdominal pain), Starting Wed 7/21/2021, Normal      fexofenadine (ALLEGRA) 180 MG tablet Take 1 tablet (180 mg total) by mouth daily, Starting Fri 8/20/2021, Normal !! glucose blood (Accu-Chek Dolly Plus) test strip Test QD, Normal      !! glucose blood (Accu-Chek Guide) test strip Test three times per day, Normal      !! glucose blood (OneTouch Verio) test strip Test once daily, Normal      ibuprofen (MOTRIN) 800 mg tablet Take 1 tablet (800 mg total) by mouth 3 (three) times a day, Starting Tue 11/30/2021, Normal      insulin glargine (LANTUS) 100 units/mL subcutaneous injection Inject 15 Units under the skin daily at bedtime, Starting Fri 7/9/2021, Until Sat 11/6/2021, Normal      labetalol (NORMODYNE) 200 mg tablet Take 200 mg by mouth 2 (two) times a day , Historical Med      !! Lancets (accu-chek multiclix) lancets Test QD, Normal      !!  Lancets (onetouch ultrasoft) lancets Test once daily, Normal      lidocaine (Lidoderm) 5 % Apply 1 patch topically daily Remove & Discard patch within 12 hours or as directed by MD, Starting Tue 11/30/2021, Normal      lisinopril (ZESTRIL) 30 mg tablet Take 30 mg by mouth daily, Starting Mon 5/25/2020, Historical Med      meclizine (ANTIVERT) 12 5 MG tablet Take 1 tablet (12 5 mg total) by mouth 3 (three) times a day as needed for dizziness, Starting Mon 11/15/2021, Normal      metFORMIN (GLUCOPHAGE) 1000 MG tablet Take 1 tablet (1,000 mg total) by mouth 2 (two) times a day with meals, Starting Tue 5/25/2021, Normal      montelukast (SINGULAIR) 10 mg tablet Take 1 tablet (10 mg total) by mouth daily at bedtime, Starting Fri 8/20/2021, Normal      Nasal Spray Extra Moisturizing 0 05 % nasal spray instill 2 sprays into each nostril twice a day for 3 days, Normal      ondansetron (ZOFRAN) 4 mg tablet Take 1 tablet (4 mg total) by mouth every 6 (six) hours, Starting Wed 7/21/2021, Normal      RA Saline Nasal Spray 0 65 % nasal spray instill 1 spray into each nostril if needed for congestion or RHINITIS, Normal      sertraline (Zoloft) 50 mg tablet Take 1 tablet (50 mg total) by mouth daily, Starting Mon 11/15/2021, Normal      sitaGLIPtin (JANUVIA) 50 mg tablet Take 1 tablet (50 mg total) by mouth daily, Starting Fri 11/19/2021, Normal      VITAMIN D PO Take by mouth daily, Historical Med       !! - Potential duplicate medications found  Please discuss with provider  No discharge procedures on file         ED Provider  Electronically Signed by     Haseeb Ortiz MD  02/03/22 7545

## 2022-02-03 NOTE — ED PROVIDER NOTES
History  Chief Complaint   Patient presents with    Chest Pain     started about an hour ago, patient was just lying down watching tv when started   Headache     lower back of head    Nausea     vomited once about 3 hours ago    Shoulder Pain     Patient is a 58-year-old female with history of coarctation of the aorta with subsequent repair as an infant that presents for evaluation of chest pain  This is recurrent issue for the patient that has not had negative workups in the past   Patient says about an hour prior to ED arrival she had abrupt onset of left-sided pressure  Pressures nonexertional, non positional, non pleuritic in nature  Similar prior episodes  Nonradiating  She has not taken anything for the pain  She denies recent infectious symptoms of fever, chills rigors, cough, rhinorrhea, congestion  No PE or DVT risk factors  Patient does note that she was nauseous vomited once several hours ago  She denies abdominal pain, urinary or bowel symptoms  No significant headache at this time  Prior to Admission Medications   Prescriptions Last Dose Informant Patient Reported? Taking? ALPRAZolam (XANAX) 0 5 mg tablet   No No   Sig: TAKE 1 TABLET BY MOUTH DAILY AT BEDTIME AS NEEDED FOR ANXIETY   B-D UF III MINI PEN NEEDLES 31G X 5 MM MISC  Self No No   Sig: INJECT UNDER THE SKIN DAILY AT BEDTIME USE ONE A DAY OR AS DIRECTED   Blood Glucose Monitoring Suppl (Accu-Chek Dolly Plus) w/Device KIT   No No   Sig: USE AS DIRECTED   Blood Glucose Monitoring Suppl (Accu-Chek Niecy SmartView) w/Device KIT  Self No No   Sig: Use once for 1 dose   Blood Glucose Monitoring Suppl (OneTouch Verio) w/Device KIT   No No   Sig: Use daily   Blood Pressure Monitoring (B-D ASSURE BPM/AUTO WRIST CUFF) MISC  Self No No   Sig: Check blood pressure prior to each OB visit, or as directed by your physician     Lancets (accu-chek multiclix) lancets   No No   Sig: Test QD   Lancets (onetouch ultrasoft) lancets   No No Sig: Test once daily   Nasal Spray Extra Moisturizing 0 05 % nasal spray   No No   Sig: instill 2 sprays into each nostril twice a day for 3 days   RA Saline Nasal Spray 0 65 % nasal spray   No No   Sig: instill 1 spray into each nostril if needed for congestion or RHINITIS   VITAMIN D PO  Self Yes No   Sig: Take by mouth daily   acetaminophen (TYLENOL) 500 mg tablet  Self Yes No   Sig: Take 1,000 mg by mouth   albuterol (Ventolin HFA) 90 mcg/act inhaler   No No   Sig: Inhale 2 puffs every 4 (four) hours as needed for wheezing or shortness of breath   azelastine (ASTELIN) 0 1 % nasal spray   No No   Si spray into each nostril 2 (two) times a day Use in each nostril as directed   buPROPion (WELLBUTRIN XL) 150 mg 24 hr tablet   No No   Sig: Take 1 tablet (150 mg total) by mouth daily   clotrimazole (LOTRIMIN) 1 % cream  Self No No   Sig: Apply topically 2 (two) times a day   cyclobenzaprine (FLEXERIL) 5 mg tablet   No No   Sig: Take 1 tablet (5 mg total) by mouth 3 (three) times a day as needed for muscle spasms   dicyclomine (BENTYL) 20 mg tablet  Self No No   Sig: Take 1 tablet (20 mg total) by mouth 3 (three) times a day as needed (abdominal pain)   fexofenadine (ALLEGRA) 180 MG tablet  Self No No   Sig: Take 1 tablet (180 mg total) by mouth daily   glucose blood (Accu-Chek Dolly Plus) test strip   No No   Sig: Test QD   glucose blood (Accu-Chek Guide) test strip  Self No No   Sig: Test three times per day   glucose blood (OneTouch Verio) test strip   No No   Sig: Test once daily   ibuprofen (MOTRIN) 800 mg tablet   No No   Sig: Take 1 tablet (800 mg total) by mouth 3 (three) times a day   insulin glargine (LANTUS) 100 units/mL subcutaneous injection  Self No No   Sig: Inject 15 Units under the skin daily at bedtime   labetalol (NORMODYNE) 200 mg tablet  Self Yes No   Sig: Take 200 mg by mouth 2 (two) times a day    lidocaine (Lidoderm) 5 %   No No   Sig: Apply 1 patch topically daily Remove & Discard patch within 12 hours or as directed by MD   lisinopril (ZESTRIL) 30 mg tablet  Self Yes No   Sig: Take 30 mg by mouth daily   meclizine (ANTIVERT) 12 5 MG tablet   No No   Sig: Take 1 tablet (12 5 mg total) by mouth 3 (three) times a day as needed for dizziness   metFORMIN (GLUCOPHAGE) 1000 MG tablet  Self No No   Sig: Take 1 tablet (1,000 mg total) by mouth 2 (two) times a day with meals   montelukast (SINGULAIR) 10 mg tablet  Self No No   Sig: Take 1 tablet (10 mg total) by mouth daily at bedtime   ondansetron (ZOFRAN) 4 mg tablet  Self No No   Sig: Take 1 tablet (4 mg total) by mouth every 6 (six) hours   sertraline (Zoloft) 50 mg tablet   No No   Sig: Take 1 tablet (50 mg total) by mouth daily   sitaGLIPtin (JANUVIA) 50 mg tablet   No No   Sig: Take 1 tablet (50 mg total) by mouth daily      Facility-Administered Medications: None       Past Medical History:   Diagnosis Date    Allergic     Arthritis     Bipolar affective disorder, currently depressed, moderate (HCC) 2018    Cyst of ovary, right     Diabetes mellitus ([facility] [address] ) 10/10/18    type 2    Endometriosis     Heart murmur 88    Hepatitis C     Hepatitis C virus infection cured after antiviral drug therapy     History of transfusion     Hypertension     Migraines     Obesity     Pulmonary artery congenital abnormality     Spleen enlarged     Status post surgical removal of both fallopian tubes     Varicella        Past Surgical History:   Procedure Laterality Date    CARDIAC CATHETERIZATION      no CAD 10days, 4 weeks 22months old      SECTION, LOW TRANSVERSE      CHOLECYSTECTOMY      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 8    CORONARY STENT PLACEMENT      LIVER BIOPSY      LIVER BIOPSY      TUBAL LIGATION Bilateral 2020    VSD REPAIR      As a child       Family History   Problem Relation Age of Onset    Hypertension Mother     Migraines Mother     JENNY disease Mother     Depression Mother    Nola Momin Hyperlipidemia Mother     Diabetes Mother     Diabetes Father     Hypertension Father     Kidney failure Father     Heart attack Father     Arthritis Father     Stroke Father     Polycystic kidney disease Paternal Grandmother     Stroke Paternal Grandmother     Heart disease Paternal Grandmother     Arthritis Sister     Asthma Sister     Thyroid disease Sister     Diabetes Sister     Arthritis Maternal Grandmother     Breast cancer Maternal Grandmother     Diabetes Maternal Grandmother     Hypertension Maternal Grandmother     Heart Valve Disease Maternal Grandmother     Learning disabilities Cousin     Learning disabilities Sister     ADD / ADHD Cousin     Lung cancer Brother     Diabetes Maternal Grandfather     Hypertension Maternal Grandfather     JENNY disease Maternal Grandfather     Stroke Maternal Grandfather      I have reviewed and agree with the history as documented  E-Cigarette/Vaping    E-Cigarette Use Never User      E-Cigarette/Vaping Substances    Nicotine No     THC No     CBD No     Flavoring No      Social History     Tobacco Use    Smoking status: Former Smoker     Packs/day: 0 20     Types: Cigarettes    Smokeless tobacco: Never Used   Vaping Use    Vaping Use: Never used   Substance Use Topics    Alcohol use: Not Currently     Alcohol/week: 3 0 standard drinks     Types: 3 Standard drinks or equivalent per week     Comment: socially/prior to knowledge of pregnancy    Drug use: Not Currently     Comment: hx of THC use for migraines       Review of Systems   Constitutional: Negative for fever  HENT: Negative for sore throat  Respiratory: Negative for shortness of breath  Cardiovascular: Positive for chest pain  Gastrointestinal: Positive for nausea and vomiting  Negative for abdominal pain  Genitourinary: Negative for dysuria  Musculoskeletal: Negative for back pain  Skin: Negative for rash  Neurological: Negative for light-headedness  Psychiatric/Behavioral: Negative for agitation  All other systems reviewed and are negative  Physical Exam  Physical Exam  Vitals reviewed  Constitutional:       General: She is not in acute distress  Appearance: She is well-developed  HENT:      Head: Normocephalic  Eyes:      Pupils: Pupils are equal, round, and reactive to light  Cardiovascular:      Rate and Rhythm: Normal rate and regular rhythm  Heart sounds: Normal heart sounds  Comments: 2+ radial and PT pulses bilaterally  All 4 extremities warm and well perfused  Pulmonary:      Effort: Pulmonary effort is normal       Breath sounds: Normal breath sounds  Abdominal:      General: Bowel sounds are normal  There is no distension  Palpations: Abdomen is soft  Tenderness: There is no abdominal tenderness  There is no guarding  Musculoskeletal:         General: No tenderness or deformity  Normal range of motion  Cervical back: Normal range of motion and neck supple  Skin:     General: Skin is warm and dry  Capillary Refill: Capillary refill takes less than 2 seconds  Neurological:      Mental Status: She is alert and oriented to person, place, and time  Cranial Nerves: No cranial nerve deficit  Sensory: No sensory deficit  Psychiatric:         Behavior: Behavior normal          Thought Content:  Thought content normal          Judgment: Judgment normal          Vital Signs  ED Triage Vitals   Temperature Pulse Respirations Blood Pressure SpO2   02/02/22 1954 02/02/22 1954 02/02/22 1954 02/02/22 1954 02/02/22 1954   99 °F (37 2 °C) 99 19 154/79 96 %      Temp Source Heart Rate Source Patient Position - Orthostatic VS BP Location FiO2 (%)   02/02/22 1954 02/02/22 1954 02/02/22 1954 02/02/22 1954 --   Tympanic Monitor Sitting Left arm       Pain Score       02/02/22 2018       7           Vitals:    02/02/22 1954 02/02/22 2200 02/02/22 2310   BP: 154/79  130/73   Pulse: 99 101 (!) 108   Patient Position - Orthostatic VS: Sitting  Sitting         Visual Acuity      ED Medications  Medications   ketorolac (TORADOL) injection 15 mg (15 mg Intravenous Given 2/2/22 2019)   acetaminophen (TYLENOL) tablet 975 mg (975 mg Oral Given 2/2/22 2018)   aluminum-magnesium hydroxide-simethicone (MYLANTA) oral suspension 30 mL (30 mL Oral Given 2/2/22 2018)   sucralfate (CARAFATE) tablet 1 g (1 g Oral Given 2/2/22 2018)       Diagnostic Studies  Results Reviewed     Procedure Component Value Units Date/Time    HS Troponin I 2hr [[id number]]  (Normal) Collected: 02/02/22 2215    Lab Status: Final result Specimen: Blood from Arm, Right Updated: 02/02/22 2245     hs TnI 2hr 4 ng/L      Delta 2hr hsTnI 1 ng/L     hCG, qualitative pregnancy [[id number]]  (Normal) Collected: 02/02/22 2007    Lab Status: Final result Specimen: Blood from Arm, Right Updated: 02/02/22 2100     Preg, Serum Negative    POCT pregnancy, urine [[id number]]  (Normal) Resulted: 02/02/22 2018    Lab Status: Final result Updated: 02/02/22 2044     EXT PREG TEST UR (Ref: Negative) NEGATIVE     Control VALID    HS Troponin 0hr (reflex protocol) [[id number]]  (Normal) Collected: 02/02/22 2007    Lab Status: Final result Specimen: Blood from Arm, Right Updated: 02/02/22 2038     hs TnI 0hr 3 ng/L     D-dimer, quantitative [[id number]]  (Normal) Collected: 02/02/22 2007    Lab Status: Final result Specimen: Blood from Arm, Right Updated: 02/02/22 2033     D-Dimer, Quant <0 27 ug/ml FEU     Basic metabolic panel [[id number]]  (Abnormal) Collected: 02/02/22 2007    Lab Status: Final result Specimen: Blood from Arm, Right Updated: 02/02/22 2030     Sodium 132 mmol/L      Potassium 3 7 mmol/L      Chloride 97 mmol/L      CO2 24 mmol/L      ANION GAP 11 mmol/L      BUN 10 mg/dL      Creatinine 0 78 mg/dL      Glucose 476 mg/dL      Calcium 9 5 mg/dL      eGFR 99 ml/min/1 73sq m     Narrative:      [place] guidelines for Chronic Kidney Disease (CKD):   Stage 1 with normal or high GFR (GFR > 90 mL/min/1 73 square meters)    Stage 2 Mild CKD (GFR = 60-89 mL/min/1 73 square meters)    Stage 3A Moderate CKD (GFR = 45-59 mL/min/1 73 square meters)    Stage 3B Moderate CKD (GFR = 30-44 mL/min/1 73 square meters)    Stage 4 Severe CKD (GFR = 15-29 mL/min/1 73 square meters)    Stage 5 End Stage CKD (GFR <15 mL/min/1 73 square meters)  Note: GFR calculation is accurate only with a steady state creatinine    CBC and differential [[id number]]  (Abnormal) Collected: 02/02/22 2007    Lab Status: Final result Specimen: Blood from Arm, Right Updated: 02/02/22 2017     WBC 8 90 Thousand/uL      RBC 5 29 Million/uL      Hemoglobin 13 5 g/dL      Hematocrit 42 1 %      MCV 80 fL      MCH 25 5 pg      MCHC 32 1 g/dL      RDW 14 9 %      MPV 11 7 fL      Platelets 126 Thousands/uL      nRBC 0 /100 WBCs      Neutrophils Relative 59 %      Immat GRANS % 0 %      Lymphocytes Relative 33 %      Monocytes Relative 7 %      Eosinophils Relative 1 %      Basophils Relative 0 %      Neutrophils Absolute 5 19 Thousands/µL      Immature Grans Absolute 0 03 Thousand/uL      Lymphocytes Absolute 2 92 Thousands/µL      Monocytes Absolute 0 60 Thousand/µL      Eosinophils Absolute 0 12 Thousand/µL      Basophils Absolute 0 04 Thousands/µL                  XR chest 1 view portable   Final Result by Kodi Aguila MD (02/02 2111)      No acute cardiopulmonary disease                    Workstation performed: [id number]                    Procedures  ECG 12 Lead Documentation Only    Date/Time: 2/3/2022 12:18 PM  Performed by: Jarocho Alcazar MD  Authorized by: Jarocho Alcazar MD     ECG reviewed by me, the ED Provider: yes    Patient location:  ED  Previous ECG:     Previous ECG:  Compared to current    Similarity:  No change    Comparison to cardiac monitor: Yes    Interpretation:     Interpretation: non-specific    Rate:     ECG rate assessment: tachycardic    Rhythm: Rhythm: sinus tachycardia    Ectopy:     Ectopy: none    QRS:     QRS axis:  Normal    QRS intervals: Wide  Conduction:     Conduction: abnormal      Abnormal conduction: complete RBBB    ST segments:     ST segments:  Normal  T waves:     T waves: normal               ED Course  ED Course as of 02/03/22 1220   Wed Feb 02, 202202, 2022 2120 Patient reports improving pain             HEART Risk Score      Most Recent Value   Heart Score Risk Calculator    History 0 Filed at: 02/02/2022 2251   ECG 1 Filed at: 02/02/2022 2251   Age 0 Filed at: 02/02/2022 2251   Risk Factors 1 Filed at: 02/02/2022 2251   Troponin 0 Filed at: 02/02/2022 2251   HEART Score 2 Filed at: 02/02/2022 2251                        SBIRT 22yo+      Most Recent Value   SBIRT (25 yo +)    In order to provide better care to our patients, we are screening all of our patients for alcohol and drug use  Would it be okay to ask you these screening questions? No Filed at: 02/02/2022 2140   Initial Alcohol Screen: US AUDIT-C     1  How often do you have a drink containing alcohol? 0 Filed at: 02/02/2022 2140   2  How many drinks containing alcohol do you have on a typical day you are drinking? 0 Filed at: 02/02/2022 2140   3a  Male UNDER 65: How often do you have five or more drinks on one occasion? 0 Filed at: 02/02/2022 2140   3b  FEMALE Any Age, or MALE 65+: How often do you have 4 or more drinks on one occassion? 0 Filed at: 02/02/2022 2140   Audit-C Score 0 Filed at: 02/02/2022 2140                    MDM  Number of Diagnoses or Management Options  Chest pain, unspecified  Diagnosis management comments: Patient is a 57-year-old female with history of core tissue of the aorta with subsequent repair as an infant that presents for evaluation of chest pain  Similar prior complaints  Workup including delta troponin and D-dimer was negative  Patient felt significantly better after GI cocktail, possible GERD/reflux related symptoms    Patient advised on supportive measures strict return precautions  Disposition  Final diagnoses:   Chest pain, unspecified     Time reflects when diagnosis was documented in both MDM as applicable and the Disposition within this note     Time User Action Codes Description Comment    2/2/2022  9:56 PM Emerald Zoila Add [R40 20] Loss of consciousness ([facility] [address] )     2/2/2022 10:14 PM Emerald Zoila Remove [R40 20] Loss of consciousness ([facility] [address] )     2/2/2022 10:14 PM Emerald Zoila Add [R07 9] Chest pain, unspecified       ED Disposition     ED Disposition Condition Date/Time Comment    Discharge Stable Wed Feb 2, 2022  9:56 PM Star Ban discharge to home/self care  Follow-up Information     Follow up With Specialties Details Why [address] [facility], [address] [facility], Nurse Practitioner In 1 day  [address] [facility]  [address] [facility] [address] [phone]            Discharge Medication List as of 2/2/2022 10:51 PM      CONTINUE these medications which have NOT CHANGED    Details   acetaminophen (TYLENOL) 500 mg tablet Take 1,000 mg by mouth, Starting Wed 5/6/2020, Historical Med      albuterol (Ventolin HFA) 90 mcg/act inhaler Inhale 2 puffs every 4 (four) hours as needed for wheezing or shortness of breath, Starting Mon 10/18/2021, Normal      ALPRAZolam (XANAX) 0 5 mg tablet TAKE 1 TABLET BY MOUTH DAILY AT BEDTIME AS NEEDED FOR ANXIETY, Normal      azelastine (ASTELIN) 0 1 % nasal spray 1 spray into each nostril 2 (two) times a day Use in each nostril as directed, Starting Fri 11/19/2021, Normal      B-D UF III MINI PEN NEEDLES 31G X 5 MM MISC INJECT UNDER THE SKIN DAILY AT BEDTIME USE ONE A DAY OR AS DIRECTED, Normal      !! Blood Glucose Monitoring Suppl (Accu-Chek Dolly Plus) w/Device KIT USE AS DIRECTED, Normal      !!  Blood Glucose Monitoring Suppl (OneTouch Verio) w/Device KIT Use daily, Starting Tue 11/23/2021, Normal      Blood Pressure Monitoring (B-D ASSURE BPM/AUTO WRIST CUFF) MISC Check blood pressure prior to each OB visit, or as directed by your physician , Normal      buPROPion (WELLBUTRIN XL) 150 mg 24 hr tablet Take 1 tablet (150 mg total) by mouth daily, Starting Tue 1/18/2022, Normal      clotrimazole (LOTRIMIN) 1 % cream Apply topically 2 (two) times a day, Starting Thu 3/4/2021, Normal      cyclobenzaprine (FLEXERIL) 5 mg tablet Take 1 tablet (5 mg total) by mouth 3 (three) times a day as needed for muscle spasms, Starting Mon 12/27/2021, Normal      dicyclomine (BENTYL) 20 mg tablet Take 1 tablet (20 mg total) by mouth 3 (three) times a day as needed (abdominal pain), Starting Wed 7/21/2021, Normal      fexofenadine (ALLEGRA) 180 MG tablet Take 1 tablet (180 mg total) by mouth daily, Starting Fri 8/20/2021, Normal      !! glucose blood (Accu-Chek Dolly Plus) test strip Test QD, Normal      !! glucose blood (Accu-Chek Guide) test strip Test three times per day, Normal      !! glucose blood (OneTouch Verio) test strip Test once daily, Normal      ibuprofen (MOTRIN) 800 mg tablet Take 1 tablet (800 mg total) by mouth 3 (three) times a day, Starting Tue 11/30/2021, Normal      insulin glargine (LANTUS) 100 units/mL subcutaneous injection Inject 15 Units under the skin daily at bedtime, Starting Fri 7/9/2021, Until Sat 11/6/2021, Normal      labetalol (NORMODYNE) 200 mg tablet Take 200 mg by mouth 2 (two) times a day , Historical Med      !! Lancets (accu-chek multiclix) lancets Test QD, Normal      !!  Lancets (onetouch ultrasoft) lancets Test once daily, Normal      lidocaine (Lidoderm) 5 % Apply 1 patch topically daily Remove & Discard patch within 12 hours or as directed by MD, Starting Tue 11/30/2021, Normal      lisinopril (ZESTRIL) 30 mg tablet Take 30 mg by mouth daily, Starting Mon 5/25/2020, Historical Med      meclizine (ANTIVERT) 12 5 MG tablet Take 1 tablet (12 5 mg total) by mouth 3 (three) times a day as needed for dizziness, Starting Mon 11/15/2021, Normal      metFORMIN (GLUCOPHAGE) 1000 MG tablet Take 1 tablet (1,000 mg total) by mouth 2 (two) times a day with meals, Starting Tue 5/25/2021, Normal      montelukast (SINGULAIR) 10 mg tablet Take 1 tablet (10 mg total) by mouth daily at bedtime, Starting Fri 8/20/2021, Normal      Nasal Spray Extra Moisturizing 0 05 % nasal spray instill 2 sprays into each nostril twice a day for 3 days, Normal      ondansetron (ZOFRAN) 4 mg tablet Take 1 tablet (4 mg total) by mouth every 6 (six) hours, Starting Wed 7/21/2021, Normal      RA Saline Nasal Spray 0 65 % nasal spray instill 1 spray into each nostril if needed for congestion or RHINITIS, Normal      sertraline (Zoloft) 50 mg tablet Take 1 tablet (50 mg total) by mouth daily, Starting Mon 11/15/2021, Normal      sitaGLIPtin (JANUVIA) 50 mg tablet Take 1 tablet (50 mg total) by mouth daily, Starting Fri 11/19/2021, Normal      VITAMIN D PO Take by mouth daily, Historical Med       !! - Potential duplicate medications found  Please discuss with provider  No discharge procedures on file      PDMP Review       Value Time User    PDMP Reviewed  Yes 10/2/2020 11:54 AM Harriet Cody MD          ED Provider  Electronically Signed by           Rohini Garza MD  02/03/22 [id number]

## 2022-03-01 ENCOUNTER — OFFICE VISIT (OUTPATIENT)
Dept: FAMILY MEDICINE CLINIC | Facility: CLINIC | Age: 32
End: 2022-03-01
Payer: COMMERCIAL

## 2022-03-01 VITALS
TEMPERATURE: 97.8 F | DIASTOLIC BLOOD PRESSURE: 68 MMHG | HEIGHT: 61 IN | OXYGEN SATURATION: 98 % | SYSTOLIC BLOOD PRESSURE: 102 MMHG | HEART RATE: 108 BPM | BODY MASS INDEX: 41.91 KG/M2 | WEIGHT: 222 LBS

## 2022-03-01 DIAGNOSIS — G62.9 NEUROPATHY: ICD-10-CM

## 2022-03-01 DIAGNOSIS — E11.9 TYPE 2 DIABETES MELLITUS WITHOUT COMPLICATION, WITH LONG-TERM CURRENT USE OF INSULIN (HCC): Primary | ICD-10-CM

## 2022-03-01 DIAGNOSIS — R10.9 ABDOMINAL PAIN: ICD-10-CM

## 2022-03-01 DIAGNOSIS — A08.4 VIRAL GASTROENTERITIS: ICD-10-CM

## 2022-03-01 DIAGNOSIS — Z79.4 TYPE 2 DIABETES MELLITUS WITHOUT COMPLICATION, WITH LONG-TERM CURRENT USE OF INSULIN (HCC): Primary | ICD-10-CM

## 2022-03-01 PROCEDURE — 81025 URINE PREGNANCY TEST: CPT | Performed by: NURSE PRACTITIONER

## 2022-03-01 PROCEDURE — 99214 OFFICE O/P EST MOD 30 MIN: CPT | Performed by: NURSE PRACTITIONER

## 2022-03-01 PROCEDURE — 3078F DIAST BP <80 MM HG: CPT | Performed by: NURSE PRACTITIONER

## 2022-03-01 PROCEDURE — 3074F SYST BP LT 130 MM HG: CPT | Performed by: NURSE PRACTITIONER

## 2022-03-01 RX ORDER — ONDANSETRON 4 MG/1
4 TABLET, FILM COATED ORAL EVERY 6 HOURS
Qty: 12 TABLET | Refills: 0 | Status: SHIPPED | OUTPATIENT
Start: 2022-03-01

## 2022-03-01 RX ORDER — GABAPENTIN 100 MG/1
100 CAPSULE ORAL
Qty: 30 CAPSULE | Refills: 0 | Status: SHIPPED | OUTPATIENT
Start: 2022-03-01

## 2022-03-01 RX ORDER — GABAPENTIN 100 MG/1
100 CAPSULE ORAL
Qty: 30 CAPSULE | Refills: 0 | Status: SHIPPED | OUTPATIENT
Start: 2022-03-01 | End: 2022-03-01

## 2022-03-01 NOTE — PROGRESS NOTES
Assessment/Plan:    No problem-specific Assessment & Plan notes found for this encounter  Diagnoses and all orders for this visit:    Type 2 diabetes mellitus without complication, with long-term current use of insulin ([facility])  -     Diabetic foot exam; Future  -     Cancel: POCT hemoglobin A1c  -     Discontinue: gabapentin (Neurontin) 100 mg capsule; Take 1 capsule (100 mg total) by mouth daily at bedtime  -     gabapentin (Neurontin) 100 mg capsule; Take 1 capsule (100 mg total) by mouth daily at bedtime    Neuropathy  -     gabapentin (Neurontin) 100 mg capsule; Take 1 capsule (100 mg total) by mouth daily at bedtime    Viral gastroenteritis  -     POCT urine HCG    Abdominal pain  -     ondansetron (ZOFRAN) 4 mg tablet; Take 1 tablet (4 mg total) by mouth every 6 (six) hours          Subjective:      Patient ID: Grace Bustamante is a 27 y o  female  Patient presents for routine diabetic follow-up  Patient did not have her labs completed due to her busy work schedule  Patient states that she will go have her labs completed 1st thing tomorrow morning  Patient states that she has been checking her sugars at home and they are generally in the 130s fasting  She states sometimes at nighttime her sugars are around 190  She is taking her Januvia as prescribed  She continues to take her insulin at bedtime and her metformin  Will see what A1c is tomorrow when she has her labs completed and see if any medication adjustments need to be made  Patient is not having any issues with hypoglycemia  Neuropathy-patient states that she has been having issues with shooting pains in her feet for the last couple months  She has been trying to keep them up on pillows in take naproxen as needed but it is not helping  Patient does have numbness in her great toes bilaterally  Patient does check her feet regularly for wounds  Will start patient on gabapentin at bedtime for neuropathy pain    She does have a very busy schedule so she is not able to come in for an appointment in 1 month but she will call towards the end of the month to let me know how she is doing with her medication  Viral gastroenteritis-patient has had nausea, vomiting, diarrhea and abdominal cramping for the last 3 days  She states that she has been able to keep fluids down  She does generally feel fatigued  She is not having any body aches, fevers or chills  Her cramping is diffuse  Patient has not had any sick contact that she is aware of  Will prescribe Zofran p r n  For nausea and vomiting  [place] diet  Call if symptoms are not improving by the end of the week or if symptoms worsen  Diabetes  She presents for her follow-up diabetic visit  She has type 2 diabetes mellitus  There are no hypoglycemic associated symptoms  Pertinent negatives for hypoglycemia include no dizziness or headaches  There are no diabetic associated symptoms  Pertinent negatives for diabetes include no chest pain, no fatigue and no weakness  There are no hypoglycemic complications  Symptoms are stable  Risk factors for coronary artery disease include diabetes mellitus, hypertension, obesity, sedentary lifestyle and stress  Her breakfast blood glucose range is generally 110-130 mg/dl  The following portions of the patient's history were reviewed and updated as appropriate: allergies, current medications, past family history, past medical history, past social history, past surgical history and problem list     Review of Systems   Constitutional: Negative for appetite change, fatigue and unexpected weight change  HENT: Negative for congestion, rhinorrhea, sneezing and sore throat  Eyes: Negative for visual disturbance  Respiratory: Negative for cough, chest tightness, shortness of breath and wheezing  Cardiovascular: Negative for chest pain, palpitations and leg swelling  Gastrointestinal: Positive for abdominal pain, constipation, diarrhea and nausea  Negative for abdominal distention, anal bleeding, blood in stool and vomiting  Genitourinary: Negative for difficulty urinating, dysuria and urgency  Musculoskeletal: Negative for arthralgias, back pain and joint swelling  Skin: Negative for color change and rash  Neurological: Positive for numbness (great toes )  Negative for dizziness, weakness and headaches  Hematological: Negative for adenopathy  Does not bruise/bleed easily  Objective:      /68   Pulse (!) 108   Temp 97 8 °F (36 6 °C)   Ht 5' 1" (1 549 m)   Wt 101 kg (222 lb)   LMP 02/02/2022   SpO2 98%   BMI 41 95 kg/m²          Physical Exam  Constitutional:       General: She is not in acute distress  Appearance: Normal appearance  She is not ill-appearing  HENT:      Head: Normocephalic and atraumatic  Right Ear: Tympanic membrane and ear canal normal       Left Ear: Tympanic membrane and ear canal normal       Nose: Nose normal       Mouth/Throat:      Mouth: Mucous membranes are moist       Pharynx: Oropharynx is clear  Eyes:      Conjunctiva/sclera: Conjunctivae normal       Pupils: Pupils are equal, round, and reactive to light  Cardiovascular:      Rate and Rhythm: Normal rate and regular rhythm  Pulses: Normal pulses  no weak pulses          Dorsalis pedis pulses are 2+ on the right side and 2+ on the left side  Posterior tibial pulses are 2+ on the right side and 2+ on the left side  Heart sounds: Normal heart sounds  No murmur heard  No friction rub  Pulmonary:      Effort: Pulmonary effort is normal  No respiratory distress  Breath sounds: Normal breath sounds  No wheezing  Chest:      Chest wall: No tenderness  Abdominal:      General: Abdomen is flat  Bowel sounds are normal       Palpations: Abdomen is soft  There is no mass  Tenderness: There is abdominal tenderness (mild diffuse tenderness )  There is no guarding or rebound     Musculoskeletal:      Cervical back: Normal range of motion  No rigidity or tenderness  Feet:    Feet:      Right foot:      Skin integrity: Dry skin present  No ulcer, skin breakdown, erythema, warmth or callus  Left foot:      Skin integrity: Dry skin present  No ulcer, skin breakdown, erythema, warmth or callus  Lymphadenopathy:      Cervical: No cervical adenopathy  Skin:     General: Skin is warm and dry  Findings: No bruising, erythema or lesion  Neurological:      General: No focal deficit present  Mental Status: She is alert and oriented to person, place, and time  Mental status is at baseline  Motor: No weakness  Coordination: Coordination normal       Gait: Gait normal    Psychiatric:         Mood and Affect: Mood normal          Behavior: Behavior normal          Thought Content: Thought content normal          Judgment: Judgment normal                Patient's shoes and socks removed  Right Foot/Ankle   Right Foot Inspection  Skin Exam: skin normal, skin intact and dry skin  No warmth, no callus, no erythema, no maceration, no abnormal color, no pre-ulcer, no ulcer and no callus  Toe Exam: ROM and strength within normal limits  Sensory   Proprioception: intact  Monofilament testing: diminished    Vascular  Capillary refills: < 3 seconds  The right DP pulse is 2+  The right PT pulse is 2+  Left Foot/Ankle  Left Foot Inspection  Skin Exam: skin normal, skin intact and dry skin  No warmth, no erythema, no maceration, normal color, no pre-ulcer, no ulcer and no callus  Toe Exam: ROM and strength within normal limits  Sensory   Proprioception: intact  Monofilament testing: diminished    Vascular  Capillary refills: < 3 seconds  The left DP pulse is 2+  The left PT pulse is 2+       Assign Risk Category  No deformity present  Loss of protective sensation  No weak pulses  Risk: 1

## 2022-03-02 LAB — SL AMB POCT URINE HCG: NORMAL

## 2022-03-14 ENCOUNTER — TELEPHONE (OUTPATIENT)
Dept: FAMILY MEDICINE CLINIC | Facility: CLINIC | Age: 32
End: 2022-03-14

## 2022-03-14 NOTE — PROGRESS NOTES
Call received from [facility] from cardiac pre admission testing at [facility] regarding question on insulin the night before the procedure for sternal wire removal  There is concern for hypoglycemia with keeping patient NPO from the night before  Therefore, patient will hold her lantus the night before her procedure   Patient will not be getting any I V  contrast during the procedure so she is okay to take her Metformin as normal      Contact number for [facility] [phone]

## 2022-03-14 NOTE — TELEPHONE ENCOUNTER
L/m for pt to call the office  Pt also needs to call [facility] about surgery at the end of the month they have not been able to get ahold of her either

## 2022-03-30 ENCOUNTER — APPOINTMENT (EMERGENCY)
Dept: RADIOLOGY | Facility: HOSPITAL | Age: 32
End: 2022-03-30
Payer: COMMERCIAL

## 2022-03-30 ENCOUNTER — APPOINTMENT (EMERGENCY)
Dept: CT IMAGING | Facility: HOSPITAL | Age: 32
End: 2022-03-30
Payer: COMMERCIAL

## 2022-03-30 ENCOUNTER — HOSPITAL ENCOUNTER (EMERGENCY)
Facility: HOSPITAL | Age: 32
Discharge: HOME | End: 2022-03-31
Attending: INTERNAL MEDICINE
Payer: COMMERCIAL

## 2022-03-30 DIAGNOSIS — R07.89 CHEST WALL PAIN FOLLOWING SURGERY: Primary | ICD-10-CM

## 2022-03-30 DIAGNOSIS — G89.18 CHEST WALL PAIN FOLLOWING SURGERY: Primary | ICD-10-CM

## 2022-03-30 LAB
ALBUMIN SERPL BCP-MCNC: 4.1 G/DL (ref 3.5–5)
ALP SERPL-CCNC: 77 U/L (ref 34–104)
ALT SERPL W P-5'-P-CCNC: 16 U/L (ref 7–52)
ANION GAP SERPL CALCULATED.3IONS-SCNC: 9 MMOL/L (ref 4–13)
APTT PPP: 31 SECONDS (ref 23–37)
AST SERPL W P-5'-P-CCNC: 13 U/L (ref 13–39)
BACTERIA UR QL AUTO: ABNORMAL /HPF
BASOPHILS # BLD AUTO: 0.03 THOUSANDS/ΜL (ref 0–0.1)
BASOPHILS NFR BLD AUTO: 0 % (ref 0–1)
BILIRUB SERPL-MCNC: 0.3 MG/DL (ref 0.2–1)
BILIRUB UR QL STRIP: NEGATIVE
BUN SERPL-MCNC: 11 MG/DL (ref 5–25)
CALCIUM SERPL-MCNC: 9.5 MG/DL (ref 8.4–10.2)
CHLORIDE SERPL-SCNC: 99 MMOL/L (ref 96–108)
CLARITY UR: CLEAR
CO2 SERPL-SCNC: 25 MMOL/L (ref 21–32)
COLOR UR: YELLOW
CREAT SERPL-MCNC: 0.58 MG/DL (ref 0.6–1.3)
CRP SERPL QL: 19.6 MG/L
D DIMER PPP FEU-MCNC: 0.75 UG/ML FEU
EOSINOPHIL # BLD AUTO: 0.18 THOUSAND/ΜL (ref 0–0.61)
EOSINOPHIL NFR BLD AUTO: 2 % (ref 0–6)
ERYTHROCYTE [DISTWIDTH] IN BLOOD BY AUTOMATED COUNT: 14.1 % (ref 11.6–15.1)
EXT PREG TEST URINE: NEGATIVE
EXT. CONTROL ED NAV: NORMAL
GFR SERPL CREATININE-BSD FRML MDRD: 120 ML/MIN/1.73SQ M
GLUCOSE SERPL-MCNC: 258 MG/DL (ref 65–140)
GLUCOSE UR STRIP-MCNC: ABNORMAL MG/DL
HCT VFR BLD AUTO: 39.2 % (ref 34.8–46.1)
HGB BLD-MCNC: 12.5 G/DL (ref 11.5–15.4)
HGB UR QL STRIP.AUTO: NEGATIVE
IMM GRANULOCYTES # BLD AUTO: 0.04 THOUSAND/UL (ref 0–0.2)
IMM GRANULOCYTES NFR BLD AUTO: 0 % (ref 0–2)
INR PPP: 1.03 (ref 0.84–1.19)
KETONES UR STRIP-MCNC: NEGATIVE MG/DL
LACTATE SERPL-SCNC: 1.3 MMOL/L (ref 0.5–2)
LEUKOCYTE ESTERASE UR QL STRIP: NEGATIVE
LYMPHOCYTES # BLD AUTO: 2.43 THOUSANDS/ΜL (ref 0.6–4.47)
LYMPHOCYTES NFR BLD AUTO: 24 % (ref 14–44)
MCH RBC QN AUTO: 25.2 PG (ref 26.8–34.3)
MCHC RBC AUTO-ENTMCNC: 31.9 G/DL (ref 31.4–37.4)
MCV RBC AUTO: 79 FL (ref 82–98)
MONOCYTES # BLD AUTO: 0.64 THOUSAND/ΜL (ref 0.17–1.22)
MONOCYTES NFR BLD AUTO: 6 % (ref 4–12)
MUCOUS THREADS UR QL AUTO: ABNORMAL
NEUTROPHILS # BLD AUTO: 6.72 THOUSANDS/ΜL (ref 1.85–7.62)
NEUTS SEG NFR BLD AUTO: 68 % (ref 43–75)
NITRITE UR QL STRIP: NEGATIVE
NON-SQ EPI CELLS URNS QL MICRO: ABNORMAL /HPF
NRBC BLD AUTO-RTO: 0 /100 WBCS
PH UR STRIP.AUTO: 6 [PH]
PLATELET # BLD AUTO: 298 THOUSANDS/UL (ref 149–390)
PMV BLD AUTO: 10.5 FL (ref 8.9–12.7)
POTASSIUM SERPL-SCNC: 3.5 MMOL/L (ref 3.5–5.3)
PROCALCITONIN SERPL-MCNC: <0.05 NG/ML
PROT SERPL-MCNC: 7.5 G/DL (ref 6.4–8.4)
PROT UR STRIP-MCNC: NEGATIVE MG/DL
PROTHROMBIN TIME: 13.4 SECONDS (ref 11.6–14.5)
RBC # BLD AUTO: 4.97 MILLION/UL (ref 3.81–5.12)
RBC #/AREA URNS AUTO: ABNORMAL /HPF
SODIUM SERPL-SCNC: 133 MMOL/L (ref 135–147)
SP GR UR STRIP.AUTO: >=1.03 (ref 1–1.03)
UROBILINOGEN UR QL STRIP.AUTO: 0.2 E.U./DL
WBC # BLD AUTO: 10.04 THOUSAND/UL (ref 4.31–10.16)
WBC #/AREA URNS AUTO: ABNORMAL /HPF

## 2022-03-30 PROCEDURE — 71045 X-RAY EXAM CHEST 1 VIEW: CPT

## 2022-03-30 PROCEDURE — 93005 ELECTROCARDIOGRAM TRACING: CPT

## 2022-03-30 PROCEDURE — 99285 EMERGENCY DEPT VISIT HI MDM: CPT | Performed by: INTERNAL MEDICINE

## 2022-03-30 PROCEDURE — 81025 URINE PREGNANCY TEST: CPT | Performed by: INTERNAL MEDICINE

## 2022-03-30 PROCEDURE — 36415 COLL VENOUS BLD VENIPUNCTURE: CPT | Performed by: INTERNAL MEDICINE

## 2022-03-30 PROCEDURE — 84145 PROCALCITONIN (PCT): CPT | Performed by: INTERNAL MEDICINE

## 2022-03-30 PROCEDURE — 86140 C-REACTIVE PROTEIN: CPT | Performed by: INTERNAL MEDICINE

## 2022-03-30 PROCEDURE — 85610 PROTHROMBIN TIME: CPT | Performed by: INTERNAL MEDICINE

## 2022-03-30 PROCEDURE — 96361 HYDRATE IV INFUSION ADD-ON: CPT

## 2022-03-30 PROCEDURE — 85379 FIBRIN DEGRADATION QUANT: CPT | Performed by: INTERNAL MEDICINE

## 2022-03-30 PROCEDURE — 99285 EMERGENCY DEPT VISIT HI MDM: CPT

## 2022-03-30 PROCEDURE — 81001 URINALYSIS AUTO W/SCOPE: CPT | Performed by: INTERNAL MEDICINE

## 2022-03-30 PROCEDURE — 87040 BLOOD CULTURE FOR BACTERIA: CPT | Performed by: INTERNAL MEDICINE

## 2022-03-30 PROCEDURE — 71275 CT ANGIOGRAPHY CHEST: CPT

## 2022-03-30 PROCEDURE — 85730 THROMBOPLASTIN TIME PARTIAL: CPT | Performed by: INTERNAL MEDICINE

## 2022-03-30 PROCEDURE — 96374 THER/PROPH/DIAG INJ IV PUSH: CPT

## 2022-03-30 PROCEDURE — 80053 COMPREHEN METABOLIC PANEL: CPT | Performed by: INTERNAL MEDICINE

## 2022-03-30 PROCEDURE — 83605 ASSAY OF LACTIC ACID: CPT | Performed by: INTERNAL MEDICINE

## 2022-03-30 PROCEDURE — G1004 CDSM NDSC: HCPCS

## 2022-03-30 PROCEDURE — 85025 COMPLETE CBC W/AUTO DIFF WBC: CPT | Performed by: INTERNAL MEDICINE

## 2022-03-30 PROCEDURE — 96375 TX/PRO/DX INJ NEW DRUG ADDON: CPT

## 2022-03-30 RX ORDER — KETOROLAC TROMETHAMINE 30 MG/ML
30 INJECTION, SOLUTION INTRAMUSCULAR; INTRAVENOUS ONCE
Status: COMPLETED | OUTPATIENT
Start: 2022-03-30 | End: 2022-03-30

## 2022-03-30 RX ORDER — OXYCODONE HYDROCHLORIDE 5 MG/1
5 TABLET ORAL EVERY 6 HOURS PRN
Qty: 15 TABLET | Refills: 0 | Status: SHIPPED | OUTPATIENT
Start: 2022-03-30

## 2022-03-30 RX ORDER — NAPROXEN 500 MG/1
500 TABLET ORAL 2 TIMES DAILY WITH MEALS
Qty: 30 TABLET | Refills: 0 | Status: SHIPPED | OUTPATIENT
Start: 2022-03-30 | End: 2022-07-15

## 2022-03-30 RX ORDER — FENTANYL CITRATE 50 UG/ML
50 INJECTION, SOLUTION INTRAMUSCULAR; INTRAVENOUS ONCE
Status: COMPLETED | OUTPATIENT
Start: 2022-03-30 | End: 2022-03-30

## 2022-03-30 RX ADMIN — SODIUM CHLORIDE 1000 ML: 0.9 INJECTION, SOLUTION INTRAVENOUS at 23:36

## 2022-03-30 RX ADMIN — IOHEXOL 85 ML: 350 INJECTION, SOLUTION INTRAVENOUS at 23:22

## 2022-03-30 RX ADMIN — KETOROLAC TROMETHAMINE 30 MG: 30 INJECTION, SOLUTION INTRAMUSCULAR at 23:30

## 2022-03-30 RX ADMIN — SODIUM CHLORIDE 1000 ML: 0.9 INJECTION, SOLUTION INTRAVENOUS at 22:06

## 2022-03-30 RX ADMIN — FENTANYL CITRATE 50 MCG: 50 INJECTION, SOLUTION INTRAMUSCULAR; INTRAVENOUS at 23:31

## 2022-03-31 VITALS
DIASTOLIC BLOOD PRESSURE: 79 MMHG | RESPIRATION RATE: 18 BRPM | WEIGHT: 218 LBS | OXYGEN SATURATION: 94 % | SYSTOLIC BLOOD PRESSURE: 116 MMHG | HEART RATE: 96 BPM | HEIGHT: 61 IN | BODY MASS INDEX: 41.16 KG/M2 | TEMPERATURE: 98.4 F

## 2022-03-31 LAB
ATRIAL RATE: 100 BPM
P AXIS: 75 DEGREES
PR INTERVAL: 166 MS
QRS AXIS: 16 DEGREES
QRSD INTERVAL: 146 MS
QT INTERVAL: 384 MS
QTC INTERVAL: 495 MS
T WAVE AXIS: 56 DEGREES
VENTRICULAR RATE: 100 BPM

## 2022-03-31 PROCEDURE — 93010 ELECTROCARDIOGRAM REPORT: CPT | Performed by: INTERNAL MEDICINE

## 2022-03-31 NOTE — DISCHARGE INSTRUCTIONS
Be sure follow-up the primary care  Take Naprosyn twice daily and oxycodone only as needed for severe pain    Monitor for fever and return if symptoms worsen

## 2022-03-31 NOTE — ED PROVIDER NOTES
History  Chief Complaint   Patient presents with    Chest Pain     Pt reports sternal wire removal 6 days ago, pt reports chest discomfort and "little palpitations"     27year-old who underwent recent removal of sternal wires  She had this removed due to persistent chest pain  She had apparently 2 left over now but had she believes for removed  She has no fever but feels chilly  She has no real shortness of breath, but does have some inspiratory pain  She was given oxycodone and was managing pain with Tylenol Motrin, but ran out of her oxycodone and presents now because pain is poorly controlled  She had open heart as 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 Taking? ALPRAZolam (XANAX) 0 5 mg tablet   No No   Sig: TAKE 1 TABLET BY MOUTH DAILY AT BEDTIME AS NEEDED FOR ANXIETY   B-D UF III MINI PEN NEEDLES 31G X 5 MM MISC  Self No No   Sig: INJECT UNDER THE SKIN DAILY AT BEDTIME USE ONE A DAY OR AS DIRECTED   Blood Glucose Monitoring Suppl (OneTouch Verio) w/Device KIT   No No   Sig: Use daily   Blood Pressure Monitoring (B-D ASSURE BPM/AUTO WRIST CUFF) MISC  Self No No   Sig: Check blood pressure prior to each OB visit, or as directed by your physician     RA Saline Nasal Spray 0 65 % nasal spray   No No   Sig: instill 1 spray into each nostril if needed for congestion or RHINITIS   VITAMIN D PO  Self Yes No   Sig: Take by mouth daily   acetaminophen (TYLENOL) 500 mg tablet  Self Yes No   Sig: Take 1,000 mg by mouth   albuterol (Ventolin HFA) 90 mcg/act inhaler   No No   Sig: Inhale 2 puffs every 4 (four) hours as needed for wheezing or shortness of breath   azelastine (ASTELIN) 0 1 % nasal spray   No No   Si spray into each nostril 2 (two) times a day Use in each nostril as directed   buPROPion (WELLBUTRIN XL) 150 mg 24 hr tablet   No No   Sig: Take 1 tablet (150 mg total) by mouth daily   clotrimazole (LOTRIMIN) 1 % cream  Self No No   Sig: Apply topically 2 (two) times a day   cyclobenzaprine (FLEXERIL) 5 mg tablet   No No   Sig: Take 1 tablet (5 mg total) by mouth 3 (three) times a day as needed for muscle spasms   fexofenadine (ALLEGRA) 180 MG tablet  Self No No   Sig: Take 1 tablet (180 mg total) by mouth daily   gabapentin (Neurontin) 100 mg capsule   No No   Sig: Take 1 capsule (100 mg total) by mouth daily at bedtime   glucose blood (OneTouch Verio) test strip   No No   Sig: Test once daily   insulin glargine (LANTUS) 100 units/mL subcutaneous injection  Self No No   Sig: Inject 15 Units under the skin daily at bedtime   lidocaine (Lidoderm) 5 %   No No   Sig: Apply 1 patch topically daily Remove & Discard patch within 12 hours or as directed by MD   lisinopril (ZESTRIL) 30 mg tablet  Self Yes No   Sig: Take 30 mg by mouth daily   meclizine (ANTIVERT) 12 5 MG tablet   No No   Sig: Take 1 tablet (12 5 mg total) by mouth 3 (three) times a day as needed for dizziness   metFORMIN (GLUCOPHAGE) 1000 MG tablet  Self No No   Sig: Take 1 tablet (1,000 mg total) by mouth 2 (two) times a day with meals   montelukast (SINGULAIR) 10 mg tablet  Self No No   Sig: Take 1 tablet (10 mg total) by mouth daily at bedtime   ondansetron (ZOFRAN) 4 mg tablet   No No   Sig: Take 1 tablet (4 mg total) by mouth every 6 (six) hours   sertraline (Zoloft) 50 mg tablet   No No   Sig: Take 1 tablet (50 mg total) by mouth daily   sitaGLIPtin (JANUVIA) 50 mg tablet   No No   Sig: Take 1 tablet (50 mg total) by mouth daily      Facility-Administered Medications: None       Past Medical History:   Diagnosis Date    Allergic     Arthritis     Bipolar affective disorder, currently depressed, moderate (HCC) 12/17/2018    Cyst of ovary, right     Diabetes mellitus ([facility] [address] ) 10/10/18    type 2    Endometriosis     Heart murmur 01/19/88    Hepatitis C     Hepatitis C virus infection cured after antiviral drug therapy     History of transfusion     Hypertension     Migraines     Obesity 1995    Pulmonary artery congenital abnormality     Spleen enlarged     Status post surgical removal of both fallopian tubes     Varicella        Past Surgical History:   Procedure Laterality Date    CARDIAC CATHETERIZATION      no CAD 10days, 4 weeks 20months old    [address], LOW TRANSVERSE  2020    CHOLECYSTECTOMY      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 8    CORONARY STENT PLACEMENT      LIVER BIOPSY      LIVER BIOPSY      STERNAL WIRE REMOVAL  03/24/2022    TUBAL LIGATION Bilateral 2020    VSD REPAIR      As a child       Family History   Problem Relation Age of Onset    Hypertension Mother     Migraines Mother     JENNY disease Mother     Depression Mother     Hyperlipidemia Mother     Diabetes Mother     Diabetes Father     Hypertension Father     Kidney failure Father     Heart attack Father     Arthritis Father     Stroke Father     Polycystic kidney disease Paternal [de-identified]     Stroke Paternal Grandmother     Heart disease Paternal Grandmother     Arthritis Sister     Asthma Sister     Thyroid disease Sister     Diabetes Sister     Arthritis Maternal Grandmother     Breast cancer Maternal Grandmother     Diabetes Maternal Grandmother     Hypertension Maternal Grandmother     Heart Valve Disease Maternal Grandmother     Learning disabilities Cousin     Learning disabilities Sister     ADD / ADHD Cousin     Lung cancer Brother     Diabetes Maternal Grandfather     Hypertension Maternal Grandfather     JENNY disease Maternal Grandfather     Stroke Maternal Grandfather      I have reviewed and agree with the history as documented      E-Cigarette/Vaping    E-Cigarette Use Never User      E-Cigarette/Vaping Substances    Nicotine No     THC No     CBD No  Flavoring No      Social History     Tobacco Use    Smoking status: Former Smoker     Packs/day: 0 20     Years: 0 00     Pack years: 0 00     Types: Cigarettes    Smokeless tobacco: Never Used   Vaping Use    Vaping Use: Never used   Substance Use Topics    Alcohol use: Not Currently     Alcohol/week: 3 0 standard drinks     Types: 3 Standard drinks or equivalent per week     Comment: socially/prior to knowledge of pregnancy    Drug use: Not Currently     Comment: hx of THC use for migraines       Review of Systems   Constitutional: Positive for chills  Negative for fever  HENT: Negative for rhinorrhea and sore throat  Eyes: Negative for visual disturbance  Respiratory: Negative for cough and shortness of breath  Cardiovascular: Positive for chest pain and palpitations  Negative for leg swelling  Gastrointestinal: Negative for abdominal pain, diarrhea, nausea and vomiting  Genitourinary: Negative for dysuria  Musculoskeletal: Negative for back pain and myalgias  Skin: Negative for rash  Neurological: Negative for dizziness and headaches  Psychiatric/Behavioral: Negative for confusion  All other systems reviewed and are negative  Physical Exam  Physical Exam  Vitals and nursing note reviewed  Constitutional:       Appearance: She is well-developed  She is obese  HENT:      Nose: Nose normal       Mouth/Throat:      Pharynx: No oropharyngeal exudate  Eyes:      General: No scleral icterus  Conjunctiva/sclera: Conjunctivae normal       Pupils: Pupils are equal, round, and reactive to light  Neck:      Vascular: No JVD  Trachea: No tracheal deviation  Cardiovascular:      Rate and Rhythm: Normal rate and regular rhythm  Heart sounds: Murmur heard  Pulmonary:      Effort: Pulmonary effort is normal  No respiratory distress  Breath sounds: Normal breath sounds  No wheezing or rales  Chest:      Chest wall: Tenderness present        Comments: Chest wall tenderness  Surgical wound appears well approximated  No drainage or cellulitis  Abdominal:      General: Bowel sounds are normal       Palpations: Abdomen is soft  Tenderness: There is no abdominal tenderness  There is no guarding  Musculoskeletal:         General: No tenderness  Normal range of motion  Cervical back: Normal range of motion and neck supple  Right lower leg: No tenderness  No edema  Left lower leg: No tenderness  No edema  Skin:     General: Skin is warm and dry  Neurological:      Mental Status: She is alert and oriented to person, place, and time  Cranial Nerves: No cranial nerve deficit  Sensory: No sensory deficit  Motor: No abnormal muscle tone        Comments: 5/5 motor, nl sens   Psychiatric:         Behavior: Behavior normal          Vital Signs  ED Triage Vitals [03/30/22 2132]   Temperature Pulse Respirations Blood Pressure SpO2   98 4 °F (36 9 °C) 103 22 139/84 96 %      Temp Source Heart Rate Source Patient Position - Orthostatic VS BP Location FiO2 (%)   Temporal Monitor Lying Left arm --      Pain Score       8           Vitals:    03/30/22 2315 03/30/22 2330 03/30/22 2345 03/31/22 0000   BP: 124/77  124/74 116/79   Pulse: 98 96 98 96   Patient Position - Orthostatic VS:             Visual Acuity      ED Medications  Medications   sodium chloride 0 9 % bolus 1,000 mL (0 mL Intravenous Stopped 3/31/22 0007)     Followed by   sodium chloride 0 9 % bolus 1,000 mL (0 mL Intravenous Stopped 3/31/22 0007)   ketorolac (TORADOL) injection 30 mg (30 mg Intravenous Given 3/30/22 2330)   fentanyl citrate (PF) 100 MCG/2ML 50 mcg (50 mcg Intravenous Given 3/30/22 2331)   iohexol (OMNIPAQUE) 350 MG/ML injection (SINGLE-DOSE) 85 mL (85 mL Intravenous Given 3/30/22 2322)       Diagnostic Studies  Results Reviewed     Procedure Component Value Units Date/Time    Procalcitonin [[id number]]  (Normal) Collected: 03/30/22 2208    Lab Status: Final result Specimen: Blood from Arm, Right Updated: 03/30/22 2248     Procalcitonin <0 05 ng/ml     Lactic acid [[id number]]  (Normal) Collected: 03/30/22 2208    Lab Status: Final result Specimen: Blood from Arm, Right Updated: 03/30/22 2242     LACTIC ACID 1 3 mmol/L     Narrative:      Result may be elevated if tourniquet was used during collection  Comprehensive metabolic panel [[id number]]  (Abnormal) Collected: 03/30/22 2208    Lab Status: Final result Specimen: Blood from Arm, Right Updated: 03/30/22 2241     Sodium 133 mmol/L      Potassium 3 5 mmol/L      Chloride 99 mmol/L      CO2 25 mmol/L      ANION GAP 9 mmol/L      BUN 11 mg/dL      Creatinine 0 58 mg/dL      Glucose 258 mg/dL      Calcium 9 5 mg/dL      AST 13 U/L      ALT 16 U/L      Alkaline Phosphatase 77 U/L      Total Protein 7 5 g/dL      Albumin 4 1 g/dL      Total Bilirubin 0 30 mg/dL      eGFR 120 ml/min/1 73sq m     Narrative:      [place] guidelines for Chronic Kidney Disease (CKD):     Stage 1 with normal or high GFR (GFR > 90 mL/min/1 73 square meters)    Stage 2 Mild CKD (GFR = 60-89 mL/min/1 73 square meters)    Stage 3A Moderate CKD (GFR = 45-59 mL/min/1 73 square meters)    Stage 3B Moderate CKD (GFR = 30-44 mL/min/1 73 square meters)    Stage 4 Severe CKD (GFR = 15-29 mL/min/1 73 square meters)    Stage 5 End Stage CKD (GFR <15 mL/min/1 73 square meters)  Note: GFR calculation is accurate only with a steady state creatinine    C-reactive protein [[id number]]  (Abnormal) Collected: 03/30/22 2211    Lab Status: Final result Specimen: Blood from Arm, Right Updated: 03/30/22 2240     CRP 19 6 mg/L     Narrative:      Note: Unit of Measure change to mg/L at [employer] will show at 10 fold increase in CRP result to match network standards      D-Dimer [[id number]]  (Abnormal) Collected: 03/30/22 2208    Lab Status: Final result Specimen: Blood from Arm, Right Updated: 03/30/22 2240     D-Dimer, Quant 0 75 ug/ml FEU Protime-INR [[id number]]  (Normal) Collected: 03/30/22 2208    Lab Status: Final result Specimen: Blood from Arm, Right Updated: 03/30/22 2233     Protime 13 4 seconds      INR 1 03    APTT [[id number]]  (Normal) Collected: 03/30/22 2208    Lab Status: Final result Specimen: Blood from Arm, Right Updated: 03/30/22 2233     PTT 31 seconds     Urinalysis with microscopic [[id number]]  (Abnormal) Collected: 03/30/22 2212    Lab Status: Final result Specimen: Urine, Clean Catch Updated: 03/30/22 2224     Clarity, UA Clear     Color, UA Yellow     Specific Gravity, UA >=1 030     pH, UA 6 0     Glucose, UA 3+ mg/dl      Ketones, UA Negative mg/dl      Blood, UA Negative     Protein, UA Negative mg/dl      Nitrite, UA Negative     Bilirubin, UA Negative     Urobilinogen, UA 0 2 E U /dl      Leukocytes, UA Negative     WBC, UA 1-2 /hpf      RBC, UA 0-1 /hpf      Bacteria, UA Occasional /hpf      Epithelial Cells Occasional /hpf      MUCUS THREADS Occasional    CBC and differential [[id number]]  (Abnormal) Collected: 03/30/22 2208    Lab Status: Final result Specimen: Blood from Arm, Right Updated: 03/30/22 2221     WBC 10 04 Thousand/uL      RBC 4 97 Million/uL      Hemoglobin 12 5 g/dL      Hematocrit 39 2 %      MCV 79 fL      MCH 25 2 pg      MCHC 31 9 g/dL      RDW 14 1 %      MPV 10 5 fL      Platelets 282 Thousands/uL      nRBC 0 /100 WBCs      Neutrophils Relative 68 %      Immat GRANS % 0 %      Lymphocytes Relative 24 %      Monocytes Relative 6 %      Eosinophils Relative 2 %      Basophils Relative 0 %      Neutrophils Absolute 6 72 Thousands/µL      Immature Grans Absolute 0 04 Thousand/uL      Lymphocytes Absolute 2 43 Thousands/µL      Monocytes Absolute 0 64 Thousand/µL      Eosinophils Absolute 0 18 Thousand/µL      Basophils Absolute 0 03 Thousands/µL     Blood culture #1 [[id number]] Collected: 03/30/22 2208    Lab Status:  In process Specimen: Blood from Arm, Right Updated: 03/30/22 2218    POCT pregnancy, urine [[id number]]  (Normal) Resulted: 03/30/22 2217    Lab Status: Final result Updated: 03/30/22 2218     EXT PREG TEST UR (Ref: Negative) negative     Control valid lot: [id number] EXP: 2023-05-31    Blood culture #2 [[id number]] Collected: 03/30/22 2208    Lab Status: In process Specimen: Blood from Arm, Right Updated: 03/30/22 2217                 CTA ED chest PE study   ED Interpretation by Jose David Vasquez DO (03/30 2344)   No acute pulmonary embolism      Final Result by Margareth Lopez MD (03/30 2341)      No evidence of pulmonary embolus                  Workstation performed: [id number]         XR chest 1 view portable   ED Interpretation by Jose David Vasquez DO (03/30 2253)   Previous cardiac surgery noted  No significant changes since prior x-ray  Final Result by Ren Peterson MD (03/30 2358)      No acute cardiopulmonary disease  Workstation performed: [id number]                    Procedures  ECG 12 Lead Documentation Only    Date/Time: 3/30/2022 10:59 PM  Performed by: Jose David Vasquez DO  Authorized by: Jose David Vasquez DO     Indications / Diagnosis:  Chest pain  ECG reviewed by me, the ED Provider: no    Patient location:  ED  Previous ECG:     Previous ECG:  Compared to current    Similarity:  No change    Comparison to cardiac monitor: Yes    Interpretation:     Interpretation: abnormal    Rate:     ECG rate:  100    ECG rate assessment: tachycardic    Rhythm:     Rhythm: sinus tachycardia    Ectopy:     Ectopy: none    QRS:     QRS axis:  Normal    QRS intervals: Wide  Conduction:     Conduction: abnormal      Abnormal conduction: complete RBBB    ST segments:     ST segments:  Non-specific  T waves:     T waves: non-specific               ED Course  ED Course as of 03/31/22 0019   Wed Mar 30, 2022   2305 Given persistent tachycardia, and relatively normal workup    Slightly elevated D-dimer common recent surgery feel obligated to moved his CTA of the chest ruling out pulmonary embolism  2346 With pulmonary embolism being ruled out, feels safe to allow the patient to be discharged  She should monitor for fever, worsening symptoms  Recommend she follow up her primary care doctor as well  Do not feel this is infectious in nature  SBIRT 20yo+      Most Recent Value   SBIRT (24 yo +)    In order to provide better care to our patients, we are screening all of our patients for alcohol and drug use  Would it be okay to ask you these screening questions? Yes Filed at: 03/30/2022 2140   Initial Alcohol Screen: US AUDIT-C     1  How often do you have a drink containing alcohol? 0 Filed at: 03/30/2022 2140   2  How many drinks containing alcohol do you have on a typical day you are drinking? 0 Filed at: 03/30/2022 2140   3a  Male UNDER 65: How often do you have five or more drinks on one occasion? 0 Filed at: 03/30/2022 2140   3b  FEMALE Any Age, or MALE 65+: How often do you have 4 or more drinks on one occassion? 0 Filed at: 03/30/2022 2140   Audit-C Score 0 Filed at: 03/30/2022 2140   JENNIFER: How many times in the past year have you    Used an illegal drug or used a prescription medication for non-medical reasons?  Never Filed at: 03/30/2022 2140          Wells' Criteria for PE      Most Recent Value   Wells' Criteria for PE    Clinical signs and symptoms of DVT 0 Filed at: 03/30/2022 2306   PE is primary diagnosis or equally likely 3 Filed at: 03/30/2022 2306   HR >100 1 5 Filed at: 03/30/2022 2306   Immobilization at least 3 days or Surgery in the previous 4 weeks 1 5 Filed at: 03/30/2022 2306   Previous, objectively diagnosed PE or DVT 0 Filed at: 03/30/2022 2306   Hemoptysis 0 Filed at: 03/30/2022 2306   Malignancy with treatment within 6 months or palliative 0 Filed at: 03/30/2022 2306   [facility] Records' Criteria Total 6 Filed at: 03/30/2022 2306                MDM    Disposition  Final diagnoses:   Chest wall pain following surgery     Time reflects when diagnosis was documented in both MDM as applicable and the Disposition within this note     Time User Action Codes Description Comment    3/30/2022 11:47 PM Alexys Young Add [Z84 52,  G89 18] Chest wall pain following surgery       ED Disposition     ED Disposition Condition Date/Time Comment    Discharge Stable Wed Mar 30, 2022 11:47 PM Krzysztof Lopez discharge to home/self care  Follow-up Information     Follow up With Specialties Details Why [address] [facility], [address] [facility], Nurse Practitioner Call in 1 week  [address] [facility]    [address] [address]  [phone]            Discharge Medication List as of 3/30/2022 11:51 PM      START taking these medications    Details   naproxen (Naprosyn) 500 mg tablet Take 1 tablet (500 mg total) by mouth 2 (two) times a day with meals, Starting Wed 3/30/2022, Normal      oxyCODONE (Roxicodone) 5 immediate release tablet Take 1 tablet (5 mg total) by mouth every 6 (six) hours as needed for moderate pain for up to 15 doses Max Daily Amount: 20 mg, Starting Wed 3/30/2022, Normal         CONTINUE these medications which have NOT CHANGED    Details   acetaminophen (TYLENOL) 500 mg tablet Take 1,000 mg by mouth, Starting Wed 5/6/2020, Historical Med      albuterol (Ventolin HFA) 90 mcg/act inhaler Inhale 2 puffs every 4 (four) hours as needed for wheezing or shortness of breath, Starting Mon 10/18/2021, Normal      ALPRAZolam (XANAX) 0 5 mg tablet TAKE 1 TABLET BY MOUTH DAILY AT BEDTIME AS NEEDED FOR ANXIETY, Normal      azelastine (ASTELIN) 0 1 % nasal spray 1 spray into each nostril 2 (two) times a day Use in each nostril as directed, Starting Fri 11/19/2021, Normal      B-D UF III MINI PEN NEEDLES 31G X 5 MM MISC INJECT UNDER THE SKIN DAILY AT BEDTIME USE ONE A DAY OR AS DIRECTED, Normal      Blood Glucose Monitoring Suppl (OneTouch Verio) w/Device KIT Use daily, Starting Tue 11/23/2021, Normal      Blood Pressure Monitoring (B-D ASSURE BPM/AUTO WRIST CUFF) MISC Check blood pressure prior to each OB visit, or as directed by your physician , Normal      buPROPion (WELLBUTRIN XL) 150 mg 24 hr tablet Take 1 tablet (150 mg total) by mouth daily, Starting Tue 1/18/2022, Normal      clotrimazole (LOTRIMIN) 1 % cream Apply topically 2 (two) times a day, Starting Thu 3/4/2021, Normal      cyclobenzaprine (FLEXERIL) 5 mg tablet Take 1 tablet (5 mg total) by mouth 3 (three) times a day as needed for muscle spasms, Starting Mon 12/27/2021, Normal      fexofenadine (ALLEGRA) 180 MG tablet Take 1 tablet (180 mg total) by mouth daily, Starting Fri 8/20/2021, Normal      gabapentin (Neurontin) 100 mg capsule Take 1 capsule (100 mg total) by mouth daily at bedtime, Starting Tue 3/1/2022, Normal      glucose blood (OneTouch Verio) test strip Test once daily, Normal      insulin glargine (LANTUS) 100 units/mL subcutaneous injection Inject 15 Units under the skin daily at bedtime, Starting Fri 7/9/2021, Until Sat 11/6/2021, Normal      lidocaine (Lidoderm) 5 % Apply 1 patch topically daily Remove & Discard patch within 12 hours or as directed by MD, Starting Tue 11/30/2021, Normal      lisinopril (ZESTRIL) 30 mg tablet Take 30 mg by mouth daily, Starting Mon 5/25/2020, Historical Med      meclizine (ANTIVERT) 12 5 MG tablet Take 1 tablet (12 5 mg total) by mouth 3 (three) times a day as needed for dizziness, Starting Mon 11/15/2021, Normal      metFORMIN (GLUCOPHAGE) 1000 MG tablet Take 1 tablet (1,000 mg total) by mouth 2 (two) times a day with meals, Starting Tue 5/25/2021, Normal      montelukast (SINGULAIR) 10 mg tablet Take 1 tablet (10 mg total) by mouth daily at bedtime, Starting Fri 8/20/2021, Normal      ondansetron (ZOFRAN) 4 mg tablet Take 1 tablet (4 mg total) by mouth every 6 (six) hours, Starting Tue 3/1/2022, Normal      RA Saline Nasal Spray 0 65 % nasal spray instill 1 spray into each nostril if needed for congestion or RHINITIS, Normal      sertraline (Zoloft) 50 mg tablet Take 1 tablet (50 mg total) by mouth daily, Starting Mon 11/15/2021, Normal      sitaGLIPtin (JANUVIA) 50 mg tablet Take 1 tablet (50 mg total) by mouth daily, Starting Fri 11/19/2021, Normal      VITAMIN D PO Take by mouth daily, Historical Med             No discharge procedures on file      PDMP Review       Value Time User    PDMP Reviewed  Yes 10/2/2020 11:54 AM Shanon Mccarty MD          ED Provider  Electronically Signed by           Lucas Brewer DO  03/31/22 7327

## 2022-04-05 LAB
BACTERIA BLD CULT: NORMAL
BACTERIA BLD CULT: NORMAL

## 2022-06-13 ENCOUNTER — HOSPITAL ENCOUNTER (EMERGENCY)
Facility: HOSPITAL | Age: 32
Discharge: HOME | End: 2022-06-13
Attending: EMERGENCY MEDICINE | Admitting: EMERGENCY MEDICINE
Payer: COMMERCIAL

## 2022-06-13 VITALS
WEIGHT: 231.48 LBS | OXYGEN SATURATION: 97 % | HEIGHT: 61 IN | DIASTOLIC BLOOD PRESSURE: 86 MMHG | SYSTOLIC BLOOD PRESSURE: 169 MMHG | HEART RATE: 93 BPM | RESPIRATION RATE: 20 BRPM | BODY MASS INDEX: 43.7 KG/M2 | TEMPERATURE: 97.9 F

## 2022-06-13 DIAGNOSIS — B37.3 VAGINAL CANDIDIASIS: Primary | ICD-10-CM

## 2022-06-13 PROCEDURE — 99283 EMERGENCY DEPT VISIT LOW MDM: CPT

## 2022-06-13 PROCEDURE — 99284 EMERGENCY DEPT VISIT MOD MDM: CPT | Performed by: EMERGENCY MEDICINE

## 2022-06-13 RX ORDER — CLOTRIMAZOLE 1 %
CREAM (GRAM) TOPICAL
Qty: 15 G | Refills: 0 | Status: SHIPPED | OUTPATIENT
Start: 2022-06-13

## 2022-06-13 RX ORDER — FLUCONAZOLE 100 MG/1
200 TABLET ORAL ONCE
Status: COMPLETED | OUTPATIENT
Start: 2022-06-13 | End: 2022-06-13

## 2022-06-13 RX ORDER — CLOTRIMAZOLE 1 %
CREAM (GRAM) TOPICAL ONCE
Status: COMPLETED | OUTPATIENT
Start: 2022-06-13 | End: 2022-06-13

## 2022-06-13 RX ADMIN — CLOTRIMAZOLE: 1 CREAM TOPICAL at 23:48

## 2022-06-13 RX ADMIN — FLUCONAZOLE 200 MG: 100 TABLET ORAL at 23:47

## 2022-06-14 NOTE — ED PROVIDER NOTES
History  Chief Complaint   Patient presents with    Vaginal Pain     States of pain for the past few days with white discharge  States like a yeast infection  States also feels sores "down there"  Ibarra when she urinates and itches  Using OTC without relief       Pt here for vaginal irrigation   Has been going on for a couple days    No vaginal discharge    Pt thinks she has a yeast infection   She tried a topical antifungal once tonight    No fever/chills  No n/v        History provided by:  Patient  Vaginal Itching  Location:  Bilateral labia irritation   Severity:  Moderate  Chronicity:  New  Associated symptoms: rash (rash over labia bilaterally )    Associated symptoms: no abdominal pain, no chest pain, no congestion, no cough, no diarrhea, no fatigue, no fever, no headaches, no myalgias, no nausea, no rhinorrhea, no shortness of breath, no vomiting and no wheezing        Prior to Admission Medications   Prescriptions Last Dose Informant Patient Reported? Taking? ALPRAZolam (XANAX) 0 5 mg tablet   No No   Sig: TAKE 1 TABLET BY MOUTH DAILY AT BEDTIME AS NEEDED FOR ANXIETY   B-D UF III MINI PEN NEEDLES 31G X 5 MM MISC  Self No No   Sig: INJECT UNDER THE SKIN DAILY AT BEDTIME USE ONE A DAY OR AS DIRECTED   Blood Glucose Monitoring Suppl (OneTouch Verio) w/Device KIT   No No   Sig: Use daily   Blood Pressure Monitoring (B-D ASSURE BPM/AUTO WRIST CUFF) MISC  Self No No   Sig: Check blood pressure prior to each OB visit, or as directed by your physician     RA Saline Nasal Spray 0 65 % nasal spray   No No   Sig: instill 1 spray into each nostril if needed for congestion or RHINITIS   VITAMIN D PO  Self Yes No   Sig: Take by mouth daily   acetaminophen (TYLENOL) 500 mg tablet  Self Yes No   Sig: Take 1,000 mg by mouth   albuterol (Ventolin HFA) 90 mcg/act inhaler   No No   Sig: Inhale 2 puffs every 4 (four) hours as needed for wheezing or shortness of breath   azelastine (ASTELIN) 0 1 % nasal spray   No No   Sig: 1 spray into each nostril 2 (two) times a day Use in each nostril as directed   buPROPion (WELLBUTRIN XL) 150 mg 24 hr tablet   No No   Sig: Take 1 tablet (150 mg total) by mouth daily   clotrimazole (LOTRIMIN) 1 % cream  Self No No   Sig: Apply topically 2 (two) times a day   cyclobenzaprine (FLEXERIL) 5 mg tablet   No No   Sig: Take 1 tablet (5 mg total) by mouth 3 (three) times a day as needed for muscle spasms   fexofenadine (ALLEGRA) 180 MG tablet  Self No No   Sig: Take 1 tablet (180 mg total) by mouth daily   gabapentin (Neurontin) 100 mg capsule   No No   Sig: Take 1 capsule (100 mg total) by mouth daily at bedtime   glucose blood (OneTouch Verio) test strip   No No   Sig: Test once daily   insulin glargine (LANTUS) 100 units/mL subcutaneous injection  Self No No   Sig: Inject 15 Units under the skin daily at bedtime   lidocaine (Lidoderm) 5 %   No No   Sig: Apply 1 patch topically daily Remove & Discard patch within 12 hours or as directed by MD   lisinopril (ZESTRIL) 30 mg tablet  Self Yes No   Sig: Take 30 mg by mouth daily   meclizine (ANTIVERT) 12 5 MG tablet   No No   Sig: Take 1 tablet (12 5 mg total) by mouth 3 (three) times a day as needed for dizziness   metFORMIN (GLUCOPHAGE) 1000 MG tablet  Self No No   Sig: Take 1 tablet (1,000 mg total) by mouth 2 (two) times a day with meals   montelukast (SINGULAIR) 10 mg tablet  Self No No   Sig: Take 1 tablet (10 mg total) by mouth daily at bedtime   naproxen (Naprosyn) 500 mg tablet   No No   Sig: Take 1 tablet (500 mg total) by mouth 2 (two) times a day with meals   ondansetron (ZOFRAN) 4 mg tablet   No No   Sig: Take 1 tablet (4 mg total) by mouth every 6 (six) hours   oxyCODONE (Roxicodone) 5 immediate release tablet   No No   Sig: Take 1 tablet (5 mg total) by mouth every 6 (six) hours as needed for moderate pain for up to 15 doses Max Daily Amount: 20 mg   sertraline (Zoloft) 50 mg tablet   No No   Sig: Take 1 tablet (50 mg total) by mouth daily sitaGLIPtin (JANUVIA) 50 mg tablet   No No   Sig: Take 1 tablet (50 mg total) by mouth daily      Facility-Administered Medications: None       Past Medical History:   Diagnosis Date    Allergic     Arthritis     Bipolar affective disorder, currently depressed, moderate ([facility] [address] ) 12/17/2018    Cyst of ovary, right     Diabetes mellitus ([facility] [address] ) 10/10/18    type 2    Endometriosis     Heart murmur 01/19/88    Hepatitis C     Hepatitis C virus infection cured after antiviral drug therapy     History of transfusion     Hypertension     Migraines     Obesity 1995    Pulmonary artery congenital abnormality     Spleen enlarged     Status post surgical removal of both fallopian tubes     Varicella        Past Surgical History:   Procedure Laterality Date    CARDIAC CATHETERIZATION      no CAD 10days, 4 weeks 22months old    [address], LOW TRANSVERSE  2020    CHOLECYSTECTOMY      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 8    CORONARY STENT PLACEMENT      LIVER BIOPSY      LIVER BIOPSY      STERNAL WIRE REMOVAL  03/24/2022    TUBAL LIGATION Bilateral 2020    VSD REPAIR      As a child       Family History   Problem Relation Age of Onset    Hypertension Mother     Migraines Mother     JENNY disease Mother     Depression Mother     Hyperlipidemia Mother     Diabetes Mother     Diabetes Father     Hypertension Father     Kidney failure Father     Heart attack Father     Arthritis Father     Stroke Father     Polycystic kidney disease Paternal [de-identified]     Stroke Paternal Grandmother     Heart disease Paternal Grandmother     Arthritis Sister     Asthma Sister     Thyroid disease Sister     Diabetes Sister     Arthritis Maternal Grandmother     Breast cancer Maternal Grandmother     Diabetes Maternal Grandmother     Hypertension Maternal Grandmother     Heart Valve Disease Maternal Grandmother     Learning disabilities Cousin     Learning disabilities Sister     ADD / ADHD Cousin     Lung cancer Brother     Diabetes Maternal Grandfather     Hypertension Maternal Grandfather     JENNY disease Maternal Grandfather     Stroke Maternal Grandfather      I have reviewed and agree with the history as documented  E-Cigarette/Vaping    E-Cigarette Use Never User      E-Cigarette/Vaping Substances    Nicotine No     THC No     CBD No     Flavoring No      Social History     Tobacco Use    Smoking status: Former Smoker     Packs/day: 0 20     Years: 0 00     Pack years: 0 00     Types: Cigarettes    Smokeless tobacco: Never Used   Vaping Use    Vaping Use: Never used   Substance Use Topics    Alcohol use: Not Currently     Alcohol/week: 3 0 standard drinks     Types: 3 Standard drinks or equivalent per week     Comment: socially/prior to knowledge of pregnancy    Drug use: Not Currently     Comment: hx of THC use for migraines       Review of Systems   Constitutional: Negative for chills, diaphoresis, fatigue and fever  HENT: Negative for congestion and rhinorrhea  Respiratory: Negative for cough, shortness of breath, wheezing and stridor  Cardiovascular: Negative for chest pain, palpitations and leg swelling  Gastrointestinal: Negative for abdominal pain, blood in stool, diarrhea, nausea and vomiting  Genitourinary: Negative for difficulty urinating, dysuria, flank pain, frequency, genital sores, hematuria, menstrual problem, pelvic pain, vaginal bleeding, vaginal discharge and vaginal pain  Vaginal irriation, pt thinks is yeast infection   Musculoskeletal: Negative for arthralgias, back pain, gait problem, joint swelling, myalgias, neck pain and neck stiffness  Skin: Positive for rash (rash over labia bilaterally )  Negative for wound  Neurological: Negative for dizziness, light-headedness and headaches  All other systems reviewed and are negative  Physical Exam  Physical Exam  Constitutional:       General: She is not in acute distress  Appearance: She is well-developed  She is not ill-appearing, toxic-appearing or diaphoretic  HENT:      Head: Normocephalic and atraumatic  Right Ear: External ear normal       Left Ear: External ear normal       Nose: Nose normal    Eyes:      General: No scleral icterus  Right eye: No discharge  Left eye: No discharge  Extraocular Movements: Extraocular movements intact  Conjunctiva/sclera: Conjunctivae normal       Pupils: Pupils are equal, round, and reactive to light  Neck:      Vascular: No JVD  Trachea: No tracheal deviation  Cardiovascular:      Rate and Rhythm: Normal rate and regular rhythm  Pulses: Normal pulses  Heart sounds: Normal heart sounds  No murmur heard  No friction rub  No gallop  Pulmonary:      Effort: Pulmonary effort is normal  No respiratory distress  Breath sounds: Normal breath sounds  No stridor  No wheezing, rhonchi or rales  Chest:      Chest wall: No tenderness  Abdominal:      General: Bowel sounds are normal  There is no distension  Palpations: Abdomen is soft  There is no mass  Tenderness: There is no abdominal tenderness  There is no right CVA tenderness, left CVA tenderness, guarding or rebound  Hernia: No hernia is present  Musculoskeletal:         General: No swelling, tenderness, deformity or signs of injury  Normal range of motion  Cervical back: Normal range of motion and neck supple  No rigidity or tenderness  Right lower leg: No edema  Left lower leg: No edema  Lymphadenopathy:      Cervical: No cervical adenopathy  Skin:     General: Skin is warm  Capillary Refill: Capillary refill takes less than 2 seconds  Coloration: Skin is not jaundiced or pale  Findings: No bruising, erythema, lesion or rash  Neurological:      General: No focal deficit present  Mental Status: She is alert and oriented to person, place, and time  Mental status is at baseline  Cranial Nerves: No cranial nerve deficit  Sensory: No sensory deficit  Motor: No weakness or abnormal muscle tone  Coordination: Coordination normal    Psychiatric:         Mood and Affect: Mood normal          Behavior: Behavior normal          Thought Content: Thought content normal          Judgment: Judgment normal          Vital Signs  ED Triage Vitals [06/13/22 2309]   Temperature Pulse Respirations Blood Pressure SpO2   97 9 °F (36 6 °C) 93 20 169/86 97 %      Temp Source Heart Rate Source Patient Position - Orthostatic VS BP Location FiO2 (%)   Temporal -- Sitting Right arm --      Pain Score       --           Vitals:    06/13/22 2309   BP: 169/86   Pulse: 93   Patient Position - Orthostatic VS: Sitting         Visual Acuity      ED Medications  Medications - No data to display    Diagnostic Studies  Results Reviewed     None                 No orders to display              Procedures  Procedures         ED Course  ED Course as of 06/13/22 3755 Mon Jun 13, 2022   2330 Pt seen and evaluated  Non toxic  Well appearing    2334 Patient has a clear exam consistent with yeast infection diffusely over the lower external genitals  Will tx w/ oral and topical medications  Pt understands return precautions                                              MDM    Disposition  Final diagnoses:   None     ED Disposition     None      Follow-up Information    None         Patient's Medications   Discharge Prescriptions    No medications on file       No discharge procedures on file      PDMP Review       Value Time User    PDMP Reviewed  Yes 10/2/2020 11:54 AM Sharon Jacome MD          ED Provider  Electronically Signed by           Luke Dacosta MD  06/13/22 1311

## 2022-06-14 NOTE — DISCHARGE INSTRUCTIONS
RETURN IF WORSE IN ANY WAY:   WORSENING PAIN,   FEVER OR FLU LIKE SYMPTOMS,   OR NEW AND CONCERNING SYMPTOMS SIGNS OR SYMPTOMS      PLEASE CALL YOUR PRIMARY DOCTOR or Gynecologist IN THE MORNING TO SET UP FOLLOW UP

## 2022-07-05 ENCOUNTER — OFFICE VISIT (OUTPATIENT)
Dept: FAMILY MEDICINE CLINIC | Facility: CLINIC | Age: 32
End: 2022-07-05
Payer: COMMERCIAL

## 2022-07-05 VITALS
WEIGHT: 224 LBS | HEART RATE: 106 BPM | OXYGEN SATURATION: 96 % | TEMPERATURE: 96.7 F | HEIGHT: 61 IN | BODY MASS INDEX: 42.29 KG/M2 | DIASTOLIC BLOOD PRESSURE: 88 MMHG | SYSTOLIC BLOOD PRESSURE: 122 MMHG

## 2022-07-05 DIAGNOSIS — M54.50 THORACOLUMBAR BACK PAIN: Primary | ICD-10-CM

## 2022-07-05 DIAGNOSIS — M54.6 THORACOLUMBAR BACK PAIN: Primary | ICD-10-CM

## 2022-07-05 DIAGNOSIS — N80.9 ENDOMETRIOSIS: ICD-10-CM

## 2022-07-05 PROCEDURE — 99213 OFFICE O/P EST LOW 20 MIN: CPT

## 2022-07-05 RX ORDER — CYCLOBENZAPRINE HCL 5 MG
5 TABLET ORAL 3 TIMES DAILY PRN
Qty: 30 TABLET | Refills: 0 | Status: SHIPPED | OUTPATIENT
Start: 2022-07-05 | End: 2022-07-15

## 2022-07-05 NOTE — LETTER
July 5, 2022     Patient: Aye Maharaj  YOB: 1990  Date of Visit: 7/5/2022      To Whom it May Concern:    Aye Maharaj is under my professional care  Tory An was seen in my office on 7/5/2022  Tory Nolan may return to work on 7/6/2022  If you have any questions or concerns, please don't hesitate to call           Sincerely,          Shahram Park MD        CC: No Recipients

## 2022-07-05 NOTE — PROGRESS NOTES
ssessment/Plan:    No problem-specific Assessment & Plan notes found for this encounter  Diagnoses and all orders for this visit:    Thoracolumbar back pain  Comments: Follow-up after 10 days if symptoms get worse  Add steroids and do x-ray  Consider physical therapy if does not improve  Orders:  -     cyclobenzaprine (FLEXERIL) 5 mg tablet; Take 1 tablet (5 mg total) by mouth 3 (three) times a day as needed for muscle spasms for up to 10 days    Endometriosis  -     US pelvis complete non OB; Future          Subjective:      Patient ID: Isatu Quijano is a 27 y o  female  Back Pain  This is a recurrent problem  The current episode started 1 to 4 weeks ago  The problem occurs daily  The problem is unchanged  The pain is present in the sacro-iliac and lumbar spine  The quality of the pain is described as aching  The pain radiates to the left thigh  The pain is at a severity of 9/10  The pain is moderate  The symptoms are aggravated by bending, lying down and position  Stiffness is present in the morning and at night  Pertinent negatives include no abdominal pain, bladder incontinence, chest pain, dysuria, fever, numbness, paresis, paresthesias, tingling or weakness  She has tried analgesics and NSAIDs for the symptoms  The treatment provided no relief  The following portions of the patient's history were reviewed and updated as appropriate: allergies, current medications, past family history, past medical history, past social history, past surgical history and problem list     Review of Systems   Constitutional: Negative for chills, diaphoresis, fatigue and fever  HENT: Negative for congestion, ear pain and sore throat  Eyes: Negative for pain and visual disturbance  Respiratory: Negative for cough and shortness of breath  Cardiovascular: Negative for chest pain and palpitations  Gastrointestinal: Negative for abdominal pain, constipation, diarrhea and vomiting     Genitourinary: Negative for bladder incontinence, dysuria and hematuria  Musculoskeletal: Positive for back pain  Negative for arthralgias and gait problem  Skin: Negative for color change and rash  Neurological: Negative for dizziness, tingling, seizures, syncope, weakness, numbness and paresthesias  All other systems reviewed and are negative  Objective:      /88   Pulse (!) 106   Temp (!) 96 7 °F (35 9 °C)   Ht 5' 1" (1 549 m)   Wt 102 kg (224 lb)   LMP 06/27/2022 (Approximate)   SpO2 96%   BMI 42 32 kg/m²          Physical Exam  Vitals reviewed  Constitutional:       Appearance: She is obese  HENT:      Nose: Nose normal       Mouth/Throat:      Mouth: Mucous membranes are moist    Eyes:      Conjunctiva/sclera: Conjunctivae normal    Cardiovascular:      Rate and Rhythm: Normal rate and regular rhythm  Pulses: Normal pulses  Heart sounds: Normal heart sounds  Pulmonary:      Effort: Pulmonary effort is normal       Breath sounds: Normal breath sounds  Abdominal:      General: Abdomen is flat  Palpations: Abdomen is soft  Musculoskeletal:      Lumbar back: Tenderness present  No swelling, edema, deformity, signs of trauma, lacerations or spasms  Normal range of motion  Negative right straight leg raise test and negative left straight leg raise test         Back:       Right lower leg: No edema  Left lower leg: No edema  Skin:     General: Skin is warm  Neurological:      Mental Status: She is alert

## 2022-07-08 ENCOUNTER — HOSPITAL ENCOUNTER (OUTPATIENT)
Dept: ULTRASOUND IMAGING | Facility: HOSPITAL | Age: 32
Discharge: HOME | End: 2022-07-08
Payer: COMMERCIAL

## 2022-07-08 DIAGNOSIS — N80.9 ENDOMETRIOSIS: ICD-10-CM

## 2022-07-08 PROCEDURE — 76830 TRANSVAGINAL US NON-OB: CPT

## 2022-07-08 PROCEDURE — 76856 US EXAM PELVIC COMPLETE: CPT

## 2022-07-15 ENCOUNTER — OFFICE VISIT (OUTPATIENT)
Dept: FAMILY MEDICINE CLINIC | Facility: CLINIC | Age: 32
End: 2022-07-15
Payer: COMMERCIAL

## 2022-07-15 VITALS
HEIGHT: 61 IN | HEART RATE: 94 BPM | SYSTOLIC BLOOD PRESSURE: 130 MMHG | DIASTOLIC BLOOD PRESSURE: 82 MMHG | TEMPERATURE: 96.8 F | OXYGEN SATURATION: 99 % | BODY MASS INDEX: 42.1 KG/M2 | WEIGHT: 223 LBS

## 2022-07-15 DIAGNOSIS — M54.9 ACUTE BILATERAL BACK PAIN, UNSPECIFIED BACK LOCATION: Primary | ICD-10-CM

## 2022-07-15 PROCEDURE — 3075F SYST BP GE 130 - 139MM HG: CPT | Performed by: NURSE PRACTITIONER

## 2022-07-15 PROCEDURE — 99213 OFFICE O/P EST LOW 20 MIN: CPT | Performed by: NURSE PRACTITIONER

## 2022-07-15 PROCEDURE — 3079F DIAST BP 80-89 MM HG: CPT | Performed by: NURSE PRACTITIONER

## 2022-07-15 RX ORDER — MELOXICAM 15 MG/1
15 TABLET ORAL DAILY
Qty: 30 TABLET | Refills: 5 | Status: SHIPPED | OUTPATIENT
Start: 2022-07-15

## 2022-07-15 RX ORDER — LIDOCAINE 50 MG/G
OINTMENT TOPICAL AS NEEDED
Qty: 35.44 G | Refills: 0 | Status: SHIPPED | OUTPATIENT
Start: 2022-07-15

## 2022-07-15 NOTE — PROGRESS NOTES
Assessment/Plan:    No problem-specific Assessment & Plan notes found for this encounter  Diagnoses and all orders for this visit:    Acute bilateral back pain, unspecified back location  -     meloxicam (Mobic) 15 mg tablet; Take 1 tablet (15 mg total) by mouth daily  -     lidocaine (XYLOCAINE) 5 % ointment; Apply topically as needed for mild pain  -     XR spine lumbar minimum 4 views non injury; Future  -     Ambulatory Referral to Physical Therapy; Future        Subjective:      Patient ID: Navi Solorzano is a 27 y o  female  Patient presents for follow up on back pain  Patient was seen 10 days ago  At that time her back pain was already going on for about 2 weeks  She denies any injury or trauma  Patient states that she has lower back pain across the whole lower back and it is worse on the left side  She states that it is an aching stabbing pain that is constant  Sometimes it is so bad in the morning that it takes her about 1 hour to get out of bed  She denies stiffness  She has not tried anything besides Flexeril and motrin which help only small amounts  She states lidocaine patches do not stick to her skin well  She does have limited ROM with bending and twisting  She does do a lot of lifting at work which often makes her pain worse  Back Pain  This is a new problem  The current episode started 1 to 4 weeks ago  The problem occurs constantly  The problem is unchanged  The pain is present in the lumbar spine  The quality of the pain is described as aching, shooting and stabbing  The pain is the same all the time  Pertinent negatives include no abdominal pain, bladder incontinence, bowel incontinence, chest pain, dysuria, fever, headaches, leg pain, numbness, paresis, paresthesias, pelvic pain, perianal numbness, tingling, weakness or weight loss  Risk factors include lack of exercise and sedentary lifestyle  She has tried muscle relaxant for the symptoms  The treatment provided no relief  The following portions of the patient's history were reviewed and updated as appropriate: allergies, current medications, past family history, past medical history, past social history, past surgical history and problem list     Review of Systems   Constitutional: Negative for appetite change, fatigue, fever, unexpected weight change and weight loss  HENT: Negative for congestion, rhinorrhea, sneezing and sore throat  Eyes: Negative for visual disturbance  Respiratory: Negative for cough, chest tightness, shortness of breath and wheezing  Cardiovascular: Negative for chest pain, palpitations and leg swelling  Gastrointestinal: Negative for abdominal pain, blood in stool, bowel incontinence, constipation, diarrhea, nausea and vomiting  Genitourinary: Negative for bladder incontinence, difficulty urinating, dysuria, pelvic pain and urgency  Musculoskeletal: Positive for back pain  Negative for arthralgias and joint swelling  Skin: Negative for color change and rash  Neurological: Negative for dizziness, tingling, weakness, numbness, headaches and paresthesias  Hematological: Negative for adenopathy  Does not bruise/bleed easily  Objective:      /82   Pulse 94   Temp (!) 96 8 °F (36 °C)   Ht 5' 1" (1 549 m)   Wt 101 kg (223 lb)   LMP 06/27/2022 (Approximate)   SpO2 99%   BMI 42 14 kg/m²          Physical Exam  Constitutional:       General: She is not in acute distress  Appearance: Normal appearance  She is not ill-appearing  HENT:      Head: Normocephalic and atraumatic  Cardiovascular:      Rate and Rhythm: Normal rate and regular rhythm  Pulses: Normal pulses  Heart sounds: Normal heart sounds  No murmur heard  No friction rub  Pulmonary:      Effort: Pulmonary effort is normal  No respiratory distress  Breath sounds: Normal breath sounds  No wheezing  Chest:      Chest wall: No tenderness  Abdominal:      General: Abdomen is flat  Bowel sounds are normal       Palpations: Abdomen is soft  There is no mass  Tenderness: There is no abdominal tenderness  There is no guarding or rebound  Musculoskeletal:      Cervical back: Normal and normal range of motion  No rigidity  Thoracic back: Normal       Lumbar back: Bony tenderness present  No swelling, edema or lacerations  Decreased range of motion  Negative right straight leg raise test and negative left straight leg raise test    Lymphadenopathy:      Cervical: No cervical adenopathy  Skin:     General: Skin is warm and dry  Findings: No bruising, erythema or lesion  Neurological:      General: No focal deficit present  Mental Status: She is alert and oriented to person, place, and time  Mental status is at baseline  Motor: No weakness  Coordination: Coordination normal       Gait: Gait normal    Psychiatric:         Mood and Affect: Mood normal          Behavior: Behavior normal          Thought Content:  Thought content normal          Judgment: Judgment normal

## 2022-07-15 NOTE — PATIENT INSTRUCTIONS
Have x-ray completed, use lidocaine gel prn, do not use heat at the same time  Go to PT as instructed  Start Mobic daily with food- allergic to steroids  Do not use other NSAIDs with this  F/u in 2 weeks

## 2022-07-15 NOTE — LETTER
July 15, 2022     Patient: Adelita Gil  YOB: 1990  Date of Visit: 7/15/2022      To Whom it May Concern:    Adelita Gil is under my professional care  Venita Lam was seen in my office on 7/15/2022  Venita Schooling may return to work with limitations no lifting > 10 pounds for two weeks  Will re-evaluate at next visit   If you have any questions or concerns, please don't hesitate to call           Sincerely,          REJI Allred        CC: No Recipients

## 2022-07-18 ENCOUNTER — TELEPHONE (OUTPATIENT)
Dept: FAMILY MEDICINE CLINIC | Facility: CLINIC | Age: 32
End: 2022-07-18

## 2022-07-18 NOTE — TELEPHONE ENCOUNTER
Tried couple of times to call patient regarding her ultrasound result  Used a number in the chart, but it says the number is not working number

## 2022-07-25 ENCOUNTER — VBI (OUTPATIENT)
Dept: ADMINISTRATIVE | Facility: OTHER | Age: 32
End: 2022-07-25

## 2022-07-26 NOTE — PROGRESS NOTES
PT Evaluation     Today's date: 2022  Patient name: Bettie Walter  : 1990  MRN: [id number]  Referring provider: REJI Fonseca  Dx:   Encounter Diagnosis     ICD-10-CM    1  Acute bilateral back pain, unspecified back location  M54 9    2  Muscle weakness  M62 81                   Assessment  Assessment details: Bettie Walter is a 27 y o  female with a history of bipolar D/O, DM, obesity, heart murmur, Hep C, endometriosis, HTN, migraines, pulmonary artery congenital abnormality, varicella, removal of both fallopian tubes, and enlarged spleen that presents for a moderate complexity physical therapy initial evaluation  The patient demonstrates signs and symptoms consistent with bilateral back pain; muscle weakness  During the examination the patient demonstrated decreased core/LE strength, decreased Lumbar ROM, activity intolerance, and B/L back pain  The patient's impairments are causing the following functional limitations: difficulty with prolonged standing, prolonged walking, difficulty bending/stooping, difficulty lifting/carrying objects, walking on unlevel surfaces, difficulty squatting/kneeling, difficulty stair-climbing, and difficulty transferring from low surfaces     The patient's clinical presentation is evolving due to a number of participation restrictions, significant medial history, and functional limitation (FOTO 50% function)  The patient will benefit from skilled PT services to address impairments, work towards goals, and restore PLOF          Impairments: abnormal or restricted ROM, activity intolerance, impaired balance, impaired physical strength, lacks appropriate home exercise program, pain with function and poor posture   Functional limitations: difficulty with prolonged standing, prolonged walking, difficulty bending/stooping, difficulty lifting/carrying objects, walking on unlevel surfaces, difficulty squatting/kneeling, difficulty stair-climbing, and difficulty transferring from low surfaces  Symptom irritability: moderateBarriers to therapy: MEDICAL HX  Understanding of Dx/Px/POC: fair   Prognosis: fair  Prognosis details: MEDICAL HX    Goals  STG: Achieve in 4-6 weeks  1  Decrease lumbar pain at worst by 50% to improve activity tolerance for self/care and leisure activities  2   Improve CORE/LE strength by 1/2-1 muscle grade to improve ability to change body positions without difficulty  3   Improve [facility] ROM [facility] " [facility]" to facilitate normal movement patterns for ADL's/work tasks  LTG: Achieve in 8-12 weeks  1  Patient's FOTO score improve to > 60% to indicate a return to normal functioning  2   Patient tolerate moving/changing positions without back / L LE pain > 2/10 to achieve patient specific goal   3   Patient achieve independence with performing home exercise plan  Plan  Plan details: RE-ASSESS 1X/MONTH    PATIENT HAD TIME CONSTRAINTS DURING THE VISIT TODAY - NEEDED TO FINISH BY 4:00PM  Patient would benefit from: skilled physical therapy  Planned modality interventions: TENS, cryotherapy, thermotherapy: hydrocollator packs, ultrasound and traction  Other planned modality interventions: [facility]  Planned therapy interventions: joint mobilization, manual therapy, massage, neuromuscular re-education, patient education, postural training, strengthening, stretching, therapeutic activities, therapeutic exercise, home exercise program and abdominal trunk stabilization  Frequency: 1-3X/WK  Duration in weeks: 12  Plan of Care beginning date: 7/27/2022  Plan of Care expiration date: 10/26/2022  Treatment plan discussed with: PTA and patient        Subjective Evaluation    History of Present Illness  Date of onset: 5/25/2022  Mechanism of injury: Raheem Padilla is a 27 y o  female that presents to outpatient physical therapy with complaints of lower back pain  The patient reports her PCP told her she has a "knot" at her low back which radiates down her L leg  The patient has difficulty with sleeping on her left side  The patient notes difficulty with sitting, standing, walking, lifting, carrying, and bending  The patient has X-rays ordered but not taken  The patientThe patient's main goal for physical therapy is to reduce her pain to move better   Recurrent probem    Pain  Current pain ratin  At best pain ratin  At worst pain rating: 10  Location: L lumbar spine, L buttocks, L posterior knee  Quality: needle-like and knife-like  Aggravating factors: sitting, standing, walking, stair climbing and lifting  Progression: no change    Social Support  Steps to enter house: yes  1  Stairs in house: yes   12  Lives in: [place]  Lives with: young children and significant other    Employment status: working ([employer] - standing)  Hand dominance: right    Treatments  Current treatment: physical therapy  Patient Goals  Patient goals for therapy: decreased pain, increased motion, increased strength, independence with ADLs/IADLs and return to sport/leisure activities          Objective     Concurrent Complaints  Negative for night pain, disturbed sleep, bladder dysfunction, bowel dysfunction, saddle (S4) numbness, history of cancer, history of trauma and infection    Static Posture     Thoracic Spine  Hyperkyphosis  Lumbar Spine   Increased lordosis  Lumbar spine (Left): Shifted       Postural Observations  Seated posture: fair  Standing posture: fair  Correction of posture: has no consistent effect        Neurological Testing     Sensation     Lumbar   Left   Intact: light touch    Right   Intact: light touch    Reflexes   Left   Patellar (L4): normal (2+)  Achilles (S1): absent (0)  Babinski sign: negative    Right   Patellar (L4): normal (2+)  Achilles (S1): absent (0)  Babinski sign: negative    Active Range of Motion     Lumbar   Flexion:  with pain Restriction level: moderate  Extension:  with pain Restriction level: moderate  Left lateral flexion:  with pain Restriction level: minimal  Right lateral flexion:  with pain Restriction level: minimal  Left rotation:  with pain Restriction level: minimal  Right rotation:  with pain Restriction level: minimal  Mechanical Assessment    Cervical      Thoracic      Lumbar    Standing flexion: repeated movements   Pain location:peripheralized  Standing extension: repeated movements  Pain location: peripheralized  Lying extension: repeated movements  Pain location: peripheralized  Left Sidegliding: repeated movements  Pain location: peripheralized  Right sidegliding: repeated movements  Pain location: peripheralized    Strength/Myotome Testing     Left Hip   Planes of Motion   Flexion: 3+  Extension: 3+  Abduction: 4-  Adduction: 4-    Right Hip   Planes of Motion   Flexion: 3+  Extension: 3+  Abduction: 4-  Adduction: 4-    Left Knee   Flexion: 3+  Extension: 3+    Right Knee   Flexion: 3+  Extension: 3+    Left Ankle/Foot   Dorsiflexion: 5  Plantar flexion: 5  Great toe extension: 5    Right Ankle/Foot   Dorsiflexion: 5  Plantar flexion: 5  Great toe extension: 5    Muscle Activation     Additional Muscle Activation Details  Decreased TrA, multifidus, gluteal activation with position changes      Tests     Lumbar     Left   Negative slump test      Right   Negative slump test      Additional Tests Details  FOTO: 50% ( predicted 60%)                       Precautions: BIPOLAR / HTN / Medical Tape  RE-EVAL:8/24    Specialty Daily Treatment Diary     Manual  7/27       STM lumbar paraspinals                Hamstring stretch B/L        Piriformis Stretch B/L        Prone Quad stretch            Therapeutic Exercise 7/27       Treadmill Ambulation        UBE Stand ALT FWD/BACK                                Bridges        Self 90-90 hamstring stretch B/L        Prone press ups        Standing back extensions                LTR cross legs                        Lean on mat donkey kicks        Neuro Re-ed        Core brace        kegels        Brace with knee fallouts        Quad DLS UE  LE  UE+LE        Quad knee lifts        Clam shells        MTP/LTP/ Anti-rotations        Towel roll education, Posture correction; movement precautions Done AD x 8 mins -       Sit ball marches        SLB        Posture corrections seated        Gait Train        Sidestepping        Heel toe Amb                        Therapeutic Activity        Steps ups fwd/side        Crate carry        Crate lifts 3 levels        Lunges        Chair squats            Modalities        MHP/CP to L/B                                The patient was given a new home exercise plan with written handout, pictures, and verbal instruction  The patient accepts and understands the new home activities

## 2022-07-27 ENCOUNTER — EVALUATION (OUTPATIENT)
Dept: PHYSICAL THERAPY | Facility: CLINIC | Age: 32
End: 2022-07-27
Payer: COMMERCIAL

## 2022-07-27 DIAGNOSIS — M62.81 MUSCLE WEAKNESS: ICD-10-CM

## 2022-07-27 DIAGNOSIS — M54.9 ACUTE BILATERAL BACK PAIN, UNSPECIFIED BACK LOCATION: Primary | ICD-10-CM

## 2022-07-27 PROCEDURE — 97162 PT EVAL MOD COMPLEX 30 MIN: CPT | Performed by: PHYSICAL THERAPIST

## 2022-07-27 PROCEDURE — 97112 NEUROMUSCULAR REEDUCATION: CPT | Performed by: PHYSICAL THERAPIST

## 2022-07-28 ENCOUNTER — OFFICE VISIT (OUTPATIENT)
Dept: PHYSICAL THERAPY | Facility: CLINIC | Age: 32
End: 2022-07-28
Payer: COMMERCIAL

## 2022-07-28 DIAGNOSIS — M62.81 MUSCLE WEAKNESS: ICD-10-CM

## 2022-07-28 DIAGNOSIS — M54.9 ACUTE BILATERAL BACK PAIN, UNSPECIFIED BACK LOCATION: Primary | ICD-10-CM

## 2022-07-28 PROCEDURE — 97110 THERAPEUTIC EXERCISES: CPT | Performed by: PHYSICAL THERAPIST

## 2022-07-28 PROCEDURE — 97112 NEUROMUSCULAR REEDUCATION: CPT | Performed by: PHYSICAL THERAPIST

## 2022-07-28 NOTE — PROGRESS NOTES
PT Discharge    Today's date: 2022  Patient name: Lilly Yao  : 1990  MRN: [id number]  Referring provider: REJI Weber  Dx:   Encounter Diagnosis     ICD-10-CM    1  Acute bilateral back pain, unspecified back location  M54 9    2  Muscle weakness  M62 81                   Assessment  Assessment details: Lilly Yao is a 27 y o  female that has attended 2 sessions of physical therapy will be discharged from formal PT at this time  The patient had difficulty attending therapy due to moving out of the area  The patient was given home exercise program information during the formal therapy sessions and is able to continue these post discharge  NO NEW OBJECTIVE MEASURES WERE COMPLETED  D/C FORMAL PT  Impairments: abnormal or restricted ROM, activity intolerance, impaired balance, impaired physical strength, lacks appropriate home exercise program, pain with function and poor posture   Functional limitations: difficulty with prolonged standing, prolonged walking, difficulty bending/stooping, difficulty lifting/carrying objects, walking on unlevel surfaces, difficulty squatting/kneeling, difficulty stair-climbing, and difficulty transferring from low surfaces  Symptom irritability: moderateBarriers to therapy: MEDICAL HX  Understanding of Dx/Px/POC: fair   Prognosis: fair  Prognosis details: MEDICAL HX    Goals  ALL GOALS N/A DUE TO PATIENT SELF DISCHARGE DUE TO MOVING OUT OF THE AREA    STG: Achieve in 4-6 weeks  1  Decrease lumbar pain at worst by 50% to improve activity tolerance for self/care and leisure activities  2   Improve CORE/LE strength by 1/2-1 muscle grade to improve ability to change body positions without difficulty  3   Im[facility]UMBAR ROM to " [facility]" to facilitate normal movement patterns for ADL's/work tasks  LTG: Achieve in 8-12 weeks  1  Patient's FOTO score improve to > 60% to indicate a return to normal functioning    2   Patient tolerate moving/changing positions without back / L LE pain > 2/10 to achieve patient specific goal   3   Patient achieve independence with performing home exercise plan  Plan  Plan details: D/C PT TO AN INDEPENDENT HEP - PATIENT IS MOVING OUT OF THE AREA AND IS REQUESTING DISCHARGE  Treatment plan discussed with: PTA and patient        Subjective Evaluation    History of Present Illness  Date of onset: 2022  Mechanism of injury: SUBJECTIVE 2022: Behzad Schultz is a 27 y o  female that has attended 2 sessions of physical therapy will be discharged from formal PT at this time  The patient had difficulty attending therapy due to moving out of the area  The patient was given home exercise program information during the formal therapy sessions and is able to continue these post discharge  NO NEW OBJECTIVE MEASURES WERE COMPLETED  D/C FORMAL PT         INJURY HISTORY: Behzad Schultz is a 27 y o  female that presents to outpatient physical therapy with complaints of lower back pain  The patient reports her PCP told her she has a "knot" at her low back which radiates down her L leg  The patient has difficulty with sleeping on her left side  The patient notes difficulty with sitting, standing, walking, lifting, carrying, and bending  The patient has X-rays ordered but not taken  The patientThe patient's main goal for physical therapy is to reduce her pain to move better                Recurrent probem    Pain  Current pain ratin  At best pain ratin  At worst pain rating: 10  Location: L lumbar spine, L buttocks, L posterior knee  Quality: needle-like and knife-like  Aggravating factors: sitting, standing, walking, stair climbing and lifting  Progression: no change    Social Support  Steps to enter house: yes  1  Stairs in house: yes   12  [facility] in: [facility]  Lives with: young children and significant other    Employment status: working ([employer] - standing)  Hand dominance: right    Treatments  Previous treatment: physical therapy  Patient Goals  Patient goal: REDUCE PAIN/MOVE BETTER ( N/A)        Objective     Concurrent Complaints  Negative for night pain, disturbed sleep, bladder dysfunction, bowel dysfunction, saddle (S4) numbness, history of cancer, history of trauma and infection    Static Posture     Thoracic Spine  Hyperkyphosis  Lumbar Spine   Increased lordosis  Lumbar spine (Left): Shifted       Postural Observations  Seated posture: fair  Standing posture: fair  Correction of posture: has no consistent effect        Neurological Testing     Sensation     Lumbar   Left   Intact: light touch    Right   Intact: light touch    Reflexes   Left   Patellar (L4): normal (2+)  Achilles (S1): absent (0)  Babinski sign: negative    Right   Patellar (L4): normal (2+)  Achilles (S1): absent (0)  Babinski sign: negative    Active Range of Motion     Lumbar   Flexion:  with pain Restriction level: moderate  Extension:  with pain Restriction level: moderate  Left lateral flexion:  with pain Restriction level: minimal  Right lateral flexion:  with pain Restriction level: minimal  Left rotation:  with pain Restriction level: minimal  Right rotation:  with pain Restriction level: minimal  Mechanical Assessment    Cervical      Thoracic      Lumbar    Standing flexion: repeated movements   Pain location:peripheralized  Standing extension: repeated movements  Pain location: peripheralized  Lying extension: repeated movements  Pain location: peripheralized  Left Sidegliding: repeated movements  Pain location: peripheralized  Right sidegliding: repeated movements  Pain location: peripheralized    Strength/Myotome Testing     Left Hip   Planes of Motion   Flexion: 3+  Extension: 3+  Abduction: 4-  Adduction: 4-    Right Hip   Planes of Motion   Flexion: 3+  Extension: 3+  Abduction: 4-  Adduction: 4-    Left Knee   Flexion: 3+  Extension: 3+    Right Knee   Flexion: 3+  Extension: 3+    Left Ankle/Foot   Dorsiflexion: 5  Plantar flexion: 5  Great toe extension: 5    Right Ankle/Foot   Dorsiflexion: 5  Plantar flexion: 5  Great toe extension: 5    Muscle Activation     Additional Muscle Activation Details  Decreased TrA, multifidus, gluteal activation with position changes      Tests     Lumbar     Left   Negative slump test      Right   Negative slump test      Additional Tests Details  FOTO: 50% ( predicted 60%)                       Precautions: BIPOLAR / HTN / Medical Tape  RE-EVAL:8/24  Access Code: [email or web address]       Specialty Daily Treatment Diary     Manual  7/27 7/28      STM lumbar paraspinals                Hamstring stretch B/L        Piriformis Stretch B/L        Prone Quad stretch            Therapeutic Exercise 7/27 7/28      Treadmill Ambulation        UBE Stand ALT FWD/BACK        NU STEP  L2 X 4 MINS                      Bridges  X10      Self 90-90 hamstring stretch B/L        Prone press ups        Standing back extensions                LTR   x10                      Lean on mat donkey kicks        Neuro Re-ed        Core brace  X15      kegels  X15      MULTIFIDUS  X15      Quad DLS UE  LE  UE+LE        GLUTEAL SETS  X15      Clam shells  X15      MTP/LTP/ Anti-rotations        Towel roll education, Posture correction; movement precautions Done AD x 8 mins - Reviewed AD      Sit ball marches        SLB        Posture corrections seated  x10              Sidestepping  25ft      Heel toe Amb                        Therapeutic Activity        Steps ups fwd/side        Crate carry        Crate lifts 3 levels        Lunges        Chair squats            Modalities        MHP/CP to L/B                                The patient was given a new home exercise plan with written handout, pictures, and verbal instruction  The patient accepts and understands the new home activities

## 2022-11-08 ENCOUNTER — OFFICE VISIT (OUTPATIENT)
Dept: FAMILY MEDICINE CLINIC | Facility: CLINIC | Age: 32
End: 2022-11-08

## 2022-11-08 VITALS
HEIGHT: 61 IN | HEART RATE: 86 BPM | TEMPERATURE: 97.8 F | DIASTOLIC BLOOD PRESSURE: 72 MMHG | BODY MASS INDEX: 41.99 KG/M2 | OXYGEN SATURATION: 98 % | WEIGHT: 222.4 LBS | SYSTOLIC BLOOD PRESSURE: 122 MMHG

## 2022-11-08 DIAGNOSIS — M54.40 ACUTE BILATERAL LOW BACK PAIN WITH SCIATICA, SCIATICA LATERALITY UNSPECIFIED: Primary | ICD-10-CM

## 2022-11-08 DIAGNOSIS — E55.9 VITAMIN D DEFICIENCY: ICD-10-CM

## 2022-11-08 DIAGNOSIS — Z79.4 TYPE 2 DIABETES MELLITUS WITHOUT COMPLICATION, WITH LONG-TERM CURRENT USE OF INSULIN (HCC): ICD-10-CM

## 2022-11-08 DIAGNOSIS — Z23 ENCOUNTER FOR IMMUNIZATION: ICD-10-CM

## 2022-11-08 DIAGNOSIS — E11.9 TYPE 2 DIABETES MELLITUS WITHOUT COMPLICATION, WITH LONG-TERM CURRENT USE OF INSULIN (HCC): ICD-10-CM

## 2022-11-08 DIAGNOSIS — Z13.29 SCREENING FOR THYROID DISORDER: ICD-10-CM

## 2022-11-08 DIAGNOSIS — E78.00 HYPERCHOLESTEREMIA: ICD-10-CM

## 2022-11-08 RX ORDER — CYCLOBENZAPRINE HCL 10 MG
10 TABLET ORAL 3 TIMES DAILY PRN
Qty: 60 TABLET | Refills: 0 | Status: SHIPPED | OUTPATIENT
Start: 2022-11-08

## 2022-11-08 RX ORDER — MELOXICAM 15 MG/1
15 TABLET ORAL DAILY
Qty: 30 TABLET | Refills: 5 | Status: SHIPPED | OUTPATIENT
Start: 2022-11-08

## 2022-11-08 NOTE — PROGRESS NOTES
Name: Tu Cortez      : 1990      MRN: [id number]  Encounter Provider: Alphonsa Frankel, CRNP  Encounter Date: 2022   Encounter department: [address] [place] [address]  Acute bilateral low back pain with sciatica, sciatica laterality unspecified  -     cyclobenzaprine (FLEXERIL) 10 mg tablet; Take 1 tablet (10 mg total) by mouth 3 (three) times a day as needed for muscle spasms  -     meloxicam (Mobic) 15 mg tablet; Take 1 tablet (15 mg total) by mouth daily    2  Hypercholesteremia  -     CBC and differential; Future  -     Comprehensive metabolic panel; Future  -     Lipid panel; Future    3  Screening for thyroid disorder  -     TSH, 3rd generation with Free T4 reflex; Future    4  Type 2 diabetes mellitus without complication, with long-term current use of insulin ([facility])  -     HEMOGLOBIN A1C W/ EAG ESTIMATION; Future    5  Vitamin D deficiency  -     Vitamin D 25 hydroxy; Future    6  Encounter for immunization  -     influenza vaccine, quadrivalent, 0 5 mL, preservative-free, for adult and pediatric patients 6 mos+ (AFLURIA, FLUARIX, FLULAVAL, FLUZONE)         Subjective      Patient presents for evaluation of low back pain  She reports the pain started gradually last week, no inciting injury or event  She reports the pain is across her lower back, primarily on the left side with radiation of pain into the left buttock/leg  She reports that lifting her child, walking, and sitting for long periods of time aggravate the pain  She has also been staying at her mother's house where she is sleeping either on the floor or in a recliner chair, which aggravates the pain further  She has tried ibuprofen, Tylenol, heat without relief of pain  She had similar back pain about 4 months ago which was relieved with Meloxicam and lidocaine patches  This time she reports numbness down the left leg into the ankle  Denies saddle anesthesia, bowel/bladder incontinence   Meloxicam and Flexeril prescriptions provided today  Review of Systems   Genitourinary: Negative for difficulty urinating, dysuria and urgency  Musculoskeletal: Positive for back pain  Negative for arthralgias, joint swelling, myalgias, neck pain and neck stiffness  Denies bowel/bladder incontinence, saddle anesthesia    Neurological: Positive for numbness  Negative for weakness         Current Outpatient Medications on File Prior to Visit   Medication Sig   • acetaminophen (TYLENOL) 500 mg tablet Take 1,000 mg by mouth   • albuterol (Ventolin HFA) 90 mcg/act inhaler Inhale 2 puffs every 4 (four) hours as needed for wheezing or shortness of breath   • ALPRAZolam (XANAX) 0 5 mg tablet TAKE 1 TABLET BY MOUTH DAILY AT BEDTIME AS NEEDED FOR ANXIETY   • B-D UF III MINI PEN NEEDLES 31G X 5 MM MISC INJECT UNDER THE SKIN DAILY AT BEDTIME USE ONE A DAY OR AS DIRECTED   • buPROPion (WELLBUTRIN XL) 150 mg 24 hr tablet Take 1 tablet (150 mg total) by mouth daily   • fexofenadine (ALLEGRA) 180 MG tablet Take 1 tablet (180 mg total) by mouth daily   • gabapentin (Neurontin) 100 mg capsule Take 1 capsule (100 mg total) by mouth daily at bedtime   • insulin glargine (LANTUS) 100 units/mL subcutaneous injection Inject 15 Units under the skin daily at bedtime   • lidocaine (Lidoderm) 5 % Apply 1 patch topically daily Remove & Discard patch within 12 hours or as directed by MD   • lisinopril (ZESTRIL) 30 mg tablet Take 30 mg by mouth daily   • meclizine (ANTIVERT) 12 5 MG tablet Take 1 tablet (12 5 mg total) by mouth 3 (three) times a day as needed for dizziness   • metFORMIN (GLUCOPHAGE) 1000 MG tablet Take 1 tablet (1,000 mg total) by mouth 2 (two) times a day with meals   • montelukast (SINGULAIR) 10 mg tablet Take 1 tablet (10 mg total) by mouth daily at bedtime   • ondansetron (ZOFRAN) 4 mg tablet Take 1 tablet (4 mg total) by mouth every 6 (six) hours   • sertraline (Zoloft) 50 mg tablet Take 1 tablet (50 mg total) by mouth daily   • sitaGLIPtin (JANUVIA) 50 mg tablet Take 1 tablet (50 mg total) by mouth daily   • VITAMIN D PO Take by mouth daily   • [DISCONTINUED] meloxicam (Mobic) 15 mg tablet Take 1 tablet (15 mg total) by mouth daily   • azelastine (ASTELIN) 0 1 % nasal spray 1 spray into each nostril 2 (two) times a day Use in each nostril as directed (Patient not taking: No sig reported)   • Blood Glucose Monitoring Suppl (OneTouch Verio) w/Device KIT Use daily (Patient not taking: Reported on 11/8/2022)   • Blood Pressure Monitoring (B-D ASSURE BPM/AUTO WRIST CUFF) MISC Check blood pressure prior to each OB visit, or as directed by your physician   (Patient not taking: Reported on 11/8/2022)   • clotrimazole (LOTRIMIN) 1 % cream Apply topically 2 (two) times a day (Patient not taking: Reported on 11/8/2022)   • clotrimazole (LOTRIMIN) 1 % cream Apply to affected area 2 times daily (Patient not taking: Reported on 11/8/2022)   • glucose blood (OneTouch Verio) test strip Test once daily (Patient not taking: Reported on 11/8/2022)   • lidocaine (XYLOCAINE) 5 % ointment Apply topically as needed for mild pain (Patient not taking: Reported on 11/8/2022)   • oxyCODONE (Roxicodone) 5 immediate release tablet Take 1 tablet (5 mg total) by mouth every 6 (six) hours as needed for moderate pain for up to 15 doses Max Daily Amount: 20 mg (Patient not taking: No sig reported)   • RA Saline Nasal [place] 0 65 % nasal spray instill 1 spray into each nostril if needed for congestion or RHINITIS (Patient not taking: Reported on 11/8/2022)   • [DISCONTINUED] cyclobenzaprine (FLEXERIL) 5 mg tablet Take 1 tablet (5 mg total) by mouth 3 (three) times a day as needed for muscle spasms for up to 10 days (Patient not taking: Reported on 11/8/2022)       Objective     /72 (BP Location: Left arm, Patient Position: Sitting)   Pulse 86   Temp 97 8 °F (36 6 °C) (Tympanic)   Ht 5' 1" (1 549 m)   Wt 101 kg (222 lb 6 4 oz)   LMP 10/11/2022   SpO2 98% BMI 42 02 kg/m²     Physical Exam  Constitutional:       General: She is not in acute distress  Appearance: Normal appearance  She is obese  She is not ill-appearing, toxic-appearing or diaphoretic  HENT:      Head: Normocephalic and atraumatic  Cardiovascular:      Rate and Rhythm: Normal rate and regular rhythm  Pulses: Normal pulses  Heart sounds: Normal heart sounds  No murmur heard  No friction rub  No gallop  Pulmonary:      Effort: Pulmonary effort is normal  No respiratory distress  Breath sounds: Normal breath sounds  No wheezing, rhonchi or rales  Musculoskeletal:         General: No swelling or deformity  Cervical back: Neck supple  No swelling, edema, deformity, erythema, signs of trauma, rigidity, tenderness or bony tenderness  Thoracic back: No swelling, deformity, signs of trauma, tenderness or bony tenderness  Lumbar back: Tenderness and bony tenderness present  No swelling, edema, deformity or signs of trauma  Negative right straight leg raise test and negative left straight leg raise test       Right lower leg: No edema  Left lower leg: No edema  Skin:     General: Skin is warm and dry  Capillary Refill: Capillary refill takes less than 2 seconds  Neurological:      General: No focal deficit present  Mental Status: She is alert and oriented to person, place, and time  Mental status is at baseline  Sensory: No sensory deficit  Gait: Gait normal       Deep Tendon Reflexes: Reflexes normal    Psychiatric:         Mood and Affect: Mood normal          Behavior: Behavior normal          Thought Content:  Thought content normal          Judgment: Judgment normal        REJI Chun

## 2022-11-08 NOTE — LETTER
November 8, 2022     Patient: Marlena Saunders  YOB: 1990  Date of Visit: 11/8/2022      To Whom it May Concern:    Marlena Saunders is under my professional care  Sierra Mcneal was seen in my office on 11/8/2022  Sierra Mcneal may return to work on 11/9/2022  If you have any questions or concerns, please don't hesitate to call           Sincerely,          REJI Apodaca        CC: No Recipients

## 2022-12-06 ENCOUNTER — OFFICE VISIT (OUTPATIENT)
Dept: FAMILY MEDICINE CLINIC | Facility: CLINIC | Age: 32
End: 2022-12-06

## 2022-12-06 VITALS
DIASTOLIC BLOOD PRESSURE: 70 MMHG | TEMPERATURE: 97.4 F | HEART RATE: 98 BPM | OXYGEN SATURATION: 98 % | BODY MASS INDEX: 41.69 KG/M2 | WEIGHT: 220.8 LBS | SYSTOLIC BLOOD PRESSURE: 120 MMHG | HEIGHT: 61 IN

## 2022-12-06 DIAGNOSIS — M54.50 LUMBAR PAIN: Primary | ICD-10-CM

## 2022-12-06 DIAGNOSIS — E11.9 TYPE 2 DIABETES MELLITUS WITHOUT COMPLICATION, WITH LONG-TERM CURRENT USE OF INSULIN (HCC): ICD-10-CM

## 2022-12-06 DIAGNOSIS — F41.9 ANXIETY: ICD-10-CM

## 2022-12-06 DIAGNOSIS — G62.9 NEUROPATHY: ICD-10-CM

## 2022-12-06 DIAGNOSIS — F32.89 OTHER DEPRESSION: ICD-10-CM

## 2022-12-06 DIAGNOSIS — Z79.4 TYPE 2 DIABETES MELLITUS WITHOUT COMPLICATION, WITH LONG-TERM CURRENT USE OF INSULIN (HCC): ICD-10-CM

## 2022-12-06 DIAGNOSIS — Z00.00 ANNUAL PHYSICAL EXAM: ICD-10-CM

## 2022-12-06 PROBLEM — F32.A DEPRESSION: Status: ACTIVE | Noted: 2022-12-06

## 2022-12-06 RX ORDER — METHOCARBAMOL 500 MG/1
500 TABLET, FILM COATED ORAL 4 TIMES DAILY
Qty: 60 TABLET | Refills: 0 | Status: SHIPPED | OUTPATIENT
Start: 2022-12-06

## 2022-12-06 RX ORDER — GABAPENTIN 100 MG/1
100 CAPSULE ORAL
Qty: 30 CAPSULE | Refills: 0 | Status: SHIPPED | OUTPATIENT
Start: 2022-12-06

## 2022-12-06 RX ORDER — ALPRAZOLAM 0.5 MG/1
0.5 TABLET ORAL
Qty: 15 TABLET | Refills: 0 | Status: SHIPPED | OUTPATIENT
Start: 2022-12-06

## 2022-12-06 NOTE — PROGRESS NOTES
[address] [facility]    NAME: Kamryn Rubi  AGE: 27 y o  SEX: female  : 1990     DATE: 2022     Assessment and Plan:     Problem List Items Addressed This Visit        Endocrine    Type 2 diabetes mellitus ([address] )    Relevant Medications    sitaGLIPtin (JANUVIA) 50 mg tablet    gabapentin (Neurontin) 100 mg capsule   Other Visit Diagnoses     Annual physical exam    -  Primary    Anxiety        Relevant Medications    sertraline (Zoloft) 50 mg tablet    Postpartum depression        Relevant Medications    sertraline (Zoloft) 50 mg tablet    ALPRAZolam (XANAX) 0 5 mg tablet    Lumbar pain        Relevant Medications    diclofenac sodium (VOLTAREN) 50 mg EC tablet    methocarbamol (ROBAXIN) 500 mg tablet    Neuropathy        Relevant Medications    gabapentin (Neurontin) 100 mg capsule          Immunizations and preventive care screenings were discussed with patient today  Appropriate education was printed on patient's after visit summary  Counseling:  Dental Health: discussed importance of regular tooth brushing, flossing, and dental visits  Exercise: the importance of regular exercise/physical activity was discussed  Recommend exercise 3-5 times per week for at least 30 minutes  BMI Counseling: Body mass index is 41 72 kg/m²  The BMI is above normal  Nutrition recommendations include decreasing portion sizes, encouraging healthy choices of fruits and vegetables, decreasing fast food intake, consuming healthier snacks, limiting drinks that contain sugar, moderation in carbohydrate intake, increasing intake of lean protein, reducing intake of saturated and trans fat and reducing intake of cholesterol  Exercise recommendations include moderate physical activity 150 minutes/week  Rationale for BMI follow-up plan is due to patient being overweight or obese           Return in about 3 weeks (around 2022) for Recheck- back pain       Chief Complaint:     Chief Complaint   Patient presents with   • Physical Exam     Physical ( labs not completed)       History of Present Illness:     Adult Annual Physical   Patient here for a comprehensive physical exam  The patient reports problems - see other visit note   Diet and Physical Activity  Diet/Nutrition: poor diet  Exercise: no formal exercise  Depression Screening  PHQ-2/9 Depression Screening    Little interest or pleasure in doing things: 1 - several days  Feeling down, depressed, or hopeless: 1 - several days  PHQ-2 Score: 2  PHQ-2 Interpretation: Negative depression screen       General Health  Sleep: sleeps well and gets 4-6 hours of sleep on average  Hearing: normal - bilateral   Vision: no vision problems and wears glasses  Dental: no dental visits for >1 year, brushes teeth twice daily and flosses teeth occasionally  /GYN Health  Last menstrual period: November middle   Contraceptive method: none   History of STDs?: no      Review of Systems:     Review of Systems   Constitutional: Negative for appetite change, fatigue and unexpected weight change  HENT: Negative for congestion, rhinorrhea, sneezing and sore throat  Eyes: Negative for visual disturbance  Respiratory: Negative for cough, chest tightness, shortness of breath and wheezing  Cardiovascular: Negative for chest pain, palpitations and leg swelling  Gastrointestinal: Negative for abdominal pain, blood in stool, constipation, diarrhea, nausea and vomiting  Genitourinary: Negative for difficulty urinating, dysuria and urgency  Musculoskeletal: Positive for back pain  Negative for arthralgias and joint swelling  Skin: Negative for color change and rash  Neurological: Negative for dizziness, weakness and headaches  Hematological: Negative for adenopathy  Does not bruise/bleed easily        Past Medical History:     Past Medical History:   Diagnosis Date   • Allergic    • Arthritis • Bipolar affective disorder, currently depressed, moderate ([facility] ) 2018   • Cyst of ovary, right    • Diabetes mellitus ([facility] ) 10/10/18    type 2   • Endometriosis    • Heart murmur 86   • Hepatitis C    • Hepatitis C virus infection cured after antiviral drug therapy    • History of transfusion    • Hypertension    • Migraines    • Obesity    • Pulmonary artery congenital abnormality    • Spleen enlarged    • Status post surgical removal of both fallopian tubes    • Varicella       Past Surgical History:     Past Surgical History:   Procedure Laterality Date   • CARDIAC CATHETERIZATION      no CAD 10days, 4 weeks 22months old    •  SECTION, LOW TRANSVERSE     • CHOLECYSTECTOMY     • 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 5   • CORONARY STENT PLACEMENT     • LIVER BIOPSY     • LIVER BIOPSY     • STERNAL WIRE REMOVAL  2022   • TUBAL LIGATION Bilateral    • VSD REPAIR      As a child      Social History:     Social History     Socioeconomic History   • Marital status: Single     Spouse name: None   • Number of children: None   • Years of education: None   • Highest education level: None   Occupational History   • None   Tobacco Use   • Smoking status: Former     Packs/day: 0 20     Years: 0 00     Pack years: 0 00     Types: Cigarettes   • Smokeless tobacco: Never   Vaping Use   • Vaping Use: Never used   Substance and Sexual Activity   • Alcohol use: Not Currently     Alcohol/week: 3 0 standard drinks     Types: 3 Standard drinks or equivalent per week     Comment: socially/prior to knowledge of pregnancy   • Drug use: Not Currently     Comment: hx of THC use for migraines   • Sexual activity: Yes     Partners: Male     Birth control/protection: Female Sterilization   Other Topics Concern   • None   Social History Narrative   • None     Social Determinants of Health     Financial Resource Strain: Not on file   Food Insecurity: Not on file   Transportation Needs: Not on file Physical Activity: Not on file   Stress: Not on file   Social Connections: Not on file   Intimate Partner Violence: Not on file   Housing Stability: Not on file      Family History:     Family History   Problem Relation Age of Onset   • Hypertension Mother    • Migraines Mother    • JENNY disease Mother    • Depression Mother    • Hyperlipidemia Mother    • Diabetes Mother    • Diabetes Father    • Hypertension Father    • Kidney failure Father    • Heart attack Father    • Arthritis Father    • Stroke Father    • Polycystic kidney disease Paternal Grandmother    • Stroke Paternal Grandmother    • Heart disease Paternal Grandmother    • Arthritis Sister    • Asthma Sister    • Thyroid disease Sister    • Diabetes Sister    • Arthritis Maternal Grandmother    • Breast cancer Maternal Grandmother    • Diabetes Maternal Grandmother    • Hypertension Maternal Grandmother    • Heart Valve Disease Maternal Grandmother    • Learning disabilities Cousin    • Learning disabilities Sister    • ADD / ADHD Cousin    • Lung cancer Brother    • Diabetes Maternal Grandfather    • Hypertension Maternal Grandfather    • JENNY disease Maternal Grandfather    • Stroke Maternal Grandfather       Current Medications:     Current Outpatient Medications   Medication Sig Dispense Refill   • acetaminophen (TYLENOL) 500 mg tablet Take 1,000 mg by mouth     • albuterol (Ventolin HFA) 90 mcg/act inhaler Inhale 2 puffs every 4 (four) hours as needed for wheezing or shortness of breath 18 g 2   • ALPRAZolam (XANAX) 0 5 mg tablet Take 1 tablet (0 5 mg total) by mouth daily at bedtime as needed for anxiety 15 tablet 0   • B-D UF III MINI PEN NEEDLES 31G X 5 MM MISC INJECT UNDER THE SKIN DAILY AT BEDTIME USE ONE A DAY OR AS DIRECTED 100 each 6   • diclofenac sodium (VOLTAREN) 50 mg EC tablet Take 1 tablet (50 mg total) by mouth 2 (two) times a day 60 tablet 3   • fexofenadine (ALLEGRA) 180 MG tablet Take 1 tablet (180 mg total) by mouth daily 30 tablet 5   • gabapentin (Neurontin) 100 mg capsule Take 1 capsule (100 mg total) by mouth daily at bedtime 30 capsule 0   • insulin glargine (LANTUS) 100 units/mL subcutaneous injection Inject 15 Units under the skin daily at bedtime 4 5 mL 3   • lidocaine (Lidoderm) 5 % Apply 1 patch topically daily Remove & Discard patch within 12 hours or as directed by MD 20 patch 0   • lisinopril (ZESTRIL) 30 mg tablet Take 30 mg by mouth daily     • meclizine (ANTIVERT) 12 5 MG tablet Take 1 tablet (12 5 mg total) by mouth 3 (three) times a day as needed for dizziness 30 tablet 0   • metFORMIN (GLUCOPHAGE) 1000 MG tablet Take 1 tablet (1,000 mg total) by mouth 2 (two) times a day with meals 180 tablet 3   • methocarbamol (ROBAXIN) 500 mg tablet Take 1 tablet (500 mg total) by mouth 4 (four) times a day 60 tablet 0   • montelukast (SINGULAIR) 10 mg tablet Take 1 tablet (10 mg total) by mouth daily at bedtime 30 tablet 5   • ondansetron (ZOFRAN) 4 mg tablet Take 1 tablet (4 mg total) by mouth every 6 (six) hours 12 tablet 0   • sertraline (Zoloft) 50 mg tablet Take 1 tablet (50 mg total) by mouth daily 90 tablet 3   • sitaGLIPtin (JANUVIA) 50 mg tablet Take 1 tablet (50 mg total) by mouth daily 90 tablet 3   • VITAMIN D PO Take by mouth daily     • Blood Glucose Monitoring Suppl (OneTouch Verio) w/Device KIT Use daily (Patient not taking: Reported on 11/8/2022) 1 kit 0   • Blood Pressure Monitoring (B-D ASSURE BPM/AUTO WRIST CUFF) MISC Check blood pressure prior to each OB visit, or as directed by your physician  (Patient not taking: Reported on 11/8/2022) 1 each 0   • glucose blood (OneTouch Verio) test strip Test once daily (Patient not taking: Reported on 11/8/2022) 100 each 3     No current facility-administered medications for this visit  Allergies:      Allergies   Allergen Reactions   • Prednisone Swelling   • Bactrim [Sulfamethoxazole-Trimethoprim] Hives   • Corticosteroids Swelling     Pt states this does not cause problems breathing, she just has generalized swelling   • Cortisone Swelling     Generalized; no impairment with breathing reported     • Medical Tape Hives     Allergic to Paper Tape   • Other Hives     Paper tape   • Sulfa Antibiotics Hives      Physical Exam:     /70 (BP Location: Left arm, Patient Position: Sitting)   Pulse 98   Temp (!) 97 4 °F (36 3 °C) (Tympanic)   Ht 5' 1" (1 549 m)   Wt 100 kg (220 lb 12 8 oz)   LMP 11/17/2022 (Approximate)   SpO2 98%   BMI 41 72 kg/m²     Physical Exam  Vitals and nursing note reviewed  Constitutional:       General: She is not in acute distress  Appearance: Normal appearance  She is well-developed  HENT:      Head: Normocephalic and atraumatic  Right Ear: Tympanic membrane, ear canal and external ear normal  There is no impacted cerumen  Left Ear: Tympanic membrane, ear canal and external ear normal  There is no impacted cerumen  Nose: Nose normal  No congestion or rhinorrhea  Mouth/Throat:      Mouth: Mucous membranes are moist       Pharynx: Oropharynx is clear  No oropharyngeal exudate or posterior oropharyngeal erythema  Eyes:      General: No scleral icterus  Right eye: No discharge  Left eye: No discharge  Extraocular Movements: Extraocular movements intact  Conjunctiva/sclera: Conjunctivae normal       Pupils: Pupils are equal, round, and reactive to light  Cardiovascular:      Rate and Rhythm: Normal rate and regular rhythm  Pulses: Normal pulses  Heart sounds: Normal heart sounds  No murmur heard  Pulmonary:      Effort: Pulmonary effort is normal  No respiratory distress  Breath sounds: Normal breath sounds  Abdominal:      General: Abdomen is flat  Bowel sounds are normal       Palpations: Abdomen is soft  Tenderness: There is no abdominal tenderness  Musculoskeletal:         General: No swelling  Cervical back: Normal range of motion and neck supple   No rigidity or tenderness  Right lower leg: No edema  Left lower leg: No edema  Lymphadenopathy:      Cervical: No cervical adenopathy  Skin:     General: Skin is warm and dry  Capillary Refill: Capillary refill takes less than 2 seconds  Findings: No bruising, erythema or lesion  Neurological:      General: No focal deficit present  Mental Status: She is alert and oriented to person, place, and time  Mental status is at baseline  Motor: No weakness  Coordination: Coordination normal       Gait: Gait normal    Psychiatric:         Mood and Affect: Mood normal          Behavior: Behavior normal          Thought Content:  Thought content normal          Judgment: Judgment normal           Ta Bansal, [address]

## 2022-12-06 NOTE — PROGRESS NOTES
Name: Terry Gillis      : 1990      MRN: [id number]  Encounter Provider: REJI Larose  Encounter Date: 2022   Encounter department: [address] [place] [address]  Lumbar pain  -     diclofenac sodium (VOLTAREN) 50 mg EC tablet; Take 1 tablet (50 mg total) by mouth 2 (two) times a day  -     methocarbamol (ROBAXIN) 500 mg tablet; Take 1 tablet (500 mg total) by mouth 4 (four) times a day    2  Annual physical exam    3  Type 2 diabetes mellitus without complication, with long-term current use of insulin (HCC)  -     sitaGLIPtin (JANUVIA) 50 mg tablet; Take 1 tablet (50 mg total) by mouth daily  -     gabapentin (Neurontin) 100 mg capsule; Take 1 capsule (100 mg total) by mouth daily at bedtime    4  Anxiety  -     sertraline (Zoloft) 50 mg tablet; Take 1 tablet (50 mg total) by mouth daily    5  Other depression  -     ALPRAZolam (XANAX) 0 5 mg tablet; Take 1 tablet (0 5 mg total) by mouth daily at bedtime as needed for anxiety    6  Neuropathy  -     gabapentin (Neurontin) 100 mg capsule; Take 1 capsule (100 mg total) by mouth daily at bedtime         Subjective      Back pain- patient states that she has been taking the Mobic and the Flexeril  She states that they have not been helping  She states that she has still been sleeping at her moms house but is now sleeping on a cot instead of the floor  She also has been trying things like heat and ice  The ice does not help  The heat does help temporarily  She has not been doing any exercises/strecthes  Will switch from mobic to voltaren  She still get muscle spasms and has lots of tension in the back  No red flag symptoms  Will switch from flexeril to robaxin  Recommend PT  Continue heat, stretches and massage  F/u in 3 weeks or sooner if needed  DM- did not have her labs completed  Instructed to have them completed before her next visit   She is compliant with her medications but does need a refill of her [place] [facility] [place]  Depression- states her symptoms are well controlled as is her anxiety  Rarely uses her xanax  zoloft is working well  Review of Systems   Constitutional: Negative for appetite change, fatigue and unexpected weight change  HENT: Negative for congestion, rhinorrhea, sneezing and sore throat  Eyes: Negative for visual disturbance  Respiratory: Negative for cough, chest tightness, shortness of breath and wheezing  Cardiovascular: Negative for chest pain, palpitations and leg swelling  Gastrointestinal: Negative for abdominal pain, blood in stool, constipation, diarrhea, nausea and vomiting  Genitourinary: Negative for difficulty urinating, dysuria and urgency  Musculoskeletal: Positive for back pain  Negative for arthralgias and joint swelling  Skin: Negative for color change and rash  Neurological: Negative for dizziness, weakness and headaches  Hematological: Negative for adenopathy  Does not bruise/bleed easily         Current Outpatient Medications on File Prior to Visit   Medication Sig   • acetaminophen (TYLENOL) 500 mg tablet Take 1,000 mg by mouth   • albuterol (Ventolin HFA) 90 mcg/act inhaler Inhale 2 puffs every 4 (four) hours as needed for wheezing or shortness of breath   • B-D UF III MINI PEN NEEDLES 31G X 5 MM MISC INJECT UNDER THE SKIN DAILY AT BEDTIME USE ONE A DAY OR AS DIRECTED   • fexofenadine (ALLEGRA) 180 MG tablet Take 1 tablet (180 mg total) by mouth daily   • insulin glargine (LANTUS) 100 units/mL subcutaneous injection Inject 15 Units under the skin daily at bedtime   • lidocaine (Lidoderm) 5 % Apply 1 patch topically daily Remove & Discard patch within 12 hours or as directed by MD   • lisinopril (ZESTRIL) 30 mg tablet Take 30 mg by mouth daily   • meclizine (ANTIVERT) 12 5 MG tablet Take 1 tablet (12 5 mg total) by mouth 3 (three) times a day as needed for dizziness   • metFORMIN (GLUCOPHAGE) 1000 MG tablet Take 1 tablet (1,000 mg total) by mouth 2 (two) times a day with meals   • montelukast (SINGULAIR) 10 mg tablet Take 1 tablet (10 mg total) by mouth daily at bedtime   • ondansetron (ZOFRAN) 4 mg tablet Take 1 tablet (4 mg total) by mouth every 6 (six) hours   • VITAMIN D PO Take by mouth daily   • [DISCONTINUED] ALPRAZolam (XANAX) 0 5 mg tablet TAKE 1 TABLET BY MOUTH DAILY AT BEDTIME AS NEEDED FOR ANXIETY   • [DISCONTINUED] buPROPion (WELLBUTRIN XL) 150 mg 24 hr tablet Take 1 tablet (150 mg total) by mouth daily   • [DISCONTINUED] cyclobenzaprine (FLEXERIL) 10 mg tablet Take 1 tablet (10 mg total) by mouth 3 (three) times a day as needed for muscle spasms   • [DISCONTINUED] gabapentin (Neurontin) 100 mg capsule Take 1 capsule (100 mg total) by mouth daily at bedtime   • [DISCONTINUED] meloxicam (Mobic) 15 mg tablet Take 1 tablet (15 mg total) by mouth daily   • [DISCONTINUED] sertraline (Zoloft) 50 mg tablet Take 1 tablet (50 mg total) by mouth daily   • [DISCONTINUED] sitaGLIPtin (JANUVIA) 50 mg tablet Take 1 tablet (50 mg total) by mouth daily   • Blood Glucose Monitoring Suppl (OneTouch Verio) w/Device KIT Use daily (Patient not taking: Reported on 11/8/2022)   • Blood Pressure Monitoring (B-D ASSURE BPM/AUTO WRIST CUFF) MISC Check blood pressure prior to each OB visit, or as directed by your physician   (Patient not taking: Reported on 11/8/2022)   • glucose blood (OneTouch Verio) test strip Test once daily (Patient not taking: Reported on 11/8/2022)   • [DISCONTINUED] azelastine (ASTELIN) 0 1 % nasal spray 1 spray into each nostril 2 (two) times a day Use in each nostril as directed (Patient not taking: Reported on 7/5/2022)   • [DISCONTINUED] clotrimazole (LOTRIMIN) 1 % cream Apply topically 2 (two) times a day (Patient not taking: Reported on 11/8/2022)   • [DISCONTINUED] clotrimazole (LOTRIMIN) 1 % cream Apply to affected area 2 times daily (Patient not taking: Reported on 11/8/2022)   • [DISCONTINUED] lidocaine (XYLOCAINE) 5 % ointment Apply topically as needed for mild pain (Patient not taking: Reported on 11/8/2022)   • [DISCONTINUED] oxyCODONE (Roxicodone) 5 immediate release tablet Take 1 tablet (5 mg total) by mouth every 6 (six) hours as needed for moderate pain for up to 15 doses Max Daily Amount: 20 mg (Patient not taking: Reported on 7/5/2022)   • [DISCONTINUED] RA Saline Nasal [place] 0 65 % nasal spray instill 1 spray into each nostril if needed for congestion or RHINITIS (Patient not taking: Reported on 11/8/2022)       Objective     /70 (BP Location: Left arm, Patient Position: Sitting)   Pulse 98   Temp (!) 97 4 °F (36 3 °C) (Tympanic)   Ht 5' 1" (1 549 m)   Wt 100 kg (220 lb 12 8 oz)   LMP 11/17/2022 (Approximate)   SpO2 98%   BMI 41 72 kg/m²     Physical Exam  Constitutional:       General: She is not in acute distress  Appearance: Normal appearance  She is not ill-appearing  HENT:      Head: Normocephalic and atraumatic  Right Ear: Tympanic membrane and ear canal normal       Left Ear: Tympanic membrane and ear canal normal       Nose: Nose normal       Mouth/Throat:      Mouth: Mucous membranes are moist       Pharynx: Oropharynx is clear  Eyes:      Conjunctiva/sclera: Conjunctivae normal       Pupils: Pupils are equal, round, and reactive to light  Cardiovascular:      Rate and Rhythm: Normal rate and regular rhythm  Heart sounds: No murmur heard  No friction rub  Pulmonary:      Effort: Pulmonary effort is normal  No respiratory distress  Breath sounds: Normal breath sounds  No wheezing  Chest:      Chest wall: No tenderness  Abdominal:      General: Abdomen is flat  Bowel sounds are normal       Palpations: Abdomen is soft  There is no mass  Tenderness: There is no abdominal tenderness  There is no guarding or rebound  Musculoskeletal:      Cervical back: Normal       Thoracic back: Normal       Lumbar back: Tenderness and bony tenderness present  No lacerations or spasms   Decreased range of motion  Back:    Skin:     General: Skin is warm and dry  Neurological:      General: No focal deficit present  Mental Status: She is alert and oriented to person, place, and time  Mental status is at baseline  Psychiatric:         Mood and Affect: Mood normal          Behavior: Behavior normal          Thought Content:  Thought content normal          Judgment: Judgment normal        REJI Meng

## 2023-01-01 DIAGNOSIS — E11.9 TYPE 2 DIABETES MELLITUS WITHOUT COMPLICATION, WITH LONG-TERM CURRENT USE OF INSULIN (HCC): ICD-10-CM

## 2023-01-01 DIAGNOSIS — G62.9 NEUROPATHY: ICD-10-CM

## 2023-01-01 DIAGNOSIS — Z79.4 TYPE 2 DIABETES MELLITUS WITHOUT COMPLICATION, WITH LONG-TERM CURRENT USE OF INSULIN (HCC): ICD-10-CM

## 2023-01-01 RX ORDER — GABAPENTIN 100 MG/1
CAPSULE ORAL
Qty: 30 CAPSULE | Refills: 0 | Status: SHIPPED | OUTPATIENT
Start: 2023-01-01

## 2023-01-09 ENCOUNTER — HOSPITAL ENCOUNTER (INPATIENT)
Facility: HOSPITAL | Age: 33
LOS: 3 days | Discharge: HOME | End: 2023-01-13
Attending: EMERGENCY MEDICINE | Admitting: INTERNAL MEDICINE

## 2023-01-09 ENCOUNTER — APPOINTMENT (EMERGENCY)
Dept: RADIOLOGY | Facility: HOSPITAL | Age: 33
End: 2023-01-09

## 2023-01-09 DIAGNOSIS — R94.39 ABNORMAL NUCLEAR STRESS TEST: ICD-10-CM

## 2023-01-09 DIAGNOSIS — R77.8 ELEVATED TROPONIN: ICD-10-CM

## 2023-01-09 DIAGNOSIS — R07.9 CHEST PAIN: Primary | ICD-10-CM

## 2023-01-09 DIAGNOSIS — E66.01 MORBID OBESITY (HCC): ICD-10-CM

## 2023-01-09 DIAGNOSIS — Z87.74 H/O AORTIC COARCTATION REPAIR: ICD-10-CM

## 2023-01-09 LAB
ANION GAP SERPL CALCULATED.3IONS-SCNC: 9 MMOL/L (ref 4–13)
BASOPHILS # BLD AUTO: 0.04 THOUSANDS/ÂΜL (ref 0–0.1)
BASOPHILS NFR BLD AUTO: 0 % (ref 0–1)
BUN SERPL-MCNC: 10 MG/DL (ref 5–25)
CALCIUM SERPL-MCNC: 9.4 MG/DL (ref 8.3–10.1)
CARDIAC TROPONIN I PNL SERPL HS: 123 NG/L
CHLORIDE SERPL-SCNC: 98 MMOL/L (ref 96–108)
CO2 SERPL-SCNC: 28 MMOL/L (ref 21–32)
CREAT SERPL-MCNC: 0.82 MG/DL (ref 0.6–1.3)
D DIMER PPP FEU-MCNC: 0.4 UG/ML FEU
EOSINOPHIL # BLD AUTO: 0.14 THOUSAND/ÂΜL (ref 0–0.61)
EOSINOPHIL NFR BLD AUTO: 1 % (ref 0–6)
ERYTHROCYTE [DISTWIDTH] IN BLOOD BY AUTOMATED COUNT: 16.6 % (ref 11.6–15.1)
GFR SERPL CREATININE-BSD FRML MDRD: 93 ML/MIN/1.73SQ M
GLUCOSE SERPL-MCNC: 456 MG/DL (ref 65–140)
HCT VFR BLD AUTO: 41.4 % (ref 34.8–46.1)
HGB BLD-MCNC: 13.4 G/DL (ref 11.5–15.4)
IMM GRANULOCYTES # BLD AUTO: 0.05 THOUSAND/UL (ref 0–0.2)
IMM GRANULOCYTES NFR BLD AUTO: 1 % (ref 0–2)
LYMPHOCYTES # BLD AUTO: 3.05 THOUSANDS/ÂΜL (ref 0.6–4.47)
LYMPHOCYTES NFR BLD AUTO: 28 % (ref 14–44)
MCH RBC QN AUTO: 25.4 PG (ref 26.8–34.3)
MCHC RBC AUTO-ENTMCNC: 32.4 G/DL (ref 31.4–37.4)
MCV RBC AUTO: 79 FL (ref 82–98)
MONOCYTES # BLD AUTO: 0.78 THOUSAND/ÂΜL (ref 0.17–1.22)
MONOCYTES NFR BLD AUTO: 7 % (ref 4–12)
NEUTROPHILS # BLD AUTO: 7 THOUSANDS/ÂΜL (ref 1.85–7.62)
NEUTS SEG NFR BLD AUTO: 63 % (ref 43–75)
NRBC BLD AUTO-RTO: 0 /100 WBCS
PLATELET # BLD AUTO: 253 THOUSANDS/UL (ref 149–390)
PMV BLD AUTO: 10.6 FL (ref 8.9–12.7)
POTASSIUM SERPL-SCNC: 4.5 MMOL/L (ref 3.5–5.3)
RBC # BLD AUTO: 5.27 MILLION/UL (ref 3.81–5.12)
SODIUM SERPL-SCNC: 135 MMOL/L (ref 135–147)
WBC # BLD AUTO: 11.06 THOUSAND/UL (ref 4.31–10.16)

## 2023-01-09 RX ORDER — KETOROLAC TROMETHAMINE 30 MG/ML
15 INJECTION, SOLUTION INTRAMUSCULAR; INTRAVENOUS ONCE
Status: COMPLETED | OUTPATIENT
Start: 2023-01-09 | End: 2023-01-09

## 2023-01-09 RX ADMIN — KETOROLAC TROMETHAMINE 15 MG: 30 INJECTION, SOLUTION INTRAMUSCULAR; INTRAVENOUS at 23:30

## 2023-01-09 NOTE — LETTER
[address] [place] [address] [address] [facility] [id number]  Dept: [phone]    January 13, 2023     Patient: Margaret Rouse   YOB: 1990   Date of Visit: 1/9/2023       To Whom it May Concern:    Margaret Rouse is under my professional care  She was seen in the hospital from 1/9/2023 to 01/13/23  She may return to work on 01/23/21 without limitations  If you have any questions or concerns, please don't hesitate to call           Sincerely,          Bozena Crawley MD

## 2023-01-09 NOTE — Clinical Note
Case was discussed with LOLY and the patient's admission status was agreed to be Admission Status: observation status to the service of Dr Dania Jones

## 2023-01-10 ENCOUNTER — APPOINTMENT (INPATIENT)
Dept: NON INVASIVE DIAGNOSTICS | Facility: HOSPITAL | Age: 33
End: 2023-01-10

## 2023-01-10 PROBLEM — R77.8 ELEVATED TROPONIN I LEVEL: Status: ACTIVE | Noted: 2023-01-10

## 2023-01-10 PROBLEM — R07.9 CHEST PAIN: Status: ACTIVE | Noted: 2023-01-10

## 2023-01-10 PROBLEM — R79.89 ELEVATED TROPONIN I LEVEL: Status: ACTIVE | Noted: 2023-01-10

## 2023-01-10 LAB
2HR DELTA HS TROPONIN: -13 NG/L
4HR DELTA HS TROPONIN: -28 NG/L
ALBUMIN SERPL BCP-MCNC: 3.5 G/DL (ref 3.5–5)
ALBUMIN SERPL BCP-MCNC: 3.5 G/DL (ref 3.5–5)
ALP SERPL-CCNC: 96 U/L (ref 46–116)
ALP SERPL-CCNC: 98 U/L (ref 46–116)
ALT SERPL W P-5'-P-CCNC: 23 U/L (ref 12–78)
ALT SERPL W P-5'-P-CCNC: 24 U/L (ref 12–78)
ANION GAP SERPL CALCULATED.3IONS-SCNC: 12 MMOL/L (ref 4–13)
AST SERPL W P-5'-P-CCNC: 11 U/L (ref 5–45)
AST SERPL W P-5'-P-CCNC: 13 U/L (ref 5–45)
ATRIAL RATE: 103 BPM
ATRIAL RATE: 107 BPM
ATRIAL RATE: 107 BPM
BASOPHILS # BLD AUTO: 0.05 THOUSANDS/ÂΜL (ref 0–0.1)
BASOPHILS NFR BLD AUTO: 1 % (ref 0–1)
BILIRUB DIRECT SERPL-MCNC: 0.07 MG/DL (ref 0–0.2)
BILIRUB SERPL-MCNC: 0.22 MG/DL (ref 0.2–1)
BILIRUB SERPL-MCNC: 0.26 MG/DL (ref 0.2–1)
BUN SERPL-MCNC: 10 MG/DL (ref 5–25)
CALCIUM SERPL-MCNC: 9.5 MG/DL (ref 8.3–10.1)
CARDIAC TROPONIN I PNL SERPL HS: 110 NG/L
CARDIAC TROPONIN I PNL SERPL HS: 95 NG/L
CHLORIDE SERPL-SCNC: 100 MMOL/L (ref 96–108)
CHOLEST SERPL-MCNC: 194 MG/DL
CO2 SERPL-SCNC: 24 MMOL/L (ref 21–32)
CREAT SERPL-MCNC: 0.71 MG/DL (ref 0.6–1.3)
EOSINOPHIL # BLD AUTO: 0.15 THOUSAND/ÂΜL (ref 0–0.61)
EOSINOPHIL NFR BLD AUTO: 2 % (ref 0–6)
ERYTHROCYTE [DISTWIDTH] IN BLOOD BY AUTOMATED COUNT: 16.6 % (ref 11.6–15.1)
FLUAV RNA RESP QL NAA+PROBE: NEGATIVE
FLUBV RNA RESP QL NAA+PROBE: NEGATIVE
GFR SERPL CREATININE-BSD FRML MDRD: 111 ML/MIN/1.73SQ M
GLUCOSE SERPL-MCNC: 229 MG/DL (ref 65–140)
GLUCOSE SERPL-MCNC: 252 MG/DL (ref 65–140)
GLUCOSE SERPL-MCNC: 352 MG/DL (ref 65–140)
GLUCOSE SERPL-MCNC: 356 MG/DL (ref 65–140)
HCT VFR BLD AUTO: 41.5 % (ref 34.8–46.1)
HDLC SERPL-MCNC: 40 MG/DL
HGB BLD-MCNC: 13.3 G/DL (ref 11.5–15.4)
IMM GRANULOCYTES # BLD AUTO: 0.06 THOUSAND/UL (ref 0–0.2)
IMM GRANULOCYTES NFR BLD AUTO: 1 % (ref 0–2)
LDLC SERPL CALC-MCNC: 100 MG/DL (ref 0–100)
LIPASE SERPL-CCNC: 113 U/L (ref 73–393)
LIPASE SERPL-CCNC: 634 U/L (ref 73–393)
LYMPHOCYTES # BLD AUTO: 2.96 THOUSANDS/ÂΜL (ref 0.6–4.47)
LYMPHOCYTES NFR BLD AUTO: 31 % (ref 14–44)
MCH RBC QN AUTO: 24.9 PG (ref 26.8–34.3)
MCHC RBC AUTO-ENTMCNC: 32 G/DL (ref 31.4–37.4)
MCV RBC AUTO: 78 FL (ref 82–98)
MONOCYTES # BLD AUTO: 0.75 THOUSAND/ÂΜL (ref 0.17–1.22)
MONOCYTES NFR BLD AUTO: 8 % (ref 4–12)
NEUTROPHILS # BLD AUTO: 5.48 THOUSANDS/ÂΜL (ref 1.85–7.62)
NEUTS SEG NFR BLD AUTO: 57 % (ref 43–75)
NRBC BLD AUTO-RTO: 0 /100 WBCS
P AXIS: 69 DEGREES
P AXIS: 69 DEGREES
P AXIS: 78 DEGREES
PLATELET # BLD AUTO: 266 THOUSANDS/UL (ref 149–390)
PMV BLD AUTO: 10.9 FL (ref 8.9–12.7)
POTASSIUM SERPL-SCNC: 3.6 MMOL/L (ref 3.5–5.3)
PR INTERVAL: 166 MS
PR INTERVAL: 172 MS
PR INTERVAL: 176 MS
PROT SERPL-MCNC: 7.7 G/DL (ref 6.4–8.4)
PROT SERPL-MCNC: 7.9 G/DL (ref 6.4–8.4)
QRS AXIS: 100 DEGREES
QRS AXIS: 66 DEGREES
QRS AXIS: 79 DEGREES
QRSD INTERVAL: 146 MS
QRSD INTERVAL: 150 MS
QRSD INTERVAL: 150 MS
QT INTERVAL: 370 MS
QT INTERVAL: 370 MS
QT INTERVAL: 384 MS
QTC INTERVAL: 484 MS
QTC INTERVAL: 493 MS
QTC INTERVAL: 512 MS
RBC # BLD AUTO: 5.35 MILLION/UL (ref 3.81–5.12)
RSV RNA RESP QL NAA+PROBE: NEGATIVE
SARS-COV-2 RNA RESP QL NAA+PROBE: NEGATIVE
SODIUM SERPL-SCNC: 136 MMOL/L (ref 135–147)
T WAVE AXIS: 81 DEGREES
T WAVE AXIS: 92 DEGREES
T WAVE AXIS: 95 DEGREES
TRIGL SERPL-MCNC: 271 MG/DL
VENTRICULAR RATE: 103 BPM
VENTRICULAR RATE: 107 BPM
VENTRICULAR RATE: 107 BPM
WBC # BLD AUTO: 9.45 THOUSAND/UL (ref 4.31–10.16)

## 2023-01-10 RX ORDER — GABAPENTIN 100 MG/1
100 CAPSULE ORAL
Status: DISCONTINUED | OUTPATIENT
Start: 2023-01-10 | End: 2023-01-13 | Stop reason: HOSPADM

## 2023-01-10 RX ORDER — INSULIN LISPRO 100 [IU]/ML
5 INJECTION, SOLUTION INTRAVENOUS; SUBCUTANEOUS
Status: DISCONTINUED | OUTPATIENT
Start: 2023-01-10 | End: 2023-01-11

## 2023-01-10 RX ORDER — ATORVASTATIN CALCIUM 20 MG/1
20 TABLET, FILM COATED ORAL
Status: DISCONTINUED | OUTPATIENT
Start: 2023-01-10 | End: 2023-01-13 | Stop reason: HOSPADM

## 2023-01-10 RX ORDER — HEPARIN SODIUM 5000 [USP'U]/ML
5000 INJECTION, SOLUTION INTRAVENOUS; SUBCUTANEOUS EVERY 8 HOURS SCHEDULED
Status: DISCONTINUED | OUTPATIENT
Start: 2023-01-10 | End: 2023-01-13 | Stop reason: HOSPADM

## 2023-01-10 RX ORDER — ASPIRIN 81 MG/1
324 TABLET ORAL ONCE
Status: COMPLETED | OUTPATIENT
Start: 2023-01-10 | End: 2023-01-10

## 2023-01-10 RX ORDER — ALPRAZOLAM 0.5 MG/1
0.5 TABLET ORAL
Status: DISCONTINUED | OUTPATIENT
Start: 2023-01-10 | End: 2023-01-13 | Stop reason: HOSPADM

## 2023-01-10 RX ORDER — ACETAMINOPHEN 325 MG/1
650 TABLET ORAL EVERY 6 HOURS PRN
Status: DISCONTINUED | OUTPATIENT
Start: 2023-01-10 | End: 2023-01-13 | Stop reason: HOSPADM

## 2023-01-10 RX ORDER — ALBUTEROL SULFATE 90 UG/1
2 AEROSOL, METERED RESPIRATORY (INHALATION) EVERY 4 HOURS PRN
Status: DISCONTINUED | OUTPATIENT
Start: 2023-01-10 | End: 2023-01-13 | Stop reason: HOSPADM

## 2023-01-10 RX ORDER — ONDANSETRON 2 MG/ML
4 INJECTION INTRAMUSCULAR; INTRAVENOUS EVERY 6 HOURS PRN
Status: DISCONTINUED | OUTPATIENT
Start: 2023-01-10 | End: 2023-01-13 | Stop reason: HOSPADM

## 2023-01-10 RX ORDER — MONTELUKAST SODIUM 10 MG/1
10 TABLET ORAL
Status: DISCONTINUED | OUTPATIENT
Start: 2023-01-10 | End: 2023-01-13 | Stop reason: HOSPADM

## 2023-01-10 RX ORDER — INSULIN GLARGINE 100 [IU]/ML
15 INJECTION, SOLUTION SUBCUTANEOUS
Status: DISCONTINUED | OUTPATIENT
Start: 2023-01-10 | End: 2023-01-13 | Stop reason: HOSPADM

## 2023-01-10 RX ADMIN — ASPIRIN 324 MG: 81 TABLET, COATED ORAL at 01:32

## 2023-01-10 RX ADMIN — INSULIN LISPRO 5 UNITS: 100 INJECTION, SOLUTION INTRAVENOUS; SUBCUTANEOUS at 12:14

## 2023-01-10 RX ADMIN — HEPARIN SODIUM 5000 UNITS: 5000 INJECTION INTRAVENOUS; SUBCUTANEOUS at 21:30

## 2023-01-10 RX ADMIN — ATORVASTATIN CALCIUM 20 MG: 20 TABLET, FILM COATED ORAL at 17:59

## 2023-01-10 RX ADMIN — ACETAMINOPHEN 650 MG: 325 TABLET ORAL at 14:18

## 2023-01-10 RX ADMIN — INSULIN LISPRO 5 UNITS: 100 INJECTION, SOLUTION INTRAVENOUS; SUBCUTANEOUS at 17:08

## 2023-01-10 RX ADMIN — LISINOPRIL 30 MG: 10 TABLET ORAL at 08:06

## 2023-01-10 RX ADMIN — GABAPENTIN 100 MG: 100 CAPSULE ORAL at 21:31

## 2023-01-10 RX ADMIN — HEPARIN SODIUM 5000 UNITS: 5000 INJECTION INTRAVENOUS; SUBCUTANEOUS at 05:09

## 2023-01-10 RX ADMIN — SERTRALINE HYDROCHLORIDE 50 MG: 50 TABLET ORAL at 08:06

## 2023-01-10 RX ADMIN — HEPARIN SODIUM 5000 UNITS: 5000 INJECTION INTRAVENOUS; SUBCUTANEOUS at 14:19

## 2023-01-10 RX ADMIN — INSULIN GLARGINE 15 UNITS: 100 INJECTION, SOLUTION SUBCUTANEOUS at 21:31

## 2023-01-10 RX ADMIN — MONTELUKAST 10 MG: 10 TABLET, FILM COATED ORAL at 21:31
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
## 2023-01-10 NOTE — ED ATTENDING ATTESTATION
2023  IChrissie MD, saw and evaluated the patient  I have discussed the patient with the resident/non-physician practitioner and agree with the resident's/non-physician practitioner's findings, Plan of Care, and MDM as documented in the resident's/non-physician practitioner's note, except where noted  All available labs and Radiology studies were reviewed  I was present for key portions of any procedure(s) performed by the resident/non-physician practitioner and I was immediately available to provide assistance  At this point I agree with the current assessment done in the Emergency Department  I have conducted an independent evaluation of this patient a history and physical is as follows:        Final Diagnosis:  1  Chest pain    2  Elevated troponin      Chief Complaint   Patient presents with   • Chest Pain     Pt reports L anterior CP and R sided neck pain that started days ago  +dizziness, feels like her heart is "skipping a beat "          49-year-old female with a history of aortic coarctation, VSD, diabetes, hypertension, hyperlipidemia who presents with chest pain  Has been experiencing intermittent chest pain over the past 2 days  No alleviating or exacerbating factors  Chest pain appears to be random  Last night, started to experience palpitations and shortness of breath with the chest pain  Tonight, started to have chest pain which resolved just prior to arrival   States that her father  of a heart attack in his early 46s  PMH:  -VSD, aortic coarctation, diabetes, hypertension, hyperlipidemia  PSH:  -Aortic coarctation repair, VSD repair      PE:   Vitals:    23 2206 23 2337   BP: 165/86 137/91   Pulse: (!) 114 (!) 106   Resp: 21 19   Temp: 97 8 °F (36 6 °C)    TempSrc: Oral    SpO2: 98% 96%   Weight: 102 kg (223 lb 15 8 oz)            Constitutional: Vital signs are normal  She appears well-developed  She is cooperative  No distress     HENT: Mouth/Throat: Uvula is midline, oropharynx is clear and moist and mucous membranes are normal    Eyes: Pupils are equal, round, and reactive to light  Conjunctivae and EOM are normal    Neck: Trachea normal  No thyroid mass and no thyromegaly present  Cardiovascular: Normal rate, regular rhythm  Holosystolic murmur  Pulmonary/Chest: Effort normal and breath sounds normal    Abdominal: Soft  Normal appearance and bowel sounds are normal  There is no tenderness  There is no rebound, no guarding  SK  No pitting edema bilateral lower extremities  Neurological: She is alert  Skin: Skin is warm, dry and intact  Psychiatric: She has a normal mood and affect  Her speech is normal and behavior is normal  Thought content normal       A:  -This is a 26-year-old female with multiple medical issues who presents with chest pain  P:  -Doubt ACS  However, patient does have an extensive medical history  We will check labs, EKG, chest x-ray  CTA chest pending dimer  - 13 point ROS was performed and all are normal unless stated in the history above  - Nursing note reviewed  Vitals reviewed  - Orders placed by myself and/or advanced practitioner / resident     - Previous chart was reviewed  - No language barrier    - History obtained from patient  - There are no limitations to the history obtained  - Critical care time: Not applicable for this patient  Medications   aspirin (ECOTRIN LOW STRENGTH) EC tablet 324 mg (has no administration in time range)   ketorolac (TORADOL) injection 15 mg (15 mg Intravenous Given 1/9/23 2330)     XR chest 2 views   ED Interpretation   No acute cardiopulmonary disease as interpreted by myself          Orders Placed This Encounter   Procedures   • XR chest 2 views   • CBC and differential   • Basic metabolic panel   • D-dimer, quantitative   • HS Troponin 0hr (reflex protocol)   • HS Troponin I 2hr   • HS Troponin I 4hr   • Hepatic function panel   • Lipase   • 48 Hour Telemetry Monitoring   • ECG 12 lead   • INPATIENT ADMISSION     Labs Reviewed   CBC AND DIFFERENTIAL - Abnormal       Result Value Ref Range Status    WBC 11 06 (*) 4 31 - 10 16 Thousand/uL Final    RBC 5 27 (*) 3 81 - 5 12 Million/uL Final    Hemoglobin 13 4  11 5 - 15 4 g/dL Final    Hematocrit 41 4  34 8 - 46 1 % Final    MCV 79 (*) 82 - 98 fL Final    MCH 25 4 (*) 26 8 - 34 3 pg Final    MCHC 32 4  31 4 - 37 4 g/dL Final    RDW 16 6 (*) 11 6 - 15 1 % Final    MPV 10 6  8 9 - 12 7 fL Final    Platelets 685  129 - 390 Thousands/uL Final    nRBC 0  /100 WBCs Final    Neutrophils Relative 63  43 - 75 % Final    Immat GRANS % 1  0 - 2 % Final    Lymphocytes Relative 28  14 - 44 % Final    Monocytes Relative 7  4 - 12 % Final    Eosinophils Relative 1  0 - 6 % Final    Basophils Relative 0  0 - 1 % Final    Neutrophils Absolute 7 00  1 85 - 7 62 Thousands/µL Final    Immature Grans Absolute 0 05  0 00 - 0 20 Thousand/uL Final    Lymphocytes Absolute 3 05  0 60 - 4 47 Thousands/µL Final    Monocytes Absolute 0 78  0 17 - 1 22 Thousand/µL Final    Eosinophils Absolute 0 14  0 00 - 0 61 Thousand/µL Final    Basophils Absolute 0 04  0 00 - 0 10 Thousands/µL Final   BASIC METABOLIC PANEL - Abnormal    Sodium 135  135 - 147 mmol/L Final    Potassium 4 5  3 5 - 5 3 mmol/L Final    Chloride 98  96 - 108 mmol/L Final    CO2 28  21 - 32 mmol/L Final    ANION GAP 9  4 - 13 mmol/L Final    BUN 10  5 - 25 mg/dL Final    Creatinine 0 82  0 60 - 1 30 mg/dL Final    Comment: Standardized to IDMS reference method    Glucose 456 (*) 65 - 140 mg/dL Final    Comment: If the patient is fasting, the ADA then defines impaired fasting glucose as > 100 mg/dL and diabetes as > or equal to 123 mg/dL  Specimen collection should occur prior to Sulfasalazine administration due to the potential for falsely depressed results   Specimen collection should occur prior to Sulfapyridine administration due to the potential for falsely elevated results  Calcium 9 4  8 3 - 10 1 mg/dL Final    eGFR 93  ml/min/1 73sq m Final    Narrative:     [place] guidelines for Chronic Kidney Disease (CKD):   •  Stage 1 with normal or high GFR (GFR > 90 mL/min/1 73 square meters)  •  Stage 2 Mild CKD (GFR = 60-89 mL/min/1 73 square meters)  •  Stage 3A Moderate CKD (GFR = 45-59 mL/min/1 73 square meters)  •  Stage 3B Moderate CKD (GFR = 30-44 mL/min/1 73 square meters)  •  Stage 4 Severe CKD (GFR = 15-29 mL/min/1 73 square meters)  •  Stage 5 End Stage CKD (GFR <15 mL/min/1 73 square meters)  Note: GFR calculation is accurate only with a steady state creatinine   HS TROPONIN I 0HR - Abnormal    hs TnI 0hr 123 (*) "Refer to ACS Flowchart"- see link ng/L Final    Comment:                                              Initial (time 0) result  If >=50 ng/L, Myocardial injury suggested ;  Type of myocardial injury and treatment strategy  to be determined  If 5-49 ng/L, a delta result at 2 hours and or 4 hours will be needed to further evaluate  If <4 ng/L, and chest pain has been >3 hours since onset, patient may qualify for discharge based on the HEART score in the ED  If <5 ng/L and <3hours since onset of chest pain, a delta result at 2 hours will be needed to further evaluate  HS Troponin 99th Percentile URL of a Health Population=12 ng/L with a 95% Confidence Interval of 8-18 ng/L  Second Troponin (time 2 hours)  If calculated delta >= 20 ng/L,  Myocardial injury suggested ; Type of myocardial injury and treatment strategy to be determined  If 5-49 ng/L and the calculated delta is 5-19 ng/L, consult medical service for evaluation  Continue evaluation for ischemia on ecg and other possible etiology and repeat hs troponin at 4 hours  If delta is <5 ng/L at 2 hours, consider discharge based on risk stratification via the HEART score (if in ED), or KAYCEE risk score in IP/Observation      HS Troponin 99th Percentile URL of a Health Population=12 ng/L with a 95% Confidence Interval of 8-18 ng/L  D-DIMER, QUANTITATIVE - Normal    D-Dimer, Quant 0 40  <0 50 ug/ml FEU Final    Comment: Reference and upper limits to exclude DVT and PE are the same  Do not use to exclude if clinical symptoms are present  Pregnant women:  1st trimester:  <0 22 - 1 06 ug/ml FEU  2nd trimester:  <0 22 - 1 88 ug/ml FEU  3rd trimester:   0 24 - 3 28 ug/ml FEU    Note: Normal ranges may not apply to patients who are transgender, non-binary, or whose legal sex, sex at birth, and gender identity differ  HS TROPONIN I 2HR   HS TROPONIN I 4HR   HEPATIC FUNCTION PANEL   LIPASE     Time reflects when diagnosis was documented in both MDM as applicable and the Disposition within this note     Time User Action Codes Description Comment    1/10/2023 12:25 AM Susan Nightingale Add [R07 9] Chest pain     1/10/2023 12:25 AM Susan Nightingale Add [R77 8] Elevated troponin       ED Disposition     ED Disposition   Admit    Condition   Stable    Date/Time   Tue Amari 10, 2023 12:46 AM    Comment   Case was discussed with LOLY and the patient's admission status was agreed to be Admission Status: inpatient status to the service of Dr Ilda Cabello  Follow-up Information    None       Patient's Medications   Discharge Prescriptions    No medications on file     No discharge procedures on file  Prior to Admission Medications   Prescriptions Last Dose Informant Patient Reported? Taking?    ALPRAZolam (XANAX) 0 5 mg tablet   No No   Sig: Take 1 tablet (0 5 mg total) by mouth daily at bedtime as needed for anxiety   B-D UF III MINI PEN NEEDLES 31G X 5 MM MISC   No No   Sig: INJECT UNDER THE SKIN DAILY AT BEDTIME USE ONE A DAY OR AS DIRECTED   Blood Glucose Monitoring Suppl (OneTouch Verio) w/Device KIT   No No   Sig: Use daily   Patient not taking: Reported on 11/8/2022   Blood Pressure Monitoring (B-D ASSURE BPM/AUTO WRIST CUFF) MISC   No No   Sig: Check blood pressure prior to each OB visit, or as directed by your physician     Patient not taking: Reported on 11/8/2022   VITAMIN D PO   Yes No   Sig: Take by mouth daily   acetaminophen (TYLENOL) 500 mg tablet   Yes No   Sig: Take 1,000 mg by mouth   albuterol (Ventolin HFA) 90 mcg/act inhaler   No No   Sig: Inhale 2 puffs every 4 (four) hours as needed for wheezing or shortness of breath   diclofenac sodium (VOLTAREN) 50 mg EC tablet   No No   Sig: Take 1 tablet (50 mg total) by mouth 2 (two) times a day   fexofenadine (ALLEGRA) 180 MG tablet   No No   Sig: Take 1 tablet (180 mg total) by mouth daily   gabapentin (NEURONTIN) 100 mg capsule   No No   Sig: take 1 capsule by mouth daily at bedtime   glucose blood (OneTouch Verio) test strip   No No   Sig: Test once daily   Patient not taking: Reported on 11/8/2022   insulin glargine (LANTUS) 100 units/mL subcutaneous injection   No No   Sig: Inject 15 Units under the skin daily at bedtime   lidocaine (Lidoderm) 5 %   No No   Sig: Apply 1 patch topically daily Remove & Discard patch within 12 hours or as directed by MD   lisinopril (ZESTRIL) 30 mg tablet   Yes No   Sig: Take 30 mg by mouth daily   meclizine (ANTIVERT) 12 5 MG tablet   No No   Sig: Take 1 tablet (12 5 mg total) by mouth 3 (three) times a day as needed for dizziness   metFORMIN (GLUCOPHAGE) 1000 MG tablet   No No   Sig: Take 1 tablet (1,000 mg total) by mouth 2 (two) times a day with meals   methocarbamol (ROBAXIN) 500 mg tablet   No No   Sig: Take 1 tablet (500 mg total) by mouth 4 (four) times a day   montelukast (SINGULAIR) 10 mg tablet   No No   Sig: Take 1 tablet (10 mg total) by mouth daily at bedtime   ondansetron (ZOFRAN) 4 mg tablet   No No   Sig: Take 1 tablet (4 mg total) by mouth every 6 (six) hours   sertraline (Zoloft) 50 mg tablet   No No   Sig: Take 1 tablet (50 mg total) by mouth daily   sitaGLIPtin (JANUVIA) 50 mg tablet   No No   Sig: Take 1 tablet (50 mg total) by mouth daily      Facility-Administered Medications: None       Portions of the record may have been created with voice recognition software  Occasional wrong word or "sound a like" substitutions may have occurred due to the inherent limitations of voice recognition software  Read the chart carefully and recognize, using context, where substitutions have occurred        ED Course         Critical Care Time  Procedures

## 2023-01-10 NOTE — ASSESSMENT & PLAN NOTE
Suspect msk but given elevated troponin consider evaluation for myopercarditis given recent head cold per mother vs allergies per pt (increased cough with ongoing tobacco use)      Flu rsv covid negative  ddimer wnl  cxr w/o acute pathology on wet read  Consider echo vs further diagnostics in light of elevated troponin pending cardiology recommendations  Supportive care

## 2023-01-10 NOTE — ASSESSMENT & PLAN NOTE
Lab Results   Component Value Date    HGBA1C 9 6 (H) 11/18/2021       No results for input(s): POCGLU in the last 72 hours      Blood Sugar Average: Last 72 hrs:  hold metformin/januvia  Continue lantus 15 iu qhs and start ssi/poc bs

## 2023-01-10 NOTE — PLAN OF CARE
Problem: PAIN - ADULT  Goal: Verbalizes/displays adequate comfort level or baseline comfort level  Description: Interventions:  - Encourage patient to monitor pain and request assistance  - Assess pain using appropriate pain scale  - Administer analgesics based on type and severity of pain and evaluate response  - Implement non-pharmacological measures as appropriate and evaluate response  - Consider cultural and social influences on pain and pain management  - Notify physician/advanced practitioner if interventions unsuccessful or patient reports new pain  Outcome: Progressing     Problem: INFECTION - ADULT  Goal: Absence or prevention of progression during hospitalization  Description: INTERVENTIONS:  - Assess and monitor for signs and symptoms of infection  - Monitor lab/diagnostic results  - Monitor all insertion sites, i e  indwelling lines, tubes, and drains  - Monitor endotracheal if appropriate and nasal secretions for changes in amount and color  - [place] appropriate cooling/warming therapies per order  - Administer medications as ordered  - Instruct and encourage patient and family to use good hand hygiene technique  - Identify and instruct in appropriate isolation precautions for identified infection/condition  Outcome: Progressing     Problem: SAFETY ADULT  Goal: Patient will remain free of falls  Description: INTERVENTIONS:  - Educate patient/family on patient safety including physical limitations  - Instruct patient to call for assistance with activity   - Consult OT/PT to assist with strengthening/mobility   - Keep Call bell within reach  - Keep bed low and locked with side rails adjusted as appropriate  - Keep care items and personal belongings within reach  - Initiate and maintain comfort rounds  - Apply yellow socks and bracelet for high fall risk patients  - Consider moving patient to room near nurses station  Outcome: Progressing     Problem: DISCHARGE PLANNING  Goal: Discharge to home or other facility with appropriate resources  Description: INTERVENTIONS:  - Identify barriers to discharge w/patient and caregiver  - Arrange for needed discharge resources and transportation as appropriate  - Identify discharge learning needs (meds, wound care, etc )  - Arrange for interpretive services to assist at discharge as needed  - Refer to Case Management Department for coordinating discharge planning if the patient needs post-hospital services based on physician/advanced practitioner order or complex needs related to functional status, cognitive ability, or social support system  Outcome: Progressing     Problem: CARDIOVASCULAR - ADULT  Goal: Maintains optimal cardiac output and hemodynamic stability  Description: INTERVENTIONS:  - Monitor I/O, vital signs and rhythm  - Monitor for S/S and trends of decreased cardiac output  - Administer and titrate ordered vasoactive medications to optimize hemodynamic stability  - Assess quality of pulses, skin color and temperature  - Assess for signs of decreased coronary artery perfusion  - Instruct patient to report change in severity of symptoms  Outcome: Progressing  Goal: Absence of cardiac dysrhythmias or at baseline rhythm  Description: INTERVENTIONS:  - Continuous cardiac monitoring, vital signs, obtain 12 lead EKG if ordered  - Administer antiarrhythmic and heart rate control medications as ordered  - Monitor electrolytes and administer replacement therapy as ordered  Outcome: Progressing     Problem: METABOLIC, FLUID AND ELECTROLYTES - ADULT  Goal: Glucose maintained within target range  Description: INTERVENTIONS:  - Monitor Blood Glucose as ordered  - Assess for signs and symptoms of hyperglycemia and hypoglycemia  - Administer ordered medications to maintain glucose within target range  - Assess nutritional intake and initiate nutrition service referral as needed  Outcome: Progressing

## 2023-01-10 NOTE — H&P
[facility]  H&P- Jaya Angle 1990, 27 y o  female MRN: [id number]  Unit/Bed#: ED 18 Encounter: [id number]  Primary Care Provider: REJI Ann   Date and time admitted to hospital: 1/9/2023 10:57 PM    * Elevated troponin I level  Assessment & Plan  Nonspecific troponin I elevation in setting of cp x 2d and palpitations  CP is worse w/palpation over midline chest wall  Recently had head cold vs allergy s/sx when talking to both pt and pt's mother at bedside   ekg w/stable RBBB and t wave abn in 1/avl x2 and compared to prior  Negative stress echo in 2021  No recent travel or hospitalizations and negative ddimer   -give asa now   -trend ekgs/troponins  If continues to elevate may empirically heparinate given RF (DM, tobacco use, obesity) although suspect this may be nonspecific vs postinfectious pericarditis vs other   -consult cardiology, monitor on telemetry for palpitations and st segment changes    Chest pain  Assessment & Plan  Suspect msk but given elevated troponin consider evaluation for myopercarditis given recent head cold per mother vs allergies per pt (increased cough with ongoing tobacco use)  Flu rsv covid negative  ddimer wnl  cxr w/o acute pathology on wet read  Consider echo vs further diagnostics in light of elevated troponin pending cardiology recommendations  Supportive care    Anxiety  Assessment & Plan  Continue zoloft    Type 2 diabetes mellitus ([facility] [address] )  Assessment & Plan  Lab Results   Component Value Date    HGBA1C 9 6 (H) 11/18/2021       No results for input(s): POCGLU in the last 72 hours  Blood Sugar Average: Last 72 hrs:  hold metformin/januvia  Continue lantus 15 iu qhs and start ssi/poc bs    Morbid obesity ([facility] [address] )  Assessment & Plan  bmi 42 noted     Encourage life style modifications    Aortic coarctation  Assessment & Plan  Congenital s/p stent in 2020 due to recoarctation  Op f/u with [facility]      VTE Prophylaxis: Enoxaparin (Lovenox)  / sequential compression device   Code Status: fc  POLST: There is no POLST form on file for this patient (pre-hospital)  Discussion with family: mother at bedside    Anticipated Length of Stay:  Patient will be admitted on an Inpatient basis with an anticipated length of stay of  Greater than 2 midnights  Justification for Hospital Stay: elevated troponin i    Total Time for Visit, including Counseling / Coordination of Care: 45 minutes  Greater than 50% of this total time spent on direct patient counseling and coordination of care  Chief Complaint:   cp    History of Present Illness:    Joyce Prasad is a 27 y o  female who presents with pmh of VSD s/p repair in child, coarctation of aorta s/p repair w/recent stenting in 2020, obesity, iddm, tobacco use, anxiety coming to hospital for cp  Pt notes substernal and slightly left sided cp  She feels this is dull in sensation nonradiating  Not worse w/inspiration or lying down/sitting up or when climbing stairs  Not worse in the middle of the night  It typically lasts 1-2 minutes and ceases and has been occurring over last 2 days  Does have some sob but this stable and chronic for her and not worse than normal   Did note on day of admission some palpitations/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  She notes no heavy lifting positioning with her but felt dizzy leaving the patient's residence and palpitations upon getting to the car  She came to the ER to be evaluated and was found to have elevated troponin I of 123  No recent travel in last month or so  No hospitalization/surgery  Pt's mother does bring up recent head cold about 3 weeks prior which pt relates rather forcefully that this is more consistent w/her smoker's cough  Does note that this dry in nature and that she does get some PND sneezing  No recent sneezing however, but patient does relate to this to the temperatures shifts    We will admit for elevated troponin I  Review of Systems:    Review of Systems   Constitutional: Negative for chills and fever  Eyes: Negative for visual disturbance  Respiratory: Positive for cough and shortness of breath  Cardiovascular: Positive for chest pain and palpitations  Gastrointestinal: Negative for abdominal pain, diarrhea, nausea and vomiting  Neurological: Positive for light-headedness  All other systems reviewed and are negative  Past Medical and Surgical History:     Past Medical History:   Diagnosis Date   • Allergic    • Arthritis    • Bipolar affective disorder, currently depressed, moderate ([facility] [address] ) 2018   • Cyst of ovary, right    • Diabetes mellitus [facility] ) 10/10/18    type 2   • Endometriosis    • Heart murmur 86   • Hepatitis C    • Hepatitis C virus infection cured after antiviral drug therapy    • History of transfusion    • Hypertension    • Migraines    • Obesity    • Pulmonary artery congenital abnormality    • Spleen enlarged    • Status post surgical removal of both fallopian tubes    • Varicella        Past Surgical History:   Procedure Laterality Date   • CARDIAC CATHETERIZATION      no CAD 10days, 4 weeks 22months old    •  SECTION, LOW TRANSVERSE     • CHOLECYSTECTOMY     • 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 5   • CORONARY STENT PLACEMENT     • LIVER BIOPSY     • LIVER BIOPSY     • STERNAL WIRE REMOVAL  2022   • TUBAL LIGATION Bilateral    • VSD REPAIR      As a child       Meds/Allergies:    Prior to Admission medications    Medication Sig Start Date End Date Taking?  Authorizing Provider   acetaminophen (TYLENOL) 500 mg tablet Take 1,000 mg by mouth 20  Yes Historical Provider, MD   albuterol (Ventolin HFA) 90 mcg/act inhaler Inhale 2 puffs every 4 (four) hours as needed for wheezing or shortness of breath 10/18/21  Yes REJI Bello   ALPRAZolam Pearla Crea) 0 5 mg tablet Take 1 tablet (0 5 mg total) by mouth daily at bedtime as needed for anxiety 12/6/22  Yes REJI Shelley   B-D UF III MINI PEN NEEDLES 31G X 5 MM MISC INJECT UNDER THE SKIN DAILY AT BEDTIME USE ONE A DAY OR AS DIRECTED 3/11/21  Yes Clover Mora DO   diclofenac sodium (VOLTAREN) 50 mg EC tablet Take 1 tablet (50 mg total) by mouth 2 (two) times a day 12/6/22  Yes REJI Shelley   fexofenadine (ALLEGRA) 180 MG tablet Take 1 tablet (180 mg total) by mouth daily 8/20/21  Yes Jimbo Ibarra DO   gabapentin (NEURONTIN) 100 mg capsule take 1 capsule by mouth daily at bedtime 1/1/23  Yes REJI Pitt   lisinopril (ZESTRIL) 30 mg tablet Take 30 mg by mouth daily 5/25/20  Yes Historical Provider, MD   metFORMIN (GLUCOPHAGE) 1000 MG tablet Take 1 tablet (1,000 mg total) by mouth 2 (two) times a day with meals 5/25/21  Yes REJI Pitt   montelukast (SINGULAIR) 10 mg tablet Take 1 tablet (10 mg total) by mouth daily at bedtime 8/20/21  Yes Jimbo Ibarra DO   sertraline (Zoloft) 50 mg tablet Take 1 tablet (50 mg total) by mouth daily 12/6/22  Yes REJI Shelley   sitaGLIPtin (JANUVIA) 50 mg tablet Take 1 tablet (50 mg total) by mouth daily 12/6/22  Yes REJI Pitt   VITAMIN D PO Take by mouth daily   Yes Historical Provider, MD   Blood Glucose Monitoring Suppl (OneTouch Verio) w/Device KIT Use daily  Patient not taking: Reported on 11/8/2022 11/23/21   REJI Shelley   Blood Pressure Monitoring (B-D ASSURE BPM/AUTO WRIST CUFF) MISC Check blood pressure prior to each OB visit, or as directed by your physician    Patient not taking: Reported on 11/8/2022 3/31/20   Blossom Turner MD   glucose blood (OneTouch Verio) test strip Test once daily  Patient not taking: Reported on 11/8/2022 11/23/21   REJI Shelley   insulin glargine (LANTUS) 100 units/mL subcutaneous injection Inject 15 Units under the skin daily at bedtime 7/9/21 12/6/22  REJI Shelley   lidocaine (Lidoderm) 5 % Apply 1 patch topically daily Remove & Discard patch within 12 hours or as directed by MD  Patient not taking: Reported on 1/10/2023 11/30/21   Devon Castro MD   meclizine (ANTIVERT) 12 5 MG tablet Take 1 tablet (12 5 mg total) by mouth 3 (three) times a day as needed for dizziness  Patient not taking: Reported on 1/10/2023 11/15/21   REJI Brandt   methocarbamol (ROBAXIN) 500 mg tablet Take 1 tablet (500 mg total) by mouth 4 (four) times a day 12/6/22   REJI Brandt   ondansetron [facility]) 4 mg tablet Take 1 tablet (4 mg total) by mouth every 6 (six) hours  Patient not taking: Reported on 1/10/2023 3/1/22   REJI Brandt     I have reviewed home medications with patient personally  Allergies:    Allergies   Allergen Reactions   • Prednisone Swelling   • Bactrim [Sulfamethoxazole-Trimethoprim] Hives   • Corticosteroids Swelling     Pt states this does not cause problems breathing, she just has generalized swelling   • Cortisone Swelling     Generalized; no impairment with breathing reported     • Medical Tape Hives     Allergic to Paper Tape   • Other Hives     Paper tape   • Sulfa Antibiotics Hives       Social History:     Marital Status: Single   Occupation:   Patient Pre-hospital Living Situation:   Patient Pre-hospital Level of Mobility:   Patient Pre-hospital Diet Restrictions:   Substance Use History:   Social History     Substance and Sexual Activity   Alcohol Use Not Currently   • Alcohol/week: 3 0 standard drinks   • Types: 3 Standard drinks or equivalent per week    Comment: socially/prior to knowledge of pregnancy     Social History     Tobacco Use   Smoking Status Every Day   • Packs/day: 0 50   • Years: 0 00   • Pack years: 0 00   • Types: Cigarettes   Smokeless Tobacco Never     Social History     Substance and Sexual Activity   Drug Use Not Currently    Comment: hx of THC use for migraines       Family History:    Family History   Problem Relation Age of Onset   • Hypertension Mother    • Migraines Mother    • JENNY disease Mother    • Depression Mother    • Hyperlipidemia Mother    • Diabetes Mother    • Diabetes Father    • Hypertension Father    • Kidney failure Father    • Heart attack Father    • Arthritis Father    • Stroke Father    • Polycystic kidney disease Paternal Grandmother    • Stroke Paternal Grandmother    • Heart disease Paternal Grandmother    • Arthritis Sister    • Asthma Sister    • Thyroid disease Sister    • Diabetes Sister    • Arthritis Maternal Grandmother    • Breast cancer Maternal Grandmother    • Diabetes Maternal Grandmother    • Hypertension Maternal Grandmother    • Heart Valve Disease Maternal Grandmother    • Learning disabilities Cousin    • Learning disabilities Sister    • ADD / ADHD Cousin    • Lung cancer Brother    • Diabetes Maternal Grandfather    • Hypertension Maternal Grandfather    • JENNY disease Maternal Grandfather    • Stroke Maternal Grandfather        Physical Exam:     Vitals:   Blood Pressure: 163/93 (01/10/23 0133)  Pulse: (!) 108 (01/10/23 0133)  Temperature: 97 8 °F (36 6 °C) (01/09/23 2206)  Temp Source: Oral (01/09/23 2206)  Respirations: 15 (01/10/23 0133)  Weight - Scale: 102 kg (223 lb 15 8 oz) (01/09/23 2206)  SpO2: 97 % (01/10/23 0133)    Physical Exam  Vitals reviewed  Constitutional:       General: She is not in acute distress  Appearance: She is obese  She is not ill-appearing, toxic-appearing or diaphoretic  HENT:      Head: Normocephalic and atraumatic  Right Ear: External ear normal       Left Ear: External ear normal       Nose: Nose normal    Eyes:      Extraocular Movements: Extraocular movements intact  Cardiovascular:      Rate and Rhythm: Normal rate and regular rhythm  Heart sounds: No murmur heard  No friction rub  No gallop  Pulmonary:      Effort: No respiratory distress  Breath sounds: No wheezing, rhonchi or rales  Chest:      Chest wall: Tenderness present     Abdominal: Palpations: Abdomen is soft  There is no mass  Tenderness: There is no abdominal tenderness  There is no guarding or rebound  Hernia: No hernia is present  Musculoskeletal:      Right lower leg: No edema  Left lower leg: No edema  Skin:     General: Skin is warm and dry  Neurological:      Mental Status: She is alert  Mental status is at baseline  Psychiatric:         Mood and Affect: Mood normal          (  Be Sure to Include Physical Exam: Delete this entire line when you have entered your exam)    Additional Data:     Lab Results: I have personally reviewed pertinent reports  Results from last 7 days   Lab Units 01/09/23  2325   WBC Thousand/uL 11 06*   HEMOGLOBIN g/dL 13 4   HEMATOCRIT % 41 4   PLATELETS Thousands/uL 253   NEUTROS PCT % 63   LYMPHS PCT % 28   MONOS PCT % 7   EOS PCT % 1     Results from last 7 days   Lab Units 01/09/23  2325   SODIUM mmol/L 135   POTASSIUM mmol/L 4 5   CHLORIDE mmol/L 98   CO2 mmol/L 28   BUN mg/dL 10   CREATININE mg/dL 0 82   ANION GAP mmol/L 9   CALCIUM mg/dL 9 4   ALBUMIN g/dL 3 5   TOTAL BILIRUBIN mg/dL 0 22   ALK PHOS U/L 98   ALT U/L 24   AST U/L 13   GLUCOSE RANDOM mg/dL 456*                       Imaging: I have personally reviewed pertinent reports  XR chest 2 views   ED Interpretation by Jonh Andrade MD (01/10 0000)   No acute cardiopulmonary disease as interpreted by myself  EKG, Pathology, and Other Studies Reviewed on Admission:   · EKG: nsr   rbbb t wave inversions in 1 /avl stable x2 and to prior ekg last year  Allscripts / Epic Records Reviewed: Yes     ** Please Note: This note has been constructed using a voice recognition system   **

## 2023-01-10 NOTE — UTILIZATION REVIEW
NOTIFICATION OF INPATIENT ADMISSION   AUTHORIZATION REQUEST   SERVICING FACILITY:   [facility] [address]  [address] [place] [address], [address]   Tax ID: [id number]  NPI: [id number] ATTENDING PROVIDER:  Attending Name and NPI#: Blank Carney Md [[id number]]  Address: [address] [place] [address], [address]   Phone: [phone]     ADMISSION INFORMATION:  Place of Service: Inpatient [facility]  [address]  Place of Service Code: 21  Inpatient Admission Date/Time: 1/10/23 12:47 AM  Discharge Date/Time: No discharge date for patient encounter  Admitting Diagnosis Code/Description:  Chest pain [R07 9]  Elevated troponin [R77 8]  H/O aortic coarctation repair [Z87 74]     UTILIZATION REVIEW CONTACT:  Miesha Okeefe Utilization   Network Utilization Review Department  Phone: [phone]  Fax: [phone]  Email: [email or web address]  Contact for approvals/pending authorizations, clinical reviews, and discharge  PHYSICIAN ADVISORY SERVICES:  Medical Necessity Denial & [id number] Review  Phone: [phone]  Fax: [phone]  Email: [email or web address]

## 2023-01-10 NOTE — PLAN OF CARE
Problem: PAIN - ADULT  Goal: Verbalizes/displays adequate comfort level or baseline comfort level  Description: Interventions:  - Encourage patient to monitor pain and request assistance  - Assess pain using appropriate pain scale  - Administer analgesics based on type and severity of pain and evaluate response  - Implement non-pharmacological measures as appropriate and evaluate response  - Consider cultural and social influences on pain and pain management  - Notify physician/advanced practitioner if interventions unsuccessful or patient reports new pain  Outcome: Progressing     Problem: INFECTION - ADULT  Goal: Absence or prevention of progression during hospitalization  Description: INTERVENTIONS:  - Assess and monitor for signs and symptoms of infection  - Monitor lab/diagnostic results  - Monitor all insertion sites, i e  indwelling lines, tubes, and drains  - Monitor endotracheal if appropriate and nasal secretions for changes in amount and color  - Emmet appropriate cooling/warming therapies per order  - Administer medications as ordered  - Instruct and encourage patient and family to use good hand hygiene technique  - Identify and instruct in appropriate isolation precautions for identified infection/condition  Outcome: Progressing     Problem: SAFETY ADULT  Goal: Patient will remain free of falls  Description: INTERVENTIONS:  - Educate patient/family on patient safety including physical limitations  - Instruct patient to call for assistance with activity   - Consult OT/PT to assist with strengthening/mobility   - Keep Call bell within reach  - Keep bed low and locked with side rails adjusted as appropriate  - Keep care items and personal belongings within reach  - Initiate and maintain comfort rounds  - Apply yellow socks and bracelet for high fall risk patients  - Consider moving patient to room near nurses station  Outcome: Progressing     Problem: DISCHARGE PLANNING  Goal: Discharge to home or other facility with appropriate resources  Description: INTERVENTIONS:  - Identify barriers to discharge w/patient and caregiver  - Arrange for needed discharge resources and transportation as appropriate  - Identify discharge learning needs (meds, wound care, etc )  - Arrange for interpretive services to assist at discharge as needed  - Refer to Case Management Department for coordinating discharge planning if the patient needs post-hospital services based on physician/advanced practitioner order or complex needs related to functional status, cognitive ability, or social support system  Outcome: Progressing     Problem: CARDIOVASCULAR - ADULT  Goal: Maintains optimal cardiac output and hemodynamic stability  Description: INTERVENTIONS:  - Monitor I/O, vital signs and rhythm  - Monitor for S/S and trends of decreased cardiac output  - Administer and titrate ordered vasoactive medications to optimize hemodynamic stability  - Assess quality of pulses, skin color and temperature  - Assess for signs of decreased coronary artery perfusion  - Instruct patient to report change in severity of symptoms  Outcome: Progressing  Goal: Absence of cardiac dysrhythmias or at baseline rhythm  Description: INTERVENTIONS:  - Continuous cardiac monitoring, vital signs, obtain 12 lead EKG if ordered  - Administer antiarrhythmic and heart rate control medications as ordered  - Monitor electrolytes and administer replacement therapy as ordered  Outcome: Progressing

## 2023-01-10 NOTE — ASSESSMENT & PLAN NOTE
Nonspecific troponin I elevation in setting of cp x 2d and palpitations  CP is worse w/palpation over midline chest wall  Recently had head cold vs allergy s/sx when talking to both pt and pt's mother at bedside   ekg w/stable RBBB and t wave abn in 1/avl x2 and compared to prior  Negative stress echo in 2021  No recent travel or hospitalizations and negative ddimer   -give asa now   -trend ekgs/troponins    If continues to elevate may empirically heparinate given RF (DM, tobacco use, obesity) although suspect this may be nonspecific vs postinfectious pericarditis vs other   -consult cardiology, monitor on telemetry for palpitations and st segment changes

## 2023-01-10 NOTE — ED PROVIDER NOTES
History  Chief Complaint   Patient presents with   • Chest Pain     Pt reports L anterior CP and R sided neck pain that started days ago  +dizziness, feels like her heart is "skipping a beat "      HPI     66-year-old female with past medical history of VSD status post repair when she was an infant and coarctation status post stenting, diabetes, and hypertension who presents for evaluation of chest pain  Patient states she has been having intermittent chest pain for the past few days  Pain is in the center of her chest   Denies radiation of the pain  She states pain feels like a pressure sensation  She has been taking Tylenol without relief of pain  Patient denies any worsening of the pain with taking a deep breath, with positional changes, or with exertion  She has had some associated lightheadedness and shortness of breath  She states she is not sure what brings on the pain  She states she has had chest pain in the past but this feels slightly different  She denies any nausea or vomiting  Denies fevers, chills, cough, or congestion  Denies leg pain or leg swelling  Denies history of DVT or PE  Denies any recent hospitalizations or surgeries or prolonged immobilization  Patient states she does smoke half pack cigarettes a day  Denies alcohol or recreational drug use  Patient states she did have surgery one year ago to have some of her sternotomy wires removed  Prior to Admission Medications   Prescriptions Last Dose Informant Patient Reported? Taking?    ALPRAZolam (XANAX) 0 5 mg tablet Past Week  No Yes   Sig: Take 1 tablet (0 5 mg total) by mouth daily at bedtime as needed for anxiety   B-D UF III MINI PEN NEEDLES 31G X 5 MM MISC Past Week  No Yes   Sig: INJECT UNDER THE SKIN DAILY AT BEDTIME USE ONE A DAY OR AS DIRECTED   Blood Glucose Monitoring Suppl (OneTouch Verio) w/Device KIT Not Taking  No No   Sig: Use daily   Patient not taking: Reported on 11/8/2022   Blood Pressure Monitoring (B-D ASSURE BPM/AUTO WRIST CUFF) MISC Not Taking  No No   Sig: Check blood pressure prior to each OB visit, or as directed by your physician     Patient not taking: Reported on 11/8/2022   VITAMIN D PO Past Month  Yes Yes   Sig: Take by mouth daily   acetaminophen (TYLENOL) 500 mg tablet Past Week  Yes Yes   Sig: Take 1,000 mg by mouth   albuterol (Ventolin HFA) 90 mcg/act inhaler Past Month  No Yes   Sig: Inhale 2 puffs every 4 (four) hours as needed for wheezing or shortness of breath   diclofenac sodium (VOLTAREN) 50 mg EC tablet 1/9/2023  No Yes   Sig: Take 1 tablet (50 mg total) by mouth 2 (two) times a day   fexofenadine (ALLEGRA) 180 MG tablet 1/9/2023  No Yes   Sig: Take 1 tablet (180 mg total) by mouth daily   gabapentin (NEURONTIN) 100 mg capsule Past Week  No Yes   Sig: take 1 capsule by mouth daily at bedtime   glucose blood (OneTouch Verio) test strip Not Taking  No No   Sig: Test once daily   Patient not taking: Reported on 11/8/2022   insulin glargine (LANTUS) 100 units/mL subcutaneous injection   No No   Sig: Inject 15 Units under the skin daily at bedtime   lidocaine (Lidoderm) 5 % Not Taking  No No   Sig: Apply 1 patch topically daily Remove & Discard patch within 12 hours or as directed by MD   Patient not taking: Reported on 1/10/2023   lisinopril (ZESTRIL) 30 mg tablet 1/9/2023  Yes Yes   Sig: Take 30 mg by mouth daily   meclizine (ANTIVERT) 12 5 MG tablet Not Taking  No No   Sig: Take 1 tablet (12 5 mg total) by mouth 3 (three) times a day as needed for dizziness   Patient not taking: Reported on 1/10/2023   metFORMIN (GLUCOPHAGE) 1000 MG tablet 1/9/2023  No Yes   Sig: Take 1 tablet (1,000 mg total) by mouth 2 (two) times a day with meals   methocarbamol (ROBAXIN) 500 mg tablet Unknown  No No   Sig: Take 1 tablet (500 mg total) by mouth 4 (four) times a day   montelukast (SINGULAIR) 10 mg tablet Past Week  No Yes   Sig: Take 1 tablet (10 mg total) by mouth daily at bedtime   ondansetron (ZOFRAN) 4 mg tablet Not Taking  No No   Sig: Take 1 tablet (4 mg total) by mouth every 6 (six) hours   Patient not taking: Reported on 1/10/2023   sertraline (Zoloft) 50 mg tablet 2023  No Yes   Sig: Take 1 tablet (50 mg total) by mouth daily   sitaGLIPtin (JANUVIA) 50 mg tablet 2023  No Yes   Sig: Take 1 tablet (50 mg total) by mouth daily      Facility-Administered Medications: None       Past Medical History:   Diagnosis Date   • Allergic    • Arthritis    • Bipolar affective disorder, currently depressed, moderate ([facility] [address] ) 2018   • Cyst of ovary, right    • [facility] ) 10/10/18    type 2   • Endometriosis    • Heart murmur 88   • Hepatitis C    • Hepatitis C virus infection cured after antiviral drug therapy    • History of transfusion    • Hypertension    • Migraines    • Obesity    • Pulmonary artery congenital abnormality    • Spleen enlarged    • Status post surgical removal of both fallopian tubes    • Varicella        Past Surgical History:   Procedure Laterality Date   • CARDIAC CATHETERIZATION      no CAD 10days, 4 weeks 22months old    •  SECTION, LOW TRANSVERSE     • CHOLECYSTECTOMY     • 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 5   • CORONARY STENT PLACEMENT     • LIVER BIOPSY     • LIVER BIOPSY     • STERNAL WIRE REMOVAL  2022   • TUBAL LIGATION Bilateral    • VSD REPAIR      As a child       Family History   Problem Relation Age of Onset   • Hypertension Mother    • Migraines Mother    • JENNY disease Mother    • Depression Mother    • Hyperlipidemia Mother    • Diabetes Mother    • Diabetes Father    • Hypertension Father    • Kidney failure Father    • Heart attack Father    • Arthritis Father    • Stroke Father    • Polycystic kidney disease Paternal Grandmother    • Stroke Paternal Grandmother    • Heart disease Paternal Grandmother    • Arthritis Sister    • Asthma Sister    • Thyroid disease Sister    • Diabetes Sister    • Arthritis Maternal Grandmother • Breast cancer Maternal Grandmother    • Diabetes Maternal Grandmother    • Hypertension Maternal Grandmother    • Heart Valve Disease Maternal Grandmother    • Learning disabilities Cousin    • Learning disabilities Sister    • ADD / ADHD Cousin    • Lung cancer Brother    • Diabetes Maternal Grandfather    • Hypertension Maternal Grandfather    • JENNY disease Maternal Grandfather    • Stroke Maternal Grandfather      I have reviewed and agree with the history as documented  E-Cigarette/Vaping   • E-Cigarette Use Never User      E-Cigarette/Vaping Substances   • Nicotine No    • THC No    • CBD No    • Flavoring No      Social History     Tobacco Use   • Smoking status: Every Day     Packs/day: 0 50     Years: 0 00     Pack years: 0 00     Types: Cigarettes   • Smokeless tobacco: Never   Vaping Use   • Vaping Use: Never used   Substance Use Topics   • Alcohol use: Not Currently     Alcohol/week: 3 0 standard drinks     Types: 3 Standard drinks or equivalent per week     Comment: socially/prior to knowledge of pregnancy   • Drug use: Not Currently     Comment: hx of THC use for migraines        Review of Systems   Constitutional: Negative for appetite change, chills and fever  HENT: Negative for congestion, rhinorrhea and sore throat  Respiratory: Positive for shortness of breath  Negative for cough  Cardiovascular: Positive for chest pain and palpitations  Negative for leg swelling  Gastrointestinal: Negative for abdominal pain, diarrhea, nausea and vomiting  Musculoskeletal: Negative for arthralgias and myalgias  Skin: Negative for rash  Neurological: Positive for light-headedness  Negative for dizziness, weakness, numbness and headaches  All other systems reviewed and are negative        Physical Exam  ED Triage Vitals   Temperature Pulse Respirations Blood Pressure SpO2   01/09/23 2206 01/09/23 2206 01/09/23 2206 01/09/23 2206 01/09/23 2206   97 8 °F (36 6 °C) (!) 114 21 165/86 98 % Temp Source Heart Rate Source Patient Position - Orthostatic VS BP Location FiO2 (%)   01/09/23 2206 01/09/23 2206 01/10/23 0412 01/10/23 0412 --   Oral Monitor Lying Left arm       Pain Score       01/09/23 2330       6             Orthostatic Vital Signs  Vitals:    01/09/23 2337 01/10/23 0133 01/10/23 0350 01/10/23 0412   BP: 137/91 163/93 139/87 125/88   Pulse: (!) 106 (!) 108 (!) 108 102   Patient Position - Orthostatic VS:    Lying       Physical Exam  Vitals and nursing note reviewed  Constitutional:       General: She is not in acute distress  Appearance: Normal appearance  She is well-developed and normal weight  She is not ill-appearing, toxic-appearing or diaphoretic  HENT:      Head: Normocephalic and atraumatic  Right Ear: External ear normal       Left Ear: External ear normal       Nose: Nose normal       Mouth/Throat:      Mouth: Mucous membranes are moist       Pharynx: Oropharynx is clear  Eyes:      Extraocular Movements: Extraocular movements intact  Conjunctiva/sclera: Conjunctivae normal    Cardiovascular:      Rate and Rhythm: Regular rhythm  Tachycardia present  Pulses: Normal pulses  Radial pulses are 2+ on the right side and 2+ on the left side  Posterior tibial pulses are 2+ on the right side and 2+ on the left side  Heart sounds: Normal heart sounds  No murmur heard  No friction rub  No gallop  Pulmonary:      Effort: Pulmonary effort is normal  No respiratory distress  Breath sounds: Normal breath sounds  No decreased breath sounds, wheezing, rhonchi or rales  Chest:      Chest wall: Tenderness present  Comments: Healed midline sternotomy scar  Abdominal:      General: There is no distension  Palpations: Abdomen is soft  Tenderness: There is no abdominal tenderness  There is no guarding or rebound  Musculoskeletal:         General: No tenderness  Cervical back: Neck supple        Right lower leg: No tenderness  No edema  Left lower leg: No tenderness  No edema  Skin:     General: Skin is warm and dry  Coloration: Skin is not pale  Findings: No erythema or rash  Neurological:      General: No focal deficit present  Mental Status: She is alert and oriented to person, place, and time  Cranial Nerves: No cranial nerve deficit  Sensory: No sensory deficit  Motor: No weakness     Psychiatric:         Mood and Affect: Mood normal          Behavior: Behavior normal          ED Medications  Medications   ALPRAZolam (XANAX) tablet 0 5 mg (has no administration in time range)   lisinopril (ZESTRIL) tablet 30 mg (has no administration in time range)   sertraline (ZOLOFT) tablet 50 mg (has no administration in time range)   gabapentin (NEURONTIN) capsule 100 mg (has no administration in time range)   montelukast (SINGULAIR) tablet 10 mg (has no administration in time range)   insulin glargine (LANTUS) subcutaneous injection 15 Units 0 15 mL (has no administration in time range)   albuterol (PROVENTIL HFA,VENTOLIN HFA) inhaler 2 puff (has no administration in time range)   ondansetron (ZOFRAN) injection 4 mg (has no administration in time range)   heparin (porcine) subcutaneous injection 5,000 Units (5,000 Units Subcutaneous Given 1/10/23 0509)   ketorolac (TORADOL) injection 15 mg (15 mg Intravenous Given 1/9/23 2330)   aspirin (ECOTRIN LOW STRENGTH) EC tablet 324 mg (324 mg Oral Given 1/10/23 0132)       Diagnostic Studies  Results Reviewed     Procedure Component Value Units Date/Time    HS Troponin I 4hr [[id number]]  (Abnormal) Collected: 01/10/23 0348    Lab Status: Final result Specimen: Blood from Arm, Right Updated: 01/10/23 0419     hs TnI 4hr 95 ng/L      Delta 4hr hsTnI -28 ng/L     COVID/FLU/RSV [[id number]]  (Normal) Collected: 01/10/23 0128    Lab Status: Final result Specimen: Nares from Nose Updated: 01/10/23 0224     SARS-CoV-2 Negative     INFLUENZA A PCR Negative INFLUENZA B PCR Negative     RSV PCR Negative    Narrative:      FOR PEDIATRIC PATIENTS - copy/paste COVID Guidelines URL to browser: [email or web address]    SARS-CoV-2 assay is a Nucleic Acid Amplification assay intended for the  qualitative detection of nucleic acid from SARS-CoV-2 in nasopharyngeal  swabs  Results are for the presumptive identification of SARS-CoV-2 RNA  Positive results are indicative of infection with SARS-CoV-2, the virus  causing COVID-19, but do not rule out bacterial infection or co-infection  with other viruses  Laboratories within the [place] and its  territories are required to report all positive results to the appropriate  public health authorities  Negative results do not preclude SARS-CoV-2  infection and should not be used as the sole basis for treatment or other  patient management decisions  Negative results must be combined with  clinical observations, patient history, and epidemiological information  This test has not been FDA cleared or approved  This test has been authorized by FDA under an Emergency Use Authorization  (EUA)  This test is only authorized for the duration of time the  declaration that circumstances exist justifying the authorization of the  emergency use of an in vitro diagnostic tests for detection of SARS-CoV-2  virus and/or diagnosis of COVID-19 infection under section 564(b)(1) of  the Act, 21 U  S C  462IJW-6(X)(5), unless the authorization is terminated  or revoked sooner  The test has been validated but independent review by FDA  and CLIA is pending  Test performed using [employer] GeneXpert: This RT-PCR assay targets N2,  a region unique to SARS-CoV-2  A conserved region in the E-gene was chosen  for pan-Sarbecovirus detection which includes SARS-CoV-2  According to CMS-2020-01-R, this platform meets the definition of high-[employer] technology      HS Troponin I 2hr [[id number]] (Abnormal) Collected: 01/10/23 0128    Lab Status: Final result Specimen: Blood from Arm, Right Updated: 01/10/23 0208     hs TnI 2hr 110 ng/L      Delta 2hr hsTnI -13 ng/L     Hepatic function panel [[id number]]  (Normal) Collected: 01/09/23 2325    Lab Status: Final result Specimen: Blood from Arm, Right Updated: 01/10/23 0103     Total Bilirubin 0 22 mg/dL      Bilirubin, Direct 0 07 mg/dL      Alkaline Phosphatase 98 U/L      AST 13 U/L      ALT 24 U/L      Total Protein 7 9 g/dL      Albumin 3 5 g/dL     Lipase [[id number]]  (Normal) Collected: 01/09/23 2325    Lab Status: Final result Specimen: Blood from Arm, Right Updated: 01/10/23 0103     Lipase 113 u/L     HS Troponin 0hr (reflex protocol) [[id number]]  (Abnormal) Collected: 01/09/23 2325    Lab Status: Final result Specimen: Blood from Arm, Right Updated: 01/09/23 2358     hs TnI 0hr 123 ng/L     D-dimer, quantitative [[id number]]  (Normal) Collected: 01/09/23 2325    Lab Status: Final result Specimen: Blood from Arm, Right Updated: 01/09/23 2348     D-Dimer, Quant 0 40 ug/ml FEU     Basic metabolic panel [[id number]]  (Abnormal) Collected: 01/09/23 2325    Lab Status: Final result Specimen: Blood from Arm, Right Updated: 01/09/23 2345     Sodium 135 mmol/L      Potassium 4 5 mmol/L      Chloride 98 mmol/L      CO2 28 mmol/L      ANION GAP 9 mmol/L      BUN 10 mg/dL      Creatinine 0 82 mg/dL      Glucose 456 mg/dL      Calcium 9 4 mg/dL      eGFR 93 ml/min/1 73sq m     Narrative:      [place] guidelines for Chronic Kidney Disease (CKD):   •  Stage 1 with normal or high GFR (GFR > 90 mL/min/1 73 square meters)  •  Stage 2 Mild CKD (GFR = 60-89 mL/min/1 73 square meters)  •  Stage 3A Moderate CKD (GFR = 45-59 mL/min/1 73 square meters)  •  Stage 3B Moderate CKD (GFR = 30-44 mL/min/1 73 square meters)  •  Stage 4 Severe CKD (GFR = 15-29 mL/min/1 73 square meters)  •  Stage 5 End Stage CKD (GFR <15 mL/min/1 73 square meters)  Note: GFR calculation is accurate only with a steady state creatinine    CBC and differential [[id number]]  (Abnormal) Collected: 01/09/23 2325    Lab Status: Final result Specimen: Blood from Arm, Right Updated: 01/09/23 2332     WBC 11 06 Thousand/uL      RBC 5 27 Million/uL      Hemoglobin 13 4 g/dL      Hematocrit 41 4 %      MCV 79 fL      MCH 25 4 pg      MCHC 32 4 g/dL      RDW 16 6 %      MPV 10 6 fL      Platelets 215 Thousands/uL      nRBC 0 /100 WBCs      Neutrophils Relative 63 %      Immat GRANS % 1 %      Lymphocytes Relative 28 %      Monocytes Relative 7 %      Eosinophils Relative 1 %      Basophils Relative 0 %      Neutrophils Absolute 7 00 Thousands/µL      Immature Grans Absolute 0 05 Thousand/uL      Lymphocytes Absolute 3 05 Thousands/µL      Monocytes Absolute 0 78 Thousand/µL      Eosinophils Absolute 0 14 Thousand/µL      Basophils Absolute 0 04 Thousands/µL                  XR chest 2 views   ED Interpretation by Brad Vinson MD (01/10 0000)   No acute cardiopulmonary disease as interpreted by myself  Procedures  ECG 12 Lead Documentation Only    Date/Time: 1/10/2023 6:36 AM  Performed by: Roz Berry MD  Authorized by: Roz Berry MD     Indications / Diagnosis:  Chest pain  ECG reviewed by me, the ED Provider: yes    Patient location:  ED  Previous ECG:     Previous ECG:  Compared to current    Similarity:  Changes noted    Comparison to cardiac monitor: Yes    Interpretation:     Interpretation: abnormal    Rate:     ECG rate:  107    ECG rate assessment: tachycardic    Rhythm:     Rhythm: sinus tachycardia    Ectopy:     Ectopy: none    QRS:     QRS axis:  Normal    QRS intervals:   Wide  Conduction:     Conduction: abnormal      Abnormal conduction: complete RBBB    ST segments:     ST segments:  Normal  T waves:     T waves: inverted      Inverted:  I and aVL          ED Course                                       MDM     26-year-old female with past medical history of VSD status post repair when she was an infant and coarctation status post stenting, diabetes, and hypertension who presents for evaluation of chest pain for the past few days, associated with shortness of breath, lightheadedness and palpitations  Patient is slightly tachycardic in the low 100s  Vitals otherwise normal   Patient is well-appearing and in no acute distress  Differential diagnosis includes: Musculoskeletal pain, anemia, electrolyte abnormality, PE  Will check CBC, BMP, d-dimer, EKG and trop  Will give toradol for pain  Reviewed labs, CBC and CMP without marked abnormalities  Trop elevated to 123  EKG shows sinus tachycardia with RBBB, she does have new T wave inversions in leads 1 and avL  Reviewed and interpreted CXR, negative for acute cardiopulmonary disease  D-dimer negative  Discussed findings with patient, given elevated troponin, will admit for further monitoring and work-up  Patient is chest pain free at this time  Patient is agreeable for admission  Discussed with SLIM for admission  Disposition  Final diagnoses:   Chest pain   Elevated troponin     Time reflects when diagnosis was documented in both MDM as applicable and the Disposition within this note     Time User Action Codes Description Comment    1/10/2023 12:25 AM Megan Girard Add [R07 9] Chest pain     1/10/2023 12:25 AM Megan Girard Add [R77 8] Elevated troponin     1/10/2023  3:10 AM Fran Parikh Add [Z87 74] H/O aortic coarctation repair       ED Disposition     ED Disposition   Admit    Condition   Stable    Date/Time   Tue Amari 10, 2023 12:46 AM    Comment   Case was discussed with LOLY and the patient's admission status was agreed to be Admission Status: inpatient status to the service of Dr Halima Bryan             Follow-up Information    None         Current Discharge Medication List      CONTINUE these medications which have NOT CHANGED    Details   acetaminophen (TYLENOL) 500 mg tablet Take 1,000 mg by mouth albuterol (Ventolin HFA) 90 mcg/act inhaler Inhale 2 puffs every 4 (four) hours as needed for wheezing or shortness of breath  Qty: 18 g, Refills: 2    Comments: Substitution to a formulary equivalent within the same pharmaceutical class is authorized  Associated Diagnoses: Asthmatic bronchitis, mild intermittent, with acute exacerbation; History of pneumonia      ALPRAZolam (XANAX) 0 5 mg tablet Take 1 tablet (0 5 mg total) by mouth daily at bedtime as needed for anxiety  Qty: 15 tablet, Refills: 0    Associated Diagnoses: Other depression      B-D UF III MINI PEN NEEDLES 31G X 5 MM MISC INJECT UNDER THE SKIN DAILY AT BEDTIME USE ONE A DAY OR AS DIRECTED  Qty: 100 each, Refills: 6    Associated Diagnoses: Type 2 diabetes mellitus without complication, without long-term current use of insulin ([facility])      diclofenac sodium (VOLTAREN) 50 mg EC tablet Take 1 tablet (50 mg total) by mouth 2 (two) times a day  Qty: 60 tablet, Refills: 3    Associated Diagnoses: Lumbar pain      fexofenadine (ALLEGRA) 180 MG tablet Take 1 tablet (180 mg total) by mouth daily  Qty: 30 tablet, Refills: 5    Associated Diagnoses: Mild intermittent asthma with acute exacerbation      gabapentin (NEURONTIN) 100 mg capsule take 1 capsule by mouth daily at bedtime  Qty: 30 capsule, Refills: 0    Associated Diagnoses: Type 2 diabetes mellitus without complication, with long-term current use of insulin ([facility] [address] );  Neuropathy      lisinopril (ZESTRIL) 30 mg tablet Take 30 mg by mouth daily      metFORMIN (GLUCOPHAGE) 1000 MG tablet Take 1 tablet (1,000 mg total) by mouth 2 (two) times a day with meals  Qty: 180 tablet, Refills: 3    Associated Diagnoses: Type 2 diabetes mellitus without complication, with long-term current use of insulin ([facility])      montelukast (SINGULAIR) 10 mg tablet Take 1 tablet (10 mg total) by mouth daily at bedtime  Qty: 30 tablet, Refills: 5    Associated Diagnoses: Mild intermittent asthma with acute exacerbation sertraline (Zoloft) 50 mg tablet Take 1 tablet (50 mg total) by mouth daily  Qty: 90 tablet, Refills: 3    Associated Diagnoses: Anxiety      sitaGLIPtin (JANUVIA) 50 mg tablet Take 1 tablet (50 mg total) by mouth daily  Qty: 90 tablet, Refills: 3    Associated Diagnoses: Type 2 diabetes mellitus without complication, with long-term current use of insulin ([facility])      VITAMIN D PO Take by mouth daily      Blood Glucose Monitoring Suppl (OneTouch Verio) w/Device KIT Use daily  Qty: 1 kit, Refills: 0    Associated Diagnoses: Type 2 diabetes mellitus ([facility])      Blood Pressure Monitoring (B-D ASSURE BPM/AUTO WRIST CUFF) MISC Check blood pressure prior to each OB visit, or as directed by your physician  Qty: 1 each, Refills: 0    Comments: Please dispense either automatic wrist cuff OR automatic arm cuff  Associated Diagnoses: Essential hypertension; Aortic coarctation; Ventricular septal defect; Chronic hypertension affecting pregnancy;  Maternal congenital heart 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 trimester, antepartum      glucose blood (OneTouch Verio) test strip Test once daily  Qty: 100 each, Refills: 3    Associated Diagnoses: Type 2 diabetes mellitus ([facility])      insulin glargine (LANTUS) 100 units/mL subcutaneous injection Inject 15 Units under the skin daily at bedtime  Qty: 4 5 mL, Refills: 3    Associated Diagnoses: Type 2 diabetes mellitus with hyperglycemia, without long-term current use of insulin ([facility])      lidocaine (Lidoderm) 5 % Apply 1 patch topically daily Remove & Discard patch within 12 hours or as directed by MD  Qty: 20 patch, Refills: 0    Associated Diagnoses: Strain of muscle, fascia and tendon of lower back, initial encounter      meclizine (ANTIVERT) 12 5 MG tablet Take 1 tablet (12 5 mg total) by mouth 3 (three) times a day as needed for dizziness  Qty: 30 tablet, Refills: 0    Associated Diagnoses: Dizziness      methocarbamol (ROBAXIN) 500 mg tablet Take 1 tablet (500 mg total) by mouth 4 (four) times a day  Qty: 60 tablet, Refills: 0    Associated Diagnoses: Lumbar pain      ondansetron (ZOFRAN) 4 mg tablet Take 1 tablet (4 mg total) by mouth every 6 (six) hours  Qty: 12 tablet, Refills: 0    Associated Diagnoses: Abdominal pain           No discharge procedures on file  PDMP Review       Value Time User    PDMP Reviewed  Yes 12/6/2022 10:47 AM Margaret Pitts, [facility]           ED Provider  Attending physically available and evaluated Clover Cordon I managed the patient along with the ED Attending      Electronically Signed by         Guille Larsen MD  01/10/23 7807

## 2023-01-11 ENCOUNTER — APPOINTMENT (INPATIENT)
Dept: NON INVASIVE DIAGNOSTICS | Facility: HOSPITAL | Age: 33
End: 2023-01-11

## 2023-01-11 ENCOUNTER — APPOINTMENT (OUTPATIENT)
Dept: NUCLEAR MEDICINE | Facility: HOSPITAL | Age: 33
End: 2023-01-11

## 2023-01-11 LAB
ANION GAP SERPL CALCULATED.3IONS-SCNC: 8 MMOL/L (ref 4–13)
AORTIC ROOT: 2.8 CM
AORTIC VALVE MEAN VELOCITY: 10.5 M/S
APICAL FOUR CHAMBER EJECTION FRACTION: 55 %
ASCENDING AORTA: 2.7 CM
AV AREA BY CONTINUOUS VTI: 1.4 CM2
AV AREA PEAK VELOCITY: 1.6 CM2
AV LVOT MEAN GRADIENT: 3 MMHG
AV LVOT PEAK GRADIENT: 6 MMHG
AV MEAN GRADIENT: 5 MMHG
AV PEAK GRADIENT: 9 MMHG
AV VALVE AREA: 1.44 CM2
AV VELOCITY RATIO: 0.81
BUN SERPL-MCNC: 11 MG/DL (ref 5–25)
CALCIUM SERPL-MCNC: 8.4 MG/DL (ref 8.3–10.1)
CHLORIDE SERPL-SCNC: 102 MMOL/L (ref 96–108)
CO2 SERPL-SCNC: 25 MMOL/L (ref 21–32)
CREAT SERPL-MCNC: 0.52 MG/DL (ref 0.6–1.3)
DOP CALC AO PEAK VEL: 1.5 M/S
DOP CALC AO VTI: 25.43 CM
DOP CALC LVOT AREA: 2.01 CM2
DOP CALC LVOT DIAMETER: 1.6 CM
DOP CALC LVOT PEAK VEL VTI: 18.26 CM
DOP CALC LVOT PEAK VEL: 1.21 M/S
DOP CALC LVOT STROKE INDEX: 18.3 ML/M2
DOP CALC LVOT STROKE VOLUME: 36
E WAVE DECELERATION TIME: 156 MS
E/A RATIO: 1.21
FRACTIONAL SHORTENING: 30 (ref 28–44)
GFR SERPL CREATININE-BSD FRML MDRD: 125 ML/MIN/1.73SQ M
GLUCOSE SERPL-MCNC: 213 MG/DL (ref 65–140)
GLUCOSE SERPL-MCNC: 218 MG/DL (ref 65–140)
GLUCOSE SERPL-MCNC: 284 MG/DL (ref 65–140)
INTERVENTRICULAR SEPTUM IN DIASTOLE (PARASTERNAL SHORT AXIS VIEW): 1 CM
INTERVENTRICULAR SEPTUM: 1 CM (ref 0.6–1.1)
LAAS-AP2: 17 CM2
LAAS-AP4: 18.5 CM2
LEFT ATRIUM AREA SYSTOLE SINGLE PLANE A4C: 19.6 CM2
LEFT ATRIUM SIZE: 3.3 CM
LEFT ATRIUM VOLUME INDEX (MOD BIPLANE): 24.9
LEFT INTERNAL DIMENSION IN SYSTOLE: 3.1 CM (ref 2.1–4)
LEFT VENTRICULAR INTERNAL DIMENSION IN DIASTOLE: 4.4 CM (ref 3.5–6)
LEFT VENTRICULAR POSTERIOR WALL IN END DIASTOLE: 1 CM
LEFT VENTRICULAR STROKE VOLUME: 48 ML
LVSV (TEICH): 48 ML
MAX HR PERCENT: 85 %
MAX HR: 160 BPM
MV PEAK A VEL: 0.72 M/S
MV PEAK E VEL: 87 CM/S
MV STENOSIS PRESSURE HALF TIME: 45 MS
MV VALVE AREA P 1/2 METHOD: 4.9
NUC STRESS EJECTION FRACTION: 56 %
POTASSIUM SERPL-SCNC: 3.3 MMOL/L (ref 3.5–5.3)
RATE PRESSURE PRODUCT: NORMAL
RIGHT ATRIUM AREA SYSTOLE A4C: 15.7 CM2
RIGHT VENTRICLE ID DIMENSION: 3.8 CM
SARS-COV-2 RNA RESP QL NAA+PROBE: NEGATIVE
SL CV LEFT ATRIUM LENGTH A2C: 4.9 CM
SL CV LV EF: 55
SL CV PED ECHO LEFT VENTRICLE DIASTOLIC VOLUME (MOD BIPLANE) 2D: 87 ML
SL CV PED ECHO LEFT VENTRICLE SYSTOLIC VOLUME (MOD BIPLANE) 2D: 39 ML
SL CV REST NUCLEAR ISOTOPE DOSE: 11 MCI
SL CV STRESS NUCLEAR ISOTOPE DOSE: 31.9 MCI
SL CV STRESS RECOVERY BP: NORMAL MMHG
SL CV STRESS RECOVERY HR: 110 BPM
SL CV STRESS RECOVERY O2 SAT: 98 %
SL CV STRESS STAGE REACHED: 3
SODIUM SERPL-SCNC: 135 MMOL/L (ref 135–147)
STRESS ANGINA INDEX: 1
STRESS BASELINE BP: NORMAL MMHG
STRESS BASELINE HR: 101 BPM
STRESS DUKE TREADMILL SCORE: 3
STRESS O2 SAT REST: 95 %
STRESS PEAK HR: 160 BPM
STRESS POST ESTIMATED WORKLOAD: 8 METS
STRESS POST EXERCISE DUR MIN: 6 MIN
STRESS POST EXERCISE DUR SEC: 40 SEC
STRESS POST O2 SAT PEAK: 98 %
STRESS POST PEAK BP: 146 MMHG
STRESS ST DEPRESSION: 0 MM
STRESS/REST PERFUSION RATIO: 1.07

## 2023-01-11 RX ORDER — CLOPIDOGREL BISULFATE 75 MG/1
300 TABLET ORAL ONCE
Status: COMPLETED | OUTPATIENT
Start: 2023-01-11 | End: 2023-01-11

## 2023-01-11 RX ORDER — POTASSIUM CHLORIDE 20 MEQ/1
40 TABLET, EXTENDED RELEASE ORAL ONCE
Status: COMPLETED | OUTPATIENT
Start: 2023-01-11 | End: 2023-01-11

## 2023-01-11 RX ORDER — ASPIRIN 325 MG
325 TABLET, DELAYED RELEASE (ENTERIC COATED) ORAL ONCE
Status: COMPLETED | OUTPATIENT
Start: 2023-01-11 | End: 2023-01-11

## 2023-01-11 RX ORDER — SODIUM CHLORIDE 9 MG/ML
125 INJECTION, SOLUTION INTRAVENOUS CONTINUOUS
Status: DISCONTINUED | OUTPATIENT
Start: 2023-01-12 | End: 2023-01-12

## 2023-01-11 RX ORDER — CLOPIDOGREL BISULFATE 75 MG/1
75 TABLET ORAL DAILY
Status: DISCONTINUED | OUTPATIENT
Start: 2023-01-12 | End: 2023-01-13 | Stop reason: HOSPADM

## 2023-01-11 RX ORDER — INSULIN LISPRO 100 [IU]/ML
1-6 INJECTION, SOLUTION INTRAVENOUS; SUBCUTANEOUS
Status: DISCONTINUED | OUTPATIENT
Start: 2023-01-11 | End: 2023-01-13 | Stop reason: HOSPADM

## 2023-01-11 RX ORDER — ASPIRIN 81 MG/1
81 TABLET ORAL DAILY
Status: DISCONTINUED | OUTPATIENT
Start: 2023-01-12 | End: 2023-01-13 | Stop reason: HOSPADM

## 2023-01-11 RX ADMIN — INSULIN LISPRO 5 UNITS: 100 INJECTION, SOLUTION INTRAVENOUS; SUBCUTANEOUS at 10:57

## 2023-01-11 RX ADMIN — LISINOPRIL 30 MG: 10 TABLET ORAL at 10:57

## 2023-01-11 RX ADMIN — POTASSIUM CHLORIDE 40 MEQ: 1500 TABLET, EXTENDED RELEASE ORAL at 17:08

## 2023-01-11 RX ADMIN — GABAPENTIN 100 MG: 100 CAPSULE ORAL at 21:42

## 2023-01-11 RX ADMIN — HEPARIN SODIUM 5000 UNITS: 5000 INJECTION INTRAVENOUS; SUBCUTANEOUS at 05:40

## 2023-01-11 RX ADMIN — HEPARIN SODIUM 5000 UNITS: 5000 INJECTION INTRAVENOUS; SUBCUTANEOUS at 21:42

## 2023-01-11 RX ADMIN — INSULIN LISPRO 5 UNITS: 100 INJECTION, SOLUTION INTRAVENOUS; SUBCUTANEOUS at 16:59

## 2023-01-11 RX ADMIN — HEPARIN SODIUM 5000 UNITS: 5000 INJECTION INTRAVENOUS; SUBCUTANEOUS at 13:11

## 2023-01-11 RX ADMIN — CLOPIDOGREL BISULFATE 300 MG: 75 TABLET ORAL at 17:11

## 2023-01-11 RX ADMIN — SERTRALINE HYDROCHLORIDE 50 MG: 50 TABLET ORAL at 10:57

## 2023-01-11 RX ADMIN — MONTELUKAST 10 MG: 10 TABLET, FILM COATED ORAL at 21:42

## 2023-01-11 RX ADMIN — INSULIN GLARGINE 15 UNITS: 100 INJECTION, SOLUTION SUBCUTANEOUS at 21:42

## 2023-01-11 RX ADMIN — INSULIN LISPRO 5 UNITS: 100 INJECTION, SOLUTION INTRAVENOUS; SUBCUTANEOUS at 12:37

## 2023-01-11 RX ADMIN — ATORVASTATIN CALCIUM 20 MG: 20 TABLET, FILM COATED ORAL at 16:58

## 2023-01-11 RX ADMIN — ASPIRIN 325 MG: 325 TABLET, COATED ORAL at 17:11

## 2023-01-11 NOTE — PLAN OF CARE
Problem: PAIN - ADULT  Goal: Verbalizes/displays adequate comfort level or baseline comfort level  Description: Interventions:  - Encourage patient to monitor pain and request assistance  - Assess pain using appropriate pain scale  - Administer analgesics based on type and severity of pain and evaluate response  - Implement non-pharmacological measures as appropriate and evaluate response  - Consider cultural and social influences on pain and pain management  - Notify physician/advanced practitioner if interventions unsuccessful or patient reports new pain  1/11/2023 0711 by Shawn Aquino RN  Outcome: Progressing  1/11/2023 0711 by Shawn Aquino RN  Outcome: Progressing     Problem: INFECTION - ADULT  Goal: Absence or prevention of progression during hospitalization  Description: INTERVENTIONS:  - Assess and monitor for signs and symptoms of infection  - Monitor lab/diagnostic results  - Monitor all insertion sites, i e  indwelling lines, tubes, and drains  - Monitor endotracheal if appropriate and nasal secretions for changes in amount and color  - [place] appropriate cooling/warming therapies per order  - Administer medications as ordered  - Instruct and encourage patient and family to use good hand hygiene technique  - Identify and instruct in appropriate isolation precautions for identified infection/condition  1/11/2023 0711 by Shawn Aquino RN  Outcome: Progressing  1/11/2023 0711 by Shawn Aquino RN  Outcome: Progressing     Problem: SAFETY ADULT  Goal: Patient will remain free of falls  Description: INTERVENTIONS:  - Educate patient/family on patient safety including physical limitations  - Instruct patient to call for assistance with activity   - Consult OT/PT to assist with strengthening/mobility   - Keep Call bell within reach  - Keep bed low and locked with side rails adjusted as appropriate  - Keep care items and personal belongings within reach  - Initiate and maintain comfort rounds  - Apply yellow socks and bracelet for high fall risk patients  - Consider moving patient to room near nurses station  1/11/2023 0711 by Rina Madrid RN  Outcome: Progressing  1/11/2023 0711 by Rina Madrid RN  Outcome: Progressing     Problem: DISCHARGE PLANNING  Goal: Discharge to home or other facility with appropriate resources  Description: INTERVENTIONS:  - Identify barriers to discharge w/patient and caregiver  - Arrange for needed discharge resources and transportation as appropriate  - Identify discharge learning needs (meds, wound care, etc )  - Arrange for interpretive services to assist at discharge as needed  - Refer to Case Management Department for coordinating discharge planning if the patient needs post-hospital services based on physician/advanced practitioner order or complex needs related to functional status, cognitive ability, or social support system  1/11/2023 0711 by Rina Madrid RN  Outcome: Progressing  1/11/2023 0711 by Rina Madrid RN  Outcome: Progressing     Problem: CARDIOVASCULAR - ADULT  Goal: Maintains optimal cardiac output and hemodynamic stability  Description: INTERVENTIONS:  - Monitor I/O, vital signs and rhythm  - Monitor for S/S and trends of decreased cardiac output  - Administer and titrate ordered vasoactive medications to optimize hemodynamic stability  - Assess quality of pulses, skin color and temperature  - Assess for signs of decreased coronary artery perfusion  - Instruct patient to report change in severity of symptoms  1/11/2023 0711 by Rina Madrid RN  Outcome: Progressing  1/11/2023 0711 by Rina Madrid RN  Outcome: Progressing  Goal: Absence of cardiac dysrhythmias or at baseline rhythm  Description: INTERVENTIONS:  - Continuous cardiac monitoring, vital signs, obtain 12 lead EKG if ordered  - Administer antiarrhythmic and heart rate control medications as ordered  - Monitor electrolytes and administer replacement therapy as ordered  1/11/2023 0711 by Jorge L Calzada RN  Outcome: Progressing  1/11/2023 0711 by Jorge L Calzada RN  Outcome: Progressing     Problem: METABOLIC, FLUID AND ELECTROLYTES - ADULT  Goal: Glucose maintained within target range  Description: INTERVENTIONS:  - Monitor Blood Glucose as ordered  - Assess for signs and symptoms of hyperglycemia and hypoglycemia  - Administer ordered medications to maintain glucose within target range  - Assess nutritional intake and initiate nutrition service referral as needed  1/11/2023 0711 by Jorge L Calzada RN  Outcome: Progressing  1/11/2023 0711 by Jorge L Calzada RN  Outcome: Progressing     Problem: Potential for Falls  Goal: Patient will remain free of falls  Description: INTERVENTIONS:  - Educate patient/family on patient safety including physical limitations  - Instruct patient to call for assistance with activity   - Consult OT/PT to assist with strengthening/mobility   - Keep Call bell within reach  - Keep bed low and locked with side rails adjusted as appropriate  - Keep care items and personal belongings within reach  - Initiate and maintain comfort rounds  - Apply yellow socks and bracelet for high fall risk patients  - Consider moving patient to room near nurses station  1/11/2023 0711 by Jorge L Calzada RN  Outcome: Progressing  1/11/2023 0711 by Jorge L Calzada RN  Outcome: Progressing

## 2023-01-11 NOTE — PROGRESS NOTES
Progress Note - Cardiology   Caroline Balbuena 27 y o  female MRN: [id number]  Unit/Bed#: [facility] [address] [id number] Encounter: [id number]      Assessment/Recommendations/Discussion:   1  Aortic coarctation s/p aortic stenting  2  Morbid obesity  3  T2DM on metformin, januvia, and lantus qhs  4  Depression   5  Anxiety   6  HTN on lisinopril  7  Elevated Troponin  8  Chest pain  9  VSD s/p surgical repair  10  Morbid obesity  11  Tobacco use  12  Hypertriglyceridemia   13  Elevated lipase <3x the upper limit  without clinical evidence of acute pancreatitis  14  Recent sternotomy wire removal  15  Family history of a myocardial infarction at a young age  12  Abnormal nuclear stress test with chest pain on the treadmill and mid anterior wall reversible defect        Results from last 7 days   Lab Units 01/10/23  1626   SL CV LV EF  55        PLAN  • Troponin 123, 110, 95 with no EKG changes  Pt received 324 mg ASA and is empirically anticoagulated on heparin  Recommend to continue heparin as the etiology of elevated troponin is currently unknown  Differential includes unstable angina vs arrhythmia  vs cardiac stress related to hyperglycemic state  • Echo completed 1/10/22 showed normal LV cavity size and wall thickness  EF 50-55% with normal systolic function  No regional wall motion abnormality was identified, however this cannot be completely excluded on the basis of this study  • NM stress today  Positive for mid anterior wall ischemia along with chest pain on the treadmill  • Plan cardiac catheterization tomorrow  • Will begin DAPT  • Continue tele to assess for arrhythmia   • D-dimer wnl   • With family history of myocardial infarction around age 25  Continue atorvastatin 20 mg daily  • Not available on present formula what but would order Vascepa 2 g 2 times a day if insurance will cover at a reasonable price    If the sample is not an option, would try Lovasa 2 g 2 times day  • Replete K; Maintain K > 4 and Mg > 2           Subjective:     Completed stress test today - reports chest pain during the stress test  No complaints otherwise  HPI  • PMH VSD s/p repair, coarctation of the aorta s/p repair with stenting in 2020, obesity, T2DM, tobacco use, and anxiety who arrived to [address] [place] [address] ED complaining of a two-day history of intermittent mid-sternal and left-sided chest pain  She describes the pain as dull and non-radiating  The pain is not positional and typically lasts 1-2 miutes  She says she has experienced chest pain in the past, but not of this quality  She was not bothered by the CP until she began to experience some dizziness and palpitations yesterday evening (1/9/22) and decided to be seen in the ED  When she arrived to the ED,  she was tachycardic to 114 and troponin was elevated at 123  Glucose was 456  She does not take her sugars at home but says she has felt dizzy when hyperglycemic in the past  Patient received 324 mg ASA and is now anticoagulated on heparin  Patient does have a strong family history of heart disease with both brother and father suffering MI's at age 27  Review of Systems: As noted in HPI  Rest of ROS is negative  Vitals:   BP 98/64   Pulse 92   Temp 98 1 °F (36 7 °C)   Resp 15   Ht 5' 1" (1 549 m)   Wt 99 8 kg (220 lb)   LMP 01/06/2023 (Approximate)   SpO2 95%   BMI 41 57 kg/m²   I/O     None        Weight (last 2 days)     Date/Time Weight    01/10/23 1615 99 8 (220)    01/10/23 04:12:11 100 (220 68)    01/09/23 2206 102 (223 99)          Physical Exam  Constitutional:       General: She is not in acute distress  Appearance: She is obese  She is not diaphoretic  HENT:      Head: Normocephalic and atraumatic  Nose: Nose normal    Eyes:      Pupils: Pupils are equal, round, and reactive to light  Cardiovascular:      Rate and Rhythm: Normal rate and regular rhythm  Pulses: Normal pulses  Heart sounds: Normal heart sounds  No murmur heard  No gallop  Pulmonary:      Effort: Pulmonary effort is normal  No respiratory distress  Breath sounds: Normal breath sounds  No rales  Abdominal:      General: Abdomen is flat  Palpations: Abdomen is soft  Musculoskeletal:      Right lower leg: No edema  Left lower leg: No edema  Skin:     General: Skin is warm  Neurological:      General: No focal deficit present  Mental Status: She is alert and oriented to person, place, and time  TELEMETRY: Sinus rhythm with one run of sinus tachycardia and     Lab Results:  Results from last 7 days   Lab Units 01/10/23  0503   WBC Thousand/uL 9 45   HEMOGLOBIN g/dL 13 3   HEMATOCRIT % 41 5   PLATELETS Thousands/uL 266     Results from last 7 days   Lab Units 01/11/23  0429 01/10/23  0503   POTASSIUM mmol/L 3 3* 3 6   CHLORIDE mmol/L 102 100   CO2 mmol/L 25 24   BUN mg/dL 11 10   CREATININE mg/dL 0 52* 0 71   CALCIUM mg/dL 8 4 9 5   ALK PHOS U/L  --  96   ALT U/L  --  23   AST U/L  --  11     Results from last 7 days   Lab Units 01/11/23  0429   POTASSIUM mmol/L 3 3*   CHLORIDE mmol/L 102   CO2 mmol/L 25   BUN mg/dL 11   CREATININE mg/dL 0 52*   CALCIUM mg/dL 8 4           Medications:    Current Facility-Administered Medications:   •  acetaminophen (TYLENOL) tablet 650 mg, 650 mg, Oral, Q6H PRN, Danita Hui MD, 650 mg at 01/10/23 1418  •  albuterol (PROVENTIL HFA,VENTOLIN HFA) inhaler 2 puff, 2 puff, Inhalation, Q4H PRN, Laya Zelaya PA-C  •  ALPRAZolam (XANAX) tablet 0 5 mg, 0 5 mg, Oral, HS PRN, Laya Zelaya PA-C  •  atorvastatin (LIPITOR) tablet 20 mg, 20 mg, Oral, Daily With Dinner, Kenneth Bond MD, 20 mg at 01/10/23 1759  •  gabapentin (NEURONTIN) capsule 100 mg, 100 mg, Oral, HS, Laya Zelaya PA-C, 100 mg at 01/10/23 2131  •  heparin (porcine) subcutaneous injection 5,000 Units, 5,000 Units, Subcutaneous, Q8H [address], 5,000 Units at 01/11/23 0540 **AND** [CANCELED] Platelet count, , , Once, Laya Zelaya PA-C  • insulin glargine (LANTUS) subcutaneous injection 15 Units 0 15 mL, 15 Units, Subcutaneous, HS, Laya Zelaya PA-C, 15 Units at 01/10/23 2131  •  insulin lispro (HumaLOG) 100 units/mL subcutaneous injection 5 Units, 5 Units, Subcutaneous, TID With Meals, Dona Gómez MD, 5 Units at 01/10/23 1708  •  lisinopril (ZESTRIL) tablet 30 mg, 30 mg, Oral, Daily, Laya Zelaya PA-C, 30 mg at 01/10/23 0806  •  montelukast (SINGULAIR) tablet 10 mg, 10 mg, Oral, HS, Laya Zelaya PA-C, 10 mg at 01/10/23 2131  •  ondansetron (ZOFRAN) injection 4 mg, 4 mg, Intravenous, Q6H PRN, Laya Zelaya PA-C  •  sertraline (ZOLOFT) tablet 50 mg, 50 mg, Oral, Daily, Laya Zelaya PA-C, 50 mg at 01/10/23 5212    Portions of the record may have been created with voice recognition software  Occasional wrong word or "sound a like" substitutions may have occurred due to the inherent limitations of voice recognition software  Read the chart carefully and recognize, using context, where substitutions have occurred        1/11/2023 8:20 AM
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
## 2023-01-11 NOTE — PLAN OF CARE
Problem: PAIN - ADULT  Goal: Verbalizes/displays adequate comfort level or baseline comfort level  Description: Interventions:  - Encourage patient to monitor pain and request assistance  - Assess pain using appropriate pain scale  - Administer analgesics based on type and severity of pain and evaluate response  - Implement non-pharmacological measures as appropriate and evaluate response  - Consider cultural and social influences on pain and pain management  - Notify physician/advanced practitioner if interventions unsuccessful or patient reports new pain  Outcome: Progressing     Problem: INFECTION - ADULT  Goal: Absence or prevention of progression during hospitalization  Description: INTERVENTIONS:  - Assess and monitor for signs and symptoms of infection  - Monitor lab/diagnostic results  - Monitor all insertion sites, i e  indwelling lines, tubes, and drains  - Monitor endotracheal if appropriate and nasal secretions for changes in amount and color  - [place] appropriate cooling/warming therapies per order  - Administer medications as ordered  - Instruct and encourage patient and family to use good hand hygiene technique  - Identify and instruct in appropriate isolation precautions for identified infection/condition  Outcome: Progressing     Problem: SAFETY ADULT  Goal: Patient will remain free of falls  Description: INTERVENTIONS:  - Educate patient/family on patient safety including physical limitations  - Instruct patient to call for assistance with activity   - Consult OT/PT to assist with strengthening/mobility   - Keep Call bell within reach  - Keep bed low and locked with side rails adjusted as appropriate  - Keep care items and personal belongings within reach  - Initiate and maintain comfort rounds  - Apply yellow socks and bracelet for high fall risk patients  - Consider moving patient to room near nurses station  Outcome: Progressing     Problem: DISCHARGE PLANNING  Goal: Discharge to home or other facility with appropriate resources  Description: INTERVENTIONS:  - Identify barriers to discharge w/patient and caregiver  - Arrange for needed discharge resources and transportation as appropriate  - Identify discharge learning needs (meds, wound care, etc )  - Arrange for interpretive services to assist at discharge as needed  - Refer to Case Management Department for coordinating discharge planning if the patient needs post-hospital services based on physician/advanced practitioner order or complex needs related to functional status, cognitive ability, or social support system  Outcome: Progressing     Problem: CARDIOVASCULAR - ADULT  Goal: Maintains optimal cardiac output and hemodynamic stability  Description: INTERVENTIONS:  - Monitor I/O, vital signs and rhythm  - Monitor for S/S and trends of decreased cardiac output  - Administer and titrate ordered vasoactive medications to optimize hemodynamic stability  - Assess quality of pulses, skin color and temperature  - Assess for signs of decreased coronary artery perfusion  - Instruct patient to report change in severity of symptoms  Outcome: Progressing  Goal: Absence of cardiac dysrhythmias or at baseline rhythm  Description: INTERVENTIONS:  - Continuous cardiac monitoring, vital signs, obtain 12 lead EKG if ordered  - Administer antiarrhythmic and heart rate control medications as ordered  - Monitor electrolytes and administer replacement therapy as ordered  Outcome: Progressing     Problem: METABOLIC, FLUID AND ELECTROLYTES - ADULT  Goal: Glucose maintained within target range  Description: INTERVENTIONS:  - Monitor Blood Glucose as ordered  - Assess for signs and symptoms of hyperglycemia and hypoglycemia  - Administer ordered medications to maintain glucose within target range  - Assess nutritional intake and initiate nutrition service referral as needed  Outcome: Progressing

## 2023-01-11 NOTE — PROGRESS NOTES
[address] [facility]  Progress Note - Farzad Hammond 1990, 27 y o  female MRN: [id number]  Unit/Bed#: [address] 224-01 Encounter: [id number]  Primary Care Provider: REJI Corral   Date and time admitted to hospital: 1/9/2023 10:57 PM    * Elevated troponin I level  Assessment & Plan  Potentially mediated by coronary artery disease  Patient with complicated cardiac history including previous VSD repair  Will undergo cardiac catheterization for further management of patient's symptoms given chest pain and abnormal stress test    Per report  there was a decrease in activity in the mid anterior wall not extending to the apex  Chest pain  Assessment & Plan  Patient with strong family history of myocardial ischemia in their 35s  Underwent nuclear medicine stress test with concern for anterior wall motion abnormality  Discussed with cardiology will plan for cardiac catheterization for 1/12/2023  No indication for heparin drip  Continue statin, ongoing discussions on obtaining Vascepa given elevated triglycerides    Anxiety  Assessment & Plan  Continue zoloft  Mood is stable, no SI or HI    Type 2 diabetes mellitus [facility])  Assessment & Plan  Lab Results   Component Value Date    HGBA1C 9 6 (H) 11/18/2021       Recent Labs     01/10/23  1123 01/10/23  1622 01/10/23  2123 01/11/23  1702   POCGLU 352* 252* 229* 213*       Blood Sugar Average: Last 72 hrs:  (P) 261 5   Home regimen reviewed  Hold Metformin if applicable  Start SSI and Basal bolus protocol    Morbid obesity ([address] )  Assessment & Plan  bmi 42 noted  Encourage life style modifications  Recommend outpatient weight loss    Aortic coarctation  Assessment & Plan  Congenital s/p stent in 2020 due to recoarctation  Follow with [address]      [address] Internal [facility] Progress Note  Patient: Farzad Hammond 27 y o  female   MRN: [id number]  PCP: REJI Corral  Unit/Bed#: [address] [address] Encounter: [id number]  Date Of Visit: 23    Assessment:    Principal Problem:    Elevated troponin I level  Active Problems: Aortic coarctation    Morbid obesity (HCC)    Type 2 diabetes mellitus (HCC)    Anxiety    Chest pain      Plan:    · Cardiac Catherization  · Check COVID Swab       VTE Pharmacologic Prophylaxis:   Pharmacologic: Heparin  Mechanical VTE Prophylaxis in Place: Yes    Patient Centered Rounds: I have performed bedside rounds with nursing staff today  Discussions with Specialists or Other Care Team Provider: case management    Education and Discussions with Family / Patient: Patient updating family    Time Spent for Care: 45 minutes  More than 50% of total time spent on counseling and coordination of care as described above  Current Length of Stay: 1 day(s)    Current Patient Status: Inpatient   Certification Statement: The patient will continue to require additional inpatient hospital stay due to cardiac catherization    Discharge Plan / Estimated Discharge Date: 48 hours    Code Status: Level 1 - Full Code      Subjective:   Seen and examined  Still complains of 2/10 chest pain  Seen after cardiac stress testing  Can lay flat, no nausea or vomitting    A complete and comprehensive 14 point organ system review has been performed and all other systems are negative other than stated above  Objective:     Vitals:   Temp (24hrs), Av °F (36 7 °C), Min:97 7 °F (36 5 °C), Max:98 3 °F (36 8 °C)    Temp:  [97 7 °F (36 5 °C)-98 3 °F (36 8 °C)] 97 8 °F (36 6 °C)  HR:  [] 101  Resp:  [17-18] 18  BP: ()/(64-83) 120/77  SpO2:  [95 %] 95 %  Body mass index is 41 57 kg/m²       Input and Output Summary (last 24 hours):     No intake or output data in the 24 hours ending 23 [id number]    Physical Exam:     General: well appearing, no acute distress  HEENT: atraumatic, PERRLA, moist mucosa, normal pharynx, normal tonsils and adenoids, normal tongue, no fluid in sinuses  Neck: Trachea midline, no carotid bruit, no masses  Respiratory: normal chest wall expansion, CTA B, no r/r/w, no rubs  Cardiovascular: RRR, no m/r/g, Normal S1 and S2  Abdomen: Soft, non-tender, non-distended, normal bowel sounds in all quadrants, no hepatosplenomegaly, no tympany  Rectal: deferred  Musculoskeletal: normal ROM in upper and lower extremities  Integumentary: warm, dry, and pink, with no rash, purpura, or petechia  Heme/Lymph: no lymphadenopathy, no bruises  Neurological: Cranial Nerves II-XII grossly intact, no tics, normal sensation to pressure and light touch  Psychiatric: cooperative with normal mood, affect, and cognition      Additional Data:     Labs:    Results from last 7 days   Lab Units 01/10/23  0503   WBC Thousand/uL 9 45   HEMOGLOBIN g/dL 13 3   HEMATOCRIT % 41 5   PLATELETS Thousands/uL 266   NEUTROS PCT % 57   LYMPHS PCT % 31   MONOS PCT % 8   EOS PCT % 2     Results from last 7 days   Lab Units 01/11/23  0429 01/10/23  0503   POTASSIUM mmol/L 3 3* 3 6   CHLORIDE mmol/L 102 100   CO2 mmol/L 25 24   BUN mg/dL 11 10   CREATININE mg/dL 0 52* 0 71   CALCIUM mg/dL 8 4 9 5   ALK PHOS U/L  --  96   ALT U/L  --  23   AST U/L  --  11           * I Have Reviewed All Lab Data Listed Above  * Additional Pertinent Lab Tests Reviewed:  [facility] Reviewed    Imaging:    Imaging Reports Reviewed Today Include: Stress test  Imaging Personally Reviewed by Myself Includes:  Stress test    Recent Cultures (last 7 days):           Last 24 Hours Medication List:   Current Facility-Administered Medications   Medication Dose Route Frequency Provider Last Rate   • acetaminophen  650 mg Oral Q6H PRN Marija Álvarez MD     • albuterol  2 puff Inhalation Q4H PRN Laya Zelaya PA-C     • ALPRAZolam  0 5 mg Oral HS PRN Laya Zelaya PA-C     • [START ON 1/12/2023] aspirin  81 mg Oral Daily Mindy Crockett MD     • aspirin  325 mg Oral Once Mindy Crockett MD     • atorvastatin  20 mg Oral Daily With Sumaya Scott MD     • clopidogrel  300 mg Oral Once Rylie Mg MD     • [START ON 1/12/2023] clopidogrel  75 mg Oral Daily Rylie Mg MD     • gabapentin  100 mg Oral HS Laya Zelaya PA-C     • heparin (porcine)  5,000 Units Subcutaneous [facility] Laya Zelaya PA-C     • insulin glargine  15 Units Subcutaneous HS Laya Zelaya PA-C     • insulin lispro  1-6 Units Subcutaneous TID AC Renan Rey DO     • montelukast  10 mg Oral HS Laya Zelaya PA-C     • ondansetron  4 mg Intravenous Q6H PRN Milena Allen PA-C     • sertraline  50 mg Oral Daily Laya Zelaya PA-C     • [START ON 1/12/2023] sodium chloride  125 mL/hr Intravenous Continuous Rylie Mg MD          Today, Patient Was Seen By: Merly Montoya DO    ** Please Note: This note was completed in part utilizing [employer] Fluency Direct Software  Grammatical errors, random word insertions, spelling mistakes, and incomplete sentences may be an occasional consequence of this system secondary to software limitations, ambient noise, and hardware issues  If you have any questions or concerns about the content, text, or information contained within the body of this dictation, please contact the provider for clarification   **

## 2023-01-11 NOTE — ASSESSMENT & PLAN NOTE
Potentially mediated by coronary artery disease  Patient with complicated cardiac history including previous VSD repair  Will undergo cardiac catheterization for further management of patient's symptoms given chest pain and abnormal stress test    Per report  there was a decrease in activity in the mid anterior wall not extending to the apex

## 2023-01-11 NOTE — ASSESSMENT & PLAN NOTE
Lab Results   Component Value Date    HGBA1C 9 6 (H) 11/18/2021       Recent Labs     01/10/23  1123 01/10/23  1622 01/10/23  2123 01/11/23  1702   POCGLU 352* 252* 229* 213*       Blood Sugar Average: Last 72 hrs:  (P) 261 5   Home regimen reviewed  Hold Metformin if applicable    Start SSI and Basal bolus protocol

## 2023-01-11 NOTE — ASSESSMENT & PLAN NOTE
Patient with strong family history of myocardial ischemia in their 35s  Underwent nuclear medicine stress test with concern for anterior wall motion abnormality  Discussed with cardiology will plan for cardiac catheterization for 1/12/2023    No indication for heparin drip  Continue statin, ongoing discussions on obtaining Vascepa given elevated triglycerides

## 2023-01-12 LAB
ANION GAP SERPL CALCULATED.3IONS-SCNC: 9 MMOL/L (ref 4–13)
BUN SERPL-MCNC: 11 MG/DL (ref 5–25)
CALCIUM SERPL-MCNC: 8.8 MG/DL (ref 8.3–10.1)
CHEST PAIN STATEMENT: NORMAL
CHLORIDE SERPL-SCNC: 103 MMOL/L (ref 96–108)
CO2 SERPL-SCNC: 25 MMOL/L (ref 21–32)
CREAT SERPL-MCNC: 0.6 MG/DL (ref 0.6–1.3)
EST. AVERAGE GLUCOSE BLD GHB EST-MCNC: 229 MG/DL
GFR SERPL CREATININE-BSD FRML MDRD: 119 ML/MIN/1.73SQ M
GLUCOSE SERPL-MCNC: 195 MG/DL (ref 65–140)
GLUCOSE SERPL-MCNC: 204 MG/DL (ref 65–140)
GLUCOSE SERPL-MCNC: 204 MG/DL (ref 65–140)
GLUCOSE SERPL-MCNC: 226 MG/DL (ref 65–140)
GLUCOSE SERPL-MCNC: 248 MG/DL (ref 65–140)
HBA1C MFR BLD: 9.6 %
MAX DIASTOLIC BP: 82 MMHG
MAX HEART RATE: 160 BPM
MAX PREDICTED HEART RATE: 186 BPM
MAX. SYSTOLIC BP: 152 MMHG
POTASSIUM SERPL-SCNC: 3.9 MMOL/L (ref 3.5–5.3)
PROTOCOL NAME: NORMAL
SARS-COV-2 RNA RESP QL NAA+PROBE: NEGATIVE
SODIUM SERPL-SCNC: 137 MMOL/L (ref 135–147)
TARGET HR FORMULA: NORMAL
TIME IN EXERCISE PHASE: NORMAL

## 2023-01-12 PROCEDURE — B2111ZZ FLUOROSCOPY OF MULTIPLE CORONARY ARTERIES USING LOW OSMOLAR CONTRAST: ICD-10-PCS | Performed by: INTERNAL MEDICINE

## 2023-01-12 PROCEDURE — 4A023N7 MEASUREMENT OF CARDIAC SAMPLING AND PRESSURE, LEFT HEART, PERCUTANEOUS APPROACH: ICD-10-PCS | Performed by: INTERNAL MEDICINE

## 2023-01-12 DEVICE — ANGIO-SEAL VIP VASCULAR CLOSURE DEVICE
Type: IMPLANTABLE DEVICE | Site: GROIN | Status: FUNCTIONAL
Brand: ANGIO-SEAL

## 2023-01-12 RX ORDER — MIDAZOLAM HYDROCHLORIDE 2 MG/2ML
INJECTION, SOLUTION INTRAMUSCULAR; INTRAVENOUS CODE/TRAUMA/SEDATION MEDICATION
Status: DISCONTINUED | OUTPATIENT
Start: 2023-01-12 | End: 2023-01-12 | Stop reason: HOSPADM

## 2023-01-12 RX ORDER — NITROGLYCERIN 20 MG/100ML
INJECTION INTRAVENOUS CODE/TRAUMA/SEDATION MEDICATION
Status: DISCONTINUED | OUTPATIENT
Start: 2023-01-12 | End: 2023-01-12 | Stop reason: HOSPADM

## 2023-01-12 RX ORDER — VERAPAMIL HCL 2.5 MG/ML
AMPUL (ML) INTRAVENOUS CODE/TRAUMA/SEDATION MEDICATION
Status: DISCONTINUED | OUTPATIENT
Start: 2023-01-12 | End: 2023-01-12 | Stop reason: HOSPADM

## 2023-01-12 RX ORDER — SODIUM CHLORIDE 9 MG/ML
75 INJECTION, SOLUTION INTRAVENOUS CONTINUOUS
Status: DISPENSED | OUTPATIENT
Start: 2023-01-12 | End: 2023-01-12

## 2023-01-12 RX ORDER — NITROGLYCERIN 0.4 MG/1
0.4 TABLET SUBLINGUAL
Status: CANCELLED | OUTPATIENT
Start: 2023-01-12

## 2023-01-12 RX ORDER — LIDOCAINE HYDROCHLORIDE 20 MG/ML
INJECTION, SOLUTION EPIDURAL; INFILTRATION; INTRACAUDAL; PERINEURAL CODE/TRAUMA/SEDATION MEDICATION
Status: DISCONTINUED | OUTPATIENT
Start: 2023-01-12 | End: 2023-01-12 | Stop reason: HOSPADM

## 2023-01-12 RX ORDER — LIDOCAINE HYDROCHLORIDE 10 MG/ML
INJECTION, SOLUTION EPIDURAL; INFILTRATION; INTRACAUDAL; PERINEURAL CODE/TRAUMA/SEDATION MEDICATION
Status: DISCONTINUED | OUTPATIENT
Start: 2023-01-12 | End: 2023-01-12 | Stop reason: HOSPADM

## 2023-01-12 RX ORDER — SODIUM CHLORIDE 9 MG/ML
100 INJECTION, SOLUTION INTRAVENOUS CONTINUOUS
Status: CANCELLED | OUTPATIENT
Start: 2023-01-12 | End: 2023-01-12

## 2023-01-12 RX ORDER — FENTANYL CITRATE 50 UG/ML
25 INJECTION, SOLUTION INTRAMUSCULAR; INTRAVENOUS EVERY 2 HOUR PRN
Status: DISCONTINUED | OUTPATIENT
Start: 2023-01-12 | End: 2023-01-13 | Stop reason: HOSPADM

## 2023-01-12 RX ORDER — FENTANYL CITRATE 50 UG/ML
INJECTION, SOLUTION INTRAMUSCULAR; INTRAVENOUS CODE/TRAUMA/SEDATION MEDICATION
Status: DISCONTINUED | OUTPATIENT
Start: 2023-01-12 | End: 2023-01-12 | Stop reason: HOSPADM

## 2023-01-12 RX ADMIN — GABAPENTIN 100 MG: 100 CAPSULE ORAL at 21:15

## 2023-01-12 RX ADMIN — INSULIN GLARGINE 15 UNITS: 100 INJECTION, SOLUTION SUBCUTANEOUS at 21:15

## 2023-01-12 RX ADMIN — SERTRALINE HYDROCHLORIDE 50 MG: 50 TABLET ORAL at 08:00

## 2023-01-12 RX ADMIN — ACETAMINOPHEN 650 MG: 325 TABLET ORAL at 22:50

## 2023-01-12 RX ADMIN — MONTELUKAST 10 MG: 10 TABLET, FILM COATED ORAL at 21:15

## 2023-01-12 RX ADMIN — INSULIN LISPRO 2 UNITS: 100 INJECTION, SOLUTION INTRAVENOUS; SUBCUTANEOUS at 13:40

## 2023-01-12 RX ADMIN — INSULIN LISPRO 3 UNITS: 100 INJECTION, SOLUTION INTRAVENOUS; SUBCUTANEOUS at 16:53

## 2023-01-12 RX ADMIN — SODIUM CHLORIDE 125 ML/HR: 0.9 INJECTION, SOLUTION INTRAVENOUS at 07:47

## 2023-01-12 RX ADMIN — HEPARIN SODIUM 5000 UNITS: 5000 INJECTION INTRAVENOUS; SUBCUTANEOUS at 21:15

## 2023-01-12 RX ADMIN — CLOPIDOGREL BISULFATE 75 MG: 75 TABLET ORAL at 08:00

## 2023-01-12 RX ADMIN — HEPARIN SODIUM 5000 UNITS: 5000 INJECTION INTRAVENOUS; SUBCUTANEOUS at 05:09

## 2023-01-12 RX ADMIN — ASPIRIN 81 MG: 81 TABLET, COATED ORAL at 08:00

## 2023-01-12 RX ADMIN — ATORVASTATIN CALCIUM 20 MG: 20 TABLET, FILM COATED ORAL at 17:18

## 2023-01-12 RX ADMIN — ACETAMINOPHEN 650 MG: 325 TABLET ORAL at 17:20

## 2023-01-12 NOTE — PLAN OF CARE
Problem: PAIN - ADULT  Goal: Verbalizes/displays adequate comfort level or baseline comfort level  Description: Interventions:  - Encourage patient to monitor pain and request assistance  - Assess pain using appropriate pain scale  - Administer analgesics based on type and severity of pain and evaluate response  - Implement non-pharmacological measures as appropriate and evaluate response  - Consider cultural and social influences on pain and pain management  - Notify physician/advanced practitioner if interventions unsuccessful or patient reports new pain  Outcome: Progressing     Problem: INFECTION - ADULT  Goal: Absence or prevention of progression during hospitalization  Description: INTERVENTIONS:  - Assess and monitor for signs and symptoms of infection  - Monitor lab/diagnostic results  - Monitor all insertion sites, i e  indwelling lines, tubes, and drains  - Monitor endotracheal if appropriate and nasal secretions for changes in amount and color  - [place] appropriate cooling/warming therapies per order  - Administer medications as ordered  - Instruct and encourage patient and family to use good hand hygiene technique  - Identify and instruct in appropriate isolation precautions for identified infection/condition  Outcome: Progressing     Problem: SAFETY ADULT  Goal: Patient will remain free of falls  Description: INTERVENTIONS:  - Educate patient/family on patient safety including physical limitations  - Instruct patient to call for assistance with activity   - Consult OT/PT to assist with strengthening/mobility   - Keep Call bell within reach  - Keep bed low and locked with side rails adjusted as appropriate  - Keep care items and personal belongings within reach  - Initiate and maintain comfort rounds  - Apply yellow socks and bracelet for high fall risk patients  - Consider moving patient to room near nurses station  Outcome: Progressing     Problem: DISCHARGE PLANNING  Goal: Discharge to home or other facility with appropriate resources  Description: INTERVENTIONS:  - Identify barriers to discharge w/patient and caregiver  - Arrange for needed discharge resources and transportation as appropriate  - Identify discharge learning needs (meds, wound care, etc )  - Arrange for interpretive services to assist at discharge as needed  - Refer to Case Management Department for coordinating discharge planning if the patient needs post-hospital services based on physician/advanced practitioner order or complex needs related to functional status, cognitive ability, or social support system  Outcome: Progressing     Problem: CARDIOVASCULAR - ADULT  Goal: Maintains optimal cardiac output and hemodynamic stability  Description: INTERVENTIONS:  - Monitor I/O, vital signs and rhythm  - Monitor for S/S and trends of decreased cardiac output  - Administer and titrate ordered vasoactive medications to optimize hemodynamic stability  - Assess quality of pulses, skin color and temperature  - Assess for signs of decreased coronary artery perfusion  - Instruct patient to report change in severity of symptoms  Outcome: Progressing  Goal: Absence of cardiac dysrhythmias or at baseline rhythm  Description: INTERVENTIONS:  - Continuous cardiac monitoring, vital signs, obtain 12 lead EKG if ordered  - Administer antiarrhythmic and heart rate control medications as ordered  - Monitor electrolytes and administer replacement therapy as ordered  Outcome: Progressing     Problem: METABOLIC, FLUID AND ELECTROLYTES - ADULT  Goal: Glucose maintained within target range  Description: INTERVENTIONS:  - Monitor Blood Glucose as ordered  - Assess for signs and symptoms of hypcemia and hypoglycemia  - Administer ordered medications to maintain glucose within target range  - Assess nutritional intake and initiate nutrition service referral as needed  Outcome: Progressing     Problem: Potential for Falls  Goal: Patient will remain free of falls  Description: INTERVENTIONS:  - Educate patient/family on patient safety including physical limitations  - Instruct patient to call for assistance with activity   - Consult OT/PT to assist with strengthening/mobility   - Keep Call bell within reach  - Keep bed low and locked with side rails adjusted as appropriate  - Keep care items and personal belongings within reach  - Initiate and maintain comfort rounds  - Apply yellow socks and bracelet for high fall risk patients  - Consider moving patient to room near nurses station  Outcome: Progressing Split-Thickness Skin Graft Text: The defect edges were debeveled with a #15 scalpel blade.  Given the location of the defect, shape of the defect and the proximity to free margins a split thickness skin graft was deemed most appropriate.  Using a sterile surgical marker, the primary defect shape was transferred to the donor site. The split thickness graft was then harvested.  The skin graft was then placed in the primary defect and oriented appropriately.

## 2023-01-12 NOTE — PLAN OF CARE
Problem: PAIN - ADULT  Goal: Verbalizes/displays adequate comfort level or baseline comfort level  Description: Interventions:  - Encourage patient to monitor pain and request assistance  - Assess pain using appropriate pain scale  - Administer analgesics based on type and severity of pain and evaluate response  - Implement non-pharmacological measures as appropriate and evaluate response  - Consider cultural and social influences on pain and pain management  - Notify physician/advanced practitioner if interventions unsuccessful or patient reports new pain  Outcome: Progressing     Problem: INFECTION - ADULT  Goal: Absence or prevention of progression during hospitalization  Description: INTERVENTIONS:  - Assess and monitor for signs and symptoms of infection  - Monitor lab/diagnostic results  - Monitor all insertion sites, i e  indwelling lines, tubes, and drains  - Monitor endotracheal if appropriate and nasal secretions for changes in amount and color  - [place] appropriate cooling/warming therapies per order  - Administer medications as ordered  - Instruct and encourage patient and family to use good hand hygiene technique  - Identify and instruct in appropriate isolation precautions for identified infection/condition  Outcome: Progressing     Problem: SAFETY ADULT  Goal: Patient will remain free of falls  Description: INTERVENTIONS:  - Educate patient/family on patient safety including physical limitations  - Instruct patient to call for assistance with activity   - Consult OT/PT to assist with strengthening/mobility   - Keep Call bell within reach  - Keep bed low and locked with side rails adjusted as appropriate  - Keep care items and personal belongings within reach  - Initiate and maintain comfort rounds  - Apply yellow socks and bracelet for high fall risk patients  - Consider moving patient to room near nurses station  Outcome: Progressing     Problem: DISCHARGE PLANNING  Goal: Discharge to home or other facility with appropriate resources  Description: INTERVENTIONS:  - Identify barriers to discharge w/patient and caregiver  - Arrange for needed discharge resources and transportation as appropriate  - Identify discharge learning needs (meds, wound care, etc )  - Arrange for interpretive services to assist at discharge as needed  - Refer to Case Management Department for coordinating discharge planning if the patient needs post-hospital services based on physician/advanced practitioner order or complex needs related to functional status, cognitive ability, or social support system  Outcome: Progressing     Problem: CARDIOVASCULAR - ADULT  Goal: Maintains optimal cardiac output and hemodynamic stability  Description: INTERVENTIONS:  - Monitor I/O, vital signs and rhythm  - Monitor for S/S and trends of decreased cardiac output  - Administer and titrate ordered vasoactive medications to optimize hemodynamic stability  - Assess quality of pulses, skin color and temperature  - Assess for signs of decreased coronary artery perfusion  - Instruct patient to report change in severity of symptoms  Outcome: Progressing  Goal: Absence of cardiac dysrhythmias or at baseline rhythm  Description: INTERVENTIONS:  - Continuous cardiac monitoring, vital signs, obtain 12 lead EKG if ordered  - Administer antiarrhythmic and heart rate control medications as ordered  - Monitor electrolytes and administer replacement therapy as ordered  Outcome: Progressing     Problem: METABOLIC, FLUID AND ELECTROLYTES - ADULT  Goal: Glucose maintained within target range  Description: INTERVENTIONS:  - Monitor Blood Glucose as ordered  - Assess for signs and symptoms of hyperglycemia and hypoglycemia  - Administer ordered medications to maintain glucose within target range  - Assess nutritional intake and initiate nutrition service referral as needed  Outcome: Progressing     Problem: Potential for Falls  Goal: Patient will remain free of falls  Description: INTERVENTIONS:  - Educate patient/family on patient safety including physical limitations  - Instruct patient to call for assistance with activity   - Consult OT/PT to assist with strengthening/mobility   - Keep Call bell within reach  - Keep bed low and locked with side rails adjusted as appropriate  - Keep care items and personal belongings within reach  - Initiate and maintain comfort rounds  - Apply yellow socks and bracelet for high fall risk patients  - Consider moving patient to room near nurses station  Outcome: Progressing

## 2023-01-12 NOTE — ASSESSMENT & PLAN NOTE
Patient with strong family history of myocardial ischemia in their 35s  Underwent nuclear medicine stress test with concern for anterior wall motion abnormality

## 2023-01-12 NOTE — ASSESSMENT & PLAN NOTE
Lab Results   Component Value Date    HGBA1C 9 6 (H) 01/10/2023       Recent Labs     01/11/23  1702 01/11/23  2137 01/12/23  0732 01/12/23  1244   POCGLU 213* 284* 204* 195*       Blood Sugar Average: Last 72 hrs:  (P) 247   Home regimen reviewed   Hold Metformin   Lantus 15 units daily, continue SSI and Basal bolus protocol

## 2023-01-12 NOTE — PROGRESS NOTES
[address] [facility]  Progress Note - Emelyn Peralta 1990, 27 y o  female MRN: [id number]  Unit/Bed#: E4 -01 Encounter: [id number]  Primary Care Provider: REJI Rosado   Date and time admitted to hospital: 2023 10:57 PM    * Elevated troponin I level  Assessment & Plan  · Patient with complicated cardiac history including previous VSD repair, aortic coarctation repair  · 2D echo without any wall motion or valvular abnormality  · Stress test showingPerfusion scan consistent with mid anterior wall exercise-induced ischemia  · Cardiology evaluated recommended cardiac cath  · Cardiac cath completed on  showing no obstructive coronary artery disease  · Cardiology recommended to follow-up with her primary cardiologist at [facility]  · Currently with TR band, set to  later this evening, plan to discharge home tomorrow    Chest pain  Assessment & Plan  Patient with strong family history of myocardial ischemia in their 35s  Underwent nuclear medicine stress test with concern for anterior wall motion abnormality    Anxiety  Assessment & Plan  Continue zoloft  Mood is stable    Type 2 diabetes mellitus [facility])  Assessment & Plan  Lab Results   Component Value Date    HGBA1C 9 6 (H) 01/10/2023       Recent Labs     23  1702 23  2137 23  0732 23  1244   POCGLU 213* 284* 204* 195*       Blood Sugar Average: Last 72 hrs:  (P) 247   Home regimen reviewed  Hold Metformin   Lantus 15 units daily, continue SSI and Basal bolus protocol    Morbid obesity ([address] )  Assessment & Plan  Encourage life style modifications  Recommend outpatient weight loss    Aortic coarctation  Assessment & Plan  Congenital s/p stent in  due to recoarctation  Follow with [address]        VTE Pharmacologic Prophylaxis:   Pharmacologic: Heparin  Mechanical VTE Prophylaxis in Place: Yes    Patient Centered Rounds: I have performed bedside rounds with nursing staff today     Discussions with Specialists or Other Care Team Provider: cardiology    Education and Discussions with Family / Patient: patient     Time Spent for Care: 30 minutes  More than 50% of total time spent on counseling and coordination of care as described above  Current Length of Stay: 2 day(s)     Current Patient Status: Inpatient   Certification Statement: The patient will continue to require additional inpatient hospital stay due to pending TR band    Discharge Plan: tmr    Code Status: Level 1 - Full Code      Subjective:   No events overnight  Denies any CP or SOB  Cardiac cath without ischemic disease  Tolerating diet  Objective:     Vitals:   Temp (24hrs), Av 1 °F (36 7 °C), Min:97 7 °F (36 5 °C), Max:98 7 °F (37 1 °C)    Temp:  [97 7 °F (36 5 °C)-98 7 °F (37 1 °C)] 98 °F (36 7 °C)  HR:  [] 97  Resp:  [16-21] 20  BP: (107-131)/(66-87) 114/67  SpO2:  [93 %-98 %] 95 %  Body mass index is 41 57 kg/m²  Input and Output Summary (last 24 hours): Intake/Output Summary (Last 24 hours) at 2023 1623  Last data filed at 2023 1256  Gross per 24 hour   Intake 100 ml   Output --   Net 100 ml       Physical Exam:     Physical Exam  Vitals reviewed  Constitutional:       General: She is not in acute distress  Appearance: She is obese  She is not ill-appearing  HENT:      Head: Normocephalic and atraumatic  Nose: Nose normal    Eyes:      General: No scleral icterus  Conjunctiva/sclera: Conjunctivae normal    Cardiovascular:      Rate and Rhythm: Normal rate and regular rhythm  Pulmonary:      Effort: No respiratory distress  Breath sounds: No rhonchi  Chest:      Chest wall: Tenderness present  Abdominal:      Palpations: Abdomen is soft  There is no mass  Musculoskeletal:      Right lower leg: No edema  Left lower leg: No edema  Skin:     General: Skin is warm and dry  Neurological:      Mental Status: She is alert   Mental status is at baseline  Psychiatric:         Mood and Affect: Mood normal        Additional Data:     Labs:    Results from last 7 days   Lab Units 01/10/23  0503   WBC Thousand/uL 9 45   HEMOGLOBIN g/dL 13 3   HEMATOCRIT % 41 5   PLATELETS Thousands/uL 266   NEUTROS PCT % 57   LYMPHS PCT % 31   MONOS PCT % 8   EOS PCT % 2     Results from last 7 days   Lab Units 01/12/23  0624 01/11/23  0429 01/10/23  0503   SODIUM mmol/L 137   < > 136   POTASSIUM mmol/L 3 9   < > 3 6   CHLORIDE mmol/L 103   < > 100   CO2 mmol/L 25   < > 24   BUN mg/dL 11   < > 10   CREATININE mg/dL 0 60   < > 0 71   ANION GAP mmol/L 9   < > 12   CALCIUM mg/dL 8 8   < > 9 5   ALBUMIN g/dL  --   --  3 5   TOTAL BILIRUBIN mg/dL  --   --  0 26   ALK PHOS U/L  --   --  96   ALT U/L  --   --  23   AST U/L  --   --  11   GLUCOSE RANDOM mg/dL 226*   < > 356*    < > = values in this interval not displayed  Results from last 7 days   Lab Units 01/12/23  1244 01/12/23  0732 01/11/23  2137 01/11/23  1702 01/10/23  2123 01/10/23  1622 01/10/23  1123   POC GLUCOSE mg/dl 195* 204* 284* 213* 229* 252* 352*     Results from last 7 days   Lab Units 01/10/23  0503   HEMOGLOBIN A1C % 9 6*               * I Have Reviewed All Lab Data Listed Above  * Additional Pertinent Lab Tests Reviewed: [facility] Reviewed    Imaging:    XR chest 2 views    Result Date: 1/10/2023  Impression: No acute cardiopulmonary disease   Findings are stable Workstation performed: [id number]     Recent Cultures (last 7 days):           Last 24 Hours Medication List:   Current Facility-Administered Medications   Medication Dose Route Frequency Provider Last Rate   • acetaminophen  650 mg Oral Q6H PRN Shannan Ibarra MD     • albuterol  2 puff Inhalation Q4H PRN Laya Zelaya PA-C     • ALPRAZolam  0 5 mg Oral HS PRN Laya Zelaya PA-C     • aspirin  81 mg Oral Daily Gee Cruz MD     • atorvastatin  20 mg Oral Daily With Octaviano Galeas MD     • clopidogrel  75 mg Oral Daily Ginny Gonzalez MD     • fentanyl citrate (PF)  25 mcg Intravenous Q2H PRN Romayne Dunnings, CRNP     • gabapentin  100 mg Oral HS Laya Zelaya PA-C     • heparin (porcine)  5,000 Units Subcutaneous Q8H [facility] Laya Zelaya PA-C     • insulin glargine  15 Units Subcutaneous HS Laya Zelaya PA-C     • insulin lispro  1-6 Units Subcutaneous TID AC Renan Jimenez DO     • montelukast  10 mg Oral HS Laya Zelaya PA-C     • ondansetron  4 mg Intravenous Q6H PRN Laya Zelaya PA-C     • sertraline  50 mg Oral Daily Laya Zelaya PA-C     • sodium chloride  75 mL/hr Intravenous Continuous Romayne Dunnings, CRNP 75 mL/hr (01/12/23 1341)        Today, Patient Was Seen By: Carlos Fabian MD    ** Please Note: Dictation voice to text software may have been used in the creation of this document   **

## 2023-01-12 NOTE — CASE MANAGEMENT
Case Management Discharge Planning Note    Patient name Sara Casiano  Location [place] 2 /[place] 2 [address] [place]* MRN [id number]  : 1990 Date 2023       Current Admission Date: 2023  Current Admission Diagnosis:Elevated troponin I level   Patient Active Problem List    Diagnosis Date Noted   • Elevated troponin I level 01/10/2023   • Chest pain 01/10/2023   • Depression 2022   • Anxiety 2022   • Neuropathy 2022   • Multiple thyroid nodules 2021   • VSD (ventricular septal defect) and coarctation of aorta 07/15/2021   • Bipolar disorder ([facility] ) 01/10/2020   • Type 2 diabetes mellitus ([facility] ) 2019   • Vitamin D deficiency 2019   • Cyst of right ovary 10/10/2019   • Endometriosis 2019   • Hidradenitis suppurativa 2019   • Hypercholesteremia 2019   • Aortic coarctation 2019   • Intractable chronic migraine without aura and with status migrainosus 2019   • Cervicalgia 2019   • Cervical dystonia 2019   • Splenomegaly 10/12/2018   • Hepatitis C 10/12/2018   • Hypertension 10/12/2018   • Viral hepatitis C 10/12/2018   • H/O aortic coarctation repair 2018   • Morbid obesity ([facility] ) 2018      LOS (days): 2  Geometric Mean LOS (GMLOS) (days): 1 70  Days to GMLOS:-0 6     OBJECTIVE:  Risk of Unplanned Readmission Score: 7 17         Current admission status: Inpatient   Preferred Pharmacy:   [address] [place] [address] [address] [place] [address]  Phone: [phone] Fax: [phone]    Primary Care Provider: REJI Smith    Primary Insurance: [address] [place] [address]  Secondary Insurance:     DISCHARGE DETAILS:                                          Other Referral/Resources/Interventions Provided:  Interventions:  Other (Specify)  Referral Comments: request for authorization of Vascepa subitted electronically to insurance- await determination

## 2023-01-12 NOTE — CASE MANAGEMENT
Case Management Progress Note    Patient name Wil Estimable  Location [place] /E4  [phone]-* MRN [id number]  : 1990 Date 2023       LOS (days): 2  Geometric Mean LOS (GMLOS) (days): 1 70  Days to GMLOS:-0 9        OBJECTIVE:        Current admission status: Inpatient  Preferred Pharmacy:   [address] [place], [place] [address] [address] [address]  Phone: [phone] Fax: [phone]    Primary Care Provider: Althea Phalen, CRNP    Primary Insurance: [address] [place] [address]  Secondary Insurance:     PROGRESS NOTE:  Received fax from [address] regarding [address] [place] [address] for [facility]  Per letter, "[address] [place] [address] was denied completely because we were not able to establish medical necessity based on the information submitted by your doctor"  CM informed SLIM

## 2023-01-12 NOTE — ASSESSMENT & PLAN NOTE
· Patient with complicated cardiac history including previous VSD repair, aortic coarctation repair  · 2D echo without any wall motion or valvular abnormality  · Stress test showingPerfusion scan consistent with mid anterior wall exercise-induced ischemia  · Cardiology evaluated recommended cardiac cath  · Cardiac cath completed on  showing no obstructive coronary artery disease  · Cardiology recommended to follow-up with her primary cardiologist at [facility]  · Currently with TR band, set to  later this evening, plan to discharge home tomorrow

## 2023-01-13 VITALS
HEART RATE: 93 BPM | SYSTOLIC BLOOD PRESSURE: 125 MMHG | DIASTOLIC BLOOD PRESSURE: 83 MMHG | TEMPERATURE: 97.7 F | HEIGHT: 61 IN | WEIGHT: 220 LBS | OXYGEN SATURATION: 95 % | BODY MASS INDEX: 41.54 KG/M2 | RESPIRATION RATE: 18 BRPM

## 2023-01-13 LAB
ANION GAP SERPL CALCULATED.3IONS-SCNC: 7 MMOL/L (ref 4–13)
BASOPHILS # BLD AUTO: 0.03 THOUSANDS/ÂΜL (ref 0–0.1)
BASOPHILS NFR BLD AUTO: 0 % (ref 0–1)
BUN SERPL-MCNC: 10 MG/DL (ref 5–25)
CALCIUM SERPL-MCNC: 8.8 MG/DL (ref 8.3–10.1)
CHLORIDE SERPL-SCNC: 103 MMOL/L (ref 96–108)
CO2 SERPL-SCNC: 25 MMOL/L (ref 21–32)
CREAT SERPL-MCNC: 0.49 MG/DL (ref 0.6–1.3)
EOSINOPHIL # BLD AUTO: 0.08 THOUSAND/ÂΜL (ref 0–0.61)
EOSINOPHIL NFR BLD AUTO: 1 % (ref 0–6)
ERYTHROCYTE [DISTWIDTH] IN BLOOD BY AUTOMATED COUNT: 16.4 % (ref 11.6–15.1)
GFR SERPL CREATININE-BSD FRML MDRD: 127 ML/MIN/1.73SQ M
GLUCOSE SERPL-MCNC: 192 MG/DL (ref 65–140)
GLUCOSE SERPL-MCNC: 206 MG/DL (ref 65–140)
GLUCOSE SERPL-MCNC: 221 MG/DL (ref 65–140)
HCT VFR BLD AUTO: 36.3 % (ref 34.8–46.1)
HGB BLD-MCNC: 11.8 G/DL (ref 11.5–15.4)
IMM GRANULOCYTES # BLD AUTO: 0.02 THOUSAND/UL (ref 0–0.2)
IMM GRANULOCYTES NFR BLD AUTO: 0 % (ref 0–2)
LYMPHOCYTES # BLD AUTO: 1.86 THOUSANDS/ÂΜL (ref 0.6–4.47)
LYMPHOCYTES NFR BLD AUTO: 22 % (ref 14–44)
MCH RBC QN AUTO: 25.4 PG (ref 26.8–34.3)
MCHC RBC AUTO-ENTMCNC: 32.5 G/DL (ref 31.4–37.4)
MCV RBC AUTO: 78 FL (ref 82–98)
MONOCYTES # BLD AUTO: 0.66 THOUSAND/ÂΜL (ref 0.17–1.22)
MONOCYTES NFR BLD AUTO: 8 % (ref 4–12)
NEUTROPHILS # BLD AUTO: 5.79 THOUSANDS/ÂΜL (ref 1.85–7.62)
NEUTS SEG NFR BLD AUTO: 69 % (ref 43–75)
NRBC BLD AUTO-RTO: 0 /100 WBCS
PLATELET # BLD AUTO: 220 THOUSANDS/UL (ref 149–390)
PMV BLD AUTO: 10.4 FL (ref 8.9–12.7)
POTASSIUM SERPL-SCNC: 3.8 MMOL/L (ref 3.5–5.3)
RBC # BLD AUTO: 4.65 MILLION/UL (ref 3.81–5.12)
SODIUM SERPL-SCNC: 135 MMOL/L (ref 135–147)
WBC # BLD AUTO: 8.44 THOUSAND/UL (ref 4.31–10.16)

## 2023-01-13 RX ADMIN — CLOPIDOGREL BISULFATE 75 MG: 75 TABLET ORAL at 09:00

## 2023-01-13 RX ADMIN — SERTRALINE HYDROCHLORIDE 50 MG: 50 TABLET ORAL at 09:01

## 2023-01-13 RX ADMIN — ASPIRIN 81 MG: 81 TABLET, COATED ORAL at 09:01

## 2023-01-13 RX ADMIN — INSULIN LISPRO 2 UNITS: 100 INJECTION, SOLUTION INTRAVENOUS; SUBCUTANEOUS at 09:01

## 2023-01-13 RX ADMIN — ACETAMINOPHEN 650 MG: 325 TABLET ORAL at 06:45

## 2023-01-13 RX ADMIN — HEPARIN SODIUM 5000 UNITS: 5000 INJECTION INTRAVENOUS; SUBCUTANEOUS at 05:24

## 2023-01-13 NOTE — ASSESSMENT & PLAN NOTE
Patient with strong family history of myocardial ischemia in their 35s  Underwent nuclear medicine stress test with concern for anterior wall motion abnormality  Cath without CAD

## 2023-01-13 NOTE — DISCHARGE SUMMARY
[facility]  Discharge- Ha Hurtado 1990, 27 y o  female MRN: [id number]  Unit/Bed#: E4 -01 Encounter: [id number]  Primary Care Provider: REJI Valerio   Date and time admitted to hospital: 1/9/2023 10:57 PM    * Elevated troponin I level  Assessment & Plan  · Patient with complicated cardiac history including previous VSD repair, aortic coarctation repair  · 2D echo without any wall motion or valvular abnormality  · Stress test showingPerfusion scan consistent with mid anterior wall exercise-induced ischemia  · Cardiology evaluated recommended cardiac cath  · Cardiac cath completed on 1/12 showing no obstructive coronary artery disease  · Cardiology recommended to follow-up with her primary cardiologist at [facility]  · Discharge home    Chest pain  Assessment & Plan  Patient with strong family history of myocardial ischemia in their 35s  Underwent nuclear medicine stress test with concern for anterior wall motion abnormality  Cath without CAD    Anxiety  Assessment & Plan  Continue zoloft    Type 2 diabetes mellitus [facility])  Assessment & Plan  Lab Results   Component Value Date    HGBA1C 9 6 (H) 01/10/2023       Recent Labs     01/12/23  1629 01/12/23  2118 01/13/23  0711 01/13/23  1113   POCGLU 248* 204* 221* 192*       Blood Sugar Average: Last 72 hrs:  (P) 235 [id number]   Home regimen reviewed  Continue Metformin, januvia  Lantus 15 units daily, continue SSI and Basal bolus protocol    Morbid obesity ([address] )  Assessment & Plan  Encourage life style modifications  Recommend outpatient weight loss    Aortic coarctation  Assessment & Plan  Congenital s/p stent in 2020 due to recoarctation  Follow with [address]          Discharging Physician / Practitioner: Celia Covington MD  PCP: Ellen Valerio  Admission Date:   Admission Orders (From admission, onward)     Ordered        01/10/23 0047  INPATIENT ADMISSION  Once Discharge Date: 01/13/23    Medical Problems     Resolved Problems  Date Reviewed: 1/13/2023   None         Consultations During Hospital Stay:  · Cardiology    Procedures Performed:   · Cardiac Cath  · Normal coronaries    Significant Findings / Test Results:   XR chest 2 views    Result Date: 1/10/2023  · Impression: No acute cardiopulmonary disease  Findings are stable Workstation performed: [id number]     Incidental Findings:   · none     Test Results Pending at Discharge (will require follow up):   · none     Outpatient Tests Requested:  · none    Complications:  none    Reason for Admission: Chest Pain    Hospital Course:     Johanny Chou is a 27 y o  female patient who originally presented to the hospital on 1/9/2023 due to chest pains  Initial eval with mildly elevated troponins, though patient had significant history of heart surgery due to VSD, coarctation of aorta status post repair with prior stenting  Cardiology evaluated, 2D echo was ordered and completed showing normal EF, no valvular or wall motion abnormality  Stress test was completed showing possible ischemia  Ultimately patient underwent cardiac cath, which showed normal coronaries  Cardiology recommend that patient follows up with primary cardiologist outpatient at [address]  Patient did not have any more chest pain since admission  Please see above list of diagnoses and related plan for additional information  Condition at Discharge: fair     Discharge Day Visit / Exam:     Subjective:    Reports doing well  Denies CP, SOB nausea or vomiting  Vitals: Blood Pressure: 125/83 (01/13/23 1121)  Pulse: 93 (01/13/23 1121)  Temperature: 97 7 °F (36 5 °C) (01/13/23 1121)  Temp Source: Temporal (01/13/23 1121)  Respirations: 18 (01/13/23 1121)  Height: 5' 1" (154 9 cm) (01/10/23 1615)  Weight - Scale: 99 8 kg (220 lb) (01/10/23 1615)  SpO2: 95 % (01/13/23 1121)  Exam:   Physical Exam  Vitals reviewed     Constitutional:       General: She is not in acute distress  Appearance: She is obese  She is not ill-appearing  HENT:      Head: Normocephalic and atraumatic  Nose: Nose normal    Eyes:      General: No scleral icterus  Conjunctiva/sclera: Conjunctivae normal    Cardiovascular:      Rate and Rhythm: Normal rate and regular rhythm  Pulmonary:      Effort: No respiratory distress  Breath sounds: No rhonchi  Chest:      Chest wall: Tenderness present  Abdominal:      Palpations: Abdomen is soft  There is no mass  Musculoskeletal:      Right lower leg: No edema  Left lower leg: No edema  Skin:     General: Skin is warm and dry  Neurological:      Mental Status: She is alert  Mental status is at baseline  Psychiatric:         Mood and Affect: Mood normal        Discussion with Family: none    Discharge instructions/Information to patient and family:   See after visit summary for information provided to patient and family  Provisions for Follow-Up Care:  See after visit summary for information related to follow-up care and any pertinent home health orders  Disposition:     Home    Planned Readmission: CP     Discharge Statement:  I spent 40 minutes discharging the patient  This time was spent on the day of discharge  I had direct contact with the patient on the day of discharge  Greater than 50% of the total time was spent examining patient, answering all patient questions, arranging and discussing plan of care with patient as well as directly providing post-discharge instructions  Additional time then spent on discharge activities  Discharge Medications:  See after visit summary for reconciled discharge medications provided to patient and family        ** Please Note: This note has been constructed using a voice recognition system **

## 2023-01-13 NOTE — ASSESSMENT & PLAN NOTE
Lab Results   Component Value Date    HGBA1C 9 6 (H) 01/10/2023       Recent Labs     01/12/23  1629 01/12/23  2118 01/13/23  0711 01/13/23  1113   POCGLU 248* 204* 221* 192*       Blood Sugar Average: Last 72 hrs:  (P) 235 [id number]   Home regimen reviewed   Continue Metformin, januvia  Lantus 15 units daily, continue SSI and Basal bolus protocol

## 2023-01-13 NOTE — ASSESSMENT & PLAN NOTE
· Patient with complicated cardiac history including previous VSD repair, aortic coarctation repair  · 2D echo without any wall motion or valvular abnormality  · Stress test showingPerfusion scan consistent with mid anterior wall exercise-induced ischemia  · Cardiology evaluated recommended cardiac cath  · Cardiac cath completed on 1/12 showing no obstructive coronary artery disease  · Cardiology recommended to follow-up with her primary cardiologist at [facility]  · Discharge home

## 2023-01-13 NOTE — PLAN OF CARE
Problem: PAIN - ADULT  Goal: Verbalizes/displays adequate comfort level or baseline comfort level  Description: Interventions:  - Encourage patient to monitor pain and request assistance  - Assess pain using appropriate pain scale  - Administer analgesics based on type and severity of pain and evaluate response  - Implement non-pharmacological measures as appropriate and evaluate response  - Consider cultural and social influences on pain and pain management  - Notify physician/advanced practitioner if interventions unsuccessful or patient reports new pain  Outcome: Progressing     Problem: INFECTION - ADULT  Goal: Absence or prevention of progression during hospitalization  Description: INTERVENTIONS:  - Assess and monitor for signs and symptoms of infection  - Monitor lab/diagnostic results  - Monitor all insertion sites, i e  indwelling lines, tubes, and drains  - Monitor endotracheal if appropriate and nasal secretions for changes in amount and color  - [place] appropriate cooling/warming therapies per order  - Administer medications as ordered  - Instruct and encourage patient and family to use good hand hygiene technique  - Identify and instruct in appropriate isolation precautions for identified infection/condition  Outcome: Progressing     Problem: SAFETY ADULT  Goal: Patient will remain free of falls  Description: INTERVENTIONS:  - Educate patient/family on patient safety including physical limitations  - Instruct patient to call for assistance with activity   - Consult OT/PT to assist with strengthening/mobility   - Keep Call bell within reach  - Keep bed low and locked with side rails adjusted as appropriate  - Keep care items and personal belongings within reach  - Initiate and maintain comfort rounds  - Apply yellow socks and bracelet for high fall risk patients  - Consider moving patient to room near nurses station  Outcome: Progressing     Problem: DISCHARGE PLANNING  Goal: Discharge to home or other facility with appropriate resources  Description: INTERVENTIONS:  - Identify barriers to discharge w/patient and caregiver  - Arrange for needed discharge resources and transportation as appropriate  - Identify discharge learning needs (meds, wound care, etc )  - Arrange for interpretive services to assist at discharge as needed  - Refer to Case Management Department for coordinating discharge planning if the patient needs post-hospital services based on physician/advanced practitioner order or complex needs related to functional status, cognitive ability, or social support system  Outcome: Progressing     Problem: CARDIOVASCULAR - ADULT  Goal: Maintains optimal cardiac output and hemodynamic stability  Description: INTERVENTIONS:  - Monitor I/O, vital signs and rhythm  - Monitor for S/S and trends of decreased cardiac output  - Administer and titrate ordered vasoactive medications to optimize hemodynamic stability  - Assess quality of pulses, skin color and temperature  - Assess for signs of decreased coronary artery perfusion  - Instruct patient to report change in severity of symptoms  Outcome: Progressing  Goal: Absence of cardiac dysrhythmias or at baseline rhythm  Description: INTERVENTIONS:  - Continuous cardiac monitoring, vital signs, obtain 12 lead EKG if ordered  - Administer antiarrhythmic and heart rate control medications as ordered  - Monitor electrolytes and administer replacement therapy as ordered  Outcome: Progressing     Problem: METABOLIC, FLUID AND ELECTROLYTES - ADULT  Goal: Glucose maintained within target range  Description: INTERVENTIONS:  - Monitor Blood Glucose as ordered  - Assess for signs and symptoms of hyperglycemia and hypoglycemia  - Administer ordered medications to maintain glucose within target range  - Assess nutritional intake and initiate nutrition service referral as needed  Outcome: Progressing     Problem: Potential for Falls  Goal: Patient will remain free of falls  Description: INTERVENTIONS:  - Educate patient/family on patient safety including physical limitations  - Instruct patient to call for assistance with activity   - Consult OT/PT to assist with strengthening/mobility   - Keep Call bell within reach  - Keep bed low and locked with side rails adjusted as appropriate  - Keep care items and personal belongings within reach  - Initiate and maintain comfort rounds  - Apply yellow socks and bracelet for high fall risk patients  - Consider moving patient to room near nurses station  Outcome: Progressing

## 2023-01-16 ENCOUNTER — TRANSITIONAL CARE MANAGEMENT (OUTPATIENT)
Dept: FAMILY MEDICINE CLINIC | Facility: CLINIC | Age: 33
End: 2023-01-16

## 2023-01-16 NOTE — UTILIZATION REVIEW
NOTIFICATION OF ADMISSION DISCHARGE   This is a Notification of Discharge from [facility]  Please be advised that this patient has been discharge from our facility  Below you will find the admission and discharge date and time including the patient’s disposition  UTILIZATION REVIEW CONTACT:  [facility]  Utilization   Network Utilization Review Department  Phone: [phone] x carefully listen to the prompts  All voicemails are confidential   Email: [email or web address]     ADMISSION INFORMATION  PRESENTATION DATE: 1/9/2023 10:57 PM    INPATIENT ADMISSION DATE: 1/10/23 12:47 AM   DISCHARGE DATE: 1/13/2023  1:45 PM   DISPOSITION:Home/Self Care    IMPORTANT INFORMATION:  Send all requests for admission clinical reviews, approved or denied determinations and any other requests to dedicated fax number below belonging to the campus where the patient is receiving treatment   List of dedicated fax numbers:  [address] DENIALS (Administrative/Medical Necessity) [phone]   [address] (Maternity/NICU/Pediatrics) [phone] 400 [facility] [phone]   [place] [address] [phone]   [address] [phone]   [address] [place] [address] [phone]   [facility]  [phone] 405[facility] [phone]   [address] [place] [address] [phone] 2050 [facility] [phone]           Marco A Sánchez MD  Physician  Hospitalist  Discharge Summary     Signed  Date of Service:  1/13/2023 11:54 AM     Signed        [facility]  Discharge- Karina Sanchez 1990, 27 y o  female MRN: [id number]  Unit/Bed#: E4 -01 Encounter: [id number]  Primary Care Provider: Mariam Dickey REJI   Date and time admitted to hospital: 1/9/2023 10:57 PM     * Elevated troponin I level  Assessment & Plan  • Patient with complicated cardiac history including previous VSD repair, aortic coarctation repair  • 2D echo without any wall motion or valvular abnormality  • Stress test showingPerfusion scan consistent with mid anterior wall exercise-induced ischemia  • Cardiology evaluated recommended cardiac cath  • Cardiac cath completed on 1/12 showing no obstructive coronary artery disease  • Cardiology recommended to follow-up with her primary cardiologist at [facility]  • Discharge home     Chest pain  Assessment & Plan  Patient with strong family history of myocardial ischemia in their 35s  Underwent nuclear medicine stress test with concern for anterior wall motion abnormality  Cath without CAD     Anxiety  Assessment & Plan  Continue zoloft     Type 2 diabetes mellitus ([facility] )  Assessment & Plan        Lab Results   Component Value Date     HGBA1C 9 6 (H) 01/10/2023                Recent Labs     01/12/23  1629 01/12/23  2118 01/13/23  0711 01/13/23  1113   POCGLU 248* 204* 221* 192*         Blood Sugar Average: Last 72 hrs:  (P) 235 [id number]   Home regimen reviewed   Continue Metformin, januvia  Lantus 15 units daily, continue SSI and Basal bolus protocol     Morbid obesity ([facility] [address] )  Assessment & Plan  Encourage life style modifications  Recommend outpatient weight loss     Aortic coarctation  Assessment & Plan  Congenital s/p stent in 2020 due to recoarctation  Follow with [address]            Discharging Physician / Practitioner: Alejandro Basurto MD  PCP: REJI Noguera  Admission Date:       Admission Orders (From admission, onward)       Ordered         01/10/23 0047   INPATIENT ADMISSION  Once                         Discharge Date: 01/13/23         Medical Problems      Resolved Problems  Date Reviewed: 1/13/2023   None            Consultations During [facility]:  • Cardiology     Procedures Performed:   • Cardiac Cath  ? Normal coronaries     Significant Findings / Test Results:   • XR chest 2 views  •    • Result Date: 1/10/2023  • Impression: No acute cardiopulmonary disease  Findings are stable Workstation performed: [id number]      Incidental Findings:   • none      Test Results Pending at Discharge (will require follow up):   • none     Outpatient Tests Requested:  • none     Complications:  none     Reason for Admission: Chest Pain     Hospital Course:      Yoselyn Garcia is a 27 y o  female patient who originally presented to the hospital on 1/9/2023 due to chest pains  Initial eval with mildly elevated troponins, though patient had significant history of heart surgery due to VSD, coarctation of aorta status post repair with prior stenting  Cardiology evaluated, 2D echo was ordered and completed showing normal EF, no valvular or wall motion abnormality  Stress test was completed showing possible ischemia  Ultimately patient underwent cardiac cath, which showed normal coronaries  Cardiology recommend that patient follows up with primary cardiologist outpatient at [address]  Patient did not have any more chest pain since admission      Please see above list of diagnoses and related plan for additional information       Condition at Discharge: fair      Discharge Day Visit / Exam:      Subjective:    Reports doing well  Denies CP, SOB nausea or vomiting  Vitals: Blood Pressure: 125/83 (01/13/23 1121)  Pulse: 93 (01/13/23 1121)  Temperature: 97 7 °F (36 5 °C) (01/13/23 1121)  Temp Source: Temporal (01/13/23 1121)  Respirations: 18 (01/13/23 1121)  Height: 5' 1" (154 9 cm) (01/10/23 1615)  Weight - Scale: 99 8 kg (220 lb) (01/10/23 1615)  SpO2: 95 % (01/13/23 1121)  Exam:   Physical Exam  Vitals reviewed  Constitutional:       General: She is not in acute distress  Appearance: She is obese  She is not ill-appearing     HENT:      Head: Normocephalic and atraumatic  Nose: Nose normal    Eyes:      General: No scleral icterus  Conjunctiva/sclera: Conjunctivae normal    Cardiovascular:      Rate and Rhythm: Normal rate and regular rhythm  Pulmonary:      Effort: No respiratory distress  Breath sounds: No rhonchi  Chest:      Chest wall: Tenderness present  Abdominal:      Palpations: Abdomen is soft  There is no mass  Musculoskeletal:      Right lower leg: No edema  Left lower leg: No edema  Skin:     General: Skin is warm and dry  Neurological:      Mental Status: She is alert  Mental status is at baseline  Psychiatric:         Mood and Affect: Mood normal          Discussion with Family: none     Discharge instructions/Information to patient and family:   See after visit summary for information provided to patient and family        Provisions for Follow-Up Care:  See after visit summary for information related to follow-up care and any pertinent home health orders        Disposition:      Home     Planned Readmission: CP     Discharge Statement:  I spent 40 minutes discharging the patient  This time was spent on the day of discharge  I had direct contact with the patient on the day of discharge  Greater than 50% of the total time was spent examining patient, answering all patient questions, arranging and discussing plan of care with patient as well as directly providing post-discharge instructions    Additional time then spent on discharge activities      Discharge Medications:  See after visit summary for reconciled discharge medications provided to patient and family        ** Please Note: This note has been constructed using a voice recognition system **

## 2023-01-17 ENCOUNTER — OFFICE VISIT (OUTPATIENT)
Dept: FAMILY MEDICINE CLINIC | Facility: CLINIC | Age: 33
End: 2023-01-17

## 2023-01-17 VITALS
DIASTOLIC BLOOD PRESSURE: 84 MMHG | TEMPERATURE: 96 F | HEIGHT: 61 IN | WEIGHT: 218 LBS | HEART RATE: 100 BPM | SYSTOLIC BLOOD PRESSURE: 122 MMHG | OXYGEN SATURATION: 98 % | BODY MASS INDEX: 41.16 KG/M2

## 2023-01-17 DIAGNOSIS — Z76.89 ENCOUNTER FOR SUPPORT AND COORDINATION OF TRANSITION OF CARE: Primary | ICD-10-CM

## 2023-01-17 DIAGNOSIS — F31.9 BIPOLAR AFFECTIVE DISORDER, REMISSION STATUS UNSPECIFIED (HCC): ICD-10-CM

## 2023-01-17 DIAGNOSIS — Z23 ENCOUNTER FOR IMMUNIZATION: ICD-10-CM

## 2023-01-17 DIAGNOSIS — E11.9 TYPE 2 DIABETES MELLITUS WITHOUT COMPLICATION, WITH LONG-TERM CURRENT USE OF INSULIN (HCC): ICD-10-CM

## 2023-01-17 DIAGNOSIS — E11.65 TYPE 2 DIABETES MELLITUS WITH HYPERGLYCEMIA, WITHOUT LONG-TERM CURRENT USE OF INSULIN (HCC): ICD-10-CM

## 2023-01-17 DIAGNOSIS — B18.2 CHRONIC HEPATITIS C WITHOUT HEPATIC COMA (HCC): ICD-10-CM

## 2023-01-17 DIAGNOSIS — Z79.4 TYPE 2 DIABETES MELLITUS WITHOUT COMPLICATION, WITH LONG-TERM CURRENT USE OF INSULIN (HCC): ICD-10-CM

## 2023-01-17 RX ORDER — INSULIN GLARGINE 100 [IU]/ML
17 INJECTION, SOLUTION SUBCUTANEOUS
Qty: 4.5 ML | Refills: 3 | Status: SHIPPED | OUTPATIENT
Start: 2023-01-17 | End: 2023-05-17

## 2023-01-17 NOTE — PATIENT INSTRUCTIONS
Send a log of your blood sugars over the next 4 weeks on my chart  Increase lantus to 17 units at bedtime  Increase Januvia to 100 mg daily     Basic Carbohydrate Counting   AMBULATORY CARE:   Carbohydrate counting  is a way to plan your meals by counting the amount of carbohydrate in foods  Carbohydrates are the sugars, starches, and fiber found in fruit, grains, vegetables, and milk products  Carbohydrates increase your blood sugar levels  Carbohydrate counting can help you eat the right amount of carbohydrate to keep your blood sugar levels under control  What you need to know about planning meals using carbohydrate counting:  A dietitian or healthcare provider will help you develop a healthy meal plan that works best for you  You will be taught how much carbohydrate to eat or drink for each meal and snack  Your meal plan will be based on your age, weight, usual food intake, and physical activity level  If you have diabetes, it will also include your blood sugar levels and diabetes medicine  Once you know how much carbohydrate you should eat, you can decide what type of food you want to eat  You will need to know what foods contain carbohydrate and how much they contain  Keep track of the amount of carbohydrate in meals and snacks in order to follow your meal plan  Do not avoid carbohydrates or skip meals  Your blood sugar may fall too low if you do not eat enough carbohydrate or you skip meals  Foods that contain carbohydrate:   Breads:  Each serving of food listed below contains about 15 g of carbohydrate   1 slice of bread (1 ounce) or 1 flour or corn tortilla (6 inch)    ½ of a hamburger bun or ¼ of a large bagel (about 1 ounce)    1 pancake (about 4 inches across and ¼ inch thick)    Cereals and grains:  Serving sizes of ready-to-eat cereals vary  Look at the serving size and the total carbohydrate amount listed on the food label   Each serving of food listed below contains about 15 g of carbohydrate   ¾ cup of dry, unsweetened, ready-to-eat cereal or ¼ cup of low-fat granola     ½ cup of oatmeal or other cooked cereal     ? cup of cooked rice or pasta    Starchy vegetables and beans:  Each serving of food listed below contains about 15 g of carbohydrate   ½ cup of corn, green peas, sweet potatoes, or mashed potatoes    ¼ of a large baked potato    ½ cup of beans, lentils, and peas (garbanzo, vega, kidney, white, split, black-eyed)    Crackers and snacks:  Each serving of food listed below contains about 15 g of carbohydrate   3 meghan cracker squares or 8 animal crackers     6 saltine-type crackers    3 cups of popcorn or ¾ ounce of pretzels, potato chips, or tortilla chips    Fruit:  Each serving of food listed below contains about 15 g of carbohydrate   1 small (4 ounce) piece of fresh fruit or ¾ to 1 cup of fresh fruit    ½ cup of canned or frozen fruit, packed in natural juice    ½ cup (4 ounces) of unsweetened fruit juice    2 tablespoons of dried fruit    Desserts or sugary foods:  Each serving of food listed below contains about 15 g of carbohydrate   2-inch square unfrosted cake or brownie     2 small cookies    ½ cup of ice cream, frozen yogurt, or nondairy frozen yogurt    ¼ cup of sherbet or sorbet    1 tablespoon of regular syrup, jam, or jelly    2 tablespoons of light syrup    Milk and yogurt:  Foods from the milk group contain about 12 g of carbohydrate per serving  1 cup of fat-free or low-fat milk    1 cup of soy milk    ? cup of fat-free, yogurt sweetened with artificial sweetener    Non-starchy vegetables:  Each serving contains about 5 g of carbohydrate   Three servings of non-starch vegetables count as 1 carbohydrate serving  ½ cup of cooked vegetables or 1 cup of raw vegetables   This includes beets, broccoli, cabbage, cauliflower, cucumber, mushrooms, tomatoes, and zucchini    ½ cup of vegetable juice    How to use carbohydrate counting to plan meals: Count carbohydrate amounts using serving sizes:      Pasta dinner example: You plan to have pasta, tossed salad, and an 8-ounce glass of milk  Your healthcare provider tells you that you may have 4 carbohydrate servings for dinner  One carbohydrate serving of pasta is ? cup  One cup of pasta will equal 3 carbohydrate servings  An 8-ounce glass of milk will count as 1 carbohydrate serving  These amounts of food would equal 4 carbohydrate servings  One cup of tossed salad does not count toward your carbohydrate servings  Count carbohydrate amounts using food labels:  Find the total amount of carbohydrate in a packaged food by reading the food label  Food labels tell you the serving size of the food and the total carbohydrate amount in each serving  Find the serving size on the food label and then decide how many servings you will eat  Multiply the number of servings you plan to eat by the carbohydrate amount per serving  Granola bar snack example: Your meal plan allows you to have 2 carbohydrate servings (30 grams) of carbohydrate for a snack  You plan to eat 1 package of granola bars, which contains 2 bars  According to the food label, the serving size of food in this package is 1 bar  Each serving (1 bar) contains 25 grams of carbohydrate  The total amount of carbohydrate in this package of granola bars would be 50 g  Based on your meal plan, you should eat only 1 bar  Follow up with your doctor as directed:  Write down your questions so you remember to ask them during your visits  © Copyright [employer] 2022 Information is for End User's use only and may not be sold, redistributed or otherwise used for commercial purposes  All illustrations and images included in CareNotes® are the copyrighted property of A D A M , Inc  or [facility] Antoine Velazquez   The above information is an  only  It is not intended as medical advice for individual conditions or treatments   Talk to your doctor, nurse or pharmacist before following any medical regimen to see if it is safe and effective for you

## 2023-01-17 NOTE — LETTER
January 17, 2023     Patient: Mehul Reid  YOB: 1990  Date of Visit: 1/17/2023      To Whom it May Concern:    Mehul Reid is under my professional care  Dougie Peterson was seen in my office on 1/17/2023  Dougielexy Peterson may return to work on is cleared by cardiology after visit on 1/23/2023   If you have any questions or concerns, please don't hesitate to call           Sincerely,          REJI Costa        CC: No Recipients

## 2023-01-17 NOTE — PROGRESS NOTES
Assessment & Plan     1  Encounter for support and coordination of transition of care    2  Type 2 diabetes mellitus without complication, with long-term current use of insulin (HCC)  -     Urine Microalbumin/creatinine ratio; Future  -     Basic metabolic panel; Future  -     sitaGLIPtin (JANUVIA) 100 mg tablet; Take 1 tablet (100 mg total) by mouth daily    3  Encounter for immunization  -     Pneumococcal Conjugate Vaccine 20-valent (PCV20)    4  Type 2 diabetes mellitus with hyperglycemia, without long-term current use of insulin (HCC)  Comments:  Increase lantus to 17 units at bedtime  Keep log of blood sugars and send in 4 weeks Continue metformin and increae januvia to 100 mg   Orders:  -     insulin glargine (LANTUS) 100 units/mL subcutaneous injection; Inject 17 Units under the skin daily at bedtime  -     HEMOGLOBIN A1C W/ EAG ESTIMATION; Future    5  Bipolar affective disorder, remission status unspecified [facility]  Comments:  symptoms are stable     6  Chronic hepatitis C without hepatic coma [facility])       Subjective     Transitional Care Management Review:   Rehana Galindo is a 27 y o  female here for TCM follow up  During the TCM phone call patient stated:  TCM Call     Date and time call was made  1/16/2023  9:17 AM    Hospital care reviewed  Records reviewed    Patient was hospitialized at  [facility]    Date of Admission  01/09/23    Date of discharge  01/13/23    Diagnosis  elevated troponin 1 level    Disposition  Home    Were the patients medications reviewed and updated  Yes    Current Symptoms  None      TCM Call     Post hospital issues  None    Should patient be enrolled in anticoag monitoring? No    Scheduled for follow up?   Yes    Did you obtain your prescribed medications  Yes    Do you need help managing your prescriptions or medications  No    Is transportation to your appointment needed  No    I have advised the patient to call PCP with any new or worsening symptoms  Bill GEORGE HPI    Patient presents for TCM after hospitaliztion on 1/9/2022  She presented to the hospital with chest pains and had mildly elevated troponin so was admitted  Troponin 123, 2 hr  110 and 4 hr 95  Normal d-dimer  Glucose was in the 300s at times  At time of admission was 456  She has a hx of VSD, coarctation of the aorta with repait and prior stenting  She sees cardiology at [facility]      -Ischemia - 2D echo completed and showed normal EF  But stress testing showed possible ischemia    -cardiac cath completed- normal coronaries  Does have a some soreness in the right groin since but getting better over time  Small amount of ecchymosis around puncture but no tenderness, swelling, warmth, drainage or signs of pseudoaneurysm externally  S/s of when to call or return reviewed     -Has appointment with MARYBETH Allison on 1/23/2023 at 11 AM     -DM was to continue metformin, januvia, lantus 15 units daily and basal bolus   HGA1C was completed in the hospital and it was 9 6  Renal function was stable  -increase januvia to 100 mg daily  -increase insulin to 17 units daily (never has any lows ) s/s of hypoglycemia reviewed  -diabetic diet reviewed  Recommend weight loss  She has cut out sugary drinks since hospital stay as she was drinking "a lot" of soda  Review of Systems   Constitutional: Negative for appetite change, fatigue and unexpected weight change  HENT: Negative for congestion, rhinorrhea, sneezing and sore throat  Eyes: Negative for visual disturbance  Respiratory: Negative for cough, chest tightness, shortness of breath and wheezing  Cardiovascular: Negative for chest pain, palpitations and leg swelling  Gastrointestinal: Negative for abdominal pain, blood in stool, constipation, diarrhea, nausea and vomiting  Endocrine: Negative for cold intolerance, heat intolerance, polydipsia, polyphagia and polyuria  Genitourinary: Negative for difficulty urinating, dysuria and urgency  Musculoskeletal: Positive for myalgias  Negative for arthralgias, back pain and joint swelling  Skin: Negative for color change and rash  Neurological: Negative for dizziness, weakness and headaches  Hematological: Negative for adenopathy  Does not bruise/bleed easily  Objective     /84   Pulse 100   Temp (!) 96 °F (35 6 °C) (Tympanic)   Ht 5' 1" (1 549 m)   Wt 98 9 kg (218 lb)   LMP 01/06/2023 (Approximate)   SpO2 98%   BMI 41 19 kg/m²      Physical Exam  Constitutional:       General: She is not in acute distress  Appearance: Normal appearance  She is not ill-appearing or toxic-appearing  HENT:      Head: Normocephalic and atraumatic  Cardiovascular:      Rate and Rhythm: Normal rate and regular rhythm  Pulses: Normal pulses  Heart sounds: Normal heart sounds  No murmur heard  No friction rub  No gallop  Pulmonary:      Effort: Pulmonary effort is normal  No respiratory distress  Breath sounds: Normal breath sounds  No stridor  No wheezing, rhonchi or rales  Abdominal:      General: Abdomen is flat  Bowel sounds are normal  There is no distension  Palpations: Abdomen is soft  There is no mass  Tenderness: There is no abdominal tenderness  Musculoskeletal:         General: No swelling, tenderness, deformity or signs of injury  Normal range of motion  Cervical back: Normal range of motion and neck supple  No rigidity  No muscular tenderness  Legs:    Lymphadenopathy:      Cervical: No cervical adenopathy  Skin:     General: Skin is warm and dry  Capillary Refill: Capillary refill takes less than 2 seconds  Coloration: Skin is not jaundiced or pale  Findings: No bruising or lesion  Neurological:      General: No focal deficit present  Mental Status: She is alert and oriented to person, place, and time  Mental status is at baseline  Sensory: No sensory deficit  Motor: No weakness        Gait: Gait normal  Psychiatric:         Mood and Affect: Mood normal          Behavior: Behavior normal          Thought Content:  Thought content normal          Judgment: Judgment normal        Medications have been reviewed by provider in current encounter    Arsl[place] , REJI

## 2023-01-19 ENCOUNTER — TELEPHONE (OUTPATIENT)
Dept: CARDIOLOGY CLINIC | Facility: CLINIC | Age: 33
End: 2023-01-19

## 2023-01-19 NOTE — TELEPHONE ENCOUNTER
Patient was seen in [address] 1/9/23 to 1/13/23  Cardiologist Dr Cristi Evangelista suggested patient be initiated on [address] and Prior Auth was initiated through the hospital     Received fax today Luis Felipe Agusrosina was denied  Dr Cristi Evangelista notified  Patient will be following with her primary cardiologist at [address]  Denial scanned into epic

## 2023-01-24 ENCOUNTER — VBI (OUTPATIENT)
Dept: ADMINISTRATIVE | Facility: OTHER | Age: 33
End: 2023-01-24

## 2023-01-27 ENCOUNTER — HOSPITAL ENCOUNTER (OUTPATIENT)
Dept: NON INVASIVE DIAGNOSTICS | Facility: HOSPITAL | Age: 33
Discharge: HOME | End: 2023-01-27

## 2023-01-27 DIAGNOSIS — R10.32 GROIN PAIN, LEFT: ICD-10-CM

## 2023-01-30 ENCOUNTER — ANESTHESIA EVENT (OUTPATIENT)
Dept: ANESTHESIOLOGY | Facility: HOSPITAL | Age: 33
End: 2023-01-30

## 2023-01-30 ENCOUNTER — HOSPITAL ENCOUNTER (INPATIENT)
Facility: HOSPITAL | Age: 33
LOS: 1 days | Discharge: HOME | End: 2023-01-31
Attending: EMERGENCY MEDICINE | Admitting: INTERNAL MEDICINE

## 2023-01-30 ENCOUNTER — APPOINTMENT (EMERGENCY)
Dept: NON INVASIVE DIAGNOSTICS | Facility: HOSPITAL | Age: 33
End: 2023-01-30

## 2023-01-30 ENCOUNTER — ANESTHESIA (EMERGENCY)
Dept: PERIOP | Facility: HOSPITAL | Age: 33
End: 2023-01-30

## 2023-01-30 ENCOUNTER — ANESTHESIA (OUTPATIENT)
Dept: ANESTHESIOLOGY | Facility: HOSPITAL | Age: 33
End: 2023-01-30

## 2023-01-30 ENCOUNTER — ANESTHESIA EVENT (EMERGENCY)
Dept: PERIOP | Facility: HOSPITAL | Age: 33
End: 2023-01-30

## 2023-01-30 ENCOUNTER — APPOINTMENT (EMERGENCY)
Dept: RADIOLOGY | Facility: HOSPITAL | Age: 33
End: 2023-01-30

## 2023-01-30 ENCOUNTER — APPOINTMENT (EMERGENCY)
Dept: CT IMAGING | Facility: HOSPITAL | Age: 33
End: 2023-01-30

## 2023-01-30 DIAGNOSIS — I70.90 ARTERIAL OCCLUSION: ICD-10-CM

## 2023-01-30 DIAGNOSIS — M79.604 PAIN OF RIGHT LOWER EXTREMITY: ICD-10-CM

## 2023-01-30 DIAGNOSIS — M79.606 LEG PAIN: ICD-10-CM

## 2023-01-30 DIAGNOSIS — R10.31 RIGHT GROIN PAIN: Primary | ICD-10-CM

## 2023-01-30 DIAGNOSIS — I74.5 EXTERNAL ILIAC ARTERY THROMBOSIS (HCC): ICD-10-CM

## 2023-01-30 DIAGNOSIS — I99.9 VASCULAR ABNORMALITY: ICD-10-CM

## 2023-01-30 LAB
ALBUMIN SERPL BCP-MCNC: 4.5 G/DL (ref 3.5–5)
ALP SERPL-CCNC: 63 U/L (ref 34–104)
ALT SERPL W P-5'-P-CCNC: 12 U/L (ref 7–52)
ANION GAP SERPL CALCULATED.3IONS-SCNC: 11 MMOL/L (ref 4–13)
ANION GAP SERPL CALCULATED.3IONS-SCNC: 9 MMOL/L (ref 4–13)
APTT PPP: 204 SECONDS (ref 23–37)
APTT PPP: 27 SECONDS (ref 23–37)
AST SERPL W P-5'-P-CCNC: 10 U/L (ref 13–39)
ATRIAL RATE: 98 BPM
BASOPHILS # BLD AUTO: 0.04 THOUSANDS/ÂΜL (ref 0–0.1)
BASOPHILS # BLD MANUAL: 0 THOUSAND/UL (ref 0–0.1)
BASOPHILS NFR BLD AUTO: 0 % (ref 0–1)
BASOPHILS NFR MAR MANUAL: 0 % (ref 0–1)
BILIRUB SERPL-MCNC: 0.34 MG/DL (ref 0.2–1)
BUN SERPL-MCNC: 11 MG/DL (ref 5–25)
BUN SERPL-MCNC: 15 MG/DL (ref 5–25)
CALCIUM SERPL-MCNC: 7.4 MG/DL (ref 8.4–10.2)
CALCIUM SERPL-MCNC: 9.6 MG/DL (ref 8.4–10.2)
CHLORIDE SERPL-SCNC: 100 MMOL/L (ref 96–108)
CHLORIDE SERPL-SCNC: 107 MMOL/L (ref 96–108)
CO2 SERPL-SCNC: 19 MMOL/L (ref 21–32)
CO2 SERPL-SCNC: 25 MMOL/L (ref 21–32)
CREAT SERPL-MCNC: 0.55 MG/DL (ref 0.6–1.3)
CREAT SERPL-MCNC: 0.65 MG/DL (ref 0.6–1.3)
EOSINOPHIL # BLD AUTO: 0.15 THOUSAND/ÂΜL (ref 0–0.61)
EOSINOPHIL # BLD MANUAL: 0 THOUSAND/UL (ref 0–0.4)
EOSINOPHIL NFR BLD AUTO: 1 % (ref 0–6)
EOSINOPHIL NFR BLD MANUAL: 0 % (ref 0–6)
ERYTHROCYTE [DISTWIDTH] IN BLOOD BY AUTOMATED COUNT: 15.4 % (ref 11.6–15.1)
ERYTHROCYTE [DISTWIDTH] IN BLOOD BY AUTOMATED COUNT: 15.5 % (ref 11.6–15.1)
ERYTHROCYTE [DISTWIDTH] IN BLOOD BY AUTOMATED COUNT: 15.6 % (ref 11.6–15.1)
FLUAV RNA RESP QL NAA+PROBE: NEGATIVE
FLUBV RNA RESP QL NAA+PROBE: NEGATIVE
GFR SERPL CREATININE-BSD FRML MDRD: 115 ML/MIN/1.73SQ M
GFR SERPL CREATININE-BSD FRML MDRD: 121 ML/MIN/1.73SQ M
GLUCOSE SERPL-MCNC: 269 MG/DL (ref 65–140)
GLUCOSE SERPL-MCNC: 284 MG/DL (ref 65–140)
GLUCOSE SERPL-MCNC: 313 MG/DL (ref 65–140)
HCT VFR BLD AUTO: 38 % (ref 34.8–46.1)
HCT VFR BLD AUTO: 39.3 % (ref 34.8–46.1)
HCT VFR BLD AUTO: 43.9 % (ref 34.8–46.1)
HGB BLD-MCNC: 12.1 G/DL (ref 11.5–15.4)
HGB BLD-MCNC: 12.4 G/DL (ref 11.5–15.4)
HGB BLD-MCNC: 14 G/DL (ref 11.5–15.4)
IMM GRANULOCYTES # BLD AUTO: 0.04 THOUSAND/UL (ref 0–0.2)
IMM GRANULOCYTES NFR BLD AUTO: 0 % (ref 0–2)
INR PPP: 1.03 (ref 0.84–1.19)
LYMPHOCYTES # BLD AUTO: 29 % (ref 14–44)
LYMPHOCYTES # BLD AUTO: 3.28 THOUSANDS/ÂΜL (ref 0.6–4.47)
LYMPHOCYTES # BLD AUTO: 3.8 THOUSAND/UL (ref 0.6–4.47)
LYMPHOCYTES NFR BLD AUTO: 30 % (ref 14–44)
MCH RBC QN AUTO: 24.8 PG (ref 26.8–34.3)
MCH RBC QN AUTO: 25.3 PG (ref 26.8–34.3)
MCH RBC QN AUTO: 25.4 PG (ref 26.8–34.3)
MCHC RBC AUTO-ENTMCNC: 31.6 G/DL (ref 31.4–37.4)
MCHC RBC AUTO-ENTMCNC: 31.8 G/DL (ref 31.4–37.4)
MCHC RBC AUTO-ENTMCNC: 31.9 G/DL (ref 31.4–37.4)
MCV RBC AUTO: 79 FL (ref 82–98)
MCV RBC AUTO: 79 FL (ref 82–98)
MCV RBC AUTO: 80 FL (ref 82–98)
MONOCYTES # BLD AUTO: 0.82 THOUSAND/ÂΜL (ref 0.17–1.22)
MONOCYTES # BLD AUTO: 1.05 THOUSAND/UL (ref 0–1.22)
MONOCYTES NFR BLD AUTO: 8 % (ref 4–12)
MONOCYTES NFR BLD: 8 % (ref 4–12)
NEUTROPHILS # BLD AUTO: 6.45 THOUSANDS/ÂΜL (ref 1.85–7.62)
NEUTROPHILS # BLD MANUAL: 8.25 THOUSAND/UL (ref 1.85–7.62)
NEUTS SEG NFR BLD AUTO: 61 % (ref 43–75)
NEUTS SEG NFR BLD AUTO: 63 % (ref 43–75)
NRBC BLD AUTO-RTO: 0 /100 WBCS
P AXIS: 69 DEGREES
PLATELET # BLD AUTO: 217 THOUSANDS/UL (ref 149–390)
PLATELET # BLD AUTO: 242 THOUSANDS/UL (ref 149–390)
PLATELET # BLD AUTO: 253 THOUSANDS/UL (ref 149–390)
PLATELET BLD QL SMEAR: ADEQUATE
PMV BLD AUTO: 10.4 FL (ref 8.9–12.7)
PMV BLD AUTO: 10.9 FL (ref 8.9–12.7)
PMV BLD AUTO: 11.4 FL (ref 8.9–12.7)
POTASSIUM SERPL-SCNC: 3.3 MMOL/L (ref 3.5–5.3)
POTASSIUM SERPL-SCNC: 3.9 MMOL/L (ref 3.5–5.3)
PR INTERVAL: 178 MS
PROT SERPL-MCNC: 7.5 G/DL (ref 6.4–8.4)
PROTHROMBIN TIME: 13.5 SECONDS (ref 11.6–14.5)
QRS AXIS: 121 DEGREES
QRSD INTERVAL: 150 MS
QT INTERVAL: 388 MS
QTC INTERVAL: 495 MS
RBC # BLD AUTO: 4.76 MILLION/UL (ref 3.81–5.12)
RBC # BLD AUTO: 5 MILLION/UL (ref 3.81–5.12)
RBC # BLD AUTO: 5.53 MILLION/UL (ref 3.81–5.12)
RBC MORPH BLD: NORMAL
RSV RNA RESP QL NAA+PROBE: NEGATIVE
SARS-COV-2 RNA RESP QL NAA+PROBE: NEGATIVE
SODIUM SERPL-SCNC: 135 MMOL/L (ref 135–147)
SODIUM SERPL-SCNC: 136 MMOL/L (ref 135–147)
T WAVE AXIS: 89 DEGREES
TSH SERPL DL<=0.05 MIU/L-ACNC: 2.64 UIU/ML (ref 0.45–4.5)
VENTRICULAR RATE: 98 BPM
WBC # BLD AUTO: 10.78 THOUSAND/UL (ref 4.31–10.16)
WBC # BLD AUTO: 13.09 THOUSAND/UL (ref 4.31–10.16)
WBC # BLD AUTO: 9.64 THOUSAND/UL (ref 4.31–10.16)

## 2023-01-30 PROCEDURE — 047H3DZ DILATION OF RIGHT EXTERNAL ILIAC ARTERY WITH INTRALUMINAL DEVICE, PERCUTANEOUS APPROACH: ICD-10-PCS | Performed by: SURGERY

## 2023-01-30 PROCEDURE — B41DYZZ FLUOROSCOPY OF AORTA AND BILATERAL LOWER EXTREMITY ARTERIES USING OTHER CONTRAST: ICD-10-PCS | Performed by: RADIOLOGY

## 2023-01-30 PROCEDURE — 04CK0ZZ EXTIRPATION OF MATTER FROM RIGHT FEMORAL ARTERY, OPEN APPROACH: ICD-10-PCS | Performed by: SURGERY

## 2023-01-30 PROCEDURE — 04UK0KZ SUPPLEMENT RIGHT FEMORAL ARTERY WITH NONAUTOLOGOUS TISSUE SUBSTITUTE, OPEN APPROACH: ICD-10-PCS | Performed by: SURGERY

## 2023-01-30 DEVICE — VIABAHN BX BALLOON EXP ENDO 7MMX29MM 7FR 135CMCATH HEPARIN
Type: IMPLANTABLE DEVICE | Site: ARTERIAL | Status: FUNCTIONAL
Brand: GORE VIABAHN VBX BALLOON EXPANDABLE ENDO

## 2023-01-30 RX ORDER — IODIXANOL 320 MG/ML
140 INJECTION, SOLUTION INTRAVASCULAR
Status: COMPLETED | OUTPATIENT
Start: 2023-01-30 | End: 2023-01-30

## 2023-01-30 RX ORDER — MIDAZOLAM HYDROCHLORIDE 2 MG/2ML
INJECTION, SOLUTION INTRAMUSCULAR; INTRAVENOUS AS NEEDED
Status: DISCONTINUED | OUTPATIENT
Start: 2023-01-30 | End: 2023-01-30

## 2023-01-30 RX ORDER — FENTANYL CITRATE/PF 50 MCG/ML
50 SYRINGE (ML) INJECTION
Status: DISCONTINUED | OUTPATIENT
Start: 2023-01-30 | End: 2023-01-30 | Stop reason: HOSPADM

## 2023-01-30 RX ORDER — HEPARIN SODIUM 1000 [USP'U]/ML
7600 INJECTION, SOLUTION INTRAVENOUS; SUBCUTANEOUS EVERY 6 HOURS PRN
Status: DISCONTINUED | OUTPATIENT
Start: 2023-01-30 | End: 2023-01-30

## 2023-01-30 RX ORDER — SODIUM CHLORIDE 9 MG/ML
75 INJECTION, SOLUTION INTRAVENOUS CONTINUOUS
Status: DISPENSED | OUTPATIENT
Start: 2023-01-30 | End: 2023-01-31

## 2023-01-30 RX ORDER — HEPARIN SODIUM 1000 [USP'U]/ML
7600 INJECTION, SOLUTION INTRAVENOUS; SUBCUTANEOUS ONCE
Status: COMPLETED | OUTPATIENT
Start: 2023-01-30 | End: 2023-01-30

## 2023-01-30 RX ORDER — NEOSTIGMINE METHYLSULFATE 1 MG/ML
INJECTION INTRAVENOUS AS NEEDED
Status: DISCONTINUED | OUTPATIENT
Start: 2023-01-30 | End: 2023-01-30

## 2023-01-30 RX ORDER — CHLORHEXIDINE GLUCONATE 0.12 MG/ML
15 RINSE ORAL ONCE
Status: DISCONTINUED | OUTPATIENT
Start: 2023-01-30 | End: 2023-01-30 | Stop reason: HOSPADM

## 2023-01-30 RX ORDER — OXYCODONE HYDROCHLORIDE 10 MG/1
10 TABLET ORAL EVERY 4 HOURS PRN
Status: DISCONTINUED | OUTPATIENT
Start: 2023-01-30 | End: 2023-01-31 | Stop reason: HOSPADM

## 2023-01-30 RX ORDER — CEFAZOLIN SODIUM 2 G/50ML
2000 SOLUTION INTRAVENOUS ONCE
Status: COMPLETED | OUTPATIENT
Start: 2023-01-30 | End: 2023-01-30

## 2023-01-30 RX ORDER — MORPHINE SULFATE 4 MG/ML
4 INJECTION, SOLUTION INTRAMUSCULAR; INTRAVENOUS ONCE
Status: COMPLETED | OUTPATIENT
Start: 2023-01-30 | End: 2023-01-30

## 2023-01-30 RX ORDER — HEPARIN SODIUM 1000 [USP'U]/ML
3800 INJECTION, SOLUTION INTRAVENOUS; SUBCUTANEOUS EVERY 6 HOURS PRN
Status: DISCONTINUED | OUTPATIENT
Start: 2023-01-30 | End: 2023-01-30

## 2023-01-30 RX ORDER — PROPOFOL 10 MG/ML
INJECTION, EMULSION INTRAVENOUS AS NEEDED
Status: DISCONTINUED | OUTPATIENT
Start: 2023-01-30 | End: 2023-01-30

## 2023-01-30 RX ORDER — ONDANSETRON 2 MG/ML
4 INJECTION INTRAMUSCULAR; INTRAVENOUS ONCE AS NEEDED
Status: COMPLETED | OUTPATIENT
Start: 2023-01-30 | End: 2023-01-30

## 2023-01-30 RX ORDER — CLOPIDOGREL BISULFATE 75 MG/1
75 TABLET ORAL DAILY
Status: DISCONTINUED | OUTPATIENT
Start: 2023-01-31 | End: 2023-01-31 | Stop reason: HOSPADM

## 2023-01-30 RX ORDER — ACETAMINOPHEN 325 MG/1
975 TABLET ORAL EVERY 6 HOURS SCHEDULED
Status: DISCONTINUED | OUTPATIENT
Start: 2023-01-30 | End: 2023-01-31 | Stop reason: HOSPADM

## 2023-01-30 RX ORDER — LIDOCAINE HYDROCHLORIDE 20 MG/ML
INJECTION, SOLUTION EPIDURAL; INFILTRATION; INTRACAUDAL; PERINEURAL AS NEEDED
Status: DISCONTINUED | OUTPATIENT
Start: 2023-01-30 | End: 2023-01-30

## 2023-01-30 RX ORDER — OXYCODONE HYDROCHLORIDE 5 MG/1
5 TABLET ORAL EVERY 4 HOURS PRN
Status: DISCONTINUED | OUTPATIENT
Start: 2023-01-30 | End: 2023-01-31 | Stop reason: HOSPADM

## 2023-01-30 RX ORDER — POTASSIUM CHLORIDE 14.9 MG/ML
20 INJECTION INTRAVENOUS
Status: COMPLETED | OUTPATIENT
Start: 2023-01-30 | End: 2023-01-31

## 2023-01-30 RX ORDER — IODIXANOL 320 MG/ML
INJECTION, SOLUTION INTRAVASCULAR AS NEEDED
Status: DISCONTINUED | OUTPATIENT
Start: 2023-01-30 | End: 2023-01-30 | Stop reason: HOSPADM

## 2023-01-30 RX ORDER — VECURONIUM BROMIDE 1 MG/ML
INJECTION, POWDER, LYOPHILIZED, FOR SOLUTION INTRAVENOUS AS NEEDED
Status: DISCONTINUED | OUTPATIENT
Start: 2023-01-30 | End: 2023-01-30

## 2023-01-30 RX ORDER — HEPARIN SODIUM 10000 [USP'U]/100ML
3-30 INJECTION, SOLUTION INTRAVENOUS
Status: DISCONTINUED | OUTPATIENT
Start: 2023-01-30 | End: 2023-01-30

## 2023-01-30 RX ORDER — INSULIN LISPRO 100 [IU]/ML
2-12 INJECTION, SOLUTION INTRAVENOUS; SUBCUTANEOUS EVERY 6 HOURS SCHEDULED
Status: DISCONTINUED | OUTPATIENT
Start: 2023-01-30 | End: 2023-01-31 | Stop reason: HOSPADM

## 2023-01-30 RX ORDER — ONDANSETRON 2 MG/ML
INJECTION INTRAMUSCULAR; INTRAVENOUS AS NEEDED
Status: DISCONTINUED | OUTPATIENT
Start: 2023-01-30 | End: 2023-01-30

## 2023-01-30 RX ORDER — HEPARIN SODIUM 200 [USP'U]/100ML
INJECTION, SOLUTION INTRAVENOUS
Status: COMPLETED | OUTPATIENT
Start: 2023-01-30 | End: 2023-01-30

## 2023-01-30 RX ORDER — HEPARIN SODIUM 5000 [USP'U]/ML
5000 INJECTION, SOLUTION INTRAVENOUS; SUBCUTANEOUS EVERY 8 HOURS SCHEDULED
Status: DISCONTINUED | OUTPATIENT
Start: 2023-01-30 | End: 2023-01-31 | Stop reason: HOSPADM

## 2023-01-30 RX ORDER — FENTANYL CITRATE 50 UG/ML
INJECTION, SOLUTION INTRAMUSCULAR; INTRAVENOUS AS NEEDED
Status: DISCONTINUED | OUTPATIENT
Start: 2023-01-30 | End: 2023-01-30

## 2023-01-30 RX ORDER — SODIUM CHLORIDE 9 MG/ML
INJECTION, SOLUTION INTRAVENOUS CONTINUOUS PRN
Status: DISCONTINUED | OUTPATIENT
Start: 2023-01-30 | End: 2023-01-30

## 2023-01-30 RX ORDER — ASPIRIN 300 MG/1
300 SUPPOSITORY RECTAL DAILY
Status: DISCONTINUED | OUTPATIENT
Start: 2023-01-31 | End: 2023-01-31

## 2023-01-30 RX ORDER — GLYCOPYRROLATE 0.2 MG/ML
INJECTION INTRAMUSCULAR; INTRAVENOUS AS NEEDED
Status: DISCONTINUED | OUTPATIENT
Start: 2023-01-30 | End: 2023-01-30

## 2023-01-30 RX ORDER — MAGNESIUM HYDROXIDE 1200 MG/15ML
LIQUID ORAL AS NEEDED
Status: DISCONTINUED | OUTPATIENT
Start: 2023-01-30 | End: 2023-01-30 | Stop reason: HOSPADM

## 2023-01-30 RX ORDER — HEPARIN SODIUM 1000 [USP'U]/ML
INJECTION, SOLUTION INTRAVENOUS; SUBCUTANEOUS AS NEEDED
Status: DISCONTINUED | OUTPATIENT
Start: 2023-01-30 | End: 2023-01-30

## 2023-01-30 RX ORDER — ONDANSETRON 2 MG/ML
4 INJECTION INTRAMUSCULAR; INTRAVENOUS ONCE
Status: COMPLETED | OUTPATIENT
Start: 2023-01-30 | End: 2023-01-30

## 2023-01-30 RX ORDER — CLOPIDOGREL BISULFATE 75 MG/1
300 TABLET ORAL ONCE
Status: COMPLETED | OUTPATIENT
Start: 2023-01-30 | End: 2023-01-30

## 2023-01-30 RX ADMIN — IOHEXOL 100 ML: 350 INJECTION, SOLUTION INTRAVENOUS at 03:48

## 2023-01-30 RX ADMIN — HEPARIN SODIUM 5000 UNITS: 5000 INJECTION INTRAVENOUS; SUBCUTANEOUS at 22:53

## 2023-01-30 RX ADMIN — NEOSTIGMINE METHYLSULFATE 3 MG: 1 INJECTION INTRAVENOUS at 17:12

## 2023-01-30 RX ADMIN — FENTANYL CITRATE 50 MCG: 50 INJECTION INTRAMUSCULAR; INTRAVENOUS at 14:01

## 2023-01-30 RX ADMIN — FENTANYL CITRATE 50 MCG: 50 INJECTION INTRAMUSCULAR; INTRAVENOUS at 15:40

## 2023-01-30 RX ADMIN — INSULIN LISPRO 2 UNITS: 100 INJECTION, SOLUTION INTRAVENOUS; SUBCUTANEOUS at 23:00

## 2023-01-30 RX ADMIN — CEFAZOLIN SODIUM 2000 MG: 2 SOLUTION INTRAVENOUS at 12:35

## 2023-01-30 RX ADMIN — FENTANYL CITRATE 50 MCG: 50 INJECTION INTRAMUSCULAR; INTRAVENOUS at 18:17

## 2023-01-30 RX ADMIN — HEPARIN SODIUM 5000 UNITS: 1000 INJECTION INTRAVENOUS; SUBCUTANEOUS at 14:33

## 2023-01-30 RX ADMIN — LIDOCAINE HYDROCHLORIDE 100 MG: 20 INJECTION, SOLUTION EPIDURAL; INFILTRATION; INTRACAUDAL; PERINEURAL at 12:42

## 2023-01-30 RX ADMIN — GLYCOPYRROLATE 0.4 MG: 0.2 INJECTION INTRAMUSCULAR; INTRAVENOUS at 17:12

## 2023-01-30 RX ADMIN — VECURONIUM BROMIDE 2 MG: 1 INJECTION, POWDER, LYOPHILIZED, FOR SOLUTION INTRAVENOUS at 13:52

## 2023-01-30 RX ADMIN — VECURONIUM BROMIDE 1 MG: 1 INJECTION, POWDER, LYOPHILIZED, FOR SOLUTION INTRAVENOUS at 14:36

## 2023-01-30 RX ADMIN — ONDANSETRON HYDROCHLORIDE 4 MG: 2 SOLUTION INTRAMUSCULAR; INTRAVENOUS at 18:05

## 2023-01-30 RX ADMIN — POTASSIUM CHLORIDE 20 MEQ: 14.9 INJECTION, SOLUTION INTRAVENOUS at 20:41

## 2023-01-30 RX ADMIN — CLOPIDOGREL BISULFATE 300 MG: 75 TABLET ORAL at 20:43

## 2023-01-30 RX ADMIN — IODIXANOL 140 ML: 320 INJECTION, SOLUTION INTRAVASCULAR at 09:51

## 2023-01-30 RX ADMIN — VECURONIUM BROMIDE 7 MG: 1 INJECTION, POWDER, LYOPHILIZED, FOR SOLUTION INTRAVENOUS at 12:42

## 2023-01-30 RX ADMIN — SODIUM CHLORIDE 75 ML/HR: 0.9 INJECTION, SOLUTION INTRAVENOUS at 20:21

## 2023-01-30 RX ADMIN — POTASSIUM CHLORIDE 20 MEQ: 14.9 INJECTION, SOLUTION INTRAVENOUS at 23:08

## 2023-01-30 RX ADMIN — SODIUM CHLORIDE: 0.9 INJECTION, SOLUTION INTRAVENOUS at 15:59

## 2023-01-30 RX ADMIN — SODIUM CHLORIDE: 9 INJECTION, SOLUTION INTRAVENOUS at 12:53

## 2023-01-30 RX ADMIN — HEPARIN SODIUM 18 UNITS/KG/HR: 10000 INJECTION, SOLUTION INTRAVENOUS at 06:53

## 2023-01-30 RX ADMIN — ONDANSETRON 4 MG: 2 INJECTION INTRAMUSCULAR; INTRAVENOUS at 19:55

## 2023-01-30 RX ADMIN — SODIUM CHLORIDE: 0.9 INJECTION, SOLUTION INTRAVENOUS at 12:28

## 2023-01-30 RX ADMIN — MORPHINE SULFATE 4 MG: 4 INJECTION INTRAVENOUS at 10:05

## 2023-01-30 RX ADMIN — FENTANYL CITRATE 50 MCG: 50 INJECTION INTRAMUSCULAR; INTRAVENOUS at 18:08

## 2023-01-30 RX ADMIN — OXYCODONE HYDROCHLORIDE 10 MG: 10 TABLET ORAL at 19:47

## 2023-01-30 RX ADMIN — FENTANYL CITRATE 50 MCG: 50 INJECTION INTRAMUSCULAR; INTRAVENOUS at 13:06

## 2023-01-30 RX ADMIN — ACETAMINOPHEN 325MG 975 MG: 325 TABLET ORAL at 23:00

## 2023-01-30 RX ADMIN — PROPOFOL 50 MG: 10 INJECTION, EMULSION INTRAVENOUS at 17:09

## 2023-01-30 RX ADMIN — CEFAZOLIN SODIUM 2000 MG: 2 SOLUTION INTRAVENOUS at 16:46

## 2023-01-30 RX ADMIN — SODIUM CHLORIDE: 9 INJECTION, SOLUTION INTRAVENOUS at 16:06

## 2023-01-30 RX ADMIN — HEPARIN SODIUM 3000 UNITS: 1000 INJECTION INTRAVENOUS; SUBCUTANEOUS at 16:04

## 2023-01-30 RX ADMIN — ONDANSETRON 4 MG: 2 INJECTION INTRAMUSCULAR; INTRAVENOUS at 16:48

## 2023-01-30 RX ADMIN — FENTANYL CITRATE 50 MCG: 50 INJECTION INTRAMUSCULAR; INTRAVENOUS at 15:07

## 2023-01-30 RX ADMIN — HEPARIN SODIUM 7600 UNITS: 1000 INJECTION INTRAVENOUS; SUBCUTANEOUS at 06:52

## 2023-01-30 RX ADMIN — MORPHINE SULFATE 4 MG: 4 INJECTION INTRAVENOUS at 05:06

## 2023-01-30 RX ADMIN — MIDAZOLAM 2 MG: 1 INJECTION INTRAMUSCULAR; INTRAVENOUS at 12:35

## 2023-01-30 RX ADMIN — INSULIN HUMAN 2 UNITS: 100 INJECTION, SOLUTION PARENTERAL at 18:45

## 2023-01-30 RX ADMIN — PROPOFOL 200 MG: 10 INJECTION, EMULSION INTRAVENOUS at 12:42

## 2023-01-30 RX ADMIN — FENTANYL CITRATE 100 MCG: 50 INJECTION INTRAMUSCULAR; INTRAVENOUS at 12:42

## 2023-01-30 RX ADMIN — VECURONIUM BROMIDE 1 MG: 1 INJECTION, POWDER, LYOPHILIZED, FOR SOLUTION INTRAVENOUS at 15:40

## 2023-01-30 NOTE — CONSULTS
Mikel Huerta 12 1990, 26 y o  female MRN: [id number]  Unit/Bed#: ED 19 Encounter: [id number]  Primary Care Provider: REJI Hoskins   Date and time admitted to hospital: 1/30/2023 12:32 AM    Inpatient consult to Vascular Surgery  Consult performed by: Jody Naidu PA-C  Consult ordered by: Maryuri Jama DO      Addendum:  CTA abdomen w/bilateral run off reviewed which demonstrates acute thrombosis of R EIA with extension into the proximal R CFA  There is continuous three vessel run off to the foot without evidence of distal embolization  Continue heparin gtt  Plan for OR emergently today for R CFA cut down, thrombectomy/embolectomy w/ Dr Octaviano Olea Doctor    Arterial occlusion  Assessment & Plan  26year old female former smoker w/obesity (BMI 41 19), HTN, HLD, type II DM, aortic coarctation s/p repair and stent placement, VSD s/p repair, bipolar disorder, hepatitis C, who is s/p diagnostic cardiac catheterization on 1/12/23 via R femoral access s/p angioseal closure device performed for abnormal stress test, who presents with 2 week hx of hip/thigh pain, worse with walking, after catheterization  Imaging suggests thrombus vs injury of the R EIA/CFA  Vascular surgery consulted for input and recommendations  Diagnostics:  - PSA duplex 1/27/23: Negative for PSA  Upon review of images, there appears to be dampened waveform in the CFA  - CT abd/pelvix w contrast 1/30/23: Filling defect of the R EIA/CFA, consistent with artifact vs thrombus  - CTA abd w/run off: pending   - GEORGINA duplex: pending     Hgb 12 4  WBC 9 64  SCr/eGFR 0 65/115    Recommendations:  - R thigh/hip pain, worse with walking, s/p cardiac catheterization on 1/12/23  From review of imaging, access stick appears to be high     - PSA study was unremarkable however there was evidence of dampened waveforms in the CFA, which suggests proximal inflow stenosis/occlusion  - Easily palpable LLE DP/PT pulses, non-palpable RLE PT/DP  - Patient remains motor/sensory intact without evidence of acute limb ischemia  - Ct of the abdomen and pelvis obtained in the ED reviewed  Difficult to fully visualize EIA/CFA due to contrast timing  However, there dose appear to be a filling defect consistent with thrombus vs dissection  - Will obtain formal CTA of the abdomen with run off for evaluation and planning  - Heparin gtt initiated  - Formal recommendations to follow CTA  - D/w Dr Terry Bruce Doctor     HPI: Jose Jackson is a 26 y o  female former smoker with obesity, hypertension, hyperlipidemia, type 2 diabetes mellitus, aortic coarctation status postrepair and stent placement, VSD status postrepair, bipolar disorder, hepatitis C, who is status post diagnostic cardiac catheterization on 1/12/2023 via right femoral access status post Angio-Seal closure device performed for abnormal stress test, who presents with 2-week history of hip/thigh pain, worse with walking, after catheterization  Imaging suggests thrombus versus injury of the right EIA/CFA  Vascular surgery consulted for input and recommendations    Patient states that she had developed chest pain after having a nuclear stress test   It was recommended that she undergo a cardiac catheterization  This was performed on 1/12/2023 and was diagnostic  They attempted to access her right radial artery however he went into spasm and therefore this was aborted  They ended up accessing the right common femoral artery, however this did appear to be a high stick with access likely in the external iliac  There was no obvious coronary artery lesion, therefore cardiac catheterization was diagnostic  Patient states that after the procedure, she had been having persistent right groin/hip pain, worse with standing and walking    She reports some discomfort at rest however she is unable to perform daily tasks such as grocery shopping or walk around her house without pain  She denies any significant pain in her foot  Denies any numbness or tingling  Patient did have an outpatient pseudoaneurysm duplex that was done on 1/27/2023 which was negative for any pseudoaneurysm  There was no noted abnormality however upon review of the imaging there is evidence of dampened waveforms in the common femoral artery suggestive of inflow disease  Patient presented to the hospital with persistent pain and states that nothing has been improving  A CT of the abdomen and pelvis was obtained and while there is limited feeling/definition of the arterial structures, there is evidence of a defect/possible thrombus in the distal external iliac artery/common femoral artery      Review of Systems:  General: negative  Cardiovascular: no chest pain or dyspnea on exertion  Respiratory: no cough, shortness of breath, or wheezing  Gastrointestinal: no abdominal pain, change in bowel habits, or black or bloody stools  Genitourinary ROS: no dysuria, trouble voiding, or hematuria  Musculoskeletal ROS: positive for - pain in hip - right  Neurological ROS: no TIA or stroke symptoms  Hematological and Lymphatic ROS: negative  Dermatological ROS: negative  Psychological ROS: negative  Ophthalmic ROS: negative  ENT ROS: negative    Past Medical History:  Past Medical History:   Diagnosis Date   • Allergic    • Arthritis    • Bipolar affective disorder, currently depressed, moderate ([address] ) 12/17/2018   • Cyst of ovary, right    • Diabetes mellitus ([address] ) 10/10/2018    type 2   • Endometriosis    • Heart murmur 1988   • Hepatitis C    • Hepatitis C virus infection cured after antiviral drug therapy    • History of transfusion    • Hypertension    • Migraines    • Morbid obesity with BMI of 40 0-44 9, adult ([facility] [address] )    • Obesity 1995   • Pulmonary artery congenital abnormality    • Spleen enlarged    • Status post surgical removal of both fallopian tubes    • Varicella        Past Surgical History:  Past Surgical History:   Procedure Laterality Date   • CARDIAC CATHETERIZATION      no CAD 10days, 4 weeks 20months old    • CARDIAC CATHETERIZATION N/A 2023    Procedure: Cardiac Coronary Angiogram;  Surgeon: Elton Hernandez DO;  Location: [facility]; Service: Cardiology   • CARDIAC CATHETERIZATION Left 2023    Procedure: Cardiac Left Heart Cath;  Surgeon: Elton Hernandez DO;  Location: [facility]; Service: Cardiology   • CARDIAC CATHETERIZATION  2023    Procedure: Cardiac catheterization;  Surgeon: Elton Hernandez DO;  Location: [facility];   Service: Cardiology   •  SECTION, LOW TRANSVERSE     • CHOLECYSTECTOMY     • 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 5   • CORONARY STENT PLACEMENT     • LIVER BIOPSY     • LIVER BIOPSY     • STERNAL WIRE REMOVAL  2022   • TUBAL LIGATION Bilateral    • VSD REPAIR      As a child       Social History:  Social History     Substance and Sexual Activity   Alcohol Use Not Currently   • Alcohol/week: 3 0 standard drinks   • Types: 3 Standard drinks or equivalent per week    Comment: socially/prior to knowledge of pregnancy     Social History     Substance and Sexual Activity   Drug Use Not Currently    Comment: hx of THC use for migraines     Social History     Tobacco Use   Smoking Status Former   • Packs/day: 0 50   • Years: 0 00   • Pack years: 0 00   • Types: Cigarettes   Smokeless Tobacco Never       Family History:  Family History   Problem Relation Age of Onset   • Hypertension Mother    • Migraines Mother    • JENNY disease Mother    • Depression Mother    • Hyperlipidemia Mother    • Diabetes Mother    • Diabetes Father    • Hypertension Father    • Kidney failure Father    • Heart attack Father    • Arthritis Father    • Stroke Father    • Polycystic kidney disease Paternal Grandmother    • Stroke Paternal Grandmother    • Heart disease Paternal Grandmother    • Arthritis Sister    • Asthma Sister    • Thyroid disease Sister    • Diabetes Sister    • Arthritis Maternal Grandmother    • Breast cancer Maternal Grandmother    • Diabetes Maternal Grandmother    • Hypertension Maternal Grandmother    • Heart Valve Disease Maternal Grandmother    • Learning disabilities Cousin    • Learning disabilities Sister    • ADD / ADHD Cousin    • Lung cancer Brother    • Diabetes Maternal Grandfather    • Hypertension Maternal Grandfather    • JENNY disease Maternal Grandfather    • Stroke Maternal Grandfather        Allergies: Allergies   Allergen Reactions   • Prednisone Swelling   • Bactrim [Sulfamethoxazole-Trimethoprim] Hives   • Corticosteroids Swelling     Pt states this does not cause problems breathing, she just has generalized swelling   • Cortisone Swelling     Generalized; no impairment with breathing reported     • Medical Tape Hives     Allergic to Paper Tape   • Other Hives     Paper tape   • Sulfa Antibiotics Hives       Medications:  Current Facility-Administered Medications   Medication Dose Route Frequency   • heparin (porcine) 25,000 units in 0 45% NaCl 250 mL infusion (premix)  3-30 Units/kg/hr (Order-Specific) Intravenous Titrated   • heparin (porcine) injection 3,800 Units  3,800 Units Intravenous Q6H PRN   • heparin (porcine) injection 7,600 Units  7,600 Units Intravenous Q6H PRN   • morphine injection 4 mg  4 mg Intravenous Once       Vitals:  /63 (01/30/23 0952)    Temp      Pulse 93 (01/30/23 0952)   Resp 21 (01/30/23 0952)    SpO2 97 % (01/30/23 0952)      I/Os:  No intake/output data recorded  No intake/output data recorded      Lab Results and Cultures:   CBC with diff:   Lab Results   Component Value Date    WBC 9 64 01/30/2023    HGB 12 4 01/30/2023    HCT 39 3 01/30/2023    MCV 79 (L) 01/30/2023     01/30/2023    MCH 24 8 (L) 01/30/2023    MCHC 31 6 01/30/2023    RDW 15 5 (H) 01/30/2023    MPV 11 4 01/30/2023    NRBC 0 01/30/2023   ,   BMP/CMP:  Lab Results   Component Value Date     06/14/2018    K 3 9 01/30/2023    K 4 0 06/14/2018     01/30/2023     06/14/2018    CO2 25 01/30/2023    CO2 28 06/14/2018    ANIONGAP 13 0 06/14/2018    BUN 15 01/30/2023    BUN 15 06/14/2018    CREATININE 0 65 01/30/2023    CREATININE 0 65 06/14/2018    CALCIUM 9 6 01/30/2023    CALCIUM 9 9 06/14/2018    AST 10 (L) 01/30/2023    ALT 12 01/30/2023    ALKPHOS 63 01/30/2023    EGFR 115 01/30/2023   ,   Lipid Panel: No results found for: CHOL,   Coags:   Lab Results   Component Value Date    PTT 27 01/30/2023    PTT 32 6 06/14/2018    INR 1 03 01/30/2023    INR 1 08 06/14/2018   ,     Blood Culture:   Lab Results   Component Value Date    BLOODCX No Growth After 5 Days  03/30/2022    BLOODCX No Growth After 5 Days  03/30/2022   ,   Urinalysis:   Lab Results   Component Value Date    COLORU Yellow 03/30/2022    CLARITYU Clear 03/30/2022    SPECGRAV >=1 030 (H) 03/30/2022    PHUR 6 0 03/30/2022    PHUR 5 5 02/28/2020    LEUKOCYTESUR Negative 03/30/2022    NITRITE Negative 03/30/2022    GLUCOSEU 3+ (A) 03/30/2022    KETONESU Negative 03/30/2022    BILIRUBINUR Negative 03/30/2022    BLOODU Negative 03/30/2022   ,   Urine Culture:   Lab Results   Component Value Date    URINECX 40,000-49,000 cfu/ml 01/06/2020   ,   Wound Culure:   Lab Results   Component Value Date    WOUNDCULT 1+ Growth of Staphylococcus aureus (A) 05/31/2019       Imaging:  Reviewed    Physical Exam:    General appearance: alert and oriented, in no acute distress  Head: Normocephalic, without obvious abnormality, atraumatic  Eyes: negative  Throat: lips, mucosa, and tongue normal; teeth and gums normal  Neck: no JVD and supple, symmetrical, trachea midline  Back: symmetric, no curvature  ROM normal  No CVA tenderness    Lungs: clear to auscultation bilaterally  Chest wall: no tenderness  Heart: regular rate and rhythm, S1, S2 normal, no murmur, click, rub or gallop  Abdomen: soft, non-tender; bowel sounds normal; no masses,  no organomegaly  Extremities: Bilateral lower extremities are warm  Motor/sensory intact   No evidence of acute limb ischemia   Skin: Skin color, texture, turgor normal  No rashes or lesions  Neurologic: Grossly normal    Pulse exam:  Radial: Right: 2+ Left[de-identified] 2+  Femoral: Right: non-palpable Left: 2+  DP: Right: non-palpable Left: 2+  PT: Right: non-palpable Left: 2+    Heather Ward PA-C  1/30/2023

## 2023-01-30 NOTE — ASSESSMENT & PLAN NOTE
26year old female former smoker w/obesity (BMI 41 19), HTN, HLD, type II DM, aortic coarctation s/p repair and stent placement, VSD s/p repair, bipolar disorder, hepatitis C, who is s/p diagnostic cardiac catheterization on 1/12/23 via R femoral access s/p angioseal closure device performed for abnormal stress test, who presents with 2 week hx of hip/thigh pain, worse with walking, after catheterization  Imaging suggests thrombus vs injury of the R EIA/CFA  Vascular surgery consulted for input and recommendations  Diagnostics:  - PSA duplex 1/27/23: Negative for PSA  Upon review of images, there appears to be dampened waveform in the CFA  - CT abd/pelvix w contrast 1/30/23: Filling defect of the R EIA/CFA, consistent with artifact vs thrombus  - CTA abd w/run off: pending   - GEORGINA duplex: pending     Recommendations:  - R thigh/hip pain, worse with walking, s/p cardiac catheterization on 1/12/23  From review of imaging, access stick appears to be high  - PSA study was unremarkable however there was evidence of dampened waveforms in the CFA, which suggests proximal inflow stenosis/occlusion  - Easily palpable LLE DP/PT pulses, non-palpable RLE PT/DP  - Patient remains motor/sensory intact without evidence of acute limb ischemia  - Ct of the abdomen and pelvis obtained in the ED reviewed  Difficult to fully visualize EIA/CFA due to contrast timing   However, there dose appear to be a filling defect consistent with thrombus vs dissection  - Will obtain formal CTA of the abdomen with run off for evaluation and planning  - Heparin gtt initiated  - Formal recommendations to follow CTA  - D/w Dr Whitaker Marker Doctor

## 2023-01-30 NOTE — LETTER
[facility] UNIT  [address] [place] [id number]  Dept: [phone]    January 31, 2023     Patient: Margaret Rouse   YOB: 1990   Date of Visit: 1/30/2023       To Whom it May Concern:    Margaret Rouse is under my professional care  She was seen in the hospital from 1/30/2023 to 01/31/23  She may return to work on 2/16/23 without limitations  If you have any questions or concerns, please don't hesitate to call           Sincerely,          Kevin Fields PA-C

## 2023-01-30 NOTE — OP NOTE
OPERATIVE REPORT  PATIENT NAME: Adal Boykin    :  1990  MRN: [id number]  Pt Location: [facility]    SURGERY DATE: 2023    Surgeon(s) and Role:     Janneth Hodges MD - Primary    * MUNA Mccloud - Assistant    Preop Diagnosis:  Right CFA/EIA occlusion likely 2/2 closure device malfunction  Acute right lower extremity ischemia    Post-Op Diagnosis Codes:  same    Procedure(s):  Right femoral exposure   Right iliac embolectomy w/ #4 Ted catheter   Aortogram   Right EIA stent w/ 7x29mm VBX    Specimen(s):  none    Estimated Blood Loss:   150 mL    Drains:  Urethral Catheter Latex;Straight-tip 16 Fr  (Active)   Site Assessment Clean;Skin intact 23   Collection Container Standard drainage bag 23   Securement Method Securing device (Describe) 23   Number of days: 0       Anesthesia Type:   General ETA    Operative Indications:  Patient is a 25 yo F w/ hx of aortic coarctation, TEVAR, multiple catheterizations, underwent cardiac catheterization via R fem access 23 w/ Mynx closure  Presents now (2 wks later) with R thigh pain and numbness since this procedure    Operative Findings:  Occlusion of the right EIA and CFA w/ mixed subacute and chronic thrombus  Patient profunda and SFA arteries    Complications:   None    Procedure and Technique:  After informed consent was obtained, the patient was brought to the operating room and placed in the supine position  Perioperative IV antibiotics were given  She was given anesthesia and endotracheally intubated  She was then prepped and draped in the usual sterile fashion exposing the right leg circumferentially and the left groin  A timeout was performed  A marking pen was used to lashonda longitudinal incision over the femoral artery, below the level of the inguinal ligament  A 15-blade was used to make the incision  Cautery was used to dissect through the soft tissue    This was noted to be densely inflamed and with numerous enlarged lymph nodes  The inguinal ligament was identified and freed medially and laterally  The femoral sheath was incised and the common femoral artery was identified and freed proximally  A vessel loop was placed  The artery was noted to be blue in color consistent with thrombus within  There was an area of dense inflammation over the common femoral artery and a proglide closure device was identified and excised  The dissection was continued distally along the superficial femoral artery which was freed circumferentially and a vessel loop was placed  Lastly, the profunda femoral artery was identified and its posterior and lateral branches were encircled with vessel loops  The femoral vessels were soft but quite small  5000 units of IV heparin were given and the heparin gtt was continued at 18units/kg/hr  Additional heparin was given throughout the case to keep ACT therapeutic  The vessel loops were pulled taught, starting with the profunda and SFA  An 11-blade was used to make a transverse arteriotomy in the common femoral artery just proximal to the bifurcation  A #4 Ted catheter was used to perform embolectomy of the iliofemoral system  A large amount of mixed subacute and chronic thrombus was removed  No closure device/angioseal was noted in this debris  Pulsatile flow was restored  The artery was flushed with heparinized saline and reclamped  The distal vessels were free of thrombus on CT scan and excellent back bleeding was noted from all vessels  Embolectomy was not performed to preserve the intima  The arteriotomy was then closed primarily in a running fashion with 6-0 prolene suture and flow was restored  A micropuncture kit was used to access the common femoral artery just proximal to the arteriotomy site in a retrograde fashion  The micropuncture catheter was inserted    Angiography was performed which now showed flow throughout the iliac system and a patent femoral bifurcation  There was a small piece of residual wall thrombus about 3 cm proximal to our arteriotomy site and another larger area about 5 cm proximal to this which appeared to be flow limiting  The arteries were reclamped and the micropuncture catheter was removed  The prior arteriotomy site was reopened and thrombectomy was again performed  There was no further return of thrombus  A long parallel shaped clamp was inserted into the arteriotomy and an additional piece of chronic thrombus was removed  A figure of eight prolene sutures was then used to close the micropuncture access site  A 7F sheath was then inserted over a sheath wire through the arteriotomy site  Angiography was performed  This showed resolution of the distal thrombus but persistent thrombus proximally where it was seen previously  A taylor wire was inserted into the aorta followed by an angled glide catheter  Angiography was taken in the aorta  This, oddly showed a dissection in the infrarenal aorta and filling of the R iliac system only  The wire was reinserted and directed to the right side and was able to easily get into the true lumen  An omniflush catheter was inserted over the wire and placed in the aorta at the level of the renal arteries  Aortogram was performed  This showed a subtle dissection flap in the aorta and flow down the bilateral iliac systems  The catheter was moved more distally but still proximal to the dissection plane and again aortogram was performed for better visualization; all lumbar arteries were filling at each level bilaterally with flow into the iliac arteries  The decision was made not to intervene on this as it did not appear to be flow limiting and for fear of worsening the dissection plane  Next, a 7x29mm VBX stent was selected and deployed over the area of residual thrombus in the external iliac artery  Completion angiography now revealed brisk flow throughout this area    The sheath and wire were then removed and the arteries were reclamped  There was a small dissection noted in the posterolateral wall at the level of the arteriotomy and this was tacked down using 6-0 prolene suture  The arteriotomy was then closed with 6-0 prolene in a running fashion  This was completed and flow was restored  The wound was then copiously irrigated with saline  It was then checked for hemostasis  Surgicel was used to aid with hemostasis  The wound was closed with two layers of running 2-0 vicryl suture for the femoral sheath and raghu's fascia, and two layers of running 3-0 vicryl for the soft tissue and deep dermal layers  4-0 monocryl running subcuticular layer was used for the skin  The incision was dressed with Histoacryl glue  The patient was allowed to awaken and was extubated  She was then transferred to the PACU for postoperative care      2+ B DP at completion     I was present for the entire procedure, A qualified resident physician was not available and A physician assistant was required during the procedure for retraction tissue handling,dissection and suturing    Patient Disposition:  PACU         SIGNATURE: Wiliam Hodges MD  DATE: January 30, 2023  TIME: 6:39 PM

## 2023-01-30 NOTE — ASSESSMENT & PLAN NOTE
26year old female former smoker w/obesity (BMI 41 19), HTN, HLD, type II DM, aortic coarctation s/p repair and stent placement, VSD s/p repair, bipolar disorder, hepatitis C, who is s/p diagnostic cardiac catheterization on 1/12/23 via R femoral access s/p angioseal closure device performed for abnormal stress test, who presents with 2 week hx of hip/thigh pain, worse with walking, after catheterization  Imaging suggests thrombus vs injury of the R EIA/CFA  Vascular surgery consulted for input and recommendations  Diagnostics:  - PSA duplex 1/27/23: Negative for PSA  Upon review of images, there appears to be dampened waveform in the CFA  - CT abd/pelvix w contrast 1/30/23: Filling defect of the R EIA/CFA, consistent with artifact vs thrombus  - CTA abd w/run off: pending   - GEORGINA duplex: pending     Hgb 12 4  WBC 9 64  SCr/eGFR 0 65/115    Recommendations:  - R thigh/hip pain, worse with walking, s/p cardiac catheterization on 1/12/23  From review of imaging, access stick appears to be high  - PSA study was unremarkable however there was evidence of dampened waveforms in the CFA, which suggests proximal inflow stenosis/occlusion  - Easily palpable LLE DP/PT pulses, non-palpable RLE PT/DP  - Patient remains motor/sensory intact without evidence of acute limb ischemia  - Ct of the abdomen and pelvis obtained in the ED reviewed  Difficult to fully visualize EIA/CFA due to contrast timing   However, there dose appear to be a filling defect consistent with thrombus vs dissection  - Will obtain formal CTA of the abdomen with run off for evaluation and planning  - Heparin gtt initiated  - Formal recommendations to follow CTA  - D/w Dr Leo Hodges

## 2023-01-30 NOTE — ED PROVIDER NOTES
History  Chief Complaint   Patient presents with   • Palpitations   • Leg Pain     PT c/o palpitation started this evening, denies CP,dizziness/SOB at this time, also c/o right leg pain, reports has had this pain since cardiac cath  that was done approx 2 weeks ago  25 y/o female with a  presents c/o palpitations that began at 10pm this evening and have been constant since  She states this feels like a "skipping" in her chest  It is non-radiating  She also has associated right inguinal pain at the site of her cardiac catheterization x 2 weeks ago  She has tried ibuprofen and tylenol with no relief, the pain is worse when weight bearing  Denies chest pain, SOB, cough, syncope, changes in temperature or color of her extremities  Palpitations  Associated symptoms: leg pain    Associated symptoms: no back pain, no chest pain, no cough, no diaphoresis, no shortness of breath and no vomiting    Leg Pain  Associated symptoms: no back pain and no fever        Prior to Admission Medications   Prescriptions Last Dose Informant Patient Reported? Taking? ALPRAZolam (XANAX) 0 5 mg tablet   No Yes   Sig: Take 1 tablet (0 5 mg total) by mouth daily at bedtime as needed for anxiety   B-D UF III MINI PEN NEEDLES 31G X 5 MM MISC   No Yes   Sig: INJECT UNDER THE SKIN DAILY AT BEDTIME USE ONE A DAY OR AS DIRECTED   Blood Glucose Monitoring Suppl (OneTouch Verio) w/Device KIT   No Yes   Sig: Use daily   Blood Pressure Monitoring (B-D ASSURE BPM/AUTO WRIST CUFF) MISC   No Yes   Sig: Check blood pressure prior to each OB visit, or as directed by your physician     VITAMIN D PO   Yes Yes   Sig: Take by mouth daily   acetaminophen (TYLENOL) 500 mg tablet   Yes Yes   Sig: Take 1,000 mg by mouth   albuterol (Ventolin HFA) 90 mcg/act inhaler   No Yes   Sig: Inhale 2 puffs every 4 (four) hours as needed for wheezing or shortness of breath   diclofenac sodium (VOLTAREN) 50 mg EC tablet   No Yes   Sig: Take 1 tablet (50 mg total) by mouth 2 (two) times a day   fexofenadine (ALLEGRA) 180 MG tablet   No Yes   Sig: Take 1 tablet (180 mg total) by mouth daily   gabapentin (NEURONTIN) 100 mg capsule   No Yes   Sig: take 1 capsule by mouth daily at bedtime   insulin glargine (LANTUS) 100 units/mL subcutaneous injection   No Yes   Sig: Inject 17 Units under the skin daily at bedtime   metFORMIN (GLUCOPHAGE) 1000 MG tablet   No Yes   Sig: Take 1 tablet (1,000 mg total) by mouth 2 (two) times a day with meals   methocarbamol (ROBAXIN) 500 mg tablet   No Yes   Sig: Take 1 tablet (500 mg total) by mouth 4 (four) times a day   montelukast (SINGULAIR) 10 mg tablet   No Yes   Sig: Take 1 tablet (10 mg total) by mouth daily at bedtime   sertraline (Zoloft) 50 mg tablet   No Yes   Sig: Take 1 tablet (50 mg total) by mouth daily   sitaGLIPtin (JANUVIA) 100 mg tablet   No Yes   Sig: Take 1 tablet (100 mg total) by mouth daily      Facility-Administered Medications: None       Past Medical History:   Diagnosis Date   • Allergic    • Arthritis    • Bipolar affective disorder, currently depressed, moderate ([facility]) 12/17/2018   • Cyst of ovary, right    • Diabetes mellitus ([facility] [address] ) 10/10/2018    type 2   • Endometriosis    • Heart murmur 1988   • Hepatitis C    • Hepatitis C virus infection cured after antiviral drug therapy    • History of transfusion    • Hypertension    • Migraines    • Morbid obesity with BMI of 40 0-44 9, adult ([facility] [address] )    • Obesity 1995   • Pulmonary artery congenital abnormality    • Spleen enlarged    • Status post surgical removal of both fallopian tubes    • Varicella        Past Surgical History:   Procedure Laterality Date   • CARDIAC CATHETERIZATION      no CAD 10days, 4 weeks 20months old    • CARDIAC CATHETERIZATION N/A 1/12/2023    Procedure: Cardiac Coronary Angiogram;  Surgeon: Lissy Doherty DO;  Location: [facility];   Service: Cardiology   • CARDIAC CATHETERIZATION Left 1/12/2023    Procedure: Cardiac Left Heart Cath; Surgeon: Cleo Paz DO;  Location: [facility]; Service: Cardiology   • CARDIAC CATHETERIZATION  2023    Procedure: Cardiac catheterization;  Surgeon: Cleo Paz DO;  Location: [facility]; Service: Cardiology   •  SECTION, LOW TRANSVERSE     • CHOLECYSTECTOMY     • 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 5   • CORONARY STENT PLACEMENT     • LIVER BIOPSY     • LIVER BIOPSY     • STERNAL WIRE REMOVAL  2022   • TUBAL LIGATION Bilateral    • VSD REPAIR      As a child       Family History   Problem Relation Age of Onset   • Hypertension Mother    • Migraines Mother    • JENNY disease Mother    • Depression Mother    • Hyperlipidemia Mother    • Diabetes Mother    • Diabetes Father    • Hypertension Father    • Kidney failure Father    • Heart attack Father    • Arthritis Father    • Stroke Father    • Polycystic kidney disease Paternal Grandmother    • Stroke Paternal Grandmother    • Heart disease Paternal Grandmother    • Arthritis Sister    • Asthma Sister    • Thyroid disease Sister    • Diabetes Sister    • Arthritis Maternal Grandmother    • Breast cancer Maternal Grandmother    • Diabetes Maternal Grandmother    • Hypertension Maternal Grandmother    • Heart Valve Disease Maternal Grandmother    • Learning disabilities Cousin    • Learning disabilities Sister    • ADD / ADHD Cousin    • Lung cancer Brother    • Diabetes Maternal Grandfather    • Hypertension Maternal Grandfather    • JENNY disease Maternal Grandfather    • Stroke Maternal Grandfather      I have reviewed and agree with the history as documented      E-Cigarette/Vaping   • E-Cigarette Use Never User      E-Cigarette/Vaping Substances   • Nicotine No    • THC No    • CBD No    • Flavoring No      Social History     Tobacco Use   • Smoking status: Former     Packs/day: 0 50     Years: 0 00     Pack years: 0 00     Types: Cigarettes   • Smokeless tobacco: Never   Vaping Use   • Vaping Use: Never used   Substance Use Topics   • Alcohol use: Not Currently     Alcohol/week: 3 0 standard drinks     Types: 3 Standard drinks or equivalent per week     Comment: socially/prior to knowledge of pregnancy   • Drug use: Not Currently     Comment: hx of THC use for migraines       Review of Systems   Constitutional: Negative for chills, diaphoresis and fever  HENT: Negative for ear pain and sore throat  Eyes: Negative for pain and visual disturbance  Respiratory: Negative for cough and shortness of breath  Cardiovascular: Positive for palpitations  Negative for chest pain and leg swelling  Gastrointestinal: Negative for abdominal pain and vomiting  Genitourinary: Negative for dysuria  Musculoskeletal: Positive for arthralgias  Negative for back pain  Skin: Negative for color change and rash  Neurological: Negative for seizures and syncope  All other systems reviewed and are negative  Physical Exam  Physical Exam  Vitals and nursing note reviewed  Constitutional:       Appearance: She is well-developed  HENT:      Head: Normocephalic and atraumatic  Eyes:      Conjunctiva/sclera: Conjunctivae normal       Pupils: Pupils are equal, round, and reactive to light  Cardiovascular:      Rate and Rhythm: Normal rate and regular rhythm  Pulses:           Femoral pulses are 2+ on the right side and 2+ on the left side  Dorsalis pedis pulses are 1+ on the right side and 1+ on the left side  Posterior tibial pulses are 1+ on the right side and 1+ on the left side  Heart sounds: Murmur heard  No friction rub  Pulmonary:      Effort: Pulmonary effort is normal  No respiratory distress  Breath sounds: Normal breath sounds  No wheezing or rales  Abdominal:      General: There is no distension  Palpations: Abdomen is soft  Tenderness: There is no abdominal tenderness  There is no guarding or rebound     Musculoskeletal:         General: No tenderness or deformity  Normal range of motion  Cervical back: Normal range of motion and neck supple  Right lower leg: No edema  Left lower leg: No edema  Skin:     General: Skin is warm and dry  Capillary Refill: Capillary refill takes less than 2 seconds  Findings: Bruising present  No erythema  Neurological:      Mental Status: She is alert and oriented to person, place, and time           Vital Signs  ED Triage Vitals   Temperature Pulse Respirations Blood Pressure SpO2   01/29/23 2305 01/29/23 2306 01/29/23 2306 01/29/23 2306 01/29/23 2306   98 1 °F (36 7 °C) 95 18 126/74 96 %      Temp Source Heart Rate Source Patient Position - Orthostatic VS BP Location FiO2 (%)   01/29/23 2305 01/29/23 2306 01/29/23 2306 01/29/23 2306 --   Oral Monitor Sitting Left arm       Pain Score       01/29/23 2309       7           Vitals:    01/30/23 0149 01/30/23 0300 01/30/23 0515 01/30/23 0615   BP: 134/74 133/65 136/91 121/68   Pulse: 91 97 98 94   Patient Position - Orthostatic VS:   Sitting Lying         Visual Acuity      ED Medications  Medications   heparin (porcine) 25,000 units in 0 45% NaCl 250 mL infusion (premix) (18 Units/kg/hr × 95 kg (Order-Specific) Intravenous New Bag 1/30/23 0653)   heparin (porcine) injection 7,600 Units (has no administration in time range)   heparin (porcine) injection 3,800 Units (has no administration in time range)   iohexol (OMNIPAQUE) 350 MG/ML injection (SINGLE-DOSE) 100 mL (100 mL Intravenous Given 1/30/23 0348)   morphine injection 4 mg (4 mg Intravenous Given 1/30/23 0506)   heparin (porcine) injection 7,600 Units (7,600 Units Intravenous Given 1/30/23 0652)       Diagnostic Studies  Results Reviewed     Procedure Component Value Units Date/Time    CBC [[id number]]  (Abnormal) Collected: 01/30/23 0651    Lab Status: Final result Specimen: Blood from Arm, Right Updated: 01/30/23 0709     WBC 9 64 Thousand/uL      RBC 5 00 Million/uL      Hemoglobin 12 4 g/dL Hematocrit 39 3 %      MCV 79 fL      MCH 24 8 pg      MCHC 31 6 g/dL      RDW 15 5 %      Platelets 937 Thousands/uL      MPV 11 4 fL     APTT [[id number]] Collected: 01/30/23 0651    Lab Status: In process Specimen: Blood from Arm, Right Updated: 01/30/23 Acie Skill [[id number]] Collected: 01/30/23 0651    Lab Status: In process Specimen: Blood from Arm, Right Updated: 01/30/23 0702    Comprehensive metabolic panel [[id number]]  (Abnormal) Collected: 01/30/23 0146    Lab Status: Final result Specimen: Blood from Arm, Right Updated: 01/30/23 0247     Sodium 136 mmol/L      Potassium 3 9 mmol/L      Chloride 100 mmol/L      CO2 25 mmol/L      ANION GAP 11 mmol/L      BUN 15 mg/dL      Creatinine 0 65 mg/dL      Glucose 269 mg/dL      Calcium 9 6 mg/dL      AST 10 U/L      ALT 12 U/L      Alkaline Phosphatase 63 U/L      Total Protein 7 5 g/dL      Albumin 4 5 g/dL      Total Bilirubin 0 34 mg/dL      eGFR 115 ml/min/1 73sq m     Narrative:      [facility] guidelines for Chronic Kidney Disease (CKD):   •  Stage 1 with normal or high GFR (GFR > 90 mL/min/1 73 square meters)  •  Stage 2 Mild CKD (GFR = 60-89 mL/min/1 73 square meters)  •  Stage 3A Moderate CKD (GFR = 45-59 mL/min/1 73 square meters)  •  Stage 3B Moderate CKD (GFR = 30-44 mL/min/1 73 square meters)  •  Stage 4 Severe CKD (GFR = 15-29 mL/min/1 73 square meters)  •  Stage 5 End Stage CKD (GFR <15 mL/min/1 73 square meters)  Note: GFR calculation is accurate only with a steady state creatinine    TSH, 3rd generation with Free T4 reflex [[id number]]  (Normal) Collected: 01/30/23 0146    Lab Status: Final result Specimen: Blood from Arm, Right Updated: 01/30/23 0233     TSH 3RD GENERATON 2 638 uIU/mL     Narrative:      Patients undergoing fluorescein dye angiography may retain small amounts of fluorescein in the body for 48-72 hours post procedure  Samples containing fluorescein can produce falsely depressed TSH values   If the patient had this procedure,a specimen should be resubmitted post fluorescein clearance  CBC and differential [[id number]]  (Abnormal) Collected: 01/30/23 0146    Lab Status: Final result Specimen: Blood from Arm, Right Updated: 01/30/23 0153     WBC 10 78 Thousand/uL      RBC 5 53 Million/uL      Hemoglobin 14 0 g/dL      Hematocrit 43 9 %      MCV 79 fL      MCH 25 3 pg      MCHC 31 9 g/dL      RDW 15 4 %      MPV 10 9 fL      Platelets 588 Thousands/uL      nRBC 0 /100 WBCs      Neutrophils Relative 61 %      Immat GRANS % 0 %      Lymphocytes Relative 30 %      Monocytes Relative 8 %      Eosinophils Relative 1 %      Basophils Relative 0 %      Neutrophils Absolute 6 45 Thousands/µL      Immature Grans Absolute 0 04 Thousand/uL      Lymphocytes Absolute 3 28 Thousands/µL      Monocytes Absolute 0 82 Thousand/µL      Eosinophils Absolute 0 15 Thousand/µL      Basophils Absolute 0 04 Thousands/µL                  CT abdomen pelvis with contrast   Final Result by Catie Roblero MD (01/30 0421)      Filling defect within the right external iliac artery right common femoral artery which may reflect mixing artifact or given recent clinical history thrombus  Vascular ultrasound recommended for further evaluation         Right external iliac and bilateral inguinal adenopathy, which is likely reactive  The study was marked in [facility] for immediate notification              Workstation performed: [id number]         VAS lower limb arterial duplex, limited, unilateral    (Results Pending)              Procedures  Procedures         ED Course                       PERC Rule for PE    Flowsheet Row Most Recent Value   PERC Rule for PE    Age >=50 0 Filed at: 01/30/2023 0131   HR >=100 0 Filed at: 01/30/2023 0131   O2 Sat on room air < 95% 0 Filed at: 01/30/2023 0131   History of PE or DVT 0 Filed at: 01/30/2023 0131   Recent trauma or surgery 0 Filed at: 01/30/2023 0131   Hemoptysis 0 Filed at: 01/30/2023 0131   Exogenous estrogen 0 Filed at: 01/30/2023 0131   Unilateral leg swelling 0 Filed at: 01/30/2023 0131   PERC Rule for PE Results 0 Filed at: 01/30/2023 0131              SBIRT 22yo+    Flowsheet Row Most Recent Value   SBIRT (25 yo +)    In order to provide better care to our patients, we are screening all of our patients for alcohol and drug use  Would it be okay to ask you these screening questions? No Filed at: 01/30/2023 2700            Wells' Criteria for DVT    Flowsheet Row Most Recent Value   Henok' Criteria for DVT    Active cancer Treatment or palliation within 6 months 0 Filed at: 01/30/2023 1609   Bedridden recently >3 days or major surgery within 12 weeks 0 Filed at: 01/30/2023 0132   Calf swelling >3 cm compared to the other leg 0 Filed at: 01/30/2023 0132   Entire leg swollen 0 Filed at: 01/30/2023 0132   Collateral (nonvaricose) superficial veins present 0 Filed at: 01/30/2023 0132   Localized tenderness along the deep venous system 0 Filed at: 01/30/2023 0132   Pitting edema, confined to symptomatic leg 0 Filed at: 01/30/2023 0132   Paralysis, paresis, or recent plaster immobilization of the lower extremity 0 Filed at: 01/30/2023 0132   Previously documented DVT 0 Filed at: 01/30/2023 0132   Alternative diagnosis to DVT as likely or more likely 2 Filed at: 01/30/2023 0132   Henok DVT Critera Score -2 Filed at: 01/30/2023 0260              Medical Decision Making  Patient presents with palpitations and right groin pain  Patient has had persistent right groin pain since her catheterization a few weeks ago  Patient had a negative pseudoaneurysm study done on January 27  Patient's EKG shows a right bundle branch block which is unchanged from her prior EKG  Vital signs are normal     On my exam the patient has no tenderness in the right groin but she keeps complaining of persistent pain that is been bothering her every day since her procedure  States that she cannot walk because of it    Given her negative pseudoaneurysm study I have a low suspicion for complication but given the patient's persistent pain I will obtain a CT for possible retroperitoneal hemorrhage or localized hematoma  Her palpitations are now gone and she never had chest pain or shortness of breath  Her PERC rule is negative  I will obtain a CBC and a CMP to further evaluate her palpitations  Offered treatment for pain and palpitations at this time but patient declined  4:52 AM  CT read noted  TT sent to surg resident covering for vascular surgery to discuss further  4:52 AM  Surg will be down to eval   Asked for an arterial duplex  6:48 AM  General surgery [facility]ad[facility] evaluated the pt  They are concerned for a possible dissection  They will discuss with the attending and reevaluate after the vascular study  I will sign the patient out to the oncoming physician to follow-up on their recommendations    Right groin pain: acute illness or injury  Vascular abnormality: acute illness or injury  Amount and/or Complexity of Data Reviewed  Labs: ordered  Radiology: ordered  ECG/medicine tests: ordered  Risk  Prescription drug management  Disposition  Final diagnoses:   Right groin pain   Vascular abnormality     Time reflects when diagnosis was documented in both MDM as applicable and the Disposition within this note     Time User Action Codes Description Comment    1/30/2023  5:07 AM Fernando Chow Add [R10 31] Right groin pain     1/30/2023  5:07 AM Fernando Chow Add [I99 9] Vascular abnormality       ED Disposition     None      Follow-up Information    None         Patient's Medications   Discharge Prescriptions    No medications on file       No discharge procedures on file      PDMP Review       Value Time User    PDMP Reviewed  Yes 1/30/2023  1:49 AM Estella Cano DO          ED Provider  Electronically Signed by           Estella Cano DO  01/30/23 0710

## 2023-01-30 NOTE — ANESTHESIA PREPROCEDURE EVALUATION
Procedure:  PRE-OP ONLY    Relevant Problems   CARDIO   (+) Aortic coarctation   (+) Chest pain   (+) Hypercholesteremia   (+) Hypertension   (+) Intractable chronic migraine without aura and with status migrainosus      ENDO   (+) Type 2 diabetes mellitus (HCC)      GI/HEPATIC   (+) Chronic hepatitis C without hepatic coma (HCC)   (+) Hepatitis C      NEURO/PSYCH   (+) Anxiety   (+) Depression   (+) Intractable chronic migraine without aura and with status migrainosus      Cardiovascular and Mediastinum   (+) VSD (ventricular septal defect) and coarctation of aorta      Other   (+) Bipolar disorder (HCC)   (+) Morbid obesity (HCC)   (+) Splenomegaly        Physical Exam    Airway    Mallampati score: I  TM Distance: >3 FB  Neck ROM: full     Dental   No notable dental hx     Cardiovascular  Rhythm: regular, Rate: normal, Murmur, Cardiovascular exam normal    Pulmonary  Pulmonary exam normal Breath sounds clear to auscultation,     Other Findings        Anesthesia Plan  ASA Score- 3 Emergent    Anesthesia Type- general with ASA Monitors  Additional Monitors: arterial line  Airway Plan: ETT  Comment: Left Ventricle: Left ventricular cavity size is normal  Wall thickness is normal  The left ventricular ejection fraction is 50-55% by single dimension measurement  Systolic function is normal  Although no diagnostic regional wall motion abnormality was identified, this possibility cannot be completely excluded on the basis of this study  •  No angiographic evidence of obstructive CAD  •  LVEDP normal without gradient on LV-AO pullback  •  RRA spasm requriring US-guided RFA access     ? LM: medium length, medium caliber vessel, bifurcates into LAD/LCX, with no angiographic evidence of significant obstructive CAD  ?  LAD: type II, medium caliber vessel, with no angiographic evidence of significant obstructive  CAD, it gives off a PARUL branch, with no angiographic evidence of significant obstructive CAD  ? LCX: nondominant, small caliber vessel, it gives off an OM branch, with no angiographic evidence of significant obstructive CAD  ? RCA: dominant, large caliber vessel,  with no angiographic evidence of significant obstructive CAD               Plan Factors-Exercise tolerance (METS): >4 METS  Chart reviewed  EKG reviewed  Existing labs reviewed  Patient summary reviewed  Patient is a current smoker  Induction- intravenous  Postoperative Plan-     Informed Consent- Anesthetic plan and risks discussed with patient

## 2023-01-30 NOTE — ANESTHESIA PREPROCEDURE EVALUATION
Procedure:  EMBOLECTOMY/THROMBECTOMY LOWER EXTREMITY, FEMORAL ARTERY CUT DOWN, ANGIOGRAM (Right: Leg Lower)    Relevant Problems   CARDIO   (+) Aortic coarctation   (+) Chest pain   (+) Hypercholesteremia   (+) Hypertension   (+) Intractable chronic migraine without aura and with status migrainosus      ENDO   (+) Type 2 diabetes mellitus (HCC)      GI/HEPATIC   (+) Chronic hepatitis C without hepatic coma (HCC)   (+) Hepatitis C      NEURO/PSYCH   (+) Anxiety   (+) Depression   (+) Intractable chronic migraine without aura and with status migrainosus      Other   (+) Splenomegaly        Physical Exam    Airway    Mallampati score: I  TM Distance: >3 FB  Neck ROM: full     Dental   No notable dental hx     Cardiovascular  Rhythm: regular, Rate: normal, Murmur, Cardiovascular exam normal    Pulmonary  Pulmonary exam normal Breath sounds clear to auscultation,     Other Findings        Anesthesia Plan  ASA Score- 3 Emergent    Anesthesia Type- general with ASA Monitors  Additional Monitors: arterial line  Airway Plan: ETT  Comment: •  No angiographic evidence of obstructive CAD  •  LVEDP normal without gradient on LV-AO pullback  •  RRA spasm requriring US-guided RFA access     ? LM: medium length, medium caliber vessel, bifurcates into LAD/LCX, with no angiographic evidence of significant obstructive CAD  ? LAD: type II, medium caliber vessel, with no angiographic evidence of significant obstructive  CAD, it gives off a PARUL branch, with no angiographic evidence of significant obstructive CAD  ? LCX: nondominant, small caliber vessel, it gives off an OM branch, with no angiographic evidence of significant obstructive CAD  ? RCA: dominant, large caliber vessel,  with no angiographic evidence of significant obstructive CAD      Left Ventricle: Left ventricular cavity size is normal  Wall thickness is normal  The left ventricular ejection fraction is 50-55% by single dimension measurement   Systolic function is normal  Although no diagnostic regional wall motion abnormality was identified, this possibility cannot be completely excluded on the basis of this study                Plan Factors-Exercise tolerance (METS): >4 METS  Chart reviewed  EKG reviewed  Existing labs reviewed  Patient summary reviewed  Patient is not a current smoker  Induction- intravenous  Postoperative Plan-     Informed Consent- Anesthetic plan and risks discussed with patient

## 2023-01-30 NOTE — ANESTHESIA PROCEDURE NOTES
Arterial Line Insertion  Performed by: Mikael Conklin CRNA  Authorized by: Sherrill White MD   Consent: Written consent obtained  Risks and benefits: risks, benefits and alternatives were discussed  Consent given by: patient  Patient identity confirmed: arm band, provided demographic data and hospital-assigned identification number  Preparation: Patient was prepped and draped in the usual sterile fashion    Indications: hemodynamic monitoring  Orientation:  Left  Location: radial artery  Procedure Details:  Needle gauge: 20  Seldinger technique: Seldinger technique used  Number of attempts: 2    Post-procedure:  Post-procedure: dressing applied  Waveform: good waveform  Post-procedure CNS: normal  Patient tolerance: Patient tolerated the procedure well with no immediate complications

## 2023-01-30 NOTE — ED NOTES
Pt's [facility] cardiologist info ([phone])     Anais Linares, RN  01/30/23 2481

## 2023-01-31 ENCOUNTER — DOCUMENTATION (OUTPATIENT)
Dept: VASCULAR SURGERY | Facility: CLINIC | Age: 33
End: 2023-01-31

## 2023-01-31 VITALS
OXYGEN SATURATION: 95 % | BODY MASS INDEX: 43.25 KG/M2 | HEART RATE: 90 BPM | WEIGHT: 229.06 LBS | DIASTOLIC BLOOD PRESSURE: 63 MMHG | TEMPERATURE: 97.6 F | HEIGHT: 61 IN | SYSTOLIC BLOOD PRESSURE: 100 MMHG | RESPIRATION RATE: 14 BRPM

## 2023-01-31 LAB
APTT PPP: 23 SECONDS (ref 23–37)
APTT PPP: 24 SECONDS (ref 23–37)
GLUCOSE SERPL-MCNC: 162 MG/DL (ref 65–140)
GLUCOSE SERPL-MCNC: 187 MG/DL (ref 65–140)
GLUCOSE SERPL-MCNC: 283 MG/DL (ref 65–140)
KCT BLD-ACNC: 201 SEC (ref 89–137)
SPECIMEN SOURCE: ABNORMAL

## 2023-01-31 RX ORDER — CLOPIDOGREL BISULFATE 75 MG/1
75 TABLET ORAL DAILY
Qty: 60 TABLET | Refills: 0 | Status: ON HOLD | OUTPATIENT
Start: 2023-02-01

## 2023-01-31 RX ORDER — ASPIRIN 81 MG/1
81 TABLET ORAL DAILY
Qty: 90 TABLET | Refills: 0 | Status: ON HOLD | OUTPATIENT
Start: 2023-02-01

## 2023-01-31 RX ORDER — ASPIRIN 81 MG/1
81 TABLET ORAL DAILY
Status: DISCONTINUED | OUTPATIENT
Start: 2023-01-31 | End: 2023-01-31 | Stop reason: HOSPADM

## 2023-01-31 RX ORDER — OXYCODONE HYDROCHLORIDE 5 MG/1
5 TABLET ORAL EVERY 4 HOURS PRN
Qty: 10 TABLET | Refills: 0 | Status: SHIPPED | OUTPATIENT
Start: 2023-01-31 | End: 2023-02-03 | Stop reason: SDUPTHER

## 2023-01-31 RX ADMIN — OXYCODONE HYDROCHLORIDE 10 MG: 10 TABLET ORAL at 07:17

## 2023-01-31 RX ADMIN — OXYCODONE HYDROCHLORIDE 5 MG: 5 TABLET ORAL at 13:03

## 2023-01-31 RX ADMIN — SODIUM CHLORIDE, SODIUM LACTATE, POTASSIUM CHLORIDE, AND CALCIUM CHLORIDE 500 ML: .6; .31; .03; .02 INJECTION, SOLUTION INTRAVENOUS at 03:55

## 2023-01-31 RX ADMIN — ACETAMINOPHEN 325MG 975 MG: 325 TABLET ORAL at 06:05

## 2023-01-31 RX ADMIN — ACETAMINOPHEN 325MG 975 MG: 325 TABLET ORAL at 11:57

## 2023-01-31 RX ADMIN — INSULIN LISPRO 2 UNITS: 100 INJECTION, SOLUTION INTRAVENOUS; SUBCUTANEOUS at 06:10

## 2023-01-31 RX ADMIN — OXYCODONE HYDROCHLORIDE 10 MG: 10 TABLET ORAL at 02:33

## 2023-01-31 RX ADMIN — INSULIN LISPRO 6 UNITS: 100 INJECTION, SOLUTION INTRAVENOUS; SUBCUTANEOUS at 11:58

## 2023-01-31 RX ADMIN — CLOPIDOGREL BISULFATE 75 MG: 75 TABLET ORAL at 08:51

## 2023-01-31 RX ADMIN — ASPIRIN 81 MG: 81 TABLET, COATED ORAL at 08:51

## 2023-01-31 RX ADMIN — HEPARIN SODIUM 5000 UNITS: 5000 INJECTION INTRAVENOUS; SUBCUTANEOUS at 06:05

## 2023-01-31 NOTE — PROGRESS NOTES
Vascular Nurse Navigator Post Op Education    Met with patient at bedside to introduce myself as Vascular Nurse Navigator and explained my role  Patient is appropriate and accepting to education  Patient was educated with Review of written materials provided, Teachback, Explanation, Demonstration and Question & Answer on expectations of post op care and recovery on R femoral exposure, R iliac embolectomy and R EIA stent placement  Patient is a former smoker, quit 3 weeks ago, as such Smoking effects on the lungs, tobacco triggers and Smoking cessation was reviewed  Education provided to patient on infection prevention, activity limitations, when to call the office, importance of follow up, and incisional care  Discharge instruction handout provided to patient to review  Provided patient with a pack of disposable washcloths, a pack of guaze, and a bottle of chlorhexidine for incision care upon discharge

## 2023-01-31 NOTE — PROGRESS NOTES
Progress Note - Cohng Schmidt 26 y o  female MRN: [id number]    Unit/Bed#: ICU 12 Encounter: [id number]      Assessment:   25 y/o F w R LEONEL and R CFA occlusion, s/p R femoral exposure, R iliac embolectomy and R EIA stent placement       Vss  Afebrile  B/l LE are warm, s/m intact  R going dressing c/d/i  R doppl dp/ pt signals  L palp DP       Plan:  Diet as tolerated  Continue asa plavix  Local wound care to R groin  Pt/ot  Ambulate  Rest of care per primary    Subjective:   Feels much better this morning  Denied any pain or numbness to her right lower ext  Main complaint is discomfort to iincision site of right groin  Otherwise nausea post op has improved  Denied fever, chills, chest pain, shortness of breath, nausea, vomiting, or abdominal pain this morning  Objective:     Vitals: Blood pressure 116/68, pulse 88, temperature 97 7 °F (36 5 °C), temperature source Oral, resp  rate 15, height 5' 1" (1 549 m), weight 104 kg (229 lb 0 9 oz), last menstrual period 01/06/2023, SpO2 99 %, not currently breastfeeding  ,Body mass index is 43 28 kg/m²  Intake/Output Summary (Last 24 hours) at 1/30/2023 2158  Last data filed at 1/30/2023 1924  Gross per 24 hour   Intake 2410 ml   Output 850 ml   Net 1560 ml       Physical Exam  General: NAD  HEENT: NC/AT  MMM  Cv: RRR     Lungs: normal effort  Ab: Soft, NT/ND  Ex: no CCE  Neuro: AAOx3    Scheduled Meds:  Current Facility-Administered Medications   Medication Dose Route Frequency Provider Last Rate   • acetaminophen  975 mg Oral Q6H [address] [address], DO     • [START ON 1/31/2023] aspirin  300 mg Rectal Daily Sandra Stern DO     • [START ON 1/31/2023] clopidogrel  75 mg Oral Daily Sandra Stern DO     • heparin (porcine)  5,000 Units Subcutaneous [facility] Sandra Stern DO     • insulin lispro  2-12 Units Sub[facility]H [facility] [place], [place]     • lactated ringers  500 mL Intravenous Once PRN Sandra Stern DO      And   • lactated ringers  500 mL Intravenous Once PRN Maurilio Sarks, DO     • oxyCODONE  10 mg Oral Q4H PRN Maurilio Sarks, DO     • oxyCODONE  5 mg Oral Q4H PRN Maurilio Sarks, DO     • potassium chloride  20 mEq Intravenous Q2H Eris [place] 20 mEq (01/30/23 2041)   • sodium chloride  500 mL Intravenous Once PRN Maurilio Sarks, DO      And   • sodium chloride  500 mL Intravenous Once PRN Maurilio Sarks, DO     • sodium chloride  75 mL/hr Intravenous Continuous Maurilio Sarks, DO 75 mL/hr (01/30/23 2021)     Continuous Infusions:sodium chloride, 75 mL/hr, Last Rate: 75 mL/hr (01/30/23 2021)      PRN Meds: •  lactated ringers **AND** lactated ringers  •  oxyCODONE  •  oxyCODONE  •  sodium chloride **AND** sodium chloride      Invasive Devices     Peripheral Intravenous Line  Duration           Peripheral IV 01/09/23 Right;Lateral Antecubital 20 days    Peripheral IV 01/30/23 Left Hand <1 day          Arterial Line  Duration           Arterial Line 01/30/23 Left Radial <1 day          Drain  Duration           Urethral Catheter Latex;Straight-tip 16 Fr  <1 day                Lab, Imaging and other studies: I have personally reviewed pertinent reports      VTE Pharmacologic Prophylaxis: Sequential compression device (Venodyne)   VTE Mechanical Prophylaxis: sequential compression device

## 2023-01-31 NOTE — ASSESSMENT & PLAN NOTE
Lab Results   Component Value Date    HGBA1C 9 6 (H) 01/10/2023       Recent Labs     01/30/23  1823   POCGLU 313*       Blood Sugar Average: Last 72 hrs:  (P) 313     -accuchecks q6  -ssi  -restart home meds when able to take po

## 2023-01-31 NOTE — ASSESSMENT & PLAN NOTE
-s/p R fem cutdown with iliac embolectomy and stent placed  -vascular checks  -neuro checks  -asa/plavix

## 2023-01-31 NOTE — UTILIZATION REVIEW
Initial Clinical Review    Admission: Date/Time/Statement:   Admission Orders (From admission, onward)     Ordered        01/30/23 1724  Inpatient Admission  Once                      Orders Placed This Encounter   Procedures   • Inpatient Admission     Standing Status:   Standing     Number of Occurrences:   1     Order Specific Question:   Level of Care     Answer:   Critical Care [15]     Order Specific Question:   Estimated length of stay     Answer:   Inpatient Only Surgery     ED Arrival Information     Expected   -    Arrival   1/29/2023 22:54    Acuity   Urgent            Means of arrival   Walk-In    Escorted by   Self    Service   Critical Care/ICU    Admission type   Emergency            Arrival complaint   Palpitations           Chief Complaint   Patient presents with   • Palpitations   • Leg Pain     PT c/o palpitation started this evening, denies CP,dizziness/SOB at this time, also c/o right leg pain, reports has had this pain since cardiac cath  that was done approx 2 weeks ago  Initial Presentation: 26 y o  female PMH aortic coarctation s/p repair, s/p tevar, DM, bipolar,  obesity, htn, hld, vsd s/p repair,  multiple cardiac caths w most recent on 1/13/2023 presents self to ED with R groin / leg pain @ rest & worse when standing, palpitations in chest beginning this evening & persistent  States leg pain since recent Cardiac cath      EXAM  + murmur, CT reveals filling defect/ thrombus injury  w/in R external iliac artery, R common femoral artery concerning for thrombus, rec VAS gloria LE    Inpatient ICU admission due to arterial occlusion: R LEONEL ( common iliac artery) , R CFA (common femoral artery) occlusion, R groin pain, DM2    Per consult Vascular surgery   2+ L DP, Nonpalp R fem/pop/DP/PT immediate OR for R femoral cutdown, iliac embolectomy & stent   IV Heparin GTT,  NPO, neuro vascular checks, IVF, pain management  SURGERY DATE: 1/30/2023   Procedure(s):  Right femoral exposure   Right iliac embolectomy w/ #4 Ted catheter   Aortogram   Right EIA stent w/ 7x29mm VBX  Anesthesia Type:   General ETA   Operative Findings:  Occlusion of the right EIA and CFA w/ mixed subacute and chronic thrombus  Patient profunda and SFA arteries    Vasc Surgery Provider  Immediate Post OP s/p R femoral exposure, R iliac embolectomy and R EIA stent placement  Continue asa, plavix  Monitor serial neuro vascular exams  Spoke w vascular fellow on call  Pt had a change in exam from immediately post op  Palpable DP prior to leaving OR now only w doppl R PT signal  Remains sensory and motor intact  Continue to monitor, if signs of paresthesias or pain resume heparin gtt   Date:  1/31/2023  Day 2:    Critical Care  s/p R LEONEL and R CFA occlusion, s/p R femoral exposure, R iliac embolectomy and R EIA stent placement POD #1 cont vascular checks & neuro checks; asa/plavix  Vomited once on antiemetics    Vascular  Surgery Continue serial neurovascular changes;  B/l LE are warm, s/m intact  R going dressing c/d/i  R doppl dp/ pt signals  L palp DP  Denied any pain or numbness to her right lower ext   Cont ASA/ Plavix  ED Triage Vitals   Temperature Pulse Respirations Blood Pressure SpO2   01/29/23 2305 01/29/23 2306 01/29/23 2306 01/29/23 2306 01/29/23 2306   98 1 °F (36 7 °C) 95 18 126/74 96 %      Temp Source Heart Rate Source Patient Position - Orthostatic VS BP Location FiO2 (%)   01/29/23 2305 01/29/23 2306 01/29/23 2306 01/29/23 2306 --   Oral Monitor Sitting Left arm       Pain Score       01/29/23 2309       7          Wt Readings from Last 1 Encounters:   01/30/23 104 kg (229 lb 0 9 oz)     Additional Vital Signs:   Date/Time Temp Pulse Resp BP MAP (mmHg) Arterial Line BP MAP SpO2 O2 Device Patient Position - Orthostatic VS   01/31/23 1120 97 6 °F (36 4 °C) -- -- -- -- -- -- -- -- --   01/31/23 1000 -- 90 14 100/63 74 -- -- 95 % -- --   01/31/23 0900 -- 100 20 107/61 75 -- -- 90 % -- --   01/31/23 0725 98 °F (36 7 °C) 90 14 -- -- 116/70 88 mmHg 98 % -- --   01/31/23 0717 -- 92 13 -- -- 116/68 88 mmHg 97 % -- --   01/31/23 0700 -- 92 24 Abnormal  108/64 74 114/64 84 mmHg 99 % -- --   01/31/23 0605 -- 88 16 95/52 70 102/56 74 mmHg 96 % -- --   01/31/23 0600 -- 86 12 95/52 70 98/52 70 mmHg 96 % -- --   01/31/23 0500 -- 94 17 100/61 75 106/60 80 mmHg 97 % -- --   01/31/23 0400 98 1 °F (36 7 °C) 84 13 97/53 62 Abnormal  92/56 72 mmHg 95 % None (Room air) --   01/31/23 0337 -- 84 12 98/55 66 88/54 68 mmHg 93 % -- --   01/31/23 0322 -- 84 13 -- -- 98/60 76 mmHg 96 % -- --   01/31/23 0300 -- 86 18 100/59 70 104/64 82 mmHg 96 % -- --   01/31/23 0233 -- 82 16 101/62 74 102/88 94 mmHg 100 % -- --   01/31/23 0200 -- 84 16 106/66 79 106/68 86 mmHg 100 % -- --   01/31/23 0100 -- 82 13 100/56 63 Abnormal  102/68 82 mmHg 99 % -- --   01/31/23 0000 -- 86 15 102/62 73 116/62 80 mmHg 99 % -- Lying   01/30/23 2300 -- 86 16 126/70 84 140/76 100 mmHg 99 % -- --   01/30/23 2200 -- 86 23 Abnormal  122/66 81 136/62 84 mmHg 100 % -- --   01/30/23 2100 -- 88 15 116/68 84 148/66 90 mmHg 99 % -- --   01/30/23 2000 -- 98 17 121/72 86 176/74 100 mmHg 99 % -- --   01/30/23 1934 97 7 °F (36 5 °C) 94 19 119/66 82 164/68 94 mmHg 96 % -- --   01/30/23 1900 -- 98 26 Abnormal  134/67 95 160/64 92 mmHg 98 % -- --   01/30/23 1845 -- 104 19 165/88 118 200/74 110 mmHg 99 % -- --   01/30/23 1830 -- 84 22 138/69 96 148/54 80 mmHg 96 % -- --   01/30/23 1815 -- 84 18 135/71 97 138/42 70 mmHg 96 % -- --   01/30/23 1800 -- 86 19 165/78 111 170/54 88 mmHg 92 % -- --   01/30/23 1745 -- 90 28 Abnormal  204/95 Abnormal  136 220/90 134 mmHg 97 % -- --   01/30/23 1730 97 9 °F (36 6 °C) 82 16 151/82 110 186/78 112 mmHg 94 % None (Room air) --   01/30/23 0952 -- 93 21 119/63 -- -- -- 97 % None (Room air) Lying   01/30/23 0720 -- 97 20 121/74 -- -- -- 100 % None (Room air) Lying   01/30/23 0615 -- 94 15 121/68 -- -- -- 99 % None (Room air) Lying   01/30/23 0515 -- 98 21 136/91 106 -- -- 98 % -- Sitting   01/30/23 0300 -- 97 19 133/65 92 -- -- 99 % None (Room air) --   01/30/23 0149 -- 91 17 134/74 -- -- -- 99 % -- --   01/29/23 2306 -- 95 18 126/74 -- -- -- 96 % None (Room air) Sitting   01/29/23 2305 98 1 °F (36 7 °C) -- -- -- -- -- -- -- -- --     Weights (last 14 days)    Date/Time Weight Weight Method Height   01/30/23 1934 104 kg (229 lb 0 9 oz) Bed scale 5' 1" (1 549 m)     Trauma Secondary Assessment - Leonard Coma Scale    Date and Time Eye Opening Best Verbal Response Best Motor Response Ivan Coma Scale Score   01/31/23 1215 4 5 6 15   01/31/23 0725 4 5 6 15   01/31/23 0600 4 5 6 15   01/31/23 0400 4 5 6 15   01/31/23 0000 4 5 6 15   01/30/23 2200 4 5 6 15   01/30/23 1924 4 5 6 15       Pertinent Labs/Diagnostic Test Results:   No ecg available   VAS lower limb arterial duplex, limited, unilateral   Final Result by Jelani Avila DO (01/30 1259)   RIGHT LOWER LIMB:  Findings suggests an occlusion versus near occlusion of the mid/ distal  external iliac artery  The superficial femoral artery, proximal deep femoral artery, and popliteal  arteries are patent  Ankle/Brachial index: 0 73, moderate claudication range  Metatarsal pressure of 71 mm Hg  Great toe pressure of 55 mm Hg, above healing threshold for a diabetic  PVR/ PPG tracings are dampened  LEFT LOWER LIMB:  Ankle/Brachial index:   1 27, normal range  Metatarsal pressure of 174 mm Hg  Great toe pressure of 111 mm Hg,above healing threshold for a diabetic  PVR/ PPG tracings are normal         CTA ABDOMEN W RUN OFF W WO CONTRAST   Final Result by Snow Padron MD (01/30 7009)      Acute thrombosis of the right proximal external iliac artery with collateral reconstitution of the mid right common femoral artery with three-vessel continuous runoff to the foot  Benign left adrenal nodule        CT abdomen pelvis with contrast   Final Result by Hossein Victor MD (01/30 8191)      Filling defect within the right external iliac artery right common femoral artery which may reflect mixing artifact or given recent clinical history thrombus  Vascular ultrasound recommended for further evaluation         Right external iliac and bilateral inguinal adenopathy, which is likely reactive     IR lower extremity angiogram    (Results Pending)     Results from last 7 days   Lab Units 01/30/23  0833   SARS-COV-2  Negative     Results from last 7 days   Lab Units 01/30/23  1753 01/30/23  0651 01/30/23  0146   WBC Thousand/uL 13 09* 9 64 10 78*   HEMOGLOBIN g/dL 12 1 12 4 14 0   HEMATOCRIT % 38 0 39 3 43 9   PLATELETS Thousands/uL 253 217 242   NEUTROS ABS Thousands/µL  --   --  6 45         Results from last 7 days   Lab Units 01/30/23  1753 01/30/23  0146   SODIUM mmol/L 135 136   POTASSIUM mmol/L 3 3* 3 9   CHLORIDE mmol/L 107 100   CO2 mmol/L 19* 25   ANION GAP mmol/L 9 11   BUN mg/dL 11 15   CREATININE mg/dL 0 55* 0 65   EGFR ml/min/1 73sq m 121 115   CALCIUM mg/dL 7 4* 9 6     Results from last 7 days   Lab Units 01/30/23  0146   AST U/L 10*   ALT U/L 12   ALK PHOS U/L 63   TOTAL PROTEIN g/dL 7 5   ALBUMIN g/dL 4 5   TOTAL BILIRUBIN mg/dL 0 34     Results from last 7 days   Lab Units 01/31/23  0609 01/30/23  2257 01/30/23  1823   POC GLUCOSE mg/dl 162* 187* 313*     Results from last 7 days   Lab Units 01/30/23  1753 01/30/23  0146   GLUCOSE RANDOM mg/dL 284* 269*             No results found for: BETA-HYDROXYBUTYRATE                           Results from last 7 days   Lab Units 01/31/23  0616 01/31/23  0045 01/30/23  1753 01/30/23  0651   PROTIME seconds  --   --   --  13 5   INR   --   --   --  1 03   PTT seconds 24 23 204* 27     Results from last 7 days   Lab Units 01/30/23  0146   TSH 3RD GENERATON uIU/mL 2 638               Results from last 7 days   Lab Units 01/30/23  0833   INFLUENZA A PCR  Negative   INFLUENZA B PCR  Negative   RSV PCR  Negative         ED Treatment:   Medication Administration from 01/29/2023 2254 to 01/30/2023 1150       Date/Time Order Dose Route Action     01/30/2023 0348 EST iohexol (OMNIPAQUE) 350 MG/ML injection (SINGLE-DOSE) 100 mL 100 mL Intravenous Given     01/30/2023 0506 EST morphine injection 4 mg 4 mg Intravenous Given     01/30/2023 9295 EST heparin (porcine) injection 7,600 Units 7,600 Units Intravenous Given     01/30/2023 0953 EST heparin (porcine) 25,000 units in 0 45% NaCl 250 mL infusion (premix) 18 Units/kg/hr Intravenous Restarted     01/30/2023 0653 EST heparin (porcine) 25,000 units in 0 45% NaCl 250 mL infusion (premix) 18 Units/kg/hr Intravenous New Bag     01/30/2023 1005 EST morphine injection 4 mg 4 mg Intravenous Given        Past Medical History:   Diagnosis Date   • Allergic    • Arthritis    • B[facility] affective disorder, currently depressed, [facility] 12/17/2018   • Cyst of ovary, right    • Diabetes mellitus ([facility] ) 10/10/2018    type 2   • Endometriosis    • Heart murmur 1988   • Hepatitis C    • Hepatitis C virus infection cured after antiviral drug therapy    • History of transfusion    • Hypertension    • Migraines    • Morbid obesity with BMI of 40 0-44 9, adult ([facility] )    • Obesity 1995   • Pulmonary artery congenital abnormality    • Spleen enlarged    • Status post surgical removal of both fallopian tubes    • Varicella      Present on Admission:  • Type 2 diabetes mellitus ([facility] )  • Morbid obesity ([facility] )      Admitting Diagnosis: Palpitations [R00 2]  Leg pain [M79 606]  Arterial occlusion [I70 90]  Right groin pain [R10 31]  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HCC) [I74 5]  Vascular abnormality [I99 9]  Age/Sex: 26 y o  female  Admission Orders:  Continuous cardiopulmonary & pulse oximetry  Neurovascular checks  Art line monitoring  Guerra  IR lower extremity angiogram  SCD    Scheduled Medications:  acetaminophen, 975 mg, Oral, Q6H [address]  aspirin, 81 mg, Oral, Daily  clopidogrel, 75 mg, Oral, Daily  heparin (porcine), 5,000 Units, Subcutaneous, Q8H [address]  insulin lispro, 2-12 Units, Subcutaneous, Q6H [facility]    Continuous IV Infusions:    heparin (porcine) 25,000 units in 0 45% NaCl 250 mL infusion (premix) 1/30 0653 & DC 1936  Rate: 2 9-28 5 mL/hr Dose: 3-30 Units/kg/hr  Weight Dosing Info: 95 kg (Order-Specific)  Freq: Titrated Route: IV    sodium chloride 0 9 % infusion 1/30 1756 & DC 1/31 0555  Rate: 75 mL/hr Dose: 75 mL/hr  Freq: Continuous Route: IV    PRN Meds:  lactated ringers, 500 mL, Intravenous, Once PRN   And  lactated ringers, 500 mL, Intravenous, Once PRN  oxyCODONE, 10 mg, Oral, Q4H PRN  oxyCODONE, 5 mg, Oral, Q4H PRN  sodium chloride, 500 mL, Intravenous, Once PRN   And   sodium chloride, 500 mL, Intravenous, Once PRN    IP CONSULT TO VASCULAR SURGERY  IP CONSULT TO [facility] CRITICAL CARE  Network Utilization Review Department  ATTENTION: Please call with any questions or concerns to [phone] and carefully listen to the prompts so that you are directed to the right person  All voicemails are confidential   Silvia Lim all requests for admission clinical reviews, approved or denied determinations and any other requests to dedicated fax number below belonging to the campus where the patient is receiving treatment   List of dedicated fax numbers for the Facilities:  [address] DENIALS (Administrative/Medical Necessity) [phone]   [address] (Maternity/NICU/Pediatrics) [phone]   [address] [phone]   [facility] [phone]   [facility] [phone]   [facility] [place] [address] [facility] [place] [place] [id number] [place] [address] [address]   [address] [place] [address] [phone]   [facility] [address] [place] [address] [phone]

## 2023-01-31 NOTE — PLAN OF CARE
Problem: MOBILITY - ADULT  Goal: Maintain or return to baseline ADL function  Description: INTERVENTIONS:  -  Assess patient's ability to carry out ADLs; assess patient's baseline for ADL function and identify physical deficits which impact ability to perform ADLs (bathing, care of mouth/teeth, toileting, grooming, dressing, etc )  - Assess/evaluate cause of self-care deficits   - Assess range of motion  - Assess patient's mobility; develop plan if impaired  - Assess patient's need for assistive devices and provide as appropriate  - Encourage maximum independence but intervene and supervise when necessary  - Involve family in performance of ADLs  - Assess for home care needs following discharge   - Consider OT consult to assist with ADL evaluation and planning for discharge  - Provide patient education as appropriate  Outcome: Progressing  Goal: Maintains/Returns to pre admission functional level  Description: INTERVENTIONS:  - Perform BMAT or MOVE assessment daily    - Set and communicate daily mobility goal to care team and patient/family/caregiver     - Collaborate with rehabilitation services on mobility goals if consulted  - Perform Range of Motion   - Out of bed for toileting  - Record patient progress and toleration of activity level   Outcome: Progressing     Problem: PAIN - ADULT  Goal: Verbalizes/displays adequate comfort level or baseline comfort level  Description: Interventions:  - Encourage patient to monitor pain and request assistance  - Assess pain using appropriate pain scale  - Administer analgesics based on type and severity of pain and evaluate response  - Implement non-pharmacological measures as appropriate and evaluate response  - Consider cultural and social influences on pain and pain management  - Notify physician/advanced practitioner if interventions unsuccessful or patient reports new pain  Outcome: Progressing     Problem: INFECTION - ADULT  Goal: Absence or prevention of progression during hospitalization  Description: INTERVENTIONS:  - Assess and monitor for signs and symptoms of infection  - Monitor lab/diagnostic results  - Monitor all insertion sites, i e  indwelling lines, tubes, and drains  - Monitor endotracheal if appropriate and nasal secretions for changes in amount and color  - [place] appropriate cooling/warming therapies per order  - Administer medications as ordered  - Instruct and encourage patient and family to use good hand hygiene technique  - Identify and instruct in appropriate isolation precautions for identified infection/condition  Outcome: Progressing  Goal: Absence of fever/infection during neutropenic period  Description: INTERVENTIONS:  - Monitor WBC    Outcome: Progressing     Problem: SAFETY ADULT  Goal: Maintain or return to baseline ADL function  Description: INTERVENTIONS:  -  Assess patient's ability to carry out ADLs; assess patient's baseline for ADL function and identify physical deficits which impact ability to perform ADLs (bathing, care of mouth/teeth, toileting, grooming, dressing, etc )  - Assess/evaluate cause of self-care deficits   - Assess range of motion  - Assess patient's mobility; develop plan if impaired  - Assess patient's need for assistive devices and provide as appropriate  - Encourage maximum independence but intervene and supervise when necessary  - Involve family in performance of ADLs  - Assess for home care needs following discharge   - Consider OT consult to assist with ADL evaluation and planning for discharge  - Provide patient education as appropriate  Outcome: Progressing  Goal: Maintains/Returns to pre admission functional level  Description: INTERVENTIONS:  - Perform BMAT or MOVE assessment daily    - Set and communicate daily mobility goal to care team and patient/family/caregiver     - Collaborate with rehabilitation services on mobility goals if consulted  - Perform Range of Motion   - Out of bed for toileting  - Record patient progress and toleration of activity level   Outcome: Progressing  Goal: Patient will remain free of falls  Description: INTERVENTIONS:  - Educate patient/family on patient safety including physical limitations  - Instruct patient to call for assistance with activity   - Consult OT/PT to assist with strengthening/mobility   - Keep Call bell within reach  - Keep bed low and locked with side rails adjusted as appropriate  - Keep care items and personal belongings within reach  - Initiate and maintain comfort rounds  - Make Fall Risk Sign visible to staff  - Offer Toileting   - Apply yellow socks and bracelet for high fall risk patients  - Consider moving patient to room near nurses station  Outcome: Progressing     Problem: DISCHARGE PLANNING  Goal: Discharge to home or other facility with appropriate resources  Description: INTERVENTIONS:  - Identify barriers to discharge w/patient and caregiver  - Arrange for needed discharge resources and transportation as appropriate  - Identify discharge learning needs (meds, wound care, etc )  - Arrange for interpretive services to assist at discharge as needed  - Refer to Case Management Department for coordinating discharge planning if the patient needs post-hospital services based on physician/advanced practitioner order or complex needs related to functional status, cognitive ability, or social support system  Outcome: Progressing     Problem: Knowledge Deficit  Goal: Patient/family/caregiver demonstrates understanding of disease process, treatment plan, medications, and discharge instructions  Description: Complete learning assessment and assess knowledge base    Interventions:  - Provide teaching at level of understanding  - Provide teaching via preferred learning methods  Outcome: Progressing

## 2023-01-31 NOTE — UTILIZATION REVIEW
NOTIFICATION OF ADMISSION DISCHARGE   This is a Notification of Discharge from [facility]  Please be advised that this patient has been discharge from our facility  Below you will find the admission and discharge date and time including the patient’s disposition  UTILIZATION REVIEW CONTACT:  Sierra Galindo  Utilization   Network Utilization Review Department  Phone: [phone] x carefully listen to the prompts  All voicemails are confidential   Email: [email or web address]     ADMISSION INFORMATION  PRESENTATION DATE: 1/30/2023 12:32 AM  OBERVATION ADMISSION DATE:   INPATIENT ADMISSION DATE: 1/30/23  5:24 PM   DISCHARGE DATE: 1/31/2023  1:44 PM   DISPOSITION:Home/Self Care    IMPORTANT INFORMATION:  Send all requests for admission clinical reviews, approved or denied determinations and any other requests to dedicated fax number below belonging to the campus where the patient is receiving treatment   List of dedicated fax numbers:  [address] DENIALS (Administrative/Medical Necessity) [phone]   [address] (Maternity/NICU/Pediatrics) [phone]   [facility] [phone]   [facility] [phone]   [address] [phone]   220 [facility] [phone]   [address] [place] [address] [phone]   [facility] 119 [phone]   [facility]  [phone]   4052 [facility] [phone]   412 [facility] 850 [address] [facility] [phone]

## 2023-01-31 NOTE — PROGRESS NOTES
Surgery  Post op check     27 y/o F w R LEONEL and R CFA occlusion, s/p R femoral exposure, R iliac embolectomy and R EIA stent placement  Vss  Afebrile  B/l LE are warm, s/m intact  R going dressing c/d/i  R doppl PT signal  L palp DP  Plan:   Continue asa, plavix  Monitor serial neuro vascular exams  Spoke w vascular fellow on call  Pt had a change in exam from immediately post op  Pt w palpable DP prior to leaving OR now only w doppl R PT signal  Remains sensory and motor intact   Will continue to monitor, if signs of paresthesias or pain will resume heparin gtt and discuss with team      Karri Jeffers  1/30/23  8:57 PM

## 2023-01-31 NOTE — ASSESSMENT & PLAN NOTE
-s/p R LEONEL and R CFA occlusion, s/p R femoral exposure, R iliac embolectomy and R EIA stent placement POD #1  -vascular checks  -neuro checks  -asa/plavix

## 2023-01-31 NOTE — UTILIZATION REVIEW
NOTIFICATION OF INPATIENT ADMISSION   AUTHORIZATION REQUEST   SERVICING FACILITY:   [address] [place] [address], [address]  Tax ID: [id number]  NPI: [id number] ATTENDING PROVIDER:  Attending Name and NPI#: Nohelia Bowen [[id number]]  Address: [address] [place] [address], [address]   Phone: [phone]     ADMISSION INFORMATION:  [facility] Code: 21  Inpatient Admission Date/Time: 1/30/23  5:24 PM  Discharge Date/Time: 1/31/2023  1:44 PM  Admitting Diagnosis Code/Description:  Palpitations [R00 2]  Leg pain [M79 606]  Arterial occlusion [I70 90]  Right groin pain [R10 31]  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

## 2023-01-31 NOTE — DISCHARGE INSTR - AVS FIRST PAGE
DISCHARGE INSTRUCTIONS  LEG SURGERY    ACTIVITY:   Limit your physical activity to walking for the first week and then increase your activity as tolerated  If you become short of breath or tired, stop and rest   You may require help with walking or feel more secure with something to lean on  Walking up steps and normal activities may be resumed as you feel ready  Most people tire easily for the first few weeks following leg surgery  This improves as conditioning returns  Avoid strenuous activity such as vigorous exercise  Avoid heavy lifting (do not lift more than 15 pounds) for the first four weeks after surgery  You should not drive a car for at least two weeks following discharge from the hospital and you are off all narcotic pain medication  You may ride in a car  DIET:  Resume your normal diet  Good nutrition is important for healing of your incision  If you are discharged on narcotics for pain control, continue taking your stool softeners until you are having regular bowel movements  INCISION:  You should shower daily  Wash incision daily with soap and water, but do not rub or scrub the incision; rinse thoroughly and pat dry  You may have stitches or staples to close your incision and it is okay for these to get wet  Do not bathe in a tub or swim for the first 4 week following surgery or if you have any open wounds  It is normal to have swelling or discoloration around the incision  If increasing redness or pain develops, call our office immediately  Numbness in the region of the incision may occur following the surgery  This normally improves over six to twelve months  You may have surgical glue over your incisions  There are stitches present under the skin which will absorb on their own  The glue is used to cover the access, assist in closure, and prevent contamination  This adhesive will darken and peel away on its own within one to two weeks  Do not pick at it      If you have a groin wound/incision, place a clean dry piece of gauze to cover your groin incision to keep incision clean and dry and prevent your skin from sticking together  Change gauze daily  You may have staples or stitches at your incisions  These will be removed at your follow-up appointment or when they are ready to come out  If you have a dressing over your surgical site, remove this on the second day after surgery  If you have foot or leg wounds, please follow your podiatrist/wound care doctor's instructions for care  If any of your incisions are open and require dressing changes, you will be given instructions for your daily incision care  If you are not able to change the dressings, a visiting nurse will be arranged  DO NOT put any powders, creams, ointments, or lotions on your incision  LEG SWELLING: Most patients have noticeable leg swelling after leg surgery  This usually improves within a few weeks  If swelling is present, elevate the leg whenever possible  Avoid sitting with the leg hanging down for prolonged periods of time  Walking is beneficial   An ACE bandage or support stocking may be helpful, but this should be discussed with your physician prior to use if you have a bypass  FOLLOW UP STUDIES:  Doppler ultrasound studies are very important for long-term management  Your surgeon will arrange this at your first postoperative visit  Repeat studies are then scheduled every three months for the first year and periodically after this  FOLLOW UP APPOINTMENTS:  Making and keeping follow up appointments and ultrasound tests are important to your recovery  If you have difficulty making it to or keeping your follow up appointments, call the office  If you have increased pain, fever >101 5, increased drainage, redness or a bad smell at your surgery site, new coldness/numbness of your arm or leg, please call us immediately and GO directly to the ER      PLEASE CALL THE OFFICE IF YOU HAVE ANY QUESTIONS  [phone]   [facility] [address] , [address], [place], [address]  [address], [address], [place], [address] [facility]  [address], [facility], [address] [place] [address]  [address] [facility] [address], One [facility], [facility], [address] [facility]    [address], [address]st Floor, [address]  [facility] 19, [address] [facility], [address] [address] [facility]  1307 [facility], [address], [place], 960 [facility]  One [facility], [address] [facility], [address] [facility],[address], Susana Carrasco 6  201 [facility] [place], [address]

## 2023-01-31 NOTE — PLAN OF CARE
Problem: MOBILITY - ADULT  Goal: Maintain or return to baseline ADL function  Description: INTERVENTIONS:  -  Assess patient's ability to carry out ADLs; assess patient's baseline for ADL function and identify physical deficits which impact ability to perform ADLs (bathing, care of mouth/teeth, toileting, grooming, dressing, etc )  - Assess/evaluate cause of self-care deficits   - Assess range of motion  - Assess patient's mobility; develop plan if impaired  - Assess patient's need for assistive devices and provide as appropriate  - Encourage maximum independence but intervene and supervise when necessary  - Involve family in performance of ADLs  - Assess for home care needs following discharge   - Consider OT consult to assist with ADL evaluation and planning for discharge  - Provide patient education as appropriate  Outcome: Progressing  Goal: Maintains/Returns to pre admission functional level  Description: INTERVENTIONS:  - Perform BMAT or MOVE assessment daily    - Set and communicate daily mobility goal to care team and patient/family/caregiver  - Collaborate with rehabilitation services on mobility goals if consulted  - Perform Range of Motion   - Reposition patient every 2 hours    - Dangle patient 3 times a day  - Stand patient 3 times a day  - Ambulate patient 3 times a day  - Out of bed to chair 3 times a day   - Out of bed for meals 3 times a day  - Out of bed for toileting  - Record patient progress and toleration of activity level   Outcome: Progressing     Problem: PAIN - ADULT  Goal: Verbalizes/displays adequate comfort level or baseline comfort level  Description: Interventions:  - Encourage patient to monitor pain and request assistance  - Assess pain using appropriate pain scale  - Administer analgesics based on type and severity of pain and evaluate response  - Implement non-pharmacological measures as appropriate and evaluate response  - Consider cultural and social influences on pain and pain management  - Notify physician/advanced practitioner if interventions unsuccessful or patient reports new pain  Outcome: Progressing     Problem: INFECTION - ADULT  Goal: Absence or prevention of progression during hospitalization  Description: INTERVENTIONS:  - Assess and monitor for signs and symptoms of infection  - Monitor lab/diagnostic results  - Monitor all insertion sites, i e  indwelling lines, tubes, and drains  - Monitor endotracheal if appropriate and nasal secretions for changes in amount and color  - [place] appropriate cooling/warming therapies per order  - Administer medications as ordered  - Instruct and encourage patient and family to use good hand hygiene technique  - Identify and instruct in appropriate isolation precautions for identified infection/condition  Outcome: Progressing  Goal: Absence of fever/infection during neutropenic period  Description: INTERVENTIONS:  - Monitor WBC    Outcome: Progressing     Problem: SAFETY ADULT  Goal: Maintain or return to baseline ADL function  Description: INTERVENTIONS:  -  Assess patient's ability to carry out ADLs; assess patient's baseline for ADL function and identify physical deficits which impact ability to perform ADLs (bathing, care of mouth/teeth, toileting, grooming, dressing, etc )  - Assess/evaluate cause of self-care deficits   - Assess range of motion  - Assess patient's mobility; develop plan if impaired  - Assess patient's need for assistive devices and provide as appropriate  - Encourage maximum independence but intervene and supervise when necessary  - Involve family in performance of ADLs  - Assess for home care needs following discharge   - Consider OT consult to assist with ADL evaluation and planning for discharge  - Provide patient education as appropriate  Outcome: Progressing  Goal: Maintains/Returns to pre admission functional level  Description: INTERVENTIONS:  - Perform BMAT or MOVE assessment daily    - Set and communicate daily mobility goal to care team and patient/family/caregiver  - Collaborate with rehabilitation services on mobility goals if consulted  - Perform Range of Motion   - Reposition patient every 2 hours    - Dangle patient 3 times a day  - Stand patient 3 times a day  - Ambulate patient 3 times a day  - Out of bed to chair 3 times a day   - Out of bed for meals 3 times a day  - Out of bed for toileting  - Record patient progress and toleration of activity level   Outcome: Progressing  Goal: Patient will remain free of falls  Description: INTERVENTIONS:  -  Assess patient's ability to carry out ADLs; assess patient's baseline for ADL function and identify physical deficits which impact ability to perform ADLs (bathing, care of mouth/teeth, toileting, grooming, dressing, etc )  - Assess/evaluate cause of self-care deficits   - Assess range of motion  - Assess patient's mobility; develop plan if impaired  - Assess patient's need for assistive devices and provide as appropriate  - Encourage maximum independence but intervene and supervise when necessary  - Involve family in performance of ADLs  - Assess for home care needs following discharge   - Consider OT consult to assist with ADL evaluation and planning for discharge  - Provide patient education as appropriate  Outcome: Progressing     Problem: DISCHARGE PLANNING  Goal: Discharge to home or other facility with appropriate resources  Description: INTERVENTIONS:  - Identify barriers to discharge w/patient and caregiver  - Arrange for needed discharge resources and transportation as appropriate  - Identify discharge learning needs (meds, wound care, etc )  - Arrange for interpretive services to assist at discharge as needed  - Refer to Case Management Department for coordinating discharge planning if the patient needs post-hospital services based on physician/advanced practitioner order or complex needs related to functional status, cognitive ability, or social support system  Outcome: Progressing     Problem: Knowledge Deficit  Goal: Patient/family/caregiver demonstrates understanding of disease process, treatment plan, medications, and discharge instructions  Description: Complete learning assessment and assess knowledge base    Interventions:  - Provide teaching at level of understanding  - Provide teaching via preferred learning methods  Outcome: Progressing

## 2023-01-31 NOTE — ANESTHESIA POSTPROCEDURE EVALUATION
Post-Op Assessment Note    CV Status:  Stable    Pain management: adequate     Mental Status:  Alert and awake   Hydration Status:  Euvolemic   PONV Controlled:  Controlled   Airway Patency:  Patent      Post Op Vitals Reviewed: Yes            No notable events documented      BP      Temp      Pulse     Resp      SpO2      /61   Pulse 94   Temp 98 1 °F (36 7 °C) (Oral)   Resp 17   Ht 5' 1" (1 549 m)   Wt 104 kg (229 lb 0 9 oz)   LMP 01/06/2023 (Approximate)   SpO2 97%   BMI 43 28 kg/m²

## 2023-01-31 NOTE — PROGRESS NOTES
[facility]  Progress Note - Kendra Renteria 1990, 26 y o  female MRN: [id number]  Unit/Bed#: ICU 12 Encounter: [id number]  Primary Care Provider: REJI Smith   Date and time admitted to hospital: 1/30/2023 12:32 AM    Right groin pain  Assessment & Plan  -s/p cath on 1/12/23  -refer to below    Type 2 diabetes mellitus [facility])  Assessment & Plan  Lab Results   Component Value Date    HGBA1C 9 6 (H) 01/10/2023       Recent Labs     01/30/23  1823   POCGLU 313*       Blood Sugar Average: Last 72 hrs:  (P) 313     -accuchecks q6  -ssi  -restart home meds when able to take po    Morbid obesity ([address] )  Assessment & Plan  -nutrition consult  -oob when ok with vascular  -nutrition education      * Arterial occlusion  Assessment & Plan  -s/p R LEONEL and R CFA occlusion, s/p R femoral exposure, R iliac embolectomy and R EIA stent placement POD #1  -vascular checks  -neuro checks  -asa/plavix      ----------------------------------------------------------------------------------------  HPI/24hr events: Kendra Renteria is a 26 y o  female who presents with hx of aortic coarctation s/p repair, s/p tevar, DM, bipolar, hcv (treated), obesity, htn, hld, vsd s/p repair, and multiple cardiac caths with most recent on 1/13/2023  Pt presented to the ER this am s/p right groin and leg pain when standing  cta showed a thrombus/injury to R EIA/CFA and pt taken to OR by vascular for R LEONEL and R CFA occlusion, s/p R femoral exposure, R iliac embolectomy and R EIA stent placement      -vomit x 1 o/n  zofran given    Patient appropriate for transfer out of the ICU today?: Patient does not meet criteria for ICU Follow-up Clinic; referral has not been made     [facility]  Code Status: Prior  ---------------------------------------------------------------------------------------  SUBJECTIVE  Right groin soreness    Review of Systems   Constitutional: Negative for chills and fever    HENT: Negative for ear pain and sore throat  Eyes: Negative for pain and visual disturbance  Respiratory: Negative for cough and shortness of breath  Cardiovascular: Negative for chest pain and palpitations  Gastrointestinal: Negative for abdominal pain and vomiting  Genitourinary: Negative for dysuria and hematuria  Musculoskeletal: Negative for arthralgias and back pain  Skin: Negative for color change and rash  Right groin soreness   Neurological: Negative for seizures, syncope and numbness  All other systems reviewed and are negative  Review of systems was reviewed and negative unless stated above in HPI/24-hour events   ---------------------------------------------------------------------------------------  OBJECTIVE    Vitals   Vitals:    23 0100 23 0200 23 0233 23 0300   BP: 100/56 106/66 101/62 100/59   BP Location:       Pulse: 82 84 82 86   Resp: 13 16 16 18   Temp:       TempSrc:       SpO2: 99% 100% 100% 96%   Weight:       Height:         Temp (24hrs), Av 8 °F (36 6 °C), Min:97 7 °F (36 5 °C), Max:97 9 °F (36 6 °C)  Current: Temperature: 97 7 °F (36 5 °C)  Arterial Line BP: 104/64  Arterial Line MAP (mmHg): 82 mmHg    Respiratory:  SpO2: SpO2: 96 %       Invasive/non-invasive ventilation settings   Respiratory    Lab Data (Last 4 hours)    None         O2/Vent Data (Last 4 hours)    None                Physical Exam  Vitals and nursing note reviewed  Constitutional:       General: She is not in acute distress  Appearance: She is well-developed  She is obese  HENT:      Head: Normocephalic and atraumatic  Mouth/Throat:      Mouth: Mucous membranes are moist    Eyes:      Conjunctiva/sclera: Conjunctivae normal       Pupils: Pupils are equal, round, and reactive to light  Cardiovascular:      Rate and Rhythm: Normal rate and regular rhythm  Heart sounds: No murmur heard       Comments: +pulses with doppler  Pulmonary: Effort: Pulmonary effort is normal  No respiratory distress  Breath sounds: Normal breath sounds  Abdominal:      General: Bowel sounds are normal       Palpations: Abdomen is soft  Tenderness: There is no abdominal tenderness  Musculoskeletal:         General: No swelling  Cervical back: Neck supple  Comments: Right groin with incision clean, dry, dressed, no gross hematoma  Skin:     General: Skin is warm and dry  Capillary Refill: Capillary refill takes less than 2 seconds  Neurological:      General: No focal deficit present  Mental Status: She is alert and oriented to person, place, and time  Mental status is at baseline  Psychiatric:         Mood and Affect: Mood normal            Laboratory and Diagnostics:  Results from last 7 days   Lab Units 01/30/23  1753 01/30/23  0651 01/30/23  0146   WBC Thousand/uL 13 09* 9 64 10 78*   HEMOGLOBIN g/dL 12 1 12 4 14 0   HEMATOCRIT % 38 0 39 3 43 9   PLATELETS Thousands/uL 253 217 242   NEUTROS PCT %  --   --  61   MONOS PCT %  --   --  8   MONO PCT % 8  --   --      Results from last 7 days   Lab Units 01/30/23  1753 01/30/23  0146   SODIUM mmol/L 135 136   POTASSIUM mmol/L 3 3* 3 9   CHLORIDE mmol/L 107 100   CO2 mmol/L 19* 25   ANION GAP mmol/L 9 11   BUN mg/dL 11 15   CREATININE mg/dL 0 55* 0 65   CALCIUM mg/dL 7 4* 9 6   GLUCOSE RANDOM mg/dL 284* 269*   ALT U/L  --  12   AST U/L  --  10*   ALK PHOS U/L  --  63   ALBUMIN g/dL  --  4 5   TOTAL BILIRUBIN mg/dL  --  0 34          Results from last 7 days   Lab Units 01/31/23  0045 01/30/23  1753 01/30/23  0651   INR   --   --  1 03   PTT seconds 23 204* 27              ABG:    VBG:          Micro        EKG: nsr  Imaging: I have personally reviewed pertinent reports     and I have personally reviewed pertinent films in PACS    Intake and Output  I/O       01/29 0701 01/30 0700 01/30 0701 01/31 0700    I V  (mL/kg)  2410 (23 2)    Total Intake(mL/kg)  2410 (23 2)    Urine (mL/kg/hr)  700 (0 3)    Emesis/NG output  0    Blood  150    Total Output  850    Net  +1560          Unmeasured Emesis Occurrence  4 x          Height and Weights   Height: 5' 1" (154 9 cm)  IBW (Ideal Body Weight): 47 8 kg  Body mass index is 43 28 kg/m²  Weight (last 2 days)     Date/Time Weight    01/30/23 1934 104 (229 06)            Nutrition       Diet Orders   (From admission, onward)             Start     Ordered    01/30/23 1720  Diet Regular; Regular House  Diet effective now        References:    Nutrtion Support Algorithm Enteral vs  Parenteral   Question Answer Comment   Diet Type Regular    Regular Regular House    RD to adjust diet per protocol?  Yes        01/30/23 1724                  Active Medications  Scheduled Meds:  Current Facility-Administered Medications   Medication Dose Route Frequency Provider Last Rate   • acetaminophen  975 mg Oral Q6H [address] Thresea Carmen, DO     • aspirin  300 mg Rectal Daily Thresea Carmen, DO     • clopidogrel  75 mg Oral Daily Thresea Carmen, DO     • heparin (porcine)  5,000 Units Subcutaneous [facility] Thresea Carmen, DO     • insulin lispro  2-12 Units Subcutaneous Q6H [address] [place], [place]     • lactated ringers  500 mL Intravenous Once PRN Thresea Carmen, DO      And   • lactated ringers  500 mL Intravenous Once PRN Thresea Carmen, DO     • oxyCODONE  10 mg Oral Q4H PRN Thresea Carmen, DO     • oxyCODONE  5 mg Oral Q4H PRN Thresea Carmen, DO     • sodium chloride  500 mL Intravenous Once PRN Thresea Carmen, DO      And   • sodium chloride  500 mL Intravenous Once PRN Thresea Carmen, DO     • sodium chloride  75 mL/hr Intravenous Continuous Thresea Carmen, DO 75 mL/hr (01/30/23 2021)     Continuous Infusions:  sodium chloride, 75 mL/hr, Last Rate: 75 mL/hr (01/30/23 2021)      PRN Meds:   lactated ringers, 500 mL, Once PRN   And  lactated ringers, 500 mL, Once PRN  oxyCODONE, 10 mg, Q4H PRN  oxyCODONE, 5 mg, Q4H PRN  sodium chloride, 500 mL, Once PRN   And  sodium chloride, 500 mL, Once PRN        Invasive Devices Review  Invasive Devices     Peripheral Intravenous Line  Duration           Peripheral IV 01/09/23 Right;Lateral Antecubital 21 days    Peripheral IV 01/30/23 Left Hand <1 day          Arterial Line  Duration           Arterial Line 01/30/23 Left Radial <1 day          Drain  Duration           Urethral Catheter Latex;Straight-tip 16 Fr  <1 day                Rationale for remaining devices: lexy/nikia adams  ---------------------------------------------------------------------------------------  Advance Directive and Living Will:      Power of :    POLST:    ---------------------------------------------------------------------------------------  Care Time Delivered:   No Critical Care time spent       Teto Mccormack PA-C      Portions of the record may have been created with voice recognition software  Occasional wrong word or "sound a like" substitutions may have occurred due to the inherent limitations of voice recognition software    Read the chart carefully and recognize, using context, where substitutions have occurred

## 2023-01-31 NOTE — H&P
[facility]  H&P- Angelo Amaro 1990, 26 y o  female MRN: [id number]  Unit/Bed#: ICU 12 Encounter: [id number]  Primary Care Provider: REJI Stratton   Date and time admitted to hospital: 1/30/2023 12:32 AM    Right groin pain  Assessment & Plan  -s/p cath on 1/12/23  -refer to above    Type 2 diabetes mellitus [facility])  Assessment & Plan  Lab Results   Component Value Date    HGBA1C 9 6 (H) 01/10/2023       Recent Labs     01/30/23 1823   POCGLU 313*       Blood Sugar Average: Last 72 hrs:  (P) 313     -accuchecks q6  -ssi  -restart home meds when able to take po    Morbid obesity ([address] )  Assessment & Plan  -nutrition consult  -oob when ok with vascular      * Arterial occlusion  Assessment & Plan  -s/p R fem cutdown with iliac embolectomy and stent placed  -vascular checks  -neuro checks  -asa/plavix        -------------------------------------------------------------------------------------------------------------  Chief Complaint: right groin pain    History of Present Illness   Angelo Amaro is a 26 y o  female who presents with hx of aortic coarctation s/p repair, s/p tevar, DM, bipolar, hcv (treated), obesity, htn, hld, vsd s/p repair, and multiple cardiac caths with most recent on 1/13/2023  Pt presented to the ER this am s/p right groin and leg pain when standing  cta showed a thrombus/injury to R EIA/CFA and pt taken to OR by vascular for Right fem cutdown, iliac embolectomy and stent placement  History obtained from chart review and the patient   -------------------------------------------------------------------------------------------------------------  Dispo: Admit to Critical Care     Code Status: Prior  --------------------------------------------------------------------------------------------------------------  Review of Systems   Constitutional: Negative  HENT: Negative  Eyes: Negative  Respiratory: Negative  Cardiovascular: Negative  Gastrointestinal: Positive for nausea and vomiting  Musculoskeletal: Negative  Mild soreness at incision site   Skin: Negative  Neurological: Negative  A 12-point, complete review of systems was reviewed and negative except as stated above     Physical Exam  Constitutional:       General: She is not in acute distress  HENT:      Head: Normocephalic and atraumatic  Mouth/Throat:      Mouth: Mucous membranes are dry  Eyes:      Pupils: Pupils are equal, round, and reactive to light  Cardiovascular:      Rate and Rhythm: Normal rate and regular rhythm  Pulses: Normal pulses  Heart sounds: Normal heart sounds  Pulmonary:      Effort: Pulmonary effort is normal       Breath sounds: Normal breath sounds  Abdominal:      Palpations: Abdomen is soft  Skin:     General: Skin is warm and dry  Comments: Incision dressed, clean and dry  Tenderness to local palpation  No large hematoma noted   Neurological:      General: No focal deficit present  Mental Status: She is alert and oriented to person, place, and time  --------------------------------------------------------------------------------------------------------------  Vitals:   Vitals:    01/30/23 1830 01/30/23 1845 01/30/23 1900 01/30/23 1934   BP: 138/69 165/88 134/67 119/66   BP Location:       Pulse: 84 104 98 94   Resp: 22 19 (!) 26 19   Temp:    97 7 °F (36 5 °C)   TempSrc:    Oral   SpO2: 96% 99% 98% 96%   Height:    5' 1" (1 549 m)     Temp  Min: 97 7 °F (36 5 °C)  Max: 98 1 °F (36 7 °C)  IBW (Ideal Body Weight): 47 8 kg  Height: 5' 1" (154 9 cm)  Body mass index is 41 19 kg/m²      Laboratory and Diagnostics:  Results from last 7 days   Lab Units 01/30/23  1753 01/30/23  0651 01/30/23  0146   WBC Thousand/uL 13 09* 9 64 10 78*   HEMOGLOBIN g/dL 12 1 12 4 14 0   HEMATOCRIT % 38 0 39 3 43 9   PLATELETS Thousands/uL 253 217 242   NEUTROS PCT %  --   --  61   MONOS PCT %  --   --  8   MONO PCT % 8  -- --      Results from last 7 days   Lab Units 01/30/23  1753 01/30/23  0146   SODIUM mmol/L 135 136   POTASSIUM mmol/L 3 3* 3 9   CHLORIDE mmol/L 107 100   CO2 mmol/L 19* 25   ANION GAP mmol/L 9 11   BUN mg/dL 11 15   CREATININE mg/dL 0 55* 0 65   CALCIUM mg/dL 7 4* 9 6   GLUCOSE RANDOM mg/dL 284* 269*   ALT U/L  --  12   AST U/L  --  10*   ALK PHOS U/L  --  63   ALBUMIN g/dL  --  4 5   TOTAL BILIRUBIN mg/dL  --  0 34          Results from last 7 days   Lab Units 01/30/23  1753 01/30/23  0651   INR   --  1 03   PTT seconds 204* 27              ABG:    VBG:          Micro:        EKG: nsr  Imaging: I have personally reviewed pertinent reports  and I have personally reviewed pertinent films in PACS      Historical Information   Past Medical History:   Diagnosis Date   • Allergic    • Arthritis    • Bipolar affective disorder, currently depressed, moderate ([facility] [address] ) 12/17/2018   • Cyst of ovary, right    • Diabetes mellitus ([facility] [address] ) 10/10/2018    type 2   • Endometriosis    • Heart murmur 1988   • Hepatitis C    • Hepatitis C virus infection cured after antiviral drug therapy    • History of transfusion    • Hypertension    • Migraines    • Morbid obesity with BMI of 40 0-44 9, adult ([facility] [address] )    • Obesity 1995   • Pulmonary artery congenital abnormality    • Spleen enlarged    • Status post surgical removal of both fallopian tubes    • Varicella      Past Surgical History:   Procedure Laterality Date   • CARDIAC CATHETERIZATION      no CAD 10days, 4 weeks 20months old    • CARDIAC CATHETERIZATION N/A 1/12/2023    Procedure: Cardiac Coronary Angiogram;  Surgeon: Darien Glass DO;  Location: [facility] CARDIAC CATH LAB; Service: Cardiology   • CARDIAC CATHETERIZATION Left 1/12/2023    Procedure: Cardiac Left Heart Cath;  Surgeon: Darien Glass DO;  Location: [facility];   Service: Cardiology   • CARDIAC CATHETERIZATION  1/12/2023    Procedure: Cardiac catheterization;  Surgeon: Darien Glass DO;  Location: [facility]AC [facility];   Service: Cardiology   •  SECTION, LOW TRANSVERSE     • CHOLECYSTECTOMY     • 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 5   • CORONARY STENT PLACEMENT     • LIVER BIOPSY     • LIVER BIOPSY     • STERNAL WIRE REMOVAL  2022   • TUBAL LIGATION Bilateral    • VSD REPAIR      As a child     Social History   Social History     Substance and Sexual Activity   Alcohol Use Not Currently   • Alcohol/week: 3 0 standard drinks   • Types: 3 Standard drinks or equivalent per week    Comment: socially/prior to knowledge of pregnancy     Social History     Substance and Sexual Activity   Drug Use Not Currently    Comment: hx of THC use for migraines     Social History     Tobacco Use   Smoking Status Former   • Packs/day: 0 50   • Years: 0 00   • Pack years: 0 00   • Types: Cigarettes   Smokeless Tobacco Never     Family History:   Family History   Problem Relation Age of Onset   • Hypertension Mother    • Migraines Mother    • JENNY disease Mother    • Depression Mother    • Hyperlipidemia Mother    • Diabetes Mother    • Diabetes Father    • Hypertension Father    • Kidney failure Father    • Heart attack Father    • Arthritis Father    • Stroke Father    • Polycystic kidney disease Paternal Grandmother    • Stroke Paternal Grandmother    • Heart disease Paternal Grandmother    • Arthritis Sister    • Asthma Sister    • Thyroid disease Sister    • Diabetes Sister    • Arthritis Maternal Grandmother    • Breast cancer Maternal Grandmother    • Diabetes Maternal Grandmother    • Hypertension Maternal Grandmother    • Heart Valve Disease Maternal Grandmother    • Learning disabilities Cousin    • Learning disabilities Sister    • ADD / ADHD Cousin    • Lung cancer Brother    • Diabetes Maternal Grandfather    • Hypertension Maternal Grandfather    • JENNY disease Maternal Grandfather    • Stroke Maternal Grandfather      I have reviewed this patient's family history and commented on sigificant items within the HPI      Medications:  Current Facility-Administered Medications   Medication Dose Route Frequency   • acetaminophen (TYLENOL) tablet 975 mg  975 mg Oral Q6H [address]   • [START ON 1/31/2023] aspirin rectal suppository 300 mg  300 mg Rectal Daily   • clopidogrel (PLAVIX) tablet 300 mg  300 mg Oral Once   • [START ON 1/31/2023] clopidogrel (PLAVIX) tablet 75 mg  75 mg Oral Daily   • heparin (porcine) subcutaneous injection 5,000 Units  5,000 Units Subcutaneous Q8H [address]   • lactated ringers bolus 500 mL  500 mL Intravenous Once PRN    And   • lactated ringers bolus 500 mL  500 mL Intravenous Once PRN   • oxyCODONE (ROXICODONE) immediate release tablet 10 mg  10 mg Oral Q4H PRN   • oxyCODONE (ROXICODONE) IR tablet 5 mg  5 mg Oral Q4H PRN   • sodium chloride 0 9 % bolus 500 mL  500 mL Intravenous Once PRN    And   • sodium chloride 0 9 % bolus 500 mL  500 mL Intravenous Once PRN   • sodium chloride 0 9 % infusion  75 mL/hr Intravenous Continuous     Home medications:  Prior to Admission Medications   Prescriptions Last Dose Informant Patient Reported? Taking? ALPRAZolam (XANAX) 0 5 mg tablet   No Yes   Sig: Take 1 tablet (0 5 mg total) by mouth daily at bedtime as needed for anxiety   B-D UF III MINI PEN NEEDLES 31G X 5 MM MISC   No Yes   Sig: INJECT UNDER THE SKIN DAILY AT BEDTIME USE ONE A DAY OR AS DIRECTED   Blood Glucose Monitoring Suppl (OneTouch Verio) w/Device KIT   No Yes   Sig: Use daily   Blood Pressure Monitoring (B-D ASSURE BPM/AUTO WRIST CUFF) MISC   No Yes   Sig: Check blood pressure prior to each OB visit, or as directed by your physician     VITAMIN D PO   Yes Yes   Sig: Take by mouth daily   acetaminophen (TYLENOL) 500 mg tablet   Yes Yes   Sig: Take 1,000 mg by mouth   albuterol (Ventolin HFA) 90 mcg/act inhaler   No Yes   Sig: Inhale 2 puffs every 4 (four) hours as needed for wheezing or shortness of breath   diclofenac sodium (VOLTAREN) 50 mg EC tablet   No Yes   Sig: Take 1 tablet (50 mg total) by mouth 2 (two) times a day   fexofenadine (ALLEGRA) 180 MG tablet   No Yes   Sig: Take 1 tablet (180 mg total) by mouth daily   gabapentin (NEURONTIN) 100 mg capsule   No Yes   Sig: take 1 capsule by mouth daily at bedtime   insulin glargine (LANTUS) 100 units/mL subcutaneous injection   No Yes   Sig: Inject 17 Units under the skin daily at bedtime   metFORMIN (GLUCOPHAGE) 1000 MG tablet   No Yes   Sig: Take 1 tablet (1,000 mg total) by mouth 2 (two) times a day with meals   methocarbamol (ROBAXIN) 500 mg tablet   No Yes   Sig: Take 1 tablet (500 mg total) by mouth 4 (four) times a day   montelukast (SINGULAIR) 10 mg tablet   No Yes   Sig: Take 1 tablet (10 mg total) by mouth daily at bedtime   sertraline (Zoloft) 50 mg tablet   No Yes   Sig: Take 1 tablet (50 mg total) by mouth daily   sitaGLIPtin (JANUVIA) 100 mg tablet   No Yes   Sig: Take 1 tablet (100 mg total) by mouth daily      Facility-Administered Medications: None     Allergies: Allergies   Allergen Reactions   • Prednisone Swelling   • Bactrim [Sulfamethoxazole-Trimethoprim] Hives   • Corticosteroids Swelling     Pt states this does not cause problems breathing, she just has generalized swelling   • Cortisone Swelling     Generalized; no impairment with breathing reported     • Medical Tape Hives     Allergic to Paper Tape   • Other Hives     Paper tape   • Sulfa Antibiotics Hives       ------------------------------------------------------------------------------------------------------------  Advance Directive and Living Will:      Power of :    POLST:    ------------------------------------------------------------------------------------------------------------  Anticipated Length of Stay is > 2 midnights    Care Time Delivered:   No Critical Care time spent       Kaelyn Sidhu PA-C        Portions of the record may have been created with voice recognition software    Occasional wrong word or "sound a like" substitutions may have occurred due to the inherent limitations of voice recognition software    Read the chart carefully and recognize, using context, where substitutions have occurred

## 2023-02-01 ENCOUNTER — TRANSITIONAL CARE MANAGEMENT (OUTPATIENT)
Dept: FAMILY MEDICINE CLINIC | Facility: CLINIC | Age: 33
End: 2023-02-01

## 2023-02-02 ENCOUNTER — TELEPHONE (OUTPATIENT)
Dept: VASCULAR SURGERY | Facility: CLINIC | Age: 33
End: 2023-02-02

## 2023-02-02 ENCOUNTER — NURSE TRIAGE (OUTPATIENT)
Dept: OTHER | Facility: OTHER | Age: 33
End: 2023-02-02

## 2023-02-02 NOTE — TELEPHONE ENCOUNTER
Based on reported symptoms, no acute concerns  Continue to monitor R groin incision and leg symptoms  If numbness persists (after getting up and moving) or she develops pain, change in color or temperature or experiences worsening symptoms contact office    Keep post op visit as scheduled

## 2023-02-02 NOTE — UTILIZATION REVIEW
NOTIFICATION OF ADMISSION DISCHARGE   This is a Notification of Discharge from [address]  Please be advised that this patient has been discharge from our facility  Below you will find the admission and discharge date and time including the patient’s disposition  UTILIZATION REVIEW CONTACT:  Paige Ramirez  Utilization   Network Utilization Review Department  Phone: [phone] x carefully listen to the prompts  All voicemails are confidential   Email: [email or web address]     ADMISSION INFORMATION  PRESENTATION DATE: 1/30/2023 12:32 AM  OBERVATION ADMISSION DATE:   INPATIENT ADMISSION DATE: 1/30/23  5:24 PM   DISCHARGE DATE: 1/31/2023  1:44 PM   DISPOSITION:Home/Self Care    IMPORTANT INFORMATION:  Send all requests for admission clinical reviews, approved or denied determinations and any other requests to dedicated fax number below belonging to the campus where the patient is receiving treatment   List of dedicated fax numbers:  [address] DENIALS (Administrative/Medical Necessity) [phone]   [address] (Maternity/NICU/Pediatrics) [phone]   [facility] [phone]   [place] [address] [phone]   [address] [phone]   220 [facility] [phone]   90 [facility] [phone]   [address] [place] [address] 119 [phone]   [facility]  [phone]   [facility] [phone]   [address] [place] [address] [address] [facility] [phone]

## 2023-02-02 NOTE — TELEPHONE ENCOUNTER
Pt called, she is s/p Right femoral exposure Right iliac embolectomy w/ #4 Ted catheter Aortogram Right EIA stent w/ 7x29mm VBX (Right: Leg Lower) 1/30/23 by Dr Hodges, she was d/c 1/31/23  Pt called ? Why she has staples in her R groin, she was told it would be glued  Advised sometimes providers use staples to close the incision  ? If incision looks ok, she states it does, no redness or pain  Her mother removed dressing yesterday for her to shower, she notes at that time the dressing was drenched w/ clear fluid  After shower they applied new dressing and it is currently dry  Instructed pt to clean and dry incision daily, apply new dressing, she states she will do so  Pt also ? Why she continues to have numbness R anterior thigh  This was present prior to surgery as well  She also notes sometimes when she first wakes up her entire leg feels numb but it goes away once she gets up, does not interfere w/ ambulation  Her foot is normal temp and color, no pain  Advised I would route her concerns to our triage provider re: numbness and we would get back to her  She is scheduled for post op visit 2/15/23 w/ Dr Hodges

## 2023-02-03 ENCOUNTER — TELEPHONE (OUTPATIENT)
Dept: VASCULAR SURGERY | Facility: CLINIC | Age: 33
End: 2023-02-03

## 2023-02-03 DIAGNOSIS — M79.604 PAIN OF RIGHT LOWER EXTREMITY: ICD-10-CM

## 2023-02-03 RX ORDER — OXYCODONE HYDROCHLORIDE 5 MG/1
5 TABLET ORAL EVERY 8 HOURS PRN
Qty: 10 TABLET | Refills: 0 | Status: SHIPPED | OUTPATIENT
Start: 2023-02-03 | End: 2023-02-06

## 2023-02-03 NOTE — TELEPHONE ENCOUNTER
Contacted patient to discuss recommendations [place] [facility] [place] inquire about frequency of pain medication  Patient stated that she is either taking Tylenol or Oxycodone every 4-6 hours, so she is taking oxycodone about every 8 hours  She stated that she uses the [facility] on [address] in [place] for her prescriptions  Informed her that Kehinde Plaza recommended that she move up her appointment to next week and she was agreeable to same  Call transferred to [facility] in the Call Center to schedule appointment  Appointment scheduled for 2/7/23 at 8:30 am with REJI Ron at [address]

## 2023-02-03 NOTE — TELEPHONE ENCOUNTER
Vascular Nurse Navigator Post Op Call    Procedure: Right femoral exposure   Right iliac embolectomy w/ #4 Ted catheter   Aortogram   Right EIA stent w/ 7x29mm VBX    Date of Procedure: 1/30/23    Surgeon:    Jose L Hodges MD - Primary    * MUNA Alcantara - Assistant    Discharge Date: 1/31/23    Discharge Disposition: Home    Leg Weakness?: No    Leg Swelling?: No    Leg Numbness?: See below    Chest Pain?: No    Shortness of Breath?: No    Orthopnea?: No    Anticoagulation pt was discharged on post op?: Aspirin and Clopidogrel (Plavix)    Bleeding?: No    Uncontrolled Pain?: Yes, see below    Incision Concerns?: No    Fever or Chills?: No      Reviewed discharge instructions and incision care with patient  NEXT OFFICE VISIT SCHEDULED:  2/15/23 at 2:45 pm with Dr Nafisa Arce Doctor at [address]    Transportation Confirmed?: Yes      Any further questions/concerns? Patient stated that she continues with pain in her right groin since discharge  She stated that her pain is an 8 out of 10 on the pain scale and she is alternating Tylenol with the prescribed oxycodone and she continues to have pain  She stated that the Tylenol does not help and the oxycodone helps but does not fully relieve the pain  She stated that she has 1 tablet left of the oxycodone  She denies any swelling in her groin or any lumps or firm areas in her groin  She stated the numbness that she called On Call about last night has improved  She stated that she still has little numbness in her right foot that comes and goes and movement of her toes sometimes helps relieve the numbness  She denies swelling in her leg  She stated that she has pain/discomfort in her groin with ambulation, but not in her leg  She stated the symptoms that she is feeling now are different than when she presented to the ER  She stated that she continues to have clear drainage (serousanguinous) from her groin incision that has staples  She stated that she has a bandage over the incision and changes this daily and the bandage has little drainage on it when changed  She stated the dressing is not wet  She stated that she is washing the area with soap and water  Reviewed discharge medications - Aspirin and Plavix  She stated that she is taking these daily  All questions answered  Informed her that I would send this information to triage provider for recommendations and contact her with recommendations  She was agreeable to same

## 2023-02-03 NOTE — TELEPHONE ENCOUNTER
Reviewed  Continue local incisional care  Please ask patient how often she has been taking the oxycodone  We can send refill for small amount to pharmacy and can try to move post-op appointment up to next week

## 2023-02-03 NOTE — TELEPHONE ENCOUNTER
Contacted patient and informed her that a prescription for oxycodone has been sent to her pharmacy  Prescription directions reviewed  Informed her to contact office with any change in drainage or increase in pain or pain not relieved with pain medication  Informed her to continue alternating oxycodone with tylenol as she has been doing  Verbal understanding received

## 2023-02-03 NOTE — TELEPHONE ENCOUNTER
Queried PDMP  Prescription refill for oxycodone sent to [facility] per patient request  Advise patient to continue alternating Tylenol and oxycodone as she has been

## 2023-02-03 NOTE — TELEPHONE ENCOUNTER
Regarding: sensation onright  leg- recently had surgery  ----- Message from [facility] sent at 2/2/2023  9:23 PM EST -----  "I was taking a shower this evening  My mother was putting a new dressing on me and I felt a burning sensation on my right leg    I just recently had surgery and was discharged on Tuesday "

## 2023-02-03 NOTE — TELEPHONE ENCOUNTER
Reason for Disposition  • [1] Caller has URGENT question AND [2] triager unable to answer question    Answer Assessment - Initial Assessment Questions  1  SYMPTOM: "What's the main symptom you're concerned about?" (e g , pain, fever, vomiting)      Burning sensation in right groin for a about a minute and now gone     2  ONSET: "When did S/S start?"      Right groin area     3  SURGERY: "What surgery was performed?"      Right femoral exposure Right iliac embolectomy w/ #4 Ted catheter Aortogram Right EIA stent w/ 7x29mm VBX (Right: Leg Lower    4  DATE of SURGERY: "When was surgery performed?"      1/30/23    5  ANESTHESIA: " What type of anesthesia did you have?" (e g , general, spinal, epidural, local)      General     6  PAIN: "Is there any pain?" If Yes, ask: "How bad is it?"  (Scale 1-10; or mild, moderate, severe)      Right groin pain ongoing since surgery 6/10    7  FEVER: "Do you have a fever?" If Yes, ask: "What is your temperature, how was it measured, and when did it start?"     Denies    8  VOMITING: "Is there any vomiting?" If yes, ask: "How many times?"      Denies    9  BLEEDING: "Is there any bleeding?" If Yes, ask: "How much?" and "Where?"      Denies    10   OTHER SYMPTOMS: "Do you have any other symptoms?" (e g , drainage from wound, painful urination, constipation)        Clear drainage from incision    Protocols used: POST-OP SYMPTOMS AND QUESTIONS-[facility]

## 2023-02-06 ENCOUNTER — OFFICE VISIT (OUTPATIENT)
Dept: FAMILY MEDICINE CLINIC | Facility: CLINIC | Age: 33
End: 2023-02-06

## 2023-02-06 VITALS
TEMPERATURE: 96.3 F | SYSTOLIC BLOOD PRESSURE: 124 MMHG | DIASTOLIC BLOOD PRESSURE: 72 MMHG | BODY MASS INDEX: 42.56 KG/M2 | HEART RATE: 100 BPM | HEIGHT: 61 IN | WEIGHT: 225.4 LBS | OXYGEN SATURATION: 97 %

## 2023-02-06 DIAGNOSIS — I74.5 EXTERNAL ILIAC ARTERY THROMBOSIS (HCC): ICD-10-CM

## 2023-02-06 DIAGNOSIS — I70.90 ARTERIAL OCCLUSION: ICD-10-CM

## 2023-02-06 DIAGNOSIS — L24.A9 DRAINAGE FROM WOUND: ICD-10-CM

## 2023-02-06 DIAGNOSIS — F11.20 CONTINUOUS OPIOID DEPENDENCE (HCC): ICD-10-CM

## 2023-02-06 DIAGNOSIS — L76.82 PAIN AT SURGICAL INCISION: ICD-10-CM

## 2023-02-06 DIAGNOSIS — Z76.89 ENCOUNTER FOR SUPPORT AND COORDINATION OF TRANSITION OF CARE: Primary | ICD-10-CM

## 2023-02-06 NOTE — PROGRESS NOTES
Assessment & Plan     1  Encounter for support and coordination of transition of care    2  Pain at surgical incision  -     Wound culture and Gram stain; Future    3  Drainage from wound  -     Wound culture and Gram stain; Future    4  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HCC)    5  Continuous opioid dependence ([facility] [address] )    6  Arterial occlusion       Subjective     Transitional Care Management Review:   Faisal Fox is a 26 y o  female here for TCM follow up  During the TCM phone call patient stated:  TCM Call     Date and time call was made  2/1/2023  [facility] reviewed  Records reviewed    Patient was hospitialized at  [facility]    Date of Admission  01/30/23    Date of discharge  01/31/23    Diagnosis  arterial occlusion    Disposition  Home    Were the patients medications reviewed and updated  Yes    Current Symptoms  None      TCM Call     Post hospital issues  None    Should patient be enrolled in anticoag monitoring? No    Scheduled for follow up? Yes    Did you obtain your prescribed medications  Yes    Do you need help managing your prescriptions or medications  No    Is transportation to your appointment needed  No    I have advised the patient to call PCP with any new or worsening symptoms  [facility]     patient presents for follow-up from hospital visit for iliac embolectomy and stent placement status post right femoral cutdown  Patient had catheterization approximately 2 weeks prior  She did have some pain originally which she was seen for in this office but there is no indication of any complications at this time and was told to follow-up with her cardiologist   She did see cardiology  Had a pseudoaneurysm duplex on 127 that was negative  Then went to the ER and had a CT of the abdomen and pelvis which noted a filling defect at the right external iliac common femoral artery sent for ultrasound  She then had a CTA with runoff and duplex completed   She then had an embolectomy and stent placement completed  Was d/c with oxycodone for pain  Patient has been continuing to have significant pain at the right femoral region  It is tolerable when she is not moving but when she is up and moving around, her pain is severe  Has not been doing any lifting  She has been changing her dressing daily  She has moderate amount of purulent and small amounts of serosanguinous drainage  She does have staples in place and wound edge are approximated  She has been taking her oxycodone every 4-8 hours and it does not seem to be relieving her pain  Taking tylenol and states it does not effect her pain at all  Call palced on 2/3 and refill given of the oxy  She does have an appointment scheduled with vascular tomorrow  Wound images attached to patients charge  Wound culture obtained  Dressing replaced, clean dry and intact  Will reach out to vascular surgeon for their recommendations at this time  (tiger text and surgeon states that they will evaluate patient at her appointment tomorrow)     Review of Systems   Constitutional: Negative for appetite change, fatigue and unexpected weight change  HENT: Negative for congestion, rhinorrhea, sneezing and sore throat  Eyes: Negative for visual disturbance  Respiratory: Negative for cough, chest tightness, shortness of breath and wheezing  Cardiovascular: Negative for chest pain, palpitations and leg swelling  Gastrointestinal: Negative for abdominal pain, blood in stool, constipation, diarrhea, nausea and vomiting  Genitourinary: Negative for difficulty urinating, dysuria and urgency  Musculoskeletal: Negative for arthralgias, back pain and joint swelling  Skin: Positive for wound  Negative for color change and rash  Neurological: Negative for dizziness, weakness and headaches  Hematological: Negative for adenopathy  Does not bruise/bleed easily         Objective     /72 (BP Location: Left arm, Patient Position: Sitting)   Pulse 100   Temp (!) 96 3 °F (35 7 °C) (Tympanic)   Ht 5' 1" (1 549 m)   Wt 102 kg (225 lb 6 4 oz)   LMP 02/03/2023   SpO2 97%   BMI 42 59 kg/m²      Physical Exam  Vitals and nursing note reviewed  Constitutional:       General: She is not in acute distress  Appearance: Normal appearance  She is obese  She is not ill-appearing or toxic-appearing  Cardiovascular:      Rate and Rhythm: Normal rate and regular rhythm  Pulses: Normal pulses  Heart sounds: Normal heart sounds  No murmur heard  No friction rub  No gallop  Pulmonary:      Effort: Pulmonary effort is normal       Breath sounds: Normal breath sounds  No wheezing, rhonchi or rales  Abdominal:      General: Abdomen is flat  Bowel sounds are normal       Palpations: Abdomen is soft  Tenderness: There is no abdominal tenderness  There is no guarding  Hernia: No hernia is present  Musculoskeletal:        Legs:    Neurological:      General: No focal deficit present  Mental Status: She is alert and oriented to person, place, and time  Mental status is at baseline  Motor: No weakness  Psychiatric:         Mood and Affect: Mood normal          Behavior: Behavior normal          Thought Content:  Thought content normal          Judgment: Judgment normal        Medications have been reviewed by provider in current encounter    REJI Rosado

## 2023-02-07 ENCOUNTER — TELEPHONE (OUTPATIENT)
Dept: VASCULAR SURGERY | Facility: CLINIC | Age: 33
End: 2023-02-07

## 2023-02-07 ENCOUNTER — OFFICE VISIT (OUTPATIENT)
Dept: VASCULAR SURGERY | Facility: CLINIC | Age: 33
End: 2023-02-07

## 2023-02-07 VITALS
OXYGEN SATURATION: 99 % | BODY MASS INDEX: 42.51 KG/M2 | SYSTOLIC BLOOD PRESSURE: 140 MMHG | WEIGHT: 225 LBS | HEART RATE: 85 BPM | DIASTOLIC BLOOD PRESSURE: 78 MMHG

## 2023-02-07 DIAGNOSIS — I74.5 EXTERNAL ILIAC ARTERY THROMBOSIS (HCC): Primary | ICD-10-CM

## 2023-02-07 RX ORDER — CEPHALEXIN 500 MG/1
500 CAPSULE ORAL EVERY 8 HOURS SCHEDULED
Qty: 21 CAPSULE | Refills: 0 | Status: SHIPPED | OUTPATIENT
Start: 2023-02-07 | End: 2023-02-17

## 2023-02-07 RX ORDER — OXYCODONE HYDROCHLORIDE AND ACETAMINOPHEN 5; 325 MG/1; MG/1
1 TABLET ORAL EVERY 8 HOURS PRN
Qty: 9 TABLET | Refills: 0 | Status: SHIPPED | OUTPATIENT
Start: 2023-02-07 | End: 2023-02-17

## 2023-02-07 NOTE — PROGRESS NOTES
Assessment/Plan:    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[facility] [address] )  26year old female former smoker w/obesity (BMI 41 19), HTN, HLD, type II DM, aortic coarctation s/p repair and stent placement, VSD s/p repair, bipolar disorder, hepatitis C, s/p diagnostic cardiac catheterization via R femoral access w/ angioseal closure device performed for abnormal stress test, c/b thrombosis of R EIA with extension into the proximal R CFA s/p R femoral exposure, R iliac embolectomy and R EIA stent 1/30/23 (L  Doctor) presents for post op follow up with concerns for R groin incision     - c/o R groin pain since surgery  - notes serosanguinous drainage worseing in last 3 days    - R groin incision intact with staples other then approx 1 3 cm area of dehiscence at proximal 1/3 of incision   - no erythema, purulence or malodor  Denies fever or chills  - 0 9cm deep  does not track or tunnel    - RLE warm, perfused, strong biphasic DP/PT doppler signals    Plan:  -Cleanse R groin incision daily soap and water, pat dry  -Apply silver alginate with dry gauze dressing  -Return to office in 1 week for wound check (appt already scheduled)  -Low threshold to return to office with changes to incision including increased pain, drainage, erythema, odor, signs infection, fever or chills  -At risk for incisional breakdown secondary to body habitus  -Continue Plavix and aspirin  -Start Keflex 500 mg 3 times a day x7 days   -Percocet for severe pain  PDMP reviewed  Script sent  Dispensed #9 tabs  - Call with any questions or concerns         Diagnoses and all orders for this visit:    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HCC)  -     cephalexin (KEFLEX) 500 mg capsule; Take 1 capsule (500 mg total) by mouth every 8 (eight) hours for 7 days  -     oxyCODONE-acetaminophen (Percocet) 5-325 mg per tablet;  Take 1 tablet by mouth every 8 (eight) hours as needed for moderate pain or severe pain Max Daily Amount: 3 tablets        I have spent 26 minutes with Patient and family today in which greater than 50% of this time was spent in counseling/coordination of care regarding Risks and benefits of tx options, Intructions for management, Patient and family education, Importance of tx compliance, Risk factor reductions and Impressions    Subjective:      Patient ID: Emelyn Peralta is a 26 y o  female  This patient is here post op right iliac embolectomy done 1/30/23  There is drainage coming from the incision site  Patient c/o lots of pain  Patient denies fever,chills orcold in her extremities  Patient is on asa and plavix  HPI    The following portions of the patient's history were reviewed and updated as appropriate: allergies, current medications, past family history, past medical history, past social history, past surgical history and problem list     Review of Systems   Constitutional: Negative  HENT: Negative  Eyes: Negative  Respiratory: Negative  Gastrointestinal: Negative  Endocrine: Negative  Genitourinary: Negative  Musculoskeletal: Negative  Skin: Positive for wound  Allergic/Immunologic: Negative  Neurological: Negative  Hematological: Bruises/bleeds easily  Psychiatric/Behavioral: Negative  I have reviewed and made appropriate changes to the review of systems input by the medical assistant  Objective:      /78 (BP Location: Right arm, Patient Position: Sitting, Cuff Size: Large)   Pulse 85   Wt 102 kg (225 lb)   LMP 02/03/2023   SpO2 99%   BMI 42 51 kg/m²          Physical Exam  Vitals reviewed  Constitutional:       Appearance: She is obese  Cardiovascular:      Pulses:           Dorsalis pedis pulses are detected w/ Doppler on the right side and detected w/ Doppler on the left side  Posterior tibial pulses are detected w/ Doppler on the right side and detected w/ Doppler on the left side  Pulmonary:      Effort: Pulmonary effort is normal  No respiratory distress     Skin:     General: Skin is warm  Comments: R groin incision intact with staples except approx 1 3cm area of dehiscence in proximal 1/3 of incision  0 9 cm deep   Neurological:      Mental Status: She is alert  Vitals:    23 0839   BP: 140/78   BP Location: Right arm   Patient Position: Sitting   Cuff Size: Large   Pulse: 85   SpO2: 99%   Weight: 102 kg (225 lb)       Patient Active Problem List   Diagnosis   • Splenomegaly   • Hepatitis C   • Intractable chronic migraine without aura and with status migrainosus   • Cervicalgia   • Cervical dystonia   • Aortic coarctation   • Hypercholesteremia   • Hidradenitis suppurativa   • Cyst of right ovary   • Endometriosis   • Morbid obesity (HCC)   • Type 2 diabetes mellitus (HCC)   • Vitamin D deficiency   • Bipolar disorder (HCC)   • VSD (ventricular septal defect) and coarctation of aorta   • Hypertension   • H/O aortic coarctation repair   • Chronic hepatitis C without hepatic coma (HCC)   • Multiple thyroid nodules   • Depression   • Anxiety   • Neuropathy   • Elevated troponin I level   • Chest pain   • Arterial occlusion   • Right groin pain   • 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HCC)   • Continuous opioid dependence (HCC)       Past Surgical History:   Procedure Laterality Date   • CARDIAC CATHETERIZATION      no CAD 10days, 4 weeks 20months old    • CARDIAC CATHETERIZATION N/A 2023    Procedure: Cardiac Coronary Angiogram;  Surgeon: Sanford Gale DO;  Location: [facility]; Service: Cardiology   • CARDIAC CATHETERIZATION Left 2023    Procedure: Cardiac Left Heart Cath;  Surgeon: Sanford Gale DO;  Location: [facility]; Service: Cardiology   • CARDIAC CATHETERIZATION  2023    Procedure: Cardiac catheterization;  Surgeon: Sanford Gale DO;  Location: [facility];   Service: Cardiology   •  SECTION, LOW TRANSVERSE     • CHOLECYSTECTOMY     • 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 5   • CORONARY STENT PLACEMENT     • IR LOWER EXTREMITY ANGIOGRAM  2023   • LIVER BIOPSY     • LIVER BIOPSY     • STERNAL WIRE REMOVAL  2022   • THROMBECTOMY W/ EMBOLECTOMY Right 2023    Procedure: Right femoral exposure Right iliac embolectomy w/ #4 Ted catheter Aortogram Right EIA stent w/ 7x29mm VBX;  Surgeon: Ethel Hodges MD;  Location: [facility];  Service: Vascular   • TUBAL LIGATION Bilateral    • VSD REPAIR      As a child       Family History   Problem Relation Age of Onset   • Hypertension Mother    • Migraines Mother    • JENNY disease Mother    • Depression Mother    • Hyperlipidemia Mother    • Diabetes Mother    • Diabetes Father    • Hypertension Father    • Kidney failure Father    • Heart attack Father    • Arthritis Father    • Stroke Father    • Polycystic kidney disease Paternal Grandmother    • Stroke Paternal Grandmother    • Heart disease Paternal Grandmother    • Arthritis Sister    • Asthma Sister    • Thyroid disease Sister    • Diabetes Sister    • Arthritis Maternal Grandmother    • Breast cancer Maternal Grandmother    • Diabetes Maternal Grandmother    • Hypertension Maternal Grandmother    • Heart Valve Disease Maternal Grandmother    • Learning disabilities Cousin    • Learning disabilities Sister    • ADD / ADHD Cousin    • Lung cancer Brother    • Diabetes Maternal Grandfather    • Hypertension Maternal Grandfather    • JENNY disease Maternal Grandfather    • Stroke Maternal Grandfather        Social History     Socioeconomic History   • Marital status: Single     Spouse name: Not on file   • Number of children: Not on file   • Years of education: Not on file   • Highest education level: Not on file   Occupational History   • Not on file   Tobacco Use   • Smoking status: Former     Packs/day: 0 50     Years: 0 00     Pack years: 0 00     Types: Cigarettes     Quit date: 2023     Years since quittin 0   • Smokeless tobacco: Never   Vaping Use   • Vaping Use: Never used   Substance and Sexual Activity   • Alcohol use: Not Currently     Alcohol/week: 3 0 standard drinks     Types: 3 Standard drinks or equivalent per week     Comment: socially/prior to knowledge of pregnancy   • Drug use: Not Currently     Comment: hx of THC use for migraines   • Sexual activity: Yes     Partners: Male     Birth control/protection: Female Sterilization   Other Topics Concern   • Not on file   Social History Narrative   • Not on file     Social Determinants of Health     Financial Resource Strain: Not on file   Food Insecurity: Not on file   Transportation Needs: Not on file   Physical Activity: Not on file   Stress: Not on file   Social Connections: Not on file   Intimate Partner Violence: Not on file   Housing Stability: Not on file       Allergies   Allergen Reactions   • Prednisone Swelling   • Bactrim [Sulfamethoxazole-Trimethoprim] Hives   • Corticosteroids Swelling     Pt states this does not cause problems breathing, she just has generalized swelling   • Cortisone Swelling     Generalized; no impairment with breathing reported     • Medical Tape Hives     Allergic to Paper Tape   • Other Hives     Paper tape   • Sulfa Antibiotics Hives         Current Outpatient Medications:   •  acetaminophen (TYLENOL) 500 mg tablet, Take 1,000 mg by mouth, Disp: , Rfl:   •  albuterol (Ventolin HFA) 90 mcg/act inhaler, Inhale 2 puffs every 4 (four) hours as needed for wheezing or shortness of breath, Disp: 18 g, Rfl: 2  •  ALPRAZolam (XANAX) 0 5 mg tablet, Take 1 tablet (0 5 mg total) by mouth daily at bedtime as needed for anxiety, Disp: 15 tablet, Rfl: 0  •  aspirin (ECOTRIN LOW STRENGTH) 81 mg EC tablet, Take 1 tablet (81 mg total) by mouth daily Do not start before February 1, 2023 , Disp: 90 tablet, Rfl: 0  •  B-D UF III MINI PEN NEEDLES 31G X 5 MM MISC, INJECT UNDER THE SKIN DAILY AT BEDTIME USE ONE A DAY OR AS DIRECTED, Disp: 100 each, Rfl: 6  •  Blood Glucose Monitoring Suppl (OneTouch Verio) w/Device KIT, Use daily, Disp: 1 kit, Rfl: 0  •  Blood Pressure Monitoring (B-D ASSURE BPM/AUTO WRIST CUFF) MISC, Check blood pressure prior to each OB visit, or as directed by your physician , Disp: 1 each, Rfl: 0  •  cephalexin (KEFLEX) 500 mg capsule, Take 1 capsule (500 mg total) by mouth every 8 (eight) hours for 7 days, Disp: 21 capsule, Rfl: 0  •  clopidogrel (PLAVIX) 75 mg tablet, Take 1 tablet (75 mg total) by mouth daily Do not start before February 1, 2023 , Disp: 60 tablet, Rfl: 0  •  diclofenac sodium (VOLTAREN) 50 mg EC tablet, Take 1 tablet (50 mg total) by mouth 2 (two) times a day, Disp: 60 tablet, Rfl: 3  •  fexofenadine (ALLEGRA) 180 MG tablet, Take 1 tablet (180 mg total) by mouth daily, Disp: 30 tablet, Rfl: 5  •  gabapentin (NEURONTIN) 100 mg capsule, take 1 capsule by mouth daily at bedtime, Disp: 30 capsule, Rfl: 0  •  insulin glargine (LANTUS) 100 units/mL subcutaneous injection, Inject 17 Units under the skin daily at bedtime, Disp: 4 5 mL, Rfl: 3  •  metFORMIN (GLUCOPHAGE) 1000 MG tablet, Take 1 tablet (1,000 mg total) by mouth 2 (two) times a day with meals, Disp: 180 tablet, Rfl: 3  •  methocarbamol (ROBAXIN) 500 mg tablet, Take 1 tablet (500 mg total) by mouth 4 (four) times a day, Disp: 60 tablet, Rfl: 0  •  montelukast (SINGULAIR) 10 mg tablet, Take 1 tablet (10 mg total) by mouth daily at bedtime, Disp: 30 tablet, Rfl: 5  •  oxyCODONE-acetaminophen (Percocet) 5-325 mg per tablet, Take 1 tablet by mouth every 8 (eight) hours as needed for moderate pain or severe pain Max Daily Amount: 3 tablets, Disp: 9 tablet, Rfl: 0  •  sertraline (Zoloft) 50 mg tablet, Take 1 tablet (50 mg total) by mouth daily, Disp: 90 tablet, Rfl: 3  •  sitaGLIPtin (JANUVIA) 100 mg tablet, Take 1 tablet (100 mg total) by mouth daily, Disp: 90 tablet, Rfl: 3  •  VITAMIN D PO, Take by mouth daily, Disp: , Rfl:

## 2023-02-07 NOTE — ASSESSMENT & PLAN NOTE
26year old female former smoker w/obesity (BMI 41 19), HTN, HLD, type II DM, aortic coarctation s/p repair and stent placement, VSD s/p repair, bipolar disorder, hepatitis C, s/p diagnostic cardiac catheterization via R femoral access w/ angioseal closure device performed for abnormal stress test, c/b thrombosis of R EIA with extension into the proximal R CFA s/p R femoral exposure, R iliac embolectomy and R EIA stent 1/30/23 (L  Doctor) presents for post op follow up with concerns for R groin incision     - c/o R groin pain since surgery  - notes serosanguinous drainage worseing in last 3 days    - R groin incision intact with staples other then approx 1 3 cm area of dehiscence at proximal 1/3 of incision   - no erythema, purulence or malodor  Denies fever or chills  - 0 9cm deep  does not track or tunnel    - RLE warm, perfused, strong biphasic DP/PT doppler signals    Plan:  -Cleanse R groin incision daily soap and water, pat dry  -Apply silver alginate with dry gauze dressing  -Return to office in 1 week for wound check (appt already scheduled)  -Low threshold to return to office with changes to incision including increased pain, drainage, erythema, odor, signs infection, fever or chills  -At risk for incisional breakdown secondary to body habitus  -Continue Plavix and aspirin  -Start Keflex 500 mg 3 times a day x7 days   -Percocet for severe pain  PDMP reviewed  Script sent   Dispensed #9 tabs  - Call with any questions or concerns

## 2023-02-07 NOTE — PATIENT INSTRUCTIONS
Cleanse R groin incision daily soap and water, pat dry  Apply silver alginate with dry gauze dressing  Return to office in 1 week for wound check    Low threshold to return to office with changes to incision including increased pain, drainage, redness, odor, signs infection, fever or chills    Continue Plavix and aspirin  Start Keflex 500 mg 3 times a day x7 days    Percocet for SEVERE pain PRN -script sent    Call with any questions or concerns

## 2023-02-07 NOTE — LETTER
February 7, 2023     Patient: Shekhar Macias  YOB: 1990  Date of Visit: 2/7/2023      To Whom it May Concern:    Shekhar Macias is under my professional care  Holly Sherman was seen in my office on 2/7/2023  Holly Whitaker should not return to work before next [address] [facility] 2/15/23    If you have any questions or concerns, please don't hesitate to call           Sincerely,          REJI Nichols        CC: No Recipients

## 2023-02-09 LAB
BACTERIA WND AEROBE CULT: ABNORMAL
BACTERIA WND AEROBE CULT: ABNORMAL
GRAM STN SPEC: ABNORMAL

## 2023-02-10 ENCOUNTER — HOSPITAL ENCOUNTER (INPATIENT)
Facility: HOSPITAL | Age: 33
LOS: 7 days | Discharge: HOME HEALTH | End: 2023-02-17
Attending: STUDENT IN AN ORGANIZED HEALTH CARE EDUCATION/TRAINING PROGRAM | Admitting: STUDENT IN AN ORGANIZED HEALTH CARE EDUCATION/TRAINING PROGRAM

## 2023-02-10 ENCOUNTER — TELEPHONE (OUTPATIENT)
Dept: FAMILY MEDICINE CLINIC | Facility: CLINIC | Age: 33
End: 2023-02-10

## 2023-02-10 ENCOUNTER — ANESTHESIA EVENT (INPATIENT)
Dept: PERIOP | Facility: HOSPITAL | Age: 33
End: 2023-02-10

## 2023-02-10 DIAGNOSIS — R10.31 RIGHT GROIN PAIN: Primary | ICD-10-CM

## 2023-02-10 DIAGNOSIS — T81.49XA SURGICAL SITE INFECTION: ICD-10-CM

## 2023-02-10 LAB
ALBUMIN SERPL BCP-MCNC: 4.2 G/DL (ref 3.5–5)
ALP SERPL-CCNC: 59 U/L (ref 34–104)
ALT SERPL W P-5'-P-CCNC: 12 U/L (ref 7–52)
ANION GAP SERPL CALCULATED.3IONS-SCNC: 8 MMOL/L (ref 4–13)
APTT PPP: 29 SECONDS (ref 23–37)
AST SERPL W P-5'-P-CCNC: 11 U/L (ref 13–39)
BASOPHILS # BLD AUTO: 0.02 THOUSANDS/ÂΜL (ref 0–0.1)
BASOPHILS NFR BLD AUTO: 0 % (ref 0–1)
BILIRUB SERPL-MCNC: 0.26 MG/DL (ref 0.2–1)
BUN SERPL-MCNC: 16 MG/DL (ref 5–25)
CALCIUM SERPL-MCNC: 9.6 MG/DL (ref 8.4–10.2)
CHLORIDE SERPL-SCNC: 101 MMOL/L (ref 96–108)
CO2 SERPL-SCNC: 27 MMOL/L (ref 21–32)
CREAT SERPL-MCNC: 0.59 MG/DL (ref 0.6–1.3)
EOSINOPHIL # BLD AUTO: 0.15 THOUSAND/ÂΜL (ref 0–0.61)
EOSINOPHIL NFR BLD AUTO: 2 % (ref 0–6)
ERYTHROCYTE [DISTWIDTH] IN BLOOD BY AUTOMATED COUNT: 14.6 % (ref 11.6–15.1)
FLUAV RNA RESP QL NAA+PROBE: NEGATIVE
FLUBV RNA RESP QL NAA+PROBE: NEGATIVE
GFR SERPL CREATININE-BSD FRML MDRD: 119 ML/MIN/1.73SQ M
GLUCOSE SERPL-MCNC: 189 MG/DL (ref 65–140)
HCT VFR BLD AUTO: 37.3 % (ref 34.8–46.1)
HGB BLD-MCNC: 11.6 G/DL (ref 11.5–15.4)
IMM GRANULOCYTES # BLD AUTO: 0.05 THOUSAND/UL (ref 0–0.2)
IMM GRANULOCYTES NFR BLD AUTO: 1 % (ref 0–2)
INR PPP: 0.96 (ref 0.84–1.19)
LACTATE SERPL-SCNC: 1.2 MMOL/L (ref 0.5–2)
LYMPHOCYTES # BLD AUTO: 2.56 THOUSANDS/ÂΜL (ref 0.6–4.47)
LYMPHOCYTES NFR BLD AUTO: 32 % (ref 14–44)
MCH RBC QN AUTO: 24.8 PG (ref 26.8–34.3)
MCHC RBC AUTO-ENTMCNC: 31.1 G/DL (ref 31.4–37.4)
MCV RBC AUTO: 80 FL (ref 82–98)
MONOCYTES # BLD AUTO: 0.5 THOUSAND/ÂΜL (ref 0.17–1.22)
MONOCYTES NFR BLD AUTO: 6 % (ref 4–12)
NEUTROPHILS # BLD AUTO: 4.69 THOUSANDS/ÂΜL (ref 1.85–7.62)
NEUTS SEG NFR BLD AUTO: 59 % (ref 43–75)
NRBC BLD AUTO-RTO: 0 /100 WBCS
PLATELET # BLD AUTO: 356 THOUSANDS/UL (ref 149–390)
PMV BLD AUTO: 10 FL (ref 8.9–12.7)
POTASSIUM SERPL-SCNC: 3.9 MMOL/L (ref 3.5–5.3)
PROCALCITONIN SERPL-MCNC: <0.05 NG/ML
PROT SERPL-MCNC: 7.5 G/DL (ref 6.4–8.4)
PROTHROMBIN TIME: 12.7 SECONDS (ref 11.6–14.5)
RBC # BLD AUTO: 4.67 MILLION/UL (ref 3.81–5.12)
RSV RNA RESP QL NAA+PROBE: NEGATIVE
SARS-COV-2 RNA RESP QL NAA+PROBE: NEGATIVE
SODIUM SERPL-SCNC: 136 MMOL/L (ref 135–147)
WBC # BLD AUTO: 7.97 THOUSAND/UL (ref 4.31–10.16)

## 2023-02-10 RX ORDER — SODIUM CHLORIDE, SODIUM LACTATE, POTASSIUM CHLORIDE, CALCIUM CHLORIDE 600; 310; 30; 20 MG/100ML; MG/100ML; MG/100ML; MG/100ML
100 INJECTION, SOLUTION INTRAVENOUS CONTINUOUS
Status: DISCONTINUED | OUTPATIENT
Start: 2023-02-11 | End: 2023-02-11

## 2023-02-10 RX ADMIN — PIPERACILLIN SODIUM AND TAZOBACTAM SODIUM 4.5 G: 4; .5 INJECTION, POWDER, LYOPHILIZED, FOR SOLUTION INTRAVENOUS at 22:20

## 2023-02-10 RX ADMIN — SODIUM CHLORIDE 1000 ML: 0.9 INJECTION, SOLUTION INTRAVENOUS at 22:19

## 2023-02-10 NOTE — TELEPHONE ENCOUNTER
Spoke with patient after discussing with vascular provider- will proceed to the ER for further evaluation

## 2023-02-11 ENCOUNTER — ANESTHESIA (INPATIENT)
Dept: PERIOP | Facility: HOSPITAL | Age: 33
End: 2023-02-11

## 2023-02-11 PROBLEM — T81.49XA SURGICAL SITE INFECTION: Status: ACTIVE | Noted: 2023-02-11

## 2023-02-11 LAB
ABO GROUP BLD: NORMAL
ANION GAP SERPL CALCULATED.3IONS-SCNC: 8 MMOL/L (ref 4–13)
ATRIAL RATE: 100 BPM
BASOPHILS # BLD AUTO: 0.04 THOUSANDS/ÂΜL (ref 0–0.1)
BASOPHILS NFR BLD AUTO: 1 % (ref 0–1)
BLD GP AB SCN SERPL QL: NEGATIVE
BUN SERPL-MCNC: 13 MG/DL (ref 5–25)
CALCIUM SERPL-MCNC: 8.7 MG/DL (ref 8.4–10.2)
CHLORIDE SERPL-SCNC: 106 MMOL/L (ref 96–108)
CO2 SERPL-SCNC: 23 MMOL/L (ref 21–32)
CREAT SERPL-MCNC: 0.54 MG/DL (ref 0.6–1.3)
EOSINOPHIL # BLD AUTO: 0.15 THOUSAND/ÂΜL (ref 0–0.61)
EOSINOPHIL NFR BLD AUTO: 2 % (ref 0–6)
ERYTHROCYTE [DISTWIDTH] IN BLOOD BY AUTOMATED COUNT: 14.6 % (ref 11.6–15.1)
GFR SERPL CREATININE-BSD FRML MDRD: 122 ML/MIN/1.73SQ M
GLUCOSE SERPL-MCNC: 151 MG/DL (ref 65–140)
GLUCOSE SERPL-MCNC: 164 MG/DL (ref 65–140)
GLUCOSE SERPL-MCNC: 168 MG/DL (ref 65–140)
GLUCOSE SERPL-MCNC: 363 MG/DL (ref 65–140)
GLUCOSE SERPL-MCNC: 366 MG/DL (ref 65–140)
HCT VFR BLD AUTO: 32.1 % (ref 34.8–46.1)
HGB BLD-MCNC: 9.8 G/DL (ref 11.5–15.4)
IMM GRANULOCYTES # BLD AUTO: 0.03 THOUSAND/UL (ref 0–0.2)
IMM GRANULOCYTES NFR BLD AUTO: 0 % (ref 0–2)
LYMPHOCYTES # BLD AUTO: 2.07 THOUSANDS/ÂΜL (ref 0.6–4.47)
LYMPHOCYTES NFR BLD AUTO: 24 % (ref 14–44)
MCH RBC QN AUTO: 24.8 PG (ref 26.8–34.3)
MCHC RBC AUTO-ENTMCNC: 30.5 G/DL (ref 31.4–37.4)
MCV RBC AUTO: 81 FL (ref 82–98)
MONOCYTES # BLD AUTO: 0.65 THOUSAND/ÂΜL (ref 0.17–1.22)
MONOCYTES NFR BLD AUTO: 7 % (ref 4–12)
NEUTROPHILS # BLD AUTO: 5.79 THOUSANDS/ÂΜL (ref 1.85–7.62)
NEUTS SEG NFR BLD AUTO: 66 % (ref 43–75)
NRBC BLD AUTO-RTO: 0 /100 WBCS
P AXIS: 53 DEGREES
PLATELET # BLD AUTO: 298 THOUSANDS/UL (ref 149–390)
PMV BLD AUTO: 10.4 FL (ref 8.9–12.7)
POTASSIUM SERPL-SCNC: 3.7 MMOL/L (ref 3.5–5.3)
PR INTERVAL: 150 MS
QRS AXIS: -25 DEGREES
QRSD INTERVAL: 150 MS
QT INTERVAL: 360 MS
QTC INTERVAL: 464 MS
RBC # BLD AUTO: 3.95 MILLION/UL (ref 3.81–5.12)
RH BLD: POSITIVE
SODIUM SERPL-SCNC: 137 MMOL/L (ref 135–147)
SPECIMEN EXPIRATION DATE: NORMAL
T WAVE AXIS: 78 DEGREES
VENTRICULAR RATE: 100 BPM
WBC # BLD AUTO: 8.73 THOUSAND/UL (ref 4.31–10.16)

## 2023-02-11 PROCEDURE — 0JDC0ZZ EXTRACTION OF PELVIC REGION SUBCUTANEOUS TISSUE AND FASCIA, OPEN APPROACH: ICD-10-PCS | Performed by: SURGERY

## 2023-02-11 RX ORDER — MONTELUKAST SODIUM 10 MG/1
10 TABLET ORAL
Status: DISCONTINUED | OUTPATIENT
Start: 2023-02-11 | End: 2023-02-17 | Stop reason: HOSPADM

## 2023-02-11 RX ORDER — INSULIN LISPRO 100 [IU]/ML
1-5 INJECTION, SOLUTION INTRAVENOUS; SUBCUTANEOUS EVERY 6 HOURS
Status: DISCONTINUED | OUTPATIENT
Start: 2023-02-11 | End: 2023-02-11

## 2023-02-11 RX ORDER — ASPIRIN 81 MG/1
81 TABLET ORAL DAILY
Status: DISCONTINUED | OUTPATIENT
Start: 2023-02-11 | End: 2023-02-17 | Stop reason: HOSPADM

## 2023-02-11 RX ORDER — ACETAMINOPHEN 325 MG/1
650 TABLET ORAL EVERY 6 HOURS PRN
Status: DISCONTINUED | OUTPATIENT
Start: 2023-02-11 | End: 2023-02-17 | Stop reason: HOSPADM

## 2023-02-11 RX ORDER — ONDANSETRON 2 MG/ML
4 INJECTION INTRAMUSCULAR; INTRAVENOUS EVERY 6 HOURS PRN
Status: DISCONTINUED | OUTPATIENT
Start: 2023-02-11 | End: 2023-02-11

## 2023-02-11 RX ORDER — LIDOCAINE HYDROCHLORIDE 20 MG/ML
INJECTION, SOLUTION EPIDURAL; INFILTRATION; INTRACAUDAL; PERINEURAL AS NEEDED
Status: DISCONTINUED | OUTPATIENT
Start: 2023-02-11 | End: 2023-02-11

## 2023-02-11 RX ORDER — ONDANSETRON 2 MG/ML
4 INJECTION INTRAMUSCULAR; INTRAVENOUS EVERY 6 HOURS PRN
Status: DISCONTINUED | OUTPATIENT
Start: 2023-02-11 | End: 2023-02-11 | Stop reason: HOSPADM

## 2023-02-11 RX ORDER — SODIUM CHLORIDE, SODIUM LACTATE, POTASSIUM CHLORIDE, CALCIUM CHLORIDE 600; 310; 30; 20 MG/100ML; MG/100ML; MG/100ML; MG/100ML
100 INJECTION, SOLUTION INTRAVENOUS CONTINUOUS
Status: DISCONTINUED | OUTPATIENT
Start: 2023-02-11 | End: 2023-02-12

## 2023-02-11 RX ORDER — CLOPIDOGREL BISULFATE 75 MG/1
75 TABLET ORAL DAILY
Status: DISCONTINUED | OUTPATIENT
Start: 2023-02-11 | End: 2023-02-17 | Stop reason: HOSPADM

## 2023-02-11 RX ORDER — MIDAZOLAM HYDROCHLORIDE 2 MG/2ML
INJECTION, SOLUTION INTRAMUSCULAR; INTRAVENOUS AS NEEDED
Status: DISCONTINUED | OUTPATIENT
Start: 2023-02-11 | End: 2023-02-11

## 2023-02-11 RX ORDER — KETOROLAC TROMETHAMINE 30 MG/ML
30 INJECTION, SOLUTION INTRAMUSCULAR; INTRAVENOUS EVERY 6 HOURS PRN
Status: DISPENSED | OUTPATIENT
Start: 2023-02-11 | End: 2023-02-16

## 2023-02-11 RX ORDER — DEXAMETHASONE SODIUM PHOSPHATE 10 MG/ML
INJECTION, SOLUTION INTRAMUSCULAR; INTRAVENOUS AS NEEDED
Status: DISCONTINUED | OUTPATIENT
Start: 2023-02-11 | End: 2023-02-11

## 2023-02-11 RX ORDER — MAGNESIUM HYDROXIDE 1200 MG/15ML
LIQUID ORAL AS NEEDED
Status: DISCONTINUED | OUTPATIENT
Start: 2023-02-11 | End: 2023-02-11 | Stop reason: HOSPADM

## 2023-02-11 RX ORDER — ONDANSETRON 2 MG/ML
INJECTION INTRAMUSCULAR; INTRAVENOUS AS NEEDED
Status: DISCONTINUED | OUTPATIENT
Start: 2023-02-11 | End: 2023-02-11

## 2023-02-11 RX ORDER — ALPRAZOLAM 0.5 MG/1
0.5 TABLET ORAL
Status: DISCONTINUED | OUTPATIENT
Start: 2023-02-11 | End: 2023-02-17 | Stop reason: HOSPADM

## 2023-02-11 RX ORDER — GABAPENTIN 100 MG/1
100 CAPSULE ORAL
Status: DISCONTINUED | OUTPATIENT
Start: 2023-02-11 | End: 2023-02-17 | Stop reason: HOSPADM

## 2023-02-11 RX ORDER — FENTANYL CITRATE 50 UG/ML
INJECTION, SOLUTION INTRAMUSCULAR; INTRAVENOUS AS NEEDED
Status: DISCONTINUED | OUTPATIENT
Start: 2023-02-11 | End: 2023-02-11

## 2023-02-11 RX ORDER — HEPARIN SODIUM 5000 [USP'U]/ML
5000 INJECTION, SOLUTION INTRAVENOUS; SUBCUTANEOUS EVERY 8 HOURS SCHEDULED
Status: DISCONTINUED | OUTPATIENT
Start: 2023-02-12 | End: 2023-02-17 | Stop reason: HOSPADM

## 2023-02-11 RX ORDER — INSULIN GLARGINE 100 [IU]/ML
17 INJECTION, SOLUTION SUBCUTANEOUS
Status: DISCONTINUED | OUTPATIENT
Start: 2023-02-11 | End: 2023-02-13

## 2023-02-11 RX ORDER — PROPOFOL 10 MG/ML
INJECTION, EMULSION INTRAVENOUS AS NEEDED
Status: DISCONTINUED | OUTPATIENT
Start: 2023-02-11 | End: 2023-02-11

## 2023-02-11 RX ORDER — INSULIN LISPRO 100 [IU]/ML
1-5 INJECTION, SOLUTION INTRAVENOUS; SUBCUTANEOUS
Status: DISCONTINUED | OUTPATIENT
Start: 2023-02-11 | End: 2023-02-17 | Stop reason: HOSPADM

## 2023-02-11 RX ORDER — SODIUM CHLORIDE 9 MG/ML
125 INJECTION, SOLUTION INTRAVENOUS CONTINUOUS
Status: CANCELLED | OUTPATIENT
Start: 2023-02-11

## 2023-02-11 RX ORDER — FENTANYL CITRATE 50 UG/ML
50 INJECTION, SOLUTION INTRAMUSCULAR; INTRAVENOUS
Status: DISCONTINUED | OUTPATIENT
Start: 2023-02-11 | End: 2023-02-11 | Stop reason: HOSPADM

## 2023-02-11 RX ORDER — ONDANSETRON 2 MG/ML
4 INJECTION INTRAMUSCULAR; INTRAVENOUS EVERY 6 HOURS PRN
Status: DISCONTINUED | OUTPATIENT
Start: 2023-02-11 | End: 2023-02-17 | Stop reason: HOSPADM

## 2023-02-11 RX ADMIN — ONDANSETRON 4 MG: 2 INJECTION INTRAMUSCULAR; INTRAVENOUS at 09:34

## 2023-02-11 RX ADMIN — SODIUM CHLORIDE, SODIUM LACTATE, POTASSIUM CHLORIDE, AND CALCIUM CHLORIDE 100 ML/HR: .6; .31; .03; .02 INJECTION, SOLUTION INTRAVENOUS at 16:51

## 2023-02-11 RX ADMIN — FENTANYL CITRATE 25 MCG: 50 INJECTION INTRAMUSCULAR; INTRAVENOUS at 09:26

## 2023-02-11 RX ADMIN — ACETAMINOPHEN 325MG 650 MG: 325 TABLET ORAL at 11:14

## 2023-02-11 RX ADMIN — KETOROLAC TROMETHAMINE 30 MG: 30 INJECTION, SOLUTION INTRAMUSCULAR; INTRAVENOUS at 23:52

## 2023-02-11 RX ADMIN — CLOPIDOGREL BISULFATE 75 MG: 75 TABLET ORAL at 11:15

## 2023-02-11 RX ADMIN — SODIUM CHLORIDE, SODIUM LACTATE, POTASSIUM CHLORIDE, AND CALCIUM CHLORIDE 100 ML/HR: .6; .31; .03; .02 INJECTION, SOLUTION INTRAVENOUS at 12:13

## 2023-02-11 RX ADMIN — ONDANSETRON HYDROCHLORIDE 4 MG: 2 SOLUTION INTRAMUSCULAR; INTRAVENOUS at 10:10

## 2023-02-11 RX ADMIN — LIDOCAINE HYDROCHLORIDE 100 MG: 20 INJECTION, SOLUTION EPIDURAL; INFILTRATION; INTRACAUDAL; PERINEURAL at 09:19

## 2023-02-11 RX ADMIN — SODIUM CHLORIDE, SODIUM LACTATE, POTASSIUM CHLORIDE, AND CALCIUM CHLORIDE 100 ML/HR: .6; .31; .03; .02 INJECTION, SOLUTION INTRAVENOUS at 00:19

## 2023-02-11 RX ADMIN — PIPERACILLIN SODIUM AND TAZOBACTAM SODIUM 4.5 G: 4; .5 INJECTION, POWDER, LYOPHILIZED, FOR SOLUTION INTRAVENOUS at 06:42

## 2023-02-11 RX ADMIN — INSULIN LISPRO 4 UNITS: 100 INJECTION, SOLUTION INTRAVENOUS; SUBCUTANEOUS at 16:51

## 2023-02-11 RX ADMIN — INSULIN GLARGINE 17 UNITS: 100 INJECTION, SOLUTION SUBCUTANEOUS at 22:17

## 2023-02-11 RX ADMIN — MIDAZOLAM 2 MG: 1 INJECTION INTRAMUSCULAR; INTRAVENOUS at 09:10

## 2023-02-11 RX ADMIN — FENTANYL CITRATE 25 MCG: 50 INJECTION INTRAMUSCULAR; INTRAVENOUS at 09:33

## 2023-02-11 RX ADMIN — FENTANYL CITRATE 50 MCG: 50 INJECTION, SOLUTION INTRAMUSCULAR; INTRAVENOUS at 10:02

## 2023-02-11 RX ADMIN — MONTELUKAST SODIUM 10 MG: 10 TABLET, COATED ORAL at 22:17

## 2023-02-11 RX ADMIN — ERTAPENEM SODIUM 1000 MG: 1 INJECTION, POWDER, LYOPHILIZED, FOR SOLUTION INTRAMUSCULAR; INTRAVENOUS at 12:44

## 2023-02-11 RX ADMIN — FENTANYL CITRATE 25 MCG: 50 INJECTION INTRAMUSCULAR; INTRAVENOUS at 09:28

## 2023-02-11 RX ADMIN — PROPOFOL 200 MG: 10 INJECTION, EMULSION INTRAVENOUS at 09:19

## 2023-02-11 RX ADMIN — ASPIRIN 81 MG: 81 TABLET, COATED ORAL at 11:15

## 2023-02-11 RX ADMIN — GABAPENTIN 100 MG: 100 CAPSULE ORAL at 22:17

## 2023-02-11 RX ADMIN — SERTRALINE HYDROCHLORIDE 50 MG: 50 TABLET ORAL at 11:14

## 2023-02-11 RX ADMIN — ACETAMINOPHEN 325MG 650 MG: 325 TABLET ORAL at 20:19

## 2023-02-11 RX ADMIN — DEXAMETHASONE SODIUM PHOSPHATE 4 MG: 10 INJECTION INTRAMUSCULAR; INTRAVENOUS at 09:23

## 2023-02-11 RX ADMIN — FENTANYL CITRATE 25 MCG: 50 INJECTION INTRAMUSCULAR; INTRAVENOUS at 09:35

## 2023-02-11 RX ADMIN — KETOROLAC TROMETHAMINE 30 MG: 30 INJECTION, SOLUTION INTRAMUSCULAR; INTRAVENOUS at 18:04

## 2023-02-11 NOTE — ED ATTENDING ATTESTATION
2/10/2023  IRaza MD, saw and evaluated the patient  I have discussed the patient with the resident/non-physician practitioner and agree with the resident's/non-physician practitioner's findings, Plan of Care, and MDM as documented in the resident's/non-physician practitioner's note, except where noted  All available labs and Radiology studies were reviewed  I was present for key portions of any procedure(s) performed by the resident/non-physician practitioner and I was immediately available to provide assistance  At this point I agree with the current assessment done in the Emergency Department  I have conducted an independent evaluation of this patient a history and physical is as follows:  27 yo F S/P right femoral artery thrombectomy 1/73/64 from complication of Right Heart Cath, referred to ED by PCP in conjuction with vascuar surgery for infection of right femoral wound site, cultures have grown ESBL, and she is being considered for admission for IV abx    ED workup is reassuring    Vascular was consulted and she is being admitted to [facility]    ED Course         Critical Care Time  Procedures

## 2023-02-11 NOTE — CONSULTS
Consultation - Infectious Disease   Shiv Andrew 26 y o  female MRN: [id number]  Unit/Bed#: E5 -01 Encounter: [id number]      IMPRESSION & RECOMMENDATIONS:     1  Right groin surgical site infection  Complication from thrombosis of the R EIA with extension into the proximal R CFA after cardiac cath s/p R femoral exposure, R iliac embolectomy and R EIA stent 1/30/23  Status post superficial wound culture of site as an outpatient, culture from 2/6/23 with growth of ESBL Proteus mirabilis susceptible to only IV antibiotics  Now status post I&D 2/11/23 of the right groin site with VAC placement; infection was superficial without involvement of deeper vascular structures  The patient is afebrile and hemodynamically stable  -Follow-up OR cultures to guide ongoing antibiotics (outpatient culture was superficial)   -Stop Zosyn and start IV ertapenem 1 g every 24 hours   -Follow-up blood cultures   -Vascular surgery follow-up ongoing   -Anticipate a 10 to 14-day course of antibiotics for this issue; if ESBL Proteus again grams from the OR cultures, we will need to arrange outpatient antibiotics via home infusion or at the infusion center   -monitor WBC count, fever curve    2  External iliac artery thrombosis  Developed after cardiac catheterization with right femoral access  Status post R femoral exposure, R iliac embolectomy and R EIA stent 1/30/23 with vascular surgery  Complicated by #1 above    -vascular surgery follow up ongoing    3  Type 2 diabetes mellitus  Poorly controlled with HbA1c 9 6%  Risk factor for infection  -Glycemic management per primary team    4  History of HCV status post SVR    5  Obesity  BMI 42  Risk factor for infection and poor wound healing    I have discussed the above management plan in detail with the primary service and vascular surgery Fellow  ID will follow      I have performed an extensive review of the medical records in Epic including review of the notes, radiographs, and laboratory results     HISTORY OF PRESENT ILLNESS:  Reason for Consult: wound infection  HPI: Sebastián Sanchez is a 26year old woman with a history of bipolar disorder, HTN, obesity, type 2 diabetes mellitus, aortic coarctation s/p repair and stent placement, VSD s/p repair, HCV status post treatment who was admitted to [facility] 2/10/23 due to a right groin surgical site infection with growth of ESBL Proteus mirabilis in culture  The patient was initially admitted 1/9/23 for an elevated troponin and abnormal stress test  She underwent cardiac catheterization via right femoral access  This was complicated by thrombosis of the R EIA with extension into the proximal R CFA s/p R femoral exposure, R iliac embolectomy and R EIA stent 1/30/23  After hospital discharge, the patient has increasing drainage of serous fluid from the incision, pain and erythema at the site  She saw her PCP 2/6/23, who noted some purulence and a superficial culture was collected  She saw vascular surgery the following day and was prescribed keflex  The wound culture returned growing ESBL proteus mirabilis susceptible only to IV antibiotics, so she was directed to come to the hospital  She denies fever or chills at home, and upon admission was afebrile without leukocytosis  She denies nausea, vomiting, diarrhea, chest pain  The patient was started on piperacillin-tazobactam  She was taken to the OR today by vascular surgery for I&D on the right groin surgical site  There was no purulence noted and per vascular surgery the infection was superficial without involvement of deeper vascular structures  ID is consulted for ongoing antibiotic management  REVIEW OF SYSTEMS:  A complete review of systems is negative other than that noted in the HPI      PAST MEDICAL HISTORY:  Past Medical History:   Diagnosis Date   • Allergic    • Arthritis    • Bipolar affective disorder, currently depressed, moderate ([facility] [address] ) 12/17/2018   • Cyst of ovary, right    • Diabetes mellitus ([facility] ) 10/10/2018    type 2   • Endometriosis    • Heart murmur 1990   • Hepatitis C    • Hepatitis C virus infection cured after antiviral drug therapy    • History of transfusion    • Hypertension    • Migraines    • Morbid obesity with BMI of 40 0-44 9, adult ([facility] )    • Obesity    • Pulmonary artery congenital abnormality    • Spleen enlarged    • Status post surgical removal of both fallopian tubes    • Varicella      Past Surgical History:   Procedure Laterality Date   • CARDIAC CATHETERIZATION      no CAD 10days, 4 weeks 22months old    • CARDIAC CATHETERIZATION N/A 2023    Procedure: Cardiac Coronary Angiogram;  Surgeon: Alexsandra Segal DO;  Location: [facility] CARDIAC CATH LAB; Service: Cardiology   • CARDIAC CATHETERIZATION Left 2023    Procedure: Cardiac Left Heart Cath;  Surgeon: Alexsandra Segal DO;  Location: [facility] CARDIAC CATH LAB; Service: Cardiology   • CARDIAC CATHETERIZATION  2023    Procedure: Cardiac catheterization;  Surgeon: Alexsandra Segal DO;  Location: [facility] CARDIAC CATH LAB;   Service: Cardiology   •  SECTION, LOW TRANSVERSE     • CHOLECYSTECTOMY     • 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 5   • CORONARY STENT PLACEMENT     • IR LOWER EXTREMITY ANGIOGRAM  2023   • LIVER BIOPSY     • LIVER BIOPSY     • STERNAL WIRE REMOVAL  2022   • THROMBECTOMY W/ EMBOLECTOMY Right 2023    Procedure: Right femoral exposure Right iliac embolectomy w/ #4 Ted catheter Aortogram Right EIA stent w/ 7x29mm VBX;  Surgeon: Preeti Hodges MD;  Location: [facility];  Service: Vascular   • TUBAL LIGATION Bilateral    • VSD REPAIR      As a child       FAMILY HISTORY:  Non-contributory    SOCIAL HISTORY:  Social History   Social History     Substance and Sexual Activity   Alcohol Use Not Currently   • Alcohol/week: 3 0 standard drinks   • Types: 3 Standard drinks or equivalent per week    Comment: socially/prior to knowledge of pregnancy Social History     Substance and Sexual Activity   Drug Use Not Currently    Comment: hx of THC use for migraines     Social History     Tobacco Use   Smoking Status Former   • Packs/day: 0 50   • Years: 0 00   • Pack years: 0 00   • Types: Cigarettes   • Quit date: 2023   • Years since quittin 0   Smokeless Tobacco Never       ALLERGIES:  Allergies   Allergen Reactions   • Prednisone Swelling   • Bactrim [Sulfamethoxazole-Trimethoprim] Hives   • Corticosteroids Swelling     Pt states this does not cause problems breathing, she just has generalized swelling   • Cortisone Swelling     Generalized; no impairment with breathing reported     • Medical Tape Hives     Allergic to Paper Tape   • Other Hives     Paper tape   • Sulfa Antibiotics Hives       MEDICATIONS:  All current active medications have been reviewed      PHYSICAL EXAM:  Temp:  [97 4 °F (36 3 °C)-98 2 °F (36 8 °C)] 98 2 °F (36 8 °C)  HR:  [] 96  Resp:  [14-20] 16  BP: (100-145)/(56-80) 145/80  SpO2:  [95 %-100 %] 97 %  Temp (24hrs), Av 9 °F (36 6 °C), Min:97 4 °F (36 3 °C), Max:98 2 °F (36 8 °C)  Current: Temperature: 98 2 °F (36 8 °C)    Intake/Output Summary (Last 24 hours) at 2023 1548  Last data filed at 2023 1143  Gross per 24 hour   Intake 600 ml   Output 400 ml   Net 200 ml       General Appearance:  Appearing well, nontoxic, and in no distress   Head:  Normocephalic, without obvious abnormality, atraumatic   Eyes:  Conjunctiva pink and sclera anicteric, both eyes   Nose: Nares normal, mucosa normal, no drainage   Throat: Oropharynx moist without lesions   Neck: Supple, symmetrical, no adenopathy, no tenderness/mass/nodules   Back:   Symmetric, no curvature, ROM normal, no CVA tenderness   Lungs:   Clear to auscultation bilaterally, respirations unlabored   Chest Wall:  No tenderness or deformity   Heart:  RRR; no murmur, rub or gallop   Abdomen:   Soft, non-tender, non-distended, positive bowel sounds    Extremities: No cyanosis, clubbing or edema   Skin: Right groin with wound VAC in place   Lymph nodes: Cervical, supraclavicular nodes normal   Neurologic: Alert and oriented times 3, extremity strength 5/5 and symmetric       LABS, IMAGING, & OTHER STUDIES:  Lab Results:  I have personally reviewed pertinent labs  Results from last 7 days   Lab Units 02/11/23  0605 02/10/23  2152   WBC Thousand/uL 8 73 7 97   HEMOGLOBIN g/dL 9 8* 11 6   PLATELETS Thousands/uL 298 356     Results from last 7 days   Lab Units 02/11/23  0605 02/10/23  2152   SODIUM mmol/L 137 136   POTASSIUM mmol/L 3 7 3 9   CHLORIDE mmol/L 106 101   CO2 mmol/L 23 27   BUN mg/dL 13 16   CREATININE mg/dL 0 54* 0 59*   EGFR ml/min/1 73sq m 122 119   CALCIUM mg/dL 8 7 9 6   AST U/L  --  11*   ALT U/L  --  12   ALK PHOS U/L  --  59     Results from last 7 days   Lab Units 02/10/23  2145 02/06/23  1607   BLOOD CULTURE  Received in Microbiology Lab  Culture in Progress  Received in Microbiology Lab  Culture in Progress  --    GRAM STAIN RESULT   --  2+ Polys*  3+ Gram negative rods*  1+ Gram positive cocci in pairs*   WOUND CULTURE   --  3+ Growth of Proteus mirabilis ESBL*  1+ Growth of     Results from last 7 days   Lab Units 02/10/23  2152   PROCALCITONIN ng/ml <0 05                   Imaging Studies:   I have personally reviewed pertinent imaging study reports and images in PACS  Other Studies:   I have personally reviewed pertinent reports

## 2023-02-11 NOTE — ED PROVIDER NOTES
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 presenting for concerns of groin site infection after having a positive wound culture for Proteus ESBL  She states that over the last week or so she has noticed some discharge from her right groin site  She describes this discharge as bloody/clear in character  She has been using dressings in the area and soaking through them  She states that she visited both her primary care as well as her vascular office, and had wound cultures that were positive for the above  She was contacted by her primary care physician who recommended going to the emergency department for IV antibiotics  This was reportedly medicated with the vascular team who was in agreement  Patient is otherwise denying any other acute complaints  She states that she has not had any fevers or chills  She denies any nausea, vomiting, diarrhea  She has maintained good sensation in the leg and has not had any changes in warmth  She denies any history of ESBL bacteremia  Prior to Admission Medications   Prescriptions Last Dose Informant Patient Reported? Taking?    ALPRAZolam (XANAX) 0 5 mg tablet More than a month  No No   Sig: Take 1 tablet (0 5 mg total) by mouth daily at bedtime as needed for anxiety   B-D UF III MINI PEN NEEDLES 31G X 5 MM MISC 2/10/2023  No Yes   Sig: INJECT UNDER THE SKIN DAILY AT BEDTIME USE ONE A DAY OR AS DIRECTED   Blood Glucose Monitoring Suppl (OneTouch Verio) w/Device KIT 2/10/2023  No Yes   Sig: Use daily   Blood Pressure Monitoring (B-D ASSURE BPM/AUTO WRIST CUFF) MISC 2/10/2023  No Yes   Sig: Check blood pressure prior to each OB visit, or as directed by your physician  VITAMIN D PO 2/10/2023  Yes Yes   Sig: Take by mouth daily   acetaminophen (TYLENOL) 500 mg tablet   Yes Yes   Sig: Take 1,000 mg by mouth   albuterol (Ventolin HFA) 90 mcg/act inhaler More than a month  No No   Sig: Inhale 2 puffs every 4 (four) hours as needed for wheezing or shortness of breath   aspirin (ECOTRIN LOW STRENGTH) 81 mg EC tablet 2/10/2023  No Yes   Sig: Take 1 tablet (81 mg total) by mouth daily Do not start before February 1, 2023  cephalexin (KEFLEX) 500 mg capsule 2/10/2023  No Yes   Sig: Take 1 capsule (500 mg total) by mouth every 8 (eight) hours for 7 days   clopidogrel (PLAVIX) 75 mg tablet 2/10/2023  No Yes   Sig: Take 1 tablet (75 mg total) by mouth daily Do not start before February 1, 2023     diclofenac sodium (VOLTAREN) 50 mg EC tablet 2/10/2023  No Yes   Sig: Take 1 tablet (50 mg total) by mouth 2 (two) times a day   fexofenadine (ALLEGRA) 180 MG tablet 2/10/2023  No Yes   Sig: Take 1 tablet (180 mg total) by mouth daily   gabapentin (NEURONTIN) 100 mg capsule 2/9/2023  No Yes   Sig: take 1 capsule by mouth daily at bedtime   insulin glargine (LANTUS) 100 units/mL subcutaneous injection 2/9/2023  No Yes   Sig: Inject 17 Units under the skin daily at bedtime   metFORMIN (GLUCOPHAGE) 1000 MG tablet 2/10/2023  No Yes   Sig: Take 1 tablet (1,000 mg total) by mouth 2 (two) times a day with meals   methocarbamol (ROBAXIN) 500 mg tablet 2/10/2023  No Yes   Sig: Take 1 tablet (500 mg total) by mouth 4 (four) times a day   montelukast (SINGULAIR) 10 mg tablet 2/9/2023  No Yes   Sig: Take 1 tablet (10 mg total) by mouth daily at bedtime   oxyCODONE-acetaminophen (Percocet) 5-325 mg per tablet 2/10/2023  No Yes   Sig: Take 1 tablet by mouth every 8 (eight) hours as needed for moderate pain or severe pain Max Daily Amount: 3 tablets   sertraline (Zoloft) 50 mg tablet 2/10/2023  No Yes   Sig: Take 1 tablet (50 mg total) by mouth daily   sitaGLIPtin (JANUVIA) 100 mg tablet 2/10/2023  No Yes   Sig: Take 1 tablet (100 mg total) by mouth daily      Facility-Administered Medications: None       Past Medical History:   Diagnosis Date   • Allergic    • Arthritis    • Bipolar affective disorder, currently depressed, moderate ([facility] [address] ) 2018   • Cyst of ovary, right    • Diabetes mellitus ([facility] ) 10/10/2018    type 2   • Endometriosis    • Heart murmur 1988   • Hepatitis C    • Hepatitis C virus infection cured after antiviral drug therapy    • History of transfusion    • Hypertension    • Migraines    • Morbid obesity with BMI of 40 0-44 9, adult ([facility] )    • Obesity    • Pulmonary artery congenital abnormality    • Spleen enlarged    • Status post surgical removal of both fallopian tubes    • Varicella        Past Surgical History:   Procedure Laterality Date   • CARDIAC CATHETERIZATION      no CAD 10days, 4 weeks 20months old    • CARDIAC CATHETERIZATION N/A 2023    Procedure: Cardiac Coronary Angiogram;  Surgeon: Markie Holden DO;  Location: [facility] CARDIAC [facility]ATH LAB; Service: Cardiology   • CARDIAC CATHETERIZATION Left 2023    Procedure: Cardiac Left Heart Cath;  Surgeon: Markie Holden DO;  Location: [facility]; Service: Cardiology   • CARDIAC CATHETERIZATION  2023    Procedure: Cardiac catheterization;  Surgeon: Markie Holden DO;  Location: [facility] CARD[facility];   Service: Cardiology   •  SECTION, LOW TRANSVERSE     • CHOLECYSTECTOMY     • 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 5   • CORONARY STENT PLACEMENT     • IR LOWER EXTREMITY ANGIOGRAM  2023   • LIVER BIOPSY     • LIVER BIOPSY     • STERNAL WIRE REMOVAL  2022   • THROMBECTOMY W/ EMBOLECTOMY Right 2023    Procedure: Right femoral exposure Right iliac embolectomy w/ #4 Ted catheter Aortogram Right EIA stent w/ 7x29mm VBX;  Surgeon: Hakan Hodges MD;  Location: [facility] Main OR;  Service: Vascular   • TUBAL LIGATION Bilateral    • VSD REPAIR      As a child       Family History   Problem Relation Age of Onset   • Hypertension Mother    • Migraines Mother    • JENNY disease Mother    • Depression Mother    • Hyperlipidemia Mother    • Diabetes Mother    • Diabetes Father    • Hypertension Father    • Kidney failure Father    • Heart attack Father    • Arthritis Father    • Stroke Father    • Polycystic kidney disease Paternal Grandmother    • Stroke Paternal Grandmother    • Heart disease Paternal Grandmother    • Arthritis Sister    • Asthma Sister    • Thyroid disease Sister    • Diabetes Sister    • Arthritis Maternal Grandmother    • Breast cancer Maternal Grandmother    • Diabetes Maternal Grandmother    • Hypertension Maternal Grandmother    • Heart Valve Disease Maternal Grandmother    • Learning disabilities Cousin    • Learning disabilities Sister    • ADD / ADHD Cousin    • Lung cancer Brother    • Diabetes Maternal Grandfather    • Hypertension Maternal Grandfather    • JENNY disease Maternal Grandfather    • Stroke Maternal Grandfather      I have reviewed and agree with the history as documented      E-Cigarette/Vaping   • E-Cigarette Use Never User      E-Cigarette/Vaping Substances   • Nicotine No    • THC No    • CBD No    • Flavoring No      Social History     Tobacco Use   • Smoking status: Former     Packs/day: 0 50     Years: 0 00     Pack years: 0 00     Types: Cigarettes     Quit date: 2023     Years since quittin 0   • Smokeless tobacco: Never   Vaping Use   • Vaping Use: Never used   Substance Use Topics   • Alcohol use: Not Currently     Alcohol/week: 3 0 standard drinks     Types: 3 Standard drinks or equivalent per week     Comment: socially/prior to knowledge of pregnancy   • Drug use: Not Currently     Comment: hx of THC use for migraines        Review of Systems Constitutional: Negative for chills and fever  HENT: Negative for sore throat  Respiratory: Negative for cough and shortness of breath  Cardiovascular: Negative for chest pain  Gastrointestinal: Negative for abdominal pain, constipation, diarrhea, nausea and vomiting  Genitourinary: Negative for dysuria  Musculoskeletal: Negative for back pain and neck pain  Skin: Positive for wound  Neurological: Negative for light-headedness and headaches  All other systems reviewed and are negative  Physical Exam  ED Triage Vitals   Temperature Pulse Respirations Blood Pressure SpO2   02/10/23 2030 02/10/23 2030 02/10/23 2030 02/10/23 2030 02/10/23 2030   97 9 °F (36 6 °C) 105 16 144/75 100 %      Temp Source Heart Rate Source Patient Position - Orthostatic VS BP Location FiO2 (%)   02/10/23 2030 02/10/23 2030 02/10/23 2030 02/10/23 2030 --   Oral Monitor Sitting Right arm       Pain Score       02/11/23 0100       5             Orthostatic Vital Signs  Vitals:    02/10/23 2030 02/10/23 2230 02/11/23 0100   BP: 144/75 129/67 113/67   Pulse: 105 100 99   Patient Position - Orthostatic VS: Sitting Lying Lying       Physical Exam  Vitals and nursing note reviewed  Constitutional:       General: She is not in acute distress  Appearance: Normal appearance  She is obese  She is not ill-appearing or toxic-appearing  HENT:      Head: Normocephalic and atraumatic  Right Ear: External ear normal       Left Ear: External ear normal       Nose: Nose normal    Eyes:      General: No scleral icterus  Right eye: No discharge  Left eye: No discharge  Extraocular Movements: Extraocular movements intact  Conjunctiva/sclera: Conjunctivae normal    Cardiovascular:      Rate and Rhythm: Normal rate and regular rhythm  Heart sounds: Normal heart sounds  No murmur heard  No friction rub  No gallop  Pulmonary:      Effort: Pulmonary effort is normal  No respiratory distress  Breath sounds: Normal breath sounds  Abdominal:      General: Abdomen is flat  There is no distension  Palpations: Abdomen is soft  There is no mass  Tenderness: There is no abdominal tenderness  Genitourinary:     Comments: Deferred  Musculoskeletal:         General: Normal range of motion  Cervical back: Normal range of motion  Comments: Strength 5/5 in bilateral lower extremities  Well perfused, capillary refill less than 2 seconds, DP PT pulses 2+/4  Skin:     General: Skin is warm  Comments: The right femoral surgical site appears to have some thick white discharge coming from just underneath the staples  The dressing appears to be soaked  There is tenderness to palpation of the area  There is no overlying warmth or erythema  Sensation intact  Neurological:      General: No focal deficit present  Mental Status: She is alert  Comments: Full sensation of bilateral lower extremities     Psychiatric:         Mood and Affect: Mood normal          ED Medications  Medications   lactated ringers infusion (100 mL/hr Intravenous New Bag 2/11/23 0019)   piperacillin-tazobactam (ZOSYN) 4 5 g in sodium chloride 0 9 % 100 mL IVPB (has no administration in time range)   ketorolac (TORADOL) injection 30 mg (has no administration in time range)   ondansetron (ZOFRAN) injection 4 mg (has no administration in time range)   sodium chloride 0 9 % bolus 1,000 mL (0 mL Intravenous Stopped 2/11/23 0014)   piperacillin-tazobactam (ZOSYN) 4 5 g in sodium chloride 0 9 % 100 mL IVPB (0 g Intravenous Stopped 2/10/23 2314)       Diagnostic Studies  Results Reviewed     Procedure Component Value Units Date/Time    FLU/RSV/COVID - if FLU/RSV clinically relevant [[id number]]  (Normal) Collected: 02/10/23 2152    Lab Status: Final result Specimen: Nares from Nose Updated: 02/10/23 2240     SARS-CoV-2 Negative     INFLUENZA A PCR Negative     INFLUENZA B PCR Negative     RSV PCR Negative Narrative:      FOR PEDIATRIC PATIENTS - copy/paste COVID Guidelines URL to browser: [email or web address] [email or web address]    SARS-CoV-2 assay is a Nucleic Acid Amplification assay intended for the  qualitative detection of nucleic acid from SARS-CoV-2 in nasopharyngeal  swabs  Results are for the presumptive identification of SARS-CoV-2 RNA  Positive results are indicative of infection with SARS-CoV-2, the virus  causing COVID-19, but do not rule out bacterial infection or co-infection  with other viruses  Laboratories within the [place] and its  territories are required to report all positive results to the appropriate  public health authorities  Negative results do not preclude SARS-CoV-2  infection and should not be used as the sole basis for treatment or other  patient management decisions  Negative results must be combined with  clinical observations, patient history, and epidemiological information  This test has not been FDA cleared or approved  This test has been authorized by FDA under an Emergency Use Authorization  (EUA)  This test is only authorized for the duration of time the  declaration that circumstances exist justifying the authorization of the  emergency use of an in vitro diagnostic tests for detection of SARS-CoV-2  virus and/or diagnosis of COVID-19 infection under section 564(b)(1) of  the Act, 21 U  S C  179EFR-0(Y)(0), unless the authorization is terminated  or revoked sooner  The test has been validated but independent review by FDA  and CLIA is pending  Test performed using [employer] GeneXpert: This RT-PCR assay targets N2,  a region unique to SARS-CoV-2  A conserved region in the E-gene was chosen  for pan-Sarbecovirus detection which includes SARS-CoV-2  According to CMS-2020-01-R, this platform meets the definition of high-throughput technology      Procalcitonin [[id number]]  (Normal) Collected: 02/10/23 2152    Lab Status: Final result Specimen: Blood from Arm, Right Updated: 02/10/23 2234     Procalcitonin <0 05 ng/ml     Lactic acid [[id number]]  (Normal) Collected: 02/10/23 2152    Lab Status: Final result Specimen: Blood from Arm, Right Updated: 02/10/23 2232     LACTIC ACID 1 2 mmol/L     Narrative:      Result may be elevated if tourniquet was used during collection  Blood culture #2 [[id number]] Collected: 02/10/23 2145    Lab Status: In process Specimen: Blood from Arm, Right Updated: 02/10/23 2231    Blood culture #1 [[id number]] Collected: 02/10/23 2145    Lab Status:  In process Specimen: Blood from Hand, Right Updated: 02/10/23 2230    Comprehensive metabolic panel [[id number]]  (Abnormal) Collected: 02/10/23 2152    Lab Status: Final result Specimen: Blood from Arm, Right Updated: 02/10/23 2222     Sodium 136 mmol/L      Potassium 3 9 mmol/L      Chloride 101 mmol/L      CO2 27 mmol/L      ANION GAP 8 mmol/L      BUN 16 mg/dL      Creatinine 0 59 mg/dL      Glucose 189 mg/dL      Calcium 9 6 mg/dL      AST 11 U/L      ALT 12 U/L      Alkaline Phosphatase 59 U/L      Total Protein 7 5 g/dL      Albumin 4 2 g/dL      Total Bilirubin 0 26 mg/dL      eGFR 119 ml/min/1 73sq m     Narrative:      [facility] guidelines for Chronic Kidney Disease (CKD):   •  Stage 1 with normal or high GFR (GFR > 90 mL/min/1 73 square meters)  •  Stage 2 Mild CKD (GFR = 60-89 mL/min/1 73 square meters)  •  Stage 3A Moderate CKD (GFR = 45-59 mL/min/1 73 square meters)  •  Stage 3B Moderate CKD (GFR = 30-44 mL/min/1 73 square meters)  •  Stage 4 Severe CKD (GFR = 15-29 mL/min/1 73 square meters)  •  Stage 5 End Stage CKD (GFR <15 mL/min/1 73 square meters)  Note: GFR calculation is accurate only with a steady state creatinine    Protime-INR [[id number]]  (Normal) Collected: 02/10/23 2152    Lab Status: Final result Specimen: Blood from Arm, Right Updated: 02/10/23 2218     Protime 12 7 seconds      INR 0 96    APTT [[id number]] (Normal) Collected: 02/10/23 2152    Lab Status: Final result Specimen: Blood from Arm, Right Updated: 02/10/23 2218     PTT 29 seconds     CBC and differential [[id number]]  (Abnormal) Collected: 02/10/23 2152    Lab Status: Final result Specimen: Blood from Arm, Right Updated: 02/10/23 2203     WBC 7 97 Thousand/uL      RBC 4 67 Million/uL      Hemoglobin 11 6 g/dL      Hematocrit 37 3 %      MCV 80 fL      MCH 24 8 pg      MCHC 31 1 g/dL      RDW 14 6 %      MPV 10 0 fL      Platelets 326 Thousands/uL      nRBC 0 /100 WBCs      Neutrophils Relative 59 %      Immat GRANS % 1 %      Lymphocytes Relative 32 %      Monocytes Relative 6 %      Eosinophils Relative 2 %      Basophils Relative 0 %      Neutrophils Absolute 4 69 Thousands/µL      Immature Grans Absolute 0 05 Thousand/uL      Lymphocytes Absolute 2 56 Thousands/µL      Monocytes Absolute 0 50 Thousand/µL      Eosinophils Absolute 0 15 Thousand/µL      Basophils Absolute 0 02 Thousands/µL                  No orders to display         Procedures  US Guided Peripheral IV    Date/Time: 2/10/2023 9:50 PM  Performed by: Drew Santa DO  Authorized by: Drew Santa DO     Patient location:  ED  Performed by:  Resident  Indications:     Indications: difficulty obtaining IV access    Procedure details:     Patient evaluated for contraindications to access (i e  fistula, thrombosis, etc): Yes      Standard clean technique used for ultrasound access: Yes      Location:  Right arm    Catheter size:  20 gauge    Number of attempts:  1    Successful placement: yes    Post-procedure details:     Post-procedure:  Dressing applied    Assessment: free fluid flow and no signs of infiltration      Post-procedure complications: none      Patient tolerance of procedure: Tolerated well, no immediate complications          ED Course  ED Course as of 02/11/23 0349   Fri Feb 10, 2023   2111 At this point I suspect that the patient has an infection   She grew ESBL on outpatient wound culture  She is not yet meeting SIRS criteria based on her vitals  Will discuss with vascular regarding antibiotics preference  Will initiate sepsis labs  2135 Discussed with vascular surgery resident  Will start zosyn  2145 Procedure Note: EKG  Date/Time: 02/10/23 9:44 PM   Interpreted by: Alesha Olson   Indications / Diagnosis: Screening  ECG reviewed by me, the ED Provider: yes   The EKG demonstrates:  Rhythm: normal sinus  Intervals: normal intervals  Axis: normal axis  QRS/Blocks: normal QRS, RBBB  ST Changes: No acute ST Changes, no STD/OC    2214 WBC: 7 97  Normal                             SBIRT 20yo+    Flowsheet Row Most Recent Value   SBIRT (23 yo +)    In order to provide better care to our patients, we are screening all of our patients for alcohol and drug use  Would it be okay to ask you these screening questions? Yes Filed at: 02/10/2023 2142   Initial Alcohol Screen: US AUDIT-C     1  How often do you have a drink containing alcohol? 0 Filed at: 02/10/2023 2142   2  How many drinks containing alcohol do you have on a typical day you are drinking? 0 Filed at: 02/10/2023 2142   3a  Male UNDER 65: How often do you have five or more drinks on one occasion? 0 Filed at: 02/10/2023 2142   3b  FEMALE Any Age, or MALE 65+: How often do you have 4 or more drinks on one occassion? 0 Filed at: 02/10/2023 2142   Audit-C Score 0 Filed at: 02/10/2023 2142   JENNIFER: How many times in the past year have you    Used an illegal drug or used a prescription medication for non-medical reasons? Never Filed at: 02/10/2023 2142                Medical Decision Making  Patient is a 10-year-old female presenting for evaluation of right groin pain  She was confirmed to have a culture that was positive for Proteus ESBL  Suspect that the patient's pain and discharge from wound are secondary to ESBL infection    Upon my independent chart review, this wound culture was sensitive to both ertapenem as well as Zosyn  Plan to perform sepsis labs and initiate fluids, Zosyn, however will discuss with vascular team prior to doing so  The patient is tachycardic however she is not meeting any other SIRS criteria  Discussed with vascular as outlined in ED course  Patient initiated on Zosyn  Labs otherwise largely unremarkable  Patient admitted to the medical team with a vascular consult for likely wound washout tomorrow  Patient seems to understand this plan and is agreeable  All questions answered  Patient admitted  Right groin pain: self-limited or minor problem  Amount and/or Complexity of Data Reviewed  Labs: ordered  Decision-making details documented in ED Course  Risk  Decision regarding hospitalization  Disposition  Final diagnoses:   Right groin pain     Time reflects when diagnosis was documented in both MDM as applicable and the Disposition within this note     Time User Action Codes Description Comment    2/10/2023  9:10 PM Juan Kerr [R10 [id number]] Right groin pain     2/10/2023 10:18 PM Tino Hannon 75 Surgical site infection       ED Disposition     ED Disposition   Admit    Condition   Stable    Date/Time   Fri Feb 10, 2023 10:44 PM    Comment   Case was discussed with LOLY and the patient's admission status was agreed to be Admission Status: inpatient status to the service of Dr Joan Montelongo             Follow-up Information    None         Current Discharge Medication List      CONTINUE these medications which have NOT CHANGED    Details   acetaminophen (TYLENOL) 500 mg tablet Take 1,000 mg by mouth      aspirin (ECOTRIN LOW STRENGTH) 81 mg EC tablet Take 1 tablet (81 mg total) by mouth daily Do not start before February 1, 2023    Qty: 90 tablet, Refills: 0    Associated Diagnoses: Pain of right lower extremity      B-D UF III MINI PEN NEEDLES 31G X 5 MM MISC INJECT UNDER THE SKIN DAILY AT BEDTIME USE ONE A DAY OR AS DIRECTED  Qty: 100 each, Refills: 6 Associated Diagnoses: Type 2 diabetes mellitus without complication, without long-term current use of insulin ([facility])      Blood Glucose Monitoring Suppl (OneTouch Verio) w/Device KIT Use daily  Qty: 1 kit, Refills: 0    Associated Diagnoses: Type 2 diabetes mellitus ([address] )      Blood Pressure Monitoring (B-D ASSURE BPM/AUTO WRIST CUFF) MISC Check blood pressure prior to each OB visit, or as directed by your physician  Qty: 1 each, Refills: 0    Comments: Please dispense either automatic wrist cuff OR automatic arm cuff  Associated Diagnoses: Essential hypertension; Aortic coarctation; Ventricular septal defect; Chronic hypertension affecting pregnancy; Maternal congenital heart 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 trimester, antepartum      cephalexin (KEFLEX) 500 mg capsule Take 1 capsule (500 mg total) by mouth every 8 (eight) hours for 7 days  Qty: 21 capsule, Refills: 0    Associated Diagnoses: 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HCC)      clopidogrel (PLAVIX) 75 mg tablet Take 1 tablet (75 mg total) by mouth daily Do not start before February 1, 2023  Qty: 60 tablet, Refills: 0    Associated Diagnoses: Pain of right lower extremity      diclofenac sodium (VOLTAREN) 50 mg EC tablet Take 1 tablet (50 mg total) by mouth 2 (two) times a day  Qty: 60 tablet, Refills: 3    Associated Diagnoses: Lumbar pain      fexofenadine (ALLEGRA) 180 MG tablet Take 1 tablet (180 mg total) by mouth daily  Qty: 30 tablet, Refills: 5    Associated Diagnoses: Mild intermittent asthma with acute exacerbation      gabapentin (NEURONTIN) 100 mg capsule take 1 capsule by mouth daily at bedtime  Qty: 30 capsule, Refills: 0    Associated Diagnoses: Type 2 diabetes mellitus without complication, with long-term current use of insulin ([facility] [address] );  Neuropathy      insulin glargine (LANTUS) 100 units/mL subcutaneous injection Inject 17 Units under the skin daily at bedtime  Qty: 4 5 mL, Refills: 3    Associated Diagnoses: Type 2 diabetes mellitus with hyperglycemia, without long-term current use of insulin ([facility])      metFORMIN (GLUCOPHAGE) 1000 MG tablet Take 1 tablet (1,000 mg total) by mouth 2 (two) times a day with meals  Qty: 180 tablet, Refills: 3    Associated Diagnoses: Type 2 diabetes mellitus without complication, with long-term current use of insulin ([facility])      methocarbamol (ROBAXIN) 500 mg tablet Take 1 tablet (500 mg total) by mouth 4 (four) times a day  Qty: 60 tablet, Refills: 0    Associated Diagnoses: Lumbar pain      montelukast (SINGULAIR) 10 mg tablet Take 1 tablet (10 mg total) by mouth daily at bedtime  Qty: 30 tablet, Refills: 5    Associated Diagnoses: Mild intermittent asthma with acute exacerbation      oxyCODONE-acetaminophen (Percocet) 5-325 mg per tablet Take 1 tablet by mouth every 8 (eight) hours as needed for moderate pain or severe pain Max Daily Amount: 3 tablets  Qty: 9 tablet, Refills: 0    Associated Diagnoses: 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[facility])      sertraline (Zoloft) 50 mg tablet Take 1 tablet (50 mg total) by mouth daily  Qty: 90 tablet, Refills: 3    Associated Diagnoses: Anxiety      sitaGLIPtin (JANUVIA) 100 mg tablet Take 1 tablet (100 mg total) by mouth daily  Qty: 90 tablet, Refills: 3    Associated Diagnoses: Type 2 diabetes mellitus without complication, with long-term current use of insulin ([facility])      VITAMIN D PO Take by mouth daily      albuterol (Ventolin HFA) 90 mcg/act inhaler Inhale 2 puffs every 4 (four) hours as needed for wheezing or shortness of breath  Qty: 18 g, Refills: 2    Comments: Substitution to a formulary equivalent within the same pharmaceutical class is authorized  Associated Diagnoses: Asthmatic bronchitis, mild intermittent, with acute exacerbation;  History of pneumonia      ALPRAZolam (XANAX) 0 5 mg tablet Take 1 tablet (0 5 mg total) by mouth daily at bedtime as needed for anxiety  Qty: 15 tablet, Refills: 0    Associated Diagnoses: Other depression           No discharge procedures on file     PDMP Review       Value Time User    PDMP Reviewed  Yes 2/3/2023 10:46 AM Bayron Desir, [address]asc[address]           ED Provider  Attending physically available and evaluated Theodore Paul  I managed the patient along with the ED Attending      Electronically Signed by         Charli Brunner DO  02/11/23 9273

## 2023-02-11 NOTE — PROGRESS NOTES
Progress Note - Vascular Surgery   Gayle June 26 y o  female MRN: [id number]  Unit/Bed#: OR POOL Encounter: [id number]    Assessment:  33F former smoker w/obesity (BMI 41 19), HTN, HLD, type II DM, aortic coarctation s/p repair and stent placement, VSD s/p repair, bipolar disorder, hepatitis C, s/p diagnostic cardiac catheterization via R femoral access w/ angioseal closure device performed for abnormal stress test, c/b thrombosis of R EIA with extension into the proximal R CFA s/p R femoral exposure, R iliac embolectomy and R EIA stent 1/30/23 (L  Doctor) sent to the ED due to concerns for infection R groin incision       Plan:  Plan for OR for washout, debridement and vac placement today - risks, benefits and alternatives discussed and she wished to proceed and consent was obtained  NPO/IVF  Can continue Zosyn but will send intra-operative cultures - wound culture from superficial wound was taken by PCP  Continue ASA, Plavix and start statin therapy    Katarzyna Dubois MD  2/11/2023      Subjective:  No acute events overnight  No fevers or chills  Vitals:  /66 (BP Location: Left arm)   Pulse 94   Temp 98 2 °F (36 8 °C) (Oral)   Resp 18   Wt 103 kg (226 lb 3 1 oz)   LMP 02/03/2023   SpO2 98%   BMI 42 74 kg/m²     I/Os:  I/O last 3 completed shifts: In: 100 [IV Piggyback:100]  Out: -   No intake/output data recorded      Lab Results and Cultures:   CBC with diff:   Lab Results   Component Value Date    WBC 8 73 02/11/2023    HGB 9 8 (L) 02/11/2023    HCT 32 1 (L) 02/11/2023    MCV 81 (L) 02/11/2023     02/11/2023    MCH 24 8 (L) 02/11/2023    MCHC 30 5 (L) 02/11/2023    RDW 14 6 02/11/2023    MPV 10 4 02/11/2023    NRBC 0 02/11/2023   ,   BMP/CMP:  Lab Results   Component Value Date    SODIUM 137 02/11/2023    K 3 7 02/11/2023    K 4 0 06/14/2018     02/11/2023     06/14/2018    CO2 23 02/11/2023    CO2 28 06/14/2018    ANIONGAP 13 0 06/14/2018    BUN 13 02/11/2023    BUN 15 06/14/2018    CREATININE 0 54 (L) 02/11/2023    CREATININE 0 65 06/14/2018    CALCIUM 8 7 02/11/2023    CALCIUM 9 9 06/14/2018    AST 11 (L) 02/10/2023    ALT 12 02/10/2023    ALKPHOS 59 02/10/2023    EGFR 122 02/11/2023   ,   Lipid Panel: No results found for: CHOL,   Coags:   Lab Results   Component Value Date    PTT 29 02/10/2023    PTT 32 6 06/14/2018    INR 0 96 02/10/2023    INR 1 08 06/14/2018   ,     Blood Culture:   Lab Results   Component Value Date    BLOODCX Received in Microbiology Lab  Culture in Progress  02/10/2023    BLOODCX Received in Microbiology Lab  Culture in Progress   02/10/2023   ,   Urinalysis:   Lab Results   Component Value Date    COLORU Yellow 03/30/2022    CLARITYU Clear 03/30/2022    SPECGRAV >=1 030 (H) 03/30/2022    PHUR 6 0 03/30/2022    PHUR 5 5 02/28/2020    LEUKOCYTESUR Negative 03/30/2022    NITRITE Negative 03/30/2022    GLUCOSEU 3+ (A) 03/30/2022    KETONESU Negative 03/30/2022    BILIRUBINUR Negative 03/30/2022    BLOODU Negative 03/30/2022   ,   Urine Culture:   Lab Results   Component Value Date    URINECX 40,000-49,000 cfu/ml 01/06/2020   ,   Wound Culure:   Lab Results   Component Value Date    WOUNDCULT 3+ Growth of Proteus mirabilis ESBL (A) 02/06/2023    WOUNDCULT 1+ Growth of 02/06/2023       Medications:  Current Facility-Administered Medications   Medication Dose Route Frequency   • [MAR Hold] acetaminophen (TYLENOL) tablet 650 mg  650 mg Oral Q6H PRN   • [MAR Hold] ALPRAZolam (XANAX) tablet 0 5 mg  0 5 mg Oral HS PRN   • [MAR Hold] aspirin (ECOTRIN LOW STRENGTH) EC tablet 81 mg  81 mg Oral Daily   • [MAR Hold] clopidogrel (PLAVIX) tablet 75 mg  75 mg Oral Daily   • [MAR Hold] gabapentin (NEURONTIN) capsule 100 mg  100 mg Oral HS   • [MAR Hold] heparin (porcine) subcutaneous injection 5,000 Units  5,000 Units Subcutaneous Q8H [address]   • [MAR Hold] insulin glargine (LANTUS) subcutaneous injection 17 Units 0 17 mL  17 Units Subcutaneous HS   • [MAR Hold] insulin lispro (HumaLOG) 100 units/mL subcutaneous injection 1-5 Units  1-5 Units Subcutaneous Q6H   • ketorolac (TORADOL) injection 30 mg  30 mg Intravenous Q6H PRN   • lactated ringers infusion  100 mL/hr Intravenous Continuous   • [MAR Hold] montelukast (SINGULAIR) tablet 10 mg  10 mg Oral HS   • [MAR Hold] ondansetron (ZOFRAN) injection 4 mg  4 mg Intravenous Q6H PRN   • piperacillin-tazobactam (ZOSYN) 4 5 g in sodium chloride 0 9 % 100 mL IVPB  4 5 g Intravenous Q8H   • [MAR Hold] sertraline (ZOLOFT) tablet 50 mg  50 mg Oral Daily       Physical Exam:    General: No acute distress  CV: Normal rate  Respiratory: Non-labored breathing  Abdominal: Soft  Extremities: Warm  Neurologic: Alert    Wound/Incision:  Right groin wound incisional dehiscence and fibrinous exudate, no sergo purulence or surrounding erytyhema

## 2023-02-11 NOTE — ANESTHESIA POSTPROCEDURE EVALUATION
Post-Op Assessment Note    CV Status:  Stable  Pain Score: 2    Pain management: adequate     Mental Status:  Alert and awake   Hydration Status:  Euvolemic   PONV Controlled:  Controlled   Airway Patency:  Patent      Post Op Vitals Reviewed: Yes      Staff: Anesthesiologist         No notable events documented      BP      Temp      Pulse     Resp      SpO2      /56 (BP Location: Left arm)   Pulse 83   Temp 97 7 °F (36 5 °C) (Oral)   Resp 18   Wt 103 kg (226 lb 3 1 oz)   LMP 02/03/2023   SpO2 95%   BMI 42 74 kg/m²

## 2023-02-11 NOTE — CONSULTS
Consultation Note - Vascular Surgery  : [facility] Surgery Resident role on [employer]  Sherlynn Seip 26 y o  female MRN: [id number]  Unit/Bed#: ED-27 Encounter: [id number]        Assessment:  26y o  year old female with right groin surgical site infection    Plan:  - NPO MN  - mIVF while NPO  - Medicine admission  - Zosyn  - Follow up admission labs, type and screen  - Plan for OR 2/11 for right groin wound washout with wound vac placement    HPI:  Sherlynn Seip is a 26 y o  female with a history of smoking, obesity, HTN, HLD, TII DM, aortic coarctation s/p repair and stent placement, VSD s/p repair, bipolar disorder, hepatitis C, s/p diagnostic cardiac catheterization via R femoral access w/ angioseal closure device performed for abnormal stress test, c/b thrombosis of R EIA with extension into the proximal R CFA s/p R femoral exposure, R iliac embolectomy and R EIA stent 1/30/23 who presents with drainage from right groin wound  Area is indurated and tender to palpation  WBC normal, afebrile  Outpatient wound culture with ESBL proteus growing, susceptible to zosyn  Foul smelling purulent material expressed on exam      Physical Exam:  General: No acute distress, alert and oriented, obese  CV: Well perfused, tachycardic  Lungs: Normal work of breathing, no increased respiratory effort  Abdomen: Soft, non-tender, non-distended  Extremities: No clubbing or cyanosis, right groin wound with predominately serous drainage and some foul smelling purulence  Tender with areas of induration  Minimal erythema at wound edge  Skin: Warm, dry      Review of Systems   Constitutional: Negative for chills and fever  HENT: Negative  Eyes: Negative  Respiratory: Negative  Cardiovascular: Negative  Gastrointestinal: Negative  Endocrine: Negative  Genitourinary: Negative  Musculoskeletal: Negative  Skin: Positive for wound  Allergic/Immunologic: Negative  Neurological: Negative  Hematological: Negative  Psychiatric/Behavioral: Negative  Objective       No intake or output data in the 24 hours ending 02/10/23 2238    First Vitals:   Blood Pressure: 144/75 (02/10/23 2030)  Pulse: 105 (02/10/23 2030)  Temperature: 97 9 °F (36 6 °C) (02/10/23 2030)  Temp Source: Oral (02/10/23 2030)  Respirations: 16 (02/10/23 2030)  Weight - Scale: 103 kg (226 lb 3 1 oz) (02/10/23 2030)  SpO2: 100 % (02/10/23 2030)    Current Vitals:   Blood Pressure: 129/67 (02/10/23 2230)  Pulse: 100 (02/10/23 2230)  Temperature: 97 9 °F (36 6 °C) (02/10/23 2030)  Temp Source: Oral (02/10/23 2030)  Respirations: 20 (02/10/23 2230)  Weight - Scale: 103 kg (226 lb 3 1 oz) (02/10/23 2030)  SpO2: 100 % (02/10/23 2230)    Invasive Devices     Peripheral Intravenous Line  Duration           Peripheral IV 01/09/23 Right;Lateral Antecubital 31 days                Imaging: I have personally reviewed pertinent reports  No results found  EKG, Pathology, and Other Studies: I have personally reviewed pertinent reports        Historical Information   Past Medical History:   Diagnosis Date   • Allergic    • Arthritis    • Bipolar affective disorder, currently depressed, moderate ([facility] [address] ) 12/17/2018   • Cyst of ovary, right    • Diabetes mellitus ([facility] [address] ) 10/10/2018    type 2   • Endometriosis    • Heart murmur 1988   • Hepatitis C    • Hepatitis C virus infection cured after antiviral drug therapy    • History of transfusion    • Hypertension    • Migraines    • Morbid obesity with BMI of 40 0-44 9, adult ([address] )    • Obesity 1995   • Pulmonary artery congenital abnormality    • Spleen enlarged    • Status post surgical removal of both fallopian tubes    • Varicella      Past Surgical History:   Procedure Laterality Date   • CARDIAC CATHETERIZATION      no CAD 10days, 4 weeks 22months old    • CARDIAC CATHETERIZATION N/A 1/12/2023    Procedure: Cardiac Coronary Angiogram;  Surgeon: Bailey Willis DO;  Location: [facility] C[facility]IAC [facility]ATH [facility]; Service: Cardiology   • CARDIAC CATHETERIZATION Left 2023    Procedure: Cardiac Left Heart Cath;  Surgeon: Ilsa Staton DO;  Location: [facility]ARDIAC [facility]; Service: Cardiology   • CARDIAC CATHETERIZATION  2023    Procedure: Cardiac catheterization;  Surgeon: Ilsa Staton DO;  Location: [facility] CARDI[facility];   Service: Cardiology   •  SECTION, LOW TRANSVERSE     • CHOLECYSTECTOMY     • 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 5   • CORONARY STENT PLACEMENT     • IR LOWER EXTREMITY ANGIOGRAM  2023   • LIVER BIOPSY     • LIVER BIOPSY     • STERNAL WIRE REMOVAL  2022   • THROMBECTOMY W/ EMBOLECTOMY Right 2023    Procedure: Right femoral exposure Right iliac embolectomy w/ #4 Ted catheter Aortogram Right EIA stent w/ 7x29mm VBX;  Surgeon: Yuniel Hodges MD;  Location: [facility];  Service: Vascular   • TUBAL LIGATION Bilateral    • VSD REPAIR      As a child     Social History   Social History     Substance and Sexual Activity   Alcohol Use Not Currently   • Alcohol/week: 3 0 standard drinks   • Types: 3 Standard drinks or equivalent per week    Comment: socially/prior to knowledge of pregnancy     Social History     Substance and Sexual Activity   Drug Use Not Currently    Comment: hx of THC use for migraines     Social History     Tobacco Use   Smoking Status Former   • Packs/day: 0 50   • Years: 0 00   • Pack years: 0 00   • Types: Cigarettes   • Quit date: 2023   • Years since quittin 0   Smokeless Tobacco Never     Family History   Problem Relation Age of Onset   • Hypertension Mother    • Migraines Mother    • JENNY disease Mother    • Depression Mother    • Hyperlipidemia Mother    • Diabetes Mother    • Diabetes Father    • Hypertension Father    • Kidney failure Father    • Heart attack Father    • Arthritis Father    • Stroke Father    • Polycystic kidney disease Paternal Grandmother    • Stroke Paternal Grandmother    • Heart disease Paternal Grandmother    • Arthritis Sister    • Asthma Sister    • Thyroid disease Sister    • Diabetes Sister    • Arthritis Maternal Grandmother    • Breast cancer Maternal Grandmother    • Diabetes Maternal Grandmother    • Hypertension Maternal Grandmother    • Heart Valve Disease Maternal Grandmother    • Learning disabilities Cousin    • Learning disabilities Sister    • ADD / ADHD Cousin    • Lung cancer Brother    • Diabetes Maternal Grandfather    • Hypertension Maternal Grandfather    • JENNY disease Maternal Grandfather    • Stroke Maternal Grandfather        Meds/Allergies   all current active meds have been reviewed, current meds:   Current Facility-Administered Medications   Medication Dose Route Frequency   • [START ON 2/11/2023] lactated ringers infusion  100 mL/hr Intravenous Continuous   • piperacillin-tazobactam (ZOSYN) 4 5 g in sodium chloride 0 9 % 100 mL IVPB  4 5 g Intravenous Once   • sodium chloride 0 9 % bolus 1,000 mL  1,000 mL Intravenous Once    and PTA meds:   Prior to Admission Medications   Prescriptions Last Dose Informant Patient Reported? Taking? ALPRAZolam (XANAX) 0 5 mg tablet   No No   Sig: Take 1 tablet (0 5 mg total) by mouth daily at bedtime as needed for anxiety   B-D UF III MINI PEN NEEDLES 31G X 5 MM MISC   No No   Sig: INJECT UNDER THE SKIN DAILY AT BEDTIME USE ONE A DAY OR AS DIRECTED   Blood Glucose Monitoring Suppl (OneTouch Verio) w/Device KIT   No No   Sig: Use daily   Blood Pressure Monitoring (B-D ASSURE BPM/AUTO WRIST CUFF) MISC   No No   Sig: Check blood pressure prior to each OB visit, or as directed by your physician     VITAMIN D PO   Yes No   Sig: Take by mouth daily   acetaminophen (TYLENOL) 500 mg tablet   Yes No   Sig: Take 1,000 mg by mouth   albuterol (Ventolin HFA) 90 mcg/act inhaler   No No   Sig: Inhale 2 puffs every 4 (four) hours as needed for wheezing or shortness of breath   aspirin (ECOTRIN LOW STRENGTH) 81 mg EC tablet   No No   Sig: Take 1 tablet (81 mg total) by mouth daily Do not start before February 1, 2023  cephalexin (KEFLEX) 500 mg capsule   No No   Sig: Take 1 capsule (500 mg total) by mouth every 8 (eight) hours for 7 days   clopidogrel (PLAVIX) 75 mg tablet   No No   Sig: Take 1 tablet (75 mg total) by mouth daily Do not start before February 1, 2023     diclofenac sodium (VOLTAREN) 50 mg EC tablet   No No   Sig: Take 1 tablet (50 mg total) by mouth 2 (two) times a day   fexofenadine (ALLEGRA) 180 MG tablet   No No   Sig: Take 1 tablet (180 mg total) by mouth daily   gabapentin (NEURONTIN) 100 mg capsule   No No   Sig: take 1 capsule by mouth daily at bedtime   insulin glargine (LANTUS) 100 units/mL subcutaneous injection   No No   Sig: Inject 17 Units under the skin daily at bedtime   metFORMIN (GLUCOPHAGE) 1000 MG tablet   No No   Sig: Take 1 tablet (1,000 mg total) by mouth 2 (two) times a day with meals   methocarbamol (ROBAXIN) 500 mg tablet   No No   Sig: Take 1 tablet (500 mg total) by mouth 4 (four) times a day   montelukast (SINGULAIR) 10 mg tablet   No No   Sig: Take 1 tablet (10 mg total) by mouth daily at bedtime   oxyCODONE-acetaminophen (Percocet) 5-325 mg per tablet   No No   Sig: Take 1 tablet by mouth every 8 (eight) hours as needed for moderate pain or severe pain Max Daily Amount: 3 tablets   sertraline (Zoloft) 50 mg tablet   No No   Sig: Take 1 tablet (50 mg total) by mouth daily   sitaGLIPtin (JANUVIA) 100 mg tablet   No No   Sig: Take 1 tablet (100 mg total) by mouth daily      Facility-Administered Medications: None     Allergies   Allergen Reactions   • Prednisone Swelling   • Bactrim [Sulfamethoxazole-Trimethoprim] Hives   • Corticosteroids Swelling     Pt states this does not cause problems breathing, she just has generalized swelling   • Cortisone Swelling     Generalized; no impairment with breathing reported     • Medical Tape Hives     Allergic to Paper Tape   • Other Hives     Paper tape   • Sulfa Antibiotics Hives       Lab Results: I have personally reviewed pertinent lab results  , CBC:   Lab Results   Component Value Date    WBC 7 97 02/10/2023    HGB 11 6 02/10/2023    HCT 37 3 02/10/2023    MCV 80 (L) 02/10/2023     02/10/2023    MCH 24 8 (L) 02/10/2023    MCHC 31 1 (L) 02/10/2023    RDW 14 6 02/10/2023    MPV 10 0 02/10/2023    NRBC 0 02/10/2023   , CMP:   Lab Results   Component Value Date    SODIUM 136 02/10/2023    K 3 9 02/10/2023     02/10/2023    CO2 27 02/10/2023    BUN 16 02/10/2023    CREATININE 0 59 (L) 02/10/2023    CALCIUM 9 6 02/10/2023    AST 11 (L) 02/10/2023    ALT 12 02/10/2023    ALKPHOS 59 02/10/2023    EGFR 119 02/10/2023       Counseling / Coordination of Care  Total floor / unit time spent today 25 minutes  Greater than 50% of total time was spent with the patient and / or family counseling and / or coordination of care      Inpatient consult to Vascular Surgery  Consult performed by: Colt Coe MD  Consult ordered by: Isadora Prabhakar MD  2/10/2023 10:38 PM

## 2023-02-11 NOTE — PLAN OF CARE
Problem: PAIN - ADULT  Goal: Verbalizes/displays adequate comfort level or baseline comfort level  Description: Interventions:  - Encourage patient to monitor pain and request assistance  - Assess pain using appropriate pain scale  - Administer analgesics based on type and severity of pain and evaluate response  - Implement non-pharmacological measures as appropriate and evaluate response  - Consider cultural and social influences on pain and pain management  - Notify physician/advanced practitioner if interventions unsuccessful or patient reports new pain  Outcome: Progressing     Problem: INFECTION - ADULT  Goal: Absence or prevention of progression during hospitalization  Description: INTERVENTIONS:  - Assess and monitor for signs and symptoms of infection  - Monitor lab/diagnostic results  - Monitor all insertion sites, i e  indwelling lines, tubes, and drains  - Monitor endotracheal if appropriate and nasal secretions for changes in amount and color  - [place] appropriate cooling/warming therapies per order  - Administer medications as ordered  - Instruct and encourage patient and family to use good hand hygiene technique  - Identify and instruct in appropriate isolation precautions for identified infection/condition  Outcome: Progressing  Goal: Absence of fever/infection during neutropenic period  Description: INTERVENTIONS:  - Monitor WBC    Outcome: Progressing     Problem: SAFETY ADULT  Goal: Patient will remain free of falls  Description: INTERVENTIONS:  - Educate patient/family on patient safety including physical limitations  - Instruct patient to call for assistance with activity   - Consult OT/PT to assist with strengthening/mobility   - Keep Call bell within reach  - Keep bed low and locked with side rails adjusted as appropriate  - Keep care items and personal belongings within reach  - Initiate and maintain comfort rounds  - Make Fall Risk Sign visible to staff  - Apply yellow socks and bracelet for high fall risk patients  - Consider moving patient to room near nurses station  Outcome: Progressing  Goal: Maintain or return to baseline ADL function  Description: INTERVENTIONS:  -  Assess patient's ability to carry out ADLs; assess patient's baseline for ADL function and identify physical deficits which impact ability to perform ADLs (bathing, care of mouth/teeth, toileting, grooming, dressing, etc )  - Assess/evaluate cause of self-care deficits   - Assess range of motion  - Assess patient's mobility; develop plan if impaired  - Assess patient's need for assistive devices and provide as appropriate  - Encourage maximum independence but intervene and supervise when necessary  - Involve family in performance of ADLs  - Assess for home care needs following discharge   - Consider OT consult to assist with ADL evaluation and planning for discharge  - Provide patient education as appropriate  Outcome: Progressing  Goal: Maintains/Returns to pre admission functional level  Description: INTERVENTIONS:  - Perform BMAT or MOVE assessment daily    - Set and communicate daily mobility goal to care team and patient/family/caregiver  - Collaborate with rehabilitation services on mobility goals if consulted  - Perform Range of Motion 3 times a day  - Reposition patient every 2 hours    - Ambulate patient 3 times a day  - Out of bed to chair 3 times a day   - Out of bed for meals 3 times a day  - Out of bed for toileting  - Record patient progress and toleration of activity level   Outcome: Progressing     Problem: DISCHARGE PLANNING  Goal: Discharge to home or other facility with appropriate resources  Description: INTERVENTIONS:  - Identify barriers to discharge w/patient and caregiver  - Arrange for needed discharge resources and transportation as appropriate  - Identify discharge learning needs (meds, wound care, etc )  - Arrange for interpretive services to assist at discharge as needed  - Refer to Case Management Department for coordinating discharge planning if the patient needs post-hospital services based on physician/advanced practitioner order or complex needs related to functional status, cognitive ability, or social support system  Outcome: Progressing     Problem: Knowledge Deficit  Goal: Patient/family/caregiver demonstrates understanding of disease process, treatment plan, medications, and discharge instructions  Description: Complete learning assessment and assess knowledge base    Interventions:  - Provide teaching at level of understanding  - Provide teaching via preferred learning methods  Outcome: Progressing

## 2023-02-11 NOTE — ASSESSMENT & PLAN NOTE
Lab Results   Component Value Date    HGBA1C 9 6 (H) 01/10/2023       No results for input(s): POCGLU in the last 72 hours      Blood Sugar Average: Last 72 hrs:  poorly controlled  Hold januvia/metformin  Continue lantus 17 iu qhs and start ssi/poc bs

## 2023-02-11 NOTE — ASSESSMENT & PLAN NOTE
As a complication from  R LEONEL and R CFA occlusion, s/p R femoral exposure, R iliac embolectomy and R EIA stent placement after right femoral access for cardiac cath    -No s/sx of sepsis  -planned for washout by vascular surgery w/admission to medicine  -outpatient wcx w/ESBL P mirabilis w/near pan resistance save ertapenem zosyn amikacin  -consult vasc sx and consult ID for abx stewardship  -continue zosyn, npo

## 2023-02-11 NOTE — H&P
[address] [facility]  H&P- Shiv Andrew 1990, 26 y o  female MRN: [id number]  Unit/Bed#: E5 -01 Encounter: [id number]  Primary Care Provider: REJI Kaur   Date and time admitted to hospital: 2/10/2023  8:47 PM    * Surgical site infection  Assessment & Plan  As a complication from  R LEONEL and R CFA occlusion, s/p R femoral exposure, R iliac embolectomy and R EIA stent placement after right femoral access for cardiac cath  -No s/sx of sepsis  -planned for washout by vascular surgery w/admission to medicine  -outpatient wcx w/ESBL P mirabilis w/near pan resistance save ertapenem zosyn amikacin  -consult vasc sx and consult ID for abx stewardship  -continue zosyn, npo    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HCC)  Assessment & Plan  R EIA thrombosis w/extension LEONEL and R CFA occlusion, s/p R femoral exposure, R iliac embolectomy and R EIA stent placement after cardiac cath w/femoral access  On asa/plavix will continue    Hypertension  Assessment & Plan  Off pharmacotherapy      Bipolar disorder ([facility] )  Assessment & Plan  Continue zoloft/xanax    Type 2 diabetes mellitus ([facility] )  Assessment & Plan  Lab Results   Component Value Date    HGBA1C 9 6 (H) 01/10/2023       No results for input(s): POCGLU in the last 72 hours  Blood Sugar Average: Last 72 hrs:  poorly controlled  Hold januvia/metformin  Continue lantus 17 iu qhs and start ssi/poc bs    Morbid obesity ([facility] )  Assessment & Plan  bmi 42 noted      VTE Prophylaxis: Enoxaparin (Lovenox)  / sequential compression device   Code Status: fc  POLST: There is no POLST form on file for this patient (pre-hospital)  Discussion with family:     Anticipated Length of Stay:  Patient will be admitted on an Inpatient basis with an anticipated length of stay of  Greater than 2 midnights  Justification for Hospital Stay: ssi    Total Time for Visit, including Counseling / Coordination of Care: 45 minutes    Greater than 50% of this total time spent on direct patient counseling and coordination of care  Chief Complaint:   My doctor said my cultures came back and I need IV abx    History of Present Illness:    Raheem Padilla is a 26 y o  female who presents with pmh of bipolar d/o htn, obesity w/bmi of 36, recently diagnosed EIA thrombosis w/extension into LEONEL and CFA coming to hospital for iv abx  Pt was admitted in January for cp from 1/9/23 thru 1/13/23 for elevated troponin I w/abn stress test and underwent cardiac cath which wa nonobstructive  She was then readmitted to vascular surgery from 1/30/23 thru 1/31/23 for right grain pain w/cta demonstrating thrombus/injury to R EIA/CFA  She underwent right femoral cutdown, iliac embolectomy and stent placing of EIA  She continued to have pain at anterior groin and followed up w/her family provider on 2/6/23 and underwent wcx as there was a moderate amount of purulent and some small amount of SS drainage  She followed up the next day w/vascular surgery who noted SS drainage w/o significant purulence and recommended low threshold w/return precautions given  She was empirically started on keflex 500mg tid x7d at that time  Wcx came back on 2/10/23 + for ESBL P mirabilis  Pt was directed to come to hospital for evaluation and iv abx  Pt notes her pain has been stable there w/purulent drainage noted  She has had no fevers/chills but notes slightly decreased appetite  Still having anterior thigh pain but stable and some anterolateral local numbness at proximal thigh but otherwise no new s/sx or distal numbness/weakness  Case was d/w vascular surgery resident on call who recommended medicine admission and plan for wound vac and OR for washout  Review of Systems:    Review of Systems   Constitutional: Positive for appetite change  Negative for chills and fever  Respiratory: Negative for shortness of breath  Cardiovascular: Negative for chest pain     Gastrointestinal: Negative for abdominal pain, nausea and vomiting  Skin: Positive for wound  All other systems reviewed and are negative  Past Medical and Surgical History:     Past Medical History:   Diagnosis Date   • Allergic    • Arthritis    • Bipolar affective disorder, currently depressed, moderate ([facility] ) 2018   • Cyst of ovary, right    • Diabetes mellitus ([facility] ) 10/10/2018    type 2   • Endometriosis    • Heart murmur 1988   • Hepatitis C    • Hepatitis C virus infection cured after antiviral drug therapy    • History of transfusion    • Hypertension    • Migraines    • Morbid obesity with BMI of 40 0-44 9, adult ([facility] )    • Obesity    • Pulmonary artery congenital abnormality    • Spleen enlarged    • Status post surgical removal of both fallopian tubes    • Varicella        Past Surgical History:   Procedure Laterality Date   • CARDIAC CATHETERIZATION      no CAD 10days, 4 weeks 20months old    • CARDIAC CATHETERIZATION N/A 2023    Procedure: Cardiac Coronary Angiogram;  Surgeon: Marlon Juan DO;  Location: [facility] CARDIAC CATH LAB; Service: Cardiology   • CARDIAC CATHETERIZATION Left 2023    Procedure: Cardiac Left Heart Cath;  Surgeon: Marlon Juan DO;  Location: [facility]; Service: Cardiology   • CARDIAC CATHETERIZATION  2023    Procedure: Cardiac catheterization;  Surgeon: Marlon Juan DO;  Location: [facility] CARD[facility]AC [facility];   Service: Cardiology   •  SECTION, LOW TRANSVERSE     • CHOLECYSTECTOMY     • 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 5   • CORONARY STENT PLACEMENT     • IR LOWER EXTREMITY ANGIOGRAM  2023   • LIVER BIOPSY     • LIVER BIOPSY     • STERNAL WIRE REMOVAL  2022   • THROMBECTOMY W/ EMBOLECTOMY Right 2023    Procedure: Right femoral exposure Right iliac embolectomy w/ #4 Ted catheter Aortogram Right EIA stent w/ 7x29mm VBX;  Surgeon: Jamie Hodges MD;  Location: [facility] Main OR;  Service: Vascular   • TUBAL LIGATION Bilateral  • VSD REPAIR      As a child       Meds/Allergies:    Prior to Admission medications    Medication Sig Start Date End Date Taking? Authorizing Provider   acetaminophen (TYLENOL) 500 mg tablet Take 1,000 mg by mouth 5/6/20  Yes Historical Provider, MD   aspirin (ECOTRIN LOW STRENGTH) 81 mg EC tablet Take 1 tablet (81 mg total) by mouth daily Do not start before February 1, 2023 2/1/23  Yes Kaylen Carrasco PA-C   B-D UF III MINI PEN NEEDLES 31G X 5 MM MISC INJECT UNDER THE SKIN DAILY AT BEDTIME USE ONE A DAY OR AS DIRECTED 3/11/21  Yes Chula De La Rosa DO   Blood Glucose Monitoring Suppl (OneTouch Verio) w/Device KIT Use daily 11/23/21  Yes REJI Pepe   Blood Pressure Monitoring (B-D ASSURE BPM/AUTO WRIST CUFF) MISC Check blood pressure prior to each OB visit, or as directed by your physician  3/31/20  Yes Youlanda Koyanagi, MD   cephalexin [facility]) 500 mg capsule Take 1 capsule (500 mg total) by mouth every 8 (eight) hours for 7 days 2/7/23 2/14/23 Yes REJI Kowalski   clopidogrel (PLAVIX) 75 mg tablet Take 1 tablet (75 mg total) by mouth daily Do not start before February 1, 2023 2/1/23  Yes Berenice Tesfaye PA-C   diclofenac sodium (VOLTAREN) 50 mg EC tablet Take 1 tablet (50 mg total) by mouth 2 (two) times a day 12/6/22  Yes REJI García   fexofenadine (ALLEGRA) 180 MG tablet Take 1 tablet (180 mg total) by mouth daily 8/20/21  Yes Martita Ramírez DO   gabapentin (NEURONTIN) 100 mg capsule take 1 capsule by mouth daily at bedtime 1/1/23  Yes REJI García   insulin glargine (LANTUS) 100 units/mL subcutaneous injection Inject 17 Units under the skin daily at bedtime 1/17/23 5/17/23 Yes REJI García   metFORMIN (GLUCOPHAGE) 1000 MG tablet Take 1 tablet (1,000 mg total) by mouth 2 (two) times a day with meals 5/25/21  Yes REJI Pepe   methocarbamol (ROBAXIN) 500 mg tablet Take 1 tablet (500 mg total) by mouth 4 (four) times a day 12/6/22  Yes REJI Webb   montelukast (SINGULAIR) 10 mg tablet Take 1 tablet (10 mg total) by mouth daily at bedtime 8/20/21  Yes Keaton Langley DO   oxyCODONE-acetaminophen (Percocet) 5-325 mg per tablet Take 1 tablet by mouth every 8 (eight) hours as needed for moderate pain or severe pain Max Daily Amount: 3 tablets 2/7/23  Yes REJI Bhatia   sertraline (Zoloft) 50 mg tablet Take 1 tablet (50 mg total) by mouth daily 12/6/22  Yes REJI Luther   sitaGLIPtin (JANUVIA) 100 mg tablet Take 1 tablet (100 mg total) by mouth daily 1/17/23  Yes REJI Luther   VITAMIN D PO Take by mouth daily   Yes Historical Provider, MD   albuterol (Ventolin HFA) 90 mcg/act inhaler Inhale 2 puffs every 4 (four) hours as needed for wheezing or shortness of breath 10/18/21   REJI Luther   ALPRAZolam Forestine Guitar) 0 5 mg tablet Take 1 tablet (0 5 mg total) by mouth daily at bedtime as needed for anxiety 12/6/22   REJI Luther     I have reviewed home medications with patient personally  Allergies:    Allergies   Allergen Reactions   • Prednisone Swelling   • Bactrim [Sulfamethoxazole-Trimethoprim] Hives   • Corticosteroids Swelling     Pt states this does not cause problems breathing, she just has generalized swelling   • Cortisone Swelling     Generalized; no impairment with breathing reported     • Medical Tape Hives     Allergic to Paper Tape   • Other Hives     Paper tape   • Sulfa Antibiotics Hives       Social History:     Marital Status: Single   Occupation:   Patient Pre-hospital Living Situation:   Patient Pre-hospital Level of Mobility:   Patient Pre-hospital Diet Restrictions:   Substance Use History:   Social History     Substance and Sexual Activity   Alcohol Use Not Currently   • Alcohol/week: 3 0 standard drinks   • Types: 3 Standard drinks or equivalent per week    Comment: socially/prior to knowledge of pregnancy     Social History     Tobacco Use   Smoking Status Former   • Packs/day: 0 50   • Years: 0 00   • Pack years: 0 00   • Types: Cigarettes   • Quit date: 2023   • Years since quittin 0   Smokeless Tobacco Never     Social History     Substance and Sexual Activity   Drug Use Not Currently    Comment: hx of THC use for migraines       Family History:    Family History   Problem Relation Age of Onset   • Hypertension Mother    • Migraines Mother    • JENNY disease Mother    • Depression Mother    • Hyperlipidemia Mother    • Diabetes Mother    • Diabetes Father    • Hypertension Father    • Kidney failure Father    • Heart attack Father    • Arthritis Father    • Stroke Father    • Polycystic kidney disease Paternal Grandmother    • Stroke Paternal Grandmother    • Heart disease Paternal Grandmother    • Arthritis Sister    • Asthma Sister    • Thyroid disease Sister    • Diabetes Sister    • Arthritis Maternal Grandmother    • Breast cancer Maternal Grandmother    • Diabetes Maternal Grandmother    • Hypertension Maternal Grandmother    • Heart Valve Disease Maternal Grandmother    • Learning disabilities Cousin    • Learning disabilities Sister    • ADD / ADHD Cousin    • Lung cancer Brother    • Diabetes Maternal Grandfather    • Hypertension Maternal Grandfather    • JENNY disease Maternal Grandfather    • Stroke Maternal Grandfather        Physical Exam:     Vitals:   Blood Pressure: 113/67 (23)  Pulse: 99 (23)  Temperature: (!) 97 4 °F (36 3 °C) (23)  Temp Source: Oral (23)  Respirations: 18 (23)  Weight - Scale: 103 kg (226 lb 3 1 oz) (02/10/23 2030)  SpO2: 97 % (23)    Physical Exam  Vitals reviewed  Constitutional:       General: She is not in acute distress  Appearance: She is obese  She is not ill-appearing, toxic-appearing or diaphoretic  HENT:      Head: Normocephalic and atraumatic        Right Ear: External ear normal       Left Ear: External ear normal       Nose: Nose normal    Eyes: Extraocular Movements: Extraocular movements intact  Cardiovascular:      Rate and Rhythm: Normal rate and regular rhythm  Pulmonary:      Effort: No respiratory distress  Breath sounds: No wheezing, rhonchi or rales  Abdominal:      General: There is no distension  Palpations: There is no mass  Tenderness: There is no abdominal tenderness  There is no guarding or rebound  Hernia: No hernia is present  Musculoskeletal:      Cervical back: Normal range of motion  Skin:     Findings: No erythema  Comments: Right anterior thigh Incision site is dressed w/o surrounding erythema  Does have mild tenderness at right thigh inferior to incision as well laterally on    Neurological:      Mental Status: She is alert  Motor: No weakness  (  Be Sure to Include Physical Exam: Delete this entire line when you have entered your exam)    Additional Data:     Lab Results: I have personally reviewed pertinent reports  Results from last 7 days   Lab Units 02/10/23  2152   WBC Thousand/uL 7 97   HEMOGLOBIN g/dL 11 6   HEMATOCRIT % 37 3   PLATELETS Thousands/uL 356   NEUTROS PCT % 59   LYMPHS PCT % 32   MONOS PCT % 6   EOS PCT % 2     Results from last 7 days   Lab Units 02/10/23  2152   SODIUM mmol/L 136   POTASSIUM mmol/L 3 9   CHLORIDE mmol/L 101   CO2 mmol/L 27   BUN mg/dL 16   CREATININE mg/dL 0 59*   ANION GAP mmol/L 8   CALCIUM mg/dL 9 6   ALBUMIN g/dL 4 2   TOTAL BILIRUBIN mg/dL 0 26   ALK PHOS U/L 59   ALT U/L 12   AST U/L 11*   GLUCOSE RANDOM mg/dL 189*     Results from last 7 days   Lab Units 02/10/23  2152   INR  0 96             Results from last 7 days   Lab Units 02/10/23  2152   LACTIC ACID mmol/L 1 2   PROCALCITONIN ng/ml <0 05       Imaging: none    No orders to display       EKG, Pathology, and Other Studies Reviewed on Admission:   · EKG:     Allscripts / Epic Records Reviewed: Yes     ** Please Note: This note has been constructed using a voice recognition system  **

## 2023-02-11 NOTE — OP NOTE
OPERATIVE REPORT  PATIENT NAME: Navi Solorzano    :  1990  MRN: [id number]  Pt Location: [facility] OR ROOM 02    SURGERY DATE: 2023    Surgeon(s) and Role:     * Jelani Avila DO - Primary     * Manuela Quiles MD - Assisting    Preop Diagnosis:  Surgical site infection [T81 49XA]    Post-Op Diagnosis Codes:     * Surgical site infection [T81 49XA]    Procedure(s):  Right - Right Groin Wound Washout  Pulse Lavage  Wound Vac placement    Specimen(s):  ID Type Source Tests Collected by Time Destination   A : DEEP RIGHT GROIN Tissue Soft Tissue, Other ANAEROBIC CULTURE AND GRAM STAIN, CULTURE, TISSUE AND GRAM STAIN Jelani Avila DO 2023 0932        Estimated Blood Loss:   Minimal    Drains:  * No LDAs found *    Anesthesia Type:   General/LMA    Operative Indications:  Surgical site infection [T81 49XA]    33F former smoker w/obesity (BMI 41 19), HTN, HLD, type II DM, aortic coarctation s/p repair and stent placement, VSD s/p repair, bipolar disorder, hepatitis C, s/p diagnostic cardiac catheterization via R femoral access w/ angioseal closure device performed for abnormal stress test, c/b thrombosis of R EIA with extension into the proximal R CFA s/p R femoral exposure, R iliac embolectomy and R EIA stent 23 (L  Doctor) sent to the ED due to concerns for infection R groin incision  Risks, benefits and alternatives were discussed with the patient who wished to proceed and consent was obtained  Operative Findings:  No purulence  Fibrinous exudate of skin edges, underlying base of wound pink and healthy, femoral sheath intact  Wound vac placed  Dimensions L x W x D  7 5 cm x 2 cm x 2cm    Complications:   None    Procedure and Technique:  The patient was brought to the operating room and placed on the operating room table in a supine fashion  LMA placed by anesthesia and preoperative antibiotics were administered  Right groin was prepped and draped in the standard sterile fashion   A time out was performed  Staples were removed and incision explored with above findings  Skin edges were sharply debrided to bleeding skin edges  The wound was pulse lavaged with 3L sterile saline  Hemostasis was achieved  Black sponge was cut to size and placed in the wound and secured with clear adhesive  Second sponge was used for bridge, trish pad was attached and connected to -125mmHg negative pressure with excellent seal   Patient tolerated the procedure and transferred to PACU  Dr Luis Dallas was scrubbed, present and participated in the entire case          Patient Disposition:  PACU         SIGNATURE: Katy Barrera MD  DATE: February 11, 2023  TIME: 9:43 AM

## 2023-02-11 NOTE — ASSESSMENT & PLAN NOTE
R EIA thrombosis w/extension LEONEL and R CFA occlusion, s/p R femoral exposure, R iliac embolectomy and R EIA stent placement after cardiac cath w/femoral access  On asa/plavix will continue

## 2023-02-11 NOTE — ANESTHESIA PREPROCEDURE EVALUATION
Procedure:  Right Groin Wound Washout, Pulse Lavage, Wound Vac placement (Right: Groin)    Relevant Problems   CARDIO           (+) Hypercholesteremia   (+) Hypertension          ENDO   (+) Type 2 diabetes mellitus ([address] )  Hgb A1c 9 6      GI/HEPATIC   (+) Chronic hepatitis C without hepatic coma (HCC)          NEURO/PSYCH                               (+) surgical site infection as a complication from R LEONEL and RFA occlusion s/p R femoral exposure, R iliac embolectomy and R EIA stent placment after R femoral access for cardiac cath       1/10/23 ECHO  Left Ventricle Left ventricular cavity size is normal  Wall thickness is normal  The left ventricular ejection fraction is 50-55% by single dimension measurement  Systolic function is normal   Although no diagnostic regional wall motion abnormality was identified, this possibility cannot be completely excluded on the basis of this study  Unable to assess diastolic function  Right Ventricle Right ventricular cavity size is normal  Systolic function is normal  Normal tricuspid annular plane systolic excursion (TAPSE) = 1 7 cm  Left Atrium The atrium is normal in size (16-34 mL/m2)  Right Atrium The atrium is normal in size  Aortic Valve The aortic valve is trileaflet  The leaflets are not thickened  The leaflets are not calcified  The leaflets exhibit normal mobility  There is no evidence of regurgitation  The aortic valve has no significant stenosis  Mitral Valve There is no evidence of regurgitation  There is no evidence of stenosis  The mitral valve has normal structure and normal function  Tricuspid Valve Tricuspid valve structure is normal  There is trace regurgitation  There is no evidence of stenosis  Pulmonic Valve Pulmonic valve structure is normal  There is no evidence of regurgitation  There is no evidence of stenosis  Ascending Aorta The ascending aorta is normal in size  IVC/SVC The inferior vena cava is normal in size   Respirophasic changes were normal    Pericardium There is no pericardial effusion  The pericardium is normal in appearance  1/12/23 CATH  No angiographic evidence of obstructive CAD  •  LVEDP normal without gradient on LV-AO pullback  •  RRA spasm requriring US-guided RFA access     • LM: medium length, medium caliber vessel, bifurcates into LAD/LCX, with no angiographic evidence of significant obstructive CAD  • LAD: type II, medium caliber vessel, with no angiographic evidence of significant obstructive  CAD, it gives off a PARUL branch, with no angiographic evidence of significant obstructive CAD  • LCX: nondominant, small caliber vessel, it gives off an OM branch, with no angiographic evidence of significant obstructive CAD  • RCA: dominant, large caliber vessel,  with no angiographic evidence of significant obstructive CAD    Physical Exam    Airway    Mallampati score: III  TM Distance: >3 FB  Neck ROM: full     Dental       Cardiovascular  Rhythm: regular, Rate: normal,     Pulmonary  Breath sounds clear to auscultation,     Other Findings        Anesthesia Plan  ASA Score- 3     Anesthesia Type- general with ASA Monitors  Additional Monitors:   Airway Plan:           Plan Factors-    Chart reviewed  Existing labs reviewed  Induction- intravenous  Postoperative Plan- Plan for postoperative opioid use  Planned trial extubation    Informed Consent- Anesthetic plan and risks discussed with patient

## 2023-02-11 NOTE — PLAN OF CARE
Problem: PAIN - ADULT  Goal: Verbalizes/displays adequate comfort level or baseline comfort level  Description: Interventions:  - Encourage patient to monitor pain and request assistance  - Assess pain using appropriate pain scale  - Administer analgesics based on type and severity of pain and evaluate response  - Implement non-pharmacological measures as appropriate and evaluate response  - Consider cultural and social influences on pain and pain management  - Notify physician/advanced practitioner if interventions unsuccessful or patient reports new pain  Outcome: Progressing     Problem: INFECTION - ADULT  Goal: Absence or prevention of progression during hospitalization  Description: INTERVENTIONS:  - Assess and monitor for signs and symptoms of infection  - Monitor lab/diagnostic results  - Monitor all insertion sites, i e  indwelling lines, tubes, and drains  - Monitor endotracheal if appropriate and nasal secretions for changes in amount and color  - [place] appropriate cooling/warming therapies per order  - Administer medications as ordered  - Instruct and encourage patient and family to use good hand hygiene technique  - Identify and instruct in appropriate isolation precautions for identified infection/condition  Outcome: Progressing  Goal: Absence of fever/infection during neutropenic period  Description: INTERVENTIONS:  - Monitor WBC    Outcome: Progressing     Problem: SAFETY ADULT  Goal: Patient will remain free of falls  Description: INTERVENTIONS:  - Educate patient/family on patient safety including physical limitations  - Instruct patient to call for assistance with activity   - Consult OT/PT to assist with strengthening/mobility   - Keep Call bell within reach  - Keep bed low and locked with side rails adjusted as appropriate  - Keep care items and personal belongings within reach  - Initiate and maintain comfort rounds  - Make Fall Risk Sign visible to staff  - - Apply yellow socks and bracelet for high fall risk patients  - Consider moving patient to room near nurses station  Outcome: Progressing  Goal: Maintain or return to baseline ADL function  Description: INTERVENTIONS:  -  Assess patient's ability to carry out ADLs; assess patient's baseline for ADL function and identify physical deficits which impact ability to perform ADLs (bathing, care of mouth/teeth, toileting, grooming, dressing, etc )  - Assess/evaluate cause of self-care deficits   - Assess range of motion  - Assess patient's mobility; develop plan if impaired  - Assess patient's need for assistive devices and provide as appropriate  - Encourage maximum independence but intervene and supervise when necessary  - Involve family in performance of ADLs  - Assess for home care needs following discharge   - Consider OT consult to assist with ADL evaluation and planning for discharge  - Provide patient education as appropriate  Outcome: Progressing  Goal: Maintains/Returns to pre admission functional level  Description: INTERVENTIONS:  - Perform BMAT or MOVE assessment daily    - Set and communicate daily mobility goal to care team and patient/family/caregiver     - Collaborate with rehabilitation services on mobility goals if consulted  -- Out of bed for toileting  - Record patient progress and toleration of activity level   Outcome: Progressing     Problem: DISCHARGE PLANNING  Goal: Discharge to home or other facility with appropriate resources  Description: INTERVENTIONS:  - Identify barriers to discharge w/patient and caregiver  - Arrange for needed discharge resources and transportation as appropriate  - Identify discharge learning needs (meds, wound care, etc )  - Arrange for interpretive services to assist at discharge as needed  - Refer to Case Management Department for coordinating discharge planning if the patient needs post-hospital services based on physician/advanced practitioner order or complex needs related to functional status, cognitive ability, or social support system  Outcome: Progressing     Problem: Knowledge Deficit  Goal: Patient/family/caregiver demonstrates understanding of disease process, treatment plan, medications, and discharge instructions  Description: Complete learning assessment and assess knowledge base    Interventions:  - Provide teaching at level of understanding  - Provide teaching via preferred learning methods  Outcome: Progressing

## 2023-02-12 LAB
ANION GAP SERPL CALCULATED.3IONS-SCNC: 10 MMOL/L (ref 4–13)
BASOPHILS # BLD AUTO: 0.01 THOUSANDS/ÂΜL (ref 0–0.1)
BASOPHILS NFR BLD AUTO: 0 % (ref 0–1)
BUN SERPL-MCNC: 12 MG/DL (ref 5–25)
CALCIUM SERPL-MCNC: 9.2 MG/DL (ref 8.4–10.2)
CHLORIDE SERPL-SCNC: 102 MMOL/L (ref 96–108)
CO2 SERPL-SCNC: 24 MMOL/L (ref 21–32)
CREAT SERPL-MCNC: 0.57 MG/DL (ref 0.6–1.3)
EOSINOPHIL # BLD AUTO: 0 THOUSAND/ÂΜL (ref 0–0.61)
EOSINOPHIL NFR BLD AUTO: 0 % (ref 0–6)
ERYTHROCYTE [DISTWIDTH] IN BLOOD BY AUTOMATED COUNT: 14 % (ref 11.6–15.1)
GFR SERPL CREATININE-BSD FRML MDRD: 120 ML/MIN/1.73SQ M
GLUCOSE SERPL-MCNC: 224 MG/DL (ref 65–140)
GLUCOSE SERPL-MCNC: 230 MG/DL (ref 65–140)
GLUCOSE SERPL-MCNC: 266 MG/DL (ref 65–140)
GLUCOSE SERPL-MCNC: 276 MG/DL (ref 65–140)
HCT VFR BLD AUTO: 31.3 % (ref 34.8–46.1)
HGB BLD-MCNC: 9.8 G/DL (ref 11.5–15.4)
IMM GRANULOCYTES # BLD AUTO: 0.03 THOUSAND/UL (ref 0–0.2)
IMM GRANULOCYTES NFR BLD AUTO: 0 % (ref 0–2)
LYMPHOCYTES # BLD AUTO: 1.49 THOUSANDS/ÂΜL (ref 0.6–4.47)
LYMPHOCYTES NFR BLD AUTO: 17 % (ref 14–44)
MCH RBC QN AUTO: 24.6 PG (ref 26.8–34.3)
MCHC RBC AUTO-ENTMCNC: 31.3 G/DL (ref 31.4–37.4)
MCV RBC AUTO: 79 FL (ref 82–98)
MONOCYTES # BLD AUTO: 0.52 THOUSAND/ÂΜL (ref 0.17–1.22)
MONOCYTES NFR BLD AUTO: 6 % (ref 4–12)
NEUTROPHILS # BLD AUTO: 6.8 THOUSANDS/ÂΜL (ref 1.85–7.62)
NEUTS SEG NFR BLD AUTO: 77 % (ref 43–75)
NRBC BLD AUTO-RTO: 0 /100 WBCS
PLATELET # BLD AUTO: 318 THOUSANDS/UL (ref 149–390)
PMV BLD AUTO: 10.3 FL (ref 8.9–12.7)
POTASSIUM SERPL-SCNC: 3.9 MMOL/L (ref 3.5–5.3)
RBC # BLD AUTO: 3.98 MILLION/UL (ref 3.81–5.12)
SODIUM SERPL-SCNC: 136 MMOL/L (ref 135–147)
WBC # BLD AUTO: 8.85 THOUSAND/UL (ref 4.31–10.16)

## 2023-02-12 RX ORDER — LISINOPRIL 20 MG/1
20 TABLET ORAL DAILY
Status: DISCONTINUED | OUTPATIENT
Start: 2023-02-12 | End: 2023-02-12

## 2023-02-12 RX ORDER — LISINOPRIL 30 MG/1
30 TABLET ORAL DAILY
COMMUNITY

## 2023-02-12 RX ADMIN — GABAPENTIN 100 MG: 100 CAPSULE ORAL at 22:11

## 2023-02-12 RX ADMIN — INSULIN LISPRO 2 UNITS: 100 INJECTION, SOLUTION INTRAVENOUS; SUBCUTANEOUS at 16:21

## 2023-02-12 RX ADMIN — ASPIRIN 81 MG: 81 TABLET, COATED ORAL at 08:00

## 2023-02-12 RX ADMIN — MONTELUKAST SODIUM 10 MG: 10 TABLET, COATED ORAL at 22:11

## 2023-02-12 RX ADMIN — CLOPIDOGREL BISULFATE 75 MG: 75 TABLET ORAL at 08:00

## 2023-02-12 RX ADMIN — HEPARIN SODIUM 5000 UNITS: 5000 INJECTION INTRAVENOUS; SUBCUTANEOUS at 05:13

## 2023-02-12 RX ADMIN — HEPARIN SODIUM 5000 UNITS: 5000 INJECTION INTRAVENOUS; SUBCUTANEOUS at 13:23

## 2023-02-12 RX ADMIN — INSULIN LISPRO 2 UNITS: 100 INJECTION, SOLUTION INTRAVENOUS; SUBCUTANEOUS at 11:56

## 2023-02-12 RX ADMIN — HEPARIN SODIUM 5000 UNITS: 5000 INJECTION INTRAVENOUS; SUBCUTANEOUS at 22:13

## 2023-02-12 RX ADMIN — ERTAPENEM SODIUM 1000 MG: 1 INJECTION, POWDER, LYOPHILIZED, FOR SOLUTION INTRAMUSCULAR; INTRAVENOUS at 11:58

## 2023-02-12 RX ADMIN — LISINOPRIL 30 MG: 10 TABLET ORAL at 10:14

## 2023-02-12 RX ADMIN — INSULIN GLARGINE 17 UNITS: 100 INJECTION, SOLUTION SUBCUTANEOUS at 22:21

## 2023-02-12 RX ADMIN — SERTRALINE HYDROCHLORIDE 50 MG: 50 TABLET ORAL at 08:00

## 2023-02-12 RX ADMIN — INSULIN LISPRO 3 UNITS: 100 INJECTION, SOLUTION INTRAVENOUS; SUBCUTANEOUS at 07:59

## 2023-02-12 NOTE — PLAN OF CARE
Problem: PAIN - ADULT  Goal: Verbalizes/displays adequate comfort level or baseline comfort level  Description: Interventions:  - Encourage patient to monitor pain and request assistance  - Assess pain using appropriate pain scale  - Administer analgesics based on type and severity of pain and evaluate response  - Implement non-pharmacological measures as appropriate and evaluate response  - Consider cultural and social influences on pain and pain management  - Notify physician/advanced practitioner if interventions unsuccessful or patient reports new pain  Outcome: Progressing     Problem: INFECTION - ADULT  Goal: Absence or prevention of progression during hospitalization  Description: INTERVENTIONS:  - Assess and monitor for signs and symptoms of infection  - Monitor lab/diagnostic results  - Monitor all insertion sites, i e  indwelling lines, tubes, and drains  - Monitor endotracheal if appropriate and nasal secretions for changes in amount and color  - [place] appropriate cooling/warming therapies per order  - Administer medications as ordered  - Instruct and encourage patient and family to use good hand hygiene technique  - Identify and instruct in appropriate isolation precautions for identified infection/condition  Outcome: Progressing     Problem: SAFETY ADULT  Goal: Patient will remain free of falls  Description: INTERVENTIONS:  - Educate patient/family on patient safety including physical limitations  - Instruct patient to call for assistance with activity   - Consult OT/PT to assist with strengthening/mobility   - Keep Call bell within reach  - Keep bed low and locked with side rails adjusted as appropriate  - Keep care items and personal belongings within reach  - Initiate and maintain comfort rounds  - Make Fall Risk Sign visible to staff  - Apply yellow socks and bracelet for high fall risk patients  - Consider moving patient to room near nurses station  Outcome: Progressing  Goal: Maintain or return to baseline ADL function  Description: INTERVENTIONS:  -  Assess patient's ability to carry out ADLs; assess patient's baseline for ADL function and identify physical deficits which impact ability to perform ADLs (bathing, care of mouth/teeth, toileting, grooming, dressing, etc )  - Assess/evaluate cause of self-care deficits   - Assess range of motion  - Assess patient's mobility; develop plan if impaired  - Assess patient's need for assistive devices and provide as appropriate  - Encourage maximum independence but intervene and supervise when necessary  - Involve family in performance of ADLs  - Assess for home care needs following discharge   - Consider OT consult to assist with ADL evaluation and planning for discharge  - Provide patient education as appropriate  Outcome: Progressing     Problem: DISCHARGE PLANNING  Goal: Discharge to home or other facility with appropriate resources  Description: INTERVENTIONS:  - Identify barriers to discharge w/patient and caregiver  - Arrange for needed discharge resources and transportation as appropriate  - Identify discharge learning needs (meds, wound care, etc )  - Arrange for interpretive services to assist at discharge as needed  - Refer to Case Management Department for coordinating discharge planning if the patient needs post-hospital services based on physician/advanced practitioner order or complex needs related to functional status, cognitive ability, or social support system  Outcome: Progressing     Problem: Knowledge Deficit  Goal: Patient/family/caregiver demonstrates understanding of disease process, treatment plan, medications, and discharge instructions  Description: Complete learning assessment and assess knowledge base    Interventions:  - Provide teaching at level of understanding  - Provide teaching via preferred learning methods  Outcome: Progressing     Problem: RESPIRATORY - ADULT  Goal: Achieves optimal ventilation and oxygenation  Description: INTERVENTIONS:  - Assess for changes in respiratory status  - Assess for changes in mentation and behavior  - Position to facilitate oxygenation and minimize respiratory effort  - Oxygen administered by appropriate delivery if ordered  - Initiate smoking cessation education as indicated  - Encourage broncho-pulmonary hygiene including cough, deep breathe, Incentive Spirometry  - Assess the need for suctioning and aspirate as needed  - Assess and instruct to report SOB or any respiratory difficulty  - Respiratory Therapy support as indicated  Outcome: Progressing     Problem: GASTROINTESTINAL - ADULT  Goal: Maintains adequate nutritional intake  Description: INTERVENTIONS:  - Monitor percentage of each meal consumed  - Identify factors contributing to decreased intake, treat as appropriate  - Assist with meals as needed  - Monitor I&O, weight, and lab values if indicated  - Obtain nutrition services referral as needed  Outcome: Progressing     Problem: GENITOURINARY - ADULT  Goal: Maintains or returns to baseline urinary function  Description: INTERVENTIONS:  - Assess urinary function  - Encourage oral fluids to ensure adequate hydration if ordered  - Administer IV fluids as ordered to ensure adequate hydration  - Administer ordered medications as needed  - Offer frequent toileting  - Follow urinary retention protocol if ordered  Outcome: Progressing     Problem: METABOLIC, FLUID AND ELECTROLYTES - ADULT  Goal: Electrolytes maintained within normal limits  Description: INTERVENTIONS:  - Monitor labs and assess patient for signs and symptoms of electrolyte imbalances  - Administer electrolyte replacement as ordered  - Monitor response to electrolyte replacements, including repeat lab results as appropriate  - Instruct patient on fluid and nutrition as appropriate  Outcome: Progressing  Goal: Fluid balance maintained  Description: INTERVENTIONS:  - Monitor labs   - Monitor I/O and WT  - Instruct patient on fluid and nutrition as appropriate  - Assess for signs & symptoms of volume excess or deficit  Outcome: Progressing  Goal: Glucose maintained within target range  Description: INTERVENTIONS:  - Monitor Blood Glucose as ordered  - Assess for signs and symptoms of hyperglycemia and hypoglycemia  - Administer ordered medications to maintain glucose within target range  - Assess nutritional intake and initiate nutrition service referral as needed  Outcome: Progressing     Problem: HEMATOLOGIC - ADULT  Goal: Maintains hematologic stability  Description: INTERVENTIONS  - Assess for signs and symptoms of bleeding or hemorrhage  - Monitor labs  - Administer supportive blood products/factors as ordered and appropriate  Outcome: Progressing     Problem: MUSCULOSKELETAL - ADULT  Goal: Maintain or return mobility to safest level of function  Description: INTERVENTIONS:  - Assess patient's ability to carry out ADLs; assess patient's baseline for ADL function and identify physical deficits which impact ability to perform ADLs (bathing, care of mouth/teeth, toileting, grooming, dressing, etc )  - Assess/evaluate cause of self-care deficits   - Assess range of motion  - Assess patient's mobility  - Assess patient's need for assistive devices and provide as appropriate  - Encourage maximum independence but intervene and supervise when necessary  - Involve family in performance of ADLs  - Assess for home care needs following discharge   - Consider OT consult to assist with ADL evaluation and planning for discharge  - Provide patient education as appropriate  Outcome: Progressing

## 2023-02-12 NOTE — PROGRESS NOTES
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 s/p R femoral exposure, R iliac embolectomy and R EIA stent 1/30/23 (L  Doctor) sent to the ED due to concerns for infection of R groin incision   Now 1 Day Post-Op s/p right groin washout, debridement, and vac placement  Doing well  Plan:  - Diet as tolerated  - Continue antibiotics per ID, awaiting OR culture results  Appreciate recommendations   - Recommend ASA, Statin, Plavix  - Continue wound vac, plan for vac change 2/13      Subjective: No acute events overnight  Afebrile, hemodynamically stable  Tolerating diet  No nausea, or vomiting, fevers or chills  Pain controlled      Objective:   Temp:  [97 2 °F (36 2 °C)-98 2 °F (36 8 °C)] 98 °F (36 7 °C)  HR:  [] 96  Resp:  [14-19] 17  BP: (100-161)/(56-82) 141/72    Physical Exam:  General: No acute distress, alert and oriented  CV: Well perfused, regular rate   Lungs: Normal work of breathing, no increased respiratory effort  Abdomen: Soft, non-tender, non-distended  Extremities: Right groin with wound vac in place, no surrounding erythema  Skin: Warm, dry      I/O       02/10 0701  02/11 0700 02/11 0701  02/12 0700 02/12 0701  02/13 0700    P  O   360     I V  (mL/kg)  500 (4 9)     IV Piggyback 100      Total Intake(mL/kg) 100 (1) 860 (8 3)     Urine (mL/kg/hr)  600 (0 2)     Drains   75    Total Output  600 75    Net +100 +260 -75           Unmeasured Urine Occurrence  1 x           Lab, Imaging and other studies: I have personally reviewed pertinent reports    , CBC with diff:   Lab Results   Component Value Date    WBC 8 85 02/12/2023    HGB 9 8 (L) 02/12/2023    HCT 31 3 (L) 02/12/2023    MCV 79 (L) 02/12/2023     02/12/2023    MCH 24 6 (L) 02/12/2023    MCHC 31 3 (L) 02/12/2023    RDW 14 0 02/12/2023    MPV 10 3 02/12/2023    NRBC 0 02/12/2023   , BMP/CMP:   Lab Results   Component Value Date    SODIUM 136 02/12/2023    K 3 9 02/12/2023     02/12/2023    CO2 24 02/12/2023    BUN 12 02/12/2023    CREATININE 0 57 (L) 02/12/2023    CALCIUM 9 2 02/12/2023    EGFR 120 02/12/2023         Bobby Lopez MD  2/12/2023 8:00 AM

## 2023-02-12 NOTE — PROGRESS NOTES
[facility]  Progress Note - Nicolasa Song 1990, 26 y o  female MRN: [id number]  Unit/Bed#: E5 -01 Encounter: [id number]  Primary Care Provider: REJI Luther   Date and time admitted to hospital: 2/10/2023  8:47 PM    * Surgical site infection  Assessment & Plan  · Sent in by PCP with groin tenderness and infection at right groin  · Recent R LEONEL and R CFA occlusion, s/p R femoral exposure, R iliac embolectomy and R EIA stent placement after right femoral access for cardiac cath  · Outpatient cultures grew ESBL Proteus mirabilis  · Status post washout with wound VAC on 02/11  · Vascular following, plan for wound check tomorrow  · Continue IV ertapenem, ID following  · OR wound and blood cultures pending    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[facility] [address] )  Assessment & Plan  On prior admisison in January 2023  Right EIA thrombosis w/extension LEONEL and R CFA occlusion, s/p R femoral exposure, R iliac embolectomy and R EIA stent placement after cardiac cath w/femoral access  Continue aspirin, plavix    Hypertension  Assessment & Plan  Elevated overnight, continue lisinopril 20mg    Bipolar disorder (HCC)  Assessment & Plan  Continue zoloft, xanax prn    Type 2 diabetes mellitus [facility])  Assessment & Plan  Lab Results   Component Value Date    HGBA1C 9 6 (H) 01/10/2023       Recent Labs     02/11/23  1003 02/11/23  1535 02/11/23  2039 02/12/23  0717   POCGLU 164* 366* 363* 276*     Increase lantus to 20 units bedtime  Diabetic diet, SSI, accuchecks      VTE Pharmacologic Prophylaxis:   Pharmacologic: Heparin  Mechanical VTE Prophylaxis in Place: Yes    Patient Centered Rounds: I have performed bedside rounds with nursing staff today      Discussions with Specialists or Other Care Team Provider: Surgery, ID    Education and Discussions with Family / Patient: mother bedside    Current Length of Stay: 2 day(s)    Current Patient Status: Inpatient   Certification Statement: The patient will continue to require additional inpatient hospital stay due to IV abx, pending cultures, wound vac    Discharge Plan: pending    Code Status: Level 1 - Full Code      Subjective:   No events overnight, no complaints  Tolerating diet  Discussed diabetic diet, and adding back her lisinopril  Objective:     Vitals:   Temp (24hrs), Av 9 °F (36 6 °C), Min:97 2 °F (36 2 °C), Max:98 2 °F (36 8 °C)    Temp:  [97 2 °F (36 2 °C)-98 2 °F (36 8 °C)] 98 °F (36 7 °C)  HR:  [] 96  Resp:  [16-18] 17  BP: (111-161)/(56-97) 155/97  SpO2:  [94 %-98 %] 94 %  Body mass index is 42 74 kg/m²  Input and Output Summary (last 24 hours): Intake/Output Summary (Last 24 hours) at 2023 1057  Last data filed at 2023 0758  Gross per 24 hour   Intake 360 ml   Output 675 ml   Net -315 ml       Physical Exam:     Physical Exam  Vitals and nursing note reviewed  Constitutional:       Appearance: Normal appearance  She is obese  HENT:      Head: Normocephalic and atraumatic  Eyes:      General: No scleral icterus  Conjunctiva/sclera: Conjunctivae normal    Cardiovascular:      Rate and Rhythm: Normal rate and regular rhythm  Pulmonary:      Effort: Pulmonary effort is normal  No respiratory distress  Abdominal:      General: Bowel sounds are normal  There is no distension  Palpations: Abdomen is soft  Musculoskeletal:         General: No swelling  Right lower leg: No edema  Comments: Right groin with wound vac   Skin:     General: Skin is warm and dry  Neurological:      General: No focal deficit present  Mental Status: She is alert  Mental status is at baseline           Additional Data:     Labs:    Results from last 7 days   Lab Units 23  0522   WBC Thousand/uL 8 85   HEMOGLOBIN g/dL 9 8*   HEMATOCRIT % 31 3*   PLATELETS Thousands/uL 318   NEUTROS PCT % 77*   LYMPHS PCT % 17   MONOS PCT % 6   EOS PCT % 0     Results from last 7 days   Lab Units 23  0522 23  0606 02/10/23  2152   SODIUM mmol/L 136   < > 136   POTASSIUM mmol/L 3 9   < > 3 9   CHLORIDE mmol/L 102   < > 101   CO2 mmol/L 24   < > 27   BUN mg/dL 12   < > 16   CREATININE mg/dL 0 57*   < > 0 59*   ANION GAP mmol/L 10   < > 8   CALCIUM mg/dL 9 2   < > 9 6   ALBUMIN g/dL  --   --  4 2   TOTAL BILIRUBIN mg/dL  --   --  0 26   ALK PHOS U/L  --   --  59   ALT U/L  --   --  12   AST U/L  --   --  11*   GLUCOSE RANDOM mg/dL 224*   < > 189*    < > = values in this interval not displayed  Results from last 7 days   Lab Units 02/10/23  2152   INR  0 96     Results from last 7 days   Lab Units 02/12/23  0717 02/11/23  2039 02/11/23  1535 02/11/23  1003 02/11/23  0805   POC GLUCOSE mg/dl 276* 363* 366* 164* 151*         Results from last 7 days   Lab Units 02/10/23  2152   LACTIC ACID mmol/L 1 2   PROCALCITONIN ng/ml <0 05           * I Have Reviewed All Lab Data Listed Above  * Additional Pertinent Lab Tests Reviewed: [facility] Admission Reviewed    Imaging:    No results found  Recent Cultures (last 7 days):     Results from last 7 days   Lab Units 02/11/23  0932 02/10/23  2145 02/06/23  1607   BLOOD CULTURE   --  No Growth at 24 hrs    No Growth at 24 hrs   --    GRAM STAIN RESULT  No Polys or Bacteria seen  --  2+ Polys*  3+ Gram negative rods*  1+ Gram positive cocci in pairs*   WOUND CULTURE   --   --  3+ Growth of Proteus mirabilis ESBL*  1+ Growth of       Last 24 Hours Medication List:   Current Facility-Administered Medications   Medication Dose Route Frequency Provider Last Rate   • acetaminophen  650 mg Oral Q6H PRN Deisy Bedolla MD     • ALPRAZolam  0 5 mg Oral HS PRN Deisy Bedolla MD     • aspirin  81 mg Oral Daily Deisy Bedolla MD     • clopidogrel  75 mg Oral Daily Deisy Bedolla MD     • ertapenem  1,000 mg Intravenous Q24H Damion Haque MD 1,000 mg (02/11/23 1244)   • gabapentin  100 mg Oral HS Deisy Bedolla MD     • heparin (porcine)  5,000 Units Subcutaneous [facility] Russell Keon Tereas MD     • insulin glargine  17 Units Subcutaneous HS Katarzyna Dubois MD     • insulin lispro  1-5 Units Subcutaneous TID AC Amos Nicole MD     • ketorolac  30 mg Intravenous Q6H PRN Katarzyna Dubois MD     • lisinopril  30 mg Oral Daily Amos Nicole MD     • montelukast  10 mg Oral HS Katarzyna Dubois MD     • ondansetron  4 mg Intravenous Q6H PRN Katarzyna Dubois MD     • sertraline  50 mg Oral Daily Katarzyna Dubois MD          Today, Patient Was Seen By: Amos Nicole MD    ** Please Note: Dictation voice to text software may have been used in the creation of this document   **

## 2023-02-12 NOTE — PLAN OF CARE
Problem: PAIN - ADULT  Goal: Verbalizes/displays adequate comfort level or baseline comfort level  Description: Interventions:  - Encourage patient to monitor pain and request assistance  - Assess pain using appropriate pain scale  - Administer analgesics based on type and severity of pain and evaluate response  - Implement non-pharmacological measures as appropriate and evaluate response  - Consider cultural and social influences on pain and pain management  - Notify physician/advanced practitioner if interventions unsuccessful or patient reports new pain  Outcome: Progressing     Problem: INFECTION - ADULT  Goal: Absence or prevention of progression during hospitalization  Description: INTERVENTIONS:  - Assess and monitor for signs and symptoms of infection  - Monitor lab/diagnostic results  - Monitor all insertion sites, i e  indwelling lines, tubes, and drains  - Monitor endotracheal if appropriate and nasal secretions for changes in amount and color  - [place] appropriate cooling/warming therapies per order  - Administer medications as ordered  - Instruct and encourage patient and family to use good hand hygiene technique  - Identify and instruct in appropriate isolation precautions for identified infection/condition  Outcome: Progressing     Problem: SAFETY ADULT  Goal: Patient will remain free of falls  Description: INTERVENTIONS:  - Educate patient/family on patient safety including physical limitations  - Instruct patient to call for assistance with activity   - Consult OT/PT to assist with strengthening/mobility   - Keep Call bell within reach  - Keep bed low and locked with side rails adjusted as appropriate  - Keep care items and personal belongings within reach  - Initiate and maintain comfort rounds  - Make Fall Risk Sign visible to staff  - Apply yellow socks and bracelet for high fall risk patients  - Consider moving patient to room near nurses station  Outcome: Progressing  Goal: Maintain or return to baseline ADL function  Description: INTERVENTIONS:  -  Assess patient's ability to carry out ADLs; assess patient's baseline for ADL function and identify physical deficits which impact ability to perform ADLs (bathing, care of mouth/teeth, toileting, grooming, dressing, etc )  - Assess/evaluate cause of self-care deficits   - Assess range of motion  - Assess patient's mobility; develop plan if impaired  - Assess patient's need for assistive devices and provide as appropriate  - Encourage maximum independence but intervene and supervise when necessary  - Involve family in performance of ADLs  - Assess for home care needs following discharge   - Consider OT consult to assist with ADL evaluation and planning for discharge  - Provide patient education as appropriate  Outcome: Progressing     Problem: DISCHARGE PLANNING  Goal: Discharge to home or other facility with appropriate resources  Description: INTERVENTIONS:  - Identify barriers to discharge w/patient and caregiver  - Arrange for needed discharge resources and transportation as appropriate  - Identify discharge learning needs (meds, wound care, etc )  - Arrange for interpretive services to assist at discharge as needed  - Refer to Case Management Department for coordinating discharge planning if the patient needs post-hospital services based on physician/advanced practitioner order or complex needs related to functional status, cognitive ability, or social support system  Outcome: Progressing     Problem: Knowledge Deficit  Goal: Patient/family/caregiver demonstrates understanding of disease process, treatment plan, medications, and discharge instructions  Description: Complete learning assessment and assess knowledge base    Interventions:  - Provide teaching at level of understanding  - Provide teaching via preferred learning methods  Outcome: Progressing     Problem: RESPIRATORY - ADULT  Goal: Achieves optimal ventilation and oxygenation  Description: INTERVENTIONS:  - Assess for changes in respiratory status  - Assess for changes in mentation and behavior  - Position to facilitate oxygenation and minimize respiratory effort  - Oxygen administered by appropriate delivery if ordered  - Initiate smoking cessation education as indicated  - Encourage broncho-pulmonary hygiene including cough, deep breathe, Incentive Spirometry  - Assess the need for suctioning and aspirate as needed  - Assess and instruct to report SOB or any respiratory difficulty  - Respiratory Therapy support as indicated  Outcome: Progressing     Problem: GASTROINTESTINAL - ADULT  Goal: Maintains adequate nutritional intake  Description: INTERVENTIONS:  - Monitor percentage of each meal consumed  - Identify factors contributing to decreased intake, treat as appropriate  - Assist with meals as needed  - Monitor I&O, weight, and lab values if indicated  - Obtain nutrition services referral as needed  Outcome: Progressing     Problem: GENITOURINARY - ADULT  Goal: Maintains or returns to baseline urinary function  Description: INTERVENTIONS:  - Assess urinary function  - Encourage oral fluids to ensure adequate hydration if ordered  - Administer IV fluids as ordered to ensure adequate hydration  - Administer ordered medications as needed  - Offer frequent toileting  - Follow urinary retention protocol if ordered  Outcome: Progressing     Problem: METABOLIC, FLUID AND ELECTROLYTES - ADULT  Goal: Electrolytes maintained within normal limits  Description: INTERVENTIONS:  - Monitor labs and assess patient for signs and symptoms of electrolyte imbalances  - Administer electrolyte replacement as ordered  - Monitor response to electrolyte replacements, including repeat lab results as appropriate  - Instruct patient on fluid and nutrition as appropriate  Outcome: Progressing  Goal: Fluid balance maintained  Description: INTERVENTIONS:  - Monitor labs   - Monitor I/O and WT  - Instruct patient on fluid and nutrition as appropriate  - Assess for signs & symptoms of volume excess or deficit  Outcome: Progressing  Goal: Glucose maintained within target range  Description: INTERVENTIONS:  - Monitor Blood Glucose as ordered  - Assess for signs and symptoms of hyperglycemia and hypoglycemia  - Administer ordered medications to maintain glucose within target range  - Assess nutritional intake and initiate nutrition service referral as needed  Outcome: Progressing     Problem: HEMATOLOGIC - ADULT  Goal: Maintains hematologic stability  Description: INTERVENTIONS  - Assess for signs and symptoms of bleeding or hemorrhage  - Monitor labs  - Administer supportive blood products/factors as ordered and appropriate  Outcome: Progressing     Problem: MUSCULOSKELETAL - ADULT  Goal: Maintain or return mobility to safest level of function  Description: INTERVENTIONS:  - Assess patient's ability to carry out ADLs; assess patient's baseline for ADL function and identify physical deficits which impact ability to perform ADLs (bathing, care of mouth/teeth, toileting, grooming, dressing, etc )  - Assess/evaluate cause of self-care deficits   - Assess range of motion  - Assess patient's mobility  - Assess patient's need for assistive devices and provide as appropriate  - Encourage maximum independence but intervene and supervise when necessary  - Involve family in performance of ADLs  - Assess for home care needs following discharge   - Consider OT consult to assist with ADL evaluation and planning for discharge  - Provide patient education as appropriate  Outcome: Progressing

## 2023-02-12 NOTE — PROGRESS NOTES
Progress Note - Infectious Disease   Raheem Padilla 26 y o  female MRN: [id number]  Unit/Bed#: E5 -01 Encounter: [id number]      Impression/Plan:    1  Right groin surgical site infection  Complication from thrombosis of the R EIA with extension into the proximal R CFA after cardiac cath s/p R femoral exposure, R iliac embolectomy and R EIA stent 1/30/23  Status post superficial wound culture of site as an outpatient, culture from 2/6/23 with growth of ESBL Proteus mirabilis susceptible to only IV antibiotics  Now status post I&D 2/11/23 of the right groin site with VAC placement; infection was superficial without involvement of deeper vascular structures  The patient is afebrile and hemodynamically stable  -Follow-up OR cultures to guide ongoing antibiotics (outpatient culture was superficial)              -for now, continue Ertapenem 1 g every 24 hours              -Follow-up blood cultures              -Vascular surgery follow-up ongoing              -Anticipate a 10 to 14-day course of antibiotics for this issue; if ESBL Proteus again grows from the OR cultures, we will need to arrange outpatient antibiotics via home infusion or at the infusion center              -monitor WBC count, fever curve     2  External iliac artery thrombosis  Developed after cardiac catheterization with right femoral access  Status post R femoral exposure, R iliac embolectomy and R EIA stent 1/30/23 with vascular surgery  Complicated by #1 above               -vascular surgery follow up ongoing     3  Type 2 diabetes mellitus  Poorly controlled with HbA1c 9 6%  Risk factor for infection  -Glycemic management per primary team     4  History of HCV status post SVR     5  Obesity  BMI 42  Risk factor for infection and poor wound healing     I have discussed the above management plan in detail with the patient at bedside  ID will follow  Antibiotics:  Ertapenem day 2  POD #1    Subjective:   The patient is feeling okay today, reports mild pain in the right groin at the [facility] site  Otherwise she has no complaints  She denies fever, chills, nausea, diarrhea  Objective:  Vitals:  Temp:  [97 2 °F (36 2 °C)-98 2 °F (36 8 °C)] 97 9 °F (36 6 °C)  HR:  [] 86  Resp:  [16-18] 17  BP: (137-179)/(67-97) 179/79  SpO2:  [94 %-98 %] 96 %  Temp (24hrs), Av 9 °F (36 6 °C), Min:97 2 °F (36 2 °C), Max:98 2 °F (36 8 °C)  Current: Temperature: 97 9 °F (36 6 °C)    Physical Exam:   General Appearance:  Alert, interactive, nontoxic, no acute distress  Throat: Oropharynx moist without lesions  Lungs:   Clear to auscultation bilaterally; no wheezes, rhonchi or rales; respirations unlabored   Heart:  RRR; no murmur, rub or gallop   Abdomen:   Soft, non-tender, non-distended, positive bowel sounds  Extremities: No clubbing, cyanosis or edema   Skin: Right groin with wound VAC in place       Labs: All pertinent labs and imaging studies were personally reviewed  Results from last 7 days   Lab Units 23  0523  0605 02/10/23  2152   WBC Thousand/uL 8 85 8 73 7 97   HEMOGLOBIN g/dL 9 8* 9 8* 11 6   PLATELETS Thousands/uL 318 298 356     Results from last 7 days   Lab Units 23  0523  0605 02/10/23  2152   SODIUM mmol/L 136 137 136   POTASSIUM mmol/L 3 9 3 7 3 9   CHLORIDE mmol/L 102 106 101   CO2 mmol/L 24 23 27   BUN mg/dL 12 13 16   CREATININE mg/dL 0 57* 0 54* 0 59*   EGFR ml/min/1 73sq m 120 122 119   CALCIUM mg/dL 9 2 8 7 9 6   AST U/L  --   --  11*   ALT U/L  --   --  12   ALK PHOS U/L  --   --  59     Results from last 7 days   Lab Units 02/10/23  2152   PROCALCITONIN ng/ml <0 05                   Micro:  Results from last 7 days   Lab Units 23  0932 02/10/23  2145 23  1607   BLOOD CULTURE   --  No Growth at 24 hrs    No Growth at 24 hrs   --    GRAM STAIN RESULT  No Polys or Bacteria seen  --  2+ Polys*  3+ Gram negative rods*  1+ Gram positive cocci in pairs*   WOUND CULTURE   --   --  3+ Growth of Proteus mirabilis ESBL*  1+ Growth of       Imaging:  I have personally reviewed pertinent imaging study reports and images in PACS           Other Studies:   I have personally reviewed pertinent reports

## 2023-02-12 NOTE — ASSESSMENT & PLAN NOTE
· Sent in by PCP with groin tenderness and infection at right groin  · Recent R LEONEL and R CFA occlusion, s/p R femoral exposure, R iliac embolectomy and R EIA stent placement after right femoral access for cardiac cath  · Outpatient cultures grew ESBL Proteus mirabilis  · Status post washout with wound VAC on 02/11  · Vascular following, plan for wound check tomorrow  · Continue IV ertapenem, ID following  · OR wound and blood cultures pending

## 2023-02-12 NOTE — ASSESSMENT & PLAN NOTE
Lab Results   Component Value Date    HGBA1C 9 6 (H) 01/10/2023       Recent Labs     02/11/23  1003 02/11/23  1535 02/11/23 2039 02/12/23  0717   POCGLU 164* 366* 363* 276*     Increase lantus to 20 units bedtime  Diabetic diet, SSI, accuchecks

## 2023-02-12 NOTE — UTILIZATION REVIEW
Initial Clinical Review    Admission: Date/Time/Statement:   Admission Orders (From admission, onward)     Ordered        02/10/23 2244  INPATIENT ADMISSION  Once                      Orders Placed This Encounter   Procedures   • INPATIENT ADMISSION     Standing Status:   Standing     Number of Occurrences:   1     Order Specific Question:   Level of Care     Answer:   Med Surg [16]     Order Specific Question:   Estimated length of stay     Answer:   More than 2 Midnights     Order Specific Question:   Certification     Answer:   I certify that inpatient services are medically necessary for this patient for a duration of greater than two midnights  See H&P and MD Progress Notes for additional information about the patient's course of treatment  ED Arrival Information     Expected   -    Arrival   2/10/2023 20:23    Acuity   Urgent            Means of arrival   Walk-In    Escorted by   Family Member    Service   Hospitalist    Admission type   Emergency            Arrival complaint   Medical Issues           Chief Complaint   Patient presents with   • Medical Problem     Pt reports referred to ED by PCP for " IV abx", reports had surgery 1 week ago to remove blood clot, and incision in groin area is red and draining, denies fevers  Initial Presentation: 26 y o  female to ED from home admitted Inpatient d/t surgical site infection  complication from  R LEONEL and R CFA occlusion, s/p R femoral exposure, R iliac embolectomy and R EIA stent placement after right femoral access for cardiac cath  Presents with pain w/purulent drainage   outpatient wcx w/ESBL P mirabilis w/near pan resistance save ertapenem zosyn amikacin  planned for washout by vascular surgery  npo  IV antibiotic  IVF  IV ertapenem  Vascular and ID consults  2/10 VASCULAR CONSULT:right groin surgical site infection  npo  IVF  IV antibiotic  Plan for OR 2/11 for right groin wound washout with wound vac placement      Date: 2/11   Day 2: Plan for OR- Right groin washout, debridement and VAC placement  IV antibiotic  IVF  IV ertapenem  SURGERY DATE: 2/11/2023    Preop Diagnosis:  Surgical site infection [T81 49XA]     Post-Op Diagnosis Codes:     * Surgical site infection [T81 49XA]     Procedure(s):  Right - Right Groin Wound Washout  Pulse Lavage  Wound Vac placement     Specimen(s):  ID Type Source Tests Collected by Time Destination   A : DEEP RIGHT GROIN Tissue Soft Tissue, Other ANAEROBIC CULTURE AND GRAM STAIN, CULTURE, TISSUE AND GRAM STAIN Jelani Avila DO 2/11/2023 0932             Anesthesia Type:   General/LMA     Operative Indications:  Surgical site infection [T81 49XA]     33F former smoker w/obesity (BMI 41 19), HTN, HLD, type II DM, aortic coarctation s/p repair and stent placement, VSD s/p repair, bipolar disorder, hepatitis C, s/p diagnostic cardiac catheterization via R femoral access w/ angioseal closure device performed for abnormal stress test, c/b thrombosis of R EIA with extension into the proximal R CFA s/p R femoral exposure, R iliac embolectomy and R EIA stent 1/30/23 (L  Doctor) sent to the ED due to concerns for infection R groin incision  Risks, benefits and alternatives were discussed with the patient who wished to proceed and consent was obtained       Operative Findings:  No purulence  Fibrinous exudate of skin edges, underlying base of wound pink and healthy, femoral sheath intact  Wound vac placed      Dimensions L x W x D  7 5 cm x 2 cm x 2cm     Complications:   None     Procedure and Technique:    2/11 ID CONSULT:Right groin surgical site infection  Complication from thrombosis of the R EIA with extension into the proximal R CFA after cardiac cath s/p R femoral exposure, R iliac embolectomy and R EIA stent 1/30/23  Status post superficial wound culture of site as an outpatient, culture from 2/6/23 with growth of ESBL Proteus mirabilis susceptible to only IV antibiotics   Now status post I&D 2/11/23 of the right groin site with VAC placement; infection was superficial without involvement of deeper vascular structures  Follow-up OR cultures to guide ongoing antibiotics    IV antibiotic  IVF  IV ertapenem  Anticipate a 10 to 14-day course of antibiotics for this issue; if ESBL Proteus again grams from the OR cultures, we will need to arrange outpatient antibiotics via home infusion or at the infusion center        ED Triage Vitals   Temperature Pulse Respirations Blood Pressure SpO2   02/10/23 2030 02/10/23 2030 02/10/23 2030 02/10/23 2030 02/10/23 2030   97 9 °F (36 6 °C) 105 16 144/75 100 %      Temp Source Heart Rate Source Patient Position - Orthostatic VS BP Location FiO2 (%)   02/10/23 2030 02/10/23 2030 02/10/23 2030 02/10/23 2030 --   Oral Monitor Sitting Right arm       Pain Score       02/11/23 0100       5          Wt Readings from Last 1 Encounters:   02/10/23 103 kg (226 lb 3 1 oz)     Additional Vital Signs:   02/12/23 1103 97 9 °F (36 6 °C) 86 17 179/79 Abnormal  -- 96 % -- None (Room air) -- Lying   02/12/23 10:13:58 -- -- -- 155/97 116 -- -- -- -- --   02/12/23 0802 -- -- -- -- -- -- -- None (Room air) -- --   02/12/23 0720 98 °F (36 7 °C) 96 17 141/72 -- 94 % -- None (Room air) -- Lying   02/12/23 03:13:23 97 2 °F (36 2 °C) Abnormal  98 16 146/68 -- 97 % -- None (Room air) -- Sitting   02/11/23 23:11:35 97 9 °F (36 6 °C) 102 18 146/67 97 98 % -- None (Room air) -- Lying   02/11/23 2119 -- -- -- 137/67 94 -- -- -- -- Lying   02/11/23 1804 98 2 °F (36 8 °C) 98 16 161/82 114 96 % -- None (Room air) -- Lying   02/11/23 1500 98 2 °F (36 8 °C) 96 16 145/80 106 97 % -- None (Room air) -- Lying   02/11/23 1104 97 7 °F (36 5 °C) 83 18 111/56 -- 95 % -- None (Room air) -- Lying   02/11/23 1030 -- 83 19 100/61 73 97 % -- None (Room air) -- --   02/11/23 1015 97 7 °F (36 5 °C) 87 15 103/59 78 96 % -- None (Room air) -- --   02/11/23 1000 -- 82 14 113/68 83 100 % 10 L/min Simple mask -- --   02/11/23 0953 98 2 °F (36 8 °C) 88 17 110/64 82 100 % 10 L/min Simple mask WDL --   02/11/23 0722 98 2 °F (36 8 °C) 94 18 126/66 -- 98 % -- None (Room air) -- Lying   02/11/23 0100 97 4 °F (36 3 °C) Abnormal  99 18 113/67 85 97 % -- None (Room air) -- Lying   02/10/23 2230 -- 100 20 129/67 92 100 % -- None (Room air) -- Lying   02/10/23 2030 97 9 °F (36 6 °C) 105 16 144/75 -- 100 % -- None (Room air) -- Sitting       Pertinent Labs/Diagnostic Test Results:   2/10 EKG:  Normal sinus rhythm  Right bundle branch block  Abnormal ECG  When compared with ECG of 29-JAN-2023 23:15,  QRS axis Shifted left  Confirmed by Liliam Iverson ([id number]) on 2/11/2023 9:37:14 AM    No orders to display     Results from last 7 days   Lab Units 02/10/23  2152   SARS-COV-2  Negative     Results from last 7 days   Lab Units 02/12/23  0522 02/11/23  0605 02/10/23  2152   WBC Thousand/uL 8 85 8 73 7 97   HEMOGLOBIN g/dL 9 8* 9 8* 11 6   HEMATOCRIT % 31 3* 32 1* 37 3   PLATELETS Thousands/uL 318 298 356   NEUTROS ABS Thousands/µL 6 80 5 79 4 69         Results from last 7 days   Lab Units 02/12/23  0522 02/11/23  0605 02/10/23  2152   SODIUM mmol/L 136 137 136   POTASSIUM mmol/L 3 9 3 7 3 9   CHLORIDE mmol/L 102 106 101   CO2 mmol/L 24 23 27   ANION GAP mmol/L 10 8 8   BUN mg/dL 12 13 16   CREATININE mg/dL 0 57* 0 54* 0 59*   EGFR ml/min/1 73sq m 120 122 119   CALCIUM mg/dL 9 2 8 7 9 6     Results from last 7 days   Lab Units 02/10/23  2152   AST U/L 11*   ALT U/L 12   ALK PHOS U/L 59   TOTAL PROTEIN g/dL 7 5   ALBUMIN g/dL 4 2   TOTAL BILIRUBIN mg/dL 0 26     Results from last 7 days   Lab Units 02/12/23  1059 02/12/23  0717 02/11/23 2039 02/11/23  1535 02/11/23  1003 02/11/23  0805   POC GLUCOSE mg/dl 266* 276* 363* 366* 164* 151*     Results from last 7 days   Lab Units 02/12/23  0522 02/11/23  0605 02/10/23  2152   GLUCOSE RANDOM mg/dL 224* 168* 189*       Results from last 7 days   Lab Units 02/10/23  2152   PROTIME seconds 12 7   INR  0 96   PTT seconds 29         Results from last 7 days   Lab Units 02/10/23  2152   PROCALCITONIN ng/ml <0 05     Results from last 7 days   Lab Units 02/10/23  2152   LACTIC ACID mmol/L 1 2       Results from last 7 days   Lab Units 02/10/23  2152   INFLUENZA A PCR  Negative   INFLUENZA B PCR  Negative   RSV PCR  Negative       Results from last 7 days   Lab Units 02/11/23  0932 02/10/23  2145 02/06/23  1607   BLOOD CULTURE   --  No Growth at 24 hrs    No Growth at 24 hrs   --    GRAM STAIN RESULT  No Polys or Bacteria seen  --  2+ Polys*  3+ Gram negative rods*  1+ Gram positive cocci in pairs*   WOUND CULTURE   --   --  3+ Growth of Proteus mirabilis ESBL*  1+ Growth of       ED Treatment:   Medication Administration from 02/10/2023 2022 to 02/10/2023 2355       Date/Time Order Dose Route Action Action by Comments     02/10/2023 2219 EST sodium chloride 0 9 % bolus 1,000 mL 1,000 mL Intravenous New Bag                  02/10/2023 2220 EST piperacillin-tazobactam (ZOSYN) 4 5 g in sodium chloride 0 9 % 100 mL IVPB 4 5 g Intravenous New Bag          Past Medical History:   Diagnosis Date   • Allergic    • Arthritis    • Bipolar affective disorder, currently depressed, moderate ([facility] [address] ) 12/17/2018   • Cyst of ovary, right    • Diabetes mellitus ([facility] [address] ) 10/10/2018    type 2   • Endometriosis    • Heart murmur 1988   • Hepatitis C    • Hepatitis C virus infection cured after antiviral drug therapy    • History of transfusion    • Hypertension    • Migraines    • Morbid obesity with BMI of 40 0-44 9, adult ([facility] [address] )    • Obesity 1995   • Pulmonary artery congenital abnormality    • Spleen enlarged    • Status post surgical removal of both fallopian tubes    • Varicella      Present on Admission:  • Hypertension  • Morbid obesity ([facility] [address] )  • Type 2 diabetes mellitus ([facility] [address] )  • 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HCC)  • Bipolar disorder ([facility] [address] )      Admitting Diagnosis: Right groin pain [R10 31]  Relational problem due to medical condition [Z63 8]  Surgical site infection Dimas Webber  Age/Sex: 26 y o  female  Admission Orders:  Scheduled Medications:  aspirin, 81 mg, Oral, Daily  clopidogrel, 75 mg, Oral, Daily  ertapenem, 1,000 mg, Intravenous, Q24H  gabapentin, 100 mg, Oral, HS  heparin (porcine), 5,000 Units, Subcutaneous, Q8H JUN  insulin glargine, 17 Units, Subcutaneous, HS  insulin lispro, 1-5 Units, Subcutaneous, TID AC  lisinopril, 30 mg, Oral, Daily  montelukast, 10 mg, Oral, HS  sertraline, 50 mg, Oral, Daily      Continuous IV Infusions:  lactated ringers infusion  Rate: 100 mL/hr Dose: 100 mL/hr  Freq: Continuous Route: IV  Indications of Use: IV Hydration  Last Dose: Stopped (02/12/23 0323)  Start: 02/11/23 1145 End: 02/12/23 0012            1213     1651      0012-D/C'd  0323        lactated ringers infusion  Rate: 100 mL/hr Dose: 100 mL/hr  Freq: Continuous Route: IV  Indications of Use: IV Hydration  Last Dose: 100 mL/hr (02/11/23 0953)  Start: 02/11/23 0000 End: 02/11/23 1131            0019     0953     1108     1131-D/C'd               PRN Meds:  acetaminophen, 650 mg, Oral, Q6H PRN 2/11 X 2  ALPRAZolam, 0 5 mg, Oral, HS PRN  ketorolac, 30 mg, Intravenous, Q6H PRN 2/11 X 2  ondansetron, 4 mg, Intravenous, Q6H PRN 2/11 X 1    CONS CARB DIET  SCD  WOUND VAC      IP CONSULT TO VASCULAR SURGERY  IP CONSULT TO INFECTIOUS DISEASES    Network Utilization Review Department  ATTENTION: Please call with any questions or concerns to [phone] and carefully listen to the prompts so that you are directed to the right person  All voicemails are confidential   Karl Chavez all requests for admission clinical reviews, approved or denied determinations and any other requests to dedicated fax number below belonging to the campus where the patient is receiving treatment   List of dedicated fax numbers for the Facilities:  FACILITY NAME UR FAX NUMBER   ADMISSION DENIALS (Administrative/Medical Necessity) [address] [place] [address] (Maternity/NICU/Pediatrics) [phone]   [facility] [address] [facility] [phone]   [facility] [place] [address] [phone] [address] [place] [address] [facility] Murali EldridgeA.O[facility] Hospital[facility]i  [phone] [address] [facility]in [facility] [address] [facility] [phone]

## 2023-02-12 NOTE — ASSESSMENT & PLAN NOTE
On prior admisison in January 2023   Right EIA thrombosis w/extension LEONEL and R CFA occlusion, s/p R femoral exposure, R iliac embolectomy and R EIA stent placement after cardiac cath w/femoral access  Continue aspirin, plavix

## 2023-02-13 PROBLEM — Z79.4 TYPE 2 DIABETES MELLITUS WITHOUT COMPLICATION, WITH LONG-TERM CURRENT USE OF INSULIN (HCC): Status: ACTIVE | Noted: 2019-12-12

## 2023-02-13 LAB
ANION GAP SERPL CALCULATED.3IONS-SCNC: 8 MMOL/L (ref 4–13)
BACTERIA SPEC ANAEROBE CULT: NORMAL
BACTERIA TISS AEROBE CULT: ABNORMAL
BASOPHILS # BLD AUTO: 0.04 THOUSANDS/ÂΜL (ref 0–0.1)
BASOPHILS NFR BLD AUTO: 0 % (ref 0–1)
BUN SERPL-MCNC: 14 MG/DL (ref 5–25)
CALCIUM SERPL-MCNC: 8.9 MG/DL (ref 8.4–10.2)
CHLORIDE SERPL-SCNC: 104 MMOL/L (ref 96–108)
CO2 SERPL-SCNC: 26 MMOL/L (ref 21–32)
CREAT SERPL-MCNC: 0.56 MG/DL (ref 0.6–1.3)
EOSINOPHIL # BLD AUTO: 0.06 THOUSAND/ÂΜL (ref 0–0.61)
EOSINOPHIL NFR BLD AUTO: 1 % (ref 0–6)
ERYTHROCYTE [DISTWIDTH] IN BLOOD BY AUTOMATED COUNT: 14.5 % (ref 11.6–15.1)
GFR SERPL CREATININE-BSD FRML MDRD: 121 ML/MIN/1.73SQ M
GLUCOSE SERPL-MCNC: 115 MG/DL (ref 65–140)
GLUCOSE SERPL-MCNC: 118 MG/DL (ref 65–140)
GLUCOSE SERPL-MCNC: 119 MG/DL (ref 65–140)
GLUCOSE SERPL-MCNC: 147 MG/DL (ref 65–140)
GLUCOSE SERPL-MCNC: 164 MG/DL (ref 65–140)
GRAM STN SPEC: ABNORMAL
HCT VFR BLD AUTO: 31.3 % (ref 34.8–46.1)
HGB BLD-MCNC: 9.9 G/DL (ref 11.5–15.4)
IMM GRANULOCYTES # BLD AUTO: 0.06 THOUSAND/UL (ref 0–0.2)
IMM GRANULOCYTES NFR BLD AUTO: 1 % (ref 0–2)
LYMPHOCYTES # BLD AUTO: 3.89 THOUSANDS/ÂΜL (ref 0.6–4.47)
LYMPHOCYTES NFR BLD AUTO: 42 % (ref 14–44)
MCH RBC QN AUTO: 24.9 PG (ref 26.8–34.3)
MCHC RBC AUTO-ENTMCNC: 31.6 G/DL (ref 31.4–37.4)
MCV RBC AUTO: 79 FL (ref 82–98)
MONOCYTES # BLD AUTO: 0.62 THOUSAND/ÂΜL (ref 0.17–1.22)
MONOCYTES NFR BLD AUTO: 7 % (ref 4–12)
NEUTROPHILS # BLD AUTO: 4.55 THOUSANDS/ÂΜL (ref 1.85–7.62)
NEUTS SEG NFR BLD AUTO: 49 % (ref 43–75)
NRBC BLD AUTO-RTO: 0 /100 WBCS
PLATELET # BLD AUTO: 293 THOUSANDS/UL (ref 149–390)
PMV BLD AUTO: 10 FL (ref 8.9–12.7)
POTASSIUM SERPL-SCNC: 4 MMOL/L (ref 3.5–5.3)
RBC # BLD AUTO: 3.97 MILLION/UL (ref 3.81–5.12)
SODIUM SERPL-SCNC: 138 MMOL/L (ref 135–147)
WBC # BLD AUTO: 9.22 THOUSAND/UL (ref 4.31–10.16)

## 2023-02-13 PROCEDURE — 2W06X6Z CHANGE PRESSURE DRESSING ON RIGHT INGUINAL REGION: ICD-10-PCS | Performed by: SURGERY

## 2023-02-13 PROCEDURE — 02HV33Z INSERTION OF INFUSION DEVICE INTO SUPERIOR VENA CAVA, PERCUTANEOUS APPROACH: ICD-10-PCS | Performed by: INTERNAL MEDICINE

## 2023-02-13 RX ORDER — HYDROMORPHONE HCL IN WATER/PF 6 MG/30 ML
0.2 PATIENT CONTROLLED ANALGESIA SYRINGE INTRAVENOUS ONCE
Status: COMPLETED | OUTPATIENT
Start: 2023-02-13 | End: 2023-02-13

## 2023-02-13 RX ORDER — INSULIN GLARGINE 100 [IU]/ML
15 INJECTION, SOLUTION SUBCUTANEOUS
Status: DISCONTINUED | OUTPATIENT
Start: 2023-02-13 | End: 2023-02-17 | Stop reason: HOSPADM

## 2023-02-13 RX ORDER — LIDOCAINE HYDROCHLORIDE 10 MG/ML
5 INJECTION, SOLUTION EPIDURAL; INFILTRATION; INTRACAUDAL; PERINEURAL ONCE
Status: COMPLETED | OUTPATIENT
Start: 2023-02-13 | End: 2023-02-13

## 2023-02-13 RX ADMIN — GABAPENTIN 100 MG: 100 CAPSULE ORAL at 22:38

## 2023-02-13 RX ADMIN — ERTAPENEM SODIUM 1000 MG: 1 INJECTION, POWDER, LYOPHILIZED, FOR SOLUTION INTRAMUSCULAR; INTRAVENOUS at 11:20

## 2023-02-13 RX ADMIN — MONTELUKAST SODIUM 10 MG: 10 TABLET, COATED ORAL at 22:38

## 2023-02-13 RX ADMIN — INSULIN GLARGINE 15 UNITS: 100 INJECTION, SOLUTION SUBCUTANEOUS at 22:40

## 2023-02-13 RX ADMIN — ASPIRIN 81 MG: 81 TABLET, COATED ORAL at 07:44

## 2023-02-13 RX ADMIN — CLOPIDOGREL BISULFATE 75 MG: 75 TABLET ORAL at 07:44

## 2023-02-13 RX ADMIN — LISINOPRIL 30 MG: 10 TABLET ORAL at 07:44

## 2023-02-13 RX ADMIN — SERTRALINE HYDROCHLORIDE 50 MG: 50 TABLET ORAL at 07:44

## 2023-02-13 RX ADMIN — HEPARIN SODIUM 5000 UNITS: 5000 INJECTION INTRAVENOUS; SUBCUTANEOUS at 13:56

## 2023-02-13 RX ADMIN — KETOROLAC TROMETHAMINE 30 MG: 30 INJECTION, SOLUTION INTRAMUSCULAR; INTRAVENOUS at 19:50

## 2023-02-13 RX ADMIN — LIDOCAINE HYDROCHLORIDE 5 ML: 10 INJECTION, SOLUTION EPIDURAL; INFILTRATION; INTRACAUDAL at 11:19

## 2023-02-13 RX ADMIN — HEPARIN SODIUM 5000 UNITS: 5000 INJECTION INTRAVENOUS; SUBCUTANEOUS at 22:38

## 2023-02-13 RX ADMIN — HEPARIN SODIUM 5000 UNITS: 5000 INJECTION INTRAVENOUS; SUBCUTANEOUS at 05:41

## 2023-02-13 RX ADMIN — HYDROMORPHONE HYDROCHLORIDE 0.2 MG: 0.2 INJECTION, SOLUTION INTRAMUSCULAR; INTRAVENOUS; SUBCUTANEOUS at 11:19

## 2023-02-13 NOTE — PROCEDURES
Vascular Surgery  Janae Davison 70 y o  female MRN: [id number]  Unit/Bed#: E5 -01 Encounter: [id number]    V  A C  Procedure Note    Date: 2/13/2023  Time: 10:30AM    Location of wound: R groin    Sponges removed:  2 Black Sponges  0 White Sponges    Dimensions of wound: 5 cm x 2 cm x 1 5 cm    Description of wound: R groin site  Full thickness, no tunneling noted  Meaty red granulation tissue with scant sanguinous drainage  No surrounding erythremia, no purulence or foul odor from wound  Sponges placed:  2 Black Sponges  0 White Sponges    VAC settings:  125 mmHg  Continuous          Pt tolerated procedure well   Good Wound vac seal   VAC sticker placed to wound dressing, per protocol

## 2023-02-13 NOTE — ASSESSMENT & PLAN NOTE
· She has prior history of aortic coarctation status post repair/stent  · Continue outpatient follow-up

## 2023-02-13 NOTE — PROGRESS NOTES
Progress Note - Infectious Disease   Grace Ramp 26 y o  female MRN: [id number]  Unit/Bed#: E5 -01 Encounter: [id number]      Impression/Plan:    1  Right groin surgical site infection  Complication from thrombosis of the R EIA with extension into the proximal R CFA after cardiac cath s/p R femoral exposure, R iliac embolectomy and R EIA stent 1/30/23  Status post superficial wound culture of site as an outpatient, culture from 2/6/23 with growth of ESBL Proteus mirabilis susceptible to only IV antibiotics  Now status post I&D 2/11/23 of the right groin site with VAC placement; infection was superficial without involvement of deeper vascular structures  The patient is afebrile and hemodynamically stable  Blood cultures no growth OR cultures also growing Proteus mirabilis  Unfortunately, patient will require IV antibiotics due to resistance of the organism              -Follow-up OR tissue cultures and blood cultures              -for now, continue Ertapenem 1 g every 24 hours   -anticipate a 10 day total course of IV antibiotics from I&D, through 2/20/23  Scripts placed in chart for CM   -weekly CBC diff and BMP on IV antibiotics    -place PICC   -patient is willing to do home infusion or go to the infusion center daily for antibiotics  CM will discuss cost of home infusion with her              -Vascular surgery follow-up ongoing, VAC management per vascular surgery              -monitor WBC count, fever curve     2  External iliac artery thrombosis  Developed after cardiac catheterization with right femoral access  Status post R femoral exposure, R iliac embolectomy and R EIA stent 1/30/23 with vascular surgery  Complicated by #1 above               -vascular surgery follow up ongoing     3  Type 2 diabetes mellitus  Poorly controlled with HbA1c 9 6%  Risk factor for infection  -Glycemic management per primary team     4  History of HCV status post SVR     5  Obesity  BMI 42   Risk factor for infection and poor wound healing     I have discussed the above management plan in detail with the patient at bedside, Dr Jamison Henao of [facility], and CM  ID will follow  Antibiotics:  Ertapenem day 3  POD #2    Subjective: The patient is feeling well, no complaints  Minimal pain in the right groin around the [facility] site  Plan for VAC change today  She is tolerating antibiotics without nausea, diarrhea  Denies fever or chills  Objective:  Vitals:  Temp:  [97 8 °F (36 6 °C)-98 9 °F (37 2 °C)] 97 8 °F (36 6 °C)  HR:  [84-97] 84  Resp:  [17-18] 17  BP: (108-139)/(58-86) 139/66  SpO2:  [94 %-97 %] 94 %  Temp (24hrs), Av 4 °F (36 9 °C), Min:97 8 °F (36 6 °C), Max:98 9 °F (37 2 °C)  Current: Temperature: 97 8 °F (36 6 °C)    Physical Exam:   General Appearance:  Alert, interactive, nontoxic, no acute distress  Throat: Oropharynx moist without lesions  Lungs:   Clear to auscultation bilaterally; no wheezes, rhonchi or rales; respirations unlabored   Heart:  RRR; no murmur, rub or gallop   Abdomen:   Soft, non-tender, non-distended, positive bowel sounds  Extremities: No clubbing, cyanosis or edema   Skin: Right groin with wound VAC in place       Labs:    All pertinent labs and imaging studies were personally reviewed  Results from last 7 days   Lab Units 23  0545 23  0523  0605   WBC Thousand/uL 9 22 8 85 8 73   HEMOGLOBIN g/dL 9 9* 9 8* 9 8*   PLATELETS Thousands/uL 293 318 298     Results from last 7 days   Lab Units 23  0545 23  0522 23  0605 02/10/23  2152   SODIUM mmol/L 138 136 137 136   POTASSIUM mmol/L 4 0 3 9 3 7 3 9   CHLORIDE mmol/L 104 102 106 101   CO2 mmol/L 26 24 23 27   BUN mg/dL 14 12 13 16   CREATININE mg/dL 0 56* 0 57* 0 54* 0 59*   EGFR ml/min/1 73sq m 121 120 122 119   CALCIUM mg/dL 8 9 9 2 8 7 9 6   AST U/L  --   --   --  11*   ALT U/L  --   --   --  12   ALK PHOS U/L  --   --   --  59     Results from last 7 days   Lab Units 02/10/23  9267 PROCALCITONIN ng/ml <0 05                   Micro:  Results from last 7 days   Lab Units 02/11/23  0932 02/10/23  2145 02/06/23  1607   BLOOD CULTURE   --  No Growth at 48 hrs  No Growth at 48 hrs   --    GRAM STAIN RESULT  No Polys or Bacteria seen  --  2+ Polys*  3+ Gram negative rods*  1+ Gram positive cocci in pairs*   WOUND CULTURE   --   --  3+ Growth of Proteus mirabilis ESBL*  1+ Growth of       Imaging:  I have personally reviewed pertinent imaging study reports and images in PACS           Other Studies:   I have personally reviewed pertinent reports

## 2023-02-13 NOTE — PLAN OF CARE
Problem: PAIN - ADULT  Goal: Verbalizes/displays adequate comfort level or baseline comfort level  Description: Interventions:  - Encourage patient to monitor pain and request assistance  - Assess pain using appropriate pain scale  - Administer analgesics based on type and severity of pain and evaluate response  - Implement non-pharmacological measures as appropriate and evaluate response  - Consider cultural and social influences on pain and pain management  - Notify physician/advanced practitioner if interventions unsuccessful or patient reports new pain  Outcome: Progressing     Problem: INFECTION - ADULT  Goal: Absence or prevention of progression during hospitalization  Description: INTERVENTIONS:  - Assess and monitor for signs and symptoms of infection  - Monitor lab/diagnostic results  - Monitor all insertion sites, i e  indwelling lines, tubes, and drains  - Monitor endotracheal if appropriate and nasal secretions for changes in amount and color  - [place] appropriate cooling/warming therapies per order  - Administer medications as ordered  - Instruct and encourage patient and family to use good hand hygiene technique  - Identify and instruct in appropriate isolation precautions for identified infection/condition  Outcome: Progressing     Problem: SAFETY ADULT  Goal: Patient will remain free of falls  Description: INTERVENTIONS:  - Educate patient/family on patient safety including physical limitations  - Instruct patient to call for assistance with activity   - Consult OT/PT to assist with strengthening/mobility   - Keep Call bell within reach  - Keep bed low and locked with side rails adjusted as appropriate  - Keep care items and personal belongings within reach  - Initiate and maintain comfort rounds  - Make Fall Risk Sign visible to staff  - Apply yellow socks and bracelet for high fall risk patients  - Consider moving patient to room near nurses station  Outcome: Progressing  Goal: Maintain or return to baseline ADL function  Description: INTERVENTIONS:  -  Assess patient's ability to carry out ADLs; assess patient's baseline for ADL function and identify physical deficits which impact ability to perform ADLs (bathing, care of mouth/teeth, toileting, grooming, dressing, etc )  - Assess/evaluate cause of self-care deficits   - Assess range of motion  - Assess patient's mobility; develop plan if impaired  - Assess patient's need for assistive devices and provide as appropriate  - Encourage maximum independence but intervene and supervise when necessary  - Involve family in performance of ADLs  - Assess for home care needs following discharge   - Consider OT consult to assist with ADL evaluation and planning for discharge  - Provide patient education as appropriate  Outcome: Progressing     Problem: DISCHARGE PLANNING  Goal: Discharge to home or other facility with appropriate resources  Description: INTERVENTIONS:  - Identify barriers to discharge w/patient and caregiver  - Arrange for needed discharge resources and transportation as appropriate  - Identify discharge learning needs (meds, wound care, etc )  - Arrange for interpretive services to assist at discharge as needed  - Refer to Case Management Department for coordinating discharge planning if the patient needs post-hospital services based on physician/advanced practitioner order or complex needs related to functional status, cognitive ability, or social support system  Outcome: Progressing     Problem: Knowledge Deficit  Goal: Patient/family/caregiver demonstrates understanding of disease process, treatment plan, medications, and discharge instructions  Description: Complete learning assessment and assess knowledge base    Interventions:  - Provide teaching at level of understanding  - Provide teaching via preferred learning methods  Outcome: Progressing     Problem: RESPIRATORY - ADULT  Goal: Achieves optimal ventilation and oxygenation  Description: INTERVENTIONS:  - Assess for changes in respiratory status  - Assess for changes in mentation and behavior  - Position to facilitate oxygenation and minimize respiratory effort  - Oxygen administered by appropriate delivery if ordered  - Initiate smoking cessation education as indicated  - Encourage broncho-pulmonary hygiene including cough, deep breathe, Incentive Spirometry  - Assess the need for suctioning and aspirate as needed  - Assess and instruct to report SOB or any respiratory difficulty  - Respiratory Therapy support as indicated  Outcome: Progressing     Problem: GASTROINTESTINAL - ADULT  Goal: Maintains adequate nutritional intake  Description: INTERVENTIONS:  - Monitor percentage of each meal consumed  - Identify factors contributing to decreased intake, treat as appropriate  - Assist with meals as needed  - Monitor I&O, weight, and lab values if indicated  - Obtain nutrition services referral as needed  Outcome: Progressing     Problem: GENITOURINARY - ADULT  Goal: Maintains or returns to baseline urinary function  Description: INTERVENTIONS:  - Assess urinary function  - Encourage oral fluids to ensure adequate hydration if ordered  - Administer IV fluids as ordered to ensure adequate hydration  - Administer ordered medications as needed  - Offer frequent toileting  - Follow urinary retention protocol if ordered  Outcome: Progressing     Problem: METABOLIC, FLUID AND ELECTROLYTES - ADULT  Goal: Electrolytes maintained within normal limits  Description: INTERVENTIONS:  - Monitor labs and assess patient for signs and symptoms of electrolyte imbalances  - Administer electrolyte replacement as ordered  - Monitor response to electrolyte replacements, including repeat lab results as appropriate  - Instruct patient on fluid and nutrition as appropriate  Outcome: Progressing  Goal: Fluid balance maintained  Description: INTERVENTIONS:  - Monitor labs   - Monitor I/O and WT  - Instruct patient on fluid and nutrition as appropriate  - Assess for signs & symptoms of volume excess or deficit  Outcome: Progressing  Goal: Glucose maintained within target range  Description: INTERVENTIONS:  - Monitor Blood Glucose as ordered  - Assess for signs and symptoms of hyperglycemia and hypoglycemia  - Administer ordered medications to maintain glucose within target range  - Assess nutritional intake and initiate nutrition service referral as needed  Outcome: Progressing     Problem: HEMATOLOGIC - ADULT  Goal: Maintains hematologic stability  Description: INTERVENTIONS  - Assess for signs and symptoms of bleeding or hemorrhage  - Monitor labs  - Administer supportive blood products/factors as ordered and appropriate  Outcome: Progressing     Problem: MUSCULOSKELETAL - ADULT  Goal: Maintain or return mobility to safest level of function  Description: INTERVENTIONS:  - Assess patient's ability to carry out ADLs; assess patient's baseline for ADL function and identify physical deficits which impact ability to perform ADLs (bathing, care of mouth/teeth, toileting, grooming, dressing, etc )  - Assess/evaluate cause of self-care deficits   - Assess range of motion  - Assess patient's mobility  - Assess patient's need for assistive devices and provide as appropriate  - Encourage maximum independence but intervene and supervise when necessary  - Involve family in performance of ADLs  - Assess for home care needs following discharge   - Consider OT consult to assist with ADL evaluation and planning for discharge  - Provide patient education as appropriate  Outcome: Progressing

## 2023-02-13 NOTE — ASSESSMENT & PLAN NOTE
Continue zoloft 50 mg daily  Continue xanax prn: The [place] queried  There are no red flags    She uses the Xanax sparingly, and has not had a prescription filled since December

## 2023-02-13 NOTE — ASSESSMENT & PLAN NOTE
Lab Results   Component Value Date    HGBA1C 9 6 (H) 01/10/2023       Recent Labs     02/12/23  1059 02/12/23  1554 02/13/23  0701 02/13/23  1105   POCGLU 266* 230* 115 118     Patient's Lantus will be decreased due to low blood sugars  Continue Accu-Cheks and sliding scale insulin  Hold home metformin and Januvia  Diabetic diet  Titrate medications to goal

## 2023-02-13 NOTE — PROGRESS NOTES
[address] [facility]  Progress Note - Sebastián Sanchez 1990, 26 y o  female MRN: [id number]  Unit/Bed#: E5 -01 Encounter: [id number]  Primary Care Provider: REJI Tuttle   Date and time admitted to hospital: 2/10/2023  8:47 PM    * Surgical site infection  Assessment & Plan  Patient is a 61-year-old female with past medical history significant for hypertension, diabetes, and previous coarctation of the aorta  · Patient underwent a diagnostic cardiac catheterization via right femoral access on 1/12  · Patient was readmitted with right leg pain, and CTA showed thrombus involving the right external iliac artery, extending to the proximal right common femoral artery  · 1/30: Underwent right femoral exposure, right external iliac embolectomy and right EIA stent placement, and discharged home with aspirin, Plavix, statin  · Outpatient vascular surgery follow-up: Concerns for pain, and possible infection  · Outpatient cultures 2/6 grew ESBL Proteus mirabilis  · Patient was readmitted 2/10 and followed by the vascular surgery and infectious disease team  · Status post washout with wound VAC on 02/11  · Vascular surgery team confirms patient will require wound VAC at discharge  · Per infectious disease team: Continue ertapenem 1 g every 24 hours for a 10-day total course from the time of her I&D, last dose 2/20  · PICC line placed 2/13  · 2/10: Blood cultures: Negative x2  · 2/10: Wound culture: ESBL Proteus x1    Anaerobic culture negative thus far  · Case management coordinating discharge    External iliac artery thrombosis [facility])  Assessment & Plan  · Patient underwent a diagnostic cardiac catheterization via right femoral access on 1/12  · Patient was readmitted with right leg pain, and CTA showed thrombus involving the right external iliac artery, extending to the proximal right common femoral artery  · 1/30: Underwent right femoral exposure, right external iliac embolectomy and right EIA stent placement  · discharged home with aspirin, Plavix, statin  · Patient follows with vascular surgery team in the office and was referred for readmission due to concerns over infection, as described above  · Wound VAC in place: Continue per surgery follow-up    Hypertension  Assessment & Plan  continue home dose of lisinopril 30mg daily    Bipolar disorder (HCC)  Assessment & Plan  Continue zoloft 50 mg daily  Continue xanax prn: [place] queried  There are no red flags  She uses the Xanax sparingly, and has not had a prescription filled since December    Type 2 diabetes mellitus without complication, with long-term current use of insulin [facility])  Assessment & Plan  Lab Results   Component Value Date    HGBA1C 9 6 (H) 01/10/2023       Recent Labs     02/12/23  1059 02/12/23  1554 02/13/23  0701 02/13/23  1105   POCGLU 266* 230* 115 118     Patient's Lantus will be decreased due to low blood sugars  Continue Accu-Cheks and sliding scale insulin  Hold home metformin and Januvia  Diabetic diet  Titrate medications to goal    Morbid obesity ([address] )  Assessment & Plan  bmi 42 noted  Dietary counseling, lifestyle modification    Aortic coarctation  Assessment & Plan  · She has prior history of aortic coarctation status post repair/stent  · Continue outpatient follow-up            Family:  Called mother Allyson Bartlett and LM to call my cell phone for update    VTE Pharmacologic Prophylaxis: Heparin  VTE Mechanical Prophylaxis: sequential compression device    dw pts nurse  nate   dw ID: Dr Jamison Orta:  Home IV abx via pICC through 2/20 dw vascular surgery: Brianna Blank: Will be dc home with [facility]    Certification Statement: The patient will continue to require additional inpatient hospital stay due to need for further acute intervention for iv abx    Home dc planning with vac and home abx    Status: inpatient     Total time spent today including interview, exam, review of vascular surgery notes, review of infectious disease notes, review of previous discharge summaries from her cardiac catheterization, as well as ICU hospitalization for thrombectomy  Review of her catheterization note, in discussion with the infectious disease and vascular surgery team: 55 minutes spent today    ===================================================================    Subjective:  Patient notes that she feels better today  She notes her right groin pain has improved  She relates she is ambulating without any difficulty  She denies any pain anywhere else  Denies any headache  Chest pain  Back pain  Abdominal pain  She denies any shortness of breath, or dyspnea on exertion  Denies any nausea, vomiting, diarrhea or constipation  Is tolerating p o  She notes she is ambulating without any dyspnea with ambulating and without any lightheadedness or dizziness with ambulating  Notes that she had numbness on her right upper lateral thigh that has improved since admission  Denies any numbness in her feet or other areas of her legs  Denies any weakness with ambulating  Physical Exam:   Temp:  [97 8 °F (36 6 °C)-98 9 °F (37 2 °C)] 98 1 °F (36 7 °C)  HR:  [84-97] 84  Resp:  [17-18] 17  BP: (108-139)/(58-86) 133/65    Gen:  Pleasant, non-tachypnic, non-dyspnic  Conversant  Heart: regular rate and rhythm, S1S2 present, no murmur, rub or gallop  Lungs: clear to ausculatation bilaterally  No wheezing, crackles, or rhonchi  No accessory muscle use or respiratory distress  Abd: soft, non-tender, non-distended  NABS, no guarding, rebound or peritoneal signs  Extremities: no clubbing, cyanosis or edema  2+pedal pulses bilaterally  Full range of motion  Neuro: awake, alert and oriented  With speech  Interactive  Skin: warm and dry: no petechiae, purpura and rash      LABS:   Results from last 7 days   Lab Units 02/13/23  0545 02/12/23  0522 02/11/23  0605   WBC Thousand/uL 9 22 8 85 8 73   HEMOGLOBIN g/dL 9 9* 9 8* 9 8*   HEMATOCRIT % 31 3* 31 3* 32 1*   PLATELETS Thousands/uL 293 318 298     Results from last 7 days   Lab Units 02/13/23  0545 02/12/23  0522 02/11/23  0605   POTASSIUM mmol/L 4 0 3 9 3 7   CHLORIDE mmol/L 104 102 106   CO2 mmol/L 26 24 23   BUN mg/dL 14 12 13   CREATININE mg/dL 0 56* 0 57* 0 54*   CALCIUM mg/dL 8 9 9 2 8 7       Hospital Data:    Microbiology  2/11: Wound culture: Proteus ESBL  2/11 anaerobic culture: Negative  2/10: Blood culture: Negative x2      Procedure  2/13: PICC line placed  2/11: Right groin washout  Pulse lavage  Wound VAC placement      ---------------------------------------------------------------------------------------------------------------  This note has been constructed using a voice recognition system

## 2023-02-13 NOTE — ASSESSMENT & PLAN NOTE
Patient is a 27-year-old female with past medical history significant for hypertension, diabetes, and previous coarctation of the aorta  · Patient underwent a diagnostic cardiac catheterization via right femoral access on 1/12  · Patient was readmitted with right leg pain, and CTA showed thrombus involving the right external iliac artery, extending to the proximal right common femoral artery  · 1/30: Underwent right femoral exposure, right external iliac embolectomy and right EIA stent placement, and discharged home with aspirin, Plavix, statin  · Outpatient vascular surgery follow-up: Concerns for pain, and possible infection  · Outpatient cultures 2/6 grew ESBL Proteus mirabilis  · Patient was readmitted 2/10 and followed by the vascular surgery and infectious disease team  · Status post washout with wound VAC on 02/11  · Vascular surgery team confirms patient will require wound VAC at discharge  · Per infectious disease team: Continue ertapenem 1 g every 24 hours for a 10-day total course from the time of her I&D, last dose 2/20  · PICC line placed 2/13  · 2/10: Blood cultures: Negative x2  · 2/10: Wound culture: ESBL Proteus x1    Anaerobic culture negative thus far  · Case management coordinating discharge

## 2023-02-13 NOTE — ASSESSMENT & PLAN NOTE
· Patient underwent a diagnostic cardiac catheterization via right femoral access on 1/12  · Patient was readmitted with right leg pain, and CTA showed thrombus involving the right external iliac artery, extending to the proximal right common femoral artery  · 1/30: Underwent right femoral exposure, right external iliac embolectomy and right EIA stent placement  · discharged home with aspirin, Plavix, statin  · Patient follows with vascular surgery team in the office and was referred for readmission due to concerns over infection, as described above  · Wound VAC in place: Continue per surgery follow-up

## 2023-02-13 NOTE — PROGRESS NOTES
[address] [place] [address]  Progress Note - Kamryn Kill 1990, 26 y o  female MRN: [id number]  Unit/Bed#: E5 -01 Encounter: [id number]  Primary Care Provider: REJI García   Date and time admitted to hospital: 2/10/2023  8:47 PM    * Surgical site infection  Assessment & Plan  26year old female with PMH bipolar, HTN, HLD, T2DM, aortic coarctation, obesity w/BMI 43, hepatitis C, pt is s/p diagnostic cardiac cath via R femoral access with angioseal closure c/b thrombosis from R EIA extension to proximal R CFA  Now S/P R fem exposure, R Iliac embolectomy, R EIA stent on 1/30/2023 (L  Doctor) presents in the ED for concerns for R groin incision  2/11/2023 Wound vac washout ([place])    2/11/2023 Washout and debridement with wound vac placement    - Minimal serosanguinous drainage from wound vac  - no purulence or malodor  Denies fever or chills  - RLE warm, perfused, strong DP/PT palpable pulse    Plan:  - Continue use of R groin wound vac as ordered  Wound vac dressing   changed today  Plan for dressing changes Mon-Camilla-Fri   - Wound bed 5cm x 2 cm, 1 5 cm deep  - Continue Plavix and aspirin  - WBC 9 22   - Continue ertapenem as prescribed by ID  - CM consulted for wound vac paperwork  - Clear for D/C pending Wound Vac approval        Vitals:  /65 (BP Location: Right arm)   Pulse 84   Temp 98 1 °F (36 7 °C) (Oral)   Resp 17   Wt 103 kg (226 lb 3 1 oz)   LMP 02/03/2023   SpO2 94%   BMI 42 74 kg/m²     I/Os:  I/O last 3 completed shifts: In: 360 [P O :360]  Out: 100 [Drains:100]  I/O this shift:   In: 0 [P O :880]  Out: -         Lab Results and Cultures:   CBC with diff:   Lab Results   Component Value Date    WBC 9 22 02/13/2023    HGB 9 9 (L) 02/13/2023    HCT 31 3 (L) 02/13/2023    MCV 79 (L) 02/13/2023     02/13/2023    MCH 24 9 (L) 02/13/2023    MCHC 31 6 02/13/2023    RDW 14 5 02/13/2023    MPV 10 0 02/13/2023    NRBC 0 02/13/2023   ,   BMP/CMP:  Lab Results   Component Value Date    SODIUM 138 02/13/2023    K 4 0 02/13/2023    K 4 0 06/14/2018     02/13/2023     06/14/2018    CO2 26 02/13/2023    CO2 28 06/14/2018    ANIONGAP 13 0 06/14/2018    BUN 14 02/13/2023    BUN 15 06/14/2018    CREATININE 0 56 (L) 02/13/2023    CREATININE 0 65 06/14/2018    CALCIUM 8 9 02/13/2023    CALCIUM 9 9 06/14/2018    AST 11 (L) 02/10/2023    ALT 12 02/10/2023    ALKPHOS 59 02/10/2023    EGFR 121 02/13/2023   ,   Lipid Panel: No results found for: CHOL,   Coags:   Lab Results   Component Value Date    PTT 29 02/10/2023    PTT 32 6 06/14/2018    INR 0 96 02/10/2023    INR 1 08 06/14/2018   ,     Blood Culture:   Lab Results   Component Value Date    BLOODCX No Growth at 48 hrs  02/10/2023    BLOODCX No Growth at 48 hrs  02/10/2023   ,   Urinalysis:   Lab Results   Component Value Date    COLORU Yellow 03/30/2022    CLARITYU Clear 03/30/2022    SPECGRAV >=1 030 (H) 03/30/2022    PHUR 6 0 03/30/2022    PHUR 5 5 02/28/2020    LEUKOCYTESUR Negative 03/30/2022    NITRITE Negative 03/30/2022    GLUCOSEU 3+ (A) 03/30/2022    KETONESU Negative 03/30/2022    BILIRUBINUR Negative 03/30/2022    BLOODU Negative 03/30/2022   ,   Urine Culture:   Lab Results   Component Value Date    URINECX 40,000-49,000 cfu/ml 01/06/2020   ,   Wound [place]:   Lab Results   Component Value Date    WOUNDCULT 3+ Growth of Proteus mirabilis ESBL (A) 02/06/2023    WOUNDCULT 1+ Growth of 02/06/2023       Medications:  Current Facility-Administered Medications   Medication Dose Route Frequency   • acetaminophen (TYLENOL) tablet 650 mg  650 mg Oral Q6H PRN   • ALPRAZolam (XANAX) tablet 0 5 mg  0 5 mg Oral HS PRN   • aspirin (ECOTRIN LOW STRENGTH) EC tablet 81 mg  81 mg Oral Daily   • clopidogrel (PLAVIX) tablet 75 mg  75 mg Oral Daily   • ertapenem (INVanz) 1,000 mg in sodium chloride 0 9 % 50 mL IVPB  1,000 mg Intravenous Q24H   • gabapentin (NEURONTIN) capsule 100 mg  100 mg Oral HS   • heparin (porcine) subcutaneous injection 5,000 Units  5,000 Units Subcutaneous Q8H [address]   • insulin glargine (LANTUS) subcutaneous injection 15 Units 0 15 mL  15 Units Subcutaneous HS   • insulin lispro (HumaLOG) 100 units/mL subcutaneous injection 1-5 Units  1-5 Units Subcutaneous TID AC   • ketorolac (TORADOL) injection 30 mg  30 mg Intravenous Q6H PRN   • lisinopril (ZESTRIL) tablet 30 mg  30 mg Oral Daily   • montelukast (SINGULAIR) tablet 10 mg  10 mg Oral HS   • ondansetron (ZOFRAN) injection 4 mg  4 mg Intravenous Q6H PRN   • sertraline (ZOLOFT) tablet 50 mg  50 mg Oral Daily       Wound/Incision:      R groin site  Wound vac dressing change  No sergo purulence  Physical Exam:    General: A&O x 4, no acute distress  CV: Regular rhythm  Respiratory: CEA, No distress  Abdominal: Soft, NT, ND  Extremities: B/L lower extremities warm, motor sensory intact     Neurologic: No deficit      Pulse exam:  Radial: Right: +2 Left: +2  DP: Right: +2 Left: +2  PT: Right: +2 Left: +2    REJI Aguilar  2/13/2023

## 2023-02-13 NOTE — PROCEDURES
Insert PICC line    Date/Time: 2/13/2023 1:30 PM  Performed by: Juan Echols RN  Authorized by: Jyoti Mckoy MD     Patient location:  Bedside  Consent:     Consent obtained:  Written    Consent given by:  Patient    Procedural risks discussed: consent obtained by physician  [place] protocol:     Procedure explained and questions answered to patient or proxy's satisfaction: yes      Relevant documents present and verified: yes      Test results available and properly labeled: yes      Radiology Images displayed and confirmed  If images not available, report reviewed: yes      Required blood products, implants, devices, and special equipment available: yes      Site/side marked: yes      Immediately prior to procedure, a time out was called: yes      Patient identity confirmed:  Verbally with patient, arm band, provided demographic data and hospital-assigned identification number  Pre-procedure details:     Hand hygiene: Hand hygiene performed prior to insertion      Skin preparation:  ChloraPrep    Skin preparation agent: Skin preparation agent completely dried prior to procedure    Indications:     PICC line indications: long term antibiotics    Anesthesia (see MAR for exact dosages):      Anesthesia method:  Local infiltration (3ml)    Local anesthetic:  Lidocaine 1% w/o epi  Procedure details:     Location:  [place]    Vessel type: vein      Laterality:  Right    Approach: percutaneous technique used      Patient position:  Flat    Catheter size:  4 Fr    Landmarks identified: yes      Ultrasound guidance: yes      Ultrasound image availability:  Not saved    Sterile ultrasound techniques: Sterile gel and sterile probe covers were used      Number of attempts:  1    Successful placement: yes      Vessel of catheter tip end:  Sherlock 3CG confirmed (sherlock 3cg procedure record confirmed placement sent to medical records, picc okay to be utilized)    Total catheter length (cm):  39    Catheter out on skin (cm):  2    Max flow rate:  999ml/hr    Arm circumference:  41cm  Post-procedure details:     Post-procedure:  Dressing applied and securement device placed    Assessment:  Blood return through all ports and free fluid flow (sherlock 3cg)    Post-procedure complications: none      Patient tolerance of procedure:   Tolerated well, no immediate complications  Comments:      ID confirmed pt cleared for picc insertion  Lot#[id number] 2024-02-29

## 2023-02-13 NOTE — ASSESSMENT & PLAN NOTE
26year old female with PMH bipolar, HTN, HLD, T2DM, aortic coarctation, obesity w/BMI 42, hepatitis C, pt is s/p diagnostic cardiac cath via R femoral access with angioseal closure c/b thrombosis from R EIA extension to proximal R CFA  Now S/P R fem exposure, R Iliac embolectomy, R EIA stent on 1/30/2023 (L  Doctor) presents in the ED for concerns for R groin incision  2/11/2023 Wound vac washout ([place])    2/11/2023 Washout and debridement with wound vac placement    - Minimal serosanguinous drainage from wound vac  - no purulence or malodor  Denies fever or chills  - RLE warm, perfused, strong DP/PT palpable pulse    Plan:  - Continue use of R groin wound vac as ordered  Wound vac dressing   changed today   Plan for dressing changes Mon-Camilla-Fri   - Wound bed 5cm x 2 cm, 1 5 cm deep  - Continue Plavix and aspirin  - WBC 9 22   - Continue ertapenem as prescribed by ID  - CM consulted for wound vac paperwork  - Clear for D/C pending Wound Vac approval

## 2023-02-14 ENCOUNTER — DOCUMENTATION (OUTPATIENT)
Dept: VASCULAR SURGERY | Facility: CLINIC | Age: 33
End: 2023-02-14

## 2023-02-14 LAB
GLUCOSE SERPL-MCNC: 110 MG/DL (ref 65–140)
GLUCOSE SERPL-MCNC: 133 MG/DL (ref 65–140)
GLUCOSE SERPL-MCNC: 137 MG/DL (ref 65–140)
GLUCOSE SERPL-MCNC: 174 MG/DL (ref 65–140)

## 2023-02-14 RX ADMIN — HEPARIN SODIUM 5000 UNITS: 5000 INJECTION INTRAVENOUS; SUBCUTANEOUS at 22:47

## 2023-02-14 RX ADMIN — GABAPENTIN 100 MG: 100 CAPSULE ORAL at 22:47

## 2023-02-14 RX ADMIN — MONTELUKAST SODIUM 10 MG: 10 TABLET, COATED ORAL at 22:47

## 2023-02-14 RX ADMIN — SERTRALINE HYDROCHLORIDE 50 MG: 50 TABLET ORAL at 08:03

## 2023-02-14 RX ADMIN — CLOPIDOGREL BISULFATE 75 MG: 75 TABLET ORAL at 08:03

## 2023-02-14 RX ADMIN — ERTAPENEM SODIUM 1000 MG: 1 INJECTION, POWDER, LYOPHILIZED, FOR SOLUTION INTRAMUSCULAR; INTRAVENOUS at 11:21

## 2023-02-14 RX ADMIN — INSULIN GLARGINE 15 UNITS: 100 INJECTION, SOLUTION SUBCUTANEOUS at 22:47

## 2023-02-14 RX ADMIN — INSULIN LISPRO 1 UNITS: 100 INJECTION, SOLUTION INTRAVENOUS; SUBCUTANEOUS at 11:33

## 2023-02-14 RX ADMIN — LISINOPRIL 30 MG: 10 TABLET ORAL at 08:03

## 2023-02-14 RX ADMIN — HEPARIN SODIUM 5000 UNITS: 5000 INJECTION INTRAVENOUS; SUBCUTANEOUS at 05:59

## 2023-02-14 RX ADMIN — ASPIRIN 81 MG: 81 TABLET, COATED ORAL at 08:03

## 2023-02-14 RX ADMIN — HEPARIN SODIUM 5000 UNITS: 5000 INJECTION INTRAVENOUS; SUBCUTANEOUS at 13:06

## 2023-02-14 NOTE — ASSESSMENT & PLAN NOTE
Patient is a 25-year-old female with past medical history significant for hypertension, diabetes, and previous coarctation of the aorta  · Patient underwent a diagnostic cardiac catheterization via right femoral access on 1/12  · Patient was readmitted with right leg pain, and CTA showed thrombus involving the right external iliac artery, extending to the proximal right common femoral artery  · 1/30: Underwent right femoral exposure, right external iliac embolectomy and right EIA stent placement, and discharged home with aspirin, Plavix, statin  · Outpatient vascular surgery follow-up: Concerns for pain, and possible infection  · Outpatient cultures 2/6 grew ESBL Proteus mirabilis  · Patient was readmitted 2/10 and followed by the vascular surgery and infectious disease team  · Status post washout with wound VAC on 02/11  · Vascular surgery team confirms patient will require wound VAC at discharge  · Per infectious disease team: Continue ertapenem 1 g every 24 hours for a 10-day total course from the time of her I&D, last dose 2/20  · PICC line placed 2/13  · 2/10: Blood cultures: Negative x2  · 2/10: Wound culture: ESBL Proteus x1    Anaerobic culture negative thus far  · Case management coordinating discharge: Home antibiotics have been arranged, awaiting coordination for home VAC wound care

## 2023-02-14 NOTE — PLAN OF CARE
Problem: PAIN - ADULT  Goal: Verbalizes/displays adequate comfort level or baseline comfort level  Description: Interventions:  - Encourage patient to monitor pain and request assistance  - Assess pain using appropriate pain scale  - Administer analgesics based on type and severity of pain and evaluate response  - Implement non-pharmacological measures as appropriate and evaluate response  - Consider cultural and social influences on pain and pain management  - Notify physician/advanced practitioner if interventions unsuccessful or patient reports new pain  Outcome: Progressing     Problem: INFECTION - ADULT  Goal: Absence or prevention of progression during hospitalization  Description: INTERVENTIONS:  - Assess and monitor for signs and symptoms of infection  - Monitor lab/diagnostic results  - Monitor all insertion sites, i e  indwelling lines, tubes, and drains  - Monitor endotracheal if appropriate and nasal secretions for changes in amount and color  - [place] appropriate cooling/warming therapies per order  - Administer medications as ordered  - Instruct and encourage patient and family to use good hand hygiene technique  - Identify and instruct in appropriate isolation precautions for identified infection/condition  Outcome: Progressing     Problem: SAFETY ADULT  Goal: Patient will remain free of falls  Description: INTERVENTIONS:  - Educate patient/family on patient safety including physical limitations  - Instruct patient to call for assistance with activity   - Consult OT/PT to assist with strengthening/mobility   - Keep Call bell within reach  - Keep bed low and locked with side rails adjusted as appropriate  - Keep care items and personal belongings within reach  - Initiate and maintain comfort rounds  - Make Fall Risk Sign visible to staff  - Apply yellow socks and bracelet for high fall risk patients  - Consider moving patient to room near nurses station  Outcome: Progressing     Problem: DISCHARGE PLANNING  Goal: Discharge to home or other facility with appropriate resources  Description: INTERVENTIONS:  - Identify barriers to discharge w/patient and caregiver  - Arrange for needed discharge resources and transportation as appropriate  - Identify discharge learning needs (meds, wound care, etc )  - Arrange for interpretive services to assist at discharge as needed  - Refer to Case Management Department for coordinating discharge planning if the patient needs post-hospital services based on physician/advanced practitioner order or complex needs related to functional status, cognitive ability, or social support system  Outcome: Progressing     Problem: Knowledge Deficit  Goal: Patient/family/caregiver demonstrates understanding of disease process, treatment plan, medications, and discharge instructions  Description: Complete learning assessment and assess knowledge base    Interventions:  - Provide teaching at level of understanding  - Provide teaching via preferred learning methods  Outcome: Progressing     Problem: RESPIRATORY - ADULT  Goal: Achieves optimal ventilation and oxygenation  Description: INTERVENTIONS:  - Assess for changes in respiratory status  - Assess for changes in mentation and behavior  - Position to facilitate oxygenation and minimize respiratory effort  - Oxygen administered by appropriate delivery if ordered  - Initiate smoking cessation education as indicated  - Encourage broncho-pulmonary hygiene including cough, deep breathe, Incentive Spirometry  - Assess the need for suctioning and aspirate as needed  - Assess and instruct to report SOB or any respiratory difficulty  - Respiratory Therapy support as indicated  Outcome: Progressing     Problem: GASTROINTESTINAL - ADULT  Goal: Maintains adequate nutritional intake  Description: INTERVENTIONS:  - Monitor percentage of each meal consumed  - Identify factors contributing to decreased intake, treat as appropriate  - Assist with meals as needed  - Monitor I&O, weight, and lab values if indicated  - Obtain nutrition services referral as needed  Outcome: Progressing     Problem: GENITOURINARY - ADULT  Goal: Maintains or returns to baseline urinary function  Description: INTERVENTIONS:  - Assess urinary function  - Encourage oral fluids to ensure adequate hydration if ordered  - Administer IV fluids as ordered to ensure adequate hydration  - Administer ordered medications as needed  - Offer frequent toileting  - Follow urinary retention protocol if ordered  Outcome: Progressing     Problem: METABOLIC, FLUID AND ELECTROLYTES - ADULT  Goal: Electrolytes maintained within normal limits  Description: INTERVENTIONS:  - Monitor labs and assess patient for signs and symptoms of electrolyte imbalances  - Administer electrolyte replacement as ordered  - Monitor response to electrolyte replacements, including repeat lab results as appropriate  - Instruct patient on fluid and nutrition as appropriate  Outcome: Progressing  Goal: Fluid balance maintained  Description: INTERVENTIONS:  - Monitor labs   - Monitor I/O and WT  - Instruct patient on fluid and nutrition as appropriate  - Assess for signs & symptoms of volume excess or deficit  Outcome: Progressing  Goal: Glucose maintained within target range  Description: INTERVENTIONS:  - Monitor Blood Glucose as ordered  - Assess for signs and symptoms of hyperglycemia and hypoglycemia  - Administer ordered medications to maintain glucose within target range  - Assess nutritional intake and initiate nutrition service referral as needed  Outcome: Progressing     Problem: HEMATOLOGIC - ADULT  Goal: Maintains hematologic stability  Description: INTERVENTIONS  - Assess for signs and symptoms of bleeding or hemorrhage  - Monitor labs  - Administer supportive blood products/factors as ordered and appropriate  Outcome: Progressing     Problem: MUSCULOSKELETAL - ADULT  Goal: Maintain or return mobility to safest level of function  Description: INTERVENTIONS:  - Assess patient's ability to carry out ADLs; assess patient's baseline for ADL function and identify physical deficits which impact ability to perform ADLs (bathing, care of mouth/teeth, toileting, grooming, dressing, etc )  - Assess/evaluate cause of self-care deficits   - Assess range of motion  - Assess patient's mobility  - Assess patient's need for assistive devices and provide as appropriate  - Encourage maximum independence but intervene and supervise when necessary  - Involve family in performance of ADLs  - Assess for home care needs following discharge   - Consider OT consult to assist with ADL evaluation and planning for discharge  - Provide patient education as appropriate  Outcome: Progressing

## 2023-02-14 NOTE — PROGRESS NOTES
Vascular Nurse Navigator Post Op Education    Met with patient at bedside to introduce myself as Vascular Nurse Navigator and explained my role  Patient is appropriate and accepting to education  Patient was educated with Review of written materials provided, Teachback, Explanation, Demonstration and Question & Answer on expectations of post op care and recovery on Right groin washout and debridement with wound vac placement  Patient is a former smoker, quit 5 weeks ago, as such Smoking effects on the lungs, tobacco triggers and Smoking cessation was reviewed  Education provided to patient on infection prevention, activity limitations, when to call the office, importance of follow up, and incisional care  Discharge instruction handout provided to patient to review

## 2023-02-14 NOTE — PROGRESS NOTES
[facility]  Progress Note - Grace Ramp 1990, 26 y o  female MRN: [id number]  Unit/Bed#: E5 -01 Encounter: [id number]  Primary Care Provider: REJI Zhang   Date and time admitted to hospital: 2/10/2023  8:47 PM    * Surgical site infection  Assessment & Plan  Patient is a 25-year-old female with past medical history significant for hypertension, diabetes, and previous coarctation of the aorta  · Patient underwent a diagnostic cardiac catheterization via right femoral access on 1/12  · Patient was readmitted with right leg pain, and CTA showed thrombus involving the right external iliac artery, extending to the proximal right common femoral artery  · 1/30: Underwent right femoral exposure, right external iliac embolectomy and right EIA stent placement, and discharged home with aspirin, Plavix, statin  · Outpatient vascular surgery follow-up: Concerns for pain, and possible infection  · Outpatient cultures 2/6 grew ESBL Proteus mirabilis  · Patient was readmitted 2/10 and followed by the vascular surgery and infectious disease team  · Status post washout with wound VAC on 02/11  · Vascular surgery team confirms patient will require wound VAC at discharge  · Per infectious disease team: Continue ertapenem 1 g every 24 hours for a 10-day total course from the time of her I&D, last dose 2/20  · PICC line placed 2/13  · 2/10: Blood cultures: Negative x2  · 2/10: Wound culture: ESBL Proteus x1    Anaerobic culture negative thus far  · Case management coordinating discharge: Home antibiotics have been arranged, awaiting coordination for home [facility] wound care    External iliac artery thrombosis [facility])  Assessment & Plan  · Patient underwent a diagnostic cardiac catheterization via right femoral access on 1/12  · Patient was readmitted with right leg pain, and CTA showed thrombus involving the right external iliac artery, extending to the proximal right common femoral artery  · 1/30: Underwent right femoral exposure, right external iliac embolectomy and right EIA stent placement  · discharged home with aspirin, Plavix, statin  · Patient follows with vascular surgery team in the office and was referred for readmission due to concerns over infection, as described above  · Wound VAC in place: Continue per surgery follow-up  · Awaiting home care arrangements for discharge    Hypertension  Assessment & Plan  continue home dose of lisinopril 30mg daily  Blood pressure adequately controlled    Bipolar disorder (HCC)  Assessment & Plan  Continue zoloft 50 mg daily  Continue xanax prn: The [place]PD[place] website was queried  There are no red flags  She uses the Xanax sparingly, and has not had a prescription filled since December    Type 2 diabetes mellitus without complication, with long-term current use of insulin [facility])  Assessment & Plan  Lab Results   Component Value Date    HGBA1C 9 6 (H) 01/10/2023       Recent Labs     02/13/23  2101 02/14/23  0713 02/14/23  1108 02/14/23  1610   POCGLU 164* 110 174* 133     Patient's Lantus dose was decreased due to low blood sugars  Continue Accu-Cheks and sliding scale insulin  Hold home metformin and Januvia  Diabetic diet  Titrate medications to goal    Morbid obesity ([address] )  Assessment & Plan  bmi 42 noted  Dietary counseling, lifestyle modification    Aortic coarctation  Assessment & Plan  · She has prior history of aortic coarctation status post repair/stent  · Continue outpatient follow-up          Family: Called patient's mother David Harp    Left message to call my cell phone for update, with patient's permission    Discussed with patient's nurse and   discussed with infectious disease: Dr Mina Valentine    VTE Pharmacologic Prophylaxis: Heparin  VTE Mechanical Prophylaxis: sequential compression device        Certification Statement: The patient will continue to require additional inpatient hospital stay due to need for further acute intervention for dc planning, home VAC arrangements/HH    Status: inpatient     ===================================================================    Subjective:  Patient denies any complaints  She notes she is bored, and is eager for discharge home as soon as possible  She denies any pain  Notes her right groin/leg pain has markedly improved  She notes she still has a small area of numbness on her right upper lateral thigh  No other areas  She denies any other pain anywhere  She denies any nausea, vomiting, diarrhea or constipation  She is tolerating p o  Denies any shortness of breath or dyspnea on exertion  Denies any dizziness or lightheadedness  She notes she is ambulating without any difficulty  Is tolerating p o , passing her urine and bowel movements  Physical Exam:   Temp:  [97 9 °F (36 6 °C)-98 5 °F (36 9 °C)] 97 9 °F (36 6 °C)  HR:  [81-96] 92  Resp:  [17-18] 17  BP: (100-143)/(57-86) 128/67    Gen:  Pleasant, non-tachypnic, non-dyspnic  Conversant  Heart: regular rate and rhythm, S1S2 present, no murmur, rub or gallop  Lungs: clear to ausculatation bilaterally  No wheezing, crackles, or rhonchi  No accessory muscle use or respiratory distress  Abd: soft, non-tender, non-distended  NABS, no guarding, rebound or peritoneal signs  Extremities: no clubbing, cyanosis or edema  2+pedal pulses bilaterally  Full range of motion  Neuro: awake, alert  Oriented  Fluent speech  Strength globally intact  Skin: warm and dry: no petechiae, purpura and rash      LABS:   Results from last 7 days   Lab Units 02/13/23  0545 02/12/23  0522 02/11/23  0605   WBC Thousand/uL 9 22 8 85 8 73   HEMOGLOBIN g/dL 9 9* 9 8* 9 8*   HEMATOCRIT % 31 3* 31 3* 32 1*   PLATELETS Thousands/uL 293 318 298     Results from last 7 days   Lab Units 02/13/23  0545 02/12/23  0522 02/11/23  0605   POTASSIUM mmol/L 4 0 3 9 3 7   CHLORIDE mmol/L 104 102 106   CO2 mmol/L 26 24 23   BUN mg/dL 14 12 13   CREATININE mg/dL 0 56* 0 57* 0 54*   CALCIUM mg/dL 8 9 9 2 8 7       Hospital Data:    Microbiology  2/11: Wound culture: Proteus ESBL  2/11 anaerobic culture: Negative  2/10: Blood culture: Negative x2        Procedure  2/13: PICC line placed  2/11: Right groin washout  Pulse lavage  Wound VAC placement        ---------------------------------------------------------------------------------------------------------------  This note has been constructed using a voice recognition system

## 2023-02-14 NOTE — PLAN OF CARE
Problem: PAIN - ADULT  Goal: Verbalizes/displays adequate comfort level or baseline comfort level  Description: Interventions:  - Encourage patient to monitor pain and request assistance  - Assess pain using appropriate pain scale  - Administer analgesics based on type and severity of pain and evaluate response  - Implement non-pharmacological measures as appropriate and evaluate response  - Consider cultural and social influences on pain and pain management  - Notify physician/advanced practitioner if interventions unsuccessful or patient reports new pain  Outcome: Progressing     Problem: INFECTION - ADULT  Goal: Absence or prevention of progression during hospitalization  Description: INTERVENTIONS:  - Assess and monitor for signs and symptoms of infection  - Monitor lab/diagnostic results  - Monitor all insertion sites, i e  indwelling lines, tubes, and drains  - Monitor endotracheal if appropriate and nasal secretions for changes in amount and color  - [place] appropriate cooling/warming therapies per order  - Administer medications as ordered  - Instruct and encourage patient and family to use good hand hygiene technique  - Identify and instruct in appropriate isolation precautions for identified infection/condition  Outcome: Progressing     Problem: HEMATOLOGIC - ADULT  Goal: Maintains hematologic stability  Description: INTERVENTIONS  - Assess for signs and symptoms of bleeding or hemorrhage  - Monitor labs  - Administer supportive blood products/factors as ordered and appropriate  Outcome: Progressing     Problem: GASTROINTESTINAL - ADULT  Goal: Maintains adequate nutritional intake  Description: INTERVENTIONS:  - Monitor percentage of each meal consumed  - Identify factors contributing to decreased intake, treat as appropriate  - Assist with meals as needed  - Monitor I&O, weight, and lab values if indicated  - Obtain nutrition services referral as needed  Outcome: Progressing

## 2023-02-14 NOTE — ASSESSMENT & PLAN NOTE
· Patient underwent a diagnostic cardiac catheterization via right femoral access on 1/12  · Patient was readmitted with right leg pain, and CTA showed thrombus involving the right external iliac artery, extending to the proximal right common femoral artery  · 1/30: Underwent right femoral exposure, right external iliac embolectomy and right EIA stent placement  · discharged home with aspirin, Plavix, statin  · Patient follows with vascular surgery team in the office and was referred for readmission due to concerns over infection, as described above  · Wound VAC in place: Continue per surgery follow-up  · Awaiting home care arrangements for discharge

## 2023-02-14 NOTE — ASSESSMENT & PLAN NOTE
Lab Results   Component Value Date    HGBA1C 9 6 (H) 01/10/2023       Recent Labs     02/13/23  2101 02/14/23  0713 02/14/23  1108 02/14/23  1610   POCGLU 164* 110 174* 133     Patient's Lantus dose was decreased due to low blood sugars  Continue Accu-Cheks and sliding scale insulin  Hold home metformin and Januvia  Diabetic diet  Titrate medications to goal

## 2023-02-14 NOTE — PROGRESS NOTES
Progress Note - Infectious Disease   Chintan Suresh 26 y o  female MRN: [id number]  Unit/Bed#: E5 -01 Encounter: [id number]      Impression/Plan:    1  Right groin surgical site infection  Complication from thrombosis of the R EIA with extension into the proximal R CFA after cardiac cath s/p R femoral exposure, R iliac embolectomy and R EIA stent 1/30/23  Status post superficial wound culture of site as an outpatient, culture from 2/6/23 with growth of ESBL Proteus mirabilis susceptible to only IV antibiotics  Now status post I&D 2/11/23 of the right groin site with VAC placement; infection was superficial without involvement of deeper vascular structures  The patient is afebrile and hemodynamically stable  Blood cultures no growth  OR cultures also growing Proteus mirabilis  Unfortunately, patient will require IV antibiotics due to resistance of the organism  PICC placed              -continue Ertapenem 1 g every 24 hours   -recommend a 10 day total course of IV antibiotics from I&D, through 2/20/23  Scripts placed in chart for CM   -weekly CBC diff and BMP on IV antibiotics    -CM assistance for home antibiotic therapy              -Vascular surgery follow-up ongoing, VAC management per vascular surgery              -monitor WBC count, fever curve     2  External iliac artery thrombosis  Developed after cardiac catheterization with right femoral access  Status post R femoral exposure, R iliac embolectomy and R EIA stent 1/30/23 with vascular surgery  Complicated by #1 above               -vascular surgery follow up ongoing     3  Type 2 diabetes mellitus  Poorly controlled with HbA1c 9 6%  Risk factor for infection  -Glycemic management per primary team     4  History of HCV status post SVR     5  Obesity  BMI 42  Risk factor for infection and poor wound healing     I have discussed the above management plan in detail with the patient at bedside, Dr Georgie Gould of [facility], and CM   ID will follow  Antibiotics:  Ertapenem day 4  POD #3    Subjective: The patient is feeling well, no complaints  Right groin wound VAC changed yesterday, wound appears healthy without drainage  She denies fever, chills, chest pain, shortness of breath  Objective:  Vitals:  Temp:  [97 9 °F (36 6 °C)-98 6 °F (37 °C)] 97 9 °F (36 6 °C)  HR:  [81-96] 88  Resp:  [17-18] 17  BP: (100-143)/(57-86) 127/86  SpO2:  [95 %-97 %] 95 %  Temp (24hrs), Av 2 °F (36 8 °C), Min:97 9 °F (36 6 °C), Max:98 6 °F (37 °C)  Current: Temperature: 97 9 °F (36 6 °C)    Physical Exam:   General Appearance:  Alert, interactive, nontoxic, no acute distress  Throat: Oropharynx moist without lesions  Lungs:   Clear to auscultation bilaterally; no wheezes, rhonchi or rales; respirations unlabored   Heart:  RRR; no murmur, rub or gallop   Abdomen:   Soft, non-tender, non-distended, positive bowel sounds  Extremities: No clubbing, cyanosis or edema   Skin: Right groin with wound VAC in place       Labs: All pertinent labs and imaging studies were personally reviewed  Results from last 7 days   Lab Units 23  0545 23  0522 23  0605   WBC Thousand/uL 9 22 8 85 8 73   HEMOGLOBIN g/dL 9 9* 9 8* 9 8*   PLATELETS Thousands/uL 293 318 298     Results from last 7 days   Lab Units 23  0545 23  0522 23  0605 02/10/23  2152   SODIUM mmol/L 138 136 137 136   POTASSIUM mmol/L 4 0 3 9 3 7 3 9   CHLORIDE mmol/L 104 102 106 101   CO2 mmol/L 26 24 23 27   BUN mg/dL 14 12 13 16   CREATININE mg/dL 0 56* 0 57* 0 54* 0 59*   EGFR ml/min/1 73sq m 121 120 122 119   CALCIUM mg/dL 8 9 9 2 8 7 9 6   AST U/L  --   --   --  11*   ALT U/L  --   --   --  12   ALK PHOS U/L  --   --   --  59     Results from last 7 days   Lab Units 02/10/23  2152   PROCALCITONIN ng/ml <0 05                   Micro:  Results from last 7 days   Lab Units 23  0932 02/10/23  2147   BLOOD CULTURE   --  No Growth at 72 hrs  No Growth at 72 hrs  GRAM STAIN RESULT  No Polys or Bacteria seen  --        Imaging:  I have personally reviewed pertinent imaging study reports and images in PACS           Other Studies:   I have personally reviewed pertinent reports

## 2023-02-15 LAB
GLUCOSE SERPL-MCNC: 127 MG/DL (ref 65–140)
GLUCOSE SERPL-MCNC: 134 MG/DL (ref 65–140)
GLUCOSE SERPL-MCNC: 135 MG/DL (ref 65–140)
GLUCOSE SERPL-MCNC: 187 MG/DL (ref 65–140)

## 2023-02-15 PROCEDURE — 2W06X6Z CHANGE PRESSURE DRESSING ON RIGHT INGUINAL REGION: ICD-10-PCS | Performed by: SURGERY

## 2023-02-15 RX ORDER — HYDROMORPHONE HCL IN WATER/PF 6 MG/30 ML
0.2 PATIENT CONTROLLED ANALGESIA SYRINGE INTRAVENOUS ONCE
Status: COMPLETED | OUTPATIENT
Start: 2023-02-15 | End: 2023-02-15

## 2023-02-15 RX ADMIN — INSULIN GLARGINE 15 UNITS: 100 INJECTION, SOLUTION SUBCUTANEOUS at 21:24

## 2023-02-15 RX ADMIN — SERTRALINE HYDROCHLORIDE 50 MG: 50 TABLET ORAL at 09:37

## 2023-02-15 RX ADMIN — ERTAPENEM SODIUM 1000 MG: 1 INJECTION, POWDER, LYOPHILIZED, FOR SOLUTION INTRAMUSCULAR; INTRAVENOUS at 11:45

## 2023-02-15 RX ADMIN — HEPARIN SODIUM 5000 UNITS: 5000 INJECTION INTRAVENOUS; SUBCUTANEOUS at 14:15

## 2023-02-15 RX ADMIN — ASPIRIN 81 MG: 81 TABLET, COATED ORAL at 09:37

## 2023-02-15 RX ADMIN — HYDROMORPHONE HYDROCHLORIDE 0.2 MG: 0.2 INJECTION, SOLUTION INTRAMUSCULAR; INTRAVENOUS; SUBCUTANEOUS at 11:44

## 2023-02-15 RX ADMIN — LISINOPRIL 30 MG: 10 TABLET ORAL at 09:37

## 2023-02-15 RX ADMIN — CLOPIDOGREL BISULFATE 75 MG: 75 TABLET ORAL at 09:37

## 2023-02-15 RX ADMIN — HEPARIN SODIUM 5000 UNITS: 5000 INJECTION INTRAVENOUS; SUBCUTANEOUS at 06:25

## 2023-02-15 RX ADMIN — KETOROLAC TROMETHAMINE 30 MG: 30 INJECTION, SOLUTION INTRAMUSCULAR; INTRAVENOUS at 09:38

## 2023-02-15 RX ADMIN — INSULIN LISPRO 1 UNITS: 100 INJECTION, SOLUTION INTRAVENOUS; SUBCUTANEOUS at 18:06

## 2023-02-15 RX ADMIN — HEPARIN SODIUM 5000 UNITS: 5000 INJECTION INTRAVENOUS; SUBCUTANEOUS at 21:24

## 2023-02-15 RX ADMIN — MONTELUKAST SODIUM 10 MG: 10 TABLET, COATED ORAL at 21:24

## 2023-02-15 RX ADMIN — GABAPENTIN 100 MG: 100 CAPSULE ORAL at 21:24

## 2023-02-15 NOTE — PLAN OF CARE
Problem: INFECTION - ADULT  Goal: Absence or prevention of progression during hospitalization  Description: INTERVENTIONS:  - Assess and monitor for signs and symptoms of infection  - Monitor lab/diagnostic results  - Monitor all insertion sites, i e  indwelling lines, tubes, and drains  - Monitor endotracheal if appropriate and nasal secretions for changes in amount and color  - [place] appropriate cooling/warming therapies per order  - Administer medications as ordered  - Instruct and encourage patient and family to use good hand hygiene technique  - Identify and instruct in appropriate isolation precautions for identified infection/condition  Outcome: Progressing     Problem: GENITOURINARY - ADULT  Goal: Maintains or returns to baseline urinary function  Description: INTERVENTIONS:  - Assess urinary function  - Encourage oral fluids to ensure adequate hydration if ordered  - Administer IV fluids as ordered to ensure adequate hydration  - Administer ordered medications as needed  - Offer frequent toileting  - Follow urinary retention protocol if ordered  Outcome: Progressing

## 2023-02-15 NOTE — PLAN OF CARE
Problem: PAIN - ADULT  Goal: Verbalizes/displays adequate comfort level or baseline comfort level  Description: Interventions:  - Encourage patient to monitor pain and request assistance  - Assess pain using appropriate pain scale  - Administer analgesics based on type and severity of pain and evaluate response  - Implement non-pharmacological measures as appropriate and evaluate response  - Consider cultural and social influences on pain and pain management  - Notify physician/advanced practitioner if interventions unsuccessful or patient reports new pain  Outcome: Progressing     Problem: INFECTION - ADULT  Goal: Absence or prevention of progression during hospitalization  Description: INTERVENTIONS:  - Assess and monitor for signs and symptoms of infection  - Monitor lab/diagnostic results  - Monitor all insertion sites, i e  indwelling lines, tubes, and drains  - Monitor endotracheal if appropriate and nasal secretions for changes in amount and color  - [place] appropriate cooling/warming therapies per order  - Administer medications as ordered  - Instruct and encourage patient and family to use good hand hygiene technique  - Identify and instruct in appropriate isolation precautions for identified infection/condition  Outcome: Progressing     Problem: SAFETY ADULT  Goal: Patient will remain free of falls  Description: INTERVENTIONS:  - Educate patient/family on patient safety including physical limitations  - Instruct patient to call for assistance with activity   - Consult OT/PT to assist with strengthening/mobility   - Keep Call bell within reach  - Keep bed low and locked with side rails adjusted as appropriate  - Keep care items and personal belongings within reach  - Initiate and maintain comfort rounds  - Make Fall Risk Sign visible to staff  - Apply yellow socks and bracelet for high fall risk patients  - Consider moving patient to room near nurses station  Outcome: Progressing  Goal: Maintain or return to baseline ADL function  Description: INTERVENTIONS:  -  Assess patient's ability to carry out ADLs; assess patient's baseline for ADL function and identify physical deficits which impact ability to perform ADLs (bathing, care of mouth/teeth, toileting, grooming, dressing, etc )  - Assess/evaluate cause of self-care deficits   - Assess range of motion  - Assess patient's mobility; develop plan if impaired  - Assess patient's need for assistive devices and provide as appropriate  - Encourage maximum independence but intervene and supervise when necessary  - Involve family in performance of ADLs  - Assess for home care needs following discharge   - Consider OT consult to assist with ADL evaluation and planning for discharge  - Provide patient education as appropriate  Outcome: Progressing     Problem: DISCHARGE PLANNING  Goal: Discharge to home or other facility with appropriate resources  Description: INTERVENTIONS:  - Identify barriers to discharge w/patient and caregiver  - Arrange for needed discharge resources and transportation as appropriate  - Identify discharge learning needs (meds, wound care, etc )  - Arrange for interpretive services to assist at discharge as needed  - Refer to Case Management Department for coordinating discharge planning if the patient needs post-hospital services based on physician/advanced practitioner order or complex needs related to functional status, cognitive ability, or social support system  Outcome: Progressing     Problem: Knowledge Deficit  Goal: Patient/family/caregiver demonstrates understanding of disease process, treatment plan, medications, and discharge instructions  Description: Complete learning assessment and assess knowledge base    Interventions:  - Provide teaching at level of understanding  - Provide teaching via preferred learning methods  Outcome: Progressing     Problem: RESPIRATORY - ADULT  Goal: Achieves optimal ventilation and oxygenation  Description: INTERVENTIONS:  - Assess for changes in respiratory status  - Assess for changes in mentation and behavior  - Position to facilitate oxygenation and minimize respiratory effort  - Oxygen administered by appropriate delivery if ordered  - Initiate smoking cessation education as indicated  - Encourage broncho-pulmonary hygiene including cough, deep breathe, Incentive Spirometry  - Assess the need for suctioning and aspirate as needed  - Assess and instruct to report SOB or any respiratory difficulty  - Respiratory Therapy support as indicated  Outcome: Progressing     Problem: GASTROINTESTINAL - ADULT  Goal: Maintains adequate nutritional intake  Description: INTERVENTIONS:  - Monitor percentage of each meal consumed  - Identify factors contributing to decreased intake, treat as appropriate  - Assist with meals as needed  - Monitor I&O, weight, and lab values if indicated  - Obtain nutrition services referral as needed  Outcome: Progressing     Problem: GENITOURINARY - ADULT  Goal: Maintains or returns to baseline urinary function  Description: INTERVENTIONS:  - Assess urinary function  - Encourage oral fluids to ensure adequate hydration if ordered  - Administer IV fluids as ordered to ensure adequate hydration  - Administer ordered medications as needed  - Offer frequent toileting  - Follow urinary retention protocol if ordered  Outcome: Progressing     Problem: METABOLIC, FLUID AND ELECTROLYTES - ADULT  Goal: Electrolytes maintained within normal limits  Description: INTERVENTIONS:  - Monitor labs and assess patient for signs and symptoms of electrolyte imbalances  - Administer electrolyte replacement as ordered  - Monitor response to electrolyte replacements, including repeat lab results as appropriate  - Instruct patient on fluid and nutrition as appropriate  Outcome: Progressing  Goal: Fluid balance maintained  Description: INTERVENTIONS:  - Monitor labs   - Monitor I/O and WT  - Instruct patient on fluid and nutrition as appropriate  - Assess for signs & symptoms of volume excess or deficit  Outcome: Progressing  Goal: Glucose maintained within target range  Description: INTERVENTIONS:  - Monitor Blood Glucose as ordered  - Assess for signs and symptoms of hyperglycemia and hypoglycemia  - Administer ordered medications to maintain glucose within target range  - Assess nutritional intake and initiate nutrition service referral as needed  Outcome: Progressing     Problem: HEMATOLOGIC - ADULT  Goal: Maintains hematologic stability  Description: INTERVENTIONS  - Assess for signs and symptoms of bleeding or hemorrhage  - Monitor labs  - Administer supportive blood products/factors as ordered and appropriate  Outcome: Progressing     Problem: MUSCULOSKELETAL - ADULT  Goal: Maintain or return mobility to safest level of function  Description: INTERVENTIONS:  - Assess patient's ability to carry out ADLs; assess patient's baseline for ADL function and identify physical deficits which impact ability to perform ADLs (bathing, care of mouth/teeth, toileting, grooming, dressing, etc )  - Assess/evaluate cause of self-care deficits   - Assess range of motion  - Assess patient's mobility  - Assess patient's need for assistive devices and provide as appropriate  - Encourage maximum independence but intervene and supervise when necessary  - Involve family in performance of ADLs  - Assess for home care needs following discharge   - Consider OT consult to assist with ADL evaluation and planning for discharge  - Provide patient education as appropriate  Outcome: Progressing

## 2023-02-15 NOTE — ASSESSMENT & PLAN NOTE
· Patient underwent a diagnostic cardiac catheterization via right femoral access on 1/12  · Patient was readmitted with right leg pain, and CTA showed thrombus involving the right external iliac artery, extending to the proximal right common femoral artery  · 1/30: Underwent right femoral exposure, right external iliac embolectomy and right EIA stent placement  · discharged home with aspirin, Plavix, statin  · Patient follows with vascular surgery team in the office and was referred for readmission due to concerns over infection, as described above  · Wound VAC in place: Continue vascular surgery follow-up  · She has met with the vascular surgery nurse navigator

## 2023-02-15 NOTE — ASSESSMENT & PLAN NOTE
26year old female with PMH bipolar, HTN, HLD, T2DM, aortic coarctation, BMI 42, and hepatitis C  Patient underwent diagnostic cardiac cath via R femoral access with angioseal closure (1/12/23)  Post-operatively patient developed thrombosis from R EIA extension to proximal R CFA  Now s/p R fem exposure, R Iliac embolectomy, R EIA stent on 1/30/2023 (L  Doctor)  Subsequently developed infection of right groin wound   2/11/2023 washout and vac placement ([place])    2/11/2023 Washout and debridement with wound vac placement      Recommendations:  - Continue use of R groin wound vac as ordered  Wound vac dressing changed today  Plan for dressing changes MWF   - Wound bed 6 1cm x 2 8 cm x 1 2 cm deep  - Continue Plavix and aspirin  - WBC 9 22   - Wound Cx + proteus mirabilis, ESBL   - Continue ertapenem as prescribed by ID  - CM consulted for wound vac paperwork, wound VAC delivered to patient bedside   - Awaiting disposition regarding home health care to manage wound vac

## 2023-02-15 NOTE — ASSESSMENT & PLAN NOTE
Lab Results   Component Value Date    HGBA1C 9 6 (H) 01/10/2023       Recent Labs     02/14/23  1610 02/14/23  2258 02/15/23  0723 02/15/23  1111   POCGLU 133 137 127 135     Patient's Lantus dose was decreased due to low blood sugars  Continue Accu-Cheks and sliding scale insulin  Temporarily held home metformin and Januvia:  We will restart at discharge  Diabetic diet  Titrate medications to goal

## 2023-02-15 NOTE — DISCHARGE SUMMARY
[facility]  Discharge- [facility] 1990, 26 y o  female MRN: [id number]  Unit/Bed#: E5 -01 Encounter: [id number]  Primary Care Provider: REJI Wu   Date and time admitted to hospital: 2/10/2023  8:47 PM          Admission Date: 2/10/2023       Discharge Date: 2/15/2023        Primary Diagnoses  Principal Problem:    Surgical site infection  Active Problems:    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HCC)    Aortic coarctation    Morbid obesity ([facility] )    Type 2 diabetes mellitus without complication, with long-term current use of insulin ([facility] )    Bipolar disorder ([facility] )    Hypertension  Resolved Problems:    * No resolved hospital problems  [facility] course by problem:   * Surgical site infection  Assessment & Plan  Patient is a 78-year-old female with past medical history significant for hypertension, diabetes, and previous coarctation of the aorta      · Patient underwent a diagnostic cardiac catheterization via right femoral access on 1/12  · Patient was readmitted with right leg pain, and CTA showed thrombus involving the right external iliac artery, extending to the proximal right common femoral artery  · 1/30: Underwent right femoral exposure, right external iliac embolectomy and right EIA stent placement, and discharged home with aspirin, Plavix, statin  · Outpatient vascular surgery follow-up: Concerns for pain, and possible infection  · Outpatient cultures 2/6 grew ESBL Proteus mirabilis  · Patient was readmitted 2/10 and followed by the vascular surgery and infectious disease team  · Status post washout with wound VAC on 02/11  · Vascular surgery team confirms patient will require wound VAC at discharge: With VAC changes on Monday, Wednesday, Friday  · Per infectious disease team: Continue ertapenem 1 g every 24 hours for a 10-day total course from the time of her I&D, last dose 2/20  · PICC line placed 2/13  · 2/10: Blood cultures: Negative x2  · 2/10: Wound culture: ESBL Proteus x1  Anaerobic culture negative thus far  · Case management coordinating discharge: Home antibiotics have been arranged, awaiting coordination for home VAC wound care, with changes every Monday, Wednesday, Friday    External iliac artery thrombosis [facility])  Assessment & Plan  · Patient underwent a diagnostic cardiac catheterization via right femoral access on 1/12  · Patient was readmitted with right leg pain, and CTA showed thrombus involving the right external iliac artery, extending to the proximal right common femoral artery  · 1/30: Underwent right femoral exposure, right external iliac embolectomy and right EIA stent placement  · discharged home with aspirin, Plavix, statin  · Patient follows with vascular surgery team in the office and was referred for readmission due to concerns over infection, as described above  · Wound VAC in place: Continue vascular surgery follow-up  · She has met with the vascular surgery nurse navigator      Hypertension  Assessment & Plan  continue home dose of lisinopril 30mg daily  Blood pressure adequately controlled  Outpatient follow-up with PCP    B[facility] ([facility] )  Assessment & Plan  Continue zoloft 50 mg daily  Continue xanax prn: The [place]PD[place] website was queried  There are no red flags  She uses the Xanax sparingly, and has not had a prescription filled since December    Type 2 diabetes mellitus without complication, with long-term current use of insulin [facility])  Assessment & Plan  Lab Results   Component Value Date    HGBA1C 9 6 (H) 01/10/2023       Recent Labs     02/14/23  1610 02/14/23  2258 02/15/23  0723 02/15/23  1111   POCGLU 133 137 127 135     Patient's Lantus dose was decreased due to low blood sugars  Continue Accu-Cheks and sliding scale insulin  Temporarily held home metformin and Januvia:  We will restart at discharge  Diabetic diet  Titrate medications to goal    Morbid obesity ([facility] )  Assessment & Plan  bmi 42 noted  Dietary counseling, lifestyle modification    Aortic coarctation  Assessment & Plan  · She has prior history of aortic coarctation status post repair/stent  · Continue outpatient follow-up      Service:  [facility], Dr [facility]  Consulting Providers   Infectious disease: Dr Dawson Eisenberg  Vascular surgery: [facility] Studies:  Microbiology  2/11: Wound culture: Proteus ESBL  2/11 anaerobic culture: Negative  2/10: Blood culture: Negative x2        Procedure  2/13: PICC line placed  2/11: Right groin washout   Pulse lavage   Wound VAC placement    Results from last 7 days   Lab Units 02/13/23  0545 02/12/23  0522 02/11/23  0605   WBC Thousand/uL 9 22 8 85 8 73   HEMOGLOBIN g/dL 9 9* 9 8* 9 8*   HEMATOCRIT % 31 3* 31 3* 32 1*   PLATELETS Thousands/uL 293 318 298     Results from last 7 days   Lab Units 02/13/23  0545 02/12/23  0522 02/11/23  0605   POTASSIUM mmol/L 4 0 3 9 3 7   CHLORIDE mmol/L 104 102 106   CO2 mmol/L 26 24 23   BUN mg/dL 14 12 13   CREATININE mg/dL 0 56* 0 57* 0 54*   CALCIUM mg/dL 8 9 9 2 8 7       History and Physical Exam:  Please refer to the Admission H&P note    Discharge Condition: Improved  Discharge Disposition: Home/Self Care    Discharge Note and Physical Exam:   Patient denies any complaints  Denies any pain in her right groin  Denies any pain anywhere else  She notes she wishes to be discharged home as soon as possible  She denies any pain anywhere else  Denies any chest pain  Denies any shortness of breath  Denies any nausea, vomiting, diarrhea  Is tolerating p o  Denies any dizziness or lightheadedness  Is ambling without any difficulty  Vitals:    02/15/23 1114   BP: 113/59   Pulse: 84   Resp: 16   Temp: 98 2 °F (36 8 °C)   SpO2: 98%     General:  Pleasant, non-tachypnic, non-dyspnic  Conversant  Heart: Regular rate and rhythm, S1S2 present  No murmur, rub or gallop    Lungs: Clear to auscultation bilaterally, no wheezing, rhonchi, or crackles  Good air movement  No accessory muscle use or respiratory distress  Abdomen: soft, non-tender, non-distended, NABS  No rebound or guarding  No mass or peritoneal signs  Extremities: no clubbing, cyanosis or edema  2+ pedal pulses bilaterally  Neurologic: Alert  Fluent speech  Strength globally intact  Skin: warm and dry  No petechiae, purpura or rash  Discharge Medications   Please see Medical Reconciliation Discharge Form    Discharge Follow Up Appointments:   Conan Goltz, CRNP: 1 week  Dr Pedro Cottrell: 1 week    Discharge  Statement   Total Time Spent today including physical exam, discussion with patient and and discharge arrangements/care = 36 minutes  dw pts nurse  dw   Family:  Called pts mother and LM requesting she call my cell phone for update      This note has been constructed using a voice recognition system

## 2023-02-15 NOTE — ASSESSMENT & PLAN NOTE
Patient is a 17-year-old female with past medical history significant for hypertension, diabetes, and previous coarctation of the aorta  · Patient underwent a diagnostic cardiac catheterization via right femoral access on 1/12  · Patient was readmitted with right leg pain, and CTA showed thrombus involving the right external iliac artery, extending to the proximal right common femoral artery  · 1/30: Underwent right femoral exposure, right external iliac embolectomy and right EIA stent placement, and discharged home with aspirin, Plavix, statin  · Outpatient vascular surgery follow-up: Concerns for pain, and possible infection  · Outpatient cultures 2/6 grew ESBL Proteus mirabilis  · Patient was readmitted 2/10 and followed by the vascular surgery and infectious disease team  · Status post washout with wound VAC on 02/11  · Vascular surgery team confirms patient will require wound VAC at discharge: With VAC changes on Monday, Wednesday, Friday  · Per infectious disease team: Continue ertapenem 1 g every 24 hours for a 10-day total course from the time of her I&D, last dose 2/20  · PICC line placed 2/13  · 2/10: Blood cultures: Negative x2  · 2/10: Wound culture: ESBL Proteus x1    Anaerobic culture negative thus far  · Case management coordinating discharge: Home antibiotics have been arranged, awaiting coordination for home VAC wound care, with changes every Monday, Wednesday, Friday

## 2023-02-15 NOTE — ASSESSMENT & PLAN NOTE
continue home dose of lisinopril 30mg daily  Blood pressure adequately controlled  Outpatient follow-up with PCP

## 2023-02-15 NOTE — CASE MANAGEMENT
Wilner Reilly has received request for [employer] wound vac from Care Manager  Request submitted via [employer] website     Pending order #: [id number]

## 2023-02-16 LAB
BACTERIA BLD CULT: NORMAL
BACTERIA BLD CULT: NORMAL
GLUCOSE SERPL-MCNC: 120 MG/DL (ref 65–140)
GLUCOSE SERPL-MCNC: 147 MG/DL (ref 65–140)
GLUCOSE SERPL-MCNC: 154 MG/DL (ref 65–140)
GLUCOSE SERPL-MCNC: 188 MG/DL (ref 65–140)

## 2023-02-16 RX ADMIN — ERTAPENEM SODIUM 1000 MG: 1 INJECTION, POWDER, LYOPHILIZED, FOR SOLUTION INTRAMUSCULAR; INTRAVENOUS at 12:53

## 2023-02-16 RX ADMIN — HEPARIN SODIUM 5000 UNITS: 5000 INJECTION INTRAVENOUS; SUBCUTANEOUS at 05:10

## 2023-02-16 RX ADMIN — HEPARIN SODIUM 5000 UNITS: 5000 INJECTION INTRAVENOUS; SUBCUTANEOUS at 13:49

## 2023-02-16 RX ADMIN — INSULIN LISPRO 1 UNITS: 100 INJECTION, SOLUTION INTRAVENOUS; SUBCUTANEOUS at 16:40

## 2023-02-16 RX ADMIN — GABAPENTIN 100 MG: 100 CAPSULE ORAL at 23:22

## 2023-02-16 RX ADMIN — INSULIN GLARGINE 15 UNITS: 100 INJECTION, SOLUTION SUBCUTANEOUS at 23:22

## 2023-02-16 RX ADMIN — SERTRALINE HYDROCHLORIDE 50 MG: 50 TABLET ORAL at 09:36

## 2023-02-16 RX ADMIN — CLOPIDOGREL BISULFATE 75 MG: 75 TABLET ORAL at 09:36

## 2023-02-16 RX ADMIN — MONTELUKAST SODIUM 10 MG: 10 TABLET, COATED ORAL at 23:22

## 2023-02-16 RX ADMIN — LISINOPRIL 30 MG: 10 TABLET ORAL at 09:36

## 2023-02-16 RX ADMIN — ASPIRIN 81 MG: 81 TABLET, COATED ORAL at 09:35

## 2023-02-16 RX ADMIN — HEPARIN SODIUM 5000 UNITS: 5000 INJECTION INTRAVENOUS; SUBCUTANEOUS at 23:22

## 2023-02-16 NOTE — ASSESSMENT & PLAN NOTE
Lab Results   Component Value Date    HGBA1C 9 6 (H) 01/10/2023       Recent Labs     02/15/23  1601 02/15/23  2053 02/16/23  0654 02/16/23  1113   POCGLU 187* 134 120 147*     Patient's Lantus dose was decreased due to low blood sugars  Continue Accu-Cheks and sliding scale insulin  Temporarily held home metformin and Januvia:  We will restart at discharge  Diabetic diet  Titrate medications to goal

## 2023-02-16 NOTE — PROGRESS NOTES
[facility]  Progress Note - Saba Roa 1990, 26 y o  female MRN: [id number]  Unit/Bed#: E5 -01 Encounter: [id number]  Primary Care Provider: REJI Rodriguez   Date and time admitted to hospital: 2/10/2023  8:47 PM    * Surgical site infection  Assessment & Plan  26year old female with PMH bipolar, HTN, HLD, T2DM, aortic coarctation, BMI 42, and hepatitis C  Patient underwent diagnostic cardiac cath via R femoral access with angioseal closure (1/12/23)  Post-operatively patient developed thrombosis from R EIA extension to proximal R CFA  Now s/p R fem exposure, R Iliac embolectomy, R EIA stent on 1/30/2023 (L  Doctor)  Subsequently developed infection of right groin wound   2/11/2023 washout and vac placement ([place])    2/11/2023 Washout and debridement with wound vac placement      Recommendations:  - Continue use of R groin wound vac as ordered  Plan for VAC changes MWF   - Continue Plavix and aspirin  - WBC 9 22   - Wound Cx + proteus mirabilis, ESBL   Patient will receive IV ABX at home via PICC line  - Continue ertapenem as prescribed by ID  Patient will require 10 day total course through 2/20/23   - CM consulted for wound vac paperwork, wound VAC delivered to patient bedside   - Awaiting disposition regarding home health care to manage wound vac  Case management continues to reach out to different agencies, has been having difficulty finding agency that will accept patient  Subjective:  Patient resting comfortably in bed, denies pain to R groin when at rest, remains tender to palpation  Patient denying any concerns at this time, would like to go home  Vitals:  /66 (BP Location: Left arm)   Pulse 82   Temp 98 °F (36 7 °C) (Oral)   Resp 18   Wt 103 kg (226 lb 3 1 oz)   LMP 02/03/2023   SpO2 98%   BMI 42 74 kg/m²     I/Os:  I/O last 3 completed shifts: In: 5 [P O :420]  Out: -   No intake/output data recorded      Lab Results and Cultures:   CBC with diff:   Lab Results   Component Value Date    WBC 9 22 02/13/2023    HGB 9 9 (L) 02/13/2023    HCT 31 3 (L) 02/13/2023    MCV 79 (L) 02/13/2023     02/13/2023    MCH 24 9 (L) 02/13/2023    MCHC 31 6 02/13/2023    RDW 14 5 02/13/2023    MPV 10 0 02/13/2023    NRBC 0 02/13/2023   ,   BMP/CMP:  Lab Results   Component Value Date    SODIUM 138 02/13/2023    K 4 0 02/13/2023    K 4 0 06/14/2018     02/13/2023     06/14/2018    CO2 26 02/13/2023    CO2 28 06/14/2018    ANIONGAP 13 0 06/14/2018    BUN 14 02/13/2023    BUN 15 06/14/2018    CREATININE 0 56 (L) 02/13/2023    CREATININE 0 65 06/14/2018    CALCIUM 8 9 02/13/2023    CALCIUM 9 9 06/14/2018    AST 11 (L) 02/10/2023    ALT 12 02/10/2023    ALKPHOS 59 02/10/2023    EGFR 121 02/13/2023   ,   Lipid Panel: No results found for: CHOL,   Coags:   Lab Results   Component Value Date    PTT 29 02/10/2023    PTT 32 6 06/14/2018    INR 0 96 02/10/2023    INR 1 08 06/14/2018   ,     Blood Culture:   Lab Results   Component Value Date    BLOODCX No Growth After 5 Days  02/10/2023    BLOODCX No Growth After 5 Days   02/10/2023   ,   Urinalysis:   Lab Results   Component Value Date    COLORU Yellow 03/30/2022    CLARITYU Clear 03/30/2022    SPECGRAV >=1 030 (H) 03/30/2022    PHUR 6 0 03/30/2022    PHUR 5 5 02/28/2020    LEUKOCYTESUR Negative 03/30/2022    NITRITE Negative 03/30/2022    GLUCOSEU 3+ (A) 03/30/2022    KETONESU Negative 03/30/2022    BILIRUBINUR Negative 03/30/2022    BLOODU Negative 03/30/2022   ,   Urine Culture:   Lab Results   Component Value Date    URINECX 40,000-49,000 cfu/ml 01/06/2020   ,   Wound Culure:   Lab Results   Component Value Date    WOUNDCULT 3+ Growth of Proteus mirabilis ESBL (A) 02/06/2023    WOUNDCULT 1+ Growth of 02/06/2023       Medications:  Current Facility-Administered Medications   Medication Dose Route Frequency   • acetaminophen (TYLENOL) tablet 650 mg  650 mg Oral Q6H PRN   • ALPRAZolam Domenico Dias) tablet 0 5 mg  0 5 mg Oral HS PRN   • aspirin (ECOTRIN LOW STRENGTH) EC tablet 81 mg  81 mg Oral Daily   • clopidogrel (PLAVIX) tablet 75 mg  75 mg Oral Daily   • ertapenem (INVanz) 1,000 mg in sodium chloride 0 9 % 50 mL IVPB  1,000 mg Intravenous Q24H   • gabapentin (NEURONTIN) capsule 100 mg  100 mg Oral HS   • heparin (porcine) subcutaneous injection 5,000 Units  5,000 Units Subcutaneous Q8H [address]   • insulin glargine (LANTUS) subcutaneous injection 15 Units 0 15 mL  15 Units Subcutaneous HS   • insulin lispro (HumaLOG) 100 units/mL subcutaneous injection 1-5 Units  1-5 Units Subcutaneous TID AC   • lisinopril (ZESTRIL) tablet 30 mg  30 mg Oral Daily   • montelukast (SINGULAIR) tablet 10 mg  10 mg Oral HS   • ondansetron (ZOFRAN) injection 4 mg  4 mg Intravenous Q6H PRN   • sertraline (ZOLOFT) tablet 50 mg  50 mg Oral Daily       Imaging:  N/A    Physical Exam:    General: NAD, alert and oriented  CV: rate regular  Respiratory: no distress, effort normal  Abdominal: soft and nontender, nondistended  Extremities: warm and well perfused  Neurologic: no focal deficits        Pulse exam:  Radial: Right: 2+ Left: 2+  DP: Right: 2+ Left: 2+  PT: Right: 2+ Left: 2+      REJI Krueger  2/16/2023

## 2023-02-16 NOTE — PLAN OF CARE
Problem: PAIN - ADULT  Goal: Verbalizes/displays adequate comfort level or baseline comfort level  Description: Interventions:  - Encourage patient to monitor pain and request assistance  - Assess pain using appropriate pain scale  - Administer analgesics based on type and severity of pain and evaluate response  - Implement non-pharmacological measures as appropriate and evaluate response  - Consider cultural and social influences on pain and pain management  - Notify physician/advanced practitioner if interventions unsuccessful or patient reports new pain  Outcome: Progressing     Problem: INFECTION - ADULT  Goal: Absence or prevention of progression during hospitalization  Description: INTERVENTIONS:  - Assess and monitor for signs and symptoms of infection  - Monitor lab/diagnostic results  - Monitor all insertion sites, i e  indwelling lines, tubes, and drains  - Monitor endotracheal if appropriate and nasal secretions for changes in amount and color  - Wilkinson appropriate cooling/warming therapies per order  - Administer medications as ordered  - Instruct and encourage patient and family to use good hand hygiene technique  - Identify and instruct in appropriate isolation precautions for identified infection/condition  Outcome: Progressing     Problem: GASTROINTESTINAL - ADULT  Goal: Maintains adequate nutritional intake  Description: INTERVENTIONS:  - Monitor percentage of each meal consumed  - Identify factors contributing to decreased intake, treat as appropriate  - Assist with meals as needed  - Monitor I&O, weight, and lab values if indicated  - Obtain nutrition services referral as needed  Outcome: Progressing     Problem: METABOLIC, FLUID AND ELECTROLYTES - ADULT  Goal: Electrolytes maintained within normal limits  Description: INTERVENTIONS:  - Monitor labs and assess patient for signs and symptoms of electrolyte imbalances  - Administer electrolyte replacement as ordered  - Monitor response to electrolyte replacements, including repeat lab results as appropriate  - Instruct patient on fluid and nutrition as appropriate  Outcome: Progressing

## 2023-02-16 NOTE — CASE MANAGEMENT
Case Management Discharge Planning Note    Patient name Raheem Padilla  [place]  [address] 557/[address]5  269 114-* MRN [id number]  : 1990 Date 2023       Current Admission Date: 2/10/2023  Current Admission Diagnosis:Surgical site infection   Patient Active Problem List    Diagnosis Date Noted   • Surgical site infection 2023   • 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[place] [address] ) 2023   • Continuous opioid dependence ([place] [address] ) 2023   • Arterial occlusion 2023   • Right groin pain    • Elevated troponin I level 01/10/2023   • Chest pain 01/10/2023   • Depression 2022   • Anxiety 2022   • Neuropathy 2022   • Multiple thyroid nodules 2021   • Chronic hepatitis C without hepatic coma ([place] [address] )    • VSD (ventricular septal defect) and coarctation of aorta 07/15/2021   • Bipolar disorder ([place] [address] ) 01/10/2020   • Type 2 diabetes mellitus without complication, with long-term current use of insulin ([place] [address] ) 2019   • Vitamin D deficiency 2019   • Cyst of right ovary 10/10/2019   • Endometriosis 2019   • Hidradenitis suppurativa 2019   • Hypercholesteremia 2019   • Aortic coarctation 2019   • Intractable chronic migraine without aura and with status migrainosus 2019   • Cervicalgia 2019   • Cervical dystonia 2019   • Splenomegaly 10/12/2018   • Hepatitis C 10/12/2018   • Hypertension 10/12/2018   • H/O aortic coarctation repair 2018   • Morbid obesity ([place] [address] ) 2018      LOS (days): 6  Geometric Mean LOS (GMLOS) (days):   Days to GMLOS:     OBJECTIVE:  Risk of Unplanned Readmission Score: 15 16         Current admission status: Inpatient   Preferred Pharmacy:   [address] [place] [id number]  Phone: [phone] Fax: [phone]    Primary Care Provider: REJI Bloom    Primary Insurance: [address] [place] [address]  Secondary Insurance: [facility]    DISCHARGE DETAILS:    Discharge planning discussed with[de-identified] Patient  Freedom of Choice: Yes     Additional Comments: CM received IM from [address] [place] [address] regarding Pts insurance  There is a concern that Pts insurance could lapse as Pt pays for the insurance by [employer]  If this were to happen, [facility] would end their care as they are OON w/ Pts secondary insurance [employer] [facility]  CM spoke with Pt who confirmed her concerns for insurance lapse, however her expectation is for her secondary to  her medical costs  Pt states she prefers [employer] replaces her primary  CM made Pt aware that if this were to happen, she will have to fins a replacement VNA that participates with [employer]  Pt aknowledged  CM updated [facility] Liaison who states they will admit Pt onto their service and provide care until insurance no longer covers the service  CM also updated Pts Vascular Nurse Navigator to be available to assist Pt with replacement VNA or scheduling op wound care   CM continues to follow

## 2023-02-16 NOTE — ASSESSMENT & PLAN NOTE
Patient is a 40-year-old female with past medical history significant for hypertension, diabetes, and previous coarctation of the aorta  · Patient underwent a diagnostic cardiac catheterization via right femoral access on 1/12  · Patient was readmitted with right leg pain, and CTA showed thrombus involving the right external iliac artery, extending to the proximal right common femoral artery  · 1/30: Underwent right femoral exposure, right external iliac embolectomy and right EIA stent placement, and discharged home with aspirin, Plavix, statin  · Outpatient vascular surgery follow-up: Concerns for pain, and possible infection  · Outpatient cultures 2/6 grew ESBL Proteus mirabilis  · Patient was readmitted 2/10 and followed by the vascular surgery and infectious disease team  · Status post washout with wound VAC on 02/11  · Vascular surgery team confirms patient will require wound VAC at discharge: With VAC changes on Monday, Wednesday, Friday  · Per infectious disease team: Continue ertapenem 1 g every 24 hours for a 10-day total course from the time of her I&D, last dose 2/20  · PICC line placed 2/13  · 2/10: Blood cultures: Negative x2  · 2/10: Wound culture: ESBL Proteus x1    Anaerobic culture negative thus far  · Case management coordinating discharge: Home antibiotics have been arranged, awaiting coordination for home VAC wound care, with changes every Monday, Wednesday, Friday

## 2023-02-16 NOTE — CASE MANAGEMENT
Case Management Discharge Planning Note    Patient name Saba Roa  Location [address] [place] [address] 557/[address]  269 114-* MRN [id number]  : 1990 Date 2023       Current Admission Date: 2/10/2023  Current Admission Diagnosis:Surgical site infection   Patient Active Problem List    Diagnosis Date Noted   • Surgical site infection 2023   • 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[facility] ) 2023   • Continuous opioid dependence ([facility] ) 2023   • Arterial occlusion 2023   • Right groin pain    • Elevated troponin I level 01/10/2023   • Chest pain 01/10/2023   • Depression 2022   • Anxiety 2022   • Neuropathy 2022   • Multiple thyroid nodules 2021   • Chronic hepatitis C without hepatic coma ([place] [address] )    • VSD (ventricular septal defect) and coarctation of aorta 07/15/2021   • Bipolar disorder ([facility] ) 01/10/2020   • Type 2 diabetes mellitus without complication, with long-term current use of insulin ([facility]  ) 2019   • Vitamin D deficiency 2019   • Cyst of right ovary 10/10/2019   • Endometriosis 2019   • Hidradenitis suppurativa 2019   • Hypercholesteremia 2019   • Aortic coarctation 2019   • Intractable chronic migraine without aura and with status migrainosus 2019   • Cervicalgia 2019   • Cervical dystonia 2019   • Splenomegaly 10/12/2018   • Hepatitis C 10/12/2018   • Hypertension 10/12/2018   • H/O aortic coarctation repair 2018   • Morbid obesity ([facility] ) 2018      LOS (days): 6  Geometric Mean LOS (GMLOS) (days):   Days to GMLOS:     OBJECTIVE:  Risk of Unplanned Readmission Score: 15 19         Current admission status: Inpatient   Preferred Pharmacy:   [address] [place] [address] [address] [place] [id number]  Phone: [phone] Fax: [phone]    Primary Care Provider: REJI Rodriguez    Primary Insurance: [address] [place] [address]  Secondary Insurance: [facility] SHEA    DISCHARGE DETAILS:    Discharge planning discussed with[de-identified] Patient    Additional Comments: CM met w/Pt to inform her that he wound vac is still being processed  Pt will not be able to discharge today  Pt acknowledged however was visibly disappointed  CM will anticipate Pts discharge tomorrow 2/17  CM following

## 2023-02-16 NOTE — PROGRESS NOTES
Progress Note - Infectious Disease   Kailey Arguelles 26 y o  female MRN: [id number]  Unit/Bed#: E5 -01 Encounter: [id number]      Impression/Plan:    1  Right groin surgical site infection  Complication from thrombosis of the R EIA with extension into the proximal R CFA after cardiac cath s/p R femoral exposure, R iliac embolectomy and R EIA stent 1/30/23  Status post superficial wound culture of site as an outpatient, culture from 2/6/23 with growth of ESBL Proteus mirabilis susceptible to only IV antibiotics  Now status post I&D 2/11/23 of the right groin site with VAC placement; infection was superficial without involvement of deeper vascular structures  The patient is afebrile and hemodynamically stable  Blood cultures no growth  OR cultures also growing Proteus mirabilis  Unfortunately, patient will require IV antibiotics due to resistance of the organism  PICC placed              -continue Ertapenem 1 g every 24 hours   -recommend a 10 day total course of IV antibiotics from I&D, through 2/20/23  Scripts placed in chart for CM   -weekly CBC diff and BMP on IV antibiotics    -CM assistance for home antibiotic and VAC therapy set up              -Vascular surgery follow-up ongoing, VAC management per vascular surgery              -monitor WBC count, fever curve   -PICC removal after last dose IV antibiotics    -due to short remaining duration of therapy, will not require formal ID follow-up      2  External iliac artery thrombosis  Developed after cardiac catheterization with right femoral access  Status post R femoral exposure, R iliac embolectomy and R EIA stent 1/30/23 with vascular surgery  Complicated by #1 above               -vascular surgery follow up ongoing     3  Type 2 diabetes mellitus  Poorly controlled with HbA1c 9 6%  Risk factor for infection  -Glycemic management per primary team     4  History of HCV status post SVR     5  Obesity  BMI 42   Risk factor for infection and poor wound healing     I have discussed the above management plan in detail with the patient at bedside  ID will follow peripherally while she remains inpatient, please call with questions  Antibiotics:  Ertapenem day 6  POD #5    Subjective: The patient is feeling well, no complaints  Right groin wound VAC changed yesterday, no purulence noted  She has minimal pain in the area, no fever or chills  Patient is medically stable but awaiting set up of home VAC therapy and IV antibiotics  Objective:  Vitals:  Temp:  [97 7 °F (36 5 °C)-98 1 °F (36 7 °C)] 98 1 °F (36 7 °C)  HR:  [78-92] 92  Resp:  [17-18] 18  BP: ()/(61-76) 104/64  SpO2:  [92 %-98 %] 96 %  Temp (24hrs), Av 9 °F (36 6 °C), Min:97 7 °F (36 5 °C), Max:98 1 °F (36 7 °C)  Current: Temperature: 98 1 °F (36 7 °C)    Physical Exam:   General Appearance:  Alert, interactive, nontoxic, no acute distress  Throat: Oropharynx moist without lesions  Lungs:   Clear to auscultation bilaterally; no wheezes, rhonchi or rales; respirations unlabored   Heart:  RRR; no murmur, rub or gallop   Abdomen:   Soft, non-tender, non-distended, positive bowel sounds  Extremities: No clubbing, cyanosis or edema  RUE PICC in place   Skin: Right groin with wound VAC in place       Labs:    All pertinent labs and imaging studies were personally reviewed  Results from last 7 days   Lab Units 23  0545 23  0523  0605   WBC Thousand/uL 9 22 8 85 8 73   HEMOGLOBIN g/dL 9 9* 9 8* 9 8*   PLATELETS Thousands/uL 293 318 298     Results from last 7 days   Lab Units 23  0545 23  0522 23  0605 02/10/23  2152   SODIUM mmol/L 138 136 137 136   POTASSIUM mmol/L 4 0 3 9 3 7 3 9   CHLORIDE mmol/L 104 102 106 101   CO2 mmol/L 26 24 23 27   BUN mg/dL 14 12 13 16   CREATININE mg/dL 0 56* 0 57* 0 54* 0 59*   EGFR ml/min/1 73sq m 121 120 122 119   CALCIUM mg/dL 8 9 9 2 8 7 9 6   AST U/L  --   --   --  11*   ALT U/L  --   --   --  12   ALK PHOS U/L  -- --   --  59     Results from last 7 days   Lab Units 02/10/23  2152   PROCALCITONIN ng/ml <0 05                   Micro:  Results from last 7 days   Lab Units 02/11/23  0932 02/10/23  2145   BLOOD CULTURE   --  No Growth After 5 Days  No Growth After 5 Days  GRAM STAIN RESULT  No Polys or Bacteria seen  --        Imaging:  I have personally reviewed pertinent imaging study reports and images in PACS           Other Studies:   I have personally reviewed pertinent reports

## 2023-02-16 NOTE — PROCEDURES
V  A C  Change Note    Date: 2/15/23    Location of wound: R groin    Dimensions of wound: 6 1 cm x 2 8 cm x 1 2 cm    Description of wound: R groin exploration site  Full thickness, 85% beefy red granulation tissue at base  No tunneling noted  ~15% white slough noted to inferior aspect of wound  Minimal serosanguineous drainage noted  No foul odor, erythema, or purulence noted  Sponges removed:  2 Black Sponges  0 White Sponges    Sponges placed:  2 Black Sponges  0 White Sponges    VAC settings:  125 mmHg  Continuous         Pt tolerated procedure well   Good Wound vac seal   VAC sticker placed to wound dressing, per protocol    REJI Vo

## 2023-02-16 NOTE — PLAN OF CARE
Problem: PAIN - ADULT  Goal: Verbalizes/displays adequate comfort level or baseline comfort level  Description: Interventions:  - Encourage patient to monitor pain and request assistance  - Assess pain using appropriate pain scale  - Administer analgesics based on type and severity of pain and evaluate response  - Implement non-pharmacological measures as appropriate and evaluate response  - Consider cultural and social influences on pain and pain management  - Notify physician/advanced practitioner if interventions unsuccessful or patient reports new pain  Outcome: Progressing     Problem: INFECTION - ADULT  Goal: Absence or prevention of progression during hospitalization  Description: INTERVENTIONS:  - Assess and monitor for signs and symptoms of infection  - Monitor lab/diagnostic results  - Monitor all insertion sites, i e  indwelling lines, tubes, and drains  - Monitor endotracheal if appropriate and nasal secretions for changes in amount and color  - [place] appropriate cooling/warming therapies per order  - Administer medications as ordered  - Instruct and encourage patient and family to use good hand hygiene technique  - Identify and instruct in appropriate isolation precautions for identified infection/condition  Outcome: Progressing     Problem: SAFETY ADULT  Goal: Patient will remain free of falls  Description: INTERVENTIONS:  - Educate patient/family on patient safety including physical limitations  - Instruct patient to call for assistance with activity   - Consult OT/PT to assist with strengthening/mobility   - Keep Call bell within reach  - Keep bed low and locked with side rails adjusted as appropriate  - Keep care items and personal belongings within reach  - Initiate and maintain comfort rounds  - Make Fall Risk Sign visible to staff  - Apply yellow socks and bracelet for high fall risk patients  - Consider moving patient to room near nurses station  Outcome: Progressing  Goal: Maintain or return to baseline ADL function  Description: INTERVENTIONS:  -  Assess patient's ability to carry out ADLs; assess patient's baseline for ADL function and identify physical deficits which impact ability to perform ADLs (bathing, care of mouth/teeth, toileting, grooming, dressing, etc )  - Assess/evaluate cause of self-care deficits   - Assess range of motion  - Assess patient's mobility; develop plan if impaired  - Assess patient's need for assistive devices and provide as appropriate  - Encourage maximum independence but intervene and supervise when necessary  - Involve family in performance of ADLs  - Assess for home care needs following discharge   - Consider OT consult to assist with ADL evaluation and planning for discharge  - Provide patient education as appropriate  Outcome: Progressing     Problem: DISCHARGE PLANNING  Goal: Discharge to home or other facility with appropriate resources  Description: INTERVENTIONS:  - Identify barriers to discharge w/patient and caregiver  - Arrange for needed discharge resources and transportation as appropriate  - Identify discharge learning needs (meds, wound care, etc )  - Arrange for interpretive services to assist at discharge as needed  - Refer to Case Management Department for coordinating discharge planning if the patient needs post-hospital services based on physician/advanced practitioner order or complex needs related to functional status, cognitive ability, or social support system  Outcome: Progressing     Problem: Knowledge Deficit  Goal: Patient/family/caregiver demonstrates understanding of disease process, treatment plan, medications, and discharge instructions  Description: Complete learning assessment and assess knowledge base    Interventions:  - Provide teaching at level of understanding  - Provide teaching via preferred learning methods  Outcome: Progressing     Problem: RESPIRATORY - ADULT  Goal: Achieves optimal ventilation and oxygenation  Description: INTERVENTIONS:  - Assess for changes in respiratory status  - Assess for changes in mentation and behavior  - Position to facilitate oxygenation and minimize respiratory effort  - Oxygen administered by appropriate delivery if ordered  - Initiate smoking cessation education as indicated  - Encourage broncho-pulmonary hygiene including cough, deep breathe, Incentive Spirometry  - Assess the need for suctioning and aspirate as needed  - Assess and instruct to report SOB or any respiratory difficulty  - Respiratory Therapy support as indicated  Outcome: Progressing     Problem: GASTROINTESTINAL - ADULT  Goal: Maintains adequate nutritional intake  Description: INTERVENTIONS:  - Monitor percentage of each meal consumed  - Identify factors contributing to decreased intake, treat as appropriate  - Assist with meals as needed  - Monitor I&O, weight, and lab values if indicated  - Obtain nutrition services referral as needed  Outcome: Progressing     Problem: GENITOURINARY - ADULT  Goal: Maintains or returns to baseline urinary function  Description: INTERVENTIONS:  - Assess urinary function  - Encourage oral fluids to ensure adequate hydration if ordered  - Administer IV fluids as ordered to ensure adequate hydration  - Administer ordered medications as needed  - Offer frequent toileting  - Follow urinary retention protocol if ordered  Outcome: Progressing     Problem: METABOLIC, FLUID AND ELECTROLYTES - ADULT  Goal: Electrolytes maintained within normal limits  Description: INTERVENTIONS:  - Monitor labs and assess patient for signs and symptoms of electrolyte imbalances  - Administer electrolyte replacement as ordered  - Monitor response to electrolyte replacements, including repeat lab results as appropriate  - Instruct patient on fluid and nutrition as appropriate  Outcome: Progressing  Goal: Fluid balance maintained  Description: INTERVENTIONS:  - Monitor labs   - Monitor I/O and WT  - Instruct patient on fluid and nutrition as appropriate  - Assess for signs & symptoms of volume excess or deficit  Outcome: Progressing  Goal: Glucose maintained within target range  Description: INTERVENTIONS:  - Monitor Blood Glucose as ordered  - Assess for signs and symptoms of hyperglycemia and hypoglycemia  - Administer ordered medications to maintain glucose within target range  - Assess nutritional intake and initiate nutrition service referral as needed  Outcome: Progressing     Problem: HEMATOLOGIC - ADULT  Goal: Maintains hematologic stability  Description: INTERVENTIONS  - Assess for signs and symptoms of bleeding or hemorrhage  - Monitor labs  - Administer supportive blood products/factors as ordered and appropriate  Outcome: Progressing     Problem: MUSCULOSKELETAL - ADULT  Goal: Maintain or return mobility to safest level of function  Description: INTERVENTIONS:  - Assess patient's ability to carry out ADLs; assess patient's baseline for ADL function and identify physical deficits which impact ability to perform ADLs (bathing, care of mouth/teeth, toileting, grooming, dressing, etc )  - Assess/evaluate cause of self-care deficits   - Assess range of motion  - Assess patient's mobility  - Assess patient's need for assistive devices and provide as appropriate  - Encourage maximum independence but intervene and supervise when necessary  - Involve family in performance of ADLs  - Assess for home care needs following discharge   - Consider OT consult to assist with ADL evaluation and planning for discharge  - Provide patient education as appropriate  Outcome: Progressing

## 2023-02-16 NOTE — PROGRESS NOTES
[address]  Progress Note - Saba Roa 1990, 26 y o  female MRN: [id number]  Unit/Bed#: E5 -01 Encounter: [id number]  Primary Care Provider: REJI Rodriguez   Date and time admitted to hospital: 2/10/2023  8:47 PM    External iliac artery thrombosis [facility])  Assessment & Plan  · Patient underwent a diagnostic cardiac catheterization via right femoral access on 1/12  · Patient was readmitted with right leg pain, and CTA showed thrombus involving the right external iliac artery, extending to the proximal right common femoral artery  · 1/30: Underwent right femoral exposure, right external iliac embolectomy and right EIA stent placement  · discharged home with aspirin, Plavix, statin  · Patient follows with vascular surgery team in the office and was referred for readmission due to concerns over infection, as described above  · Wound VAC in place: Continue vascular surgery follow-up  · She has met with the vascular surgery nurse navigator      Hypertension  Assessment & Plan  continue home dose of lisinopril 30mg daily  Blood pressure adequately controlled  Outpatient follow-up with PCP    B[facility] disorder ([facility] )  Assessment & Plan  Continue zoloft 50 mg daily  Continue xanax prn: The [place] queried  There are no red flags  She uses the Xanax sparingly, and has not had a prescription filled since December    Type 2 diabetes mellitus without complication, with long-term current use of insulin [facility])  Assessment & Plan  Lab Results   Component Value Date    HGBA1C 9 6 (H) 01/10/2023       Recent Labs     02/15/23  1601 02/15/23  2053 02/16/23  0654 02/16/23  1113   POCGLU 187* 134 120 147*     Patient's Lantus dose was decreased due to low blood sugars  Continue Accu-Cheks and sliding scale insulin  Temporarily held home metformin and Januvia:  We will restart at discharge  Diabetic diet  Titrate medications to goal    Morbid obesity [facility])  Assessment & Plan  bmi 43 noted  Dietary counseling, lifestyle modification    Aortic coarctation  Assessment & Plan  · She has prior history of aortic coarctation status post repair/stent  · Continue outpatient follow-up    * Surgical site infection  Assessment & Plan  Patient is a 38-year-old female with past medical history significant for hypertension, diabetes, and previous coarctation of the aorta  · Patient underwent a diagnostic cardiac catheterization via right femoral access on 1/12  · Patient was readmitted with right leg pain, and CTA showed thrombus involving the right external iliac artery, extending to the proximal right common femoral artery  · 1/30: Underwent right femoral exposure, right external iliac embolectomy and right EIA stent placement, and discharged home with aspirin, Plavix, statin  · Outpatient vascular surgery follow-up: Concerns for pain, and possible infection  · Outpatient cultures 2/6 grew ESBL Proteus mirabilis  · Patient was readmitted 2/10 and followed by the vascular surgery and infectious disease team  · Status post washout with wound VAC on 02/11  · Vascular surgery team confirms patient will require wound VAC at discharge: With VAC changes on Monday, Wednesday, Friday  · Per infectious disease team: Continue ertapenem 1 g every 24 hours for a 10-day total course from the time of her I&D, last dose 2/20  · PICC line placed 2/13  · 2/10: Blood cultures: Negative x2  · 2/10: Wound culture: ESBL Proteus x1    Anaerobic culture negative thus far  · Case management coordinating discharge: Home antibiotics have been arranged, awaiting coordination for home VAC wound care, with changes every Monday, Wednesday, Friday      VTE Pharmacologic Prophylaxis: heparin     Mechanical VTE Prophylaxis in Place: Yes    Education and Discussions with Family / Patient: patient    Current Length of Stay: 6 day(s)  Current Patient Status: Inpatient     Discharge Plan / Estimated Discharge Date: 24 to 48 hours Code Status: Level 1 - Full Code      Subjective:   Pt seen and examined  No problems reported except that she wants to go home  No f/ c no cp no sob no n/v/d no abd pain  Objective:     Vitals:   Temp (24hrs), Av °F (36 7 °C), Min:97 9 °F (36 6 °C), Max:98 1 °F (36 7 °C)    Temp:  [97 9 °F (36 6 °C)-98 1 °F (36 7 °C)] 98 1 °F (36 7 °C)  HR:  [78-92] 92  Resp:  [18] 18  BP: ()/(61-76) 104/64  SpO2:  [92 %-98 %] 96 %  Body mass index is 42 74 kg/m²  Input and Output Summary (last 24 hours):     No intake or output data in the 24 hours ending 23 1510    Physical Exam:   Physical Exam  Constitutional:       Appearance: Normal appearance  HENT:      Head: Normocephalic and atraumatic  Eyes:      Extraocular Movements: Extraocular movements intact  Pupils: Pupils are equal, round, and reactive to light  Cardiovascular:      Rate and Rhythm: Normal rate and regular rhythm  Heart sounds: No murmur heard  No friction rub  No gallop  Pulmonary:      Effort: Pulmonary effort is normal  No respiratory distress  Breath sounds: Normal breath sounds  No wheezing, rhonchi or rales  Abdominal:      General: Bowel sounds are normal  There is no distension  Palpations: Abdomen is soft  Tenderness: There is no abdominal tenderness  There is no guarding or rebound  Musculoskeletal:      Right lower leg: No edema  Left lower leg: No edema  Neurological:      Mental Status: She is alert and oriented to person, place, and time            Additional Data:     Labs:  Results from last 7 days   Lab Units 23  0545   WBC Thousand/uL 9 22   HEMOGLOBIN g/dL 9 9*   HEMATOCRIT % 31 3*   PLATELETS Thousands/uL 293   NEUTROS PCT % 49   LYMPHS PCT % 42   MONOS PCT % 7   EOS PCT % 1     Results from last 7 days   Lab Units 23  0545 23  0605 02/10/23  2152   SODIUM mmol/L 138   < > 136   POTASSIUM mmol/L 4 0   < > 3 9   CHLORIDE mmol/L 104   < > 101   CO2 mmol/L 26   < > 27   BUN mg/dL 14   < > 16   CREATININE mg/dL 0 56*   < > 0 59*   ANION GAP mmol/L 8   < > 8   CALCIUM mg/dL 8 9   < > 9 6   ALBUMIN g/dL  --   --  4 2   TOTAL BILIRUBIN mg/dL  --   --  0 26   ALK PHOS U/L  --   --  59   ALT U/L  --   --  12   AST U/L  --   --  11*   GLUCOSE RANDOM mg/dL 119   < > 189*    < > = values in this interval not displayed  Results from last 7 days   Lab Units 02/10/23  2152   INR  0 96     Results from last 7 days   Lab Units 02/16/23  1113 02/16/23  0654 02/15/23  2053 02/15/23  1601 02/15/23  1111 02/15/23  0723 02/14/23  2258 02/14/23  1610 02/14/23  1108 02/14/23  0713 02/13/23  2101 02/13/23  1544   POC GLUCOSE mg/dl 147* 120 134 187* 135 127 137 133 174* 110 164* 147*         Results from last 7 days   Lab Units 02/10/23  2152   LACTIC ACID mmol/L 1 2   PROCALCITONIN ng/ml <0 05     Recent Cultures (last 7 days):     Results from last 7 days   Lab Units 02/11/23  0932 02/10/23  2145   BLOOD CULTURE   --  No Growth After 5 Days  No Growth After 5 Days     GRAM STAIN RESULT  No Polys or Bacteria seen  --        Lines/Drains:  Invasive Devices     Peripherally Inserted Central Catheter Line  Duration           PICC Line 24/80/96 Right Basilic 3 days              Last 24 Hours Medication List:   Current Facility-Administered Medications   Medication Dose Route Frequency Provider Last Rate   • acetaminophen  650 mg Oral Q6H PRN Bennie Lopez MD     • ALPRAZolam  0 5 mg Oral HS PRN Bennie Lopez MD     • aspirin  81 mg Oral Daily Bennie Lopez MD     • clopidogrel  75 mg Oral Daily Bennie Lopez MD     • ertapenem  1,000 mg Intravenous Q24H Jessy Burns MD 1,000 mg (02/16/23 1253)   • gabapentin  100 mg Oral HS Bennie Lopez MD     • heparin (porcine)  5,000 Units Subcutaneous [facility] Bennie Lopez MD     • insulin glargine  15 Units Subcutaneous HS Mohinder Nolasco MD     • insulin lispro  1-5 Units Subcutaneous TID AC Sita Sutton MD     • lisinopril  30 mg Oral Daily Tim Santiago MD     • montelukast  10 mg Oral HS Warner Madsen MD     • ondansetron  4 mg Intravenous Q6H PRN Warner Madsen MD     • sertraline  50 mg Oral Daily Warner Madsen MD          Today, Patient Was Seen By: Yen Ghotra DO

## 2023-02-16 NOTE — CASE MANAGEMENT
Case Management Assessment & Discharge Planning Note    Patient name Dolly Love  [facility]  [address] 557/E5  269 114-* MRN [id number]  : 1990 Date 2023       Current Admission Date: 2/10/2023  Current Admission Diagnosis:Surgical site infection   Patient Active Problem List    Diagnosis Date Noted   • Surgical site infection 2023   • 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[facility] ) 2023   • Continuous opioid dependence ([facility] ) 2023   • Arterial occlusion 2023   • Right groin pain    • Elevated troponin I level 01/10/2023   • Chest pain 01/10/2023   • Depression 2022   • Anxiety 2022   • Neuropathy 2022   • Multiple thyroid nodules 2021   • Chronic hepatitis C without hepatic coma ([facility] )    • VSD (ventricular septal defect) and coarctation of aorta 07/15/2021   • Bipolar disorder ([facility] ) 01/10/2020   • Type 2 diabetes mellitus without complication, with long-term current use of insulin ([facility] ) 2019   • Vitamin D deficiency 2019   • Cyst of right ovary 10/10/2019   • Endometriosis 2019   • Hidradenitis suppurativa 2019   • Hypercholesteremia 2019   • Aortic coarctation 2019   • Intractable chronic migraine without aura and with status migrainosus 2019   • Cervicalgia 2019   • Cervical dystonia 2019   • Splenomegaly 10/12/2018   • Hepatitis C 10/12/2018   • Hypertension 10/12/2018   • H/O aortic coarctation repair 2018   • Morbid obesity ([facility] ) 2018      LOS (days): 6  Geometric Mean LOS (GMLOS) (days):   Days to GMLOS:     OBJECTIVE:  PATIENT READMITTED TO HOSPITAL  Risk of Unplanned Readmission Score: 15 12         Current admission status: Inpatient       Preferred Pharmacy:   [address] [place] [id number]  Phone: [phone] Fax: [phone]    Primary Care Provider: REJI Francois    Primary Insurance: [facility] SHIELD  Secondary Insurance: [employer]    ASSESSMENT:  Active Health Care Proxies    There are no active Health Care Proxies on file         Readmission Root Cause  30 Day Readmission: No, Yes  Who directed you to return to the hospital?: Self  Did you understand whom to contact if you had questions or problems?: Yes  Did you get your prescriptions before you left the hospital?: Yes  Were you able to get your prescriptions filled when you left the hospital?: Yes  Did you take your medications as prescribed?: Yes  Patient was readmitted due to: Surgical Site Infection  Action Plan: Vascular following- IV Abx, wash out and wound vac    Patient Information  Admitted from[de-identified] Home  Mental Status: Alert  During Assessment patient was accompanied by: Not accompanied during assessment  Assessment information provided by[de-identified] Patient  Primary Caregiver: Self  Support Systems: Spouse/significant other, Parent  [place] of Residence: [address] [place] [address] do you live in?: [place]  Homeless/housing insecurity resource given?: N/A  Living Arrangements: Lives w/ Parent(s)  Is patient a ?: No    Activities of Daily Living Prior to Admission  Functional Status: Independent  Completes ADLs independently?: Yes  Ambulates independently?: Yes  Does patient use assisted devices?: No  Does patient have a history of HHC?: No  Does patient currently have [address]?: No    Patient Information Continued  Income Source: SSI/SSD  Does patient have prescription coverage?: Yes  Within the past 12 months, you worried that your food would run out before you got the money to buy more : Never true  Within the past 12 months, the food you bought just didn't last and you didn't have money to get more : Never true  Food insecurity resource given?: N/A  Does patient receive dialysis treatments?: No  Does patient have a history of substance abuse?: No  Does patient have a history of Mental Health Diagnosis?: No    Means of Transportation  Means of Transport to [facility][de-identified] Drives Self  In the past 12 months, has lack of transportation kept you from medical appointments or from getting medications?: No  In the past 12 months, has lack of transportation kept you from meetings, work, or from getting things needed for daily living?: No  Was application for public transport provided?: N/A        DISCHARGE DETAILS:    Discharge planning discussed with[de-identified] Patient  Freedom of Choice: Yes  Comments - Freedom of Choice: Pt david ldischarge home w/ VNA for wound care and Infusion RN for IV Abx/PICC care  Were Treatment Team discharge recommendations reviewed with patient/caregiver?: Yes  Did patient/caregiver verbalize understanding of patient care needs?: Yes  Were patient/caregiver advised of the risks associated with not following Treatment Team discharge recommendations?: Yes    Contacts  Patient Contacts: Efrain Carranza (Mother) [phone] (M)  Relationship to Patient[de-identified] Family  Contact Method: Phone  Phone Number: Efrain Carranza (Mother) [phone] Will Him)  Reason/Outcome: Emergency [address]         Is the patient interested in [address] at discharge?: Yes  [address] requested[de-identified] [address] Name[de-identified] Other Karine Montgomery  [address] Provider[de-identified] PCP  [address] Needed[de-identified] Wound/Ostomy Care, Post-Op Care and Assessment  Homebound Criteria Met[de-identified] Immunosuppressed  Supporting Clincal Findings[de-identified] Limited Endurance    DME Referral Provided  Referral made for DME?: No    Other Referral/Resources/Interventions Provided:  Interventions: Home Infusion, Wound Vac, HHC  Referral Comments: SN visits for wound vac/ wound care through [facility] and SN visits for home infusions through [facility]  Discharge Destination Plan[de-identified] Home with [place] at Discharge : Automobile  Transfer Mode: Ambulate  Accompanied by: Family member    Additional Comments: CM met with Pt to discuss discharge plan  Pt aware she will require SN for IV Abx-Infusions and SN for wound vac/wound care  Pt identified her Mother to assist with general care and IV Abx  Referrals sent for both HHC and Home Infusions, CM intially unable to acquire services  Initial barriers to care were insurance, location of Pts home and staffing availability of VNAs  CM resent referral to VNAs that were short staffed, [facility] accepted and now reserved / [facility] 2/18  [facility] accepted with no co-pays and SOC on Friday 2/17  Wound vac ordered and in process  CM continues to follow

## 2023-02-17 ENCOUNTER — TELEPHONE (OUTPATIENT)
Dept: VASCULAR SURGERY | Facility: CLINIC | Age: 33
End: 2023-02-17

## 2023-02-17 VITALS
SYSTOLIC BLOOD PRESSURE: 119 MMHG | TEMPERATURE: 98 F | BODY MASS INDEX: 42.74 KG/M2 | OXYGEN SATURATION: 96 % | HEART RATE: 70 BPM | DIASTOLIC BLOOD PRESSURE: 84 MMHG | WEIGHT: 226.19 LBS | RESPIRATION RATE: 12 BRPM

## 2023-02-17 LAB
GLUCOSE SERPL-MCNC: 135 MG/DL (ref 65–140)
GLUCOSE SERPL-MCNC: 141 MG/DL (ref 65–140)

## 2023-02-17 RX ORDER — HYDROMORPHONE HCL IN WATER/PF 6 MG/30 ML
0.2 PATIENT CONTROLLED ANALGESIA SYRINGE INTRAVENOUS ONCE
Status: COMPLETED | OUTPATIENT
Start: 2023-02-17 | End: 2023-02-17

## 2023-02-17 RX ADMIN — LISINOPRIL 30 MG: 10 TABLET ORAL at 09:42

## 2023-02-17 RX ADMIN — HYDROMORPHONE HYDROCHLORIDE 0.2 MG: 0.2 INJECTION, SOLUTION INTRAMUSCULAR; INTRAVENOUS; SUBCUTANEOUS at 15:25

## 2023-02-17 RX ADMIN — CLOPIDOGREL BISULFATE 75 MG: 75 TABLET ORAL at 09:42

## 2023-02-17 RX ADMIN — HEPARIN SODIUM 5000 UNITS: 5000 INJECTION INTRAVENOUS; SUBCUTANEOUS at 05:27

## 2023-02-17 RX ADMIN — ASPIRIN 81 MG: 81 TABLET, COATED ORAL at 09:42

## 2023-02-17 RX ADMIN — SERTRALINE HYDROCHLORIDE 50 MG: 50 TABLET ORAL at 09:43

## 2023-02-17 RX ADMIN — ERTAPENEM SODIUM 1000 MG: 1 INJECTION, POWDER, LYOPHILIZED, FOR SOLUTION INTRAMUSCULAR; INTRAVENOUS at 11:52

## 2023-02-17 NOTE — PLAN OF CARE
Problem: PAIN - ADULT  Goal: Verbalizes/displays adequate comfort level or baseline comfort level  Description: Interventions:  - Encourage patient to monitor pain and request assistance  - Assess pain using appropriate pain scale  - Administer analgesics based on type and severity of pain and evaluate response  - Implement non-pharmacological measures as appropriate and evaluate response  - Consider cultural and social influences on pain and pain management  - Notify physician/advanced practitioner if interventions unsuccessful or patient reports new pain  Outcome: Progressing     Problem: INFECTION - ADULT  Goal: Absence or prevention of progression during hospitalization  Description: INTERVENTIONS:  - Assess and monitor for signs and symptoms of infection  - Monitor lab/diagnostic results  - Monitor all insertion sites, i e  indwelling lines, tubes, and drains  - Monitor endotracheal if appropriate and nasal secretions for changes in amount and color  - Davidson appropriate cooling/warming therapies per order  - Administer medications as ordered  - Instruct and encourage patient and family to use good hand hygiene technique  - Identify and instruct in appropriate isolation precautions for identified infection/condition  Outcome: Progressing     Problem: SAFETY ADULT  Goal: Patient will remain free of falls  Description: INTERVENTIONS:  - Educate patient/family on patient safety including physical limitations  - Instruct patient to call for assistance with activity   - Consult OT/PT to assist with strengthening/mobility   - Keep Call bell within reach  - Keep bed low and locked with side rails adjusted as appropriate  - Keep care items and personal belongings within reach  - Initiate and maintain comfort rounds  - Make Fall Risk Sign visible to staff  - Apply yellow socks and bracelet for high fall risk patients  - Consider moving patient to room near nurses station  Outcome: Progressing  Goal: Maintain or return to baseline ADL function  Description: INTERVENTIONS:  -  Assess patient's ability to carry out ADLs; assess patient's baseline for ADL function and identify physical deficits which impact ability to perform ADLs (bathing, care of mouth/teeth, toileting, grooming, dressing, etc )  - Assess/evaluate cause of self-care deficits   - Assess range of motion  - Assess patient's mobility; develop plan if impaired  - Assess patient's need for assistive devices and provide as appropriate  - Encourage maximum independence but intervene and supervise when necessary  - Involve family in performance of ADLs  - Assess for home care needs following discharge   - Consider OT consult to assist with ADL evaluation and planning for discharge  - Provide patient education as appropriate  Outcome: Progressing     Problem: DISCHARGE PLANNING  Goal: Discharge to home or other facility with appropriate resources  Description: INTERVENTIONS:  - Identify barriers to discharge w/patient and caregiver  - Arrange for needed discharge resources and transportation as appropriate  - Identify discharge learning needs (meds, wound care, etc )  - Arrange for interpretive services to assist at discharge as needed  - Refer to Case Management Department for coordinating discharge planning if the patient needs post-hospital services based on physician/advanced practitioner order or complex needs related to functional status, cognitive ability, or social support system  Outcome: Progressing     Problem: Knowledge Deficit  Goal: Patient/family/caregiver demonstrates understanding of disease process, treatment plan, medications, and discharge instructions  Description: Complete learning assessment and assess knowledge base    Interventions:  - Provide teaching at level of understanding  - Provide teaching via preferred learning methods  Outcome: Progressing     Problem: RESPIRATORY - ADULT  Goal: Achieves optimal ventilation and oxygenation  Description: INTERVENTIONS:  - Assess for changes in respiratory status  - Assess for changes in mentation and behavior  - Position to facilitate oxygenation and minimize respiratory effort  - Oxygen administered by appropriate delivery if ordered  - Initiate smoking cessation education as indicated  - Encourage broncho-pulmonary hygiene including cough, deep breathe, Incentive Spirometry  - Assess the need for suctioning and aspirate as needed  - Assess and instruct to report SOB or any respiratory difficulty  - Respiratory Therapy support as indicated  Outcome: Progressing     Problem: GASTROINTESTINAL - ADULT  Goal: Maintains adequate nutritional intake  Description: INTERVENTIONS:  - Monitor percentage of each meal consumed  - Identify factors contributing to decreased intake, treat as appropriate  - Assist with meals as needed  - Monitor I&O, weight, and lab values if indicated  - Obtain nutrition services referral as needed  Outcome: Progressing     Problem: GENITOURINARY - ADULT  Goal: Maintains or returns to baseline urinary function  Description: INTERVENTIONS:  - Assess urinary function  - Encourage oral fluids to ensure adequate hydration if ordered  - Administer IV fluids as ordered to ensure adequate hydration  - Administer ordered medications as needed  - Offer frequent toileting  - Follow urinary retention protocol if ordered  Outcome: Progressing     Problem: METABOLIC, FLUID AND ELECTROLYTES - ADULT  Goal: Electrolytes maintained within normal limits  Description: INTERVENTIONS:  - Monitor labs and assess patient for signs and symptoms of electrolyte imbalances  - Administer electrolyte replacement as ordered  - Monitor response to electrolyte replacements, including repeat lab results as appropriate  - Instruct patient on fluid and nutrition as appropriate  Outcome: Progressing  Goal: Fluid balance maintained  Description: INTERVENTIONS:  - Monitor labs   - Monitor I/O and WT  - Instruct patient on fluid and nutrition as appropriate  - Assess for signs & symptoms of volume excess or deficit  Outcome: Progressing  Goal: Glucose maintained within target range  Description: INTERVENTIONS:  - Monitor Blood Glucose as ordered  - Assess for signs and symptoms of hyperglycemia and hypoglycemia  - Administer ordered medications to maintain glucose within target range  - Assess nutritional intake and initiate nutrition service referral as needed  Outcome: Progressing     Problem: HEMATOLOGIC - ADULT  Goal: Maintains hematologic stability  Description: INTERVENTIONS  - Assess for signs and symptoms of bleeding or hemorrhage  - Monitor labs  - Administer supportive blood products/factors as ordered and appropriate  Outcome: Progressing     Problem: MUSCULOSKELETAL - ADULT  Goal: Maintain or return mobility to safest level of function  Description: INTERVENTIONS:  - Assess patient's ability to carry out ADLs; assess patient's baseline for ADL function and identify physical deficits which impact ability to perform ADLs (bathing, care of mouth/teeth, toileting, grooming, dressing, etc )  - Assess/evaluate cause of self-care deficits   - Assess range of motion  - Assess patient's mobility  - Assess patient's need for assistive devices and provide as appropriate  - Encourage maximum independence but intervene and supervise when necessary  - Involve family in performance of ADLs  - Assess for home care needs following discharge   - Consider OT consult to assist with ADL evaluation and planning for discharge  - Provide patient education as appropriate  Outcome: Progressing

## 2023-02-17 NOTE — DISCHARGE SUMMARY
Discharge Summary - [address] [facility]    Patient Information: Krzysztof Lopez 26 y o  female MRN: [id number]  Unit/Bed#: E5 -01 Encounter: [id number]    Discharging Physician / Practitioner: Rylie Alejandro DO  PCP: Ellen Farfan  Admission Date: 2/10/2023  Discharge Date: 02/17/23    Disposition:     Home with VNA Services (Reminder: Complete face to face encounter)     Reason for Admission: surgical site infection     Discharge Diagnoses:     Principal Problem:    Surgical site infection  Active Problems: Aortic coarctation    Morbid obesity (HCC)    Type 2 diabetes mellitus without complication, with long-term current use of insulin (HCC)    Bipolar disorder ([facility] )    Hypertension    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[facility] )  Resolved Problems:    * No resolved hospital problems  *      Consultations During Hospital Stay:  · Vascular   · Infectious disease      Procedures Performed:     · S/p washout of the right groin wound     Significant Findings / Test Results:   none      Incidental Findings:   · none    Test Results Pending at Discharge (will require follow up):   · none     Outpatient Tests Requested:  none    Hospital Course:     Krzysztof Lopez is a 26 y o  female patient who originally presented to the hospital on 2/10/2023 due to surgical site infection  She underwent a cardiac cath right femoral access on 1/12  She had right leg pain and cta showed thrombus in which on 1/30 she underwent a right external iliac embolectomy with stent placement  There was concern for infection in which she was evaluated by vascular outpt and cultures grew esbl proteus  She had a washout on 2/11 with wound vac placed  ID evaluated her and she will complete a 10 day course of ertapenem  She will continue wound vac with changes on m/w/f  She was discharged to home with hhpt       Discharge Day Visit / Exam:     * Please refer to separate progress note for these details *    Discharge instructions/Information to patient and family:   See after visit summary for information provided to patient and family  Provisions for Follow-Up Care:  See after visit summary for information related to follow-up care and any pertinent home health orders  Discharge Statement:  I spent 40 minutes discharging the patient  This time was spent on the day of discharge  I had direct contact with the patient on the day of discharge  Greater than 50% of the total time was spent examining patient, answering all patient questions, arranging and discussing plan of care with patient as well as directly providing post-discharge instructions  Additional time then spent on discharge activities  Discharge Medications:  See after visit summary for reconciled discharge medications provided to patient and family

## 2023-02-17 NOTE — PROGRESS NOTES
[facility]  Progress Note - Shin Passer 1990, 26 y o  female MRN: [id number]  Unit/Bed#: E5 -01 Encounter: [id number]  Primary Care Provider: Renelda Cabot, CRNP   Date and time admitted to hospital: 2/10/2023  8:47 PM    External iliac artery thrombosis [facility])  Assessment & Plan  · Patient underwent a diagnostic cardiac catheterization via right femoral access on 1/12  · Patient was readmitted with right leg pain, and CTA showed thrombus involving the right external iliac artery, extending to the proximal right common femoral artery  · 1/30: Underwent right femoral exposure, right external iliac embolectomy and right EIA stent placement  · discharged home with aspirin, Plavix, statin  · Patient follows with vascular surgery team in the office and was referred for readmission due to concerns over infection, as described above  · Wound VAC in place: Continue vascular surgery follow-up  · She has met with the vascular surgery nurse navigator      Hypertension  Assessment & Plan  continue home dose of lisinopril 30mg daily  Blood pressure adequately controlled  Outpatient follow-up with PCP    Bipolar disorder ([facility] )  Assessment & Plan  Continue zoloft 50 mg daily  Continue xanax prn: The [place] queried  There are no red flags  She uses the Xanax sparingly, and has not had a prescription filled since December    Type 2 diabetes mellitus without complication, with long-term current use of insulin [facility])  Assessment & Plan  Lab Results   Component Value Date    HGBA1C 9 6 (H) 01/10/2023       Recent Labs     02/16/23  1559 02/16/23  2046 02/17/23  0747 02/17/23  1135   POCGLU 154* 188* 135 141*     Patient's Lantus dose was decreased due to low blood sugars  Continue Accu-Cheks and sliding scale insulin  Temporarily held home metformin and Januvia:  We will restart at discharge  Diabetic diet  Titrate medications to goal    Morbid obesity [facility])  Assessment & Plan  bmi 43 noted  Dietary counseling, lifestyle modification    Aortic coarctation  Assessment & Plan  · She has prior history of aortic coarctation status post repair/stent  · Continue outpatient follow-up    * Surgical site infection  Assessment & Plan  Patient is a 56-year-old female with past medical history significant for hypertension, diabetes, and previous coarctation of the aorta  · Patient underwent a diagnostic cardiac catheterization via right femoral access on   · Patient was readmitted with right leg pain, and CTA showed thrombus involving the right external iliac artery, extending to the proximal right common femoral artery  · : Underwent right femoral exposure, right external iliac embolectomy and right EIA stent placement, and discharged home with aspirin, Plavix, statin  · Outpatient vascular surgery follow-up: Concerns for pain, and possible infection  · Outpatient cultures  grew ESBL Proteus mirabilis  · Patient was readmitted 2/10 and followed by the vascular surgery and infectious disease team  · Status post washout with wound VAC on   · Vascular surgery team confirms patient will require wound VAC at discharge: With VAC changes on Monday, Wednesday, Friday  · Per infectious disease team: Continue ertapenem 1 g every 24 hours for a 10-day total course from the time of her I&D, last dose   · PICC line placed   · 2/10: Blood cultures: Negative x2  · 2/10: Wound culture: ESBL Proteus x1  Anaerobic culture negative thus far  · Case management coordinating discharge: Home antibiotics have been arranged, awaiting coordination for home VAC wound care, with changes every Monday, Wednesday, Friday      Discharge Plan / Estimated Discharge Date: d/c to home with follow up with vascular     Code Status: Level 1 - Full Code      Subjective:   Pt seen and examined  Pt doing well  No f/c no cp no sob no n/v/d no abd pain       Objective:     Vitals:   Temp (24hrs), Av 9 °F (36 6 °C), Min:97 1 °F (36 2 °C), Max:98 6 °F (37 °C)    Temp:  [97 1 °F (36 2 °C)-98 6 °F (37 °C)] 98 °F (36 7 °C)  HR:  [70-92] 70  Resp:  [12-18] 12  BP: ()/(71-84) 119/84  SpO2:  [96 %] 96 %  Body mass index is 42 74 kg/m²  Input and Output Summary (last 24 hours): Intake/Output Summary (Last 24 hours) at 2/17/2023 1247  Last data filed at 2/16/2023 2041  Gross per 24 hour   Intake 10 ml   Output --   Net 10 ml       Physical Exam:   Physical Exam  Constitutional:       Appearance: Normal appearance  HENT:      Head: Normocephalic and atraumatic  Eyes:      Extraocular Movements: Extraocular movements intact  Pupils: Pupils are equal, round, and reactive to light  Cardiovascular:      Rate and Rhythm: Normal rate and regular rhythm  Heart sounds: No murmur heard  No friction rub  No gallop  Pulmonary:      Effort: Pulmonary effort is normal  No respiratory distress  Breath sounds: Normal breath sounds  No wheezing, rhonchi or rales  Abdominal:      General: Bowel sounds are normal  There is no distension  Palpations: Abdomen is soft  Tenderness: There is no abdominal tenderness  There is no guarding or rebound  Neurological:      Mental Status: She is alert and oriented to person, place, and time            Additional Data:     Labs:  Results from last 7 days   Lab Units 02/13/23  0545   WBC Thousand/uL 9 22   HEMOGLOBIN g/dL 9 9*   HEMATOCRIT % 31 3*   PLATELETS Thousands/uL 293   NEUTROS PCT % 49   LYMPHS PCT % 42   MONOS PCT % 7   EOS PCT % 1     Results from last 7 days   Lab Units 02/13/23  0545 02/11/23  0605 02/10/23  2152   SODIUM mmol/L 138   < > 136   POTASSIUM mmol/L 4 0   < > 3 9   CHLORIDE mmol/L 104   < > 101   CO2 mmol/L 26   < > 27   BUN mg/dL 14   < > 16   CREATININE mg/dL 0 56*   < > 0 59*   ANION GAP mmol/L 8   < > 8   CALCIUM mg/dL 8 9   < > 9 6   ALBUMIN g/dL  --   --  4 2   TOTAL BILIRUBIN mg/dL  --   --  0 26   ALK PHOS U/L  --   -- 59   ALT U/L  --   --  12   AST U/L  --   --  11*   GLUCOSE RANDOM mg/dL 119   < > 189*    < > = values in this interval not displayed  Results from last 7 days   Lab Units 02/10/23  2152   INR  0 96     Results from last 7 days   Lab Units 02/17/23  1135 02/17/23  0747 02/16/23  2046 02/16/23  1559 02/16/23  1113 02/16/23  0654 02/15/23  2053 02/15/23  1601 02/15/23  1111 02/15/23  0723 02/14/23  2258 02/14/23  1610   POC GLUCOSE mg/dl 141* 135 188* 154* 147* 120 134 187* 135 127 137 133         Results from last 7 days   Lab Units 02/10/23  2152   LACTIC ACID mmol/L 1 2   PROCALCITONIN ng/ml <0 05     Recent Cultures (last 7 days):     Results from last 7 days   Lab Units 02/11/23  0932 02/10/23  2145   BLOOD CULTURE   --  No Growth After 5 Days  No Growth After 5 Days     GRAM STAIN RESULT  No Polys or Bacteria seen  --        Lines/Drains:  Invasive Devices     Peripherally Inserted Central Catheter Line  Duration           PICC Line 72/81/10 Right Basilic 3 days              Last 24 Hours Medication List:   Current Facility-Administered Medications   Medication Dose Route Frequency Provider Last Rate   • acetaminophen  650 mg Oral Q6H PRCHRISTINE Figueroa MD     • ALPRAZolam  0 5 mg Oral HS PRCHIRSTINE Figueroa MD     • aspirin  81 mg Oral Daily Yas Figueroa MD     • clopidogrel  75 mg Oral Daily Yas Figueroa MD     • ertapenem  1,000 mg Intravenous Q24H Marcelina Miller MD 1,000 mg (02/17/23 1152)   • gabapentin  100 mg Oral HS Yas Figueroa MD     • heparin (porcine)  5,000 Units Subcutaneous [facility] Yas Figueroa MD     • insulin glargine  15 Units Subcutaneous HS Marylen Kill, MD     • insulin lispro  1-5 Units Subcutaneous TID AC Ginna Myers MD     • lisinopril  30 mg Oral Daily Ginna Myers MD     • montelukast  10 mg Oral HS Yas Figueroa MD     • ondansetron  4 mg Intravenous Q6H PRN Yas Figueroa MD     • sertraline  50 mg Oral Daily Yas Figueroa MD          Today, Patient Was Seen By: Crystal Castaneda DO

## 2023-02-17 NOTE — CASE MANAGEMENT
Case Management Discharge Planning Note    Patient name Arcadio Hernandez  [facility] [place]  [address] 557/[address]5  269 114-* MRN [id number]  : 1990 Date 2023       Current Admission Date: 2/10/2023  Current Admission Diagnosis:Surgical site infection   Patient Active Problem List    Diagnosis Date Noted   • Surgical site infection 2023   • 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[facility] ) 2023   • Continuous opioid dependence ([facility] ) 2023   • Arterial occlusion 2023   • Right groin pain    • Elevated troponin I level 01/10/2023   • Chest pain 01/10/2023   • Depression 2022   • Anxiety 2022   • Neuropathy 2022   • Multiple thyroid nodules 2021   • Chronic hepatitis C without hepatic coma ([place] [address] )    • VSD (ventricular septal defect) and coarctation of aorta 07/15/2021   • Bipolar disorder ([place] [address] ) 01/10/2020   • Type 2 diabetes mellitus without complication, with long-term current use of insulin ([place] [address] ) 2019   • Vitamin D deficiency 2019   • Cyst of right ovary 10/10/2019   • Endometriosis 2019   • Hidradenitis suppurativa 2019   • Hypercholesteremia 2019   • Aortic coarctation 2019   • Intractable chronic migraine without aura and with status migrainosus 2019   • Cervicalgia 2019   • Cervical dystonia 2019   • Splenomegaly 10/12/2018   • Hepatitis C 10/12/2018   • Hypertension 10/12/2018   • H/O aortic coarctation repair 2018   • Morbid obesity ([facility] ) 2018      LOS (days): 7  Geometric Mean LOS (GMLOS) (days):   Days to GMLOS:     OBJECTIVE:  Risk of Unplanned Readmission Score: 15 38         Current admission status: Inpatient   Preferred Pharmacy:   [facility] [address] [place] [id number]  Phone: [phone] Fax: [phone]    Primary Care Provider: REJI Ramirez    Primary Insurance: [facility]  Secondary Insurance: [facility]    DISCHARGE DETAILS:    Discharge planning discussed with[de-identified] Patient  Freedom of Choice: Yes  Comments - Freedom of Choice: Pt will discharge home today with [facility] for IV Abx and [facility] for wound/woundvac care  Were Treatment Team discharge recommendations reviewed with patient/caregiver?: Yes  Did patient/caregiver verbalize understanding of patient care needs?: Yes  Were patient/caregiver advised of the risks associated with not following Treatment Team discharge recommendations?: Yes    Discharge Destination Plan[de-identified] Home with [address], Other (and Home Infusion)  Transport at Discharge : Automobile  Transfer Mode: Ambulate  Accompanied by: Family member    Additional Comments: CM met with Pt to discuss dcp  Pts wound vac was approved this morning, delivered to Pt at bedside  Pt signed the DME received contract  Pt will have final Abx at 1300 today, and new dressing/packing by Vascular prior to application of Home wound vac  Infusion Nurse scheduled to visit Pt at her home starting tomorrow 2/18, and will continue daily visits through 2/20 for final Abx infusion  Infusion Nurse will pull PICC 2/21  [facility] schedule to admit Pt to [facility] on Monday 2/21, at which time they will change Pts dressings and empy vac, and continue every 3 days for as long as orders are renewed and Pts insurance remains current  Shuold Pts insurance lapse, her secondary (medicaid) will become her primary insurance  Vascular team aware that Pts insurance could lapse, at which time Vascular will assist with finding Pt a VNA that accepts Pts secondary,  If not successful, Vascular will schedule Pt for OP wound care in their offices  Pt is in agreement with this plan  Pt will transport home by car w/ her christo BOWEN remains available to follow through discharge

## 2023-02-17 NOTE — ASSESSMENT & PLAN NOTE
Patient is a 15-year-old female with past medical history significant for hypertension, diabetes, and previous coarctation of the aorta  · Patient underwent a diagnostic cardiac catheterization via right femoral access on 1/12  · Patient was readmitted with right leg pain, and CTA showed thrombus involving the right external iliac artery, extending to the proximal right common femoral artery  · 1/30: Underwent right femoral exposure, right external iliac embolectomy and right EIA stent placement, and discharged home with aspirin, Plavix, statin  · Outpatient vascular surgery follow-up: Concerns for pain, and possible infection  · Outpatient cultures 2/6 grew ESBL Proteus mirabilis  · Patient was readmitted 2/10 and followed by the vascular surgery and infectious disease team  · Status post washout with wound VAC on 02/11  · Vascular surgery team confirms patient will require wound VAC at discharge: With VAC changes on Monday, Wednesday, Friday  · Per infectious disease team: Continue ertapenem 1 g every 24 hours for a 10-day total course from the time of her I&D, last dose 2/20  · PICC line placed 2/13  · 2/10: Blood cultures: Negative x2  · 2/10: Wound culture: ESBL Proteus x1    Anaerobic culture negative thus far  · Case management coordinating discharge: Home antibiotics have been arranged, awaiting coordination for home VAC wound care, with changes every Monday, Wednesday, Friday

## 2023-02-17 NOTE — NURSING NOTE
Discharge instructions reviewed with pt  She is aware of follow-up appointments; infusion nurse scheduled tomorrow and wound nurse scheduled for Monday  Vascular team replaced wound vac, dressing is CDI and attached to portable wound vac  Single lumen PICC line in place, flushing well with good blood return, dressing CDI  Pt has no questions or concerns, all supplies for wound vac sent with pt  Family is providing ride home

## 2023-02-17 NOTE — PROCEDURES
Vascular Surgery  Ogden Maximilian 26 y o  female MRN: [id number]  Unit/Bed#: E5 -01 Encounter: [id number]    V  A C  Procedure Note    Date: 2/17/23  Time: 345 PM    Location of wound: R groin    Sponges removed:  2 Black Sponges  0 White Sponges    Dimensions of wound: 6 cm x 2 cm x 1 5 cm    Description of wound: Healthy granulation tissue without undermining or tracking   No purulence or drainage           Sponges placed:  2 Black Sponges (1 in wound bed, 1 bridge)  0 White Sponges    VAC settings:  125 mmHg  Continuous    Pt tolerated procedure well  VAC sticker placed to wound dressing, per protocol      Josiah Kamara PA-C  2/17/2023

## 2023-02-17 NOTE — PLAN OF CARE
Problem: PAIN - ADULT  Goal: Verbalizes/displays adequate comfort level or baseline comfort level  Description: Interventions:  - Encourage patient to monitor pain and request assistance  - Assess pain using appropriate pain scale  - Administer analgesics based on type and severity of pain and evaluate response  - Implement non-pharmacological measures as appropriate and evaluate response  - Consider cultural and social influences on pain and pain management  - Notify physician/advanced practitioner if interventions unsuccessful or patient reports new pain  Outcome: Progressing     Problem: INFECTION - ADULT  Goal: Absence or prevention of progression during hospitalization  Description: INTERVENTIONS:  - Assess and monitor for signs and symptoms of infection  - Monitor lab/diagnostic results  - Monitor all insertion sites, i e  indwelling lines, tubes, and drains  - Monitor endotracheal if appropriate and nasal secretions for changes in amount and color  - [place] appropriate cooling/warming therapies per order  - Administer medications as ordered  - Instruct and encourage patient and family to use good hand hygiene technique  - Identify and instruct in appropriate isolation precautions for identified infection/condition  Outcome: Progressing     Problem: SAFETY ADULT  Goal: Patient will remain free of falls  Description: INTERVENTIONS:  - Educate patient/family on patient safety including physical limitations  - Instruct patient to call for assistance with activity   - Consult OT/PT to assist with strengthening/mobility   - Keep Call bell within reach  - Keep bed low and locked with side rails adjusted as appropriate  - Keep care items and personal belongings within reach  - Initiate and maintain comfort rounds  - Make Fall Risk Sign visible to staff  - Apply yellow socks and bracelet for high fall risk patients  - Consider moving patient to room near nurses station  Outcome: Progressing  Goal: Maintain or return to baseline ADL function  Description: INTERVENTIONS:  -  Assess patient's ability to carry out ADLs; assess patient's baseline for ADL function and identify physical deficits which impact ability to perform ADLs (bathing, care of mouth/teeth, toileting, grooming, dressing, etc )  - Assess/evaluate cause of self-care deficits   - Assess range of motion  - Assess patient's mobility; develop plan if impaired  - Assess patient's need for assistive devices and provide as appropriate  - Encourage maximum independence but intervene and supervise when necessary  - Involve family in performance of ADLs  - Assess for home care needs following discharge   - Consider OT consult to assist with ADL evaluation and planning for discharge  - Provide patient education as appropriate  Outcome: Progressing     Problem: DISCHARGE PLANNING  Goal: Discharge to home or other facility with appropriate resources  Description: INTERVENTIONS:  - Identify barriers to discharge w/patient and caregiver  - Arrange for needed discharge resources and transportation as appropriate  - Identify discharge learning needs (meds, wound care, etc )  - Arrange for interpretive services to assist at discharge as needed  - Refer to Case Management Department for coordinating discharge planning if the patient needs post-hospital services based on physician/advanced practitioner order or complex needs related to functional status, cognitive ability, or social support system  Outcome: Progressing     Problem: Knowledge Deficit  Goal: Patient/family/caregiver demonstrates understanding of disease process, treatment plan, medications, and discharge instructions  Description: Complete learning assessment and assess knowledge base    Interventions:  - Provide teaching at level of understanding  - Provide teaching via preferred learning methods  Outcome: Progressing     Problem: RESPIRATORY - ADULT  Goal: Achieves optimal ventilation and oxygenation  Description: INTERVENTIONS:  - Assess for changes in respiratory status  - Assess for changes in mentation and behavior  - Position to facilitate oxygenation and minimize respiratory effort  - Oxygen administered by appropriate delivery if ordered  - Initiate smoking cessation education as indicated  - Encourage broncho-pulmonary hygiene including cough, deep breathe, Incentive Spirometry  - Assess the need for suctioning and aspirate as needed  - Assess and instruct to report SOB or any respiratory difficulty  - Respiratory Therapy support as indicated  Outcome: Progressing     Problem: GASTROINTESTINAL - ADULT  Goal: Maintains adequate nutritional intake  Description: INTERVENTIONS:  - Monitor percentage of each meal consumed  - Identify factors contributing to decreased intake, treat as appropriate  - Assist with meals as needed  - Monitor I&O, weight, and lab values if indicated  - Obtain nutrition services referral as needed  Outcome: Progressing     Problem: GENITOURINARY - ADULT  Goal: Maintains or returns to baseline urinary function  Description: INTERVENTIONS:  - Assess urinary function  - Encourage oral fluids to ensure adequate hydration if ordered  - Administer IV fluids as ordered to ensure adequate hydration  - Administer ordered medications as needed  - Offer frequent toileting  - Follow urinary retention protocol if ordered  Outcome: Progressing     Problem: METABOLIC, FLUID AND ELECTROLYTES - ADULT  Goal: Electrolytes maintained within normal limits  Description: INTERVENTIONS:  - Monitor labs and assess patient for signs and symptoms of electrolyte imbalances  - Administer electrolyte replacement as ordered  - Monitor response to electrolyte replacements, including repeat lab results as appropriate  - Instruct patient on fluid and nutrition as appropriate  Outcome: Progressing  Goal: Fluid balance maintained  Description: INTERVENTIONS:  - Monitor labs   - Monitor I/O and WT  - Instruct patient on fluid and nutrition as appropriate  - Assess for signs & symptoms of volume excess or deficit  Outcome: Progressing  Goal: Glucose maintained within target range  Description: INTERVENTIONS:  - Monitor Blood Glucose as ordered  - Assess for signs and symptoms of hyperglycemia and hypoglycemia  - Administer ordered medications to maintain glucose within target range  - Assess nutritional intake and initiate nutrition service referral as needed  Outcome: Progressing     Problem: HEMATOLOGIC - ADULT  Goal: Maintains hematologic stability  Description: INTERVENTIONS  - Assess for signs and symptoms of bleeding or hemorrhage  - Monitor labs  - Administer supportive blood products/factors as ordered and appropriate  Outcome: Progressing     Problem: MUSCULOSKELETAL - ADULT  Goal: Maintain or return mobility to safest level of function  Description: INTERVENTIONS:  - Assess patient's ability to carry out ADLs; assess patient's baseline for ADL function and identify physical deficits which impact ability to perform ADLs (bathing, care of mouth/teeth, toileting, grooming, dressing, etc )  - Assess/evaluate cause of self-care deficits   - Assess range of motion  - Assess patient's mobility  - Assess patient's need for assistive devices and provide as appropriate  - Encourage maximum independence but intervene and supervise when necessary  - Involve family in performance of ADLs  - Assess for home care needs following discharge   - Consider OT consult to assist with ADL evaluation and planning for discharge  - Provide patient education as appropriate  Outcome: Progressing

## 2023-02-17 NOTE — ASSESSMENT & PLAN NOTE
----- Message from Sera Barnard RN sent at 2/4/2019  7:28 AM CST -----  Please let patient know she passed her glucose test.   Lab Results   Component Value Date    HGBA1C 9 6 (H) 01/10/2023       Recent Labs     02/16/23  1559 02/16/23  2046 02/17/23  0747 02/17/23  1135   POCGLU 154* 188* 135 141*     Patient's Lantus dose was decreased due to low blood sugars  Continue Accu-Cheks and sliding scale insulin  Temporarily held home metformin and Januvia:  We will restart at discharge  Diabetic diet  Titrate medications to goal

## 2023-02-20 ENCOUNTER — OFFICE VISIT (OUTPATIENT)
Dept: FAMILY MEDICINE CLINIC | Facility: CLINIC | Age: 33
End: 2023-02-20

## 2023-02-20 ENCOUNTER — TRANSITIONAL CARE MANAGEMENT (OUTPATIENT)
Dept: FAMILY MEDICINE CLINIC | Facility: CLINIC | Age: 33
End: 2023-02-20

## 2023-02-20 VITALS
HEIGHT: 61 IN | SYSTOLIC BLOOD PRESSURE: 124 MMHG | OXYGEN SATURATION: 95 % | DIASTOLIC BLOOD PRESSURE: 74 MMHG | HEART RATE: 98 BPM | TEMPERATURE: 96 F | WEIGHT: 219.2 LBS | BODY MASS INDEX: 41.39 KG/M2

## 2023-02-20 DIAGNOSIS — L08.9 WOUND INFECTION: Primary | ICD-10-CM

## 2023-02-20 DIAGNOSIS — T14.8XXA WOUND INFECTION: Primary | ICD-10-CM

## 2023-02-20 DIAGNOSIS — T14.8XXA WOUND INFECTION: ICD-10-CM

## 2023-02-20 DIAGNOSIS — Z76.89 ENCOUNTER FOR SUPPORT AND COORDINATION OF TRANSITION OF CARE: Primary | ICD-10-CM

## 2023-02-20 DIAGNOSIS — Z79.4 TYPE 2 DIABETES MELLITUS WITHOUT COMPLICATION, WITH LONG-TERM CURRENT USE OF INSULIN (HCC): ICD-10-CM

## 2023-02-20 DIAGNOSIS — I74.5 EXTERNAL ILIAC ARTERY THROMBOSIS (HCC): ICD-10-CM

## 2023-02-20 DIAGNOSIS — E11.9 TYPE 2 DIABETES MELLITUS WITHOUT COMPLICATION, WITH LONG-TERM CURRENT USE OF INSULIN (HCC): ICD-10-CM

## 2023-02-20 DIAGNOSIS — L08.9 WOUND INFECTION: ICD-10-CM

## 2023-02-20 NOTE — UTILIZATION REVIEW
NOTIFICATION OF INPATIENT ADMISSION   AUTHORIZATION REQUEST   SERVICING FACILITY:   [address] [place] [address] [facility]  Tax ID: [id number]  NPI: [id number] ATTENDING PROVIDER:  Attending Name and NPI#: Eva Fregoso [[id number]]  Address: [address] [place] [address] [facility]  Phone: [phone]     ADMISSION INFORMATION:  [facility] Code: 21  Inpatient Admission Date/Time: 2/10/23 10:45 PM  Discharge Date/Time: 2/17/2023  4:30 PM  Admitting Diagnosis Code/Description:  Right groin pain [R10 31]  Relational problem due to medical condition [Z63 8]  Surgical site infection [T81 49XA]     UTILIZATION REVIEW CONTACT:  Divina Kincaid Utilization   Network Utilization Review Department  Phone: [phone]  Fax: [phone]  Email: [email or web address]  Contact for approvals/pending authorizations, clinical reviews, and discharge  PHYSICIAN ADVISORY SERVICES:  Medical Necessity Denial & Tviu-ef-Vibg Review  Phone: [phone]  Fax: [phone]  Email: [email or web address]

## 2023-02-20 NOTE — UTILIZATION REVIEW
NOTIFICATION OF ADMISSION DISCHARGE   This is a Notification of Discharge from [address]  Please be advised that this patient has been discharge from our facility  Below you will find the admission and discharge date and time including the patient’s disposition  UTILIZATION REVIEW CONTACT:  Melita Roper  Utilization   Network Utilization Review Department  Phone: [phone] x carefully listen to the prompts  All voicemails are confidential   Email: [email or web address]     ADMISSION INFORMATION  PRESENTATION DATE: 2/10/2023  8:47 PM  OBERVATION ADMISSION DATE:   INPATIENT ADMISSION DATE: 2/10/23 10:45 PM   DISCHARGE DATE: 2/17/2023  4:30 PM   DISPOSITION:Home with Home Health Care    IMPORTANT INFORMATION:  Send all requests for admission clinical reviews, approved or denied determinations and any other requests to dedicated fax number below belonging to the campus where the patient is receiving treatment   List of dedicated fax numbers:  [address] DENIALS (Administrative/Medical Necessity) [phone]   [address] (Maternity/NICU/Pediatrics) [phone]   [facility] [phone]   [facility] 87 [phone]   [address] [address] 134 [phone]   220 [facility] [phone]   90 [facility] [phone]   [address] [place] [address] 119 [phone]   [facility]  [phone] 4058 [facility] [phone]   412 [facility] 850 [facility] [phone]

## 2023-02-20 NOTE — PROGRESS NOTES
Assessment & Plan     1  Encounter for support and coordination of transition of care    2  Wound infection    3  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HCC)    4  Type 2 diabetes mellitus without complication, with long-term current use of insulin [facility])       Subjective     Transitional Care Management Review:   Amy Lam is a 26 y o  female here for TCM follow up  During the TCM phone call patient stated:  TCM Call     Date and time call was made  2/20/2023  7:10 AM    Hospital care reviewed  Records reviewed    Patient [facility] hospitialized at  [facility]    Date of Admission  02/10/23    Date of discharge  02/17/23    Diagnosis  surgical site infection    Disposition  Home    Were the patients medications reviewed and updated  Yes    Current Symptoms  None      TCM Call     Post hospital issues  None    Should patient be enrolled in anticoag monitoring? No    Scheduled for follow up? Yes    Did you obtain your prescribed medications  Yes    Do you need help managing your prescriptions or medications  No    Is transportation to your appointment needed  No    I have advised the patient to call PCP with any new or worsening symptoms  fernando sparks        Patient presents for TCM after wound infection  1/30 Patient had right femoral exposure, right iliac embolctomy and right EIA stent placement  Was sent home on ASA, Plavix and a statin   2/6 I saw her in the office and was concerned for wound infection due to pain and drainage, collected wound culture and reached out to vascular   2/7 seen by vascular put on Keflex  2/10 her wound culture was positive for ESBL and I sent her back to the hospital for IV antibiotics  2/11 patient had a wash out of the wound and had a wound vac placed  She was discharged home with wound vac which is t o be changed on monday, wednesday and fridays  She was given ertapenem 1 g every 24 hours for ten days total  2/13 she had a PICC line placed     She states that since, she has been doing well  On Saturday the PICC line company came out and showed her how to administer her IV antibiotics  She is completing her last dose today  She also has a home health agency coming in to change her wound vac but did not hear from then yet today  Instructed to call them if she does not hear from them by lunch time  She has not had any pain at the site  No leaking from the wound vac  Has not been checking her sugars at home and is not watching what she is eating  Counseled on diabetic diet  BP is stable  Continue current medications and follow up as scheduled  Review of Systems   Constitutional: Negative for activity change, appetite change, chills, fatigue, fever and unexpected weight change  HENT: Negative for ear pain and sore throat  Eyes: Negative for pain and visual disturbance  Respiratory: Negative for cough, chest tightness and shortness of breath  Cardiovascular: Negative for chest pain and palpitations  Gastrointestinal: Negative for abdominal pain, constipation, diarrhea, nausea and vomiting  Genitourinary: Negative for dysuria and hematuria  Musculoskeletal: Negative for arthralgias and back pain  Skin: Positive for wound  Negative for color change and rash  Neurological: Negative for seizures and syncope  All other systems reviewed and are negative  Objective     /74 (BP Location: Left arm, Patient Position: Sitting)   Pulse 98   Temp (!) 96 °F (35 6 °C) (Tympanic)   Ht 5' 1" (1 549 m)   Wt 99 4 kg (219 lb 3 2 oz)   LMP 02/03/2023   SpO2 95%   BMI 41 42 kg/m²      Physical Exam  Vitals and nursing note reviewed  Constitutional:       General: She is not in acute distress  Appearance: Normal appearance  She is well-developed  She is obese  HENT:      Head: Normocephalic and atraumatic  Cardiovascular:      Rate and Rhythm: Normal rate and regular rhythm  Pulses: Normal pulses  Heart sounds: Normal heart sounds   No murmur heard  Pulmonary:      Effort: Pulmonary effort is normal  No respiratory distress  Breath sounds: Normal breath sounds  Abdominal:      General: Abdomen is flat  Bowel sounds are normal       Palpations: Abdomen is soft  Tenderness: There is no abdominal tenderness  There is no guarding  Hernia: No hernia is present  Musculoskeletal:         General: No swelling  Cervical back: Normal range of motion and neck supple  No rigidity or tenderness  Right lower leg: No edema  Left lower leg: No edema  Right ankle: Normal pulse  Left ankle: Normal pulse  Legs:    Lymphadenopathy:      Cervical: No cervical adenopathy  Skin:     General: Skin is warm and dry  Capillary Refill: Capillary refill takes less than 2 seconds  Findings: No bruising  Neurological:      Mental Status: She is alert     Psychiatric:         Mood and Affect: Mood normal        Medications have been reviewed by provider in current encounter    Jose Melton

## 2023-02-20 NOTE — UTILIZATION REVIEW
NOTIFICATION OF INPATIENT ADMISSION   AUTHORIZATION REQUEST   SERVICING FACILITY:   [address] [place] [address] [facility]  Tax ID: [id number]  NPI: [id number] ATTENDING PROVIDER:  Attending Name and NPI#: Harriett Epley [[id number]]  Address: [address] [place] [address] [facility]  Phone: [phone]     ADMISSION INFORMATION:  [facility] Code: 21  Inpatient Admission Date/Time: 2/10/23 10:45 PM  Discharge Date/Time: 2/17/2023  4:30 PM  Admitting Diagnosis Code/Description:  Right groin pain [R10 31]  Relational problem due to medical condition [Z63 8]  Surgical site infection [T81 49XA]     UTILIZATION REVIEW CONTACT:  Margaret Juan Utilization   Network Utilization Review Department  Phone: [phone]  Fax: [phone]  Email: [email or web address]  Contact for approvals/pending authorizations, clinical reviews, and discharge  PHYSICIAN ADVISORY SERVICES:  Medical Necessity Denial & Vfcd-je-Ezhc Review  Phone: [phone]  Fax: [phone]  Email: [email or web address]

## 2023-02-21 ENCOUNTER — TELEPHONE (OUTPATIENT)
Dept: VASCULAR SURGERY | Facility: CLINIC | Age: 33
End: 2023-02-21

## 2023-02-21 NOTE — UTILIZATION REVIEW
NOTIFICATION OF INPATIENT ADMISSION   AUTHORIZATION REQUEST   SERVICING FACILITY:   [address] [place] [address]  Tax ID: [id number]  NPI: [id number] ATTENDING PROVIDER:  Attending Name and NPI#: Kodi Sabillon [[id number]]  Address: [address] [place] [address]  Phone: [phone]     PENDING [facility]#[id number]  ID NUMBER [id number]    ADMISSION INFORMATION:  Place of Service: Inpatient [facility] Code: 21  Inpatient Admission Date/Time: 2/10/23 10:45 PM  Discharge Date/Time: 2/17/2023  4:30 PM  Admitting Diagnosis Code/Description:  Right groin pain [R10 31]  Relational problem due to medical condition [Z63 8]  Surgical site infection [T81 49XA]     UTILIZATION REVIEW CONTACT:  Kit Hauser, Utilization   Network Utilization Review Department  Phone: [phone]  Fax: [phone]  Email: [email or web address]  Contact for approvals/pending authorizations, clinical reviews, and discharge  PHYSICIAN ADVISORY SERVICES:  Medical Necessity Denial & Kacx-zp-Inam Review  Phone: [phone]  Fax: [phone]  Email: [email or web address]

## 2023-02-21 NOTE — TELEPHONE ENCOUNTER
Vascular Nurse Navigator Post Op Call    Procedure: Right - Right Groin Wound Washout  Pulse Lavage  Wound Vac placement    Date of Procedure: 2/11/23    Surgeon:    Alison Davis, [address] [facility] Keon Mistry MD - Assisting    Discharge Date: 2/17/23    Discharge Disposition: Home with [address] [place] [address]    Leg Weakness?: No    Leg Swelling?: No    Leg Numbness?: No    Chest Pain?: No    Shortness of Breath?: No    Orthopnea?: No    Anticoagulation pt was discharged on post op?: Aspirin and Clopidogrel (Plavix)    Bleeding?: No    Uncontrolled Pain?: No    Incision Concerns?: No    Fever or Chills?: No      Reviewed discharge instructions and incision care with patient  NEXT OFFICE VISIT SCHEDULED:  2/24/23 at 1 pm with REJI Delcid at [facility] Confirmed?: Yes      Any further questions/concerns? Patient stated that she is doing good since discharge  She stated the nurse was out to see her yesterday and changed her wound vac  She stated that they reported the wound looks good and without any signs of infection  Reviewed discharge medications with her - Aspirin and Plavix  All questions answered  No concerns expressed at this time

## 2023-02-24 ENCOUNTER — OFFICE VISIT (OUTPATIENT)
Dept: VASCULAR SURGERY | Facility: CLINIC | Age: 33
End: 2023-02-24

## 2023-02-24 VITALS
WEIGHT: 219 LBS | SYSTOLIC BLOOD PRESSURE: 122 MMHG | RESPIRATION RATE: 18 BRPM | BODY MASS INDEX: 41.35 KG/M2 | HEIGHT: 61 IN | HEART RATE: 96 BPM | DIASTOLIC BLOOD PRESSURE: 84 MMHG

## 2023-02-24 DIAGNOSIS — T81.49XA SURGICAL SITE INFECTION: Primary | ICD-10-CM

## 2023-02-24 NOTE — PROGRESS NOTES
Assessment/Plan:    Surgical site infection  70-year-old female pmh of T2 DM, Migraines, aortic coarctation, VSD, HTN, Obesity (BMI 41), bipolar disorder, depression, anxiety presents to the office s/p wound washout and vac placement from 2/11/23 [facility])  Wound vac dressing changed today  Wound is 5 2 cm (W) x 2 5 cm (L),  4cm (D) with no foul odor or discharge noted, wound bed had pink/red beefy granulation tissues  Pain only with wound dressing changes  Plan  -Instructed to continue use of wound vac with suction at 125 mmHg, wound vac changes 3 times a week  -Keep area clean and dry   -Call the office if any fever, increased pain, discharge or swelling to the area  -Follow up in the office in two weeks for wound re-check  There are no diagnoses linked to this encounter  Subjective:      Patient ID: Mira Young is a 26 y o  female  Patent is s/p Rt groin washout/ Pulse lavage and wound vac placement on 2/11/23 by Dr Viramontes Lob  Wound vac removed, 1 sponge at wound site and 1 bridge  Wound is not draining, no discoloration, or odor present  Pt has VNA 3x/ week to change the wound vac  Pt is on ASA 81 mg and Plavix  Patient underwent diagnostic cardiac cath via R femoral access with angioseal closure (1/12/23)  Post-operatively patient developed thrombosis from R EIA extension to proximal R CFA  Now s/p R fem exposure, R Iliac embolectomy, R EIA stent on 1/30/2023 (L  Doctor)  Subsequently developed infection of right groin wound  S/P R groin wound washout and vac placement on 2/11/23 with Andrés  Pt in the office today for post-op wound check  Pt reports no pain other than pain due to dressing changes  Has been getting dressing changes at home from visiting nurses 3x a week  Denies fevers or purulent discharge from the area  Ambulating with no issues, no claudication, no rest pain        The following portions of the patient's history were reviewed and updated as appropriate: allergies, current medications, past family history, past medical history, past social history, past surgical history and problem list     Review of Systems   Respiratory: Negative for shortness of breath  Cardiovascular: Negative for chest pain  Musculoskeletal: Negative  Skin: Positive for color change and wound  Objective:      /84 (BP Location: Right arm, Patient Position: Supine)   Pulse 96   Resp 18   Ht 5' 1" (1 549 m)   Wt 99 3 kg (219 lb)   LMP 02/03/2023   BMI 41 38 kg/m²          Physical Exam  Constitutional:       Appearance: Normal appearance  HENT:      Head: Normocephalic and atraumatic  Cardiovascular:      Rate and Rhythm: Normal rate  Pulses:           Radial pulses are 2+ on the right side and 2+ on the left side  Dorsalis pedis pulses are 2+ on the right side and 2+ on the left side  Heart sounds: Normal heart sounds  Pulmonary:      Effort: Pulmonary effort is normal    Abdominal:      Palpations: Abdomen is soft  Musculoskeletal:      Cervical back: Normal range of motion  Right lower leg: No edema  Left lower leg: No edema  Skin:     General: Skin is warm and dry  Capillary Refill: Capillary refill takes less than 2 seconds  Findings: Rash and wound present  Comments: Skin is irritated and red from tape around the r groin site  Neurological:      General: No focal deficit present  Mental Status: She is alert and oriented to person, place, and time  I have reviewed and made appropriate changes to the review of systems input by the medical assistant      Vitals:    02/24/23 1123   BP: 122/84   BP Location: Right arm   Patient Position: Supine   Pulse: 96   Resp: 18   Weight: 99 3 kg (219 lb)   Height: 5' 1" (1 549 m)       Patient Active Problem List   Diagnosis   • Splenomegaly   • Hepatitis C   • Intractable chronic migraine without aura and with status migrainosus   • Cervicalgia   • Cervical dystonia   • Aortic coarctation   • Hypercholesteremia   • Hidradenitis suppurativa   • Cyst of right ovary   • Endometriosis   • Morbid obesity (HCC)   • Type 2 diabetes mellitus without complication, with long-term current use of insulin (HCC)   • Vitamin D deficiency   • Bipolar disorder ([facility] [address] )   • VSD (ventricular septal defect) and coarctation of aorta   • Hypertension   • H/O aortic coarctation repair   • Chronic hepatitis C without hepatic coma (HCC)   • Multiple thyroid nodules   • Depression   • Anxiety   • Neuropathy   • Elevated troponin I level   • Chest pain   • Arterial occlusion   • Right groin pain   • 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HCC)   • Continuous opioid dependence ([facility] [address] )   • Surgical site infection       Past Surgical History:   Procedure Laterality Date   • CARDIAC CATHETERIZATION      no CAD 10days, 4 weeks 20months old    • CARDIAC CATHETERIZATION N/A 2023    Procedure: Cardiac Coronary Angiogram;  Surgeon: Moira Denton DO;  Location: [facility]ARD[facility]; Service: Cardiology   • CARDIAC CATHETERIZATION Left 2023    Procedure: Cardiac Left Heart Cath;  Surgeon: Moira Denton DO;  Location: [facility]; Service: Cardiology   • CARDIAC CATHETERIZATION  2023    Procedure: Cardiac catheterization;  Surgeon: Moira Denton DO;  Location: [facility];   Service: Cardiology   •  SECTION, LOW TRANSVERSE     • CHOLECYSTECTOMY     • 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 5   • CORONARY STENT PLACEMENT     • IR LOWER EXTREMITY ANGIOGRAM  2023   • LIVER BIOPSY     • LIVER BIOPSY     • STERNAL WIRE REMOVAL  2022   • THROMBECTOMY W/ EMBOLECTOMY Right 2023    Procedure: Right femoral exposure Right iliac embolectomy w/ #4 Ted catheter Aortogram Right EIA stent w/ 7x29mm VBX;  Surgeon: Magy Hodges MD;  Location: [facility];  Service: Vascular   • TUBAL LIGATION Bilateral    • VSD REPAIR      As a child   • WOUND DEBRIDEMENT Right 2023    Procedure: Right Groin Wound Washout, Pulse Lavage, Wound Vac placement;  Surgeon: DO Ben;  Location: [facility];  Service: Vascular       Family History   Problem Relation Age of Onset   • Hypertension Mother    • Migraines Mother    • JENNY disease Mother    • Depression Mother    • Hyperlipidemia Mother    • Diabetes Mother    • Diabetes Father    • Hypertension Father    • Kidney failure Father    • Heart attack Father    • Arthritis Father    • Stroke Father    • Polycystic kidney disease Paternal Grandmother    • Stroke Paternal Grandmother    • Heart disease Paternal Grandmother    • Arthritis Sister    • Asthma Sister    • Thyroid disease Sister    • Diabetes Sister    • Arthritis Maternal Grandmother    • Breast cancer Maternal Grandmother    • Diabetes Maternal Grandmother    • Hypertension Maternal Grandmother    • Heart Valve Disease Maternal Grandmother    • Learning disabilities Cousin    • Learning disabilities Sister    • ADD / ADHD Cousin    • Lung cancer Brother    • Diabetes Maternal Grandfather    • Hypertension Maternal Grandfather    • JENNY disease Maternal Grandfather    • Stroke Maternal Grandfather        Social History     Socioeconomic History   • Marital status: Single     Spouse name: Not on file   • Number of children: Not on file   • Years of education: Not on file   • Highest education level: Not on file   Occupational History   • Not on file   Tobacco Use   • Smoking status: Former     Packs/day: 0 50     Years: 0 00     Pack years: 0 00     Types: Cigarettes     Quit date: 2023     Years since quittin 1   • Smokeless tobacco: Never   Vaping Use   • Vaping Use: Never used   Substance and Sexual Activity   • Alcohol use: Not Currently     Alcohol/week: 3 0 standard drinks     Types: 3 Standard drinks or equivalent per week     Comment: socially/prior to knowledge of pregnancy   • Drug use: Not Currently     Comment: hx of THC use for migraines   • Sexual activity: Yes     Partners: Male     Birth control/protection: Female Sterilization   Other Topics Concern   • Not on file   Social History Narrative   • Not on file     Social Determinants of Health     Financial Resource Strain: Not on file   Food Insecurity: No Food Insecurity   • Worried About Running Out of Food in the Last Year: Never true   • Ran Out of Food in the Last Year: Never true   Transportation Needs: No Transportation Needs   • Lack of Transportation (Medical): No   • Lack of Transportation (Non-Medical):  No   Physical Activity: Not on file   Stress: Not on file   Social Connections: Not on file   Intimate Partner Violence: Not on file   Housing Stability: Not on file       Allergies   Allergen Reactions   • Prednisone Swelling   • Bactrim [Sulfamethoxazole-Trimethoprim] Hives   • Corticosteroids Swelling     Pt states this does not cause problems breathing, she just has generalized swelling   • Cortisone Swelling     Generalized; no impairment with breathing reported     • Medical Tape Hives     Allergic to Paper Tape   • Other Hives     Paper tape   • Sulfa Antibiotics Hives         Current Outpatient Medications:   •  acetaminophen (TYLENOL) 500 mg tablet, Take 1,000 mg by mouth, Disp: , Rfl:   •  albuterol (Ventolin HFA) 90 mcg/act inhaler, Inhale 2 puffs every 4 (four) hours as needed for wheezing or shortness of breath, Disp: 18 g, Rfl: 2  •  ALPRAZolam (XANAX) 0 5 mg tablet, Take 1 tablet (0 5 mg total) by mouth daily at bedtime as needed for anxiety, Disp: 15 tablet, Rfl: 0  •  aspirin (ECOTRIN LOW STRENGTH) 81 mg EC tablet, Take 1 tablet (81 mg total) by mouth daily Do not start before February 1, 2023 , Disp: 90 tablet, Rfl: 0  •  B-D UF III MINI PEN NEEDLES 31G X 5 MM MISC, INJECT UNDER THE SKIN DAILY AT BEDTIME USE ONE A DAY OR AS DIRECTED, Disp: 100 each, Rfl: 6  •  Blood Glucose Monitoring Suppl (OneTouch Verio) w/Device KIT, Use daily, Disp: 1 kit, Rfl: 0  • Blood Pressure Monitoring (B-D ASSURE BPM/AUTO WRIST CUFF) MISC, Check blood pressure prior to each OB visit, or as directed by your physician , Disp: 1 each, Rfl: 0  •  clopidogrel (PLAVIX) 75 mg tablet, Take 1 tablet (75 mg total) by mouth daily Do not start before February 1, 2023 , Disp: 60 tablet, Rfl: 0  •  fexofenadine (ALLEGRA) 180 MG tablet, Take 1 tablet (180 mg total) by mouth daily, Disp: 30 tablet, Rfl: 5  •  gabapentin (NEURONTIN) 100 mg capsule, take 1 capsule by mouth daily at bedtime, Disp: 30 capsule, Rfl: 0  •  insulin glargine (LANTUS) 100 units/mL subcutaneous injection, Inject 17 Units under the skin daily at bedtime, Disp: 4 5 mL, Rfl: 3  •  lisinopril (ZESTRIL) 30 mg tablet, Take 30 mg by mouth daily, Disp: , Rfl:   •  metFORMIN (GLUCOPHAGE) 1000 MG tablet, Take 1 tablet (1,000 mg total) by mouth 2 (two) times a day with meals, Disp: 180 tablet, Rfl: 3  •  methocarbamol (ROBAXIN) 500 mg tablet, Take 1 tablet (500 mg total) by mouth 4 (four) times a day, Disp: 60 tablet, Rfl: 0  •  montelukast (SINGULAIR) 10 mg tablet, Take 1 tablet (10 mg total) by mouth daily at bedtime, Disp: 30 tablet, Rfl: 5  •  sertraline (Zoloft) 50 mg tablet, Take 1 tablet (50 mg total) by mouth daily, Disp: 90 tablet, Rfl: 3  •  sitaGLIPtin (JANUVIA) 100 mg tablet, Take 1 tablet (100 mg total) by mouth daily, Disp: 90 tablet, Rfl: 3  •  VITAMIN D PO, Take by mouth daily, Disp: , Rfl:   I have spent a total time of 30 minutes on 02/24/23 in caring for this patient including Risk factor reductions, Documenting in the medical record and Wound Vac dressing change

## 2023-02-24 NOTE — PATIENT INSTRUCTIONS
Call the office if you notice any increase in swelling, drainage, pain to the area or fever       Follow up in two weeks for wound check

## 2023-02-24 NOTE — ASSESSMENT & PLAN NOTE
42-year-old female pmh of T2 DM, Migraines, aortic coarctation, VSD, HTN, Obesity (BMI 41), bipolar disorder, depression, anxiety presents to the office s/p wound washout and vac placement from 2/11/23 [facility])  Wound vac dressing changed today  Wound is 5 2 cm (W) x 2 5 cm (L),  4cm (D) with no foul odor or discharge noted, wound bed had pink/red beefy granulation tissues  Pain only with wound dressing changes  Plan  -Instructed to continue use of wound vac with suction at 125 mmHg, wound vac changes 3 times a week  -Keep area clean and dry   -Call the office if any fever, increased pain, discharge or swelling to the area  -Follow up in the office in two weeks for wound re-check

## 2023-03-06 ENCOUNTER — TELEPHONE (OUTPATIENT)
Dept: VASCULAR SURGERY | Facility: CLINIC | Age: 33
End: 2023-03-06

## 2023-03-06 NOTE — TELEPHONE ENCOUNTER
Bianca from [facility] called, patient has a VAC and wound is now too shallow to reapply the [facility] and the skin aroiund the wound is inflammed from applying the VAC  There has been no drainage in VAC container for 3 days  , so she is not able to reapply the [facility] today     Patient coming in on for appt on 3/9/23

## 2023-03-09 ENCOUNTER — OFFICE VISIT (OUTPATIENT)
Dept: VASCULAR SURGERY | Facility: CLINIC | Age: 33
End: 2023-03-09

## 2023-03-09 VITALS
DIASTOLIC BLOOD PRESSURE: 70 MMHG | BODY MASS INDEX: 41.99 KG/M2 | HEIGHT: 61 IN | SYSTOLIC BLOOD PRESSURE: 136 MMHG | WEIGHT: 222.4 LBS | HEART RATE: 87 BPM | OXYGEN SATURATION: 99 %

## 2023-03-09 DIAGNOSIS — I74.5 EXTERNAL ILIAC ARTERY THROMBOSIS (HCC): Primary | ICD-10-CM

## 2023-03-09 NOTE — PROGRESS NOTES
Assessment/Plan:    Surgical site infection  53-year-old female PMH of T2 DM, Migraines, aortic coarctation, VSD, HTN, Obesity (BMI 42), bipolar disorder, depression, anxiety presents to the office s/p wound washout and vac placement from 2/11/23 [facility])  Wound vac d/c'd by home nurse on 3/6/23  Wound is 3 7 cm (W) x  1 7cm (L),  1cm (D) with no foul odor or discharge noted, wound bed had pink/red beefy granulation tissues  Pain only with wound dressing changes      Plan  -Continue to monitor wound for infection  Ok to d/c home nursing   - Limit lifting anything more than 10 lbs at this time until R groin wound is fully healed  -Keep area clean and dry  Cover with dry dressing and minimal silk tape  -Call the office if any fever, increased pain, discharge or swelling to the area  -Follow up in the office in two weeks for wound re-check  Diagnoses and all orders for this visit:    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HCC)  -     VAS lower limb arterial duplex, complete bilateral; Future  -     VAS abdominal aorta/iliacs; complete study; Future          Subjective:      Patient ID: Mehul Reid is a 26 y o  female  Patient presents to our office for two week follow up on wound in R groin area  Wound appears to be healing, no ozzing or blood  Pt called nursing staff and was advised she didn't need to put her wound vac back on  Pt taking ASA 81 mg and Atorvastatin  53-year-old female PMH of T2 DM, Migraines, aortic coarctation, VSD, HTN, Obesity (BMI 42), bipolar disorder, depression, anxiety presents to the office s/p wound washout and vac placement from 2/11/23 [facility])  Wound vac d/c'd by home nurse on 3/6/23 due to wound being too shallow for re-application in addition to irritation around the wound from wound vac tape  Wound is 3 7 cm (W) x  1 7 cm (L),  1cm (D) with no foul odor or discharge noted, wound bed had pink/red beefy granulation tissue with healing edges, no tunneling   Pain only with wound dressing changes  See clinical images  Pt reports wound is improving  Reviewed with patient indications of infection to call the office  Instructed to keep area covered with clean, dry dressing and minimal tape  Reviewed cleaning incision site with soap and water, pat dry daily  Pt verbalized understanding  Agreeable to follow up in two weeks  Pt is currently taking Aspirin and Plavix  The following portions of the patient's history were reviewed and updated as appropriate: allergies, current medications, past family history, past medical history, past social history, past surgical history and problem list     Review of Systems   Skin: Positive for color change, rash (R groin) and wound (R groin)  Objective:      /70 (BP Location: Right arm, Patient Position: Sitting, Cuff Size: Standard)   Pulse 87   Ht 5' 1" (1 549 m)   Wt 101 kg (222 lb 6 4 oz)   SpO2 99%   BMI 42 02 kg/m²          Physical Exam  Constitutional:       Appearance: Normal appearance  HENT:      Head: Normocephalic and atraumatic  Cardiovascular:      Rate and Rhythm: Normal rate and regular rhythm  Pulses:           Radial pulses are 2+ on the right side and 2+ on the left side  Dorsalis pedis pulses are 2+ on the right side and 2+ on the left side  Posterior tibial pulses are 1+ on the right side and 1+ on the left side  Heart sounds: No murmur heard  Pulmonary:      Effort: Pulmonary effort is normal    Musculoskeletal:         General: No swelling  Right lower leg: No edema  Left lower leg: No edema  Skin:     General: Skin is warm and dry  Capillary Refill: Capillary refill takes less than 2 seconds  Findings: Rash and wound present  Rash is crusting  Neurological:      General: No focal deficit present  Mental Status: She is alert and oriented to person, place, and time                 I have reviewed and made appropriate changes to the review of systems input by the medical assistant  Vitals:    23 1614   BP: 136/70   BP Location: Right arm   Patient Position: Sitting   Cuff Size: Standard   Pulse: 87   SpO2: 99%   Weight: 101 kg (222 lb 6 4 oz)   Height: 5' 1" (1 549 m)       Patient Active Problem List   Diagnosis   • Splenomegaly   • Hepatitis C   • Intractable chronic migraine without aura and with status migrainosus   • Cervicalgia   • Cervical dystonia   • Aortic coarctation   • Hypercholesteremia   • Hidradenitis suppurativa   • Cyst of right ovary   • Endometriosis   • Morbid obesity (HCC)   • Type 2 diabetes mellitus without complication, with long-term current use of insulin (HCC)   • Vitamin D deficiency   • Bipolar disorder (HCC)   • VSD (ventricular septal defect) and coarctation of aorta   • Hypertension   • H/O aortic coarctation repair   • Chronic hepatitis C without hepatic coma (HCC)   • Multiple thyroid nodules   • Depression   • Anxiety   • Neuropathy   • Elevated troponin I level   • Chest pain   • Arterial occlusion   • Right groin pain   • 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HCC)   • Continuous opioid dependence (HCC)   • Surgical site infection       Past Surgical History:   Procedure Laterality Date   • CARDIAC CATHETERIZATION      no CAD 10days, 4 weeks 20months old    • CARDIAC CATHETERIZATION N/A 2023    Procedure: Cardiac Coronary Angiogram;  Surgeon: Pedro Alejo DO;  Location: [facility]; Service: Cardiology   • CARDIAC CATHETERIZATION Left 2023    Procedure: Cardiac Left Heart Cath;  Surgeon: Pedro Alejo DO;  Location: [facility]; Service: Cardiology   • CARDIAC CATHETERIZATION  2023    Procedure: Cardiac catheterization;  Surgeon: Pedro Alejo DO;  Location: [facility];   Service: Cardiology   •  SECTION, LOW TRANSVERSE  2020   • CHOLECYSTECTOMY     • 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 5   • CORONARY STENT PLACEMENT     • IR LOWER EXTREMITY ANGIOGRAM  1/30/2023   • LIVER BIOPSY     • LIVER BIOPSY     • STERNAL WIRE REMOVAL  03/24/2022   • THROMBECTOMY W/ EMBOLECTOMY Right 1/30/2023    Procedure: Right femoral exposure Right iliac embolectomy w/ #4 Ted catheter Aortogram Right EIA stent w/ 7x29mm VBX;  Surgeon: Viridiana Hodges MD;  Location: [facility];  Service: Vascular   • TUBAL LIGATION Bilateral 2020   • VSD REPAIR      As a child   • WOUND DEBRIDEMENT Right 2/11/2023    Procedure: Right Groin Wound Washout, Pulse Lavage, Wound Vac placement;  Surgeon: Sigrid Laura DO;  Location: [facility];  Service: Vascular       Family History   Problem Relation Age of Onset   • Hypertension Mother    • Migraines Mother    • JENNY disease Mother    • Depression Mother    • Hyperlipidemia Mother    • Diabetes Mother    • Diabetes Father    • Hypertension Father    • Kidney failure Father    • Heart attack Father    • Arthritis Father    • Stroke Father    • Polycystic kidney disease Paternal Grandmother    • Stroke Paternal Grandmother    • Heart disease Paternal Grandmother    • Arthritis Sister    • Asthma Sister    • Thyroid disease Sister    • Diabetes Sister    • Arthritis Maternal Grandmother    • Breast cancer Maternal Grandmother    • Diabetes Maternal Grandmother    • Hypertension Maternal Grandmother    • Heart Valve Disease Maternal Grandmother    • Learning disabilities Cousin    • Learning disabilities Sister    • ADD / ADHD Cousin    • Lung cancer Brother    • Diabetes Maternal Grandfather    • Hypertension Maternal Grandfather    • JENNY disease Maternal Grandfather    • Stroke Maternal Grandfather        Social History     Socioeconomic History   • Marital status: Single     Spouse name: Not on file   • Number of children: Not on file   • Years of education: Not on file   • Highest education level: Not on file   Occupational History   • Not on file   Tobacco Use   • Smoking status: Former     Packs/day: 0 50     Years: 0 00 Pack years: 0 00     Types: Cigarettes     Quit date: 2023     Years since quittin 1   • Smokeless tobacco: Never   Vaping Use   • Vaping Use: Never used   Substance and Sexual Activity   • Alcohol use: Not Currently     Alcohol/week: 3 0 standard drinks     Types: 3 Standard drinks or equivalent per week     Comment: socially/prior to knowledge of pregnancy   • Drug use: Not Currently     Comment: hx of THC use for migraines   • Sexual activity: Yes     Partners: Male     Birth control/protection: Female Sterilization   Other Topics Concern   • Not on file   Social History Narrative   • Not on file     Social Determinants of Health     Financial Resource Strain: Not on file   Food Insecurity: No Food Insecurity   • Worried About Running Out of Food in the Last Year: Never true   • Ran Out of Food in the Last Year: Never true   Transportation Needs: No Transportation Needs   • Lack of Transportation (Medical): No   • Lack of Transportation (Non-Medical):  No   Physical Activity: Not on file   Stress: Not on file   Social Connections: Not on file   Intimate Partner Violence: Not on file   Housing Stability: Not on file       Allergies   Allergen Reactions   • Prednisone Swelling   • Bactrim [Sulfamethoxazole-Trimethoprim] Hives   • Corticosteroids Swelling     Pt states this does not cause problems breathing, she just has generalized swelling   • Cortisone Swelling     Generalized; no impairment with breathing reported     • Medical Tape Hives     Allergic to Paper Tape   • Other Hives     Paper tape   • Sulfa Antibiotics Hives         Current Outpatient Medications:   •  acetaminophen (TYLENOL) 500 mg tablet, Take 1,000 mg by mouth, Disp: , Rfl:   •  albuterol (Ventolin HFA) 90 mcg/act inhaler, Inhale 2 puffs every 4 (four) hours as needed for wheezing or shortness of breath, Disp: 18 g, Rfl: 2  •  ALPRAZolam (XANAX) 0 5 mg tablet, Take 1 tablet (0 5 mg total) by mouth daily at bedtime as needed for anxiety, Disp: 15 tablet, Rfl: 0  •  aspirin (ECOTRIN LOW STRENGTH) 81 mg EC tablet, Take 1 tablet (81 mg total) by mouth daily Do not start before February 1, 2023 , Disp: 90 tablet, Rfl: 0  •  B-D UF III MINI PEN NEEDLES 31G X 5 MM MISC, INJECT UNDER THE SKIN DAILY AT BEDTIME USE ONE A DAY OR AS DIRECTED, Disp: 100 each, Rfl: 6  •  Blood Glucose Monitoring Suppl (OneTouch Verio) w/Device KIT, Use daily, Disp: 1 kit, Rfl: 0  •  Blood Pressure Monitoring (B-D ASSURE BPM/AUTO WRIST CUFF) MISC, Check blood pressure prior to each OB visit, or as directed by your physician , Disp: 1 each, Rfl: 0  •  clopidogrel (PLAVIX) 75 mg tablet, Take 1 tablet (75 mg total) by mouth daily Do not start before February 1, 2023 , Disp: 60 tablet, Rfl: 0  •  fexofenadine (ALLEGRA) 180 MG tablet, Take 1 tablet (180 mg total) by mouth daily, Disp: 30 tablet, Rfl: 5  •  gabapentin (NEURONTIN) 100 mg capsule, take 1 capsule by mouth daily at bedtime, Disp: 30 capsule, Rfl: 0  •  insulin glargine (LANTUS) 100 units/mL subcutaneous injection, Inject 17 Units under the skin daily at bedtime, Disp: 4 5 mL, Rfl: 3  •  lisinopril (ZESTRIL) 30 mg tablet, Take 30 mg by mouth daily, Disp: , Rfl:   •  metFORMIN (GLUCOPHAGE) 1000 MG tablet, Take 1 tablet (1,000 mg total) by mouth 2 (two) times a day with meals, Disp: 180 tablet, Rfl: 3  •  methocarbamol (ROBAXIN) 500 mg tablet, Take 1 tablet (500 mg total) by mouth 4 (four) times a day, Disp: 60 tablet, Rfl: 0  •  montelukast (SINGULAIR) 10 mg tablet, Take 1 tablet (10 mg total) by mouth daily at bedtime, Disp: 30 tablet, Rfl: 5  •  sertraline (Zoloft) 50 mg tablet, Take 1 tablet (50 mg total) by mouth daily, Disp: 90 tablet, Rfl: 3  •  sitaGLIPtin (JANUVIA) 100 mg tablet, Take 1 tablet (100 mg total) by mouth daily, Disp: 90 tablet, Rfl: 3  •  VITAMIN D PO, Take by mouth daily, Disp: , Rfl:   I have spent a total time of 20 minutes on 03/09/23 in caring for this patient including Instructions for management, Patient and family education, Documenting in the medical record and Reviewing / ordering tests, medicine, procedures

## 2023-03-09 NOTE — PATIENT INSTRUCTIONS
Continue to monitor incision site  Keep area clean and dry  Call the office if you get a fever, notice an increase in swelling, discharge or have pain at the incision site  Continue taking clopidogrel (Plavix) as ordered by your surgeon and aspirin  No need for further care with the visiting nurses due to discontinued wound vac

## 2023-03-09 NOTE — LETTER
March 9, 2023     Patient: Caitlin Carrasco   YOB: 1990   Date of Visit: 3/9/2023       To Whom It May Concern: It is my medical opinion that Caitlin Carrasco should remain out of work until R groin wound is fully healed  Will determine wound healing status at next offce visit  Pt was under our care since Feburary 10th 2023  Please excuse her absence       If you have any questions or concerns, please don't hesitate to call           Sincerely,        REJI Patton    CC: No Recipients

## 2023-03-21 ENCOUNTER — OFFICE VISIT (OUTPATIENT)
Dept: VASCULAR SURGERY | Facility: CLINIC | Age: 33
End: 2023-03-21

## 2023-03-21 VITALS
WEIGHT: 221.4 LBS | BODY MASS INDEX: 41.8 KG/M2 | HEIGHT: 61 IN | SYSTOLIC BLOOD PRESSURE: 126 MMHG | OXYGEN SATURATION: 99 % | DIASTOLIC BLOOD PRESSURE: 84 MMHG | HEART RATE: 113 BPM

## 2023-03-21 DIAGNOSIS — T81.49XA SURGICAL SITE INFECTION: Primary | ICD-10-CM

## 2023-03-21 NOTE — ASSESSMENT & PLAN NOTE
26year old female former smoker w/obesity (BMI 41 19), HTN, HLD, type II DM, aortic coarctation s/p repair and stent placement, VSD s/p repair, bipolar disorder, hepatitis C, s/p diagnostic cardiac catheterization via R femoral access w/ angioseal closure device performed for abnormal stress test, c/b thrombosis of R EIA with extension into the proximal R CFA s/p R femoral exposure, R iliac embolectomy and R EIA stent 1/30/23 (L  Doctor) c/b R groin infection s/p washout and VAC 2/11/2023 [facility]) presents for post op follow-up/groin check    - VAC discontinued 3/6/23  -Right groin wound is closed however remains with hypergranulation tissue  Noninfected appearing  Patient denies pain, no drainage    Plan:  -Keep right groin clean and dry  Wash with soap and water, pat dry daily  -Continue local wound care to right groin  Place a small piece of silver alginate with dry gauze dressing-Daily changes x 1-2 weeks   Then may leave open to air   -Return to office in 1 month for groin check   May consider silver nitrate if hypergranulation tissue remains   - follow up imaging as previously scheduled  -Call or return to office with any questions or concerns or changes to groin site including erythema, drainage, pain, or signs infection   -Call with any questions or concerns

## 2023-03-21 NOTE — PATIENT INSTRUCTIONS
Keep right groin clean and dry  Wash with soap and water, pat dry daily  Continue local wound care to right groin  Place a small piece of silver alginate with dry gauze dressing-Daily changes x 1-2 weeks   Then may leave open to air  Return to office in 1 month for groin check  Call return to office with any questions or concerns or changes to groin site including erythema, drainage, pain, or signs infection    Call with any questions or concerns

## 2023-03-21 NOTE — PROGRESS NOTES
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 s/p R femoral exposure, R iliac embolectomy and R EIA stent 1/30/23 (L  Doctor) c/b R groin infection s/p washout and VAC 2/11/2023 [facility]) presents for post op follow-up/groin check    - VAC discontinued 3/6/23  -Right groin wound is closed however remains with hypergranulation tissue  Noninfected appearing  Patient denies pain, no drainage    Plan:  -Keep right groin clean and dry  Wash with soap and water, pat dry daily  -Continue local wound care to right groin  Place a small piece of silver alginate with dry gauze dressing-Daily changes x 1-2 weeks   Then may leave open to air   -Return to office in 1 month for groin check  May consider silver nitrate if hypergranulation tissue remains   - follow up imaging as previously scheduled  -Call or return to office with any questions or concerns or changes to groin site including erythema, drainage, pain, or signs infection   -Call with any questions or concerns         Diagnoses and all orders for this visit:    Surgical site infection          Subjective:      Patient ID: Hayden Michel is a 26 y o  female  Patient is here today to have the wound on her right groin rechecked  Patient is s/p a right femoral exposure/right iliac embolectomy aortogram and right EIA stent done 1/30/2023 by Dr Smita Martinez Doctor  She is also s/p a right groin wound washout and VAC placement done 2/11/2023 by Dr Waldo Noland  VAC was D/C'd 3/6/2023  Wound remains open, but is very shallow  Wound bed is pink  Patient states that they is never any drainage on her dressings   She denies any pain  Patient is taking ASA 81 mg and Plavix  She is a former smoker  HPI    The following portions of the patient's history were reviewed and updated as appropriate: allergies, current medications, past family history, past medical history, past social history, past surgical history and problem list     Review of Systems   Constitutional: Negative  HENT: Negative  Eyes: Negative  Respiratory: Negative  Cardiovascular: Positive for leg swelling  Gastrointestinal: Negative  Endocrine: Negative  Genitourinary: Negative  Musculoskeletal: Negative  Skin: Positive for wound  Allergic/Immunologic: Negative  Neurological: Positive for headaches  Hematological: Bruises/bleeds easily  Psychiatric/Behavioral: Negative  I have reviewed and made appropriate changes to the review of systems input by the medical assistant  Objective:      /84 (BP Location: Left arm, Patient Position: Sitting, Cuff Size: Standard)   Pulse (!) 113   Ht 5' 1" (1 549 m)   Wt 100 kg (221 lb 6 4 oz)   SpO2 99%   BMI 41 83 kg/m²          Physical Exam  Constitutional:       Appearance: She is obese  Abdominal:      Comments: Large pannus,   Skin:     General: Skin is warm  Comments: R groin wound, pink wound bed without exudate, or signs of infection, hypergranulation tissue (?)    Improved skin integrity to surrounding skin   Neurological:      Mental Status: She is alert and oriented to person, place, and time  Psychiatric:         Behavior: Behavior normal            R groin     I have spent a total time of 20 minutes on 03/21/23 in caring for this patient including Instructions for management, Patient and family education, Importance of tx compliance, Impressions, Counseling / Coordination of care, Documenting in the medical record, Reviewing / ordering tests, medicine, procedures   and Obtaining or reviewing history        I have reviewed and made appropriate changes to the review of systems input by the medical assistant  Vitals:    23 1411   BP: 126/84   BP Location: Left arm   Patient Position: Sitting   Cuff Size: Standard   Pulse: (!) 113   SpO2: 99%   Weight: 100 kg (221 lb 6 4 oz)   Height: 5' 1" (1 549 m)       Patient Active Problem List   Diagnosis   • Splenomegaly   • Hepatitis C   • Intractable chronic migraine without aura and with status migrainosus   • Cervicalgia   • Cervical dystonia   • Aortic coarctation   • Hypercholesteremia   • Hidradenitis suppurativa   • Cyst of right ovary   • Endometriosis   • Morbid obesity (HCC)   • Type 2 diabetes mellitus without complication, with long-term current use of insulin (HCC)   • Vitamin D deficiency   • Bipolar disorder (HCC)   • VSD (ventricular septal defect) and coarctation of aorta   • Hypertension   • H/O aortic coarctation repair   • Chronic hepatitis C without hepatic coma (HCC)   • Multiple thyroid nodules   • Depression   • Anxiety   • Neuropathy   • Elevated troponin I level   • Chest pain   • Arterial occlusion   • Right groin pain   • 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HCC)   • Continuous opioid dependence (HCC)   • Surgical site infection       Past Surgical History:   Procedure Laterality Date   • CARDIAC CATHETERIZATION      no CAD 10days, 4 weeks 20months old    • CARDIAC CATHETERIZATION N/A 2023    Procedure: Cardiac Coronary Angiogram;  Surgeon: Darwin Swan DO;  Location: [facility]; Service: Cardiology   • CARDIAC CATHETERIZATION Left 2023    Procedure: Cardiac Left Heart Cath;  Surgeon: Darwin Swan DO;  Location: [facility]; Service: Cardiology   • CARDIAC CATHETERIZATION  2023    Procedure: Cardiac catheterization;  Surgeon: Darwin Swan DO;  Location: [facility];   Service: Cardiology   •  SECTION, LOW TRANSVERSE     • CHOLECYSTECTOMY     • 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 5   • CORONARY STENT PLACEMENT     • IR LOWER EXTREMITY ANGIOGRAM  1/30/2023   • LIVER BIOPSY     • LIVER BIOPSY     • STERNAL WIRE REMOVAL  03/24/2022   • THROMBECTOMY W/ EMBOLECTOMY Right 1/30/2023    Procedure: Right femoral exposure Right iliac embolectomy w/ #4 Ted catheter Aortogram Right EIA stent w/ 7x29mm VBX;  Surgeon: Caden Hodges MD;  Location: [facility];  Service: Vascular   • TUBAL LIGATION Bilateral 2020   • VSD REPAIR      As a child   • WOUND DEBRIDEMENT Right 2/11/2023    Procedure: Right Groin Wound Washout, Pulse Lavage, Wound Vac placement;  Surgeon: Janis Alejandra DO;  Location: [facility];  Service: Vascular       Family History   Problem Relation Age of Onset   • Hypertension Mother    • Migraines Mother    • JENNY disease Mother    • Depression Mother    • Hyperlipidemia Mother    • Diabetes Mother    • Diabetes Father    • Hypertension Father    • Kidney failure Father    • Heart attack Father    • Arthritis Father    • Stroke Father    • Polycystic kidney disease Paternal Grandmother    • Stroke Paternal Grandmother    • Heart disease Paternal Grandmother    • Arthritis Sister    • Asthma Sister    • Thyroid disease Sister    • Diabetes Sister    • Arthritis Maternal Grandmother    • Breast cancer Maternal Grandmother    • Diabetes Maternal Grandmother    • Hypertension Maternal Grandmother    • Heart Valve Disease Maternal Grandmother    • Learning disabilities Cousin    • Learning disabilities Sister    • ADD / ADHD Cousin    • Lung cancer Brother    • Diabetes Maternal Grandfather    • Hypertension Maternal Grandfather    • JENNY disease Maternal Grandfather    • Stroke Maternal Grandfather        Social History     Socioeconomic History   • Marital status: Single     Spouse name: Not on file   • Number of children: Not on file   • Years of education: Not on file   • Highest education level: Not on file   Occupational History   • Not on file   Tobacco Use   • Smoking status: Former     Packs/day: 0 50     Years: 0 00 Pack years: 0 00     Types: Cigarettes     Quit date: 2023     Years since quittin 1   • Smokeless tobacco: Never   Vaping Use   • Vaping Use: Never used   Substance and Sexual Activity   • Alcohol use: Not Currently     Alcohol/week: 3 0 standard drinks     Types: 3 Standard drinks or equivalent per week     Comment: socially/prior to knowledge of pregnancy   • Drug use: Not Currently     Comment: hx of THC use for migraines   • Sexual activity: Yes     Partners: Male     Birth control/protection: Female Sterilization   Other Topics Concern   • Not on file   Social History Narrative   • Not on file     Social Determinants of Health     Financial Resource Strain: Not on file   Food Insecurity: No Food Insecurity   • Worried About Running Out of Food in the Last Year: Never true   • Ran Out of Food in the Last Year: Never true   Transportation Needs: No Transportation Needs   • Lack of Transportation (Medical): No   • Lack of Transportation (Non-Medical):  No   Physical Activity: Not on file   Stress: Not on file   Social Connections: Not on file   Intimate Partner Violence: Not on file   Housing Stability: Not on file       Allergies   Allergen Reactions   • Prednisone Swelling   • Bactrim [Sulfamethoxazole-Trimethoprim] Hives   • Corticosteroids Swelling     Pt states this does not cause problems breathing, she just has generalized swelling   • Cortisone Swelling     Generalized; no impairment with breathing reported     • Medical Tape Hives     Allergic to Paper Tape   • Other Hives     Paper tape   • Sulfa Antibiotics Hives         Current Outpatient Medications:   •  acetaminophen (TYLENOL) 500 mg tablet, Take 1,000 mg by mouth, Disp: , Rfl:   •  albuterol (Ventolin HFA) 90 mcg/act inhaler, Inhale 2 puffs every 4 (four) hours as needed for wheezing or shortness of breath, Disp: 18 g, Rfl: 2  •  ALPRAZolam (XANAX) 0 5 mg tablet, Take 1 tablet (0 5 mg total) by mouth daily at bedtime as needed for anxiety, Disp: 15 tablet, Rfl: 0  •  aspirin (ECOTRIN LOW STRENGTH) 81 mg EC tablet, Take 1 tablet (81 mg total) by mouth daily Do not start before February 1, 2023 , Disp: 90 tablet, Rfl: 0  •  B-D UF III MINI PEN NEEDLES 31G X 5 MM MISC, INJECT UNDER THE SKIN DAILY AT BEDTIME USE ONE A DAY OR AS DIRECTED, Disp: 100 each, Rfl: 6  •  Blood Glucose Monitoring Suppl (OneTouch Verio) w/Device KIT, Use daily, Disp: 1 kit, Rfl: 0  •  Blood Pressure Monitoring (B-D ASSURE BPM/AUTO WRIST CUFF) MISC, Check blood pressure prior to each OB visit, or as directed by your physician , Disp: 1 each, Rfl: 0  •  clopidogrel (PLAVIX) 75 mg tablet, Take 1 tablet (75 mg total) by mouth daily Do not start before February 1, 2023 , Disp: 60 tablet, Rfl: 0  •  fexofenadine (ALLEGRA) 180 MG tablet, Take 1 tablet (180 mg total) by mouth daily, Disp: 30 tablet, Rfl: 5  •  gabapentin (NEURONTIN) 100 mg capsule, take 1 capsule by mouth daily at bedtime, Disp: 30 capsule, Rfl: 0  •  insulin glargine (LANTUS) 100 units/mL subcutaneous injection, Inject 17 Units under the skin daily at bedtime, Disp: 4 5 mL, Rfl: 3  •  lisinopril (ZESTRIL) 30 mg tablet, Take 30 mg by mouth daily, Disp: , Rfl:   •  metFORMIN (GLUCOPHAGE) 1000 MG tablet, Take 1 tablet (1,000 mg total) by mouth 2 (two) times a day with meals, Disp: 180 tablet, Rfl: 3  •  methocarbamol (ROBAXIN) 500 mg tablet, Take 1 tablet (500 mg total) by mouth 4 (four) times a day, Disp: 60 tablet, Rfl: 0  •  montelukast (SINGULAIR) 10 mg tablet, Take 1 tablet (10 mg total) by mouth daily at bedtime, Disp: 30 tablet, Rfl: 5  •  sertraline (Zoloft) 50 mg tablet, Take 1 tablet (50 mg total) by mouth daily, Disp: 90 tablet, Rfl: 3  •  sitaGLIPtin (JANUVIA) 100 mg tablet, Take 1 tablet (100 mg total) by mouth daily, Disp: 90 tablet, Rfl: 3  •  VITAMIN D PO, Take by mouth daily, Disp: , Rfl:

## 2023-04-18 PROBLEM — S92.515A CLOSED NONDISPLACED FRACTURE OF PROXIMAL PHALANX OF LESSER TOE OF LEFT FOOT: Status: ACTIVE | Noted: 2023-04-18

## 2023-04-24 ENCOUNTER — OFFICE VISIT (OUTPATIENT)
Dept: VASCULAR SURGERY | Facility: CLINIC | Age: 33
End: 2023-04-24

## 2023-04-24 VITALS
BODY MASS INDEX: 41.72 KG/M2 | HEART RATE: 97 BPM | SYSTOLIC BLOOD PRESSURE: 140 MMHG | OXYGEN SATURATION: 99 % | DIASTOLIC BLOOD PRESSURE: 80 MMHG | HEIGHT: 61 IN | WEIGHT: 221 LBS

## 2023-04-24 DIAGNOSIS — B36.9 FUNGAL INFECTION OF SKIN: Primary | ICD-10-CM

## 2023-04-24 NOTE — ASSESSMENT & PLAN NOTE
24-year-old female s/p wound washout and vac placement from 2/11/23 [facility])    Wound is 1 cm  x  0 5cm , 0 1cm (D) with fungal odor, no discharge noted, wound bed has pink granulation tissues  Pt is c/o red, irritated pruritus area surrounding the wound      -Pt approved for return to work in one week  States she has her f/u AOIL and LEAD next week with f/u review    -Denies any claudication, groin pain  States she has pruritis and erythema in the surrounding area of the wound  Start using micotin powder on an ABD and place on groin fold, change ABD BID or when moist    -Follow up imaging as previously scheduled   -Call or return to office with any questions or concerns or changes to groin site including erythema, drainage, pain, or signs infection

## 2023-04-24 NOTE — PROGRESS NOTES
Assessment/Plan:    Surgical site infection  33-year-old female s/p wound washout and vac placement from 2/11/23 [facility])    Wound is 1 cm  x  0 5cm , 0 1cm (D) with fungal odor, no discharge noted, wound bed has pink granulation tissues  Pt is c/o red, irritated pruritus area surrounding the wound      -Pt approved for return to work in one week  States she has her f/u AOIL and LEAD next week with f/u review    -Denies any claudication, groin pain  States she has pruritis and erythema in the surrounding area of the wound  Start using micotin powder on an ABD and place on groin fold, change ABD BID or when moist    -Follow up imaging as previously scheduled   -Call or return to office with any questions or concerns or changes to groin site including erythema, drainage, pain, or signs infection  Fungal infection of skin  R groin        Diagnoses and all orders for this visit:    Fungal infection of skin  -     miconazole (MICOTIN) 2 % powder; Apply topically 2 (two) times a day          Subjective:      Patient ID: Min Fried is a 26 y o  female  Patient is s/p R thrombectomy w/ embolectomy done 1/30/23 by Dr Saige Crowder Doctor  She presents to office today for a wound check of R groin  Wound is closed with redness around the area  She denies fever, chills  She states there is still pain there  She is currently taking ASA 81 mg and Plavix  33-year-old female PMH of T2 DM, Migraines, aortic coarctation, VSD, HTN, Obesity (BMI 42), bipolar disorder, depression, anxiety presents to the office s/p a right femoral exposure/right iliac embolectomy aortogram and right EIA stent done 1/30/2023 by Dr AGOSTO [facility] Doctor, s/p a right groin wound washout and VAC placement done 2/11/2023 by Dr Sarah Bangura  VAC was D/C'd 3/6/2023  Wound remains open, but is very shallow, she states she is not putting anything on the wound at this time   Wound bed is pink and measures 1 0 cm  x  0 5 cm, < 1cm (D) with fugal odor and no "discharge noted  She reports no pain at the R groin site  States she is feeling well  She has some pain in the medial thigh that she describes as burning intermittently she attributes to increasing her mobility  She states she feels ready to return to work, however, is concerned about the increased erythema and itching surrounding the wound bed  Patient is taking ASA 81 mg and Plavix  She is a former smoker        The following portions of the patient's history were reviewed and updated as appropriate: allergies, current medications, past family history, past medical history, past social history, past surgical history and problem list     Review of Systems   Respiratory: Negative for shortness of breath  Cardiovascular: Negative for chest pain  Musculoskeletal: Negative  Skin: Positive for wound (R groin)  Objective:      /80 (BP Location: Left arm, Patient Position: Sitting, Cuff Size: Standard)   Pulse 97   Ht 5' 1\" (1 549 m)   Wt 100 kg (221 lb)   LMP 04/03/2023 (Approximate)   SpO2 99%   BMI 41 76 kg/m²          Physical Exam  Nursing note reviewed  Constitutional:       Appearance: Normal appearance  She is obese  HENT:      Head: Normocephalic and atraumatic  Cardiovascular:      Rate and Rhythm: Normal rate and regular rhythm  Pulses:           Radial pulses are 2+ on the right side and 2+ on the left side  Dorsalis pedis pulses are 2+ on the right side and 2+ on the left side  Posterior tibial pulses are 1+ on the right side and 1+ on the left side  Heart sounds: No murmur heard  Pulmonary:      Effort: Pulmonary effort is normal       Breath sounds: Normal breath sounds  Musculoskeletal:      Right lower leg: No edema  Left lower leg: No edema  Skin:     General: Skin is warm and dry  Capillary Refill: Capillary refill takes less than 2 seconds  Findings: Erythema present               Comments: R groin puncture site malodorous  " "  Neurological:      General: No focal deficit present  Mental Status: She is alert and oriented to person, place, and time  Psychiatric:         Mood and Affect: Mood normal          Behavior: Behavior normal          I have reviewed and made appropriate changes to the review of systems input by the medical assistant  Vitals:    04/24/23 0943   BP: 140/80   BP Location: Left arm   Patient Position: Sitting   Cuff Size: Standard   Pulse: 97   SpO2: 99%   Weight: 100 kg (221 lb)   Height: 5' 1\" (1 549 m)       Patient Active Problem List   Diagnosis   • Splenomegaly   • Hepatitis C   • Intractable chronic migraine without aura and with status migrainosus   • Cervicalgia   • Cervical dystonia   • Aortic coarctation   • Hypercholesteremia   • Hidradenitis suppurativa   • Cyst of right ovary   • Endometriosis   • Morbid obesity (HCC)   • Type 2 diabetes mellitus without complication, with long-term current use of insulin (HCC)   • Vitamin D deficiency   • Bipolar disorder (HCC)   • VSD (ventricular septal defect) and coarctation of aorta   • Hypertension   • H/O aortic coarctation repair   • Chronic hepatitis C without hepatic coma (HCC)   • Multiple thyroid nodules   • Depression   • Anxiety   • Neuropathy   • Elevated troponin I level   • Chest pain   • Arterial occlusion   • Right groin pain   • 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HCC)   • Continuous opioid dependence (HCC)   • Surgical site infection   • Closed nondisplaced fracture of proximal phalanx of lesser toe of left foot   • Fungal infection of skin       Past Surgical History:   Procedure Laterality Date   • CARDIAC CATHETERIZATION      no CAD 10days, 4 weeks 20months old    • CARDIAC CATHETERIZATION N/A 1/12/2023    Procedure: Cardiac Coronary Angiogram;  Surgeon: Ricardo Otoole DO;  Location: [facility];   Service: Cardiology   • CARDIAC CATHETERIZATION Left 1/12/2023    Procedure: Cardiac Left Heart Cath;  Surgeon: Ángel Brewer" Trev Barbour DO;  Location: [facility]; Service: Cardiology   • CARDIAC CATHETERIZATION  2023    Procedure: Cardiac catheterization;  Surgeon: Juan Landers DO;  Location: [facility];   Service: Cardiology   •  SECTION, LOW TRANSVERSE     • CHOLECYSTECTOMY     • 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 5   • CORONARY STENT PLACEMENT     • IR LOWER EXTREMITY ANGIOGRAM  2023   • LIVER BIOPSY     • LIVER BIOPSY     • STERNAL WIRE REMOVAL  2022   • THROMBECTOMY W/ EMBOLECTOMY Right 2023    Procedure: Right femoral exposure Right iliac embolectomy w/ #4 Ted catheter Aortogram Right EIA stent w/ 7x29mm VBX;  Surgeon: Agustina Hodges MD;  Location: [facility];  Service: Vascular   • TUBAL LIGATION Bilateral    • VSD REPAIR      As a child   • WOUND DEBRIDEMENT Right 2023    Procedure: Right Groin Wound Washout, Pulse Lavage, Wound Vac placement;  Surgeon: Jose Malik DO;  Location: [facility];  Service: Vascular       Family History   Problem Relation Age of Onset   • Hypertension Mother    • Migraines Mother    • JENNY disease Mother    • Depression Mother    • Hyperlipidemia Mother    • Diabetes Mother    • Diabetes Father    • Hypertension Father    • Kidney failure Father    • Heart attack Father    • Arthritis Father    • Stroke Father    • Polycystic kidney disease Paternal Grandmother    • Stroke Paternal Grandmother    • Heart disease Paternal Grandmother    • Arthritis Sister    • Asthma Sister    • Thyroid disease Sister    • Diabetes Sister    • Arthritis Maternal Grandmother    • Breast cancer Maternal Grandmother    • Diabetes Maternal Grandmother    • Hypertension Maternal Grandmother    • Heart Valve Disease Maternal Grandmother    • Learning disabilities Cousin    • Learning disabilities Sister    • ADD / ADHD Cousin    • Lung cancer Brother    • Diabetes Maternal Grandfather    • Hypertension Maternal Grandfather    • JENNY disease Maternal Grandfather    • Stroke Maternal Grandfather        Social History     Socioeconomic History   • Marital status: Single     Spouse name: Not on file   • Number of children: Not on file   • Years of education: Not on file   • Highest education level: Not on file   Occupational History   • Not on file   Tobacco Use   • Smoking status: Former     Packs/day: 0 50     Years: 0 00     Pack years: 0 00     Types: Cigarettes     Quit date: 2023     Years since quittin 2   • Smokeless tobacco: Never   Vaping Use   • Vaping Use: Never used   Substance and Sexual Activity   • Alcohol use: Not Currently     Alcohol/week: 3 0 standard drinks     Types: 3 Standard drinks or equivalent per week     Comment: socially/prior to knowledge of pregnancy   • Drug use: Not Currently     Comment: hx of THC use for migraines   • Sexual activity: Yes     Partners: Male     Birth control/protection: Female Sterilization   Other Topics Concern   • Not on file   Social History Narrative   • Not on file     Social Determinants of Health     Financial Resource Strain: Not on file   Food Insecurity: No Food Insecurity   • Worried About Running Out of Food in the Last Year: Never true   • Ran Out of Food in the Last Year: Never true   Transportation Needs: No Transportation Needs   • Lack of Transportation (Medical): No   • Lack of Transportation (Non-Medical):  No   Physical Activity: Not on file   Stress: Not on file   Social Connections: Not on file   Intimate Partner Violence: Not on file   Housing Stability: Not on file       Allergies   Allergen Reactions   • Prednisone Swelling   • Bactrim [Sulfamethoxazole-Trimethoprim] Hives   • Corticosteroids Swelling     Pt states this does not cause problems breathing, she just has generalized swelling   • Cortisone Swelling     Generalized; no impairment with breathing reported     • Medical Tape Hives     Allergic to Paper Tape   • Other Hives     Paper tape   • Sulfa Antibiotics Hives Current Outpatient Medications:   •  acetaminophen (TYLENOL) 500 mg tablet, Take 1,000 mg by mouth, Disp: , Rfl:   •  albuterol (Ventolin HFA) 90 mcg/act inhaler, Inhale 2 puffs every 4 (four) hours as needed for wheezing or shortness of breath, Disp: 18 g, Rfl: 2  •  ALPRAZolam (XANAX) 0 5 mg tablet, Take 1 tablet (0 5 mg total) by mouth daily at bedtime as needed for anxiety, Disp: 15 tablet, Rfl: 0  •  aspirin (ECOTRIN LOW STRENGTH) 81 mg EC tablet, Take 1 tablet (81 mg total) by mouth daily Do not start before February 1, 2023 , Disp: 90 tablet, Rfl: 0  •  B-D UF III MINI PEN NEEDLES 31G X 5 MM MISC, INJECT UNDER THE SKIN DAILY AT BEDTIME USE ONE A DAY OR AS DIRECTED, Disp: 100 each, Rfl: 6  •  Blood Glucose Monitoring Suppl (OneTouch Verio) w/Device KIT, Use daily, Disp: 1 kit, Rfl: 0  •  Blood Pressure Monitoring (B-D ASSURE BPM/AUTO WRIST CUFF) MISC, Check blood pressure prior to each OB visit, or as directed by your physician , Disp: 1 each, Rfl: 0  •  clopidogrel (PLAVIX) 75 mg tablet, Take 1 tablet (75 mg total) by mouth daily Do not start before February 1, 2023 , Disp: 60 tablet, Rfl: 0  •  Continuous Blood Gluc Sensor (Dexcom G6 Sensor) MISC, 3 PACK SENSOR FOR CONTINUOUS GLUCOSE MONITORING, Disp: 9 each, Rfl: 3  •  Continuous Blood Gluc Transmit (Dexcom G6 Transmitter) MISC, 1 TRANSMITTED EVERY 3 MONTHS FOR CONTINUOUS GLUCOSE MONITORING, Disp: 1 each, Rfl: 3  •  Empagliflozin (JARDIANCE) 10 MG TABS tablet, Take 1 tablet (10 mg total) by mouth daily, Disp: 90 tablet, Rfl: 3  •  fexofenadine (ALLEGRA) 180 MG tablet, Take 1 tablet (180 mg total) by mouth daily, Disp: 30 tablet, Rfl: 5  •  gabapentin (NEURONTIN) 100 mg capsule, take 1 capsule by mouth daily at bedtime, Disp: 30 capsule, Rfl: 0  •  insulin glargine (LANTUS) 100 units/mL subcutaneous injection, Inject 17 Units under the skin daily at bedtime, Disp: 4 5 mL, Rfl: 3  •  lisinopril (ZESTRIL) 30 mg tablet, Take 30 mg by mouth daily, Disp: , Rfl:   •  metFORMIN (GLUCOPHAGE) 1000 MG tablet, Take 1 tablet (1,000 mg total) by mouth 2 (two) times a day with meals, Disp: 180 tablet, Rfl: 3  •  methocarbamol (ROBAXIN) 500 mg tablet, Take 1 tablet (500 mg total) by mouth 4 (four) times a day, Disp: 60 tablet, Rfl: 0  •  miconazole (MICOTIN) 2 % powder, Apply topically 2 (two) times a day, Disp: 70 g, Rfl: 0  •  montelukast (SINGULAIR) 10 mg tablet, Take 1 tablet (10 mg total) by mouth daily at bedtime, Disp: 30 tablet, Rfl: 5  •  sertraline (Zoloft) 50 mg tablet, Take 1 5 tablets (75 mg total) by mouth daily, Disp: 90 tablet, Rfl: 3  •  sitaGLIPtin (JANUVIA) 100 mg tablet, Take 1 tablet (100 mg total) by mouth daily, Disp: 90 tablet, Rfl: 3  •  VITAMIN D PO, Take by mouth daily, Disp: , Rfl:   I have spent a total time of 30 minutes on 04/24/23 in caring for this patient including Risks and benefits of tx options, Instructions for management, Patient and family education, Documenting in the medical record, Reviewing / ordering tests, medicine, procedures   and Obtaining or reviewing history

## 2023-04-24 NOTE — LETTER
April 24, 2023     Patient: Andres Trevino  YOB: 1990  Date of Visit: 4/24/2023      To Whom it May Concern:    Andres Trevino is under my professional care  Clemente Stockton was seen in my office on 4/24/2023  Clemente Stockton may return to work on May 4th 2023  If you have any questions or concerns, please don't hesitate to call           Sincerely,          REJI Bullard        CC: No Recipients

## 2023-04-24 NOTE — ASSESSMENT & PLAN NOTE
-- DO NOT REPLY / DO NOT REPLY ALL --  -- Message is from the [facility]--    COVID-19 Universal Screening: N/A - Not about scheduling    General Patient Message      Reason for Call: Patient spouse call in and will like a call back from PCP regarding patient been release on Friday from the hospital.     Caller Information       Type Contact Phone    03/01/2021 01:29 PM CST Phone (Incoming) DELBERT HARTLEY (Emergency Contact) [phone]          Alternative phone number: None    Turnaround time given to caller:   \"This message will be sent to [state Provider's name]. The clinical team will fulfill your request as soon as they review your message.\"     SAMANTA [email or web address]

## 2023-04-24 NOTE — PATIENT INSTRUCTIONS
- Call the office if you experience any changes to your legs or feet such as new pain, redness,swelling,    or fever   - Stay active  Exercise everyday  Walking is the recommended exercise, keep a log if possible    -Start using antifungal powder on ABD dressing on the groin site  Change twice daily or when moist  Call the office with any worsening wound, increase in swelling or discharge    -Complete LEAD and AOIL 5/3 and return to the office for review with Dr Hodges

## 2023-04-25 DIAGNOSIS — M79.604 PAIN OF RIGHT LOWER EXTREMITY: ICD-10-CM

## 2023-04-25 DIAGNOSIS — E11.9 TYPE 2 DIABETES MELLITUS WITHOUT COMPLICATION, WITH LONG-TERM CURRENT USE OF INSULIN (HCC): Primary | ICD-10-CM

## 2023-04-25 DIAGNOSIS — Z79.4 TYPE 2 DIABETES MELLITUS WITHOUT COMPLICATION, WITH LONG-TERM CURRENT USE OF INSULIN (HCC): Primary | ICD-10-CM

## 2023-04-25 DIAGNOSIS — E11.65 TYPE 2 DIABETES MELLITUS WITH HYPERGLYCEMIA, WITHOUT LONG-TERM CURRENT USE OF INSULIN (HCC): ICD-10-CM

## 2023-04-25 RX ORDER — ASPIRIN 81 MG/1
TABLET, COATED ORAL
Qty: 90 TABLET | Refills: 0 | Status: SHIPPED | OUTPATIENT
Start: 2023-04-25

## 2023-04-26 ENCOUNTER — TELEPHONE (OUTPATIENT)
Dept: ENDOCRINOLOGY | Facility: CLINIC | Age: 33
End: 2023-04-26

## 2023-05-03 ENCOUNTER — HOSPITAL ENCOUNTER (OUTPATIENT)
Dept: NON INVASIVE DIAGNOSTICS | Facility: HOSPITAL | Age: 33
Discharge: HOME | End: 2023-05-03

## 2023-05-03 DIAGNOSIS — I74.5 EXTERNAL ILIAC ARTERY THROMBOSIS (HCC): ICD-10-CM

## 2023-05-10 NOTE — PROGRESS NOTES
New Patient Progress Note      Chief complaint: T2DM with hyperglycemia     Referring Provider  Azucena Louise, [address] [place] [address]/[employer]  [employer],  [address]     History of Present Illness:   Lucina Godfrey is a 26 y o  female with a history of type 2 diabetes with hyperglycemia seen in initial consult at the request of Josh Noble (PCP) for diabetes management  Patient recalls being diagnosed with diabetes 6 years ago  She was previously on oral agents, insulin was added 4 years ago due to uncontrolled hyperglycemia  Infrequent with home glucose monitoring- at most checks twice a week  Also endorses nonadherence to diabetic diet  Most recent A1c of 10 6% (2023)    Reports complications of neuropathy and recent skin wound infection s/p wound washout and vac placement from 2023- 2023  Denies any prior CAD, nephropathy, CVA or TIA  Significant family history of type 1 diabetes in her father who  of diabetic complications, and type 2 diabetes in her mother and multiple siblings  Has a PMH of VSD /p surgical repair in childhood, aortic coarctation, DVT s/p right thrombectomy and stent placement  Current regimen: Metformin 1000 mg bid, Januvia 100 mg daily, Jardiance 10 mg daily (added a few weeks ago by PCP), Lantus 17 units daily  Reports prior history of UTI during pregnancy 3 years ago with no recent episodes  Diabetes education: NO     Opthamology: Yet to establish care  Podiatry:  Yet to establish care    Has hypertension: followed by PCP; on ACE inhibitor    Thyroid disorders: None    Patient Active Problem List   Diagnosis   • Splenomegaly   • Hepatitis C   • Intractable chronic migraine without aura and with status migrainosus   • Cervicalgia   • Cervical dystonia   • Aortic coarctation   • Hypercholesteremia   • Hidradenitis suppurativa   • Cyst of right ovary   • Endometriosis   • Morbid obesity ([facility] [address] )   • Type 2 diabetes mellitus without complication, with long-term current use of insulin ([place] [address] )   • Vitamin D deficiency   • Bipolar disorder ([place] [address] )   • VSD (ventricular septal defect) and coarctation of aorta   • Hypertension   • H/O aortic coarctation repair   • Chronic hepatitis C without hepatic coma (HCC)   • Multiple thyroid nodules   • Depression   • Anxiety   • Neuropathy   • Elevated troponin I level   • Chest pain   • Arterial occlusion   • Right groin pain   • 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HCC)   • Continuous opioid dependence (HCC)   • Surgical site infection   • Closed nondisplaced fracture of proximal phalanx of lesser toe of left foot   • Fungal infection of skin      Past Medical History:   Diagnosis Date   • Allergic    • Arthritis    • Bipolar affective disorder, currently depressed, moderate ([place] [address] ) 2018   • Cyst of ovary, right    • Diabetes mellitus ([place] [address] ) 10/10/2018    type 2   • Endometriosis    • Heart murmur 1988   • Hepatitis C    • Hepatitis C virus infection cured after antiviral drug therapy    • History of transfusion    • Hypertension    • Migraines    • Morbid obesity with BMI of 40 0-44 9, adult ([place] [address] )    • Obesity    • Pulmonary artery congenital abnormality    • Spleen enlarged    • Status post surgical removal of both fallopian tubes    • Varicella       Past Surgical History:   Procedure Laterality Date   • CARDIAC CATHETERIZATION      no CAD 10days, 4 weeks 20months old    • CARDIAC CATHETERIZATION N/A 2023    Procedure: Cardiac Coronary Angiogram;  Surgeon: Arlen Huggins DO;  Location: [facility]; Service: Cardiology   • CARDIAC CATHETERIZATION Left 2023    Procedure: Cardiac Left Heart Cath;  Surgeon: Arlen Huggins DO;  [facility]: [facility]; Service: Cardiology   • CARDIAC CATHETERIZATION  2023    Procedure: Cardiac catheterization;  Surgeon: Arlen Huggins DO;  [facility]: [facility];   Service: Cardiology   •  SECTION, LOW TRANSVERSE     • CHOLECYSTECTOMY • 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 5   • CORONARY STENT PLACEMENT     • IR LOWER EXTREMITY ANGIOGRAM  2023   • LIVER BIOPSY     • LIVER BIOPSY     • STERNAL WIRE REMOVAL  2022   • THROMBECTOMY W/ EMBOLECTOMY Right 2023    Procedure: Right femoral exposure Right iliac embolectomy w/ #4 Ted catheter Aortogram Right EIA stent w/ 7x29mm VBX;  Surgeon: Amanda Hodges MD;  Location: [facility];  Service: Vascular   • TUBAL LIGATION Bilateral    • VSD REPAIR      As a child   • WOUND DEBRIDEMENT Right 2023    Procedure: Right Groin Wound Washout, Pulse Lavage, Wound Vac placement;  Surgeon: Breana aDy DO;  Location: [facility];  Service: Vascular      Family History   Problem Relation Age of Onset   • Hypertension Mother    • Migraines Mother    • JENNY disease Mother    • Depression Mother    • Hyperlipidemia Mother    • Diabetes Mother    • Diabetes Father    • Hypertension Father    • Kidney failure Father    • Heart attack Father    • Arthritis Father    • Stroke Father    • Polycystic kidney disease Paternal Grandmother    • Stroke Paternal Grandmother    • Heart disease Paternal Grandmother    • Arthritis Sister    • Asthma Sister    • Thyroid disease Sister    • Diabetes Sister    • Arthritis Maternal Grandmother    • Breast cancer Maternal Grandmother    • Diabetes Maternal Grandmother    • Hypertension Maternal Grandmother    • Heart Valve Disease Maternal Grandmother    • Learning disabilities Cousin    • Learning disabilities Sister    • ADD / ADHD Cousin    • Lung cancer Brother    • Diabetes Maternal Grandfather    • Hypertension Maternal Grandfather    • JENNY disease Maternal Grandfather    • Stroke Maternal Grandfather      Social History     Tobacco Use   • Smoking status: Former     Packs/day: 0 50     Years: 0 00     Pack years: 0 00     Types: Cigarettes     Quit date: 2023     Years since quittin 3   • Smokeless tobacco: Never   Substance Use Topics   • Alcohol use: Not Currently     Alcohol/week: 3 0 standard drinks     Types: 3 Standard drinks or equivalent per week     Comment: socially/prior to knowledge of pregnancy     Allergies   Allergen Reactions   • Prednisone Swelling   • Bactrim [Sulfamethoxazole-Trimethoprim] Hives   • Corticosteroids Swelling     Pt states this does not cause problems breathing, she just has generalized swelling   • Cortisone Swelling     Generalized; no impairment with breathing reported     • Medical Tape Hives     Allergic to Paper Tape   • Other Hives     Paper tape   • Sulfa Antibiotics Hives         Current Outpatient Medications:   •  acetaminophen (TYLENOL) 500 mg tablet, Take 1,000 mg by mouth, Disp: , Rfl:   •  albuterol (Ventolin HFA) 90 mcg/act inhaler, Inhale 2 puffs every 4 (four) hours as needed for wheezing or shortness of breath, Disp: 18 g, Rfl: 2  •  ALPRAZolam (XANAX) 0 5 mg tablet, Take 1 tablet (0 5 mg total) by mouth daily at bedtime as needed for anxiety, Disp: 15 tablet, Rfl: 0  •  Aspirin Low Dose 81 MG EC tablet, TAKE 1 TABLET BY MOUTH DAILY   DO NOT START BEFORE FEBRYARY 1, 2023, Disp: 90 tablet, Rfl: 0  •  B-D UF III MINI PEN NEEDLES 31G X 5 MM MISC, INJECT UNDER THE SKIN DAILY AT BEDTIME USE ONE A DAY OR AS DIRECTED, Disp: 100 each, Rfl: 6  •  Blood Glucose Monitoring Suppl (OneTouch Verio) w/Device KIT, Use daily, Disp: 1 kit, Rfl: 0  •  Blood Pressure Monitoring (B-D ASSURE BPM/AUTO WRIST CUFF) MISC, Check blood pressure prior to each OB visit, or as directed by your physician , Disp: 1 each, Rfl: 0  •  clopidogrel (PLAVIX) 75 mg tablet, Take 1 tablet (75 mg total) by mouth daily Do not start before February 1, 2023 , Disp: 60 tablet, Rfl: 0  •  Continuous Blood Gluc  (FreeStyle Gautam 14 Day [place]) KVNG, Use with gautam sensor, Disp: 1 each, Rfl: 3  •  Continuous Blood Gluc Sensor (FreeStyle Gautam 3 Sensor) MISC, Use 1 sensor each to be changed every 14 days, Disp: 2 each, Rfl: 3  • Empagliflozin (JARDIANCE) 10 MG TABS tablet, Take 1 tablet (10 mg total) by mouth daily, Disp: 90 tablet, Rfl: 3  •  fexofenadine (ALLEGRA) 180 MG tablet, Take 1 tablet (180 mg total) by mouth daily, Disp: 30 tablet, Rfl: 5  •  gabapentin (NEURONTIN) 100 mg capsule, take 1 capsule by mouth daily at bedtime, Disp: 30 capsule, Rfl: 0  •  insulin glargine (LANTUS) 100 units/mL subcutaneous injection, Inject 17 Units under the skin daily at bedtime, Disp: 4 5 mL, Rfl: 3  •  insulin lispro (HumaLOG KwikPen) 100 units/mL injection pen, Inject 5 Units under the skin 3 (three) times a day with meals, Disp: 15 mL, Rfl: 3  •  lisinopril (ZESTRIL) 30 mg tablet, Take 30 mg by mouth daily, Disp: , Rfl:   •  metFORMIN (GLUCOPHAGE) 1000 MG tablet, Take 1 tablet (1,000 mg total) by mouth 2 (two) times a day with meals, Disp: 180 tablet, Rfl: 3  •  methocarbamol (ROBAXIN) 500 mg tablet, Take 1 tablet (500 mg total) by mouth 4 (four) times a day, Disp: 60 tablet, Rfl: 0  •  miconazole (MICOTIN) 2 % powder, Apply topically 2 (two) times a day, Disp: 70 g, Rfl: 0  •  montelukast (SINGULAIR) 10 mg tablet, Take 1 tablet (10 mg total) by mouth daily at bedtime, Disp: 30 tablet, Rfl: 5  •  Multiple Vitamin (MULTI VITAMIN DAILY PO), Take by mouth, Disp: , Rfl:   •  sertraline (Zoloft) 50 mg tablet, Take 1 5 tablets (75 mg total) by mouth daily, Disp: 90 tablet, Rfl: 3  •  tirzepatide (Mounjaro) 2 5 MG/0 5ML, Inject 0 5 mL (2 5 mg total) under the skin every 7 days Start with 2 5 mg weekly for 4 weeks then increase to 5 mg weekly, Disp: 3 mL, Rfl: 3  •  Continuous Blood Gluc Transmit (Dexcom G6 Transmitter) MISC, 1 TRANSMITTED EVERY 3 MONTHS FOR CONTINUOUS GLUCOSE MONITORING (Patient not taking: Reported on 5/11/2023), Disp: 1 each, Rfl: 3  •  VITAMIN D PO, Take by mouth daily (Patient not taking: Reported on 5/11/2023), Disp: , Rfl:      ROS  Constitutional: Negative for appetite change     Respiratory: Negative for shortness of breath, "wheezing, cough  Cardiovascular: Negative for chest pain and palpitations  Gastrointestinal: Negative for abdominal pain, nausea and vomiting  Musculoskeletal: Negative for arthralgias  Neurological: Negative for dizziness, light-headedness and headaches  All other ROS reviewed and negative    Physical Exam:  Body mass index is 41 38 kg/m²  /78 (BP Location: Left arm, Patient Position: Sitting, Cuff Size: Large)   Pulse 84   Ht 5' 1\" (1 549 m)   Wt 99 3 kg (219 lb)   BMI 41 38 kg/m²    Wt Readings from Last 3 Encounters:   05/11/23 99 3 kg (219 lb)   04/24/23 100 kg (221 lb)   04/18/23 101 kg (222 lb 12 8 oz)       Physical exam:   Constitutional:Oriented to person, place, and time  Appears well-developed and well-nourished  Not in any acute distress  HENT:   Head: Normocephalic and atraumatic  Neck: Normal range of motion  Supple, No thyromegaly  Pulmonary/Chest: Effort normal/ breathing comfortably on room air  CTAB   CVS:  Regular rate and rhythm, S1-S2 +  Abdomen: soft, nondistended, nontender  Musculoskeletal: Normal range of motion  Neurological: Alert and oriented to person, place, and time  Skin:  Warm, no rash  Extremities:  No pedal edema  Psychiatric: Normal mood and affect   Behavior is normal    Foot exam: dry scaly skin bilaterally  no cuts or breaks in the skin  DP 2+ bilaterally   Sensations diminished to monofilament testing bilaterally     Labs:   Component Ref Range & Units 4/18/23  2:30 PM 1/10/23  5:03 AM 11/18/21  7:01 AM 7/7/21  6:24 AM 5/26/21 10:08 AM 3/3/21  8:15 AM 1/6/20  5:17 PM   Hemoglobin A1C 6 5 10 6 Abnormal   9 6 High  R  9 6 High  R  9 9 High  R  10 7 Abnormal   9 8 High  R  7 8 High  R         Lab Results   Component Value Date    CREATININE 0 56 (L) 02/13/2023    CREATININE 0 57 (L) 02/12/2023    CREATININE 0 54 (L) 02/11/2023    BUN 14 02/13/2023     06/14/2018    K 4 0 02/13/2023     02/13/2023    CO2 26 02/13/2023     eGFR   Date Value " Ref Range Status   02/13/2023 121 ml/min/1 73sq m Final     No components found for: [facility]    Lab Results   Component Value Date    HDL 40 (L) 01/10/2023    TRIG 271 (H) 01/10/2023       Lab Results   Component Value Date    ALT 12 02/10/2023    AST 11 (L) 02/10/2023    GGT 39 11/21/2018    ALKPHOS 59 02/10/2023         Impression:  1  Type 2 diabetes mellitus with hyperglycemia, with long-term current use of insulin ([facility] [address] )           Plan:      1  T2DM, uncontrolled, complicated by diabetic neuropathy  Started on GLP-1 agonist Mounjaro 2 5 mg weekly for 4 weeks then increase to 5 mg weekly, discontinue Januvia  Start on Humalog 5 units 3 times daily with meals, continue on current Lantus 70 units daily at bedtime  Continue on metformin 1000 mg twice daily and Jardiance 10 mg daily  Patient will now be on at least 4 insulin shots daily and will benefit from CGM for home glucose monitoring, soo 3 prescription sent to pharmacy pending insurance approval   In the meantime, advised checking Bgs at least 2X daily with logs brought to office visits/uploaded via [address]  Referral to diabetes education for MNT  Referral to medical fitness center  Office follow-up in 6 weeks  2  Morbid obesity: Discussed lifestyle and dietary modifications  Referral to medical fitness center      Discussed with the patient and all questioned fully answered  She will call me if any problems arise      Harriet Chen MD

## 2023-05-11 ENCOUNTER — CONSULT (OUTPATIENT)
Dept: ENDOCRINOLOGY | Facility: CLINIC | Age: 33
End: 2023-05-11

## 2023-05-11 VITALS
HEIGHT: 61 IN | BODY MASS INDEX: 41.35 KG/M2 | WEIGHT: 219 LBS | HEART RATE: 84 BPM | DIASTOLIC BLOOD PRESSURE: 78 MMHG | SYSTOLIC BLOOD PRESSURE: 116 MMHG

## 2023-05-11 DIAGNOSIS — E11.65 TYPE 2 DIABETES MELLITUS WITH HYPERGLYCEMIA, WITH LONG-TERM CURRENT USE OF INSULIN (HCC): ICD-10-CM

## 2023-05-11 DIAGNOSIS — Z79.4 TYPE 2 DIABETES MELLITUS WITH HYPERGLYCEMIA, WITH LONG-TERM CURRENT USE OF INSULIN (HCC): ICD-10-CM

## 2023-05-11 RX ORDER — FLASH GLUCOSE SCANNING READER
EACH MISCELLANEOUS
Qty: 1 EACH | Refills: 3 | Status: SHIPPED | OUTPATIENT
Start: 2023-05-11

## 2023-05-11 RX ORDER — INSULIN LISPRO 100 [IU]/ML
5 INJECTION, SOLUTION INTRAVENOUS; SUBCUTANEOUS
Qty: 15 ML | Refills: 3 | Status: SHIPPED | OUTPATIENT
Start: 2023-05-11

## 2023-05-11 RX ORDER — BLOOD-GLUCOSE SENSOR
EACH MISCELLANEOUS
Qty: 2 EACH | Refills: 3 | Status: SHIPPED | OUTPATIENT
Start: 2023-05-11

## 2023-05-12 ENCOUNTER — TELEPHONE (OUTPATIENT)
Dept: ENDOCRINOLOGY | Facility: CLINIC | Age: 33
End: 2023-05-12

## 2023-05-12 NOTE — TELEPHONE ENCOUNTER
Vibha Stephanie (JamiaKailexis Cruz) - [phone]  Need help? Call us at [phone]    Status   Sent to [facility]  Next [employer]   The plan will fax you a determination, typically within 1 to 5 business days  How do I follow up? Drug    FreeStyle Gautam 14 Day [place] device   Form    [facility] Oral Form    Prior Authorization for Oral and Other Pharmacy Requests for [address] [facility] Members      [phone]    [facility] [phone] - Prescriber Not Enrolled in [facility] Program  Step Therapy: PA/Override Required[PA] Prior Authorization Required  Mason Garcia Prior Authorization Code [id number] for a 5 day Temporary Supply   Exclu

## 2023-05-15 NOTE — PROGRESS NOTES
Assessment/Plan:    Patient is a 27 yo F w/ hx of aortic coarctation s/p repair (4wks old, 24mos old), DM, bipolar, HVC (treated), s/p TEVAR, multiple catheterizations, underwent cardiac catheterization via R fem access 1/12/23 w/ Mynx closure  Presented with RLE ischemia symptoms and finding of iliac/CFA occlusion s/p thrombectomy, angiogram, EIA stent 1/30/23 Me c/b groin wound infection s/p I&D, VAC placement, 2/11/23    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HCC)  Arterial occlusion  Morbid obesity (HCC)  -s/p thrombectomy, EIA stent 1/30/23 Me  -s/p I&D and VAC 2/11/23  -reviewed AOIL which showed R EIA stent  -reviewed LEADs which showed R: 0 94 and L: 1 21; fernandez machine broken; 50-75% stenosis of the R CFA  -will repeat testing in 6 mos w/ AOIL w/ ABIs; will transition to 1 year if this is stable  -f/u 1 year    DM  -A1C: 10 6  -she has a new monitor  -follows with endocrine    Medications  -cont ASA  -will stop plavix at this point (has had >3mos already since stenting)    Subjective:      Patient ID: Vika Roman is a 26 y o  female  Patient present to review AOIL and GEORGINA done on 5/3/23  Patient denies any pain or complaints  Patient is currently taking ASA and Plavix  HPI:    Patient initially presented in Jan for cardiac cath after positive stress test   She had R fem access w/ angioseal closure  A week or two later, she presented with R groin pain and numbness since the procedure and thigh pain with walking  FOund to have R iliac/CFA occlusion, s/p thrombectomy and EIA stent  Represented 2 wks later with R groin wound infection  S/p I&D and VAC and IV abx  Now VAC off and wound completely healed  She is back to normal activity  NO issues with ambulation  No SOB or CP  No claudication        The following portions of the patient's history were reviewed and updated as appropriate: allergies, current medications, past family history, past medical history, past social history, past surgical history and problem list     Review of Systems   Constitutional: Negative  HENT: Negative  Eyes: Negative  Respiratory: Negative  Negative for shortness of breath  Cardiovascular: Negative  Negative for chest pain  Gastrointestinal: Negative  Endocrine: Negative  Genitourinary: Negative  Musculoskeletal: Negative  Negative for gait problem  Skin: Negative  Negative for wound  Allergic/Immunologic: Negative  Neurological: Negative  Negative for weakness and numbness  Hematological: Negative  Psychiatric/Behavioral: Negative  Objective:      /78 (BP Location: Right arm, Patient Position: Sitting, Cuff Size: Adult)   Pulse 82          Physical Exam  Cardiovascular:      Pulses:           Radial pulses are 2+ on the right side and 2+ on the left side  Dorsalis pedis pulses are 2+ on the right side and 2+ on the left side  Heart sounds: No murmur heard  Pulmonary:      Effort: No respiratory distress  Breath sounds: No wheezing or rales  Musculoskeletal:      Right lower le+ Edema present  Left lower le+ Edema present  Skin:     Comments: R groin incision is now healed completely; there is some hypergranular scar in this area           I have reviewed and made appropriate changes to the review of systems input by the medical assistant      Vitals:    23 1433   BP: 124/78   BP Location: Right arm   Patient Position: Sitting   Cuff Size: Adult   Pulse: 82       Patient Active Problem List   Diagnosis   • Splenomegaly   • Hepatitis C   • Intractable chronic migraine without aura and with status migrainosus   • Cervicalgia   • Cervical dystonia   • Aortic coarctation   • Hypercholesteremia   • Hidradenitis suppurativa   • Cyst of right ovary   • Endometriosis   • Morbid obesity (HCC)   • Type 2 diabetes mellitus without complication, with long-term current use of insulin (HCC)   • Vitamin D deficiency   • B[facility] ([facility] ) • VSD (ventricular septal defect) and coarctation of aorta   • Hypertension   • H/O aortic coarctation repair   • Chronic hepatitis C without hepatic coma (HCC)   • Multiple thyroid nodules   • Depression   • Anxiety   • Neuropathy   • Elevated troponin I level   • Chest pain   • Arterial occlusion   • 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HCC)   • Continuous opioid dependence (HCC)   • Surgical site infection   • Closed nondisplaced fracture of proximal phalanx of lesser toe of left foot   • Fungal infection of skin       Past Surgical History:   Procedure Laterality Date   • CARDIAC CATHETERIZATION      no CAD 10days, 4 weeks 20months old    • CARDIAC CATHETERIZATION N/A 2023    Procedure: Cardiac Coronary Angiogram;  Surgeon: Wilmer Ellis DO;  Location: [facility]; Service: Cardiology   • CARDIAC CATHETERIZATION Left 2023    Procedure: Cardiac Left Heart Cath;  Surgeon: Wilmer Ellis DO;  Location: [facility]; Service: Cardiology   • CARDIAC CATHETERIZATION  2023    Procedure: Cardiac catheterization;  Surgeon: Wilmer Ellis DO;  Location: [facility];   Service: Cardiology   •  SECTION, LOW TRANSVERSE     • CHOLECYSTECTOMY     • 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 5   • CORONARY STENT PLACEMENT     • IR LOWER EXTREMITY ANGIOGRAM  2023   • LIVER BIOPSY     • LIVER BIOPSY     • STERNAL WIRE REMOVAL  2022   • THROMBECTOMY W/ EMBOLECTOMY Right 2023    Procedure: Right femoral exposure Right iliac embolectomy w/ #4 Ted catheter Aortogram Right EIA stent w/ 7x29mm VBX;  Surgeon: Emory Hodges MD;  Location: [facility];  Service: Vascular   • TUBAL LIGATION Bilateral    • VSD REPAIR      As a child   • WOUND DEBRIDEMENT Right 2023    Procedure: Right Groin Wound Washout, Pulse Lavage, Wound Vac placement;  Surgeon: Nicole Newsome DO;  Location: [facility];  Service: Vascular       Family History   Problem Relation Age of Onset   • Hypertension Mother    • Migraines Mother    • JENNY disease Mother    • Depression Mother    • Hyperlipidemia Mother    • Diabetes Mother    • Diabetes Father    • Hypertension Father    • Kidney failure Father    • Heart attack Father    • Arthritis Father    • Stroke Father    • Polycystic kidney disease Paternal Grandmother    • Stroke Paternal Grandmother    • Heart disease Paternal Grandmother    • Arthritis Sister    • Asthma Sister    • Thyroid disease Sister    • Diabetes Sister    • Arthritis Maternal Grandmother    • Breast cancer Maternal Grandmother    • Diabetes Maternal Grandmother    • Hypertension Maternal Grandmother    • Heart Valve Disease Maternal Grandmother    • Learning disabilities Cousin    • Learning disabilities Sister    • ADD / ADHD Cousin    • Lung cancer Brother    • Diabetes Maternal Grandfather    • Hypertension Maternal Grandfather    • JENNY disease Maternal Grandfather    • Stroke Maternal Grandfather        Social History     Socioeconomic History   • Marital status: Single     Spouse name: Not on file   • Number of children: Not on file   • Years of education: Not on file   • Highest education level: Not on file   Occupational History   • Not on file   Tobacco Use   • Smoking status: Former     Packs/day: 0 50     Years: 0 00     Pack years: 0 00     Types: Cigarettes     Quit date: 2023     Years since quittin 3   • Smokeless tobacco: Never   Vaping Use   • Vaping Use: Never used   Substance and Sexual Activity   • Alcohol use: Not Currently     Alcohol/week: 3 0 standard drinks     Types: 3 Standard drinks or equivalent per week     Comment: socially/prior to knowledge of pregnancy   • Drug use: Not Currently     Comment: hx of THC use for migraines   • Sexual activity: Yes     Partners: Male     Birth control/protection: Female Sterilization   Other Topics Concern   • Not on file   Social History Narrative   • Not on file     Social Determinants of Health     Financial Resource Strain: Not on file   Food Insecurity: No Food Insecurity   • Worried About Running Out of Food in the Last Year: Never true   • Ran Out of Food in the Last Year: Never true   Transportation Needs: No Transportation Needs   • Lack of Transportation (Medical): No   • Lack of Transportation (Non-Medical): No   Physical Activity: Not on file   Stress: Not on file   Social Connections: Not on file   Intimate Partner Violence: Not on file   Housing Stability: Not on file       Allergies   Allergen Reactions   • Prednisone Swelling   • Bactrim [Sulfamethoxazole-Trimethoprim] Hives   • Corticosteroids Swelling     Pt states this does not cause problems breathing, she just has generalized swelling   • Cortisone Swelling     Generalized; no impairment with breathing reported     • Medical Tape Hives     Allergic to Paper Tape   • Other Hives     Paper tape   • Sulfa Antibiotics Hives         Current Outpatient Medications:   •  acetaminophen (TYLENOL) 500 mg tablet, Take 1,000 mg by mouth, Disp: , Rfl:   •  albuterol (Ventolin HFA) 90 mcg/act inhaler, Inhale 2 puffs every 4 (four) hours as needed for wheezing or shortness of breath, Disp: 18 g, Rfl: 2  •  ALPRAZolam (XANAX) 0 5 mg tablet, Take 1 tablet (0 5 mg total) by mouth daily at bedtime as needed for anxiety, Disp: 15 tablet, Rfl: 0  •  Aspirin Low Dose 81 MG EC tablet, TAKE 1 TABLET BY MOUTH DAILY   DO NOT START BEFORE FEBRYARY 1, 2023, Disp: 90 tablet, Rfl: 0  •  Blood Glucose Monitoring Suppl (OneTouch Verio) w/Device KIT, Use daily, Disp: 1 kit, Rfl: 0  •  Blood Pressure Monitoring (B-D ASSURE BPM/AUTO WRIST CUFF) MISC, Check blood pressure prior to each OB visit, or as directed by your physician , Disp: 1 each, Rfl: 0  •  clopidogrel (PLAVIX) 75 mg tablet, Take 1 tablet (75 mg total) by mouth daily Do not start before February 1, 2023 , Disp: 60 tablet, Rfl: 0  •  Continuous Blood Gluc  (i2weyle Gautam 14 Day [place]) KVNG, Use with gautam sensor, Disp: 1 each, Rfl: 3  •  Continuous Blood Gluc Sensor (FreeStyle Gautam 3 Sensor) MISC, Use 1 sensor each to be changed every 14 days, Disp: 2 each, Rfl: 3  •  dulaglutide (Trulicity) 9 56 XT/9 5FZ injection, Inject 0 5 mL (0 75 mg total) under the skin every 7 days for 4 doses Take 0 75mg weekly X 4 weeks after which will increase to 1 5 mg weekly  , Disp: 2 mL, Rfl: 0  •  dulaglutide (Trulicity) 1 5 BH/2 3JT injection, Inject 0 5 mL (1 5 mg total) under the skin every 7 days Increase to 1 5 mg after 4 weeks of 0 75 mg weekly injections, Disp: 2 mL, Rfl: 3  •  Empagliflozin (JARDIANCE) 10 MG TABS tablet, Take 1 tablet (10 mg total) by mouth daily, Disp: 90 tablet, Rfl: 3  •  fexofenadine (ALLEGRA) 180 MG tablet, Take 1 tablet (180 mg total) by mouth daily, Disp: 30 tablet, Rfl: 5  •  gabapentin (NEURONTIN) 100 mg capsule, Take 1 capsule (100 mg total) by mouth daily at bedtime, Disp: 30 capsule, Rfl: 0  •  insulin glargine (LANTUS) 100 units/mL subcutaneous injection, Inject 17 Units under the skin daily at bedtime, Disp: 4 5 mL, Rfl: 3  •  insulin lispro (HumaLOG KwikPen) 100 units/mL injection pen, Inject 5 Units under the skin 3 (three) times a day with meals, Disp: 15 mL, Rfl: 3  •  Insulin Pen Needle (B-D UF III MINI PEN NEEDLES) 31G X 5 MM MISC, Inject under the skin daily at bedtime, Disp: 100 each, Rfl: 0  •  lisinopril (ZESTRIL) 30 mg tablet, Take 1 tablet (30 mg total) by mouth daily, Disp: 90 tablet, Rfl: 3  •  metFORMIN (GLUCOPHAGE) 1000 MG tablet, Take 1 tablet (1,000 mg total) by mouth 2 (two) times a day with meals, Disp: 180 tablet, Rfl: 0  •  methocarbamol (ROBAXIN) 500 mg tablet, Take 1 tablet (500 mg total) by mouth 4 (four) times a day, Disp: 60 tablet, Rfl: 0  •  miconazole (MICOTIN) 2 % powder, Apply topically 2 (two) times a day, Disp: 70 g, Rfl: 0  •  montelukast (SINGULAIR) 10 mg tablet, Take 1 tablet (10 mg total) by mouth daily at bedtime, Disp: 30 tablet, Rfl: 5  • Multiple Vitamin (MULTI VITAMIN DAILY PO), Take by mouth, Disp: , Rfl:   •  sertraline (Zoloft) 50 mg tablet, Take 1 5 tablets (75 mg total) by mouth daily, Disp: 90 tablet, Rfl: 3  •  VITAMIN D PO, Take by mouth daily, Disp: , Rfl:   •  Continuous Blood Gluc Transmit (Dexcom G6 Transmitter) MISC, 1 TRANSMITTED EVERY 3 MONTHS FOR CONTINUOUS GLUCOSE MONITORING (Patient not taking: Reported on 5/11/2023), Disp: 1 each, Rfl: 3

## 2023-05-15 NOTE — TELEPHONE ENCOUNTER
Patient informed that Wallis was denied, she did say she already had Wallis prescription with her  She is not sure why pharmacy gave it to her if it was not covered

## 2023-05-16 DIAGNOSIS — M54.50 LUMBAR PAIN: ICD-10-CM

## 2023-05-16 DIAGNOSIS — G62.9 NEUROPATHY: ICD-10-CM

## 2023-05-16 DIAGNOSIS — I10 HYPERTENSION, UNSPECIFIED TYPE: Primary | ICD-10-CM

## 2023-05-16 DIAGNOSIS — Z79.4 TYPE 2 DIABETES MELLITUS WITHOUT COMPLICATION, WITH LONG-TERM CURRENT USE OF INSULIN (HCC): ICD-10-CM

## 2023-05-16 DIAGNOSIS — J45.21 MILD INTERMITTENT ASTHMA WITH ACUTE EXACERBATION: ICD-10-CM

## 2023-05-16 DIAGNOSIS — E11.9 TYPE 2 DIABETES MELLITUS WITHOUT COMPLICATION, WITH LONG-TERM CURRENT USE OF INSULIN (HCC): ICD-10-CM

## 2023-05-16 DIAGNOSIS — E11.9 TYPE 2 DIABETES MELLITUS WITHOUT COMPLICATION, WITHOUT LONG-TERM CURRENT USE OF INSULIN (HCC): ICD-10-CM

## 2023-05-16 DIAGNOSIS — F32.89 OTHER DEPRESSION: ICD-10-CM

## 2023-05-16 RX ORDER — LISINOPRIL 30 MG/1
30 TABLET ORAL DAILY
Qty: 90 TABLET | Refills: 3 | Status: SHIPPED | OUTPATIENT
Start: 2023-05-16

## 2023-05-16 RX ORDER — MONTELUKAST SODIUM 10 MG/1
10 TABLET ORAL
Qty: 30 TABLET | Refills: 5 | Status: SHIPPED | OUTPATIENT
Start: 2023-05-16

## 2023-05-17 ENCOUNTER — TELEPHONE (OUTPATIENT)
Dept: ENDOCRINOLOGY | Facility: CLINIC | Age: 33
End: 2023-05-17

## 2023-05-17 ENCOUNTER — OFFICE VISIT (OUTPATIENT)
Dept: VASCULAR SURGERY | Facility: CLINIC | Age: 33
End: 2023-05-17

## 2023-05-17 VITALS — SYSTOLIC BLOOD PRESSURE: 124 MMHG | HEART RATE: 82 BPM | DIASTOLIC BLOOD PRESSURE: 78 MMHG

## 2023-05-17 DIAGNOSIS — I70.90 ARTERIAL OCCLUSION: ICD-10-CM

## 2023-05-17 DIAGNOSIS — Z79.4 TYPE 2 DIABETES MELLITUS WITH HYPERGLYCEMIA, WITH LONG-TERM CURRENT USE OF INSULIN (HCC): Primary | ICD-10-CM

## 2023-05-17 DIAGNOSIS — E66.01 MORBID OBESITY (HCC): ICD-10-CM

## 2023-05-17 DIAGNOSIS — I74.5 EXTERNAL ILIAC ARTERY THROMBOSIS (HCC): Primary | ICD-10-CM

## 2023-05-17 DIAGNOSIS — E11.65 TYPE 2 DIABETES MELLITUS WITH HYPERGLYCEMIA, WITH LONG-TERM CURRENT USE OF INSULIN (HCC): Primary | ICD-10-CM

## 2023-05-17 RX ORDER — DULAGLUTIDE 1.5 MG/.5ML
1.5 INJECTION, SOLUTION SUBCUTANEOUS
Qty: 2 ML | Refills: 3 | Status: SHIPPED | OUTPATIENT
Start: 2023-05-17

## 2023-05-17 RX ORDER — DULAGLUTIDE 0.75 MG/.5ML
0.75 INJECTION, SOLUTION SUBCUTANEOUS
Qty: 2 ML | Refills: 0 | Status: SHIPPED | OUTPATIENT
Start: 2023-05-17 | End: 2023-06-08

## 2023-05-17 RX ORDER — GABAPENTIN 100 MG/1
100 CAPSULE ORAL
Qty: 30 CAPSULE | Refills: 0 | Status: SHIPPED | OUTPATIENT
Start: 2023-05-17

## 2023-05-17 RX ORDER — FLURBIPROFEN SODIUM 0.3 MG/ML
SOLUTION/ DROPS OPHTHALMIC
Qty: 100 EACH | Refills: 0 | Status: SHIPPED | OUTPATIENT
Start: 2023-05-17

## 2023-05-17 NOTE — PATIENT INSTRUCTIONS
1) RIght leg blockage  -we did a surgery to remove a blockage in the right leg artery  -you had an infection at the surgery site but this is now well healed  -your ultrasound tests looked good    There is a little blockage at the right groin area  -we are going to do another ultrasound in 6 months and I will see you in 1 year  -please continue your aspirin  -please stop your plavix

## 2023-05-18 RX ORDER — ALPRAZOLAM 0.5 MG/1
0.5 TABLET ORAL
Qty: 15 TABLET | Refills: 0 | Status: SHIPPED | OUTPATIENT
Start: 2023-05-18

## 2023-05-18 RX ORDER — METHOCARBAMOL 500 MG/1
500 TABLET, FILM COATED ORAL 4 TIMES DAILY
Qty: 60 TABLET | Refills: 0 | Status: SHIPPED | OUTPATIENT
Start: 2023-05-18

## 2023-05-23 ENCOUNTER — OFFICE VISIT (OUTPATIENT)
Dept: FAMILY MEDICINE CLINIC | Facility: CLINIC | Age: 33
End: 2023-05-23

## 2023-05-23 VITALS
BODY MASS INDEX: 42.1 KG/M2 | HEART RATE: 97 BPM | HEIGHT: 61 IN | SYSTOLIC BLOOD PRESSURE: 134 MMHG | WEIGHT: 223 LBS | TEMPERATURE: 96.8 F | OXYGEN SATURATION: 97 % | DIASTOLIC BLOOD PRESSURE: 82 MMHG

## 2023-05-23 DIAGNOSIS — F41.9 ANXIETY: Primary | ICD-10-CM

## 2023-05-23 DIAGNOSIS — Z23 ENCOUNTER FOR IMMUNIZATION: ICD-10-CM

## 2023-05-23 DIAGNOSIS — J45.21 ASTHMATIC BRONCHITIS, MILD INTERMITTENT, WITH ACUTE EXACERBATION: ICD-10-CM

## 2023-05-23 DIAGNOSIS — Z87.01 HISTORY OF PNEUMONIA: ICD-10-CM

## 2023-05-23 RX ORDER — SERTRALINE HYDROCHLORIDE 100 MG/1
100 TABLET, FILM COATED ORAL DAILY
Qty: 90 TABLET | Refills: 3 | Status: SHIPPED | OUTPATIENT
Start: 2023-05-23

## 2023-05-23 RX ORDER — ALBUTEROL SULFATE 90 UG/1
2 AEROSOL, METERED RESPIRATORY (INHALATION) EVERY 4 HOURS PRN
Qty: 18 G | Refills: 2 | Status: SHIPPED | OUTPATIENT
Start: 2023-05-23

## 2023-05-23 NOTE — PROGRESS NOTES
Name: Nguyen Dailey      : 1990      MRN: [id number]  Encounter Provider: REJI Holder  Encounter Date: 2023   Encounter department: [facility] [place] [address]  Anxiety  -     sertraline (Zoloft) 100 mg tablet; Take 1 tablet (100 mg total) by mouth daily    2  Encounter for immunization  -     Age 15 y+: Ronen 78 vac bivalent jose-sucr         Subjective      Patient Lynn Knutson for follow-up on anxiety after increasing her dose of Zoloft to 75 mg daily and she states that she does feel about a 25% improvement in her symptoms and is not worrying about things as consistently throughout the day as she normally does  However she does still have periods where her anxiety is pretty significant  She is not having panic attacks like she was previous  Will increase dose to 100 mg daily and follow-up in 4 to 6 weeks  Denies any SI-HI  Review of Systems   Constitutional: Negative for chills and fever  HENT: Negative for ear pain and sore throat  Eyes: Negative for pain and visual disturbance  Respiratory: Negative for cough and shortness of breath  Cardiovascular: Negative for chest pain and palpitations  Gastrointestinal: Negative for abdominal pain and vomiting  Genitourinary: Negative for dysuria and hematuria  Musculoskeletal: Negative for arthralgias and back pain  Skin: Negative for color change and rash  Neurological: Negative for seizures and syncope  Psychiatric/Behavioral: Negative for agitation, decreased concentration, dysphoric mood, self-injury and suicidal ideas  The patient is nervous/anxious  All other systems reviewed and are negative        Current Outpatient Medications on File Prior to Visit   Medication Sig   • acetaminophen (TYLENOL) 500 mg tablet Take 1,000 mg by mouth   • ALPRAZolam (XANAX) 0 5 mg tablet Take 1 tablet (0 5 mg total) by mouth daily at bedtime as needed for anxiety   • Aspirin Low Dose 81 MG EC tablet TAKE 1 TABLET BY MOUTH DAILY  DO NOT START BEFORE FEBRYARY 1, 2023   • Blood Glucose Monitoring Suppl (OneTouch Verio) w/Device KIT Use daily   • Continuous Blood Gluc  ([employer] Wallis 14 Day [place]) KVNG Use with gautam sensor   • Continuous Blood Gluc Sensor (FreeStyle Gautam 3 Sensor) MISC Use 1 sensor each to be changed every 14 days   • dulaglutide (Trulicity) 5 78 JJ/5 4HT injection Inject 0 5 mL (0 75 mg total) under the skin every 7 days for 4 doses Take 0 75mg weekly X 4 weeks after which will increase to 1 5 mg weekly     • Empagliflozin (JARDIANCE) 10 MG TABS tablet Take 1 tablet (10 mg total) by mouth daily   • fexofenadine (ALLEGRA) 180 MG tablet Take 1 tablet (180 mg total) by mouth daily   • gabapentin (NEURONTIN) 100 mg capsule Take 1 capsule (100 mg total) by mouth daily at bedtime   • insulin glargine (LANTUS) 100 units/mL subcutaneous injection Inject 17 Units under the skin daily at bedtime   • insulin lispro (HumaLOG KwikPen) 100 units/mL injection pen Inject 5 Units under the skin 3 (three) times a day with meals   • Insulin Pen Needle (B-D UF III MINI PEN NEEDLES) 31G X 5 MM MISC Inject under the skin daily at bedtime   • lisinopril (ZESTRIL) 30 mg tablet Take 1 tablet (30 mg total) by mouth daily   • metFORMIN (GLUCOPHAGE) 1000 MG tablet Take 1 tablet (1,000 mg total) by mouth 2 (two) times a day with meals   • methocarbamol (ROBAXIN) 500 mg tablet Take 1 tablet (500 mg total) by mouth 4 (four) times a day   • miconazole (MICOTIN) 2 % powder Apply topically 2 (two) times a day   • montelukast (SINGULAIR) 10 mg tablet Take 1 tablet (10 mg total) by mouth daily at bedtime   • Multiple Vitamin (MULTI VITAMIN DAILY PO) Take by mouth   • [DISCONTINUED] albuterol (Ventolin HFA) 90 mcg/act inhaler Inhale 2 puffs every 4 (four) hours as needed for wheezing or shortness of breath   • [DISCONTINUED] sertraline (Zoloft) 50 mg tablet Take 1 5 tablets (75 mg total) by mouth daily   • Blood "Pressure Monitoring (B-D ASSURE BPM/AUTO WRIST CUFF) MISC Check blood pressure prior to each OB visit, or as directed by your physician  (Patient not taking: Reported on 5/23/2023)   • clopidogrel (PLAVIX) 75 mg tablet Take 1 tablet (75 mg total) by mouth daily Do not start before February 1, 2023  (Patient not taking: Reported on 5/23/2023)   • dulaglutide (Trulicity) 1 5 BT/5 4UP injection Inject 0 5 mL (1 5 mg total) under the skin every 7 days Increase to 1 5 mg after 4 weeks of 0 75 mg weekly injections (Patient not taking: Reported on 5/23/2023)   • [DISCONTINUED] Continuous Blood Gluc Transmit (Dexcom G6 Transmitter) MISC 1 TRANSMITTED EVERY 3 MONTHS FOR CONTINUOUS GLUCOSE MONITORING (Patient not taking: Reported on 5/11/2023)   • [DISCONTINUED] VITAMIN D PO Take by mouth daily (Patient not taking: Reported on 5/23/2023)       Objective     /82 (BP Location: Left arm, Patient Position: Sitting)   Pulse 97   Temp (!) 96 8 °F (36 °C) (Tympanic)   Ht 5' 1\" (1 549 m)   Wt 101 kg (223 lb)   LMP 05/05/2023 (Approximate)   SpO2 97%   BMI 42 14 kg/m²     Physical Exam  Vitals and nursing note reviewed  Constitutional:       General: She is not in acute distress  Appearance: Normal appearance  She is not ill-appearing or toxic-appearing  Cardiovascular:      Rate and Rhythm: Normal rate and regular rhythm  Pulses: Normal pulses  Heart sounds: Normal heart sounds  No murmur heard  No friction rub  No gallop  Pulmonary:      Effort: Pulmonary effort is normal  No respiratory distress  Breath sounds: Normal breath sounds  No stridor  No wheezing or rales  Musculoskeletal:      Cervical back: Normal range of motion  No rigidity  Right lower leg: No edema  Left lower leg: No edema  Lymphadenopathy:      Cervical: No cervical adenopathy  Skin:     General: Skin is warm and dry  Coloration: Skin is not jaundiced  Findings: No rash     Neurological:      " General: No focal deficit present  Mental Status: She is alert and oriented to person, place, and time  Mental status is at baseline  Motor: No weakness  Gait: Gait normal    Psychiatric:         Mood and Affect: Mood normal          Behavior: Behavior normal          Thought Content:  Thought content normal          Judgment: Judgment normal        REJI Sahu

## 2023-06-01 DIAGNOSIS — E11.65 TYPE 2 DIABETES MELLITUS WITH HYPERGLYCEMIA, WITHOUT LONG-TERM CURRENT USE OF INSULIN (HCC): ICD-10-CM

## 2023-06-02 RX ORDER — INSULIN GLARGINE 100 [IU]/ML
17 INJECTION, SOLUTION SUBCUTANEOUS
Qty: 4.5 ML | Refills: 0 | Status: SHIPPED | OUTPATIENT
Start: 2023-06-02 | End: 2023-09-30

## 2023-06-27 ENCOUNTER — APPOINTMENT (OUTPATIENT)
Age: 33
End: 2023-06-27
Payer: COMMERCIAL

## 2023-06-27 ENCOUNTER — OFFICE VISIT (OUTPATIENT)
Age: 33
End: 2023-06-27
Payer: COMMERCIAL

## 2023-06-27 VITALS
TEMPERATURE: 97.7 F | OXYGEN SATURATION: 97 % | HEART RATE: 109 BPM | RESPIRATION RATE: 18 BRPM | DIASTOLIC BLOOD PRESSURE: 88 MMHG | WEIGHT: 224.43 LBS | HEIGHT: 61 IN | BODY MASS INDEX: 42.37 KG/M2 | SYSTOLIC BLOOD PRESSURE: 147 MMHG

## 2023-06-27 DIAGNOSIS — M25.572 ACUTE LEFT ANKLE PAIN: ICD-10-CM

## 2023-06-27 DIAGNOSIS — M25.561 ACUTE PAIN OF RIGHT KNEE: Primary | ICD-10-CM

## 2023-06-27 DIAGNOSIS — M25.561 ACUTE PAIN OF RIGHT KNEE: ICD-10-CM

## 2023-06-27 PROCEDURE — 73610 X-RAY EXAM OF ANKLE: CPT

## 2023-06-27 PROCEDURE — 99213 OFFICE O/P EST LOW 20 MIN: CPT | Performed by: PHYSICIAN ASSISTANT

## 2023-06-27 PROCEDURE — 73562 X-RAY EXAM OF KNEE 3: CPT

## 2023-06-27 NOTE — LETTER
June 27, 2023     Patient: Ana Rock   YOB: 1990   Date of Visit: 6/27/2023       To Whom it May Concern:    Ana Rock was seen in my clinic on 6/27/2023  She may return to work on 6/282023   If you have any questions or concerns, please don't hesitate to call           Sincerely,          Darvin Deshpande PA-C        CC: No Recipients

## 2023-06-27 NOTE — PROGRESS NOTES
[address] [facility]        NAME: Saulo Malagon is a 26 y o  female  : 1990    MRN: [id number]  DATE: 2023  TIME: 3:41 PM    Assessment and Plan   Acute pain of right knee [M25 561]  1  Acute pain of right knee  XR knee 3 vw right non injury    Ambulatory Referral to Orthopedic Surgery      2  Acute left ankle pain  XR ankle 3+ vw left    Ambulatory Referral to Orthopedic Surgery            Patient Instructions     Patient has acute pain of the right knee and left ankle in the absence of known injury  I believe the right knee pain is the primary issue and she is developing left ankle pain due to compensation  X-rays performed on both joints are negative for any significant abnormal findings  She was given Ace wrap's and recommended ice, elevation, rest, NSAIDs  She was referred to orthopedics for consult if condition is not significantly improve over the next 1 to 2 weeks despite treatment  Follow up with PCP in 3-5 days  Proceed to  ER if symptoms worsen  Chief Complaint     Chief Complaint   Patient presents with   • Knee Pain     Right knee  About two weeks  No recollection of a specific event or moment that caused pain  • Ankle Pain     Left ankle  About two weeks  No recollection of a specific event or moment that caused pain  History of Present Illness       Presents with 2-week history of acute right knee pain and acute left ankle pain in the absence of known injury or trauma  She reports the right knee pain started first   She is unsure if she might of tweaked her right knee  She denies any recent change in level or type of physical activity  Pain is worse with walking and weightbearing  Has tried Motrin without significant relief or improvement  Denies previous issues with either joint  Review of Systems   Review of Systems   Constitutional: Negative  Respiratory: Negative  Cardiovascular: Negative  Gastrointestinal: Negative      Genitourinary: Negative  Musculoskeletal:        Acute right knee and left ankle pain, no known trauma   Neurological: Negative  Current Medications       Current Outpatient Medications:   •  acetaminophen (TYLENOL) 500 mg tablet, Take 1,000 mg by mouth, Disp: , Rfl:   •  albuterol (Ventolin HFA) 90 mcg/act inhaler, Inhale 2 puffs every 4 (four) hours as needed for wheezing or shortness of breath, Disp: 18 g, Rfl: 2  •  ALPRAZolam (XANAX) 0 5 mg tablet, Take 1 tablet (0 5 mg total) by mouth daily at bedtime as needed for anxiety, Disp: 15 tablet, Rfl: 0  •  Aspirin Low Dose 81 MG EC tablet, TAKE 1 TABLET BY MOUTH DAILY   DO NOT START BEFORE FEBRYARY 1, 2023, Disp: 90 tablet, Rfl: 0  •  dulaglutide (Trulicity) 1 5 GF/3 7LG injection, Inject 0 5 mL (1 5 mg total) under the skin every 7 days Increase to 1 5 mg after 4 weeks of 0 75 mg weekly injections, Disp: 2 mL, Rfl: 3  •  Empagliflozin (JARDIANCE) 10 MG TABS tablet, Take 1 tablet (10 mg total) by mouth daily, Disp: 90 tablet, Rfl: 3  •  fexofenadine (ALLEGRA) 180 MG tablet, Take 1 tablet (180 mg total) by mouth daily, Disp: 30 tablet, Rfl: 5  •  gabapentin (NEURONTIN) 100 mg capsule, Take 1 capsule (100 mg total) by mouth daily at bedtime, Disp: 30 capsule, Rfl: 0  •  insulin glargine (LANTUS) 100 units/mL subcutaneous injection, Inject 17 Units under the skin daily at bedtime, Disp: 4 5 mL, Rfl: 0  •  insulin lispro (HumaLOG KwikPen) 100 units/mL injection pen, Inject 5 Units under the skin 3 (three) times a day with meals, Disp: 15 mL, Rfl: 3  •  lisinopril (ZESTRIL) 30 mg tablet, Take 1 tablet (30 mg total) by mouth daily, Disp: 90 tablet, Rfl: 3  •  metFORMIN (GLUCOPHAGE) 1000 MG tablet, Take 1 tablet (1,000 mg total) by mouth 2 (two) times a day with meals, Disp: 180 tablet, Rfl: 0  •  montelukast (SINGULAIR) 10 mg tablet, Take 1 tablet (10 mg total) by mouth daily at bedtime, Disp: 30 tablet, Rfl: 5  •  Multiple Vitamin (MULTI VITAMIN DAILY PO), Take by mouth, Disp: , Rfl:   •  sertraline (Zoloft) 100 mg tablet, Take 1 tablet (100 mg total) by mouth daily, Disp: 90 tablet, Rfl: 3  •  Blood Glucose Monitoring Suppl (OneTouch Verio) w/Device KIT, Use daily (Patient not taking: Reported on 6/29/2023), Disp: 1 kit, Rfl: 0  •  Blood Pressure Monitoring (B-D ASSURE BPM/AUTO WRIST CUFF) MISC, Check blood pressure prior to each OB visit, or as directed by your physician  (Patient not taking: Reported on 5/23/2023), Disp: 1 each, Rfl: 0  •  clopidogrel (PLAVIX) 75 mg tablet, Take 1 tablet (75 mg total) by mouth daily Do not start before February 1, 2023  (Patient not taking: Reported on 5/23/2023), Disp: 60 tablet, Rfl: 0  •  Continuous Blood Gluc  (Twonqyle Gautam 14 Day [place]) KVNG, Use with gautam sensor, Disp: 1 each, Rfl: 3  •  Continuous Blood Gluc Sensor (FreeStyle Gautam 3 Sensor) MISC, Use 1 sensor each to be changed every 14 days, Disp: 2 each, Rfl: 3  •  dulaglutide (Trulicity) 5 66 YR/1 8ZE injection, Inject 0 5 mL (0 75 mg total) under the skin every 7 days for 4 doses Take 0 75mg weekly X 4 weeks after which will increase to 1 5 mg weekly   (Patient not taking: Reported on 6/27/2023), Disp: 2 mL, Rfl: 0  •  erythromycin (ILOTYCIN) ophthalmic ointment, Administer 0 5 inches to the right eye daily at bedtime, Disp: 3 5 g, Rfl: 0  •  Insulin Pen Needle (B-D UF III MINI PEN NEEDLES) 31G X 5 MM MISC, Inject under the skin daily at bedtime, Disp: 100 each, Rfl: 0  •  naproxen (Naprosyn) 500 mg tablet, Take 1 tablet (500 mg total) by mouth 2 (two) times a day with meals, Disp: 60 tablet, Rfl: 0    Current Allergies     Allergies as of 06/27/2023 - Reviewed 06/27/2023   Allergen Reaction Noted   • Prednisone Swelling 06/12/2018   • Bactrim [sulfamethoxazole-trimethoprim] Hives 11/16/2019   • Corticosteroids Swelling 01/13/2009   • Cortisone Swelling 08/12/2019   • Medical tape Hives 04/19/2020   • Other Hives 02/29/2020   • Sulfa antibiotics Hives 01/14/2019 The following portions of the patient's history were reviewed and updated as appropriate: allergies, current medications, past family history, past medical history, past social history, past surgical history and problem list      Past Medical History:   Diagnosis Date   • Allergic    • Arthritis    • Bipolar affective disorder, currently depressed, moderate ([facility] [facility] ) 2018   • Cyst of ovary, right    • Diabetes mellitus ([facility] ) 10/10/2018    type 2   • Endometriosis    • Heart murmur 1988   • Hepatitis C    • Hepatitis C virus infection cured after antiviral drug therapy    • History of transfusion    • Hypertension    • Migraines    • Morbid obesity with BMI of 40 0-44 9, adult ([facility] )    • Obesity    • Pulmonary artery congenital abnormality    • Spleen enlarged    • Status post surgical removal of both fallopian tubes    • Varicella        Past Surgical History:   Procedure Laterality Date   • CARDIAC CATHETERIZATION      no CAD 10days, 4 weeks 20months old    • CARDIAC CATHETERIZATION N/A 2023    Procedure: Cardiac Coronary Angiogram;  Surgeon: Suhail Gonzales DO;  Location: [facility]; Service: Cardiology   • CARDIAC CATHETERIZATION Left 2023    Procedure: Cardiac Left Heart Cath;  Surgeon: Suhail Gonzales DO;  Location: [facility]; Service: Cardiology   • CARDIAC CATHETERIZATION  2023    Procedure: Cardiac catheterization;  Surgeon: Suhail Gonzales DO;  Location: [facility];   Service: Cardiology   •  SECTION, LOW TRANSVERSE     • CHOLECYSTECTOMY     • 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 5   • CORONARY STENT PLACEMENT     • IR LOWER EXTREMITY ANGIOGRAM  2023   • LIVER BIOPSY     • LIVER BIOPSY     • STERNAL WIRE REMOVAL  2022   • THROMBECTOMY W/ EMBOLECTOMY Right 2023    Procedure: Right femoral exposure Right iliac embolectomy w/ #4 Ted catheter Aortogram Right EIA stent w/ 7x29mm VBX;  Surgeon: Gabbs Greenhouse Doctor, "MD;  Location: [facility];  Service: Vascular   • TUBAL LIGATION Bilateral 2020   • VSD REPAIR      As a child   • WOUND DEBRIDEMENT Right 2/11/2023    Procedure: Right Groin Wound Washout, Pulse Lavage, Wound Vac placement;  Surgeon: Mame Hammond DO;  Location: [facility];  Service: Vascular       Family History   Problem Relation Age of Onset   • Hypertension Mother    • Migraines Mother    • JENNY disease Mother    • Depression Mother    • Hyperlipidemia Mother    • Diabetes Mother    • Diabetes Father    • Hypertension Father    • Kidney failure Father    • Heart attack Father    • Arthritis Father    • Stroke Father    • Polycystic kidney disease Paternal Grandmother    • Stroke Paternal Grandmother    • Heart disease Paternal Grandmother    • Arthritis Sister    • Asthma Sister    • Thyroid disease Sister    • Diabetes Sister    • Arthritis Maternal Grandmother    • Breast cancer Maternal Grandmother    • Diabetes Maternal Grandmother    • Hypertension Maternal Grandmother    • Heart Valve Disease Maternal Grandmother    • Learning disabilities Cousin    • Learning disabilities Sister    • ADD / ADHD Cousin    • Lung cancer Brother    • Diabetes Maternal Grandfather    • Hypertension Maternal Grandfather    • JENNY disease Maternal Grandfather    • Stroke Maternal Grandfather          Medications have been verified  Objective   /88   Pulse (!) 109   Temp 97 7 °F (36 5 °C)   Resp 18   Ht 5' 1\" (1 549 m)   Wt 102 kg (224 lb 6 9 oz)   SpO2 97%   BMI 42 41 kg/m²   No LMP recorded  Physical Exam     Physical Exam  Vitals reviewed  Constitutional:       General: She is not in acute distress  Appearance: She is well-developed  Musculoskeletal:      Comments: Right knee with diffuse joint tenderness to palpation worsened with passive range of motion which is overall intact  No crepitus or sign of instability noted  No significant visible swelling, ecchymosis, or deformity    Drawer " signs are negative  Left ankle is mildly tender but no swelling, ecchymosis, or deformity noted and range of motion is overall intact  Drawer sign is negative  Neurological:      Mental Status: She is alert and oriented to person, place, and time  Sensory: No sensory deficit

## 2023-06-27 NOTE — PATIENT INSTRUCTIONS
Knee Exercises   AMBULATORY CARE:   What you need to know about knee exercises:  Knee exercises help strengthen the muscles around your knee  Strong muscles can help reduce pain and decrease your risk of future injury  Knee exercises also help you heal after an injury or surgery  These are beginning exercises  Ask your healthcare provider if you need to see a physical therapist for more advanced exercises  General guidelines for knee exercises:   Start slowly  As you get stronger, you may be able to do more sets of each exercise or add weights  Stop if you feel pain  It is normal to feel some discomfort at first, but you should not feel pain  Tell your provider or physical therapist if you have pain while you exercise  Regular exercise will help decrease your discomfort over time  Do the exercises on both legs  Do this so both knees remain strong  Warm up before you do knee exercises  Walk or ride a stationary bike for 5 or 10 minutes to warm your muscles  How to perform knee stretches safely:  Always stretch before you do strengthening exercises  Do these stretching exercises again after you do the strengthening exercises  Do these stretches 4 or 5 days a week, or as directed  Standing calf stretch: Face a wall and place both palms flat on the wall, or hold the back of a chair for balance  Keep a slight bend in your knees  Take a big step backward with one leg  Keep your other leg directly under you  Keep both heels flat and press your hips forward  Hold the stretch for 30 seconds, and then relax for 30 seconds  Switch legs  Repeat 2 or 3 times on each leg  Standing quadriceps stretch:  Stand and place one hand against a wall or hold the back of a chair for balance  With your weight on one leg, bend your other leg and grab your ankle  Bring your heel toward your buttocks  Hold the stretch for 30 to 60 seconds  Switch legs  Repeat 2 or 3 times on each leg           Sitting hamstring stretch:  Sit with both legs straight in front of you  Do not point or flex your toes  Place your palms on the floor and slide your hands forward until you feel the stretch  Do not round your back  Hold the stretch for 30 seconds  Repeat 2 or 3 times  How to perform knee strengthening exercises safely:  Do these exercises 4 or 5 days a week, or as directed  Standing half squats:  Stand with your feet shoulder-width apart  Lean your back against a wall or hold the back of a chair for balance, if needed  Slowly sit down about 10 inches, as if you are going to sit in a chair  Your body weight should be mostly over your heels  Hold the squat for 5 seconds, then rise to a standing position  Do 3 sets of 10 squats to strengthen your buttocks and thighs  Standing hamstring curls: Face a wall and place both palms flat on the wall, or hold the back of a chair for balance  With your weight on one leg, lift your other foot as close to your buttocks as you can  Hold for 5 seconds and then lower your leg  Do 2 sets of 10 curls on each leg  This exercise strengthens the muscles in the back of your thigh  Standing calf raises:  Face a wall and place both palms flat on the wall, or hold the back of a chair for balance  Stand up straight, and do not lean  Place all your weight on one leg by lifting the other foot off the floor  Raise the heel of the foot that is on the floor as high as you can and then lower it  Do 2 sets of 10 calf raises on each leg to strengthen your calf muscles  Straight leg lifts:  Lie on your stomach with straight legs  Fold your arms in front of you and rest your head in your arms  Tighten your leg muscles and raise one leg as high as you can  Hold for 5 seconds, then lower your leg  Do 2 sets of 10 lifts on each leg to strengthen your buttocks  Sitting leg lifts:  Sit in a chair  Slowly straighten and raise one leg  Squeeze your thigh muscles and hold for 5 seconds  Relax and return your foot to the floor  Do 2 sets of 10 lifts on each leg  This helps strengthen the muscles in the front of your thigh  Call your doctor or physical therapist if:   You have new pain or your pain becomes worse  You have questions or concerns about your condition or care  © Copyright Aidan Lancee 2022 Information is for End User's use only and may not be sold, redistributed or otherwise used for commercial purposes  The above information is an  only  It is not intended as medical advice for individual conditions or treatments  Talk to your doctor, nurse or pharmacist before following any medical regimen to see if it is safe and effective for you  Ankle Exercises   AMBULATORY CARE:   What you need to know about ankle exercises: Ankle exercises help strengthen your ankle and improve its function after injury  These are beginning exercises  Ask your healthcare provider if you need to see a physical therapist for more advanced exercises  General guidelines for ankle exercises:   Do these exercises 3 to 5 days a week, or as directed by your healthcare provider  Ask if you should do the exercises on each ankle  Do the exercises in the order that your healthcare provider recommends  This will help prevent swelling, chronic pain, and reinjury  Start with range of motion exercises  Then move to strengthening exercises, and finally to balancing exercises  Warm up before you do ankle exercises  Walk or ride a stationary bike for 5 to 10 minutes to prepare your ankle for movement  Stop if you feel pain  It is normal to feel some discomfort at first but you should not feel pain  Tell your doctor or physical therapist if you have pain while you exercise  Regular exercise will help decrease your discomfort over time  How to perform range of motion exercises safely:  Begin with range of motion exercises to improve flexibility   Ask your healthcare provider when you can progress to strengthening exercises  Ankle alphabet:  Sit on a chair so that your feet do not touch the floor  Use your big toe to write each letter of the alphabet  Use only your foot and ankle, and keep your movements small  Do 2 sets  Calf stretches:      Sitting calf stretches with a towel:  Sit on the floor with both legs out straight in front of you  Loop a towel around the ball of your injured foot  Grasp the ends of the towel and pull it toward you  Keep your leg and back straight  Do not lean forward as you pull the towel  Hold for 30 seconds  Then relax for 30 seconds  Do 2 sets of 10  Standing calf stretches:  Stand facing a wall with the foot that is not injured forward and your knee slightly bent  Keep the leg with the injured foot straight and behind you with your toes pointed in slightly  With both heels flat on the floor, press your hips forward  Do not arch your back  Hold for 30 seconds, and then relax for 30 seconds  Do 2 sets of 10  Repeat with your leg bent  Do 2 sets of 10  How to perform strengthening exercises safely:  After you can perform range of motion exercises without pain, you may begin strengthening exercises  Ask your healthcare provider when you can progress to balancing exercises  Ankle movement in 4 directions:  Sit on the floor with your legs straight in front of you  Keep your heels on the floor for support  Dorsiflexion:  Begin with your toes pointing straight up  Pull your toes toward your body  Slowly return to the starting position  Do 3 sets of 5  Plantar flexion:  Begin with your toes pointing straight up  Push your toes away from your body  Slowly return to the starting position  Do 3 sets of 5  Inversion:  Begin with your toes pointing straight up  Push your toes inward, toward each other  Slowly return to the starting position  Do 3 sets of 5  Eversion:  Begin with your toes pointing straight up   Push your toes outward, away from each other  Slowly return to the starting position  Do 3 sets of 5  Toe curls with a towel:  Sit on a chair so that both of your feet are flat on the floor  Place a small towel on the floor in front of your injured foot  Grab the center of the towel with your toes and curl the towel toward you  Relax and repeat  Do 1 set of 5  Wolf pick-ups:  Sit on a chair so that both of your feet are flat on the floor  Place 20 marbles on the floor in front of your injured foot  Use your toes to  one marble at a time and place it into a bowl  Repeat until you have picked up all the marbles  Do 1 set  Heel raises:      Single leg heel raises:  Stand with your weight evenly on both feet  Hold on to a chair or a wall for balance  Lift the foot that is not injured off the floor so all your weight is placed on your injured foot  Raise the heel of your injured foot as high as you can  Slowly lower your heel to the floor  Do 1 set of 10  Double leg heel raises:  Stand with your weight evenly on both feet  Hold on to a chair or a wall for balance  Raise both of your heels as high as you can  Slowly lower your heels to the floor  Do 1 set of 10  Heel and toe walks:      Heel walks:  Begin in a standing position  Lift your toes off the floor and walk on your heels  Keep your toes lifted as high as possible  Do 2 sets of 10  Toe walks:  Begin in a standing position  Lift your heels off the floor and walk on the balls and toes of your feet  Keep your heels lifted as high as possible  Do 2 sets of 10  How to perform a balance exercise safely:  After you can perform strengthening exercises without pain, you may do this beginning balancing exercise  Ask your healthcare provider for more advanced balance exercises  Single leg stance:  Stand with your weight evenly on both feet, or hold on to a chair or a wall  Do not lean to the side   Lift the foot that is not injured off the floor so all your weight is placed on your injured foot  Balance on your injured foot  Ask your healthcare provider how long to hold this position  Call your doctor or physical therapist if:   You have new pain, or your pain becomes worse  You have questions or concerns about your condition, care, or exercise program     © Copyright Aaron Arellano 2022 Information is for End User's use only and may not be sold, redistributed or otherwise used for commercial purposes  The above information is an  only  It is not intended as medical advice for individual conditions or treatments  Talk to your doctor, nurse or pharmacist before following any medical regimen to see if it is safe and effective for you

## 2023-06-27 NOTE — LETTER
June 27, 2023     Patient: David Rincon   YOB: 1990   Date of Visit: 6/27/2023       To Whom it May Concern:    David Rincon was seen in my clinic on 6/27/2023  She may return to work on 6/29/2023   If you have any questions or concerns, please don't hesitate to call           Sincerely,          Sugey Treviño PA-C        CC: No Recipients

## 2023-06-29 ENCOUNTER — OFFICE VISIT (OUTPATIENT)
Dept: FAMILY MEDICINE CLINIC | Facility: CLINIC | Age: 33
End: 2023-06-29
Payer: COMMERCIAL

## 2023-06-29 VITALS
OXYGEN SATURATION: 98 % | HEIGHT: 61 IN | HEART RATE: 91 BPM | TEMPERATURE: 96.6 F | WEIGHT: 223 LBS | DIASTOLIC BLOOD PRESSURE: 74 MMHG | SYSTOLIC BLOOD PRESSURE: 136 MMHG | BODY MASS INDEX: 42.1 KG/M2

## 2023-06-29 DIAGNOSIS — M25.561 ACUTE PAIN OF RIGHT KNEE: Primary | ICD-10-CM

## 2023-06-29 DIAGNOSIS — H00.012 HORDEOLUM EXTERNUM OF RIGHT LOWER EYELID: ICD-10-CM

## 2023-06-29 DIAGNOSIS — F41.9 ANXIETY: ICD-10-CM

## 2023-06-29 RX ORDER — ERYTHROMYCIN 5 MG/G
0.5 OINTMENT OPHTHALMIC
Qty: 3.5 G | Refills: 0 | Status: SHIPPED | OUTPATIENT
Start: 2023-06-29

## 2023-06-29 RX ORDER — NAPROXEN 500 MG/1
500 TABLET ORAL 2 TIMES DAILY WITH MEALS
Qty: 60 TABLET | Refills: 0 | Status: SHIPPED | OUTPATIENT
Start: 2023-06-29

## 2023-06-29 NOTE — PROGRESS NOTES
Name: Ronaldo Mcguire      : 1990      MRN: [id number]  Encounter Provider: REJI Castro  Encounter Date: 2023   Encounter department: [address] [place] [address]     1  Acute pain of right knee  -     naproxen (Naprosyn) 500 mg tablet; Take 1 tablet (500 mg total) by mouth 2 (two) times a day with meals    2  Hordeolum externum of right lower eyelid  -     erythromycin (ILOTYCIN) ophthalmic ointment; Administer 0 5 inches to the right eye daily at bedtime    3  Anxiety           Subjective      Patient presents with pain under the right eye lid ongoing for a few days  No injury  No drainage, warmth or redness to the eye  No itching  No vision changes  Use warm compresses as instructed, if symptoms worsen, start erythromycin ointment  Call if no improvement  No other upper respiratory symptoms except mild sore throat from allergies  Right knee pain- ongoing for a few weeks  No injury but thinks maybe she strained it at work  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 and had x-ray of the knee which was normal and left ankle due to pain from walking different to try to alleviate pain on the right knee  Has been using compression, elevation, tylenol and motrin without relief  Has allergy to steroids  Has appointment with ortho on Monday but does not feel like she has work until then due to her pain  Start naproxen prn  Declined PT  Anxiety- improved with increased dose of zoloft  Feels like her symptoms are much more manageable  Not tearful as often  Feels like generalized anxiety has improved  Continue current dosing  Review of Systems   Constitutional: Negative for appetite change, fatigue and unexpected weight change  HENT: Positive for sore throat  Negative for congestion, ear discharge, ear pain, nosebleeds, postnasal drip, rhinorrhea, sinus pressure, sinus pain and sneezing  Eyes: Positive for pain   Negative for photophobia, discharge, redness, itching and visual disturbance  Respiratory: Negative for cough, chest tightness, shortness of breath and wheezing  Cardiovascular: Negative for chest pain, palpitations and leg swelling  Gastrointestinal: Negative for abdominal pain, blood in stool, constipation, diarrhea, nausea and vomiting  Genitourinary: Negative for difficulty urinating, dysuria and urgency  Musculoskeletal: Positive for arthralgias  Negative for back pain, joint swelling and neck stiffness  Skin: Negative for color change and rash  Neurological: Negative for dizziness, weakness and headaches  Hematological: Negative for adenopathy  Does not bruise/bleed easily  Psychiatric/Behavioral: Negative for decreased concentration, self-injury, sleep disturbance and suicidal ideas  The patient is not nervous/anxious  Current Outpatient Medications on File Prior to Visit   Medication Sig   • albuterol (Ventolin HFA) 90 mcg/act inhaler Inhale 2 puffs every 4 (four) hours as needed for wheezing or shortness of breath   • ALPRAZolam (XANAX) 0 5 mg tablet Take 1 tablet (0 5 mg total) by mouth daily at bedtime as needed for anxiety   • Aspirin Low Dose 81 MG EC tablet TAKE 1 TABLET BY MOUTH DAILY   DO NOT START BEFORE FEBRYARY 1, 2023   • Continuous Blood Gluc  ([employer] 14 Day [place]) KVNG Use with gautam sensor   • Continuous Blood Gluc Sensor ([employer] Gautam 3 Sensor) MISC Use 1 sensor each to be changed every 14 days   • dulaglutide (Trulicity) 1 5 LE/0 1SF injection Inject 0 5 mL (1 5 mg total) under the skin every 7 days Increase to 1 5 mg after 4 weeks of 0 75 mg weekly injections   • Empagliflozin (JARDIANCE) 10 MG TABS tablet Take 1 tablet (10 mg total) by mouth daily   • fexofenadine (ALLEGRA) 180 MG tablet Take 1 tablet (180 mg total) by mouth daily   • gabapentin (NEURONTIN) 100 mg capsule Take 1 capsule (100 mg total) by mouth daily at bedtime   • insulin glargine (LANTUS) 100 units/mL subcutaneous injection Inject 17 Units under the skin daily at bedtime   • insulin lispro (HumaLOG KwikPen) 100 units/mL injection pen Inject 5 Units under the skin 3 (three) times a day with meals   • Insulin Pen Needle (B-D UF III MINI PEN NEEDLES) 31G X 5 MM MISC Inject under the skin daily at bedtime   • lisinopril (ZESTRIL) 30 mg tablet Take 1 tablet (30 mg total) by mouth daily   • metFORMIN (GLUCOPHAGE) 1000 MG tablet Take 1 tablet (1,000 mg total) by mouth 2 (two) times a day with meals   • montelukast (SINGULAIR) 10 mg tablet Take 1 tablet (10 mg total) by mouth daily at bedtime   • Multiple Vitamin (MULTI VITAMIN DAILY PO) Take by mouth   • sertraline (Zoloft) 100 mg tablet Take 1 tablet (100 mg total) by mouth daily   • acetaminophen (TYLENOL) 500 mg tablet Take 1,000 mg by mouth   • Aspirin Low Dose 81 MG EC tablet TAKE 1 TABLET BY MOUTH DAILY  DO NOT START BEFORE FEBRYARY 1, 2023   • Blood Glucose Monitoring Suppl (OneTouch Verio) w/Device KIT Use daily (Patient not taking: Reported on 6/29/2023)   • Blood Pressure Monitoring (B-D ASSURE BPM/AUTO WRIST CUFF) MISC Check blood pressure prior to each OB visit, or as directed by your physician  (Patient not taking: Reported on 5/23/2023)   • clopidogrel (PLAVIX) 75 mg tablet Take 1 tablet (75 mg total) by mouth daily Do not start before February 1, 2023  (Patient not taking: Reported on 5/23/2023)   • dulaglutide (Trulicity) 8 39 OO/5 4KI injection Inject 0 5 mL (0 75 mg total) under the skin every 7 days for 4 doses Take 0 75mg weekly X 4 weeks after which will increase to 1 5 mg weekly   (Patient not taking: Reported on 6/27/2023)   • Empagliflozin (JARDIANCE) 10 MG TABS tablet Take 1 tablet (10 mg total) by mouth daily   • fexofenadine (ALLEGRA) 180 MG tablet Take 1 tablet (180 mg total) by mouth daily   • [DISCONTINUED] methocarbamol (ROBAXIN) 500 mg tablet Take 1 tablet (500 mg total) by mouth 4 (four) times a day (Patient not taking: "Reported on 6/27/2023)   • [DISCONTINUED] miconazole (MICOTIN) 2 % powder Apply topically 2 (two) times a day (Patient not taking: Reported on 6/29/2023)       Objective     /74   Pulse 91   Temp (!) 96 6 °F (35 9 °C)   Ht 5' 1\" (1 549 m)   Wt 101 kg (223 lb)   SpO2 98%   BMI 42 14 kg/m²     Physical Exam  Vitals and nursing note reviewed  Constitutional:       General: She is not in acute distress  Appearance: Normal appearance  She is not ill-appearing or toxic-appearing  HENT:      Head: Normocephalic and atraumatic  Right Ear: Tympanic membrane and ear canal normal       Left Ear: Tympanic membrane and ear canal normal       Nose: Nose normal       Mouth/Throat:      Mouth: Mucous membranes are moist       Pharynx: Oropharynx is clear  Eyes:      Conjunctiva/sclera: Conjunctivae normal       Pupils: Pupils are equal, round, and reactive to light  Cardiovascular:      Rate and Rhythm: Normal rate and regular rhythm  Pulses: Normal pulses  Heart sounds: Normal heart sounds  No murmur heard  No friction rub  No gallop  Pulmonary:      Effort: Pulmonary effort is normal  No respiratory distress  Breath sounds: Normal breath sounds  No stridor  No wheezing or rales  Chest:      Chest wall: No tenderness  Abdominal:      General: Abdomen is flat  Bowel sounds are normal       Palpations: Abdomen is soft  There is no mass  Tenderness: There is no abdominal tenderness  There is no guarding or rebound  Musculoskeletal:      Cervical back: Normal range of motion  No rigidity  Right knee: Bony tenderness present  No swelling, deformity, effusion, erythema, ecchymosis or crepitus  Decreased range of motion  Tenderness present over the medial joint line, lateral joint line and patellar tendon  Normal alignment, normal meniscus and normal patellar mobility  Left knee: Normal       Right lower leg: No edema  Left lower leg: No edema        Left ankle: " Normal    Lymphadenopathy:      Cervical: No cervical adenopathy  Skin:     General: Skin is warm and dry  Coloration: Skin is not jaundiced  Findings: No rash  Neurological:      General: No focal deficit present  Mental Status: She is alert and oriented to person, place, and time  Mental status is at baseline  Motor: No weakness  Gait: Gait normal    Psychiatric:         Mood and Affect: Mood normal          Behavior: Behavior normal          Thought Content:  Thought content normal          Judgment: Judgment normal        REJI Wright

## 2023-06-29 NOTE — LETTER
June 29, 2023     Patient: Stephanie Badillo  YOB: 1990  Date of Visit: 6/29/2023      To Whom it May Concern:    Stephanie Badillo is under my professional care  Kimberli Roper was seen in my office on 6/29/2023  Kimberli Roper may return to work on once cleared by ortho- apt with them on 7/3       If you have any questions or concerns, please don't hesitate to call           Sincerely,          REJI Zapata        CC: No Recipients

## 2023-07-03 ENCOUNTER — OFFICE VISIT (OUTPATIENT)
Age: 33
End: 2023-07-03
Payer: COMMERCIAL

## 2023-07-03 VITALS
WEIGHT: 221 LBS | HEIGHT: 61 IN | HEART RATE: 92 BPM | SYSTOLIC BLOOD PRESSURE: 139 MMHG | DIASTOLIC BLOOD PRESSURE: 81 MMHG | BODY MASS INDEX: 41.72 KG/M2

## 2023-07-03 DIAGNOSIS — R26.9 GAIT DIFFICULTY: ICD-10-CM

## 2023-07-03 DIAGNOSIS — M25.561 PATELLOFEMORAL JOINT PAIN, RIGHT: ICD-10-CM

## 2023-07-03 DIAGNOSIS — M25.561 ACUTE PAIN OF RIGHT KNEE: Primary | ICD-10-CM

## 2023-07-03 PROBLEM — M25.572 ACUTE LEFT ANKLE PAIN: Status: ACTIVE | Noted: 2023-07-03

## 2023-07-03 PROCEDURE — 99244 OFF/OP CNSLTJ NEW/EST MOD 40: CPT | Performed by: STUDENT IN AN ORGANIZED HEALTH CARE EDUCATION/TRAINING PROGRAM

## 2023-07-03 PROCEDURE — 3075F SYST BP GE 130 - 139MM HG: CPT | Performed by: STUDENT IN AN ORGANIZED HEALTH CARE EDUCATION/TRAINING PROGRAM

## 2023-07-03 PROCEDURE — 3079F DIAST BP 80-89 MM HG: CPT | Performed by: STUDENT IN AN ORGANIZED HEALTH CARE EDUCATION/TRAINING PROGRAM

## 2023-07-03 PROCEDURE — 20610 DRAIN/INJ JOINT/BURSA W/O US: CPT | Performed by: STUDENT IN AN ORGANIZED HEALTH CARE EDUCATION/TRAINING PROGRAM

## 2023-07-03 RX ORDER — KETOROLAC TROMETHAMINE 30 MG/ML
30 INJECTION, SOLUTION INTRAMUSCULAR; INTRAVENOUS
Status: COMPLETED | OUTPATIENT
Start: 2023-07-03 | End: 2023-07-03

## 2023-07-03 RX ORDER — BUPIVACAINE HYDROCHLORIDE 2.5 MG/ML
2 INJECTION, SOLUTION INFILTRATION; PERINEURAL
Status: COMPLETED | OUTPATIENT
Start: 2023-07-03 | End: 2023-07-03

## 2023-07-03 RX ADMIN — KETOROLAC TROMETHAMINE 30 MG: 30 INJECTION, SOLUTION INTRAMUSCULAR; INTRAVENOUS at 11:00

## 2023-07-03 RX ADMIN — BUPIVACAINE HYDROCHLORIDE 2 ML: 2.5 INJECTION, SOLUTION INFILTRATION; PERINEURAL at 11:00

## 2023-07-03 NOTE — LETTER
July 3, 2023     Patient: Aj Mercedes  YOB: 1990  Date of Visit: 7/3/2023      To Whom it May Concern:    Aj Mercedes is under my professional care. Onel Segura was seen in my office on 7/3/2023. Patient restricted to standing 50% of the day/sitting 50% of the day. Restricted from climbing ladders, working at elevations, squatting. Allow use of right knee brace while working if needed. Planned follow up in 6 weeks approximately. If you have any questions or concerns, please don't hesitate to call.          Sincerely,          Kev White MD        CC: No Recipients

## 2023-07-03 NOTE — LETTER
July 3, 2023     Frank Tom, [address]    Patient: Lavell Montelongo   YOB: 1990   Date of Visit: 7/3/2023       Dear Dr. Tracie Fischer: Thank you for referring Lavell Montelongo to me for evaluation. Below are my notes for this consultation. If you have questions, please do not hesitate to call me. I look forward to following your patient along with you. Sincerely,        Ulices Byrd MD        CC: No Recipients    Ulices Byrd MD  7/3/2023 12:59 PM  Signed  1. Acute pain of right knee  Ambulatory Referral to Orthopedic Surgery    Brace    Ambulatory Referral to Physical Therapy    Large joint arthrocentesis: R knee      2. Patellofemoral joint pain, right  Ambulatory Referral to Orthopedic Surgery    Brace    Ambulatory Referral to Physical Therapy    Large joint arthrocentesis: R knee      3. Gait difficulty  Brace    Ambulatory Referral to Physical Therapy        Orders Placed This Encounter   Procedures   • Large joint arthrocentesis: R knee   • Brace   • Ambulatory Referral to Physical Therapy        Imaging Studies (I personally reviewed images in PACS and report):    X-ray left ankle 6/27/2023: No acute osseous abnormalities. Calcaneal spur. Soft tissues unremarkable. X-ray right knee 6/27/2023: No acute osseous abnormalities. No joint effusion. No significant degenerative changes. IMPRESSION:  Acute atraumatic right knee pain  [place] and [place] a concert when she was jumping up and down frequently  Radiographic imaging unremarkable  Clinical exam consistent with patellofemoral knee pain syndrome    Other factors:  History of hepatitis C  Type 2 diabetes  BMI 42  Patient reportedly cannot tolerate cortisone as it causes her to have generalized swelling    PLAN:    Clinical exam and radiographic imaging reviewed with patient today, with impression as per above.  I have discussed with the patient the pathophysiology of this diagnosis and reviewed how the examination correlates with this diagnosis. Prior imaging reviewed with patient today as noted above. Treatment options were discussed at length, including risks and benefits; after discussing these treatment options, the patient elected for toradol injection of right knee as she cannot undergo cortisone injection due to reported allergy  Home exercise program and formal physical therapy provided  Work accommodations provided as per communications. We will keep close follow-up to update work accommodations going forward depending on her progress. Return in about 3 weeks (around 7/24/2023). Portions of the record may have been created with voice recognition software. Occasional wrong word or "sound a like" substitutions may have occurred due to the inherent limitations of voice recognition software. Read the chart carefully and recognize, using context, where substitutions have occurred. CHIEF COMPLAINT:  Chief Complaint   Patient presents with   • Right Knee - Pain     THE WHOLE RT KNEE HURTS. ACHY AND STABBING AND PINS IN THE KNEE. BEFORE BED IT'S THE WORSE         HPI:  Aldo Carlos is a 26 y.o. female  who presents in regards to referral from Stephenie Milligan, [address] [place] [address], for       Visit 7/3/2023:  Initial evaluation of right knee pain:  Reportedly ongoing for the past 1 month without a specific precipitating injury  Patient does recall going to the [facility] and was walking for a prolonged period of time more than baseline. She also states attending a concert and was jumping up and down frequently which she thinks may have further aggravated her knee pain. Reports pain localized around the anterior aspect of her knee over the peripatellar joint lines. Describes as a sharp/aching pain that is worse with prolonged standing, walking, squatting, transitioning from sitting to standing.   She reports crepitus of her knee with range of motion movements but states this is a chronic issue even before the pain. Reports minimal swelling intermittently. Denies any knee discoloration, numbness/tingling. Reports gait function is affected as she has a sense of giving out intermittently. Denies any knee locking in extension sensation. In regards to pain control she has been using NSAIDs, heat/ice therapy with minimal to no relief. She does not use a knee brace but has been wrapping her knee and Ace wrap. She denies prior injuries or surgeries of her knee in the past.  She has recent imaging of her knee as noted above. She has not had an injection of her knee in the past.  She does note that she cannot have cortisone injections as she has an "allergic response" where she reports generalized swelling and thus avoids cortisone. She has not seen formal PT for this issue. She is concerned about her occupation where she has to stand for several hours a day      Medical, Surgical, Family, and Social History    Past Medical History:   Diagnosis Date   • Allergic    • Arthritis    • Bipolar affective disorder, currently depressed, moderate ([address]) 12/17/2018   • Cyst of ovary, right    • Diabetes mellitus ([address]) 10/10/2018    type 2   • Endometriosis    • Heart murmur 1988   • Hepatitis C    • Hepatitis C virus infection cured after antiviral drug therapy    • History of transfusion    • Hypertension    • Migraines    • Morbid obesity with BMI of 40.0-44.9, adult ([address])    • Obesity 1995   • Pulmonary artery congenital abnormality    • Spleen enlarged    • Status post surgical removal of both fallopian tubes    • Varicella      Past Surgical History:   Procedure Laterality Date   • CARDIAC CATHETERIZATION      no CAD 10days, 4 weeks 20months old    • CARDIAC CATHETERIZATION N/A 1/12/2023    Procedure: Cardiac Coronary Angiogram;  Surgeon: Hemal Calhoun DO;  Location: [facility];   Service: Cardiology   • CARDIAC CATHETERIZATION Left 2023    Procedure: Cardiac Left Heart Cath;  Surgeon: Scot Puri DO;  Location: [facility]; Service: Cardiology   • CARDIAC CATHETERIZATION  2023    Procedure: Cardiac catheterization;  Surgeon: Scot Puri DO;  Location: [facility];   Service: Cardiology   •  SECTION, LOW TRANSVERSE     • CHOLECYSTECTOMY     • 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 5   • CORONARY STENT PLACEMENT     • IR LOWER EXTREMITY ANGIOGRAM  2023   • LIVER BIOPSY     • LIVER BIOPSY     • STERNAL WIRE REMOVAL  2022   • THROMBECTOMY W/ EMBOLECTOMY Right 2023    Procedure: Right femoral exposure Right iliac embolectomy w/ #4 Ted catheter Aortogram Right EIA stent w/ 7x29mm VBX;  Surgeon: Khushbu Hodges MD;  Location: [facility];  Service: Vascular   • TUBAL LIGATION Bilateral    • VSD REPAIR      As a child   • WOUND DEBRIDEMENT Right 2023    Procedure: Right Groin Wound Washout, Pulse Lavage, Wound Vac placement;  Surgeon: Renae Sykes DO;  Location: [facility];  Service: Vascular     Social History   Social History     Substance and Sexual Activity   Alcohol Use Not Currently   • Alcohol/week: 3.0 standard drinks of alcohol   • Types: 3 Standard drinks or equivalent per week    Comment: socially     Social History     Substance and Sexual Activity   Drug Use Not Currently    Comment: hx of THC use for migraines     Social History     Tobacco Use   Smoking Status Former   • Packs/day: 0.50   • Years: 0.00   • Total pack years: 0.00   • Types: Cigarettes   • Quit date: 2023   • Years since quittin.4   Smokeless Tobacco Never     Family History   Problem Relation Age of Onset   • Hypertension Mother    • Migraines Mother    • JENNY disease Mother    • Depression Mother    • Hyperlipidemia Mother    • Diabetes Mother    • Diabetes Father    • Hypertension Father    • Kidney failure Father    • Heart attack Father    • Arthritis Father    • Stroke Father    • Polycystic kidney disease Paternal Grandmother    • Stroke Paternal Grandmother    • Heart disease Paternal Grandmother    • Arthritis Sister    • Asthma Sister    • Thyroid disease Sister    • Diabetes Sister    • Arthritis Maternal Grandmother    • Breast cancer Maternal Grandmother    • Diabetes Maternal Grandmother    • Hypertension Maternal Grandmother    • Heart Valve Disease Maternal Grandmother    • Learning disabilities Cousin    • Learning disabilities Sister    • ADD / ADHD Cousin    • Lung cancer Brother    • Diabetes Maternal Grandfather    • Hypertension Maternal Grandfather    • JENNY disease Maternal Grandfather    • Stroke Maternal Grandfather      Allergies   Allergen Reactions   • Prednisone Swelling   • Bactrim [Sulfamethoxazole-Trimethoprim] Hives   • Corticosteroids Swelling     Pt states this does not cause problems breathing, she just has generalized swelling   • Cortisone Swelling     Generalized; no impairment with breathing reported     • Medical Tape Hives     Allergic to Paper Tape   • Other Hives     Paper tape   • Sulfa Antibiotics Hives          Physical Exam  /81   Pulse 92   Ht 5' 1" (1.549 m)   Wt 100 kg (221 lb)   BMI 41.76 kg/m²     Constitutional:  see vital signs  Gen: obese, normocephalic/atraumatic, well-groomed  Eyes: No inflammation or discharge of conjunctiva or lids; sclera clear   Pharynx: no inflammation, lesion, or mass of lips  Neck: supple, no masses, non-distended  MSK: no inflammation, lesion, mass, or clubbing of nails and digits except for other than mentioned below  SKIN: no visible rashes or skin lesions  Pulmonary/Chest: Effort normal. No respiratory distress.        Ortho Exam  Right Knee Exam:  Erythema: no  Swelling: no  Increased Warmth: no  Tenderness: +perpatellar joint line, +MJL  ROM: 0-130  Knee flexion strength: 5/5  Knee extension strength: 5/5  Patellar Apprehension: negative  Patellar Grind: +  Lachman's: negative  Anterior Drawer: negative  Varus laxity: negative  Valgus laxity: negative  Robson: negative     Right hip ROM demonstrates no pain actively or passively    No calf tenderness to palpation       Large joint arthrocentesis: R knee  Universal Protocol:  Consent: Verbal consent obtained. Risks and benefits: risks, benefits and alternatives were discussed  Consent given by: patient  Patient understanding: patient states understanding of the procedure being performed  Site marked: the operative site was marked  Radiology Images displayed and confirmed.  If images not available, report reviewed: imaging studies available  Required items: required blood products, implants, devices, and special equipment available  Patient identity confirmed: verbally with patient    Supporting Documentation  Indications: pain   Procedure Details  Location: knee - R knee  Preparation: Patient was prepped and draped in the usual sterile fashion  Needle size: 22 G  Ultrasound guidance: no  Approach: anterolateral  Medications administered: 2 mL bupivacaine 0.25 %; 30 mg ketorolac 30 mg/mL    Patient tolerance: patient tolerated the procedure well with no immediate complications  Dressing:  Sterile dressing applied

## 2023-07-03 NOTE — LETTER
July 3, 2023     Patient: Mari Iqbal  YOB: 1990  Date of Visit: 7/3/2023      To Whom it May Concern:    Mari Iqbal is under my professional care. Sanjiv Jackson was seen in my office on 7/3/2023. Patient restricted to standing 50% of the day/sitting 50% of the day. Restricted from climbing ladders, working at elevations, squatting. Allow use of right knee brace while working if needed. Planned follow up in 2-3 weeks approximately. If you have any questions or concerns, please don't hesitate to call.          Sincerely,          Eyal Gomes MD        CC: No Recipients

## 2023-07-03 NOTE — PROGRESS NOTES
1. Acute pain of right knee  Ambulatory Referral to Orthopedic Surgery    Brace    Ambulatory Referral to Physical Therapy    Large joint arthrocentesis: R knee      2. Patellofemoral joint pain, right  Ambulatory Referral to Orthopedic Surgery    Brace    Ambulatory Referral to Physical Therapy    Large joint arthrocentesis: R knee      3. Gait difficulty  Brace    Ambulatory Referral to Physical Therapy        Orders Placed This Encounter   Procedures   • Large joint arthrocentesis: R knee   • Brace   • Ambulatory Referral to Physical Therapy        Imaging Studies (I personally reviewed images in PACS and report):    • X-ray left ankle 6/27/2023: No acute osseous abnormalities. Calcaneal spur. Soft tissues unremarkable. • X-ray right knee 6/27/2023: No acute osseous abnormalities. No joint effusion. No significant degenerative changes. IMPRESSION:  • Acute atraumatic right knee pain  [place] and [place] a concert when she was jumping up and down frequently  • Radiographic imaging unremarkable  • Clinical exam consistent with patellofemoral knee pain syndrome    Other factors:  • History of hepatitis C  • Type 2 diabetes  • BMI 42  • Patient reportedly cannot tolerate cortisone as it causes her to have generalized swelling    PLAN:    • Clinical exam and radiographic imaging reviewed with patient today, with impression as per above. I have discussed with the patient the pathophysiology of this diagnosis and reviewed how the examination correlates with this diagnosis. • Prior imaging reviewed with patient today as noted above.     • Treatment options were discussed at length, including risks and benefits; after discussing these treatment options, the patient elected for toradol injection of right knee as she cannot undergo cortisone injection due to reported allergy  • Home exercise program and formal physical therapy provided  • Work accommodations provided as per communications. • We will keep close follow-up to update work accommodations going forward depending on her progress. Return in about 3 weeks (around 7/24/2023). Portions of the record may have been created with voice recognition software. Occasional wrong word or "sound a like" substitutions may have occurred due to the inherent limitations of voice recognition software. Read the chart carefully and recognize, using context, where substitutions have occurred. CHIEF COMPLAINT:  Chief Complaint   Patient presents with   • Right Knee - Pain     THE WHOLE RT KNEE HURTS. ACHY AND STABBING AND PINS IN THE KNEE. BEFORE BED IT'S THE WORSE         HPI:  Yogi Peters is a 26 y.o. female  who presents in regards to referral from [place], [place], for       Visit 7/3/2023:  Initial evaluation of right knee pain:  Reportedly ongoing for the past 1 month without a specific precipitating injury  Patient does recall going to the [facility] at [facility] and was walking for a prolonged period of time more than baseline. She also states attending a concert and was jumping up and down frequently which she thinks may have further aggravated her knee pain. Reports pain localized around the anterior aspect of her knee over the peripatellar joint lines. Describes as a sharp/aching pain that is worse with prolonged standing, walking, squatting, transitioning from sitting to standing. She reports crepitus of her knee with range of motion movements but states this is a chronic issue even before the pain. Reports minimal swelling intermittently. Denies any knee discoloration, numbness/tingling. Reports gait function is affected as she has a sense of giving out intermittently. Denies any knee locking in extension sensation. In regards to pain control she has been using NSAIDs, heat/ice therapy with minimal to no relief.   She does not use a knee brace but has been wrapping her knee and Ace wrap.  She denies prior injuries or surgeries of her knee in the past.  She has recent imaging of her knee as noted above. She has not had an injection of her knee in the past.  She does note that she cannot have cortisone injections as she has an "allergic response" where she reports generalized swelling and thus avoids cortisone. She has not seen formal PT for this issue. She is concerned about her occupation where she has to stand for several hours a day      Medical, Surgical, Family, and Social History    Past Medical History:   Diagnosis Date   • Allergic    • Arthritis    • Bipolar affective disorder, currently depressed, moderate ([address]) 2018   • Cyst of ovary, right    • Diabetes mellitus ([address]) 10/10/2018    type 2   • Endometriosis    • Heart murmur 1988   • Hepatitis C    • Hepatitis C virus infection cured after antiviral drug therapy    • History of transfusion    • Hypertension    • Migraines    • Morbid obesity with BMI of 40.0-44.9, adult ([address])    • Obesity    • Pulmonary artery congenital abnormality    • Spleen enlarged    • Status post surgical removal of both fallopian tubes    • Varicella      Past Surgical History:   Procedure Laterality Date   • CARDIAC CATHETERIZATION      no CAD 10days, 4 weeks 20months old    • CARDIAC CATHETERIZATION N/A 2023    Procedure: Cardiac Coronary Angiogram;  Surgeon: Jorge A Hirsch DO;  [facility]; Service: Cardiology   • CARDIAC CATHETERIZATION Left 2023    Procedure: Cardiac Left Heart Cath;  Surgeon: Jorge A Hirsch DO;  [facility]; Service: Cardiology   • CARDIAC CATHETERIZATION  2023    Procedure: Cardiac catheterization;  Surgeon: Jorge A Hirsch DO;  [facility];   Service: Cardiology   •  SECTION, LOW TRANSVERSE     • CHOLECYSTECTOMY     • 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 5   • CORONARY STENT PLACEMENT     • IR LOWER EXTREMITY ANGIOGRAM 2023   • LIVER BIOPSY     • LIVER BIOPSY     • STERNAL WIRE REMOVAL  2022   • THROMBECTOMY W/ EMBOLECTOMY Right 2023    Procedure: Right femoral exposure Right iliac embolectomy w/ #4 Ted catheter Aortogram Right EIA stent w/ 7x29mm VBX;  Surgeon: Yolie Hodges MD;  [facility];  Service: Vascular   • TUBAL LIGATION Bilateral    • VSD REPAIR      As a child   • WOUND DEBRIDEMENT Right 2023    Procedure: Right Groin Wound Washout, Pulse Lavage, Wound Vac placement;  Surgeon: Renuka Thomas DO;  [facility];  Service: Vascular     Social History   Social History     Substance and Sexual Activity   Alcohol Use Not Currently   • Alcohol/week: 3.0 standard drinks of alcohol   • Types: 3 Standard drinks or equivalent per week    Comment: socially     Social History     Substance and Sexual Activity   Drug Use Not Currently    Comment: hx of THC use for migraines     Social History     Tobacco Use   Smoking Status Former   • Packs/day: 0.50   • Years: 0.00   • Total pack years: 0.00   • Types: Cigarettes   • Quit date: 2023   • Years since quittin.4   Smokeless Tobacco Never     Family History   Problem Relation Age of Onset   • Hypertension Mother    • Migraines Mother    • JENNY disease Mother    • Depression Mother    • Hyperlipidemia Mother    • Diabetes Mother    • Diabetes Father    • Hypertension Father    • Kidney failure Father    • Heart attack Father    • Arthritis Father    • Stroke Father    • Polycystic kidney disease Paternal Grandmother    • Stroke Paternal Grandmother    • Heart disease Paternal Grandmother    • Arthritis Sister    • Asthma Sister    • Thyroid disease Sister    • Diabetes Sister    • Arthritis Maternal Grandmother    • Breast cancer Maternal Grandmother    • Diabetes Maternal Grandmother    • Hypertension Maternal Grandmother    • Heart Valve Disease Maternal Grandmother    • Learning disabilities Cousin    • Learning disabilities Sister    • ADD / ADHD Cousin    • Lung cancer Brother    • Diabetes Maternal Grandfather    • Hypertension Maternal Grandfather    • JENNY disease Maternal Grandfather    • Stroke Maternal Grandfather      Allergies   Allergen Reactions   • Prednisone Swelling   • Bactrim [Sulfamethoxazole-Trimethoprim] Hives   • Corticosteroids Swelling     Pt states this does not cause problems breathing, she just has generalized swelling   • Cortisone Swelling     Generalized; no impairment with breathing reported     • Medical Tape Hives     Allergic to Paper Tape   • Other Hives     Paper tape   • Sulfa Antibiotics Hives          Physical Exam  /81   Pulse 92   Ht 5' 1" (1.549 m)   Wt 100 kg (221 lb)   BMI 41.76 kg/m²     Constitutional:  see vital signs  Gen: obese, normocephalic/atraumatic, well-groomed  Eyes: No inflammation or discharge of conjunctiva or lids; sclera clear   Pharynx: no inflammation, lesion, or mass of lips  Neck: supple, no masses, non-distended  MSK: no inflammation, lesion, mass, or clubbing of nails and digits except for other than mentioned below  SKIN: no visible rashes or skin lesions  Pulmonary/Chest: Effort normal. No respiratory distress. Ortho Exam  Right Knee Exam:  Erythema: no  Swelling: no  Increased Warmth: no  Tenderness: +perpatellar joint line, +MJL  ROM: 0-130  Knee flexion strength: 5/5  Knee extension strength: 5/5  Patellar Apprehension: negative  Patellar Grind: +  Lachman's: negative  Anterior Drawer: negative  Varus laxity: negative  Valgus laxity: negative  Robson: negative     Right hip ROM demonstrates no pain actively or passively    No calf tenderness to palpation       Large joint arthrocentesis: R knee  Universal Protocol:  Consent: Verbal consent obtained.   Risks and benefits: risks, benefits and alternatives were discussed  Consent given by: patient  Patient understanding: patient states understanding of the procedure being performed  Site marked: the operative site was marked  Radiology Images displayed and confirmed.  If images not available, report reviewed: imaging studies available  Required items: required blood products, implants, devices, and special equipment available  Patient identity confirmed: verbally with patient    Supporting Documentation  Indications: pain   Procedure Details  Location: knee - R knee  Preparation: Patient was prepped and draped in the usual sterile fashion  Needle size: 22 G  Ultrasound guidance: no  Approach: anterolateral  Medications administered: 2 mL bupivacaine 0.25 %; 30 mg ketorolac 30 mg/mL    Patient tolerance: patient tolerated the procedure well with no immediate complications  Dressing:  Sterile dressing applied

## 2023-07-17 ENCOUNTER — OFFICE VISIT (OUTPATIENT)
Age: 33
End: 2023-07-17
Payer: COMMERCIAL

## 2023-07-17 VITALS
DIASTOLIC BLOOD PRESSURE: 88 MMHG | SYSTOLIC BLOOD PRESSURE: 131 MMHG | HEART RATE: 88 BPM | HEIGHT: 61 IN | BODY MASS INDEX: 42.29 KG/M2 | WEIGHT: 224 LBS

## 2023-07-17 DIAGNOSIS — M25.561 ACUTE PAIN OF RIGHT KNEE: Primary | ICD-10-CM

## 2023-07-17 DIAGNOSIS — R26.9 GAIT DIFFICULTY: ICD-10-CM

## 2023-07-17 DIAGNOSIS — M25.561 PATELLOFEMORAL JOINT PAIN, RIGHT: ICD-10-CM

## 2023-07-17 PROCEDURE — 99212 OFFICE O/P EST SF 10 MIN: CPT | Performed by: STUDENT IN AN ORGANIZED HEALTH CARE EDUCATION/TRAINING PROGRAM

## 2023-07-17 NOTE — PROGRESS NOTES
1. Acute pain of right knee        2. Patellofemoral joint pain, right        3. Gait difficulty          No orders of the defined types were placed in this encounter. Imaging Studies (I personally reviewed images in PACS and report):    • X-ray left ankle 6/27/2023: No acute osseous abnormalities. Calcaneal spur. Soft tissues unremarkable. • X-ray right knee 6/27/2023: No acute osseous abnormalities. No joint effusion. No significant degenerative changes. IMPRESSION:  • Acute atraumatic right knee pain  [place] and [place] a concert when she was jumping up and down frequently  • Radiographic imaging unremarkable  • Clinical exam consistent with patellofemoral knee pain syndrome  • No improvement since last visit; only briefly had improved pain with intra-articular Toradol injection    Other factors:  • History of hepatitis C  • Type 2 diabetes  • BMI 42  • Patient reportedly cannot tolerate cortisone as it causes her to have generalized swelling    PLAN:    • Clinical exam and radiographic imaging reviewed with patient today, with impression as per above. I have discussed with the patient the pathophysiology of this diagnosis and reviewed how the examination correlates with this diagnosis. • Recommended to start formal physical therapy at this time which was placed on last visit. Recommended a minimum of 6 weeks. • The pain control recommended. Use of acetaminophen, NSAIDs, heat/ice therapy torments on/off, use of patella stabilizing knee brace versus compression knee sleeve. • Work combination was provided today as per communications. Return in about 6 weeks (around 8/28/2023). Portions of the record may have been created with voice recognition software. Occasional wrong word or "sound a like" substitutions may have occurred due to the inherent limitations of voice recognition software.  Read the chart carefully and recognize, using context, where substitutions have occurred. CHIEF COMPLAINT:  Chief Complaint   Patient presents with   • Right Knee - Follow-up         HPI:  Tory Mcgregor is a 26 y.o. female  who presents in regards to referral from [facility], [address] [place] [address], for       Visit 7/17/2023: Follow-up evaluation of right knee pain  Patient last seen on 7/3/2023 in which conservative treatment including an intra-articular Toradol injection as patient cannot tolerate cortisone was performed. She was also provided a home exercise program and referred to formal physical therapy. Patient reports that that she had briefly a day or 2 of relief from the Toradol injection before pain slowly recurred again. She again states her pain is over the lateral patellofemoral aspect of her knee and describes as a sharp/aching pain that is worse with squatting, prolonged walking, ascending/descending stairs. She reports crepitus of her knee. Denies swelling, discoloration, numbness/tingling. She does not feel the patella stabilizing brace is providing much relief for support.       Medical, Surgical, Family, and Social History    Past Medical History:   Diagnosis Date   • Allergic    • Arthritis    • Bipolar affective disorder, currently depressed, moderate ([address]) 12/17/2018   • Cyst of ovary, right    • Diabetes mellitus ([address]) 10/10/2018    type 2   • Endometriosis    • Heart murmur 1988   • Hepatitis C    • Hepatitis C virus infection cured after antiviral drug therapy    • History of transfusion    • Hypertension    • Migraines    • Morbid obesity with BMI of 40.0-44.9, adult ([address])    • Obesity 1995   • Pulmonary artery congenital abnormality    • Spleen enlarged    • Status post surgical removal of both fallopian tubes    • Varicella      Past Surgical History:   Procedure Laterality Date   • CARDIAC CATHETERIZATION      no CAD 10days, 4 weeks 20months old    • CARDIAC CATHETERIZATION N/A 1/12/2023    Procedure: Cardiac Coronary Angiogram;  Surgeon: Nadya Castillo DO;  [facility]; Service: Cardiology   • CARDIAC CATHETERIZATION Left 2023    Procedure: Cardiac Left Heart Cath;  Surgeon: Nadya Castillo DO;  [facility]; Service: Cardiology   • CARDIAC CATHETERIZATION  2023    Procedure: Cardiac catheterization;  Surgeon: Nadya Castillo DO;  [facility];   Service: Cardiology   •  SECTION, LOW TRANSVERSE     • CHOLECYSTECTOMY     • 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 5   • CORONARY STENT PLACEMENT     • IR LOWER EXTREMITY ANGIOGRAM  2023   • LIVER BIOPSY     • LIVER BIOPSY     • STERNAL WIRE REMOVAL  2022   • THROMBECTOMY W/ EMBOLECTOMY Right 2023    Procedure: Right femoral exposure Right iliac embolectomy w/ #4 Ted catheter Aortogram Right EIA stent w/ 7x29mm VBX;  Surgeon: Manjeet Hodges MD;  [facility];  Service: Vascular   • TUBAL LIGATION Bilateral    • VSD REPAIR      As a child   • WOUND DEBRIDEMENT Right 2023    Procedure: Right Groin Wound Washout, Pulse Lavage, Wound Vac placement;  Surgeon: South Nagel DO;  [facility];  Service: Vascular     Social History   Social History     Substance and Sexual Activity   Alcohol Use Not Currently   • Alcohol/week: 3.0 standard drinks of alcohol   • Types: 3 Standard drinks or equivalent per week    Comment: socially     Social History     Substance and Sexual Activity   Drug Use Not Currently    Comment: hx of THC use for migraines     Social History     Tobacco Use   Smoking Status Former   • Packs/day: 0.50   • Years: 0.00   • Total pack years: 0.00   • Types: Cigarettes   • Quit date: 2023   • Years since quittin.5   Smokeless Tobacco Never     Family History   Problem Relation Age of Onset   • Hypertension Mother    • Migraines Mother    • JENNY disease Mother    • Depression Mother    • Hyperlipidemia Mother    • Diabetes Mother    • Diabetes Father    • Hypertension Father    • Kidney failure Father    • Heart attack Father    • Arthritis Father    • Stroke Father    • Polycystic kidney disease Paternal Grandmother    • Stroke Paternal Grandmother    • Heart disease Paternal Grandmother    • Arthritis Sister    • Asthma Sister    • Thyroid disease Sister    • Diabetes Sister    • Arthritis Maternal Grandmother    • Breast cancer Maternal Grandmother    • Diabetes Maternal Grandmother    • Hypertension Maternal Grandmother    • Heart Valve Disease Maternal Grandmother    • Learning disabilities Cousin    • Learning disabilities Sister    • ADD / ADHD Cousin    • Lung cancer Brother    • Diabetes Maternal Grandfather    • Hypertension Maternal Grandfather    • JENNY disease Maternal Grandfather    • Stroke Maternal Grandfather      Allergies   Allergen Reactions   • Prednisone Swelling   • Bactrim [Sulfamethoxazole-Trimethoprim] Hives   • Corticosteroids Swelling     Pt states this does not cause problems breathing, she just has generalized swelling   • Cortisone Swelling     Generalized; no impairment with breathing reported     • Medical Tape Hives     Allergic to Paper Tape   • Other Hives     Paper tape   • Sulfa Antibiotics Hives          Physical Exam  /88   Pulse 88   Ht 5' 1" (1.549 m)   Wt 102 kg (224 lb)   BMI 42.32 kg/m²     Constitutional:  see vital signs  Gen: obese, normocephalic/atraumatic, well-groomed  Eyes: No inflammation or discharge of conjunctiva or lids; sclera clear   Pharynx: no inflammation, lesion, or mass of lips  Neck: supple, no masses, non-distended  MSK: no inflammation, lesion, mass, or clubbing of nails and digits except for other than mentioned below  SKIN: no visible rashes or skin lesions  Pulmonary/Chest: Effort normal. No respiratory distress.        Ortho Exam  Right Knee Exam:  Erythema: no  Swelling: no  Increased Warmth: no  Tenderness: + Lateral peripatellar joint line  ROM: 0-130  Knee flexion strength: 5/5  Knee extension strength: 5/5  Patellar Apprehension: negative  Patellar Grind: +  Lachman's: negative  Anterior Drawer: negative  Varus laxity: negative  Valgus laxity: negative  Robson: negative     Right hip ROM demonstrates no pain actively or passively    No calf tenderness to palpation       Procedures

## 2023-07-17 NOTE — LETTER
July 17, 2023     Patient: Jimy Moeller  YOB: 1990  Date of Visit: 7/17/2023      To Whom it May Concern:    Jimy Moeller is under my professional care. Hilda Degroot was seen in my office on 7/17/2023. Patient restricted to standing 50% of the day/sitting 50% of the day (ex: in a 6 hour workday, 3 hours sitting and 3 hours standing). Restricted from climbing ladders, working at elevations, squatting. Allow use of right knee brace while working if needed. Plan for starting formal physical therapy and to follow up in 6 weeks. If you have any questions or concerns, please don't hesitate to call.          Sincerely,          Sherry Orozco MD        CC: No Recipients

## 2023-08-02 ENCOUNTER — EVALUATION (OUTPATIENT)
Age: 33
End: 2023-08-02
Payer: COMMERCIAL

## 2023-08-02 DIAGNOSIS — M25.561 ACUTE PAIN OF RIGHT KNEE: Primary | ICD-10-CM

## 2023-08-02 DIAGNOSIS — M25.561 PATELLOFEMORAL JOINT PAIN, RIGHT: ICD-10-CM

## 2023-08-02 DIAGNOSIS — R26.9 GAIT DIFFICULTY: ICD-10-CM

## 2023-08-02 DIAGNOSIS — R29.898 WEAKNESS OF BOTH LOWER EXTREMITIES: ICD-10-CM

## 2023-08-02 DIAGNOSIS — M25.60 DECREASED RANGE OF MOTION: ICD-10-CM

## 2023-08-02 DIAGNOSIS — M79.604 PAIN OF RIGHT LOWER EXTREMITY: ICD-10-CM

## 2023-08-02 PROCEDURE — 97161 PT EVAL LOW COMPLEX 20 MIN: CPT

## 2023-08-02 PROCEDURE — 97110 THERAPEUTIC EXERCISES: CPT

## 2023-08-05 NOTE — PROGRESS NOTES
Daily Note     Today's date: 2023  Patient name: Ramon Metzger  : 1990  MRN: [id number]  Referring provider: Jordan Ding MD  Dx:   Encounter Diagnosis     ICD-10-CM    1. Acute pain of right knee  M25.561       2. Patellofemoral joint pain, right  M25.561       3. Gait difficulty  R26.9       4. Weakness of both lower extremities  R29.898       5. Decreased range of motion  M25.60           Start Time: 1210  Stop Time: 1255  Total time in clinic (min): 45 minutes    Subjective: Pt reports HEP is going well. She did get a new knee brace that stays up more but is not sure if it helps. Objective: See treatment diary below      Assessment: Ramon Metzger tolerated today's treatment session well. Patient education provided on HEP and TE. Jaycee JAFFE completed all TE with good form and no adverse reactions. Pt does need intermittent rest breaks and fatigues quickly. Jaskaran Maradiaga continues to benefit from skilled OPPT services to address knee pain. Will continue to address functional deficits and focus on progression of POC per patient tolerance. Patient performed Nustep to increase blood flow to the area being treated, prepare the muscles for strength training and stretching, improve overall tolerance to activity, and aerobic endurance. Plan: Continue per plan of care. Progress treatment as tolerated. Precautions: standard    Access Code: [email or web address]  URL: [email or web address]  Date: 2023  Prepared by: Tasneem Del Rio    Exercises  - Seated Hamstring Stretch  - 2 x daily - 1 sets - 3 reps - 30 second  hold  - Seated Table Hamstring Stretch  - 2 x daily - 1 sets - 3 reps - 30 second  hold  - Standing Gastroc Stretch on Foam 1/2 Roll  - 2 x daily - 1 sets - 3 reps - 30 second hold  - Seated Hip Abduction with Resistance  - 2 x daily - 1 sets - 10-20 reps - 5 second  hold  - Single Leg Stance with 3-Way Kick on Foam  - 2 x daily - 1 sets - 10 reps  - Side Stepping with Resistance at Thighs  - 2 x daily - 1 sets - 10 reps  - Hip Hiking on Step  - 2 x daily - 1 sets - 10 reps  - Heel Raises with Counter Support  - 2 x daily - 1 sets - 20 reps  - Squat with Chair Touch  - 2 x daily - 2 sets - 10 reps      Date: 8/2/2023 8/8/2023           Visit: #1 #2 #3 #4 #5 #6 #7 #8 #9 #10   Manual:                                       Neuro Re-Ed             QS             QS with SLR             TKE in hip flexion, neutral and extension                                                                 Ther Ex             Warm Up  NuStep level 3 8 min            HS Stretch 30" x2 BLE 30" hold x2 BLE           Standing calf stretch 30" x2 BLE 30" hold x2 BLE           Seated hip abd  GTB 5" hold x20             Lateral walk  CO yellow 1 long lap           Diagonal walk   1 long lap no band            Hip hike   On step x10 each LE           Standing hip 3 way on airex   x10 each LE           HR   x20            Squat to chair   2x10                                      Ther Activity                                       Gait Training                                       Modalities

## 2023-08-08 ENCOUNTER — OFFICE VISIT (OUTPATIENT)
Age: 33
End: 2023-08-08
Payer: COMMERCIAL

## 2023-08-08 DIAGNOSIS — M25.561 PATELLOFEMORAL JOINT PAIN, RIGHT: ICD-10-CM

## 2023-08-08 DIAGNOSIS — M25.561 ACUTE PAIN OF RIGHT KNEE: Primary | ICD-10-CM

## 2023-08-08 DIAGNOSIS — R26.9 GAIT DIFFICULTY: ICD-10-CM

## 2023-08-08 DIAGNOSIS — R29.898 WEAKNESS OF BOTH LOWER EXTREMITIES: ICD-10-CM

## 2023-08-08 DIAGNOSIS — M25.60 DECREASED RANGE OF MOTION: ICD-10-CM

## 2023-08-08 PROCEDURE — 97110 THERAPEUTIC EXERCISES: CPT

## 2023-08-09 DIAGNOSIS — E11.9 TYPE 2 DIABETES MELLITUS WITHOUT COMPLICATION, WITH LONG-TERM CURRENT USE OF INSULIN (HCC): ICD-10-CM

## 2023-08-09 DIAGNOSIS — Z79.4 TYPE 2 DIABETES MELLITUS WITHOUT COMPLICATION, WITH LONG-TERM CURRENT USE OF INSULIN (HCC): ICD-10-CM

## 2023-08-09 NOTE — PROGRESS NOTES
Daily Note     Today's date: 2023  Patient name: Stella Bruce  : 1990  MRN: [id number]  Referring provider: Asmita Aguirre MD  Dx:   Encounter Diagnosis     ICD-10-CM    1. Acute pain of right knee  M25.561       2. Patellofemoral joint pain, right  M25.561       3. Gait difficulty  R26.9       4. Weakness of both lower extremities  R29.898       5. Decreased range of motion  M25.60           Start Time: 1115  Stop Time: 1155  Total time in clinic (min): 40 minutes    Subjective: Pt reports knee is feeling ok today. Not wearing brace today. Objective: See treatment diary below      Assessment: Stella Bruce tolerated today's treatment session well. Patient education provided on progression of TE and POC. Jacyee JAFFE completed all TE with good form and no adverse reactions. Xi Bryant continues to benefit from skilled OPPT services to address knee pain. Will continue to address functional deficits and focus on progression of POC per patient tolerance. Patient performed Nustep to increase blood flow to the area being treated, prepare the muscles for strength training and stretching, improve overall tolerance to activity, and aerobic endurance. Plan: Continue per plan of care. Progress treatment as tolerated. Precautions: standard    Access Code: [email or web address]  URL: [email or web address]  Date: 2023  Prepared by: Jia Mendoza    Exercises  - Seated Hamstring Stretch  - 2 x daily - 1 sets - 3 reps - 30 second  hold  - Seated Table Hamstring Stretch  - 2 x daily - 1 sets - 3 reps - 30 second  hold  - Standing Gastroc Stretch on Foam 1/2 Roll  - 2 x daily - 1 sets - 3 reps - 30 second hold  - Seated Hip Abduction with Resistance  - 2 x daily - 1 sets - 10-20 reps - 5 second  hold  - Single Leg Stance with 3-Way Kick on Foam  - 2 x daily - 1 sets - 10 reps  - Side Stepping with Resistance at Thighs  - 2 x daily - 1 sets - 10 reps  - Hip Hiking on Step  - 2 x daily - 1 sets - 10 reps  - Heel Raises with Counter Support  - 2 x daily - 1 sets - 20 reps  - Squat with Chair Touch  - 2 x daily - 2 sets - 10 reps      Date: 8/2/2023 8/8/2023 8/11/2023          Visit: #1 #2 #3 #4 #5 #6 #7 #8 #9 #10   Manual:                                       Neuro Re-Ed             QS             QS with SLR             TKE in hip flexion, neutral and extension                                                                 Ther Ex             Warm Up  NuStep level 3 8 min  NuStep level 3 8 min 10          HS Stretch 30" x2 BLE 30" hold x2 BLE           Standing calf stretch 30" x2 BLE 30" hold x2 BLE           Seated hip abd  GTB 5" hold x20   GTB 5" hold x20           Lateral walk  CO yellow 1 long lap CO yellow 1 long lap           Diagonal walk   1 long lap no band  1 long lap no band          Hip hike   On step x10 each LE On step x10 each LE          Standing hip 3 way on airex   x10 each LE x10 each LE          HR   x20  Single leg x10           Squat to chair   2x10  x10                                    Ther Activity                                       Gait Training                                       Modalities

## 2023-08-10 DIAGNOSIS — E11.65 TYPE 2 DIABETES MELLITUS WITH HYPERGLYCEMIA, WITH LONG-TERM CURRENT USE OF INSULIN (HCC): ICD-10-CM

## 2023-08-10 DIAGNOSIS — Z79.4 TYPE 2 DIABETES MELLITUS WITH HYPERGLYCEMIA, WITH LONG-TERM CURRENT USE OF INSULIN (HCC): ICD-10-CM

## 2023-08-10 RX ORDER — BLOOD-GLUCOSE SENSOR
EACH MISCELLANEOUS
Qty: 2 EACH | Refills: 2 | Status: SHIPPED | OUTPATIENT
Start: 2023-08-10

## 2023-08-10 RX ORDER — ASPIRIN 81 MG/1
TABLET, COATED ORAL
Qty: 90 TABLET | Refills: 0 | Status: SHIPPED | OUTPATIENT
Start: 2023-08-10

## 2023-08-11 ENCOUNTER — OFFICE VISIT (OUTPATIENT)
Age: 33
End: 2023-08-11
Payer: COMMERCIAL

## 2023-08-11 DIAGNOSIS — R26.9 GAIT DIFFICULTY: ICD-10-CM

## 2023-08-11 DIAGNOSIS — M25.561 PATELLOFEMORAL JOINT PAIN, RIGHT: ICD-10-CM

## 2023-08-11 DIAGNOSIS — M25.60 DECREASED RANGE OF MOTION: ICD-10-CM

## 2023-08-11 DIAGNOSIS — R29.898 WEAKNESS OF BOTH LOWER EXTREMITIES: ICD-10-CM

## 2023-08-11 DIAGNOSIS — M25.561 ACUTE PAIN OF RIGHT KNEE: Primary | ICD-10-CM

## 2023-08-11 PROCEDURE — 97110 THERAPEUTIC EXERCISES: CPT

## 2023-08-11 NOTE — PROGRESS NOTES
Daily Note     Today's date: 2023  Patient name: Baron Fischer  : 1990  MRN: [id number]  Referring provider: Alison Knutson MD  Dx:   Encounter Diagnosis     ICD-10-CM    1. Acute pain of right knee  M25.561       2. Patellofemoral joint pain, right  M25.561       3. Gait difficulty  R26.9       4. Weakness of both lower extremities  R29.898       5. Decreased range of motion  M25.60                      Subjective: ***      Objective: See treatment diary below      Assessment: Baron Fischer tolerated today's treatment session well. Patient education provided on [facility] education options:14328}. Jaycee JAFFE completed all TE with good form and no adverse reactions. ***  Julia JAFFE continues to benefit from skilled OPPT services to address {khdailysymptoms:29227}. Will continue to address functional deficits and focus on progression of POC per patient tolerance. Patient performed {KUAerobic:55361} to increase blood flow to the area being treated, prepare the muscles for strength training and stretching, improve overall tolerance to activity, and aerobic endurance. Plan: Continue per plan of care. Progress treatment as tolerated. Precautions: standard    Access Code: [email or web address]  URL: [email or web address]  Date: 2023  Prepared by: Zohra Jimenez    Exercises  - Seated Hamstring Stretch  - 2 x daily - 1 sets - 3 reps - 30 second  hold  - Seated Table Hamstring Stretch  - 2 x daily - 1 sets - 3 reps - 30 second  hold  - Standing Gastroc Stretch on Foam 1/2 Roll  - 2 x daily - 1 sets - 3 reps - 30 second hold  - Seated Hip Abduction with Resistance  - 2 x daily - 1 sets - 10-20 reps - 5 second  hold  - Single Leg Stance with 3-Way Kick on Foam  - 2 x daily - 1 sets - 10 reps  - Side Stepping with Resistance at Thighs  - 2 x daily - 1 sets - 10 reps  - Hip Hiking on Step  - 2 x daily - 1 sets - 10 reps  - Heel Raises with Counter Support  - 2 x daily - 1 sets - 20 reps  - Squat with Chair Touch  - 2 x daily - 2 sets - 10 reps      Date: 8/2/2023 8/8/2023 8/11/2023          Visit: #1 #2 #3 #4 #5 #6 #7 #8 #9 #10   Manual:                                       Neuro Re-Ed             QS             QS with SLR             TKE in hip flexion, neutral and extension                                                                 Ther Ex             Warm Up  NuStep level 3 8 min  NuStep level 3 8 min 10          HS Stretch 30" x2 BLE 30" hold x2 BLE           Standing calf stretch 30" x2 BLE 30" hold x2 BLE           Seated hip abd  GTB 5" hold x20   GTB 5" hold x20           Lateral walk  CO yellow 1 long lap CO yellow 1 long lap           Diagonal walk   1 long lap no band  1 long lap no band          Hip hike   On step x10 each LE On step x10 each LE          Standing hip 3 way on airex   x10 each LE x10 each LE          HR   x20  Single leg x10           Squat to chair   2x10  x10                                    Ther Activity                                       Gait Training                                       Modalities

## 2023-08-15 ENCOUNTER — APPOINTMENT (OUTPATIENT)
Age: 33
End: 2023-08-15
Payer: COMMERCIAL

## 2023-08-15 DIAGNOSIS — M25.561 PATELLOFEMORAL JOINT PAIN, RIGHT: ICD-10-CM

## 2023-08-15 DIAGNOSIS — M25.561 ACUTE PAIN OF RIGHT KNEE: Primary | ICD-10-CM

## 2023-08-15 DIAGNOSIS — M25.60 DECREASED RANGE OF MOTION: ICD-10-CM

## 2023-08-15 DIAGNOSIS — R29.898 WEAKNESS OF BOTH LOWER EXTREMITIES: ICD-10-CM

## 2023-08-15 DIAGNOSIS — R26.9 GAIT DIFFICULTY: ICD-10-CM

## 2023-08-15 NOTE — PROGRESS NOTES
Discharge    Patient discharged on 8/15/2023. She attended PT eval and 2 treatments and did not feel that PT was helping her knee and wished to cancel all future appointments.

## 2023-08-17 ENCOUNTER — APPOINTMENT (OUTPATIENT)
Age: 33
End: 2023-08-17
Payer: COMMERCIAL

## 2023-08-20 NOTE — TELEPHONE ENCOUNTER
Form obtained, will have Amarilis sign in am 
Per Phuc Olson, she is going to order something else for pt 
Received fax from [facility], oxycodone 5/325 ordered today "supply exceeds plan limitation" and requires prior auth for [facility]  However [place] also says it was filled today  Called pt and left message to see if she obtained med  She returned call and states she did not as she received email it wasn't approved  Called number on form from pharmacy, entered our fax number, they will fax prior auth form to us 
LABS:                        7.6    3.61  )-----------( 93       ( 20 Aug 2023 05:20 )             25.0     08-20    141  |  105  |  5<L>  ----------------------------<  99  3.6   |  25  |  0.40<L>    Ca    8.0<L>      20 Aug 2023 05:20    TPro  7.5  /  Alb  2.9<L>  /  TBili  0.6  /  DBili  x   /  AST  72<H>  /  ALT  15  /  AlkPhos  173<H>  08-20    PT/INR - ( 20 Aug 2023 05:20 )   PT: 14.6 sec;   INR: 1.30 ratio         PTT - ( 20 Aug 2023 05:20 )  PTT:41.7 sec      Urinalysis Basic - ( 20 Aug 2023 05:20 )    Color: x / Appearance: x / SG: x / pH: x  Gluc: 99 mg/dL / Ketone: x  / Bili: x / Urobili: x   Blood: x / Protein: x / Nitrite: x   Leuk Esterase: x / RBC: x / WBC x   Sq Epi: x / Non Sq Epi: x / Bacteria: x        COVID-19 PCR: NotDetec (15 Mar 2023 03:12)      RADIOLOGY & ADDITIONAL TESTS:  New Imaging Personally Reviewed Today: Yes  New Electrocardiogram Personally Reviewed Today: will order, not in chart  Other Results Reviewed Today: yes  Prior or Outpatient Records Reviewed Today with Summary: N/A

## 2023-08-22 ENCOUNTER — APPOINTMENT (OUTPATIENT)
Age: 33
End: 2023-08-22
Payer: COMMERCIAL

## 2023-08-25 ENCOUNTER — APPOINTMENT (OUTPATIENT)
Age: 33
End: 2023-08-25
Payer: COMMERCIAL

## 2023-08-29 DIAGNOSIS — E11.65 TYPE 2 DIABETES MELLITUS WITH HYPERGLYCEMIA, WITHOUT LONG-TERM CURRENT USE OF INSULIN (HCC): ICD-10-CM

## 2023-08-29 RX ORDER — INSULIN GLARGINE 100 [IU]/ML
17 INJECTION, SOLUTION SUBCUTANEOUS
Qty: 10 ML | Refills: 0 | Status: SHIPPED | OUTPATIENT
Start: 2023-08-29 | End: 2023-12-27

## 2023-09-11 NOTE — PROGRESS NOTES
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  Now s/p R fem exposure, R Iliac embolectomy, R EIA stent on 1/30/2023 (L  Doctor)  Subsequently developed infection of right groin wound   2/11/2023 washout and vac placement ([place])    2/11/2023 Washout and debridement with wound vac placement      Recommendations:  - Continue use of R groin wound vac as ordered  Wound vac dressing changed today  Plan for dressing changes MWF   - Wound bed 6 1cm x 2 8 cm x 1 2 cm deep  - Continue Plavix and aspirin  - WBC 9 22   - Wound Cx + proteus mirabilis, ESBL   - Continue ertapenem as prescribed by ID  Patient will require 10 day total course through 2/20/23   - CM consulted for wound vac paperwork, wound VAC delivered to patient bedside   - Awaiting disposition regarding home health care to manage wound vac  Subjective:  Patient resting comfortably in bed, denies pain to R groin when at rest, remains tender to palpation  Patient premedicated with ketorolac and wound VAC changed today, small amount serosanguineous drainage noted in VAC canister  Healthy red granulation tissue in wound bed, small amount of white slough noted to inferior aspect of wound  Patient did report pain with VAC dressing change, therefore 1x dose 0 2mg IV hydromorphone administered      Vitals:  /75   Pulse 84   Temp 98 °F (36 7 °C) (Oral)   Resp 16   Wt 103 kg (226 lb 3 1 oz)   LMP 02/03/2023 A (CATHETER 7FR XB3.5 CRV 100CM VISTA BRTP XL LUM RADOPQ GUIDE) catheter was used to cross the aortic valve and placed in the left ventricle.  LV pressure was recorded and measured. SpO2 97%   BMI 42 74 kg/m²     I/Os:  I/O last 3 completed shifts: In: 480 [P O :480]  Out: -   I/O this shift: In: 5 [P O :420]  Out: -       Lab Results and Cultures:   CBC with diff: Lab Results   Component Value Date    WBC 9 22 02/13/2023    HGB 9 9 (L) 02/13/2023    HCT 31 3 (L) 02/13/2023    MCV 79 (L) 02/13/2023     02/13/2023    MCH 24 9 (L) 02/13/2023    MCHC 31 6 02/13/2023    RDW 14 5 02/13/2023    MPV 10 0 02/13/2023    NRBC 0 02/13/2023   ,   BMP/CMP:  Lab Results   Component Value Date    SODIUM 138 02/13/2023    K 4 0 02/13/2023    K 4 0 06/14/2018     02/13/2023     06/14/2018    CO2 26 02/13/2023    CO2 28 06/14/2018    ANIONGAP 13 0 06/14/2018    BUN 14 02/13/2023    BUN 15 06/14/2018    CREATININE 0 56 (L) 02/13/2023    CREATININE 0 65 06/14/2018    CALCIUM 8 9 02/13/2023    CALCIUM 9 9 06/14/2018    AST 11 (L) 02/10/2023    ALT 12 02/10/2023    ALKPHOS 59 02/10/2023    EGFR 121 02/13/2023   ,   Lipid Panel: No results found for: CHOL,   Coags:   Lab Results   Component Value Date    PTT 29 02/10/2023    PTT 32 6 06/14/2018    INR 0 96 02/10/2023    INR 1 08 06/14/2018   ,     Blood Culture:   Lab Results   Component Value Date    BLOODCX No Growth After 4 Days  02/10/2023    BLOODCX No Growth After 4 Days   02/10/2023   ,   Urinalysis: Lab Results   Component Value Date    COLORU Yellow 03/30/2022    CLARITYU Clear 03/30/2022    SPECGRAV >=1 030 (H) 03/30/2022    PHUR 6 0 03/30/2022    PHUR 5 5 02/28/2020    LEUKOCYTESUR Negative 03/30/2022    NITRITE Negative 03/30/2022    GLUCOSEU 3+ (A) 03/30/2022    KETONESU Negative 03/30/2022    BILIRUBINUR Negative 03/30/2022    BLOODU Negative 03/30/2022   ,   Urine Culture:   Lab Results   Component Value Date    URINECX 40,000-49,000 cfu/ml 01/06/2020   ,   Wound Culure:   Lab Results   Component Value Date    WOUNDCULT 3+ Growth of Proteus mirabilis ESBL (A) 02/06/2023    WOUNDCULT 1+ Growth of 02/06/2023 Medications:  Current Facility-Administered Medications   Medication Dose Route Frequency   • acetaminophen (TYLENOL) tablet 650 mg  650 mg Oral Q6H PRN   • ALPRAZolam (XANAX) tablet 0 5 mg  0 5 mg Oral HS PRN   • aspirin (ECOTRIN LOW STRENGTH) EC tablet 81 mg  81 mg Oral Daily   • clopidogrel (PLAVIX) tablet 75 mg  75 mg Oral Daily   • ertapenem (INVanz) 1,000 mg in sodium chloride 0 9 % 50 mL IVPB  1,000 mg Intravenous Q24H   • gabapentin (NEURONTIN) capsule 100 mg  100 mg Oral HS   • heparin (porcine) subcutaneous injection 5,000 Units  5,000 Units Subcutaneous Q8H [address]   • HYDROmorphone HCl (DILAUDID) injection 0 2 mg  0 2 mg Intravenous Once   • insulin glargine (LANTUS) subcutaneous injection 15 Units 0 15 mL  15 Units Subcutaneous HS   • insulin lispro (HumaLOG) 100 units/mL subcutaneous injection 1-5 Units  1-5 Units Subcutaneous TID AC   • ketorolac (TORADOL) injection 30 mg  30 mg Intravenous Q6H PRN   • lisinopril (ZESTRIL) tablet 30 mg  30 mg Oral Daily   • montelukast (SINGULAIR) tablet 10 mg  10 mg Oral HS   • ondansetron (ZOFRAN) injection 4 mg  4 mg Intravenous Q6H PRN   • sertraline (ZOLOFT) tablet 50 mg  50 mg Oral Daily       Imaging:  N/A    Physical Exam:    General: NAD, alert and oriented  CV: rate regular  Respiratory: no distress, effort normal  Abdominal: soft and nontender, nondistended  Extremities: warm and well perfused  Neurologic: no focal deficits  Wound/Incision:    Beefy red granulation tissue to wound base, ~15% white/yellow slough noted to inferior aspect of wound        Pulse exam:  Radial: Right: 2+ Left: 2+  DP: Right: 2+ Left: 2+  PT: Right: 2+ Left: 2+    REJI Calix  2/15/2023

## 2023-09-29 DIAGNOSIS — F32.89 OTHER DEPRESSION: ICD-10-CM

## 2023-09-29 RX ORDER — ALPRAZOLAM 0.5 MG/1
0.5 TABLET ORAL
Qty: 15 TABLET | Refills: 0 | Status: SHIPPED | OUTPATIENT
Start: 2023-09-29

## 2023-10-07 NOTE — ASSESSMENT & PLAN NOTE
58-year-old female PMH of T2 DM, Migraines, aortic coarctation, VSD, HTN, Obesity (BMI 42), bipolar disorder, depression, anxiety presents to the office s/p wound washout and vac placement from 2/11/23 [facility])  Wound vac d/c'd by home nurse on 3/6/23  Wound is 3 7 cm (W) x  1 7cm (L),  1cm (D) with no foul odor or discharge noted, wound bed had pink/red beefy granulation tissues  Pain only with wound dressing changes      Plan  -Continue to monitor wound for infection  Ok to d/c home nursing   - Limit lifting anything more than 10 lbs at this time until R groin wound is fully healed  -Keep area clean and dry  Cover with dry dressing and minimal silk tape  -Call the office if any fever, increased pain, discharge or swelling to the area  -Follow up in the office in two weeks for wound re-check 
No
No
Ambulatory

## 2023-10-13 ENCOUNTER — RA CDI HCC (OUTPATIENT)
Dept: OTHER | Facility: HOSPITAL | Age: 33
End: 2023-10-13

## 2023-10-13 NOTE — PROGRESS NOTES
[address] [facility] coding opportunities       Chart reviewed, no opportunity found: CHART REVIEWED, NO OPPORTUNITY FOUND      [facility] review    Patients Insurance        Commercial Insurance: [employer]

## 2023-11-14 ENCOUNTER — HOSPITAL ENCOUNTER (OUTPATIENT)
Dept: NON INVASIVE DIAGNOSTICS | Facility: HOSPITAL | Age: 33
Discharge: HOME | End: 2023-11-14
Payer: COMMERCIAL

## 2023-11-14 ENCOUNTER — PATIENT MESSAGE (OUTPATIENT)
Dept: VASCULAR SURGERY | Facility: CLINIC | Age: 33
End: 2023-11-14

## 2023-11-14 ENCOUNTER — OFFICE VISIT (OUTPATIENT)
Dept: FAMILY MEDICINE CLINIC | Facility: CLINIC | Age: 33
End: 2023-11-14
Payer: COMMERCIAL

## 2023-11-14 ENCOUNTER — TELEPHONE (OUTPATIENT)
Dept: FAMILY MEDICINE CLINIC | Facility: CLINIC | Age: 33
End: 2023-11-14

## 2023-11-14 ENCOUNTER — TELEPHONE (OUTPATIENT)
Dept: VASCULAR SURGERY | Facility: CLINIC | Age: 33
End: 2023-11-14

## 2023-11-14 VITALS
WEIGHT: 222.6 LBS | TEMPERATURE: 96.5 F | HEART RATE: 97 BPM | SYSTOLIC BLOOD PRESSURE: 118 MMHG | DIASTOLIC BLOOD PRESSURE: 72 MMHG | OXYGEN SATURATION: 100 % | BODY MASS INDEX: 42.03 KG/M2 | HEIGHT: 61 IN

## 2023-11-14 DIAGNOSIS — M79.604 PAIN OF RIGHT LOWER EXTREMITY: ICD-10-CM

## 2023-11-14 DIAGNOSIS — F32.A ANXIETY AND DEPRESSION: Primary | ICD-10-CM

## 2023-11-14 DIAGNOSIS — I74.5 EXTERNAL ILIAC ARTERY THROMBOSIS (HCC): ICD-10-CM

## 2023-11-14 DIAGNOSIS — F41.9 ANXIETY AND DEPRESSION: Primary | ICD-10-CM

## 2023-11-14 DIAGNOSIS — R07.9 CHEST PAIN, UNSPECIFIED TYPE: ICD-10-CM

## 2023-11-14 PROCEDURE — 93971 EXTREMITY STUDY: CPT

## 2023-11-14 PROCEDURE — 93971 EXTREMITY STUDY: CPT | Performed by: SURGERY

## 2023-11-14 PROCEDURE — 93000 ELECTROCARDIOGRAM COMPLETE: CPT | Performed by: NURSE PRACTITIONER

## 2023-11-14 PROCEDURE — 99214 OFFICE O/P EST MOD 30 MIN: CPT | Performed by: NURSE PRACTITIONER

## 2023-11-14 RX ORDER — SERTRALINE HYDROCHLORIDE 25 MG/1
25 TABLET, FILM COATED ORAL DAILY
Qty: 90 TABLET | Refills: 3 | Status: SHIPPED | OUTPATIENT
Start: 2023-11-14 | End: 2024-11-08

## 2023-11-14 RX ORDER — SERTRALINE HYDROCHLORIDE 100 MG/1
100 TABLET, FILM COATED ORAL DAILY
Qty: 90 TABLET | Refills: 3 | Status: SHIPPED | OUTPATIENT
Start: 2023-11-14

## 2023-11-14 NOTE — TELEPHONE ENCOUNTER
Will review LEVD once done. Patient should have OV to evaluate lump and determine if more testing is needed.

## 2023-11-14 NOTE — TELEPHONE ENCOUNTER
----- Message from Marcy Wolf sent at 11/14/2023  9:16 AM EST -----  Regarding: Hi  Contact: [phone]  I have been having pain in my right growing and my mlright thight

## 2023-11-14 NOTE — PROGRESS NOTES
Name: Ashley Bhardwaj      : 1990      MRN: [id number]  Encounter Provider: REJI Golden  Encounter Date: 2023   Encounter department: [address] [place] [address]     1. Anxiety and depression  -     sertraline (ZOLOFT) 25 mg tablet; Take 1 tablet (25 mg total) by mouth daily  -     sertraline (Zoloft) 100 mg tablet; Take 1 tablet (100 mg total) by mouth daily    2. Pain of right lower extremity  -     VAS lower limb venous duplex study, unilateral/limited; Future; Expected date: 2023    3. Chest pain, unspecified type  -     POCT ECG    4. 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HCC)         Subjective      Patient presents with concerns for increased anxiety and depression over the last few months. Often times, feels like she is not a good enough mother. Her and her own mother had an argument and then this effects her as they did not speak for a while. She feels like she is worried through out the day and also feels sad. She is working for [employer] [place] but has not had a client for a few weeks so also has financial stressors right now. She is living with her ex right now but feel like it is a good environment for her and her daughter. She denies Si/Hi. Does find herself getting irritated more easily then normal. On Zoloft 100 mg daily. Will add 25 mg daily to see how symptoms improve. Leg pain- has pain in the right upper thigh ongoing for about 3-4 days. No injury. She does have swelling and tenderness to touch. No erythema, warmth. Patient did have thrombectomy and EIA stent 23. She does have AOIL w/ ABIs to have compelted now as she is due for her 6 month recheck. Her plavix was d/c on May and she is continuing on aspirin. Will also call vascular for follow up. S/s of when to go to there ER reviewed. Chest pain- intermittent 1x a week sometimes only 1-2x a month. Ongoing for >6 months.  She states that is a sharp dull aching sensation in the left lower chest. Sometimes correlated to when she picks up her daughter. No other patterns. Will resolve in its own. Not related to exertion. She did have a stress test 2023 Perfusion scan consistent with mid anterior wall exercise-induced ischemia. She does have a cardiologist who she follows with and is due to see next month but instructed patient to call and get appointment sooner to discuss symptoms. EKG today RBBB, no change from previous. No evidence of prolonged QT             AZEEM-7 Flowsheet Screening    Flowsheet Row Most Recent Value   Over the last 2 weeks, how often have you been bothered by any of the following problems? Feeling nervous, anxious, or on edge 3   Not being able to stop or control worrying 3   Worrying too much about different things 3   Trouble relaxing 3   Being so restless that it is hard to sit still 1   Becoming easily annoyed or irritable 2   Feeling afraid as if something awful might happen 2   AZEEM-7 Total Score 17        PHQ-2/9 Depression Screening    Little interest or pleasure in doing things: 2 - more than half the days  Feeling down, depressed, or hopeless: 2 - more than half the days  Trouble falling or staying asleep, or sleeping too much: 1 - several days  Feeling tired or having little energy: 1 - several days  Poor appetite or overeatin - not at all  Feeling bad about yourself - or that you are a failure or have let yourself or your family down: 1 - several days  Trouble concentrating on things, such as reading the newspaper or watching television: 2 - more than half the days  Moving or speaking so slowly that other people could have noticed.  Or the opposite - being so fidgety or restless that you have been moving around a lot more than usual: 2 - more than half the days  Thoughts that you would be better off dead, or of hurting yourself in some way: 0 - not at all  PHQ-9 Score: 11   PHQ-9 Interpretation: Moderate depression            Review of Systems Constitutional:  Negative for chills and fever. Respiratory:  Negative for cough, chest tightness and shortness of breath. Cardiovascular:  Positive for chest pain. Negative for palpitations. Gastrointestinal:  Negative for abdominal pain and vomiting. Genitourinary:  Negative for dysuria and hematuria. Musculoskeletal:  Positive for myalgias (leg pain). Negative for arthralgias, back pain, neck pain and neck stiffness. Skin:  Negative for color change and rash. Neurological:  Negative for seizures and syncope. Psychiatric/Behavioral:  Positive for agitation and dysphoric mood. Negative for self-injury and suicidal ideas. The patient is nervous/anxious. All other systems reviewed and are negative. Current Outpatient Medications on File Prior to Visit   Medication Sig   • acetaminophen (TYLENOL) 500 mg tablet Take 1,000 mg by mouth   • albuterol (Ventolin HFA) 90 mcg/act inhaler Inhale 2 puffs every 4 (four) hours as needed for wheezing or shortness of breath   • ALPRAZolam (XANAX) 0.5 mg tablet Take 1 tablet (0.5 mg total) by mouth daily at bedtime as needed for anxiety   • Aspirin Low Dose 81 MG EC tablet TAKE 1 TABLET BY MOUTH DAILY. DO NOT START BEFORE FEBRYARY 1, 2023   • Blood Pressure Monitoring (B-D ASSURE BPM/AUTO WRIST CUFF) MISC Check blood pressure prior to each OB visit, or as directed by your physician.    • Continuous Blood Gluc  (FreeStyle [place] 14 Day [employer]) KVNG Use with gautam sensor   • Continuous Blood Gluc Sensor (FreeStyle Gautam 3 Sensor) MISC USE 1 SENSOR EACH TO BE CHANGED EVERY 14 DAYS   • Empagliflozin (JARDIANCE) 10 MG TABS tablet Take 1 tablet (10 mg total) by mouth daily   • fexofenadine (ALLEGRA) 180 MG tablet Take 1 tablet (180 mg total) by mouth daily   • gabapentin (NEURONTIN) 100 mg capsule Take 1 capsule (100 mg total) by mouth daily at bedtime   • insulin glargine (LANTUS) 100 units/mL subcutaneous injection INJECT 17 UNITS UNDER THE SKIN DAILY AT BEDTIME   • insulin lispro (HumaLOG KwikPen) 100 units/mL injection pen Inject 5 Units under the skin 3 (three) times a day with meals   • Insulin Pen Needle (B-D UF III MINI PEN NEEDLES) 31G X 5 MM MISC Inject under the skin daily at bedtime   • lisinopril (ZESTRIL) 30 mg tablet Take 1 tablet (30 mg total) by mouth daily   • metFORMIN (GLUCOPHAGE) 1000 MG tablet take 1 tablet by mouth twice a day with meals   • montelukast (SINGULAIR) 10 mg tablet Take 1 tablet (10 mg total) by mouth daily at bedtime   • Multiple Vitamin (MULTI VITAMIN DAILY PO) Take by mouth   • naproxen (Naprosyn) 500 mg tablet Take 1 tablet (500 mg total) by mouth 2 (two) times a day with meals   • [DISCONTINUED] sertraline (Zoloft) 100 mg tablet Take 1 tablet (100 mg total) by mouth daily   • Blood Glucose Monitoring Suppl (OneTouch Verio) w/Device KIT Use daily (Patient not taking: Reported on 11/14/2023)   • clopidogrel (PLAVIX) 75 mg tablet Take 1 tablet (75 mg total) by mouth daily Do not start before February 1, 2023. (Patient not taking: Reported on 5/23/2023)   • dulaglutide (Trulicity) 9.06 ZV/4.5VN injection Inject 0.5 mL (0.75 mg total) under the skin every 7 days for 4 doses Take 0.75mg weekly X 4 weeks after which will increase to 1.5 mg weekly.  (Patient not taking: Reported on 6/27/2023)   • dulaglutide (Trulicity) 1.5 HX/3.5CO injection Inject 0.5 mL (1.5 mg total) under the skin every 7 days Increase to 1.5 mg after 4 weeks of 0.75 mg weekly injections (Patient not taking: Reported on 7/3/2023)   • erythromycin (ILOTYCIN) ophthalmic ointment Administer 0.5 inches to the right eye daily at bedtime (Patient not taking: Reported on 8/2/2023)       Objective     /72 (BP Location: Left arm, Patient Position: Sitting)   Pulse 97   Temp (!) 96.5 °F (35.8 °C) (Tympanic)   Ht 5' 1" (1.549 m)   Wt 101 kg (222 lb 9.6 oz)   LMP 10/16/2023 (Approximate)   SpO2 100%   BMI 42.06 kg/m²     Physical Exam  Vitals and nursing note reviewed. Constitutional:       General: She is not in acute distress. Appearance: Normal appearance. She is not ill-appearing or toxic-appearing. Cardiovascular:      Rate and Rhythm: Normal rate and regular rhythm. Pulses: Normal pulses. Heart sounds: Normal heart sounds. No murmur heard. No friction rub. No gallop. Pulmonary:      Effort: Pulmonary effort is normal. No respiratory distress. Breath sounds: Normal breath sounds. No stridor. No wheezing or rales. Musculoskeletal:      Cervical back: Normal range of motion. No rigidity. Right lower leg: No edema. Left lower leg: No edema. Legs:    Lymphadenopathy:      Cervical: No cervical adenopathy. Skin:     General: Skin is warm and dry. Coloration: Skin is not jaundiced. Findings: No rash. Neurological:      General: No focal deficit present. Mental Status: She is alert and oriented to person, place, and time. Mental status is at baseline. Motor: No weakness. Gait: Gait normal.   Psychiatric:         Mood and Affect: Mood normal.         Behavior: Behavior normal.         Thought Content:  Thought content normal.         Judgment: Judgment normal.       Sherlie Aschoff, CRNP

## 2023-11-14 NOTE — TELEPHONE ENCOUNTER
[facility] called to let you know patients VAS lower limb venous duplex study is negative.

## 2023-11-14 NOTE — TELEPHONE ENCOUNTER
Reviewed LEVD which was negative for RLE DVT and arterial waveforms looked good.  She should come in for an OV for evaluation and determine if additional testing is needed

## 2023-11-14 NOTE — TELEPHONE ENCOUNTER
Called pt. She has been having intermittent R groin and thigh pain for about 1 week. She states that the pain typically lasts for about 5 minutes. She feels that it is getting slightly worse. She states that her thigh feels slightly swollen and there is a lump there. She denies any erythema or warmth to the area. She has chronic neuropathy in the R foot. She is scheduled for AOIL 12/14. She saw her PCP this morning and they ordered an LEV, which is scheduled for today at 2:00. Informed her I would send a message to our triage provider to see if there are any further recommendations and we will call her back.

## 2023-11-15 DIAGNOSIS — E11.9 TYPE 2 DIABETES MELLITUS WITHOUT COMPLICATION, WITH LONG-TERM CURRENT USE OF INSULIN (HCC): ICD-10-CM

## 2023-11-15 DIAGNOSIS — Z79.4 TYPE 2 DIABETES MELLITUS WITHOUT COMPLICATION, WITH LONG-TERM CURRENT USE OF INSULIN (HCC): ICD-10-CM

## 2023-11-21 ENCOUNTER — APPOINTMENT (EMERGENCY)
Dept: CT IMAGING | Facility: HOSPITAL | Age: 33
End: 2023-11-21
Payer: COMMERCIAL

## 2023-11-21 ENCOUNTER — HOSPITAL ENCOUNTER (EMERGENCY)
Facility: HOSPITAL | Age: 33
Discharge: HOME | End: 2023-11-22
Attending: EMERGENCY MEDICINE
Payer: COMMERCIAL

## 2023-11-21 ENCOUNTER — APPOINTMENT (EMERGENCY)
Dept: RADIOLOGY | Facility: HOSPITAL | Age: 33
End: 2023-11-21
Payer: COMMERCIAL

## 2023-11-21 DIAGNOSIS — R07.89 ATYPICAL CHEST PAIN: Primary | ICD-10-CM

## 2023-11-21 LAB
ALBUMIN SERPL BCP-MCNC: 4.3 G/DL (ref 3.5–5)
ALP SERPL-CCNC: 96 U/L (ref 34–104)
ALT SERPL W P-5'-P-CCNC: 17 U/L (ref 7–52)
ANION GAP SERPL CALCULATED.3IONS-SCNC: 10 MMOL/L
AST SERPL W P-5'-P-CCNC: 14 U/L (ref 13–39)
BASOPHILS # BLD AUTO: 0.02 THOUSANDS/ÂΜL (ref 0–0.1)
BASOPHILS NFR BLD AUTO: 0 % (ref 0–1)
BILIRUB SERPL-MCNC: 0.25 MG/DL (ref 0.2–1)
BUN SERPL-MCNC: 16 MG/DL (ref 5–25)
CALCIUM SERPL-MCNC: 9.1 MG/DL (ref 8.4–10.2)
CARDIAC TROPONIN I PNL SERPL HS: 3 NG/L
CHLORIDE SERPL-SCNC: 98 MMOL/L (ref 96–108)
CO2 SERPL-SCNC: 24 MMOL/L (ref 21–32)
CREAT SERPL-MCNC: 0.7 MG/DL (ref 0.6–1.3)
D DIMER PPP FEU-MCNC: <0.27 UG/ML FEU
EOSINOPHIL # BLD AUTO: 0.16 THOUSAND/ÂΜL (ref 0–0.61)
EOSINOPHIL NFR BLD AUTO: 2 % (ref 0–6)
ERYTHROCYTE [DISTWIDTH] IN BLOOD BY AUTOMATED COUNT: 15 % (ref 11.6–15.1)
GFR SERPL CREATININE-BSD FRML MDRD: 112 ML/MIN/1.73SQ M
GLUCOSE SERPL-MCNC: 270 MG/DL (ref 65–140)
HCT VFR BLD AUTO: 41.3 % (ref 34.8–46.1)
HGB BLD-MCNC: 13 G/DL (ref 11.5–15.4)
IMM GRANULOCYTES # BLD AUTO: 0.03 THOUSAND/UL (ref 0–0.2)
IMM GRANULOCYTES NFR BLD AUTO: 0 % (ref 0–2)
LYMPHOCYTES # BLD AUTO: 2.77 THOUSANDS/ÂΜL (ref 0.6–4.47)
LYMPHOCYTES NFR BLD AUTO: 31 % (ref 14–44)
MCH RBC QN AUTO: 23.9 PG (ref 26.8–34.3)
MCHC RBC AUTO-ENTMCNC: 31.5 G/DL (ref 31.4–37.4)
MCV RBC AUTO: 76 FL (ref 82–98)
MONOCYTES # BLD AUTO: 0.56 THOUSAND/ÂΜL (ref 0.17–1.22)
MONOCYTES NFR BLD AUTO: 6 % (ref 4–12)
NEUTROPHILS # BLD AUTO: 5.44 THOUSANDS/ÂΜL (ref 1.85–7.62)
NEUTS SEG NFR BLD AUTO: 61 % (ref 43–75)
NRBC BLD AUTO-RTO: 0 /100 WBCS
PLATELET # BLD AUTO: 288 THOUSANDS/UL (ref 149–390)
PMV BLD AUTO: 10.7 FL (ref 8.9–12.7)
POTASSIUM SERPL-SCNC: 3.7 MMOL/L (ref 3.5–5.3)
PROT SERPL-MCNC: 7.5 G/DL (ref 6.4–8.4)
RBC # BLD AUTO: 5.45 MILLION/UL (ref 3.81–5.12)
SODIUM SERPL-SCNC: 132 MMOL/L (ref 135–147)
WBC # BLD AUTO: 8.98 THOUSAND/UL (ref 4.31–10.16)

## 2023-11-21 PROCEDURE — 74174 CTA ABD&PLVS W/CONTRAST: CPT

## 2023-11-21 PROCEDURE — 71275 CT ANGIOGRAPHY CHEST: CPT

## 2023-11-21 PROCEDURE — G1004 CDSM NDSC: HCPCS

## 2023-11-21 PROCEDURE — 99285 EMERGENCY DEPT VISIT HI MDM: CPT

## 2023-11-21 PROCEDURE — 84484 ASSAY OF TROPONIN QUANT: CPT | Performed by: EMERGENCY MEDICINE

## 2023-11-21 PROCEDURE — 36415 COLL VENOUS BLD VENIPUNCTURE: CPT | Performed by: EMERGENCY MEDICINE

## 2023-11-21 PROCEDURE — 85025 COMPLETE CBC W/AUTO DIFF WBC: CPT | Performed by: EMERGENCY MEDICINE

## 2023-11-21 PROCEDURE — 93005 ELECTROCARDIOGRAM TRACING: CPT

## 2023-11-21 PROCEDURE — 85379 FIBRIN DEGRADATION QUANT: CPT | Performed by: EMERGENCY MEDICINE

## 2023-11-21 PROCEDURE — 99285 EMERGENCY DEPT VISIT HI MDM: CPT | Performed by: EMERGENCY MEDICINE

## 2023-11-21 PROCEDURE — 71045 X-RAY EXAM CHEST 1 VIEW: CPT

## 2023-11-21 PROCEDURE — 80053 COMPREHEN METABOLIC PANEL: CPT | Performed by: EMERGENCY MEDICINE

## 2023-11-21 PROCEDURE — 96374 THER/PROPH/DIAG INJ IV PUSH: CPT

## 2023-11-21 RX ORDER — MORPHINE SULFATE 4 MG/ML
4 INJECTION, SOLUTION INTRAMUSCULAR; INTRAVENOUS ONCE
Status: COMPLETED | OUTPATIENT
Start: 2023-11-21 | End: 2023-11-21

## 2023-11-21 RX ORDER — SODIUM CHLORIDE 9 MG/ML
3 INJECTION INTRAVENOUS
Status: DISCONTINUED | OUTPATIENT
Start: 2023-11-21 | End: 2023-11-22 | Stop reason: HOSPADM

## 2023-11-21 RX ADMIN — IOHEXOL 100 ML: 350 INJECTION, SOLUTION INTRAVENOUS at 22:25

## 2023-11-21 RX ADMIN — MORPHINE SULFATE 4 MG: 4 INJECTION, SOLUTION INTRAMUSCULAR; INTRAVENOUS at 22:09

## 2023-11-21 NOTE — Clinical Note
Jonathan Beck was seen and treated in our emergency department on 11/21/2023. Diagnosis:     Gladys Trevino  may return to work on return date. She may return on this date: 11/23/2023         If you have any questions or concerns, please don't hesitate to call.       Randell Burch MD    ______________________________           _______________          _______________  Hospital Representative                              Date                                Time

## 2023-11-22 VITALS
SYSTOLIC BLOOD PRESSURE: 130 MMHG | RESPIRATION RATE: 18 BRPM | OXYGEN SATURATION: 95 % | TEMPERATURE: 97.6 F | HEART RATE: 89 BPM | DIASTOLIC BLOOD PRESSURE: 74 MMHG

## 2023-11-22 LAB
2HR DELTA HS TROPONIN: <-1 NG/L
ATRIAL RATE: 79 BPM
ATRIAL RATE: 93 BPM
CARDIAC TROPONIN I PNL SERPL HS: <2 NG/L
P AXIS: 53 DEGREES
P AXIS: 61 DEGREES
PR INTERVAL: 168 MS
PR INTERVAL: 172 MS
QRS AXIS: 37 DEGREES
QRS AXIS: 48 DEGREES
QRSD INTERVAL: 152 MS
QRSD INTERVAL: 156 MS
QT INTERVAL: 392 MS
QT INTERVAL: 404 MS
QTC INTERVAL: 463 MS
QTC INTERVAL: 487 MS
T WAVE AXIS: 34 DEGREES
T WAVE AXIS: 46 DEGREES
VENTRICULAR RATE: 79 BPM
VENTRICULAR RATE: 93 BPM

## 2023-11-22 PROCEDURE — 84484 ASSAY OF TROPONIN QUANT: CPT | Performed by: EMERGENCY MEDICINE

## 2023-11-22 PROCEDURE — 93005 ELECTROCARDIOGRAM TRACING: CPT

## 2023-11-22 PROCEDURE — 93010 ELECTROCARDIOGRAM REPORT: CPT | Performed by: INTERNAL MEDICINE

## 2023-11-22 PROCEDURE — 36415 COLL VENOUS BLD VENIPUNCTURE: CPT | Performed by: EMERGENCY MEDICINE

## 2023-11-22 NOTE — ED PROVIDER NOTES
History  No chief complaint on file. 26YEAR-OLD FEMALE      PMH:  Hep C  Migraine  Cervicalgia  Aortic Coarctation  HLD  Endometriosis  Morbid Obesity  Bipolar D/o  HTN   IDDM   1/2 PACK-A-DAY SMOKER  FAMILY HISTORY:  FATHER HAD MI - terminal at 48. Chief complaint:   Left Chest pain       HPI  Left chest pain started at around 2 hours ago  [place] 11/10, NOW IT IS 8/10  DESCRIBED AS SHARP  PATIENT HAS NO SHORTNESS OF BREATH. NO COMPLAINTS OF DIAPHORESIS      INTERVENTIONS: NONE    PATIENT DENIES ANY COUGH, NO FEVERS OR CHILLS. NO URI SYMPTOMS      VTE  RISK FACTORS:  NONE  NO HISTORY OF PE OR DVT  NO LONG CAR RIDES OR PLANE RIDES. NO IMMOBILIZATION. NO RECENT SURGERY OR TRAUMA. NO HEMOPTYSIS. OTHER ASSOCIATED SYMPTOMS:  NO ABDOMINAL PAIN. NO NAUSEA OR VOMITING. NO STOOL CHANGES. NO OTHER COMPLAINTS       History provided by:  Patient  Chest Pain  Pain location:  L chest  Pain quality: sharp    Pain radiates to:  L arm  Pain radiates to the back: yes    Pain severity:  Severe  Onset quality:  Sudden  Duration:  2 hours  Chronicity:  New  Relieved by:  Nothing  Worsened by:  Nothing tried  Ineffective treatments:  None tried  Associated symptoms: no abdominal pain, no back pain, no cough, no diaphoresis, no dizziness, no dysphagia, no fatigue, no fever, no headache, no lower extremity edema, no nausea, no palpitations, no shortness of breath, no syncope and not vomiting        Prior to Admission Medications   Prescriptions Last Dose Informant Patient Reported? Taking? ALPRAZolam (XANAX) 0.5 mg tablet   No No   Sig: Take 1 tablet (0.5 mg total) by mouth daily at bedtime as needed for anxiety   Aspirin Low Dose 81 MG EC tablet   No No   Sig: TAKE 1 TABLET BY MOUTH DAILY.  DO NOT START BEFORE FEBRYARY 1, 2023   Blood Glucose Monitoring Suppl (OneTouch Verio) w/Device KIT  Self No No   Sig: Use daily   Patient not taking: Reported on 11/14/2023   Blood Pressure Monitoring (B-D ASSURE BPM/AUTO WRIST CUFF) MISC  Self No No   Sig: Check blood pressure prior to each OB visit, or as directed by your physician. Continuous Blood Gluc  (FreeStyle Gautam 14 Day [place]) KVNG   No No   Sig: Use with gautam sensor   Continuous Blood Gluc Sensor (FreeStyle Gautam 3 Sensor) MISC   No No   Sig: USE 1 SENSOR EACH TO BE CHANGED EVERY 14 DAYS   Empagliflozin (JARDIANCE) 10 MG TABS tablet  Self No No   Sig: Take 1 tablet (10 mg total) by mouth daily   Insulin Pen Needle (B-D UF III MINI PEN NEEDLES) 31G X 5 MM MISC   No No   Sig: Inject under the skin daily at bedtime   Multiple Vitamin (MULTI VITAMIN DAILY PO)  Self Yes No   Sig: Take by mouth   acetaminophen (TYLENOL) 500 mg tablet  Self Yes No   Sig: Take 1,000 mg by mouth   albuterol (Ventolin HFA) 90 mcg/act inhaler   No No   Sig: Inhale 2 puffs every 4 (four) hours as needed for wheezing or shortness of breath   clopidogrel (PLAVIX) 75 mg tablet  Self No No   Sig: Take 1 tablet (75 mg total) by mouth daily Do not start before February 1, 2023. Patient not taking: Reported on 5/23/2023   dulaglutide (Trulicity) 8.93 NJ/9.9FQ injection   No No   Sig: Inject 0.5 mL (0.75 mg total) under the skin every 7 days for 4 doses Take 0.75mg weekly X 4 weeks after which will increase to 1.5 mg weekly.    Patient not taking: Reported on 6/27/2023   dulaglutide (Trulicity) 1.5 XO/0.8WT injection   No No   Sig: Inject 0.5 mL (1.5 mg total) under the skin every 7 days Increase to 1.5 mg after 4 weeks of 0.75 mg weekly injections   Patient not taking: Reported on 7/3/2023   erythromycin (ILOTYCIN) ophthalmic ointment   No No   Sig: Administer 0.5 inches to the right eye daily at bedtime   Patient not taking: Reported on 8/2/2023   fexofenadine (ALLEGRA) 180 MG tablet  Self No No   Sig: Take 1 tablet (180 mg total) by mouth daily   gabapentin (NEURONTIN) 100 mg capsule   No No   Sig: Take 1 capsule (100 mg total) by mouth daily at bedtime   insulin glargine (LANTUS) 100 units/mL subcutaneous injection   No No   Sig: INJECT 17 UNITS UNDER THE SKIN DAILY AT BEDTIME   insulin lispro (HumaLOG KwikPen) 100 units/mL injection pen   No No   Sig: Inject 5 Units under the skin 3 (three) times a day with meals   lisinopril (ZESTRIL) 30 mg tablet   No No   Sig: Take 1 tablet (30 mg total) by mouth daily   metFORMIN (GLUCOPHAGE) 1000 MG tablet   No No   Sig: take 1 tablet by mouth twice a day with meals   montelukast (SINGULAIR) 10 mg tablet   No No   Sig: Take 1 tablet (10 mg total) by mouth daily at bedtime   naproxen (Naprosyn) 500 mg tablet   No No   Sig: Take 1 tablet (500 mg total) by mouth 2 (two) times a day with meals   sertraline (ZOLOFT) 25 mg tablet   No No   Sig: Take 1 tablet (25 mg total) by mouth daily   sertraline (Zoloft) 100 mg tablet   No No   Sig: Take 1 tablet (100 mg total) by mouth daily      Facility-Administered Medications: None       Past Medical History:   Diagnosis Date    Allergic     Arthritis     Bipolar affective disorder, currently depressed, moderate (HCC) 12/17/2018    Cyst of ovary, right     Diabetes mellitus ([address]) 10/10/2018    type 2    Endometriosis     Heart murmur 1988    Hepatitis C     Hepatitis C virus infection cured after antiviral drug therapy     History of transfusion     Hypertension     Migraines     Morbid obesity with BMI of 40.0-44.9, adult (HCC)     Obesity 1995    Pulmonary artery congenital abnormality     Spleen enlarged     Status post surgical removal of both fallopian tubes     Varicella        Past Surgical History:   Procedure Laterality Date    CARDIAC CATHETERIZATION      no CAD 10days, 4 weeks 22months old     CARDIAC CATHETERIZATION N/A 1/12/2023    Procedure: Cardiac Coronary Angiogram;  Surgeon: Nicole Humphreys DO;  Location: [facility];   Service: Cardiology    CARDIAC CATHETERIZATION Left 1/12/2023    Procedure: Cardiac Left Heart Cath;  Surgeon: Nicole Humphreys DO;  Location: [facility]; Service: Cardiology    CARDIAC CATHETERIZATION  2023    Procedure: Cardiac catheterization;  Surgeon: Markos Dill DO;  Location: [facility];   Service: Cardiology     SECTION, LOW TRANSVERSE      CHOLECYSTECTOMY      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 5    CORONARY STENT PLACEMENT      IR LOWER EXTREMITY ANGIOGRAM  2023    LIVER BIOPSY      LIVER BIOPSY      STERNAL WIRE REMOVAL  2022    THROMBECTOMY W/ EMBOLECTOMY Right 2023    Procedure: Right femoral exposure Right iliac embolectomy w/ #4 Ted catheter Aortogram Right EIA stent w/ 7x29mm VBX;  Surgeon: Joby Hodges MD;  Location: [facility];  Service: Vascular    TUBAL LIGATION Bilateral 2020    VSD REPAIR      As a child    WOUND DEBRIDEMENT Right 2023    Procedure: Right Groin Wound Washout, Pulse Lavage, Wound Vac placement;  Surgeon: Celestina Holt DO;  Location: [facility];  Service: Vascular       Family History   Problem Relation Age of Onset    Hypertension Mother     Migraines Mother     JENNY disease Mother     Depression Mother     Hyperlipidemia Mother     Diabetes Mother     Diabetes Father     Hypertension Father     Kidney failure Father     Heart attack Father     Arthritis Father     Stroke Father     Polycystic kidney disease Paternal Grandmother     Stroke Paternal Grandmother     Heart disease Paternal Grandmother     Arthritis Sister     Asthma Sister     Thyroid disease Sister     Diabetes Sister     Arthritis Maternal Grandmother     Breast cancer Maternal Grandmother     Diabetes Maternal Grandmother     Hypertension Maternal Grandmother     Heart Valve Disease Maternal Grandmother     Learning disabilities Cousin     Learning disabilities Sister     ADD / ADHD Cousin     Lung cancer Brother     Diabetes Maternal Grandfather     Hypertension Maternal Grandfather     JENNY disease Maternal Grandfather     Stroke Maternal Grandfather      I have reviewed and agree with the history as documented. E-Cigarette/Vaping    E-Cigarette Use Never User      E-Cigarette/Vaping Substances    Nicotine No     THC No     CBD No     Flavoring No      Social History     Tobacco Use    Smoking status: Every Day     Packs/day: 0.50     Years: 0.00     Total pack years: 0.00     Types: Cigarettes     Last attempt to quit: 2023     Years since quittin.8    Smokeless tobacco: Never   Vaping Use    Vaping Use: Never used   Substance Use Topics    Alcohol use: Not Currently     Alcohol/week: 3.0 standard drinks of alcohol     Types: 3 Standard drinks or equivalent per week     Comment: socially    Drug use: Not Currently     Comment: hx of THC use for migraines       Review of Systems   Constitutional:  Negative for chills, diaphoresis, fatigue and fever. HENT:  Negative for rhinorrhea, sinus pressure, sinus pain, sneezing, sore throat, trouble swallowing and voice change. Respiratory:  Negative for cough, shortness of breath, wheezing and stridor. Cardiovascular:  Positive for chest pain. Negative for palpitations, leg swelling and syncope. Gastrointestinal:  Negative for abdominal pain, blood in stool, diarrhea, nausea and vomiting. Genitourinary:  Negative for difficulty urinating, dysuria, flank pain and frequency. Musculoskeletal:  Negative for arthralgias, back pain, gait problem, joint swelling, myalgias, neck pain and neck stiffness. Skin:  Negative for rash and wound. Neurological:  Negative for dizziness, light-headedness and headaches. All other systems reviewed and are negative. Physical Exam  Physical Exam  Constitutional:       General: She is not in acute distress. Appearance: Normal appearance. She is well-developed. She is not ill-appearing, toxic-appearing or diaphoretic. HENT:      Head: Normocephalic and atraumatic. Right Ear: External ear normal.      Left Ear: External ear normal.      Nose: Nose normal. No congestion or rhinorrhea. Mouth/Throat:      Pharynx: No oropharyngeal exudate or posterior oropharyngeal erythema. Eyes:      General: No scleral icterus. Right eye: No discharge. Left eye: No discharge. Extraocular Movements: Extraocular movements intact. Conjunctiva/sclera: Conjunctivae normal.      Pupils: Pupils are equal, round, and reactive to light. Neck:      Vascular: No JVD. Trachea: No tracheal deviation. Cardiovascular:      Rate and Rhythm: Normal rate and regular rhythm. Pulses: Normal pulses. Carotid pulses are 2+ on the right side and 2+ on the left side. Heart sounds: Normal heart sounds. No murmur heard. No friction rub. No gallop. Pulmonary:      Effort: Pulmonary effort is normal. No accessory muscle usage or respiratory distress. Breath sounds: Normal breath sounds. No stridor. No wheezing, rhonchi or rales. Chest:      Chest wall: No tenderness. Abdominal:      General: Bowel sounds are normal. There is no distension. Palpations: Abdomen is soft. There is no mass. Tenderness: There is no abdominal tenderness. There is no right CVA tenderness, left CVA tenderness, guarding or rebound. Hernia: No hernia is present. Musculoskeletal:         General: No swelling, tenderness, deformity or signs of injury. Normal range of motion. Cervical back: Normal range of motion and neck supple. No rigidity or tenderness. Right lower leg: No tenderness. No edema. Left lower leg: No tenderness. No edema. Lymphadenopathy:      Cervical: No cervical adenopathy. Skin:     General: Skin is warm. Capillary Refill: Capillary refill takes less than 2 seconds. Coloration: Skin is not cyanotic, jaundiced or pale. Findings: No bruising, ecchymosis, erythema, lesion or rash. Neurological:      General: No focal deficit present. Mental Status: She is alert and oriented to person, place, and time.  Mental status is at baseline. Cranial Nerves: No cranial nerve deficit. Sensory: No sensory deficit. Motor: No weakness or abnormal muscle tone. Coordination: Coordination normal.   Psychiatric:         Mood and Affect: Mood is anxious. Behavior: Behavior normal.         Thought Content: Thought content normal.         Judgment: Judgment normal.         Vital Signs  ED Triage Vitals   Temp Pulse Resp BP SpO2   -- -- -- -- --      Temp src Heart Rate Source Patient Position - Orthostatic VS BP Location FiO2 (%)   -- -- -- -- --      Pain Score       --           There were no vitals filed for this visit.       Visual Acuity      ED Medications  Medications - No data to display    Diagnostic Studies  Results Reviewed       None                   No orders to display              Procedures  ECG 12 Lead Documentation Only    Date/Time: 11/21/2023 10:00 PM    Performed by: Perico Faustin MD  Authorized by: Perico Faustin MD    Indications / Diagnosis:  CP  ECG reviewed by me, the ED Provider: yes    Patient location:  ED  Previous ECG:     Comparison to cardiac monitor: Yes    Interpretation:     Interpretation: normal    Rate:     ECG rate:  93    ECG rate assessment: normal    Rhythm:     Rhythm: sinus rhythm    Ectopy:     Ectopy: none    QRS:     QRS axis:  Normal  Conduction:     Conduction: abnormal      Abnormal conduction: complete RBBB    ST segments:     ST segments:  Non-specific  T waves:     T waves: non-specific    ECG 12 Lead Documentation Only    Date/Time: 11/22/2023 12:36 AM    Performed by: Perico Faustin MD  Authorized by: Perico Faustin MD    Indications / Diagnosis:  Cp  Patient location:  ED  Previous ECG:     Comparison to cardiac monitor: Yes    Interpretation:     Interpretation: non-specific    Rate:     ECG rate:  79  Rhythm:     Rhythm: sinus rhythm    Ectopy:     Ectopy: none    Conduction:     Conduction: abnormal      Abnormal conduction: complete RBBB    ST segments: ST segments:  Non-specific  T waves:     T waves: non-specific             ED Course  ED Course as of 11/22/23 0558   Tue Nov 21, 2023   2158 Patient seen and evaluated    She is here for her sudden chest pain   2213 CBC and differential(!)   2251 hs TnI 0hr: 3   2251 Comprehensive metabolic panel(!)   Wed Nov 22, 2023   0005 CTA - CHEST, ABDOMEN AND PELVIS - WITHOUT AND WITH IV CONTRAST     AORTA:  There is no aortic dissection or intramural hematoma. There is no aortic aneurysm. Vascular stent within the proximal descending thoracic aorta noted. No significant atherosclerotic disease. Specifically, no flow limiting atherosclerotic stenosis of aorta or major aortic branch vessel in the chest, abdomen or pelvis. LUNGS: Central airways are patent. There is no tracheal or endobronchial lesion. No lobar airspace consolidation or pulmonary parenchymal mass. Minimal dependent atelectasis in the left lung base region. PLEURA:  Unremarkable without pneumothorax or pleural effusions. HEART/PULMONARY ARTERIAL TREE: Heart is normal in size. No pericardial effusion. No acute pulmonary embolism. MEDIASTINUM AND LUIS: Unremarkable without mass/hematoma, fluid collection, or lymphadenopathy. Visualized thyroid glands appear grossly unremarkable. Esophagus is normal in course and caliber. No significant prevertebral soft tissue swelling or mass. CHEST WALL AND LOWER NECK: A few old healed left-sided rib fracture deformities with callus formation. Caffie Hari LIVER/BILIARY TREE:  Unremarkable. ADRENAL GLANDS: 4.0 x 3.7 cm left adrenal myolipoma again noted. Right adrenal gland is grossly unremarkable. STOMACH AND BOWEL: Stomach is moderately distended with air and heterogeneous density debris. No gross intraluminal mass or irregular wall thickening. The small and large bowel loops appear normal in course and caliber without obstruction or   inflammation.  Terminal ileum and appendix appear grossly unremarkable. APPENDIX:  A normal appendix was visualized. ABDOMINOPELVIC CAVITY:  No ascites or free intraperitoneal air. No lymphadenopathy. IMPRESSION:  No aortic aneurysm or dissection. No acute pulmonary embolism. No significant acute intrathoracic or intra-abdominal/pelvic abnormalities with ancillary findings detailed above. 0009 Pt is feeling much much better  Back to baseline  She understands work up results    Plan to await delta trop and if normal will dc home    0156 Delta 2hr hsTnI: <-1   0159 Pt understands work up results  No concerning findings    Pt feels much much better    She has no signs of life or limb threatening process    I offered further tx, I offered admission, if she felt ill, or her pain was too great, but she declined  She is ready to manage from home  She is very appreciative of her ED care and is ready to manage from home  She understands return precautions    She will f/u w/ PCP tomorrow, as well as Cardiology                                                Medical Decision Making  Patient with history as above presented with Patient presents with:  Chest Pain: Started with sharp chest pain in the left upper area into jaw and left arm    History obtained from patient    Patient was nontoxic, stable. Ambulatory. Exam as above. EKG reviewed. Labs reviewed. Independently reviewed imaging. Differential diagnosis considered. Overall presentation is consistent with atypical CP, possible anxiety, possible GERD. Low suspicion for ACS, PE, TAD, sepsis, surgical process in the abdomen or thorax . Patient was treated with Morphine with resolution of symptoms. No Consideration was given for admission, as the patient was stable for outpatient management.     Disposition:   Discussed need to follow up with Cardiology and PCP  Discharged with instructions to obtain outpatient follow up of patient's symptoms and findings, with strict return precautions if patient develops new or worsening symptoms. This medical documentation was created using an electronic medical record system with [facility] Modal voice recognition. Although this document has been carefully reviewed, there may still be some phonetic and typographical errors. These errors are purely typographical and due to imperfections of the software program, do not reflect any compromise in the patient's medical care. Amount and/or Complexity of Data Reviewed  Labs: ordered. Decision-making details documented in ED Course. Radiology: ordered and independent interpretation performed. ECG/medicine tests: ordered and independent interpretation performed. Decision-making details documented in ED Course. Risk  Prescription drug management. Disposition  Final diagnoses:   None     ED Disposition       None          Follow-up Information    None         Patient's Medications   Discharge Prescriptions    No medications on file       No discharge procedures on file.     PDMP Review         Value Time User    PDMP Reviewed  Yes 9/29/2023 11:33 AM REJI Partida            ED Provider  Electronically Signed by             Nina Castillo MD  11/22/23 0600 loops appear normal in course and caliber without obstruction or   inflammation. Terminal ileum and appendix appear grossly unremarkable. APPENDIX:  A normal appendix was visualized. ABDOMINOPELVIC CAVITY:  No ascites or free intraperitoneal air. No lymphadenopathy. IMPRESSION:  No aortic aneurysm or dissection. No acute pulmonary embolism. No significant acute intrathoracic or intra-abdominal/pelvic abnormalities with ancillary findings detailed above. 0009 Pt is feeling much much better  Back to baseline  She understands work up results    Plan to await delta trop and if normal will dc home    0156 Delta 2hr hsTnI: <-1   0159 Pt understands work up results  No concerning findings    Pt feels much much better    She has no signs of life or limb threatening process    I offered further tx, I offered admission, if she felt ill, or her pain was too great, but she declined  She is ready to manage from home  She is very appreciative of her ED care and is ready to manage from home  She understands return precautions    She will f/u w/ PCP tomorrow, as well as Cardiology                HEART Risk Score      Flowsheet Row Most Recent Value   Heart Score Risk Calculator    History 0 Filed at: 11/22/2023 0157   ECG 1 Filed at: 11/22/2023 0157   Age 0 Filed at: 11/22/2023 0157   Risk Factors 1 Filed at: 11/22/2023 0157   Troponin 0 Filed at: 11/22/2023 0157   HEART Score 2 Filed at: 11/22/2023 0157                          SBIRT 20yo+      Flowsheet Row Most Recent Value   Initial Alcohol Screen: US AUDIT-C     1. How often do you have a drink containing alcohol? 0 Filed at: 11/22/2023 0126   2. How many drinks containing alcohol do you have on a typical day you are drinking? 0 Filed at: 11/22/2023 0126   3a. Male UNDER 65: How often do you have five or more drinks on one occasion? 0 Filed at: 11/22/2023 0126   3b. FEMALE Any Age, or MALE 65+: How often do you have 4 or more drinks on one occassion?  0 Filed at: 11/22/2023 0126   Audit-C Score 0 Filed at: 11/22/2023 0126   JENNIFER: How many times in the past year have you. .. Used an illegal drug or used a prescription medication for non-medical reasons? Never Filed at: 11/22/2023 0126                      Medical Decision Making  Patient with history as above presented with Patient presents with:  Chest Pain: Started with sharp chest pain in the left upper area into jaw and left arm    History obtained from patient    Patient was nontoxic, stable. Ambulatory. Exam as above. EKG reviewed. Labs reviewed. Independently reviewed imaging. Differential diagnosis considered. Overall presentation is consistent with atypical CP, possible anxiety, possible GERD. Low suspicion for ACS, PE, TAD, sepsis, surgical process in the abdomen or thorax . Patient was treated with Morphine with resolution of symptoms. No Consideration was given for admission, as the patient was stable for outpatient management. Disposition:   Discussed need to follow up with Cardiology and PCP  Discharged with instructions to obtain outpatient follow up of patient's symptoms and findings, with strict return precautions if patient develops new or worsening symptoms. This medical documentation was created using an electronic medical record system with [employer] Modal voice recognition. Although this document has been carefully reviewed, there may still be some phonetic and typographical errors. These errors are purely typographical and due to imperfections of the software program, do not reflect any compromise in the patient's medical care. Amount and/or Complexity of Data Reviewed  Labs: ordered. Decision-making details documented in ED Course. Radiology: ordered and independent interpretation performed. ECG/medicine tests: ordered and independent interpretation performed. Decision-making details documented in ED Course. Risk  Prescription drug management. Disposition  Final diagnoses:   Atypical chest pain     Time reflects when diagnosis was documented in both MDM as applicable and the Disposition within this note       Time User Action Codes Description Comment    11/22/2023  1:59 AM Oli Face Add [R07.89] Atypical chest pain           ED Disposition       ED Disposition   Discharge    Condition   Stable    Date/Time   Wed Nov 22, 2023 [facility] discharge to home/self care. Follow-up Information       Follow up With Specialties Details Why Contact Info    Sukhdev Lobo, [address] [facility], Nurse Practitioner Call today  [address] [facility]  [phone]              Discharge Medication List as of 11/22/2023  2:00 AM        CONTINUE these medications which have NOT CHANGED    Details   acetaminophen (TYLENOL) 500 mg tablet Take 1,000 mg by mouth, Starting Wed 5/6/2020, Historical Med      albuterol (Ventolin HFA) 90 mcg/act inhaler Inhale 2 puffs every 4 (four) hours as needed for wheezing or shortness of breath, Starting Tue 5/23/2023, Normal      ALPRAZolam (XANAX) 0.5 mg tablet Take 1 tablet (0.5 mg total) by mouth daily at bedtime as needed for anxiety, Starting Fri 9/29/2023, Normal      Aspirin Low Dose 81 MG EC tablet TAKE 1 TABLET BY MOUTH DAILY.  DO NOT START BEFORE FEBRYARY 1, 2023, Normal      Blood Glucose Monitoring Suppl (OneTouch Verio) w/Device KIT Use daily, Starting Tue 11/23/2021, Normal      Blood Pressure Monitoring (B-D ASSURE BPM/AUTO WRIST CUFF) MISC Check blood pressure prior to each OB visit, or as directed by your physician., Normal      clopidogrel (PLAVIX) 75 mg tablet Take 1 tablet (75 mg total) by mouth daily Do not start before February 1, 2023., Starting Wed 2/1/2023, Normal      Continuous Blood Gluc  (FreeStyle [place] 14 Day [place]) Margie Adorno Use with gautam sensor, Normal      Continuous Blood Gluc Sensor (FreeStyle Gautam 3 Sensor) MISC USE 1 SENSOR EACH TO BE CHANGED EVERY 14 DAYS, Normal      dulaglutide (Trulicity) 0.71 XL/6.0AI injection Inject 0.5 mL (0.75 mg total) under the skin every 7 days for 4 doses Take 0.75mg weekly X 4 weeks after which will increase to 1.5 mg weekly. , Starting Wed 5/17/2023, Until Thu 6/8/2023, Normal      dulaglutide (Trulicity) 1.5 RENAE/0.7UQ injection Inject 0.5 mL (1.5 mg total) under the skin every 7 days Increase to 1.5 mg after 4 weeks of 0.75 mg weekly injections, Starting Wed 5/17/2023, Normal      Empagliflozin (JARDIANCE) 10 MG TABS tablet Take 1 tablet (10 mg total) by mouth daily, Starting Tue 4/18/2023, Until Tue 11/14/2023, Normal      erythromycin (ILOTYCIN) ophthalmic ointment Administer 0.5 inches to the right eye daily at bedtime, Starting Thu 6/29/2023, Normal      fexofenadine (ALLEGRA) 180 MG tablet Take 1 tablet (180 mg total) by mouth daily, Starting Fri 8/20/2021, Normal      gabapentin (NEURONTIN) 100 mg capsule Take 1 capsule (100 mg total) by mouth daily at bedtime, Starting Wed 5/17/2023, Normal      insulin glargine (LANTUS) 100 units/mL subcutaneous injection INJECT 17 UNITS UNDER THE SKIN DAILY AT BEDTIME, Starting Tue 8/29/2023, Until Wed 12/27/2023, Normal      insulin lispro (HumaLOG KwikPen) 100 units/mL injection pen Inject 5 Units under the skin 3 (three) times a day with meals, Starting Thu 5/11/2023, Normal      Insulin Pen Needle (B-D UF III MINI PEN NEEDLES) 31G X 5 MM MISC Inject under the skin daily at bedtime, Starting Wed 5/17/2023, Normal      lisinopril (ZESTRIL) 30 mg tablet Take 1 tablet (30 mg total) by mouth daily, Starting Tue 5/16/2023, Normal      metFORMIN (GLUCOPHAGE) 1000 MG tablet take 1 tablet by mouth twice a day with meals, Starting Wed 11/15/2023, Normal      montelukast (SINGULAIR) 10 mg tablet Take 1 tablet (10 mg total) by mouth daily at bedtime, Starting Tue 5/16/2023, Normal      Multiple Vitamin (MULTI VITAMIN DAILY PO) Take by mouth, Historical Med      naproxen (Naprosyn) 500 mg tablet Take 1 tablet (500 mg total) by mouth 2 (two) times a day with meals, Starting Thu 6/29/2023, Normal      !! sertraline (Zoloft) 100 mg tablet Take 1 tablet (100 mg total) by mouth daily, Starting Tue 11/14/2023, Normal      !! sertraline (ZOLOFT) 25 mg tablet Take 1 tablet (25 mg total) by mouth daily, Starting Tue 11/14/2023, Until Fri 11/8/2024, Normal       !! - Potential duplicate medications found. Please discuss with provider. No discharge procedures on file.     PDMP Review         Value Time User    PDMP Reviewed  Yes 9/29/2023 11:33 AM REJI Lynne            ED Provider  Electronically Signed by             Melita Oropeza MD  11/22/23 0600       Melita Oropeza MD  12/03/23 7026

## 2023-12-15 ENCOUNTER — OFFICE VISIT (OUTPATIENT)
Dept: FAMILY MEDICINE CLINIC | Facility: CLINIC | Age: 33
End: 2023-12-15
Payer: COMMERCIAL

## 2023-12-15 VITALS
TEMPERATURE: 98.2 F | DIASTOLIC BLOOD PRESSURE: 74 MMHG | HEART RATE: 83 BPM | OXYGEN SATURATION: 98 % | SYSTOLIC BLOOD PRESSURE: 126 MMHG | BODY MASS INDEX: 41.16 KG/M2 | WEIGHT: 218 LBS | HEIGHT: 61 IN

## 2023-12-15 DIAGNOSIS — E11.65 TYPE 2 DIABETES MELLITUS WITH HYPERGLYCEMIA, WITH LONG-TERM CURRENT USE OF INSULIN (HCC): Primary | ICD-10-CM

## 2023-12-15 DIAGNOSIS — Z23 ENCOUNTER FOR IMMUNIZATION: ICD-10-CM

## 2023-12-15 DIAGNOSIS — Z79.4 TYPE 2 DIABETES MELLITUS WITH HYPERGLYCEMIA, WITH LONG-TERM CURRENT USE OF INSULIN (HCC): Primary | ICD-10-CM

## 2023-12-15 DIAGNOSIS — E11.8 DIABETIC FOOT (HCC): ICD-10-CM

## 2023-12-15 DIAGNOSIS — F41.9 ANXIETY: ICD-10-CM

## 2023-12-15 DIAGNOSIS — F32.A DEPRESSION, UNSPECIFIED DEPRESSION TYPE: ICD-10-CM

## 2023-12-15 DIAGNOSIS — M25.50 ARTHRALGIA, UNSPECIFIED JOINT: ICD-10-CM

## 2023-12-15 LAB — SL AMB POCT HEMOGLOBIN AIC: 10.3 (ref ?–6.5)

## 2023-12-15 PROCEDURE — 99214 OFFICE O/P EST MOD 30 MIN: CPT | Performed by: NURSE PRACTITIONER

## 2023-12-15 PROCEDURE — 83036 HEMOGLOBIN GLYCOSYLATED A1C: CPT | Performed by: NURSE PRACTITIONER

## 2023-12-15 PROCEDURE — 90632 HEPA VACCINE ADULT IM: CPT | Performed by: NURSE PRACTITIONER

## 2023-12-15 PROCEDURE — 90471 IMMUNIZATION ADMIN: CPT | Performed by: NURSE PRACTITIONER

## 2023-12-15 PROCEDURE — 90686 IIV4 VACC NO PRSV 0.5 ML IM: CPT | Performed by: NURSE PRACTITIONER

## 2023-12-15 PROCEDURE — 90472 IMMUNIZATION ADMIN EACH ADD: CPT | Performed by: NURSE PRACTITIONER

## 2023-12-15 RX ORDER — NAPROXEN 500 MG/1
500 TABLET ORAL 2 TIMES DAILY WITH MEALS
Qty: 60 TABLET | Refills: 0 | Status: SHIPPED | OUTPATIENT
Start: 2023-12-15

## 2023-12-15 RX ORDER — DULAGLUTIDE 0.75 MG/.5ML
0.75 INJECTION, SOLUTION SUBCUTANEOUS
Qty: 2 ML | Refills: 0 | Status: SHIPPED | OUTPATIENT
Start: 2023-12-15 | End: 2024-01-06

## 2023-12-15 RX ORDER — CITALOPRAM HYDROBROMIDE 10 MG/1
10 TABLET ORAL DAILY
Qty: 90 TABLET | Refills: 0 | Status: SHIPPED | OUTPATIENT
Start: 2023-12-15

## 2023-12-15 NOTE — PROGRESS NOTES
Name: Marcy Wolf      : 1990      MRN: [id number]  Encounter Provider: REJI Caal  Encounter Date: 12/15/2023   Encounter department: [address] [place] [address]     1. Type 2 diabetes mellitus with hyperglycemia, with long-term current use of insulin (HCC)  -     POCT hemoglobin A1c  -     Albumin / creatinine urine ratio; Future; Expected date: 03/15/2024  -     Comprehensive metabolic panel; Future; Expected date: 03/15/2024  -     Hemoglobin A1C; Future; Expected date: 03/15/2024  -     Ambulatory Referral to Endocrinology; Future  -     dulaglutide (Trulicity) 7.51 TE/0.7IO injection; Inject 0.5 mL (0.75 mg total) under the skin every 7 days for 4 doses Take 0.75mg weekly X 4 weeks after which will increase to 1.5 mg weekly. 2. Encounter for immunization  -     influenza vaccine, quadrivalent, 0.5 mL, preservative-free, for adult and pediatric patients 6 mos+ (AFLURIA, FLUARIX, FLULAVAL, FLUZONE)  -     HEPATITIS A VACCINE ADULT IM    3. Arthralgia, unspecified joint  -     naproxen (Naprosyn) 500 mg tablet; Take 1 tablet (500 mg total) by mouth 2 (two) times a day with meals    4. Anxiety  -     citalopram (CeleXA) 10 mg tablet; Take 1 tablet (10 mg total) by mouth daily    5. Depression, unspecified depression type  -     citalopram (CeleXA) 10 mg tablet; Take 1 tablet (10 mg total) by mouth daily    6. Diabetic foot ([address])           Subjective      Patient presents for follow-up on anxiety. She felt like when she was taking the zoloft 125 mg was too much and felt like too much. Sometimes catches herself lashing out on loved ones. Cut back down to 100 mg 2 days ago. Has not noticed any worsening symptoms. She does not feel like the Zoloft iseffective for her. Still has consistent anxiety most days. Not currently working still has a lot of financial stressors at this time. Also does feel down and tearful sometimes.   Denies any thoughts of wanting to hurt herself or anybody else. Will start to titrate off of Zoloft as instructed signs and symptoms of withdrawal reviewed with patient and she verbalized understanding. Will then start Celexa daily as instructed. Has not been following with her endocrinologist due to location. She has not been on her Trulicity. Her A1c is uncontrolled at 10.3. She is compliant with taking her insulin Lantus at bedtime mealtime insulin metformin. Patient has no history of medullary thyroid cancer in her family or multiple endocrine neoplasia. No history of pancreatitis. Will start Trulicity again with possible side effects reviewed. Recommend following up with local  endocrinology for further management of her diabetes. She verbalized understanding. ?  PHQ-2/9 Depression Screening    Little interest or pleasure in doing things: 2 - more than half the days  Feeling down, depressed, or hopeless: 3 - nearly every day  Trouble falling or staying asleep, or sleeping too much: 3 - nearly every day  Feeling tired or having little energy: 3 - nearly every day  Poor appetite or overeatin - not at all  Feeling bad about yourself - or that you are a failure or have let yourself or your family down: 3 - nearly every day  Trouble concentrating on things, such as reading the newspaper or watching television: 0 - not at all  Moving or speaking so slowly that other people could have noticed. Or the opposite - being so fidgety or restless that you have been moving around a lot more than usual: 0 - not at all  Thoughts that you would be better off dead, or of hurting yourself in some way: 0 - not at all  PHQ-9 Score: 14   PHQ-9 Interpretation: Moderate depression            Review of Systems   Constitutional:  Negative for chills and fever. HENT:  Negative for ear pain and sore throat. Eyes:  Negative for pain and visual disturbance. Respiratory:  Negative for cough and shortness of breath.     Cardiovascular:  Negative for chest pain and palpitations. Gastrointestinal:  Negative for abdominal pain and vomiting. Genitourinary:  Negative for dysuria and hematuria. Musculoskeletal:  Negative for arthralgias and back pain. Skin:  Negative for color change and rash. Neurological:  Negative for seizures and syncope. Psychiatric/Behavioral:  Positive for agitation and dysphoric mood. Negative for self-injury and suicidal ideas. The patient is nervous/anxious. All other systems reviewed and are negative. Current Outpatient Medications on File Prior to Visit   Medication Sig   • acetaminophen (TYLENOL) 500 mg tablet Take 1,000 mg by mouth   • albuterol (Ventolin HFA) 90 mcg/act inhaler Inhale 2 puffs every 4 (four) hours as needed for wheezing or shortness of breath   • ALPRAZolam (XANAX) 0.5 mg tablet Take 1 tablet (0.5 mg total) by mouth daily at bedtime as needed for anxiety   • Aspirin Low Dose 81 MG EC tablet TAKE 1 TABLET BY MOUTH DAILY.  DO NOT START BEFORE FEBRYARY 1, 2023   • Continuous Blood Gluc  (FreeStyle [place] 14 Day [place]) KVNG Use with gautam sensor   • Continuous Blood Gluc Sensor ([employer]reStyle Gautam 3 Sensor) MISC USE 1 SENSOR EACH TO BE CHANGED EVERY 14 DAYS   • Empagliflozin (JARDIANCE) 10 MG TABS tablet Take 1 tablet (10 mg total) by mouth daily   • fexofenadine (ALLEGRA) 180 MG tablet Take 1 tablet (180 mg total) by mouth daily   • gabapentin (NEURONTIN) 100 mg capsule Take 1 capsule (100 mg total) by mouth daily at bedtime   • insulin glargine (LANTUS) 100 units/mL subcutaneous injection INJECT 17 UNITS UNDER THE SKIN DAILY AT BEDTIME   • insulin lispro (HumaLOG KwikPen) 100 units/mL injection pen Inject 5 Units under the skin 3 (three) times a day with meals   • Insulin Pen Needle (B-D UF III MINI PEN NEEDLES) 31G X 5 MM MISC Inject under the skin daily at bedtime   • lisinopril (ZESTRIL) 30 mg tablet Take 1 tablet (30 mg total) by mouth daily   • metFORMIN (GLUCOPHAGE) 1000 MG tablet take 1 tablet by mouth twice a day with meals   • montelukast (SINGULAIR) 10 mg tablet Take 1 tablet (10 mg total) by mouth daily at bedtime   • Multiple Vitamin (MULTI VITAMIN DAILY PO) Take by mouth   • Blood Glucose Monitoring Suppl (OneTouch Verio) w/Device KIT Use daily (Patient not taking: Reported on 11/14/2023)   • Blood Pressure Monitoring (B-D ASSURE BPM/AUTO WRIST CUFF) MISC Check blood pressure prior to each OB visit, or as directed by your physician. (Patient not taking: Reported on 12/15/2023)   • erythromycin (ILOTYCIN) ophthalmic ointment Administer 0.5 inches to the right eye daily at bedtime (Patient not taking: Reported on 8/2/2023)       Objective     /74   Pulse 83   Temp 98.2 °F (36.8 °C) (Tympanic)   Ht 5' 1" (1.549 m)   Wt 98.9 kg (218 lb)   LMP 12/14/2023   SpO2 98%   BMI 41.19 kg/m²     Physical Exam  Cardiovascular:      Pulses: no weak pulses          Dorsalis pedis pulses are 2+ on the right side and 2+ on the left side. Posterior tibial pulses are 2+ on the right side and 2+ on the left side. Feet:      Right foot:      Skin integrity: No ulcer, skin breakdown, erythema, warmth, callus or dry skin. Left foot:      Skin integrity: No ulcer, skin breakdown, erythema, warmth, callus or dry skin. Patient's shoes and socks removed. Right Foot/Ankle   Right Foot Inspection  Skin Exam: skin normal and skin intact. No dry skin, no warmth, no callus, no erythema, no maceration, no abnormal color, no pre-ulcer, no ulcer and no callus. Toe Exam: ROM and strength within normal limits. Sensory   Monofilament testing: intact    Vascular  Capillary refills: < 3 seconds  The right DP pulse is 2+. The right PT pulse is 2+. Left Foot/Ankle  Left Foot Inspection  Skin Exam: skin normal and skin intact. No dry skin, no warmth, no erythema, no maceration, normal color, no pre-ulcer, no ulcer and no callus. Toe Exam: ROM and strength within normal limits. Sensory   Monofilament testing: intact    Vascular  Capillary refills: < 3 seconds  The left DP pulse is 2+. The left PT pulse is 2+.      Assign Risk Category  No deformity present  No loss of protective sensation  No weak pulses  Risk: REJI Nam

## 2023-12-15 NOTE — PATIENT INSTRUCTIONS
Zoloft reduction 1.5 tablets (50 mg) for 5 days and then 1 (50 mg tablet) for 7 days and then 25 mg (1/2) for 5 days and then stop. The next day start the Celexa.

## 2023-12-18 ENCOUNTER — TELEPHONE (OUTPATIENT)
Dept: ENDOCRINOLOGY | Facility: CLINIC | Age: 33
End: 2023-12-18

## 2024-01-26 ENCOUNTER — OFFICE VISIT (OUTPATIENT)
Dept: FAMILY MEDICINE CLINIC | Facility: CLINIC | Age: 34
End: 2024-01-26

## 2024-01-26 VITALS
WEIGHT: 223 LBS | OXYGEN SATURATION: 98 % | BODY MASS INDEX: 42.1 KG/M2 | HEIGHT: 61 IN | DIASTOLIC BLOOD PRESSURE: 74 MMHG | HEART RATE: 100 BPM | TEMPERATURE: 97.6 F | SYSTOLIC BLOOD PRESSURE: 114 MMHG

## 2024-01-26 DIAGNOSIS — J45.21 ASTHMATIC BRONCHITIS, MILD INTERMITTENT, WITH ACUTE EXACERBATION: ICD-10-CM

## 2024-01-26 DIAGNOSIS — Z79.4 TYPE 2 DIABETES MELLITUS WITH HYPERGLYCEMIA, WITH LONG-TERM CURRENT USE OF INSULIN (HCC): ICD-10-CM

## 2024-01-26 DIAGNOSIS — F32.89 OTHER DEPRESSION: ICD-10-CM

## 2024-01-26 DIAGNOSIS — G62.9 NEUROPATHY: ICD-10-CM

## 2024-01-26 DIAGNOSIS — M25.50 ARTHRALGIA, UNSPECIFIED JOINT: ICD-10-CM

## 2024-01-26 DIAGNOSIS — E11.65 TYPE 2 DIABETES MELLITUS WITH HYPERGLYCEMIA, WITH LONG-TERM CURRENT USE OF INSULIN (HCC): ICD-10-CM

## 2024-01-26 DIAGNOSIS — M79.604 PAIN OF RIGHT LOWER EXTREMITY: ICD-10-CM

## 2024-01-26 DIAGNOSIS — F32.A DEPRESSION, UNSPECIFIED DEPRESSION TYPE: ICD-10-CM

## 2024-01-26 DIAGNOSIS — Z87.01 HISTORY OF PNEUMONIA: ICD-10-CM

## 2024-01-26 DIAGNOSIS — J45.21 MILD INTERMITTENT ASTHMA WITH ACUTE EXACERBATION: ICD-10-CM

## 2024-01-26 DIAGNOSIS — F41.9 ANXIETY: Primary | ICD-10-CM

## 2024-01-26 DIAGNOSIS — I10 HYPERTENSION, UNSPECIFIED TYPE: ICD-10-CM

## 2024-01-26 RX ORDER — GABAPENTIN 100 MG/1
100 CAPSULE ORAL
Qty: 30 CAPSULE | Refills: 0 | Status: SHIPPED | OUTPATIENT
Start: 2024-01-26

## 2024-01-26 RX ORDER — MONTELUKAST SODIUM 10 MG/1
10 TABLET ORAL
Qty: 30 TABLET | Refills: 5 | Status: SHIPPED | OUTPATIENT
Start: 2024-01-26

## 2024-01-26 RX ORDER — INSULIN GLARGINE 100 [IU]/ML
17 INJECTION, SOLUTION SUBCUTANEOUS
Qty: 10 ML | Refills: 0 | Status: SHIPPED | OUTPATIENT
Start: 2024-01-26 | End: 2024-05-25

## 2024-01-26 RX ORDER — INSULIN LISPRO 100 [IU]/ML
5 INJECTION, SOLUTION INTRAVENOUS; SUBCUTANEOUS
Qty: 15 ML | Refills: 3 | Status: SHIPPED | OUTPATIENT
Start: 2024-01-26

## 2024-01-26 RX ORDER — ASPIRIN 81 MG/1
TABLET ORAL
Qty: 90 TABLET | Refills: 0 | Status: CANCELLED | OUTPATIENT
Start: 2024-01-26

## 2024-01-26 RX ORDER — DULAGLUTIDE 0.75 MG/.5ML
0.75 INJECTION, SOLUTION SUBCUTANEOUS
Qty: 2 ML | Refills: 0 | Status: SHIPPED | OUTPATIENT
Start: 2024-01-26 | End: 2024-02-17

## 2024-01-26 RX ORDER — LISINOPRIL 30 MG/1
30 TABLET ORAL DAILY
Qty: 90 TABLET | Refills: 3 | Status: SHIPPED | OUTPATIENT
Start: 2024-01-26

## 2024-01-26 RX ORDER — NAPROXEN 500 MG/1
500 TABLET ORAL 2 TIMES DAILY WITH MEALS
Qty: 60 TABLET | Refills: 0 | Status: SHIPPED | OUTPATIENT
Start: 2024-01-26

## 2024-01-26 RX ORDER — ALBUTEROL SULFATE 90 UG/1
2 AEROSOL, METERED RESPIRATORY (INHALATION) EVERY 4 HOURS PRN
Qty: 18 G | Refills: 2 | Status: SHIPPED | OUTPATIENT
Start: 2024-01-26

## 2024-01-26 RX ORDER — ALPRAZOLAM 0.5 MG/1
0.5 TABLET ORAL
Qty: 15 TABLET | Refills: 0 | Status: SHIPPED | OUTPATIENT
Start: 2024-01-26

## 2024-01-26 RX ORDER — FLURBIPROFEN SODIUM 0.3 MG/ML
SOLUTION/ DROPS OPHTHALMIC
Qty: 100 EACH | Refills: 0 | Status: SHIPPED | OUTPATIENT
Start: 2024-01-26

## 2024-01-26 NOTE — PROGRESS NOTES
Name: Jaycee Can      : 1990      MRN: [id number]  Encounter Provider: REJI Franco  Encounter Date: 2024   Encounter department: [facility]    Assessment & Plan     1. Anxiety  Comments:  continue current dosing of celexa    2. Other depression  -     ALPRAZolam (XANAX) 0.5 mg tablet; Take 1 tablet (0.5 mg total) by mouth daily at bedtime as needed for anxiety    3. Asthmatic bronchitis, mild intermittent, with acute exacerbation  -     albuterol (Ventolin HFA) 90 mcg/act inhaler; Inhale 2 puffs every 4 (four) hours as needed for wheezing or shortness of breath    4. History of pneumonia  -     albuterol (Ventolin HFA) 90 mcg/act inhaler; Inhale 2 puffs every 4 (four) hours as needed for wheezing or shortness of breath    5. Pain of right lower extremity    6. Depression, unspecified depression type    7. Neuropathy  -     gabapentin (NEURONTIN) 100 mg capsule; Take 1 capsule (100 mg total) by mouth daily at bedtime    8. Type 2 diabetes mellitus with hyperglycemia, with long-term current use of insulin (HCC)  -     dulaglutide (Trulicity) 0.75 MG/0.5ML injection; Inject 0.5 mL (0.75 mg total) under the skin every 7 days for 4 doses Take 0.75mg weekly X 4 weeks after which will increase to 1.5 mg weekly.  -     Empagliflozin (JARDIANCE) 10 MG TABS tablet; Take 1 tablet (10 mg total) by mouth daily  -     gabapentin (NEURONTIN) 100 mg capsule; Take 1 capsule (100 mg total) by mouth daily at bedtime  -     insulin glargine (LANTUS) 100 units/mL subcutaneous injection; Inject 17 Units under the skin daily at bedtime  -     insulin lispro (HumaLOG KwikPen) 100 units/mL injection pen; Inject 5 Units under the skin 3 (three) times a day with meals  -     Insulin Pen Needle (B-D UF III MINI PEN NEEDLES) 31G X 5 MM MISC; Inject under the skin daily at bedtime  -     metFORMIN (GLUCOPHAGE) 1000 MG tablet; Take 1 tablet (1,000 mg total) by mouth 2 (two) times a day with  meals    9. Hypertension, unspecified type  -     lisinopril (ZESTRIL) 30 mg tablet; Take 1 tablet (30 mg total) by mouth daily    10. Mild intermittent asthma with acute exacerbation  -     montelukast (SINGULAIR) 10 mg tablet; Take 1 tablet (10 mg total) by mouth daily at bedtime    11. Arthralgia, unspecified joint  -     naproxen (Naprosyn) 500 mg tablet; Take 1 tablet (500 mg total) by mouth 2 (two) times a day with meals           Subjective      Patient presents for follow up on anxiety after tapering off Zoloft and starting Celexa.  She feels like things are not feeling as stressful as they used to. Easily irritable improved. She feels like she has made significant improvement in her symptoms since switching. She is not feeling like she is worrying about things all the time. Denies any depression. Denies any side effects.   Needs refills of all her medications- sent           AZEEM-7 Flowsheet Screening    Flowsheet Row Most Recent Value   Over the last 2 weeks, how often have you been bothered by any of the following problems?    Feeling nervous, anxious, or on edge 0   Not being able to stop or control worrying 0   Worrying too much about different things 0   Trouble relaxing 2   Being so restless that it is hard to sit still 0   Becoming easily annoyed or irritable 0   Feeling afraid as if something awful might happen 0   AZEEM-7 Total Score 2        PHQ-2/9 Depression Screening    Little interest or pleasure in doing things: 0 - not at all  Feeling down, depressed, or hopeless: 0 - not at all  Trouble falling or staying asleep, or sleeping too much: 0 - not at all  Feeling tired or having little energy: 0 - not at all  Poor appetite or overeatin - not at all  Feeling bad about yourself - or that you are a failure or have let yourself or your family down: 0 - not at all  Trouble concentrating on things, such as reading the newspaper or watching television: 0 - not at all  Moving or speaking so slowly that  other people could have noticed. Or the opposite - being so fidgety or restless that you have been moving around a lot more than usual: 0 - not at all  Thoughts that you would be better off dead, or of hurting yourself in some way: 0 - not at all  PHQ-9 Score: 0  PHQ-9 Interpretation: No or Minimal depression           Review of Systems   Constitutional:  Negative for chills and fever.   Eyes:  Negative for pain and visual disturbance.   Respiratory:  Negative for cough and shortness of breath.    Cardiovascular:  Negative for chest pain and palpitations.   Gastrointestinal:  Negative for abdominal pain, constipation, diarrhea, nausea and vomiting.   Genitourinary:  Negative for dysuria and hematuria.   Musculoskeletal:  Negative for arthralgias and back pain.   Skin:  Negative for color change and rash.   Neurological:  Negative for seizures, syncope and headaches.   Psychiatric/Behavioral:  Negative for agitation, dysphoric mood, self-injury, sleep disturbance and suicidal ideas. The patient is not nervous/anxious.    All other systems reviewed and are negative.      Current Outpatient Medications on File Prior to Visit   Medication Sig   • acetaminophen (TYLENOL) 500 mg tablet Take 1,000 mg by mouth   • Aspirin Low Dose 81 MG EC tablet TAKE 1 TABLET BY MOUTH DAILY. DO NOT START BEFORE FEBRYARY 1, 2023   • citalopram (CeleXA) 10 mg tablet Take 1 tablet (10 mg total) by mouth daily   • Continuous Blood Gluc  ([place] Gautam 14 Day [place]) KVNG Use with gautam sensor   • Continuous Blood Gluc Sensor ([place] MediaStyle Gautam 3 Sensor) MISC USE 1 SENSOR EACH TO BE CHANGED EVERY 14 DAYS   • fexofenadine (ALLEGRA) 180 MG tablet Take 1 tablet (180 mg total) by mouth daily   • Multiple Vitamin (MULTI VITAMIN DAILY PO) Take by mouth   • [DISCONTINUED] albuterol (Ventolin HFA) 90 mcg/act inhaler Inhale 2 puffs every 4 (four) hours as needed for wheezing or shortness of breath   • [DISCONTINUED] ALPRAZolam (XANAX) 0.5 mg  "tablet Take 1 tablet (0.5 mg total) by mouth daily at bedtime as needed for anxiety   • [DISCONTINUED] dulaglutide (Trulicity) 0.75 MG/0.5ML injection Inject 0.5 mL (0.75 mg total) under the skin every 7 days for 4 doses Take 0.75mg weekly X 4 weeks after which will increase to 1.5 mg weekly.   • [DISCONTINUED] Empagliflozin (JARDIANCE) 10 MG TABS tablet Take 1 tablet (10 mg total) by mouth daily   • [DISCONTINUED] gabapentin (NEURONTIN) 100 mg capsule Take 1 capsule (100 mg total) by mouth daily at bedtime   • [DISCONTINUED] insulin glargine (LANTUS) 100 units/mL subcutaneous injection INJECT 17 UNITS UNDER THE SKIN DAILY AT BEDTIME   • [DISCONTINUED] insulin lispro (HumaLOG KwikPen) 100 units/mL injection pen Inject 5 Units under the skin 3 (three) times a day with meals   • [DISCONTINUED] Insulin Pen Needle (B-D UF III MINI PEN NEEDLES) 31G X 5 MM MISC Inject under the skin daily at bedtime   • [DISCONTINUED] lisinopril (ZESTRIL) 30 mg tablet Take 1 tablet (30 mg total) by mouth daily   • [DISCONTINUED] metFORMIN (GLUCOPHAGE) 1000 MG tablet take 1 tablet by mouth twice a day with meals   • [DISCONTINUED] montelukast (SINGULAIR) 10 mg tablet Take 1 tablet (10 mg total) by mouth daily at bedtime   • [DISCONTINUED] naproxen (Naprosyn) 500 mg tablet Take 1 tablet (500 mg total) by mouth 2 (two) times a day with meals   • Blood Glucose Monitoring Suppl (OneTouch Verio) w/Device KIT Use daily (Patient not taking: Reported on 11/14/2023)   • Blood Pressure Monitoring (B-D ASSURE BPM/AUTO WRIST CUFF) MISC Check blood pressure prior to each OB visit, or as directed by your physician. (Patient not taking: Reported on 12/15/2023)   • erythromycin (ILOTYCIN) ophthalmic ointment Administer 0.5 inches to the right eye daily at bedtime (Patient not taking: Reported on 8/2/2023)       Objective     /74 (BP Location: Left arm, Patient Position: Sitting)   Pulse 100   Temp 97.6 °F (36.4 °C) (Tympanic)   Ht 5' 1\" (1.549 m) "   Wt 101 kg (223 lb)   LMP 01/12/2024 (Approximate)   SpO2 98%   BMI 42.14 kg/m²     Physical Exam  Vitals and nursing note reviewed.   Constitutional:       General: She is not in acute distress.     Appearance: Normal appearance. She is not ill-appearing or toxic-appearing.   Cardiovascular:      Rate and Rhythm: Normal rate and regular rhythm.      Pulses: Normal pulses.      Heart sounds: Normal heart sounds. No murmur heard.     No friction rub. No gallop.   Pulmonary:      Effort: Pulmonary effort is normal. No respiratory distress.      Breath sounds: Normal breath sounds. No stridor. No wheezing or rales.   Skin:     General: Skin is warm and dry.      Coloration: Skin is not jaundiced.      Findings: No rash.   Neurological:      General: No focal deficit present.      Mental Status: She is alert and oriented to person, place, and time. Mental status is at baseline.      Motor: No weakness.      Gait: Gait normal.   Psychiatric:         Mood and Affect: Mood normal.         Behavior: Behavior normal.         Thought Content: Thought content normal.         Judgment: Judgment normal.       REJI Franco

## 2024-03-05 DIAGNOSIS — F32.A DEPRESSION, UNSPECIFIED DEPRESSION TYPE: ICD-10-CM

## 2024-03-05 DIAGNOSIS — F41.9 ANXIETY: ICD-10-CM

## 2024-03-05 RX ORDER — CITALOPRAM HYDROBROMIDE 10 MG/1
10 TABLET ORAL DAILY
Qty: 90 TABLET | Refills: 1 | Status: SHIPPED | OUTPATIENT
Start: 2024-03-05

## 2024-03-08 ENCOUNTER — APPOINTMENT (EMERGENCY)
Dept: CT IMAGING | Facility: HOSPITAL | Age: 34
End: 2024-03-08

## 2024-03-08 ENCOUNTER — HOSPITAL ENCOUNTER (EMERGENCY)
Facility: HOSPITAL | Age: 34
Discharge: HOME | End: 2024-03-08
Attending: EMERGENCY MEDICINE

## 2024-03-08 ENCOUNTER — OFFICE VISIT (OUTPATIENT)
Dept: URGENT CARE | Facility: CLINIC | Age: 34
End: 2024-03-08

## 2024-03-08 VITALS
HEART RATE: 91 BPM | TEMPERATURE: 98.4 F | BODY MASS INDEX: 42.48 KG/M2 | SYSTOLIC BLOOD PRESSURE: 170 MMHG | OXYGEN SATURATION: 97 % | DIASTOLIC BLOOD PRESSURE: 82 MMHG | RESPIRATION RATE: 20 BRPM | WEIGHT: 225 LBS | HEIGHT: 61 IN

## 2024-03-08 VITALS
OXYGEN SATURATION: 98 % | RESPIRATION RATE: 18 BRPM | TEMPERATURE: 98.1 F | DIASTOLIC BLOOD PRESSURE: 88 MMHG | HEART RATE: 102 BPM | SYSTOLIC BLOOD PRESSURE: 166 MMHG

## 2024-03-08 DIAGNOSIS — R10.10 UPPER ABDOMINAL PAIN: Primary | ICD-10-CM

## 2024-03-08 DIAGNOSIS — R11.0 NAUSEA: ICD-10-CM

## 2024-03-08 DIAGNOSIS — R10.9 ABDOMINAL PAIN: ICD-10-CM

## 2024-03-08 DIAGNOSIS — R11.2 NAUSEA AND VOMITING, UNSPECIFIED VOMITING TYPE: ICD-10-CM

## 2024-03-08 DIAGNOSIS — R16.2 HEPATOSPLENOMEGALY: Primary | ICD-10-CM

## 2024-03-08 LAB
ALBUMIN SERPL BCP-MCNC: 4 G/DL (ref 3.5–5)
ALP SERPL-CCNC: 84 U/L (ref 34–104)
ALT SERPL W P-5'-P-CCNC: 13 U/L (ref 7–52)
ANION GAP SERPL CALCULATED.3IONS-SCNC: 13 MMOL/L
AST SERPL W P-5'-P-CCNC: 15 U/L (ref 13–39)
BACTERIA UR QL AUTO: ABNORMAL /HPF
BASE EX.OXY STD BLDV CALC-SCNC: 95.6 % (ref 60–80)
BASE EXCESS BLDV CALC-SCNC: -1.4 MMOL/L
BASOPHILS # BLD AUTO: 0.03 THOUSANDS/ÂΜL (ref 0–0.1)
BASOPHILS NFR BLD AUTO: 0 % (ref 0–1)
BETA-HYDROXYBUTYRATE: 0.1 MMOL/L
BILIRUB SERPL-MCNC: 0.33 MG/DL (ref 0.2–1)
BILIRUB UR QL STRIP: NEGATIVE
BUN SERPL-MCNC: 10 MG/DL (ref 5–25)
CALCIUM SERPL-MCNC: 9.1 MG/DL (ref 8.4–10.2)
CARDIAC TROPONIN I PNL SERPL HS: 3 NG/L
CHLORIDE SERPL-SCNC: 99 MMOL/L (ref 96–108)
CLARITY UR: CLEAR
CO2 SERPL-SCNC: 22 MMOL/L (ref 21–32)
COLOR UR: YELLOW
CREAT SERPL-MCNC: 0.63 MG/DL (ref 0.6–1.3)
EOSINOPHIL # BLD AUTO: 0.11 THOUSAND/ÂΜL (ref 0–0.61)
EOSINOPHIL NFR BLD AUTO: 1 % (ref 0–6)
ERYTHROCYTE [DISTWIDTH] IN BLOOD BY AUTOMATED COUNT: 15.2 % (ref 11.6–15.1)
EXT PREGNANCY TEST URINE: NEGATIVE
EXT. CONTROL: NORMAL
GFR SERPL CREATININE-BSD FRML MDRD: 115 ML/MIN/1.73SQ M
GLUCOSE SERPL-MCNC: 301 MG/DL (ref 65–140)
GLUCOSE SERPL-MCNC: 328 MG/DL (ref 65–140)
GLUCOSE SERPL-MCNC: 444 MG/DL (ref 65–140)
GLUCOSE UR STRIP-MCNC: ABNORMAL MG/DL
HCO3 BLDV-SCNC: 21.4 MMOL/L (ref 24–30)
HCT VFR BLD AUTO: 44.1 % (ref 34.8–46.1)
HGB BLD-MCNC: 14 G/DL (ref 11.5–15.4)
HGB UR QL STRIP.AUTO: ABNORMAL
IMM GRANULOCYTES # BLD AUTO: 0.01 THOUSAND/UL (ref 0–0.2)
IMM GRANULOCYTES NFR BLD AUTO: 0 % (ref 0–2)
KETONES UR STRIP-MCNC: NEGATIVE MG/DL
LEUKOCYTE ESTERASE UR QL STRIP: NEGATIVE
LIPASE SERPL-CCNC: 90 U/L (ref 11–82)
LYMPHOCYTES # BLD AUTO: 2.27 THOUSANDS/ÂΜL (ref 0.6–4.47)
LYMPHOCYTES NFR BLD AUTO: 26 % (ref 14–44)
MCH RBC QN AUTO: 24.8 PG (ref 26.8–34.3)
MCHC RBC AUTO-ENTMCNC: 31.7 G/DL (ref 31.4–37.4)
MCV RBC AUTO: 78 FL (ref 82–98)
MONOCYTES # BLD AUTO: 0.58 THOUSAND/ÂΜL (ref 0.17–1.22)
MONOCYTES NFR BLD AUTO: 7 % (ref 4–12)
NEUTROPHILS # BLD AUTO: 5.86 THOUSANDS/ÂΜL (ref 1.85–7.62)
NEUTS SEG NFR BLD AUTO: 66 % (ref 43–75)
NITRITE UR QL STRIP: NEGATIVE
NON-SQ EPI CELLS URNS QL MICRO: ABNORMAL /HPF
NRBC BLD AUTO-RTO: 0 /100 WBCS
O2 CT BLDV-SCNC: 18.8 ML/DL
PCO2 BLDV: 30.5 MM HG (ref 42–50)
PH BLDV: 7.46 [PH] (ref 7.3–7.4)
PH UR STRIP.AUTO: 6 [PH]
PLATELET # BLD AUTO: 242 THOUSANDS/UL (ref 149–390)
PMV BLD AUTO: 10.9 FL (ref 8.9–12.7)
PO2 BLDV: 189.7 MM HG (ref 35–45)
POTASSIUM SERPL-SCNC: 3.8 MMOL/L (ref 3.5–5.3)
PROT SERPL-MCNC: 7.1 G/DL (ref 6.4–8.4)
PROT UR STRIP-MCNC: NEGATIVE MG/DL
RBC # BLD AUTO: 5.64 MILLION/UL (ref 3.81–5.12)
RBC #/AREA URNS AUTO: ABNORMAL /HPF
SODIUM SERPL-SCNC: 134 MMOL/L (ref 135–147)
SP GR UR STRIP.AUTO: 1.01
UROBILINOGEN UR QL STRIP.AUTO: 0.2 E.U./DL
WBC # BLD AUTO: 8.86 THOUSAND/UL (ref 4.31–10.16)
WBC #/AREA URNS AUTO: ABNORMAL /HPF

## 2024-03-08 PROCEDURE — 81003 URINALYSIS AUTO W/O SCOPE: CPT

## 2024-03-08 PROCEDURE — 85025 COMPLETE CBC W/AUTO DIFF WBC: CPT

## 2024-03-08 PROCEDURE — 81025 URINE PREGNANCY TEST: CPT

## 2024-03-08 PROCEDURE — 96372 THER/PROPH/DIAG INJ SC/IM: CPT

## 2024-03-08 PROCEDURE — 99213 OFFICE O/P EST LOW 20 MIN: CPT | Performed by: PHYSICIAN ASSISTANT

## 2024-03-08 PROCEDURE — 82805 BLOOD GASES W/O2 SATURATION: CPT

## 2024-03-08 PROCEDURE — 93005 ELECTROCARDIOGRAM TRACING: CPT

## 2024-03-08 PROCEDURE — 80053 COMPREHEN METABOLIC PANEL: CPT

## 2024-03-08 PROCEDURE — 74177 CT ABD & PELVIS W/CONTRAST: CPT

## 2024-03-08 PROCEDURE — 83690 ASSAY OF LIPASE: CPT

## 2024-03-08 PROCEDURE — 82948 REAGENT STRIP/BLOOD GLUCOSE: CPT

## 2024-03-08 PROCEDURE — 81001 URINALYSIS AUTO W/SCOPE: CPT

## 2024-03-08 PROCEDURE — 99285 EMERGENCY DEPT VISIT HI MDM: CPT

## 2024-03-08 PROCEDURE — 82010 KETONE BODYS QUAN: CPT

## 2024-03-08 PROCEDURE — 36415 COLL VENOUS BLD VENIPUNCTURE: CPT

## 2024-03-08 PROCEDURE — 99285 EMERGENCY DEPT VISIT HI MDM: CPT | Performed by: EMERGENCY MEDICINE

## 2024-03-08 PROCEDURE — 96375 TX/PRO/DX INJ NEW DRUG ADDON: CPT

## 2024-03-08 PROCEDURE — 96361 HYDRATE IV INFUSION ADD-ON: CPT

## 2024-03-08 PROCEDURE — 84484 ASSAY OF TROPONIN QUANT: CPT

## 2024-03-08 PROCEDURE — 96374 THER/PROPH/DIAG INJ IV PUSH: CPT

## 2024-03-08 RX ORDER — KETOROLAC TROMETHAMINE 30 MG/ML
15 INJECTION, SOLUTION INTRAMUSCULAR; INTRAVENOUS ONCE
Status: COMPLETED | OUTPATIENT
Start: 2024-03-08 | End: 2024-03-08

## 2024-03-08 RX ORDER — ONDANSETRON 2 MG/ML
4 INJECTION INTRAMUSCULAR; INTRAVENOUS ONCE
Status: COMPLETED | OUTPATIENT
Start: 2024-03-08 | End: 2024-03-08

## 2024-03-08 RX ADMIN — SODIUM CHLORIDE 1000 ML: 0.9 INJECTION, SOLUTION INTRAVENOUS at 10:04

## 2024-03-08 RX ADMIN — ONDANSETRON 4 MG: 2 INJECTION INTRAMUSCULAR; INTRAVENOUS at 10:06

## 2024-03-08 RX ADMIN — KETOROLAC TROMETHAMINE 15 MG: 30 INJECTION, SOLUTION INTRAMUSCULAR; INTRAVENOUS at 10:06

## 2024-03-08 RX ADMIN — IOHEXOL 100 ML: 350 INJECTION, SOLUTION INTRAVENOUS at 10:41

## 2024-03-08 RX ADMIN — INSULIN HUMAN 5 UNITS: 100 INJECTION, SOLUTION PARENTERAL at 11:34

## 2024-03-08 NOTE — PATIENT INSTRUCTIONS
Recommend evaluation in the emergency room.  Nausea/vomiting for 3 days, no diarrhea.  Upper abdominal pain with history of Hep C in remission and history of splenomegaly.  S/p cholecystectomy.  Differential could include hepatitis, pancreatitis, acute gastroenteritis.

## 2024-03-08 NOTE — ED ATTENDING ATTESTATION
3/8/2024  IYonis DO, saw and evaluated the patient. I have discussed the patient with the resident/non-physician practitioner and agree with the resident's/non-physician practitioner's findings, Plan of Care, and MDM as documented in the resident's/non-physician practitioner's note, except where noted. All available labs and Radiology studies were reviewed.  I was present for key portions of any procedure(s) performed by the resident/non-physician practitioner and I was immediately available to provide assistance.       At this point I agree with the current assessment done in the Emergency Department.  I have conducted an independent evaluation of this patient a history and physical is as follows:    Patient is a 34-year-old female who presents for evaluation of nausea and vomiting x 3 days.  She started with upper abdominal pain starting this am.  She was seen at the Urgent care and sent her for further evaluation.  Pain does not radiate anywhere else.  Took tylenol with improvement of pain.  Able to tolerate PO.  No chest pain, sob, lightheadedness, dizziness.  Patient has a history of hep C which has been in remission since 2018.     Physical Exam  Vitals and nursing note reviewed.   Constitutional:       General: She is not in acute distress.     Appearance: She is well-developed.   HENT:      Head: Normocephalic and atraumatic.      Right Ear: External ear normal.      Left Ear: External ear normal.      Nose: Nose normal.      Mouth/Throat:      Mouth: Mucous membranes are moist.      Pharynx: No oropharyngeal exudate.   Eyes:      Conjunctiva/sclera: Conjunctivae normal.      Pupils: Pupils are equal, round, and reactive to light.   Cardiovascular:      Rate and Rhythm: Normal rate and regular rhythm.      Heart sounds: Normal heart sounds. No murmur heard.     No friction rub. No gallop.   Pulmonary:      Effort: Pulmonary effort is normal. No respiratory distress.      Breath sounds: Normal  breath sounds. No wheezing or rales.   Abdominal:      General: There is no distension.      Palpations: Abdomen is soft.      Tenderness: There is abdominal tenderness (epigastric). There is no guarding.   Musculoskeletal:         General: No swelling, tenderness or deformity. Normal range of motion.      Cervical back: Normal range of motion and neck supple.   Lymphadenopathy:      Cervical: No cervical adenopathy.   Skin:     General: Skin is warm and dry.   Neurological:      General: No focal deficit present.      Mental Status: She is alert and oriented to person, place, and time. Mental status is at baseline.      Cranial Nerves: No cranial nerve deficit.      Sensory: No sensory deficit.      Motor: No weakness or abnormal muscle tone.      Coordination: Coordination normal.        Differential includes gastritis, enteritis, colitis, choledocholithiasis    Plan:  CBC, CMP, Lipase, cardiac workup, CT abdomen pelvis with contrast    CBC and CMP within normal limits.  Blood sugar 444, beta hydroxybutyrate and VBG were added which were within normal limits.  No anion gap.  CT ab pelvis shows no acute findings.  Has a stable type B dissection.  Spoke with radiology who said this was previous on CAT scan from November, this is likely secondary to patient's cardiac surgery as a child.    She is feeling better after the medications.  Will discharge to home with GI follow-up    ED Course         Critical Care Time  Procedures

## 2024-03-08 NOTE — Clinical Note
Jaycee Can was seen and treated in our emergency department on 3/8/2024.    No restrictions            Diagnosis: Abdominal pain    Jaycee JAFFE  may return to work on return date.    She may return on this date: 03/09/2024         If you have any questions or concerns, please don't hesitate to call.      Twan Uriarte PA-C    ______________________________           _______________          _______________  Hospital Representative                              Date                                Time

## 2024-03-08 NOTE — ED PROVIDER NOTES
History  Chief Complaint   Patient presents with    Abdominal Pain     Upper abdominal pain    Vomiting     Patient is a 34-year-old female with relevant past medical history of arthritis, diabetes mellitus, endometriosis, hepatitis C, hypertension, migraines, morbid obesity, pulmonary artery congenital abnormality presenting with nausea and vomiting x 3 days. Patient reports that she started with nausea and vomiting on Wednesday and missed work yesterday due to this. The emesis is nonbilious and non-bloody. She presented to the urgent care this morning for a doctor's note because she missed work yesterday and they sent her to the urgent care because she had tenderness in her upper abdomen. She reports vomiting roughly twice a day for the last 3 days but has not vomited today. She started with pain in her upper abdomen this morning when she woke up. She complains of 8/10 stabbing and intermittent pain in her upper abdomen. She does not have her gallbladder. She is currently on her period, which is irregular, and she is sexually active. She reports her normal menstrual cramps are different. She has been eating and drinking normally. She is a social drinker and does not smoke or use drugs. She contracted hepatitis C when she was a kid from a blood transfusion during open heart surgery. She has been in remission since 2018. She reports normal bowel movements and urination. She denies headache, dizziness, lightheadedness, fever, chills, chest pain, shortness of breath, constipation, diarrhea, or urinary symptoms.             History provided by:  Patient   used: No    Abdominal Pain  Associated symptoms: nausea and vomiting    Associated symptoms: no chest pain, no chills, no constipation, no cough, no diarrhea, no dysuria, no fever, no hematuria, no shortness of breath and no sore throat    Vomiting  Associated symptoms: abdominal pain    Associated symptoms: no arthralgias, no chills, no cough, no  diarrhea, no fever, no headaches and no sore throat        Prior to Admission Medications   Prescriptions Last Dose Informant Patient Reported? Taking?   ALPRAZolam (XANAX) 0.5 mg tablet   No No   Sig: Take 1 tablet (0.5 mg total) by mouth daily at bedtime as needed for anxiety   Aspirin Low Dose 81 MG EC tablet   No No   Sig: TAKE 1 TABLET BY MOUTH DAILY. DO NOT START BEFORE FEBRYARY 1, 2023   Blood Glucose Monitoring Suppl (OneTouch Verio) w/Device KIT  Self No No   Sig: Use daily   Patient not taking: Reported on 11/14/2023   Blood Pressure Monitoring (B-D ASSURE BPM/AUTO WRIST CUFF) MISC  Self No No   Sig: Check blood pressure prior to each OB visit, or as directed by your physician.   Patient not taking: Reported on 12/15/2023   Continuous Blood Gluc  (FreeStyle Gautam 14 Day [place]) KVNG   No No   Sig: Use with gautam sensor   Continuous Blood Gluc Sensor (FreeStyle Gautam 3 Sensor) MISC   No No   Sig: USE 1 SENSOR EACH TO BE CHANGED EVERY 14 DAYS   Empagliflozin (JARDIANCE) 10 MG TABS tablet   No No   Sig: Take 1 tablet (10 mg total) by mouth daily   Insulin Pen Needle (B-D UF III MINI PEN NEEDLES) 31G X 5 MM MISC   No No   Sig: Inject under the skin daily at bedtime   Multiple Vitamin (MULTI VITAMIN DAILY PO)  Self Yes No   Sig: Take by mouth   acetaminophen (TYLENOL) 500 mg tablet  Self Yes No   Sig: Take 1,000 mg by mouth   albuterol (Ventolin HFA) 90 mcg/act inhaler   No No   Sig: Inhale 2 puffs every 4 (four) hours as needed for wheezing or shortness of breath   citalopram (CeleXA) 10 mg tablet   No No   Sig: take 1 tablet by mouth once daily   dulaglutide (Trulicity) 0.75 MG/0.5ML injection   No No   Sig: Inject 0.5 mL (0.75 mg total) under the skin every 7 days for 4 doses Take 0.75mg weekly X 4 weeks after which will increase to 1.5 mg weekly.   erythromycin (ILOTYCIN) ophthalmic ointment   No No   Sig: Administer 0.5 inches to the right eye daily at bedtime   Patient not taking: Reported on  8/2/2023   fexofenadine (ALLEGRA) 180 MG tablet  Self No No   Sig: Take 1 tablet (180 mg total) by mouth daily   gabapentin (NEURONTIN) 100 mg capsule   No No   Sig: Take 1 capsule (100 mg total) by mouth daily at bedtime   insulin glargine (LANTUS) 100 units/mL subcutaneous injection   No No   Sig: Inject 17 Units under the skin daily at bedtime   insulin lispro (HumaLOG KwikPen) 100 units/mL injection pen   No No   Sig: Inject 5 Units under the skin 3 (three) times a day with meals   lisinopril (ZESTRIL) 30 mg tablet   No No   Sig: Take 1 tablet (30 mg total) by mouth daily   metFORMIN (GLUCOPHAGE) 1000 MG tablet   No No   Sig: Take 1 tablet (1,000 mg total) by mouth 2 (two) times a day with meals   montelukast (SINGULAIR) 10 mg tablet   No No   Sig: Take 1 tablet (10 mg total) by mouth daily at bedtime   naproxen (Naprosyn) 500 mg tablet   No No   Sig: Take 1 tablet (500 mg total) by mouth 2 (two) times a day with meals      Facility-Administered Medications: None       Past Medical History:   Diagnosis Date    Allergic     Arthritis     Bipolar affective disorder, currently depressed, moderate (HCC) 12/17/2018    Cyst of ovary, right     Diabetes mellitus (HCC) 10/10/2018    type 2    Endometriosis     Heart murmur 1988    Hepatitis C     Hepatitis C virus infection cured after antiviral drug therapy     History of transfusion     Hypertension     Migraines     Morbid obesity with BMI of 40.0-44.9, adult (HCC)     Obesity 1995    Pulmonary artery congenital abnormality     Spleen enlarged     Status post surgical removal of both fallopian tubes     Varicella        Past Surgical History:   Procedure Laterality Date    CARDIAC CATHETERIZATION      no CAD 10days, 4 weeks 22months old     CARDIAC CATHETERIZATION N/A 1/12/2023    Procedure: Cardiac Coronary Angiogram;  Surgeon: Marcela Lr DO;  Location: [facility];  Service: Cardiology    CARDIAC CATHETERIZATION Left 1/12/2023    Procedure:  Cardiac Left Heart Cath;  Surgeon: Marcela Lr DO;  Location: [facility];  Service: Cardiology    CARDIAC CATHETERIZATION  2023    Procedure: Cardiac catheterization;  Surgeon: Marcela Lr DO;  Location: [facility];  Service: Cardiology     SECTION, LOW TRANSVERSE      CHOLECYSTECTOMY      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 5    CORONARY STENT PLACEMENT      IR LOWER EXTREMITY ANGIOGRAM  2023    LIVER BIOPSY      LIVER BIOPSY      STERNAL WIRE REMOVAL  2022    THROMBECTOMY W/ EMBOLECTOMY Right 2023    Procedure: Right femoral exposure Right iliac embolectomy w/ #4 Ted catheter Aortogram Right EIA stent w/ 7x29mm VBX;  Surgeon: Jody Hodges MD;  Location: [facility];  Service: Vascular    TUBAL LIGATION Bilateral 2020    VSD REPAIR      As a child    WOUND DEBRIDEMENT Right 2023    Procedure: Right Groin Wound Washout, Pulse Lavage, Wound Vac placement;  Surgeon: Jelani Avila DO;  Location: [facility];  Service: Vascular       Family History   Problem Relation Age of Onset    Hypertension Mother     Migraines Mother     JENNY disease Mother     Depression Mother     Hyperlipidemia Mother     Diabetes Mother     Diabetes Father     Hypertension Father     Kidney failure Father     Heart attack Father     Arthritis Father     Stroke Father     Polycystic kidney disease Paternal Grandmother     Stroke Paternal Grandmother     Heart disease Paternal Grandmother     Arthritis Sister     Asthma Sister     Thyroid disease Sister     Diabetes Sister     Arthritis Maternal Grandmother     Breast cancer Maternal Grandmother     Diabetes Maternal Grandmother     Hypertension Maternal Grandmother     Heart Valve Disease Maternal Grandmother     Learning disabilities Cousin     Learning disabilities Sister     ADD / ADHD Cousin     Lung cancer Brother     Diabetes Maternal Grandfather     Hypertension Maternal Grandfather     JENNY disease Maternal  Grandfather     Stroke Maternal Grandfather      I have reviewed and agree with the history as documented.    E-Cigarette/Vaping    E-Cigarette Use Never User      E-Cigarette/Vaping Substances    Nicotine No     THC No     CBD No     Flavoring No      Social History     Tobacco Use    Smoking status: Former     Current packs/day: 0.00     Types: Cigarettes     Start date: 2023     Quit date: 2023     Years since quittin.1    Smokeless tobacco: Never   Vaping Use    Vaping status: Never Used   Substance Use Topics    Alcohol use: Not Currently     Alcohol/week: 3.0 standard drinks of alcohol     Types: 3 Standard drinks or equivalent per week     Comment: socially    Drug use: Not Currently     Comment: hx of THC use for migraines       Review of Systems   Constitutional:  Negative for appetite change, chills and fever.   HENT:  Negative for congestion, ear pain, rhinorrhea and sore throat.    Eyes:  Negative for pain and visual disturbance.   Respiratory:  Negative for cough and shortness of breath.    Cardiovascular:  Negative for chest pain and palpitations.   Gastrointestinal:  Positive for abdominal pain, nausea and vomiting. Negative for constipation and diarrhea.   Genitourinary:  Negative for dysuria, frequency, hematuria and urgency.   Musculoskeletal:  Negative for arthralgias and back pain.   Skin:  Negative for color change and rash.   Neurological:  Negative for dizziness, seizures, syncope, weakness, light-headedness and headaches.   Psychiatric/Behavioral:  Negative for agitation and confusion.        Physical Exam  Physical Exam  Vitals and nursing note reviewed.   Constitutional:       General: She is not in acute distress.     Appearance: She is well-developed. She is obese. She is not ill-appearing.   HENT:      Head: Normocephalic and atraumatic.   Eyes:      Conjunctiva/sclera: Conjunctivae normal.   Cardiovascular:      Rate and Rhythm: Normal rate and regular rhythm.      Heart  sounds: No murmur heard.  Pulmonary:      Effort: Pulmonary effort is normal. No respiratory distress.      Breath sounds: Normal breath sounds.   Abdominal:      Palpations: Abdomen is soft.      Tenderness: There is abdominal tenderness in the right upper quadrant, epigastric area and left upper quadrant. There is no guarding or rebound.   Musculoskeletal:         General: No swelling.      Cervical back: Neck supple.   Skin:     General: Skin is warm and dry.      Capillary Refill: Capillary refill takes less than 2 seconds.   Neurological:      General: No focal deficit present.      Mental Status: She is alert and oriented to person, place, and time.   Psychiatric:         Mood and Affect: Mood normal.         Vital Signs  ED Triage Vitals   Temperature Pulse Respirations Blood Pressure SpO2   03/08/24 0937 03/08/24 0934 03/08/24 0934 03/08/24 0934 03/08/24 0934   98.4 °F (36.9 °C) (!) 113 17 (!) 191/105 96 %      Temp Source Heart Rate Source Patient Position - Orthostatic VS BP Location FiO2 (%)   03/08/24 0937 03/08/24 0934 03/08/24 0934 03/08/24 0934 --   Temporal Monitor Sitting Left arm       Pain Score       03/08/24 0934       8           Vitals:    03/08/24 1130 03/08/24 1200 03/08/24 1230 03/08/24 1300   BP: 155/81 144/75 162/85 170/82   Pulse: 91 92 87 91   Patient Position - Orthostatic VS: Sitting Sitting Sitting Sitting         Visual Acuity      ED Medications  Medications   ondansetron (ZOFRAN) injection 4 mg (4 mg Intravenous Given 3/8/24 1006)   ketorolac (TORADOL) injection 15 mg (15 mg Intravenous Given 3/8/24 1006)   sodium chloride 0.9 % bolus 1,000 mL (0 mL Intravenous Stopped 3/8/24 1104)   iohexol (OMNIPAQUE) 350 MG/ML injection (MULTI-DOSE) 100 mL (100 mL Intravenous Given 3/8/24 1041)   insulin regular (HumuLIN R,NovoLIN R) injection 5 Units (5 Units Subcutaneous Given 3/8/24 1134)       Diagnostic Studies  Results Reviewed       Procedure Component Value Units Date/Time     Fingerstick Glucose (POCT) [[id number]]  (Abnormal) Collected: 03/08/24 1201    Lab Status: Final result Specimen: Blood Updated: 03/08/24 1202     POC Glucose 301 mg/dl     Urine Microscopic [[id number]]  (Abnormal) Collected: 03/08/24 1005    Lab Status: Final result Specimen: Urine Updated: 03/08/24 1152     RBC, UA 10-20 /hpf      WBC, UA 0-1 /hpf      Epithelial Cells Occasional /hpf      Bacteria, UA None Seen /hpf     Fingerstick Glucose (POCT) [[id number]]  (Abnormal) Collected: 03/08/24 1128    Lab Status: Final result Specimen: Blood Updated: 03/08/24 1130     POC Glucose 328 mg/dl     Beta Hydroxybutyrate [[id number]]  (Normal) Collected: 03/08/24 1033    Lab Status: Final result Specimen: Blood from Arm, Right Updated: 03/08/24 1050     BETA-HYDROXYBUTYRATE 0.1 mmol/L     Blood gas, venous [[id number]]  (Abnormal) Collected: 03/08/24 1033    Lab Status: Final result Specimen: Blood from Arm, Right Updated: 03/08/24 1045     pH, Arnaldo 7.463     pCO2, Arnaldo 30.5 mm Hg      pO2, Anraldo 189.7 mm Hg      HCO3, Arnaldo 21.4 mmol/L      Base Excess, Arnaldo -1.4 mmol/L      O2 Content, Arnaldo 18.8 ml/dL      O2 HGB, VENOUS 95.6 %     HS Troponin 0hr (reflex protocol) [[id number]]  (Normal) Collected: 03/08/24 1003    Lab Status: Final result Specimen: Blood from Arm, Right Updated: 03/08/24 1038     hs TnI 0hr 3 ng/L     Comprehensive metabolic panel [[id number]]  (Abnormal) Collected: 03/08/24 0959    Lab Status: Final result Specimen: Blood from Arm, Right Updated: 03/08/24 1026     Sodium 134 mmol/L      Potassium 3.8 mmol/L      Chloride 99 mmol/L      CO2 22 mmol/L      ANION GAP 13 mmol/L      BUN 10 mg/dL      Creatinine 0.63 mg/dL      Glucose 444 mg/dL      Calcium 9.1 mg/dL      AST 15 U/L      ALT 13 U/L      Alkaline Phosphatase 84 U/L      Total Protein 7.1 g/dL      Albumin 4.0 g/dL      Total Bilirubin 0.33 mg/dL      eGFR 115 ml/min/1.73sq m     Narrative:      National Kidney Disease Foundation guidelines for Chronic  Kidney Disease (CKD):     Stage 1 with normal or high GFR (GFR > 90 mL/min/1.73 square meters)    Stage 2 Mild CKD (GFR = 60-89 mL/min/1.73 square meters)    Stage 3A Moderate CKD (GFR = 45-59 mL/min/1.73 square meters)    Stage 3B Moderate CKD (GFR = 30-44 mL/min/1.73 square meters)    Stage 4 Severe CKD (GFR = 15-29 mL/min/1.73 square meters)    Stage 5 End Stage CKD (GFR <15 mL/min/1.73 square meters)  Note: GFR calculation is accurate only with a steady state creatinine    UA (URINE) with reflex to Scope [[id number]]  (Abnormal) Collected: 03/08/24 1005    Lab Status: Final result Specimen: Urine Updated: 03/08/24 1026     Color, UA Yellow     Clarity, UA Clear     Specific Gravity, UA 1.010     pH, UA 6.0     Leukocytes, UA Negative     Nitrite, UA Negative     Protein, UA Negative mg/dl      Glucose, UA 3+ mg/dl      Ketones, UA Negative mg/dl      Urobilinogen, UA 0.2 E.U./dl      Bilirubin, UA Negative     Occult Blood, UA 3+    Lipase [[id number]]  (Abnormal) Collected: 03/08/24 0959    Lab Status: Final result Specimen: Blood from Arm, Right Updated: 03/08/24 1021     Lipase 90 u/L     POCT pregnancy, urine [[id number]]  (Normal) Resulted: 03/08/24 1005    Lab Status: Final result Updated: 03/08/24 1010     EXT Preg Test, Ur Negative     Control Valid    CBC and differential [[id number]]  (Abnormal) Collected: 03/08/24 0959    Lab Status: Final result Specimen: Blood from Arm, Right Updated: 03/08/24 1006     WBC 8.86 Thousand/uL      RBC 5.64 Million/uL      Hemoglobin 14.0 g/dL      Hematocrit 44.1 %      MCV 78 fL      MCH 24.8 pg      MCHC 31.7 g/dL      RDW 15.2 %      MPV 10.9 fL      Platelets 242 Thousands/uL      nRBC 0 /100 WBCs      Neutrophils Relative 66 %      Immat GRANS % 0 %      Lymphocytes Relative 26 %      Monocytes Relative 7 %      Eosinophils Relative 1 %      Basophils Relative 0 %      Neutrophils Absolute 5.86 Thousands/µL      Immature Grans Absolute 0.01 Thousand/uL       Lymphocytes Absolute 2.27 Thousands/µL      Monocytes Absolute 0.58 Thousand/µL      Eosinophils Absolute 0.11 Thousand/µL      Basophils Absolute 0.03 Thousands/µL                    CT Abdomen pelvis with contrast   Final Result by Luda Dong MD (03/08 1154)   No acute intra-abdominal pathology.   Hepatosplenomegaly, possibly responsible for discomfort. Correlate for history or symptoms of hepatocellular disease.   Surgically absent gallbladder. No biliary dilatation.   Unchanged known 4.3 cm left adrenal myelolipoma.   Type B distal descending aortic dissection, stable.            Workstation performed: [id number]                    Procedures  ECG 12 Lead Documentation Only    Date/Time: 3/8/2024 10:12 AM    Performed by: Twan Uriarte PA-C  Authorized by: Twan Uriarte PA-C    Indications / Diagnosis:  Upper abdominal pain  ECG reviewed by me, the ED Provider: yes    Patient location:  ED  Previous ECG:     Comparison to cardiac monitor: Yes    Interpretation:     Interpretation: normal    Rate:     ECG rate:  98    ECG rate assessment: normal    Rhythm:     Rhythm: sinus rhythm    Ectopy:     Ectopy: none    QRS:     QRS axis:  Normal  Conduction:     Conduction: abnormal      Abnormal conduction: incomplete RBBB    ST segments:     ST segments:  Normal  T waves:     T waves: normal             ED Course  ED Course as of 03/08/24 1326   Fri Mar 08, 2024   0938 Vital signs reviewed; blood pressure and heart rate are elevated. Will repeat.   1011 PREGNANCY TEST URINE: Negative  Patient is negative for pregnancy.   1012 CBC and differential(!)  No leukocytosis, anemia, or platelet abnormality.   1028 GLUCOSE(!!): 444  Will add on beta hydroxybutyrate and VBG.   1028 Comprehensive metabolic panel(!!)  Electrolytes are within normal limits. No DILIA or transaminitis.   1028 UA (URINE) with reflex to Scope(!)  No evidence of urinary tract infection.   1028 LIPASE(!): 90  Mildly elevated lipase.   1047  Patient is back at CT scan.   1057 hs TnI 0hr: 3  Troponin of 3. No need to repeat further.   1058 Beta- Hydroxybutyrate: 0.1  Within normal limits.   1059 Blood gas, venous(!)  No metabolic acidosis.   1119 Went over results with patient. Patient reports feeling much better. Her abdominal pain is now a 5/10. She no longer has the nausea. She takes 5 units insulin with meals and 17 units before bed. Will order 5 units regular insulin. Awaiting CT abdomen/pelvis interpretation.   1157 CT Abdomen pelvis with contrast  IMPRESSION:  No acute intra-abdominal pathology.  Hepatosplenomegaly, possibly responsible for discomfort. Correlate for history or symptoms of hepatocellular disease.  Surgically absent gallbladder. No biliary dilatation.  Unchanged known 4.3 cm left adrenal myelolipoma.  Type B distal descending aortic dissection, stable.   1201 Went over results with patient. Will reach out to radiologist to discuss CT results. Awaiting radiologist to call me back.   1300 Spoke with Dr. Dong reports the patient's aortic dissection was there in November 2023 when she had a vascular study and is very stable and unchanged. She thinks her dissection is likely iatrogenic from her open heart surgery as a child. Her hepatosplenomegaly are likely secondary to her hepatitis C.   1312 Went over results with patient. Mother was also on the phone. Patient had 2 open heart surgeries as a child and later had stenting of her aorta when she was 5 years old for coarctation of her aorta. Her dissection is stable and and known. Offered Zofran prescription for at home but she declined. Will provide a GI referral and discharge home.             HEART Risk Score      Flowsheet Row Most Recent Value   Heart Score Risk Calculator    History 0 Filed at: 03/08/2024 1003   ECG 0 Filed at: 03/08/2024 1003   Age 0 Filed at: 03/08/2024 1003   Risk Factors 1 Filed at: 03/08/2024 1003   Troponin 0 Filed at: 03/08/2024 1003   HEART Score 1 Filed  at: 03/08/2024 1003                          SBIRT 22yo+      Flowsheet Row Most Recent Value   Initial Alcohol Screen: US AUDIT-C     1. How often do you have a drink containing alcohol? 0 Filed at: 03/08/2024 0939   2. How many drinks containing alcohol do you have on a typical day you are drinking?  0 Filed at: 03/08/2024 0939   3a. Male UNDER 65: How often do you have five or more drinks on one occasion? 0 Filed at: 03/08/2024 0939   3b. FEMALE Any Age, or MALE 65+: How often do you have 4 or more drinks on one occassion? 0 Filed at: 03/08/2024 0939   Audit-C Score 0 Filed at: 03/08/2024 0939   JENNIFER: How many times in the past year have you...    Used an illegal drug or used a prescription medication for non-medical reasons? Never Filed at: 03/08/2024 0939                      Medical Decision Making  Patient is a 34-year-old female with relevant past medical history of arthritis, diabetes mellitus, endometriosis, hepatitis C, hypertension, migraines, morbid obesity, pulmonary artery congenital abnormality presenting with nausea and vomiting x 3 days. She has pain and tenderness in her RUQ, LUQ, and epigastric region.  Cardiac workup, including troponin and EKG, due to upper abdominal pain.   Abdominal labs and CT abdomen/pelvis due to upper quadrant pain and tenderness. Urine pregnancy to rule out pregnancy.  See ED course for interpretation of labs, imaging, and further medical decision making.   Toradol 15 mg IV given for the pain and Zofran 4 mg IV given for the nausea. IV fluids given for hydration. Patient felt much better after these and wanted to go home. Hepatitis panel would likely be unrevealing as she has a history of hepatitis C.  Given referral for gastroenterology advised her to follow-up with them to discuss her CT findings. Offered a prescription for Zofran for her nausea but she declined.  Dispo: Patient discharged home with strict return precautions. Advised patient to return to the nearest  emergency room if she has new or worsening symptoms. Advised patient to follow-up with her family doctor. Patient is satisfied with care and agrees with management and plan.       Amount and/or Complexity of Data Reviewed  External Data Reviewed: labs, radiology and notes.  Labs: ordered. Decision-making details documented in ED Course.  Radiology: ordered. Decision-making details documented in ED Course.  ECG/medicine tests: ordered and independent interpretation performed.    Risk  OTC drugs.  Prescription drug management.             Disposition  Final diagnoses:   Hepatosplenomegaly   Abdominal pain   Nausea     Time reflects when diagnosis was documented in both MDM as applicable and the Disposition within this note       Time User Action Codes Description Comment    3/8/2024 11:58 AM Twan Uriarte Add [R16.2] Hepatosplenomegaly     3/8/2024 12:59 PM Twna Uriarte Add [R10.9] Abdominal pain     3/8/2024  1:00 PM Twan Uriarte Add [R11.0] Nausea           ED Disposition       ED Disposition   Discharge    Condition   Stable    Date/Time   Fri Mar 8, 2024 1259    Comment   Jaycee Can discharge to home/self care.                   Follow-up Information       Follow up With Specialties Details Why Contact Info Additional Information    [facility] Emergency Medicine Go to  If symptoms worsen [address] [facility] [address] [phone] [facility], [address] [place], [place] [id number] [place] [address] [facility] Schedule an appointment as soon as possible for a visit  As needed [address] [place] [id number]  [phone] [facility], [address] [place] [address] [facility]  Thomas Donnel[place], [id number], [phone]            Discharge Medication List as of 3/8/2024  1:18 PM        CONTINUE these  medications which have NOT CHANGED    Details   acetaminophen (TYLENOL) 500 mg tablet Take 1,000 mg by mouth, Starting Wed 5/6/2020, Historical Med      albuterol (Ventolin HFA) 90 mcg/act inhaler Inhale 2 puffs every 4 (four) hours as needed for wheezing or shortness of breath, Starting Fri 1/26/2024, Normal      ALPRAZolam (XANAX) 0.5 mg tablet Take 1 tablet (0.5 mg total) by mouth daily at bedtime as needed for anxiety, Starting Fri 1/26/2024, Normal      Aspirin Low Dose 81 MG EC tablet TAKE 1 TABLET BY MOUTH DAILY. DO NOT START BEFORE FEBRYARY 1, 2023, Normal      Blood Glucose Monitoring Suppl (OneTouch Verio) w/Device KIT Use daily, Starting Tue 11/23/2021, Normal      Blood Pressure Monitoring (B-D ASSURE BPM/AUTO WRIST CUFF) MISC Check blood pressure prior to each OB visit, or as directed by your physician., Normal      citalopram (CeleXA) 10 mg tablet take 1 tablet by mouth once daily, Starting Tue 3/5/2024, Normal      Continuous Blood Gluc  (FreeStyle Gautam 14 Day [place]) KVNG Use with gautam sensor, Normal      Continuous Blood Gluc Sensor (FreeStyle Gautam 3 Sensor) MISC USE 1 SENSOR EACH TO BE CHANGED EVERY 14 DAYS, Normal      dulaglutide (Trulicity) 0.75 MG/0.5ML injection Inject 0.5 mL (0.75 mg total) under the skin every 7 days for 4 doses Take 0.75mg weekly X 4 weeks after which will increase to 1.5 mg weekly., Starting Fri 1/26/2024, Until Sat 2/17/2024, Normal      Empagliflozin (JARDIANCE) 10 MG TABS tablet Take 1 tablet (10 mg total) by mouth daily, Starting Fri 1/26/2024, Until Mon 1/20/2025, Normal      erythromycin (ILOTYCIN) ophthalmic ointment Administer 0.5 inches to the right eye daily at bedtime, Starting Thu 6/29/2023, Normal      fexofenadine (ALLEGRA) 180 MG tablet Take 1 tablet (180 mg total) by mouth daily, Starting Fri 8/20/2021, Normal      gabapentin (NEURONTIN) 100 mg capsule Take 1 capsule (100 mg total) by mouth daily at bedtime, Starting Fri 1/26/2024, Normal       insulin glargine (LANTUS) 100 units/mL subcutaneous injection Inject 17 Units under the skin daily at bedtime, Starting Fri 1/26/2024, Until Sat 5/25/2024, Normal      insulin lispro (HumaLOG KwikPen) 100 units/mL injection pen Inject 5 Units under the skin 3 (three) times a day with meals, Starting Fri 1/26/2024, Normal      Insulin Pen Needle (B-D UF III MINI PEN NEEDLES) 31G X 5 MM MISC Inject under the skin daily at bedtime, Starting Fri 1/26/2024, Normal      lisinopril (ZESTRIL) 30 mg tablet Take 1 tablet (30 mg total) by mouth daily, Starting Fri 1/26/2024, Normal      metFORMIN (GLUCOPHAGE) 1000 MG tablet Take 1 tablet (1,000 mg total) by mouth 2 (two) times a day with meals, Starting Fri 1/26/2024, Normal      montelukast (SINGULAIR) 10 mg tablet Take 1 tablet (10 mg total) by mouth daily at bedtime, Starting Fri 1/26/2024, Normal      Multiple Vitamin (MULTI VITAMIN DAILY PO) Take by mouth, Historical Med      naproxen (Naprosyn) 500 mg tablet Take 1 tablet (500 mg total) by mouth 2 (two) times a day with meals, Starting Fri 1/26/2024, Normal                 PDMP Review         Value Time User    PDMP Reviewed  Yes 1/26/2024  9:40 AM REJI Franco            ED Provider  Electronically Signed by             Twan Uriarte PA-C  03/08/24 [id number]

## 2024-03-08 NOTE — PROGRESS NOTES
[facility]    NAME: Jaycee Can is a 34 y.o. female  : 1990    MRN: [id number]  DATE: 2024  TIME: 9:25 AM    Assessment and Plan   Upper abdominal pain [R10.10]  1. Upper abdominal pain  Transfer to other facility      2. Nausea and vomiting, unspecified vomiting type            Patient Instructions     Patient Instructions   Recommend evaluation in the emergency room.  Nausea/vomiting for 3 days, no diarrhea.  Upper abdominal pain with history of Hep C in remission and history of splenomegaly.  S/p cholecystectomy.  Differential could include hepatitis, pancreatitis, acute gastroenteritis.      Chief Complaint   No chief complaint on file.      History of Present Illness   34-year-old female here with complaint of nausea and vomiting last 3 days.  States that the vomiting is not all day long but she really cannot hold anything down.  She denies any diarrhea.  Also having some discomfort in her upper abdomen more on the right side than the left.  Denies any radiation of the pain.  States that it is sharp at times.  Denies any lower abdominal discomfort.  No diarrhea or constipation.  She is status postcholecystectomy.  She also had a tubal ligation.  Currently has her menses and does have some abdominal cramping.  History of hepatitis C that is in remission.  Also reports having history of splenomegaly.  She denies any fever or chills.  No upper respiratory complaints.  Boyfriend did have flulike symptoms recently with just body aches and chills.  No GI symptoms.        Review of Systems   Review of Systems   Constitutional:  Negative for activity change, appetite change, chills, fatigue and fever.   Respiratory:  Negative for cough.    Cardiovascular:  Negative for chest pain.   Gastrointestinal:  Positive for abdominal pain, nausea and vomiting. Negative for constipation and diarrhea.   Genitourinary:  Negative for difficulty urinating, dysuria, flank pain, frequency, hematuria and  urgency.   Musculoskeletal:  Negative for back pain and myalgias.   All other systems reviewed and are negative.      Current Medications     Current Outpatient Medications:     acetaminophen (TYLENOL) 500 mg tablet, Take 1,000 mg by mouth, Disp: , Rfl:     albuterol (Ventolin HFA) 90 mcg/act inhaler, Inhale 2 puffs every 4 (four) hours as needed for wheezing or shortness of breath, Disp: 18 g, Rfl: 2    ALPRAZolam (XANAX) 0.5 mg tablet, Take 1 tablet (0.5 mg total) by mouth daily at bedtime as needed for anxiety, Disp: 15 tablet, Rfl: 0    Aspirin Low Dose 81 MG EC tablet, TAKE 1 TABLET BY MOUTH DAILY. DO NOT START BEFORE FEBRYARY 1, 2023, Disp: 90 tablet, Rfl: 0    Blood Glucose Monitoring Suppl (OneTouch Verio) w/Device KIT, Use daily (Patient not taking: Reported on 11/14/2023), Disp: 1 kit, Rfl: 0    Blood Pressure Monitoring (B-D ASSURE BPM/AUTO WRIST CUFF) MISC, Check blood pressure prior to each OB visit, or as directed by your physician. (Patient not taking: Reported on 12/15/2023), Disp: 1 each, Rfl: 0    citalopram (CeleXA) 10 mg tablet, take 1 tablet by mouth once daily, Disp: 90 tablet, Rfl: 1    Continuous Blood Gluc  ([place] Gautam 14 Day [place]) KVNG, Use with gautam sensor, Disp: 1 each, Rfl: 3    Continuous Blood Gluc Sensor (FreeStyle Gautam 3 Sensor) MISC, USE 1 SENSOR EACH TO BE CHANGED EVERY 14 DAYS, Disp: 2 each, Rfl: 2    dulaglutide (Trulicity) 0.75 MG/0.5ML injection, Inject 0.5 mL (0.75 mg total) under the skin every 7 days for 4 doses Take 0.75mg weekly X 4 weeks after which will increase to 1.5 mg weekly., Disp: 2 mL, Rfl: 0    Empagliflozin (JARDIANCE) 10 MG TABS tablet, Take 1 tablet (10 mg total) by mouth daily, Disp: 90 tablet, Rfl: 3    erythromycin (ILOTYCIN) ophthalmic ointment, Administer 0.5 inches to the right eye daily at bedtime (Patient not taking: Reported on 8/2/2023), Disp: 3.5 g, Rfl: 0    fexofenadine (ALLEGRA) 180 MG tablet, Take 1 tablet (180 mg total) by  mouth daily, Disp: 30 tablet, Rfl: 5    gabapentin (NEURONTIN) 100 mg capsule, Take 1 capsule (100 mg total) by mouth daily at bedtime, Disp: 30 capsule, Rfl: 0    insulin glargine (LANTUS) 100 units/mL subcutaneous injection, Inject 17 Units under the skin daily at bedtime, Disp: 10 mL, Rfl: 0    insulin lispro (HumaLOG KwikPen) 100 units/mL injection pen, Inject 5 Units under the skin 3 (three) times a day with meals, Disp: 15 mL, Rfl: 3    Insulin Pen Needle (B-D UF III MINI PEN NEEDLES) 31G X 5 MM MISC, Inject under the skin daily at bedtime, Disp: 100 each, Rfl: 0    lisinopril (ZESTRIL) 30 mg tablet, Take 1 tablet (30 mg total) by mouth daily, Disp: 90 tablet, Rfl: 3    metFORMIN (GLUCOPHAGE) 1000 MG tablet, Take 1 tablet (1,000 mg total) by mouth 2 (two) times a day with meals, Disp: 180 tablet, Rfl: 0    montelukast (SINGULAIR) 10 mg tablet, Take 1 tablet (10 mg total) by mouth daily at bedtime, Disp: 30 tablet, Rfl: 5    Multiple Vitamin (MULTI VITAMIN DAILY PO), Take by mouth, Disp: , Rfl:     naproxen (Naprosyn) 500 mg tablet, Take 1 tablet (500 mg total) by mouth 2 (two) times a day with meals, Disp: 60 tablet, Rfl: 0    Current Allergies     Allergies as of 03/08/2024 - Reviewed 03/08/2024   Allergen Reaction Noted    Prednisone Swelling 06/12/2018    Bactrim [sulfamethoxazole-trimethoprim] Hives 11/16/2019    Corticosteroids Swelling 01/13/2009    Cortisone Swelling 08/12/2019    Medical tape Hives 04/19/2020    Other Hives 02/29/2020    Sulfa antibiotics Hives 01/14/2019          The following portions of the patient's history were reviewed and updated as appropriate: allergies, current medications, past family history, past medical history, past social history, past surgical history and problem list.   Past Medical History:   Diagnosis Date    Allergic     Arthritis     Bipolar affective disorder, currently depressed, moderate (HCC) 12/17/2018    Cyst of ovary, right     Diabetes mellitus (HCC)  10/10/2018    type 2    Endometriosis     Heart murmur 1988    Hepatitis C     Hepatitis C virus infection cured after antiviral drug therapy     History of transfusion     Hypertension     Migraines     Morbid obesity with BMI of 40.0-44.9, adult (HCC)     Obesity     Pulmonary artery congenital abnormality     Spleen enlarged     Status post surgical removal of both fallopian tubes     Varicella      Past Surgical History:   Procedure Laterality Date    CARDIAC CATHETERIZATION      no CAD 10days, 4 weeks 22months old     CARDIAC CATHETERIZATION N/A 2023    Procedure: Cardiac Coronary Angiogram;  Surgeon: Marcela Lr DO;  Location: [facility];  Service: Cardiology    CARDIAC CATHETERIZATION Left 2023    Procedure: Cardiac Left Heart Cath;  Surgeon: Marcela Lr DO;  Location: [facility];  Service: Cardiology    CARDIAC CATHETERIZATION  2023    Procedure: Cardiac catheterization;  Surgeon: Marcela Lr DO;  Location: [facility];  Service: Cardiology     SECTION, LOW TRANSVERSE      CHOLECYSTECTOMY      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 5    CORONARY STENT PLACEMENT      IR LOWER EXTREMITY ANGIOGRAM  2023    LIVER BIOPSY      LIVER BIOPSY      STERNAL WIRE REMOVAL  2022    THROMBECTOMY W/ EMBOLECTOMY Right 2023    Procedure: Right femoral exposure Right iliac embolectomy w/ #4 Ted catheter Aortogram Right EIA stent w/ 7x29mm VBX;  Surgeon: Jody Hodges MD;  Location: [facility];  Service: Vascular    TUBAL LIGATION Bilateral     VSD REPAIR      As a child    WOUND DEBRIDEMENT Right 2023    Procedure: Right Groin Wound Washout, Pulse Lavage, Wound Vac placement;  Surgeon: Jelani Avila DO;  Location: [facility];  Service: Vascular     Family History   Problem Relation Age of Onset    Hypertension Mother     Migraines Mother     JENNY disease Mother     Depression Mother     Hyperlipidemia Mother      Diabetes Mother     Diabetes Father     Hypertension Father     Kidney failure Father     Heart attack Father     Arthritis Father     Stroke Father     Polycystic kidney disease Paternal Grandmother     Stroke Paternal Grandmother     Heart disease Paternal Grandmother     Arthritis Sister     Asthma Sister     Thyroid disease Sister     Diabetes Sister     Arthritis Maternal Grandmother     Breast cancer Maternal Grandmother     Diabetes Maternal Grandmother     Hypertension Maternal Grandmother     Heart Valve Disease Maternal Grandmother     Learning disabilities Cousin     Learning disabilities Sister     ADD / ADHD Cousin     Lung cancer Brother     Diabetes Maternal Grandfather     Hypertension Maternal Grandfather     JENNY disease Maternal Grandfather     Stroke Maternal Grandfather      Social History     Socioeconomic History    Marital status: Single     Spouse name: Not on file    Number of children: Not on file    Years of education: Not on file    Highest education level: Not on file   Occupational History    Not on file   Tobacco Use    Smoking status: Former     Current packs/day: 0.00     Types: Cigarettes     Start date: 2023     Quit date: 2023     Years since quittin.1    Smokeless tobacco: Never   Vaping Use    Vaping status: Never Used   Substance and Sexual Activity    Alcohol use: Not Currently     Alcohol/week: 3.0 standard drinks of alcohol     Types: 3 Standard drinks or equivalent per week     Comment: socially    Drug use: Not Currently     Comment: hx of THC use for migraines    Sexual activity: Yes     Partners: Male     Birth control/protection: Female Sterilization   Other Topics Concern    Not on file   Social History Narrative    Not on file     Social Determinants of Health     Financial Resource Strain: Not on file   Food Insecurity: No Food Insecurity (2023)    Hunger Vital Sign     Worried About Running Out of Food in the Last Year: Never true     Ran Out of  Food in the Last Year: Never true   Transportation Needs: No Transportation Needs (2/16/2023)    PRAPARE - Transportation     Lack of Transportation (Medical): No     Lack of Transportation (Non-Medical): No   Physical Activity: Not on file   Stress: Not on file   Social Connections: Not on file   Intimate Partner Violence: Not on file   Housing Stability: Not on file     Medications have been verified.    Objective   /88   Pulse 102   Temp 98.1 °F (36.7 °C)   Resp 18   SpO2 98%      Physical Exam   Physical Exam  Vitals and nursing note reviewed.   Constitutional:       General: She is not in acute distress.     Appearance: Normal appearance. She is well-developed.   HENT:      Head: Normocephalic and atraumatic.   Cardiovascular:      Rate and Rhythm: Normal rate and regular rhythm.      Heart sounds: Normal heart sounds. No murmur heard.  Pulmonary:      Effort: Pulmonary effort is normal. No respiratory distress.      Breath sounds: Normal breath sounds.   Abdominal:      General: Bowel sounds are normal. There is distension (Some distention and swelling noted in the upper abdomen bilaterally.).      Tenderness: There is abdominal tenderness (Bilateral upper quadrants mildly tender). There is no guarding or rebound.

## 2024-03-08 NOTE — DISCHARGE INSTRUCTIONS
Immediately return to the emergency room if you experience any new or worsening symptoms or if the symptoms are lasting longer than expected.     Please follow-up with gastroenterology to discuss your CT findings.

## 2024-03-09 ENCOUNTER — HOSPITAL ENCOUNTER (EMERGENCY)
Facility: HOSPITAL | Age: 34
Discharge: HOME | End: 2024-03-09
Attending: EMERGENCY MEDICINE | Admitting: EMERGENCY MEDICINE

## 2024-03-09 VITALS
DIASTOLIC BLOOD PRESSURE: 67 MMHG | SYSTOLIC BLOOD PRESSURE: 146 MMHG | HEART RATE: 86 BPM | TEMPERATURE: 97.3 F | RESPIRATION RATE: 18 BRPM | OXYGEN SATURATION: 97 %

## 2024-03-09 DIAGNOSIS — Z86.79 HISTORY OF AORTIC DISSECTION: ICD-10-CM

## 2024-03-09 DIAGNOSIS — K29.70 GASTRITIS: Primary | ICD-10-CM

## 2024-03-09 DIAGNOSIS — R10.9 ABDOMINAL PAIN: ICD-10-CM

## 2024-03-09 LAB
ALBUMIN SERPL BCP-MCNC: 4.2 G/DL (ref 3.5–5)
ALP SERPL-CCNC: 83 U/L (ref 34–104)
ALT SERPL W P-5'-P-CCNC: 16 U/L (ref 7–52)
ANION GAP SERPL CALCULATED.3IONS-SCNC: 9 MMOL/L
AST SERPL W P-5'-P-CCNC: 20 U/L (ref 13–39)
ATRIAL RATE: 98 BPM
BASOPHILS # BLD AUTO: 0.03 THOUSANDS/ÂΜL (ref 0–0.1)
BASOPHILS NFR BLD AUTO: 0 % (ref 0–1)
BILIRUB SERPL-MCNC: 0.36 MG/DL (ref 0.2–1)
BUN SERPL-MCNC: 10 MG/DL (ref 5–25)
CALCIUM SERPL-MCNC: 9.4 MG/DL (ref 8.4–10.2)
CARDIAC TROPONIN I PNL SERPL HS: 3 NG/L
CHLORIDE SERPL-SCNC: 99 MMOL/L (ref 96–108)
CO2 SERPL-SCNC: 25 MMOL/L (ref 21–32)
CREAT SERPL-MCNC: 0.63 MG/DL (ref 0.6–1.3)
EOSINOPHIL # BLD AUTO: 0.17 THOUSAND/ÂΜL (ref 0–0.61)
EOSINOPHIL NFR BLD AUTO: 2 % (ref 0–6)
ERYTHROCYTE [DISTWIDTH] IN BLOOD BY AUTOMATED COUNT: 14.9 % (ref 11.6–15.1)
GFR SERPL CREATININE-BSD FRML MDRD: 115 ML/MIN/1.73SQ M
GLUCOSE SERPL-MCNC: 352 MG/DL (ref 65–140)
HCT VFR BLD AUTO: 43.6 % (ref 34.8–46.1)
HGB BLD-MCNC: 14.1 G/DL (ref 11.5–15.4)
IMM GRANULOCYTES # BLD AUTO: 0.02 THOUSAND/UL (ref 0–0.2)
IMM GRANULOCYTES NFR BLD AUTO: 0 % (ref 0–2)
LIPASE SERPL-CCNC: 29 U/L (ref 11–82)
LYMPHOCYTES # BLD AUTO: 2.49 THOUSANDS/ÂΜL (ref 0.6–4.47)
LYMPHOCYTES NFR BLD AUTO: 29 % (ref 14–44)
MCH RBC QN AUTO: 25.3 PG (ref 26.8–34.3)
MCHC RBC AUTO-ENTMCNC: 32.3 G/DL (ref 31.4–37.4)
MCV RBC AUTO: 78 FL (ref 82–98)
MONOCYTES # BLD AUTO: 0.46 THOUSAND/ÂΜL (ref 0.17–1.22)
MONOCYTES NFR BLD AUTO: 5 % (ref 4–12)
NEUTROPHILS # BLD AUTO: 5.33 THOUSANDS/ÂΜL (ref 1.85–7.62)
NEUTS SEG NFR BLD AUTO: 64 % (ref 43–75)
NRBC BLD AUTO-RTO: 0 /100 WBCS
P AXIS: 71 DEGREES
PLATELET # BLD AUTO: 253 THOUSANDS/UL (ref 149–390)
PMV BLD AUTO: 10.6 FL (ref 8.9–12.7)
POTASSIUM SERPL-SCNC: 4.1 MMOL/L (ref 3.5–5.3)
PR INTERVAL: 168 MS
PROT SERPL-MCNC: 7.4 G/DL (ref 6.4–8.4)
QRS AXIS: 45 DEGREES
QRSD INTERVAL: 152 MS
QT INTERVAL: 390 MS
QTC INTERVAL: 497 MS
RBC # BLD AUTO: 5.58 MILLION/UL (ref 3.81–5.12)
SODIUM SERPL-SCNC: 133 MMOL/L (ref 135–147)
T WAVE AXIS: 49 DEGREES
VENTRICULAR RATE: 98 BPM
WBC # BLD AUTO: 8.5 THOUSAND/UL (ref 4.31–10.16)

## 2024-03-09 PROCEDURE — 99284 EMERGENCY DEPT VISIT MOD MDM: CPT

## 2024-03-09 PROCEDURE — 83690 ASSAY OF LIPASE: CPT | Performed by: EMERGENCY MEDICINE

## 2024-03-09 PROCEDURE — C9113 INJ PANTOPRAZOLE SODIUM, VIA: HCPCS | Performed by: EMERGENCY MEDICINE

## 2024-03-09 PROCEDURE — 84484 ASSAY OF TROPONIN QUANT: CPT | Performed by: EMERGENCY MEDICINE

## 2024-03-09 PROCEDURE — 85025 COMPLETE CBC W/AUTO DIFF WBC: CPT | Performed by: EMERGENCY MEDICINE

## 2024-03-09 PROCEDURE — 96374 THER/PROPH/DIAG INJ IV PUSH: CPT

## 2024-03-09 PROCEDURE — 93010 ELECTROCARDIOGRAM REPORT: CPT | Performed by: INTERNAL MEDICINE

## 2024-03-09 PROCEDURE — 36415 COLL VENOUS BLD VENIPUNCTURE: CPT | Performed by: EMERGENCY MEDICINE

## 2024-03-09 PROCEDURE — 80053 COMPREHEN METABOLIC PANEL: CPT | Performed by: EMERGENCY MEDICINE

## 2024-03-09 PROCEDURE — 93005 ELECTROCARDIOGRAM TRACING: CPT

## 2024-03-09 PROCEDURE — 96375 TX/PRO/DX INJ NEW DRUG ADDON: CPT

## 2024-03-09 PROCEDURE — 99285 EMERGENCY DEPT VISIT HI MDM: CPT | Performed by: EMERGENCY MEDICINE

## 2024-03-09 PROCEDURE — 96372 THER/PROPH/DIAG INJ SC/IM: CPT

## 2024-03-09 RX ORDER — ONDANSETRON 2 MG/ML
4 INJECTION INTRAMUSCULAR; INTRAVENOUS ONCE
Status: COMPLETED | OUTPATIENT
Start: 2024-03-09 | End: 2024-03-09

## 2024-03-09 RX ORDER — PANTOPRAZOLE SODIUM 40 MG/10ML
40 INJECTION, POWDER, LYOPHILIZED, FOR SOLUTION INTRAVENOUS ONCE
Status: COMPLETED | OUTPATIENT
Start: 2024-03-09 | End: 2024-03-09

## 2024-03-09 RX ORDER — ONDANSETRON 4 MG/1
4 TABLET, ORALLY DISINTEGRATING ORAL EVERY 6 HOURS PRN
Qty: 8 TABLET | Refills: 0 | Status: SHIPPED | OUTPATIENT
Start: 2024-03-09

## 2024-03-09 RX ORDER — MAGNESIUM HYDROXIDE/ALUMINUM HYDROXICE/SIMETHICONE 120; 1200; 1200 MG/30ML; MG/30ML; MG/30ML
30 SUSPENSION ORAL ONCE
Status: COMPLETED | OUTPATIENT
Start: 2024-03-09 | End: 2024-03-09

## 2024-03-09 RX ORDER — SUCRALFATE 1 G/1
1 TABLET ORAL 4 TIMES DAILY
Qty: 28 TABLET | Refills: 0 | Status: SHIPPED | OUTPATIENT
Start: 2024-03-09

## 2024-03-09 RX ORDER — SUCRALFATE 1 G/1
1 TABLET ORAL ONCE
Status: COMPLETED | OUTPATIENT
Start: 2024-03-09 | End: 2024-03-09

## 2024-03-09 RX ADMIN — SUCRALFATE 1 G: 1 TABLET ORAL at 15:37

## 2024-03-09 RX ADMIN — INSULIN HUMAN 8 UNITS: 100 INJECTION, SOLUTION PARENTERAL at 16:06

## 2024-03-09 RX ADMIN — ALUMINUM HYDROXIDE, MAGNESIUM HYDROXIDE, AND DIMETHICONE 30 ML: 200; 20; 200 SUSPENSION ORAL at 14:47

## 2024-03-09 RX ADMIN — PANTOPRAZOLE SODIUM 40 MG: 40 INJECTION, POWDER, FOR SOLUTION INTRAVENOUS at 15:38

## 2024-03-09 RX ADMIN — ONDANSETRON 4 MG: 2 INJECTION INTRAMUSCULAR; INTRAVENOUS at 14:48

## 2024-03-09 NOTE — ED PROVIDER NOTES
History  Chief Complaint   Patient presents with    Nausea    Abdominal Pain     34-year-old female with history of hepatosplenomegaly, as well as diabetes presents emergency room complaining of abdominal pain and vomiting.  The patient was here yesterday for similar complaints.  The patient had lab work and is well as a CT scan of the abdomen pelvis at that time, showing a type B aortic dissection extending into the right iliac artery.  This was found to be unchanged and stable.  The patient is noting that she has increased pain in the epigastrium region.  The patient has no lower abdominal pain.  Patient notes that she has been taking Tylenol for discomfort.        Prior to Admission Medications   Prescriptions Last Dose Informant Patient Reported? Taking?   ALPRAZolam (XANAX) 0.5 mg tablet   No No   Sig: Take 1 tablet (0.5 mg total) by mouth daily at bedtime as needed for anxiety   Aspirin Low Dose 81 MG EC tablet   No No   Sig: TAKE 1 TABLET BY MOUTH DAILY. DO NOT START BEFORE FEBRYARY 1, 2023   Blood Glucose Monitoring Suppl (OneTouch Verio) w/Device KIT  Self No No   Sig: Use daily   Patient not taking: Reported on 11/14/2023   Blood Pressure Monitoring (B-D ASSURE BPM/AUTO WRIST CUFF) MISC  Self No No   Sig: Check blood pressure prior to each OB visit, or as directed by your physician.   Patient not taking: Reported on 12/15/2023   Continuous Blood Gluc  (FreeStyle Gautam 14 Day [place]) KVNG   No No   Sig: Use with gautam sensor   Continuous Blood Gluc Sensor (FreeStyle Gautam 3 Sensor) MISC   No No   Sig: USE 1 SENSOR EACH TO BE CHANGED EVERY 14 DAYS   Empagliflozin (JARDIANCE) 10 MG TABS tablet   No No   Sig: Take 1 tablet (10 mg total) by mouth daily   Insulin Pen Needle (B-D UF III MINI PEN NEEDLES) 31G X 5 MM MISC   No No   Sig: Inject under the skin daily at bedtime   Multiple Vitamin (MULTI VITAMIN DAILY PO)  Self Yes No   Sig: Take by mouth   acetaminophen (TYLENOL) 500 mg tablet  Self Yes No    Sig: Take 1,000 mg by mouth   albuterol (Ventolin HFA) 90 mcg/act inhaler   No No   Sig: Inhale 2 puffs every 4 (four) hours as needed for wheezing or shortness of breath   citalopram (CeleXA) 10 mg tablet   No No   Sig: take 1 tablet by mouth once daily   dulaglutide (Trulicity) 0.75 MG/0.5ML injection   No No   Sig: Inject 0.5 mL (0.75 mg total) under the skin every 7 days for 4 doses Take 0.75mg weekly X 4 weeks after which will increase to 1.5 mg weekly.   erythromycin (ILOTYCIN) ophthalmic ointment   No No   Sig: Administer 0.5 inches to the right eye daily at bedtime   Patient not taking: Reported on 8/2/2023   fexofenadine (ALLEGRA) 180 MG tablet  Self No No   Sig: Take 1 tablet (180 mg total) by mouth daily   gabapentin (NEURONTIN) 100 mg capsule   No No   Sig: Take 1 capsule (100 mg total) by mouth daily at bedtime   insulin glargine (LANTUS) 100 units/mL subcutaneous injection   No No   Sig: Inject 17 Units under the skin daily at bedtime   insulin lispro (HumaLOG KwikPen) 100 units/mL injection pen   No No   Sig: Inject 5 Units under the skin 3 (three) times a day with meals   lisinopril (ZESTRIL) 30 mg tablet   No No   Sig: Take 1 tablet (30 mg total) by mouth daily   metFORMIN (GLUCOPHAGE) 1000 MG tablet   No No   Sig: Take 1 tablet (1,000 mg total) by mouth 2 (two) times a day with meals   montelukast (SINGULAIR) 10 mg tablet   No No   Sig: Take 1 tablet (10 mg total) by mouth daily at bedtime   naproxen (Naprosyn) 500 mg tablet   No No   Sig: Take 1 tablet (500 mg total) by mouth 2 (two) times a day with meals      Facility-Administered Medications: None       Past Medical History:   Diagnosis Date    Allergic     Arthritis     Bipolar affective disorder, currently depressed, moderate (HCC) 12/17/2018    Cyst of ovary, right     Diabetes mellitus (HCC) 10/10/2018    type 2    Endometriosis     Heart murmur 1988    Hepatitis C     Hepatitis C virus infection cured after antiviral drug therapy      History of transfusion     Hypertension     Migraines     Morbid obesity with BMI of 40.0-44.9, adult (HCC)     Obesity     Pulmonary artery congenital abnormality     Spleen enlarged     Status post surgical removal of both fallopian tubes     Varicella        Past Surgical History:   Procedure Laterality Date    CARDIAC CATHETERIZATION      no CAD 10days, 4 weeks 22months old     CARDIAC CATHETERIZATION N/A 2023    Procedure: Cardiac Coronary Angiogram;  Surgeon: Marcela Lr DO;  Location: [facility];  Service: Cardiology    CARDIAC CATHETERIZATION Left 2023    Procedure: Cardiac Left Heart Cath;  Surgeon: Marcela Lr DO;  Location: [facility];  Service: Cardiology    CARDIAC CATHETERIZATION  2023    Procedure: Cardiac catheterization;  Surgeon: Marcela Lr DO;  Location: [facility];  Service: Cardiology     SECTION, LOW TRANSVERSE      CHOLECYSTECTOMY      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 5    CORONARY STENT PLACEMENT      IR LOWER EXTREMITY ANGIOGRAM  2023    LIVER BIOPSY      LIVER BIOPSY      STERNAL WIRE REMOVAL  2022    THROMBECTOMY W/ EMBOLECTOMY Right 2023    Procedure: Right femoral exposure Right iliac embolectomy w/ #4 Ted catheter Aortogram Right EIA stent w/ 7x29mm VBX;  Surgeon: Jody Hodges MD;  Location: [facility];  Service: Vascular    TUBAL LIGATION Bilateral     VSD REPAIR      As a child    WOUND DEBRIDEMENT Right 2023    Procedure: Right Groin Wound Washout, Pulse Lavage, Wound Vac placement;  Surgeon: Jelani Avila DO;  Location: [facility];  Service: Vascular       Family History   Problem Relation Age of Onset    Hypertension Mother     Migraines Mother     JENNY disease Mother     Depression Mother     Hyperlipidemia Mother     Diabetes Mother     Diabetes Father     Hypertension Father     Kidney failure Father     Heart attack Father     Arthritis Father     Stroke  Father     Polycystic kidney disease Paternal Grandmother     Stroke Paternal Grandmother     Heart disease Paternal Grandmother     Arthritis Sister     Asthma Sister     Thyroid disease Sister     Diabetes Sister     Arthritis Maternal Grandmother     Breast cancer Maternal Grandmother     Diabetes Maternal Grandmother     Hypertension Maternal Grandmother     Heart Valve Disease Maternal Grandmother     Learning disabilities Cousin     Learning disabilities Sister     ADD / ADHD Cousin     Lung cancer Brother     Diabetes Maternal Grandfather     Hypertension Maternal Grandfather     JENNY disease Maternal Grandfather     Stroke Maternal Grandfather      I have reviewed and agree with the history as documented.    E-Cigarette/Vaping    E-Cigarette Use Never User      E-Cigarette/Vaping Substances    Nicotine No     THC No     CBD No     Flavoring No      Social History     Tobacco Use    Smoking status: Former     Current packs/day: 0.00     Types: Cigarettes     Start date: 2023     Quit date: 2023     Years since quittin.1    Smokeless tobacco: Never   Vaping Use    Vaping status: Never Used   Substance Use Topics    Alcohol use: Not Currently     Alcohol/week: 3.0 standard drinks of alcohol     Types: 3 Standard drinks or equivalent per week     Comment: socially    Drug use: Not Currently     Comment: hx of THC use for migraines       Review of Systems   Constitutional:  Positive for activity change and fatigue. Negative for chills and fever.   HENT:  Negative for ear pain and sore throat.    Eyes:  Negative for pain and visual disturbance.   Respiratory:  Negative for cough and shortness of breath.    Cardiovascular:  Negative for chest pain and palpitations.   Gastrointestinal:  Positive for abdominal pain, nausea and vomiting.   Genitourinary:  Negative for dysuria and hematuria.   Musculoskeletal:  Negative for arthralgias and back pain.   Skin:  Negative for color change and rash.    Neurological:  Negative for seizures and syncope.   All other systems reviewed and are negative.      Physical Exam  Physical Exam  Vitals and nursing note reviewed.   Constitutional:       General: She is not in acute distress.     Appearance: Normal appearance. She is well-developed.   HENT:      Head: Normocephalic and atraumatic.      Right Ear: External ear normal.      Left Ear: External ear normal.      Nose: Nose normal.      Mouth/Throat:      Mouth: Mucous membranes are moist.   Eyes:      Conjunctiva/sclera: Conjunctivae normal.   Cardiovascular:      Rate and Rhythm: Normal rate and regular rhythm.      Pulses: Normal pulses.      Heart sounds: Normal heart sounds. No murmur heard.  Pulmonary:      Effort: Pulmonary effort is normal. No respiratory distress.      Breath sounds: Normal breath sounds.   Abdominal:      General: Bowel sounds are normal.      Palpations: Abdomen is soft.      Tenderness: There is no abdominal tenderness.   Musculoskeletal:         General: No swelling or deformity.      Cervical back: Neck supple.   Skin:     General: Skin is warm and dry.      Capillary Refill: Capillary refill takes less than 2 seconds.   Neurological:      General: No focal deficit present.      Mental Status: She is alert and oriented to person, place, and time. Mental status is at baseline.         Vital Signs  ED Triage Vitals   Temperature Pulse Respirations Blood Pressure SpO2   03/09/24 1427 03/09/24 1427 03/09/24 1427 03/09/24 1427 03/09/24 1427   (!) 97.3 °F (36.3 °C) 102 18 (!) 211/112 97 %      Temp src Heart Rate Source Patient Position - Orthostatic VS BP Location FiO2 (%)   -- -- -- -- --             Pain Score       03/09/24 1425       9           Vitals:    03/09/24 1427 03/09/24 1500   BP: (!) 211/112 146/67   Pulse: 102 86         Visual Acuity      ED Medications  Medications   aluminum-magnesium hydroxide-simethicone (MAALOX) oral suspension 30 mL (30 mL Oral Given 3/9/24 1447)    ondansetron (ZOFRAN) injection 4 mg (4 mg Intravenous Given 3/9/24 1448)   sucralfate (CARAFATE) tablet 1 g (1 g Oral Given 3/9/24 1537)   pantoprazole (PROTONIX) injection 40 mg (40 mg Intravenous Given 3/9/24 1538)   insulin regular (HumuLIN R,NovoLIN R) injection 8 Units (8 Units Subcutaneous Given 3/9/24 1606)       Diagnostic Studies  Results Reviewed       Procedure Component Value Units Date/Time    Comprehensive metabolic panel [[id number]]  (Abnormal) Collected: 03/09/24 1447    Lab Status: Final result Specimen: Blood from Arm, Right Updated: 03/09/24 1534     Sodium 133 mmol/L      Potassium 4.1 mmol/L      Chloride 99 mmol/L      CO2 25 mmol/L      ANION GAP 9 mmol/L      BUN 10 mg/dL      Creatinine 0.63 mg/dL      Glucose 352 mg/dL      Calcium 9.4 mg/dL      AST 20 U/L      ALT 16 U/L      Alkaline Phosphatase 83 U/L      Total Protein 7.4 g/dL      Albumin 4.2 g/dL      Total Bilirubin 0.36 mg/dL      eGFR 115 ml/min/1.73sq m     Narrative:      National Kidney Disease Foundation guidelines for Chronic Kidney Disease (CKD):     Stage 1 with normal or high GFR (GFR > 90 mL/min/1.73 square meters)    Stage 2 Mild CKD (GFR = 60-89 mL/min/1.73 square meters)    Stage 3A Moderate CKD (GFR = 45-59 mL/min/1.73 square meters)    Stage 3B Moderate CKD (GFR = 30-44 mL/min/1.73 square meters)    Stage 4 Severe CKD (GFR = 15-29 mL/min/1.73 square meters)    Stage 5 End Stage CKD (GFR <15 mL/min/1.73 square meters)  Note: GFR calculation is accurate only with a steady state creatinine    Lipase [[id number]]  (Normal) Collected: 03/09/24 1447    Lab Status: Final result Specimen: Blood from Arm, Right Updated: 03/09/24 1534     Lipase 29 u/L     HS Troponin 0hr (reflex protocol) [[id number]]  (Normal) Collected: 03/09/24 1447    Lab Status: Final result Specimen: Blood from Arm, Right Updated: 03/09/24 1517     hs TnI 0hr 3 ng/L     CBC and differential [[id number]]  (Abnormal) Collected: 03/09/24 1447    Lab  Status: Final result Specimen: Blood from Arm, Right Updated: 03/09/24 1456     WBC 8.50 Thousand/uL      RBC 5.58 Million/uL      Hemoglobin 14.1 g/dL      Hematocrit 43.6 %      MCV 78 fL      MCH 25.3 pg      MCHC 32.3 g/dL      RDW 14.9 %      MPV 10.6 fL      Platelets 253 Thousands/uL      nRBC 0 /100 WBCs      Neutrophils Relative 64 %      Immat GRANS % 0 %      Lymphocytes Relative 29 %      Monocytes Relative 5 %      Eosinophils Relative 2 %      Basophils Relative 0 %      Neutrophils Absolute 5.33 Thousands/µL      Immature Grans Absolute 0.02 Thousand/uL      Lymphocytes Absolute 2.49 Thousands/µL      Monocytes Absolute 0.46 Thousand/µL      Eosinophils Absolute 0.17 Thousand/µL      Basophils Absolute 0.03 Thousands/µL                    No orders to display              Procedures  ECG 12 Lead Documentation Only    Date/Time: 3/9/2024 2:47 PM    Performed by: Ramón Jay Jr., DO  Authorized by: Ramón Jay Jr., DO    ECG reviewed by me, the ED Provider: yes    Patient location:  ED  Comments:      EKG shows a sinus rhythm at 96 beats a minute.  There is a right bundle branch block pattern noted.  There is no other definitive acute ST or T wave changes appreciated.  EKG relatively unchanged from prior tracing.           ED Course  ED Course as of 03/10/24 0059   Sat Mar 09, 2024   1458 D/W Dr. Florentin Frias Reviewed previous CT scan done in November 2023.  They note that the dissection was indeed there on that date, and the scan yesterday shows no difference.   1533 Patient with some improvement after Maalox.   1636 Patient notes even more improvement after Carafate.  Patient's symptoms are likely due to gastritis versus peptic ulcer disease.  The patiently placed on an acid as well as medication for nausea as well as possible ulcer.  The patient should be rechecked by their doctor within a week, and follow-up with cardiologist regarding the chronic aortic dissection for chronic  surveillance.                               SBIRT 22yo+      Flowsheet Row Most Recent Value   Initial Alcohol Screen: US AUDIT-C     1. How often do you have a drink containing alcohol? 0 Filed at: 03/09/2024 1426   2. How many drinks containing alcohol do you have on a typical day you are drinking?  0 Filed at: 03/09/2024 1426   3b. FEMALE Any Age, or MALE 65+: How often do you have 4 or more drinks on one occassion? 0 Filed at: 03/09/2024 1426   Audit-C Score 0 Filed at: 03/09/2024 1426   JENNIFER: How many times in the past year have you...    Used an illegal drug or used a prescription medication for non-medical reasons? Never Filed at: 03/09/2024 1426                      Medical Decision Making  34-year-old female presents emergency room due to upper abdominal pain and nausea and vomiting.  The patient was here yesterday for the same thing.  Patient had a CAT scan done yesterday which showed a type B aortic dissection which according to radiology is unchanged.  I did discuss the CT results with radiology who noted that on her prior comparison CT in November, this finding was also apparent although the dictation notes otherwise.  They note that they will amend this read.  Differential diagnosis at the time my evaluation is gastroenteritis, peptic ulcer disease or pancreatitis.  The patient has reproducible abdominal pain in the epigastrium and does not have mid or lower abdominal pain.  Patient was given Maalox with improvement in symptoms.  Lab tests reviewed shows no significant abnormalities at this time.  The patient's glucose still is elevated, and Humulin ordered.  Patient counseled on her aortic dissection findings, and told to follow-up with her cardiologist as this will need to be reevaluated intermittently throughout her life.  Patient discharged.    Amount and/or Complexity of Data Reviewed  Labs: ordered.    Risk  OTC drugs.  Prescription drug management.             Disposition  Final diagnoses:    Gastritis   Abdominal pain   History of aortic dissection     Time reflects when diagnosis was documented in both MDM as applicable and the Disposition within this note       Time User Action Codes Description Comment    3/9/2024  3:55 PM BruticoEdsonRamón Add [K29.70] Gastritis     3/9/2024  4:36 PM Brutico, Ramón Add [R10.9] Abdominal pain     3/9/2024  4:36 PM Brutico Ramón Add [I71.019] Chronic thoracic aortic dissection (HCC)     3/9/2024  4:36 PM Brutico Ramón Remove [I71.019] Chronic thoracic aortic dissection (HCC)     3/9/2024  4:37 PM Brutico, Ramón Add [Z86.79] History of aortic dissection           ED Disposition       ED Disposition   Discharge    Condition   Stable    Date/Time   Sat Mar 9, 2024 1638    Comment   Jaycee Can discharge to home/self care.                   Follow-up Information       Follow up With Specialties Details Why Contact Info    [facility], Nurse Practitioner On 3/13/2024  [address] [place] [address] [id number] [phone]              Discharge Medication List as of 3/9/2024  4:39 PM        START taking these medications    Details   ondansetron (ZOFRAN-ODT) 4 mg disintegrating tablet Take 1 tablet (4 mg total) by mouth every 6 (six) hours as needed for nausea for up to 8 doses, Starting Sat 3/9/2024, Normal      sucralfate (CARAFATE) 1 g tablet Take 1 tablet (1 g total) by mouth 4 (four) times a day, Starting Sat 3/9/2024, Normal           CONTINUE these medications which have NOT CHANGED    Details   acetaminophen (TYLENOL) 500 mg tablet Take 1,000 mg by mouth, Starting Wed 5/6/2020, Historical Med      albuterol (Ventolin HFA) 90 mcg/act inhaler Inhale 2 puffs every 4 (four) hours as needed for wheezing or shortness of breath, Starting Fri 1/26/2024, Normal      ALPRAZolam (XANAX) 0.5 mg tablet Take 1 tablet (0.5 mg total) by mouth daily at bedtime as needed for anxiety, Starting Fri 1/26/2024, Normal      Aspirin Low Dose 81 MG EC  tablet TAKE 1 TABLET BY MOUTH DAILY. DO NOT START BEFORE FEBRYARY 1, 2023, Normal      Blood Glucose Monitoring Suppl (OneTouch Verio) w/Device KIT Use daily, Starting Tue 11/23/2021, Normal      Blood Pressure Monitoring (B-D ASSURE BPM/AUTO WRIST CUFF) MISC Check blood pressure prior to each OB visit, or as directed by your physician., Normal      citalopram (CeleXA) 10 mg tablet take 1 tablet by mouth once daily, Starting Tue 3/5/2024, Normal      Continuous Blood Gluc  (FreeStyle Gautam 14 Day [place]) KVNG Use with gautam sensor, Normal      Continuous Blood Gluc Sensor (FreeStyle Gautam 3 Sensor) MISC USE 1 SENSOR EACH TO BE CHANGED EVERY 14 DAYS, Normal      dulaglutide (Trulicity) 0.75 MG/0.5ML injection Inject 0.5 mL (0.75 mg total) under the skin every 7 days for 4 doses Take 0.75mg weekly X 4 weeks after which will increase to 1.5 mg weekly., Starting Fri 1/26/2024, Until Sat 2/17/2024, Normal      Empagliflozin (JARDIANCE) 10 MG TABS tablet Take 1 tablet (10 mg total) by mouth daily, Starting Fri 1/26/2024, Until Mon 1/20/2025, Normal      erythromycin (ILOTYCIN) ophthalmic ointment Administer 0.5 inches to the right eye daily at bedtime, Starting Thu 6/29/2023, Normal      fexofenadine (ALLEGRA) 180 MG tablet Take 1 tablet (180 mg total) by mouth daily, Starting Fri 8/20/2021, Normal      gabapentin (NEURONTIN) 100 mg capsule Take 1 capsule (100 mg total) by mouth daily at bedtime, Starting Fri 1/26/2024, Normal      insulin glargine (LANTUS) 100 units/mL subcutaneous injection Inject 17 Units under the skin daily at bedtime, Starting Fri 1/26/2024, Until Sat 5/25/2024, Normal      insulin lispro (HumaLOG KwikPen) 100 units/mL injection pen Inject 5 Units under the skin 3 (three) times a day with meals, Starting Fri 1/26/2024, Normal      Insulin Pen Needle (B-D UF III MINI PEN NEEDLES) 31G X 5 MM MISC Inject under the skin daily at bedtime, Starting Fri 1/26/2024, Normal      lisinopril (ZESTRIL)  30 mg tablet Take 1 tablet (30 mg total) by mouth daily, Starting Fri 1/26/2024, Normal      metFORMIN (GLUCOPHAGE) 1000 MG tablet Take 1 tablet (1,000 mg total) by mouth 2 (two) times a day with meals, Starting Fri 1/26/2024, Normal      montelukast (SINGULAIR) 10 mg tablet Take 1 tablet (10 mg total) by mouth daily at bedtime, Starting Fri 1/26/2024, Normal      Multiple Vitamin (MULTI VITAMIN DAILY PO) Take by mouth, Historical Med      naproxen (Naprosyn) 500 mg tablet Take 1 tablet (500 mg total) by mouth 2 (two) times a day with meals, Starting Fri 1/26/2024, Normal             No discharge procedures on file.    PDMP Review         Value Time User    PDMP Reviewed  Yes 1/26/2024  9:40 AM RE[facility]I Franco            ED Provider  Electronically Signed by             Ramón Jay Jr.,   03/10/24 0059

## 2024-03-09 NOTE — DISCHARGE INSTRUCTIONS
Clear liquids for the next 24 hours.  Advance diet slowly as tolerated.    Take omeprazole which you can buy over-the-counter to decrease your stomach acid, 1 pill a day for the next week.    Use Carafate 1 pill before meals and at bedtime.  Do not take any other medicines for an hour after taking a Carafate tablet.    For nausea you Zofran 1 pill under your tongue every 6-8 hours as needed.    Recheck with your family doctor in 2 to 4 days for repeat evaluation and call your cardiologist to set up an appointment to talk about the aortic injury you have and the need for chronic checkups regarding this.

## 2024-03-09 NOTE — Clinical Note
Jaycee Can was seen and treated in our emergency department on 3/9/2024.                Diagnosis:     Jaycee JAFFE  may return to work on return date.    She may return on this date: 03/11/2024         If you have any questions or concerns, please don't hesitate to call.      Ramón Jay Jr., DO    ______________________________           _______________          _______________  Hospital Representative                              Date                                Time

## 2024-03-10 LAB
ATRIAL RATE: 96 BPM
P AXIS: 54 DEGREES
PR INTERVAL: 172 MS
QRS AXIS: 10 DEGREES
QRSD INTERVAL: 144 MS
QT INTERVAL: 382 MS
QTC INTERVAL: 482 MS
T WAVE AXIS: 25 DEGREES
VENTRICULAR RATE: 96 BPM

## 2024-03-10 PROCEDURE — 93010 ELECTROCARDIOGRAM REPORT: CPT | Performed by: INTERNAL MEDICINE

## 2024-03-26 ENCOUNTER — HOSPITAL ENCOUNTER (EMERGENCY)
Facility: HOSPITAL | Age: 34
Discharge: HOME | End: 2024-03-26
Attending: EMERGENCY MEDICINE

## 2024-03-26 VITALS
SYSTOLIC BLOOD PRESSURE: 138 MMHG | OXYGEN SATURATION: 96 % | RESPIRATION RATE: 18 BRPM | DIASTOLIC BLOOD PRESSURE: 80 MMHG | HEART RATE: 105 BPM | HEIGHT: 61 IN | WEIGHT: 225 LBS | BODY MASS INDEX: 42.48 KG/M2 | TEMPERATURE: 97 F

## 2024-03-26 DIAGNOSIS — J02.9 SORE THROAT: ICD-10-CM

## 2024-03-26 DIAGNOSIS — J06.9 VIRAL URI WITH COUGH: Primary | ICD-10-CM

## 2024-03-26 LAB
FLUAV RNA RESP QL NAA+PROBE: NEGATIVE
FLUBV RNA RESP QL NAA+PROBE: NEGATIVE
RSV RNA RESP QL NAA+PROBE: NEGATIVE
S PYO DNA THROAT QL NAA+PROBE: NOT DETECTED
SARS-COV-2 RNA RESP QL NAA+PROBE: NEGATIVE

## 2024-03-26 PROCEDURE — 99283 EMERGENCY DEPT VISIT LOW MDM: CPT

## 2024-03-26 PROCEDURE — 87651 STREP A DNA AMP PROBE: CPT | Performed by: PHYSICIAN ASSISTANT

## 2024-03-26 PROCEDURE — 99284 EMERGENCY DEPT VISIT MOD MDM: CPT | Performed by: PHYSICIAN ASSISTANT

## 2024-03-26 PROCEDURE — 0241U HB NFCT DS VIR RESP RNA 4 TRGT: CPT | Performed by: PHYSICIAN ASSISTANT

## 2024-03-26 RX ORDER — BENZONATATE 100 MG/1
100 CAPSULE ORAL ONCE
Status: COMPLETED | OUTPATIENT
Start: 2024-03-26 | End: 2024-03-26

## 2024-03-26 RX ORDER — BENZONATATE 100 MG/1
100 CAPSULE ORAL EVERY 8 HOURS
Qty: 21 CAPSULE | Refills: 0 | Status: SHIPPED | OUTPATIENT
Start: 2024-03-26

## 2024-03-26 RX ADMIN — BENZONATATE 100 MG: 100 CAPSULE ORAL at 09:26

## 2024-03-26 NOTE — Clinical Note
Jaycee Can was seen and treated in our emergency department on 3/26/2024.                Diagnosis:     Jaycee JAFFE  may return to work on return date.    She may return on this date: 03/28/2024         If you have any questions or concerns, please don't hesitate to call.      Dinora Sauer PA-C    ______________________________           _______________          _______________  Hospital Representative                              Date                                Time

## 2024-03-26 NOTE — ED PROVIDER NOTES
History  Chief Complaint   Patient presents with    URI     Patient reports URI symptoms for almost a week. Sore throat, congestion. Patient denies shortness of breath or chest pain      Patient is a 34-year-old female with a PMHx of DM 2, HTN and migraines, presenting to the ED for evaluation of URI symptoms x 1 week.  Patient states that she has had a cough, congestion, runny nose, sore throat, chills and headaches x1 week. She denies any known fevers. She denies any chest pain, shortness of breath, abdominal pain, nausea, vomiting, diarrhea, constipation or urinary symptoms.  Patient states that her significant other is also here being evaluated for similar symptoms.        Prior to Admission Medications   Prescriptions Last Dose Informant Patient Reported? Taking?   ALPRAZolam (XANAX) 0.5 mg tablet   No No   Sig: Take 1 tablet (0.5 mg total) by mouth daily at bedtime as needed for anxiety   Aspirin Low Dose 81 MG EC tablet   No No   Sig: TAKE 1 TABLET BY MOUTH DAILY. DO NOT START BEFORE FEBRYARY 1, 2023   Blood Glucose Monitoring Suppl (OneTouch Verio) w/Device KIT  Self No No   Sig: Use daily   Patient not taking: Reported on 11/14/2023   Blood Pressure Monitoring (B-D ASSURE BPM/AUTO WRIST CUFF) MISC  Self No No   Sig: Check blood pressure prior to each OB visit, or as directed by your physician.   Patient not taking: Reported on 12/15/2023   Continuous Blood Gluc  (FreeStyle Gautam 14 Day [place]) KVNG   No No   Sig: Use with gautam sensor   Continuous Blood Gluc Sensor (FreeStyle Gautam 3 Sensor) MISC   No No   Sig: USE 1 SENSOR EACH TO BE CHANGED EVERY 14 DAYS   Empagliflozin (JARDIANCE) 10 MG TABS tablet   No No   Sig: Take 1 tablet (10 mg total) by mouth daily   Insulin Pen Needle (B-D UF III MINI PEN NEEDLES) 31G X 5 MM MISC   No No   Sig: Inject under the skin daily at bedtime   Multiple Vitamin (MULTI VITAMIN DAILY PO)  Self Yes No   Sig: Take by mouth   acetaminophen (TYLENOL) 500 mg tablet  Self  Yes No   Sig: Take 1,000 mg by mouth   albuterol (Ventolin HFA) 90 mcg/act inhaler   No No   Sig: Inhale 2 puffs every 4 (four) hours as needed for wheezing or shortness of breath   citalopram (CeleXA) 10 mg tablet   No No   Sig: take 1 tablet by mouth once daily   dulaglutide (Trulicity) 0.75 MG/0.5ML injection   No No   Sig: Inject 0.5 mL (0.75 mg total) under the skin every 7 days for 4 doses Take 0.75mg weekly X 4 weeks after which will increase to 1.5 mg weekly.   erythromycin (ILOTYCIN) ophthalmic ointment   No No   Sig: Administer 0.5 inches to the right eye daily at bedtime   Patient not taking: Reported on 8/2/2023   fexofenadine (ALLEGRA) 180 MG tablet  Self No No   Sig: Take 1 tablet (180 mg total) by mouth daily   gabapentin (NEURONTIN) 100 mg capsule   No No   Sig: Take 1 capsule (100 mg total) by mouth daily at bedtime   insulin glargine (LANTUS) 100 units/mL subcutaneous injection   No No   Sig: Inject 17 Units under the skin daily at bedtime   insulin lispro (HumaLOG KwikPen) 100 units/mL injection pen   No No   Sig: Inject 5 Units under the skin 3 (three) times a day with meals   lisinopril (ZESTRIL) 30 mg tablet   No No   Sig: Take 1 tablet (30 mg total) by mouth daily   metFORMIN (GLUCOPHAGE) 1000 MG tablet   No No   Sig: Take 1 tablet (1,000 mg total) by mouth 2 (two) times a day with meals   montelukast (SINGULAIR) 10 mg tablet   No No   Sig: Take 1 tablet (10 mg total) by mouth daily at bedtime   naproxen (Naprosyn) 500 mg tablet   No No   Sig: Take 1 tablet (500 mg total) by mouth 2 (two) times a day with meals   ondansetron (ZOFRAN-ODT) 4 mg disintegrating tablet   No No   Sig: Take 1 tablet (4 mg total) by mouth every 6 (six) hours as needed for nausea for up to 8 doses   sucralfate (CARAFATE) 1 g tablet   No No   Sig: Take 1 tablet (1 g total) by mouth 4 (four) times a day      Facility-Administered Medications: None       Past Medical History:   Diagnosis Date    Allergic     Arthritis      Bipolar affective disorder, currently depressed, moderate (HCC) 2018    Cyst of ovary, right     Diabetes mellitus (HCC) 10/10/2018    type 2    Endometriosis     Heart murmur 1988    Hepatitis C     Hepatitis C virus infection cured after antiviral drug therapy     History of transfusion     Hypertension     Migraines     Morbid obesity with BMI of 40.0-44.9, adult (HCC)     Obesity     Pulmonary artery congenital abnormality     Spleen enlarged     Status post surgical removal of both fallopian tubes     Varicella        Past Surgical History:   Procedure Laterality Date    CARDIAC CATHETERIZATION      no CAD 10days, 4 weeks 22months old     CARDIAC CATHETERIZATION N/A 2023    Procedure: Cardiac Coronary Angiogram;  Surgeon: Marcela Lr DO;  Location: [facility];  Service: Cardiology    CARDIAC CATHETERIZATION Left 2023    Procedure: Cardiac Left Heart Cath;  Surgeon: Marcela Lr DO;  Location: [facility];  Service: Cardiology    CARDIAC CATHETERIZATION  2023    Procedure: Cardiac catheterization;  Surgeon: Marcela Lr DO;  Location: [facility];  Service: Cardiology     SECTION, LOW TRANSVERSE      CHOLECYSTECTOMY      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 5    CORONARY STENT PLACEMENT      IR LOWER EXTREMITY ANGIOGRAM  2023    LIVER BIOPSY      LIVER BIOPSY      STERNAL WIRE REMOVAL  2022    THROMBECTOMY W/ EMBOLECTOMY Right 2023    Procedure: Right femoral exposure Right iliac embolectomy w/ #4 Ted catheter Aortogram Right EIA stent w/ 7x29mm VBX;  Surgeon: Jody Hodges MD;  Location: [facility];  Service: Vascular    TUBAL LIGATION Bilateral     VSD REPAIR      As a child    WOUND DEBRIDEMENT Right 2023    Procedure: Right Groin Wound Washout, Pulse Lavage, Wound Vac placement;  Surgeon: Jelani Avila DO;  Location: [facility];  Service: Vascular       Family History   Problem  Relation Age of Onset    Hypertension Mother     Migraines Mother     JENNY disease Mother     Depression Mother     Hyperlipidemia Mother     Diabetes Mother     Diabetes Father     Hypertension Father     Kidney failure Father     Heart attack Father     Arthritis Father     Stroke Father     Polycystic kidney disease Paternal Grandmother     Stroke Paternal Grandmother     Heart disease Paternal Grandmother     Arthritis Sister     Asthma Sister     Thyroid disease Sister     Diabetes Sister     Arthritis Maternal Grandmother     Breast cancer Maternal Grandmother     Diabetes Maternal Grandmother     Hypertension Maternal Grandmother     Heart Valve Disease Maternal Grandmother     Learning disabilities Cousin     Learning disabilities Sister     ADD / ADHD Cousin     Lung cancer Brother     Diabetes Maternal Grandfather     Hypertension Maternal Grandfather     JENNY disease Maternal Grandfather     Stroke Maternal Grandfather      I have reviewed and agree with the history as documented.    E-Cigarette/Vaping    E-Cigarette Use Never User      E-Cigarette/Vaping Substances    Nicotine No     THC No     CBD No     Flavoring No      Social History     Tobacco Use    Smoking status: Former     Current packs/day: 0.00     Types: Cigarettes     Start date: 2023     Quit date: 2023     Years since quittin.2    Smokeless tobacco: Never   Vaping Use    Vaping status: Never Used   Substance Use Topics    Alcohol use: Not Currently     Alcohol/week: 3.0 standard drinks of alcohol     Types: 3 Standard drinks or equivalent per week     Comment: socially    Drug use: Not Currently     Comment: hx of THC use for migraines       Review of Systems   Constitutional:  Positive for chills. Negative for fever.   HENT:  Positive for congestion, rhinorrhea and sore throat.    Eyes:  Negative for visual disturbance.   Respiratory:  Positive for cough. Negative for shortness of breath.    Cardiovascular:  Negative for chest  pain, palpitations and leg swelling.   Gastrointestinal:  Negative for abdominal pain, constipation, diarrhea, nausea and vomiting.   Genitourinary:  Negative for dysuria, flank pain and hematuria.   Musculoskeletal:  Negative for back pain and neck pain.   Skin:  Negative for rash.   Neurological:  Positive for headaches. Negative for dizziness, syncope and weakness.   All other systems reviewed and are negative.      Physical Exam  Physical Exam  Vitals and nursing note reviewed.   Constitutional:       General: She is awake.      Appearance: Normal appearance. She is well-developed. She is not toxic-appearing or diaphoretic.   HENT:      Head: Normocephalic and atraumatic.      Right Ear: External ear normal.      Left Ear: External ear normal.      Nose: Congestion present.      Mouth/Throat:      Lips: Pink.      Mouth: Mucous membranes are moist.   Eyes:      General: Lids are normal. No scleral icterus.     Conjunctiva/sclera: Conjunctivae normal.      Pupils: Pupils are equal, round, and reactive to light.   Cardiovascular:      Rate and Rhythm: Normal rate and regular rhythm.      Pulses: Normal pulses.           Radial pulses are 2+ on the right side and 2+ on the left side.      Heart sounds: Normal heart sounds, S1 normal and S2 normal.   Pulmonary:      Effort: Pulmonary effort is normal. No accessory muscle usage.      Breath sounds: Normal breath sounds. No stridor. No decreased breath sounds, wheezing, rhonchi or rales.   Abdominal:      General: Abdomen is flat. Bowel sounds are normal. There is no distension.      Palpations: Abdomen is soft.      Tenderness: There is no abdominal tenderness. There is no right CVA tenderness, left CVA tenderness, guarding or rebound.   Musculoskeletal:      Cervical back: Full passive range of motion without pain and neck supple. No signs of trauma. No pain with movement.      Right lower leg: No edema.      Left lower leg: No edema.   Lymphadenopathy:       Cervical: No cervical adenopathy.   Skin:     General: Skin is warm and dry.      Capillary Refill: Capillary refill takes less than 2 seconds.      Coloration: Skin is not cyanotic, jaundiced or pale.   Neurological:      Mental Status: She is alert and oriented to person, place, and time.      GCS: GCS eye subscore is 4. GCS verbal subscore is 5. GCS motor subscore is 6.      Gait: Gait normal.   Psychiatric:         Mood and Affect: Mood normal.         Speech: Speech normal.         Behavior: Behavior is cooperative.         Vital Signs  ED Triage Vitals   Temperature Pulse Respirations Blood Pressure SpO2   03/26/24 0903 03/26/24 0903 03/26/24 0903 03/26/24 0903 03/26/24 0903   (!) 96.7 °F (35.9 °C) (!) 108 20 138/80 96 %      Temp Source Heart Rate Source Patient Position - Orthostatic VS BP Location FiO2 (%)   03/26/24 0903 03/26/24 0914 03/26/24 0903 03/26/24 0903 --   Tympanic Monitor Sitting Left arm       Pain Score       03/26/24 0903       6           Vitals:    03/26/24 0903 03/26/24 0914   BP: 138/80 138/80   Pulse: (!) 108 105   Patient Position - Orthostatic VS: Sitting          Visual Acuity      ED Medications  Medications   benzonatate (TESSALON PERLES) capsule 100 mg (100 mg Oral Given 3/26/24 0926)       Diagnostic Studies  Results Reviewed       Procedure Component Value Units Date/Time    FLU/RSV/COVID - if FLU/RSV clinically relevant [[id number]]  (Normal) Collected: 03/26/24 0926    Lab Status: Final result Specimen: Nares from Nose Updated: 03/26/24 1008     SARS-CoV-2 Negative     INFLUENZA A PCR Negative     INFLUENZA B PCR Negative     RSV PCR Negative    Narrative:      FOR PEDIATRIC PATIENTS - copy/paste COVID Guidelines URL to browser: [email or web address]    SARS-CoV-2 assay is a Nucleic Acid Amplification assay intended for the  qualitative detection of nucleic acid from SARS-CoV-2 in nasopharyngeal  swabs. Results are for the  presumptive identification of SARS-CoV-2 RNA.    Positive results are indicative of infection with SARS-CoV-2, the virus  causing COVID-19, but do not rule out bacterial infection or co-infection  with other viruses. Laboratories within the [place] and its  territories are required to report all positive results to the appropriate  public health authorities. Negative results do not preclude SARS-CoV-2  infection and should not be used as the sole basis for treatment or other  patient management decisions. Negative results must be combined with  clinical observations, patient history, and epidemiological information.  This test has not been FDA cleared or approved.    This test has been authorized by FDA under an Emergency Use Authorization  (EUA). This test is only authorized for the duration of time the  declaration that circumstances exist justifying the authorization of the  emergency use of an in vitro diagnostic tests for detection of SARS-CoV-2  virus and/or diagnosis of COVID-19 infection under section 564(b)(1) of  the Act, 21 U.S.C. 360bbb-3(b)(1), unless the authorization is terminated  or revoked sooner. The test has been validated but independent review by FDA  and CLIA is pending.    Test performed using Proxamapert: This RT-PCR assay targets N2,  a region unique to SARS-CoV-2. A conserved region in the E-gene was chosen  for pan-Sarbecovirus detection which includes SARS-CoV-2.    According to CMS-2020-01-R, this platform meets the definition of high-throughput technology.    Strep A PCR [[id number]]  (Normal) Collected: 03/26/24 0926    Lab Status: Final result Specimen: Throat Updated: 03/26/24 0956     STREP A PCR Not Detected                   No orders to display              Procedures  Procedures         ED Course                               SBIRT 22yo+      Flowsheet Row Most Recent Value   Initial Alcohol Screen: US AUDIT-C     1. How often do you have a drink containing alcohol?  0 Filed at: 03/26/2024 0903   2. How many drinks containing alcohol do you have on a typical day you are drinking?  0 Filed at: 03/26/2024 0903   3a. Male UNDER 65: How often do you have five or more drinks on one occasion? 0 Filed at: 03/26/2024 0903   3b. FEMALE Any Age, or MALE 65+: How often do you have 4 or more drinks on one occassion? 0 Filed at: 03/26/2024 0903   Audit-C Score 0 Filed at: 03/26/2024 0903   JENNIFER: How many times in the past year have you...    Used an illegal drug or used a prescription medication for non-medical reasons? Never Filed at: 03/26/2024 0903                      Medical Decision Making  Patient is a 34-year-old female with a PMHx of DM 2, HTN and migraines, presenting to the ED for evaluation of URI symptoms x 1 week.      DDx including but not limited to: viral illness, URI, bronchiolitis, bronchitis, influenza, COVID-19, pharyngitis, mono, pneumonia.    Patient's symptoms consistent with a viral illness.  She denies any chest pain, shortness of breath or fevers and lung sounds are clear to auscultation bilaterally, low suspicion for pneumonia.  COVID/flu/RSV and strep swabs obtained and pending at time of discharge.  Advised patient that we will contact her with any positive test results.  We discussed supportive care measures and symptomatic management in detail.  Advised close follow-up with PCP or return to the ED for any new/worsening symptoms.    The management plan was discussed in detail with the patient at bedside and all questions were answered. Strict ED return instructions were discussed at bedside. Prior to discharge, both verbal and written instructions were provided. We discussed the signs and symptoms that should prompt the patient to return to the ED. All questions were answered and the patient was comfortable with the plan of care and discharged home. The patient agrees to return to the Emergency Department for concerns and/or progression of illness.     Amount  and/or Complexity of Data Reviewed  Labs: ordered.    Risk  Prescription drug management.             Disposition  Final diagnoses:   Viral URI with cough   Sore throat     Time reflects when diagnosis was documented in both MDM as applicable and the Disposition within this note       Time User Action Codes Description Comment    3/26/2024  9:23 AM Dinora Sauer Add [J06.9] Viral URI with cough     3/26/2024  9:23 AM Dinora Sauer Add [J02.9] Sore throat           ED Disposition       ED Disposition   Discharge    Condition   Stable    Date/Time   Tue Mar 26, 2024 0923    Comment   Jaycee Can discharge to home/self care.                   Follow-up Information       Follow up With Specialties Details Why Contact Info Additional Information    [facility], Nurse Practitioner Schedule an appointment as soon as possible for a visit   [address] [place], [place] [address]Earnest Borrero [place] [address]  [phone]       [facility] Emergency Medicine  If symptoms worsen [address] [facility] [id number] [phone] [facility], [address] [facility], [place] [address]            Discharge Medication List as of 3/26/2024  9:24 AM        START taking these medications    Details   benzonatate (TESSALON PERLES) 100 mg capsule Take 1 capsule (100 mg total) by mouth every 8 (eight) hours, Starting Tue 3/26/2024, Normal           CONTINUE these medications which have NOT CHANGED    Details   acetaminophen (TYLENOL) 500 mg tablet Take 1,000 mg by mouth, Starting Wed 5/6/2020, Historical Med      albuterol (Ventolin HFA) 90 mcg/act inhaler Inhale 2 puffs every 4 (four) hours as needed for wheezing or shortness of breath, Starting Fri 1/26/2024, Normal      ALPRAZolam (XANAX) 0.5 mg tablet Take 1 tablet (0.5 mg total) by mouth daily at bedtime as needed for anxiety, Starting Fri 1/26/2024, Normal      Aspirin Low Dose  81 MG EC tablet TAKE 1 TABLET BY MOUTH DAILY. DO NOT START BEFORE FEBRYARY 1, 2023, Normal      Blood Glucose Monitoring Suppl (OneTouch Verio) w/Device KIT Use daily, Starting Tue 11/23/2021, Normal      Blood Pressure Monitoring (B-D ASSURE BPM/AUTO WRIST CUFF) MISC Check blood pressure prior to each OB visit, or as directed by your physician., Normal      citalopram (CeleXA) 10 mg tablet take 1 tablet by mouth once daily, Starting Tue 3/5/2024, Normal      Continuous Blood Gluc  (FreeStyle Gautam 14 Day [place]) KVNG Use with gautam sensor, Normal      Continuous Blood Gluc Sensor (FreeStyle Gautam 3 Sensor) MISC USE 1 SENSOR EACH TO BE CHANGED EVERY 14 DAYS, Normal      dulaglutide (Trulicity) 0.75 MG/0.5ML injection Inject 0.5 mL (0.75 mg total) under the skin every 7 days for 4 doses Take 0.75mg weekly X 4 weeks after which will increase to 1.5 mg weekly., Starting Fri 1/26/2024, Until Sat 2/17/2024, Normal      Empagliflozin (JARDIANCE) 10 MG TABS tablet Take 1 tablet (10 mg total) by mouth daily, Starting Fri 1/26/2024, Until Mon 1/20/2025, Normal      erythromycin (ILOTYCIN) ophthalmic ointment Administer 0.5 inches to the right eye daily at bedtime, Starting Thu 6/29/2023, Normal      fexofenadine (ALLEGRA) 180 MG tablet Take 1 tablet (180 mg total) by mouth daily, Starting Fri 8/20/2021, Normal      gabapentin (NEURONTIN) 100 mg capsule Take 1 capsule (100 mg total) by mouth daily at bedtime, Starting Fri 1/26/2024, Normal      insulin glargine (LANTUS) 100 units/mL subcutaneous injection Inject 17 Units under the skin daily at bedtime, Starting Fri 1/26/2024, Until Sat 5/25/2024, Normal      insulin lispro (HumaLOG KwikPen) 100 units/mL injection pen Inject 5 Units under the skin 3 (three) times a day with meals, Starting Fri 1/26/2024, Normal      Insulin Pen Needle (B-D UF III MINI PEN NEEDLES) 31G X 5 MM MISC Inject under the skin daily at bedtime, Starting Fri 1/26/2024, Normal      lisinopril  (ZESTRIL) 30 mg tablet Take 1 tablet (30 mg total) by mouth daily, Starting Fri 1/26/2024, Normal      metFORMIN (GLUCOPHAGE) 1000 MG tablet Take 1 tablet (1,000 mg total) by mouth 2 (two) times a day with meals, Starting Fri 1/26/2024, Normal      montelukast (SINGULAIR) 10 mg tablet Take 1 tablet (10 mg total) by mouth daily at bedtime, Starting Fri 1/26/2024, Normal      Multiple Vitamin (MULTI VITAMIN DAILY PO) Take by mouth, Historical Med      naproxen (Naprosyn) 500 mg tablet Take 1 tablet (500 mg total) by mouth 2 (two) times a day with meals, Starting Fri 1/26/2024, Normal      ondansetron (ZOFRAN-ODT) 4 mg disintegrating tablet Take 1 tablet (4 mg total) by mouth every 6 (six) hours as needed for nausea for up to 8 doses, Starting Sat 3/9/2024, Normal      sucralfate (CARAFATE) 1 g tablet Take 1 tablet (1 g total) by mouth 4 (four) times a day, Starting Sat 3/9/2024, Normal             No discharge procedures on file.    PDMP Review         Value Time User    PDMP Reviewed  Yes 1/26/2024  9:40 AM REJI Franco            ED Provider  Electronically Signed by             Dinora Sauer PA-C  03/26/24 0983

## 2024-06-03 ENCOUNTER — HOSPITAL ENCOUNTER (EMERGENCY)
Facility: HOSPITAL | Age: 34
Discharge: HOME | End: 2024-06-03
Attending: EMERGENCY MEDICINE
Payer: COMMERCIAL

## 2024-06-03 VITALS
DIASTOLIC BLOOD PRESSURE: 101 MMHG | OXYGEN SATURATION: 98 % | RESPIRATION RATE: 20 BRPM | SYSTOLIC BLOOD PRESSURE: 179 MMHG | HEART RATE: 98 BPM | TEMPERATURE: 97.9 F

## 2024-06-03 DIAGNOSIS — S13.9XXA NECK SPRAIN, INITIAL ENCOUNTER: Primary | ICD-10-CM

## 2024-06-03 DIAGNOSIS — S20.219A CHEST WALL CONTUSION: ICD-10-CM

## 2024-06-03 PROCEDURE — 93005 ELECTROCARDIOGRAM TRACING: CPT

## 2024-06-03 PROCEDURE — 99285 EMERGENCY DEPT VISIT HI MDM: CPT

## 2024-06-03 PROCEDURE — 99284 EMERGENCY DEPT VISIT MOD MDM: CPT | Performed by: EMERGENCY MEDICINE

## 2024-06-03 RX ORDER — CYCLOBENZAPRINE HCL 5 MG
5 TABLET ORAL 3 TIMES DAILY PRN
Qty: 12 TABLET | Refills: 0 | Status: SHIPPED | OUTPATIENT
Start: 2024-06-03

## 2024-06-03 NOTE — Clinical Note
Jaycee Can was seen and treated in our emergency department on 6/3/2024.                Diagnosis:     Jaycee JAFFE  .    She may return on this date: 06/05/2024    Patient seen in the emergency department on 6/3/2024.  Please excuse from work today and tomorrow.     If you have any questions or concerns, please don't hesitate to call.      Ramón Jay Jr., DO    ______________________________           _______________          _______________  Hospital Representative                              Date                                Time
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
## 2024-06-03 NOTE — DISCHARGE INSTRUCTIONS
Ice to sore areas 4-6 times a day for 10 to 15 minutes at a time.    For increasing spasm use Flexeril 1 pill every 8 hours as needed.  Do not drive or operate heavy machinery on this medicine.    Always wear your seatbelt when driving.    Use Motrin as needed for pain.    Consider getting a soft collar neck brace from [employer] or [employer].  This will help relax the muscles in her neck.

## 2024-06-04 LAB
ATRIAL RATE: 96 BPM
ATRIAL RATE: 99 BPM
P AXIS: 48 DEGREES
P AXIS: 56 DEGREES
PR INTERVAL: 176 MS
PR INTERVAL: 186 MS
QRS AXIS: 16 DEGREES
QRS AXIS: 26 DEGREES
QRSD INTERVAL: 146 MS
QRSD INTERVAL: 146 MS
QT INTERVAL: 388 MS
QT INTERVAL: 392 MS
QTC INTERVAL: 490 MS
QTC INTERVAL: 503 MS
T WAVE AXIS: 21 DEGREES
T WAVE AXIS: 36 DEGREES
VENTRICULAR RATE: 96 BPM
VENTRICULAR RATE: 99 BPM

## 2024-06-04 PROCEDURE — 93010 ELECTROCARDIOGRAM REPORT: CPT | Performed by: INTERNAL MEDICINE

## 2024-06-11 NOTE — ED CARE HANDOFF
"  Subjective:     HPI:   Lashanda Monaco is a 67 y.o. female who presents for f/u R knee    Last seen 3/12/24:   PCP 3/1/24: PARDO eval: "for the past month or 2 she has been having increasing exertional dyspnea, has to stop and catch her breath after walking relatively short distances up to quarter of a mi. Denies associated chest pain or palpitations. "  nuc stress test ordered     Rpt labs: CKD on latest labs, repeat pending     EMG/NCS rescheduled to tomorrow     Ref to rheum for polyarthralgia eval, ?CMT     Ref to genetics for eval for possible CMT     R knee CSI today  6 week f/u   -review EMG, rheum, genetics, PCP/PARDO/renal workup    3/22/24:   No genetics appointment has been made so have placed e-consult for genetics:     NCV/EMG: no sig major nerve/lumbar radic, +early PN of unclear etiology  EMG read as normal  but ankle to pop fossa CV is 38m/s which is upper limit for CMT1     Rheum: labs all negative, Dx OA, started prednisone taper     PARDO w/u - CXR prompted CT chest to rule out suprahilar adenopathy: no masses but + ascending aorta aneurysm -> vasc eval     Renal eval pending, new KD on labs     spine  MRI L spine: Scoliosis, spondylosis and facet arthritis throughout the lumbar spine contributing to multilevel central canal and foraminal stenosis most severe centrally at L3-4. and involving the right neural foramen at L5-S1.      DATE OF PROCEDURE: 01/24/2024  PROCEDURE:  Right  L3/4 and L4/5 Lumbar Transforaminal Epidural Steroid Injection under Fluoroscopic Guidance    Interval history:   -Pulmonary:   Ms Monaco is a 67 year old woman with RA and osteoarthritis- on prendiosne with possible plans to start additional therapy with sterod sparing agents- had CT imaging which shows small bilateral sub centimeter nodules - solid appearing and appears to be in centrilobular and perilymphatic distribution. This could be related to RA, but I think unlikely to be causing her respiratory issues. I dont " Emergency Department Sign Out Note        Sign out and transfer of care from Dr Karla Mims  See Separate Emergency Department note  The patient, Farzad Hamomnd, was evaluated by the previous provider for CP, SOB, dizziness, R groin/leg pain    Recent admission with cardiac cath  Persistent R groin pain, outpt pseudoaneurysm US was negative  Palpitations/R leg pain    Workup Completed:  CT A/P:  - Filling defect within the right external iliac artery right common femoral artery which may reflect mixing artifact or given recent clinical history thrombus  Vascular ultrasound recommended for further evaluation     - Right external iliac and bilateral inguinal adenopathy, which is likely reactive  ED Course / Workup Pending (followup): Vascular evaluated pt and started on Heparin  Ordered for RLE GEORGINA and CTA      CTA:    Acute thrombosis of the right proximal external iliac artery with collateral reconstitution of the mid right common femoral artery with three-vessel continuous runoff to the foot      Pt to OR and admission with vascular surgery      PERC Rule for PE    Flowsheet Row Most Recent Value   PERC Rule for PE    Age >=50 0 Filed at: 01/30/2023 0131   HR >=100 0 Filed at: 01/30/2023 0131   O2 Sat on room air < 95% 0 Filed at: 01/30/2023 0131   History of PE or DVT 0 Filed at: 01/30/2023 0131   Recent trauma or surgery 0 Filed at: 01/30/2023 0131   Hemoptysis 0 Filed at: 01/30/2023 0131   Exogenous estrogen 0 Filed at: 01/30/2023 0131   Unilateral leg swelling 0 Filed at: 01/30/2023 0131   PERC Rule for PE Results 0 Filed at: 01/30/2023 0131                Wells' Criteria for DVT    Flowsheet Row Most Recent Value   Wells' Criteria for DVT    Active cancer Treatment or palliation within 6 months 0 Filed at: 01/30/2023 9566   Bedridden recently >3 days or major surgery within 12 weeks 0 Filed at: 01/30/2023 0132   Calf swelling >3 cm compared to the other leg 0 Filed at: 01/30/2023 0132   Entire leg see any evidence of fibrosis or airway disease.   I don't see any clear evidence of abnormal lung findings that would be causing her respiratory issues- but she is clearly wheezing on exam- almost sounds like broncholitiis to me due to the high pitch squeak.      SHe is reporting frequent symptoms of upper airway cough syndrome. This could produce this type of picture- frequent cough and wheezing.      Will plan on treating her upper airway cough syndrome    F/u with pulm 6/4/24:   From a pulmonary perspective I do believe she can tolerate anesthesia if required for any future surgeries. She has not been tested for sleep apnea which I would recommend- she wants to wait right now on testing for sleep apnea.     -incidental finding of TAA - saw CT Sx:   CT chest noncontrast shows 4.4cm ascending aorta.     We had a lengthy discussion with her regarding her aortic aneurysm and the guidelines and we agreed to continue surveillance.  We will obtain a CT chest noncontrast in one year with repeat clinic visit.    -Genetics:   Given Lashanda's symptoms, we discussed genetic testing for Charcot Katarina Tooth.   Orders placed for hereditary neuropathy panel at [facility]     -SPINE:   Today she is doing well but notes she had an DOMINIC 1/24/24.  She says the injection gave her great relief for 2 weeks and then wore off.   Rx'd Yaquelin  Plan for caudal DOMINIC.     DATE OF PROCEDURE: 05/22/2024     PROCEDURE: Caudal Epidural Steroid Injection under Fluoroscopic Guidance      -rheumatolocy:   She is coming in with bilateral symmetrical arthritis consistent  RHEUMATOID ARTHRITIS. PATIENT HAS EROSIVE CHANGES IN WRISTS CONSISTENT WITH SERONEGATIVE RA.  GIVEN ABNORMAL CT CHEST, WILL AVOID MTX AS THIS CAN MAKE NODULES WORSE AND CAN AFFECT THE LUNG.  I discussed with her and it would be optimal to get her RA under control before knee replacement.       - no anti-TNF given basal cell cancer  -no MTX given nodules  -awaiting PA for Rinvoq  -patient  swollen 0 Filed at: 01/30/2023 0132   Collateral (nonvaricose) superficial veins present 0 Filed at: 01/30/2023 0132   Localized tenderness along the deep venous system 0 Filed at: 01/30/2023 0132   Pitting edema, confined to symptomatic leg 0 Filed at: 01/30/2023 0132   Paralysis, paresis, or recent plaster immobilization of the lower extremity 0 Filed at: 01/30/2023 0132   Previously documented DVT 0 Filed at: 01/30/2023 5435   Alternative diagnosis to DVT as likely or more likely 2 Filed at: 01/30/2023 0132   Wells DVT Critera Score -2 Filed at: 01/30/2023 0132              ED Course as of 01/30/23 1518   Mon Jan 30, 2023   1055 CTA: Acute thrombosis of the right proximal external iliac artery with collateral reconstitution of the mid right common femoral artery with three-vessel continuous runoff to the foot  Procedures  MDM        Disposition  Final diagnoses:   Right groin pain   Vascular abnormality     Time reflects when diagnosis was documented in both MDM as applicable and the Disposition within this note     Time User Action Codes Description Comment    1/30/2023  5:07 AM Meseret Chow Add [R10 31] Right groin pain     1/30/2023  5:07 AM Meseret Chow Add [I99 9] Vascular abnormality       ED Disposition     None      Follow-up Information    None       Patient's Medications   Discharge Prescriptions    No medications on file     No discharge procedures on file         ED Provider  Electronically Signed by     Rene Valencia DO  01/30/23 7481 self stopped steroids and would like to get back on mobic. (Need to check gfr)  -plan to get labs 4 weeks after she starts rinvoq.    CRP 9.3 yesterday    Update:   -she says genetics says she does not have CMT but has virtual visit with genetics pending to talk about lab results    -breathing better on walker    -rheum: Dx RA, took herself off steroids - didn't tolerate  Not on any rheum meds currently  Trying to get rinvoq approved    -spine: caudal injection helped    -progressive R knee pain - last injection helped the best  Has been using walker for a week, helping a lot    -being treated for UTI from UA yesterday    History of Present Illness  The patient presents for evaluation of knee pain.    The patient initiated antibiotic therapy for a urinary tract infection (UTI) yesterday. She reports persistent puffiness in her knee, which she describes as the most beneficial injection she has received. She expresses a desire to discontinue steroid use due to severe tremors and nausea. She has been utilizing a walker for approximately one week, which she finds beneficial. She queries the duration of her rheumatoid arthritis medication before proceeding with surgical intervention. She reports swelling in the posterior aspect of her knee, but no pain while sitting. She requests a prescription for ibuprofen 800 mg.       Objective:   Body mass index is 41.49 kg/m².  Exam:    Physical Exam  The patient has an antalgic gait with a limp favoring the right knee. There is no groin pain with active straight leg raise. There is no pain with active or passive range of motion of her hip joint. She is distally neurovascularly intact with good strength, sensation, and pulses. Her knee range of motion is 0 to 130 degrees and alignment is 0 degrees. The knee is stable to anterior, posterior varus and valgus stresses without flexion contracture or extensor lag. She is tender to palpation in the medial and lateral patellofemoral joint  lines with moderate effusion in the knee.        Imaging:    Results  Laboratory Studies  Urinalysis was positive. Inflammatory markers were elevated.    Imaging  X-rays of the knee showed bad arthritis.    KNEE R ARTHRITIS    Indication:  Right knee pain  Exam Ordered: Radiographs of the right knee include a standing anteroposterior view, a standing posterioanterior view, a lateral view in full flexion, and a sunrise view  Details of Examination: Exam shows evidence of joint space narrowing, osteophyte formation, and subchondral sclerosis, all consistent with degenerative arthritis of the knee.  No other significant findings are noted.  Impression:  Degenerative Arthritis, Right Knee    Klg4 severe bone on bone varus arthritis    Gentic testing: unclear how to interpret    6/10/24  CRP 9.3  ESR 55    Alb 3.3          Assessment/Plan:       ICD-10-CM ICD-9-CM   1. Primary osteoarthritis of right knee  M17.11 715.16        RA  Neuropathy  Pulm issues  Radicular spine    Assessment/Plan:     Assessment & Plan  1. Arthritis of the right knee.  The patient's knee condition is characterized by bone-on-bone contact, complicated by rheumatoid arthritis. A comprehensive discussion regarding pulmonary issues was conducted, revealing no contraindications to surgical intervention. Genetic testing has been excluded, and the Charcot-Katarina-Tooth syndrome has been ruled out. The patient's leg appears to have improved following the caudal injection, however, the knee appears to be more indicative of general arthritis rather than nerve-related issues. The patient's inflammatory markers are elevated, potentially due to the discontinuation of steroids. Rinvoq is pending insurance authorization, while other medications are contraindicated. The current treatment plan involves the continuation of steroid injections and the possibility of knee replacement surgery. I will communicate with Dr. Mc regarding the timing and optimization of  the patient's knee replacement surgery.    Progressive R knee OA symptoms  Also RA with elevated inflammatory markers    Pulm issues explored  Genetics - does not have CMT, virtual visit pending  Spine - better after caudal block - exam more consistant with OA and not radicular pain     Have messaged Dr VEGA re: optimization/timing for TKA      This patient has significant symptoms in their knee that are affecting their quality of life and daily activities.  They have tried non-operative treatment including analgesics, an exercise program, and activity modification, but the symptoms have persisted. I believe they make a good candidate for knee arthroplasty.         Implants:   Company: [employer]  System: [employer]  Velys FB rev + 30  simplex + TOBRA    DVT prophylaxis: ASA 81mg BID x1 month    Dispo: outpatient PT    Admission status: Outpatient/23hr OBS    Location: [place]      Move forward with TKA pending dispo from rheum  TKA + velys info    No orders of the defined types were placed in this encounter.      This note was generated with the assistance of ambient listening technology. Verbal consent was obtained by the patient and accompanying visitor(s) for the recording of patient appointment to facilitate this note. I attest to having reviewed and edited the generated note for accuracy, though some syntax or spelling errors may persist. Please contact the author of this note for any clarification.            Past Medical History:   Diagnosis Date    Arthritis     Hypercholesterolemia     Hypertension        Past Surgical History:   Procedure Laterality Date    BREAST BIOPSY Right     2010 & 2021    BREAST CYST ASPIRATION      CARPAL TUNNEL RELEASE Left     EPIDURAL STEROID INJECTION N/A 5/22/2024    Procedure: CAUDAL DOMINIC DIRECT REFERRAL;  Surgeon: Oswald Hoskins MD;  Location: [facility];  Service: Pain Management;  Laterality: N/A;  [phone]    hip repl Right 08/2019    HYSTERECTOMY      PARTIAL HIP  ARTHROPLASTY Left     RHINOPLASTY      ROTATOR CUFF REPAIR Right     SHOULDER ARTHROSCOPY Right 05/2022    right shoulder sx with bicep repair    TONSILLECTOMY, ADENOIDECTOMY      TOTAL ABDOMINAL HYSTERECTOMY W/ BILATERAL SALPINGOOPHORECTOMY      TRANSFORAMINAL EPIDURAL INJECTION OF STEROID Right 01/24/2024    Procedure: LUMBAR TRANSFORAMINAL RIGHT L3/4 AND L4/L5 DIRECT REFERRAL;  Surgeon: Oswald Hoskins MD;  Location[facility];  Service: Pain Management;  Laterality: Right;  [phone]       Family History   Problem Relation Name Age of Onset    Aortic aneurysm Mother  80    Heart attack Father      Colon cancer Son          no genetic testing    Arthritis Sister      Arthritis Sister      Diabetes Sister      Osteoarthritis Sister      Heart disease Sister      Diabetes Brother      Arthritis Brother      Heart disease Brother      Lymphoma Brother      Diabetes Brother      Arthritis Brother      Heart disease Brother      Heart disease Brother      Heart attack Other      Tourette syndrome Other      Tourette syndrome Other      Tourette syndrome Other      Tourette syndrome Other         Social History     Socioeconomic History    Marital status:    Tobacco Use    Smoking status: Never    Smokeless tobacco: Never   Substance and Sexual Activity    Alcohol use: Yes     Comment: Occasionally     Drug use: No    Sexual activity: Yes     Partners: Male     Social Determinants of Health     Financial Resource Strain: Patient Declined (4/11/2024)    Overall Financial Resource Strain (CARDIA)     Difficulty of Paying Living Expenses: Patient declined   Food Insecurity: Patient Declined (4/11/2024)    Hunger Vital Sign     Worried About Running Out of Food in the Last Year: Patient declined     Ran Out of Food in the Last Year: Patient declined   Transportation Needs: No Transportation Needs (4/11/2024)    PRAPARE - Transportation     Lack of Transportation (Medical): No     Lack of Transportation  (Non-Medical): No   Physical Activity: Unknown (4/11/2024)    Exercise Vital Sign     Days of Exercise per Week: 1 day   Stress: Stress Concern Present (4/11/2024)    [employer] - Occupational Stress Questionnaire     Feeling of Stress : To some extent   Housing Stability: Patient Declined (4/11/2024)    Housing Stability Vital Sign     Unable to Pay for Housing in the Last Year: Patient declined     Unstable Housing in the Last Year: Patient declined

## 2024-08-06 ENCOUNTER — OFFICE VISIT (OUTPATIENT)
Dept: FAMILY MEDICINE CLINIC | Facility: CLINIC | Age: 34
End: 2024-08-06
Payer: COMMERCIAL

## 2024-08-06 VITALS
TEMPERATURE: 97.2 F | OXYGEN SATURATION: 99 % | SYSTOLIC BLOOD PRESSURE: 142 MMHG | WEIGHT: 218 LBS | DIASTOLIC BLOOD PRESSURE: 84 MMHG | BODY MASS INDEX: 41.16 KG/M2 | HEART RATE: 95 BPM | HEIGHT: 61 IN

## 2024-08-06 DIAGNOSIS — J06.9 UPPER RESPIRATORY TRACT INFECTION, UNSPECIFIED TYPE: Primary | ICD-10-CM

## 2024-08-06 DIAGNOSIS — G43.909 MIGRAINE WITHOUT STATUS MIGRAINOSUS, NOT INTRACTABLE, UNSPECIFIED MIGRAINE TYPE: ICD-10-CM

## 2024-08-06 LAB
SARS-COV-2 AG UPPER RESP QL IA: NEGATIVE
VALID CONTROL: NORMAL

## 2024-08-06 PROCEDURE — 87811 SARS-COV-2 COVID19 W/OPTIC: CPT | Performed by: NURSE PRACTITIONER

## 2024-08-06 PROCEDURE — 96372 THER/PROPH/DIAG INJ SC/IM: CPT

## 2024-08-06 PROCEDURE — 99214 OFFICE O/P EST MOD 30 MIN: CPT | Performed by: NURSE PRACTITIONER

## 2024-08-06 RX ORDER — KETOROLAC TROMETHAMINE 30 MG/ML
30 INJECTION, SOLUTION INTRAMUSCULAR; INTRAVENOUS ONCE
Status: COMPLETED | OUTPATIENT
Start: 2024-08-06 | End: 2024-08-06

## 2024-08-06 RX ADMIN — KETOROLAC TROMETHAMINE 30 MG: 30 INJECTION, SOLUTION INTRAMUSCULAR; INTRAVENOUS at 10:00

## 2024-08-06 NOTE — LETTER
August 6, 2024     Patient: Jaycee Can  YOB: 1990  Date of Visit: 8/6/2024      To Whom it May Concern:    Jaycee Can is under my professional care. Jaycee JAFFE was seen in my office on 8/6/2024. Jaycee JAFFE may return to work on 5/9/2024 .    If you have any questions or concerns, please don't hesitate to call.         Sincerely,          REJI Ferrell        CC: No Recipients

## 2024-08-06 NOTE — PROGRESS NOTES
Ambulatory Visit  Name: Jaycee Can      : 1990      MRN: [id number]  Encounter Provider: REJI Ferrell  Encounter Date: 2024   Encounter department: [facility]    Assessment & Plan   1. Upper respiratory tract infection, unspecified type  -     POCT Rapid Covid Ag  2. Migraine without status migrainosus, not intractable, unspecified migraine type  -     ketorolac (TORADOL) injection 30 mg       History of Present Illness     Patient presents with migraine that started yesterday- feels like previous migraines but has not had one in 3 years. She states it is b/l in the temples and a sharp pain. Vomited x3 yesterday and x1 today. LMP- 1 week ago. She tried motrin yesterday without relief. Can use tylenol today prn. 12 hours can use naproxen prn   She also cough cough, congestion, rhinorrhea and sore throat. Conservative measures reviewed. S/s of when to return reviewed.     Cough  This is a recurrent problem. The current episode started yesterday. The problem has been unchanged. The problem occurs every few hours. The cough is Non-productive. Associated symptoms include ear pain, headaches, nasal congestion, postnasal drip, rhinorrhea, a sore throat and sweats. Pertinent negatives include no chest pain, chills, ear congestion, fever, heartburn, hemoptysis, myalgias, rash, shortness of breath, weight loss or wheezing. Nothing aggravates the symptoms.       Review of Systems   Constitutional:  Negative for appetite change, chills, fatigue, fever, unexpected weight change and weight loss.   HENT:  Positive for ear pain, postnasal drip, rhinorrhea and sore throat. Negative for congestion and sneezing.    Eyes:  Negative for visual disturbance.   Respiratory:  Positive for cough. Negative for hemoptysis, chest tightness, shortness of breath and wheezing.    Cardiovascular:  Negative for chest pain, palpitations and leg swelling.   Gastrointestinal:  Negative for  "abdominal pain, blood in stool, constipation, diarrhea, heartburn, nausea and vomiting.   Genitourinary:  Negative for difficulty urinating, dysuria and urgency.   Musculoskeletal:  Negative for arthralgias, back pain, joint swelling and myalgias.   Skin:  Negative for color change and rash.   Neurological:  Positive for headaches. Negative for dizziness and weakness.   Hematological:  Negative for adenopathy. Does not bruise/bleed easily.       Objective     /84   Pulse 95   Temp (!) 97.2 °F (36.2 °C)   Ht 5' 1\" (1.549 m)   Wt 98.9 kg (218 lb)   SpO2 99%   BMI 41.19 kg/m²     Physical Exam  Constitutional:       General: She is not in acute distress.     Appearance: Normal appearance. She is not ill-appearing or toxic-appearing.   HENT:      Head: Normocephalic and atraumatic.      Right Ear: Tympanic membrane, ear canal and external ear normal. There is no impacted cerumen.      Left Ear: Tympanic membrane, ear canal and external ear normal. There is no impacted cerumen.      Nose: Nose normal. No congestion or rhinorrhea.      Mouth/Throat:      Mouth: Mucous membranes are moist.      Pharynx: Oropharynx is clear. No oropharyngeal exudate or posterior oropharyngeal erythema.   Eyes:      General: No scleral icterus.        Right eye: No discharge.         Left eye: No discharge.      Conjunctiva/sclera: Conjunctivae normal.      Pupils: Pupils are equal, round, and reactive to light.   Cardiovascular:      Rate and Rhythm: Normal rate and regular rhythm.      Pulses: Normal pulses.      Heart sounds: Normal heart sounds. No murmur heard.     No friction rub. No gallop.   Pulmonary:      Effort: Pulmonary effort is normal. No respiratory distress.      Breath sounds: Normal breath sounds. No stridor. No wheezing, rhonchi or rales.   Abdominal:      General: Abdomen is flat. Bowel sounds are normal. There is no distension.      Palpations: Abdomen is soft. There is no mass.      Tenderness: There is no " abdominal tenderness.   Musculoskeletal:      Cervical back: Normal range of motion and neck supple. No rigidity. No muscular tenderness.   Lymphadenopathy:      Cervical: No cervical adenopathy.   Skin:     General: Skin is warm and dry.      Capillary Refill: Capillary refill takes less than 2 seconds.      Coloration: Skin is not jaundiced or pale.      Findings: No bruising or lesion.   Neurological:      General: No focal deficit present.      Mental Status: She is alert and oriented to person, place, and time. Mental status is at baseline.      Sensory: No sensory deficit.      Motor: No weakness.      Gait: Gait normal.   Psychiatric:         Mood and Affect: Mood normal.         Behavior: Behavior normal.         Thought Content: Thought content normal.         Judgment: Judgment normal.     Administrative Statements

## 2024-08-23 ENCOUNTER — HOSPITAL ENCOUNTER (EMERGENCY)
Facility: HOSPITAL | Age: 34
Discharge: HOME | End: 2024-08-23
Attending: FAMILY MEDICINE

## 2024-08-23 ENCOUNTER — APPOINTMENT (EMERGENCY)
Dept: RADIOLOGY | Facility: HOSPITAL | Age: 34
End: 2024-08-23

## 2024-08-23 VITALS
SYSTOLIC BLOOD PRESSURE: 155 MMHG | RESPIRATION RATE: 18 BRPM | OXYGEN SATURATION: 98 % | HEART RATE: 91 BPM | DIASTOLIC BLOOD PRESSURE: 83 MMHG

## 2024-08-23 DIAGNOSIS — R07.9 CHEST PAIN: Primary | ICD-10-CM

## 2024-08-23 DIAGNOSIS — R73.9 HYPERGLYCEMIA: ICD-10-CM

## 2024-08-23 DIAGNOSIS — E87.1 HYPONATREMIA: ICD-10-CM

## 2024-08-23 LAB
ANION GAP SERPL CALCULATED.3IONS-SCNC: 9 MMOL/L (ref 4–13)
B-OH-BUTYR SERPL-MCNC: 0.07 MMOL/L (ref 0.02–0.27)
BASE EX.OXY STD BLDV CALC-SCNC: 88.4 % (ref 60–80)
BASE EXCESS BLDV CALC-SCNC: -2.9 MMOL/L
BASOPHILS # BLD AUTO: 0.02 THOUSANDS/ÂΜL (ref 0–0.1)
BASOPHILS NFR BLD AUTO: 0 % (ref 0–1)
BUN SERPL-MCNC: 11 MG/DL (ref 5–25)
CALCIUM SERPL-MCNC: 9.7 MG/DL (ref 8.4–10.2)
CARDIAC TROPONIN I PNL SERPL HS: <2 NG/L
CHLORIDE SERPL-SCNC: 100 MMOL/L (ref 96–108)
CO2 SERPL-SCNC: 24 MMOL/L (ref 21–32)
CREAT SERPL-MCNC: 0.61 MG/DL (ref 0.6–1.3)
D DIMER PPP FEU-MCNC: 0.32 UG/ML FEU
EOSINOPHIL # BLD AUTO: 0.13 THOUSAND/ÂΜL (ref 0–0.61)
EOSINOPHIL NFR BLD AUTO: 1 % (ref 0–6)
ERYTHROCYTE [DISTWIDTH] IN BLOOD BY AUTOMATED COUNT: 14.4 % (ref 11.6–15.1)
GFR SERPL CREATININE-BSD FRML MDRD: 117 ML/MIN/1.73SQ M
GLUCOSE SERPL-MCNC: 313 MG/DL (ref 65–140)
GLUCOSE SERPL-MCNC: 443 MG/DL (ref 65–140)
HCO3 BLDV-SCNC: 21 MMOL/L (ref 24–30)
HCT VFR BLD AUTO: 44.4 % (ref 34.8–46.1)
HGB BLD-MCNC: 14.3 G/DL (ref 11.5–15.4)
IMM GRANULOCYTES # BLD AUTO: 0.03 THOUSAND/UL (ref 0–0.2)
IMM GRANULOCYTES NFR BLD AUTO: 0 % (ref 0–2)
LYMPHOCYTES # BLD AUTO: 2.72 THOUSANDS/ÂΜL (ref 0.6–4.47)
LYMPHOCYTES NFR BLD AUTO: 28 % (ref 14–44)
MCH RBC QN AUTO: 25.2 PG (ref 26.8–34.3)
MCHC RBC AUTO-ENTMCNC: 32.2 G/DL (ref 31.4–37.4)
MCV RBC AUTO: 78 FL (ref 82–98)
MONOCYTES # BLD AUTO: 0.66 THOUSAND/ÂΜL (ref 0.17–1.22)
MONOCYTES NFR BLD AUTO: 7 % (ref 4–12)
NEUTROPHILS # BLD AUTO: 6.19 THOUSANDS/ÂΜL (ref 1.85–7.62)
NEUTS SEG NFR BLD AUTO: 64 % (ref 43–75)
NRBC BLD AUTO-RTO: 0 /100 WBCS
O2 CT BLDV-SCNC: 18.2 ML/DL
PCO2 BLDV: 34.1 MM HG (ref 42–50)
PH BLDV: 7.41 [PH] (ref 7.3–7.4)
PLATELET # BLD AUTO: 252 THOUSANDS/UL (ref 149–390)
PMV BLD AUTO: 11.3 FL (ref 8.9–12.7)
PO2 BLDV: 57.7 MM HG (ref 35–45)
POTASSIUM SERPL-SCNC: 3.8 MMOL/L (ref 3.5–5.3)
RBC # BLD AUTO: 5.67 MILLION/UL (ref 3.81–5.12)
SODIUM SERPL-SCNC: 133 MMOL/L (ref 135–147)
WBC # BLD AUTO: 9.75 THOUSAND/UL (ref 4.31–10.16)

## 2024-08-23 PROCEDURE — 82010 KETONE BODYS QUAN: CPT

## 2024-08-23 PROCEDURE — 99285 EMERGENCY DEPT VISIT HI MDM: CPT

## 2024-08-23 PROCEDURE — 96374 THER/PROPH/DIAG INJ IV PUSH: CPT

## 2024-08-23 PROCEDURE — 85025 COMPLETE CBC W/AUTO DIFF WBC: CPT

## 2024-08-23 PROCEDURE — 82805 BLOOD GASES W/O2 SATURATION: CPT

## 2024-08-23 PROCEDURE — 96361 HYDRATE IV INFUSION ADD-ON: CPT

## 2024-08-23 PROCEDURE — 96372 THER/PROPH/DIAG INJ SC/IM: CPT

## 2024-08-23 PROCEDURE — 84484 ASSAY OF TROPONIN QUANT: CPT

## 2024-08-23 PROCEDURE — 36415 COLL VENOUS BLD VENIPUNCTURE: CPT

## 2024-08-23 PROCEDURE — 80048 BASIC METABOLIC PNL TOTAL CA: CPT

## 2024-08-23 PROCEDURE — 82948 REAGENT STRIP/BLOOD GLUCOSE: CPT

## 2024-08-23 PROCEDURE — 71046 X-RAY EXAM CHEST 2 VIEWS: CPT

## 2024-08-23 PROCEDURE — 85379 FIBRIN DEGRADATION QUANT: CPT

## 2024-08-23 RX ORDER — KETOROLAC TROMETHAMINE 30 MG/ML
15 INJECTION, SOLUTION INTRAMUSCULAR; INTRAVENOUS ONCE
Status: COMPLETED | OUTPATIENT
Start: 2024-08-23 | End: 2024-08-23

## 2024-08-23 RX ORDER — ASPIRIN 81 MG/1
324 TABLET, CHEWABLE ORAL ONCE
Status: COMPLETED | OUTPATIENT
Start: 2024-08-23 | End: 2024-08-23

## 2024-08-23 RX ADMIN — SODIUM CHLORIDE 1000 ML: 0.9 INJECTION, SOLUTION INTRAVENOUS at 10:55

## 2024-08-23 RX ADMIN — INSULIN HUMAN 7 UNITS: 100 INJECTION, SOLUTION PARENTERAL at 11:27

## 2024-08-23 RX ADMIN — ASPIRIN 81 MG 324 MG: 81 TABLET ORAL at 09:55

## 2024-08-23 RX ADMIN — KETOROLAC TROMETHAMINE 15 MG: 30 INJECTION, SOLUTION INTRAMUSCULAR at 09:55

## 2024-08-23 NOTE — ED PROVIDER NOTES
History  Chief Complaint   Patient presents with    Chest Pain     Onset yesterday  Described as a dull sharp pain that is constant.   Radiating to left shoulder.        Patient is a 34-year-old female with relevant past medical history of arthritis, bipolar disorder, diabetes mellitus, hypertension, migraines, and obesity presenting with chest pain that started yesterday evening. Patient works at [employer] and started with chest pain over her sternum last evening around 10-10:30 PM while at work. She got home and went to bed around 2 AM chest pain-free. She woke up around 5 AM feeling good and the chest pain came on again around 8-8:30 AM. She still has the sternal chest pain and it radiates to her left chest and occasionally into her left shoulder. She gets chest pain 1-2 times a month. She rates the chest pain 8/10 dull/sharp/stabbing pain. She has not taken anything for it and complains of mild shortness of breath. 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 She is not on blood thinners. She denies F/C, dizziness, abdominal pain, nausea, vomiting, or urinary symptoms.      History provided by:  Patient   used: No    Chest Pain  Associated symptoms: headache and shortness of breath    Associated symptoms: no abdominal pain, no back pain, no cough, no dizziness, no fever, no nausea, no palpitations and not vomiting        Prior to Admission Medications   Prescriptions Last Dose Informant Patient Reported? Taking?   ALPRAZolam (XANAX) 0.5 mg tablet   No No   Sig: Take 1 tablet (0.5 mg total) by mouth daily at bedtime as needed for anxiety   Aspirin Low Dose 81 MG EC tablet   No No   Sig: TAKE 1 TABLET BY MOUTH DAILY. DO NOT START BEFORE FEBRYARY 1, 2023   Blood Glucose Monitoring Suppl (OneTouch Verio) w/Device KIT  Self No No   Sig: Use daily   Patient not taking: Reported on 11/14/2023   Blood Pressure Monitoring (B-D ASSURE BPM/AUTO WRIST  CUFF) MISC  Self No No   Sig: Check blood pressure prior to each OB visit, or as directed by your physician.   Patient not taking: Reported on 12/15/2023   Continuous Blood Gluc  (FreeStyle Gautam 14 Day [place]) KVNG   No No   Sig: Use with gautam sensor   Patient not taking: Reported on 8/6/2024   Continuous Blood Gluc Sensor (FreeStyle Gautam 3 Sensor) MISC   No No   Sig: USE 1 SENSOR EACH TO BE CHANGED EVERY 14 DAYS   Patient not taking: Reported on 8/6/2024   Empagliflozin (JARDIANCE) 10 MG TABS tablet   No No   Sig: Take 1 tablet (10 mg total) by mouth daily   Insulin Pen Needle (B-D UF III MINI PEN NEEDLES) 31G X 5 MM MISC   No No   Sig: Inject under the skin daily at bedtime   Multiple Vitamin (MULTI VITAMIN DAILY PO)  Self Yes No   Sig: Take by mouth   acetaminophen (TYLENOL) 500 mg tablet  Self Yes No   Sig: Take 1,000 mg by mouth   albuterol (Ventolin HFA) 90 mcg/act inhaler   No No   Sig: Inhale 2 puffs every 4 (four) hours as needed for wheezing or shortness of breath   benzonatate (TESSALON PERLES) 100 mg capsule   No No   Sig: Take 1 capsule (100 mg total) by mouth every 8 (eight) hours   Patient not taking: Reported on 8/6/2024   citalopram (CeleXA) 10 mg tablet   No No   Sig: take 1 tablet by mouth once daily   cyclobenzaprine (FLEXERIL) 5 mg tablet   No No   Sig: Take 1 tablet (5 mg total) by mouth 3 (three) times a day as needed for muscle spasms for up to 12 doses   Patient not taking: Reported on 8/6/2024   dulaglutide (Trulicity) 0.75 MG/0.5ML injection   No No   Sig: Inject 0.5 mL (0.75 mg total) under the skin every 7 days for 4 doses Take 0.75mg weekly X 4 weeks after which will increase to 1.5 mg weekly.   Patient not taking: Reported on 8/6/2024   erythromycin (ILOTYCIN) ophthalmic ointment   No No   Sig: Administer 0.5 inches to the right eye daily at bedtime   Patient not taking: Reported on 8/2/2023   fexofenadine (ALLEGRA) 180 MG tablet  Self No No   Sig: Take 1 tablet (180 mg  total) by mouth daily   gabapentin (NEURONTIN) 100 mg capsule   No No   Sig: Take 1 capsule (100 mg total) by mouth daily at bedtime   insulin glargine (LANTUS) 100 units/mL subcutaneous injection   No No   Sig: Inject 17 Units under the skin daily at bedtime   insulin lispro (HumaLOG KwikPen) 100 units/mL injection pen   No No   Sig: Inject 5 Units under the skin 3 (three) times a day with meals   lisinopril (ZESTRIL) 30 mg tablet   No No   Sig: Take 1 tablet (30 mg total) by mouth daily   metFORMIN (GLUCOPHAGE) 1000 MG tablet   No No   Sig: Take 1 tablet (1,000 mg total) by mouth 2 (two) times a day with meals   montelukast (SINGULAIR) 10 mg tablet   No No   Sig: Take 1 tablet (10 mg total) by mouth daily at bedtime   naproxen (Naprosyn) 500 mg tablet   No No   Sig: Take 1 tablet (500 mg total) by mouth 2 (two) times a day with meals   ondansetron (ZOFRAN-ODT) 4 mg disintegrating tablet   No No   Sig: Take 1 tablet (4 mg total) by mouth every 6 (six) hours as needed for nausea for up to 8 doses   sucralfate (CARAFATE) 1 g tablet   No No   Sig: Take 1 tablet (1 g total) by mouth 4 (four) times a day   Patient not taking: Reported on 8/6/2024      Facility-Administered Medications: None       Past Medical History:   Diagnosis Date    Allergic     Arthritis     Bipolar affective disorder, currently depressed, moderate (HCC) 12/17/2018    Cyst of ovary, right     Diabetes mellitus (HCC) 10/10/2018    type 2    Endometriosis     Heart murmur 1988    Hepatitis C     Hepatitis C virus infection cured after antiviral drug therapy     History of transfusion     Hypertension     Migraines     Morbid obesity with BMI of 40.0-44.9, adult (HCC)     Obesity 1995    Pulmonary artery congenital abnormality     Spleen enlarged     Status post surgical removal of both fallopian tubes     Varicella        Past Surgical History:   Procedure Laterality Date    CARDIAC CATHETERIZATION      no CAD 10days, 4 weeks 22months old      CARDIAC CATHETERIZATION N/A 2023    Procedure: Cardiac Coronary Angiogram;  Surgeon: Marcela Lr DO;  Location: [facility];  Service: Cardiology    CARDIAC CATHETERIZATION Left 2023    Procedure: Cardiac Left Heart Cath;  Surgeon: Marcela Lr DO;  Location: [facility];  Service: Cardiology    CARDIAC CATHETERIZATION  2023    Procedure: Cardiac catheterization;  Surgeon: Marcela Lr DO;  Location: [facility];  Service: Cardiology     SECTION, LOW TRANSVERSE      CHOLECYSTECTOMY      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 5    CORONARY STENT PLACEMENT      IR LOWER EXTREMITY ANGIOGRAM  2023    LIVER BIOPSY      LIVER BIOPSY      STERNAL WIRE REMOVAL  2022    THROMBECTOMY W/ EMBOLECTOMY Right 2023    Procedure: Right femoral exposure Right iliac embolectomy w/ #4 Ted catheter Aortogram Right EIA stent w/ 7x29mm VBX;  Surgeon: Jody Hodges MD;  Location: [facility];  Service: Vascular    TUBAL LIGATION Bilateral 2020    VSD REPAIR      As a child    WOUND DEBRIDEMENT Right 2023    Procedure: Right Groin Wound Washout, Pulse Lavage, Wound Vac placement;  Surgeon: Jelani Avila DO;  Location: [facility];  Service: Vascular       Family History   Problem Relation Age of Onset    Hypertension Mother     Migraines Mother     JENNY disease Mother     Depression Mother     Hyperlipidemia Mother     Diabetes Mother     Diabetes Father     Hypertension Father     Kidney failure Father     Heart attack Father     Arthritis Father     Stroke Father     Glaucoma Father     Polycystic kidney disease Paternal Grandmother     Stroke Paternal Grandmother     Heart disease Paternal Grandmother     Arthritis Sister     Asthma Sister     Thyroid disease Sister     Diabetes Sister     Arthritis Maternal Grandmother     Breast cancer Maternal Grandmother     Diabetes Maternal Grandmother     Hypertension Maternal Grandmother     Heart  Valve Disease Maternal Grandmother     Cancer Maternal Grandmother         Skin cancer    Learning disabilities Cousin     Learning disabilities Sister     Diabetes Sister     ADD / ADHD Cousin     Lung cancer Brother     Cancer Brother         Lung cancer    Diabetes Maternal Grandfather     Hypertension Maternal Grandfather     JENNY disease Maternal Grandfather     Stroke Maternal Grandfather     Cancer Maternal Grandfather         Skin cancer    Cancer Paternal Uncle         Skin cancer     I have reviewed and agree with the history as documented.    E-Cigarette/Vaping    E-Cigarette Use Never User      E-Cigarette/Vaping Substances    Nicotine No     THC No     CBD No     Flavoring No      Social History     Tobacco Use    Smoking status: Former     Current packs/day: 0.00     Average packs/day: 0.5 packs/day for 10.0 years (5.0 ttl pk-yrs)     Types: Cigarettes     Start date: 2013     Quit date: 2023     Years since quittin.6    Smokeless tobacco: Never   Vaping Use    Vaping status: Never Used   Substance Use Topics    Alcohol use: Yes     Alcohol/week: 3.0 standard drinks of alcohol     Types: 3 Standard drinks or equivalent per week     Comment: I drink socially    Drug use: Not Currently     Comment: hx of THC use for migraines       Review of Systems   Constitutional:  Negative for chills and fever.   HENT:  Negative for congestion, ear pain, rhinorrhea and sore throat.    Eyes:  Negative for pain and visual disturbance.   Respiratory:  Positive for shortness of breath. Negative for cough.    Cardiovascular:  Positive for chest pain. Negative for palpitations.   Gastrointestinal:  Negative for abdominal pain, constipation, diarrhea, nausea and vomiting.   Genitourinary:  Negative for dysuria, frequency, hematuria and urgency.   Musculoskeletal:  Negative for arthralgias and back pain.   Skin:  Negative for color change and rash.   Neurological:  Positive for headaches. Negative for dizziness,  seizures, syncope and light-headedness.   Psychiatric/Behavioral:  Negative for agitation and confusion.        Physical Exam  Physical Exam  Vitals and nursing note reviewed.   Constitutional:       General: She is not in acute distress.     Appearance: She is well-developed. She is obese.   HENT:      Head: Normocephalic and atraumatic.   Eyes:      Conjunctiva/sclera: Conjunctivae normal.   Cardiovascular:      Rate and Rhythm: Regular rhythm. Tachycardia present.      Heart sounds: No murmur heard.  Pulmonary:      Effort: Pulmonary effort is normal. No respiratory distress.      Breath sounds: Normal breath sounds. No wheezing, rhonchi or rales.   Abdominal:      Palpations: Abdomen is soft.      Tenderness: There is no abdominal tenderness. There is no guarding or rebound.   Musculoskeletal:         General: No swelling.      Cervical back: Neck supple.   Skin:     General: Skin is warm and dry.      Capillary Refill: Capillary refill takes less than 2 seconds.   Neurological:      General: No focal deficit present.      Mental Status: She is alert and oriented to person, place, and time.      GCS: GCS eye subscore is 4. GCS verbal subscore is 5. GCS motor subscore is 6.   Psychiatric:         Mood and Affect: Mood normal.         Vital Signs  ED Triage Vitals   Temp Pulse Respirations Blood Pressure SpO2   -- 08/23/24 0931 08/23/24 0930 08/23/24 0931 08/23/24 0931    (!) 106 16 (!) 194/112 98 %      Temp src Heart Rate Source Patient Position - Orthostatic VS BP Location FiO2 (%)   -- 08/23/24 0930 08/23/24 0931 08/23/24 0931 --    Monitor Sitting Left arm       Pain Score       08/23/24 0930       8           Vitals:    08/23/24 1000 08/23/24 1100 08/23/24 1130 08/23/24 1230   BP: 168/81 145/83 147/86 155/83   Pulse: 95 88 92 91   Patient Position - Orthostatic VS:  Lying  Lying         Visual Acuity      ED Medications  Medications   aspirin chewable tablet 324 mg (324 mg Oral Given 8/23/24 0955)    ketorolac (TORADOL) injection 15 mg (15 mg Intravenous Given 8/23/24 0955)   sodium chloride 0.9 % bolus 1,000 mL (0 mL Intravenous Stopped 8/23/24 1306)   insulin regular (HumuLIN R,NovoLIN R) injection 7 Units (7 Units Subcutaneous Given 8/23/24 1127)       Diagnostic Studies  Results Reviewed       Procedure Component Value Units Date/Time    Beta Hydroxybutyrate [[id number]]  (Normal) Collected: 08/23/24 0952    Lab Status: Final result Specimen: Blood from Arm, Right Updated: 08/23/24 1244     Beta- Hydroxybutyrate 0.07 mmol/L     Fingerstick Glucose (POCT) [[id number]]  (Abnormal) Collected: 08/23/24 1216    Lab Status: Final result Specimen: Blood Updated: 08/23/24 1217     POC Glucose 313 mg/dl     Blood gas, venous [[id number]]  (Abnormal) Collected: 08/23/24 1052    Lab Status: Final result Specimen: Blood from Arm, Right Updated: 08/23/24 1102     pH, Arnaldo 7.407     pCO2, Arnaldo 34.1 mm Hg      pO2, Arnaldo 57.7 mm Hg      HCO3, Arnaldo 21.0 mmol/L      Base Excess, Arnaldo -2.9 mmol/L      O2 Content, Arnaldo 18.2 ml/dL      O2 HGB, VENOUS 88.4 %     HS Troponin 0hr (reflex protocol) [[id number]]  (Normal) Collected: 08/23/24 0952    Lab Status: Final result Specimen: Blood from Arm, Right Updated: 08/23/24 1025     hs TnI 0hr <2 ng/L     Basic metabolic panel [[id number]]  (Abnormal) Collected: 08/23/24 0952    Lab Status: Final result Specimen: Blood from Arm, Right Updated: 08/23/24 1022     Sodium 133 mmol/L      Potassium 3.8 mmol/L      Chloride 100 mmol/L      CO2 24 mmol/L      ANION GAP 9 mmol/L      BUN 11 mg/dL      Creatinine 0.61 mg/dL      Glucose 443 mg/dL      Calcium 9.7 mg/dL      eGFR 117 ml/min/1.73sq m     Narrative:      National Kid[employer] guidelines for Chronic Kidney Disease (CKD):     Stage 1 with normal or high GFR (GFR > 90 mL/min/1.73 square meters)    Stage 2 Mild CKD (GFR = 60-89 mL/min/1.73 square meters)    Stage 3A Moderate CKD (GFR = 45-59 mL/min/1.73 square meters)    Stage 3B  Moderate CKD (GFR = 30-44 mL/min/1.73 square meters)    Stage 4 Severe CKD (GFR = 15-29 mL/min/1.73 square meters)    Stage 5 End Stage CKD (GFR <15 mL/min/1.73 square meters)  Note: GFR calculation is accurate only with a steady state creatinine    D-dimer, quantitative [[id number]]  (Normal) Collected: 08/23/24 0952    Lab Status: Final result Specimen: Blood from Arm, Right Updated: 08/23/24 1021     D-Dimer, Quant 0.32 ug/ml FEU     CBC and differential [[id number]]  (Abnormal) Collected: 08/23/24 0952    Lab Status: Final result Specimen: Blood from Arm, Right Updated: 08/23/24 1001     WBC 9.75 Thousand/uL      RBC 5.67 Million/uL      Hemoglobin 14.3 g/dL      Hematocrit 44.4 %      MCV 78 fL      MCH 25.2 pg      MCHC 32.2 g/dL      RDW 14.4 %      MPV 11.3 fL      Platelets 252 Thousands/uL      nRBC 0 /100 WBCs      Segmented % 64 %      Immature Grans % 0 %      Lymphocytes % 28 %      Monocytes % 7 %      Eosinophils Relative 1 %      Basophils Relative 0 %      Absolute Neutrophils 6.19 Thousands/µL      Absolute Immature Grans 0.03 Thousand/uL      Absolute Lymphocytes 2.72 Thousands/µL      Absolute Monocytes 0.66 Thousand/µL      Eosinophils Absolute 0.13 Thousand/µL      Basophils Absolute 0.02 Thousands/µL                    X-ray chest 2 views   ED Interpretation by Twan Uriarte PA-C (08/23 1035)   No acute cardiopulmonary disease.      Final Result by Miah Davila DO (08/23 1306)      No acute cardiopulmonary disease.            Resident: Vini Moreno I, the attending radiologist, have reviewed the images and agree with the final report above.      Workstation performed: [id number]                    Procedures  ECG 12 Lead Documentation Only    Date/Time: 8/23/2024 9:34 AM    Performed by: Twan Uriarte PA-C  Authorized by: Twan Uriarte PA-C    Indications / Diagnosis:  Chest pain  ECG reviewed by me, the ED Provider: yes    Patient location:  ED  Previous ECG:      Comparison to cardiac monitor: Yes    Interpretation:     Interpretation: abnormal    Rate:     ECG rate:  102    ECG rate assessment: tachycardic    Rhythm:     Rhythm: sinus tachycardia    Ectopy:     Ectopy: none    QRS:     QRS axis:  Normal    QRS intervals:  Normal  Conduction:     Conduction: abnormal      Abnormal conduction: complete RBBB    ST segments:     ST segments:  Normal  T waves:     T waves: normal             ED Course  ED Course as of 08/23/24 1309   Fri Aug 23, 2024   0932 Blood Pressure(!): 194/112  Hypertension and tachycardia. Will monitor this.   1004 WBC: 9.75  No leukocytosis, anemia, or platelet abnormality.   1021 D-Dimer, Quant: 0.32  D-dimer WNL. PE ruled out.   1033 Sodium(!): 133  Mild hyponatremia. Will give NSS for correction.   1033 GLUCOSE(!!): 443  Hyperglycemia. She did not take her morning 7 units of insulin. Will get VBG, UA, and beta hydroxybutyrate to further evaluate.   1034 hs TnI 0hr: <2  Initial troponin <2. Will discontinue further troponins.   1035 Went over results with patient. She is feeling better. Will give fluids and insulin for hyperglycemia and get labs and then reevaluate.    1104 pH, Arnaldo(!): 7.407  No acidosis.   1217 POC Glucose(!): 313  Glucose improved.   1246 Beta- Hydroxybutyrate: 0.07  Beta hydroxybutyrate WNL and patient unable to provide urine. She would like to go home. Will discharge home with strict return precautions.               HEART Risk Score      Flowsheet Row Most Recent Value   Heart Score Risk Calculator    History 1 Filed at: 08/23/2024 1308   ECG 1 Filed at: 08/23/2024 1308   Age 0 Filed at: 08/23/2024 1308   Risk Factors 1 Filed at: 08/23/2024 1308   Troponin 0 Filed at: 08/23/2024 1308   HEART Score 3 Filed at: 08/23/2024 1308                  PERC Rule for PE      Flowsheet Row Most Recent Value   PERC Rule for PE    Age >=50 0 Filed at: 08/23/2024 0953   HR >=100 1 Filed at: 08/23/2024 0953   O2 Sat on room air < 95% 0 Filed  at: 08/23/2024 0953   History of PE or DVT 1 Filed at: 08/23/2024 0953   Recent trauma or surgery 0 Filed at: 08/23/2024 0953   Hemoptysis 0 Filed at: 08/23/2024 0953   Exogenous estrogen 0 Filed at: 08/23/2024 0953   Unilateral leg swelling 0 Filed at: 08/23/2024 0953   PERC Rule for PE Results 2 Filed at: 08/23/2024 0953                    Wells' Criteria for PE      Flowsheet Row Most Recent Value   Wells' Criteria for PE    Clinical signs and symptoms of DVT 0 Filed at: 08/23/2024 0947   PE is primary diagnosis or equally likely 0 Filed at: 08/23/2024 0947   HR >100 1.5 Filed at: 08/23/2024 0947   Immobilization at least 3 days or Surgery in the previous 4 weeks 0 Filed at: 08/23/2024 0947   Previous, objectively diagnosed PE or DVT 1.5 Filed at: 08/23/2024 0947   Hemoptysis 0 Filed at: 08/23/2024 0947   Malignancy with treatment within 6 months or palliative 0 Filed at: 08/23/2024 0947   Wells' Criteria Total 3 Filed at: 08/23/2024 0947                  Medical Decision Making  Patient is a well-appearing 34-year-old female presenting with sharp constant chest pain and mild shortness of breath. The chest pain started last evening while at work and went away when she got home and started again this morning a few hours after waking up. She currently has the chest pain.  Cardiac workup including EKG, troponin, and chest x-ray as she complains of chest pain and SOB. Will add on D-dimer to evaluate for pulmonary embolism as she is tachycardic and has had a DVT before. CBC and BMP evaluate for leukocytosis, anemia, electrolyte abnormality, DILIA, or glucose abnormality.  See ED course for interpretation of labs, imaging, and further medical decision making.   Aspirin and Toradol for chest pain and generalized headache. This essentially resolved her symptoms. Low risk on heart score. Labs and imaging were reassuring or acute cardiopulmonary disease. Incidental finding of hyperglycemia on her BMP which she has had  several times before. She did not take her morning insulin. Gave her morning dose of insulin and NSS and ordered further workup including VBG and beta hydroxybutyrate which were within normal limits. Low suspicion for DKA or HHS. She will follow-up with her PCP in regards to this. Provided work note for this evening.  Dispo: Patient is safe/stable for discharge home and was discharged home with strict return precautions. Provided verbal and written supportive care instructions for managing her illness. Advised patient to return to the nearest emergency room if she has new or worsening symptoms or if any questions arise. Advised patient to follow-up with her family doctor. Patient is satisfied with care and agrees with management and plan.     Amount and/or Complexity of Data Reviewed  External Data Reviewed: labs, radiology and notes.  Labs: ordered. Decision-making details documented in ED Course.  Radiology: ordered and independent interpretation performed.  ECG/medicine tests: ordered and independent interpretation performed.    Risk  OTC drugs.  Prescription drug management.                 Disposition  Final diagnoses:   Chest pain   Hyperglycemia   Hyponatremia     Time reflects when diagnosis was documented in both MDM as applicable and the Disposition within this note       Time User Action Codes Description Comment    8/23/2024 12:47 PM Twan Uriarte Add [R07.9] Chest pain     8/23/2024 12:47 PM Twan Uriarte Add [R73.9] Hyperglycemia     8/23/2024 12:47 PM Twan Uriarte Add [E87.1] Hyponatremia           ED Disposition       ED Disposition   Discharge    Condition   Stable    Date/Time   Fri Aug 23, 2024 1144    Comment   Jaycee Can discharge to home/self care.                   Follow-up Information       Follow up With Specialties Details Why Contact [facility], Nurse Practitioner Schedule an appointment as soon as possible for a visit   [address] [place] [address]  Dottie TORO [address]  [phone]              Discharge Medication List as of 8/23/2024 12:53 PM        CONTINUE these medications which have NOT CHANGED    Details   acetaminophen (TYLENOL) 500 mg tablet Take 1,000 mg by mouth, Starting Wed 5/6/2020, Historical Med      albuterol (Ventolin HFA) 90 mcg/act inhaler Inhale 2 puffs every 4 (four) hours as needed for wheezing or shortness of breath, Starting Fri 1/26/2024, Normal      ALPRAZolam (XANAX) 0.5 mg tablet Take 1 tablet (0.5 mg total) by mouth daily at bedtime as needed for anxiety, Starting Fri 1/26/2024, Normal      Aspirin Low Dose 81 MG EC tablet TAKE 1 TABLET BY MOUTH DAILY. DO NOT START BEFORE FEBRYARY 1, 2023, Normal      benzonatate (TESSALON PERLES) 100 mg capsule Take 1 capsule (100 mg total) by mouth every 8 (eight) hours, Starting Tue 3/26/2024, Normal      Blood Glucose Monitoring Suppl (OneTouch Verio) w/Device KIT Use daily, Starting Tue 11/23/2021, Normal      Blood Pressure Monitoring (B-D ASSURE BPM/AUTO WRIST CUFF) MISC Check blood pressure prior to each OB visit, or as directed by your physician., Normal      citalopram (CeleXA) 10 mg tablet take 1 tablet by mouth once daily, Starting Tue 3/5/2024, Normal      Continuous Blood Gluc  (FreeStyle Gautam 14 Day [place]) KVNG Use with gautam sensor, Normal      Continuous Blood Gluc Sensor (FreeStyle Gautam 3 Sensor) MISC USE 1 SENSOR EACH TO BE CHANGED EVERY 14 DAYS, Normal      cyclobenzaprine (FLEXERIL) 5 mg tablet Take 1 tablet (5 mg total) by mouth 3 (three) times a day as needed for muscle spasms for up to 12 doses, Starting Mon 6/3/2024, Normal      dulaglutide (Trulicity) 0.75 MG/0.5ML injection Inject 0.5 mL (0.75 mg total) under the skin every 7 days for 4 doses Take 0.75mg weekly X 4 weeks after which will increase to 1.5 mg weekly., Starting Fri 1/26/2024, Until Sat 2/17/2024, Normal      Empagliflozin (JARDIANCE) 10 MG TABS tablet Take 1 tablet (10 mg total) by mouth  daily, Starting Fri 1/26/2024, Until Mon 1/20/2025, Normal      erythromycin (ILOTYCIN) ophthalmic ointment Administer 0.5 inches to the right eye daily at bedtime, Starting Thu 6/29/2023, Normal      fexofenadine (ALLEGRA) 180 MG tablet Take 1 tablet (180 mg total) by mouth daily, Starting Fri 8/20/2021, Normal      gabapentin (NEURONTIN) 100 mg capsule Take 1 capsule (100 mg total) by mouth daily at bedtime, Starting Fri 1/26/2024, Normal      insulin glargine (LANTUS) 100 units/mL subcutaneous injection Inject 17 Units under the skin daily at bedtime, Starting Fri 1/26/2024, Until Tue 8/6/2024, Normal      insulin lispro (HumaLOG KwikPen) 100 units/mL injection pen Inject 5 Units under the skin 3 (three) times a day with meals, Starting Fri 1/26/2024, Normal      Insulin Pen Needle (B-D UF III MINI PEN NEEDLES) 31G X 5 MM MISC Inject under the skin daily at bedtime, Starting Fri 1/26/2024, Normal      lisinopril (ZESTRIL) 30 mg tablet Take 1 tablet (30 mg total) by mouth daily, Starting Fri 1/26/2024, Normal      metFORMIN (GLUCOPHAGE) 1000 MG tablet Take 1 tablet (1,000 mg total) by mouth 2 (two) times a day with meals, Starting Fri 1/26/2024, Normal      montelukast (SINGULAIR) 10 mg tablet Take 1 tablet (10 mg total) by mouth daily at bedtime, Starting Fri 1/26/2024, Normal      Multiple Vitamin (MULTI VITAMIN DAILY PO) Take by mouth, Historical Med      naproxen (Naprosyn) 500 mg tablet Take 1 tablet (500 mg total) by mouth 2 (two) times a day with meals, Starting Fri 1/26/2024, Normal      ondansetron (ZOFRAN-ODT) 4 mg disintegrating tablet Take 1 tablet (4 mg total) by mouth every 6 (six) hours as needed for nausea for up to 8 doses, Starting Sat 3/9/2024, Normal      sucralfate (CARAFATE) 1 g tablet Take 1 tablet (1 g total) by mouth 4 (four) times a day, Starting Sat 3/9/2024, Normal             No discharge procedures on file.    PDMP Review         Value Time User    PDMP Reviewed  Yes 1/26/2024  9:40  REJI Krishnan            ED Provider  Electronically Signed by             Twan Uriarte PA-C  08/23/24 [id number]

## 2024-08-23 NOTE — DISCHARGE INSTRUCTIONS
hyponImmediately return to the emergency room if you experience any new or worsening symptoms or if the symptoms are lasting longer than expected.     Please follow-up with your family doctor to discuss your ER visit today. Continue with your medications as prescribed and adequate hydration. Continue monitoring your sugar.

## 2024-08-23 NOTE — Clinical Note
Jaycee Can was seen and treated in our emergency department on 8/23/2024.    No restrictions            Diagnosis: Chest pain    Jaycee JAFFE  may return to work on return date.    She may return on this date: 08/24/2024         If you have any questions or concerns, please don't hesitate to call.      Twan Uriarte PA-C    ______________________________           _______________          _______________  Hospital Representative                              Date                                Time

## 2024-08-25 ENCOUNTER — APPOINTMENT (OUTPATIENT)
Dept: RADIOLOGY | Facility: CLINIC | Age: 34
End: 2024-08-25

## 2024-08-25 ENCOUNTER — OFFICE VISIT (OUTPATIENT)
Dept: URGENT CARE | Facility: CLINIC | Age: 34
End: 2024-08-25

## 2024-08-25 VITALS
RESPIRATION RATE: 16 BRPM | TEMPERATURE: 97.7 F | OXYGEN SATURATION: 96 % | SYSTOLIC BLOOD PRESSURE: 148 MMHG | HEIGHT: 61 IN | DIASTOLIC BLOOD PRESSURE: 86 MMHG | HEART RATE: 94 BPM | WEIGHT: 216 LBS | BODY MASS INDEX: 40.78 KG/M2

## 2024-08-25 DIAGNOSIS — S92.344A CLOSED NONDISPLACED FRACTURE OF FOURTH METATARSAL BONE OF RIGHT FOOT, INITIAL ENCOUNTER: Primary | ICD-10-CM

## 2024-08-25 DIAGNOSIS — S69.91XA INJURY OF RIGHT HAND, INITIAL ENCOUNTER: ICD-10-CM

## 2024-08-25 PROCEDURE — 73130 X-RAY EXAM OF HAND: CPT

## 2024-08-25 PROCEDURE — 29125 APPL SHORT ARM SPLINT STATIC: CPT

## 2024-08-25 PROCEDURE — 99213 OFFICE O/P EST LOW 20 MIN: CPT | Performed by: STUDENT IN AN ORGANIZED HEALTH CARE EDUCATION/TRAINING PROGRAM

## 2024-08-25 NOTE — PROGRESS NOTES
[facility]        NAME: Jaycee Can is a 34 y.o. female  : 1990    MRN: [id number]  DATE: 2024  TIME: 4:00 PM    Assessment and Plan   Closed nondisplaced fracture of fourth metatarsal bone of right foot, initial encounter [S92.344A]  1. Closed nondisplaced fracture of fourth metatarsal bone of right foot, initial encounter  XR hand 3+ vw right        Provider Radiology Interpretation (preliminary)   Final results will be as per official Radiology Report when available:   RIGHT HAND: fracture of right 4th mid metacarpal    Patient placed in an ulnar gutter splint by this provider and assigned RN (Adali CHANG RN)    Patient Instructions     Elevate the hand to decrease the swelling     You may take over the counter Tylenol (Acetaminophen) and/or Motrin (Ibuprofen) as needed, as directed on packaging.     Keep the splint intact until you see orthopedics.     Call orthopedics to schedule an appt to be seen. Call tomorrow morning.     Ice to the area up to 6 times per day for a period of 15 minutes each time    Proceed to  ER if symptoms worsen.    If tests are performed, our office will contact you with results only if changes need to made to the care plan discussed with you at the visit. [facility]    Chief Complaint     Chief Complaint   Patient presents with    Hand Injury     Per patient, she [place] the [place], she took her daughter down the slide and jammed her right hand. Her hand is noted swelling and ecchymosis.          History of Present Illness       34-year-old female patient presents with complaint of right hand pain.  She reports she was at the fair last [place] and took her daughter down the slide and jammed her right hand.  Today she reports right hand swelling and bruising.        Review of Systems   Review of Systems   Musculoskeletal:  Positive for arthralgias and joint swelling.   Skin:  Positive for color change.          Current Medications       Current Outpatient Medications:     acetaminophen (TYLENOL) 500 mg tablet, Take 1,000 mg by mouth, Disp: , Rfl:     albuterol (Ventolin HFA) 90 mcg/act inhaler, Inhale 2 puffs every 4 (four) hours as needed for wheezing or shortness of breath, Disp: 18 g, Rfl: 2    ALPRAZolam (XANAX) 0.5 mg tablet, Take 1 tablet (0.5 mg total) by mouth daily at bedtime as needed for anxiety, Disp: 15 tablet, Rfl: 0    Aspirin Low Dose 81 MG EC tablet, TAKE 1 TABLET BY MOUTH DAILY. DO NOT START BEFORE FEBRYARY 1, 2023, Disp: 90 tablet, Rfl: 0    fexofenadine (ALLEGRA) 180 MG tablet, Take 1 tablet (180 mg total) by mouth daily, Disp: 30 tablet, Rfl: 5    gabapentin (NEURONTIN) 100 mg capsule, Take 1 capsule (100 mg total) by mouth daily at bedtime, Disp: 30 capsule, Rfl: 0    insulin glargine (LANTUS) 100 units/mL subcutaneous injection, Inject 17 Units under the skin daily at bedtime, Disp: 10 mL, Rfl: 0    insulin lispro (HumaLOG KwikPen) 100 units/mL injection pen, Inject 5 Units under the skin 3 (three) times a day with meals, Disp: 15 mL, Rfl: 3    Insulin Pen Needle (B-D UF III MINI PEN NEEDLES) 31G X 5 MM MISC, Inject under the skin daily at bedtime, Disp: 100 each, Rfl: 0    lisinopril (ZESTRIL) 30 mg tablet, Take 1 tablet (30 mg total) by mouth daily, Disp: 90 tablet, Rfl: 3    metFORMIN (GLUCOPHAGE) 1000 MG tablet, Take 1 tablet (1,000 mg total) by mouth 2 (two) times a day with meals, Disp: 180 tablet, Rfl: 0    montelukast (SINGULAIR) 10 mg tablet, Take 1 tablet (10 mg total) by mouth daily at bedtime, Disp: 30 tablet, Rfl: 5    naproxen (Naprosyn) 500 mg tablet, Take 1 tablet (500 mg total) by mouth 2 (two) times a day with meals, Disp: 60 tablet, Rfl: 0    ondansetron (ZOFRAN-ODT) 4 mg disintegrating tablet, Take 1 tablet (4 mg total) by mouth every 6 (six) hours as needed for nausea for up to 8 doses, Disp: 8 tablet, Rfl: 0    benzonatate (TESSALON PERLES) 100 mg capsule, Take 1 capsule  (100 mg total) by mouth every 8 (eight) hours (Patient not taking: Reported on 8/6/2024), Disp: 21 capsule, Rfl: 0    Blood Glucose Monitoring Suppl (OneTouch Verio) w/Device KIT, Use daily (Patient not taking: Reported on 11/14/2023), Disp: 1 kit, Rfl: 0    Blood Pressure Monitoring (B-D ASSURE BPM/AUTO WRIST CUFF) MISC, Check blood pressure prior to each OB visit, or as directed by your physician. (Patient not taking: Reported on 12/15/2023), Disp: 1 each, Rfl: 0    citalopram (CeleXA) 10 mg tablet, take 1 tablet by mouth once daily, Disp: 90 tablet, Rfl: 1    Continuous Blood Gluc  (FreeStyle Gautam 14 Day Wasta) KVNG, Use with gautam sensor (Patient not taking: Reported on 8/6/2024), Disp: 1 each, Rfl: 3    Continuous Blood Gluc Sensor (FreeStyle Gautam 3 Sensor) MISC, USE 1 SENSOR EACH TO BE CHANGED EVERY 14 DAYS (Patient not taking: Reported on 8/6/2024), Disp: 2 each, Rfl: 2    cyclobenzaprine (FLEXERIL) 5 mg tablet, Take 1 tablet (5 mg total) by mouth 3 (three) times a day as needed for muscle spasms for up to 12 doses (Patient not taking: Reported on 8/6/2024), Disp: 12 tablet, Rfl: 0    dulaglutide (Trulicity) 0.75 MG/0.5ML injection, Inject 0.5 mL (0.75 mg total) under the skin every 7 days for 4 doses Take 0.75mg weekly X 4 weeks after which will increase to 1.5 mg weekly. (Patient not taking: Reported on 8/6/2024), Disp: 2 mL, Rfl: 0    Empagliflozin (JARDIANCE) 10 MG TABS tablet, Take 1 tablet (10 mg total) by mouth daily, Disp: 90 tablet, Rfl: 3    erythromycin (ILOTYCIN) ophthalmic ointment, Administer 0.5 inches to the right eye daily at bedtime (Patient not taking: Reported on 8/2/2023), Disp: 3.5 g, Rfl: 0    Multiple Vitamin (MULTI VITAMIN DAILY PO), Take by mouth (Patient not taking: Reported on 8/25/2024), Disp: , Rfl:     sucralfate (CARAFATE) 1 g tablet, Take 1 tablet (1 g total) by mouth 4 (four) times a day (Patient not taking: Reported on 8/6/2024), Disp: 28 tablet, Rfl: 0    Current  Allergies     Allergies as of 2024 - Reviewed 2024   Allergen Reaction Noted    Prednisone Swelling 2018    Bactrim [sulfamethoxazole-trimethoprim] Hives 2019    Corticosteroids Swelling 2009    Cortisone Swelling 2019    Medical tape Hives 2020    Other Hives 2020    Sulfa antibiotics Hives 2019            The following portions of the patient's history were reviewed and updated as appropriate: allergies, current medications, past family history, past medical history, past social history, past surgical history and problem list.     Past Medical History:   Diagnosis Date    Allergic     Arthritis     Bipolar affective disorder, currently depressed, moderate (HCC) 2018    Cyst of ovary, right     Diabetes mellitus (HCC) 10/10/2018    type 2    Endometriosis     Heart murmur 1988    Hepatitis C     Hepatitis C virus infection cured after antiviral drug therapy     History of transfusion     Hypertension     Migraines     Morbid obesity with BMI of 40.0-44.9, adult ([facility])     Obesity     Pulmonary artery congenital abnormality     Spleen enlarged     Status post surgical removal of both fallopian tubes     Varicella        Past Surgical History:   Procedure Laterality Date    CARDIAC CATHETERIZATION      no CAD 10days, 4 weeks 22months old     CARDIAC CATHETERIZATION N/A 2023    Procedure: Cardiac Coronary Angiogram;  Surgeon: Marcela Lr DO;  Location: [facility];  Service: Cardiology    CARDIAC CATHETERIZATION Left 2023    Procedure: Cardiac Left Heart Cath;  Surgeon: Marcela Lr DO;  Location: [facility];  Service: Cardiology    CARDIAC CATHETERIZATION  2023    Procedure: Cardiac catheterization;  Surgeon: Marcela Lr DO;  Location: [facility];  Service: Cardiology     SECTION, LOW TRANSVERSE      CHOLECYSTECTOMY      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 5    CORONARY  STENT PLACEMENT      IR LOWER EXTREMITY ANGIOGRAM  1/30/2023    LIVER BIOPSY      LIVER BIOPSY      STERNAL WIRE REMOVAL  03/24/2022    THROMBECTOMY W/ EMBOLECTOMY Right 1/30/2023    Procedure: Right femoral exposure Right iliac embolectomy w/ #4 Ted catheter Aortogram Right EIA stent w/ 7x29mm VBX;  Surgeon: Jody Hodges MD;  Location: [facility];  Service: Vascular    TUBAL LIGATION Bilateral 2020    VSD REPAIR      As a child    WOUND DEBRIDEMENT Right 2/11/2023    Procedure: Right Groin Wound Washout, Pulse Lavage, Wound Vac placement;  Surgeon: Jelani Avila DO;  Location: [facility];  Service: Vascular       Family History   Problem Relation Age of Onset    Hypertension Mother     Migraines Mother     JENNY disease Mother     Depression Mother     Hyperlipidemia Mother     Diabetes Mother     Diabetes Father     Hypertension Father     Kidney failure Father     Heart attack Father     Arthritis Father     Stroke Father     Glaucoma Father     Polycystic kidney disease Paternal Grandmother     Stroke Paternal Grandmother     Heart disease Paternal Grandmother     Arthritis Sister     Asthma Sister     Thyroid disease Sister     Diabetes Sister     Arthritis Maternal Grandmother     Breast cancer Maternal Grandmother     Diabetes Maternal Grandmother     Hypertension Maternal Grandmother     Heart Valve Disease Maternal Grandmother     Cancer Maternal Grandmother         Skin cancer    Learning disabilities Cousin     Learning disabilities Sister     Diabetes Sister     ADD / ADHD Cousin     Lung cancer Brother     Cancer Brother         Lung cancer    Diabetes Maternal Grandfather     Hypertension Maternal Grandfather     JENNY disease Maternal Grandfather     Stroke Maternal Grandfather     Cancer Maternal Grandfather         Skin cancer    Cancer Paternal Uncle         Skin cancer         Medications have been verified.        Objective   /86 (BP Location: Left arm, Patient Position: Sitting)    "Pulse 94   Temp 97.7 °F (36.5 °C) (Skin)   Resp 16   Ht 5' 1\" (1.549 m)   Wt 98 kg (216 lb)   LMP 08/04/2024 (Approximate)   SpO2 96%   BMI 40.81 kg/m²        Physical Exam     Physical Exam  Vitals and nursing note reviewed.   Constitutional:       Appearance: Normal appearance. She is obese.   Cardiovascular:      Rate and Rhythm: Normal rate and regular rhythm.      Pulses: Normal pulses.      Heart sounds: Normal heart sounds.   Pulmonary:      Effort: Pulmonary effort is normal.      Breath sounds: Normal breath sounds.   Musculoskeletal:         General: Swelling, tenderness and signs of injury present.        Hands:       Comments: Area TTP; bruising noted; sensation intact; AROM with mild difficulty due to pain   Skin:     General: Skin is warm and dry.      Capillary Refill: Capillary refill takes less than 2 seconds.      Findings: Bruising (right hand) present.   Neurological:      Mental Status: She is alert.                   "

## 2024-08-25 NOTE — LETTER
August 25, 2024     Patient: Jaycee Can  YOB: 1990  Date of Visit: 8/25/2024      To Whom it May Concern:    Jaycee Can is under my professional care. Jaycee JAFFE was seen in my office on 8/25/2024. Jaycee JAFFE may return to work with limitations : no lifting with right upper extremity until cleared by orthopedics .     If you have any questions or concerns, please don't hesitate to call.         Sincerely,          REJI Santos        CC: No Recipients

## 2024-08-25 NOTE — PATIENT INSTRUCTIONS
Elevate the hand to decrease the swelling     You may take over the counter Tylenol (Acetaminophen) and/or Motrin (Ibuprofen) as needed, as directed on packaging.     Keep the splint intact until you see orthopedics.     Call orthopedics to schedule an appt to be seen. Call tomorrow morning.     Ice to the area up to 6 times per day for a period of 15 minutes each time

## 2024-08-26 ENCOUNTER — OFFICE VISIT (OUTPATIENT)
Dept: OBGYN CLINIC | Facility: CLINIC | Age: 34
End: 2024-08-26
Payer: COMMERCIAL

## 2024-08-26 VITALS
BODY MASS INDEX: 40.78 KG/M2 | DIASTOLIC BLOOD PRESSURE: 80 MMHG | WEIGHT: 216 LBS | SYSTOLIC BLOOD PRESSURE: 120 MMHG | HEIGHT: 61 IN

## 2024-08-26 DIAGNOSIS — S62.354A CLOSED NONDISPLACED FRACTURE OF SHAFT OF FOURTH METACARPAL BONE OF RIGHT HAND, INITIAL ENCOUNTER: Primary | ICD-10-CM

## 2024-08-26 DIAGNOSIS — S92.344A CLOSED NONDISPLACED FRACTURE OF FOURTH METATARSAL BONE OF RIGHT FOOT, INITIAL ENCOUNTER: ICD-10-CM

## 2024-08-26 PROCEDURE — 99203 OFFICE O/P NEW LOW 30 MIN: CPT | Performed by: SURGERY

## 2024-08-26 NOTE — PROGRESS NOTES
ORTHOPAEDIC HAND, WRIST, AND ELBOW OFFICE  VISIT       ASSESSMENT/PLAN:      34 y.o. year old female who presents with Right 4th metacarpal fracture    XR were reviewed and we discussed them with the patient  Will provide a new volar fabricated splint. She may remove to bathe.   Encouraged finger motion  No heavy lifting, pushing, pulling or WB through hand  NSAID's as needed  Work note provided    The patient verbalized understanding of exam findings and treatment plan. We engaged in the shared decision-making process and treatment options were discussed at length with the patient. Surgical and conservative management discussed today along with risks and benefits.    Diagnoses and all orders for this visit:    Closed nondisplaced fracture of shaft of fourth metacarpal bone of right hand, initial encounter    Closed nondisplaced fracture of fourth metatarsal bone of right foot, initial encounter  -     Ambulatory Referral to Orthopedic Surgery        Follow Up:  No follow-ups on file.    To Do Next Visit:  Re-evaluation of current issue        ____________________________________________________________________________________________________________________________________________      CHIEF COMPLAINT:  Chief Complaint   Patient presents with    Right Hand - Pain     Went to fair and wet down slide finger got caught going down   Ring finger        SUBJECTIVE:  Jaycee Can is a 34 y.o. year old  female who presents for evaluation of her Right hand        Patient states she went down a slide at a fair with her daughter and she jammed her finger. This happened 8/24/2024. She states she thought she just jammed it but there was swelling and bruising the next day. She realized it was broke and went to urgent care. UC note reviewed. She presents in a volar short splint. She states the splint is uncomfortable and places pain on her small finger as it may have shifted  She denies numbness    I have personally reviewed  all the relevant PMH, PSH, SH, FH, Medications and allergies      PAST MEDICAL HISTORY:  Past Medical History:   Diagnosis Date    Allergic     Arthritis     Bipolar affective disorder, currently depressed, moderate (HCC) 2018    Cyst of ovary, right     Diabetes mellitus (HCC) 10/10/2018    type 2    Endometriosis     Heart murmur 1988    Hepatitis C     Hepatitis C virus infection cured after antiviral drug therapy     History of transfusion     Hypertension     Migraines     Morbid obesity with BMI of 40.0-44.9, adult (HCC)     Obesity     Pulmonary artery congenital abnormality     Spleen enlarged     Status post surgical removal of both fallopian tubes     Varicella        PAST SURGICAL HISTORY:  Past Surgical History:   Procedure Laterality Date    CARDIAC CATHETERIZATION      no CAD 10days, 4 weeks 22months old     CARDIAC CATHETERIZATION N/A 2023    Procedure: Cardiac Coronary Angiogram;  Surgeon: Marcela Lr DO;  Location: [facility];  Service: Cardiology    CARDIAC CATHETERIZATION Left 2023    Procedure: Cardiac Left Heart Cath;  Surgeon: Marcela Lr DO;  Location: [facility];  Service: Cardiology    CARDIAC CATHETERIZATION  2023    Procedure: Cardiac catheterization;  Surgeon: Marcela Lr DO;  Location: [facility];  Service: Cardiology     SECTION, LOW TRANSVERSE      CHOLECYSTECTOMY      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 5    CORONARY STENT PLACEMENT      IR LOWER EXTREMITY ANGIOGRAM  2023    LIVER BIOPSY      LIVER BIOPSY      STERNAL WIRE REMOVAL  2022    THROMBECTOMY W/ EMBOLECTOMY Right 2023    Procedure: Right femoral exposure Right iliac embolectomy w/ #4 Ted catheter Aortogram Right EIA stent w/ 7x29mm VBX;  Surgeon: Jody Hodges MD;  Location: [facility];  Service: Vascular    TUBAL LIGATION Bilateral 2020    VSD REPAIR      As a child    WOUND DEBRIDEMENT Right 2023     Procedure: Right Groin Wound Washout, Pulse Lavage, Wound Vac placement;  Surgeon: Jelani Avila DO;  Location: [facility];  Service: Vascular       FAMILY HISTORY:  Family History   Problem Relation Age of Onset    Hypertension Mother     Migraines Mother     JENNY disease Mother     Depression Mother     Hyperlipidemia Mother     Diabetes Mother     Diabetes Father     Hypertension Father     Kidney failure Father     Heart attack Father     Arthritis Father     Stroke Father     Glaucoma Father     Polycystic kidney disease Paternal Grandmother     Stroke Paternal Grandmother     Heart disease Paternal Grandmother     Arthritis Sister     Asthma Sister     Thyroid disease Sister     Diabetes Sister     Arthritis Maternal Grandmother     Breast cancer Maternal Grandmother     Diabetes Maternal Grandmother     Hypertension Maternal Grandmother     Heart Valve Disease Maternal Grandmother     Cancer Maternal Grandmother         Skin cancer    Learning disabilities Cousin     Learning disabilities Sister     Diabetes Sister     ADD / ADHD Cousin     Lung cancer Brother     Cancer Brother         Lung cancer    Diabetes Maternal Grandfather     Hypertension Maternal Grandfather     JENNY disease Maternal Grandfather     Stroke Maternal Grandfather     Cancer Maternal Grandfather         Skin cancer    Cancer Paternal Uncle         Skin cancer       SOCIAL HISTORY:  Social History     Tobacco Use    Smoking status: Former     Current packs/day: 0.00     Average packs/day: 0.5 packs/day for 10.0 years (5.0 ttl pk-yrs)     Types: Cigarettes     Start date: 2013     Quit date: 2023     Years since quittin.6    Smokeless tobacco: Never   Vaping Use    Vaping status: Never Used   Substance Use Topics    Alcohol use: Yes     Alcohol/week: 3.0 standard drinks of alcohol     Types: 3 Standard drinks or equivalent per week     Comment: I drink socially    Drug use: Not Currently     Comment: hx of THC use for  migraines       MEDICATIONS:    Current Outpatient Medications:     acetaminophen (TYLENOL) 500 mg tablet, Take 1,000 mg by mouth, Disp: , Rfl:     albuterol (Ventolin HFA) 90 mcg/act inhaler, Inhale 2 puffs every 4 (four) hours as needed for wheezing or shortness of breath, Disp: 18 g, Rfl: 2    ALPRAZolam (XANAX) 0.5 mg tablet, Take 1 tablet (0.5 mg total) by mouth daily at bedtime as needed for anxiety, Disp: 15 tablet, Rfl: 0    Aspirin Low Dose 81 MG EC tablet, TAKE 1 TABLET BY MOUTH DAILY. DO NOT START BEFORE FEBRYARY 1, 2023, Disp: 90 tablet, Rfl: 0    citalopram (CeleXA) 10 mg tablet, take 1 tablet by mouth once daily, Disp: 90 tablet, Rfl: 1    Empagliflozin (JARDIANCE) 10 MG TABS tablet, Take 1 tablet (10 mg total) by mouth daily, Disp: 90 tablet, Rfl: 3    fexofenadine (ALLEGRA) 180 MG tablet, Take 1 tablet (180 mg total) by mouth daily, Disp: 30 tablet, Rfl: 5    gabapentin (NEURONTIN) 100 mg capsule, Take 1 capsule (100 mg total) by mouth daily at bedtime, Disp: 30 capsule, Rfl: 0    insulin lispro (HumaLOG KwikPen) 100 units/mL injection pen, Inject 5 Units under the skin 3 (three) times a day with meals, Disp: 15 mL, Rfl: 3    Insulin Pen Needle (B-D UF III MINI PEN NEEDLES) 31G X 5 MM MISC, Inject under the skin daily at bedtime, Disp: 100 each, Rfl: 0    lisinopril (ZESTRIL) 30 mg tablet, Take 1 tablet (30 mg total) by mouth daily, Disp: 90 tablet, Rfl: 3    metFORMIN (GLUCOPHAGE) 1000 MG tablet, Take 1 tablet (1,000 mg total) by mouth 2 (two) times a day with meals, Disp: 180 tablet, Rfl: 0    montelukast (SINGULAIR) 10 mg tablet, Take 1 tablet (10 mg total) by mouth daily at bedtime, Disp: 30 tablet, Rfl: 5    naproxen (Naprosyn) 500 mg tablet, Take 1 tablet (500 mg total) by mouth 2 (two) times a day with meals, Disp: 60 tablet, Rfl: 0    ondansetron (ZOFRAN-ODT) 4 mg disintegrating tablet, Take 1 tablet (4 mg total) by mouth every 6 (six) hours as needed for nausea for up to 8 doses, Disp: 8  tablet, Rfl: 0    benzonatate (TESSALON PERLES) 100 mg capsule, Take 1 capsule (100 mg total) by mouth every 8 (eight) hours (Patient not taking: Reported on 8/6/2024), Disp: 21 capsule, Rfl: 0    Blood Glucose Monitoring Suppl (OneTouch Verio) w/Device KIT, Use daily (Patient not taking: Reported on 11/14/2023), Disp: 1 kit, Rfl: 0    Blood Pressure Monitoring (B-D ASSURE BPM/AUTO WRIST CUFF) MISC, Check blood pressure prior to each OB visit, or as directed by your physician. (Patient not taking: Reported on 12/15/2023), Disp: 1 each, Rfl: 0    Continuous Blood Gluc  (FreeStyle Gautam 14 Day [place]) KVNG, Use with gautam sensor (Patient not taking: Reported on 8/6/2024), Disp: 1 each, Rfl: 3    Continuous Blood Gluc Sensor (FreeStyle Gautam 3 Sensor) MISC, USE 1 SENSOR EACH TO BE CHANGED EVERY 14 DAYS (Patient not taking: Reported on 8/6/2024), Disp: 2 each, Rfl: 2    cyclobenzaprine (FLEXERIL) 5 mg tablet, Take 1 tablet (5 mg total) by mouth 3 (three) times a day as needed for muscle spasms for up to 12 doses (Patient not taking: Reported on 8/6/2024), Disp: 12 tablet, Rfl: 0    dulaglutide (Trulicity) 0.75 MG/0.5ML injection, Inject 0.5 mL (0.75 mg total) under the skin every 7 days for 4 doses Take 0.75mg weekly X 4 weeks after which will increase to 1.5 mg weekly. (Patient not taking: Reported on 8/6/2024), Disp: 2 mL, Rfl: 0    erythromycin (ILOTYCIN) ophthalmic ointment, Administer 0.5 inches to the right eye daily at bedtime (Patient not taking: Reported on 8/2/2023), Disp: 3.5 g, Rfl: 0    insulin glargine (LANTUS) 100 units/mL subcutaneous injection, Inject 17 Units under the skin daily at bedtime, Disp: 10 mL, Rfl: 0    Multiple Vitamin (MULTI VITAMIN DAILY PO), Take by mouth (Patient not taking: Reported on 8/25/2024), Disp: , Rfl:     sucralfate (CARAFATE) 1 g tablet, Take 1 tablet (1 g total) by mouth 4 (four) times a day (Patient not taking: Reported on 8/6/2024), Disp: 28 tablet, Rfl:  0    ALLERGIES:  Allergies   Allergen Reactions    Prednisone Swelling    Bactrim [Sulfamethoxazole-Trimethoprim] Hives    Corticosteroids Swelling     Pt states this does not cause problems breathing, she just has generalized swelling    Cortisone Swelling     Generalized; no impairment with breathing reported      Medical Tape Hives     Allergic to Paper Tape    Other Hives     Paper tape    Sulfa Antibiotics Hives           REVIEW OF SYSTEMS:  Review of Systems   Constitutional:  Negative for chills and fever.   HENT:  Negative for ear pain and sore throat.    Eyes:  Negative for pain and visual disturbance.   Respiratory:  Negative for cough and shortness of breath.    Cardiovascular:  Negative for chest pain and palpitations.   Gastrointestinal:  Negative for abdominal pain and vomiting.   Genitourinary:  Negative for dysuria and hematuria.   Musculoskeletal:  Negative for arthralgias and back pain.   Skin:  Negative for color change and rash.   Neurological:  Negative for seizures and syncope.   All other systems reviewed and are negative.      VITALS:  Vitals:    08/26/24 1647   BP: 120/80       LABS:  HgA1c:   Lab Results   Component Value Date    HGBA1C 10.3 (A) 12/15/2023     BMP:   Lab Results   Component Value Date    CALCIUM 9.7 08/23/2024     06/14/2018    K 3.8 08/23/2024    CO2 24 08/23/2024     08/23/2024    BUN 11 08/23/2024    CREATININE 0.61 08/23/2024       _____________________________________________________  PHYSICAL EXAMINATION:  General: well developed and well nourished, alert, oriented times 3, and appears comfortable  Psychiatric: Normal  HEENT: Normocephalic, Atraumatic Trachea Midline, No torticollis  Pulmonary: No audible wheezing or respiratory distress   Abdomen/GI: Non tender, non distended   Cardiovascular: No pitting edema, 2+ radial pulse   Skin: No masses, erythema, lacerations, fluctation, ulcerations  Neurovascular: Sensation Intact to the Median, Ulnar, Radial  Nerve, Motor Intact to the Median, Ulnar, Radial Nerve, and Pulses Intact  Musculoskeletal: Normal, except as noted in detailed exam and in HPI.      MUSCULOSKELETAL EXAMINATION:  Right hand:  SILT  Short Composite fist  No scissoring on finger flexion  TTP at fracture site Ecchymosis noted, Swelling of digit noted      Left hand:    ___________________________________________________  STUDIES REVIEWED:  I have personally reviewed AP lateral and oblique radiographs of Right hand 8/25/2024   which demonstrate      FINDINGS:     Comminuted fracture mid diaphysis of the fourth metacarpal.     No significant degenerative changes.     No lytic or blastic osseous lesion.     Unremarkable soft tissues.     IMPRESSION:     Fourth metacarpal fracture.        PROCEDURES PERFORMED:  Procedures  No Procedures performed today    _____________________________________________________      Scribe Attestation      I,:  Onel Perkins am acting as a scribe while in the presence of the attending physician.:       I,:  Mio Marmolejo MD personally performed the services described in this documentation    as scribed in my presence.:

## 2024-08-26 NOTE — LETTER
August 26, 2024     Patient: Jaycee Can  YOB: 1990  Date of Visit: 8/26/2024      To Whom it May Concern:    Jaycee Can is under my professional care. Jaycee JAFFE was seen in my office on 8/26/2024. Jaycee JAFFE may return to work on 8/27/2024 with limitations : No heavy lifting, pushing, pulling or weight bearing with her Right hand. She must wear the splint at all times at work until her next office visit in 4 weeks.She may do light activities with the splint on.    If you have any questions or concerns, please don't hesitate to call.         Sincerely,          Mio Marmolejo MD

## 2024-09-04 ENCOUNTER — OFFICE VISIT (OUTPATIENT)
Dept: OBGYN CLINIC | Facility: CLINIC | Age: 34
End: 2024-09-04
Payer: COMMERCIAL

## 2024-09-04 VITALS
WEIGHT: 216 LBS | SYSTOLIC BLOOD PRESSURE: 129 MMHG | DIASTOLIC BLOOD PRESSURE: 80 MMHG | HEIGHT: 61 IN | BODY MASS INDEX: 40.78 KG/M2

## 2024-09-04 DIAGNOSIS — S92.344A CLOSED NONDISPLACED FRACTURE OF FOURTH METATARSAL BONE OF RIGHT FOOT, INITIAL ENCOUNTER: Primary | ICD-10-CM

## 2024-09-04 PROCEDURE — 99212 OFFICE O/P EST SF 10 MIN: CPT | Performed by: SURGERY

## 2024-09-04 PROCEDURE — 29125 APPL SHORT ARM SPLINT STATIC: CPT | Performed by: PHYSICIAN ASSISTANT

## 2024-09-04 NOTE — PROGRESS NOTES
Patient presents to the office for a splint change.  She is being treated for a right 4th metacarpal shaft fracture with volar short arm splint.  She states she has been trying not to use the arm but at work sometimes she does send she feels like the splint is uncomfortable.  A new right ulnar gutter style splint was fabricated in the office today and she tolerated well.  Work note provided.  She has follow-up with us already scheduled in a few weeks.  Remain nonweightbearing right hand in splint until then.    Splint application    Date/Time: 9/4/2024 11:00 AM    Performed by: Robert Jones PA-C  Authorized by: Robert Jones PA-C  Universal Protocol:  Consent: Verbal consent obtained.  Risks and benefits: risks, benefits and alternatives were discussed  Consent given by: patient  Patient identity confirmed: verbally with patient    Pre-procedure details:     Sensation:  Normal  Procedure details:     Laterality:  Right    Location:  Wrist    Wrist:  R wrist    Splint type:  Ulnar gutter    Supplies:  Cotton padding and Ortho-Glass  Post-procedure details:     Pain:  Improved    Sensation:  Normal    Patient tolerance of procedure:  Tolerated well, no immediate complications

## 2024-09-04 NOTE — LETTER
September 4, 2024     Patient: Jaycee Can  YOB: 1990  Date of Visit: 9/4/2024      To Whom it May Concern:    Jaycee Can is under my professional care. Jaycee JAFFE was seen in my office on 9/4/2024. Jaycee JAFFE may work with restrictions that include no use of right hand until next follow-up on 9/23/2024.    If you have any questions or concerns, please don't hesitate to call.         Sincerely,          Mio Marmolejo MD        CC: No Recipients

## 2024-09-23 ENCOUNTER — OFFICE VISIT (OUTPATIENT)
Dept: OBGYN CLINIC | Facility: CLINIC | Age: 34
End: 2024-09-23
Payer: COMMERCIAL

## 2024-09-23 VITALS — HEIGHT: 61 IN | BODY MASS INDEX: 40.78 KG/M2 | WEIGHT: 216 LBS

## 2024-09-23 DIAGNOSIS — S62.354D CLOSED NONDISPLACED FRACTURE OF SHAFT OF FOURTH METACARPAL BONE OF RIGHT HAND WITH ROUTINE HEALING, SUBSEQUENT ENCOUNTER: Primary | ICD-10-CM

## 2024-09-23 PROCEDURE — 99213 OFFICE O/P EST LOW 20 MIN: CPT | Performed by: SURGERY

## 2024-09-23 NOTE — PROGRESS NOTES
HPI:  34F here for follow-up right 4th metacarpal shaft fracture treated with a splint.  Today she states overall she is doing better.  Right hand pain is 5/10.  Has been compliant with splinting.        PE:  Right hand:  No open wounds or erythema.  No ecchymosis or swelling.  Mild TTP at the fracture site.  No rotational deformity/scissoring of the right ring finger with ROM.  + full composite fist.  SILT.  Palpable radial pulse.      A/P:  Right 4th metacarpal shaft fracture DOI 8/24/2024.  -Discontinue splint immobilization.  -Start to use the hand normally, increase activity level as able.    -WBAT R Hand.  -Follow-up 2 weeks for motion check.

## 2024-10-24 ENCOUNTER — OFFICE VISIT (OUTPATIENT)
Dept: FAMILY MEDICINE CLINIC | Facility: CLINIC | Age: 34
End: 2024-10-24
Payer: COMMERCIAL

## 2024-10-24 VITALS
WEIGHT: 221 LBS | TEMPERATURE: 96.9 F | HEIGHT: 61 IN | BODY MASS INDEX: 41.72 KG/M2 | OXYGEN SATURATION: 98 % | SYSTOLIC BLOOD PRESSURE: 128 MMHG | DIASTOLIC BLOOD PRESSURE: 86 MMHG | HEART RATE: 98 BPM

## 2024-10-24 DIAGNOSIS — Z12.4 SCREENING FOR CERVICAL CANCER: ICD-10-CM

## 2024-10-24 DIAGNOSIS — E11.65 TYPE 2 DIABETES MELLITUS WITH HYPERGLYCEMIA, WITH LONG-TERM CURRENT USE OF INSULIN (HCC): ICD-10-CM

## 2024-10-24 DIAGNOSIS — Z23 ENCOUNTER FOR IMMUNIZATION: ICD-10-CM

## 2024-10-24 DIAGNOSIS — H60.392 OTHER INFECTIVE ACUTE OTITIS EXTERNA OF LEFT EAR: Primary | ICD-10-CM

## 2024-10-24 DIAGNOSIS — Z79.4 TYPE 2 DIABETES MELLITUS WITH HYPERGLYCEMIA, WITH LONG-TERM CURRENT USE OF INSULIN (HCC): ICD-10-CM

## 2024-10-24 PROCEDURE — 99214 OFFICE O/P EST MOD 30 MIN: CPT | Performed by: NURSE PRACTITIONER

## 2024-10-24 RX ORDER — OFLOXACIN 3 MG/ML
10 SOLUTION AURICULAR (OTIC) DAILY
Qty: 10 ML | Refills: 0 | Status: SHIPPED | OUTPATIENT
Start: 2024-10-24

## 2024-10-24 NOTE — PROGRESS NOTES
Ambulatory Visit  Name: Jaycee Can      : 1990      MRN: [id number]  Encounter Provider: REJI Ferrell  Encounter Date: 10/24/2024   Encounter department: [facility]    Assessment & Plan  Other infective acute otitis externa of left ear  Presents with one week of ear pain in  the left ear. Swelling and redness. No injury to the ear. No hearing changes.   No other associated symptoms, has not taken anything for it.   Orders:    ofloxacin (FLOXIN) 0.3 % otic solution; Administer 10 drops into the left ear daily    Type 2 diabetes mellitus with hyperglycemia, with long-term current use of insulin (HCC)  Due for labs and follow up visit.   Lab Results   Component Value Date    HGBA1C 10.3 (A) 12/15/2023       Orders:    Albumin / creatinine urine ratio; Future    Comprehensive metabolic panel; Future    Hemoglobin A1C; Future    Lipid Panel with Direct LDL reflex; Future    TSH, 3rd generation with Free T4 reflex; Future    CBC and differential; Future    Screening for cervical cancer         Encounter for immunization    Orders:    influenza vaccine preservative-free 0.5 mL IM (Fluzone, Afluria, Fluarix, Flulaval)       History of Present Illness     Earache   Pertinent negatives include no abdominal pain, coughing, diarrhea, ear discharge, headaches, rash, rhinorrhea, sore throat or vomiting.         Review of Systems   Constitutional:  Negative for chills and fever.   HENT:  Positive for ear pain. Negative for congestion, ear discharge, postnasal drip, rhinorrhea, sinus pressure, sinus pain and sore throat.    Eyes:  Negative for pain and visual disturbance.   Respiratory:  Negative for cough and shortness of breath.    Cardiovascular:  Negative for chest pain and palpitations.   Gastrointestinal:  Negative for abdominal pain, constipation, diarrhea, nausea and vomiting.   Genitourinary:  Negative for dysuria and hematuria.   Musculoskeletal:  Negative for  "arthralgias and back pain.   Skin:  Negative for color change and rash.   Neurological:  Negative for dizziness, seizures, syncope and headaches.   All other systems reviewed and are negative.          Objective     /86 (BP Location: Left arm, Patient Position: Sitting, Cuff Size: Standard)   Pulse 98   Temp (!) 96.9 °F (36.1 °C) (Tympanic)   Ht 5' 1\" (1.549 m)   Wt 100 kg (221 lb)   SpO2 98%   BMI 41.76 kg/m²     Physical Exam  Vitals and nursing note reviewed.   Constitutional:       General: She is not in acute distress.     Appearance: Normal appearance. She is well-developed and normal weight.   HENT:      Head: Normocephalic and atraumatic.      Right Ear: Tympanic membrane, ear canal and external ear normal. No middle ear effusion. There is no impacted cerumen. Tympanic membrane is not scarred, perforated, erythematous, retracted or bulging.      Left Ear: Tympanic membrane, ear canal and external ear normal. Swelling present. No drainage.  No middle ear effusion. There is no impacted cerumen. Tympanic membrane is not scarred, perforated, erythematous, retracted or bulging.      Nose: Nose normal. No congestion or rhinorrhea.      Mouth/Throat:      Mouth: Mucous membranes are moist.      Pharynx: Oropharynx is clear. No oropharyngeal exudate or posterior oropharyngeal erythema.   Eyes:      General: No scleral icterus.        Right eye: No discharge.         Left eye: No discharge.      Extraocular Movements: Extraocular movements intact.      Conjunctiva/sclera: Conjunctivae normal.      Pupils: Pupils are equal, round, and reactive to light.   Cardiovascular:      Rate and Rhythm: Normal rate and regular rhythm.      Pulses: Normal pulses.      Heart sounds: Normal heart sounds. No murmur heard.  Pulmonary:      Effort: Pulmonary effort is normal. No respiratory distress.      Breath sounds: Normal breath sounds. No wheezing or rales.   Abdominal:      General: Abdomen is flat. Bowel sounds are " normal. There is no distension.      Palpations: Abdomen is soft.      Tenderness: There is no abdominal tenderness. There is no guarding.   Musculoskeletal:      Cervical back: Normal range of motion and neck supple. No rigidity or tenderness.   Lymphadenopathy:      Cervical: No cervical adenopathy.   Skin:     General: Skin is warm and dry.      Capillary Refill: Capillary refill takes less than 2 seconds.      Findings: No bruising, erythema or lesion.   Neurological:      General: No focal deficit present.      Mental Status: She is alert and oriented to person, place, and time. Mental status is at baseline.      Motor: No weakness.      Coordination: Coordination normal.      Gait: Gait normal.   Psychiatric:         Mood and Affect: Mood normal.         Behavior: Behavior normal.         Thought Content: Thought content normal.         Judgment: Judgment normal.

## 2024-11-30 ENCOUNTER — APPOINTMENT (EMERGENCY)
Dept: RADIOLOGY | Facility: HOSPITAL | Age: 34
End: 2024-11-30
Payer: COMMERCIAL

## 2024-11-30 ENCOUNTER — HOSPITAL ENCOUNTER (EMERGENCY)
Facility: HOSPITAL | Age: 34
Discharge: HOME | End: 2024-11-30
Attending: EMERGENCY MEDICINE
Payer: COMMERCIAL

## 2024-11-30 VITALS
RESPIRATION RATE: 18 BRPM | DIASTOLIC BLOOD PRESSURE: 106 MMHG | WEIGHT: 220 LBS | HEIGHT: 61 IN | SYSTOLIC BLOOD PRESSURE: 178 MMHG | TEMPERATURE: 97.8 F | BODY MASS INDEX: 41.54 KG/M2 | OXYGEN SATURATION: 99 % | HEART RATE: 100 BPM

## 2024-11-30 DIAGNOSIS — S60.511A ABRASION OF RIGHT HAND, INITIAL ENCOUNTER: Primary | ICD-10-CM

## 2024-11-30 DIAGNOSIS — S60.229A HAND CONTUSION: ICD-10-CM

## 2024-11-30 PROCEDURE — 73130 X-RAY EXAM OF HAND: CPT

## 2024-11-30 PROCEDURE — 99284 EMERGENCY DEPT VISIT MOD MDM: CPT | Performed by: EMERGENCY MEDICINE

## 2024-11-30 PROCEDURE — 93005 ELECTROCARDIOGRAM TRACING: CPT

## 2024-11-30 PROCEDURE — 99284 EMERGENCY DEPT VISIT MOD MDM: CPT

## 2024-11-30 RX ORDER — GINSENG 100 MG
1 CAPSULE ORAL ONCE
Status: DISCONTINUED | OUTPATIENT
Start: 2024-11-30 | End: 2024-11-30 | Stop reason: HOSPADM

## 2024-11-30 NOTE — DISCHARGE INSTRUCTIONS
Return to the ER for any new, concerning, or worsening issues.    Use Motrin or Tylenol as needed for pain.  Ice to sore areas 4-6 times a day for 10 to 15 minutes at a time as necessary.    Recheck with your family doctor in 2 to 4 days for repeat evaluation.
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
## 2024-12-01 LAB
ATRIAL RATE: 99 BPM
P AXIS: 62 DEGREES
PR INTERVAL: 174 MS
QRS AXIS: 44 DEGREES
QRSD INTERVAL: 152 MS
QT INTERVAL: 388 MS
QTC INTERVAL: 497 MS
T WAVE AXIS: 41 DEGREES
VENTRICULAR RATE: 99 BPM

## 2024-12-01 PROCEDURE — 93010 ELECTROCARDIOGRAM REPORT: CPT | Performed by: INTERNAL MEDICINE

## 2025-02-06 ENCOUNTER — OFFICE VISIT (OUTPATIENT)
Dept: URGENT CARE | Facility: CLINIC | Age: 35
End: 2025-02-06
Payer: COMMERCIAL

## 2025-02-06 VITALS
SYSTOLIC BLOOD PRESSURE: 161 MMHG | RESPIRATION RATE: 18 BRPM | OXYGEN SATURATION: 97 % | WEIGHT: 223.2 LBS | HEART RATE: 96 BPM | BODY MASS INDEX: 42.17 KG/M2 | TEMPERATURE: 97.6 F | DIASTOLIC BLOOD PRESSURE: 93 MMHG

## 2025-02-06 DIAGNOSIS — K04.7 DENTAL INFECTION: Primary | ICD-10-CM

## 2025-02-06 PROCEDURE — 99213 OFFICE O/P EST LOW 20 MIN: CPT

## 2025-02-06 RX ORDER — CHLORHEXIDINE GLUCONATE ORAL RINSE 1.2 MG/ML
15 SOLUTION DENTAL 2 TIMES DAILY
Qty: 120 ML | Refills: 0 | Status: SHIPPED | OUTPATIENT
Start: 2025-02-06

## 2025-02-06 NOTE — PROGRESS NOTES
[facility]        NAME: Jaycee Can is a 35 y.o. female  : 1990    MRN: [id number]  DATE: 2025  TIME: 2:17 PM    Assessment and Plan   Dental infection [K04.7]  1. Dental infection  amoxicillin-clavulanate (AUGMENTIN) 875-125 mg per tablet    Ambulatory Referral to Dentistry    chlorhexidine (PERIDEX) 0.12 % solution        No dental abscess visualized.  Will start on Augmentin twice daily for the next 7 days.  Will also send chlorhexidine mouth rinse.  Referral for dentistry placed as patient has no current dentist. Instructed patient to follow-up with PCP for no improvement or worsening of symptoms.  Patient educated on red flag symptoms and when to proceed to the ED.  Patient agreeable and understands current treatment plan.     Patient Instructions     Patient Instructions   Please take Augmentin 875 mg twice daily for the next 7 days.  Please rinse mouth with Chlorhexidine mouth wash twice daily. Do not use longer than 2 weeks (increases risk of tooth staining).  May take OTC Tylenol or ibuprofen as needed for dental pain.  Follow-up with dentist ASAP for further treatment and evaluation.     Antibiotics are medicines that treat bacterial infections by either killing bacteria or preventing them from growing. It is important to take the full course of your antibiotics as prescribed to kill the bacteria causing your infection. Stopping your antibiotics early could lead to reinfection and can also promote the spread of antibiotic resistant bacteria. Some antibiotics may cause certain side effects including: nausea, vomiting, diarrhea, bloating, indigestion, belly pain, and/or loss of appetite. Eating yogurt with live and active cultures and/or taking a probiotic and maintaining a healthy diet can help to reduce some of these side effects.     If your symptoms do not improve with our current treatment plan or worsen, please schedule an appointment with your PCP or proceed to the ED.         Follow up with PCP in 3-5 days.  Proceed to  ER if symptoms worsen.    Chief Complaint     Chief Complaint   Patient presents with   • Dental Pain     Left bottom tooth pain.  Started yesterday.  Face is edematous.  Taking Tylenol for pain         History of Present Illness       35-year-old female presents to the clinic for evaluation of tooth pain x 2 days.  Patient reports her tooth pain has been gradually worsening.  She reports the pain is located in her left lower tooth.  She rates the pain an 8 out of 10 and describes it as dull and aching in nature.  She also reports pain while chewing, tenderness when touching the tooth and mild facial swelling.  Patient denies any fevers, chills, sore throat, throat swelling, drooling, nausea, vomiting, or diarrhea.  Patient reports taking OTC Tylenol with mild relief of symptoms.  She states she does not currently have a dentist.        Dental Pain   Pertinent negatives include no fever.       Review of Systems   Review of Systems   Constitutional:  Negative for chills and fever.   HENT:  Positive for dental problem (left lower tooth) and facial swelling (mild, left side). Negative for congestion, drooling, ear pain and sore throat.    Respiratory:  Negative for cough and shortness of breath.    Cardiovascular:  Negative for chest pain.   Gastrointestinal:  Negative for abdominal pain, diarrhea, nausea and vomiting.   Musculoskeletal:  Negative for arthralgias and myalgias.   Neurological:  Negative for headaches.         Current Medications       Current Outpatient Medications:   •  acetaminophen (TYLENOL) 500 mg tablet, Take 1,000 mg by mouth, Disp: , Rfl:   •  albuterol (Ventolin HFA) 90 mcg/act inhaler, Inhale 2 puffs every 4 (four) hours as needed for wheezing or shortness of breath, Disp: 18 g, Rfl: 2  •  ALPRAZolam (XANAX) 0.5 mg tablet, Take 1 tablet (0.5 mg total) by mouth daily at bedtime as needed for anxiety, Disp: 15 tablet, Rfl: 0  •   amoxicillin-clavulanate (AUGMENTIN) 875-125 mg per tablet, Take 1 tablet by mouth every 12 (twelve) hours for 7 days, Disp: 14 tablet, Rfl: 0  •  Aspirin Low Dose 81 MG EC tablet, TAKE 1 TABLET BY MOUTH DAILY. DO NOT START BEFORE FEBRYARY 1, 2023, Disp: 90 tablet, Rfl: 0  •  Blood Glucose Monitoring Suppl (OneTouch Verio) w/Device KIT, Use daily, Disp: 1 kit, Rfl: 0  •  Blood Pressure Monitoring (B-D ASSURE BPM/AUTO WRIST CUFF) MISC, Check blood pressure prior to each OB visit, or as directed by your physician., Disp: 1 each, Rfl: 0  •  chlorhexidine (PERIDEX) 0.12 % solution, Apply 15 mL to the mouth or throat 2 (two) times a day, Disp: 120 mL, Rfl: 0  •  citalopram (CeleXA) 10 mg tablet, take 1 tablet by mouth once daily, Disp: 90 tablet, Rfl: 1  •  Continuous Blood Gluc  (FreeStyle Gautam 14 Day Trenary) KVNG, Use with gautam sensor, Disp: 1 each, Rfl: 3  •  Continuous Blood Gluc Sensor (FreeStyle Gautam 3 Sensor) MISC, USE 1 SENSOR EACH TO BE CHANGED EVERY 14 DAYS, Disp: 2 each, Rfl: 2  •  fexofenadine (ALLEGRA) 180 MG tablet, Take 1 tablet (180 mg total) by mouth daily, Disp: 30 tablet, Rfl: 5  •  gabapentin (NEURONTIN) 100 mg capsule, Take 1 capsule (100 mg total) by mouth daily at bedtime, Disp: 30 capsule, Rfl: 0  •  insulin lispro (HumaLOG KwikPen) 100 units/mL injection pen, Inject 5 Units under the skin 3 (three) times a day with meals, Disp: 15 mL, Rfl: 3  •  Insulin Pen Needle (B-D UF III MINI PEN NEEDLES) 31G X 5 MM MISC, Inject under the skin daily at bedtime, Disp: 100 each, Rfl: 0  •  lisinopril (ZESTRIL) 30 mg tablet, Take 1 tablet (30 mg total) by mouth daily, Disp: 90 tablet, Rfl: 3  •  metFORMIN (GLUCOPHAGE) 1000 MG tablet, Take 1 tablet (1,000 mg total) by mouth 2 (two) times a day with meals, Disp: 180 tablet, Rfl: 0  •  montelukast (SINGULAIR) 10 mg tablet, Take 1 tablet (10 mg total) by mouth daily at bedtime, Disp: 30 tablet, Rfl: 5  •  Multiple Vitamin (MULTI VITAMIN DAILY PO), Take by  mouth, Disp: , Rfl:   •  naproxen (Naprosyn) 500 mg tablet, Take 1 tablet (500 mg total) by mouth 2 (two) times a day with meals, Disp: 60 tablet, Rfl: 0  •  ondansetron (ZOFRAN-ODT) 4 mg disintegrating tablet, Take 1 tablet (4 mg total) by mouth every 6 (six) hours as needed for nausea for up to 8 doses, Disp: 8 tablet, Rfl: 0  •  benzonatate (TESSALON PERLES) 100 mg capsule, Take 1 capsule (100 mg total) by mouth every 8 (eight) hours (Patient not taking: Reported on 2/6/2025), Disp: 21 capsule, Rfl: 0  •  cyclobenzaprine (FLEXERIL) 5 mg tablet, Take 1 tablet (5 mg total) by mouth 3 (three) times a day as needed for muscle spasms for up to 12 doses (Patient not taking: Reported on 2/6/2025), Disp: 12 tablet, Rfl: 0  •  dulaglutide (Trulicity) 0.75 MG/0.5ML injection, Inject 0.5 mL (0.75 mg total) under the skin every 7 days for 4 doses Take 0.75mg weekly X 4 weeks after which will increase to 1.5 mg weekly. (Patient not taking: Reported on 8/6/2024), Disp: 2 mL, Rfl: 0  •  Empagliflozin (JARDIANCE) 10 MG TABS tablet, Take 1 tablet (10 mg total) by mouth daily, Disp: 90 tablet, Rfl: 3  •  erythromycin (ILOTYCIN) ophthalmic ointment, Administer 0.5 inches to the right eye daily at bedtime (Patient not taking: Reported on 2/6/2025), Disp: 3.5 g, Rfl: 0  •  insulin glargine (LANTUS) 100 units/mL subcutaneous injection, Inject 17 Units under the skin daily at bedtime, Disp: 10 mL, Rfl: 0  •  ofloxacin (FLOXIN) 0.3 % otic solution, Administer 10 drops into the left ear daily (Patient not taking: Reported on 2/6/2025), Disp: 10 mL, Rfl: 0  •  sucralfate (CARAFATE) 1 g tablet, Take 1 tablet (1 g total) by mouth 4 (four) times a day (Patient not taking: Reported on 10/24/2024), Disp: 28 tablet, Rfl: 0    Current Allergies     Allergies as of 02/06/2025 - Reviewed 02/06/2025   Allergen Reaction Noted   • Prednisone Swelling 06/12/2018   • Bactrim [sulfamethoxazole-trimethoprim] Hives 11/16/2019   • Corticosteroids Swelling  2009   • Cortisone Swelling 2019   • Medical tape Hives 2020   • Other Hives 2020   • Sulfa antibiotics Hives 2019            The following portions of the patient's history were reviewed and updated as appropriate: allergies, current medications, past family history, past medical history, past social history, past surgical history and problem list.     Past Medical History:   Diagnosis Date   • Allergic    • Arthritis    • Bipolar affective disorder, currently depressed, moderate ([facility]) 2018   • Cyst of ovary, right    • Diabetes mellitus ([facility]) 10/10/2018    type 2   • Endometriosis    • Heart murmur 1990   • Hepatitis C    • Hepatitis C virus infection cured after antiviral drug therapy    • History of transfusion    • Hypertension    • Migraines    • Morbid obesity with BMI of 40.0-44.9, adult ([facility])    • Obesity    • Pulmonary artery congenital abnormality    • Spleen enlarged    • Status post surgical removal of both fallopian tubes    • Varicella        Past Surgical History:   Procedure Laterality Date   • CARDIAC CATHETERIZATION      no CAD 10days, 4 weeks 22months old    • CARDIAC CATHETERIZATION N/A 2023    Procedure: Cardiac Coronary Angiogram;  Surgeon: aMrcela Lr DO;  Location: [facility];  Service: Cardiology   • CARDIAC CATHETERIZATION Left 2023    Procedure: Cardiac Left Heart Cath;  Surgeon: Marcela Lr DO;  Location: [facility];  Service: Cardiology   • CARDIAC CATHETERIZATION  2023    Procedure: Cardiac catheterization;  Surgeon: Marcela Lr DO;  Location: [facility];  Service: Cardiology   •  SECTION, LOW TRANSVERSE     • CHOLECYSTECTOMY     • 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 5   • CORONARY STENT PLACEMENT     • IR LOWER EXTREMITY ANGIOGRAM  2023   • LIVER BIOPSY     • LIVER BIOPSY     • STERNAL WIRE REMOVAL  2022   • THROMBECTOMY W/ EMBOLECTOMY Right 2023     Procedure: Right femoral exposure Right iliac embolectomy w/ #4 Ted catheter Aortogram Right EIA stent w/ 7x29mm VBX;  Surgeon: Jody Hodges MD;  Location: [facility];  Service: Vascular   • TUBAL LIGATION Bilateral 2020   • VSD REPAIR      As a child   • WOUND DEBRIDEMENT Right 2/11/2023    Procedure: Right Groin Wound Washout, Pulse Lavage, Wound Vac placement;  Surgeon: Jelani Avila DO;  Location: [facility];  Service: Vascular       Family History   Problem Relation Age of Onset   • Hypertension Mother    • Migraines Mother    • JENNY disease Mother    • Depression Mother    • Hyperlipidemia Mother    • Diabetes Mother    • Diabetes Father    • Hypertension Father    • Kidney failure Father    • Heart attack Father    • Arthritis Father    • Stroke Father    • Glaucoma Father    • Polycystic kidney disease Paternal Grandmother    • Stroke Paternal Grandmother    • Heart disease Paternal Grandmother    • Arthritis Sister    • Asthma Sister    • Thyroid disease Sister    • Diabetes Sister    • Arthritis Maternal Grandmother    • Breast cancer Maternal Grandmother    • Diabetes Maternal Grandmother    • Hypertension Maternal Grandmother    • Heart Valve Disease Maternal Grandmother    • Cancer Maternal Grandmother         Skin cancer   • Learning disabilities Cousin    • Learning disabilities Sister    • Diabetes Sister    • ADD / ADHD Cousin    • Lung cancer Brother    • Cancer Brother         Lung cancer   • Diabetes Maternal Grandfather    • Hypertension Maternal Grandfather    • JENNY disease Maternal Grandfather    • Stroke Maternal Grandfather    • Cancer Maternal Grandfather         Skin cancer   • Cancer Paternal Uncle         Skin cancer         Medications have been verified.        Objective   /93   Pulse 96   Temp 97.6 °F (36.4 °C) (Skin)   Resp 18   Wt 101 kg (223 lb 3.2 oz)   SpO2 97%   BMI 42.17 kg/m²        Physical Exam     Physical Exam  Vitals and nursing note reviewed.    Constitutional:       Appearance: Normal appearance.   HENT:      Head: Normocephalic and atraumatic.      Mouth/Throat:      Dentition: Abnormal dentition. Dental tenderness, gingival swelling and dental caries present. No dental abscesses.      Pharynx: Uvula midline. No pharyngeal swelling, oropharyngeal exudate, posterior oropharyngeal erythema or uvula swelling.        Comments: Significant dental decay, broken tooth and surrounding gingival edema/erythema   Cardiovascular:      Rate and Rhythm: Normal rate and regular rhythm.      Pulses: Normal pulses.      Heart sounds: Normal heart sounds.   Pulmonary:      Effort: Pulmonary effort is normal.      Breath sounds: Normal breath sounds.   Skin:     General: Skin is warm and dry.   Neurological:      General: No focal deficit present.      Mental Status: She is alert.   Psychiatric:         Mood and Affect: Mood normal.         Behavior: Behavior normal.

## 2025-02-06 NOTE — PATIENT INSTRUCTIONS
Please take Augmentin 875 mg twice daily for the next 7 days.  Please rinse mouth with Chlorhexidine mouth wash twice daily. Do not use longer than 2 weeks (increases risk of tooth staining).  May take OTC Tylenol or ibuprofen as needed for dental pain.  Follow-up with dentist ASAP for further treatment and evaluation.     Antibiotics are medicines that treat bacterial infections by either killing bacteria or preventing them from growing. It is important to take the full course of your antibiotics as prescribed to kill the bacteria causing your infection. Stopping your antibiotics early could lead to reinfection and can also promote the spread of antibiotic resistant bacteria. Some antibiotics may cause certain side effects including: nausea, vomiting, diarrhea, bloating, indigestion, belly pain, and/or loss of appetite. Eating yogurt with live and active cultures and/or taking a probiotic and maintaining a healthy diet can help to reduce some of these side effects.     If your symptoms do not improve with our current treatment plan or worsen, please schedule an appointment with your PCP or proceed to the ED.

## 2025-02-28 DIAGNOSIS — J45.21 ASTHMATIC BRONCHITIS, MILD INTERMITTENT, WITH ACUTE EXACERBATION: ICD-10-CM

## 2025-02-28 DIAGNOSIS — G62.9 NEUROPATHY: ICD-10-CM

## 2025-02-28 DIAGNOSIS — F32.89 OTHER DEPRESSION: ICD-10-CM

## 2025-02-28 DIAGNOSIS — J45.21 MILD INTERMITTENT ASTHMA WITH ACUTE EXACERBATION: ICD-10-CM

## 2025-02-28 DIAGNOSIS — Z87.01 HISTORY OF PNEUMONIA: ICD-10-CM

## 2025-02-28 DIAGNOSIS — K29.70 GASTRITIS: ICD-10-CM

## 2025-02-28 DIAGNOSIS — Z79.4 TYPE 2 DIABETES MELLITUS WITH HYPERGLYCEMIA, WITH LONG-TERM CURRENT USE OF INSULIN (HCC): ICD-10-CM

## 2025-02-28 DIAGNOSIS — E11.65 TYPE 2 DIABETES MELLITUS WITH HYPERGLYCEMIA, WITH LONG-TERM CURRENT USE OF INSULIN (HCC): ICD-10-CM

## 2025-02-28 DIAGNOSIS — I10 HYPERTENSION, UNSPECIFIED TYPE: ICD-10-CM

## 2025-02-28 DIAGNOSIS — F32.A DEPRESSION, UNSPECIFIED DEPRESSION TYPE: ICD-10-CM

## 2025-02-28 DIAGNOSIS — F41.9 ANXIETY: ICD-10-CM

## 2025-02-28 DIAGNOSIS — S13.9XXA NECK SPRAIN, INITIAL ENCOUNTER: ICD-10-CM

## 2025-02-28 DIAGNOSIS — M25.50 ARTHRALGIA, UNSPECIFIED JOINT: ICD-10-CM

## 2025-02-28 RX ORDER — INSULIN LISPRO 100 [IU]/ML
5 INJECTION, SOLUTION INTRAVENOUS; SUBCUTANEOUS
Qty: 5 ML | Refills: 0 | Status: SHIPPED | OUTPATIENT
Start: 2025-02-28

## 2025-02-28 RX ORDER — FLURBIPROFEN SODIUM 0.3 MG/ML
SOLUTION/ DROPS OPHTHALMIC
Qty: 100 EACH | Refills: 1 | Status: SHIPPED | OUTPATIENT
Start: 2025-02-28

## 2025-02-28 RX ORDER — CITALOPRAM HYDROBROMIDE 10 MG/1
10 TABLET ORAL DAILY
Qty: 90 TABLET | Refills: 1 | Status: SHIPPED | OUTPATIENT
Start: 2025-02-28

## 2025-02-28 RX ORDER — GABAPENTIN 100 MG/1
100 CAPSULE ORAL
Qty: 90 CAPSULE | Refills: 1 | Status: SHIPPED | OUTPATIENT
Start: 2025-02-28

## 2025-02-28 RX ORDER — NAPROXEN 500 MG/1
500 TABLET ORAL 2 TIMES DAILY WITH MEALS
Qty: 180 TABLET | Refills: 1 | Status: SHIPPED | OUTPATIENT
Start: 2025-02-28

## 2025-02-28 RX ORDER — INSULIN GLARGINE 100 [IU]/ML
17 INJECTION, SOLUTION SUBCUTANEOUS
Qty: 10 ML | Refills: 0 | Status: SHIPPED | OUTPATIENT
Start: 2025-02-28 | End: 2025-03-30

## 2025-02-28 RX ORDER — ALBUTEROL SULFATE 90 UG/1
2 INHALANT RESPIRATORY (INHALATION) EVERY 4 HOURS PRN
Qty: 18 G | Refills: 1 | Status: SHIPPED | OUTPATIENT
Start: 2025-02-28 | End: 2025-03-03 | Stop reason: SDUPTHER

## 2025-02-28 RX ORDER — MONTELUKAST SODIUM 10 MG/1
10 TABLET ORAL
Qty: 90 TABLET | Refills: 1 | Status: SHIPPED | OUTPATIENT
Start: 2025-02-28

## 2025-02-28 RX ORDER — LISINOPRIL 30 MG/1
30 TABLET ORAL DAILY
Qty: 90 TABLET | Refills: 1 | Status: SHIPPED | OUTPATIENT
Start: 2025-02-28

## 2025-03-03 ENCOUNTER — TELEPHONE (OUTPATIENT)
Age: 35
End: 2025-03-03

## 2025-03-03 ENCOUNTER — OFFICE VISIT (OUTPATIENT)
Dept: FAMILY MEDICINE CLINIC | Facility: CLINIC | Age: 35
End: 2025-03-03
Payer: COMMERCIAL

## 2025-03-03 VITALS
DIASTOLIC BLOOD PRESSURE: 92 MMHG | HEART RATE: 105 BPM | TEMPERATURE: 98.7 F | WEIGHT: 221 LBS | HEIGHT: 61 IN | OXYGEN SATURATION: 95 % | BODY MASS INDEX: 41.72 KG/M2 | SYSTOLIC BLOOD PRESSURE: 150 MMHG

## 2025-03-03 DIAGNOSIS — Z12.4 SCREENING FOR CERVICAL CANCER: ICD-10-CM

## 2025-03-03 DIAGNOSIS — E11.65 TYPE 2 DIABETES MELLITUS WITH HYPERGLYCEMIA, WITH LONG-TERM CURRENT USE OF INSULIN (HCC): ICD-10-CM

## 2025-03-03 DIAGNOSIS — Z23 ENCOUNTER FOR IMMUNIZATION: ICD-10-CM

## 2025-03-03 DIAGNOSIS — J45.21 ASTHMATIC BRONCHITIS, MILD INTERMITTENT, WITH ACUTE EXACERBATION: ICD-10-CM

## 2025-03-03 DIAGNOSIS — R52 GENERALIZED BODY ACHES: ICD-10-CM

## 2025-03-03 DIAGNOSIS — J06.9 UPPER RESPIRATORY TRACT INFECTION, UNSPECIFIED TYPE: Primary | ICD-10-CM

## 2025-03-03 DIAGNOSIS — Z79.4 TYPE 2 DIABETES MELLITUS WITH HYPERGLYCEMIA, WITH LONG-TERM CURRENT USE OF INSULIN (HCC): ICD-10-CM

## 2025-03-03 PROCEDURE — 87636 SARSCOV2 & INF A&B AMP PRB: CPT | Performed by: NURSE PRACTITIONER

## 2025-03-03 PROCEDURE — 99214 OFFICE O/P EST MOD 30 MIN: CPT | Performed by: NURSE PRACTITIONER

## 2025-03-03 RX ORDER — CYCLOBENZAPRINE HCL 5 MG
5 TABLET ORAL 3 TIMES DAILY PRN
Qty: 12 TABLET | Refills: 0 | Status: SHIPPED | OUTPATIENT
Start: 2025-03-03

## 2025-03-03 RX ORDER — ALBUTEROL SULFATE 90 UG/1
2 INHALANT RESPIRATORY (INHALATION) EVERY 4 HOURS PRN
Qty: 18 G | Refills: 1 | Status: SHIPPED | OUTPATIENT
Start: 2025-03-03

## 2025-03-03 RX ORDER — ALPRAZOLAM 0.5 MG
0.5 TABLET ORAL
Qty: 15 TABLET | Refills: 0 | Status: SHIPPED | OUTPATIENT
Start: 2025-03-03

## 2025-03-03 RX ORDER — ONDANSETRON 4 MG/1
4 TABLET, ORALLY DISINTEGRATING ORAL EVERY 6 HOURS PRN
Qty: 8 TABLET | Refills: 0 | Status: SHIPPED | OUTPATIENT
Start: 2025-03-03

## 2025-03-03 RX ORDER — AZELASTINE 1 MG/ML
1 SPRAY, METERED NASAL 2 TIMES DAILY
Qty: 30 ML | Refills: 3 | Status: SHIPPED | OUTPATIENT
Start: 2025-03-03

## 2025-03-03 NOTE — LETTER
March 3, 2025     Patient: Jaycee Can  YOB: 1990  Date of Visit: 3/3/2025      To Whom it May Concern:    Jaycee Can is under my professional care. Jaycee JAFFE was seen in my office on 3/3/2025. Jaycee JAFFE may return to work on 3/5/2025 .    If you have any questions or concerns, please don't hesitate to call.         Sincerely,          REJI Ferrell        CC: No Recipients

## 2025-03-03 NOTE — PROGRESS NOTES
Name: Jaycee Can      : 1990      MRN: [id number]  Encounter Provider: REJI Ferrell  Encounter Date: 3/3/2025   Encounter department: [facility]  :  Assessment & Plan  Upper respiratory tract infection, unspecified type    Orders:  •  Covid/Flu- Office Collect Normal  •  azelastine (ASTELIN) 0.1 % nasal spray; 1 spray into each nostril 2 (two) times a day Use in each nostril as directed    Generalized body aches  Started with core throat, now coughing, sneezing, body aches, small amount of clear mucus Headaches,   Taking dayquil and nyquil and aleve   3 days of symptoms. Friend was sick with similar symptoms as well.   Supportive treatment reviewed.   Orders:  •  Covid/Flu- Office Collect Normal    Type 2 diabetes mellitus with hyperglycemia, with long-term current use of insulin (HCC)  Well overdue for labs, has ordered and appointment next month. Patient aware she needs to have these completed   Lab Results   Component Value Date    HGBA1C 10.3 (A) 12/15/2023            Screening for cervical cancer         Encounter for immunization    Orders:  •  influenza vaccine preservative-free 0.5 mL IM (Fluzone, Afluria, Fluarix, Flulaval)    Asthmatic bronchitis, mild intermittent, with acute exacerbation    Orders:  •  albuterol (Ventolin HFA) 90 mcg/act inhaler; Inhale 2 puffs every 4 (four) hours as needed for wheezing or shortness of breath           History of Present Illness   HPI  Review of Systems   Constitutional:  Positive for fatigue. Negative for chills and fever.   HENT:  Positive for congestion, postnasal drip, rhinorrhea and sneezing. Negative for ear pain and sore throat.    Eyes:  Negative for pain and visual disturbance.   Respiratory:  Positive for cough. Negative for shortness of breath.    Cardiovascular:  Negative for chest pain and palpitations.   Gastrointestinal:  Negative for abdominal pain and vomiting.   Genitourinary:  Negative for dysuria  "and hematuria.   Musculoskeletal:  Positive for myalgias. Negative for arthralgias and back pain.   Skin:  Negative for color change and rash.   Neurological:  Positive for headaches. Negative for seizures and syncope.   All other systems reviewed and are negative.      Objective   /92   Pulse 105   Temp 98.7 °F (37.1 °C)   Ht 5' 1\" (1.549 m)   Wt 100 kg (221 lb)   LMP 02/25/2025 (Approximate)   SpO2 95%   BMI 41.76 kg/m²      Physical Exam  Constitutional:       General: She is not in acute distress.     Appearance: Normal appearance. She is not ill-appearing or toxic-appearing.   HENT:      Head: Normocephalic and atraumatic.      Right Ear: Tympanic membrane, ear canal and external ear normal. There is no impacted cerumen.      Left Ear: Tympanic membrane, ear canal and external ear normal. There is no impacted cerumen.      Nose: Congestion present. No rhinorrhea.      Mouth/Throat:      Mouth: Mucous membranes are moist.      Pharynx: Oropharynx is clear. No oropharyngeal exudate or posterior oropharyngeal erythema.   Eyes:      General: No scleral icterus.        Right eye: No discharge.         Left eye: No discharge.      Conjunctiva/sclera: Conjunctivae normal.      Pupils: Pupils are equal, round, and reactive to light.   Cardiovascular:      Rate and Rhythm: Normal rate and regular rhythm.      Pulses: Normal pulses.      Heart sounds: Normal heart sounds. No murmur heard.     No friction rub. No gallop.   Pulmonary:      Effort: Pulmonary effort is normal. No respiratory distress.      Breath sounds: No stridor. Wheezing (upper lobes b/l) present. No rhonchi or rales.   Abdominal:      General: Abdomen is flat. Bowel sounds are normal. There is no distension.      Palpations: Abdomen is soft. There is no mass.      Tenderness: There is no abdominal tenderness.   Musculoskeletal:      Cervical back: No muscular tenderness.   Lymphadenopathy:      Cervical: No cervical adenopathy.   Skin:     " General: Skin is warm and dry.      Capillary Refill: Capillary refill takes less than 2 seconds.   Neurological:      General: No focal deficit present.      Mental Status: She is alert and oriented to person, place, and time. Mental status is at baseline.      Sensory: No sensory deficit.      Motor: No weakness.      Gait: Gait normal.   Psychiatric:         Mood and Affect: Mood normal.         Behavior: Behavior normal.         Thought Content: Thought content normal.         Judgment: Judgment normal.

## 2025-03-03 NOTE — TELEPHONE ENCOUNTER
Pt c/o cold symptoms, bodyaches and sneezing and also back pain x 2 days.  Pt requesting an appointment with PCP today.    Pt scheduled for today with PCP at 1220 pm. Pt will bring along photo ID and insurance cards to appointment.

## 2025-03-04 ENCOUNTER — RESULTS FOLLOW-UP (OUTPATIENT)
Dept: FAMILY MEDICINE CLINIC | Facility: CLINIC | Age: 35
End: 2025-03-04

## 2025-03-04 LAB
FLUAV RNA RESP QL NAA+PROBE: NEGATIVE
FLUBV RNA RESP QL NAA+PROBE: NEGATIVE
SARS-COV-2 RNA RESP QL NAA+PROBE: NEGATIVE

## 2025-03-10 NOTE — TELEPHONE ENCOUNTER
----- Message from Lidia Scott, [address] [place] sent at 2021  2:22 PM EDT -----  [address] Exam  Contact: [phone]   21 2:22 PM    Hello, our patient Luc Alvarado has had Diabetic Eye Exam completed/performed  Please assist in updating the patient chart by making an External outreach to dr Susan Alfredo facility located in [place]  The date of service is 2020      Thank you,  ALIX Craig PG 10-Mar-2025 02:29

## 2025-03-18 ENCOUNTER — OFFICE VISIT (OUTPATIENT)
Dept: URGENT CARE | Facility: CLINIC | Age: 35
End: 2025-03-18
Payer: COMMERCIAL

## 2025-03-18 ENCOUNTER — APPOINTMENT (OUTPATIENT)
Dept: RADIOLOGY | Facility: CLINIC | Age: 35
End: 2025-03-18
Payer: COMMERCIAL

## 2025-03-18 VITALS
SYSTOLIC BLOOD PRESSURE: 134 MMHG | HEART RATE: 100 BPM | HEIGHT: 61 IN | RESPIRATION RATE: 18 BRPM | DIASTOLIC BLOOD PRESSURE: 76 MMHG | BODY MASS INDEX: 41.72 KG/M2 | TEMPERATURE: 98 F | OXYGEN SATURATION: 97 % | WEIGHT: 221 LBS

## 2025-03-18 DIAGNOSIS — R06.00 DYSPNEA, UNSPECIFIED TYPE: ICD-10-CM

## 2025-03-18 DIAGNOSIS — J45.21 MILD INTERMITTENT ASTHMA WITH ACUTE EXACERBATION: ICD-10-CM

## 2025-03-18 DIAGNOSIS — J22 VIRAL LOWER RESPIRATORY TRACT INFECTION: Primary | ICD-10-CM

## 2025-03-18 DIAGNOSIS — B97.89 VIRAL LOWER RESPIRATORY TRACT INFECTION: Primary | ICD-10-CM

## 2025-03-18 PROCEDURE — 99213 OFFICE O/P EST LOW 20 MIN: CPT | Performed by: ORTHOPAEDIC SURGERY

## 2025-03-18 PROCEDURE — 71046 X-RAY EXAM CHEST 2 VIEWS: CPT

## 2025-03-18 RX ORDER — ALBUTEROL SULFATE 0.83 MG/ML
2.5 SOLUTION RESPIRATORY (INHALATION) EVERY 6 HOURS PRN
Qty: 180 ML | Refills: 0 | Status: SHIPPED | OUTPATIENT
Start: 2025-03-18

## 2025-03-18 RX ORDER — BENZONATATE 200 MG/1
200 CAPSULE ORAL 3 TIMES DAILY PRN
Qty: 20 CAPSULE | Refills: 0 | Status: SHIPPED | OUTPATIENT
Start: 2025-03-18

## 2025-03-20 NOTE — PROGRESS NOTES
[facility]        NAME: Jaycee Can is a 35 y.o. female  : 1990    MRN: [id number]  DATE: 2025  TIME: 10:48 AM    Assessment and Plan   Viral lower respiratory tract infection [J22, B97.89]  1. Viral lower respiratory tract infection  benzonatate (TESSALON) 200 MG capsule    albuterol (2.5 mg/3 mL) 0.083 % nebulizer solution    Nebulizer      2. Dyspnea, unspecified type  XR chest pa and lateral    albuterol (2.5 mg/3 mL) 0.083 % nebulizer solution    Nebulizer      3. Mild intermittent asthma with acute exacerbation  albuterol (2.5 mg/3 mL) 0.083 % nebulizer solution    Nebulizer        CXR reviewed and discussed with patient which shows no evidence of infiltrate or pneumonia.  Will await final radiology report.    Patient Instructions       Most upper respiratory infections are viral and resolve on their own within 10-14 days. Antibiotics are not indicated for the viral infection, and are only prescribed if there is evidence for a bacterial infection. Viral infections are the most common, with bacterial infections only accounting for 0.5-2 percent of cases. Sometimes an upper respiratory infection may lead to secondary bacterial infection, such as bacterial sinusitis, in which case antibiotics would be indicated at that time. If your symptoms continue beyond 10-14 days or if you experience ongoing fevers, productive cough with green, brown, bloody phlegm production, you may have developed a bacterial infection. For the uncomplicated viral upper respiratory infection conservative management includes:    Fever and pain control:  Ibuprofen (Motrin) 600mg every 6 hours for fever, headaches, body aches   Ibuprofen is an NSAID. Please stop medication if you experience stomach/abdominal pain and report to your primary care provider.   Ask your primary care provider before you take NSAIDs if you are on any blood thinners, or if you have a history of heart disease, kidney disease, gastric  bypass surgery, GI bleed, or poorly controlled high blood pressure.   May use acetaminophen (Tylenol) as directed on the bottle between doses of ibuprofen. Do not exceed 4,000mg of Tylenol a day.   Cough & Congestion:  Guaifenesin (Mucinex) as directed on the bottle for congestion and mucous-y cough.   Dextromethorphan (Delsym, Robitussin) for dry cough and cough suppression   Pseudoephedrine (Sudafed) for congestion and sinus pressure   Sudafed may cause increased heart rate, irregular heart rate, and an increase in blood pressure. Please do not take Sudafed if you have a history of heart disease or high blood pressure.   Sudafed should not be taken if you are on anti-depressants such as those belonging to the class MAOIs or tricyclics.  Coricidin HBP (chlorpheniramine maleate) can be used as a decongestant in place of other options for those unable to take Sudafed.   Combination cough and cold such as Dimetapp and Mucinex DM also available  Sudafed PE Head Congestion +Flu Severe contains a combination of Sudafed, Tylenol, Mucinex, and Delsym  If prescribed, take Tessalon Pearles or Bromfed/Phenergan DM as directed  Avoid taking prescription cough/congestion medication and OTC options at the same time  Sore Throat:  Cepacol lozenges  Chloraseptic spray  Throat Coat tea  Warm salt water gargles   Vitamin/Minerals:  Vitamin D3 2,000 IU daily  Vitamin C 1000mg twice a day  Some studies suggest that Zinc 12.5-15mg every 2 hours while awake for 5 days may shorten symptom duration by 1-2 days  Other:   Plenty of fluids and rest  Cool mist humidifiers  Nasal sinus rinses such as NettiPot, [place]imed, or Navage can be used to help flush out sinuses  Please only use distilled/sterile water that can be purchased at your local pharmacy  Nasal spray options:  Nasal steroid sprays such as Flonase, Nasonex, Nasacort may help with sinus congestion, itchy/watery eyes, clogged ears  These options must be used consistently for at least  2 weeks for full effect  Afrin nasal spray for quick acting congestion relief  Saline nasal spray for dry nose, irritation of the nasal passages  Follow up with PCP in 3-5 days  Proceed to the ED if symptoms worsen      If tests are performed, our office will contact you with results only if changes need to made to the care plan discussed with you at the visit. You can review your full results on [facility].    Chief Complaint     Chief Complaint   Patient presents with    Cold Like Symptoms     Cough, wheezing, chest congestion for 1-2 weeks, difficulty taking a deep breath at times         History of Present Illness       35-year-old female presents to the urgent care for evaluation of cough, wheezing, congestion for approximately 2 weeks.  Patient states the cough is productive with clear phlegm.  She does have a history of asthma and has been using her albuterol inhaler excessively.  The patient denies any fevers or chills.  She did test for COVID at home, which was negative.  The patient has been using DayQuil and NyQuil for symptom relief.        Review of Systems   Review of Systems   Constitutional:  Negative for chills and fever.   HENT:  Positive for congestion. Negative for ear pain and sore throat.    Eyes:  Negative for pain and visual disturbance.   Respiratory:  Positive for cough, chest tightness, shortness of breath and wheezing.    Cardiovascular:  Negative for chest pain and palpitations.   Gastrointestinal:  Negative for abdominal pain and vomiting.   Genitourinary:  Negative for dysuria and hematuria.   Musculoskeletal:  Negative for arthralgias and back pain.   Skin:  Negative for color change and rash.   Neurological:  Negative for dizziness, seizures and syncope.   All other systems reviewed and are negative.        Current Medications       Current Outpatient Medications:     acetaminophen (TYLENOL) 500 mg tablet, Take 1,000 mg by mouth, Disp: , Rfl:     albuterol (2.5 mg/3 mL) 0.083  % nebulizer solution, Take 3 mL (2.5 mg total) by nebulization every 6 (six) hours as needed for wheezing or shortness of breath, Disp: 180 mL, Rfl: 0    albuterol (Ventolin HFA) 90 mcg/act inhaler, Inhale 2 puffs every 4 (four) hours as needed for wheezing or shortness of breath, Disp: 18 g, Rfl: 1    ALPRAZolam (XANAX) 0.5 mg tablet, Take 1 tablet (0.5 mg total) by mouth daily at bedtime as needed for anxiety, Disp: 15 tablet, Rfl: 0    Aspirin Low Dose 81 MG EC tablet, TAKE 1 TABLET BY MOUTH DAILY. DO NOT START BEFORE FEBRYARY 1, 2023, Disp: 90 tablet, Rfl: 0    azelastine (ASTELIN) 0.1 % nasal spray, 1 spray into each nostril 2 (two) times a day Use in each nostril as directed, Disp: 30 mL, Rfl: 3    benzonatate (TESSALON PERLES) 100 mg capsule, Take 1 capsule (100 mg total) by mouth every 8 (eight) hours, Disp: 21 capsule, Rfl: 0    benzonatate (TESSALON) 200 MG capsule, Take 1 capsule (200 mg total) by mouth 3 (three) times a day as needed for cough, Disp: 20 capsule, Rfl: 0    Blood Glucose Monitoring Suppl (OneTouch Verio) w/Device KIT, Use daily, Disp: 1 kit, Rfl: 0    Blood Pressure Monitoring (B-D ASSURE BPM/AUTO WRIST CUFF) MISC, Check blood pressure prior to each OB visit, or as directed by your physician., Disp: 1 each, Rfl: 0    citalopram (CeleXA) 10 mg tablet, Take 1 tablet (10 mg total) by mouth daily, Disp: 90 tablet, Rfl: 1    Continuous Blood Gluc  (FreeStyle Gautam 14 Day [place]) KVNG, Use with gautam sensor, Disp: 1 each, Rfl: 3    Continuous Blood Gluc Sensor (FreeStyle Gautam 3 Sensor) MISC, USE 1 SENSOR EACH TO BE CHANGED EVERY 14 DAYS, Disp: 2 each, Rfl: 2    cyclobenzaprine (FLEXERIL) 5 mg tablet, Take 1 tablet (5 mg total) by mouth 3 (three) times a day as needed for muscle spasms for up to 12 doses, Disp: 12 tablet, Rfl: 0    Empagliflozin (JARDIANCE) 10 MG TABS tablet, Take 1 tablet (10 mg total) by mouth daily, Disp: 30 tablet, Rfl: 0    fexofenadine (ALLEGRA) 180 MG tablet, Take  1 tablet (180 mg total) by mouth daily, Disp: 30 tablet, Rfl: 5    gabapentin (NEURONTIN) 100 mg capsule, Take 1 capsule (100 mg total) by mouth daily at bedtime, Disp: 90 capsule, Rfl: 1    insulin glargine (LANTUS) 100 units/mL subcutaneous injection, Inject 17 Units under the skin daily at bedtime, Disp: 10 mL, Rfl: 0    insulin lispro (HumaLOG KwikPen) 100 units/mL injection pen, Inject 5 Units under the skin 3 (three) times a day with meals, Disp: 5 mL, Rfl: 0    Insulin Pen Needle (B-D UF III MINI PEN NEEDLES) 31G X 5 MM MISC, Inject under the skin daily at bedtime, Disp: 100 each, Rfl: 1    lisinopril (ZESTRIL) 30 mg tablet, Take 1 tablet (30 mg total) by mouth daily, Disp: 90 tablet, Rfl: 1    metFORMIN (GLUCOPHAGE) 1000 MG tablet, Take 1 tablet (1,000 mg total) by mouth 2 (two) times a day with meals, Disp: 60 tablet, Rfl: 0    montelukast (SINGULAIR) 10 mg tablet, Take 1 tablet (10 mg total) by mouth daily at bedtime, Disp: 90 tablet, Rfl: 1    Multiple Vitamin (MULTI VITAMIN DAILY PO), Take by mouth, Disp: , Rfl:     naproxen (Naprosyn) 500 mg tablet, Take 1 tablet (500 mg total) by mouth 2 (two) times a day with meals, Disp: 180 tablet, Rfl: 1    sucralfate (CARAFATE) 1 g tablet, Take 1 tablet (1 g total) by mouth 4 (four) times a day, Disp: 28 tablet, Rfl: 0    chlorhexidine (PERIDEX) 0.12 % solution, Apply 15 mL to the mouth or throat 2 (two) times a day, Disp: 120 mL, Rfl: 0    dulaglutide (Trulicity) 0.75 MG/0.5ML injection, Inject 0.5 mL (0.75 mg total) under the skin every 7 days for 4 doses Take 0.75mg weekly X 4 weeks after which will increase to 1.5 mg weekly. (Patient not taking: Reported on 3/3/2025), Disp: 2 mL, Rfl: 0    erythromycin (ILOTYCIN) ophthalmic ointment, Administer 0.5 inches to the right eye daily at bedtime (Patient not taking: Reported on 3/18/2025), Disp: 3.5 g, Rfl: 0    ofloxacin (FLOXIN) 0.3 % otic solution, Administer 10 drops into the left ear daily (Patient not taking:  Reported on 2/6/2025), Disp: 10 mL, Rfl: 0    ondansetron (ZOFRAN-ODT) 4 mg disintegrating tablet, Take 1 tablet (4 mg total) by mouth every 6 (six) hours as needed for nausea for up to 8 doses (Patient not taking: Reported on 3/18/2025), Disp: 8 tablet, Rfl: 0    Current Allergies     Allergies as of 03/18/2025 - Reviewed 03/18/2025   Allergen Reaction Noted    Prednisone Swelling 06/12/2018    Bactrim [sulfamethoxazole-trimethoprim] Hives 11/16/2019    Corticosteroids Swelling 01/13/2009    Cortisone Swelling 08/12/2019    Medical tape Hives 04/19/2020    Other Hives 02/29/2020    Sulfa antibiotics Hives 01/14/2019            The following portions of the patient's history were reviewed and updated as appropriate: allergies, current medications, past family history, past medical history, past social history, past surgical history and problem list.     Past Medical History:   Diagnosis Date    Allergic     Arthritis     Bipolar affective disorder, currently depressed, moderate (HCC) 12/17/2018    Cyst of ovary, right     Diabetes mellitus (HCC) 10/10/2018    type 2    Endometriosis     Heart murmur 1988    Hepatitis C     Hepatitis C virus infection cured after antiviral drug therapy     History of transfusion     Hypertension     Migraines     Morbid obesity with BMI of 40.0-44.9, adult (HCC)     Obesity 1995    Pulmonary artery congenital abnormality     Spleen enlarged     Status post surgical removal of both fallopian tubes     Varicella        Past Surgical History:   Procedure Laterality Date    CARDIAC CATHETERIZATION      no CAD 10days, 4 weeks 22months old     CARDIAC CATHETERIZATION N/A 1/12/2023    Procedure: Cardiac Coronary Angiogram;  Surgeon: Marcela Lr DO;  Location: [facility];  Service: Cardiology    CARDIAC CATHETERIZATION Left 1/12/2023    Procedure: Cardiac Left Heart Cath;  Surgeon: Marcela Lr DO;  Location: [facility];  Service: Cardiology    CARDIAC  CATHETERIZATION  2023    Procedure: Cardiac catheterization;  Surgeon: Marcela Lr DO;  Location: [facility];  Service: Cardiology     SECTION, LOW TRANSVERSE      CHOLECYSTECTOMY      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 5    CORONARY STENT PLACEMENT      IR LOWER EXTREMITY ANGIOGRAM  2023    LIVER BIOPSY      LIVER BIOPSY      STERNAL WIRE REMOVAL  2022    THROMBECTOMY W/ EMBOLECTOMY Right 2023    Procedure: Right femoral exposure Right iliac embolectomy w/ #4 Ted catheter Aortogram Right EIA stent w/ 7x29mm VBX;  Surgeon: Jody Hodges MD;  Location: [facility];  Service: Vascular    TUBAL LIGATION Bilateral 2020    VSD REPAIR      As a child    WOUND DEBRIDEMENT Right 2023    Procedure: Right Groin Wound Washout, Pulse Lavage, Wound Vac placement;  Surgeon: Jelani Avila DO;  Location: [facility];  Service: Vascular       Family History   Problem Relation Age of Onset    Hypertension Mother     Migraines Mother     JENNY disease Mother     Depression Mother     Hyperlipidemia Mother     Diabetes Mother     Diabetes Father     Hypertension Father     Kidney failure Father     Heart attack Father     Arthritis Father     Stroke Father     Glaucoma Father     Polycystic kidney disease Paternal Grandmother     Stroke Paternal Grandmother     Heart disease Paternal Grandmother     Arthritis Sister     Asthma Sister     Thyroid disease Sister     Diabetes Sister     Arthritis Maternal Grandmother     Breast cancer Maternal Grandmother     Diabetes Maternal Grandmother     Hypertension Maternal Grandmother     Heart Valve Disease Maternal Grandmother     Cancer Maternal Grandmother         Skin cancer    Learning disabilities Cousin     Learning disabilities Sister     Diabetes Sister     ADD / ADHD Cousin     Lung cancer Brother     Cancer Brother         Lung cancer    Diabetes Maternal Grandfather     Hypertension Maternal Grandfather     JENNY disease  "Maternal Grandfather     Stroke Maternal Grandfather     Cancer Maternal Grandfather         Skin cancer    Cancer Paternal Uncle         Skin cancer         Medications have been verified.        Objective   /76   Pulse 100   Temp 98 °F (36.7 °C) (Temporal)   Resp 18   Ht 5' 1\" (1.549 m)   Wt 100 kg (221 lb)   LMP 02/25/2025 (Approximate)   SpO2 97%   BMI 41.76 kg/m²        Physical Exam     Physical Exam  Vitals and nursing note reviewed.   Constitutional:       General: She is not in acute distress.     Appearance: Normal appearance. She is not ill-appearing.   HENT:      Head: Normocephalic and atraumatic.      Right Ear: Tympanic membrane normal.      Left Ear: Tympanic membrane normal.      Nose: Nose normal.      Mouth/Throat:      Mouth: Mucous membranes are moist.      Pharynx: Oropharynx is clear. No oropharyngeal exudate or posterior oropharyngeal erythema.   Eyes:      Extraocular Movements: Extraocular movements intact.      Pupils: Pupils are equal, round, and reactive to light.   Cardiovascular:      Rate and Rhythm: Normal rate and regular rhythm.      Pulses: Normal pulses.      Heart sounds: Normal heart sounds. No murmur heard.  Pulmonary:      Effort: Pulmonary effort is normal. No respiratory distress.      Breath sounds: Normal breath sounds. No wheezing or rhonchi.   Abdominal:      Palpations: Abdomen is soft.      Tenderness: There is no abdominal tenderness.   Musculoskeletal:         General: Normal range of motion.      Cervical back: Normal range of motion.   Lymphadenopathy:      Cervical: No cervical adenopathy.   Skin:     General: Skin is warm and dry.      Capillary Refill: Capillary refill takes less than 2 seconds.   Neurological:      General: No focal deficit present.      Mental Status: She is alert and oriented to person, place, and time.   Psychiatric:         Mood and Affect: Mood normal.         Behavior: Behavior normal.                   " 1yr/male brought into ED by mother. As per mother, child has had a fever since yesterday, mother reports she has been alternating Tylenol and Motrin at home. Child last received Ibuprofen at 630am today and Tylenol at 1030am today. Mother also reports child has been coughing for 2-3 days and she has noticed child has been playing with his left ear. Mother states "he looks dizzy sometimes." Child awake and alert on assessment. Unable to assess suicidal/homicidal ideations due to child's age.

## 2025-04-07 ENCOUNTER — APPOINTMENT (OUTPATIENT)
Dept: LAB | Facility: CLINIC | Age: 35
End: 2025-04-07
Payer: COMMERCIAL

## 2025-04-07 DIAGNOSIS — E11.65 TYPE 2 DIABETES MELLITUS WITH HYPERGLYCEMIA, WITH LONG-TERM CURRENT USE OF INSULIN (HCC): ICD-10-CM

## 2025-04-07 DIAGNOSIS — Z79.4 TYPE 2 DIABETES MELLITUS WITH HYPERGLYCEMIA, WITH LONG-TERM CURRENT USE OF INSULIN (HCC): ICD-10-CM

## 2025-04-07 LAB
ALBUMIN SERPL BCG-MCNC: 3.9 G/DL (ref 3.5–5)
ALP SERPL-CCNC: 77 U/L (ref 34–104)
ALT SERPL W P-5'-P-CCNC: 19 U/L (ref 7–52)
ANION GAP SERPL CALCULATED.3IONS-SCNC: 9 MMOL/L (ref 4–13)
AST SERPL W P-5'-P-CCNC: 14 U/L (ref 13–39)
BASOPHILS # BLD AUTO: 0.04 THOUSANDS/ÂΜL (ref 0–0.1)
BASOPHILS NFR BLD AUTO: 1 % (ref 0–1)
BILIRUB SERPL-MCNC: 0.47 MG/DL (ref 0.2–1)
BUN SERPL-MCNC: 11 MG/DL (ref 5–25)
CALCIUM SERPL-MCNC: 9.1 MG/DL (ref 8.4–10.2)
CHLORIDE SERPL-SCNC: 104 MMOL/L (ref 96–108)
CHOLEST SERPL-MCNC: 176 MG/DL (ref ?–200)
CO2 SERPL-SCNC: 26 MMOL/L (ref 21–32)
CREAT SERPL-MCNC: 0.53 MG/DL (ref 0.6–1.3)
CREAT UR-MCNC: 144.9 MG/DL
EOSINOPHIL # BLD AUTO: 0.15 THOUSAND/ÂΜL (ref 0–0.61)
EOSINOPHIL NFR BLD AUTO: 2 % (ref 0–6)
ERYTHROCYTE [DISTWIDTH] IN BLOOD BY AUTOMATED COUNT: 14.9 % (ref 11.6–15.1)
EST. AVERAGE GLUCOSE BLD GHB EST-MCNC: 255 MG/DL
GFR SERPL CREATININE-BSD FRML MDRD: 121 ML/MIN/1.73SQ M
GLUCOSE P FAST SERPL-MCNC: 156 MG/DL (ref 65–99)
HBA1C MFR BLD: 10.5 %
HCT VFR BLD AUTO: 41.6 % (ref 34.8–46.1)
HDLC SERPL-MCNC: 38 MG/DL
HGB BLD-MCNC: 12.9 G/DL (ref 11.5–15.4)
IMM GRANULOCYTES # BLD AUTO: 0.04 THOUSAND/UL (ref 0–0.2)
IMM GRANULOCYTES NFR BLD AUTO: 1 % (ref 0–2)
LDLC SERPL CALC-MCNC: 104 MG/DL (ref 0–100)
LYMPHOCYTES # BLD AUTO: 2.49 THOUSANDS/ÂΜL (ref 0.6–4.47)
LYMPHOCYTES NFR BLD AUTO: 28 % (ref 14–44)
MCH RBC QN AUTO: 24.7 PG (ref 26.8–34.3)
MCHC RBC AUTO-ENTMCNC: 31 G/DL (ref 31.4–37.4)
MCV RBC AUTO: 80 FL (ref 82–98)
MICROALBUMIN UR-MCNC: 24.7 MG/L
MICROALBUMIN/CREAT 24H UR: 17 MG/G CREATININE (ref 0–30)
MONOCYTES # BLD AUTO: 0.57 THOUSAND/ÂΜL (ref 0.17–1.22)
MONOCYTES NFR BLD AUTO: 6 % (ref 4–12)
NEUTROPHILS # BLD AUTO: 5.55 THOUSANDS/ÂΜL (ref 1.85–7.62)
NEUTS SEG NFR BLD AUTO: 62 % (ref 43–75)
NRBC BLD AUTO-RTO: 0 /100 WBCS
PLATELET # BLD AUTO: 250 THOUSANDS/UL (ref 149–390)
PMV BLD AUTO: 11.2 FL (ref 8.9–12.7)
POTASSIUM SERPL-SCNC: 4.3 MMOL/L (ref 3.5–5.3)
PROT SERPL-MCNC: 6.8 G/DL (ref 6.4–8.4)
RBC # BLD AUTO: 5.22 MILLION/UL (ref 3.81–5.12)
SODIUM SERPL-SCNC: 139 MMOL/L (ref 135–147)
TRIGL SERPL-MCNC: 169 MG/DL (ref ?–150)
TSH SERPL DL<=0.05 MIU/L-ACNC: 1.87 UIU/ML (ref 0.45–4.5)
WBC # BLD AUTO: 8.84 THOUSAND/UL (ref 4.31–10.16)

## 2025-04-07 PROCEDURE — 85025 COMPLETE CBC W/AUTO DIFF WBC: CPT

## 2025-04-07 PROCEDURE — 36415 COLL VENOUS BLD VENIPUNCTURE: CPT

## 2025-04-07 PROCEDURE — 82570 ASSAY OF URINE CREATININE: CPT

## 2025-04-07 PROCEDURE — 84443 ASSAY THYROID STIM HORMONE: CPT

## 2025-04-07 PROCEDURE — 80061 LIPID PANEL: CPT

## 2025-04-07 PROCEDURE — 80053 COMPREHEN METABOLIC PANEL: CPT

## 2025-04-07 PROCEDURE — 82043 UR ALBUMIN QUANTITATIVE: CPT

## 2025-04-07 PROCEDURE — 83036 HEMOGLOBIN GLYCOSYLATED A1C: CPT

## 2025-04-08 ENCOUNTER — RESULTS FOLLOW-UP (OUTPATIENT)
Dept: FAMILY MEDICINE CLINIC | Facility: CLINIC | Age: 35
End: 2025-04-08

## 2025-04-09 ENCOUNTER — TELEPHONE (OUTPATIENT)
Age: 35
End: 2025-04-09

## 2025-04-09 ENCOUNTER — OFFICE VISIT (OUTPATIENT)
Dept: FAMILY MEDICINE CLINIC | Facility: CLINIC | Age: 35
End: 2025-04-09
Payer: COMMERCIAL

## 2025-04-09 VITALS
HEART RATE: 90 BPM | SYSTOLIC BLOOD PRESSURE: 134 MMHG | HEIGHT: 61 IN | BODY MASS INDEX: 43.65 KG/M2 | DIASTOLIC BLOOD PRESSURE: 72 MMHG | OXYGEN SATURATION: 97 % | WEIGHT: 231.2 LBS | TEMPERATURE: 97.6 F

## 2025-04-09 DIAGNOSIS — Z00.6 ENCOUNTER FOR EXAMINATION FOR NORMAL COMPARISON OR CONTROL IN CLINICAL RESEARCH PROGRAM: ICD-10-CM

## 2025-04-09 DIAGNOSIS — F33.0 MILD EPISODE OF RECURRENT MAJOR DEPRESSIVE DISORDER (HCC): ICD-10-CM

## 2025-04-09 DIAGNOSIS — Z12.4 SCREENING FOR CERVICAL CANCER: ICD-10-CM

## 2025-04-09 DIAGNOSIS — J45.21 MILD INTERMITTENT ASTHMA WITH ACUTE EXACERBATION: ICD-10-CM

## 2025-04-09 DIAGNOSIS — M25.50 ARTHRALGIA, UNSPECIFIED JOINT: ICD-10-CM

## 2025-04-09 DIAGNOSIS — Z00.00 ANNUAL PHYSICAL EXAM: Primary | ICD-10-CM

## 2025-04-09 DIAGNOSIS — Z79.4 TYPE 2 DIABETES MELLITUS WITH HYPERGLYCEMIA, WITH LONG-TERM CURRENT USE OF INSULIN (HCC): ICD-10-CM

## 2025-04-09 DIAGNOSIS — I10 HYPERTENSION, UNSPECIFIED TYPE: ICD-10-CM

## 2025-04-09 DIAGNOSIS — K29.70 GASTRITIS: ICD-10-CM

## 2025-04-09 DIAGNOSIS — G62.9 NEUROPATHY: ICD-10-CM

## 2025-04-09 DIAGNOSIS — F41.9 ANXIETY: ICD-10-CM

## 2025-04-09 DIAGNOSIS — E78.2 MIXED HYPERLIPIDEMIA: ICD-10-CM

## 2025-04-09 DIAGNOSIS — R19.8 PAIN WITH BOWEL MOVEMENTS: ICD-10-CM

## 2025-04-09 DIAGNOSIS — E11.65 TYPE 2 DIABETES MELLITUS WITH HYPERGLYCEMIA, WITH LONG-TERM CURRENT USE OF INSULIN (HCC): ICD-10-CM

## 2025-04-09 DIAGNOSIS — Z13.5 SCREENING FOR DIABETIC RETINOPATHY: ICD-10-CM

## 2025-04-09 LAB
LEFT EYE DIABETIC RETINOPATHY: ABNORMAL
LEFT EYE IMAGE QUALITY: ABNORMAL
LEFT EYE MACULAR EDEMA: ABNORMAL
LEFT EYE OTHER RETINOPATHY: ABNORMAL
RIGHT EYE DIABETIC RETINOPATHY: ABNORMAL
RIGHT EYE IMAGE QUALITY: ABNORMAL
RIGHT EYE MACULAR EDEMA: ABNORMAL
RIGHT EYE OTHER RETINOPATHY: ABNORMAL
SEVERITY (EYE EXAM): ABNORMAL

## 2025-04-09 PROCEDURE — 99395 PREV VISIT EST AGE 18-39: CPT | Performed by: NURSE PRACTITIONER

## 2025-04-09 PROCEDURE — 99214 OFFICE O/P EST MOD 30 MIN: CPT | Performed by: NURSE PRACTITIONER

## 2025-04-09 RX ORDER — CITALOPRAM HYDROBROMIDE 10 MG/1
10 TABLET ORAL DAILY
Qty: 90 TABLET | Refills: 1 | Status: SHIPPED | OUTPATIENT
Start: 2025-04-09

## 2025-04-09 RX ORDER — TIRZEPATIDE 5 MG/.5ML
5 INJECTION, SOLUTION SUBCUTANEOUS WEEKLY
Qty: 2 ML | Refills: 1 | Status: SHIPPED | OUTPATIENT
Start: 2025-05-09 | End: 2025-04-10

## 2025-04-09 RX ORDER — INSULIN GLARGINE 100 [IU]/ML
25 INJECTION, SOLUTION SUBCUTANEOUS
Qty: 10 ML | Refills: 3 | Status: SHIPPED | OUTPATIENT
Start: 2025-04-09 | End: 2025-05-09

## 2025-04-09 RX ORDER — LISINOPRIL 30 MG/1
30 TABLET ORAL DAILY
Qty: 100 TABLET | Refills: 2 | Status: SHIPPED | OUTPATIENT
Start: 2025-04-09

## 2025-04-09 RX ORDER — NAPROXEN 500 MG/1
500 TABLET ORAL 2 TIMES DAILY PRN
Qty: 180 TABLET | Refills: 1 | Status: SHIPPED | OUTPATIENT
Start: 2025-04-09

## 2025-04-09 RX ORDER — ACYCLOVIR 800 MG/1
TABLET ORAL
Qty: 2 EACH | Refills: 2 | Status: SHIPPED | OUTPATIENT
Start: 2025-04-09

## 2025-04-09 RX ORDER — FEXOFENADINE HCL 180 MG/1
180 TABLET ORAL DAILY
Qty: 30 TABLET | Refills: 5 | Status: SHIPPED | OUTPATIENT
Start: 2025-04-09

## 2025-04-09 RX ORDER — INSULIN LISPRO 100 [IU]/ML
5 INJECTION, SOLUTION INTRAVENOUS; SUBCUTANEOUS
Qty: 5 ML | Refills: 0 | Status: CANCELLED | OUTPATIENT
Start: 2025-04-09

## 2025-04-09 RX ORDER — ONDANSETRON 4 MG/1
4 TABLET, ORALLY DISINTEGRATING ORAL EVERY 6 HOURS PRN
Qty: 8 TABLET | Refills: 0 | Status: SHIPPED | OUTPATIENT
Start: 2025-04-09

## 2025-04-09 RX ORDER — MONTELUKAST SODIUM 10 MG/1
10 TABLET ORAL
Qty: 90 TABLET | Refills: 1 | Status: SHIPPED | OUTPATIENT
Start: 2025-04-09

## 2025-04-09 RX ORDER — GABAPENTIN 100 MG/1
100 CAPSULE ORAL
Qty: 90 CAPSULE | Refills: 1 | Status: SHIPPED | OUTPATIENT
Start: 2025-04-09

## 2025-04-09 RX ORDER — ATORVASTATIN CALCIUM 20 MG/1
20 TABLET, FILM COATED ORAL DAILY
Qty: 100 TABLET | Refills: 2 | Status: SHIPPED | OUTPATIENT
Start: 2025-04-09

## 2025-04-09 RX ORDER — TIRZEPATIDE 2.5 MG/.5ML
2.5 INJECTION, SOLUTION SUBCUTANEOUS WEEKLY
Qty: 2 ML | Refills: 0 | Status: SHIPPED | OUTPATIENT
Start: 2025-04-09 | End: 2025-04-10

## 2025-04-09 NOTE — ASSESSMENT & PLAN NOTE
Depression Screening Follow-up Plan: Patient's depression screening was positive with a PHQ-9 score of 9. Patient assessed for underlying major depression. They have no active suicidal ideations. Brief counseling provided and recommend additional follow-up/re-evaluation next office visit.    Patient has been off of her Celexa for quite some time.  Unfortunately, patient's Pappy recently passed away and she had an accident which has increased her depression.  She is also feeling anxious and worried.  She is having a lot of difficulty sleeping.  She is requesting a sleep aid but we will see how she does with restarting her Celexa first if no improvement will consider addition of a sleep aid at next appointment.  Possible side effects and when to return reviewed.  Orders:  •  citalopram (CeleXA) 10 mg tablet; Take 1 tablet (10 mg total) by mouth daily

## 2025-04-09 NOTE — ASSESSMENT & PLAN NOTE
Orders:  •  fexofenadine (ALLEGRA) 180 MG tablet; Take 1 tablet (180 mg total) by mouth daily  •  montelukast (SINGULAIR) 10 mg tablet; Take 1 tablet (10 mg total) by mouth daily at bedtime

## 2025-04-09 NOTE — ASSESSMENT & PLAN NOTE
Has been on and off this medication intermittently.  Blood pressure is well-controlled today at 134/72.  Orders:  •  lisinopril (ZESTRIL) 30 mg tablet; Take 1 tablet (30 mg total) by mouth daily

## 2025-04-09 NOTE — ASSESSMENT & PLAN NOTE
Depression Screening Follow-up Plan: Patient's depression screening was positive with a PHQ-9 score of 9.     Orders:  •  citalopram (CeleXA) 10 mg tablet; Take 1 tablet (10 mg total) by mouth daily

## 2025-04-09 NOTE — PROGRESS NOTES
Adult Annual Physical  Name: Jaycee Can      : 1990      MRN: [id number]  Encounter Provider: REJI Ferrell  Encounter Date: 2025   Encounter department: [facility]    :  Assessment & Plan  Annual physical exam         Type 2 diabetes mellitus with hyperglycemia, with long-term current use of insulin (HCC)  - Patient has been out of all of her oral medications.  - She has been taking Lantus 25 units at bedtime and Humalog 15 units all at once in the evening.  Sometimes she does feel little shaky throughout the day usually after taking her Humalog.  She is often waking in the middle of the night due to alert from [employer] so[employer] for low sugar around 60.  Will eat peanut butter and jelly sandwich and this will resolve hypoglycemia.  Patient understands that she is supposed to be using Humalog as mealtime insulin but states that she cannot remember to do this.  Will discontinue Humalog at this time as risk of hypoglycemia is too high.  Signs and symptoms of hypoglycemia along with treatment of hypoglycemia reviewed with patient and she verbalized understanding.  Patient has been out of her Trulicity for a very long time.  Patient admits that she has not eating well and is eating a lot of junk food and fast food.  Counseled on diabetic diet and importance of routine exercise.  Patient was previously started on Lipitor 20 mg daily and she states that she never got a refill of this prescription posthospitalization.  Will restart Lipitor counseled on possible side effects.    Patient does not have any personal history of pancreatitis. No personal or family history of men 2 or medullary thyroid cancer. Counseled patient on importance of hydration while taking GLP1 along with importance of adequate protein intake and exercise to help maintain muscle mass. Possible side effects including but not limited to nausea, constipation and diarrhea reviewed. Try to limit high fat  foods and ensure adequate fiber intake through out the day. Importance of pregnancy prevention while taking this medication reviewed with patient.  Patient did have a tubal ligation.  Symptoms of pancreatitis reviewed and instructed to go to ER if this symptoms develop. If nausea develops, lemon water or crackers may help with this. After completing 4th injection, patient is to contact office and let me know how they are tolerating the medication and will increase dose at this time if appropriate.         - Continue with Lantus 25 units at bedtime if continues to have hypoglycemia patient is to call and we will decrease units at bedtime.  - Will start patient on Mounjaro 2.5 mg weekly after 1 month increase to 5 mg.  Possible side effects reviewed with patient.  - Restart Lipitor 20 mg daily and repeat labs in 3 months.    Lab Results   Component Value Date    HGBA1C 10.5 (H) 04/07/2025       Orders:  •  Diabetic foot exam; Future  •  gabapentin (NEURONTIN) 100 mg capsule; Take 1 capsule (100 mg total) by mouth daily at bedtime  •  metFORMIN (GLUCOPHAGE) 1000 MG tablet; Take 1 tablet (1,000 mg total) by mouth 2 (two) times a day with meals  •  insulin glargine (LANTUS) 100 units/mL subcutaneous injection; Inject 25 Units under the skin daily at bedtime  •  Empagliflozin (JARDIANCE) 10 MG TABS tablet; Take 1 tablet (10 mg total) by mouth daily  •  Continuous Glucose Sensor (FreeStyle Gautam 3 Sensor) MISC; Use 1 sensor each to be changed every 14 days  •  Mounjaro 2.5 MG/0.5ML SOAJ; Inject 2.5 mg under the skin once a week  •  Mounjaro 5 MG/0.5ML SOAJ; Inject 5 mg under the skin once a week Do not start before May 9, 2025.  •  Hemoglobin A1C; Future  •  Comprehensive metabolic panel; Future  •  Lipid Panel with Direct LDL reflex; Future  •  atorvastatin (LIPITOR) 20 mg tablet; Take 1 tablet (20 mg total) by mouth daily    Screening for cervical cancer    Orders:  •  Ambulatory Referral to Obstetrics / Gynecology;  Future    Mild episode of recurrent major depressive disorder (HCC)  Depression Screening Follow-up Plan: Patient's depression screening was positive with a PHQ-9 score of 9. Patient assessed for underlying major depression. They have no active suicidal ideations. Brief counseling provided and recommend additional follow-up/re-evaluation next office visit.    Patient has been off of her Celexa for quite some time.  Unfortunately, patient's Pappy recently passed away and she had an accident which has increased her depression.  She is also feeling anxious and worried.  She is having a lot of difficulty sleeping.  She is requesting a sleep aid but we will see how she does with restarting her Celexa first if no improvement will consider addition of a sleep aid at next appointment.  Possible side effects and when to return reviewed.  Orders:  •  citalopram (CeleXA) 10 mg tablet; Take 1 tablet (10 mg total) by mouth daily    Pain with bowel movements  Patient states for several months, when she tries to have a bowel movement, she gets pain in her left flank area.  States it is consistent with all of her bowel movements.  Does not have any pain in this area when she urinates.  No urgency, frequency or change in urine.  She states her bowel movements are normal and formed most of the time.  Denies any blood in the stools. Denies straining to have a bowel movement Will order CT abdomen and pelvis and follow-up with GI in the future if needed.    Orders:  •  CT abdomen pelvis w contrast; Future    Mild intermittent asthma with acute exacerbation    Orders:  •  fexofenadine (ALLEGRA) 180 MG tablet; Take 1 tablet (180 mg total) by mouth daily  •  montelukast (SINGULAIR) 10 mg tablet; Take 1 tablet (10 mg total) by mouth daily at bedtime    Neuropathy  States her symptoms have been well-controlled with the gabapentin.  Orders:  •  gabapentin (NEURONTIN) 100 mg capsule; Take 1 capsule (100 mg total) by mouth daily at  bedtime    Hypertension, unspecified type  Has been on and off this medication intermittently.  Blood pressure is well-controlled today at 134/72.  Orders:  •  lisinopril (ZESTRIL) 30 mg tablet; Take 1 tablet (30 mg total) by mouth daily    Gastritis  Continues to use the Zofran as needed but it is not often.  Orders:  •  ondansetron (ZOFRAN-ODT) 4 mg disintegrating tablet; Take 1 tablet (4 mg total) by mouth every 6 (six) hours as needed for nausea for up to 8 doses    Arthralgia, unspecified joint  Do not combine routine use of naproxen as she is on baby aspirin daily.  States she only takes it when she needs it.  Orders:  •  naproxen (Naprosyn) 500 mg tablet; Take 1 tablet (500 mg total) by mouth 2 (two) times a day as needed for mild pain    Screening for diabetic retinopathy    Orders:  •  IRIS Diabetic eye exam    Mixed hyperlipidemia    Orders:  •  atorvastatin (LIPITOR) 20 mg tablet; Take 1 tablet (20 mg total) by mouth daily    Anxiety    Orders:  •  citalopram (CeleXA) 10 mg tablet; Take 1 tablet (10 mg total) by mouth daily            Immunizations:  - Immunizations due: Influenza         History of Present Illness     Adult Annual Physical:  Patient presents for annual physical.     Diet and Physical Activity:  - Diet/Nutrition: frequent junk food.  - Exercise: no formal exercise.    Depression Screening:    - PHQ-9 Score: 9    General Health:  - Sleep: sleeps poorly.  - Hearing: normal hearing bilateral ears.  - Vision: wears glasses.  - Dental: regular dental visits. Just got a new dentist    /GYN Health:  - Follows with GYN: yes.   - Last menstrual cycle: 3/27/2025.   - History of STDs: no  - Contraception: tubal ligation.      Advanced Care Planning:  - Has an advanced directive?: no    - Has a durable medical POA?: no    - ACP document given to patient?: no      Review of Systems   Constitutional:  Negative for activity change, appetite change, diaphoresis, fatigue, fever and unexpected weight  "change.   HENT:  Negative for congestion, mouth sores, rhinorrhea, sinus pain, trouble swallowing and voice change.    Eyes:  Negative for photophobia and visual disturbance.   Respiratory:  Negative for apnea, cough, chest tightness, shortness of breath and wheezing.    Cardiovascular:  Negative for chest pain, palpitations and leg swelling.   Gastrointestinal:  Negative for abdominal distention, abdominal pain, blood in stool, constipation, diarrhea, nausea and vomiting.   Endocrine: Negative for cold intolerance, heat intolerance, polydipsia, polyphagia and polyuria.   Genitourinary:  Positive for flank pain. Negative for decreased urine volume, difficulty urinating, frequency and urgency.   Skin:  Negative for color change, rash and wound.   Neurological:  Negative for dizziness, weakness, light-headedness, numbness and headaches.   Hematological:  Negative for adenopathy. Does not bruise/bleed easily.   Psychiatric/Behavioral:  Positive for dysphoric mood and sleep disturbance. Negative for self-injury and suicidal ideas. The patient is nervous/anxious.          Objective   /72   Pulse 90   Temp 97.6 °F (36.4 °C)   Ht 5' 1\" (1.549 m)   Wt 105 kg (231 lb 3.2 oz)   LMP 03/27/2025   SpO2 97%   BMI 43.68 kg/m²     Physical Exam  Vitals and nursing note reviewed.   Constitutional:       General: She is not in acute distress.     Appearance: Normal appearance. She is well-developed and normal weight.   HENT:      Head: Normocephalic and atraumatic.      Right Ear: Tympanic membrane, ear canal and external ear normal. There is no impacted cerumen.      Left Ear: Tympanic membrane, ear canal and external ear normal. There is no impacted cerumen.      Nose: Nose normal. No congestion or rhinorrhea.      Mouth/Throat:      Mouth: Mucous membranes are moist.      Pharynx: Oropharynx is clear. No oropharyngeal exudate or posterior oropharyngeal erythema.   Eyes:      General: No scleral icterus.        Right " eye: No discharge.         Left eye: No discharge.      Extraocular Movements: Extraocular movements intact.      Conjunctiva/sclera: Conjunctivae normal.      Pupils: Pupils are equal, round, and reactive to light.   Cardiovascular:      Rate and Rhythm: Normal rate and regular rhythm.      Pulses: Normal pulses. no weak pulses.           Dorsalis pedis pulses are 2+ on the right side and 2+ on the left side.        Posterior tibial pulses are 2+ on the right side and 2+ on the left side.      Heart sounds: Normal heart sounds. No murmur heard.  Pulmonary:      Effort: Pulmonary effort is normal. No respiratory distress.      Breath sounds: Normal breath sounds. No wheezing or rales.   Abdominal:      General: Abdomen is flat. Bowel sounds are normal. There is no distension.      Palpations: Abdomen is soft.      Tenderness: There is no abdominal tenderness. There is no guarding.   Musculoskeletal:         General: Normal range of motion.      Cervical back: Normal range of motion and neck supple. No rigidity or tenderness.      Right lower leg: No edema.      Left lower leg: No edema.   Feet:      Right foot:      Skin integrity: Dry skin present. No ulcer, skin breakdown, erythema, warmth or callus.      Left foot:      Skin integrity: Dry skin present. No ulcer, skin breakdown, erythema, warmth or callus.   Lymphadenopathy:      Cervical: No cervical adenopathy.   Skin:     General: Skin is warm and dry.      Capillary Refill: Capillary refill takes less than 2 seconds.   Neurological:      General: No focal deficit present.      Mental Status: She is alert and oriented to person, place, and time. Mental status is at baseline.      Gait: Gait normal.   Psychiatric:         Mood and Affect: Mood normal.         Behavior: Behavior normal.         Thought Content: Thought content normal.         Judgment: Judgment normal.             Patient's shoes and socks removed.    Right Foot/Ankle   Right Foot  Inspection  Skin Exam: dry skin. Skin not intact, no warmth, no callus, no erythema, no maceration, no abnormal color, no pre-ulcer, no ulcer and no callus.     Toe Exam: ROM and strength within normal limits.     Sensory   Proprioception: intact  Monofilament testing: intact    Vascular  Capillary refills: < 3 seconds  The right DP pulse is 2+. The right PT pulse is 2+.     Left Foot/Ankle  Left Foot Inspection  Skin Exam: dry skin. Skin not intact, no warmth, no erythema, no maceration, normal color, no pre-ulcer, no ulcer and no callus.     Toe Exam: ROM and strength within normal limits.     Sensory   Proprioception: intact  Monofilament testing: intact    Vascular  Capillary refills: < 3 seconds  The left DP pulse is 2+. The left PT pulse is 2+.     Assign Risk Category  No deformity present  No loss of protective sensation  No weak pulses  Risk: 0

## 2025-04-09 NOTE — ASSESSMENT & PLAN NOTE
States her symptoms have been well-controlled with the gabapentin.  Orders:  •  gabapentin (NEURONTIN) 100 mg capsule; Take 1 capsule (100 mg total) by mouth daily at bedtime

## 2025-04-09 NOTE — TELEPHONE ENCOUNTER
PA for Mounjaro 2.5 MG/0.5ML SUBMITTED to [employer]    via    [x]CMM-KEY: [id number]  []Surescripts-Case ID #   []Availity-Auth ID # NDC [id number]   []Faxed to plan   []Other website   []Phone call Case ID #     []PA sent as URGENT    All office notes, labs and other pertaining documents and studies sent. Clinical questions answered. Awaiting determination from insurance company.     Turnaround time for your insurance to make a decision on your Prior Authorization can take 7-21 business days.

## 2025-04-10 DIAGNOSIS — E11.9 TYPE 2 DIABETES MELLITUS WITHOUT COMPLICATION, WITH LONG-TERM CURRENT USE OF INSULIN (HCC): Primary | ICD-10-CM

## 2025-04-10 DIAGNOSIS — Z79.4 TYPE 2 DIABETES MELLITUS WITHOUT COMPLICATION, WITH LONG-TERM CURRENT USE OF INSULIN (HCC): Primary | ICD-10-CM

## 2025-04-10 NOTE — TELEPHONE ENCOUNTER
PA for Mounjaro 2.5 MG/0.5ML DENIED    Reason:(Screenshot if applicable) - previously tried Trulicity, also needs to try Ozempic or Victoza        Message sent to office clinical pool Yes    Denial letter scanned into Media Yes    Appeal started No (Provider will need to decide if appeal is warranted and send clinical documentation to Prior Authorization Team for initiation.)    **Please follow up with your patient regarding denial and next steps**

## 2025-04-17 PROBLEM — B97.89: Status: RESOLVED | Noted: 2025-03-18 | Resolved: 2025-04-17

## 2025-04-17 PROBLEM — J22: Status: RESOLVED | Noted: 2025-03-18 | Resolved: 2025-04-17

## 2025-04-25 ENCOUNTER — HOSPITAL ENCOUNTER (EMERGENCY)
Facility: HOSPITAL | Age: 35
Discharge: HOME | End: 2025-04-25
Attending: EMERGENCY MEDICINE
Payer: COMMERCIAL

## 2025-04-25 VITALS
OXYGEN SATURATION: 95 % | SYSTOLIC BLOOD PRESSURE: 162 MMHG | RESPIRATION RATE: 16 BRPM | HEART RATE: 84 BPM | TEMPERATURE: 98.1 F | DIASTOLIC BLOOD PRESSURE: 102 MMHG

## 2025-04-25 DIAGNOSIS — L23.7 POISON IVY: Primary | ICD-10-CM

## 2025-04-25 PROCEDURE — 99282 EMERGENCY DEPT VISIT SF MDM: CPT

## 2025-04-25 PROCEDURE — 99284 EMERGENCY DEPT VISIT MOD MDM: CPT | Performed by: EMERGENCY MEDICINE

## 2025-04-25 RX ORDER — CLOBETASOL PROPIONATE 0.5 MG/G
CREAM TOPICAL 2 TIMES DAILY
Qty: 45 G | Refills: 0 | Status: SHIPPED | OUTPATIENT
Start: 2025-04-25

## 2025-04-25 RX ORDER — CLOBETASOL PROPIONATE 0.5 MG/G
CREAM TOPICAL ONCE
Status: COMPLETED | OUTPATIENT
Start: 2025-04-25 | End: 2025-04-25

## 2025-04-25 RX ORDER — DIPHENHYDRAMINE HCL 25 MG
25 TABLET ORAL ONCE
Status: COMPLETED | OUTPATIENT
Start: 2025-04-25 | End: 2025-04-25

## 2025-04-25 RX ADMIN — DIPHENHYDRAMINE HYDROCHLORIDE 25 MG: 25 TABLET ORAL at 18:32

## 2025-04-25 RX ADMIN — CLOBETASOL PROPIONATE CREAM USP, 0.05%: 0.5 CREAM TOPICAL at 18:41

## 2025-04-30 NOTE — ED PROVIDER NOTES
Time reflects when diagnosis was documented in both MDM as applicable and the Disposition within this note       Time User Action Codes Description Comment    4/25/2025  6:42 PM Manfred Blue Add [L23.7] Poison ivy           ED Disposition       ED Disposition   Discharge    Condition   Stable    Date/Time   Fri Apr 25, 2025  6:42 PM    Comment   Jaycee L Juan José discharge to home/self care.                   Assessment & Plan       Medical Decision Making  Patient is a 35-year-old female presents for evaluation of poison ivy dermatitis.  She is diabetic, will avoid oral steroids and instead prescribed topical steroids.  Advised PCP follow-up and strict return precautions.    Risk  OTC drugs.  Prescription drug management.             Medications   diphenhydrAMINE (BENADRYL) tablet 25 mg (25 mg Oral Given 4/25/25 1832)   clobetasol (TEMOVATE) 0.05 % cream ( Topical Given 4/25/25 1841)       ED Risk Strat Scores                    No data recorded        SBIRT 20yo+      Flowsheet Row Most Recent Value   Initial Alcohol Screen: US AUDIT-C     1. How often do you have a drink containing alcohol? 0 Filed at: 04/25/2025 1816   2. How many drinks containing alcohol do you have on a typical day you are drinking?  0 Filed at: 04/25/2025 1816   3b. FEMALE Any Age, or MALE 65+: How often do you have 4 or more drinks on one occassion? 0 Filed at: 04/25/2025 1816   Audit-C Score 0 Filed at: 04/25/2025 1816   JENNIFER: How many times in the past year have you...    Used an illegal drug or used a prescription medication for non-medical reasons? Never Filed at: 04/25/2025 1816                            History of Present Illness       Chief Complaint   Patient presents with    Rash     Poison ivy exposure       Past Medical History:   Diagnosis Date    Allergic     Arthritis     Asthma     Bipolar affective disorder, currently depressed, moderate (HCC) 12/17/2018    Cyst of ovary, right     Depression     Diabetes mellitus (HCC)  10/10/2018    type 2    Endometriosis     Fractures 24    My right hand    Heart murmur 1988    Hepatitis C     Hepatitis C virus infection cured after antiviral drug therapy     History of transfusion     Hypertension     Infectious viral hepatitis 01    Migraines     Morbid obesity with BMI of 40.0-44.9, adult (HCC)     Obesity     Pulmonary artery congenital abnormality     Spleen enlarged     Status post surgical removal of both fallopian tubes     Varicella       Past Surgical History:   Procedure Laterality Date    CARDIAC CATHETERIZATION      no CAD 10days, 4 weeks 22months old     CARDIAC CATHETERIZATION N/A 2023    Procedure: Cardiac Coronary Angiogram;  Surgeon: Marcela Lr DO;  Location: [facility];  Service: Cardiology    CARDIAC CATHETERIZATION Left 2023    Procedure: Cardiac Left Heart Cath;  Surgeon: Marcela Lr DO;  Location: [facility];  Service: Cardiology    CARDIAC CATHETERIZATION  2023    Procedure: Cardiac catheterization;  Surgeon: Marcela Lr DO;  Location: [facility];  Service: Cardiology     SECTION, LOW TRANSVERSE      CHOLECYSTECTOMY      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 5    CORONARY STENT PLACEMENT      IR LOWER EXTREMITY ANGIOGRAM  2023    LIVER BIOPSY      LIVER BIOPSY      PERIPHERAL ANGIOGRAM  23    STERNAL WIRE REMOVAL  2022    THROMBECTOMY W/ EMBOLECTOMY Right 2023    Procedure: Right femoral exposure Right iliac embolectomy w/ #4 Ted catheter Aortogram Right EIA stent w/ 7x29mm VBX;  Surgeon: Jody Hodges MD;  Location: [facility];  Service: Vascular    TUBAL LIGATION Bilateral 2020    VSD REPAIR      As a child    WOUND DEBRIDEMENT Right 2023    Procedure: Right Groin Wound Washout, Pulse Lavage, Wound Vac placement;  Surgeon: Jelani Avila DO;  Location: [facility];  Service: Vascular      Family History   Problem Relation Age of  Onset    Hypertension Mother     Migraines Mother     JENNY disease Mother     Depression Mother     Hyperlipidemia Mother     Diabetes Mother     Diabetes Father     Hypertension Father     Kidney failure Father     Heart attack Father     Arthritis Father     Stroke Father     Glaucoma Father     Kidney disease Father     Polycystic kidney disease Paternal Grandmother     Stroke Paternal Grandmother     Heart disease Paternal Grandmother     Kidney disease Paternal Grandmother     Arthritis Sister     Asthma Sister     Thyroid disease Sister     Diabetes Sister     Arthritis Maternal Grandmother     Breast cancer Maternal Grandmother     Diabetes Maternal Grandmother     Hypertension Maternal Grandmother     Heart Valve Disease Maternal Grandmother     Cancer Maternal Grandmother         Skin cancer    Learning disabilities Cousin     Learning disabilities Sister     Diabetes Sister     ADD / ADHD Cousin     Lung cancer Brother     Cancer Brother         Lung cancer    Diabetes Maternal Grandfather     Hypertension Maternal Grandfather     JENNY disease Maternal Grandfather     Stroke Maternal Grandfather     Cancer Maternal Grandfather         Skin cancer    Cancer Paternal Uncle         Skin cancer    Aneurysm Maternal Aunt       Social History     Tobacco Use    Smoking status: Former     Current packs/day: 0.00     Average packs/day: 0.5 packs/day for 10.0 years (5.0 ttl pk-yrs)     Types: Cigarettes     Start date: 2013     Quit date: 2023     Years since quittin.2    Smokeless tobacco: Never   Vaping Use    Vaping status: Never Used   Substance Use Topics    Alcohol use: Yes     Alcohol/week: 3.0 standard drinks of alcohol     Types: 3 Standard drinks or equivalent per week     Comment: I drink socially    Drug use: Not Currently     Comment: hx of THC use for migraines      E-Cigarette/Vaping    E-Cigarette Use Never User       E-Cigarette/Vaping Substances    Nicotine No     THC No     CBD No      Flavoring No       I have reviewed and agree with the history as documented.     Patient is a 35-year-old female that presents for evaluation of possible poison ivy.  She has a rash over her bilateral lower extremities and upper extremities.  She thinks she was exposed to poison ivy.  It is pruritic in nature.  No recent medication changes or new exposures otherwise.  She denies difficulty breathing or difficulty swallowing.  No history of anaphylaxis.        Review of Systems   Constitutional:  Negative for fever.   HENT:  Negative for sore throat.    Respiratory:  Negative for shortness of breath.    Cardiovascular:  Negative for chest pain.   Gastrointestinal:  Negative for abdominal pain.   Genitourinary:  Negative for dysuria.   Musculoskeletal:  Negative for back pain.   Skin:  Positive for rash.   Neurological:  Negative for light-headedness.   Psychiatric/Behavioral:  Negative for agitation.    All other systems reviewed and are negative.          Objective       ED Triage Vitals [04/25/25 1815]   Temperature Pulse Blood Pressure Respirations SpO2 Patient Position - Orthostatic VS   98.1 °F (36.7 °C) 84 (!) 162/102 16 95 % Sitting      Temp Source Heart Rate Source BP Location FiO2 (%) Pain Score    Temporal Monitor Left arm -- No Pain      Vitals      Date and Time Temp Pulse SpO2 Resp BP Pain Score FACES Pain Rating User   04/25/25 1815 98.1 °F (36.7 °C) 84 95 % 16 162/102 No Pain -- SG            Physical Exam  Vitals reviewed.   Constitutional:       General: She is not in acute distress.     Appearance: She is well-developed.   HENT:      Head: Normocephalic.   Eyes:      Pupils: Pupils are equal, round, and reactive to light.   Cardiovascular:      Rate and Rhythm: Normal rate and regular rhythm.      Heart sounds: Normal heart sounds.   Pulmonary:      Effort: Pulmonary effort is normal.      Breath sounds: Normal breath sounds.   Abdominal:      General: Bowel sounds are normal. There is no  distension.      Palpations: Abdomen is soft.      Tenderness: There is no abdominal tenderness. There is no guarding.   Musculoskeletal:         General: No tenderness or deformity. Normal range of motion.      Cervical back: Normal range of motion and neck supple.   Skin:     Capillary Refill: Capillary refill takes less than 2 seconds.      Findings: Rash present.      Comments: Patient with rash consistent with contact dermatitis over all 4 extremities.  Nikolsky sign negative.   Neurological:      Mental Status: She is alert and oriented to person, place, and time.      Cranial Nerves: No cranial nerve deficit.      Sensory: No sensory deficit.   Psychiatric:         Behavior: Behavior normal.         Thought Content: Thought content normal.         Judgment: Judgment normal.         Results Reviewed       None            No orders to display       Procedures    ED Medication and Procedure Management   Prior to Admission Medications   Prescriptions Last Dose Informant Patient Reported? Taking?   ALPRAZolam (XANAX) 0.5 mg tablet   No No   Sig: Take 1 tablet (0.5 mg total) by mouth daily at bedtime as needed for anxiety   Aspirin Low Dose 81 MG EC tablet   No No   Sig: TAKE 1 TABLET BY MOUTH DAILY. DO NOT START BEFORE FEBRYARY 1, 2023   Blood Glucose Monitoring Suppl (OneTouch Verio) w/Device KIT  Self No No   Sig: Use daily   Blood Pressure Monitoring (B-D ASSURE BPM/AUTO WRIST CUFF) MISC  Self No No   Sig: Check blood pressure prior to each OB visit, or as directed by your physician.   Continuous Blood Gluc  (FreeStyle Gautam 14 Day [place]) KVNG   No No   Sig: Use with gautam sensor   Continuous Glucose Sensor (FreeStyle Gautam 3 Sensor) MISC   No No   Sig: Use 1 sensor each to be changed every 14 days   Empagliflozin (JARDIANCE) 10 MG TABS tablet   No No   Sig: Take 1 tablet (10 mg total) by mouth daily   Insulin Pen Needle (B-D UF III MINI PEN NEEDLES) 31G X 5 MM MISC   No No   Sig: Inject under the skin  daily at bedtime   Multiple Vitamin (MULTI VITAMIN DAILY PO)  Self Yes No   Sig: Take by mouth   acetaminophen (TYLENOL) 500 mg tablet  Self Yes No   Sig: Take 1,000 mg by mouth   albuterol (2.5 mg/3 mL) 0.083 % nebulizer solution   No No   Sig: Take 3 mL (2.5 mg total) by nebulization every 6 (six) hours as needed for wheezing or shortness of breath   albuterol (Ventolin HFA) 90 mcg/act inhaler   No No   Sig: Inhale 2 puffs every 4 (four) hours as needed for wheezing or shortness of breath   atorvastatin (LIPITOR) 20 mg tablet   No No   Sig: Take 1 tablet (20 mg total) by mouth daily   azelastine (ASTELIN) 0.1 % nasal spray   No No   Si spray into each nostril 2 (two) times a day Use in each nostril as directed   citalopram (CeleXA) 10 mg tablet   No No   Sig: Take 1 tablet (10 mg total) by mouth daily   cyclobenzaprine (FLEXERIL) 5 mg tablet   No No   Sig: Take 1 tablet (5 mg total) by mouth 3 (three) times a day as needed for muscle spasms for up to 12 doses   fexofenadine (ALLEGRA) 180 MG tablet   No No   Sig: Take 1 tablet (180 mg total) by mouth daily   gabapentin (NEURONTIN) 100 mg capsule   No No   Sig: Take 1 capsule (100 mg total) by mouth daily at bedtime   insulin glargine (LANTUS) 100 units/mL subcutaneous injection   No No   Sig: Inject 25 Units under the skin daily at bedtime   lisinopril (ZESTRIL) 30 mg tablet   No No   Sig: Take 1 tablet (30 mg total) by mouth daily   metFORMIN (GLUCOPHAGE) 1000 MG tablet   No No   Sig: Take 1 tablet (1,000 mg total) by mouth 2 (two) times a day with meals   montelukast (SINGULAIR) 10 mg tablet   No No   Sig: Take 1 tablet (10 mg total) by mouth daily at bedtime   naproxen (Naprosyn) 500 mg tablet   No No   Sig: Take 1 tablet (500 mg total) by mouth 2 (two) times a day as needed for mild pain   ondansetron (ZOFRAN-ODT) 4 mg disintegrating tablet   No No   Sig: Take 1 tablet (4 mg total) by mouth every 6 (six) hours as needed for nausea for up to 8 doses    semaglutide, 0.25 or 0.5 mg/dose, (Ozempic, 0.25 or 0.5 MG/DOSE,) 2 mg/3 mL injection pen   No No   Si.25 mg under the skin every 7 days for 4 doses (28 days), THEN 0.5 mg under the skin every 7 days   sucralfate (CARAFATE) 1 g tablet   No No   Sig: Take 1 tablet (1 g total) by mouth 4 (four) times a day      Facility-Administered Medications: None     Discharge Medication List as of 2025  6:58 PM        START taking these medications    Details   clobetasol (TEMOVATE) 0.05 % cream Apply topically 2 (two) times a day, Starting 2025, Normal           CONTINUE these medications which have NOT CHANGED    Details   acetaminophen (TYLENOL) 500 mg tablet Take 1,000 mg by mouth, Starting 2020, Historical Med      albuterol (2.5 mg/3 mL) 0.083 % nebulizer solution Take 3 mL (2.5 mg total) by nebulization every 6 (six) hours as needed for wheezing or shortness of breath, Starting Tue 3/18/2025, Normal      albuterol (Ventolin HFA) 90 mcg/act inhaler Inhale 2 puffs every 4 (four) hours as needed for wheezing or shortness of breath, Starting Mon 3/3/2025, Normal      ALPRAZolam (XANAX) 0.5 mg tablet Take 1 tablet (0.5 mg total) by mouth daily at bedtime as needed for anxiety, Starting Mon 3/3/2025, Normal      Aspirin Low Dose 81 MG EC tablet TAKE 1 TABLET BY MOUTH DAILY. DO NOT START BEFORE 2023, Normal      atorvastatin (LIPITOR) 20 mg tablet Take 1 tablet (20 mg total) by mouth daily, Starting 2025, Normal      azelastine (ASTELIN) 0.1 % nasal spray 1 spray into each nostril 2 (two) times a day Use in each nostril as directed, Starting Mon 3/3/2025, Normal      Blood Glucose Monitoring Suppl (OneTouch Verio) w/Device KIT Use daily, Starting 2021, Normal      Blood Pressure Monitoring (B-D ASSURE BPM/AUTO WRIST CUFF) MISC Check blood pressure prior to each OB visit, or as directed by your physician., Normal      citalopram (CeleXA) 10 mg tablet Take 1 tablet (10 mg  total) by mouth daily, Starting Wed 4/9/2025, Normal      Continuous Blood Gluc  (FreeStyle Gautam 14 Day [place]) KVNG Use with gautam sensor, Normal      Continuous Glucose Sensor (FreeStyle Gautam 3 Sensor) MISC Use 1 sensor each to be changed every 14 days, Normal      cyclobenzaprine (FLEXERIL) 5 mg tablet Take 1 tablet (5 mg total) by mouth 3 (three) times a day as needed for muscle spasms for up to 12 doses, Starting Mon 3/3/2025, Normal      Empagliflozin (JARDIANCE) 10 MG TABS tablet Take 1 tablet (10 mg total) by mouth daily, Starting Wed 4/9/2025, Until Sat 4/4/2026, Normal      fexofenadine (ALLEGRA) 180 MG tablet Take 1 tablet (180 mg total) by mouth daily, Starting Wed 4/9/2025, Normal      gabapentin (NEURONTIN) 100 mg capsule Take 1 capsule (100 mg total) by mouth daily at bedtime, Starting Wed 4/9/2025, Normal      insulin glargine (LANTUS) 100 units/mL subcutaneous injection Inject 25 Units under the skin daily at bedtime, Starting Wed 4/9/2025, Until Fri 5/9/2025, Normal      Insulin Pen Needle (B-D UF III MINI PEN NEEDLES) 31G X 5 MM MISC Inject under the skin daily at bedtime, Starting Fri 2/28/2025, Normal      lisinopril (ZESTRIL) 30 mg tablet Take 1 tablet (30 mg total) by mouth daily, Starting Wed 4/9/2025, Normal      metFORMIN (GLUCOPHAGE) 1000 MG tablet Take 1 tablet (1,000 mg total) by mouth 2 (two) times a day with meals, Starting Wed 4/9/2025, Normal      montelukast (SINGULAIR) 10 mg tablet Take 1 tablet (10 mg total) by mouth daily at bedtime, Starting Wed 4/9/2025, Normal      Multiple Vitamin (MULTI VITAMIN DAILY PO) Take by mouth, Historical Med      naproxen (Naprosyn) 500 mg tablet Take 1 tablet (500 mg total) by mouth 2 (two) times a day as needed for mild pain, Starting Wed 4/9/2025, Normal      ondansetron (ZOFRAN-ODT) 4 mg disintegrating tablet Take 1 tablet (4 mg total) by mouth every 6 (six) hours as needed for nausea for up to 8 doses, Starting Wed 4/9/2025, Normal       semaglutide, 0.25 or 0.5 mg/dose, (Ozempic, 0.25 or 0.5 MG/DOSE,) 2 mg/3 mL injection pen 0.25 mg under the skin every 7 days for 4 doses (28 days), THEN 0.5 mg under the skin every 7 days, Normal      sucralfate (CARAFATE) 1 g tablet Take 1 tablet (1 g total) by mouth 4 (four) times a day, Starting Sat 3/9/2024, Normal           No discharge procedures on file.  ED SEPSIS DOCUMENTATION   Time reflects when diagnosis was documented in both MDM as applicable and the Disposition within this note       Time User Action Codes Description Comment    4/25/2025  6:42 PM Manfred Blue Add [L23.7] Poison beau Blue MD  04/30/25 0766

## 2025-05-23 ENCOUNTER — OFFICE VISIT (OUTPATIENT)
Dept: FAMILY MEDICINE CLINIC | Facility: CLINIC | Age: 35
End: 2025-05-23
Payer: COMMERCIAL

## 2025-05-23 VITALS
BODY MASS INDEX: 42.48 KG/M2 | TEMPERATURE: 97 F | WEIGHT: 225 LBS | HEART RATE: 98 BPM | OXYGEN SATURATION: 98 % | DIASTOLIC BLOOD PRESSURE: 86 MMHG | HEIGHT: 61 IN | SYSTOLIC BLOOD PRESSURE: 122 MMHG

## 2025-05-23 DIAGNOSIS — F33.0 MILD EPISODE OF RECURRENT MAJOR DEPRESSIVE DISORDER (HCC): ICD-10-CM

## 2025-05-23 DIAGNOSIS — F41.9 ANXIETY: ICD-10-CM

## 2025-05-23 DIAGNOSIS — Z79.4 TYPE 2 DIABETES MELLITUS WITHOUT COMPLICATION, WITH LONG-TERM CURRENT USE OF INSULIN (HCC): Primary | ICD-10-CM

## 2025-05-23 DIAGNOSIS — Z23 ENCOUNTER FOR IMMUNIZATION: ICD-10-CM

## 2025-05-23 DIAGNOSIS — E11.9 TYPE 2 DIABETES MELLITUS WITHOUT COMPLICATION, WITH LONG-TERM CURRENT USE OF INSULIN (HCC): Primary | ICD-10-CM

## 2025-05-23 LAB
LEFT EYE DIABETIC RETINOPATHY: NORMAL
LEFT EYE IMAGE QUALITY: NORMAL
LEFT EYE MACULAR EDEMA: NORMAL
LEFT EYE OTHER RETINOPATHY: NORMAL
RIGHT EYE DIABETIC RETINOPATHY: NORMAL
RIGHT EYE IMAGE QUALITY: NORMAL
RIGHT EYE MACULAR EDEMA: NORMAL
RIGHT EYE OTHER RETINOPATHY: NORMAL
SEVERITY (EYE EXAM): NORMAL

## 2025-05-23 PROCEDURE — 99214 OFFICE O/P EST MOD 30 MIN: CPT | Performed by: NURSE PRACTITIONER

## 2025-05-23 PROCEDURE — 90651 9VHPV VACCINE 2/3 DOSE IM: CPT

## 2025-05-23 PROCEDURE — 90471 IMMUNIZATION ADMIN: CPT

## 2025-05-23 NOTE — ASSESSMENT & PLAN NOTE
Depression Screening Follow-up Plan: Patient's depression screening was positive with a PHQ-9 score of 9. Patient assessed for underlying major depression. They have no active suicidal ideations. Brief counseling provided and recommend additional follow-up/re-evaluation next office visit.    States she has noticed an improvement in her depression and anxiety symptoms.  They do not happen often like they were before, maybe a few days a week and when they happen they are not as severe. Does not want to adjust medication at this time.

## 2025-05-23 NOTE — ASSESSMENT & PLAN NOTE
Lab Results   Component Value Date    HGBA1C 10.5 (H) 04/07/2025   Patient is on her 6th week of the Ozempic. She is having vomiting the day after taking but eating lots of junk foods, fast foods. Counseled on importance of low fat, high protein, high fiber diet. If  this does not help with side effects call office.     Orders:    [facility] Diabetic eye exam

## 2025-05-23 NOTE — PROGRESS NOTES
Name: Jaycee Can      : 1990      MRN: [id number]  Encounter Provider: REJI Ferrell  Encounter Date: 2025   Encounter department: [facility]  :  Assessment & Plan  Type 2 diabetes mellitus without complication, with long-term current use of insulin ([facility])    Lab Results   Component Value Date    HGBA1C 10.5 (H) 2025   Patient is on her 6th week of the Ozempic. She is having vomiting the day after taking but eating lots of junk foods, fast foods. Counseled on importance of low fat, high protein, high fiber diet. If  this does not help with side effects call office.     Orders:    [facility] Diabetic eye exam    Encounter for immunization  Had a tubal        Mild episode of recurrent major depressive disorder (HCC)  Depression Screening Follow-up Plan: Patient's depression screening was positive with a PHQ-9 score of 9. Patient assessed for underlying major depression. They have no active suicidal ideations. Brief counseling provided and recommend additional follow-up/re-evaluation next office visit.    States she has noticed an improvement in her depression and anxiety symptoms.  They do not happen often like they were before, maybe a few days a week and when they happen they are not as severe. Does not want to adjust medication at this time.        Anxiety                History of Present Illness   HPI  Review of Systems   Constitutional:  Negative for chills and fever.   HENT:  Negative for ear pain and sore throat.    Eyes:  Negative for pain and visual disturbance.   Respiratory:  Negative for cough and shortness of breath.    Cardiovascular:  Negative for chest pain and palpitations.   Gastrointestinal:  Negative for abdominal pain, constipation, diarrhea, nausea and vomiting.   Genitourinary:  Negative for dysuria and hematuria.   Musculoskeletal:  Negative for arthralgias and back pain.   Skin:  Negative for color change and rash.   Neurological:   "Negative for seizures and syncope.   Psychiatric/Behavioral:  Negative for dysphoric mood, self-injury, sleep disturbance and suicidal ideas. The patient is not nervous/anxious.    All other systems reviewed and are negative.      Objective   /86   Pulse 98   Temp (!) 97 °F (36.1 °C)   Ht 5' 1\" (1.549 m)   Wt 102 kg (225 lb)   SpO2 98%   BMI 42.51 kg/m²      Physical Exam  Vitals and nursing note reviewed.   Constitutional:       General: She is not in acute distress.     Appearance: She is well-developed.   HENT:      Head: Normocephalic and atraumatic.     Cardiovascular:      Rate and Rhythm: Normal rate.      Pulses: Normal pulses.   Pulmonary:      Effort: No respiratory distress.     Skin:     General: Skin is warm and dry.     Neurological:      Mental Status: She is alert.     Psychiatric:         Mood and Affect: Mood normal.         Behavior: Behavior normal.         Thought Content: Thought content normal.         Judgment: Judgment normal.         "

## 2025-05-27 ENCOUNTER — RESULTS FOLLOW-UP (OUTPATIENT)
Dept: FAMILY MEDICINE CLINIC | Facility: CLINIC | Age: 35
End: 2025-05-27

## 2025-06-18 NOTE — TELEPHONE ENCOUNTER
Patients wife called back and scheduled an appointment to be seen by Dr. Riggins.    Please see below

## 2025-06-30 DIAGNOSIS — J45.21 ASTHMATIC BRONCHITIS, MILD INTERMITTENT, WITH ACUTE EXACERBATION: ICD-10-CM

## 2025-06-30 DIAGNOSIS — Z79.4 TYPE 2 DIABETES MELLITUS WITH HYPERGLYCEMIA, WITH LONG-TERM CURRENT USE OF INSULIN (HCC): ICD-10-CM

## 2025-06-30 DIAGNOSIS — F41.9 ANXIETY: ICD-10-CM

## 2025-06-30 DIAGNOSIS — S13.9XXA NECK SPRAIN, INITIAL ENCOUNTER: ICD-10-CM

## 2025-06-30 DIAGNOSIS — M25.50 ARTHRALGIA, UNSPECIFIED JOINT: ICD-10-CM

## 2025-06-30 DIAGNOSIS — E78.2 MIXED HYPERLIPIDEMIA: ICD-10-CM

## 2025-06-30 DIAGNOSIS — J22 VIRAL LOWER RESPIRATORY TRACT INFECTION: ICD-10-CM

## 2025-06-30 DIAGNOSIS — E11.65 TYPE 2 DIABETES MELLITUS WITH HYPERGLYCEMIA, WITH LONG-TERM CURRENT USE OF INSULIN (HCC): ICD-10-CM

## 2025-06-30 DIAGNOSIS — R06.00 DYSPNEA, UNSPECIFIED TYPE: ICD-10-CM

## 2025-06-30 DIAGNOSIS — J45.21 MILD INTERMITTENT ASTHMA WITH ACUTE EXACERBATION: ICD-10-CM

## 2025-06-30 DIAGNOSIS — K29.70 GASTRITIS: ICD-10-CM

## 2025-06-30 DIAGNOSIS — F33.0 MILD EPISODE OF RECURRENT MAJOR DEPRESSIVE DISORDER (HCC): ICD-10-CM

## 2025-06-30 DIAGNOSIS — G62.9 NEUROPATHY: ICD-10-CM

## 2025-06-30 DIAGNOSIS — F32.89 OTHER DEPRESSION: ICD-10-CM

## 2025-06-30 DIAGNOSIS — B97.89 VIRAL LOWER RESPIRATORY TRACT INFECTION: ICD-10-CM

## 2025-07-01 ENCOUNTER — HOSPITAL ENCOUNTER (INPATIENT)
Facility: HOSPITAL | Age: 35
LOS: 2 days | DRG: 045 | End: 2025-07-03
Attending: EMERGENCY MEDICINE | Admitting: HOSPITALIST
Payer: COMMERCIAL

## 2025-07-01 ENCOUNTER — APPOINTMENT (EMERGENCY)
Dept: CT IMAGING | Facility: HOSPITAL | Age: 35
DRG: 045 | End: 2025-07-01
Payer: COMMERCIAL

## 2025-07-01 ENCOUNTER — OFFICE VISIT (OUTPATIENT)
Dept: FAMILY MEDICINE CLINIC | Facility: CLINIC | Age: 35
End: 2025-07-01
Payer: COMMERCIAL

## 2025-07-01 VITALS
OXYGEN SATURATION: 97 % | HEART RATE: 99 BPM | HEIGHT: 61 IN | DIASTOLIC BLOOD PRESSURE: 84 MMHG | SYSTOLIC BLOOD PRESSURE: 122 MMHG | TEMPERATURE: 96.9 F | BODY MASS INDEX: 40.17 KG/M2 | WEIGHT: 212.8 LBS

## 2025-07-01 DIAGNOSIS — I63.9 CEREBELLAR STROKE, ACUTE (HCC): ICD-10-CM

## 2025-07-01 DIAGNOSIS — R42 VERTIGO: ICD-10-CM

## 2025-07-01 DIAGNOSIS — I63.9 ISCHEMIC STROKE (HCC): Primary | ICD-10-CM

## 2025-07-01 DIAGNOSIS — R51.9 RIGHT-SIDED HEADACHE: ICD-10-CM

## 2025-07-01 DIAGNOSIS — R51.9 ACUTE NONINTRACTABLE HEADACHE, UNSPECIFIED HEADACHE TYPE: Primary | ICD-10-CM

## 2025-07-01 PROBLEM — E78.00 HYPERCHOLESTEREMIA: Status: RESOLVED | Noted: 2019-05-02 | Resolved: 2025-07-01

## 2025-07-01 PROBLEM — R07.9 CHEST PAIN: Status: RESOLVED | Noted: 2023-01-10 | Resolved: 2025-07-01

## 2025-07-01 PROBLEM — R79.89 ELEVATED TROPONIN I LEVEL: Status: RESOLVED | Noted: 2023-01-10 | Resolved: 2025-07-01

## 2025-07-01 PROBLEM — K21.9 GERD (GASTROESOPHAGEAL REFLUX DISEASE): Status: ACTIVE | Noted: 2020-04-30

## 2025-07-01 PROBLEM — E55.9 VITAMIN D DEFICIENCY: Status: RESOLVED | Noted: 2019-12-12 | Resolved: 2025-07-01

## 2025-07-01 LAB
ALBUMIN SERPL BCG-MCNC: 4 G/DL (ref 3.5–5)
ALP SERPL-CCNC: 69 U/L (ref 34–104)
ALT SERPL W P-5'-P-CCNC: 20 U/L (ref 7–52)
ANION GAP SERPL CALCULATED.3IONS-SCNC: 7 MMOL/L (ref 4–13)
AST SERPL W P-5'-P-CCNC: 19 U/L (ref 13–39)
BASOPHILS # BLD AUTO: 0.03 THOUSANDS/ÂΜL (ref 0–0.1)
BASOPHILS NFR BLD AUTO: 0 % (ref 0–1)
BILIRUB SERPL-MCNC: 0.4 MG/DL (ref 0.2–1)
BUN SERPL-MCNC: 14 MG/DL (ref 5–25)
CALCIUM SERPL-MCNC: 9.3 MG/DL (ref 8.4–10.2)
CHLORIDE SERPL-SCNC: 101 MMOL/L (ref 96–108)
CO2 SERPL-SCNC: 27 MMOL/L (ref 21–32)
CREAT SERPL-MCNC: 0.78 MG/DL (ref 0.6–1.3)
CRP SERPL QL: 13.3 MG/L
EOSINOPHIL # BLD AUTO: 0.14 THOUSAND/ÂΜL (ref 0–0.61)
EOSINOPHIL NFR BLD AUTO: 2 % (ref 0–6)
ERYTHROCYTE [DISTWIDTH] IN BLOOD BY AUTOMATED COUNT: 15.2 % (ref 11.6–15.1)
ERYTHROCYTE [SEDIMENTATION RATE] IN BLOOD: 27 MM/HOUR (ref 0–19)
GFR SERPL CREATININE-BSD FRML MDRD: 97 ML/MIN/1.73SQ M
GLUCOSE SERPL-MCNC: 203 MG/DL (ref 65–140)
HCT VFR BLD AUTO: 44.7 % (ref 34.8–46.1)
HGB BLD-MCNC: 14.3 G/DL (ref 11.5–15.4)
IMM GRANULOCYTES # BLD AUTO: 0.03 THOUSAND/UL (ref 0–0.2)
IMM GRANULOCYTES NFR BLD AUTO: 0 % (ref 0–2)
LYMPHOCYTES # BLD AUTO: 3.08 THOUSANDS/ÂΜL (ref 0.6–4.47)
LYMPHOCYTES NFR BLD AUTO: 32 % (ref 14–44)
MCH RBC QN AUTO: 25.6 PG (ref 26.8–34.3)
MCHC RBC AUTO-ENTMCNC: 32 G/DL (ref 31.4–37.4)
MCV RBC AUTO: 80 FL (ref 82–98)
MONOCYTES # BLD AUTO: 0.66 THOUSAND/ÂΜL (ref 0.17–1.22)
MONOCYTES NFR BLD AUTO: 7 % (ref 4–12)
NEUTROPHILS # BLD AUTO: 5.68 THOUSANDS/ÂΜL (ref 1.85–7.62)
NEUTS SEG NFR BLD AUTO: 59 % (ref 43–75)
NRBC BLD AUTO-RTO: 0 /100 WBCS
PLATELET # BLD AUTO: 242 THOUSANDS/UL (ref 149–390)
PMV BLD AUTO: 11.1 FL (ref 8.9–12.7)
POTASSIUM SERPL-SCNC: 4.3 MMOL/L (ref 3.5–5.3)
PROT SERPL-MCNC: 7 G/DL (ref 6.4–8.4)
RBC # BLD AUTO: 5.58 MILLION/UL (ref 3.81–5.12)
SODIUM SERPL-SCNC: 135 MMOL/L (ref 135–147)
WBC # BLD AUTO: 9.62 THOUSAND/UL (ref 4.31–10.16)

## 2025-07-01 PROCEDURE — 99285 EMERGENCY DEPT VISIT HI MDM: CPT | Performed by: EMERGENCY MEDICINE

## 2025-07-01 PROCEDURE — 70496 CT ANGIOGRAPHY HEAD: CPT

## 2025-07-01 PROCEDURE — 85025 COMPLETE CBC W/AUTO DIFF WBC: CPT

## 2025-07-01 PROCEDURE — 80053 COMPREHEN METABOLIC PANEL: CPT

## 2025-07-01 PROCEDURE — 86140 C-REACTIVE PROTEIN: CPT | Performed by: EMERGENCY MEDICINE

## 2025-07-01 PROCEDURE — 96365 THER/PROPH/DIAG IV INF INIT: CPT

## 2025-07-01 PROCEDURE — 36415 COLL VENOUS BLD VENIPUNCTURE: CPT

## 2025-07-01 PROCEDURE — 96366 THER/PROPH/DIAG IV INF ADDON: CPT

## 2025-07-01 PROCEDURE — 96375 TX/PRO/DX INJ NEW DRUG ADDON: CPT

## 2025-07-01 PROCEDURE — 96368 THER/DIAG CONCURRENT INF: CPT

## 2025-07-01 PROCEDURE — 70498 CT ANGIOGRAPHY NECK: CPT

## 2025-07-01 PROCEDURE — 96372 THER/PROPH/DIAG INJ SC/IM: CPT

## 2025-07-01 PROCEDURE — 99285 EMERGENCY DEPT VISIT HI MDM: CPT

## 2025-07-01 PROCEDURE — 99213 OFFICE O/P EST LOW 20 MIN: CPT

## 2025-07-01 PROCEDURE — 85652 RBC SED RATE AUTOMATED: CPT

## 2025-07-01 PROCEDURE — 93005 ELECTROCARDIOGRAM TRACING: CPT

## 2025-07-01 RX ORDER — INSULIN GLARGINE 100 [IU]/ML
25 INJECTION, SOLUTION SUBCUTANEOUS
Qty: 10 ML | Refills: 3 | Status: SHIPPED | OUTPATIENT
Start: 2025-07-01 | End: 2025-12-08

## 2025-07-01 RX ORDER — ALBUTEROL SULFATE 90 UG/1
2 INHALANT RESPIRATORY (INHALATION) EVERY 4 HOURS PRN
Qty: 18 G | Refills: 2 | Status: SHIPPED | OUTPATIENT
Start: 2025-07-01

## 2025-07-01 RX ORDER — ONDANSETRON 4 MG/1
4 TABLET, ORALLY DISINTEGRATING ORAL EVERY 6 HOURS PRN
Qty: 8 TABLET | Refills: 0 | Status: SHIPPED | OUTPATIENT
Start: 2025-07-01

## 2025-07-01 RX ORDER — NAPROXEN 500 MG/1
500 TABLET ORAL 2 TIMES DAILY PRN
Qty: 180 TABLET | Refills: 1 | Status: SHIPPED | OUTPATIENT
Start: 2025-07-01 | End: 2025-07-08

## 2025-07-01 RX ORDER — ONDANSETRON 2 MG/ML
4 INJECTION INTRAMUSCULAR; INTRAVENOUS EVERY 6 HOURS PRN
Status: DISCONTINUED | OUTPATIENT
Start: 2025-07-01 | End: 2025-07-03

## 2025-07-01 RX ORDER — ATORVASTATIN CALCIUM 20 MG/1
20 TABLET, FILM COATED ORAL DAILY
Qty: 100 TABLET | Refills: 1 | Status: SHIPPED | OUTPATIENT
Start: 2025-07-01 | End: 2025-07-08

## 2025-07-01 RX ORDER — HEPARIN SODIUM 5000 [USP'U]/ML
5000 INJECTION, SOLUTION INTRAVENOUS; SUBCUTANEOUS EVERY 8 HOURS SCHEDULED
Status: DISCONTINUED | OUTPATIENT
Start: 2025-07-01 | End: 2025-07-03 | Stop reason: HOSPADM

## 2025-07-01 RX ORDER — GABAPENTIN 100 MG/1
100 CAPSULE ORAL
Status: DISCONTINUED | OUTPATIENT
Start: 2025-07-01 | End: 2025-07-03 | Stop reason: HOSPADM

## 2025-07-01 RX ORDER — ALPRAZOLAM 0.5 MG
0.5 TABLET ORAL
Status: DISCONTINUED | OUTPATIENT
Start: 2025-07-01 | End: 2025-07-02

## 2025-07-01 RX ORDER — CITALOPRAM HYDROBROMIDE 10 MG/1
10 TABLET ORAL DAILY
Qty: 90 TABLET | Refills: 1 | Status: SHIPPED | OUTPATIENT
Start: 2025-07-01

## 2025-07-01 RX ORDER — SUCRALFATE 1 G/1
1 TABLET ORAL 4 TIMES DAILY
Status: DISCONTINUED | OUTPATIENT
Start: 2025-07-01 | End: 2025-07-03 | Stop reason: HOSPADM

## 2025-07-01 RX ORDER — FEXOFENADINE HCL 180 MG/1
180 TABLET ORAL DAILY
Qty: 30 TABLET | Refills: 5 | Status: SHIPPED | OUTPATIENT
Start: 2025-07-01

## 2025-07-01 RX ORDER — ALBUTEROL SULFATE 0.83 MG/ML
2.5 SOLUTION RESPIRATORY (INHALATION) EVERY 6 HOURS PRN
Qty: 180 ML | Refills: 2 | Status: SHIPPED | OUTPATIENT
Start: 2025-07-01

## 2025-07-01 RX ORDER — MONTELUKAST SODIUM 10 MG/1
10 TABLET ORAL
Status: DISCONTINUED | OUTPATIENT
Start: 2025-07-01 | End: 2025-07-03 | Stop reason: HOSPADM

## 2025-07-01 RX ORDER — MULTIVITAMIN
1 TABLET ORAL DAILY
Qty: 100 TABLET | Refills: 2 | Status: SHIPPED | OUTPATIENT
Start: 2025-07-01

## 2025-07-01 RX ORDER — CLOPIDOGREL 300 MG/1
300 TABLET, FILM COATED ORAL ONCE
Status: COMPLETED | OUTPATIENT
Start: 2025-07-01 | End: 2025-07-01

## 2025-07-01 RX ORDER — ASPIRIN 81 MG/1
324 TABLET, CHEWABLE ORAL ONCE
Status: COMPLETED | OUTPATIENT
Start: 2025-07-01 | End: 2025-07-01

## 2025-07-01 RX ORDER — CYCLOBENZAPRINE HCL 10 MG
5 TABLET ORAL 3 TIMES DAILY PRN
Status: DISCONTINUED | OUTPATIENT
Start: 2025-07-01 | End: 2025-07-02

## 2025-07-01 RX ORDER — ALBUTEROL SULFATE 90 UG/1
2 INHALANT RESPIRATORY (INHALATION) EVERY 4 HOURS PRN
Status: DISCONTINUED | OUTPATIENT
Start: 2025-07-01 | End: 2025-07-03 | Stop reason: HOSPADM

## 2025-07-01 RX ORDER — DIPHENHYDRAMINE HYDROCHLORIDE 50 MG/ML
12.5 INJECTION, SOLUTION INTRAMUSCULAR; INTRAVENOUS ONCE
Status: COMPLETED | OUTPATIENT
Start: 2025-07-01 | End: 2025-07-01

## 2025-07-01 RX ORDER — GABAPENTIN 100 MG/1
100 CAPSULE ORAL
Qty: 90 CAPSULE | Refills: 1 | Status: SHIPPED | OUTPATIENT
Start: 2025-07-01

## 2025-07-01 RX ORDER — MECLIZINE HCL 12.5 MG 12.5 MG/1
25 TABLET ORAL ONCE
Status: COMPLETED | OUTPATIENT
Start: 2025-07-01 | End: 2025-07-01

## 2025-07-01 RX ORDER — ATORVASTATIN CALCIUM 40 MG/1
40 TABLET, FILM COATED ORAL EVERY EVENING
Status: DISCONTINUED | OUTPATIENT
Start: 2025-07-01 | End: 2025-07-03 | Stop reason: HOSPADM

## 2025-07-01 RX ORDER — CITALOPRAM HYDROBROMIDE 20 MG/1
10 TABLET ORAL DAILY
Status: DISCONTINUED | OUTPATIENT
Start: 2025-07-02 | End: 2025-07-03 | Stop reason: HOSPADM

## 2025-07-01 RX ORDER — CLOPIDOGREL BISULFATE 75 MG/1
75 TABLET ORAL DAILY
Status: DISCONTINUED | OUTPATIENT
Start: 2025-07-02 | End: 2025-07-03 | Stop reason: HOSPADM

## 2025-07-01 RX ORDER — ACYCLOVIR 800 MG/1
TABLET ORAL
Qty: 2 EACH | Refills: 5 | Status: SHIPPED | OUTPATIENT
Start: 2025-07-01

## 2025-07-01 RX ORDER — LORATADINE 10 MG/1
10 TABLET ORAL DAILY
Status: DISCONTINUED | OUTPATIENT
Start: 2025-07-02 | End: 2025-07-03 | Stop reason: HOSPADM

## 2025-07-01 RX ORDER — INSULIN GLARGINE 100 [IU]/ML
25 INJECTION, SOLUTION SUBCUTANEOUS
Status: DISCONTINUED | OUTPATIENT
Start: 2025-07-01 | End: 2025-07-03 | Stop reason: HOSPADM

## 2025-07-01 RX ORDER — CYCLOBENZAPRINE HCL 5 MG
5 TABLET ORAL 3 TIMES DAILY PRN
Qty: 12 TABLET | Refills: 0 | Status: SHIPPED | OUTPATIENT
Start: 2025-07-01

## 2025-07-01 RX ORDER — MAGNESIUM SULFATE HEPTAHYDRATE 40 MG/ML
2 INJECTION, SOLUTION INTRAVENOUS ONCE
Status: COMPLETED | OUTPATIENT
Start: 2025-07-01 | End: 2025-07-01

## 2025-07-01 RX ORDER — ACETAMINOPHEN 325 MG/1
650 TABLET ORAL EVERY 4 HOURS PRN
Status: DISCONTINUED | OUTPATIENT
Start: 2025-07-01 | End: 2025-07-03 | Stop reason: HOSPADM

## 2025-07-01 RX ORDER — METOCLOPRAMIDE HYDROCHLORIDE 5 MG/ML
10 INJECTION INTRAMUSCULAR; INTRAVENOUS ONCE
Status: COMPLETED | OUTPATIENT
Start: 2025-07-01 | End: 2025-07-01

## 2025-07-01 RX ORDER — FLURBIPROFEN SODIUM 0.3 MG/ML
SOLUTION/ DROPS OPHTHALMIC
Qty: 100 EACH | Refills: 1 | Status: SHIPPED | OUTPATIENT
Start: 2025-07-01

## 2025-07-01 RX ORDER — MONTELUKAST SODIUM 10 MG/1
10 TABLET ORAL
Qty: 90 TABLET | Refills: 1 | Status: SHIPPED | OUTPATIENT
Start: 2025-07-01

## 2025-07-01 RX ORDER — ALPRAZOLAM 0.5 MG
0.5 TABLET ORAL
Qty: 15 TABLET | Refills: 0 | Status: SHIPPED | OUTPATIENT
Start: 2025-07-01

## 2025-07-01 RX ORDER — KETOROLAC TROMETHAMINE 30 MG/ML
15 INJECTION, SOLUTION INTRAMUSCULAR; INTRAVENOUS ONCE
Status: COMPLETED | OUTPATIENT
Start: 2025-07-01 | End: 2025-07-01

## 2025-07-01 RX ORDER — SODIUM CHLORIDE, SODIUM GLUCONATE, SODIUM ACETATE, POTASSIUM CHLORIDE, MAGNESIUM CHLORIDE, SODIUM PHOSPHATE, DIBASIC, AND POTASSIUM PHOSPHATE .53; .5; .37; .037; .03; .012; .00082 G/100ML; G/100ML; G/100ML; G/100ML; G/100ML; G/100ML; G/100ML
1000 INJECTION, SOLUTION INTRAVENOUS ONCE
Status: COMPLETED | OUTPATIENT
Start: 2025-07-01 | End: 2025-07-01

## 2025-07-01 RX ORDER — AZELASTINE 1 MG/ML
1 SPRAY, METERED NASAL 2 TIMES DAILY
Status: DISCONTINUED | OUTPATIENT
Start: 2025-07-02 | End: 2025-07-03 | Stop reason: HOSPADM

## 2025-07-01 RX ORDER — DIAZEPAM 10 MG/2ML
2.5 INJECTION, SOLUTION INTRAMUSCULAR; INTRAVENOUS ONCE
Status: COMPLETED | OUTPATIENT
Start: 2025-07-01 | End: 2025-07-01

## 2025-07-01 RX ORDER — KETOROLAC TROMETHAMINE 30 MG/ML
30 INJECTION, SOLUTION INTRAMUSCULAR; INTRAVENOUS ONCE
Status: COMPLETED | OUTPATIENT
Start: 2025-07-01 | End: 2025-07-01

## 2025-07-01 RX ADMIN — CLOPIDOGREL BISULFATE 300 MG: 300 TABLET, FILM COATED ORAL at 20:48

## 2025-07-01 RX ADMIN — DIPHENHYDRAMINE HYDROCHLORIDE 12.5 MG: 50 INJECTION, SOLUTION INTRAMUSCULAR; INTRAVENOUS at 19:09

## 2025-07-01 RX ADMIN — ASPIRIN 324 MG: 81 TABLET, CHEWABLE ORAL at 20:48

## 2025-07-01 RX ADMIN — KETOROLAC TROMETHAMINE 15 MG: 30 INJECTION, SOLUTION INTRAMUSCULAR at 19:07

## 2025-07-01 RX ADMIN — SODIUM CHLORIDE, SODIUM GLUCONATE, SODIUM ACETATE, POTASSIUM CHLORIDE, MAGNESIUM CHLORIDE, SODIUM PHOSPHATE, DIBASIC, AND POTASSIUM PHOSPHATE 1000 ML: .53; .5; .37; .037; .03; .012; .00082 INJECTION, SOLUTION INTRAVENOUS at 19:14

## 2025-07-01 RX ADMIN — DIAZEPAM 2.5 MG: 5 INJECTION INTRAMUSCULAR; INTRAVENOUS at 19:10

## 2025-07-01 RX ADMIN — METOCLOPRAMIDE 10 MG: 5 INJECTION, SOLUTION INTRAMUSCULAR; INTRAVENOUS at 19:16

## 2025-07-01 RX ADMIN — IOHEXOL 85 ML: 350 INJECTION, SOLUTION INTRAVENOUS at 20:02

## 2025-07-01 RX ADMIN — MAGNESIUM SULFATE HEPTAHYDRATE 2 G: 40 INJECTION, SOLUTION INTRAVENOUS at 19:14

## 2025-07-01 RX ADMIN — KETOROLAC TROMETHAMINE 30 MG: 30 INJECTION, SOLUTION INTRAMUSCULAR; INTRAVENOUS at 12:28

## 2025-07-01 RX ADMIN — MECLIZINE HYDROCHLORIDE 25 MG: 12.5 TABLET ORAL at 19:06

## 2025-07-01 NOTE — ED ATTENDING ATTESTATION
7/1/2025  ISoila DO, saw and evaluated the patient. I have discussed the patient with the resident/non-physician practitioner and agree with the resident's/non-physician practitioner's findings, Plan of Care, and MDM as documented in the resident's/non-physician practitioner's note, except where noted. All available labs and Radiology studies were reviewed.  I was present for key portions of any procedure(s) performed by the resident/non-physician practitioner and I was immediately available to provide assistance.       At this point I agree with the current assessment done in the Emergency Department.  I have conducted an independent evaluation of this patient a history and physical is as follows:    Patient is a 35-year-old female with past medical history of prior migraines, not currently on any daily medications, history of bilateral salpingectomy, presents to the ED for headache, dizziness and vomiting.  Patient states that she woke up with a headache at around 9:30 AM that she localized to the right frontal region describes it as pressure.  She states it came on gradually and got progressively worse throughout the day.  She states it feels different than a typical migraine but she has had similar headaches to this before.  She reports associated photophobia as well as nausea with a headache and vomited 1 time.  She also states that tonight she woke up from a nap at around 6 PM and when she woke up she started to feel very dizzy described as room spinning sensation.  She denies any worsening with head movement or turning on 1 side versus the other.  She states that she has had similar dizziness in the past but never at the same time as the headache.  She denies any recent fevers or chills, neck pain or stiffness, tinnitus or loss of hearing, change in vision, chest pain, palpitations, dyspnea, abdominal pain, diarrhea, lateralizing extremity weakness or extremity paresthesia or other focal  neurologic deficits such as slurred speech, difficulty words out, facial drooping.  She has been able to walk steadily without feeling as though she is going to lose her balance or fall.    On examination, patient in no apparent distress.  HEENT exam unremarkable.  Bilateral TMs clear.  No evidence of head trauma.  Pupils round reactive and equal to light bilaterally.  No evidence of meningismus.  Neck supple with full range of motion.  Heart regular rate and rhythm.  Lungs clear to auscultation bilaterally.  Abdomen soft, nontender, nondistended.  Skin exam unremarkable.  Extremity exam unremarkable.  On neurologic exam, patient alert, oriented x 3.  5/5 strength throughout without extremity drift x 4.  No gross sensory deficits.  Normal finger-to-nose and heel-to-shin exam bilaterally.  Normal speech.    Assessment and plan: 35-year-old female with history of migraines presents to the ED for acute headache starting this morning upon awakening now localized to the right temple region associated with nausea, vomiting, photophobia.  She also reports acute vertigo/dizziness that started upon awakening at 6 PM.  She went to take a nap that 3 to 3:30 PM and did not have vertigo at that time.  NIH stroke scale is 0.  Overall I suspect peripheral etiology of the vertigo however given the concurrent headache and lack of any neurologic deficits or ataxia. Will  obtain CTA head and neck to rule out any flow restrictive disease, arterial dissection.  Overall low suspicion for ICH.  Patient did take Toradol earlier today without relief.  Will give migraine cocktail as I do suspect the headache is likely migrainous.  Given that she is tender over the temple, ESR and CRP are also ordered.  Overall low suspicion for temporal arteritis given her young age.  Will give Valium and meclizine for the dizziness.  Disposition pending workup.      ED Course  ED Course as of 07/01/25 3691   Tue Jul 01, 2025 2050 Radiologist called  resident physician to discuss significant findings on CT scan.  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  There is questionable vertebral artery dissection.  On-call stroke neurologist, Dr. Judge, recommended calling stroke alert despite the CT scan already being done.  Patient not a TNK candidate given that she woke up with symptoms at 6 PM and her last well-known without the vertigo was at 3:30 PM.  Patient also has NIH stroke scale of 0, no truncal ataxia, normal gait.  Neurologist recommended transfer to [facility] out of precaution in case patient's neuroexam worsens as they have neurointerventional on hand.  I did discuss results with patient and plan with neurology and plan to transfer to [facility] and she is agreeable.  She states her headache is significantly improved and states it is barely there.  Her dizziness also improved and states when she came in she would rate her dizziness as a 7/10 in severity and now rates it a 4.  She walked to the bathroom without any difficulty or ataxia.   2143 Dr. Durand, [facility] at [facility] accepted patient.  Unfortunately [facility] is at critical capacity and there are no available beds so patient will have to stay at [facility] for now.  Will keep transfer in process however will admit overnight to internal medicine and they can reassess the need for transfer tomorrow.           Critical Care Time  Procedures

## 2025-07-01 NOTE — ED PROVIDER NOTES
Time reflects when diagnosis was documented in both MDM as applicable and the Disposition within this note       Time User Action Codes Description Comment    7/1/2025  8:58 PM Chirag Santana Add [I63.9] Ischemic stroke (HCC)           ED Disposition       ED Disposition   Transfer to Another Facility-In Network    Condition   --    Date/Time   Tue Jul 1, 2025  8:59 PM    Comment   Jaycee Can should be transferred out to [facility] [place].               Assessment & Plan       Medical Decision Making  Amount and/or Complexity of Data Reviewed  Labs: ordered. Decision-making details documented in ED Course.  Radiology: ordered.    Risk  OTC drugs.  Prescription drug management.            35-year-old female with past medical history for hepatitis C anxiety neuropathy opioid dependence asthma and coarctation of the aorta who comes in with new onset headache that started at 8 AM.  The patient states that she had nausea associated with this.  At approximately 3:30 PM the patient went down for a nap and woke up at 630 with new onset vertigo and acute vision changes in the right eye.  The patient states that the headache is associated with the right side of her face.  The patient states that the dizziness is a room spinning sensation and not unsteadiness of her feet.  The patient is able to ambulate without rocking to one-sided the other.  The patient states that the vision changes are no longer present.  She received a Toradol injection at a urgent care prior to arrival and came in for further evaluation.    On examination the patient cranial nerves II through XII intact no signs of proprioception deficit or truncal ataxia.  Patient does not have any nystagmus present.  No peripheral motor or sensory deficits patient's vertigo cannot be extinguished with positional changes. NIH 0 patient's heart and lungs clear to auscultation abdomen soft and nontender.  The patient does have tenderness over her right temporal  artery    Differential diagnosis migraine headache versus temporal arteritis versus peripheral vertigo from possible labyrinthitis versus vestibular neuritis.  Doubt central causes of dizziness based off of the patient's examination.    Plan CBC CMP EKG migraine cocktail inflammatory markers including CRP and sed rate.  CTA head and neck to rule out stenosis versus ischemic infarct    The patient's CT showed the following CT Brain: Hypodensity involving the inferior right cerebellar hemisphere, most compatible with an acute/recent right PICA distribution infarct. No intracranial hemorrhage noted. Recommend for evaluation with brain MRI without contrast.     CT Angiography: New occlusion of the right vertebral artery from its origin, and throughout the V1 segment, with reconstitution at the level of the proximal V2 segment, with mild caliber change/attenuation of the right V2 segment, which may be due to   proximal occlusion, but underlying dissection cannot be excluded.     High-grade stenosis involving the intradural right vertebral artery beyond the origin of the right PICA. Likely patent but attenuated proximal right PICA, but the distal right PICA is likely occluded.     No additional large vessel occlusion, high-grade stenosis, or intracranial aneurysm identified on CT angiogram of the head.     No hemodynamically significant stenosis or dissection identified involving the bilateral cervical internal carotid arteries or left vertebral artery.     Stable stent involving the distal aortic arch and proximal descending thoracic aorta related to prior aortic coarctation repair.      Stroke alert was thus called and it was determined that the patient will need to go to [facility] for further evaluation.  Patient stable for transfer at this time.     ED Course as of 07/01/25 2112   Tue Jul 01, 2025 2005 WBC: 9.62   2005 EKG: NSR RBBB unchanged from previous scans.    2020 Reading room: Inf R cerebellar  infarct. New occlusion of vertebral . Can't exclude dissection. High grade stenosis. Distal pica occluded.    2031 NIH 0       Medications   multi-electrolyte (Plasmalyte-A/Isolyte-S PH 7.4/Normosol-R) IV bolus 1,000 mL (1,000 mL Intravenous New Bag 7/1/25 1914)   magnesium sulfate 2 g/50 mL IVPB (premix) 2 g (0 g Intravenous Stopped 7/1/25 2036)   metoclopramide (REGLAN) injection 10 mg (10 mg Intravenous Given 7/1/25 1916)   ketorolac (TORADOL) injection 15 mg (15 mg Intravenous Given 7/1/25 1907)   diazepam (VALIUM) injection 2.5 mg (2.5 mg Intravenous Given 7/1/25 1910)   diphenhydrAMINE (BENADRYL) injection 12.5 mg (12.5 mg Intravenous Given 7/1/25 1909)   meclizine (ANTIVERT) tablet 25 mg (25 mg Oral Given 7/1/25 1906)   iohexol (OMNIPAQUE) 350 MG/ML injection (MULTI-DOSE) 85 mL (85 mL Intravenous Given 7/1/25 2002)   clopidogrel (PLAVIX) tablet 300 mg (300 mg Oral Given 7/1/25 2048)   aspirin chewable tablet 324 mg (324 mg Oral Given 7/1/25 2048)       ED Risk Strat Scores         Stroke Assessment       Row Name 07/01/25 2030             NIH Stroke Scale    Interval Baseline      Level of Consciousness (1a.) 0      LOC Questions (1b.) 0      LOC Commands (1c.) 0      Best Gaze (2.) 0      Visual (3.) 0      Facial Palsy (4.) 0      Motor Arm, Left (5a.) 0      Motor Arm, Right (5b.) 0      Motor Leg, Left (6a.) 0      Motor Leg, Right (6b.) 0      Limb Ataxia (7.) 0      Sensory (8.) 0      Best Language (9.) 0      Dysarthria (10.) 0      Extinction and Inattention (11.) (Formerly Neglect) 0      Total 0                              No data recorded        SBIRT 20yo+      Flowsheet Row Most Recent Value   Initial Alcohol Screen: US AUDIT-C     1. How often do you have a drink containing alcohol? 0 Filed at: 07/01/2025 1840   2. How many drinks containing alcohol do you have on a typical day you are drinking?  0 Filed at: 07/01/2025 1840   3a. Male UNDER 65: How often do you have five or more drinks on one  occasion? 0 Filed at: 07/01/2025 1840   3b. FEMALE Any Age, or MALE 65+: How often do you have 4 or more drinks on one occassion? 0 Filed at: 07/01/2025 1840   Audit-C Score 0 Filed at: 07/01/2025 1840   JENNIFER: How many times in the past year have you...    Used an illegal drug or used a prescription medication for non-medical reasons? Never Filed at: 07/01/2025 1840                            History of Present Illness       Chief Complaint   Patient presents with    Dizziness     Reports she woke up with it. Went to sleep at 1500. Reports H/A; N/V        Past Medical History[1]   Past Surgical History[2]   Family History[3]   Social History[4]   E-Cigarette/Vaping    E-Cigarette Use Never User       E-Cigarette/Vaping Substances    Nicotine No     THC No     CBD No     Flavoring No       I have reviewed and agree with the history as documented.     35-year-old female comes in for evaluation of vertigo and headache that started acutely today        Review of Systems   Constitutional:  Negative for chills and fever.   HENT:  Negative for ear pain and sore throat.    Eyes:  Negative for pain and visual disturbance.   Respiratory:  Negative for cough and shortness of breath.    Cardiovascular:  Negative for chest pain and palpitations.   Gastrointestinal:  Negative for abdominal pain and vomiting.   Genitourinary:  Negative for dysuria and hematuria.   Musculoskeletal:  Negative for arthralgias and back pain.   Skin:  Negative for color change and rash.   Neurological:  Positive for dizziness and headaches. Negative for seizures, syncope, facial asymmetry, weakness, light-headedness and numbness.   All other systems reviewed and are negative.          Objective       ED Triage Vitals   Temperature Pulse Blood Pressure Respirations SpO2 Patient Position - Orthostatic VS   07/01/25 1840 07/01/25 1840 07/01/25 1840 07/01/25 1840 07/01/25 1840 --   (!) 97.2 °F (36.2 °C) 96 168/97 18 97 %       Temp Source Heart Rate Source  BP Location FiO2 (%) Pain Score    07/01/25 1840 07/01/25 2034 -- -- 07/01/25 1907    Oral Monitor   7      Vitals      Date and Time Temp Pulse SpO2 Resp BP Pain Score FACES Pain Rating User   07/01/25 2100 -- 91 98 % 18 184/82 -- -- AM   07/01/25 2045 -- 88 99 % 18 167/81 -- -- AM   07/01/25 2034 -- 96 100 % 18 179/81 -- -- AM   07/01/25 1907 -- -- -- -- -- 7 -- AM   07/01/25 1840 97.2 °F (36.2 °C) 96 97 % 18 168/97 -- -- RTM            Physical Exam  Vitals and nursing note reviewed.   Constitutional:       General: She is not in acute distress.     Appearance: She is well-developed.   HENT:      Head: Normocephalic and atraumatic.     Eyes:      Conjunctiva/sclera: Conjunctivae normal.       Cardiovascular:      Rate and Rhythm: Normal rate and regular rhythm.      Heart sounds: No murmur heard.  Pulmonary:      Effort: Pulmonary effort is normal. No respiratory distress.      Breath sounds: Normal breath sounds.   Abdominal:      Palpations: Abdomen is soft.      Tenderness: There is no abdominal tenderness.     Musculoskeletal:         General: No swelling.      Cervical back: Neck supple.     Skin:     General: Skin is warm and dry.      Capillary Refill: Capillary refill takes less than 2 seconds.     Neurological:      Mental Status: She is alert and oriented to person, place, and time. Mental status is at baseline.      GCS: GCS eye subscore is 4. GCS verbal subscore is 5. GCS motor subscore is 6.      Cranial Nerves: Cranial nerves 2-12 are intact. No cranial nerve deficit.      Sensory: No sensory deficit.      Motor: No weakness, tremor, atrophy, abnormal muscle tone, seizure activity or pronator drift.      Coordination: Romberg sign negative. Coordination normal. Finger-Nose-Finger Test and Heel to Shin Test normal. Rapid alternating movements normal.      Gait: Gait normal.      Deep Tendon Reflexes: Reflexes normal.     Psychiatric:         Mood and Affect: Mood normal.         Results Reviewed        Procedure Component Value Units Date/Time    Sedimentation rate, automated [[id number]]  (Abnormal) Collected: 07/01/25 1900    Lab Status: Final result Specimen: Blood from Arm, Right Updated: 07/01/25 1950     Sed Rate 27 mm/hour     C-reactive protein [[id number]]  (Abnormal) Collected: 07/01/25 1900    Lab Status: Final result Specimen: Blood from Arm, Right Updated: 07/01/25 1946     CRP 13.3 mg/L     Comprehensive metabolic panel [[id number]]  (Abnormal) Collected: 07/01/25 1900    Lab Status: Final result Specimen: Blood from Arm, Right Updated: 07/01/25 1934     Sodium 135 mmol/L      Potassium 4.3 mmol/L      Chloride 101 mmol/L      CO2 27 mmol/L      ANION GAP 7 mmol/L      BUN 14 mg/dL      Creatinine 0.78 mg/dL      Glucose 203 mg/dL      Calcium 9.3 mg/dL      AST 19 U/L      ALT 20 U/L      Alkaline Phosphatase 69 U/L      Total Protein 7.0 g/dL      Albumin 4.0 g/dL      Total Bilirubin 0.40 mg/dL      eGFR 97 ml/min/1.73sq m     Narrative:      National Kidney Disease Foundation guidelines for Chronic Kidney Disease (CKD):     Stage 1 with normal or high GFR (GFR > 90 mL/min/1.73 square meters)    Stage 2 Mild CKD (GFR = 60-89 mL/min/1.73 square meters)    Stage 3A Moderate CKD (GFR = 45-59 mL/min/1.73 square meters)    Stage 3B Moderate CKD (GFR = 30-44 mL/min/1.73 square meters)    Stage 4 Severe CKD (GFR = 15-29 mL/min/1.73 square meters)    Stage 5 End Stage CKD (GFR <15 mL/min/1.73 square meters)  Note: GFR calculation is accurate only with a steady state creatinine    CBC and differential [[id number]]  (Abnormal) Collected: 07/01/25 1900    Lab Status: Final result Specimen: Blood from Arm, Right Updated: 07/01/25 1906     WBC 9.62 Thousand/uL      RBC 5.58 Million/uL      Hemoglobin 14.3 g/dL      Hematocrit 44.7 %      MCV 80 fL      MCH 25.6 pg      MCHC 32.0 g/dL      RDW 15.2 %      MPV 11.1 fL      Platelets 242 Thousands/uL      nRBC 0 /100 WBCs      Segmented % 59 %      Immature  Grans % 0 %      Lymphocytes % 32 %      Monocytes % 7 %      Eosinophils Relative 2 %      Basophils Relative 0 %      Absolute Neutrophils 5.68 Thousands/µL      Absolute Immature Grans 0.03 Thousand/uL      Absolute Lymphocytes 3.08 Thousands/µL      Absolute Monocytes 0.66 Thousand/µL      Eosinophils Absolute 0.14 Thousand/µL      Basophils Absolute 0.03 Thousands/µL             CTA head and neck with and without contrast   Final Interpretation by Shaila Mays MD (07/01 2026)   CT Brain: Hypodensity involving the inferior right cerebellar hemisphere, most compatible with an acute/recent right PICA distribution infarct. No intracranial hemorrhage noted. Recommend for evaluation with brain MRI without contrast.      CT Angiography: New occlusion of the right vertebral artery from its origin, and throughout the V1 segment, with reconstitution at the level of the proximal V2 segment, with mild caliber change/attenuation of the right V2 segment, which may be due to    proximal occlusion, but underlying dissection cannot be excluded.      High-grade stenosis involving the intradural right vertebral artery beyond the origin of the right PICA. Likely patent but attenuated proximal right PICA, but the distal right PICA is likely occluded.      No additional large vessel occlusion, high-grade stenosis, or intracranial aneurysm identified on CT angiogram of the head.      No hemodynamically significant stenosis or dissection identified involving the bilateral cervical internal carotid arteries or left vertebral artery.      Stable stent involving the distal aortic arch and proximal descending thoracic aorta related to prior aortic coarctation repair.         I personally discussed this study with KESHAWN LANDA on 7/1/2025 at 8:20 p.m.                        Workstation performed: [id number]             Procedures    ED Medication and Procedure Management   Prior to Admission Medications   Prescriptions Last Dose Informant  Patient Reported? Taking?   ALPRAZolam (XANAX) 0.5 mg tablet   No No   Sig: Take 1 tablet (0.5 mg total) by mouth daily at bedtime as needed for anxiety   Aspirin Low Dose 81 MG EC tablet   No No   Sig: TAKE 1 TABLET BY MOUTH DAILY. DO NOT START BEFORE 2023   Blood Glucose Monitoring Suppl (OneTouch Verio) w/Device KIT  Self No No   Sig: Use daily   Blood Pressure Monitoring (B-D ASSURE BPM/AUTO WRIST CUFF) MISC  Self No No   Sig: Check blood pressure prior to each OB visit, or as directed by your physician.   Continuous Blood Gluc  (FreeStyle Gautam 14 Day [place]) KVNG   No No   Sig: Use with gautam sensor   Continuous Glucose Sensor (FreeStyle Gautam 3 Sensor) MISC   No No   Sig: Use 1 sensor each to be changed every 14 days   Empagliflozin (JARDIANCE) 10 MG TABS tablet   No No   Sig: Take 1 tablet (10 mg total) by mouth daily   Insulin Pen Needle (B-D UF III MINI PEN NEEDLES) 31G X 5 MM MISC   No No   Sig: Inject under the skin daily at bedtime   Multiple Vitamin (Multi Vitamin Daily) TABS   No No   Sig: Take 1 tablet by mouth in the morning   acetaminophen (TYLENOL) 500 mg tablet  Self Yes No   Sig: Take 1,000 mg by mouth   albuterol (2.5 mg/3 mL) 0.083 % nebulizer solution   No No   Sig: Take 3 mL (2.5 mg total) by nebulization every 6 (six) hours as needed for wheezing or shortness of breath   albuterol (Ventolin HFA) 90 mcg/act inhaler   No No   Sig: Inhale 2 puffs every 4 (four) hours as needed for wheezing or shortness of breath   atorvastatin (LIPITOR) 20 mg tablet   No No   Sig: Take 1 tablet (20 mg total) by mouth daily   azelastine (ASTELIN) 0.1 % nasal spray   No No   Si spray into each nostril 2 (two) times a day Use in each nostril as directed   citalopram (CeleXA) 10 mg tablet   No No   Sig: Take 1 tablet (10 mg total) by mouth daily   cyclobenzaprine (FLEXERIL) 5 mg tablet   No No   Sig: Take 1 tablet (5 mg total) by mouth 3 (three) times a day as needed for muscle spasms for  up to 12 doses   fexofenadine (ALLEGRA) 180 MG tablet   No No   Sig: Take 1 tablet (180 mg total) by mouth daily   gabapentin (NEURONTIN) 100 mg capsule   No No   Sig: Take 1 capsule (100 mg total) by mouth daily at bedtime   insulin glargine (LANTUS) 100 units/mL subcutaneous injection   No No   Sig: Inject 25 Units under the skin daily at bedtime   lisinopril (ZESTRIL) 30 mg tablet   No No   Sig: Take 1 tablet (30 mg total) by mouth daily   metFORMIN (GLUCOPHAGE) 1000 MG tablet   No No   Sig: Take 1 tablet (1,000 mg total) by mouth 2 (two) times a day with meals   montelukast (SINGULAIR) 10 mg tablet   No No   Sig: Take 1 tablet (10 mg total) by mouth daily at bedtime   naproxen (Naprosyn) 500 mg tablet   No No   Sig: Take 1 tablet (500 mg total) by mouth 2 (two) times a day as needed for mild pain   ondansetron (ZOFRAN-ODT) 4 mg disintegrating tablet   No No   Sig: Take 1 tablet (4 mg total) by mouth every 6 (six) hours as needed for nausea for up to 8 doses   semaglutide, 0.25 or 0.5 mg/dose, (Ozempic, 0.25 or 0.5 MG/DOSE,) 2 mg/3 mL injection pen   No No   Si.25 mg under the skin every 7 days for 4 doses (28 days), THEN 0.5 mg under the skin every 7 days   sucralfate (CARAFATE) 1 g tablet   No No   Sig: Take 1 tablet (1 g total) by mouth 4 (four) times a day      Facility-Administered Medications Last Administration Doses Remaining   ketorolac (TORADOL) injection 30 mg 2025 12:28 PM 0        Patient's Medications   Discharge Prescriptions    No medications on file     No discharge procedures on file.  ED SEPSIS DOCUMENTATION   Time reflects when diagnosis was documented in both MDM as applicable and the Disposition within this note       Time User Action Codes Description Comment    2025  8:58 PM Chirag Santana Add [I63.9] Ischemic stroke (HCC)                    Chirag Santana MD  23         [1]   Past Medical History:  Diagnosis Date    Allergic     Arthritis     Asthma     Bipolar  affective disorder, currently depressed, moderate (HCC) 2018    Cyst of ovary, right     Depression     Diabetes mellitus (HCC) 10/10/2018    type 2    Endometriosis     Fractures 24    My right hand    Heart murmur 1988    Hepatitis C     Hepatitis C virus infection cured after antiviral drug therapy     History of transfusion     Hypertension     Infectious viral hepatitis 01    Migraines     Morbid obesity with BMI of 40.0-44.9, adult (HCC)     Obesity     Pulmonary artery congenital abnormality     Spleen enlarged     Status post surgical removal of both fallopian tubes     Varicella    [2]   Past Surgical History:  Procedure Laterality Date    CARDIAC CATHETERIZATION      no CAD 10days, 4 weeks 22months old     CARDIAC CATHETERIZATION N/A 2023    Procedure: Cardiac Coronary Angiogram;  Surgeon: Marcela Lr DO;  Location: [facility];  Service: Cardiology    CARDIAC CATHETERIZATION Left 2023    Procedure: Cardiac Left Heart Cath;  Surgeon: Marcela Lr DO;  Location: [facility];  Service: Cardiology    CARDIAC CATHETERIZATION  2023    Procedure: Cardiac catheterization;  Surgeon: Marcela Lr DO;  Location: [facility];  Service: Cardiology     SECTION, LOW TRANSVERSE      CHOLECYSTECTOMY      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 5    CORONARY STENT PLACEMENT      IR LOWER EXTREMITY ANGIOGRAM  2023    LIVER BIOPSY      LIVER BIOPSY      PERIPHERAL ANGIOGRAM  21    STERNAL WIRE REMOVAL  2022    THROMBECTOMY W/ EMBOLECTOMY Right 2023    Procedure: Right femoral exposure Right iliac embolectomy w/ #4 Ted catheter Aortogram Right EIA stent w/ 7x29mm VBX;  Surgeon: Jody Hodges MD;  Location: [facility];  Service: Vascular    TUBAL LIGATION Bilateral 2020    VSD REPAIR      As a child    WOUND DEBRIDEMENT Right 2023    Procedure: Right Groin Wound Washout, Pulse Lavage, Wound Vac  placement;  Surgeon: Jelani Avila DO;  Location: [facility];  Service: Vascular   [3]   Family History  Problem Relation Name Age of Onset    Hypertension Mother Patritica     Migraines Mother Patritica     JENNY disease Mother Patritica     Depression Mother Patritica     Hyperlipidemia Mother Patritica     Diabetes Mother Patritica     Diabetes Father Justin     Hypertension Father Justin     Kidney failure Father Justin     Heart attack Father Justin     Arthritis Father Justin     Stroke Father Justin     Glaucoma Father Justin     Kidney disease Father Justin     Polycystic kidney disease Paternal Grandmother Alison     Stroke Paternal Grandmother Alison     Heart disease Paternal Grandmother Alison     Kidney disease Paternal Grandmother Alison     Arthritis Sister Melodie     Asthma Sister Melodie     Thyroid disease Sister Melodie     Diabetes Sister Melodie     Arthritis Maternal Grandmother Irina     Breast cancer Maternal Grandmother Irina     Diabetes Maternal Grandmother Irina     Hypertension Maternal Grandmother Irina     Heart Valve Disease Maternal Grandmother Irina     Cancer Maternal Grandmother Irina         Skin cancer    Learning disabilities Cousin Marisol     Learning disabilities Sister Ana     Diabetes Sister Ana     ADD / ADHD Cousin Estelle     Lung cancer Brother Valdo     Cancer Brother Valdo         Lung cancer    Diabetes Maternal Grandfather Ankit     Hypertension Maternal Grandfather Ankit     JENNY disease Maternal Grandfather Ankit     Stroke Maternal Grandfather Ankit     Cancer Maternal Grandfather Ankit         Skin cancer    Cancer Paternal Uncle Brandon         Skin cancer    Aneurysm Maternal Aunt Olinda    [4]   Social History  Tobacco Use    Smoking status: Former     Current packs/day: 0.00     Average packs/day: 0.5 packs/day for 10.0 years (5.0 ttl pk-yrs)     Types: Cigarettes     Start date: 2013     Quit date: 2023     Years since quittin.4     Smokeless tobacco: Never   Vaping Use    Vaping status: Never Used   Substance Use Topics    Alcohol use: Yes     Alcohol/week: 3.0 standard drinks of alcohol     Types: 3 Standard drinks or equivalent per week     Comment: I drink socially    Drug use: Not Currently     Comment: hx of THC use for migraines        Chirag Santana MD  07/01/25 5141

## 2025-07-01 NOTE — PROGRESS NOTES
Name: Jaycee Can      : 1990      MRN: [id number]  Encounter Provider: REJI Mckinney  Encounter Date: 2025   Encounter department: [facility]  :  Assessment & Plan  Acute nonintractable headache, unspecified headache type    HA starting this morning  Unclear precipitating events - pt reports compliance to all medications  Initially states she didn't eat this morning then states she woke up at 4 am and ate a peanut butter sandwich    Denies recent illness  No trauma or injury  Denies hx of migraines  No neck pain  Denies numbness/tingling/weakness/speech change    Reports HA is near the top of the head on the R side  Exam unremarkable; neuro WNL    Toradol 30 mg IM in office today  Counseled on hydration & diet...  Advised OTC medication, ice, and rest  ER precautions provided    The benefits, risks and alternatives to the treatment plan were discussed at this visit. Patient was advised of common adverse effects of any medical therapies prescribed. All questions were answered and discussed with the patient and any accompanying family members or caretakers.     Orders:    ketorolac (TORADOL) injection 30 mg           History of Present Illness     Review of Systems   Constitutional:  Negative for chills, fatigue and fever.   HENT:  Negative for congestion, ear pain, facial swelling, hearing loss, rhinorrhea, sinus pressure, sneezing, sore throat and trouble swallowing.    Eyes:  Negative for pain, redness and visual disturbance.   Respiratory:  Negative for cough, chest tightness, shortness of breath and wheezing.    Cardiovascular:  Negative for chest pain and palpitations.   Gastrointestinal:  Negative for abdominal pain, diarrhea, nausea and vomiting.   Genitourinary:  Negative for dysuria, flank pain, hematuria and pelvic pain.   Musculoskeletal:  Negative for arthralgias, back pain and myalgias.   Skin:  Negative for color change and rash.   Neurological:   "Positive for headaches. Negative for dizziness, seizures, syncope, weakness and light-headedness.   Psychiatric/Behavioral:  Negative for confusion, hallucinations and sleep disturbance. The patient is not nervous/anxious.    All other systems reviewed and are negative.      Objective   /84   Pulse 99   Temp (!) 96.9 °F (36.1 °C)   Ht 5' 1\" (1.549 m)   Wt 96.5 kg (212 lb 12.8 oz)   LMP 06/20/2025 (Approximate)   SpO2 97%   BMI 40.21 kg/m²      Physical Exam  Vitals and nursing note reviewed.   Constitutional:       General: She is not in acute distress.     Appearance: She is well-developed.   HENT:      Head: Normocephalic and atraumatic.      Right Ear: Hearing and tympanic membrane normal.      Left Ear: Hearing and tympanic membrane normal.      Nose: Nose normal.      Mouth/Throat:      Mouth: Mucous membranes are moist.      Dentition: Normal dentition.      Tongue: No lesions.      Pharynx: Oropharynx is clear. Uvula midline. No oropharyngeal exudate.      Tonsils: No tonsillar exudate.     Eyes:      Extraocular Movements: Extraocular movements intact.      Conjunctiva/sclera: Conjunctivae normal.     Neck:      Vascular: No carotid bruit or JVD.     Cardiovascular:      Rate and Rhythm: Normal rate and regular rhythm.      Heart sounds: S1 normal and S2 normal. No murmur heard.  Pulmonary:      Effort: Pulmonary effort is normal. No tachypnea or respiratory distress.      Breath sounds: Normal breath sounds and air entry. No decreased breath sounds.   Chest:      Chest wall: No deformity or tenderness.   Abdominal:      General: Abdomen is flat. Bowel sounds are normal. There is no distension.      Palpations: Abdomen is soft.      Tenderness: There is no abdominal tenderness. There is no right CVA tenderness, left CVA tenderness or guarding.     Musculoskeletal:         General: No swelling.      Cervical back: Full passive range of motion without pain and neck supple.      Right lower leg: No " edema.      Left lower leg: No edema.   Lymphadenopathy:      Cervical: No cervical adenopathy.     Skin:     General: Skin is warm and dry.      Capillary Refill: Capillary refill takes less than 2 seconds.      Findings: No rash.     Neurological:      Mental Status: She is alert and oriented to person, place, and time.      GCS: GCS eye subscore is 4. GCS verbal subscore is 5. GCS motor subscore is 6.      Cranial Nerves: Cranial nerves 2-12 are intact.      Sensory: Sensation is intact.      Motor: Motor function is intact.      Coordination: Coordination is intact.      Gait: Gait is intact.     Psychiatric:         Mood and Affect: Mood normal.         Behavior: Behavior is cooperative.

## 2025-07-02 ENCOUNTER — APPOINTMENT (INPATIENT)
Dept: MRI IMAGING | Facility: HOSPITAL | Age: 35
DRG: 045 | End: 2025-07-02
Payer: COMMERCIAL

## 2025-07-02 ENCOUNTER — APPOINTMENT (INPATIENT)
Dept: NON INVASIVE DIAGNOSTICS | Facility: HOSPITAL | Age: 35
DRG: 045 | End: 2025-07-02
Payer: COMMERCIAL

## 2025-07-02 PROBLEM — M25.561 ACUTE PAIN OF RIGHT KNEE: Status: RESOLVED | Noted: 2023-07-03 | Resolved: 2025-07-02

## 2025-07-02 PROBLEM — I63.9 CVA (CEREBRAL VASCULAR ACCIDENT) (HCC): Status: ACTIVE | Noted: 2025-07-02

## 2025-07-02 PROBLEM — F32.A ANXIETY AND DEPRESSION: Status: ACTIVE | Noted: 2022-12-06

## 2025-07-02 PROBLEM — G43.711 INTRACTABLE CHRONIC MIGRAINE WITHOUT AURA AND WITH STATUS MIGRAINOSUS: Status: RESOLVED | Noted: 2019-03-08 | Resolved: 2025-07-02

## 2025-07-02 PROBLEM — Z86.718: Status: ACTIVE | Noted: 2023-01-30

## 2025-07-02 PROBLEM — M25.572 ACUTE LEFT ANKLE PAIN: Status: RESOLVED | Noted: 2023-07-03 | Resolved: 2025-07-02

## 2025-07-02 PROBLEM — T81.49XA SURGICAL SITE INFECTION: Status: RESOLVED | Noted: 2023-02-11 | Resolved: 2025-07-02

## 2025-07-02 PROBLEM — J45.20 MILD INTERMITTENT ASTHMA WITHOUT COMPLICATION: Status: ACTIVE | Noted: 2025-03-18

## 2025-07-02 LAB
ANION GAP SERPL CALCULATED.3IONS-SCNC: 5 MMOL/L (ref 4–13)
ANION GAP SERPL CALCULATED.3IONS-SCNC: 6 MMOL/L (ref 4–13)
ANION GAP SERPL CALCULATED.3IONS-SCNC: 6 MMOL/L (ref 4–13)
ANION GAP SERPL CALCULATED.3IONS-SCNC: 7 MMOL/L (ref 4–13)
AORTIC ROOT: 2.5 CM
AORTIC VALVE MEAN VELOCITY: 10.5 M/S
APTT PPP: 25 SECONDS (ref 23–34)
ASCENDING AORTA: 2.8 CM
ATRIAL RATE: 88 BPM
AV AREA BY CONTINUOUS VTI: 1.6 CM2
AV AREA PEAK VELOCITY: 1.8 CM2
AV LVOT MEAN GRADIENT: 2 MMHG
AV LVOT PEAK GRADIENT: 3 MMHG
AV MEAN PRESS GRAD SYS DOP V1V2: 5 MMHG
AV ORIFICE AREA US: 1.59 CM2
AV PEAK GRADIENT: 11 MMHG
AV VELOCITY RATIO: 0.51
AV VMAX SYS DOP: 1.65 M/S
BSA FOR ECHO PROCEDURE: 1.97 M2
BUN SERPL-MCNC: 11 MG/DL (ref 5–25)
BUN SERPL-MCNC: 12 MG/DL (ref 5–25)
BUN SERPL-MCNC: 14 MG/DL (ref 5–25)
BUN SERPL-MCNC: 9 MG/DL (ref 5–25)
CA-I BLD-SCNC: 1.14 MMOL/L (ref 1.12–1.32)
CALCIUM SERPL-MCNC: 8.2 MG/DL (ref 8.4–10.2)
CALCIUM SERPL-MCNC: 8.3 MG/DL (ref 8.4–10.2)
CALCIUM SERPL-MCNC: 8.5 MG/DL (ref 8.4–10.2)
CALCIUM SERPL-MCNC: 8.7 MG/DL (ref 8.4–10.2)
CHLORIDE SERPL-SCNC: 103 MMOL/L (ref 96–108)
CHLORIDE SERPL-SCNC: 105 MMOL/L (ref 96–108)
CHLORIDE SERPL-SCNC: 105 MMOL/L (ref 96–108)
CHLORIDE SERPL-SCNC: 106 MMOL/L (ref 96–108)
CHOLEST SERPL-MCNC: 154 MG/DL (ref ?–200)
CO2 SERPL-SCNC: 20 MMOL/L (ref 21–32)
CO2 SERPL-SCNC: 23 MMOL/L (ref 21–32)
CO2 SERPL-SCNC: 24 MMOL/L (ref 21–32)
CO2 SERPL-SCNC: 24 MMOL/L (ref 21–32)
CREAT SERPL-MCNC: 0.49 MG/DL (ref 0.6–1.3)
CREAT SERPL-MCNC: 0.53 MG/DL (ref 0.6–1.3)
CREAT SERPL-MCNC: 0.59 MG/DL (ref 0.6–1.3)
CREAT SERPL-MCNC: 0.64 MG/DL (ref 0.6–1.3)
DOP CALC AO VTI: 34.85 CM
DOP CALC LVOT AREA: 3.14 CM2
DOP CALC LVOT CARDIAC INDEX: 2.28 L/MIN/M2
DOP CALC LVOT CARDIAC OUTPUT: 4.5 L/MIN
DOP CALC LVOT DIAMETER: 2 CM
DOP CALC LVOT PEAK VEL VTI: 17.62 CM
DOP CALC LVOT PEAK VEL: 0.93 M/S
DOP CALC LVOT STROKE INDEX: 28.4 ML/M2
DOP CALC LVOT STROKE VOLUME: 56
E WAVE DECELERATION TIME: 164 MS
E/A RATIO: 1.61
ERYTHROCYTE [DISTWIDTH] IN BLOOD BY AUTOMATED COUNT: 14.9 % (ref 11.6–15.1)
EST. AVERAGE GLUCOSE BLD GHB EST-MCNC: 223 MG/DL
FRACTIONAL SHORTENING: 32 (ref 28–44)
GFR SERPL CREATININE-BSD FRML MDRD: 114 ML/MIN/1.73SQ M
GFR SERPL CREATININE-BSD FRML MDRD: 117 ML/MIN/1.73SQ M
GFR SERPL CREATININE-BSD FRML MDRD: 121 ML/MIN/1.73SQ M
GFR SERPL CREATININE-BSD FRML MDRD: 124 ML/MIN/1.73SQ M
GLUCOSE SERPL-MCNC: 169 MG/DL (ref 65–140)
GLUCOSE SERPL-MCNC: 170 MG/DL (ref 65–140)
GLUCOSE SERPL-MCNC: 198 MG/DL (ref 65–140)
GLUCOSE SERPL-MCNC: 205 MG/DL (ref 65–140)
GLUCOSE SERPL-MCNC: 207 MG/DL (ref 65–140)
GLUCOSE SERPL-MCNC: 215 MG/DL (ref 65–140)
GLUCOSE SERPL-MCNC: 218 MG/DL (ref 65–140)
GLUCOSE SERPL-MCNC: 229 MG/DL (ref 65–140)
HBA1C MFR BLD: 9.4 %
HCT VFR BLD AUTO: 39.8 % (ref 34.8–46.1)
HDLC SERPL-MCNC: 31 MG/DL
HGB BLD-MCNC: 12.9 G/DL (ref 11.5–15.4)
INR PPP: 0.93 (ref 0.85–1.19)
INTERVENTRICULAR SEPTUM IN DIASTOLE (PARASTERNAL SHORT AXIS VIEW): 1.1 CM
INTERVENTRICULAR SEPTUM: 1.1 CM (ref 0.6–1.1)
LAAS-AP2: 16.1 CM2
LAAS-AP4: 16.8 CM2
LDLC SERPL CALC-MCNC: 53 MG/DL (ref 0–100)
LEFT ATRIUM SIZE: 3.4 CM
LEFT ATRIUM VOLUME (MOD BIPLANE): 44 ML
LEFT ATRIUM VOLUME INDEX (MOD BIPLANE): 22.3 ML/M2
LEFT INTERNAL DIMENSION IN SYSTOLE: 3.2 CM (ref 2.1–4)
LEFT VENTRICULAR INTERNAL DIMENSION IN DIASTOLE: 4.7 CM (ref 3.5–6)
LEFT VENTRICULAR POSTERIOR WALL IN END DIASTOLE: 1.1 CM
LEFT VENTRICULAR STROKE VOLUME: 60 ML
LV EF US.2D.A4C+ESTIMATED: 51 %
LVSV (TEICH): 60 ML
MAGNESIUM SERPL-MCNC: 1.9 MG/DL (ref 1.9–2.7)
MCH RBC QN AUTO: 25.9 PG (ref 26.8–34.3)
MCHC RBC AUTO-ENTMCNC: 32.4 G/DL (ref 31.4–37.4)
MCV RBC AUTO: 80 FL (ref 82–98)
MV E'TISSUE VEL-SEP: 12 CM/S
MV PEAK A VEL: 0.64 M/S
MV PEAK E VEL: 103 CM/S
MV STENOSIS PRESSURE HALF TIME: 48 MS
MV VALVE AREA P 1/2 METHOD: 4.6
OSMOLALITY UR/SERPL-RTO: 290 MMOL/KG (ref 282–298)
P AXIS: 66 DEGREES
PHOSPHATE SERPL-MCNC: 2.4 MG/DL (ref 2.7–4.5)
PLATELET # BLD AUTO: 216 THOUSANDS/UL (ref 149–390)
PMV BLD AUTO: 10.8 FL (ref 8.9–12.7)
POTASSIUM SERPL-SCNC: 3.6 MMOL/L (ref 3.5–5.3)
POTASSIUM SERPL-SCNC: 3.7 MMOL/L (ref 3.5–5.3)
POTASSIUM SERPL-SCNC: 3.9 MMOL/L (ref 3.5–5.3)
POTASSIUM SERPL-SCNC: 4 MMOL/L (ref 3.5–5.3)
PR INTERVAL: 190 MS
PROTHROMBIN TIME: 13 SECONDS (ref 12.3–15)
QRS AXIS: 14 DEGREES
QRSD INTERVAL: 154 MS
QT INTERVAL: 420 MS
QTC INTERVAL: 508 MS
RBC # BLD AUTO: 4.99 MILLION/UL (ref 3.81–5.12)
RIGHT ATRIUM AREA SYSTOLE A4C: 10.5 CM2
RIGHT VENTRICLE ID DIMENSION: 3 CM
SL CV LEFT ATRIUM LENGTH A2C: 4.8 CM
SL CV LV EF: 55
SL CV PED ECHO LEFT VENTRICLE DIASTOLIC VOLUME (MOD BIPLANE) 2D: 102 ML
SL CV PED ECHO LEFT VENTRICLE SYSTOLIC VOLUME (MOD BIPLANE) 2D: 42 ML
SODIUM SERPL-SCNC: 132 MMOL/L (ref 135–147)
SODIUM SERPL-SCNC: 133 MMOL/L (ref 135–147)
SODIUM SERPL-SCNC: 134 MMOL/L (ref 135–147)
SODIUM SERPL-SCNC: 135 MMOL/L (ref 135–147)
SODIUM UR-SCNC: 119 MMOL/L
T WAVE AXIS: 41 DEGREES
TRICUSPID ANNULAR PLANE SYSTOLIC EXCURSION: 1.5 CM
TRIGL SERPL-MCNC: 351 MG/DL (ref ?–150)
VENTRICULAR RATE: 88 BPM
WBC # BLD AUTO: 8.5 THOUSAND/UL (ref 4.31–10.16)

## 2025-07-02 PROCEDURE — 80048 BASIC METABOLIC PNL TOTAL CA: CPT | Performed by: PHYSICIAN ASSISTANT

## 2025-07-02 PROCEDURE — 84300 ASSAY OF URINE SODIUM: CPT | Performed by: STUDENT IN AN ORGANIZED HEALTH CARE EDUCATION/TRAINING PROGRAM

## 2025-07-02 PROCEDURE — 70544 MR ANGIOGRAPHY HEAD W/O DYE: CPT

## 2025-07-02 PROCEDURE — 80048 BASIC METABOLIC PNL TOTAL CA: CPT | Performed by: NURSE PRACTITIONER

## 2025-07-02 PROCEDURE — 83036 HEMOGLOBIN GLYCOSYLATED A1C: CPT | Performed by: PHYSICIAN ASSISTANT

## 2025-07-02 PROCEDURE — 83735 ASSAY OF MAGNESIUM: CPT | Performed by: NURSE PRACTITIONER

## 2025-07-02 PROCEDURE — 99223 1ST HOSP IP/OBS HIGH 75: CPT | Performed by: HOSPITALIST

## 2025-07-02 PROCEDURE — 70551 MRI BRAIN STEM W/O DYE: CPT

## 2025-07-02 PROCEDURE — 83930 ASSAY OF BLOOD OSMOLALITY: CPT | Performed by: NURSE PRACTITIONER

## 2025-07-02 PROCEDURE — 82948 REAGENT STRIP/BLOOD GLUCOSE: CPT

## 2025-07-02 PROCEDURE — 85610 PROTHROMBIN TIME: CPT | Performed by: NURSE PRACTITIONER

## 2025-07-02 PROCEDURE — 85027 COMPLETE CBC AUTOMATED: CPT | Performed by: PHYSICIAN ASSISTANT

## 2025-07-02 PROCEDURE — 85730 THROMBOPLASTIN TIME PARTIAL: CPT | Performed by: NURSE PRACTITIONER

## 2025-07-02 PROCEDURE — 84100 ASSAY OF PHOSPHORUS: CPT | Performed by: NURSE PRACTITIONER

## 2025-07-02 PROCEDURE — 99255 IP/OBS CONSLTJ NEW/EST HI 80: CPT | Performed by: PSYCHIATRY & NEUROLOGY

## 2025-07-02 PROCEDURE — 99223 1ST HOSP IP/OBS HIGH 75: CPT | Performed by: STUDENT IN AN ORGANIZED HEALTH CARE EDUCATION/TRAINING PROGRAM

## 2025-07-02 PROCEDURE — 80048 BASIC METABOLIC PNL TOTAL CA: CPT | Performed by: STUDENT IN AN ORGANIZED HEALTH CARE EDUCATION/TRAINING PROGRAM

## 2025-07-02 PROCEDURE — 80061 LIPID PANEL: CPT | Performed by: PHYSICIAN ASSISTANT

## 2025-07-02 PROCEDURE — 82330 ASSAY OF CALCIUM: CPT | Performed by: NURSE PRACTITIONER

## 2025-07-02 PROCEDURE — 93306 TTE W/DOPPLER COMPLETE: CPT

## 2025-07-02 PROCEDURE — 93010 ELECTROCARDIOGRAM REPORT: CPT | Performed by: INTERNAL MEDICINE

## 2025-07-02 PROCEDURE — 93306 TTE W/DOPPLER COMPLETE: CPT | Performed by: INTERNAL MEDICINE

## 2025-07-02 RX ORDER — INSULIN LISPRO 100 [IU]/ML
1-6 INJECTION, SOLUTION INTRAVENOUS; SUBCUTANEOUS
Status: DISCONTINUED | OUTPATIENT
Start: 2025-07-02 | End: 2025-07-02

## 2025-07-02 RX ORDER — ACETAMINOPHEN 10 MG/ML
1000 INJECTION, SOLUTION INTRAVENOUS ONCE
Status: COMPLETED | OUTPATIENT
Start: 2025-07-02 | End: 2025-07-02

## 2025-07-02 RX ORDER — CHLORHEXIDINE GLUCONATE ORAL RINSE 1.2 MG/ML
15 SOLUTION DENTAL EVERY 12 HOURS SCHEDULED
Status: DISCONTINUED | OUTPATIENT
Start: 2025-07-02 | End: 2025-07-03 | Stop reason: HOSPADM

## 2025-07-02 RX ORDER — INSULIN LISPRO 100 [IU]/ML
1-6 INJECTION, SOLUTION INTRAVENOUS; SUBCUTANEOUS
Status: DISCONTINUED | OUTPATIENT
Start: 2025-07-02 | End: 2025-07-03 | Stop reason: HOSPADM

## 2025-07-02 RX ORDER — ASPIRIN 81 MG/1
81 TABLET, CHEWABLE ORAL DAILY
Status: DISCONTINUED | OUTPATIENT
Start: 2025-07-02 | End: 2025-07-03 | Stop reason: HOSPADM

## 2025-07-02 RX ORDER — METOCLOPRAMIDE HYDROCHLORIDE 5 MG/ML
10 INJECTION INTRAMUSCULAR; INTRAVENOUS ONCE
Status: COMPLETED | OUTPATIENT
Start: 2025-07-02 | End: 2025-07-02

## 2025-07-02 RX ORDER — SODIUM CHLORIDE 1 G/1
1 TABLET ORAL
Status: DISCONTINUED | OUTPATIENT
Start: 2025-07-03 | End: 2025-07-03 | Stop reason: HOSPADM

## 2025-07-02 RX ORDER — DIPHENHYDRAMINE HYDROCHLORIDE 50 MG/ML
12.5 INJECTION, SOLUTION INTRAMUSCULAR; INTRAVENOUS ONCE
Status: COMPLETED | OUTPATIENT
Start: 2025-07-02 | End: 2025-07-02

## 2025-07-02 RX ORDER — KETOROLAC TROMETHAMINE 30 MG/ML
30 INJECTION, SOLUTION INTRAMUSCULAR; INTRAVENOUS ONCE
Status: COMPLETED | OUTPATIENT
Start: 2025-07-02 | End: 2025-07-02

## 2025-07-02 RX ORDER — SODIUM CHLORIDE 3 G/100ML
50 INJECTION, SOLUTION INTRAVENOUS ONCE
Status: DISCONTINUED | OUTPATIENT
Start: 2025-07-02 | End: 2025-07-02

## 2025-07-02 RX ADMIN — KETOROLAC TROMETHAMINE 30 MG: 30 INJECTION, SOLUTION INTRAMUSCULAR at 14:23

## 2025-07-02 RX ADMIN — KETOROLAC TROMETHAMINE 30 MG: 30 INJECTION, SOLUTION INTRAMUSCULAR at 23:37

## 2025-07-02 RX ADMIN — SODIUM CHLORIDE 100 ML/HR: 4 INJECTION, SOLUTION, CONCENTRATE INTRAVENOUS at 22:19

## 2025-07-02 RX ADMIN — SUCRALFATE 1 G: 1 TABLET ORAL at 12:30

## 2025-07-02 RX ADMIN — INSULIN LISPRO 2 UNITS: 100 INJECTION, SOLUTION INTRAVENOUS; SUBCUTANEOUS at 21:10

## 2025-07-02 RX ADMIN — MONTELUKAST 10 MG: 10 TABLET, FILM COATED ORAL at 21:11

## 2025-07-02 RX ADMIN — POTASSIUM & SODIUM PHOSPHATES POWDER PACK 280-160-250 MG 2 PACKET: 280-160-250 PACK at 14:24

## 2025-07-02 RX ADMIN — GABAPENTIN 100 MG: 100 CAPSULE ORAL at 00:14

## 2025-07-02 RX ADMIN — ACETAMINOPHEN 650 MG: 325 TABLET ORAL at 03:02

## 2025-07-02 RX ADMIN — SUCRALFATE 1 G: 1 TABLET ORAL at 00:14

## 2025-07-02 RX ADMIN — INSULIN LISPRO 1 UNITS: 100 INJECTION, SOLUTION INTRAVENOUS; SUBCUTANEOUS at 07:55

## 2025-07-02 RX ADMIN — GABAPENTIN 100 MG: 100 CAPSULE ORAL at 21:11

## 2025-07-02 RX ADMIN — MONTELUKAST 10 MG: 10 TABLET, FILM COATED ORAL at 00:15

## 2025-07-02 RX ADMIN — METOCLOPRAMIDE 10 MG: 5 INJECTION, SOLUTION INTRAMUSCULAR; INTRAVENOUS at 14:23

## 2025-07-02 RX ADMIN — ASPIRIN 81 MG: 81 TABLET, CHEWABLE ORAL at 12:30

## 2025-07-02 RX ADMIN — AZELASTINE HYDROCHLORIDE 1 SPRAY: 137 SPRAY, METERED NASAL at 17:43

## 2025-07-02 RX ADMIN — HEPARIN SODIUM 5000 UNITS: 5000 INJECTION INTRAVENOUS; SUBCUTANEOUS at 14:23

## 2025-07-02 RX ADMIN — CHLORHEXIDINE GLUCONATE 15 ML: 1.2 SOLUTION ORAL at 21:11

## 2025-07-02 RX ADMIN — INSULIN LISPRO 2 UNITS: 100 INJECTION, SOLUTION INTRAVENOUS; SUBCUTANEOUS at 11:32

## 2025-07-02 RX ADMIN — ATORVASTATIN CALCIUM 40 MG: 40 TABLET, FILM COATED ORAL at 17:43

## 2025-07-02 RX ADMIN — SUCRALFATE 1 G: 1 TABLET ORAL at 21:11

## 2025-07-02 RX ADMIN — CLOPIDOGREL 75 MG: 75 TABLET ORAL at 08:51

## 2025-07-02 RX ADMIN — CITALOPRAM HYDROBROMIDE 10 MG: 20 TABLET ORAL at 08:51

## 2025-07-02 RX ADMIN — DIPHENHYDRAMINE HYDROCHLORIDE 12.5 MG: 50 INJECTION, SOLUTION INTRAMUSCULAR; INTRAVENOUS at 14:23

## 2025-07-02 RX ADMIN — HEPARIN SODIUM 5000 UNITS: 5000 INJECTION INTRAVENOUS; SUBCUTANEOUS at 00:14

## 2025-07-02 RX ADMIN — SODIUM CHLORIDE 50 ML/HR: 4 INJECTION, SOLUTION, CONCENTRATE INTRAVENOUS at 14:56

## 2025-07-02 RX ADMIN — INSULIN GLARGINE 25 UNITS: 100 INJECTION, SOLUTION SUBCUTANEOUS at 00:14

## 2025-07-02 RX ADMIN — B-COMPLEX W/ C & FOLIC ACID TAB 1 TABLET: TAB at 08:51

## 2025-07-02 RX ADMIN — ATORVASTATIN CALCIUM 40 MG: 40 TABLET, FILM COATED ORAL at 00:14

## 2025-07-02 RX ADMIN — LORATADINE 10 MG: 10 TABLET ORAL at 08:51

## 2025-07-02 RX ADMIN — INSULIN LISPRO 2 UNITS: 100 INJECTION, SOLUTION INTRAVENOUS; SUBCUTANEOUS at 15:58

## 2025-07-02 RX ADMIN — CHLORHEXIDINE GLUCONATE 15 ML: 1.2 SOLUTION ORAL at 12:30

## 2025-07-02 RX ADMIN — ACETAMINOPHEN 1000 MG: 10 INJECTION INTRAVENOUS at 14:24

## 2025-07-02 RX ADMIN — SUCRALFATE 1 G: 1 TABLET ORAL at 17:43

## 2025-07-02 RX ADMIN — HEPARIN SODIUM 5000 UNITS: 5000 INJECTION INTRAVENOUS; SUBCUTANEOUS at 06:23

## 2025-07-02 RX ADMIN — SUCRALFATE 1 G: 1 TABLET ORAL at 08:51

## 2025-07-02 RX ADMIN — LISINOPRIL 30 MG: 20 TABLET ORAL at 08:51

## 2025-07-02 RX ADMIN — AZELASTINE HYDROCHLORIDE 1 SPRAY: 137 SPRAY, METERED NASAL at 08:52

## 2025-07-02 RX ADMIN — INSULIN GLARGINE 25 UNITS: 100 INJECTION, SOLUTION SUBCUTANEOUS at 21:11

## 2025-07-02 RX ADMIN — HEPARIN SODIUM 5000 UNITS: 5000 INJECTION INTRAVENOUS; SUBCUTANEOUS at 21:11

## 2025-07-02 NOTE — DISCHARGE INSTRUCTIONS
CT Brain: Hypodensity involving the inferior right cerebellar hemisphere, most compatible with an acute/recent right PICA distribution infarct. No intracranial hemorrhage noted. Recommend for evaluation with brain MRI without contrast.     CT Angiography: New occlusion of the right vertebral artery from its origin, and throughout the V1 segment, with reconstitution at the level of the proximal V2 segment, with mild caliber change/attenuation of the right V2 segment, which may be due to   proximal occlusion, but underlying dissection cannot be excluded.     High-grade stenosis involving the intradural right vertebral artery beyond the origin of the right PICA. Likely patent but attenuated proximal right PICA, but the distal right PICA is likely occluded.     No additional large vessel occlusion, high-grade stenosis, or intracranial aneurysm identified on CT angiogram of the head.     No hemodynamically significant stenosis or dissection identified involving the bilateral cervical internal carotid arteries or left vertebral artery.     Stable stent involving the distal aortic arch and proximal descending thoracic aorta related to prior aortic coarctation repair.

## 2025-07-02 NOTE — CONSULTS
Consultation - Neurology   Name: Jaycee Can 35 y.o. female I MRN: [id number]  Unit/Bed#: -01 I Date of Admission: 7/1/2025   Date of Service: 7/2/2025 I Hospital Day: 1   Consults  Physician Requesting Evaluation: Blair Pimentel MD   Reason for Evaluation / Principal Problem: Right PICA acute infarct    Assessment & Plan  CVA (cerebral vascular accident) (HCC)  Right PICA acute infarct  -Discussed with team, given young age and posterior circulation infarct size, recommend to admit patient to step down level 1 under stroke pathway for close monitoring.  -Will need to closely monitor sign of increased ICP.  -Recommend sodium above 140  -Etiology under investigation, cannot rule out vertebral dissection versus embolic, patient reported family history of stroke at an early age but denied known blood clot disease.  Pending MRI head  -Discussed with the patient, given young age, we will contact comprehensive stroke workup.  Pending echo, will send hypercoagulable panel.  May consider CT chest, abdomen, pelvis to rule out malignancy  -Stroke labs  - Recommend to continue to antiplatelet with aspirin 81 mg and Plavix 75 mg.  Okay to hold her statin given optimal LDL value.  -Repeat CT head scan stat with any acute change.  - Permissive hypertension, 24 hours after onset.  Please avoid hypotension.  Please keep systolic blood pressure above 140.    Neurology will continue to follow.  Hypertension    Mild intermittent asthma with acute exacerbation    GERD (gastroesophageal reflux disease)          Jaycee Can will need follow up in in 4 weeks with neurovascular attending. She will not require outpatient neurological testing.    History of Present Illness   Hx and PE limited by: Telemedicine  Patient last known well: 2 AM 7/1/2025    HPI: Jaycee Can is a 35 y.o.  female with PMH of VSD repair, insulin-dependent diabetes, hypertension, migraines who presents with headache and dizziness x 1 day on 7/1.   Patient was eval by on-call neurologist as a stroke alert.  Patient CT head identified possible cerebellar stroke.  Per record, ED staff discussed with on-call neurologist, who determined patient is not a candidate for TNK/thrombectomy and recommended patient transfer to [facility] for further evaluation.  However, patient was not able to go, as [facility] is at  capacity.  Patient seen and examined today.  Patient neuroexam was intact except for mild right facial asymmetry (patient reported being baseline).  She was alert, awake can answer question and follow commands.  Endorsed headache.  No dysmetria in finger-nose-finger/heel knee heel bilaterally.  Full strength throughout.  Denies any focal sensory deficit.  Language intact  Reported last known normal was 2 AM on 7/1.  She then woke up in the morning with a headache and nausea vomiting.    Review of Systems  10 system reviewed, unremarkable other than above    Historical Information   Past Medical History[1]  Past Surgical History[2]  Social History[3]  E-Cigarette/Vaping    E-Cigarette Use Never User      E-Cigarette/Vaping Substances    Nicotine No     THC No     CBD No     Flavoring No      Family history non-contributory    Objective :  Temp:  [96.9 °F (36.1 °C)-98.3 °F (36.8 °C)] 98.3 °F (36.8 °C)  HR:  [83-99] 90  BP: (122-187)/() 136/78  Resp:  [18-20] 18  SpO2:  [94 %-100 %] 97 %  O2 Device: None (Room air)    Physical ExamNeurological Exam  GEN: in no acute distress, well-developed, well nourished  HEENT: normocephalic,  Nose and ears grossly normal in appearance.  CV:  no pedal edema.  Normotensive  PULM: airways patent, non-labored breathing   ABD:  Nondistended  EXT: no   edema or erythema.  No joint swelling  SKIN: no rashes or lesions.     NEURO:        Mental Status: Alert and oriented to person, place, and year. Interactive, able to follow commands.  Answers questions appropriately       Speech: Intact Articulation         CN  2-12: grossly intact       Motor: can move all extremities symmetrically      Sensory:  Reported intact light touch and pinprick throughout        Reflexes:  Not able to assess during tele visit       Coordination: no ataxia with finger-to-nose and heel-to-shin testing             Gait/Station: Deferred        Cortical: No Extinction   NIHSS:  1a.Level of Consciousness: 0 = Alert   1b. LOC Questions: 0 = Answers both correctly   1c. LOC Commands: 0 = Obeys both correctly   2. Best Gaze: 0 = Normal   3. Visual: 0 = No visual field loss   4. Facial Palsy: 0=Normal symmetric movement   5a. Motor Right Arm: 0=No drift, limb holds 90 (or 45) degrees for full 10 seconds   5b. Motor Left Arm: 0=No drift, limb holds 90 (or 45) degrees for full 10 seconds   6a. Motor Right Le=No drift, limb holds 90 (or 45) degrees for full 10 seconds   6b. Motor Left Le=No drift, limb holds 90 (or 45) degrees for full 10 seconds   7. Limb Ataxia:  0=Absent   8. Sensory: 0=Normal; no sensory loss   9. Best Language:  0=No aphasia, normal   10. Dysarthria: 0=Normal articulation   11. Extinction and Inattention (formerly Neglect): 0=No abnormality   Total Score: 0       Modified Neversink Score:  0 (No baseline symptoms/disability)      Lab Results: I have reviewed the following results:CBC/BMP:   .     25  0458   WBC 8.50   HGB 12.9   HCT 39.8      SODIUM 133*   K 3.6      CO2 24   BUN 11   CREATININE 0.53*   GLUC 198*    , Creatinine Clearance: Estimated Creatinine Clearance: 157.4 mL/min (A) (by C-G formula based on SCr of 0.53 mg/dL (L))., PTT/INR:No new results in last 24 hours. , Lipid Profile:   Results from last 7 days   Lab Units 25  0458   HDL mg/dL 31*   LDL CALC mg/dL 53   TRIGLYCERIDES mg/dL 351*       Imaging Results Review: I personally reviewed the following image studies in PACS and associated radiology reports: CT head and CT a head and neck, MRI brain without. My interpretation of the radiology  general images/reports is: MRI brain without demonstrated a right PICA acute infarct.  CTA head and neck demonstrated right vertebral artery occlusion with distal reconstitution, possible PICA occlusion..      VTE Prophylaxis: Heparin      Administrative Statements   VIRTUAL CARE DOCUMENTATION:     1. This service was provided via Telemedicine using [employer]     2. Parties in the room with patient during teleconsult Patient only    3. Confidentiality My office door was closed     4. Participants No one else was in the room    5. Patient acknowledged consent and understanding of privacy and security of the  Telemedicine consult. I informed the patient that I have reviewed their record in Epic and presented the opportunity for them to ask any questions regarding the visit today.  The patient agreed to participate.    6. I have spent a total time of 51 minutes in caring for this patient on the day of the visit/encounter including Risks and benefits of tx options, Impressions, Counseling / Coordination of care, Documenting in the medical record, Reviewing/placing orders in the medical record (including tests, medications, and/or procedures), Obtaining or reviewing history  , and Communicating with other healthcare professionals , not including the time spent for establishing the audio/video connection.             [1]   Past Medical History:  Diagnosis Date    Allergic     Arthritis     Asthma     Bipolar affective disorder, currently depressed, moderate ([facility]) 12/17/2018    Chest pain, unspecified 07/24/2019    Cyst of ovary, right     Depression     Diabetes mellitus ([facility]) 10/10/2018    type 2    Endometriosis     Fractures 08/25/24    My right hand    Heart murmur 1988    Hepatitis C     Hepatitis C virus infection cured after antiviral drug therapy     History of transfusion     Hypertension     Infectious viral hepatitis 07/30/01    Migraines     Morbid obesity with BMI of 40.0-44.9, adult ([facility])     Obesity 1995     Pulmonary artery congenital abnormality     Spleen enlarged     Status post surgical removal of both fallopian tubes     Varicella    [2]   Past Surgical History:  Procedure Laterality Date    CARDIAC CATHETERIZATION      no CAD 10days, 4 weeks 22months old     CARDIAC CATHETERIZATION N/A 2023    Procedure: Cardiac Coronary Angiogram;  Surgeon: Marcela Lr DO;  Location: [facility];  Service: Cardiology    CARDIAC CATHETERIZATION Left 2023    Procedure: Cardiac Left Heart Cath;  Surgeon: Marcela Lr DO;  Location: [facility];  Service: Cardiology    CARDIAC CATHETERIZATION  2023    Procedure: Cardiac catheterization;  Surgeon: Marcela Lr DO;  Location: [facility];  Service: Cardiology     SECTION, LOW TRANSVERSE      CHOLECYSTECTOMY      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 5    CORONARY STENT PLACEMENT      IR LOWER EXTREMITY ANGIOGRAM  2023    LIVER BIOPSY      LIVER BIOPSY      PERIPHERAL ANGIOGRAM  21    STERNAL WIRE REMOVAL  2022    THROMBECTOMY W/ EMBOLECTOMY Right 2023    Procedure: Right femoral exposure Right iliac embolectomy w/ #4 Ted catheter Aortogram Right EIA stent w/ 7x29mm VBX;  Surgeon: Jody Hodges MD;  Location: [facility];  Service: Vascular    TUBAL LIGATION Bilateral     VSD REPAIR      As a child    WOUND DEBRIDEMENT Right 2023    Procedure: Right Groin Wound Washout, Pulse Lavage, Wound Vac placement;  Surgeon: Jelani Avila DO;  Location: [facility];  Service: Vascular   [3]   Social History  Tobacco Use    Smoking status: Every Day     Current packs/day: 0.00     Average packs/day: 0.5 packs/day for 10.0 years (5.0 ttl pk-yrs)     Types: Cigarettes     Start date: 2013     Last attempt to quit: 2023     Years since quittin.4    Smokeless tobacco: Never   Vaping Use    Vaping status: Never Used   Substance and Sexual Activity    Alcohol use: Yes      Alcohol/week: 3.0 standard drinks of alcohol     Types: 3 Standard drinks or equivalent per week     Comment: I drink socially    Drug use: Not Currently     Comment: hx of THC use for migraines    Sexual activity: Yes     Partners: Male     Birth control/protection: Female Sterilization      general

## 2025-07-02 NOTE — UTILIZATION REVIEW
"Initial Clinical Review    Admission: Date/Time/Statement:   Admission Orders (From admission, onward)       Ordered        07/01/25 2306  INPATIENT ADMISSION  Once                          Orders Placed This Encounter   Procedures    INPATIENT ADMISSION     Standing Status:   Standing     Number of Occurrences:   1     Level of Care:   Med Surg [16]     Bed request comments:   tele     Estimated length of stay:   More than 2 Midnights     Certification:   I certify that inpatient services are medically necessary for this patient for a duration of greater than two midnights. See H&P and MD Progress Notes for additional information about the patient's course of treatment.     ED Arrival Information       Expected   -    Arrival   7/1/2025 18:27    Acuity   Urgent              Means of arrival   Walk-In    Escorted by   Self    Service   Critical Care/ICU    Admission type   Emergency              Arrival complaint   Dizziness             Chief Complaint   Patient presents with    Dizziness     Reports she woke up with it. Went to sleep at 1500. Reports H/A; N/V        Initial Presentation: 35 y.o. female  with a PMH of VSD repair, insulin-dependent diabetes, hypertension, migraines, who presented from home to [facility] ED. Admitted as Inpatient for evaluation and treatment of CVA     Presented w/ headache and dizziness which began @ 8am yesterday. On exam, No focal deficit present. Labs Glucose 203, CRP 13.3. CT imaging revealed \"Hypodensity involving the inferior right cerebellar hemisphere, most compatible with an acute/recent right PICA distribution infarct\" additionally \"New occlusion of the right vertebral artery from its origin\" Please see below med list for meds given in the ED.     Plan: Transfer to [facility] once bed avail, Neuro checks, allow permissive HTN, Plavix, ASA and Lipitor, Obtain MRI and Echo. Hold prehospital oral antihyperglycemic's. Continue Lantus, Accu-checks AC/HS w/ Humalog " correction dose AC and at bedtime.   Neurology consulted.    Neurology: Stroke Alert. Not a TNK Candidate given symptoms were outside the window. With her right vertebral artery occlusion and NIHSS of 0, she may need to be transferred to [place] in case of worsening symptoms but no beds were available, so patient stayed at [place] overnight, since NIHSS of 0.      Anticipated Length of Stay/Certification Statement: Patient will be admitted on an inpatient basis with an anticipated length of stay of greater than 2 midnights secondary to acute CVA with right vertebral artery occlusion requiring continued neurological monitoring and IR evaluation.     Date: 7/2/25   Day 2:   She reports feeling a little bit better, she denies any chest pain, nausea, vomiting, diarrhea, fevers, chills, palpitations, shortness of breath, numbness, and/or tingling.  She continues to feel weak. On exam, no focal deficits present. Abnormal labs: Na 133, Creat 0.53, Glucose 198, Triglycerides 351, HDL 31, A1C 9.4. Plan: Pt upgraded ICU for closer monitoring, BP control and for target Na level to be of greater than 140. Frequent neuro checks, MRI brain pending, ASA, Statin, Plavix, give hypertonic saline bolus for goal Na > 140 per Neurology's recommendations, however will recheck labs now including serum osmo - serial labs checks thereafter. PRN pain medications/HA cocktail for persistent headache.  Pt remains on the transfer list to be transition to the [facility].     ED Treatment-Medication Administration from 07/01/2025 1827 to 07/01/2025 4735         Date/Time Order Dose Route Action     07/01/2025 1914 multi-electrolyte (Plasmalyte-A/Isolyte-S PH 7.4/Normosol-R) IV bolus 1,000 mL 1,000 mL Intravenous New Bag     07/01/2025 1914 magnesium sulfate 2 g/50 mL IVPB (premix) 2 g 2 g Intravenous New Bag     07/01/2025 1916 metoclopramide (REGLAN) injection 10 mg 10 mg Intravenous Given     07/01/2025 1907 ketorolac (TORADOL) injection  15 mg 15 mg Intravenous Given     07/01/2025 1910 diazepam (VALIUM) injection 2.5 mg 2.5 mg Intravenous Given     07/01/2025 1909 diphenhydrAMINE (BENADRYL) injection 12.5 mg 12.5 mg Intravenous Given     07/01/2025 1906 meclizine (ANTIVERT) tablet 25 mg 25 mg Oral Given     07/01/2025 2002 iohexol (OMNIPAQUE) 350 MG/ML injection (MULTI-DOSE) 85 mL 85 mL Intravenous Given     07/01/2025 2048 clopidogrel (PLAVIX) tablet 300 mg 300 mg Oral Given     07/01/2025 2048 aspirin chewable tablet 324 mg 324 mg Oral Given            Scheduled Medications:  aspirin, 81 mg, Oral, Daily  atorvastatin, 40 mg, Oral, QPM  azelastine, 1 spray, Each Nare, BID  chlorhexidine, 15 mL, Mouth/Throat, Q12H JUN  citalopram, 10 mg, Oral, Daily  clopidogrel, 75 mg, Oral, Daily  gabapentin, 100 mg, Oral, HS  heparin (porcine), 5,000 Units, Subcutaneous, Q8H JUN  insulin glargine, 25 Units, Subcutaneous, HS  insulin lispro, 1-6 Units, Subcutaneous, 4x Daily (AC & HS)  loratadine, 10 mg, Oral, Daily  montelukast, 10 mg, Oral, HS  multivitamin stress formula, 1 tablet, Oral, Daily  sucralfate, 1 g, Oral, 4x Daily  acetaminophen (Ofirmev) injection 1,000 mg  Dose: 1,000 mg  Freq: Once Route: IV  Start: 07/02/25 1345  ketorolac (TORADOL) injection 30 mg  Dose: 30 mg  Freq: Once Route: IV  Start: 07/02/25 1345 End: 07/07/25 1344   lisinopril (ZESTRIL) tablet 30 mg  Dose: 30 mg  Freq: Daily Route: PO  Start: 07/02/25 0900 End: 07/02/25 1123  metoclopramide (REGLAN) injection 10 mg  Dose: 10 mg  Freq: Once Route: IV  Start: 07/02/25 1345    Continuous IV Infusions:   sodium chloride (HYPERTONIC) infusion 1.8%  Rate: 50 mL/hr Dose: 50 mL/hr  Freq: Continuous Route: IV  Start: 07/02/25 1400       PRN Meds:  acetaminophen, 650 mg, Oral, Q4H PRN- given x1 7/2  albuterol, 2 puff, Inhalation, Q4H PRN  ondansetron, 4 mg, Intravenous, Q6H PRN      ED Triage Vitals   Temperature Pulse Respirations Blood Pressure SpO2 Pain Score   07/01/25 1840 07/01/25 1840  07/01/25 1840 07/01/25 1840 07/01/25 1840 07/01/25 1907   (!) 97.2 °F (36.2 °C) 96 18 168/97 97 % 7     Weight (last 2 days)       Date/Time Weight    07/02/25 1000 99.8 (220)    07/01/25 2328 100 (220.57)            Vital Signs (last 3 days)       Date/Time Temp Pulse Resp BP MAP (mmHg) SpO2 O2 Device Patient Position - Orthostatic VS Ivan Coma Scale Score Pain    07/02/25 1223 -- -- -- -- -- -- -- -- 15 --    07/02/25 1030 97.4 °F (36.3 °C) 88 18 168/102 124 96 % -- Lying -- --    07/02/25 1000 -- 90 -- 136/78 -- 97 % -- -- -- --    07/02/25 0900 -- -- -- -- -- -- -- -- 15 No Pain    07/02/25 0830 97.6 °F (36.4 °C) 88 17 119/63 82 95 % -- Lying -- --    07/02/25 0630 98.3 °F (36.8 °C) 83 18 136/78 97 -- -- Lying 15 --    07/02/25 0430 98.3 °F (36.8 °C) 90 18 133/79 97 94 % None (Room air) Lying 15 --    07/02/25 0302 -- -- -- -- -- -- -- -- -- 8    07/02/25 02:30:02 98.1 °F (36.7 °C) 94 18 133/85 101 96 % -- Lying -- --    07/02/25 0230 -- -- -- -- -- -- -- -- 15 --    07/02/25 0130 97.9 °F (36.6 °C) 91 -- 129/78 95 96 % -- Lying 15 --    07/02/25 0030 98.1 °F (36.7 °C) 92 -- 135/88 104 96 % -- -- 15 --    07/01/25 2345 -- -- -- -- -- 97 % None (Room air) -- 15 --    07/01/25 2342 -- -- -- -- -- -- -- -- -- 5 07/01/25 2330 98 °F (36.7 °C) 91 -- 159/97 118 98 % None (Room air) Sitting -- --    07/01/25 23:17:09 -- 96 20 158/77 -- 98 % -- -- -- --    07/01/25 22:45:24 -- 92 20 163/76 -- 98 % -- -- -- --    07/01/25 2230 -- 92 -- 187/91 131 98 % None (Room air) -- 15 7    07/01/25 22:20:25 -- 89 20 187/91 -- 99 % -- -- -- --    07/01/25 2215 -- 89 -- 186/102 -- 99 % -- -- -- --    07/01/25 2200 -- 93 -- -- -- 98 % -- -- 15 7    07/01/25 2145 -- 96 18 172/81 -- 98 % None (Room air) -- -- --    07/01/25 2130 -- 97 18 181/83 -- 99 % None (Room air) -- 15 --    07/01/25 2115 -- 91 18 182/86 -- 100 % None (Room air) Lying 15 --    07/01/25 21:00:54 -- 91 18 184/82 -- 98 % None (Room air) -- 15 --    07/01/25  20:45:22 -- 88 18 167/81 -- 99 % None (Room air) -- 15 --    07/01/25 20:34:41 -- 96 18 179/81 -- 100 % None (Room air) -- 15 --    07/01/25 1907 -- -- -- -- -- -- -- -- -- 7    07/01/25 1840 97.2 °F (36.2 °C) 96 18 168/97 124 97 % -- -- -- --              Pertinent Labs/Diagnostic Test Results:   Radiology:  MRA head wo contrast   Final Interpretation by Vince Bender DO (07/02 1245)      Focal severe stenosis versus occlusion of the midportion of the intracranial right vertebral artery reconstitutes distally. This is similar to the prior CT angiogram.               Workstation performed: [id number]         MRI brain wo contrast   Final Interpretation by Vince Bender DO (07/02 1243)      Recent infarct right inferior cerebellum without evidence of hemorrhage.                  Workstation performed: [id number]         CTA head and neck with and without contrast   Final Interpretation by Shaila Mays MD (07/01 2026)   CT Brain: Hypodensity involving the inferior right cerebellar hemisphere, most compatible with an acute/recent right PICA distribution infarct. No intracranial hemorrhage noted. Recommend for evaluation with brain MRI without contrast.      CT Angiography: New occlusion of the right vertebral artery from its origin, and throughout the V1 segment, with reconstitution at the level of the proximal V2 segment, with mild caliber change/attenuation of the right V2 segment, which may be due to    proximal occlusion, but underlying dissection cannot be excluded.      High-grade stenosis involving the intradural right vertebral artery beyond the origin of the right PICA. Likely patent but attenuated proximal right PICA, but the distal right PICA is likely occluded.      No additional large vessel occlusion, high-grade stenosis, or intracranial aneurysm identified on CT angiogram of the head.      No hemodynamically significant stenosis or dissection identified involving the bilateral cervical  internal carotid arteries or left vertebral artery.      Stable stent involving the distal aortic arch and proximal descending thoracic aorta related to prior aortic coarctation repair.         I personally discussed this study with KESHAWN LANDA on 7/1/2025 at 8:20 p.m.                        Workstation performed: [id number]           Cardiology:  Echo complete w/ contrast if indicated   Final Result by Connor Estevez DO (07/02 1119)        Left Ventricle: Left ventricular cavity size is normal. Wall thickness    is mildly increased. The left ventricular ejection fraction is 55%.    Systolic function is normal. Although no diagnostic regional wall motion    abnormality was identified, this possibility cannot be completely excluded    on the basis of this study. Diastolic function is normal.     Atrial Septum: No significant patent foramen ovale was appreciated, at    rest using agitated saline contrast, or by provocation with valsalva,    using agitated saline contrast.         ECG 12 lead   Final Result by Connor Estevez DO (07/02 0551)   Normal sinus rhythm   Right bundle branch block   Abnormal ECG   When compared with ECG of 30-Nov-2024 10:13,   No significant change was found   Confirmed by Connor Estevez [id number]) on 7/2/2025 5:51:19 AM            Results from last 7 days   Lab Units 07/02/25  0458 07/01/25  1900   WBC Thousand/uL 8.50 9.62   HEMOGLOBIN g/dL 12.9 14.3   HEMATOCRIT % 39.8 44.7   PLATELETS Thousands/uL 216 242   TOTAL NEUT ABS Thousands/µL  --  5.68         Results from last 7 days   Lab Units 07/02/25  1216 07/02/25  0458 07/01/25  1900   SODIUM mmol/L 135 133* 135   POTASSIUM mmol/L 3.7 3.6 4.3   CHLORIDE mmol/L 105 103 101   CO2 mmol/L 24 24 27   ANION GAP mmol/L 6 6 7   BUN mg/dL 9 11 14   CREATININE mg/dL 0.49* 0.53* 0.78   EGFR ml/min/1.73sq m 124 121 97   CALCIUM mg/dL 8.5 8.7 9.3   CALCIUM, IONIZED mmol/L 1.14  --   --    MAGNESIUM mg/dL 1.9  --   --    PHOSPHORUS mg/dL 2.4*  --   --       Results from last 7 days   Lab Units 07/01/25  1900   AST U/L 19   ALT U/L 20   ALK PHOS U/L 69   TOTAL PROTEIN g/dL 7.0   ALBUMIN g/dL 4.0   TOTAL BILIRUBIN mg/dL 0.40     Results from last 7 days   Lab Units 07/02/25  1131 07/02/25  0715   POC GLUCOSE mg/dl 218* 170*     Results from last 7 days   Lab Units 07/02/25  1216 07/02/25  0458 07/01/25  1900   GLUCOSE RANDOM mg/dL 169* 198* 203*         Results from last 7 days   Lab Units 07/02/25  0458   HEMOGLOBIN A1C % 9.4*   EAG mg/dl 223     Beta- Hydroxybutyrate   Date Value Ref Range Status   08/23/2024 0.07 0.02 - 0.27 mmol/L Final     BETA-HYDROXYBUTYRATE   Date Value Ref Range Status   03/08/2024 0.1 <0.6 mmol/L Final     Results from last 7 days   Lab Units 07/02/25  1216   PROTIME seconds 13.0   INR  0.93   PTT seconds 25     Results from last 7 days   Lab Units 07/01/25  1900   CRP mg/L 13.3*   SED RATE mm/hour 27*       Past Medical History[1]  Present on Admission:   Morbid obesity (HCC)   Hypertension   Mild intermittent asthma with acute exacerbation   GERD (gastroesophageal reflux disease)   CVA (cerebral vascular accident) (HCC)   Chronic hepatitis C without hepatic coma (HCC)   Anxiety and depression   Acute headache   Neuropathy      Admitting Diagnosis: Vertigo [R42]  Dizzy [R42]  Right-sided headache [R51.9]  Cerebellar stroke, acute (HCC) [I63.9]  Ischemic stroke (HCC) [I63.9]  Age/Sex: 35 y.o. female    Network Utilization Review Department  ATTENTION: Please call with any questions or concerns to [phone] and carefully listen to the prompts so that you are directed to the right person. All voicemails are confidential.   For Discharge needs, contact Care Management DC Support Team at [phone] opt. 2  Send all requests for admission clinical reviews, approved or denied determinations and any other requests to dedicated fax number below belonging to the campus where the patient is receiving treatment. List of dedicated fax numbers for  the Facilities:  FACILITY NAME UR FAX NUMBER   ADMISSION DENIALS (Administrative/Medical Necessity) [phone]   DISCHARGE SUPPORT TEAM (NETWORK) [phone]   PARENT CHILD HEALTH (Maternity/NICU/Pediatrics) [phone]   [facility] [phone]   [facility] [phone]   [facility] [phone]   [facility] [phone]   [facility] [phone]   [facility] [phone]   [facility] [phone]   [facility] [phone]   [facility] [phone]   [facility] [phone]   [facility] [phone]   [facility] [phone]              [1]   Past Medical History:  Diagnosis Date    Allergic     Arthritis     Asthma     Bipolar affective disorder, currently depressed, moderate (HCC) 12/17/2018    Chest pain, unspecified 07/24/2019    Cyst of ovary, right     Depression     Diabetes mellitus (HCC) 10/10/2018    type 2    Endometriosis     Fractures 08/25/24    My right hand    Heart murmur 1988    Hepatitis C     Hepatitis C virus infection cured after antiviral drug therapy     History of transfusion     Hypertension     Infectious viral hepatitis 07/30/01    Migraines     Morbid obesity with BMI of 40.0-44.9, adult (HCC)     Obesity 1995    Pulmonary artery congenital abnormality     Spleen enlarged     Status post surgical removal of both fallopian tubes     Varicella

## 2025-07-02 NOTE — ASSESSMENT & PLAN NOTE
In setting of new R PICA infarct w/R vertebral artery occlusion as noted above  Treat CVA as noted above  Will give headache cocktail and IV Toradol/APAP - could also consider Fioricet if persistent  Monitor for worsening characteristics

## 2025-07-02 NOTE — ASSESSMENT & PLAN NOTE
Unfortunately received home Lisinopril this AM - would hold on further dosing to allow for permissive HTN  Trend BPs closely

## 2025-07-02 NOTE — ASSESSMENT & PLAN NOTE
Underwent cardiac cath w/[facility] in 2023, which was c/b R femoral artery thrombosis - required R femoral artery exposure, R iliac embolectomy, and REIA stent in 01/2023, which was further c/b R groin infection requiring washout and wound vac placement in 02/2023  Now fully recovered at this point

## 2025-07-02 NOTE — ASSESSMENT & PLAN NOTE
"Lab Results   Component Value Date    HGBA1C 10.5 (H) 04/07/2025       No results for input(s): \"POCGLU\" in the last 72 hours.    Blood Sugar Average: Last 72 hrs:    Please on [facility] step 2 diet  Hold prehospital oral antihyperglycemic's  Continue prehospital Lantus 25 units SQ nightly  Obtain Accu-Cheks AC and at bedtime with Humalog correction dose AC and at bedtime  "

## 2025-07-02 NOTE — CASE MANAGEMENT
Case Management Discharge Planning Note    Patient name Jaycee Can  Location /-01 MRN [id number]  : 1990 Date 2025       Current Admission Date: 2025  Current Admission Diagnosis:CVA (cerebral vascular accident) (HCC)   Patient Active Problem List    Diagnosis Date Noted    CVA (cerebral vascular accident) (HCC) 2025    Dyspnea, unspecified type 2025    Mild intermittent asthma with acute exacerbation 2025    Gait difficulty 2023    Fungal infection of skin 2023    Closed nondisplaced fracture of proximal phalanx of lesser toe of left foot 2023    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HCC) 2023    Continuous opioid dependence (HCC) 2023    Arterial occlusion 2023    Depression 2022    Anxiety 2022    Neuropathy 2022    Multiple thyroid nodules 2021    Chronic hepatitis C without hepatic coma (HCC)     VSD (ventricular septal defect) and coarctation of aorta 07/15/2021    GERD (gastroesophageal reflux disease) 2020    Bipolar disorder (HCC) 01/10/2020    Type 2 diabetes mellitus without complication, with long-term current use of insulin (HCC) 2019    Cyst of right ovary 10/10/2019    Endometriosis 2019    Hidradenitis suppurativa 2019    Aortic coarctation 2019    Cervicalgia 2019    Cervical dystonia 2019    Splenomegaly 10/12/2018    Hepatitis C 10/12/2018    Hypertension 10/12/2018    H/O aortic coarctation repair 2018    Morbid obesity (HCC) 2018      LOS (days): 1  Geometric Mean LOS (GMLOS) (days):   Days to GMLOS:     OBJECTIVE:  Risk of Unplanned Readmission Score: 13.8         Current admission status: Inpatient   Preferred Pharmacy:   [id number]  [place] - [address]  [address] [place] [address]  Phone: [phone] Fax: [phone]    Primary Care Provider: Miri Webb  REJI    Primary Insurance: [employer]  Secondary Insurance:     DISCHARGE DETAILS:    Additional Comments: Chart reviewed for discharge planning. Pt pending transfer to [facility] for higher LOC. CM to follow as needed.

## 2025-07-02 NOTE — EMTALA/ACUTE CARE TRANSFER
[facility] EMERGENCY DEPARTMENT  [address] [place] [address]  Dept: [phone]      EMTALA TRANSFER CONSENT    NAME Jaycee Can                                         1990                              MRN [id number]    I have been informed of my rights regarding examination, treatment, and transfer   by Dr. Soila Guardado,*    Benefits: Specialized equipment and/or services available at the receiving facility (Include comment)________________________ (Interventional neuroradiology)    Risks: Potential for delay in receiving treatment, Potential deterioration of medical condition, Loss of IV, Increased discomfort during transfer, Possible worsening of condition or death during transfer      Consent for Transfer:  I acknowledge that my medical condition has been evaluated and explained to me by the emergency department physician or other qualified medical person and/or my attending physician, who has recommended that I be transferred to the service of  Accepting Physician: Dr. Durand at Accepting Facility Name, City & State : [facility]. The above potential benefits of such transfer, the potential risks associated with such transfer, and the probable risks of not being transferred have been explained to me, and I fully understand them.  The doctor has explained that, in my case, the benefits of transfer outweigh the risks.  I agree to be transferred.    I authorize the performance of emergency medical procedures and treatments upon me in both transit and upon arrival at the receiving facility.  Additionally, I authorize the release of any and all medical records to the receiving facility and request they be transported with me, if possible.  I understand that the safest mode of transportation during a medical emergency is an ambulance and that the Hospital advocates the use of this mode of transport. Risks of traveling to the receiving facility by car,  including absence of medical control, life sustaining equipment, such as oxygen, and medical personnel has been explained to me and I fully understand them.    (SARY CORRECT BOX BELOW)  [  ]  I consent to the stated transfer and to be transported by ambulance/helicopter.  [  ]  I consent to the stated transfer, but refuse transportation by ambulance and accept full responsibility for my transportation by car.  I understand the risks of non-ambulance transfers and I exonerate the Hospital and its staff from any deterioration in my condition that results from this refusal.    X___________________________________________    DATE  25  TIME________  Signature of patient or legally responsible individual signing on patient behalf           RELATIONSHIP TO PATIENT_________________________          Provider Certification    NAME Jaycee Can                                         1990                              MRN [id number]    A medical screening exam was performed on the above named patient.  Based on the examination:    Condition Necessitating Transfer The primary encounter diagnosis was Ischemic stroke (HCC). Diagnoses of Cerebellar stroke, acute (HCC), Right-sided headache, and Vertigo were also pertinent to this visit.    Patient Condition: The patient has been stabilized such that within reasonable medical probability, no material deterioration of the patient condition or the condition of the unborn child(mayda) is likely to result from the transfer    Reason for Transfer: Level of Care needed not available at this facility, Patient/Family request    Transfer Requirements: Facility [facility]   Space available and qualified personnel available for treatment as acknowledged by    Agreed to accept transfer and to provide appropriate medical treatment as acknowledged by       Dr. Durand  Appropriate medical records of the examination and treatment of the patient are provided at the time of  transfer   STAFF INITIAL WHEN COMPLETED _______  Transfer will be performed by qualified personnel from    and appropriate transfer equipment as required, including the use of necessary and appropriate life support measures.    Provider Certification: I have examined the patient and explained the following risks and benefits of being transferred/refusing transfer to the patient/family:  General risk, such as traffic hazards, adverse weather conditions, rough terrain or turbulence, possible failure of equipment (including vehicle or aircraft), or consequences of actions of persons outside the control of the transport personnel, Unanticipated needs of medical equipment and personnel during transport, Risk of worsening condition, The possibility of a transport vehicle being unavailable      Based on these reasonable risks and benefits to the patient and/or the unborn child(mayda), and based upon the information available at the time of the patient’s examination, I certify that the medical benefits reasonably to be expected from the provision of appropriate medical treatments at another medical facility outweigh the increasing risks, if any, to the individual’s medical condition, and in the case of labor to the unborn child, from effecting the transfer.    X____________________________________________ DATE 07/01/25        TIME_______      ORIGINAL - SEND TO MEDICAL RECORDS   COPY - SEND WITH PATIENT DURING TRANSFER

## 2025-07-02 NOTE — EMTALA/ACUTE CARE TRANSFER
[facility]  [address] [place] [address]  Dept: [phone]      ACUTE CARE TRANSFER CONSENT    NAME Jaycee Can                                         1990                              MRN [id number]    I have been informed of my rights regarding examination, treatment, and transfer   by Dr. Arelis Gan DO    Benefits: Continuity of care, Specialized equipment and/or services available at the receiving facility (Include comment)________________________ (Needs Neurology/Neurosurgical evaluation)    Risks: Potential for delay in receiving treatment, Potential deterioration of medical condition, Loss of IV, Possible worsening of condition or death during transfer, Increased discomfort during transfer      Consent for Transfer:  I acknowledge that my medical condition has been evaluated and explained to me by the treating physician or other qualified medical person and/or my attending physician, who has recommended that I be transferred to the service of  Accepting Physician: Dr. Durand at Accepting Facility Name, City & State : [place], [place], [place]. The above potential benefits of such transfer, the potential risks associated with such transfer, and the probable risks of not being transferred have been explained to me, and I fully understand them.  The doctor has explained that, in my case, the benefits of transfer outweigh the risks.  I agree to be transferred.    I authorize the performance of emergency medical procedures and treatments upon me in both transit and upon arrival at the receiving facility.  Additionally, I authorize the release of any and all medical records to the receiving facility and request they be transported with me, if possible.  I understand that the safest mode of transportation during a medical emergency is an ambulance and that the Hospital advocates the use of this mode of transport. Risks of traveling to the receiving  facility by car, including absence of medical control, life sustaining equipment, such as oxygen, and medical personnel has been explained to me and I fully understand them.    (SARY CORRECT BOX BELOW)  [X]  I consent to the stated transfer and to be transported by ambulance/helicopter.  [  ]  I consent to the stated transfer, but refuse transportation by ambulance and accept full responsibility for my transportation by car.  I understand the risks of non-ambulance transfers and I exonerate the Hospital and its staff from any deterioration in my condition that results from this refusal.    X_Verbal consent from patient___    DATE  25  TIME_1152_          Provider Certification    NAME Jaycee Can                                         1990                              MRN [id number]    A medical screening exam was performed on the above named patient.  Based on the examination:    Condition Necessitating Transfer Requires Neurology/Neurosurgical evaluation    Patient Condition: The patient has been stabilized such that within reasonable medical probability, no material deterioration of the patient condition or the condition of the unborn child(mayda) is likely to result from the transfer    Reason for Transfer: No bed available at level of patient's needs, Other (Include comment)____________________ (Needs Neurology/Neurosurgical evaluation)    Transfer Requirements: Facility [facility], [place], [place]   Space available and qualified personnel available for treatment as acknowledged by Mirian Beasley  Agreed to accept transfer and to provide appropriate medical treatment as acknowledged by       Dr. Durand  Appropriate medical records of the examination and treatment of the patient are provided at the time of transfer   STAFF INITIAL WHEN COMPLETED _______  Transfer will be performed by qualified personnel from    and appropriate transfer equipment as required, including the use of necessary and appropriate  life support measures.    Provider Certification: I have examined the patient and explained the following risks and benefits of being transferred/refusing transfer to the patient/family:  General risk, such as traffic hazards, adverse weather conditions, rough terrain or turbulence, possible failure of equipment (including vehicle or aircraft), or consequences of actions of persons outside the control of the transport personnel, Unanticipated needs of medical equipment and personnel during transport, Risk of worsening condition, The possibility of a transport vehicle being unavailable      Based on these reasonable risks and benefits to the patient and/or the unborn child(mayda), and based upon the information available at the time of the patient’s examination, I certify that the medical benefits reasonably to be expected from the provision of appropriate medical treatments at another medical facility outweigh the increasing risks, if any, to the individual’s medical condition, and in the case of labor to the unborn child, from effecting the transfer.    X___REJI Teixeira_ DATE 07/02/25        TIME_1152__      ORIGINAL - SEND TO MEDICAL RECORDS   COPY - SEND WITH PATIENT DURING TRANSFER

## 2025-07-02 NOTE — PLAN OF CARE
Problem: PAIN - ADULT  Goal: Verbalizes/displays adequate comfort level or baseline comfort level  Description: Interventions:  - Encourage patient to monitor pain and request assistance  - Assess pain using appropriate pain scale  - Administer analgesics as ordered based on type and severity of pain and evaluate response  - Implement non-pharmacological measures as appropriate and evaluate response  - Consider cultural and social influences on pain and pain management  - Notify physician/advanced practitioner if interventions unsuccessful or patient reports new pain  - Educate patient/family on pain management process including their role and importance of  reporting pain   - Provide non-pharmacologic/complimentary pain relief interventions  Outcome: Progressing     Problem: INFECTION - ADULT  Goal: Absence or prevention of progression during hospitalization  Description: INTERVENTIONS:  - Assess and monitor for signs and symptoms of infection  - Monitor lab/diagnostic results  - Monitor all insertion sites, i.e. indwelling lines, tubes, and drains  - Monitor endotracheal if appropriate and nasal secretions for changes in amount and color  - [place] appropriate cooling/warming therapies per order  - Administer medications as ordered  - Instruct and encourage patient and family to use good hand hygiene technique  - Identify and instruct in appropriate isolation precautions for identified infection/condition  Outcome: Progressing  Goal: Absence of fever/infection during neutropenic period  Description: INTERVENTIONS:  - Monitor WBC  - Perform strict hand hygiene  - Limit to healthy visitors only  - No plants, dried, fresh or silk flowers with acuna in patient room  Outcome: Progressing     Problem: SAFETY ADULT  Goal: Patient will remain free of falls  Description: INTERVENTIONS:  - Educate patient/family on patient safety including physical limitations  - Instruct patient to call for assistance with activity   -  Consider consulting OT/PT to assist with strengthening/mobility based on AM PAC & JH-HLM score  - Consult OT/PT to assist with strengthening/mobility   - Keep Call bell within reach  - Keep bed low and locked with side rails adjusted as appropriate  - Keep care items and personal belongings within reach  - Initiate and maintain comfort rounds  - Make Fall Risk Sign visible to staff  - Offer Toileting every 2 Hours, in advance of need  - Initiate/Maintain bed alarm  - Obtain necessary fall risk management equipment: yellow socks  - Apply yellow socks and bracelet for high fall risk patients  - Consider moving patient to room near nurses station  Outcome: Progressing  Goal: Maintain or return to baseline ADL function  Description: INTERVENTIONS:  -  Assess patient's ability to carry out ADLs; assess patient's baseline for ADL function and identify physical deficits which impact ability to perform ADLs (bathing, care of mouth/teeth, toileting, grooming, dressing, etc.)  - Assess/evaluate cause of self-care deficits   - Assess range of motion  - Assess patient's mobility; develop plan if impaired  - Assess patient's need for assistive devices and provide as appropriate  - Encourage maximum independence but intervene and supervise when necessary  - Involve family in performance of ADLs  - Assess for home care needs following discharge   - Consider OT consult to assist with ADL evaluation and planning for discharge  - Provide patient education as appropriate  - Monitor functional capacity and physical performance, use of AM PAC & JH-HLM   - Monitor gait, balance and fatigue with ambulation    Outcome: Progressing  Goal: Maintains/Returns to pre admission functional level  Description: INTERVENTIONS:  - Perform AM-PAC 6 Click Basic Mobility/ Daily Activity assessment daily.  - Set and communicate daily mobility goal to care team and patient/family/caregiver.   - Collaborate with rehabilitation services on mobility goals if  consulted  - Perform Range of Motion 3 times a day.  - Reposition patient every 2 hours.  - Dangle patient 3 times a day  - Stand patient 3 times a day  - Ambulate patient 3 times a day  - Out of bed to chair 3 times a day   - Out of bed for meals 3 times a day  - Out of bed for toileting  - Record patient progress and toleration of activity level   Outcome: Progressing     Problem: DISCHARGE PLANNING  Goal: Discharge to home or other facility with appropriate resources  Description: INTERVENTIONS:  - Identify barriers to discharge w/patient and caregiver  - Arrange for needed discharge resources and transportation as appropriate  - Identify discharge learning needs (meds, wound care, etc.)  - Arrange for interpretive services to assist at discharge as needed  - Refer to Case Management Department for coordinating discharge planning if the patient needs post-hospital services based on physician/advanced practitioner order or complex needs related to functional status, cognitive ability, or social support system  Outcome: Progressing     Problem: Knowledge Deficit  Goal: Patient/family/caregiver demonstrates understanding of disease process, treatment plan, medications, and discharge instructions  Description: Complete learning assessment and assess knowledge base.  Interventions:  - Provide teaching at level of understanding  - Provide teaching via preferred learning methods  Outcome: Progressing

## 2025-07-02 NOTE — ASSESSMENT & PLAN NOTE
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

## 2025-07-02 NOTE — ASSESSMENT & PLAN NOTE
Holding home Ozempic for now   Encourage healthy lifestyle modifications when appropriate   INTERVAL HPI/OVERNIGHT EVENTS:  33y Female s/p c section under spinal anesthesia with duramorph for post op analgesia on 07/29/23    Vital Signs Last 24 Hrs  T(C): 36.5 (30 Jul 2023 09:01), Max: 36.8 (30 Jul 2023 00:00)  T(F): 97.7 (30 Jul 2023 09:01), Max: 98.2 (30 Jul 2023 00:00)  HR: 44 (30 Jul 2023 09:01) (44 - 57)  BP: 106/61 (30 Jul 2023 09:01) (106/61 - 113/64)  BP(mean): --  RR: 18 (30 Jul 2023 09:01) (18 - 18)  SpO2: 97% (30 Jul 2023 09:01) (96% - 97%)    Parameters below as of 30 Jul 2023 09:01  Patient On (Oxygen Delivery Method): room air            Patient satisfied    Patients pain is well controlled    No respiratory events overnight    No pruritis at this time    Patient seen and doing well     No headache      No residual numbness or weakness, sensory and motor function intact    Site not examined     No anesthetic complications or complaints noted or reported          .             Postpartum Note,  Section  She is a  33y woman who is now post-operative day 1    Subjective:  The patient feels well.    Yesterday her pulse was somewhat low however she was asymptomatic.    She has ambulated and is tolerating a diet  She is having  flatus, but no BM yet  She reports no breathing problems  She reports no headache or visual changes  She reports normal postpartum bleeding    Physical exam:  She generally looks and feels well    Vital Signs Last 24 Hrs  T(C): 36.7 (2023 04:00), Max: 36.8 (2023 00:00)  T(F): 98 (2023 04:00), Max: 98.2 (2023 00:00)  HR: 48 (2023 04:00) (42 - 153)  BP: 110/63 (2023 04:00) (106/61 - 130/82)  BP(mean): --  RR: 18 (2023 04:00) (15 - 18)  SpO2: 97% (2023 04:00) (92% - 98%)    Parameters below as of 2023 20:00  Patient On (Oxygen Delivery Method): room air        Lungs: Normal  Heart: Regular rate and rhythm  Abdomen: Soft, nontender, no distension , firm uterine fundus, the incision is clean dry and intact    Dressing removed. There was a small amt of dried blood   Pelvic: Normal lochia noted  Ext: No DVT signs, warm extremities, normal pulses    LABS:                        9.7    14.97 )-----------( 198      ( 2023 07:15 )             27.9     07-29    136  |  101  |  5.1<L>  ----------------------------<  78  3.8   |  21.0<L>  |  0.48<L>    Ca    9.0      2023 05:50    TPro  6.1<L>  /  Alb  3.4  /  TBili  0.4  /  DBili  x   /  AST  26  /  ALT  22  /  AlkPhos  128<H>  07-29      Urinalysis Basic - ( 2023 05:50 )    Color: x / Appearance: x / SG: x / pH: x  Gluc: 78 mg/dL / Ketone: x  / Bili: x / Urobili: x   Blood: x / Protein: x / Nitrite: x   Leuk Esterase: x / RBC: x / WBC x   Sq Epi: x / Non Sq Epi: x / Bacteria: x        Allergies    penicillins (Unknown)    Intolerance:   none      MEDICATIONS  (STANDING):  acetaminophen     Tablet .. 975 milliGRAM(s) Oral <User Schedule>  diphtheria/tetanus/pertussis (acellular) Vaccine (Adacel) 0.5 milliLiter(s) IntraMuscular once  ibuprofen  Tablet. 600 milliGRAM(s) Oral every 6 hours  lactated ringers. 1000 milliLiter(s) (125 mL/Hr) IV Continuous <Continuous>  oxytocin Infusion 333.333 milliUNIT(s)/Min (1000 mL/Hr) IV Continuous <Continuous>  oxytocin Infusion 333.333 milliUNIT(s)/Min (1000 mL/Hr) IV Continuous <Continuous>    MEDICATIONS  (PRN):  diphenhydrAMINE 25 milliGRAM(s) Oral every 6 hours PRN Pruritus  lanolin Ointment 1 Application(s) Topical every 6 hours PRN Sore Nipples  magnesium hydroxide Suspension 30 milliLiter(s) Oral two times a day PRN Constipation  oxyCODONE    IR 5 milliGRAM(s) Oral once PRN Moderate to Severe Pain (4-10)  oxyCODONE    IR 5 milliGRAM(s) Oral every 3 hours PRN Moderate to Severe Pain (4-10)  simethicone 80 milliGRAM(s) Chew every 4 hours PRN Gas

## 2025-07-02 NOTE — ASSESSMENT & PLAN NOTE
Lab Results   Component Value Date    HGBA1C 10.5 (H) 04/07/2025       Recent Labs     07/02/25  0715   POCGLU 170*       Blood Sugar Average: Last 72 hrs:  (P) 170

## 2025-07-02 NOTE — OCCUPATIONAL THERAPY NOTE
Occupational Therapy Cancellation Note     07/02/25 0738   OT Last Visit   OT Visit Date 07/02/25   Note Type   Note type Cancelled Session   Cancel Reasons Other   Additional Comments awaiting transfer to [facility]     OT orders received. Chart reviewed. Will follow-up as able and appropriate    Dinora Dawson OTR/L

## 2025-07-02 NOTE — ASSESSMENT & PLAN NOTE
Not in exacerbation at this time  Continue prehospital Singulair 10 mg p.o. nightly and albuterol 90 mcg 2 puffs every 4 hours as needed

## 2025-07-02 NOTE — QUICK NOTE
Stroke alert     Paged at 10:30pm    Responded at 10:30pm    Last known normal was 8am    36 yo Female who had new onset ha at 8am followed by dizziness and nausea after a nap at 3:30pm where she woke up at 6pm.     She states her Right vision was lost for a brief period of time.    CT head shows right PICA distribution stroke, hypodensity     CTA is concerning for new occlusion for the R vertebral artery .    Stroke alert was activated    Not a TNK Candidate given symptoms were outside the window     With her right vertebral artery occlusion and NIHSS of 0, she may need to be transferred to [facility] in case of worsening symptoms but no beds were available, so patient stayed at [place] overnight, since NIHSS of 0. Did not discuss the case with IR.     Continue with home dose plavix    Neuro consult in AM.

## 2025-07-02 NOTE — UTILIZATION REVIEW
NOTIFICATION OF INPATIENT ADMISSION   AUTHORIZATION REQUEST   SERVICING FACILITY:   [place] [address]  Tax ID: [id number]  NPI: [id number]   ATTENDING PROVIDER:  Attending Name and NPI#: Arelis Gan Do [[id number]]  Address: [address] [place] [address]  Phone: [phone]     ADMISSION INFORMATION:  Place of Service: Inpatient Acute [facility]  Place of Service Code: 21  Inpatient Admission Date/Time: 7/1/25 11:06 PM  Discharge Date/Time: No discharge date for patient encounter.  Admitting Diagnosis Code/Description:  Vertigo [R42]  Dizzy [R42]  Right-sided headache [R51.9]  Cerebellar stroke, acute (HCC) [I63.9]  Ischemic stroke (HCC) [I63.9]     UTILIZATION REVIEW CONTACT:  Irma Love Utilization   Network Utilization Review Department  Phone: [phone]  Fax: [phone]  Email: [email or web address]  Contact for approvals/pending authorizations, clinical reviews, and discharge.     PHYSICIAN ADVISORY SERVICES:  Medical Necessity Denial & [employer] Review  Phone: [phone]  Fax: [phone]  Email: [email or web address]     DISCHARGE SUPPORT TEAM:  For Patients Discharge Needs & Updates  Phone: [phone] opt. 2 Fax: [phone]  Email: [email or web address]

## 2025-07-02 NOTE — PLAN OF CARE
Problem: PAIN - ADULT  Goal: Verbalizes/displays adequate comfort level or baseline comfort level  Description: Interventions:  - Encourage patient to monitor pain and request assistance  - Assess pain using appropriate pain scale  - Administer analgesics as ordered based on type and severity of pain and evaluate response  - Implement non-pharmacological measures as appropriate and evaluate response  - Consider cultural and social influences on pain and pain management  - Notify physician/advanced practitioner if interventions unsuccessful or patient reports new pain  - Educate patient/family on pain management process including their role and importance of  reporting pain   - Provide non-pharmacologic/complimentary pain relief interventions  Outcome: Progressing     Problem: INFECTION - ADULT  Goal: Absence or prevention of progression during hospitalization  Description: INTERVENTIONS:  - Assess and monitor for signs and symptoms of infection  - Monitor lab/diagnostic results  - Monitor all insertion sites, i.e. indwelling lines, tubes, and drains  - Monitor endotracheal if appropriate and nasal secretions for changes in amount and color  - [place] appropriate cooling/warming therapies per order  - Administer medications as ordered  - Instruct and encourage patient and family to use good hand hygiene technique  - Identify and instruct in appropriate isolation precautions for identified infection/condition  Outcome: Progressing  Goal: Absence of fever/infection during neutropenic period  Description: INTERVENTIONS:  - Monitor WBC  - Perform strict hand hygiene  - Limit to healthy visitors only  - No plants, dried, fresh or silk flowers with acuna in patient room  Outcome: Progressing

## 2025-07-02 NOTE — ASSESSMENT & PLAN NOTE
Upgraded to [facility] service for frequent neuro checks/monitoring and likely need for hypertonic saline  Initially presented to ED last evening for for severe headache w/associated dizziness, vision changes, and nausea that started abruptly at approximately 8 AM that morning - was admitted to [facility] early this AM until bed becomes available at [facility]  CTA Head/Neck revealed acute R PICA infarct w/new occlusion of R vertebral artery   Stroke alert on 07/01 at 2230 - not TNK candidate 2/2 being outside window  Received ASA/Plavix load per Neurology's recommendations  Stroke protocol:  MRA/MRI Brain currently pending  LDL 53, HDL 31, TRIG 351  HA1C pending  Start ASA 81 mg and Plavix 75 mg daily - 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

## 2025-07-02 NOTE — ASSESSMENT & PLAN NOTE
Continue home Singulair and Allegra as Claritin per hospital formulary   Continue PRN Albuterol inhaler  Aggressive pulmonary hygiene

## 2025-07-02 NOTE — CONSULTS
ICU Acceptance Note - Critical Care/ICU   Name: Jaycee Can 35 y.o. female I MRN: [id number]  Unit/Bed#: ICU 10-01 I Date of Admission: 7/1/2025   Date of Service: 7/2/2025 I Hospital Day: 1     Reason for Evaluation / Principal Problem: Stroke w/need for close monitoring and likely hypertonic saline  Assessment & Plan  CVA (cerebral vascular accident) (HCC)  Upgraded to [facility] service for frequent neuro checks/monitoring and likely need for hypertonic saline  Initially presented to ED last evening for for severe headache w/associated dizziness, vision changes, and nausea that started abruptly at approximately 8 AM that morning - was admitted to [facility] early this AM until bed becomes available at [facility]  CTA Head/Neck revealed acute R PICA infarct w/new occlusion of R vertebral artery   Stroke alert on 07/01 at 2230 - not TNK candidate 2/2 being outside window  Received ASA/Plavix load per Neurology's recommendations  Stroke protocol:  MRA/MRI Brain currently pending  LDL 53, HDL 31, TRIG 351  HA1C pending  Start ASA 81 mg and Plavix 75 mg daily - 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  Acute headache  In setting of new R PICA infarct w/R vertebral artery occlusion as noted above  Treat CVA as noted above  Will give headache cocktail and IV Toradol/APAP - could also consider Fioricet if persistent  Monitor for worsening characteristics  Hypertension  Unfortunately received home Lisinopril this AM - would hold on further dosing to allow for permissive HTN  Trend BPs closely  Type 2 diabetes mellitus  without complication, with long-term current use of insulin (HCC)  Lab Results   Component Value Date    HGBA1C 10.5 (H) 04/07/2025       Recent Labs     07/02/25  0715 07/02/25  1131   POCGLU 170* 218*       Blood Sugar Average: Last 72 hrs:  (P) 194    HA1C pending  Holding home Ozempic, Metformin, and Jardiance while here  Will continue home Lantus and add SSI w/ACHS fingersticks  Diabetic diet  Goal  - 180  Morbid obesity (HCC)  Holding home Ozempic for now   Encourage healthy lifestyle modifications when appropriate  Anxiety and depression  Continue home Celexa  Neuropathy  Continue home Neurontin  Mild intermittent asthma with acute exacerbation  Continue home Singulair and Allegra as Claritin per hospital formulary   Continue PRN Albuterol inhaler  Aggressive pulmonary hygiene  GERD (gastroesophageal reflux disease)  Continue home Carafate  History of repair of coarctation of aorta  Has history of aortic coarctation and VSD s/p repair when she was 10D old - required stenting due to recurrent coarctation in 02/2020 at [facility]  Chronic hepatitis C without hepatic coma (HCC)  Contracted when she was 11 and was successfully treated per note from [facility] in 2021  Hx of femoral artery thrombosis  Underwent cardiac cath w/[facility] in 2023, which was c/b R femoral artery thrombosis - required R femoral artery exposure, R iliac embolectomy, and REIA stent in 01/2023, which was further c/b R groin infection requiring washout and wound vac placement in 02/2023  Now fully recovered at this point    Disposition: Critical care    History of Present Illness   Jaycee Can is a 35 y.o. female w/PMHx of anxiety/depression, HTN, aortic coarctation w/VSD s/p repair at 10D old followed by recurrent coarctation requiring stenting in 02/2020 ([facility]), cardiac cath c/b R femoral artery thrombosis requiring R femoral artery exposure, R iliac embolectomy, and REIA stent in 01/2023 further c/b R groin infection requiring washout and  wound vac placement in 02/2023, asthma, GERD, Hepatitis C (treated), IDDM2, neuropathy, obesity   who initially presented last evening for persistent headache associated w/dizziness, vision changes (specifically R), and nausea and was found to have new acute R PICA infarct w/vertebral artery occlusion. Transfer to [facility] was recommended at that time, however there were no available beds, so she was admitted under [facility] for frequent neuro checks. This AM, Neurology recommended patient be upgraded to ICU for close monitoring and possible need for hypertonic saline given goal Na > 140.     History obtained from chart review and the patient.  Review of Systems: Review of Systems   Constitutional:  Negative for chills and fever.   HENT:  Negative for congestion.    Eyes:  Negative for visual disturbance.   Respiratory:  Negative for cough and shortness of breath.    Cardiovascular:  Negative for chest pain and leg swelling.   Gastrointestinal:  Negative for abdominal distention, abdominal pain, nausea and vomiting.   Genitourinary:  Negative for difficulty urinating.   Musculoskeletal:  Negative for back pain.   Neurological:  Positive for headaches. Negative for dizziness, weakness, light-headedness and numbness.        HA 8 out of 10   Psychiatric/Behavioral:  Negative for confusion.        Historical Information   Past Medical History:  No date: Allergic  No date: Arthritis  No date: Asthma  12/17/2018: Bipolar affective disorder, currently depressed, moderate   (HCC)  07/24/2019: Chest pain, unspecified  No date: Cyst of ovary, right  No date: Depression  10/10/2018: Diabetes mellitus (HCC)      Comment:  type 2  No date: Endometriosis  08/25/24: Fractures      Comment:  My right hand  1988: Heart murmur  No date: Hepatitis C  No date: Hepatitis C virus infection cured after antiviral drug   therapy  No date: History of transfusion  No date: Hypertension  07/30/01: Infectious viral hepatitis  No date: Migraines  No  date: Morbid obesity with BMI of 40.0-44.9, adult ([facility])  : Obesity  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  No date: Spleen enlarged  No date: Status post surgical removal of both fallopian tubes  No date: Varicella Past Surgical History:  No date: CARDIAC CATHETERIZATION      Comment:  no CAD 10days, 4 weeks 22months old   2023: CARDIAC CATHETERIZATION; N/A      Comment:  Procedure: Cardiac Coronary Angiogram;  Surgeon:                Marcela Lr DO;  Location: [facility] CARDIAC [facility]ATH LAB;                 Service: Cardiology  2023: CARDIAC CATHETERIZATION; Left      Comment:  Procedure: Cardiac Left Heart Cath;  Surgeon: Marcela Lr DO;  Location: [facility] CARDIAC [facility];  Service:                Cardiology  2023: CARDIAC CATHETERIZATION      Comment:  Procedure: Cardiac catheterization;  Surgeon: Marcela Lr DO;  Location: [facility] CARDIAC [facility];  Service:                Cardiology  :  SECTION, LOW TRANSVERSE  No date: CHOLECYSTECTOMY  No date: COARCTATION OF AORTA EXCISION      Comment:  Age 5  No date: CORONARY STENT PLACEMENT  2023: IR LOWER EXTREMITY ANGIOGRAM  No date: LIVER BIOPSY  No date: LIVER BIOPSY  23: PERIPHERAL ANGIOGRAM  2022: STERNAL WIRE REMOVAL  2023: THROMBECTOMY W/ EMBOLECTOMY; Right      Comment:  Procedure: Right femoral exposure Right iliac                embolectomy w/ #4 Ted catheter Aortogram Right EIA                stent w/ 7x29mm VBX;  Surgeon: Jody Hodges MD;                 Location: [facility];  Service: Vascular  2020: TUBAL LIGATION; Bilateral  No date: VSD REPAIR      Comment:  As a child  2023: WOUND DEBRIDEMENT; Right      Comment:  Procedure: Right Groin Wound Washout, Pulse Lavage,                Wound Vac placement;  Surgeon: Jelani Avila DO;                 Location: [facility];  Service: Vascular   Current Outpatient Medications   Medication Instructions     acetaminophen (TYLENOL) 1,000 mg    albuterol (Ventolin HFA) 90 mcg/act inhaler 2 puffs, Inhalation, Every 4 hours PRN    albuterol 2.5 mg, Nebulization, Every 6 hours PRN    ALPRAZolam (XANAX) 0.5 mg, Oral, Daily at bedtime PRN    Aspirin Low Dose 81 MG EC tablet TAKE 1 TABLET BY MOUTH DAILY. DO NOT START BEFORE FEBRYARY 1, 2023    atorvastatin (LIPITOR) 20 mg, Oral, Daily    azelastine (ASTELIN) 0.1 % nasal spray 1 spray, Nasal, 2 times daily, Use in each nostril as directed    Blood Glucose Monitoring Suppl (OneTouch Verio) w/Device KIT Does not apply, Daily    Blood Pressure Monitoring (B-D ASSURE BPM/AUTO WRIST CUFF) MISC Check blood pressure prior to each OB visit, or as directed by your physician.    citalopram (CELEXA) 10 mg, Oral, Daily    Continuous Blood Gluc  (FreeStyle Gautam 14 Day [place]) KVNG Use with gautam sensor    Continuous Glucose Sensor (FreeStyle Gautam 3 Sensor) MISC Use 1 sensor each to be changed every 14 days    cyclobenzaprine (FLEXERIL) 5 mg, Oral, 3 times daily PRN    Empagliflozin (JARDIANCE) 10 mg, Oral, Daily    fexofenadine (ALLEGRA) 180 mg, Oral, Daily    gabapentin (NEURONTIN) 100 mg, Oral, Daily at bedtime    insulin glargine (LANTUS) 25 Units, Subcutaneous, Daily at bedtime    Insulin Pen Needle (B-D UF III MINI PEN NEEDLES) 31G X 5 MM MISC Subcutaneous, Daily at bedtime    lisinopril (ZESTRIL) 30 mg, Oral, Daily    metFORMIN (GLUCOPHAGE) 1,000 mg, Oral, 2 times daily with meals    montelukast (SINGULAIR) 10 mg, Oral, Daily at bedtime    Multiple Vitamin (Multi Vitamin Daily) TABS 1 tablet, Oral, Daily    naproxen (NAPROSYN) 500 mg, Oral, 2 times daily PRN    ondansetron (ZOFRAN-ODT) 4 mg, Oral, Every 6 hours PRN    semaglutide, 0.25 or 0.5 mg/dose, (Ozempic, 0.25 or 0.5 MG/DOSE,) 2 mg/3 mL injection pen 0.25 mg under the skin every 7 days for 4 doses (28 days), THEN 0.5 mg under the skin every 7 days    sucralfate (CARAFATE) 1 g, Oral, 4 times daily    Allergies[1]    Social History[2] Family History[3]       Objective :                   Vitals I/O      Most Recent Min/Max in 24hrs   Temp (!) 97.4 °F (36.3 °C) Temp  Min: 97.2 °F (36.2 °C)  Max: 98.3 °F (36.8 °C)   Pulse 88 Pulse  Min: 83  Max: 97   Resp 18 Resp  Min: 17  Max: 20   BP (!) 168/102 BP  Min: 119/63  Max: 187/91   O2 Sat 96 % SpO2  Min: 94 %  Max: 100 %      Intake/Output Summary (Last 24 hours) at 7/2/2025 1238  Last data filed at 7/1/2025 2128  Gross per 24 hour   Intake 1050 ml   Output --   Net 1050 ml       Diet Cardiovascular; Cardiac; Consistent Carbohydrate Diet Level 2 (5 carb servings/75 grams CHO/meal)    Invasive Monitoring           Physical Exam   Physical Exam  Vitals and nursing note reviewed.   Eyes:      Extraocular Movements: Extraocular movements intact.      Pupils: Pupils are equal, round, and reactive to light.   Skin:     General: Skin is warm and dry.      Capillary Refill: Capillary refill takes less than 2 seconds.   Cardiovascular:      Rate and Rhythm: Normal rate and regular rhythm.      Pulses: Normal pulses.   Musculoskeletal:      Right lower leg: No edema.      Left lower leg: No edema.   Abdominal: General: Bowel sounds are normal.      Palpations: Abdomen is soft.   Constitutional:       General: She is awake. She is not in acute distress.  Pulmonary:      Effort: Pulmonary effort is normal.      Breath sounds: Normal breath sounds.   Psychiatric:         Behavior: Behavior is cooperative.   Neurological:      General: No focal deficit present.      Mental Status: She is alert and oriented to person, place and time.          Diagnostic Studies        Lab Results: I have reviewed the following results:     Medications:  Scheduled PRN   aspirin, 81 mg, Daily  atorvastatin, 40 mg, QPM  azelastine, 1 spray, BID  chlorhexidine, 15 mL, Q12H JUN  citalopram, 10 mg, Daily  clopidogrel, 75 mg, Daily  gabapentin, 100 mg, HS  heparin (porcine), 5,000 Units, Q8H JUN  insulin glargine, 25  Units, HS  insulin lispro, 1-6 Units, 4x Daily (AC & HS)  loratadine, 10 mg, Daily  montelukast, 10 mg, HS  multivitamin stress formula, 1 tablet, Daily  sucralfate, 1 g, 4x Daily      acetaminophen, 650 mg, Q4H PRN  albuterol, 2 puff, Q4H PRN  ondansetron, 4 mg, Q6H PRN       Continuous          Labs:   CBC    Recent Labs     250 25  0458   WBC 9.62 8.50   HGB 14.3 12.9   HCT 44.7 39.8    216     BMP    Recent Labs     25  1900 25  0458   SODIUM 135 133*   K 4.3 3.6    103   CO2 27 24   AGAP 7 6   BUN 14 11   CREATININE 0.78 0.53*   CALCIUM 9.3 8.7       Coags    No recent results     Additional Electrolytes  Recent Labs     25  1216   CAIONIZED 1.14          Blood Gas    No recent results  No recent results LFTs  Recent Labs     25   ALT 20   AST 19   ALKPHOS 69   ALB 4.0   TBILI 0.40       Infectious  No recent results  Glucose  Recent Labs     25  1900 25  0458   GLUC 203* 198*             [1]   Allergies  Allergen Reactions    Prednisone Swelling    Bactrim [Sulfamethoxazole-Trimethoprim] Hives    Corticosteroids Swelling     Pt states this does not cause problems breathing, she just has generalized swelling    Cortisone Swelling     Generalized; no impairment with breathing reported      Medical Tape Hives     Allergic to Paper Tape    Other Hives     Paper tape    Sulfa Antibiotics Hives   [2]   Social History  Tobacco Use    Smoking status: Every Day     Current packs/day: 0.00     Average packs/day: 0.5 packs/day for 10.0 years (5.0 ttl pk-yrs)     Types: Cigarettes     Start date: 2013     Last attempt to quit: 2023     Years since quittin.4    Smokeless tobacco: Never   Vaping Use    Vaping status: Never Used   Substance Use Topics    Alcohol use: Yes     Alcohol/week: 3.0 standard drinks of alcohol     Types: 3 Standard drinks or equivalent per week     Comment: I drink socially    Drug use: Not Currently     Comment:  hx of THC use for migraines   [3]   Family History  Problem Relation Name Age of Onset    Hypertension Mother Patritica     Migraines Mother Patritica     JENNY disease Mother Patritica     Depression Mother Patritica     Hyperlipidemia Mother Patritica     Diabetes Mother Patritica     Diabetes Father Justin     Hypertension Father Justin     Kidney failure Father Justin     Heart attack Father Justin     Arthritis Father Justin     Stroke Father Justin     Glaucoma Father Justin     Kidney disease Father Justin     Polycystic kidney disease Paternal Grandmother Alison     Stroke Paternal Grandmother Alison     Heart disease Paternal Grandmother Alison     Kidney disease Paternal Grandmother Alison     Arthritis Sister Melodie     Asthma Sister Melodie     Thyroid disease Sister Melodie     Diabetes Sister Melodie     Arthritis Maternal Grandmother Irina     Breast cancer Maternal Grandmother Irina     Diabetes Maternal Grandmother Irina     Hypertension Maternal Grandmother Irina     Heart Valve Disease Maternal Grandmother Irina     Cancer Maternal Grandmother Irina         Skin cancer    Learning disabilities Cousin Marisol     Learning disabilities Sister Ana     Diabetes Sister Ana     ADD / ADHD Cousin Estelle     Lung cancer Brother Valdo     Cancer Brother Valdo         Lung cancer    Diabetes Maternal Grandfather Ankit     Hypertension Maternal Grandfather Ankit     JENNY disease Maternal Grandfather Ankit     Stroke Maternal Grandfather Ankit     Cancer Maternal Grandfather Ankit         Skin cancer    Cancer Paternal Uncle Brandon         Skin cancer    Aneurysm Maternal Aunt Olinda

## 2025-07-02 NOTE — ASSESSMENT & PLAN NOTE
Placed on [facility] type II diet  Encourage healthy weight loss and supportive care

## 2025-07-02 NOTE — SPEECH THERAPY NOTE
Speech Language/Pathology  Orders Received. Chart Reviewed. Pt currently unavailable as ECHO being completed at bedside. Currently awaiting [facility]. Spoke to nursing reported no overt difficulties with speech or swallowing noted. ST will f/u as indicated.

## 2025-07-02 NOTE — H&P
"H&P - Hospitalist   Name: Jaycee Can 35 y.o. female I MRN: [id number]  Unit/Bed#: -[facility] I Date of Admission: 7/1/2025   Date of Service: 7/2/2025 I Hospital Day: 1     Assessment & Plan  CVA (cerebral vascular accident) (HCC)  Admit to telemetry  CT imaging revealed \"Hypodensity involving the inferior right cerebellar hemisphere, most compatible with an acute/recent right PICA distribution infarct\" additionally \"New occlusion of the right vertebral artery from its origin\"  Case was discussed by ER provider with stroke neuro on-call  Patient is accepted for transfer at [facility] but is currently being held in our location secondary to bed availability  Will obtain vital signs and neuro checks per stroke protocol  Allow permissive hypertension  Patient was Plavix loaded in ER we will give his 75 mg p.o. daily, aspirin 81 mg p.o. daily and Lipitor 40 mg p.o. daily  Obtain MRI and echocardiogram in a.m.  Consult neurology  Morbid obesity (HCC)  Placed on [facility] type II diet  Encourage healthy weight loss and supportive care  Type 2 diabetes mellitus without complication, with long-term current use of insulin ([facility])  Lab Results   Component Value Date    HGBA1C 10.5 (H) 04/07/2025       No results for input(s): \"POCGLU\" in the last 72 hours.    Blood Sugar Average: Last 72 hrs:    Please on [facility] step 2 diet  Hold prehospital oral antihyperglycemic's  Continue prehospital Lantus 25 units SQ nightly  Obtain Accu-Cheks AC and at bedtime with Humalog correction dose AC and at bedtime  Bipolar disorder (HCC)  Continue prehospital Xanax 0.5 mg p.o. nightly as needed and Celexa 10 mg p.o. daily  Hypertension  Continue prehospital lisinopril 40 mg p.o. daily  Mild intermittent asthma with acute exacerbation  Not in exacerbation at this time  Continue prehospital Singulair 10 mg p.o. nightly and albuterol 90 mcg 2 puffs every 4 hours as needed  GERD (gastroesophageal reflux disease)  Continue prehospital Carafate 1 g " 4 times daily      VTE Pharmacologic Prophylaxis: VTE Score: 6 High Risk (Score >/= 5) - Pharmacological DVT Prophylaxis Ordered: heparin. Sequential Compression Devices Ordered.  Code Status: Level 1 - Full Code per patient  Discussion with family: Patient declined call to .     Anticipated Length of Stay: Patient will be admitted on an inpatient basis with an anticipated length of stay of greater than 2 midnights secondary to acute CVA with right vertebral artery occlusion requiring continued neurological monitoring and IR evaluation.    History of Present Illness   Chief Complaint: Headache and dizziness x 1 day    Jaycee Can is a 35 y.o. female with a PMH of VSD repair, insulin-dependent diabetes, hypertension, migraines who presents with headache and dizziness x 1 day.  Patient presents ER for further evaluation treatment of 1 day history of headache that began at 8 AM yesterday and she describes it as being right frontal and dull in nature wrapping around the back of her head to her left ear.  Patient additionally reports some dizziness which she describes as a room spinning not associated with any vomiting but does complain of nausea, no aggravating or alleviating factors.  Patient denies previous episodes, no change in her vision or hearing, no weakness or clumsiness in her arms or legs, no facial droop, dysarthria or aphasia.    Workup in ER revealed acute right cerebellar stroke with right vertebral artery occlusion and patient was referred for admission.  Patient was accepted at [facility] is currently holding in our facility due to bed availability.    Review of Systems   Constitutional:  Negative for chills and fever.   HENT:  Negative for congestion and sore throat.    Respiratory:  Negative for cough, shortness of breath and wheezing.    Cardiovascular:  Negative for chest pain and palpitations.   Gastrointestinal:  Negative for abdominal pain, diarrhea, nausea and  vomiting.   Genitourinary:  Negative for dysuria, frequency, hematuria and urgency.   Musculoskeletal:  Negative for arthralgias and myalgias.   Skin:  Negative for wound.   Neurological:  Positive for dizziness and headaches. Negative for syncope, facial asymmetry, speech difficulty, weakness, light-headedness and numbness.   All other systems reviewed and are negative.      Historical Information   Past Medical History[1]  Past Surgical History[2]  Social History[3]  E-Cigarette/Vaping    E-Cigarette Use Never User      E-Cigarette/Vaping Substances    Nicotine No     THC No     CBD No     Flavoring No      Family history non-contributory  Social History:  Marital Status: Single   Occupation: Caregiver  Patient Pre-hospital Living Situation: Home  Patient Pre-hospital Level of Mobility: walks  Patient Pre-hospital Diet Restrictions: Diabetic    Meds/Allergies   I have reviewed home medications with patient personally.  Prior to Admission medications    Medication Sig Start Date End Date Taking? Authorizing Provider   acetaminophen (TYLENOL) 500 mg tablet Take 1,000 mg by mouth 5/6/20  Yes Historical Provider, MD   albuterol (2.5 mg/3 mL) 0.083 % nebulizer solution Take 3 mL (2.5 mg total) by nebulization every 6 (six) hours as needed for wheezing or shortness of breath 7/1/25  Yes REJI Ferrell   albuterol (Ventolin HFA) 90 mcg/act inhaler Inhale 2 puffs every 4 (four) hours as needed for wheezing or shortness of breath 7/1/25  Yes REJI Ferrell   ALPRAZolam (XANAX) 0.5 mg tablet Take 1 tablet (0.5 mg total) by mouth daily at bedtime as needed for anxiety 7/1/25  Yes REJI Ferrell   Aspirin Low Dose 81 MG EC tablet TAKE 1 TABLET BY MOUTH DAILY. DO NOT START BEFORE FEBRYARY 1, 2023 8/10/23  Yes REJI Hanson   atorvastatin (LIPITOR) 20 mg tablet Take 1 tablet (20 mg total) by mouth daily 7/1/25  Yes REJI Ferrell   azelastine (ASTELIN) 0.1 % nasal spray 1  spray into each nostril 2 (two) times a day Use in each nostril as directed 3/3/25  Yes REJI Ferrell   Blood Glucose Monitoring Suppl (OneTouch Verio) w/Device KIT Use daily 11/23/21  Yes REJI Ferrell   citalopram (CeleXA) 10 mg tablet Take 1 tablet (10 mg total) by mouth daily 7/1/25  Yes REJI Ferrell   Continuous Blood Gluc  (FreeStyle Gautam 14 Day [place]) KVNG Use with gautam sensor 5/11/23  Yes Oluwatomigeralod Cali MD   Continuous Glucose Sensor (FreeStyle Gautam 3 Sensor) MISC Use 1 sensor each to be changed every 14 days 7/1/25  Yes ERJI Ferrell   cyclobenzaprine (FLEXERIL) 5 mg tablet Take 1 tablet (5 mg total) by mouth 3 (three) times a day as needed for muscle spasms for up to 12 doses 7/1/25  Yes REJI Ferrell   Empagliflozin (JARDIANCE) 10 MG TABS tablet Take 1 tablet (10 mg total) by mouth daily 7/1/25 1/17/26 Yes REJI Ferrell   fexofenadine (ALLEGRA) 180 MG tablet Take 1 tablet (180 mg total) by mouth daily 7/1/25  Yes REJI Ferrell   gabapentin (NEURONTIN) 100 mg capsule Take 1 capsule (100 mg total) by mouth daily at bedtime 7/1/25  Yes REJI Ferrell   insulin glargine (LANTUS) 100 units/mL subcutaneous injection Inject 25 Units under the skin daily at bedtime 7/1/25 12/8/25 Yes REJI Ferrell   Insulin Pen Needle (B-D UF III MINI PEN NEEDLES) 31G X 5 MM MISC Inject under the skin daily at bedtime 7/1/25  Yes REJI Ferrell   lisinopril (ZESTRIL) 30 mg tablet Take 1 tablet (30 mg total) by mouth daily 4/9/25  Yes REJI Ferrell   metFORMIN (GLUCOPHAGE) 1000 MG tablet Take 1 tablet (1,000 mg total) by mouth 2 (two) times a day with meals 7/1/25  Yes REJI Ferrell   montelukast (SINGULAIR) 10 mg tablet Take 1 tablet (10 mg total) by mouth daily at bedtime 7/1/25  Yes REJI Ferrell   naproxen (Naprosyn) 500 mg tablet Take 1  tablet (500 mg total) by mouth 2 (two) times a day as needed for mild pain 7/1/25  Yes REJI Ferrell   ondansetron (ZOFRAN-ODT) 4 mg disintegrating tablet Take 1 tablet (4 mg total) by mouth every 6 (six) hours as needed for nausea for up to 8 doses 7/1/25  Yes REJI Ferrell   semaglutide, 0.25 or 0.5 mg/dose, (Ozempic, 0.25 or 0.5 MG/DOSE,) 2 mg/3 mL injection pen 0.25 mg under the skin every 7 days for 4 doses (28 days), THEN 0.5 mg under the skin every 7 days 4/10/25  Yes REJI Ferrell   sucralfate (CARAFATE) 1 g tablet Take 1 tablet (1 g total) by mouth 4 (four) times a day 3/9/24  Yes Ramón Jay Jr., DO   Blood Pressure Monitoring (B-D ASSURE BPM/AUTO WRIST CUFF) MISC Check blood pressure prior to each OB visit, or as directed by your physician. 3/31/20   Amarilis Prieto MD   Multiple Vitamin (Multi Vitamin Daily) TABS Take 1 tablet by mouth in the morning 7/1/25   REJI Ferrell     Allergies   Allergen Reactions    Prednisone Swelling    Bactrim [Sulfamethoxazole-Trimethoprim] Hives    Corticosteroids Swelling     Pt states this does not cause problems breathing, she just has generalized swelling    Cortisone Swelling     Generalized; no impairment with breathing reported      Medical Tape Hives     Allergic to Paper Tape    Other Hives     Paper tape    Sulfa Antibiotics Hives       Objective :  Temp:  [96.9 °F (36.1 °C)-98.3 °F (36.8 °C)] 98.3 °F (36.8 °C)  HR:  [88-99] 90  BP: (122-187)/() 133/79  Resp:  [18-20] 18  SpO2:  [94 %-100 %] 94 %  O2 Device: None (Room air)    Physical Exam  Vitals and nursing note reviewed.   Constitutional:       Appearance: She is well-developed. She is obese.   HENT:      Head: Normocephalic and atraumatic.      Right Ear: Tympanic membrane normal.      Left Ear: Tympanic membrane normal.      Nose: Nose normal.      Mouth/Throat:      Mouth: Mucous membranes are moist.      Pharynx: No oropharyngeal exudate.      Eyes:      General: No scleral icterus.     Pupils: Pupils are equal, round, and reactive to light.     Neck:      Vascular: No JVD.     Cardiovascular:      Rate and Rhythm: Normal rate and regular rhythm.      Heart sounds: Normal heart sounds. No murmur heard.  Pulmonary:      Effort: Pulmonary effort is normal. No respiratory distress.      Breath sounds: Normal breath sounds. No wheezing or rales.   Abdominal:      General: Bowel sounds are normal.      Palpations: Abdomen is soft.      Tenderness: There is no abdominal tenderness. There is no guarding or rebound.     Musculoskeletal:         General: Normal range of motion.      Cervical back: Normal range of motion and neck supple.      Right lower leg: No edema.      Left lower leg: No edema.   Lymphadenopathy:      Cervical: No cervical adenopathy.     Skin:     General: Skin is warm and dry.      Findings: No erythema or rash.     Neurological:      General: No focal deficit present.      Mental Status: She is alert and oriented to person, place, and time.      Cranial Nerves: Cranial nerves 2-12 are intact.      Sensory: Sensation is intact.      Motor: Motor function is intact.      Coordination: Coordination is intact.      Deep Tendon Reflexes: Reflexes are normal and symmetric.     Psychiatric:         Behavior: Behavior normal.         Lab Results: I have reviewed the following results:  Results from last 7 days   Lab Units 07/02/25  0458 07/01/25  1900   WBC Thousand/uL 8.50 9.62   HEMOGLOBIN g/dL 12.9 14.3   HEMATOCRIT % 39.8 44.7   PLATELETS Thousands/uL 216 242   SEGS PCT %  --  59   LYMPHO PCT %  --  32   MONO PCT %  --  7   EOS PCT %  --  2     Results from last 7 days   Lab Units 07/02/25  0458 07/01/25  1900   SODIUM mmol/L 133* 135   POTASSIUM mmol/L 3.6 4.3   CHLORIDE mmol/L 103 101   CO2 mmol/L 24 27   BUN mg/dL 11 14   CREATININE mg/dL 0.53* 0.78   ANION GAP mmol/L 6 7   CALCIUM mg/dL 8.7 9.3   ALBUMIN g/dL  --  4.0   TOTAL BILIRUBIN  mg/dL  --  0.40   ALK PHOS U/L  --  69   ALT U/L  --  20   AST U/L  --  19   GLUCOSE RANDOM mg/dL 198* 203*             Lab Results   Component Value Date    HGBA1C 10.5 (H) 04/07/2025    HGBA1C 10.3 (A) 12/15/2023    HGBA1C 10.6 (A) 04/18/2023           Imaging Results Review: I reviewed radiology reports from this admission including: CT head.  Other Study Results Review: No additional pertinent studies reviewed.    Administrative Statements   I have spent a total time of 60 minutes in caring for this patient on the day of the visit/encounter including Diagnostic results, Impressions, Documenting in the medical record, Reviewing/placing orders in the medical record (including tests, medications, and/or procedures), and Obtaining or reviewing history  .    ** Please Note: This note has been constructed using a voice recognition system. **         [1]   Past Medical History:  Diagnosis Date    Allergic     Arthritis     Asthma     Bipolar affective disorder, currently depressed, moderate (HCC) 12/17/2018    Chest pain, unspecified 07/24/2019    Cyst of ovary, right     Depression     Diabetes mellitus (HCC) 10/10/2018    type 2    Endometriosis     Fractures 08/25/24    My right hand    Heart murmur 1988    Hepatitis C     Hepatitis C virus infection cured after antiviral drug therapy     History of transfusion     Hypertension     Infectious viral hepatitis 07/30/01    Migraines     Morbid obesity with BMI of 40.0-44.9, adult (HCC)     Obesity 1995    Pulmonary artery congenital abnormality     Spleen enlarged     Status post surgical removal of both fallopian tubes     Varicella    [2]   Past Surgical History:  Procedure Laterality Date    CARDIAC CATHETERIZATION      no CAD 10days, 4 weeks 22months old     CARDIAC CATHETERIZATION N/A 01/12/2023    Procedure: Cardiac Coronary Angiogram;  Surgeon: Marcela Lr DO;  Location: [facility];  Service: Cardiology    CARDIAC CATHETERIZATION Left  2023    Procedure: Cardiac Left Heart Cath;  Surgeon: Marcela Lr DO;  Location: [facility];  Service: Cardiology    CARDIAC CATHETERIZATION  2023    Procedure: Cardiac catheterization;  Surgeon: Marcela Lr DO;  Location: [facility];  Service: Cardiology     SECTION, LOW TRANSVERSE  2020    CHOLECYSTECTOMY      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 5    CORONARY STENT PLACEMENT      IR LOWER EXTREMITY ANGIOGRAM  2023    LIVER BIOPSY      LIVER BIOPSY      PERIPHERAL ANGIOGRAM  23    STERNAL WIRE REMOVAL  2022    THROMBECTOMY W/ EMBOLECTOMY Right 2023    Procedure: Right femoral exposure Right iliac embolectomy w/ #4 Ted catheter Aortogram Right EIA stent w/ 7x29mm VBX;  Surgeon: Jody Hodges MD;  Location: [facility];  Service: Vascular    TUBAL LIGATION Bilateral 2020    VSD REPAIR      As a child    WOUND DEBRIDEMENT Right 2023    Procedure: Right Groin Wound Washout, Pulse Lavage, Wound Vac placement;  Surgeon: Jelani Avila DO;  Location: [facility];  Service: Vascular   [3]   Social History  Tobacco Use    Smoking status: Every Day     Current packs/day: 0.00     Average packs/day: 0.5 packs/day for 10.0 years (5.0 ttl pk-yrs)     Types: Cigarettes     Start date: 2013     Last attempt to quit: 2023     Years since quittin.4    Smokeless tobacco: Never   Vaping Use    Vaping status: Never Used   Substance and Sexual Activity    Alcohol use: Yes     Alcohol/week: 3.0 standard drinks of alcohol     Types: 3 Standard drinks or equivalent per week     Comment: I drink socially    Drug use: Not Currently     Comment: hx of THC use for migraines    Sexual activity: Yes     Partners: Male     Birth control/protection: Female Sterilization

## 2025-07-02 NOTE — ASSESSMENT & PLAN NOTE
Lab Results   Component Value Date    HGBA1C 10.5 (H) 04/07/2025       Recent Labs     07/02/25  0715 07/02/25  1131   POCGLU 170* 218*       Blood Sugar Average: Last 72 hrs:  (P) 194    HA1C pending  Holding home Ozempic, Metformin, and Jardiance while here  Will continue home Lantus and add SSI w/ACHS fingersticks  Diabetic diet  Goal  - 180

## 2025-07-02 NOTE — ASSESSMENT & PLAN NOTE
Contracted when she was 11 and was successfully treated per note from [facility] in 2021

## 2025-07-02 NOTE — ASSESSMENT & PLAN NOTE
"Admit to telemetry  CT imaging revealed \"Hypodensity involving the inferior right cerebellar hemisphere, most compatible with an acute/recent right PICA distribution infarct\" additionally \"New occlusion of the right vertebral artery from its origin\"  Case was discussed by ER provider with stroke neuro on-call  Patient is accepted for transfer at [facility] but is currently being held in our location secondary to bed availability  Will obtain vital signs and neuro checks per stroke protocol  Allow permissive hypertension  Patient was Plavix loaded in ER we will give his 75 mg p.o. daily, aspirin 81 mg p.o. daily and Lipitor 40 mg p.o. daily  Obtain MRI and echocardiogram in a.m.  Consult neurology  "

## 2025-07-03 ENCOUNTER — APPOINTMENT (INPATIENT)
Dept: CT IMAGING | Facility: HOSPITAL | Age: 35
DRG: 045 | End: 2025-07-03
Payer: COMMERCIAL

## 2025-07-03 ENCOUNTER — HOSPITAL ENCOUNTER (INPATIENT)
Facility: HOSPITAL | Age: 35
LOS: 5 days | Discharge: HOME | DRG: 045 | End: 2025-07-08
Attending: EMERGENCY MEDICINE | Admitting: EMERGENCY MEDICINE
Payer: COMMERCIAL

## 2025-07-03 VITALS
SYSTOLIC BLOOD PRESSURE: 163 MMHG | RESPIRATION RATE: 17 BRPM | BODY MASS INDEX: 42.08 KG/M2 | WEIGHT: 222.88 LBS | OXYGEN SATURATION: 95 % | HEART RATE: 86 BPM | TEMPERATURE: 97.6 F | HEIGHT: 61 IN | DIASTOLIC BLOOD PRESSURE: 82 MMHG

## 2025-07-03 DIAGNOSIS — I10 HYPERTENSION, UNSPECIFIED TYPE: ICD-10-CM

## 2025-07-03 DIAGNOSIS — I63.211 CEREBROVASCULAR ACCIDENT (CVA) DUE TO OCCLUSION OF RIGHT VERTEBRAL ARTERY (HCC): Primary | ICD-10-CM

## 2025-07-03 LAB
ANION GAP SERPL CALCULATED.3IONS-SCNC: 10 MMOL/L (ref 4–13)
ANION GAP SERPL CALCULATED.3IONS-SCNC: 5 MMOL/L (ref 4–13)
ANION GAP SERPL CALCULATED.3IONS-SCNC: 6 MMOL/L (ref 4–13)
ANION GAP SERPL CALCULATED.3IONS-SCNC: 7 MMOL/L (ref 4–13)
ANION GAP SERPL CALCULATED.3IONS-SCNC: 7 MMOL/L (ref 4–13)
BUN SERPL-MCNC: 10 MG/DL (ref 5–25)
BUN SERPL-MCNC: 12 MG/DL (ref 5–25)
BUN SERPL-MCNC: 12 MG/DL (ref 5–25)
BUN SERPL-MCNC: 7 MG/DL (ref 5–25)
BUN SERPL-MCNC: 7 MG/DL (ref 5–25)
CA-I BLD-SCNC: 1.1 MMOL/L (ref 1.12–1.32)
CALCIUM SERPL-MCNC: 8.2 MG/DL (ref 8.4–10.2)
CALCIUM SERPL-MCNC: 8.4 MG/DL (ref 8.4–10.2)
CALCIUM SERPL-MCNC: 8.4 MG/DL (ref 8.4–10.2)
CALCIUM SERPL-MCNC: 9 MG/DL (ref 8.4–10.2)
CALCIUM SERPL-MCNC: 9.5 MG/DL (ref 8.4–10.2)
CHLORIDE SERPL-SCNC: 106 MMOL/L (ref 96–108)
CHLORIDE SERPL-SCNC: 107 MMOL/L (ref 96–108)
CHLORIDE SERPL-SCNC: 108 MMOL/L (ref 96–108)
CHLORIDE SERPL-SCNC: 109 MMOL/L (ref 96–108)
CHLORIDE SERPL-SCNC: 109 MMOL/L (ref 96–108)
CO2 SERPL-SCNC: 20 MMOL/L (ref 21–32)
CO2 SERPL-SCNC: 21 MMOL/L (ref 21–32)
CO2 SERPL-SCNC: 22 MMOL/L (ref 21–32)
CO2 SERPL-SCNC: 23 MMOL/L (ref 21–32)
CO2 SERPL-SCNC: 24 MMOL/L (ref 21–32)
CREAT SERPL-MCNC: 0.4 MG/DL (ref 0.6–1.3)
CREAT SERPL-MCNC: 0.47 MG/DL (ref 0.6–1.3)
CREAT SERPL-MCNC: 0.47 MG/DL (ref 0.6–1.3)
CREAT SERPL-MCNC: 0.48 MG/DL (ref 0.6–1.3)
CREAT SERPL-MCNC: 0.52 MG/DL (ref 0.6–1.3)
ERYTHROCYTE [DISTWIDTH] IN BLOOD BY AUTOMATED COUNT: 14.8 % (ref 11.6–15.1)
GFR SERPL CREATININE-BSD FRML MDRD: 122 ML/MIN/1.73SQ M
GFR SERPL CREATININE-BSD FRML MDRD: 125 ML/MIN/1.73SQ M
GFR SERPL CREATININE-BSD FRML MDRD: 126 ML/MIN/1.73SQ M
GFR SERPL CREATININE-BSD FRML MDRD: 126 ML/MIN/1.73SQ M
GFR SERPL CREATININE-BSD FRML MDRD: 133 ML/MIN/1.73SQ M
GLUCOSE SERPL-MCNC: 161 MG/DL (ref 65–140)
GLUCOSE SERPL-MCNC: 182 MG/DL (ref 65–140)
GLUCOSE SERPL-MCNC: 186 MG/DL (ref 65–140)
GLUCOSE SERPL-MCNC: 192 MG/DL (ref 65–140)
GLUCOSE SERPL-MCNC: 202 MG/DL (ref 65–140)
GLUCOSE SERPL-MCNC: 205 MG/DL (ref 65–140)
GLUCOSE SERPL-MCNC: 214 MG/DL (ref 65–140)
GLUCOSE SERPL-MCNC: 215 MG/DL (ref 65–140)
GLUCOSE SERPL-MCNC: 215 MG/DL (ref 65–140)
GLUCOSE SERPL-MCNC: 228 MG/DL (ref 65–140)
HCT VFR BLD AUTO: 40.2 % (ref 34.8–46.1)
HGB BLD-MCNC: 12.7 G/DL (ref 11.5–15.4)
MAGNESIUM SERPL-MCNC: 1.8 MG/DL (ref 1.9–2.7)
MCH RBC QN AUTO: 25.1 PG (ref 26.8–34.3)
MCHC RBC AUTO-ENTMCNC: 31.6 G/DL (ref 31.4–37.4)
MCV RBC AUTO: 80 FL (ref 82–98)
PHOSPHATE SERPL-MCNC: 2.4 MG/DL (ref 2.7–4.5)
PLATELET # BLD AUTO: 215 THOUSANDS/UL (ref 149–390)
PMV BLD AUTO: 10.2 FL (ref 8.9–12.7)
POTASSIUM SERPL-SCNC: 3.6 MMOL/L (ref 3.5–5.3)
POTASSIUM SERPL-SCNC: 3.6 MMOL/L (ref 3.5–5.3)
POTASSIUM SERPL-SCNC: 3.7 MMOL/L (ref 3.5–5.3)
POTASSIUM SERPL-SCNC: 3.8 MMOL/L (ref 3.5–5.3)
POTASSIUM SERPL-SCNC: 4.2 MMOL/L (ref 3.5–5.3)
RBC # BLD AUTO: 5.05 MILLION/UL (ref 3.81–5.12)
SODIUM SERPL-SCNC: 136 MMOL/L (ref 135–147)
SODIUM SERPL-SCNC: 138 MMOL/L (ref 135–147)
SODIUM SERPL-SCNC: 138 MMOL/L (ref 135–147)
WBC # BLD AUTO: 9.76 THOUSAND/UL (ref 4.31–10.16)

## 2025-07-03 PROCEDURE — 82948 REAGENT STRIP/BLOOD GLUCOSE: CPT

## 2025-07-03 PROCEDURE — 84100 ASSAY OF PHOSPHORUS: CPT | Performed by: NURSE PRACTITIONER

## 2025-07-03 PROCEDURE — 80048 BASIC METABOLIC PNL TOTAL CA: CPT | Performed by: NURSE PRACTITIONER

## 2025-07-03 PROCEDURE — NC001 PR NO CHARGE: Performed by: NURSE PRACTITIONER

## 2025-07-03 PROCEDURE — 82330 ASSAY OF CALCIUM: CPT | Performed by: NURSE PRACTITIONER

## 2025-07-03 PROCEDURE — 85027 COMPLETE CBC AUTOMATED: CPT | Performed by: NURSE PRACTITIONER

## 2025-07-03 PROCEDURE — 99291 CRITICAL CARE FIRST HOUR: CPT | Performed by: EMERGENCY MEDICINE

## 2025-07-03 PROCEDURE — 93005 ELECTROCARDIOGRAM TRACING: CPT

## 2025-07-03 PROCEDURE — 99233 SBSQ HOSP IP/OBS HIGH 50: CPT | Performed by: PSYCHIATRY & NEUROLOGY

## 2025-07-03 PROCEDURE — 99238 HOSP IP/OBS DSCHRG MGMT 30/<: CPT

## 2025-07-03 PROCEDURE — 83735 ASSAY OF MAGNESIUM: CPT | Performed by: NURSE PRACTITIONER

## 2025-07-03 PROCEDURE — 70450 CT HEAD/BRAIN W/O DYE: CPT

## 2025-07-03 PROCEDURE — 80048 BASIC METABOLIC PNL TOTAL CA: CPT | Performed by: STUDENT IN AN ORGANIZED HEALTH CARE EDUCATION/TRAINING PROGRAM

## 2025-07-03 PROCEDURE — 92610 EVALUATE SWALLOWING FUNCTION: CPT

## 2025-07-03 RX ORDER — ASPIRIN 81 MG/1
81 TABLET ORAL DAILY
Status: DISCONTINUED | OUTPATIENT
Start: 2025-07-04 | End: 2025-07-03

## 2025-07-03 RX ORDER — INSULIN LISPRO 100 [IU]/ML
1-6 INJECTION, SOLUTION INTRAVENOUS; SUBCUTANEOUS
Status: CANCELLED | OUTPATIENT
Start: 2025-07-03

## 2025-07-03 RX ORDER — AZELASTINE 1 MG/ML
1 SPRAY, METERED NASAL 2 TIMES DAILY
Status: CANCELLED | OUTPATIENT
Start: 2025-07-04

## 2025-07-03 RX ORDER — INSULIN GLARGINE 100 [IU]/ML
25 INJECTION, SOLUTION SUBCUTANEOUS
Status: DISCONTINUED | OUTPATIENT
Start: 2025-07-03 | End: 2025-07-04 | Stop reason: SDUPTHER

## 2025-07-03 RX ORDER — ASPIRIN 81 MG/1
81 TABLET, CHEWABLE ORAL DAILY
Status: CANCELLED | OUTPATIENT
Start: 2025-07-04

## 2025-07-03 RX ORDER — SUCRALFATE 1 G/1
1 TABLET ORAL 4 TIMES DAILY
Status: DISCONTINUED | OUTPATIENT
Start: 2025-07-03 | End: 2025-07-08 | Stop reason: HOSPADM

## 2025-07-03 RX ORDER — ATORVASTATIN CALCIUM 40 MG/1
40 TABLET, FILM COATED ORAL EVERY EVENING
Status: DISCONTINUED | OUTPATIENT
Start: 2025-07-04 | End: 2025-07-08 | Stop reason: HOSPADM

## 2025-07-03 RX ORDER — CITALOPRAM HYDROBROMIDE 10 MG/1
10 TABLET ORAL DAILY
Status: DISCONTINUED | OUTPATIENT
Start: 2025-07-04 | End: 2025-07-08 | Stop reason: HOSPADM

## 2025-07-03 RX ORDER — SUCRALFATE 1 G/1
1 TABLET ORAL 4 TIMES DAILY
Status: CANCELLED | OUTPATIENT
Start: 2025-07-03

## 2025-07-03 RX ORDER — SODIUM CHLORIDE 1 G/1
1 TABLET ORAL
Status: CANCELLED | OUTPATIENT
Start: 2025-07-04

## 2025-07-03 RX ORDER — CLOPIDOGREL BISULFATE 75 MG/1
75 TABLET ORAL DAILY
Status: DISCONTINUED | OUTPATIENT
Start: 2025-07-04 | End: 2025-07-05

## 2025-07-03 RX ORDER — ATORVASTATIN CALCIUM 20 MG/1
20 TABLET, FILM COATED ORAL DAILY
Status: DISCONTINUED | OUTPATIENT
Start: 2025-07-04 | End: 2025-07-03

## 2025-07-03 RX ORDER — CHLORHEXIDINE GLUCONATE ORAL RINSE 1.2 MG/ML
15 SOLUTION DENTAL EVERY 12 HOURS SCHEDULED
Status: DISCONTINUED | OUTPATIENT
Start: 2025-07-03 | End: 2025-07-03

## 2025-07-03 RX ORDER — METOCLOPRAMIDE HYDROCHLORIDE 5 MG/ML
10 INJECTION INTRAMUSCULAR; INTRAVENOUS EVERY 6 HOURS PRN
Status: DISCONTINUED | OUTPATIENT
Start: 2025-07-03 | End: 2025-07-03 | Stop reason: HOSPADM

## 2025-07-03 RX ORDER — ALBUTEROL SULFATE 90 UG/1
2 INHALANT RESPIRATORY (INHALATION) EVERY 4 HOURS PRN
Status: DISCONTINUED | OUTPATIENT
Start: 2025-07-03 | End: 2025-07-03

## 2025-07-03 RX ORDER — CITALOPRAM HYDROBROMIDE 20 MG/1
10 TABLET ORAL DAILY
Status: CANCELLED | OUTPATIENT
Start: 2025-07-04

## 2025-07-03 RX ORDER — AZELASTINE 1 MG/ML
1 SPRAY, METERED NASAL 2 TIMES DAILY
Status: DISCONTINUED | OUTPATIENT
Start: 2025-07-04 | End: 2025-07-08 | Stop reason: HOSPADM

## 2025-07-03 RX ORDER — CLOPIDOGREL BISULFATE 75 MG/1
75 TABLET ORAL DAILY
Status: CANCELLED | OUTPATIENT
Start: 2025-07-04

## 2025-07-03 RX ORDER — SODIUM CHLORIDE 3 G/100ML
250 INJECTION, SOLUTION INTRAVENOUS ONCE
Status: COMPLETED | OUTPATIENT
Start: 2025-07-03 | End: 2025-07-03

## 2025-07-03 RX ORDER — GABAPENTIN 100 MG/1
100 CAPSULE ORAL
Status: CANCELLED | OUTPATIENT
Start: 2025-07-03

## 2025-07-03 RX ORDER — MONTELUKAST SODIUM 10 MG/1
10 TABLET ORAL
Status: CANCELLED | OUTPATIENT
Start: 2025-07-03

## 2025-07-03 RX ORDER — MONTELUKAST SODIUM 10 MG/1
10 TABLET ORAL
Status: DISCONTINUED | OUTPATIENT
Start: 2025-07-03 | End: 2025-07-08 | Stop reason: HOSPADM

## 2025-07-03 RX ORDER — HEPARIN SODIUM 5000 [USP'U]/ML
5000 INJECTION, SOLUTION INTRAVENOUS; SUBCUTANEOUS EVERY 8 HOURS SCHEDULED
Status: CANCELLED | OUTPATIENT
Start: 2025-07-03

## 2025-07-03 RX ORDER — CHLORHEXIDINE GLUCONATE ORAL RINSE 1.2 MG/ML
15 SOLUTION DENTAL EVERY 12 HOURS SCHEDULED
Status: DISCONTINUED | OUTPATIENT
Start: 2025-07-03 | End: 2025-07-08 | Stop reason: HOSPADM

## 2025-07-03 RX ORDER — ALBUTEROL SULFATE 90 UG/1
2 INHALANT RESPIRATORY (INHALATION) EVERY 4 HOURS PRN
Status: CANCELLED | OUTPATIENT
Start: 2025-07-03

## 2025-07-03 RX ORDER — METOCLOPRAMIDE HYDROCHLORIDE 5 MG/ML
10 INJECTION INTRAMUSCULAR; INTRAVENOUS EVERY 6 HOURS PRN
Status: DISCONTINUED | OUTPATIENT
Start: 2025-07-03 | End: 2025-07-03

## 2025-07-03 RX ORDER — DEXAMETHASONE SODIUM PHOSPHATE 10 MG/ML
10 INJECTION, SOLUTION INTRAMUSCULAR; INTRAVENOUS ONCE
Status: DISCONTINUED | OUTPATIENT
Start: 2025-07-03 | End: 2025-07-03

## 2025-07-03 RX ORDER — INSULIN GLARGINE 100 [IU]/ML
25 INJECTION, SOLUTION SUBCUTANEOUS
Status: CANCELLED | OUTPATIENT
Start: 2025-07-03

## 2025-07-03 RX ORDER — POTASSIUM CHLORIDE 14.9 MG/ML
20 INJECTION INTRAVENOUS ONCE
Status: COMPLETED | OUTPATIENT
Start: 2025-07-03 | End: 2025-07-03

## 2025-07-03 RX ORDER — 3% SODIUM CHLORIDE 3 G/100ML
50 INJECTION, SOLUTION INTRAVENOUS CONTINUOUS
Status: DISCONTINUED | OUTPATIENT
Start: 2025-07-03 | End: 2025-07-06

## 2025-07-03 RX ORDER — CHLORHEXIDINE GLUCONATE ORAL RINSE 1.2 MG/ML
15 SOLUTION DENTAL EVERY 12 HOURS SCHEDULED
Status: CANCELLED | OUTPATIENT
Start: 2025-07-03

## 2025-07-03 RX ORDER — CLOPIDOGREL BISULFATE 75 MG/1
75 TABLET ORAL DAILY
Status: DISCONTINUED | OUTPATIENT
Start: 2025-07-04 | End: 2025-07-03

## 2025-07-03 RX ORDER — LORATADINE 10 MG/1
10 TABLET ORAL DAILY
Status: DISCONTINUED | OUTPATIENT
Start: 2025-07-04 | End: 2025-07-03

## 2025-07-03 RX ORDER — HEPARIN SODIUM 5000 [USP'U]/ML
5000 INJECTION, SOLUTION INTRAVENOUS; SUBCUTANEOUS EVERY 8 HOURS SCHEDULED
Status: DISCONTINUED | OUTPATIENT
Start: 2025-07-03 | End: 2025-07-08 | Stop reason: HOSPADM

## 2025-07-03 RX ORDER — ACETAMINOPHEN 325 MG/1
650 TABLET ORAL EVERY 4 HOURS PRN
Status: CANCELLED | OUTPATIENT
Start: 2025-07-03

## 2025-07-03 RX ORDER — ATORVASTATIN CALCIUM 40 MG/1
40 TABLET, FILM COATED ORAL EVERY EVENING
Status: CANCELLED | OUTPATIENT
Start: 2025-07-04

## 2025-07-03 RX ORDER — SCOPOLAMINE 1 MG/3D
1 PATCH, EXTENDED RELEASE TRANSDERMAL
Status: DISCONTINUED | OUTPATIENT
Start: 2025-07-03 | End: 2025-07-06

## 2025-07-03 RX ORDER — GABAPENTIN 100 MG/1
100 CAPSULE ORAL
Status: DISCONTINUED | OUTPATIENT
Start: 2025-07-03 | End: 2025-07-08 | Stop reason: HOSPADM

## 2025-07-03 RX ORDER — ACETAMINOPHEN 325 MG/1
650 TABLET ORAL EVERY 4 HOURS PRN
Status: DISCONTINUED | OUTPATIENT
Start: 2025-07-03 | End: 2025-07-03

## 2025-07-03 RX ORDER — ALBUTEROL SULFATE 90 UG/1
2 INHALANT RESPIRATORY (INHALATION) EVERY 4 HOURS PRN
Status: DISCONTINUED | OUTPATIENT
Start: 2025-07-03 | End: 2025-07-08 | Stop reason: HOSPADM

## 2025-07-03 RX ORDER — INSULIN GLARGINE 100 [IU]/ML
25 INJECTION, SOLUTION SUBCUTANEOUS
Status: DISCONTINUED | OUTPATIENT
Start: 2025-07-03 | End: 2025-07-03

## 2025-07-03 RX ORDER — ACETAMINOPHEN 10 MG/ML
1000 INJECTION, SOLUTION INTRAVENOUS EVERY 6 HOURS PRN
Status: DISPENSED | OUTPATIENT
Start: 2025-07-03 | End: 2025-07-05

## 2025-07-03 RX ORDER — METOCLOPRAMIDE HYDROCHLORIDE 5 MG/ML
10 INJECTION INTRAMUSCULAR; INTRAVENOUS EVERY 6 HOURS PRN
Status: CANCELLED | OUTPATIENT
Start: 2025-07-03

## 2025-07-03 RX ORDER — ASPIRIN 81 MG/1
81 TABLET, CHEWABLE ORAL DAILY
Status: DISCONTINUED | OUTPATIENT
Start: 2025-07-04 | End: 2025-07-06

## 2025-07-03 RX ORDER — CITALOPRAM HYDROBROMIDE 10 MG/1
10 TABLET ORAL DAILY
Status: DISCONTINUED | OUTPATIENT
Start: 2025-07-04 | End: 2025-07-03

## 2025-07-03 RX ORDER — INSULIN LISPRO 100 [IU]/ML
1-6 INJECTION, SOLUTION INTRAVENOUS; SUBCUTANEOUS
Status: DISCONTINUED | OUTPATIENT
Start: 2025-07-03 | End: 2025-07-03

## 2025-07-03 RX ORDER — NICARDIPINE HYDROCHLORIDE 0.1 MG/ML
1-15 INJECTION INTRAVENOUS
Status: DISCONTINUED | OUTPATIENT
Start: 2025-07-03 | End: 2025-07-03

## 2025-07-03 RX ORDER — SODIUM CHLORIDE 1 G/1
1 TABLET ORAL
Status: DISCONTINUED | OUTPATIENT
Start: 2025-07-04 | End: 2025-07-03

## 2025-07-03 RX ORDER — POTASSIUM CHLORIDE 1500 MG/1
40 TABLET, EXTENDED RELEASE ORAL ONCE
Status: COMPLETED | OUTPATIENT
Start: 2025-07-03 | End: 2025-07-03

## 2025-07-03 RX ORDER — LORATADINE 10 MG/1
10 TABLET ORAL DAILY
Status: CANCELLED | OUTPATIENT
Start: 2025-07-04

## 2025-07-03 RX ORDER — MONTELUKAST SODIUM 10 MG/1
10 TABLET ORAL
Status: DISCONTINUED | OUTPATIENT
Start: 2025-07-03 | End: 2025-07-03

## 2025-07-03 RX ORDER — INSULIN LISPRO 100 [IU]/ML
2-12 INJECTION, SOLUTION INTRAVENOUS; SUBCUTANEOUS EVERY 6 HOURS SCHEDULED
Status: DISCONTINUED | OUTPATIENT
Start: 2025-07-03 | End: 2025-07-06

## 2025-07-03 RX ORDER — CALCIUM GLUCONATE 20 MG/ML
2 INJECTION, SOLUTION INTRAVENOUS ONCE
Status: COMPLETED | OUTPATIENT
Start: 2025-07-03 | End: 2025-07-03

## 2025-07-03 RX ORDER — ONDANSETRON 2 MG/ML
4 INJECTION INTRAMUSCULAR; INTRAVENOUS ONCE
Status: COMPLETED | OUTPATIENT
Start: 2025-07-03 | End: 2025-07-03

## 2025-07-03 RX ADMIN — CLOPIDOGREL 75 MG: 75 TABLET ORAL at 08:32

## 2025-07-03 RX ADMIN — ONDANSETRON 4 MG: 2 INJECTION INTRAMUSCULAR; INTRAVENOUS at 10:05

## 2025-07-03 RX ADMIN — SCOPOLAMINE 1 PATCH: 1.5 PATCH, EXTENDED RELEASE TRANSDERMAL at 22:06

## 2025-07-03 RX ADMIN — INSULIN LISPRO 1 UNITS: 100 INJECTION, SOLUTION INTRAVENOUS; SUBCUTANEOUS at 05:57

## 2025-07-03 RX ADMIN — SODIUM CHLORIDE 250 ML: 3 INJECTION, SOLUTION INTRAVENOUS at 14:55

## 2025-07-03 RX ADMIN — INSULIN LISPRO 2 UNITS: 100 INJECTION, SOLUTION INTRAVENOUS; SUBCUTANEOUS at 17:00

## 2025-07-03 RX ADMIN — POTASSIUM CHLORIDE 20 MEQ: 14.9 INJECTION, SOLUTION INTRAVENOUS at 17:15

## 2025-07-03 RX ADMIN — POTASSIUM CHLORIDE 40 MEQ: 1500 TABLET, EXTENDED RELEASE ORAL at 12:20

## 2025-07-03 RX ADMIN — SODIUM CHLORIDE 100 ML/HR: 4 INJECTION, SOLUTION, CONCENTRATE INTRAVENOUS at 04:57

## 2025-07-03 RX ADMIN — CHLORHEXIDINE GLUCONATE 15 ML: 1.2 SOLUTION ORAL at 08:31

## 2025-07-03 RX ADMIN — ACETAMINOPHEN 650 MG: 325 TABLET ORAL at 08:32

## 2025-07-03 RX ADMIN — SODIUM CHLORIDE 1 G: 1 TABLET ORAL at 17:15

## 2025-07-03 RX ADMIN — METOCLOPRAMIDE 10 MG: 5 INJECTION, SOLUTION INTRAMUSCULAR; INTRAVENOUS at 14:14

## 2025-07-03 RX ADMIN — SODIUM CHLORIDE 1 G: 1 TABLET ORAL at 11:13

## 2025-07-03 RX ADMIN — ATORVASTATIN CALCIUM 40 MG: 40 TABLET, FILM COATED ORAL at 17:15

## 2025-07-03 RX ADMIN — HEPARIN SODIUM 5000 UNITS: 5000 INJECTION INTRAVENOUS; SUBCUTANEOUS at 14:15

## 2025-07-03 RX ADMIN — CALCIUM GLUCONATE 2 G: 20 INJECTION, SOLUTION INTRAVENOUS at 11:14

## 2025-07-03 RX ADMIN — SUCRALFATE 1 G: 1 TABLET ORAL at 17:15

## 2025-07-03 RX ADMIN — ONDANSETRON 4 MG: 2 INJECTION INTRAMUSCULAR; INTRAVENOUS at 05:25

## 2025-07-03 RX ADMIN — HEPARIN SODIUM 5000 UNITS: 5000 INJECTION INTRAVENOUS; SUBCUTANEOUS at 05:25

## 2025-07-03 RX ADMIN — SUCRALFATE 1 G: 1 TABLET ORAL at 08:33

## 2025-07-03 RX ADMIN — SUCRALFATE 1 G: 1 TABLET ORAL at 11:13

## 2025-07-03 RX ADMIN — SODIUM CHLORIDE 1 G: 1 TABLET ORAL at 08:35

## 2025-07-03 RX ADMIN — B-COMPLEX W/ C & FOLIC ACID TAB 1 TABLET: TAB at 08:33

## 2025-07-03 RX ADMIN — ASPIRIN 81 MG: 81 TABLET, CHEWABLE ORAL at 08:34

## 2025-07-03 RX ADMIN — SODIUM CHLORIDE 30 ML/HR: 3 INJECTION, SOLUTION INTRAVENOUS at 21:20

## 2025-07-03 RX ADMIN — LORATADINE 10 MG: 10 TABLET ORAL at 08:33

## 2025-07-03 RX ADMIN — INSULIN LISPRO 2 UNITS: 100 INJECTION, SOLUTION INTRAVENOUS; SUBCUTANEOUS at 11:17

## 2025-07-03 RX ADMIN — CITALOPRAM HYDROBROMIDE 10 MG: 20 TABLET ORAL at 08:34

## 2025-07-03 RX ADMIN — TRIMETHOBENZAMIDE HYDROCHLORIDE 200 MG: 100 INJECTION INTRAMUSCULAR at 20:43

## 2025-07-03 RX ADMIN — SODIUM CHLORIDE 250 ML: 3 INJECTION, SOLUTION INTRAVENOUS at 11:05

## 2025-07-03 NOTE — NURSING NOTE
1900: Assumed care of pt from [facility] nurse Elías. Verified 1.8 NaCl infusion at 75 mL/hr. Pt resting comfortably and complains of no pain. Bed alarm set and confirmed pt has call bell.     2000: Head to toe assessment and VS obtained (see flowsheets).     2200: Reassessment of neuro completed (see flowsheets). Pts linen and gown changed. CHG bath completed.     2219: 1.8% NaCl gtt rate changed to 100 mL/hr D/T Na being 134.    2337: Pt complaining of 7/10 HA. Given IV Toradol. Pt resting comfortably 30 minutes after administration.     0000: Head to toe assessment completed (see flowsheets).     0200: Lissett MENDOZA able to get new IV on pt and evening labs obtained.     0400: Head to toe assessment completed (see flowsheets).     0537: Pt had episode of nausea and emesis. PRN Zofran given.

## 2025-07-03 NOTE — PROGRESS NOTES
Progress Note - Neurology   Name: Jaycee Can 35 y.o. female I MRN: [id number]  Unit/Bed#: ICU 10-01 I Date of Admission: 7/1/2025   Date of Service: 7/3/2025 I Hospital Day: 2    Assessment & Plan  CVA (cerebral vascular accident) (HCC)  Right PICA acute infarct  -Discussed with team, given young age and posterior circulation infarct size, recommend to admit patient to step down level 1 under stroke pathway for close monitoring.  -Will need to closely monitor sign of increased ICP.  -Etiology under investigation, cannot rule out vertebral dissection versus embolic, patient reported family history of stroke at an early age but denied known blood clot disease.  Pending MRI head  -Discussed with the patient, given young age, we will contact comprehensive stroke workup.  Pending echo, will send hypercoagulable panel.  May consider CT chest, abdomen, pelvis to rule out malignancy  -Stroke labs  - Recommend to continue to antiplatelet with aspirin 81 mg and Plavix 75 mg.  Okay to hold her statin given optimal LDL value.  -Repeat CT head scan stat with any acute change.  - Permissive hypertension, 24 hours after onset.  Please avoid hypotension.  Please keep systolic blood pressure above 140.      # Discussed with the ICU team, recommended to start 3% hypertonic saline.  Sodium goal above 145.  Advised ICU team to discuss the case with the neurosurgery.  In case patient clinical presentation continue to worsen, she will need to be transferred to [place] for possible surgical intervention.  As I discussed with ICU team, the critical period for patients presented with with large posterior stroke is typically 72 hours since onset.  Recommend continue to monitor patient with neurocheck acute 1 to 2 hours.  Pending transfer to [place] neuro ICU  Hypertension    Mild intermittent asthma without complication    GERD (gastroesophageal reflux disease)    Acute headache    History of repair of coarctation of aorta    Chronic  hepatitis C without hepatic coma (HCC)    Anxiety and depression    Neuropathy    Hx of femoral artery thrombosis      Jaycee Can will need follow up in in 4 weeks with neurovascular attending. She will not require outpatient neurological testing.  I have discussed the above management plan in detail with the primary service.     Subjective   Team reported patient started to vomit this morning.  Stat CT head repeat, which demonstrated Evolving recent right cerebellar infarct with persistent mass effect and effacement of the fourth ventricle     Review of Systems  10 system reviewed, unremarkable other than above  Nausea vomiting    Objective :  Temp:  [97.1 °F (36.2 °C)-98.1 °F (36.7 °C)] 97.5 °F (36.4 °C)  HR:  [] 81  BP: (133-197)/(70-95) 136/74  Resp:  [6-50] 17  SpO2:  [95 %-98 %] 95 %  O2 Device: None (Room air)    Physical ExamNeurological Exam  GEN: in no acute distress, well-developed, well nourished  HEENT: normocephalic,  Nose and ears grossly normal in appearance.  CV:  no pedal edema.  Normotensive  PULM: airways patent, non-labored breathing   ABD:  Nondistended  EXT: no   edema or erythema.  No joint swelling  SKIN: no rashes or lesions.     NEURO:        Mental Status: Alert and oriented to person, place, and year. Interactive, able to follow commands.  Answers questions appropriately       Speech: Intact Articulation         CN 2-12: grossly intact       Motor: can move all extremities symmetrically      Sensory:  Reported intact light touch and pinprick throughout        Reflexes:  Not able to assess during tele visit       Coordination: no ataxia with finger-to-nose and heel-to-shin testing             Gait/Station: Deferred        Cortical: No Extinction      Lab Results: I have reviewed the following results:  Imaging Results Review: I personally reviewed the following image studies/reports in PACS and discussed pertinent findings with Radiology: CT head. My interpretation of the  radiology images/reports is: Evolving recent right cerebellar infarct with persistent mass effect and effacement of the fourth ventricle.  Other Study Results Review: EKG was reviewed.     VTE Pharmacologic Prophylaxis: Heparin    Administrative Statements   VIRTUAL CARE DOCUMENTATION:     1. This service was provided via Telemedicine using [email or web address]     2. Parties in the room with patient during teleconsult Patient only    3. Confidentiality My office door was closed     4. Participants No one else was in the room    5. Patient acknowledged consent and understanding of privacy and security of the  Telemedicine consult. I informed the patient that I have reviewed their record in Epic and presented the opportunity for them to ask any questions regarding the visit today.  The patient agreed to participate.    6. I have spent a total time of 36 minutes in caring for this patient on the day of the visit/encounter including Prognosis, Risks and benefits of tx options, and Instructions for management, not including the time spent for establishing the audio/video connection.

## 2025-07-03 NOTE — ASSESSMENT & PLAN NOTE
In setting of new R PICA infarct w/R vertebral artery occlusion as noted above  Treat CVA as noted above  PRN tylenol and will consider additional toradol as needed   Monitor for worsening characteristics

## 2025-07-03 NOTE — PLAN OF CARE
Problem: PAIN - ADULT  Goal: Verbalizes/displays adequate comfort level or baseline comfort level  Description: Interventions:  - Encourage patient to monitor pain and request assistance  - Assess pain using appropriate pain scale  - Administer analgesics as ordered based on type and severity of pain and evaluate response  - Implement non-pharmacological measures as appropriate and evaluate response  - Consider cultural and social influences on pain and pain management  - Notify physician/advanced practitioner if interventions unsuccessful or patient reports new pain  - Educate patient/family on pain management process including their role and importance of  reporting pain   - Provide non-pharmacologic/complimentary pain relief interventions  Outcome: Progressing     Problem: INFECTION - ADULT  Goal: Absence or prevention of progression during hospitalization  Description: INTERVENTIONS:  - Assess and monitor for signs and symptoms of infection  - Monitor lab/diagnostic results  - Monitor all insertion sites, i.e. indwelling lines, tubes, and drains  - Monitor endotracheal if appropriate and nasal secretions for changes in amount and color  - [place] appropriate cooling/warming therapies per order  - Administer medications as ordered  - Instruct and encourage patient and family to use good hand hygiene technique  - Identify and instruct in appropriate isolation precautions for identified infection/condition  Outcome: Progressing

## 2025-07-03 NOTE — ASSESSMENT & PLAN NOTE
Right PICA acute infarct  -Discussed with team, given young age and posterior circulation infarct size, recommend to admit patient to step down level 1 under stroke pathway for close monitoring.  -Will need to closely monitor sign of increased ICP.  -Recommend sodium above 140  -Etiology under investigation, cannot rule out vertebral dissection versus embolic, patient reported family history of stroke at an early age but denied known blood clot disease.  Pending MRI head  -Discussed with the patient, given young age, we will contact comprehensive stroke workup.  Pending echo, will send hypercoagulable panel.  May consider CT chest, abdomen, pelvis to rule out malignancy  -Stroke labs  - Recommend to continue to antiplatelet with aspirin 81 mg and Plavix 75 mg.  Okay to hold her statin given optimal LDL value.  -Repeat CT head scan stat with any acute change.  - Permissive hypertension, 24 hours after onset.  Please avoid hypotension.  Please keep systolic blood pressure above 140.    Neurology will continue to follow.

## 2025-07-03 NOTE — ASSESSMENT & PLAN NOTE
Lab Results   Component Value Date    HGBA1C 9.4 (H) 07/02/2025       Recent Labs     07/02/25  1131 07/02/25  1557 07/02/25 2044 07/03/25  0554   POCGLU 218* 229* 215* 186*       Blood Sugar Average: Last 72 hrs:  (P) 203.6    HA1C pending  Holding home Ozempic, Metformin, and Jardiance while here  Will continue home Lantus and add SSI w/ACHS fingersticks  Diabetic diet  Goal  - 180

## 2025-07-03 NOTE — ASSESSMENT & PLAN NOTE
Upgraded to [facility] service for frequent neuro checks/monitoring and likely need for hypertonic saline  Initially presented to ED last evening for for severe headache w/associated dizziness, vision changes, and nausea that started abruptly at approximately 8 AM that morning - was admitted to [facility] early this AM until bed becomes available at \[facility]\""  CTA Head/Neck revealed acute R PICA infarct w/new occlusion of R vertebral artery   Stroke alert on 07/01 at 2230 - not TNK candidate 2/2 being outside window  Received ASA/Plavix load per Neurology's recommendations  Stroke protocol:  MRA/MRI Brain completed   LDL 53, HDL 31, TRIG 351  HA1C pending  Start ASA 81 mg and Plavix 75 mg daily - previously on daily Plavix for hx femoral artery thrombosis, however had been stopped within last couple of years  Allow for permissive HTN, goal>140  Frequent neuro checks per protocol  PT/OT/ST  Continue 1.8% hypertonic saline and sodium tabs for goal Na>140   Q4H BMP   Plan for STAT CTH w/any change in neuro status  PRN pain medications/HA cocktail for persistent headache

## 2025-07-03 NOTE — ASSESSMENT & PLAN NOTE
Has history of aortic coarctation and VSD s/p repair when she was 8D old - required stenting due to recurrent coarctation in 02/2020 at [facility]

## 2025-07-03 NOTE — DISCHARGE SUMMARY
Discharge Summary - Critical Care/ICU   Name: Jaycee Can 35 y.o. female I MRN: [id number]  Unit/Bed#: ICU 10-01 I Date of Admission: 7/1/2025   Date of Service: 7/3/2025 I Hospital Day: 2    Admission Date: 7/1/2025 1837  Discharge Date: 07/03/25  Admitting Diagnosis: Vertigo [R42]  Dizzy [R42]  Right-sided headache [R51.9]  Cerebellar stroke, acute (HCC) [I63.9]  Ischemic stroke (HCC) [I63.9]  Discharge Diagnosis:   Medical Problems       Resolved Problems  Date Reviewed: 7/3/2025   None         HPI: per CCAP: Jaycee Can is a 35 y.o. female w/PMHx of anxiety/depression, HTN, aortic coarctation w/VSD s/p repair at 10D old followed by recurrent coarctation requiring stenting in 02/2020 ([facility]), cardiac cath c/b R femoral artery thrombosis requiring R femoral artery exposure, R iliac embolectomy, and REIA stent in 01/2023 further c/b R groin infection requiring washout and wound vac placement in 02/2023, asthma, GERD, Hepatitis C (treated), IDDM2, neuropathy, obesity   who initially presented last evening for persistent headache associated w/dizziness, vision changes (specifically R), and nausea and was found to have new acute R PICA infarct w/vertebral artery occlusion. Transfer to [facility] was recommended at that time, however there were no available beds, so she was admitted under [facility] for frequent neuro checks. This AM, Neurology recommended patient be upgraded to ICU for close monitoring and possible need for hypertonic saline given goal Na > 140.          Procedures Performed: No orders of the defined types were placed in this encounter.      Summary of Hospital Course:  She received aspirin and Plavix load.  7/2 Admitted under [facility] service awaiting transfer to [facility] and Transferred to critical care service overnight d/t capacity at [facility].  Initiated on 1.8% normal saline and salt tabs added to achieve sodium goals.  Systolic blood pressure remained 140s to 160s.  This morning the patient became  symptomatic with nausea and vomiting, therefore neurology recommended 3% saline bolus to achieve sodium>145.  She received 3% saline bolus x 2 in total.  Repeat CT head with evolving recent right cerebellar infarct.  Critical care Dr Gan spoke to neurosurgery who recommended reaching out to neurocritical care.  Dr. Mathias was updated about the status of the patient and current treatment plan and accepted her for transfer to [facility] neurocritical care service    Significant Findings, Care, Treatment and Services Provided:   These results are sorted by the time result was reported, with newest results at the top.  Radiology Results (last 21 days)    Procedure Component Value Units Date/Time   CT head wo contrast [[id number]] Collected: 07/03/25 1042   Order Status: Completed Updated: 07/03/25 1057   Narrative:     CT BRAIN - WITHOUT CONTRAST    INDICATION:   HA/N/V.    COMPARISON: CT head 7/1/2025    TECHNIQUE:  CT examination of the brain was performed.  Multiplanar 2D reformatted images were created from the source data.    Radiation dose length product (DLP) for this visit: .  This examination, like all CT scans performed in the [facility], was performed utilizing techniques to minimize radiation dose exposure, including the use of iterative reconstruction   and automated exposure control.    IMAGE QUALITY:  Diagnostic.    FINDINGS:    PARENCHYMA:    Evolving recent right cerebellar infarct with persistent mass effect and effacement of the fourth ventricle. No acute intracranial hemorrhage.    Mild nonspecific white matter change in the left frontal lobe is better demonstrated in prior MRI.    VENTRICLES AND EXTRA-AXIAL SPACES: Persistent mass effect upon the fourth ventricle with unchanged ventricular size. No hydrocephalus..    VISUALIZED ORBITS:  Normal visualized orbits.    PARANASAL SINUSES:  Normal visualized paranasal sinuses.    CALVARIUM AND EXTRACRANIAL SOFT TISSUES:  Normal.   Impression:        Evolving recent right cerebellar infarct with persistent mass effect and effacement of the fourth ventricle. Unchanged ventricular size without hydrocephalus. No intracranial hemorrhage.            Workstation performed: [id number]   MRA head wo contrast [[id number]] Collected: 07/02/25 1243   Order Status: Completed Updated: 07/02/25 1254   Narrative:     MRA BRAIN WITHOUT CONTRAST    INDICATION:  stroke    COMPARISON: Prior CT angiogram 7/1/2025.    TECHNIQUE:  Axial 3-D time-of-flight imaging with 3-D reconstructions performed without contrast.    IV Contrast:  Not administered.    FINDINGS:    IMAGE QUALITY:  Diagnostic.    ANATOMY    INTERNAL CAROTID ARTERIES:  Normal flow related signal of the distal cervical, petrous and cavernous segments of the internal carotid arteries.  Normal ICA terminus.    ANTERIOR CEREBRAL ARTERY CIRCULATION:  Normal A1 segments.  Normal anterior communicating artery.  Normal flow-related signal of the anterior cerebral arteries.    MIDDLE CEREBRAL ARTERY CIRCULATION:  The M1 segment and middle cerebral artery branches demonstrate normal flow-related signal.    DISTAL VERTEBRAL ARTERIES: Segment of the intracranial right vertebral artery focally occludes in its midportion reconstituting distally. This is similar to the prior CT angiogram performed 1 day earlier.    BASILAR ARTERY:  Normal.    POSTERIOR CEREBRAL ARTERIES:  Both posterior cerebral arteries arises from the basilar tip.  Both arteries demonstrate normal flow-related enhancement. Patent right posterior communicating artery.   Impression:       Focal severe stenosis versus occlusion of the midportion of the intracranial right vertebral artery reconstitutes distally. This is similar to the prior CT angiogram.          Workstation performed: [id number]   MRI brain wo contrast [[id number]] Collected: 07/02/25 1234   Order Status: Completed Updated: 07/02/25 1244   Narrative:     MRI BRAIN WITHOUT CONTRAST    INDICATION:  Stroke.    COMPARISON:   Prior MRI 3/26/2019    TECHNIQUE:  Multiplanar, multisequence imaging of the brain was performed.      IMAGE QUALITY:  Diagnostic.    FINDINGS:    BRAIN PARENCHYMA: Diffusion abnormality identified right inferior cerebellum with decrease ADC compatible with recent infarct. No hemorrhage identified. Minimal mass effect on the inferior aspect of the fourth ventricle. A few nonspecific focus of white  matter abnormality left frontal deep white matter is new when compared to the prior MRI dated 3/26/2019.    VENTRICLES:  Normal for the patient's age.    SELLA AND PITUITARY GLAND:  Normal.    ORBITS:  Normal.    PARANASAL SINUSES:  Normal.    VASCULATURE:  Evaluation of the major intracranial vasculature demonstrates appropriate flow voids.    CALVARIUM AND SKULL BASE:  Normal.    EXTRACRANIAL SOFT TISSUES:  Normal.   Impression:       Recent infarct right inferior cerebellum without evidence of hemorrhage.            Workstation performed: [id number]   CTA head and neck with and without contrast [[id number]] Collected: 07/01/25 2006   Order Status: Completed Updated: 07/01/25 2027   Narrative:     CTA NECK AND BRAIN WITH AND WITHOUT CONTRAST    INDICATION: headache dizziness vision blurriness    COMPARISON:   Head CT from 6/24/2021 brain MR from 3/26/2018, head CT from 12/2/2018, CT angiogram of the chest from 11/21/2023.    TECHNIQUE:  Routine CT imaging of the Brain without contrast.Post contrast imaging was performed after administration of iodinated contrast through the neck and brain. Post contrast axial 0.625 mm images timed to opacify the arterial system.  3D  rendering was performed on an independent workstation.   MIP reconstructions performed. Coronal and sagittal reconstructions were performed of the non contrast portion of the brain.    Radiation dose length product (DLP) for this visit:  1121 mGy-cm. .  This examination, like all CT scans performed in the [facility],  was performed utilizing techniques to minimize radiation dose exposure, including the use of iterative  reconstruction and automated exposure control.    IV Contrast:  85 mL of iohexol (OMNIPAQUE)    IMAGE QUALITY:   Diagnostic    FINDINGS:  NONCONTRAST BRAIN  PARENCHYMA: Hypodensity involving the inferior right cerebellar hemisphere, indicative of an acute/recent right cerebellar infarct. No intracranial hemorrhage noted. No mass effect or midline shift. No intracranial mass identified.    VENTRICLES AND EXTRA-AXIAL SPACES:Normal for the patient's age.    VISUALIZED ORBITS: Normal.    PARANASAL SINUSES: Normal.    CTA NECK    ARCH AND GREAT VESSELS: Stable stent involving the distal aortic arch extending to the proximal descending thoracic aorta related to prior aortic coarctation repair.. Great vessel origins are typical.  VERTEBRAL ARTERIES: Left vertebral artery is dominant and appears patent throughout. New occlusion of the right vertebral artery from its origin, and throughout the V1 segment, with reconstitution at the level of the proximal V2 segment, which appears  attenuated, throughout the V2 segment, with mild caliber changes which may be due to the proximal occlusion, but underlying dissection cannot be excluded. The right V3 segment is widely patent.  RIGHT CAROTID: No stenosis.    No dissection.  LEFT CAROTID: No stenosis.    No dissection.  NASCET criteria was used to determine the degree of internal carotid artery diameter stenosis.      CTA BRAIN:  INTERNAL CAROTID ARTERIES: No stenosis or occlusion.  ANTERIOR CEREBRAL ARTERY CIRCULATION:  No stenosis or occlusion.  MIDDLE CEREBRAL ARTERY CIRCULATION:  No stenosis or occlusion.  DISTAL VERTEBRAL ARTERIES: Left vertebral artery is widely patent. Left PICA is patent. Right vertebral artery appears to have high-grade stenosis beyond the origin of the right PICA. Proximal right PICA is attenuated but patent, while the distal right  PICA is likely  occluded.  BASILAR ARTERY:  No stenosis or occlusion.  POSTERIOR CEREBRAL ARTERIES: No stenosis or occlusion.  VENOUS STRUCTURES:  Normal.    NON VASCULAR ANATOMY  BONY STRUCTURES:  No acute osseous abnormality.    SOFT TISSUES OF THE NECK:  Normal.    THORACIC INLET:  Unremarkable.     Impression:     CT Brain: Hypodensity involving the inferior right cerebellar hemisphere, most compatible with an acute/recent right PICA distribution infarct. No intracranial hemorrhage noted. Recommend for evaluation with brain MRI without contrast.    CT Angiography: New occlusion of the right vertebral artery from its origin, and throughout the V1 segment, with reconstitution at the level of the proximal V2 segment, with mild caliber change/attenuation of the right V2 segment, which may be due to  proximal occlusion, but underlying dissection cannot be excluded.    High-grade stenosis involving the intradural right vertebral artery beyond the origin of the right PICA. Likely patent but attenuated proximal right PICA, but the distal right PICA is likely occluded.    No additional large vessel occlusion, high-grade stenosis, or intracranial aneurysm identified on CT angiogram of the head.    No hemodynamically significant stenosis or dissection identified involving the bilateral cervical internal carotid arteries or left vertebral artery.    Stable stent involving the distal aortic arch and proximal descending thoracic aorta related to prior aortic coarctation repair.      I personally discussed this study with KESHAWN LANDA on 7/1/2025 at 8:20 p.m.           Complications: N/V/HA    Condition at Discharge: serious       Discharge instructions/Information to patient and family:   See After Visit Summary (AVS) for information provided to patient and family.      Provisions for Follow-Up Care:  See after visit summary for information related to follow-up care and any pertinent home health orders.      PCP: Miri Webb,  REJI    Disposition: SLB    Planned Readmission: No   Future Encounters        7/1/2025 Conf Preadm    BE PPHP7     7/31/2025 11:30 AM (15 min) Jia    OFFICE VISIT SHORT PG    SATISH CHANEL ([facility])    REJI Ferrell                            Discharge Medications:  See after visit summary for reconciled discharge medications provided to patient and family.      Discharge Statement:  I have spent a total time of 41 minutes in caring for this patient on the day of the visit/encounter. >30 minutes of time was spent on: Diagnostic results, Instructions for management, Risk factor reductions, Counseling / Coordination of care, Documenting in the medical record, Reviewing / ordering tests, medicine, procedures  , and Communicating with other healthcare professionals .

## 2025-07-03 NOTE — ASSESSMENT & PLAN NOTE
Contracted when she was 11 and was successfully treated per note from [facility] in 2021

## 2025-07-03 NOTE — OCCUPATIONAL THERAPY NOTE
Occupational Therapy Cancellation Note     07/03/25 0746   OT Last Visit   OT Visit Date 07/03/25   Note Type   Note type Cancelled Session   Cancel Reasons Other   Additional Comments awaiting transfer to [facility]     OT orders received. Chart reviewed. Will follow-up as appropriate    Dinora Dawson OTR/L

## 2025-07-03 NOTE — PLAN OF CARE
Problem: PAIN - ADULT  Goal: Verbalizes/displays adequate comfort level or baseline comfort level  Description: Interventions:  - Encourage patient to monitor pain and request assistance  - Assess pain using appropriate pain scale  - Administer analgesics as ordered based on type and severity of pain and evaluate response  - Implement non-pharmacological measures as appropriate and evaluate response  - Consider cultural and social influences on pain and pain management  - Notify physician/advanced practitioner if interventions unsuccessful or patient reports new pain  - Educate patient/family on pain management process including their role and importance of  reporting pain   - Provide non-pharmacologic/complimentary pain relief interventions  Outcome: Progressing     Problem: SAFETY ADULT  Goal: Patient will remain free of falls  Description: INTERVENTIONS:  - Educate patient/family on patient safety including physical limitations  - Instruct patient to call for assistance with activity   - Consider consulting OT/PT to assist with strengthening/mobility based on AM PAC & JH-HLM score  - Consult OT/PT to assist with strengthening/mobility   - Keep Call bell within reach  - Keep bed low and locked with side rails adjusted as appropriate  - Keep care items and personal belongings within reach  - Initiate and maintain comfort rounds  - Make Fall Risk Sign visible to staff  - Offer Toileting every 2 Hours, in advance of need  - Initiate/Maintain bed alarm  - Obtain necessary fall risk management equipment: yellow socks  - Apply yellow socks and bracelet for high fall risk patients  - Consider moving patient to room near nurses station  Outcome: Progressing  Goal: Maintain or return to baseline ADL function  Description: INTERVENTIONS:  -  Assess patient's ability to carry out ADLs; assess patient's baseline for ADL function and identify physical deficits which impact ability to perform ADLs (bathing, care of  mouth/teeth, toileting, grooming, dressing, etc.)  - Assess/evaluate cause of self-care deficits   - Assess range of motion  - Assess patient's mobility; develop plan if impaired  - Assess patient's need for assistive devices and provide as appropriate  - Encourage maximum independence but intervene and supervise when necessary  - Involve family in performance of ADLs  - Assess for home care needs following discharge   - Consider OT consult to assist with ADL evaluation and planning for discharge  - Provide patient education as appropriate  - Monitor functional capacity and physical performance, use of AM PAC & JH-HLM   - Monitor gait, balance and fatigue with ambulation    Outcome: Progressing  Goal: Maintains/Returns to pre admission functional level  Description: INTERVENTIONS:  - Perform AM-PAC 6 Click Basic Mobility/ Daily Activity assessment daily.  - Set and communicate daily mobility goal to care team and patient/family/caregiver.   - Collaborate with rehabilitation services on mobility goals if consulted  - Perform Range of Motion 3 times a day.  - Reposition patient every 2 hours.  - Dangle patient 3 times a day  - Stand patient 3 times a day  - Ambulate patient 3 times a day  - Out of bed to chair 3 times a day   - Out of bed for meals 3 times a day  - Out of bed for toileting  - Record patient progress and toleration of activity level   Outcome: Progressing

## 2025-07-03 NOTE — PROGRESS NOTES
Progress Note - Critical Care/ICU   Name: Jaycee Can 35 y.o. female I MRN: [id number]  Unit/Bed#: ICU 10-01 I Date of Admission: 7/1/2025   Date of Service: 7/3/2025 I Hospital Day: 2      Assessment & Plan  CVA (cerebral vascular accident) ([facility])  Upgraded to [facility] service for frequent neuro checks/monitoring and likely need for hypertonic saline  Initially presented to ED last evening for for severe headache w/associated dizziness, vision changes, and nausea that started abruptly at approximately 8 AM that morning - was admitted to [facility] early this AM until bed becomes available at [facility]  CTA Head/Neck revealed acute R PICA infarct w/new occlusion of R vertebral artery   Stroke alert on 07/01 at 2230 - not TNK candidate 2/2 being outside window  Received ASA/Plavix load per Neurology's recommendations  Stroke protocol:  MRA/MRI Brain completed   LDL 53, HDL 31, TRIG 351  HA1C pending  Start ASA 81 mg and Plavix 75 mg daily - previously on daily Plavix for hx femoral artery thrombosis, however had been stopped within last couple of years  Allow for permissive HTN, goal>140  Frequent neuro checks per protocol  PT/OT/ST  Continue 1.8% hypertonic saline and sodium tabs for goal Na>140   Q4H BMP   Plan for STAT CTH w/any change in neuro status  PRN pain medications/HA cocktail for persistent headache  Acute headache  In setting of new R PICA infarct w/R vertebral artery occlusion as noted above  Treat CVA as noted above  PRN tylenol and will consider additional toradol as needed   Monitor for worsening characteristics  Hypertension  Hold home lisinopril to allow for permissive HTN goal >140   Trend BPs closely  Type 2 diabetes mellitus without complication, with long-term current use of insulin ([facility])  Lab Results   Component Value Date    HGBA1C 9.4 (H) 07/02/2025       Recent Labs     07/02/25  1131 07/02/25  1557 07/02/25  2044 07/03/25  0554   POCGLU 218* 229* 215* 186*       Blood Sugar Average: Last 72 hrs:  (P)  203.6    HA1C pending  Holding home Ozempic, Metformin, and Jardiance while here  Will continue home Lantus and add SSI w/ACHS fingersticks  Diabetic diet  Goal  - 180  Morbid obesity (HCC)  Holding home Ozempic for now   Encourage healthy lifestyle modifications when appropriate  Anxiety and depression  Continue home Celexa  Neuropathy  Continue home Neurontin  Mild intermittent asthma without complication  Continue home Singulair and Allegra as Claritin per hospital formulary   Continue PRN Albuterol inhaler  Aggressive pulmonary hygiene  GERD (gastroesophageal reflux disease)  Continue home Carafate  History of repair of coarctation of aorta  Has history of aortic coarctation and VSD s/p repair when she was 8D old - required stenting due to recurrent coarctation in 02/2020 at [facility]  Chronic hepatitis C without hepatic coma (HCC)  Contracted when she was 11 and was successfully treated per note from [facility] in 2021  Hx of femoral artery thrombosis  Underwent cardiac cath w/[facility] in 2023, which was c/b R femoral artery thrombosis - required R femoral artery exposure, R iliac embolectomy, and REIA stent in 01/2023, which was further c/b R groin infection requiring washout and wound vac placement in 02/2023  Now fully recovered at this point  Disposition: Stepdown Level 1    ICU Core Measures     A: Assess, Prevent, and Manage Pain Has pain been assessed? Yes  Need for changes to pain regimen? No   B: Both SAT/SAT  N/A   C: Choice of Sedation RASS Goal: 0 Alert and Calm or N/A patient not on sedation  Need for changes to sedation or analgesia regimen? N/A   D: Delirium CAM-ICU: Negative   E: Early Mobility  Plan for early mobility? Yes   F: Family Engagement Plan for family engagement today? Yes         Prophylaxis:  VTE VTE covered by:  heparin (porcine), Subcutaneous, 5,000 Units at 07/03/25 0525       Stress Ulcer  not ordered         24 Hour Events : Transferred to SD1 for hypertonic saline and frequent  neuro checks. No acute events.   Subjective   Review of Systems: Review of Systems   Constitutional: Negative.    HENT: Negative.     Eyes: Negative.    Respiratory: Negative.     Cardiovascular: Negative.    Gastrointestinal: Negative.    Endocrine: Negative.    Genitourinary: Negative.    Musculoskeletal: Negative.    Skin: Negative.    Allergic/Immunologic: Negative.    Neurological:  Positive for headaches.   Hematological: Negative.    Psychiatric/Behavioral: Negative.         Objective :                   Vitals I/O      Most Recent Min/Max in 24hrs   Temp (!) 97.1 °F (36.2 °C) Temp  Min: 97.1 °F (36.2 °C)  Max: 98.3 °F (36.8 °C)   Pulse 83 Pulse  Min: 76  Max: 104   Resp 18 Resp  Min: 6  Max: 50   /88 BP  Min: 119/63  Max: 197/92   O2 Sat 97 % SpO2  Min: 95 %  Max: 98 %      Intake/Output Summary (Last 24 hours) at 7/3/2025 0557  Last data filed at 7/3/2025 0537  Gross per 24 hour   Intake 1145.83 ml   Output 700 ml   Net 445.83 ml       Diet Cardiovascular; Cardiac; Consistent Carbohydrate Diet Level 2 (5 carb servings/75 grams CHO/meal)    Invasive Monitoring           Physical Exam   Physical Exam  Eyes:      Pupils: Pupils are equal, round, and reactive to light.   Skin:     General: Skin is warm and dry.   HENT:      Head: Normocephalic and atraumatic.      Mouth/Throat:      Mouth: Mucous membranes are moist.   Cardiovascular:      Rate and Rhythm: Normal rate and regular rhythm.      Pulses: Normal pulses.      Heart sounds: Normal heart sounds.   Musculoskeletal:         General: Normal range of motion.      Right lower leg: No edema.      Left lower leg: No edema.   Abdominal: General: Bowel sounds are normal. There is no distension.      Palpations: Abdomen is soft.      Tenderness: There is no abdominal tenderness.   Constitutional:       General: She is not in acute distress.     Appearance: She is well-developed and well-nourished.   Pulmonary:      Effort: Pulmonary effort is normal. No  respiratory distress.      Breath sounds: No wheezing, rhonchi or rales.   Neurological:      General: No focal deficit present.      Mental Status: She is alert and oriented to person, place and time. Mental status is at baseline.      Cranial Nerves: No dysarthria or facial asymmetry.      Sensory: No sensory deficit.      Motor: Strength full and intact in all extremities. No pronator drift.          Diagnostic Studies        Lab Results: I have reviewed the following results:     Medications:  Scheduled PRN   aspirin, 81 mg, Daily  atorvastatin, 40 mg, QPM  azelastine, 1 spray, BID  chlorhexidine, 15 mL, Q12H JUN  citalopram, 10 mg, Daily  clopidogrel, 75 mg, Daily  gabapentin, 100 mg, HS  heparin (porcine), 5,000 Units, Q8H JUN  insulin glargine, 25 Units, HS  insulin lispro, 1-6 Units, 4x Daily (AC & HS)  loratadine, 10 mg, Daily  montelukast, 10 mg, HS  multivitamin stress formula, 1 tablet, Daily  sodium chloride, 1 g, TID With Meals  sucralfate, 1 g, 4x Daily      acetaminophen, 650 mg, Q4H PRN  albuterol, 2 puff, Q4H PRN  ondansetron, 4 mg, Q6H PRN       Continuous    sodium chloride (HYPERTONIC) infusion 1.8%, 100 mL/hr, Last Rate: 100 mL/hr (07/03/25 0457)         Labs:   CBC    Recent Labs     07/02/25  0458 07/03/25  0211   WBC 8.50 9.76   HGB 12.9 12.7   HCT 39.8 40.2    215     BMP    Recent Labs     07/02/25  2132 07/03/25  0211   SODIUM 132* 138   K 4.0 3.6    109*   CO2 20* 23   AGAP 7 6   BUN 14 12   CREATININE 0.59* 0.52*   CALCIUM 8.3* 8.4       Coags    Recent Labs     07/02/25  1216   INR 0.93   PTT 25        Additional Electrolytes  Recent Labs     07/02/25  1216 07/03/25  0211   MG 1.9 1.8*   PHOS 2.4* 2.4*   CAIONIZED 1.14 1.10*          Blood Gas    No recent results  No recent results LFTs  Recent Labs     07/01/25  1900   ALT 20   AST 19   ALKPHOS 69   ALB 4.0   TBILI 0.40       Infectious  No recent results  Glucose  Recent Labs     07/02/25  1216 07/02/25  1746  07/02/25  2132 07/03/25  0211   GLUC 169* 205* 207* 161*

## 2025-07-03 NOTE — LETTER
[facility]  [address] [address]  Dept: [phone]    July 8, 2025     Patient: Jaycee Can   YOB: 1990   Date of Visit: 7/3/2025       To Whom it May Concern:    Jaycee Can is under my professional care. She was seen in the hospital from 7/3/2025 to 07/08/25. She may return to work on 7/22/25 with the following limitations : No heavy lifting.    If you have any questions or concerns, please don't hesitate to call.         Sincerely,          Sean Davis, DO            
Crista David  (RN)  2018 18:01:05
Lesly Kaiser)  2018 00:56:28
bedrest for now

## 2025-07-03 NOTE — PHYSICAL THERAPY NOTE
Physical Therapy Cancellation Note       07/03/25 0731   PT Last Visit   PT Visit Date 07/03/25   Note Type   Note type Cancelled Session   Additional Comments awaiting transfer to [facility] due to occlusion     Ema Bateman

## 2025-07-03 NOTE — SPEECH THERAPY NOTE
Speech Language/Pathology  Speech/Language Pathology  Assessment    Patient Name: Jaycee Can  Today's Date: 7/3/2025     Problem List  Principal Problem:    CVA (cerebral vascular accident) (HCC)  Active Problems:    Acute headache    Morbid obesity (HCC)    Type 2 diabetes mellitus without complication, with long-term current use of insulin (HCC)    Hypertension    History of repair of coarctation of aorta    Chronic hepatitis C without hepatic coma (HCC)    Anxiety and depression    Neuropathy    Hx of femoral artery thrombosis    Mild intermittent asthma without complication    GERD (gastroesophageal reflux disease)    Past Medical History  Past Medical History[1]  Past Surgical History  Past Surgical History[2]     Bedside Swallow Evaluation:    Summary:  Pt presented w/ oral and pharyngeal phase of swallow WNL. Positioned upright and alert. She fed herself meatloaf, green beans, and thin liquids via straw with single/successive sips. Mastication, bolus control, formation, and transfer were WNL. Swallows appeared prompt. No overt s/s of aspiration. Pt denied difficulties with chewing and swallowing. Reported nausea improved since this am. Nursing administered medications whole with thin liquids, no overt difficulties.  Communication deficits denied and none noted in conversation (pt alert, oriented with no s/s anomia, aphasia or dysarthria in conversation).  ST reviewed safe swallow strategies as listed below pt understood.     Recommendations:  Diet:Regular   Liquid:Thin   Meds:as tolerated   Positioning:Upright  Strategies:  slow rate, alternate liquids with solids, swallow prior to additional po   Pt to take PO/Meds only when fully alert and upright.   Oral care:  Aspiration precautions  Reflux precautions  Eval only, No f/u tx indicated.     Consider consult w/:  Rehab  Nutrition    H&P/Admit info/ pertinent provider notes: (PMH noted above)  Jaycee Can is a 35 y.o. female with a PMH of VSD repair,  insulin-dependent diabetes, hypertension, migraines who presents with headache and dizziness x 1 day.  Patient presents ER for further evaluation treatment of 1 day history of headache that began at 8 AM yesterday and she describes it as being right frontal and dull in nature wrapping around the back of her head to her left ear.  Patient additionally reports some dizziness which she describes as a room spinning not associated with any vomiting but does complain of nausea, no aggravating or alleviating factors.  Patient denies previous episodes, no change in her vision or hearing, no weakness or clumsiness in her arms or legs, no facial droop, dysarthria or aphasia.     Workup in ER revealed acute right cerebellar stroke with right vertebral artery occlusion and patient was referred for admission.  Patient was accepted at [facility] is currently holding in our facility due to bed availability.      Special Studies:  CT BRAIN - WITHOUT CONTRAST 07/03/2025  IMPRESSION:  Evolving recent right cerebellar infarct with persistent mass effect and effacement of the fourth ventricle. Unchanged ventricular size without hydrocephalus. No intracranial hemorrhage.  MRI BRAIN WITHOUT CONTRAST 07/02/2025   IMPRESSION:  Recent infarct right inferior cerebellum without evidence of hemorrhage.    Procalcitonin:    WBC: 9.76           07/03/2025     Code Status : Level 1 full code     Previous MBS: none    Patient's goal: none stated     Did the pt report pain? no  If yes, was nursing notified/was it addressed? N/a     Reason for consult:  R/o aspiration  Determine safest and least restrictive diet  New neuro event  Stroke protocol    Precautions:  Fall    Food Allergies:  No known    Current Diet: Regular and thin    Premorbid diet: Regular and thin    O2 requirement: Room air    Social/Prior living Lives with friend    Voice/Speech: WNL   Follows commands: Basic    Cognitive status: Alert      Oral mech exam:  Dentition:  Natural adequate   Lips (VII):WNL  Tongue (XII): midline   Mandible (V):adequate ROM  Face/oral sensation (V):WNL  Velum (X):WNL    Items administered:  Meatloaf, green beans, and thin liquids via straw. Pills whole with thin liquids administered by nursing.     Oral stage:  Lip closure:WNL  Mastication:WNL  Bolus formation:WNL  Bolus control:WNL  Transfer:WNL    Pharyngeal stage:  Swallow promptness:prompt adequate   Laryngeal rise:  No overt s/s aspiration    Esophageal stage:  No s/s reported  H/o GERD    Results d/w:  Pt, nursing,     Time In:12:16  Time Out: 12:26                [1]   Past Medical History:  Diagnosis Date    Allergic     Arthritis     Asthma     Bipolar affective disorder, currently depressed, moderate (HCC) 12/17/2018    Chest pain, unspecified 07/24/2019    Cyst of ovary, right     Depression     Diabetes mellitus (HCC) 10/10/2018    type 2    Endometriosis     Fractures 08/25/24    My right hand    Heart murmur 1988    Hepatitis C     Hepatitis C virus infection cured after antiviral drug therapy     History of transfusion     Hypertension     Infectious viral hepatitis 07/30/01    Migraines     Morbid obesity with BMI of 40.0-44.9, adult (HCC)     Obesity 1995    Pulmonary artery congenital abnormality     Spleen enlarged     Status post surgical removal of both fallopian tubes     Varicella    [2]   Past Surgical History:  Procedure Laterality Date    CARDIAC CATHETERIZATION      no CAD 10days, 4 weeks 22months old     CARDIAC CATHETERIZATION N/A 01/12/2023    Procedure: Cardiac Coronary Angiogram;  Surgeon: Marcela Lr DO;  Location: [facility] CARDIAC CATH LAB;  Service: Cardiology    CARDIAC CATHETERIZATION Left 01/12/2023    Procedure: Cardiac Left Heart Cath;  Surgeon: Marcela Lr DO;  Location: [facility] CARDIAC CATH [facility];  Service: Cardiology    CARDIAC CATHETERIZATION  01/12/2023    Procedure: Cardiac catheterization;  Surgeon: Marcela Lr DO;  Location: [facility] CARDIAC CATH  LAB;  Service: Cardiology     SECTION, LOW TRANSVERSE      CHOLECYSTECTOMY      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 5    CORONARY STENT PLACEMENT      IR LOWER EXTREMITY ANGIOGRAM  2023    LIVER BIOPSY      LIVER BIOPSY      PERIPHERAL ANGIOGRAM  21    STERNAL WIRE REMOVAL  2022    THROMBECTOMY W/ EMBOLECTOMY Right 2023    Procedure: Right femoral exposure Right iliac embolectomy w/ #4 Ted catheter Aortogram Right EIA stent w/ 7x29mm VBX;  Surgeon: Jody Hodges MD;  Location: [facility] OR;  Service: Vascular    TUBAL LIGATION Bilateral 2020    VSD REPAIR      As a child    WOUND DEBRIDEMENT Right 2023    Procedure: Right Groin Wound Washout, Pulse Lavage, Wound Vac placement;  Surgeon: Jelani Avila DO;  Location: [facility] OR;  Service: Vascular

## 2025-07-03 NOTE — ASSESSMENT & PLAN NOTE
Right PICA acute infarct  -Discussed with team, given young age and posterior circulation infarct size, recommend to admit patient to step down level 1 under stroke pathway for close monitoring.  -Will need to closely monitor sign of increased ICP.  -Etiology under investigation, cannot rule out vertebral dissection versus embolic, patient reported family history of stroke at an early age but denied known blood clot disease.  Pending MRI head  -Discussed with the patient, given young age, we will contact comprehensive stroke workup.  Pending echo, will send hypercoagulable panel.  May consider CT chest, abdomen, pelvis to rule out malignancy  -Stroke labs  - Recommend to continue to antiplatelet with aspirin 81 mg and Plavix 75 mg.  Okay to hold her statin given optimal LDL value.  -Repeat CT head scan stat with any acute change.  - Permissive hypertension, 24 hours after onset.  Please avoid hypotension.  Please keep systolic blood pressure above 140.      # Discussed with the ICU team, recommended to start 3% hypertonic saline.  Sodium goal above 145.  Advised ICU team to discuss the case with the neurosurgery.  In case patient clinical presentation continue to worsen, she will need to be transferred to [facility] for possible surgical intervention.  As I discussed with ICU team, the critical period for patients presented with with large posterior stroke is typically 72 hours since onset.  Recommend continue to monitor patient with neurocheck acute 1 to 2 hours.  Pending transfer to [facility] neuro ICU

## 2025-07-04 ENCOUNTER — APPOINTMENT (INPATIENT)
Dept: RADIOLOGY | Facility: HOSPITAL | Age: 35
DRG: 045 | End: 2025-07-04
Payer: COMMERCIAL

## 2025-07-04 LAB
ANION GAP SERPL CALCULATED.3IONS-SCNC: 10 MMOL/L (ref 4–13)
ANION GAP SERPL CALCULATED.3IONS-SCNC: 7 MMOL/L (ref 4–13)
ANION GAP SERPL CALCULATED.3IONS-SCNC: 7 MMOL/L (ref 4–13)
ANION GAP SERPL CALCULATED.3IONS-SCNC: 8 MMOL/L (ref 4–13)
BASOPHILS # BLD AUTO: 0.02 THOUSANDS/ÂΜL (ref 0–0.1)
BASOPHILS NFR BLD AUTO: 0 % (ref 0–1)
BUN SERPL-MCNC: 6 MG/DL (ref 5–25)
BUN SERPL-MCNC: 9 MG/DL (ref 5–25)
C3 SERPL-MCNC: 179 MG/DL (ref 87–200)
C4 SERPL-MCNC: 29 MG/DL (ref 19–52)
CA-I BLD-SCNC: 1.1 MMOL/L (ref 1.12–1.32)
CALCIUM SERPL-MCNC: 8.5 MG/DL (ref 8.4–10.2)
CALCIUM SERPL-MCNC: 8.7 MG/DL (ref 8.4–10.2)
CALCIUM SERPL-MCNC: 8.7 MG/DL (ref 8.4–10.2)
CALCIUM SERPL-MCNC: 9 MG/DL (ref 8.4–10.2)
CHLORIDE SERPL-SCNC: 106 MMOL/L (ref 96–108)
CHLORIDE SERPL-SCNC: 107 MMOL/L (ref 96–108)
CHLORIDE SERPL-SCNC: 108 MMOL/L (ref 96–108)
CHLORIDE SERPL-SCNC: 112 MMOL/L (ref 96–108)
CO2 SERPL-SCNC: 21 MMOL/L (ref 21–32)
CO2 SERPL-SCNC: 22 MMOL/L (ref 21–32)
CO2 SERPL-SCNC: 23 MMOL/L (ref 21–32)
CO2 SERPL-SCNC: 23 MMOL/L (ref 21–32)
CREAT SERPL-MCNC: 0.38 MG/DL (ref 0.6–1.3)
CREAT SERPL-MCNC: 0.41 MG/DL (ref 0.6–1.3)
CREAT SERPL-MCNC: 0.42 MG/DL (ref 0.6–1.3)
CREAT SERPL-MCNC: 0.46 MG/DL (ref 0.6–1.3)
CRP SERPL QL: 9.7 MG/L
EOSINOPHIL # BLD AUTO: 0.02 THOUSAND/ÂΜL (ref 0–0.61)
EOSINOPHIL NFR BLD AUTO: 0 % (ref 0–6)
ERYTHROCYTE [DISTWIDTH] IN BLOOD BY AUTOMATED COUNT: 15.4 % (ref 11.6–15.1)
ERYTHROCYTE [SEDIMENTATION RATE] IN BLOOD: 21 MM/HOUR (ref 0–19)
FERRITIN SERPL-MCNC: 55 NG/ML (ref 30–307)
GFR SERPL CREATININE-BSD FRML MDRD: 127 ML/MIN/1.73SQ M
GFR SERPL CREATININE-BSD FRML MDRD: 131 ML/MIN/1.73SQ M
GFR SERPL CREATININE-BSD FRML MDRD: 132 ML/MIN/1.73SQ M
GFR SERPL CREATININE-BSD FRML MDRD: 135 ML/MIN/1.73SQ M
GLUCOSE SERPL-MCNC: 132 MG/DL (ref 65–140)
GLUCOSE SERPL-MCNC: 143 MG/DL (ref 65–140)
GLUCOSE SERPL-MCNC: 153 MG/DL (ref 65–140)
GLUCOSE SERPL-MCNC: 173 MG/DL (ref 65–140)
GLUCOSE SERPL-MCNC: 179 MG/DL (ref 65–140)
GLUCOSE SERPL-MCNC: 181 MG/DL (ref 65–140)
GLUCOSE SERPL-MCNC: 193 MG/DL (ref 65–140)
GLUCOSE SERPL-MCNC: 217 MG/DL (ref 65–140)
HCT VFR BLD AUTO: 39.5 % (ref 34.8–46.1)
HGB BLD-MCNC: 13 G/DL (ref 11.5–15.4)
IMM GRANULOCYTES # BLD AUTO: 0.03 THOUSAND/UL (ref 0–0.2)
IMM GRANULOCYTES NFR BLD AUTO: 0 % (ref 0–2)
IRON SATN MFR SERPL: 12 % (ref 15–50)
IRON SERPL-MCNC: 41 UG/DL (ref 50–212)
LYMPHOCYTES # BLD AUTO: 2.37 THOUSANDS/ÂΜL (ref 0.6–4.47)
LYMPHOCYTES NFR BLD AUTO: 23 % (ref 14–44)
MAGNESIUM SERPL-MCNC: 1.8 MG/DL (ref 1.9–2.7)
MCH RBC QN AUTO: 25.7 PG (ref 26.8–34.3)
MCHC RBC AUTO-ENTMCNC: 32.9 G/DL (ref 31.4–37.4)
MCV RBC AUTO: 78 FL (ref 82–98)
MONOCYTES # BLD AUTO: 0.63 THOUSAND/ÂΜL (ref 0.17–1.22)
MONOCYTES NFR BLD AUTO: 6 % (ref 4–12)
NEUTROPHILS # BLD AUTO: 7.11 THOUSANDS/ÂΜL (ref 1.85–7.62)
NEUTS SEG NFR BLD AUTO: 71 % (ref 43–75)
NRBC BLD AUTO-RTO: 0 /100 WBCS
OSMOLALITY UR/SERPL-RTO: 291 MMOL/KG (ref 282–298)
OSMOLALITY UR/SERPL-RTO: 294 MMOL/KG (ref 282–298)
OSMOLALITY UR/SERPL-RTO: 295 MMOL/KG (ref 282–298)
PA ADP BLD-ACNC: 222 PRU (ref 194–418)
PHOSPHATE SERPL-MCNC: 2.8 MG/DL (ref 2.7–4.5)
PLATELET # BLD AUTO: 231 THOUSANDS/UL (ref 149–390)
PMV BLD AUTO: 10.8 FL (ref 8.9–12.7)
POTASSIUM SERPL-SCNC: 3.5 MMOL/L (ref 3.5–5.3)
POTASSIUM SERPL-SCNC: 3.6 MMOL/L (ref 3.5–5.3)
POTASSIUM SERPL-SCNC: 3.7 MMOL/L (ref 3.5–5.3)
POTASSIUM SERPL-SCNC: 3.8 MMOL/L (ref 3.5–5.3)
RBC # BLD AUTO: 5.06 MILLION/UL (ref 3.81–5.12)
RHEUMATOID FACT SERPL-ACNC: <10 IU/ML
SODIUM SERPL-SCNC: 137 MMOL/L (ref 135–147)
SODIUM SERPL-SCNC: 137 MMOL/L (ref 135–147)
SODIUM SERPL-SCNC: 138 MMOL/L (ref 135–147)
SODIUM SERPL-SCNC: 142 MMOL/L (ref 135–147)
TIBC SERPL-MCNC: 334.6 UG/DL (ref 250–450)
TRANSFERRIN SERPL-MCNC: 239 MG/DL (ref 203–362)
UIBC SERPL-MCNC: 294 UG/DL (ref 155–355)
WBC # BLD AUTO: 10.18 THOUSAND/UL (ref 4.31–10.16)

## 2025-07-04 PROCEDURE — 99233 SBSQ HOSP IP/OBS HIGH 50: CPT | Performed by: PSYCHIATRY & NEUROLOGY

## 2025-07-04 PROCEDURE — 86146 BETA-2 GLYCOPROTEIN ANTIBODY: CPT

## 2025-07-04 PROCEDURE — 85025 COMPLETE CBC W/AUTO DIFF WBC: CPT

## 2025-07-04 PROCEDURE — 86038 ANTINUCLEAR ANTIBODIES: CPT

## 2025-07-04 PROCEDURE — 70450 CT HEAD/BRAIN W/O DYE: CPT

## 2025-07-04 PROCEDURE — 99291 CRITICAL CARE FIRST HOUR: CPT | Performed by: EMERGENCY MEDICINE

## 2025-07-04 PROCEDURE — 85652 RBC SED RATE AUTOMATED: CPT

## 2025-07-04 PROCEDURE — 83735 ASSAY OF MAGNESIUM: CPT

## 2025-07-04 PROCEDURE — 86431 RHEUMATOID FACTOR QUANT: CPT

## 2025-07-04 PROCEDURE — 86160 COMPLEMENT ANTIGEN: CPT

## 2025-07-04 PROCEDURE — 86225 DNA ANTIBODY NATIVE: CPT

## 2025-07-04 PROCEDURE — 86037 ANCA TITER EACH ANTIBODY: CPT

## 2025-07-04 PROCEDURE — 83520 IMMUNOASSAY QUANT NOS NONAB: CPT

## 2025-07-04 PROCEDURE — 82948 REAGENT STRIP/BLOOD GLUCOSE: CPT

## 2025-07-04 PROCEDURE — 82595 ASSAY OF CRYOGLOBULIN: CPT

## 2025-07-04 PROCEDURE — 84165 PROTEIN E-PHORESIS SERUM: CPT

## 2025-07-04 PROCEDURE — 83930 ASSAY OF BLOOD OSMOLALITY: CPT

## 2025-07-04 PROCEDURE — 86235 NUCLEAR ANTIGEN ANTIBODY: CPT

## 2025-07-04 PROCEDURE — 84100 ASSAY OF PHOSPHORUS: CPT

## 2025-07-04 PROCEDURE — 80048 BASIC METABOLIC PNL TOTAL CA: CPT

## 2025-07-04 PROCEDURE — 82330 ASSAY OF CALCIUM: CPT

## 2025-07-04 PROCEDURE — 86147 CARDIOLIPIN ANTIBODY EA IG: CPT

## 2025-07-04 PROCEDURE — 86140 C-REACTIVE PROTEIN: CPT

## 2025-07-04 PROCEDURE — 85576 BLOOD PLATELET AGGREGATION: CPT

## 2025-07-04 PROCEDURE — 86148 ANTI-PHOSPHOLIPID ANTIBODY: CPT

## 2025-07-04 PROCEDURE — 82728 ASSAY OF FERRITIN: CPT

## 2025-07-04 PROCEDURE — 83550 IRON BINDING TEST: CPT

## 2025-07-04 PROCEDURE — 81240 F2 GENE: CPT

## 2025-07-04 PROCEDURE — 81241 F5 GENE: CPT

## 2025-07-04 PROCEDURE — 83540 ASSAY OF IRON: CPT

## 2025-07-04 PROCEDURE — 83516 IMMUNOASSAY NONANTIBODY: CPT

## 2025-07-04 PROCEDURE — 93005 ELECTROCARDIOGRAM TRACING: CPT

## 2025-07-04 RX ORDER — CALCIUM GLUCONATE 20 MG/ML
2 INJECTION, SOLUTION INTRAVENOUS ONCE
Status: COMPLETED | OUTPATIENT
Start: 2025-07-04 | End: 2025-07-04

## 2025-07-04 RX ORDER — INSULIN GLARGINE 100 [IU]/ML
25 INJECTION, SOLUTION SUBCUTANEOUS
Status: DISCONTINUED | OUTPATIENT
Start: 2025-07-04 | End: 2025-07-08 | Stop reason: HOSPADM

## 2025-07-04 RX ORDER — SODIUM CHLORIDE 3 G/100ML
250 INJECTION, SOLUTION INTRAVENOUS ONCE
Status: COMPLETED | OUTPATIENT
Start: 2025-07-04 | End: 2025-07-04

## 2025-07-04 RX ORDER — POTASSIUM CHLORIDE 14.9 MG/ML
20 INJECTION INTRAVENOUS ONCE
Status: COMPLETED | OUTPATIENT
Start: 2025-07-04 | End: 2025-07-05

## 2025-07-04 RX ORDER — LORAZEPAM 2 MG/ML
1 INJECTION INTRAMUSCULAR ONCE AS NEEDED
Status: DISCONTINUED | OUTPATIENT
Start: 2025-07-04 | End: 2025-07-08 | Stop reason: HOSPADM

## 2025-07-04 RX ORDER — POTASSIUM CHLORIDE 14.9 MG/ML
20 INJECTION INTRAVENOUS ONCE
Status: COMPLETED | OUTPATIENT
Start: 2025-07-05 | End: 2025-07-05

## 2025-07-04 RX ADMIN — HEPARIN SODIUM 5000 UNITS: 5000 INJECTION, SOLUTION INTRAVENOUS; SUBCUTANEOUS at 06:11

## 2025-07-04 RX ADMIN — CALCIUM GLUCONATE 2 G: 20 INJECTION, SOLUTION INTRAVENOUS at 08:24

## 2025-07-04 RX ADMIN — TRIMETHOBENZAMIDE HYDROCHLORIDE 200 MG: 100 INJECTION INTRAMUSCULAR at 21:13

## 2025-07-04 RX ADMIN — INSULIN LISPRO 4 UNITS: 100 INJECTION, SOLUTION INTRAVENOUS; SUBCUTANEOUS at 23:43

## 2025-07-04 RX ADMIN — ASPIRIN 81 MG CHEWABLE TABLET 81 MG: 81 TABLET CHEWABLE at 08:23

## 2025-07-04 RX ADMIN — SODIUM CHLORIDE 250 ML: 3 INJECTION, SOLUTION INTRAVENOUS at 02:52

## 2025-07-04 RX ADMIN — POTASSIUM CHLORIDE 20 MEQ: 14.9 INJECTION, SOLUTION INTRAVENOUS at 23:44

## 2025-07-04 RX ADMIN — SODIUM CHLORIDE 50 ML/HR: 3 INJECTION, SOLUTION INTRAVENOUS at 18:35

## 2025-07-04 RX ADMIN — CITALOPRAM HYDROBROMIDE 10 MG: 10 TABLET ORAL at 08:23

## 2025-07-04 RX ADMIN — ACETAMINOPHEN 1000 MG: 10 INJECTION INTRAVENOUS at 06:11

## 2025-07-04 RX ADMIN — HEPARIN SODIUM 5000 UNITS: 5000 INJECTION, SOLUTION INTRAVENOUS; SUBCUTANEOUS at 00:12

## 2025-07-04 RX ADMIN — SODIUM CHLORIDE 250 ML: 3 INJECTION, SOLUTION INTRAVENOUS at 12:50

## 2025-07-04 RX ADMIN — INSULIN LISPRO 4 UNITS: 100 INJECTION, SOLUTION INTRAVENOUS; SUBCUTANEOUS at 00:12

## 2025-07-04 RX ADMIN — SODIUM CHLORIDE 250 ML: 3 INJECTION, SOLUTION INTRAVENOUS at 21:18

## 2025-07-04 RX ADMIN — INSULIN LISPRO 2 UNITS: 100 INJECTION, SOLUTION INTRAVENOUS; SUBCUTANEOUS at 18:34

## 2025-07-04 RX ADMIN — INSULIN LISPRO 2 UNITS: 100 INJECTION, SOLUTION INTRAVENOUS; SUBCUTANEOUS at 12:50

## 2025-07-04 RX ADMIN — CLOPIDOGREL BISULFATE 75 MG: 75 TABLET, FILM COATED ORAL at 08:23

## 2025-07-04 RX ADMIN — ATORVASTATIN CALCIUM 40 MG: 40 TABLET, FILM COATED ORAL at 17:03

## 2025-07-04 RX ADMIN — HEPARIN SODIUM 5000 UNITS: 5000 INJECTION, SOLUTION INTRAVENOUS; SUBCUTANEOUS at 15:00

## 2025-07-04 RX ADMIN — CHLORHEXIDINE GLUCONATE 15 ML: 1.2 SOLUTION ORAL at 08:24

## 2025-07-04 NOTE — CASE MANAGEMENT
Case Management Assessment & Discharge Planning Note    Patient name Jaycee Can  Location ICU 08/ICU 08 MRN [id number]  : 1990 Date 2025       Current Admission Date: 7/3/2025  Current Admission Diagnosis:Stroke (cerebrum) (HCC)  Patient Active Problem List    Diagnosis Date Noted    Stroke (cerebrum) (HCC) 2025    Dyspnea, unspecified type 2025    Mild intermittent asthma without complication 2025    Gait difficulty 2023    Fungal infection of skin 2023    Closed nondisplaced fracture of proximal phalanx of lesser toe of left foot 2023    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HCC) 2023    Continuous opioid dependence (HCC) 2023    Hx of femoral artery thrombosis 2023    Anxiety and depression 2022    Neuropathy 2022    Multiple thyroid nodules 2021    Chronic hepatitis C without hepatic coma ([facility])     VSD (ventricular septal defect) and coarctation of aorta 07/15/2021    GERD (gastroesophageal reflux disease) 2020    Bipolar disorder (HCC) 01/10/2020    Type 2 diabetes mellitus without complication, with long-term current use of insulin (HCC) 2019    Cyst of right ovary 10/10/2019    Endometriosis 2019    Hidradenitis suppurativa 2019    Aortic coarctation 2019    Cervicalgia 2019    Cervical dystonia 2019    Acute headache 2018    Splenomegaly 10/12/2018    Hepatitis C 10/12/2018    Hypertension 10/12/2018    History of repair of coarctation of aorta 2018    Morbid obesity (HCC) 2018      LOS (days): 1  Geometric Mean LOS (GMLOS) (days):   Days to GMLOS:     OBJECTIVE:    Risk of Unplanned Readmission Score: 15.1         Current admission status: Inpatient       Preferred Pharmacy:   [id number]  [place] - [address]  [address] [address]  Phone: [phone] Fax: [phone]    Primary Care Provider:  REJI Ferrell    Primary Insurance: [employer]  Secondary Insurance:     ASSESSMENT:  Active Health Care Proxies    There are no active Health Care Proxies on file.                 Readmission Root Cause  30 Day Readmission: No    Patient Information  Admitted from:: Home  Mental Status: Alert  During Assessment patient was accompanied by: Not accompanied during assessment  Assessment information provided by:: Patient  Primary Caregiver: Self  Support Systems: Self, Family members  County [place] Residence: [place] do you live in?: [place]  Type of Current Residence: 2 story home  Living Arrangements: Lives w/ Friend  Is patient a ?: No    Activities of Daily Living Prior to Admission  Functional Status: Independent  Completes ADLs independently?: Yes  Ambulates independently?: Yes  Does patient use assisted devices?: No  Does patient currently own DME?: No  Does patient have a history of Outpatient Therapy (PT/OT)?: No  Does the patient have a history of Short-Term Rehab?: No  Does patient have a history of HHC?: No  Does patient currently have HHC?: No         Patient Information Continued  Income Source: Employed  Does patient have prescription coverage?: Yes  Can the patient afford their medications and any related supplies (such as glucometers or test strips)?: Yes  Does patient receive dialysis treatments?: No  Does patient have a history of substance abuse?: No  Does patient have a history of Mental Health Diagnosis?: Yes ([employer])  Is patient receiving treatment for mental health?: No. Treatment options were provided.  Has patient received inpatient treatment related to mental health in the last 2 years?: No         Means of Transportation  Means of Transport to [facility]:: Drives Self          DISCHARGE DETAILS:    Discharge planning discussed with:: bedside with pt  Freedom of Choice: No  Comments - Freedom of Choice: No aftercare reccs  CM contacted family/caregiver?: No-  see comments (Pt is A&O)             Contacts  Patient Contacts: Claire Tom  Relationship to Patient:: Family (mother)  Contact Method: Phone  Phone Number: [phone]  Reason/Outcome: Emergency Contact              Additional Comments: CM spoke with pt bedside and discussed DCP. No reccomendations for aftercare it is anticipated pt will DC home with no needs

## 2025-07-04 NOTE — PLAN OF CARE
Problem: PAIN - ADULT  Goal: Verbalizes/displays adequate comfort level or baseline comfort level  Description: Interventions:  - Encourage patient to monitor pain and request assistance  - Assess pain using appropriate pain scale  - Administer analgesics as ordered based on type and severity of pain and evaluate response  - Implement non-pharmacological measures as appropriate and evaluate response  - Consider cultural and social influences on pain and pain management  - Notify physician/advanced practitioner if interventions unsuccessful or patient reports new pain  - Educate patient/family on pain management process including their role and importance of  reporting pain   - Provide non-pharmacologic/complimentary pain relief interventions  Outcome: Progressing     Problem: INFECTION - ADULT  Goal: Absence or prevention of progression during hospitalization  Description: INTERVENTIONS:  - Assess and monitor for signs and symptoms of infection  - Monitor lab/diagnostic results  - Monitor all insertion sites, i.e. indwelling lines, tubes, and drains  - Monitor endotracheal if appropriate and nasal secretions for changes in amount and color  - [place] appropriate cooling/warming therapies per order  - Administer medications as ordered  - Instruct and encourage patient and family to use good hand hygiene technique  - Identify and instruct in appropriate isolation precautions for identified infection/condition  Outcome: Progressing  Goal: Absence of fever/infection during neutropenic period  Description: INTERVENTIONS:  - Monitor WBC  - Perform strict hand hygiene  - Limit to healthy visitors only  - No plants, dried, fresh or silk flowers with acuna in patient room  Outcome: Progressing     Problem: SAFETY ADULT  Goal: Patient will remain free of falls  Description: INTERVENTIONS:  - Educate patient/family on patient safety including physical limitations  - Instruct patient to call for assistance with activity   -  Consider consulting OT/PT to assist with strengthening/mobility based on AM PAC & JH-HLM score  - Consult OT/PT to assist with strengthening/mobility   - Keep Call bell within reach  - Keep bed low and locked with side rails adjusted as appropriate  - Keep care items and personal belongings within reach  - Initiate and maintain comfort rounds  - Make Fall Risk Sign visible to staff  - Offer Toileting every 2 Hours, in advance of need  - Initiate/Maintain bed alarm  - Obtain necessary fall risk management equipment.  - Apply yellow socks and bracelet for high fall risk patients  - Consider moving patient to room near nurses station  Outcome: Progressing  Goal: Maintain or return to baseline ADL function  Description: INTERVENTIONS:  -  Assess patient's ability to carry out ADLs; assess patient's baseline for ADL function and identify physical deficits which impact ability to perform ADLs (bathing, care of mouth/teeth, toileting, grooming, dressing, etc.)  - Assess/evaluate cause of self-care deficits   - Assess range of motion  - Assess patient's mobility; develop plan if impaired  - Assess patient's need for assistive devices and provide as appropriate  - Encourage maximum independence but intervene and supervise when necessary  - Involve family in performance of ADLs  - Assess for home care needs following discharge   - Consider OT consult to assist with ADL evaluation and planning for discharge  - Provide patient education as appropriate  - Monitor functional capacity and physical performance, use of AM PAC & JH-HLM   - Monitor gait, balance and fatigue with ambulation    Outcome: Progressing  Goal: Maintains/Returns to pre admission functional level  Description: INTERVENTIONS:  - Perform AM-PAC 6 Click Basic Mobility/ Daily Activity assessment daily.  - Set and communicate daily mobility goal to care team and patient/family/caregiver.   - Collaborate with rehabilitation services on mobility goals if consulted  -  Perform Range of Motion 4 times a day.  - Reposition patient every 2 hours.  - Dangle patient 3 times a day  - Stand patient 3 times a day  - Ambulate patient 3 times a day  - Out of bed to chair 3 times a day   - Out of bed for meals 3 times a day  - Out of bed for toileting  - Record patient progress and toleration of activity level   Outcome: Progressing     Problem: DISCHARGE PLANNING  Goal: Discharge to home or other facility with appropriate resources  Description: INTERVENTIONS:  - Identify barriers to discharge w/patient and caregiver  - Arrange for needed discharge resources and transportation as appropriate  - Identify discharge learning needs (meds, wound care, etc.)  - Arrange for interpretive services to assist at discharge as needed  - Refer to Case Management Department for coordinating discharge planning if the patient needs post-hospital services based on physician/advanced practitioner order or complex needs related to functional status, cognitive ability, or social support system  Outcome: Progressing     Problem: Knowledge Deficit  Goal: Patient/family/caregiver demonstrates understanding of disease process, treatment plan, medications, and discharge instructions  Description: Complete learning assessment and assess knowledge base.  Interventions:  - Provide teaching at level of understanding  - Provide teaching via preferred learning methods  Outcome: Progressing     Problem: Neurological Deficit  Goal: Neurological status is stable or improving  Description: Interventions:  - Monitor and assess patient's level of consciousness, motor function, sensory function, and level of assistance needed for ADLs.   - Monitor and report changes from baseline. Collaborate with interdisciplinary team to initiate plan and implement interventions as ordered.   - Provide and maintain a safe environment.  - Consider seizure precautions.  - Consider fall precautions.  - Consider aspiration precautions.  - Consider  bleeding precautions.  Outcome: Progressing     Problem: Activity Intolerance/Impaired Mobility  Goal: Mobility/activity is maintained at optimum level for patient  Description: Interventions:  - Assess and monitor patient  barriers to mobility and need for assistive/adaptive devices.  - Assess patient's emotional response to limitations.  - Collaborate with interdisciplinary team and initiate plans and interventions as ordered.  - Encourage independent activity per ability.  - Maintain proper body alignment.  - Perform active/passive rom as tolerated/ordered.  - Plan activities to conserve energy.  - Turn patient as appropriate  Outcome: Progressing     Problem: Communication Impairment  Goal: Ability to express needs and understand communication  Description: Assess patient's communication skills and ability to understand information.  Patient will demonstrate use of effective communication techniques, alternative methods of communication and understanding even if not able to speak.     - Encourage communication and provide alternate methods of communication as needed.  - Collaborate with case management/ for discharge needs.  - Include patient/family/caregiver in decisions related to communication.  Outcome: Progressing     Problem: Potential for Aspiration  Goal: Non-ventilated patient's risk of aspiration is minimized  Description: Assess and monitor vital signs, respiratory status, and labs (WBC).  Monitor for signs of aspiration (tachypnea, cough, rales, wheezing, cyanosis, fever).    - Assess and monitor patient's ability to swallow.  - Place patient up in chair to eat if possible.  - HOB up at 90 degrees to eat if unable to get patient up into chair.  - Supervise patient during oral intake.   - Instruct patient/ family to take small bites.  - Instruct patient/ family to take small single sips when taking liquids.  - Follow patient-specific strategies generated by speech pathologist.  Outcome:  Progressing  Goal: Ventilated patient's risk of aspiration is minimized  Description: Assess and monitor vital signs, respiratory status, airway cuff pressure, and labs (WBC).  Monitor for signs of aspiration (tachypnea, cough, rales, wheezing, cyanosis, fever).    - Elevate head of bed 30 degrees if patient has tube feeding.  - Monitor tube feeding.  Outcome: Progressing     Problem: Nutrition  Goal: Nutrition/Hydration status is improving  Description: Monitor and assess patient's nutrition/hydration status for malnutrition (ex- brittle hair, bruises, dry skin, pale skin and conjunctiva, muscle wasting, smooth red tongue, and disorientation). Collaborate with interdisciplinary team and initiate plan and interventions as ordered.  Monitor patient's weight and dietary intake as ordered or per policy. Utilize nutrition screening tool and intervene per policy. Determine patient's food preferences and provide high-protein, high-caloric foods as appropriate.     - Assist patient with eating.  - Allow adequate time for meals.  - Encourage patient to take dietary supplement as ordered.  - Collaborate with clinical nutritionist.  - Include patient/family/caregiver in decisions related to nutrition.  Outcome: Progressing

## 2025-07-04 NOTE — PROGRESS NOTES
Progress Note - Neurology   Name: Jaycee Can 35 y.o. female I MRN: [id number]  Unit/Bed#: ICU 08 I Date of Admission: 7/3/2025   Date of Service: 7/4/2025 I Hospital Day: 1    Assessment & Plan  Stroke (cerebrum) (HCC)  36 y/o female with VSD repair, DM, HTN, migraines, chronic hep C, bipolar disorder, depression, who initially presented to [facility] on 7/1 with headache, nausea, dizziness, and R eye vision changes. Imaging demonstrated R cerebellar hypodensity, occlusion in the R vertebral artery, and high-grade stenosis involving the intradural R vert. Stroke alert was initiated in ED and patient was deemed not a TNK candidate due to being outside of the appropriate time window. NIHSS 0 per ED. Patient was subsequently transferred to \[facility]\"" for further evaluation.    Workup:  CTA head and neck:  CT Brain: Hypodensity involving the inferior right cerebellar hemisphere, most compatible with an acute/recent right PICA distribution infarct. No intracranial hemorrhage noted. Recommend for evaluation with brain MRI without contrast.  CT Angiography: New occlusion of the right vertebral artery from its origin, and throughout the V1 segment, with reconstitution at the level of the proximal V2 segment, with mild caliber change/attenuation of the right V2 segment, which may be due to proximal occlusion, but underlying dissection cannot be excluded.  High-grade stenosis involving the intradural right vertebral artery beyond the origin of the right PICA. Likely patent but attenuated proximal right PICA, but the distal right PICA is likely occluded.  No additional large vessel occlusion, high-grade stenosis, or intracranial aneurysm identified on CT angiogram of the head.  No hemodynamically significant stenosis or dissection identified involving the bilateral cervical internal carotid arteries or left vertebral artery.  Stable stent involving the distal aortic arch and proximal descending thoracic aorta related to prior  aortic coarctation repair.  MRI brain: Recent infarct right inferior cerebellum without evidence of hemorrhage.   MRA head: Focal severe stenosis versus occlusion of the midportion of the intracranial right vertebral artery reconstitutes distally. This is similar to the prior CT angiogram.   7/2 Repeat CT head: Evolving recent right cerebellar infarct with persistent mass effect and effacement of the fourth ventricle. Unchanged ventricular size without hydrocephalus. No intracranial hemorrhage.   Echo: EF 55%. L atrium upper normal in size. R atrium normal in size. No PFO.  Labs: ESR 27, CRP 13.3, LDL 53, A1C 9.4    Suspect embolic etiology with unknown source at this time. Further workup as noted below.    Plan:  -Stroke pathway  Recommend CT CAP, thrombosis panel, and TERESA  P2Y12  S/p aspirin 324 mg and Plavix 300 mg x 1; continue aspirin 81 mg and Plavix 75 mg  Atorvastatin 40 mg  Sodium goal 145-155  Currently on hypertonic saline gtt  Permissive HTN up to ; avoid hypotension  Continue telemetry  PT/OT/ST  Frequent neuro checks. Continue to monitor and notify neurology with any changes.   -Medical management and supportive care per primary team. Correction of any metabolic or infectious disturbances.   -Case and treatment plan reviewed with attending neurologist, Dr. Whittaker. Please see attending attestation for any further recommendations.    Recommendations for outpatient neurological follow up have yet to be determined.    Subjective   Patient resting in bed. She denies headache, dizziness, weakness, vision changes, speech difficulty. She states her hands feel numb/tingly but attributes this to being stuck for labs.    Review of Systems  A 12 system ROS was completed. Other than the above mentioned complaints in the HPI and those commented on below, all remaining systems were negative.    Objective :  Temp:  [97.6 °F (36.4 °C)-98 °F (36.7 °C)] 98 °F (36.7 °C)  HR:  [] 87  BP: (136-197)/(65-98)  172/85  Resp:  [14-28] 20  SpO2:  [93 %-98 %] 95 %  O2 Device: None (Room air)    Physical Exam  Vitals and nursing note reviewed.   Constitutional:       General: She is awake. She is not in acute distress.     Appearance: Normal appearance. She is not ill-appearing.   HENT:      Head: Normocephalic.      Mouth/Throat:      Mouth: Mucous membranes are moist.      Pharynx: Oropharynx is clear.     Eyes:      General: No scleral icterus.        Right eye: No discharge.         Left eye: No discharge.      Extraocular Movements: Extraocular movements intact.      Conjunctiva/sclera: Conjunctivae normal.       Cardiovascular:      Rate and Rhythm: Normal rate.   Pulmonary:      Effort: Pulmonary effort is normal. No respiratory distress.     Skin:     General: Skin is warm and dry.      Coloration: Skin is not jaundiced or pale.     Neurological:      Mental Status: She is alert and oriented to person, place, and time.      Motor: Motor strength is normal.    Psychiatric:         Mood and Affect: Mood normal.         Speech: Speech normal.       Neurological Exam  Mental Status  Awake and alert. Oriented to person, place, time and situation. Oriented to person, place, and time. Speech is normal. Language is fluent with no aphasia.    Cranial Nerves  CN II: Visual fields full to confrontation.  CN III, IV, VI: Extraocular movements intact bilaterally.  CN V:  Right: Facial sensation is normal.  Left: Facial sensation is normal on the left.  CN VII: Full and symmetric facial movement.  CN IX, X: Palate elevates symmetrically  CN XII: Tongue midline without atrophy or fasciculations.    Motor  Normal muscle bulk throughout. Strength is 5/5 throughout all four extremities.    Sensory  Light touch is normal in upper and lower extremities.     Reflexes  Right Plantar: downgoing  Left Plantar: downgoing    Coordination  Right: Finger-to-nose normal. Heel-to-shin normal.Left: Finger-to-nose normal. Heel-to-shin  normal.        Lab Results: I have reviewed the following results:  Imaging Results Review: I reviewed radiology reports from this admission including: CTA head and neck, MRA head, CT head, MRI brain, and Echocardiogram.  Other Study Results Review: No additional pertinent studies reviewed.    VTE Pharmacologic Prophylaxis: VTE covered by:  heparin (porcine), Subcutaneous, 5,000 Units at 07/04/25 0611

## 2025-07-04 NOTE — H&P
H&P - Critical Care/ICU   Name: Jaycee Can 35 y.o. female I MRN: [id number]  Unit/Bed#: ICU 08 I Date of Admission: 7/3/2025   Date of Service: 2025 I Hospital Day: 1       Assessment & Plan   There are no hospital problems to display for this patient.    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 Unchanged ventricular size without hydrocephalus. No intracranial hemorrhage.    MRI Brain: Recent infarct right inferior cerebellum without evidence of hemorrhage.    MRA Brain: Focal severe stenosis versus occlusion of the midportion of the intracranial right vertebral artery reconstitutes distally. This is similar to the prior CT angiogram.   Plan:  Neurology following  Continue aspirin and Plavix daily  Continue permissive hypertension goal SBP greater than 140  Repeat CT head with any acute change in GCS or new focal exam finding  Goal sodium 145-155 - switch to 3% NaCl infusion   Q6 BMP and osm   Neuro checks every hour  Analgesia: Tylenol as needed    Diagnosis: Anxiety, neuropathy  Plan:  Continue home Celexa and gabapentin    CV:  Diagnoses: Hypertension; history of congenital coarctation of aorta, VSD status post repair () repair and secondary aortic stenting ()  Plan:  Maintain SBP greater than 140  Continuous cardiopulmonary monitoring    Pulm:  Diagnoses: Mild intermittent asthma without acute exacerbation  Plan:  Albuterol as needed  Continue home  Singulair  Continuous pulse oximetry. Maintain O2 sat >92%.     GI:  Diagnoses: GERD; history of chronic hepatitis C status post treatment  Last BM: Prior to arrival  Plan:  Continue home Carafate  Bowel regimen: Senokot    :  Diagnoses: No active issues  Creatinine trend: No active issues  Plan:  Monitor I/Os.    F/E/N:  Plan:   F: Fluid resuscitation prn.  E: Monitor and replete electrolytes for Mg >2, Phos >3, K >4.  N: NPO - advance as N/V allows     Heme/Onc:  Diagnoses: Hx of femoral artery thrombosis  Plan:  Consider hypercoagulable w/u   VTE prophylaxis: Subcu heparin    Endo:  Diagnoses: Type 2 diabetes  Plan:   Holding home Ozempic, metformin, Jardiance  Lantus held in the setting of persistent vomiting, restart as able/appropriate  Insulin: Sliding scale insulin. Monitor blood glucose.    ID:  Diagnoses: No active issues   Plan:  Monitor fever curve and WBC.    MSK/Skin:  Diagnoses: No active issues  Plan:  PT/OT when appropriate. Encourage OOB and ambulation when appropriate. Local wound care prn.    LDAs:  Lines - PIV  Drains - /  Airways -  /    Disposition: Critical care    History of Present Illness   Jaycee Can is a 35 y.o. who presents as a transfer from [facility] with a right cerebellar stroke.  Patient presented on July 1 with headache, nausea, dizziness, vomiting.  Stroke alert revealed right PICA infarct and vertebrobasilar artery occlusion.  She was admitted to [facility] and then upgraded to ICU on 7 3 for closer monitoring and hypertonic saline infusion of 1.8%.  Patient ultimately transferred to \[facility]\"" for continued management.  On arrival to \[facility]\"" patient with only complaint of vomiting and slight headache.     History obtained from chart review and the patient.      Historical Information   Past Medical History:  No date: Allergic  No date: Arthritis  No date: Asthma  12/17/2018: Bipolar affective disorder, currently depressed, moderate   (HCC)  07/24/2019: Chest pain, unspecified  No  date: Cyst of ovary, right  No date: Depression  10/10/2018: Diabetes mellitus (HCC)      Comment:  type 2  No date: Endometriosis  24: Fractures      Comment:  My right hand  1988: Heart murmur  No date: Hepatitis C  No date: Hepatitis C virus infection cured after antiviral drug   therapy  No date: History of transfusion  No date: Hypertension  01: Infectious viral hepatitis  No date: Migraines  No date: Morbid obesity with BMI of 40.0-44.9, adult (HCC)  : Obesity  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  No date: Spleen enlarged  No date: Status post surgical removal of both fallopian tubes  No date: Varicella Past Surgical History:  No date: CARDIAC CATHETERIZATION      Comment:  no CAD 10days, 4 weeks 22months old   2023: CARDIAC CATHETERIZATION; N/A      Comment:  Procedure: Cardiac Coronary Angiogram;  Surgeon:                Marcela Lr DO;  Location: [facility]ARD[facility];                 Service: Cardiology  2023: CARDIAC CATHETERIZATION; Left      Comment:  Procedure: Cardiac Left Heart Cath;  Surgeon: Marcela Lr DO;  Location: [facility];  Service:                Cardiology  2023: CARDIAC CATHETERIZATION      Comment:  Procedure: Cardiac catheterization;  Surgeon: Marcela Lr DO;  Location: [facility];  Service:                Cardiology  :  SECTION, LOW TRANSVERSE  No date: CHOLECYSTECTOMY  No date: COARCTATION OF AORTA EXCISION      Comment:  Age 5  No date: CORONARY STENT PLACEMENT  2023: IR LOWER EXTREMITY ANGIOGRAM  No date: LIVER BIOPSY  No date: LIVER BIOPSY  23: PERIPHERAL ANGIOGRAM  2022: STERNAL WIRE REMOVAL  2023: THROMBECTOMY W/ EMBOLECTOMY; Right      Comment:  Procedure: Right femoral exposure Right iliac                embolectomy w/ #4 Ted catheter Aortogram Right EIA                stent w/ 7x29mm VBX;  Surgeon: Jody Hodges MD;                  Location: [facility];  Service: Vascular  2020: TUBAL LIGATION; Bilateral  No date: VSD REPAIR      Comment:  As a child  02/11/2023: WOUND DEBRIDEMENT; Right      Comment:  Procedure: Right Groin Wound Washout, Pulse Lavage,                Wound Vac placement;  Surgeon: Jelani Avila DO;                 Location: [facility];  Service: Vascular   Current Outpatient Medications   Medication Instructions    acetaminophen (TYLENOL) 1,000 mg    albuterol (Ventolin HFA) 90 mcg/act inhaler 2 puffs, Inhalation, Every 4 hours PRN    albuterol 2.5 mg, Nebulization, Every 6 hours PRN    ALPRAZolam (XANAX) 0.5 mg, Oral, Daily at bedtime PRN    Aspirin Low Dose 81 MG EC tablet TAKE 1 TABLET BY MOUTH DAILY. DO NOT START BEFORE FEBRYARY 1, 2023    atorvastatin (LIPITOR) 20 mg, Oral, Daily    azelastine (ASTELIN) 0.1 % nasal spray 1 spray, Nasal, 2 times daily, Use in each nostril as directed    Blood Glucose Monitoring Suppl (OneTouch Verio) w/Device KIT Does not apply, Daily    Blood Pressure Monitoring (B-D ASSURE BPM/AUTO WRIST CUFF) MISC Check blood pressure prior to each OB visit, or as directed by your physician.    citalopram (CELEXA) 10 mg, Oral, Daily    Continuous Blood Gluc  (FreeStyle Gautam 14 Day [place]) KVNG Use with gautam sensor    Continuous Glucose Sensor (FreeStyle Gautam 3 Sensor) MISC Use 1 sensor each to be changed every 14 days    cyclobenzaprine (FLEXERIL) 5 mg, Oral, 3 times daily PRN    Empagliflozin (JARDIANCE) 10 mg, Oral, Daily    fexofenadine (ALLEGRA) 180 mg, Oral, Daily    gabapentin (NEURONTIN) 100 mg, Oral, Daily at bedtime    insulin glargine (LANTUS) 25 Units, Subcutaneous, Daily at bedtime    Insulin Pen Needle (B-D UF III MINI PEN NEEDLES) 31G X 5 MM MISC Subcutaneous, Daily at bedtime    lisinopril (ZESTRIL) 30 mg, Oral, Daily    metFORMIN (GLUCOPHAGE) 1,000 mg, Oral, 2 times daily with meals    montelukast (SINGULAIR) 10 mg, Oral, Daily at bedtime    Multiple Vitamin  (Multi Vitamin Daily) TABS 1 tablet, Oral, Daily    naproxen (NAPROSYN) 500 mg, Oral, 2 times daily PRN    ondansetron (ZOFRAN-ODT) 4 mg, Oral, Every 6 hours PRN    semaglutide, 0.25 or 0.5 mg/dose, (Ozempic, 0.25 or 0.5 MG/DOSE,) 2 mg/3 mL injection pen 0.25 mg under the skin every 7 days for 4 doses (28 days), THEN 0.5 mg under the skin every 7 days    sucralfate (CARAFATE) 1 g, Oral, 4 times daily    Allergies[1]   Social History[2] Family History[3]       Objective :                   Vitals I/O      Most Recent Min/Max in 24hrs   Temp 98 °F (36.7 °C) Temp  Min: 97.5 °F (36.4 °C)  Max: 98 °F (36.7 °C)   Pulse 92 Pulse  Min: 81  Max: 110   Resp 16 Resp  Min: 14  Max: 22   /80 BP  Min: 136/74  Max: 197/91   O2 Sat 96 % SpO2  Min: 93 %  Max: 98 %      Intake/Output Summary (Last 24 hours) at 7/4/2025 0639  Last data filed at 7/4/2025 0400  Gross per 24 hour   Intake 450 ml   Output 803 ml   Net -353 ml       Diet NPO; Sips with meds    Invasive Monitoring           Physical Exam   Physical Exam  Vitals reviewed.   Eyes:      Conjunctiva/sclera: Conjunctivae normal.      Pupils: Pupils are equal, round, and reactive to light.   HENT:      Head: Normocephalic.      Nose: No congestion.      Mouth/Throat:      Mouth: Mucous membranes are moist.   Cardiovascular:      Rate and Rhythm: Normal rate and regular rhythm.      Pulses: Normal pulses.   Abdominal:      Palpations: Abdomen is soft.   Constitutional:       General: She is not in acute distress.     Appearance: She is well-developed.   Pulmonary:      Effort: Pulmonary effort is normal.   Neurological:      General: No focal deficit present.      Mental Status: She is alert and oriented to person, place and time. Mental status is at baseline.      Motor: No pronator drift or motor deficit.          Diagnostic Studies        Lab Results: I have reviewed the following results:     Medications:  Scheduled PRN   aspirin, 81 mg, Daily  atorvastatin, 40 mg,  QPM  azelastine, 1 spray, BID  calcium gluconate, 2 g, Once  chlorhexidine, 15 mL, Q12H JUN  citalopram, 10 mg, Daily  clopidogrel, 75 mg, Daily  gabapentin, 100 mg, HS  heparin (porcine), 5,000 Units, Q8H JUN  [Held by provider] insulin glargine, 25 Units, HS  insulin lispro, 2-12 Units, Q6H JUN  [Held by provider] lisinopril, 30 mg, Daily  montelukast, 10 mg, HS  scopolamine, 1 patch, Q72H  [Held by provider] sucralfate, 1 g, 4x Daily      acetaminophen, 1,000 mg, Q6H PRN  albuterol, 2 puff, Q4H PRN  trimethobenzamide, 200 mg, Q6H PRN       Continuous    sodium chloride, 30 mL/hr, Last Rate: 30 mL/hr (07/03/25 2120)         Labs:   CBC    Recent Labs     07/03/25  0211 07/04/25  0623   WBC 9.76 10.18*   HGB 12.7 13.0   HCT 40.2 39.5    231     BMP    Recent Labs     07/03/25  1732 07/04/25  0023   SODIUM 138 137   K 4.2 3.7   * 106   CO2 22 21   AGAP 7 10   BUN 7 6   CREATININE 0.47* 0.42*   CALCIUM 9.0 8.7       Coags    Recent Labs     07/02/25  1216   INR 0.93   PTT 25        Additional Electrolytes  Recent Labs     07/02/25  1216 07/03/25  0211 07/04/25  0623   MG 1.9 1.8*  --    PHOS 2.4* 2.4*  --    CAIONIZED 1.14 1.10* 1.10*          Blood Gas    No recent results  No recent results LFTs  No recent results    Infectious  No recent results  Glucose  Recent Labs     07/03/25  1009 07/03/25  1411 07/03/25  1732 07/04/25  0023   GLUC 215* 214* 192* 193*               [1]   Allergies  Allergen Reactions    Prednisone Swelling    Bactrim [Sulfamethoxazole-Trimethoprim] Hives    Corticosteroids Swelling     Pt states this does not cause problems breathing, she just has generalized swelling    Cortisone Swelling     Generalized; no impairment with breathing reported      Medical Tape Hives     Allergic to Paper Tape    Other Hives     Paper tape    Sulfa Antibiotics Hives   [2]   Social History  Tobacco Use    Smoking status: Every Day     Current packs/day: 0.00     Average packs/day: 0.5 packs/day  for 10.0 years (5.0 ttl pk-yrs)     Types: Cigarettes     Start date: 2013     Last attempt to quit: 2023     Years since quittin.4    Smokeless tobacco: Never   Vaping Use    Vaping status: Never Used   Substance Use Topics    Alcohol use: Yes     Alcohol/week: 3.0 standard drinks of alcohol     Types: 3 Standard drinks or equivalent per week     Comment: I drink socially    Drug use: Not Currently     Comment: hx of THC use for migraines   [3]   Family History  Problem Relation Name Age of Onset    Hypertension Mother Patritica     Migraines Mother Patritica     JENNY disease Mother Patritica     Depression Mother Patritica     Hyperlipidemia Mother Patritica     Diabetes Mother Patritica     Diabetes Father Justin     Hypertension Father Justin     Kidney failure Father Justin     Heart attack Father Justin     Arthritis Father Justin     Stroke Father Justin     Glaucoma Father Justin     Kidney disease Father Justin     Polycystic kidney disease Paternal Grandmother Alison     Stroke Paternal Grandmother Alison     Heart disease Paternal Grandmother Alison     Kidney disease Paternal Grandmother Alison     Arthritis Sister Melodie     Asthma Sister Melodie     Thyroid disease Sister Melodie     Diabetes Sister Melodie     Arthritis Maternal Grandmother Irina     Breast cancer Maternal Grandmother Irina     Diabetes Maternal Grandmother Irina     Hypertension Maternal Grandmother Irina     Heart Valve Disease Maternal Grandmother Irina     Cancer Maternal Grandmother Irina         Skin cancer    Learning disabilities Cousin Marisol     Learning disabilities Sister Ana     Diabetes Sister Ana     ADD / ADHD Cousin Estelle     Lung cancer Brother Valdo     Cancer Brother Valdo         Lung cancer    Diabetes Maternal Grandfather Ankit     Hypertension Maternal Grandfather Ankit     JENNY disease Maternal Grandfather Ankit     Stroke Maternal Grandfather Ankit     Cancer Maternal Grandfather  Ankit         Skin cancer    Cancer Paternal Uncle Brandon         Skin cancer    Aneurysm Maternal Aunt Olinda

## 2025-07-04 NOTE — ASSESSMENT & PLAN NOTE
36 y/o female with VSD repair, DM, HTN, migraines, chronic hep C, bipolar disorder, depression, who initially presented to [facility] on 7/1 with headache, nausea, dizziness, and R eye vision changes. Imaging demonstrated R cerebellar hypodensity, occlusion in the R vertebral artery, and high-grade stenosis involving the intradural R vert. Stroke alert was initiated in ED and patient was deemed not a TNK candidate due to being outside of the appropriate time window. NIHSS 0 per ED. Patient was subsequently transferred to [facility] for further evaluation.    Workup:  CTA head and neck:  CT Brain: Hypodensity involving the inferior right cerebellar hemisphere, most compatible with an acute/recent right PICA distribution infarct. No intracranial hemorrhage noted. Recommend for evaluation with brain MRI without contrast.  CT Angiography: New occlusion of the right vertebral artery from its origin, and throughout the V1 segment, with reconstitution at the level of the proximal V2 segment, with mild caliber change/attenuation of the right V2 segment, which may be due to proximal occlusion, but underlying dissection cannot be excluded.  High-grade stenosis involving the intradural right vertebral artery beyond the origin of the right PICA. Likely patent but attenuated proximal right PICA, but the distal right PICA is likely occluded.  No additional large vessel occlusion, high-grade stenosis, or intracranial aneurysm identified on CT angiogram of the head.  No hemodynamically significant stenosis or dissection identified involving the bilateral cervical internal carotid arteries or left vertebral artery.  Stable stent involving the distal aortic arch and proximal descending thoracic aorta related to prior aortic coarctation repair.  MRI brain: Recent infarct right inferior cerebellum without evidence of hemorrhage.   MRA head: Focal severe stenosis versus occlusion of the midportion of the intracranial right vertebral artery  reconstitutes distally. This is similar to the prior CT angiogram.   7/2 Repeat CT head: Evolving recent right cerebellar infarct with persistent mass effect and effacement of the fourth ventricle. Unchanged ventricular size without hydrocephalus. No intracranial hemorrhage.   Echo: EF 55%. L atrium upper normal in size. R atrium normal in size. No PFO.  Labs: ESR 27, CRP 13.3, LDL 53, A1C 9.4    Suspect embolic etiology with unknown source at this time. Further workup as noted below.    Plan:  -Stroke pathway  Recommend CT CAP, thrombosis panel, and TERESA  P2Y12  S/p aspirin 324 mg and Plavix 300 mg x 1; continue aspirin 81 mg and Plavix 75 mg  Atorvastatin 40 mg  Sodium goal 145-155  Currently on hypertonic saline gtt  Permissive HTN up to ; avoid hypotension  Continue telemetry  PT/OT/ST  Frequent neuro checks. Continue to monitor and notify neurology with any changes.   -Medical management and supportive care per primary team. Correction of any metabolic or infectious disturbances.   -Case and treatment plan reviewed with attending neurologist, Dr. Whittaker. Please see attending attestation for any further recommendations.

## 2025-07-04 NOTE — CONSULTS
Duplicate neuro consult order, see consult 07/02 & progress note 07/03 by Dr. LD Hill, neuro will follow.

## 2025-07-04 NOTE — PLAN OF CARE
Problem: PAIN - ADULT  Goal: Verbalizes/displays adequate comfort level or baseline comfort level  Description: Interventions:  - Encourage patient to monitor pain and request assistance  - Assess pain using appropriate pain scale  - Administer analgesics as ordered based on type and severity of pain and evaluate response  - Implement non-pharmacological measures as appropriate and evaluate response  - Consider cultural and social influences on pain and pain management  - Notify physician/advanced practitioner if interventions unsuccessful or patient reports new pain  - Educate patient/family on pain management process including their role and importance of  reporting pain   - Provide non-pharmacologic/complimentary pain relief interventions  Outcome: Progressing     Problem: INFECTION - ADULT  Goal: Absence or prevention of progression during hospitalization  Description: INTERVENTIONS:  - Assess and monitor for signs and symptoms of infection  - Monitor lab/diagnostic results  - Monitor all insertion sites, i.e. indwelling lines, tubes, and drains  - Monitor endotracheal if appropriate and nasal secretions for changes in amount and color  - [place] appropriate cooling/warming therapies per order  - Administer medications as ordered  - Instruct and encourage patient and family to use good hand hygiene technique  - Identify and instruct in appropriate isolation precautions for identified infection/condition  Outcome: Progressing  Goal: Absence of fever/infection during neutropenic period  Description: INTERVENTIONS:  - Monitor WBC  - Perform strict hand hygiene  - Limit to healthy visitors only  - No plants, dried, fresh or silk flowers with acuna in patient room  Outcome: Progressing     Problem: SAFETY ADULT  Goal: Patient will remain free of falls  Description: INTERVENTIONS:  - Educate patient/family on patient safety including physical limitations  - Instruct patient to call for assistance with activity   -  Consider consulting OT/PT to assist with strengthening/mobility based on AM PAC & JH-HLM score  - Consult OT/PT to assist with strengthening/mobility   - Keep Call bell within reach  - Keep bed low and locked with side rails adjusted as appropriate  - Keep care items and personal belongings within reach  - Initiate and maintain comfort rounds  - Make Fall Risk Sign visible to staff  - Offer Toileting every  Hours, in advance of need  - Initiate/Maintain alarm  - Obtain necessary fall risk management equipment:   - Apply yellow socks and bracelet for high fall risk patients  - Consider moving patient to room near nurses station  Outcome: Progressing  Goal: Maintain or return to baseline ADL function  Description: INTERVENTIONS:  -  Assess patient's ability to carry out ADLs; assess patient's baseline for ADL function and identify physical deficits which impact ability to perform ADLs (bathing, care of mouth/teeth, toileting, grooming, dressing, etc.)  - Assess/evaluate cause of self-care deficits   - Assess range of motion  - Assess patient's mobility; develop plan if impaired  - Assess patient's need for assistive devices and provide as appropriate  - Encourage maximum independence but intervene and supervise when necessary  - Involve family in performance of ADLs  - Assess for home care needs following discharge   - Consider OT consult to assist with ADL evaluation and planning for discharge  - Provide patient education as appropriate  - Monitor functional capacity and physical performance, use of AM PAC & JH-HLM   - Monitor gait, balance and fatigue with ambulation    Outcome: Progressing  Goal: Maintains/Returns to pre admission functional level  Description: INTERVENTIONS:  - Perform AM-PAC 6 Click Basic Mobility/ Daily Activity assessment daily.  - Set and communicate daily mobility goal to care team and patient/family/caregiver.   - Collaborate with rehabilitation services on mobility goals if consulted  -  Perform Range of Motion  times a day.  - Reposition patient every  hours.  - Dangle patient  times a day  - Stand patient  times a day  - Ambulate patient  times a day  - Out of bed to chair  times a day   - Out of bed for meal times a day  - Out of bed for toileting  - Record patient progress and toleration of activity level   Outcome: Progressing     Problem: DISCHARGE PLANNING  Goal: Discharge to home or other facility with appropriate resources  Description: INTERVENTIONS:  - Identify barriers to discharge w/patient and caregiver  - Arrange for needed discharge resources and transportation as appropriate  - Identify discharge learning needs (meds, wound care, etc.)  - Arrange for interpretive services to assist at discharge as needed  - Refer to Case Management Department for coordinating discharge planning if the patient needs post-hospital services based on physician/advanced practitioner order or complex needs related to functional status, cognitive ability, or social support system  Outcome: Progressing     Problem: Knowledge Deficit  Goal: Patient/family/caregiver demonstrates understanding of disease process, treatment plan, medications, and discharge instructions  Description: Complete learning assessment and assess knowledge base.  Interventions:  - Provide teaching at level of understanding  - Provide teaching via preferred learning methods  Outcome: Progressing     Problem: Neurological Deficit  Goal: Neurological status is stable or improving  Description: Interventions:  - Monitor and assess patient's level of consciousness, motor function, sensory function, and level of assistance needed for ADLs.   - Monitor and report changes from baseline. Collaborate with interdisciplinary team to initiate plan and implement interventions as ordered.   - Provide and maintain a safe environment.  - Consider seizure precautions.  - Consider fall precautions.  - Consider aspiration precautions.  - Consider bleeding  precautions.  Outcome: Progressing

## 2025-07-04 NOTE — UTILIZATION REVIEW
Initial Clinical Review    TRANSFER FROM [facility]  ICU    Admission: Date/Time/Statement:   Admission Orders (From admission, onward)       Ordered        07/03/25 2021  Inpatient Admission  Once                          Orders Placed This Encounter   Procedures    Inpatient Admission     Standing Status:   Standing     Number of Occurrences:   1     Level of Care:   Critical Care [15]     Estimated length of stay:   More than 2 Midnights     Certification:   I certify that inpatient services are medically necessary for this patient for a duration of greater than two midnights. See H&P and MD Progress Notes for additional information about the patient's course of treatment.       Initial Presentation: 35 y.o. female initially admitted to [facility]  on  7/1 with a right cerebellar stroke.  [facility] further care. PMH is  DM2, GERD, chronic hep C, asthma, HTN, congenital coarctation of aorta, VSD status post repair (1980) repair and secondary aortic stenting (2020)   and anxiety.  Admit  Ip ICU LOC  with Right Cerebellar infarct  and plan is ,  not A   TNK  candidate, neuro checks, aspirin/plavix, 3 % Na CL infusion,  neuro consult, PT/OT when able and continue all current meds.        Date:    7/4     Day 2:   Had persistent vomiting after arrival in [place], improved with scopolamine patch.  Continue  hypertonic infusion.  Has persistent headache.  Neuro checks  now Q 2 hrs.  Continue all current meds. NPO  until dysphagia eval.    Scheduled Medications:  aspirin, 81 mg, Oral, Daily  atorvastatin, 40 mg, Oral, QPM  azelastine, 1 spray, Each Nare, BID  calcium gluconate, 2 g, Intravenous, Once  chlorhexidine, 15 mL, Mouth/Throat, Q12H JUN  citalopram, 10 mg, Oral, Daily  clopidogrel, 75 mg, Oral, Daily  gabapentin, 100 mg, Oral, HS  heparin (porcine), 5,000 Units, Subcutaneous, Q8H JUN  [Held by provider] insulin glargine, 25 Units, Subcutaneous, HS  insulin lispro, 2-12 Units, Subcutaneous,  Q6H JUN  [Held by provider] lisinopril, 30 mg, Oral, Daily  montelukast, 10 mg, Oral, HS  scopolamine, 1 patch, Transdermal, Q72H  [Held by provider] sucralfate, 1 g, Oral, 4x Daily      Continuous IV Infusions:  sodium chloride, 30 mL/hr, Intravenous, Continuous      PRN Meds:  acetaminophen, 1,000 mg, Intravenous, Q6H PRN  albuterol, 2 puff, Inhalation, Q4H PRN  trimethobenzamide, 200 mg, Intramuscular, Q6H PRN    Stroke teaching  Dysphagia eval  Fall precautions  Neuro checks    ED Triage Vitals   Temperature Pulse Respirations Blood Pressure SpO2 Pain Score   07/03/25 2000 07/03/25 2030 07/03/25 2030 07/03/25 2030 07/03/25 2030 07/03/25 2000   97.9 °F (36.6 °C) (!) 110 22 (!) 197/91 98 % 8           Vital Signs (last 3 days)       Date/Time Temp Pulse Resp BP MAP (mmHg) SpO2 Ivan Coma Scale Score Pain    07/04/25 0700 -- -- -- -- -- -- 15 --    07/04/25 0630 -- 95 -- 165/85 117 -- -- --    07/04/25 0600 -- 108 20 174/92 128 96 % 15 --    07/04/25 0500 -- 90 15 147/75 105 95 % 15 --    07/04/25 0400 -- 92 16 151/80 110 96 % 15 --    07/04/25 0300 98 °F (36.7 °C) 101 20 153/82 111 95 % 15 2    07/04/25 0200 -- 99 14 147/71 102 95 % 15 --    07/04/25 0100 -- 103 14 152/74 105 95 % 15 --    07/04/25 0000 -- 103 21 158/77 111 93 % 15 --    07/03/25 2300 -- 100 18 149/70 103 93 % 15 --    07/03/25 2200 -- 103 19 148/80 109 94 % 15 --    07/03/25 2130 -- 105 21 170/85 119 95 % -- --    07/03/25 2100 -- 98 19 170/83 119 94 % 15 --    07/03/25 2030 -- 110 22 197/91 133 98 % -- --    07/03/25 2000 97.9 °F (36.6 °C) -- -- -- -- -- 15 8              Pertinent Labs/Diagnostic Test Results:   Radiology:    Cardiology:    GI:          Results from last 7 days   Lab Units 07/04/25  0623 07/03/25  0211 07/02/25  0458 07/01/25  1900   WBC Thousand/uL 10.18* 9.76 8.50 9.62   HEMOGLOBIN g/dL 13.0 12.7 12.9 14.3   HEMATOCRIT % 39.5 40.2 39.8 44.7   PLATELETS Thousands/uL 231 215 216 242   TOTAL NEUT ABS Thousands/µL 7.11  --   --   5.68         Results from last 7 days   Lab Units 07/04/25  0623 07/04/25  0023 07/03/25  1732 07/03/25  1411 07/03/25  1009 07/03/25  0544 07/03/25  0211 07/02/25  1746 07/02/25  1216   SODIUM mmol/L 142 137 138 136 136   < > 138   < > 135   POTASSIUM mmol/L 3.8 3.7 4.2 3.7 3.6   < > 3.6   < > 3.7   CHLORIDE mmol/L 112* 106 109* 106 108   < > 109*   < > 105   CO2 mmol/L 22 21 22 20* 21   < > 23   < > 24   ANION GAP mmol/L 8 10 7 10 7   < > 6   < > 6   BUN mg/dL 6 6 7 7 10   < > 12   < > 9   CREATININE mg/dL 0.41* 0.42* 0.47* 0.47* 0.40*   < > 0.52*   < > 0.49*   EGFR ml/min/1.73sq m 132 131 126 126 133   < > 122   < > 124   CALCIUM mg/dL 8.7 8.7 9.0 9.5 8.2*   < > 8.4   < > 8.5   CALCIUM, IONIZED mmol/L 1.10*  --   --   --   --   --  1.10*  --  1.14   MAGNESIUM mg/dL 1.8*  --   --   --   --   --  1.8*  --  1.9   PHOSPHORUS mg/dL 2.8  --   --   --   --   --  2.4*  --  2.4*    < > = values in this interval not displayed.     Results from last 7 days   Lab Units 07/01/25  1900   AST U/L 19   ALT U/L 20   ALK PHOS U/L 69   TOTAL PROTEIN g/dL 7.0   ALBUMIN g/dL 4.0   TOTAL BILIRUBIN mg/dL 0.40     Results from last 7 days   Lab Units 07/04/25  0555 07/03/25  2356 07/03/25  2127 07/03/25  1608 07/03/25  1111 07/03/25  0554 07/02/25  2044 07/02/25  1557 07/02/25  1131 07/02/25  0715   POC GLUCOSE mg/dl 143* 202* 205* 215* 228* 186* 215* 229* 218* 170*     Results from last 7 days   Lab Units 07/04/25  0623 07/04/25  0023 07/03/25  1732 07/03/25  1411 07/03/25  1009 07/03/25  0544 07/03/25  0211 07/02/25  2132 07/02/25  1746 07/02/25  1216 07/02/25  0458 07/01/25  1900   GLUCOSE RANDOM mg/dL 132 193* 192* 214* 215* 182* 161* 207* 205* 169* 198* 203*     Results from last 7 days   Lab Units 07/04/25  0623 07/02/25  1216   OSMOLALITY, SERUM mmol/ 290     Results from last 7 days   Lab Units 07/02/25  0458   HEMOGLOBIN A1C % 9.4*   EAG mg/dl 223                     Results from last 7 days   Lab Units 07/02/25  1216    PROTIME seconds 13.0   INR  0.93   PTT seconds 25               Results from last 7 days   Lab Units 07/04/25  0623   FERRITIN ng/mL 55                     Results from last 7 days   Lab Units 07/01/25  1900   CRP mg/L 13.3*   SED RATE mm/hour 27*         Results from last 7 days   Lab Units 07/04/25  0623 07/02/25  1216   OSMOLALITY, SERUM mmol/ 290     Results from last 7 days   Lab Units 07/02/25  2319   SODIUM UR mmol/L 119.0           Admitting Diagnosis: Stroke (cerebrum) (HCC) [I63.9]  Age/Sex: 35 y.o. female    Network Utilization Review Department  ATTENTION: Please call with any questions or concerns to [phone] and carefully listen to the prompts so that you are directed to the right person. All voicemails are confidential.   For Discharge needs, contact Care Management DC Support Team at [phone] opt. 2  Send all requests for admission clinical reviews, approved or denied determinations and any other requests to dedicated fax number below belonging to the [place] where the patient is receiving treatment. List of dedicated fax numbers for the Facilities:  FACILITY NAME UR FAX NUMBER   ADMISSION DENIALS (Administrative/Medical Necessity) [phone]   DISCHARGE SUPPORT TEAM (NETWORK) [phone]   PARENT CHILD HEALTH (Maternity/NICU/Pediatrics) [phone]   [facility] [phone]   [facility] [phone]   [facility] [phone]   [facility] [phone]   [facility] [phone]   [facility] [phone]   [facility] [phone]   [facility] [phone]   [facility] [phone]   [facility] [phone]   [facility] [phone]   [facility]  [phone]

## 2025-07-04 NOTE — PROGRESS NOTES
Progress Note - Critical Care/ICU   Name: Jaycee Can 35 y.o. female I MRN: [id number]  Unit/Bed#: ICU 08 I Date of Admission: 7/3/2025   Date of Service: 2025 I Hospital Day: 1       24 Hour events:   Patient transferred to [facility] yesterday evening.  On arrival she was persistently vomiting.  Vomiting improved after scopolamine patch was placed.  Patient transition from 1.8% hypertonic saline to 3% hypertonic saline.  Repeat BMP still subtherapeutic at 137.  Hypertonic bolus given, continue infusion.  Every 6 BMP and awesome's ordered.  Patient with persistent headache but overall controlled.  Notices headache and nausea/vomiting worsened with any kind of movement.    Assessment & Plan   There are no hospital problems to display for this patient.    Diagnoses: Right cerebellar infarct - Right PICA infarct with right vertebral artery occlusion  Stroke alert on  at [facility] - not acute TNK and event secondary to being outside of the window - CTA revealed acute right PCA infarct with no occlusion of right vertebral artery  Initially presented for severe headache with dizziness, vision changes, nausea he was admitted to [facility] while awaiting bed availability at [facility]   Imagin/3 CTH: Evolving recent right cerebellar infarct with persistent mass effect and effacement of the fourth ventricle. Unchanged ventricular size without hydrocephalus. No intracranial hemorrhage.    MRI Brain: Recent infarct right inferior cerebellum without evidence of hemorrhage.    MRA Brain: Focal severe stenosis versus occlusion of the midportion of the intracranial right vertebral artery reconstitutes distally. This is similar to the prior CT angiogram.   Plan:  Neurology following  Continue aspirin and Plavix daily  Continue permissive hypertension goal SBP greater than 140  Repeat CT head with any acute change in GCS or new focal exam finding  Goal sodium 145-155 - continue hypertonic saline infusion @ 30 mL/hr    Consult venous access for PICC line   Q6 BMP and osm   Decrease neuro checks to q2 hours   Analgesia: Tylenol as needed     Diagnosis: Anxiety, neuropathy  Plan:  Continue home Celexa and gabapentin     CV:  Diagnoses: Hypertension; history of congenital coarctation of aorta, VSD status post repair (1980) repair and secondary aortic stenting (2020)  Plan:  Maintain SBP greater than 140  Continuous cardiopulmonary monitoring     Pulm:  Diagnoses: Mild intermittent asthma without acute exacerbation  Plan:  Albuterol as needed  Continue home Singulair  Continuous pulse oximetry. Maintain O2 sat >92%.      GI:  Diagnoses: Nausea/vomiting; GERD; history of chronic hepatitis C status post treatment  Last BM: Prior to arrival  Plan:  Continue scopolamine patch  Avoid QT prolonging anti-emetics   Continue home Carafate  Bowel regimen: Senokot     :  Diagnoses: No active issues  Creatinine trend: No active issues  Plan:  Monitor I/Os.     F/E/N:  Plan:   F: Fluid resuscitation prn.  E: Monitor and replete electrolytes for Mg >2, Phos >3, K >4.  N: NPO - advance as N/V allows      Heme/Onc:  Diagnoses: Hx of femoral artery thrombosis  Plan:  Consider hypercoagulable w/u   VTE prophylaxis: Subcu heparin     Endo:  Diagnoses: Type 2 diabetes  Plan:   Holding home Ozempic, metformin, Jardiance  Lantus held in the setting of persistent vomiting, restart as able/appropriate  Insulin: Sliding scale insulin. Monitor blood glucose.     ID:  Diagnoses: No active issues   Plan:  Monitor fever curve and WBC.     MSK/Skin:  Diagnoses: No active issues  Plan:  PT/OT when appropriate. Encourage OOB and ambulation when appropriate. Local wound care prn.     LDAs:  Lines - PIV  Drains - /  Airways -  /    Disposition: Critical care    ICU Core Measures     A: Assess, Prevent, and Manage Pain Has pain been assessed? Yes  Need for changes to pain regimen? No   B: Both SAT/SAT  N/A   C: Choice of Sedation RASS Goal: 0 Alert and Calm or N/A  patient not on sedation  Need for changes to sedation or analgesia regimen? N/A   D: Delirium CAM-ICU: Negative   E: Early Mobility  Plan for early mobility? Yes   F: Family Engagement Plan for family engagement today? Yes         Prophylaxis:  VTE VTE covered by:  heparin (porcine), Subcutaneous, 5,000 Units at 07/04/25 0611       Stress Ulcer  not ordered           Subjective       Objective :                   Vitals I/O      Most Recent Min/Max in 24hrs   Temp 98 °F (36.7 °C) Temp  Min: 97.6 °F (36.4 °C)  Max: 98 °F (36.7 °C)   Pulse 95 Pulse  Min: 81  Max: 110   Resp 20 Resp  Min: 14  Max: 22   /85 BP  Min: 136/74  Max: 197/91   O2 Sat 96 % SpO2  Min: 93 %  Max: 98 %      Intake/Output Summary (Last 24 hours) at 7/4/2025 0730  Last data filed at 7/4/2025 0643  Gross per 24 hour   Intake 610 ml   Output 803 ml   Net -193 ml       Diet NPO; Sips with meds    Invasive Monitoring           Physical Exam   Physical Exam  Vitals reviewed.   Eyes:      Conjunctiva/sclera: Conjunctivae normal.      Pupils: Pupils are equal, round, and reactive to light.   Skin:     General: Skin is warm and dry.   HENT:      Head: Normocephalic.      Nose: No congestion.      Mouth/Throat:      Pharynx: No oropharyngeal exudate.   Cardiovascular:      Rate and Rhythm: Normal rate and regular rhythm.      Pulses: Normal pulses.   Abdominal:      Palpations: Abdomen is soft.   Constitutional:       General: She is not in acute distress.     Appearance: She is well-developed.   Pulmonary:      Effort: Pulmonary effort is normal.   Neurological:      General: No focal deficit present.      Mental Status: She is alert and oriented to person, place and time. Mental status is at baseline.      Motor: gross motor function is at baseline for patient. No motor deficit.          Diagnostic Studies        Lab Results: I have reviewed the following results:     Medications:  Scheduled PRN   aspirin, 81 mg, Daily  atorvastatin, 40 mg,  QPM  azelastine, 1 spray, BID  calcium gluconate, 2 g, Once  chlorhexidine, 15 mL, Q12H JUN  citalopram, 10 mg, Daily  clopidogrel, 75 mg, Daily  gabapentin, 100 mg, HS  heparin (porcine), 5,000 Units, Q8H JUN  [Held by provider] insulin glargine, 25 Units, HS  insulin lispro, 2-12 Units, Q6H JUN  [Held by provider] lisinopril, 30 mg, Daily  montelukast, 10 mg, HS  scopolamine, 1 patch, Q72H  [Held by provider] sucralfate, 1 g, 4x Daily      acetaminophen, 1,000 mg, Q6H PRN  albuterol, 2 puff, Q4H PRN  trimethobenzamide, 200 mg, Q6H PRN       Continuous    sodium chloride, 30 mL/hr, Last Rate: 30 mL/hr (07/03/25 2120)         Labs:   CBC    Recent Labs     07/03/25  0211 07/04/25  0623   WBC 9.76 10.18*   HGB 12.7 13.0   HCT 40.2 39.5    231     BMP    Recent Labs     07/03/25  1732 07/04/25  0023   SODIUM 138 137   K 4.2 3.7   * 106   CO2 22 21   AGAP 7 10   BUN 7 6   CREATININE 0.47* 0.42*   CALCIUM 9.0 8.7       Coags    Recent Labs     07/02/25  1216   INR 0.93   PTT 25        Additional Electrolytes  Recent Labs     07/02/25  1216 07/03/25  0211 07/04/25  0623   MG 1.9 1.8*  --    PHOS 2.4* 2.4*  --    CAIONIZED 1.14 1.10* 1.10*          Blood Gas    No recent results  No recent results LFTs  No recent results    Infectious  No recent results  Glucose  Recent Labs     07/03/25  1009 07/03/25  1411 07/03/25  1732 07/04/25  0023   GLUC 215* 214* 192* 193*

## 2025-07-05 ENCOUNTER — APPOINTMENT (INPATIENT)
Dept: RADIOLOGY | Facility: HOSPITAL | Age: 35
DRG: 045 | End: 2025-07-05
Payer: COMMERCIAL

## 2025-07-05 LAB
ANION GAP SERPL CALCULATED.3IONS-SCNC: 10 MMOL/L (ref 4–13)
ANION GAP SERPL CALCULATED.3IONS-SCNC: 6 MMOL/L (ref 4–13)
ANION GAP SERPL CALCULATED.3IONS-SCNC: 7 MMOL/L (ref 4–13)
ATRIAL RATE: 99 BPM
BASOPHILS # BLD AUTO: 0.03 THOUSANDS/ÂΜL (ref 0–0.1)
BASOPHILS NFR BLD AUTO: 0 % (ref 0–1)
BUN SERPL-MCNC: 10 MG/DL (ref 5–25)
BUN SERPL-MCNC: 7 MG/DL (ref 5–25)
BUN SERPL-MCNC: 8 MG/DL (ref 5–25)
CALCIUM SERPL-MCNC: 8.1 MG/DL (ref 8.4–10.2)
CALCIUM SERPL-MCNC: 8.5 MG/DL (ref 8.4–10.2)
CALCIUM SERPL-MCNC: 9 MG/DL (ref 8.4–10.2)
CHLORIDE SERPL-SCNC: 108 MMOL/L (ref 96–108)
CHLORIDE SERPL-SCNC: 109 MMOL/L (ref 96–108)
CHLORIDE SERPL-SCNC: 113 MMOL/L (ref 96–108)
CO2 SERPL-SCNC: 21 MMOL/L (ref 21–32)
CO2 SERPL-SCNC: 23 MMOL/L (ref 21–32)
CO2 SERPL-SCNC: 24 MMOL/L (ref 21–32)
CREAT SERPL-MCNC: 0.39 MG/DL (ref 0.6–1.3)
CREAT SERPL-MCNC: 0.4 MG/DL (ref 0.6–1.3)
CREAT SERPL-MCNC: 0.44 MG/DL (ref 0.6–1.3)
EOSINOPHIL # BLD AUTO: 0.01 THOUSAND/ÂΜL (ref 0–0.61)
EOSINOPHIL NFR BLD AUTO: 0 % (ref 0–6)
ERYTHROCYTE [DISTWIDTH] IN BLOOD BY AUTOMATED COUNT: 15.6 % (ref 11.6–15.1)
GFR SERPL CREATININE-BSD FRML MDRD: 129 ML/MIN/1.73SQ M
GFR SERPL CREATININE-BSD FRML MDRD: 133 ML/MIN/1.73SQ M
GFR SERPL CREATININE-BSD FRML MDRD: 134 ML/MIN/1.73SQ M
GLUCOSE SERPL-MCNC: 152 MG/DL (ref 65–140)
GLUCOSE SERPL-MCNC: 157 MG/DL (ref 65–140)
GLUCOSE SERPL-MCNC: 159 MG/DL (ref 65–140)
GLUCOSE SERPL-MCNC: 165 MG/DL (ref 65–140)
GLUCOSE SERPL-MCNC: 170 MG/DL (ref 65–140)
GLUCOSE SERPL-MCNC: 207 MG/DL (ref 65–140)
GLUCOSE SERPL-MCNC: 216 MG/DL (ref 65–140)
HCT VFR BLD AUTO: 38.6 % (ref 34.8–46.1)
HGB BLD-MCNC: 12.1 G/DL (ref 11.5–15.4)
IMM GRANULOCYTES # BLD AUTO: 0.06 THOUSAND/UL (ref 0–0.2)
IMM GRANULOCYTES NFR BLD AUTO: 1 % (ref 0–2)
LYMPHOCYTES # BLD AUTO: 2.35 THOUSANDS/ÂΜL (ref 0.6–4.47)
LYMPHOCYTES NFR BLD AUTO: 24 % (ref 14–44)
MAGNESIUM SERPL-MCNC: 1.8 MG/DL (ref 1.9–2.7)
MCH RBC QN AUTO: 25.6 PG (ref 26.8–34.3)
MCHC RBC AUTO-ENTMCNC: 31.3 G/DL (ref 31.4–37.4)
MCV RBC AUTO: 82 FL (ref 82–98)
MONOCYTES # BLD AUTO: 0.57 THOUSAND/ÂΜL (ref 0.17–1.22)
MONOCYTES NFR BLD AUTO: 6 % (ref 4–12)
NEUTROPHILS # BLD AUTO: 6.96 THOUSANDS/ÂΜL (ref 1.85–7.62)
NEUTS SEG NFR BLD AUTO: 69 % (ref 43–75)
NRBC BLD AUTO-RTO: 0 /100 WBCS
OSMOLALITY UR/SERPL-RTO: 295 MMOL/KG (ref 282–298)
OSMOLALITY UR/SERPL-RTO: 299 MMOL/KG (ref 282–298)
OSMOLALITY UR/SERPL-RTO: 300 MMOL/KG (ref 282–298)
P AXIS: 70 DEGREES
PHOSPHATE SERPL-MCNC: 2.8 MG/DL (ref 2.7–4.5)
PLATELET # BLD AUTO: 216 THOUSANDS/UL (ref 149–390)
PMV BLD AUTO: 10.9 FL (ref 8.9–12.7)
POTASSIUM SERPL-SCNC: 3.4 MMOL/L (ref 3.5–5.3)
POTASSIUM SERPL-SCNC: 3.5 MMOL/L (ref 3.5–5.3)
POTASSIUM SERPL-SCNC: 3.9 MMOL/L (ref 3.5–5.3)
PR INTERVAL: 188 MS
QRS AXIS: 21 DEGREES
QRSD INTERVAL: 152 MS
QT INTERVAL: 384 MS
QTC INTERVAL: 492 MS
RBC # BLD AUTO: 4.72 MILLION/UL (ref 3.81–5.12)
SODIUM SERPL-SCNC: 138 MMOL/L (ref 135–147)
SODIUM SERPL-SCNC: 140 MMOL/L (ref 135–147)
SODIUM SERPL-SCNC: 143 MMOL/L (ref 135–147)
T WAVE AXIS: 41 DEGREES
VENTRICULAR RATE: 99 BPM
WBC # BLD AUTO: 9.98 THOUSAND/UL (ref 4.31–10.16)

## 2025-07-05 PROCEDURE — A9585 GADOBUTROL INJECTION: HCPCS | Performed by: EMERGENCY MEDICINE

## 2025-07-05 PROCEDURE — 83930 ASSAY OF BLOOD OSMOLALITY: CPT

## 2025-07-05 PROCEDURE — 70553 MRI BRAIN STEM W/O & W/DYE: CPT

## 2025-07-05 PROCEDURE — 70544 MR ANGIOGRAPHY HEAD W/O DYE: CPT

## 2025-07-05 PROCEDURE — 85025 COMPLETE CBC W/AUTO DIFF WBC: CPT

## 2025-07-05 PROCEDURE — 99291 CRITICAL CARE FIRST HOUR: CPT | Performed by: INTERNAL MEDICINE

## 2025-07-05 PROCEDURE — 80048 BASIC METABOLIC PNL TOTAL CA: CPT

## 2025-07-05 PROCEDURE — 82948 REAGENT STRIP/BLOOD GLUCOSE: CPT

## 2025-07-05 PROCEDURE — 93010 ELECTROCARDIOGRAM REPORT: CPT | Performed by: INTERNAL MEDICINE

## 2025-07-05 PROCEDURE — 83735 ASSAY OF MAGNESIUM: CPT

## 2025-07-05 PROCEDURE — 84100 ASSAY OF PHOSPHORUS: CPT

## 2025-07-05 RX ORDER — TICAGRELOR 90 MG/1
90 TABLET, FILM COATED ORAL EVERY 12 HOURS SCHEDULED
Status: CANCELLED | OUTPATIENT
Start: 2025-07-05

## 2025-07-05 RX ORDER — MECLIZINE HYDROCHLORIDE 25 MG/1
25 TABLET ORAL EVERY 8 HOURS PRN
Status: DISCONTINUED | OUTPATIENT
Start: 2025-07-05 | End: 2025-07-08 | Stop reason: HOSPADM

## 2025-07-05 RX ORDER — FERROUS SULFATE 325(65) MG
325 TABLET ORAL
Status: DISCONTINUED | OUTPATIENT
Start: 2025-07-07 | End: 2025-07-05

## 2025-07-05 RX ORDER — GADOBUTROL 604.72 MG/ML
10 INJECTION INTRAVENOUS
Status: COMPLETED | OUTPATIENT
Start: 2025-07-05 | End: 2025-07-05

## 2025-07-05 RX ORDER — MAGNESIUM SULFATE HEPTAHYDRATE 40 MG/ML
2 INJECTION, SOLUTION INTRAVENOUS ONCE
Status: COMPLETED | OUTPATIENT
Start: 2025-07-05 | End: 2025-07-05

## 2025-07-05 RX ORDER — CALCIUM GLUCONATE 20 MG/ML
2 INJECTION, SOLUTION INTRAVENOUS ONCE
Status: COMPLETED | OUTPATIENT
Start: 2025-07-05 | End: 2025-07-05

## 2025-07-05 RX ORDER — TICAGRELOR 90 MG/1
180 TABLET, FILM COATED ORAL ONCE
Status: CANCELLED | OUTPATIENT
Start: 2025-07-05

## 2025-07-05 RX ORDER — SODIUM CHLORIDE 3 G/100ML
250 INJECTION, SOLUTION INTRAVENOUS ONCE
Status: COMPLETED | OUTPATIENT
Start: 2025-07-05 | End: 2025-07-05

## 2025-07-05 RX ADMIN — POTASSIUM CHLORIDE 20 MEQ: 14.9 INJECTION, SOLUTION INTRAVENOUS at 02:49

## 2025-07-05 RX ADMIN — CALCIUM GLUCONATE 2 G: 20 INJECTION, SOLUTION INTRAVENOUS at 09:49

## 2025-07-05 RX ADMIN — MAGNESIUM SULFATE HEPTAHYDRATE 2 G: 40 INJECTION, SOLUTION INTRAVENOUS at 08:13

## 2025-07-05 RX ADMIN — GABAPENTIN 100 MG: 100 CAPSULE ORAL at 21:15

## 2025-07-05 RX ADMIN — MONTELUKAST 10 MG: 10 TABLET, FILM COATED ORAL at 21:15

## 2025-07-05 RX ADMIN — LORAZEPAM 1 MG: 2 INJECTION INTRAMUSCULAR; INTRAVENOUS at 14:06

## 2025-07-05 RX ADMIN — CITALOPRAM HYDROBROMIDE 10 MG: 10 TABLET ORAL at 08:14

## 2025-07-05 RX ADMIN — INSULIN LISPRO 2 UNITS: 100 INJECTION, SOLUTION INTRAVENOUS; SUBCUTANEOUS at 17:21

## 2025-07-05 RX ADMIN — GADOBUTROL 10 ML: 604.72 INJECTION INTRAVENOUS at 15:17

## 2025-07-05 RX ADMIN — HEPARIN SODIUM 5000 UNITS: 5000 INJECTION, SOLUTION INTRAVENOUS; SUBCUTANEOUS at 13:16

## 2025-07-05 RX ADMIN — SODIUM CHLORIDE 50 ML/HR: 3 INJECTION, SOLUTION INTRAVENOUS at 09:51

## 2025-07-05 RX ADMIN — HEPARIN SODIUM 5000 UNITS: 5000 INJECTION, SOLUTION INTRAVENOUS; SUBCUTANEOUS at 05:58

## 2025-07-05 RX ADMIN — ASPIRIN 81 MG CHEWABLE TABLET 81 MG: 81 TABLET CHEWABLE at 08:13

## 2025-07-05 RX ADMIN — ATORVASTATIN CALCIUM 40 MG: 40 TABLET, FILM COATED ORAL at 17:17

## 2025-07-05 RX ADMIN — SODIUM CHLORIDE 250 ML: 3 INJECTION, SOLUTION INTRAVENOUS at 03:07

## 2025-07-05 RX ADMIN — INSULIN LISPRO 2 UNITS: 100 INJECTION, SOLUTION INTRAVENOUS; SUBCUTANEOUS at 11:51

## 2025-07-05 RX ADMIN — HEPARIN SODIUM 5000 UNITS: 5000 INJECTION, SOLUTION INTRAVENOUS; SUBCUTANEOUS at 21:15

## 2025-07-05 RX ADMIN — INSULIN LISPRO 2 UNITS: 100 INJECTION, SOLUTION INTRAVENOUS; SUBCUTANEOUS at 07:39

## 2025-07-05 RX ADMIN — INSULIN GLARGINE 25 UNITS: 100 INJECTION, SOLUTION SUBCUTANEOUS at 21:15

## 2025-07-05 RX ADMIN — CLOPIDOGREL BISULFATE 75 MG: 75 TABLET, FILM COATED ORAL at 08:13

## 2025-07-05 RX ADMIN — SODIUM CHLORIDE 60 ML/HR: 3 INJECTION, SOLUTION INTRAVENOUS at 01:16

## 2025-07-05 NOTE — UTILIZATION REVIEW
NOTIFICATION OF INPATIENT ADMISSION   AUTHORIZATION REQUEST   SERVICING FACILITY:   [facility]ly [facility]  Address: [address] [place] [address]  Tax ID: [id number]  NPI: [id number] ATTENDING PROVIDER:  Attending Name and NPI#: Amira Mathias Do [[id number]]  Address: [address] [place] [address]  Phone: [phone]   ADMISSION INFORMATION:  Place of Service: Inpatient [facility]  Place of Service Code: 21  Inpatient Admission Date/Time: 7/3/25  7:54 PM  Discharge Date/Time: No discharge date for patient encounter.  Admitting Diagnosis Code/Description:  Stroke (cerebrum) (HCC) [I63.9]     UTILIZATION REVIEW CONTACT:  Jamel Kinney Utilization   Network Utilization Review Department  Phone: [phone]  Fax: [phone]  Email: [email or web address]  Contact for approvals/pending authorizations, clinical reviews, and discharge.     PHYSICIAN ADVISORY SERVICES:  Medical Necessity Denial & [id number]-[id number] Review  Phone: [phone]  Fax: [phone]  Email: [email or web address]     DISCHARGE SUPPORT TEAM:  For Patients Discharge Needs & Updates  Phone: [phone] opt. 2 Fax: [phone]  Email: [email or web address]

## 2025-07-05 NOTE — PLAN OF CARE
Problem: PAIN - ADULT  Goal: Verbalizes/displays adequate comfort level or baseline comfort level  Description: Interventions:  - Encourage patient to monitor pain and request assistance  - Assess pain using appropriate pain scale  - Administer analgesics as ordered based on type and severity of pain and evaluate response  - Implement non-pharmacological measures as appropriate and evaluate response  - Consider cultural and social influences on pain and pain management  - Notify physician/advanced practitioner if interventions unsuccessful or patient reports new pain  - Educate patient/family on pain management process including their role and importance of  reporting pain   - Provide non-pharmacologic/complimentary pain relief interventions  Outcome: Progressing     Problem: INFECTION - ADULT  Goal: Absence or prevention of progression during hospitalization  Description: INTERVENTIONS:  - Assess and monitor for signs and symptoms of infection  - Monitor lab/diagnostic results  - Monitor all insertion sites, i.e. indwelling lines, tubes, and drains  - Monitor endotracheal if appropriate and nasal secretions for changes in amount and color  - [place] appropriate cooling/warming therapies per order  - Administer medications as ordered  - Instruct and encourage patient and family to use good hand hygiene technique  - Identify and instruct in appropriate isolation precautions for identified infection/condition  Outcome: Progressing     Problem: Activity Intolerance/Impaired Mobility  Goal: Mobility/activity is maintained at optimum level for patient  Description: Interventions:  - Assess and monitor patient  barriers to mobility and need for assistive/adaptive devices.  - Assess patient's emotional response to limitations.  - Collaborate with interdisciplinary team and initiate plans and interventions as ordered.  - Encourage independent activity per ability.  - Maintain proper body alignment.  - Perform  active/passive rom as tolerated/ordered.  - Plan activities to conserve energy.  - Turn patient as appropriate  Outcome: Progressing

## 2025-07-05 NOTE — PROGRESS NOTES
Progress Note - Critical Care/ICU   Name: Jaycee Can 35 y.o. female I MRN: [id number]  Unit/Bed#: ICU 08 I Date of Admission: 7/3/2025   Date of Service: 7/4/2025 I Hospital Day: 1       Patient with recurrent nausea/ vomiting tonight after brushing her teeth/ using mouthwash. Repeat CT head completed- grossly unchanged, 4th ventricle appears patent, no obvious ventriculomegaly. Neuro exam unchanged, no deficits or visual changes. Continue to monitor.

## 2025-07-05 NOTE — PROGRESS NOTES
Progress Note - Critical Care/ICU   Name: Jaycee Can 35 y.o. female I MRN: [id number]  Unit/Bed#: ICU 08 I Date of Admission: 7/3/2025   Date of Service: 2025 I Hospital Day: 2       24 Hour Events : Overnight patient had an episode of nausea and vomiting x 4 with room-spinning-dizziness and worsening headache. Repeat CTH obtained which showed persistent swelling but no significantly new findings. Patient still not within sodium goal so received 2 hypertonic saline boluses.     Assessment & Plan   Active Hospital Problems    Diagnosis Date Noted POA    Stroke (cerebrum) ([facility]) 2025 Yes      Resolved Hospital Problems   No resolved problems to display.     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; Cerebellar edema w/ effacement of fourth ventricle w/ brain compression and mass effect   Stroke alert on  at [facility] - not acute TNK and event secondary to being outside of the window - CTA revealed acute right PCA infarct with no occlusion of right vertebral artery  Initially presented for severe headache with dizziness, vision changes, nausea he was admitted to [facility] while awaiting bed availability at [facility]   Imagin/3 CTH: Evolving recent right cerebellar infarct with persistent mass effect and effacement of the fourth ventricle. Unchanged ventricular size without hydrocephalus. No intracranial hemorrhage.    MRI Brain: Recent infarct right inferior cerebellum without evidence of hemorrhage.    MRA Brain: Focal severe stenosis versus occlusion of the midportion of the intracranial right vertebral artery reconstitutes distally. This is similar to the prior CT angiogram.   Plan:  Neurology following  Continue aspirin and Plavix daily  Continue permissive hypertension goal SBP greater than 140  Repeat CT head with any acute change in GCS or new focal exam finding  Goal sodium 145-155, goal serum osm 310-320    Continue hypertonic saline infusion - currently @ 70 mLhr   Consult venous access for PICC line   Q6 BMP and Osm   Decrease neuro checks to q2 hours   F/u vasculitis workup  MRI ordered w/ vessel wall imaging   Analgesia: Tylenol as needed     Diagnosis: Anxiety, neuropathy  Plan:  Continue home Celexa and gabapentin     CV:  Diagnoses: Hypertension; history of congenital coarctation of aorta, VSD status post repair (1980) repair and secondary aortic stenting (2020)  Plan:  Maintain SBP greater than 140  Continuous cardiopulmonary monitoring     Pulm:  Diagnoses: Mild intermittent asthma without acute exacerbation  Plan:  Albuterol as needed  Continue home Singulair  Continuous pulse oximetry. Maintain O2 sat >92%.      GI:  Diagnoses: Nausea/vomiting; GERD; history of chronic hepatitis C status post treatment  Last BM: Prior to arrival  Plan:  Continue scopolamine patch  Avoid QT prolonging anti-emetics   Continue home Carafate  Bowel regimen: Senokot     :  Diagnoses: No active issues  Creatinine trend: No active issues  Plan:  Monitor I/Os.     F/E/N:  Plan:   F: Fluid resuscitation prn.  E: Monitor and replete electrolytes for Mg >2, Phos >3, K >4.  N: Regular diet as tolerated       Heme/Onc:  Diagnoses: Hx of femoral artery thrombosis  Plan:  Consider hypercoagulable w/u   VTE prophylaxis: Subcu heparin     Endo:  Diagnoses: Type 2 diabetes  7/2 Hgb A1c of 9.2   Plan:   Holding home Ozempic, metformin, Jardiance  Insulin: Restart home Lantus. Sliding scale insulin. Monitor blood glucose.     ID:  Diagnoses: No active issues   Plan:  Monitor fever curve and WBC.     MSK/Skin:  Diagnoses: No active issues  Plan:  PT/OT when appropriate. Encourage OOB and ambulation when appropriate. Local wound care prn.     LDAs:  Lines - PIV  Drains - /  Airways -  /    Disposition: Critical care    ICU Core Measures     A: Assess, Prevent, and Manage Pain Has pain been assessed? Yes  Need for changes to pain regimen? No    B: Both SAT/SAT  N/A   C: Choice of Sedation RASS Goal: 0 Alert and Calm or N/A patient not on sedation  Need for changes to sedation or analgesia regimen? N/A   D: Delirium CAM-ICU: Negative   E: Early Mobility  Plan for early mobility? Yes   F: Family Engagement Plan for family engagement today? Yes         Prophylaxis:  VTE VTE covered by:  heparin (porcine), Subcutaneous, 5,000 Units at 07/04/25 1500       Stress Ulcer  not ordered         24 Hour Events : Overnight patient had an episode of nausea and vomiting x 4 with room-spinning-dizziness and worsening headache. Repeat CTH obtained which showed persistent swelling but no significantly new findings. Patient still not within sodium goal so received 2 hypertonic saline boluses and infusion increased to 70.     Subjective       Objective :                   Vitals I/O      Most Recent Min/Max in 24hrs   Temp 98.2 °F (36.8 °C) Temp  Min: 98 °F (36.7 °C)  Max: 98.2 °F (36.8 °C)   Pulse 84 Pulse  Min: 81  Max: 113   Resp 19 Resp  Min: 17  Max: 28   /75 BP  Min: 141/65  Max: 219/107   O2 Sat 95 % SpO2  Min: 91 %  Max: 98 %      Intake/Output Summary (Last 24 hours) at 7/5/2025 0500  Last data filed at 7/5/2025 0249  Gross per 24 hour   Intake 1767.66 ml   Output 800 ml   Net 967.66 ml       Diet Koko/CHO Controlled; Consistent Carbohydrate Diet Level 2 (5 carb servings/75 grams CHO/meal); Fluid Restriction 1500 ML  Diet NPO; Sips with meds    Invasive Monitoring           Physical Exam   Physical Exam  Vitals reviewed.   Eyes:      Extraocular Movements: Extraocular movements intact.      Conjunctiva/sclera: Conjunctivae normal.      Pupils: Pupils are equal, round, and reactive to light.      Comments: Pupils dilated bilaterally. Equal and reactive. Scop patch in place.  Skin:     General: Skin is warm and dry.   HENT:      Head: Normocephalic.      Nose: No congestion.      Mouth/Throat:      Mouth: Mucous membranes are moist.   Cardiovascular:      Rate  and Rhythm: Normal rate and regular rhythm.      Pulses: Normal pulses.   Abdominal:      Palpations: Abdomen is soft.   Constitutional:       General: She is not in acute distress.     Appearance: She is well-developed and well-nourished. She is not ill-appearing.   Pulmonary:      Effort: Pulmonary effort is normal.   Neurological:      General: No focal deficit present.      Mental Status: She is alert.      Cranial Nerves: No dysarthria or facial asymmetry.      Motor: Strength full and intact in all extremities. No pronator drift or motor deficit.          Diagnostic Studies        Lab Results: I have reviewed the following results:     Medications:  Scheduled PRN   aspirin, 81 mg, Daily  atorvastatin, 40 mg, QPM  azelastine, 1 spray, BID  chlorhexidine, 15 mL, Q12H JUN  citalopram, 10 mg, Daily  clopidogrel, 75 mg, Daily  gabapentin, 100 mg, HS  heparin (porcine), 5,000 Units, Q8H JUN  insulin glargine, 25 Units, HS  insulin lispro, 2-12 Units, Q6H JUN  [Held by provider] lisinopril, 30 mg, Daily  montelukast, 10 mg, HS  scopolamine, 1 patch, Q72H  [Held by provider] sucralfate, 1 g, 4x Daily      acetaminophen, 1,000 mg, Q6H PRN  albuterol, 2 puff, Q4H PRN  LORazepam, 1 mg, Once PRN  trimethobenzamide, 200 mg, Q6H PRN       Continuous    sodium chloride, 50 mL/hr, Last Rate: 70 mL/hr (07/05/25 0303)         Labs:   CBC    Recent Labs     07/04/25  0623   WBC 10.18*   HGB 13.0   HCT 39.5        BMP    Recent Labs     07/04/25  1834 07/04/25  2343   SODIUM 138 140   K 3.5 3.4*    109*   CO2 23 21   AGAP 7 10   BUN 9 10   CREATININE 0.46* 0.44*   CALCIUM 8.5 8.1*       Coags    No recent results     Additional Electrolytes  Recent Labs     07/04/25  0623   MG 1.8*   PHOS 2.8   CAIONIZED 1.10*          Blood Gas    No recent results  No recent results LFTs  No recent results    Infectious  No recent results  Glucose  Recent Labs     07/04/25  0623 07/04/25  1021 07/04/25  1834 07/04/25  2343   GLUC  132 153* 173* 216*

## 2025-07-06 ENCOUNTER — APPOINTMENT (INPATIENT)
Dept: RADIOLOGY | Facility: HOSPITAL | Age: 35
DRG: 045 | End: 2025-07-06
Payer: COMMERCIAL

## 2025-07-06 LAB
ANION GAP SERPL CALCULATED.3IONS-SCNC: 9 MMOL/L (ref 4–13)
ATRIAL RATE: 98 BPM
BASOPHILS # BLD AUTO: 0.05 THOUSANDS/ÂΜL (ref 0–0.1)
BASOPHILS NFR BLD AUTO: 1 % (ref 0–1)
BUN SERPL-MCNC: 12 MG/DL (ref 5–25)
CA-I BLD-SCNC: 1.15 MMOL/L (ref 1.12–1.32)
CALCIUM SERPL-MCNC: 8.3 MG/DL (ref 8.4–10.2)
CHLORIDE SERPL-SCNC: 107 MMOL/L (ref 96–108)
CO2 SERPL-SCNC: 22 MMOL/L (ref 21–32)
CREAT SERPL-MCNC: 0.44 MG/DL (ref 0.6–1.3)
EOSINOPHIL # BLD AUTO: 0.11 THOUSAND/ÂΜL (ref 0–0.61)
EOSINOPHIL NFR BLD AUTO: 1 % (ref 0–6)
ERYTHROCYTE [DISTWIDTH] IN BLOOD BY AUTOMATED COUNT: 15.3 % (ref 11.6–15.1)
GFR SERPL CREATININE-BSD FRML MDRD: 129 ML/MIN/1.73SQ M
GLUCOSE SERPL-MCNC: 107 MG/DL (ref 65–140)
GLUCOSE SERPL-MCNC: 120 MG/DL (ref 65–140)
GLUCOSE SERPL-MCNC: 135 MG/DL (ref 65–140)
GLUCOSE SERPL-MCNC: 150 MG/DL (ref 65–140)
GLUCOSE SERPL-MCNC: 165 MG/DL (ref 65–140)
GLUCOSE SERPL-MCNC: 205 MG/DL (ref 65–140)
HCG SERPL QL: NEGATIVE
HCT VFR BLD AUTO: 33.7 % (ref 34.8–46.1)
HGB BLD-MCNC: 10.9 G/DL (ref 11.5–15.4)
IMM GRANULOCYTES # BLD AUTO: 0.04 THOUSAND/UL (ref 0–0.2)
IMM GRANULOCYTES NFR BLD AUTO: 1 % (ref 0–2)
LYMPHOCYTES # BLD AUTO: 2.79 THOUSANDS/ÂΜL (ref 0.6–4.47)
LYMPHOCYTES NFR BLD AUTO: 32 % (ref 14–44)
MAGNESIUM SERPL-MCNC: 1.8 MG/DL (ref 1.9–2.7)
MCH RBC QN AUTO: 25.8 PG (ref 26.8–34.3)
MCHC RBC AUTO-ENTMCNC: 32.3 G/DL (ref 31.4–37.4)
MCV RBC AUTO: 80 FL (ref 82–98)
MONOCYTES # BLD AUTO: 0.74 THOUSAND/ÂΜL (ref 0.17–1.22)
MONOCYTES NFR BLD AUTO: 8 % (ref 4–12)
NEUTROPHILS # BLD AUTO: 5.12 THOUSANDS/ÂΜL (ref 1.85–7.62)
NEUTS SEG NFR BLD AUTO: 57 % (ref 43–75)
NRBC BLD AUTO-RTO: 0 /100 WBCS
P AXIS: 69 DEGREES
PHOSPHATE SERPL-MCNC: 2.8 MG/DL (ref 2.7–4.5)
PLATELET # BLD AUTO: 201 THOUSANDS/UL (ref 149–390)
PMV BLD AUTO: 10.8 FL (ref 8.9–12.7)
POTASSIUM SERPL-SCNC: 3.1 MMOL/L (ref 3.5–5.3)
PR INTERVAL: 194 MS
QRS AXIS: 14 DEGREES
QRSD INTERVAL: 152 MS
QT INTERVAL: 402 MS
QTC INTERVAL: 513 MS
RBC # BLD AUTO: 4.22 MILLION/UL (ref 3.81–5.12)
SODIUM SERPL-SCNC: 138 MMOL/L (ref 135–147)
T WAVE AXIS: 26 DEGREES
VENTRICULAR RATE: 98 BPM
WBC # BLD AUTO: 8.85 THOUSAND/UL (ref 4.31–10.16)

## 2025-07-06 PROCEDURE — 82948 REAGENT STRIP/BLOOD GLUCOSE: CPT

## 2025-07-06 PROCEDURE — 84100 ASSAY OF PHOSPHORUS: CPT

## 2025-07-06 PROCEDURE — 80048 BASIC METABOLIC PNL TOTAL CA: CPT

## 2025-07-06 PROCEDURE — 84703 CHORIONIC GONADOTROPIN ASSAY: CPT

## 2025-07-06 PROCEDURE — 93010 ELECTROCARDIOGRAM REPORT: CPT | Performed by: INTERNAL MEDICINE

## 2025-07-06 PROCEDURE — 83735 ASSAY OF MAGNESIUM: CPT

## 2025-07-06 PROCEDURE — 74177 CT ABD & PELVIS W/CONTRAST: CPT

## 2025-07-06 PROCEDURE — NC001 PR NO CHARGE: Performed by: INTERNAL MEDICINE

## 2025-07-06 PROCEDURE — 99232 SBSQ HOSP IP/OBS MODERATE 35: CPT | Performed by: INTERNAL MEDICINE

## 2025-07-06 PROCEDURE — 85025 COMPLETE CBC W/AUTO DIFF WBC: CPT

## 2025-07-06 PROCEDURE — 71260 CT THORAX DX C+: CPT

## 2025-07-06 PROCEDURE — 82330 ASSAY OF CALCIUM: CPT

## 2025-07-06 RX ORDER — MAGNESIUM SULFATE HEPTAHYDRATE 40 MG/ML
2 INJECTION, SOLUTION INTRAVENOUS ONCE
Status: COMPLETED | OUTPATIENT
Start: 2025-07-06 | End: 2025-07-06

## 2025-07-06 RX ORDER — ASPIRIN 81 MG/1
81 TABLET ORAL DAILY
Status: DISCONTINUED | OUTPATIENT
Start: 2025-07-06 | End: 2025-07-08 | Stop reason: HOSPADM

## 2025-07-06 RX ORDER — POTASSIUM CHLORIDE 1500 MG/1
40 TABLET, EXTENDED RELEASE ORAL 2 TIMES DAILY
Status: DISCONTINUED | OUTPATIENT
Start: 2025-07-06 | End: 2025-07-07

## 2025-07-06 RX ORDER — TICAGRELOR 90 MG/1
90 TABLET, FILM COATED ORAL EVERY 12 HOURS SCHEDULED
Status: DISCONTINUED | OUTPATIENT
Start: 2025-07-06 | End: 2025-07-08 | Stop reason: HOSPADM

## 2025-07-06 RX ORDER — INSULIN LISPRO 100 [IU]/ML
2-12 INJECTION, SOLUTION INTRAVENOUS; SUBCUTANEOUS
Status: DISCONTINUED | OUTPATIENT
Start: 2025-07-06 | End: 2025-07-08 | Stop reason: HOSPADM

## 2025-07-06 RX ORDER — LISINOPRIL 10 MG/1
10 TABLET ORAL DAILY
Status: DISCONTINUED | OUTPATIENT
Start: 2025-07-06 | End: 2025-07-08 | Stop reason: HOSPADM

## 2025-07-06 RX ORDER — LISINOPRIL 10 MG/1
10 TABLET ORAL DAILY
Status: DISCONTINUED | OUTPATIENT
Start: 2025-07-07 | End: 2025-07-06

## 2025-07-06 RX ADMIN — INSULIN LISPRO 2 UNITS: 100 INJECTION, SOLUTION INTRAVENOUS; SUBCUTANEOUS at 00:20

## 2025-07-06 RX ADMIN — GABAPENTIN 100 MG: 100 CAPSULE ORAL at 21:48

## 2025-07-06 RX ADMIN — INSULIN LISPRO 2 UNITS: 100 INJECTION, SOLUTION INTRAVENOUS; SUBCUTANEOUS at 11:49

## 2025-07-06 RX ADMIN — SUCRALFATE 1 G: 1 TABLET ORAL at 17:15

## 2025-07-06 RX ADMIN — SUCRALFATE 1 G: 1 TABLET ORAL at 21:48

## 2025-07-06 RX ADMIN — SUCRALFATE 1 G: 1 TABLET ORAL at 08:39

## 2025-07-06 RX ADMIN — HEPARIN SODIUM 5000 UNITS: 5000 INJECTION, SOLUTION INTRAVENOUS; SUBCUTANEOUS at 14:00

## 2025-07-06 RX ADMIN — CITALOPRAM HYDROBROMIDE 10 MG: 10 TABLET ORAL at 08:39

## 2025-07-06 RX ADMIN — TICAGRELOR 90 MG: 90 TABLET ORAL at 10:13

## 2025-07-06 RX ADMIN — POTASSIUM CHLORIDE 40 MEQ: 1500 TABLET, EXTENDED RELEASE ORAL at 14:00

## 2025-07-06 RX ADMIN — LISINOPRIL 10 MG: 10 TABLET ORAL at 10:13

## 2025-07-06 RX ADMIN — ASPIRIN 81 MG: 81 TABLET ORAL at 08:39

## 2025-07-06 RX ADMIN — MAGNESIUM SULFATE HEPTAHYDRATE 2 G: 40 INJECTION, SOLUTION INTRAVENOUS at 06:52

## 2025-07-06 RX ADMIN — IOHEXOL 100 ML: 350 INJECTION, SOLUTION INTRAVENOUS at 22:57

## 2025-07-06 RX ADMIN — HEPARIN SODIUM 5000 UNITS: 5000 INJECTION, SOLUTION INTRAVENOUS; SUBCUTANEOUS at 05:16

## 2025-07-06 RX ADMIN — TICAGRELOR 90 MG: 90 TABLET ORAL at 21:49

## 2025-07-06 RX ADMIN — INSULIN GLARGINE 25 UNITS: 100 INJECTION, SOLUTION SUBCUTANEOUS at 21:47

## 2025-07-06 RX ADMIN — HEPARIN SODIUM 5000 UNITS: 5000 INJECTION, SOLUTION INTRAVENOUS; SUBCUTANEOUS at 21:47

## 2025-07-06 RX ADMIN — ATORVASTATIN CALCIUM 40 MG: 40 TABLET, FILM COATED ORAL at 17:15

## 2025-07-06 RX ADMIN — SUCRALFATE 1 G: 1 TABLET ORAL at 11:48

## 2025-07-06 RX ADMIN — MONTELUKAST 10 MG: 10 TABLET, FILM COATED ORAL at 21:48

## 2025-07-06 NOTE — PROGRESS NOTES
Progress Note - Critical Care/ICU   Name: Jaycee Can 35 y.o. female I MRN: [id number]  Unit/Bed#: ICU 08 I Date of Admission: 7/3/2025   Date of Service: 2025 I Hospital Day: 3       Assessment & Plan   Active Hospital Problems    Diagnosis Date Noted POA    Stroke (cerebrum) ([facility]) 2025 Yes      Resolved Hospital Problems   No resolved problems to display.     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 Unchanged ventricular size without hydrocephalus. No intracranial hemorrhage.    MRI Brain: Recent infarct right inferior cerebellum without evidence of hemorrhage.    MRA Brain: Focal severe stenosis versus occlusion of the midportion of the intracranial right vertebral artery reconstitutes distally. This is similar to the prior CT angiogram.    MRI Vessel wall: Evolution of recent infarct in right cerebellum PICA territory with enhancement and worsened petechial cortical hemorrhage. Persistent compressive mass effect on fourth ventricle. No obstructive hydrocephalus; Diffuse wall thickening and enhancement in visualized right vertebral artery V2, V3, and V4 segments with short-segment intraluminal occlusive thrombus in right post-PICA V4 segment with distal reconstitution which likely represents a component of reactive change related to vertebral artery dissection with  short-segment occlusive intraluminal thrombus. Vasculitis felt less likely.  Plan:  Neurology following  F/u AM CTH  Continue aspirin daily   Plan to start Brilinta eventually    Continue permissive hypertension goal SBP greater than 140  Repeat CT head with any acute change in GCS or new focal exam finding  HTS weaned off yesterday   Q4 neuro checks   F/u vasculitis workup  MRI ordered w/ vessel wall imaging   Analgesia: Tylenol as needed     Diagnosis: Anxiety, neuropathy  Plan:  Continue home Celexa and gabapentin     CV:  Diagnoses: Hypertension; history of congenital coarctation of aorta, VSD status post repair (1980) repair and secondary aortic stenting (2020)  Plan:  Maintain SBP greater than 140  Continuous cardiopulmonary monitoring     Pulm:  Diagnoses: Mild intermittent asthma without acute exacerbation  Plan:  Albuterol as needed  Continue home Singulair  Continuous pulse oximetry. Maintain O2 sat >92%.      GI:  Diagnoses: Nausea/vomiting; GERD; history of chronic hepatitis C status post treatment  Last BM: Prior to arrival  Plan:  Continue scopolamine patch  Avoid QT prolonging anti-emetics   Continue home Carafate  Bowel regimen: Senokot     :  Diagnoses: No active issues  Creatinine trend: No active issues  Plan:  Monitor I/Os.     F/E/N:  Plan:   F: Fluid resuscitation prn.  E: Monitor and replete electrolytes for Mg >2, Phos >3, K >4.  N: Regular diet as tolerated       Heme/Onc:  Diagnoses: Hx of femoral artery thrombosis  Plan:  Consider hypercoagulable w/u   VTE prophylaxis: Subcu heparin     Endo:  Diagnoses: Type 2 diabetes  7/2 Hgb A1c of 9.2   Plan:   Holding home Ozempic, metformin, Jardiance  Insulin: Restart home Lantus. Sliding scale insulin. Monitor blood glucose.     ID:  Diagnoses: No active issues   Plan:  Monitor fever curve and WBC.     MSK/Skin:  Diagnoses: No active issues  Plan:  PT/OT when appropriate. Encourage OOB and ambulation when appropriate. Local wound care prn.      LDAs:  Lines - PIV  Drains - /  Airways -  /    Disposition: Stepdown Level 1    ICU Core Measures     A: Assess, Prevent, and Manage Pain Has pain been assessed? Yes  Need for changes to pain regimen? No   B: Both SAT/SAT  N/A   C: Choice of Sedation RASS Goal: 0 Alert and Calm or N/A patient not on sedation  Need for changes to sedation or analgesia regimen? N/A   D: Delirium CAM-ICU: Negative   E: Early Mobility  Plan for early mobility? Yes   F: Family Engagement Plan for family engagement today? Yes         Prophylaxis:  VTE VTE covered by:  heparin (porcine), Subcutaneous, 5,000 Units at 07/06/25 0516       Stress Ulcer  not ordered         24 Hour Events : No acute events overnight. Patient feeling overall better.     Subjective       Objective :                   Vitals I/O      Most Recent Min/Max in 24hrs   Temp 98.2 °F (36.8 °C) Temp  Min: 98 °F (36.7 °C)  Max: 98.7 °F (37.1 °C)   Pulse 87 Pulse  Min: 76  Max: 98   Resp (!) 10 Resp  Min: 9  Max: 27   /75 BP  Min: 117/58  Max: 189/91   O2 Sat 95 % SpO2  Min: 93 %  Max: 98 %      Intake/Output Summary (Last 24 hours) at 7/6/2025 0529  Last data filed at 7/6/2025 0501  Gross per 24 hour   Intake 2062.17 ml   Output 2305 ml   Net -242.83 ml       Diet Koko/CHO Controlled; Consistent Carbohydrate Diet Level 2 (5 carb servings/75 grams CHO/meal); Fluid Restriction 1500 ML  Diet NPO; Sips with meds    Invasive Monitoring           Physical Exam   Physical Exam  Eyes:      Extraocular Movements: Extraocular movements intact.      Conjunctiva/sclera: Conjunctivae normal.      Pupils: Pupils are equal, round, and reactive to light.   Skin:     General: Skin is warm and dry.   HENT:      Head: Normocephalic.      Nose: No congestion.      Mouth/Throat:      Mouth: Mucous membranes are moist.   Cardiovascular:      Rate and Rhythm: Normal rate and regular rhythm.      Pulses: Normal pulses.   Abdominal:      Palpations: Abdomen is soft.   Constitutional:        General: She is not in acute distress.     Appearance: She is well-developed.   Pulmonary:      Effort: Pulmonary effort is normal.   Neurological:      General: No focal deficit present.      Mental Status: She is alert and oriented to person, place and time. Mental status is at baseline.      Cranial Nerves: No facial asymmetry.      Motor: gross motor function is at baseline for patient. No motor deficit.          Diagnostic Studies        Lab Results: I have reviewed the following results:     Medications:  Scheduled PRN   aspirin, 81 mg, Daily  atorvastatin, 40 mg, QPM  azelastine, 1 spray, BID  chlorhexidine, 15 mL, Q12H JUN  citalopram, 10 mg, Daily  gabapentin, 100 mg, HS  heparin (porcine), 5,000 Units, Q8H JUN  insulin glargine, 25 Units, HS  insulin lispro, 2-12 Units, Q6H JUN  [Held by provider] lisinopril, 30 mg, Daily  montelukast, 10 mg, HS  scopolamine, 1 patch, Q72H  [Held by provider] sucralfate, 1 g, 4x Daily      albuterol, 2 puff, Q4H PRN  LORazepam, 1 mg, Once PRN  meclizine, 25 mg, Q8H PRN  trimethobenzamide, 200 mg, Q6H PRN       Continuous    sodium chloride, 50 mL/hr, Last Rate: Stopped (07/06/25 0027)         Labs:   CBC    Recent Labs     07/05/25  0558 07/06/25  0501   WBC 9.98 8.85   HGB 12.1 10.9*   HCT 38.6 33.7*    201     BMP    Recent Labs     07/05/25  0558 07/05/25  1117   SODIUM 143 138   K 3.9 3.5   * 108   CO2 24 23   AGAP 6 7   BUN 7 8   CREATININE 0.39* 0.40*   CALCIUM 8.5 9.0       Coags    No recent results     Additional Electrolytes  Recent Labs     07/04/25  0623 07/05/25  0558 07/06/25  0501   MG 1.8* 1.8*  --    PHOS 2.8 2.8  --    CAIONIZED 1.10*  --  1.15          Blood Gas    No recent results  No recent results LFTs  No recent results    Infectious  No recent results  Glucose  Recent Labs     07/04/25  1834 07/04/25  2343 07/05/25  0558 07/05/25  1117   GLUC 173* 216* 152* 170*

## 2025-07-06 NOTE — PLAN OF CARE
Problem: PAIN - ADULT  Goal: Verbalizes/displays adequate comfort level or baseline comfort level  Description: Interventions:  - Encourage patient to monitor pain and request assistance  - Assess pain using appropriate pain scale  - Administer analgesics as ordered based on type and severity of pain and evaluate response  - Implement non-pharmacological measures as appropriate and evaluate response  - Consider cultural and social influences on pain and pain management  - Notify physician/advanced practitioner if interventions unsuccessful or patient reports new pain  - Educate patient/family on pain management process including their role and importance of  reporting pain   - Provide non-pharmacologic/complimentary pain relief interventions  Outcome: Progressing     Problem: INFECTION - ADULT  Goal: Absence or prevention of progression during hospitalization  Description: INTERVENTIONS:  - Assess and monitor for signs and symptoms of infection  - Monitor lab/diagnostic results  - Monitor all insertion sites, i.e. indwelling lines, tubes, and drains  - Monitor endotracheal if appropriate and nasal secretions for changes in amount and color  - [place] appropriate cooling/warming therapies per order  - Administer medications as ordered  - Instruct and encourage patient and family to use good hand hygiene technique  - Identify and instruct in appropriate isolation precautions for identified infection/condition  Outcome: Progressing  Goal: Absence of fever/infection during neutropenic period  Description: INTERVENTIONS:  - Monitor WBC  - Perform strict hand hygiene  - Limit to healthy visitors only  - No plants, dried, fresh or silk flowers with acuna in patient room  Outcome: Progressing     Problem: SAFETY ADULT  Goal: Patient will remain free of falls  Description: INTERVENTIONS:  - Educate patient/family on patient safety including physical limitations  - Instruct patient to call for assistance with activity   -  Consider consulting OT/PT to assist with strengthening/mobility based on AM PAC & JH-HLM score  - Consult OT/PT to assist with strengthening/mobility   - Keep Call bell within reach  - Keep bed low and locked with side rails adjusted as appropriate  - Keep care items and personal belongings within reach  - Initiate and maintain comfort rounds  - Make Fall Risk Sign visible to staff  - Apply yellow socks and bracelet for high fall risk patients  - Consider moving patient to room near nurses station  Outcome: Progressing  Goal: Maintain or return to baseline ADL function  Description: INTERVENTIONS:  -  Assess patient's ability to carry out ADLs; assess patient's baseline for ADL function and identify physical deficits which impact ability to perform ADLs (bathing, care of mouth/teeth, toileting, grooming, dressing, etc.)  - Assess/evaluate cause of self-care deficits   - Assess range of motion  - Assess patient's mobility; develop plan if impaired  - Assess patient's need for assistive devices and provide as appropriate  - Encourage maximum independence but intervene and supervise when necessary  - Involve family in performance of ADLs  - Assess for home care needs following discharge   - Consider OT consult to assist with ADL evaluation and planning for discharge  - Provide patient education as appropriate  - Monitor functional capacity and physical performance, use of AM PAC & JH-HLM   - Monitor gait, balance and fatigue with ambulation    Outcome: Progressing  Goal: Maintains/Returns to pre admission functional level  Description: INTERVENTIONS:  - Perform AM-PAC 6 Click Basic Mobility/ Daily Activity assessment daily.  - Set and communicate daily mobility goal to care team and patient/family/caregiver.   - Collaborate with rehabilitation services on mobility goals if consulted  - Out of bed for toileting  - Record patient progress and toleration of activity level   Outcome: Progressing

## 2025-07-06 NOTE — PLAN OF CARE
Problem: PAIN - ADULT  Goal: Verbalizes/displays adequate comfort level or baseline comfort level  Description: Interventions:  - Encourage patient to monitor pain and request assistance  - Assess pain using appropriate pain scale  - Administer analgesics as ordered based on type and severity of pain and evaluate response  - Implement non-pharmacological measures as appropriate and evaluate response  - Consider cultural and social influences on pain and pain management  - Notify physician/advanced practitioner if interventions unsuccessful or patient reports new pain  - Educate patient/family on pain management process including their role and importance of  reporting pain   - Provide non-pharmacologic/complimentary pain relief interventions  Outcome: Progressing     Problem: INFECTION - ADULT  Goal: Absence or prevention of progression during hospitalization  Description: INTERVENTIONS:  - Assess and monitor for signs and symptoms of infection  - Monitor lab/diagnostic results  - Monitor all insertion sites, i.e. indwelling lines, tubes, and drains  - Monitor endotracheal if appropriate and nasal secretions for changes in amount and color  - [place] appropriate cooling/warming therapies per order  - Administer medications as ordered  - Instruct and encourage patient and family to use good hand hygiene technique  - Identify and instruct in appropriate isolation precautions for identified infection/condition  Outcome: Progressing  Goal: Absence of fever/infection during neutropenic period  Description: INTERVENTIONS:  - Monitor WBC  - Perform strict hand hygiene  - Limit to healthy visitors only  - No plants, dried, fresh or silk flowers with acuna in patient room  Outcome: Progressing     Problem: SAFETY ADULT  Goal: Patient will remain free of falls  Description: INTERVENTIONS:  - Educate patient/family on patient safety including physical limitations  - Instruct patient to call for assistance with activity   -  Consider consulting OT/PT to assist with strengthening/mobility based on AM PAC & JH-HLM score  - Consult OT/PT to assist with strengthening/mobility   - Keep Call bell within reach  - Keep bed low and locked with side rails adjusted as appropriate  - Keep care items and personal belongings within reach  - Initiate and maintain comfort rounds  - Make Fall Risk Sign visible to staff  - Apply yellow socks and bracelet for high fall risk patients  - Consider moving patient to room near nurses station  Outcome: Progressing  Goal: Maintain or return to baseline ADL function  Description: INTERVENTIONS:  -  Assess patient's ability to carry out ADLs; assess patient's baseline for ADL function and identify physical deficits which impact ability to perform ADLs (bathing, care of mouth/teeth, toileting, grooming, dressing, etc.)  - Assess/evaluate cause of self-care deficits   - Assess range of motion  - Assess patient's mobility; develop plan if impaired  - Assess patient's need for assistive devices and provide as appropriate  - Encourage maximum independence but intervene and supervise when necessary  - Involve family in performance of ADLs  - Assess for home care needs following discharge   - Consider OT consult to assist with ADL evaluation and planning for discharge  - Provide patient education as appropriate  - Monitor functional capacity and physical performance, use of AM PAC & JH-HLM   - Monitor gait, balance and fatigue with ambulation    Outcome: Progressing  Goal: Maintains/Returns to pre admission functional level  Description: INTERVENTIONS:  - Perform AM-PAC 6 Click Basic Mobility/ Daily Activity assessment daily.  - Set and communicate daily mobility goal to care team and patient/family/caregiver.   - Collaborate with rehabilitation services on mobility goals if consulted  - Out of bed for toileting  - Record patient progress and toleration of activity level   Outcome: Progressing     Problem: Neurological  Deficit  Goal: Neurological status is stable or improving  Description: Interventions:  - Monitor and assess patient's level of consciousness, motor function, sensory function, and level of assistance needed for ADLs.   - Monitor and report changes from baseline. Collaborate with interdisciplinary team to initiate plan and implement interventions as ordered.   - Provide and maintain a safe environment.  - Consider seizure precautions.  - Consider fall precautions.  - Consider aspiration precautions.  - Consider bleeding precautions.  Outcome: Progressing     Problem: Activity Intolerance/Impaired Mobility  Goal: Mobility/activity is maintained at optimum level for patient  Description: Interventions:  - Assess and monitor patient  barriers to mobility and need for assistive/adaptive devices.  - Assess patient's emotional response to limitations.  - Collaborate with interdisciplinary team and initiate plans and interventions as ordered.  - Encourage independent activity per ability.  - Maintain proper body alignment.  - Perform active/passive rom as tolerated/ordered.  - Plan activities to conserve energy.  - Turn patient as appropriate  Outcome: Progressing     Problem: Communication Impairment  Goal: Ability to express needs and understand communication  Description: Assess patient's communication skills and ability to understand information.  Patient will demonstrate use of effective communication techniques, alternative methods of communication and understanding even if not able to speak.     - Encourage communication and provide alternate methods of communication as needed.  - Collaborate with case management/ for discharge needs.  - Include patient/family/caregiver in decisions related to communication.  Outcome: Progressing     Problem: Potential for Aspiration  Goal: Non-ventilated patient's risk of aspiration is minimized  Description: Assess and monitor vital signs, respiratory status, and  labs (WBC).  Monitor for signs of aspiration (tachypnea, cough, rales, wheezing, cyanosis, fever).    - Assess and monitor patient's ability to swallow.  - Place patient up in chair to eat if possible.  - HOB up at 90 degrees to eat if unable to get patient up into chair.  - Supervise patient during oral intake.   - Instruct patient/ family to take small bites.  - Instruct patient/ family to take small single sips when taking liquids.  - Follow patient-specific strategies generated by speech pathologist.  Outcome: Progressing  Goal: Ventilated patient's risk of aspiration is minimized  Description: Assess and monitor vital signs, respiratory status, airway cuff pressure, and labs (WBC).  Monitor for signs of aspiration (tachypnea, cough, rales, wheezing, cyanosis, fever).    - Elevate head of bed 30 degrees if patient has tube feeding.  - Monitor tube feeding.  Outcome: Progressing     Problem: Nutrition  Goal: Nutrition/Hydration status is improving  Description: Monitor and assess patient's nutrition/hydration status for malnutrition (ex- brittle hair, bruises, dry skin, pale skin and conjunctiva, muscle wasting, smooth red tongue, and disorientation). Collaborate with interdisciplinary team and initiate plan and interventions as ordered.  Monitor patient's weight and dietary intake as ordered or per policy. Utilize nutrition screening tool and intervene per policy. Determine patient's food preferences and provide high-protein, high-caloric foods as appropriate.     - Assist patient with eating.  - Allow adequate time for meals.  - Encourage patient to take dietary supplement as ordered.  - Collaborate with clinical nutritionist.  - Include patient/family/caregiver in decisions related to nutrition.  Outcome: Progressing

## 2025-07-06 NOTE — PROGRESS NOTES
Progress Note - Critical Care/ICU   Name: Jaycee Can 35 y.o. 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 CTA H/N showed new R vert occlusion from its origin and throughout V1 segment with resolution at level of proximal V2 segment, w/ mild caliber change/attenuation of R V2, which may be due to proximal occlusion vs dissection. Distal R PICA likely occluded. Also stent in distal aortic arch and proximal descending thoracic aorta related to prior aortic coarctation repair. Not candidate for TNK due to OOTW and not a candidate for thrombectomy. Plan was to transfer to [facility], delayed due to [facility] at capacity. Neuro exam was noted to be intact w/ exception of mild R facial asx, which pt reported was baseline. +Headache. No dysmetria noted. On 7/2 woke up with headache, n/v. Repeat CTH 7/3 showed evolving R cerebellar stroke. At [facility], she received HTS infusion of 1.8%. Arrived at [facility] on 7/3 for closer monitoring. Since arrival to [facility], she was started on HTS with bolus, up to 70cc/hr. Was on ASA/Plavix but found to be a Plavix nonresponder. MR vessel wall imaging stable with exception of worsening cortical petechial hemorrhage. D/w neurology team. Expected petechial hemorrhage given size of stroke, noting blooming artifact on MRI as well as reassuring CTH on 7/4. Started Brilinta 90mg BID on 7/6, no load. Nsx aware and agreeable. Weaned off of HTS on 7/6 PM, continues to be asx, as well as off of scopolamine. No further critical care needs. Appropriate for transfer to [facility] with telemetry.    Of note, family  history of stroke at early age, but denied known coagulopathy. 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 No other clotting history known. No prior history of miscarriages. No history of hypermobility. No known family history of connective tissue disorders.     Etiology of stroke: R vert occlusion from origin and throughout V1 with then reconstitution before mild caliber change of R V2 showing proximal occlusion vs dissection. Distal R PICA likely occluded.   MR vessel wall imaging c/f dissection. However, given stroke in the young, will complete embolic work up with TERESA.    Barriers to discharge:   TERESA, plan for 7/7 if able. NPO at midnight. Cardiology aware.  Final recs per neurology team appreciated prior to d/c.  Will likely need P2Y12 level rechecked prior to discharge, preferably 7/7 PM vs 7/8AM at the earliest, given no Brilinta load. 1st Brilinta 90mg given at 10:13am.  PT/OT evaluation.     Consults: IP CONSULT TO CASE MANAGEMENT  IP CONSULT TO PHYSICAL MEDICINE REHAB  IP CONSULT TO NEUROLOGY  IP CONSULT TO VENOUS ACCESS TEAM    Recommended to review admission imaging for incidental findings and document in discharge navigator: Chart reviewed, no known incidental findings noted at this time.      Discharge Plan: Anticipate discharge in 48-72 hrs to discharge location to be determined pending rehab evaluations.     Patient seen and evaluated by Critical Care today and deemed to be appropriate for transfer to Stepdown Level 2 with telemetry. Spoke to Dr. Castellanos from [facility] to accept transfer. Critical care can be contacted via SecureChat with any questions or concerns. Please use the Critical Care AP Role in Secure Chat for any provider inquires until the patient is transferred out of the ICU or until tomorrow at 0600.

## 2025-07-07 ENCOUNTER — TRANSITIONAL CARE MANAGEMENT (OUTPATIENT)
Dept: FAMILY MEDICINE CLINIC | Facility: CLINIC | Age: 35
End: 2025-07-07

## 2025-07-07 PROBLEM — J45.909 ASTHMA: Status: ACTIVE | Noted: 2025-07-07

## 2025-07-07 LAB
ALBUMIN SERPL ELPH-MCNC: 3.32 G/DL (ref 3.2–5.1)
ALBUMIN SERPL ELPH-MCNC: 55.3 % (ref 48–70)
ALPHA1 GLOB SERPL ELPH-MCNC: 0.32 G/DL (ref 0.15–0.47)
ALPHA1 GLOB SERPL ELPH-MCNC: 5.3 % (ref 1.8–7)
ALPHA2 GLOB SERPL ELPH-MCNC: 0.76 G/DL (ref 0.42–1.04)
ALPHA2 GLOB SERPL ELPH-MCNC: 12.7 % (ref 5.9–14.9)
ANION GAP SERPL CALCULATED.3IONS-SCNC: 6 MMOL/L (ref 4–13)
BASOPHILS # BLD AUTO: 0.02 THOUSANDS/ÂΜL (ref 0–0.1)
BASOPHILS NFR BLD AUTO: 0 % (ref 0–1)
BETA GLOB ABNORMAL SERPL ELPH-MCNC: 0.45 G/DL (ref 0.31–0.57)
BETA1 GLOB SERPL ELPH-MCNC: 7.5 % (ref 4.7–7.7)
BETA2 GLOB SERPL ELPH-MCNC: 6.8 % (ref 3.1–7.9)
BETA2+GAMMA GLOB SERPL ELPH-MCNC: 0.41 G/DL (ref 0.2–0.58)
BUN SERPL-MCNC: 10 MG/DL (ref 5–25)
CA-I BLD-SCNC: 1.15 MMOL/L (ref 1.12–1.32)
CALCIUM SERPL-MCNC: 8.8 MG/DL (ref 8.4–10.2)
CHLORIDE SERPL-SCNC: 107 MMOL/L (ref 96–108)
CO2 SERPL-SCNC: 24 MMOL/L (ref 21–32)
CREAT SERPL-MCNC: 0.49 MG/DL (ref 0.6–1.3)
ENA RNP AB SER-ACNC: <0.2 AI (ref 0–0.9)
ENA SM AB SER-ACNC: <0.2 AI (ref 0–0.9)
EOSINOPHIL # BLD AUTO: 0.13 THOUSAND/ÂΜL (ref 0–0.61)
EOSINOPHIL NFR BLD AUTO: 1 % (ref 0–6)
ERYTHROCYTE [DISTWIDTH] IN BLOOD BY AUTOMATED COUNT: 15.1 % (ref 11.6–15.1)
GAMMA GLOB ABNORMAL SERPL ELPH-MCNC: 0.74 G/DL (ref 0.4–1.66)
GAMMA GLOB SERPL ELPH-MCNC: 12.4 % (ref 6.9–22.3)
GFR SERPL CREATININE-BSD FRML MDRD: 124 ML/MIN/1.73SQ M
GLUCOSE SERPL-MCNC: 109 MG/DL (ref 65–140)
GLUCOSE SERPL-MCNC: 113 MG/DL (ref 65–140)
GLUCOSE SERPL-MCNC: 115 MG/DL (ref 65–140)
GLUCOSE SERPL-MCNC: 180 MG/DL (ref 65–140)
HCT VFR BLD AUTO: 34.8 % (ref 34.8–46.1)
HGB BLD-MCNC: 11.5 G/DL (ref 11.5–15.4)
IGG/ALB SER: 1.24 {RATIO} (ref 1.1–1.8)
IMM GRANULOCYTES # BLD AUTO: 0.04 THOUSAND/UL (ref 0–0.2)
IMM GRANULOCYTES NFR BLD AUTO: 0 % (ref 0–2)
LYMPHOCYTES # BLD AUTO: 2.37 THOUSANDS/ÂΜL (ref 0.6–4.47)
LYMPHOCYTES NFR BLD AUTO: 23 % (ref 14–44)
MAGNESIUM SERPL-MCNC: 1.9 MG/DL (ref 1.9–2.7)
MCH RBC QN AUTO: 26 PG (ref 26.8–34.3)
MCHC RBC AUTO-ENTMCNC: 33 G/DL (ref 31.4–37.4)
MCV RBC AUTO: 79 FL (ref 82–98)
MONOCYTES # BLD AUTO: 0.88 THOUSAND/ÂΜL (ref 0.17–1.22)
MONOCYTES NFR BLD AUTO: 9 % (ref 4–12)
NEUTROPHILS # BLD AUTO: 6.88 THOUSANDS/ÂΜL (ref 1.85–7.62)
NEUTS SEG NFR BLD AUTO: 67 % (ref 43–75)
NRBC BLD AUTO-RTO: 0 /100 WBCS
PHOSPHATE SERPL-MCNC: 3.5 MG/DL (ref 2.7–4.5)
PLATELET # BLD AUTO: 231 THOUSANDS/UL (ref 149–390)
PMV BLD AUTO: 10.4 FL (ref 8.9–12.7)
POTASSIUM SERPL-SCNC: 3.2 MMOL/L (ref 3.5–5.3)
PROT SERPL-MCNC: 6 G/DL (ref 6.4–8.4)
RBC # BLD AUTO: 4.43 MILLION/UL (ref 3.81–5.12)
SODIUM SERPL-SCNC: 137 MMOL/L (ref 135–147)
WBC # BLD AUTO: 10.32 THOUSAND/UL (ref 4.31–10.16)

## 2025-07-07 PROCEDURE — 84100 ASSAY OF PHOSPHORUS: CPT

## 2025-07-07 PROCEDURE — 84165 PROTEIN E-PHORESIS SERUM: CPT | Performed by: STUDENT IN AN ORGANIZED HEALTH CARE EDUCATION/TRAINING PROGRAM

## 2025-07-07 PROCEDURE — 82948 REAGENT STRIP/BLOOD GLUCOSE: CPT

## 2025-07-07 PROCEDURE — 80048 BASIC METABOLIC PNL TOTAL CA: CPT

## 2025-07-07 PROCEDURE — 83735 ASSAY OF MAGNESIUM: CPT

## 2025-07-07 PROCEDURE — 99232 SBSQ HOSP IP/OBS MODERATE 35: CPT | Performed by: FAMILY MEDICINE

## 2025-07-07 PROCEDURE — 82330 ASSAY OF CALCIUM: CPT

## 2025-07-07 PROCEDURE — 99255 IP/OBS CONSLTJ NEW/EST HI 80: CPT | Performed by: STUDENT IN AN ORGANIZED HEALTH CARE EDUCATION/TRAINING PROGRAM

## 2025-07-07 PROCEDURE — 97162 PT EVAL MOD COMPLEX 30 MIN: CPT

## 2025-07-07 PROCEDURE — 97166 OT EVAL MOD COMPLEX 45 MIN: CPT

## 2025-07-07 PROCEDURE — 85025 COMPLETE CBC W/AUTO DIFF WBC: CPT

## 2025-07-07 RX ORDER — LANOLIN ALCOHOL/MO/W.PET/CERES
400 CREAM (GRAM) TOPICAL 2 TIMES DAILY
Status: COMPLETED | OUTPATIENT
Start: 2025-07-07 | End: 2025-07-07

## 2025-07-07 RX ORDER — POTASSIUM CHLORIDE 1500 MG/1
40 TABLET, EXTENDED RELEASE ORAL 2 TIMES DAILY
Status: COMPLETED | OUTPATIENT
Start: 2025-07-07 | End: 2025-07-07

## 2025-07-07 RX ADMIN — INSULIN GLARGINE 25 UNITS: 100 INJECTION, SOLUTION SUBCUTANEOUS at 22:05

## 2025-07-07 RX ADMIN — MAGNESIUM OXIDE TAB 400 MG (241.3 MG ELEMENTAL MG) 400 MG: 400 (241.3 MG) TAB at 09:29

## 2025-07-07 RX ADMIN — MAGNESIUM OXIDE TAB 400 MG (241.3 MG ELEMENTAL MG) 400 MG: 400 (241.3 MG) TAB at 17:57

## 2025-07-07 RX ADMIN — LISINOPRIL 10 MG: 10 TABLET ORAL at 08:33

## 2025-07-07 RX ADMIN — POTASSIUM CHLORIDE 40 MEQ: 1500 TABLET, EXTENDED RELEASE ORAL at 09:29

## 2025-07-07 RX ADMIN — HEPARIN SODIUM 5000 UNITS: 5000 INJECTION, SOLUTION INTRAVENOUS; SUBCUTANEOUS at 21:59

## 2025-07-07 RX ADMIN — ATORVASTATIN CALCIUM 40 MG: 40 TABLET, FILM COATED ORAL at 17:57

## 2025-07-07 RX ADMIN — SUCRALFATE 1 G: 1 TABLET ORAL at 21:59

## 2025-07-07 RX ADMIN — TICAGRELOR 90 MG: 90 TABLET ORAL at 08:46

## 2025-07-07 RX ADMIN — HEPARIN SODIUM 5000 UNITS: 5000 INJECTION, SOLUTION INTRAVENOUS; SUBCUTANEOUS at 13:51

## 2025-07-07 RX ADMIN — GABAPENTIN 100 MG: 100 CAPSULE ORAL at 21:59

## 2025-07-07 RX ADMIN — TICAGRELOR 90 MG: 90 TABLET ORAL at 22:07

## 2025-07-07 RX ADMIN — INSULIN LISPRO 2 UNITS: 100 INJECTION, SOLUTION INTRAVENOUS; SUBCUTANEOUS at 12:01

## 2025-07-07 RX ADMIN — ASPIRIN 81 MG: 81 TABLET ORAL at 08:33

## 2025-07-07 RX ADMIN — POTASSIUM CHLORIDE 40 MEQ: 1500 TABLET, EXTENDED RELEASE ORAL at 17:57

## 2025-07-07 RX ADMIN — SUCRALFATE 1 G: 1 TABLET ORAL at 11:59

## 2025-07-07 RX ADMIN — SUCRALFATE 1 G: 1 TABLET ORAL at 08:33

## 2025-07-07 RX ADMIN — SUCRALFATE 1 G: 1 TABLET ORAL at 17:57

## 2025-07-07 RX ADMIN — MONTELUKAST 10 MG: 10 TABLET, FILM COATED ORAL at 21:59

## 2025-07-07 RX ADMIN — HEPARIN SODIUM 5000 UNITS: 5000 INJECTION, SOLUTION INTRAVENOUS; SUBCUTANEOUS at 05:54

## 2025-07-07 RX ADMIN — CITALOPRAM HYDROBROMIDE 10 MG: 10 TABLET ORAL at 08:33

## 2025-07-07 NOTE — ASSESSMENT & PLAN NOTE
S/p coarctation gore-darlin patch repair and PDA ligation (1988), PA band placement (1988), VSD closure, PA band take down, PA plasty (1989), recurrent coartcation balloon dilation (1995), and catheterization (2004)

## 2025-07-07 NOTE — PROGRESS NOTES
Progress Note - Hospitalist   Name: Jaycee Can 35 y.o. female I MRN: [id number]  [facility] I Date of Admission: 7/3/2025   Date of Service: 7/7/2025 I Hospital Day: 4    Assessment & Plan  Stroke (cerebrum) (HCC)  - Initially presented 7/1 for HA, nausea, dizziness in setting of acute R PICA infarct with R vertebral artery occlusion. S/p ASA and Plavix load. Was in NCC receiving HTS and permissive HTN. Due to worsening sx and requirement of HTS bolus, transferred to [facility] for neurology eval.   - 7/1 CTA head and neck: acute R PICA infarct, new R vertebral artery occlusion, high grade stenosis involving intradural R vertebral artery; stable stent from previous aortic coarctation repair  - 7/2 MRI brain: acute R inferior cerebellum wo hemorrhage  - 7/2 MRA head: focal severe stenosis vs occlusion of intracranial R vertebral artery  - 7/2 echo: mildly increased wall thickness, EF 55%, no PFO.   - 7/3 CT head: evolving R cerebellar infarct with persistent mass effect  - 7/5 MRI vessel wall: evolution of recent R PICA infarct with worsened petechial cortical hemorrhage. Diffuse wall thickening in R vertebral artery with occlusive thrombus, likely represents reactive change related to vertebral artery dissection. Vasculitis less likely  - 7/6 CT CAP: pending    Plan:  - Neurology consulted, appreciate recs   - DAPT x 21 days followed by ASA monotherapy   - currently ASA 81, Brillinta 90 BID   - permissive HTN up to systolics 200   - lipitor 40    - dvt ppx heparin SQ   - hypercoagulable workup in 6-8 weeks  - per NCC, recommendation of P2Y12 level check on 7/7 PM or 7/8AM.    - ordered for 7/8 AM  - TERESA scheduled Tuesday 7/8 @10AM   - NPO ordered  T2DM (type 2 diabetes mellitus) (HCC)  Lab Results   Component Value Date    HGBA1C 9.4 (H) 07/02/2025       Recent Labs     07/06/25  1622 07/06/25  2101 07/07/25  0607 07/07/25  1040   POCGLU 135 205* 113 180*       Blood Sugar Average: Last 72 hrs:  (P)  [id number]    Home meds: jardiance 10, metformin 1000 BID, lantus 25u HS, ozempic 0.25 weekly    Plan:  - currently on lantus 25 u HS  - SSI  HTN (hypertension)  - home meds: lisinopril 30    Plan:  - lisinopril 10 for permissive HTN  Aortic coarctation  S/p coarctation gore-darlin patch repair and PDA ligation (1988), PA band placement (1988), VSD closure, PA band take down, PA plasty (1989), recurrent coartcation balloon dilation (1995), and catheterization (2004)   Bipolar disorder (HCC)  Only on citalopram 10 for depression    - continue  Neuropathy  On gabapentin 100   GERD (gastroesophageal reflux disease)  On Carafate QID    Asthma  On singulair 10    - added PRN albuterol    VTE Pharmacologic Prophylaxis:   High Risk (Score >/= 5) - Pharmacological DVT Prophylaxis Ordered: heparin. Sequential Compression Devices Ordered.    Mobility:   Basic Mobility Inpatient Raw Score: 24  JH-HLM Goal: 8: Walk 250 feet or more  JH-HLM Achieved: 6: Walk 10 steps or more  JH-HLM Goal NOT achieved. Continue with multidisciplinary rounding and encourage appropriate mobility to improve upon JH-HLM goals.    Patient Centered Rounds: I performed bedside rounds with nursing staff today.   Discussions with Specialists or Other Care Team Provider: none    Education and Discussions with Family / Patient: update as appropriate.     Current Length of Stay: 4 day(s)  Current Patient Status: Inpatient   Certification Statement: The patient will continue to require additional inpatient hospital stay due to ongoing workup  Discharge Plan: Anticipate discharge in >72 hrs to discharge location to be determined pending rehab evaluations.    Code Status: Level 1 - Full Code    Subjective   No complaints. TERESA scheduled for today rescheduled to tomorrow. NPO at midnight    Objective :  Temp:  [97.7 °F (36.5 °C)-98.5 °F (36.9 °C)] 98.3 °F (36.8 °C)  HR:  [81-94] 90  BP: (112-162)/(63-99) 152/80  Resp:  [16-22] 16  SpO2:  [96 %-98 %] 97  %  O2 Device: None (Room air)    Body mass index is 42.45 kg/m².     Input and Output Summary (last 24 hours):     Intake/Output Summary (Last 24 hours) at 7/7/2025 1200  Last data filed at 7/6/2025 2300  Gross per 24 hour   Intake 470 ml   Output --   Net 470 ml       Physical Exam  Vitals reviewed.   Constitutional:       General: She is not in acute distress.     Appearance: She is obese. She is not ill-appearing, toxic-appearing or diaphoretic.     Cardiovascular:      Rate and Rhythm: Normal rate.   Pulmonary:      Effort: No respiratory distress.     Skin:     General: Skin is warm and dry.     Neurological:      General: No focal deficit present.      Mental Status: She is alert.     Psychiatric:         Mood and Affect: Mood normal.         Behavior: Behavior normal.           Lines/Drains:              Lab Results: I have reviewed the following results:   Results from last 7 days   Lab Units 07/07/25  0525   WBC Thousand/uL 10.32*   HEMOGLOBIN g/dL 11.5   HEMATOCRIT % 34.8   PLATELETS Thousands/uL 231   SEGS PCT % 67   LYMPHO PCT % 23   MONO PCT % 9   EOS PCT % 1     Results from last 7 days   Lab Units 07/07/25  0525 07/02/25  0458 07/01/25  1900   SODIUM mmol/L 137   < > 135   POTASSIUM mmol/L 3.2*   < > 4.3   CHLORIDE mmol/L 107   < > 101   CO2 mmol/L 24   < > 27   BUN mg/dL 10   < > 14   CREATININE mg/dL 0.49*   < > 0.78   ANION GAP mmol/L 6   < > 7   CALCIUM mg/dL 8.8   < > 9.3   ALBUMIN g/dL  --   --  4.0   TOTAL BILIRUBIN mg/dL  --   --  0.40   ALK PHOS U/L  --   --  69   ALT U/L  --   --  20   AST U/L  --   --  19   GLUCOSE RANDOM mg/dL 109   < > 203*    < > = values in this interval not displayed.     Results from last 7 days   Lab Units 07/02/25  1216   INR  0.93     Results from last 7 days   Lab Units 07/07/25  1040 07/07/25  0607 07/06/25  2101 07/06/25  1622 07/06/25  1109 07/06/25  0514 07/06/25  0018 07/05/25  2118 07/05/25  1720 07/05/25  1150 07/05/25  0605 07/04/25  2342   POC GLUCOSE  mg/dl 180* 113 205* 135 165* 120 150* 207* 159* 157* 165* 217*     Results from last 7 days   Lab Units 07/02/25  0458   HEMOGLOBIN A1C % 9.4*           Recent Cultures (last 7 days):               Last 24 Hours Medication List:     Current Facility-Administered Medications:     albuterol (PROVENTIL HFA,VENTOLIN HFA) inhaler 2 puff, Q4H PRN    aspirin (ECOTRIN LOW STRENGTH) EC tablet 81 mg, Daily    atorvastatin (LIPITOR) tablet 40 mg, QPM    azelastine (ASTELIN) 0.1 % nasal spray 1 spray, BID    chlorhexidine (PERIDEX) 0.12 % oral rinse 15 mL, Q12H JUN    citalopram (CeleXA) tablet 10 mg, Daily    gabapentin (NEURONTIN) capsule 100 mg, HS    heparin (porcine) subcutaneous injection 5,000 Units, Q8H JUN    insulin glargine (LANTUS) subcutaneous injection 25 Units 0.25 mL, HS    insulin lispro (HumALOG/ADMELOG) 100 units/mL subcutaneous injection 2-12 Units, TID AC **AND** Fingerstick Glucose (POCT), TID AC    lisinopril (ZESTRIL) tablet 10 mg, Daily    LORazepam (ATIVAN) injection 1 mg, Once PRN    magnesium Oxide (MAG-OX) tablet 400 mg, BID    meclizine (ANTIVERT) tablet 25 mg, Q8H PRN    montelukast (SINGULAIR) tablet 10 mg, HS    potassium chloride (Klor-Con M20) CR tablet 40 mEq, BID    sucralfate (CARAFATE) tablet 1 g, 4x Daily    ticagrelor (BRILINTA) tablet 90 mg, Q12H JUN    trimethobenzamide (TIGAN) IM injection 200 mg, Q6H PRN    Administrative Statements   Today, Patient Was Seen By: Riana Cortes MD      **Please Note: This note may have been constructed using a voice recognition system.**

## 2025-07-07 NOTE — ASSESSMENT & PLAN NOTE
- Presented 7/1 with dizziness, nausea and vomiting.  - Found to have right PICA infarct with right vertebral artery occlusion and petechial hemorrhage  - Found to be non-responder to plavix, placed on Brilinta/ASA   - TERESA ordered and pending, embolic work up ongoing   - Multifactorial comorbidities:  neuropathy, HTN, hepatitis C s/p treatment, hx of femoral artery thrombosis, diabetes type 2, VSD and aortic coarctation with hx of repair, asthma, cephalgia  - Multifactorial functional diagnoses: Depression and Anxiety   - Optimize management of each listed condition with ongoing adjustments as medically indicated  - Continue and advance PT/OT focusing on mobility, balance, transfers, ADLs, and safety awareness as needed.   - Monitor vitals at rest and with activity for orthostatic intolerance, autonomic instability, and deconditioning  - Monitor and adjust medication regimen as needed for anticoagulation, antithrombotics, blood pressure and polypharmacy risk  - Monitor and trend labs including electrolytes, renal function, nutrition markers, anemia, infection markers  - Frequent exams/assessments for neurologic stability, neurologic recovery, functional tolerance, and therapy progress, with additional diagnostic work-up, imaging, and management adjustments as clinically indicated.  - Limit sedating medications when possible  - Fall precautions - if safety concerns recommend increasing rounding, adding virtual sitter, or in-person 1:1 at discretion of primary team  - Provide patient and (if applicable) caregiver education for medical conditions, equipment, medications, and care needs  - Interdisciplinary team coordination and frequent team meetings as indicated  - Pressure ulcer prevention protocol, DVT prophylaxis per primary team    Prior Level of Function and Social history:    The patient lives with her friend who is a nurse and her 5 year old daughter in a two story home.   Independent with ADLs  Independent  with ambulation    Current Level of Function:    Pending assessment by PT/OT however patient's symptoms have resolved and is able to ambulate independently.    Disposition recommendation: Home  The patient appears appropriate for discharge home pending:   - No medical complications that significantly impact function prior to discharge  - Adequate oversight by caregiver, improvement in medical stability  - Further functional improvements during acute course to safely discharge home  - Significant changes (worsening) in functional status, in the interim, that would warrant dispo to different location

## 2025-07-07 NOTE — ASSESSMENT & PLAN NOTE
Lab Results   Component Value Date    HGBA1C 9.4 (H) 07/02/2025       Recent Labs     07/06/25  1622 07/06/25  2101 07/07/25  0607 07/07/25  1040   POCGLU 135 205* 113 180*       Blood Sugar Average: Last 72 hrs:  (P) [id number]    Home meds: jardiance 10, metformin 1000 BID, lantus 25u HS, ozempic 0.25 weekly    Plan:  - currently on lantus 25 u HS  - SSI

## 2025-07-07 NOTE — PHYSICAL THERAPY NOTE
Physical Therapy Evaluation     Patient Name: Jaycee Can    Today's Date: 7/7/2025     Problem List  Principal Problem:    Stroke (cerebrum) ([facility])  Active Problems:    Aortic coarctation    T2DM (type 2 diabetes mellitus) ([facility])    Bipolar disorder ([facility])    HTN (hypertension)    Neuropathy    GERD (gastroesophageal reflux disease)    Asthma       Past Medical History  Past Medical History[1]     Past Surgical History  Past Surgical History[2]        07/07/25 0845   PT Last Visit   PT Visit Date 07/07/25   Note Type   Note type Evaluation   Pain Assessment   Pain Assessment Tool 0-10   Pain Score No Pain   Restrictions/Precautions   Weight Bearing Precautions Per Order No   Other Precautions Chair Alarm;Bed Alarm   Home Living   Type of Home House   Home Layout Two level;Stairs to enter with rails;Bed/bath upstairs  (10 OC)   Bathroom Shower/Tub Tub/shower unit   Bathroom Toilet Standard   Prior Function   Level of [place] Independent with ADLs;Independent with functional mobility   Lives With Friend(s);Family;Daughter  (friend and her 2 kids and neice + her own daughter.)   IADLs Independent with driving;Independent with meal prep;Independent with medication management   Falls in the last 6 months 0   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   RLE Assessment   RLE Assessment WFL   LLE Assessment   LLE Assessment WFL   Coordination   Movements are Fluid and Coordinated 1   Sensation WFL   Heel to Shin Intact   Light Touch   RLE Light Touch Grossly intact   LLE Light Touch Grossly intact   Proprioception   RLE Proprioception Grossly intact   LLE Proprioception Grossly Intact    Bed Mobility   Supine to Sit 5  Supervision   Transfers   Sit to Stand 5  Supervision   Stand to Sit 5  Supervision   Additional items Armrests   Additional Comments x1 STS   Ambulation/Elevation   Gait pattern Excessively slow;Short stride;Step through pattern   Gait Assistance 5  Supervision   Distance 200 ft   Stair Management Assistance 5  Supervision   Additional items Verbal cues;Increased time required   Stair Management Technique One rail R;Nonreciprocal   Number of Stairs 7   Balance   Static Sitting Normal   Dynamic Sitting Good   Static Standing Fair +   Dynamic Standing Fair -   Ambulatory Fair -   Endurance Deficit   Endurance Deficit No   Activity Tolerance   Activity Tolerance Patient tolerated treatment well   Medical Staff Made Aware Luis Fernando PT, [place] OT, Luis Fernando OTS   Nurse Made Aware pt was cleared   Assessment   Prognosis Good   Assessment Pt presents to [facility] following ED admission on 7/3 for stroke like symptoms including severe HA, dizziness, vision changes and nausea. Pt received primary medical diagnosis of Stroke. PMH is significant for HTN, GERD, type II DM, Bipolar disorder, Hep C and VSD (repaired as child). Upon PT eval, pt required no physical assistance with all aspects of mobility including bed mobility, transfers, ambulation and negotiating stairs. Pt's LE strength, proprioception and coordination were all WFL as described above. Pt denied any lasting deficits that would require PT intervention. Pt denied any potential barriers to discharge and feels ready to go home. The patient's AM-PAC Basic Mobility Inpatient Short Form Raw Score is 18. A Raw score of greater than or equal to 16 suggests the patient may benefit from discharge to home. Please also refer to the recommendation of the Physical Therapist for safe discharge planning. Due to her current level of mobility, lack of barriers and medical stability, she is able to return to home with no PT services required.    Barriers to Discharge None   Goals   Patient Goals to go home   PT Treatment Day 0   Discharge Recommendation   Rehab Resource Intensity Level, PT No post-acute rehabilitation needs   AM-PAC Basic Mobility Inpatient   Turning in Flat Bed Without Bedrails 3   Lying on Back to Sitting on Edge of Flat Bed Without Bedrails 3   Moving Bed to Chair 3   Standing Up From Chair Using Arms 3   Walk in Room 3   Climb 3-5 Stairs With Railing 3   Basic Mobility Inpatient Raw Score 18   Basic Mobility Standardized Score 41.05   [facility] Highest Level Of Mobility   -HLM Goal 6: Walk 10 steps or more   -HLM Achieved 7: Walk 25 feet or more   Modified [place] Scale   Modified [place] Scale 0   Barthel Index   Feeding 10   Bathing 5   Grooming Score 5   Dressing Score 10   Bladder Score 10   Bowels Score 10   Toilet Use Score 10   Transfers (Bed/Chair) Score 15   Mobility (Level Surface) Score 15   Stairs Score 10   Barthel Index Score 100   LUCIO Winslow        [1]   Past Medical History:  Diagnosis Date    Allergic     Arthritis     Asthma     Bipolar affective disorder, currently depressed, moderate ([facility]) 12/17/2018    Chest pain, unspecified 07/24/2019    Cyst of ovary, right     Depression     Diabetes mellitus (HCC) 10/10/2018    type 2    Endometriosis     Fractures 08/25/24    My right hand    Heart murmur 1988    Hepatitis C     Hepatitis C virus infection cured after antiviral drug therapy     History of transfusion     Hypertension     Infectious viral hepatitis 07/30/01    Migraines     Morbid obesity with BMI of 40.0-44.9, adult (HCC)     Obesity 1995    Pulmonary artery congenital abnormality     Spleen enlarged     Status post surgical removal of both fallopian tubes     Varicella    [2]   Past Surgical History:  Procedure Laterality Date    CARDIAC CATHETERIZATION      no CAD 10days, 4 weeks 22months old     CARDIAC CATHETERIZATION N/A 01/12/2023    Procedure: Cardiac Coronary Angiogram;  Surgeon:  Marcela Lr DO;  Location: [facility];  Service: Cardiology    CARDIAC CATHETERIZATION Left 2023    Procedure: Cardiac Left Heart Cath;  Surgeon: Marcela Lr DO;  Location: [facility];  Service: Cardiology    CARDIAC CATHETERIZATION  2023    Procedure: Cardiac catheterization;  Surgeon: Marcela Lr DO;  Location: [facility];  Service: Cardiology     SECTION, LOW TRANSVERSE      CHOLECYSTECTOMY      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 5    CORONARY STENT PLACEMENT      IR LOWER EXTREMITY ANGIOGRAM  2023    LIVER BIOPSY      LIVER BIOPSY      PERIPHERAL ANGIOGRAM  21    STERNAL WIRE REMOVAL  2022    THROMBECTOMY W/ EMBOLECTOMY Right 2023    Procedure: Right femoral exposure Right iliac embolectomy w/ #4 Ted catheter Aortogram Right EIA stent w/ 7x29mm VBX;  Surgeon: Jody Hodges MD;  Location: [facility];  Service: Vascular    TUBAL LIGATION Bilateral     VSD REPAIR      As a child    WOUND DEBRIDEMENT Right 2023    Procedure: Right Groin Wound Washout, Pulse Lavage, Wound Vac placement;  Surgeon: Jelani Avila DO;  Location: [facility];  Service: Vascular

## 2025-07-07 NOTE — NURSING NOTE
Vascular access team consulted for midline, patient has working PIV per primary GABINO Love and no IV medication at this time, will hold off on midline placement, please re consult if access needs change

## 2025-07-07 NOTE — CONSULTS
CONSULTATION - PMR   Name: Jaycee Can 35 y.o. female I MRN: [id number]  Unit/Bed#: [facility] 717-01 I Date of Admission: 7/3/2025   Date of Service: 7/7/2025 I Hospital Day: 4     Referred by:  Sarah Keller MD   For:  Right cerebellar infarct   Assessment & Plan  Stroke (cerebrum) (HCC)  - Presented 7/1 with dizziness, nausea and vomiting.  - Found to have right PICA infarct with right vertebral artery occlusion and petechial hemorrhage  - Found to be non-responder to plavix, placed on Brilinta/ASA   - TERESA ordered and pending, embolic work up ongoing   - Multifactorial comorbidities:  neuropathy, HTN, hepatitis C s/p treatment, hx of femoral artery thrombosis, diabetes type 2, VSD and aortic coarctation with hx of repair, asthma, cephalgia  - Multifactorial functional diagnoses: Depression and Anxiety   - Optimize management of each listed condition with ongoing adjustments as medically indicated  - Continue and advance PT/OT focusing on mobility, balance, transfers, ADLs, and safety awareness as needed.   - Monitor vitals at rest and with activity for orthostatic intolerance, autonomic instability, and deconditioning  - Monitor and adjust medication regimen as needed for anticoagulation, antithrombotics, blood pressure and polypharmacy risk  - Monitor and trend labs including electrolytes, renal function, nutrition markers, anemia, infection markers  - Frequent exams/assessments for neurologic stability, neurologic recovery, functional tolerance, and therapy progress, with additional diagnostic work-up, imaging, and management adjustments as clinically indicated.  - Limit sedating medications when possible  - Fall precautions - if safety concerns recommend increasing rounding, adding virtual sitter, or in-person 1:1 at discretion of primary team  - Provide patient and (if applicable) caregiver education for medical conditions, equipment, medications, and care needs  - Interdisciplinary team coordination and  frequent team meetings as indicated  - Pressure ulcer prevention protocol, DVT prophylaxis per primary team    Prior Level of Function and Social history:    The patient lives with her friend who is a nurse and her 5 year old daughter in a two story home.   Independent with ADLs  Independent with ambulation    Current Level of Function:    Pending assessment by PT/OT however patient's symptoms have resolved and is able to ambulate independently.    Disposition recommendation: Home  The patient appears appropriate for discharge home pending:   - No medical complications that significantly impact function prior to discharge  - Adequate oversight by caregiver, improvement in medical stability  - Further functional improvements during acute course to safely discharge home  - Significant changes (worsening) in functional status, in the interim, that would warrant dispo to different location          VTE prophylaxis   As outlined by primary team      Other medical issues:     As per primary service (and relevant consultants if applicable)    ==================================================================    Chief Complaint:  the patient currently denies any complaints     History of Present Illness:   Jaycee Can is a 35 y.o. female with a PMH of HTN, congential coarctation of aorta, VSD s/p repair and aortic stenting, asthma, hepatitis C s/p treatment, GERD, and femoral artery thrombosis who presented to [facility] on 7/1/2025 with headache, nausea, dizziness, vomiting. She was found to have right PICA infarct and vertebrobasilar artery occlusion. She was admitted with plan to transfer to [place] which was delayed due to [facility] at capacity. On 7/2 she woke up with a headache and n/v. Repeat CT showed evolving right cerebellar stroke. MRA/MRI on 7/2 showed recent infarct right inferior cerebellum without evidence of hemorrhage. Focal severe stenosis versus occlusion of the midportion of the intracranial right  vertebral artery reconstitutes distally. She was placed on hypertonic saline. She was transferred to [facility] on 7/3 for closer monitoring. CT head on 7/3 showed evolving recent right cerebellar infarct with persistent mass effect and effacement of the 4th ventricle. Unchanged ventricular size without hydrocephalus. No intracranial hemorrhage. She was placed on ASA and plavix however was found to be a non-responder. MR vessel wall imaging was stable with exception of worsening cortical petechial hemorrhage which was expected given size of stoke. CTH 7/4 was reassuring and she was placed on Brilinta on 7/6 with no load. She was weaned off of HTS on 7/6. Embolic work up with TERESA was recommended. She was transferred out of ICU to [facility] with telemetry on 7/6 and will undergo TERESA either on 7/7 or 7/8. PM&R are consulted for rehabilitation recommendations.    The patient was seen in her room. Currently she denies any blurred vision, double vision, any weakness, numbness, tingling. Her headache, dizziness, nausea and vomiting have resolved and she feels she is back to her baseline. She reported having been evaluated by therapy today and was able to ambulate in the hallway and negotiate stairs without issue or DME. Per patient therapy was reporting no needs including outpatient therapy at this time.      Review of Systems:     Complete review of systems obtained.    Please see HPI for details with other significant symptoms or history listed here:    Otherwise, 14 point review of systems completed and was otherwise unremarkable.    Allergies[1]  Current Medications[2]    Past Medical History[3]    Past Surgical History[4]   Family History[5]    Social History available currently in EMR:  (See additional SH as outlined above)   Social History[6]    Physical Examination:   Temp:  [97.7 °F (36.5 °C)-98.5 °F (36.9 °C)] 97.7 °F (36.5 °C)  HR:  [] 81  BP: (112-165)/(63-99) 139/99  Resp:  [16-24] 16  SpO2:  [96 %-98 %] 98  %  General: Awake, alert in NAD  HENT: NCAT, MMM  Respiratory: Unlabored breathing, no SOB  Gastrointestinal: non-distended  Genitourinary: No rhodes  SkiN/MSK/Extremities:  Distal extremities appropriately warm, normal cap refill distally, no cyanosis or calf edema, no calf tenderness to palpation  Neurologic/Psych:   MENTAL STATUS: awake, oriented to person, place, time, and situation, no ataxia on FTN testing  Affect: Appropriately tearful at times when talking about her daughter.  CN II-XII: grossly intact   Strength/MMT:    Right  Left  Site  Right  Left  Site    5 5  S Ab: Shoulder Abductors  5  5  HF: Hip Flexors    5 5  EF: Elbow Flexors  5  5 KF: Knee Flexors    5  5  EE: Elbow Extensors  5  5  KE: Knee Extensors    5  5  WE: Wrist Extensors  5  5  DR: Dorsi Flexors    5  5  FF: Finger Flexors  5  5  PF: Plantar Flexors    5  5  HI: Hand Intrinsics  5  5  EHL: Extensor Hallucis Longus       Data:  Lab Results   Component Value Date    HGB 11.5 07/07/2025    HGB 14.5 06/14/2018    HCT 34.8 07/07/2025    HCT 43.7 06/14/2018    WBC 10.32 (H) 07/07/2025    WBC 11.5 (H) 06/14/2018     06/14/2018    K 3.2 (L) 07/07/2025    K 4.0 05/21/2020     07/07/2025     05/21/2020    CREATININE 0.49 (L) 07/07/2025    CREATININE 0.61 05/21/2020    BUN 10 07/07/2025    BUN 10 05/21/2020       MRI vessel wall imaging  Result Date: 7/5/2025  Impression: Evolution of recent infarct in right cerebellum PICA territory with enhancement and worsened petechial cortical hemorrhage. Persistent compressive mass effect on fourth ventricle. No obstructive hydrocephalus. Diffuse wall thickening and enhancement in visualized right vertebral artery V2, V3, and V4 segments with short-segment intraluminal occlusive thrombus in right post-PICA V4 segment with distal reconstitution which likely represents a component of reactive change related to vertebral artery dissection with short-segment occlusive intraluminal thrombus.  Vasculitis felt less likely. Unchanged chronic lacunar infarct in left corona radiata. The study was marked in EPIC for immediate notification. Workstation performed: [id number]     CT head wo contrast  Result Date: 7/5/2025  Impression: Redemonstration of right cerebellar subacute infarct with associated mass effect and partial effacement of the fourth ventricle, not significantly intervally changed. No parenchymal hemorrhage identified. No new infarct is seen. No hydrocephalus. Other findings as above. Overall no significant interval change. Follow-up as clinically warranted. Findings are consistent with the preliminary report from [facility] which was provided shortly after completion of the exam. Workstation performed: [id number]     CT head wo contrast  Result Date: 7/3/2025  Impression: Evolving recent right cerebellar infarct with persistent mass effect and effacement of the fourth ventricle. Unchanged ventricular size without hydrocephalus. No intracranial hemorrhage. Workstation performed: [id number]       Pertinent labs and imaging reviewed.      Patient seen on date of service listed.    Thank you for allowing the PM&R service to participate in the care of this patient. We will continue to follow Jaycee Can's progress with you. Please do not hesitate to call with questions or concerns.         [1]   Allergies  Allergen Reactions    Prednisone Swelling    Bactrim [Sulfamethoxazole-Trimethoprim] Hives    Corticosteroids Swelling     Pt states this does not cause problems breathing, she just has generalized swelling    Cortisone Swelling     Generalized; no impairment with breathing reported      Medical Tape Hives     Allergic to Paper Tape    Other Hives     Paper tape    Sulfa Antibiotics Hives   [2]   Current Facility-Administered Medications:     albuterol (PROVENTIL HFA,VENTOLIN HFA) inhaler 2 puff, 2 puff, Inhalation, Q4H PRN, Jelena Gonzalez, DO    aspirin (ECOTRIN LOW STRENGTH) EC tablet 81  mg, 81 mg, Oral, Daily, Jelena Carlos, DO, 81 mg at 07/07/25 0833    atorvastatin (LIPITOR) tablet 40 mg, 40 mg, Oral, QPM, Jelena Carlos, DO, 40 mg at 07/06/25 1715    azelastine (ASTELIN) 0.1 % nasal spray 1 spray, 1 spray, Each Nare, BID, Jelena Guzmana, DO    chlorhexidine (PERIDEX) 0.12 % oral rinse 15 mL, 15 mL, Mouth/Throat, Q12H JUN, Jelena Carlos, DO, 15 mL at 07/04/25 0824    citalopram (CeleXA) tablet 10 mg, 10 mg, Oral, Daily, Jelena Guzmana, DO, 10 mg at 07/07/25 0833    gabapentin (NEURONTIN) capsule 100 mg, 100 mg, Oral, HS, Jelena Guzmana, DO, 100 mg at 07/06/25 2148    heparin (porcine) subcutaneous injection 5,000 Units, 5,000 Units, Subcutaneous, Q8H JUN, Jelena Gonzalez, DO, 5,000 Units at 07/07/25 0554    insulin glargine (LANTUS) subcutaneous injection 25 Units 0.25 mL, 25 Units, Subcutaneous, HS, Jelena Guzmana, DO, 25 Units at 07/06/25 2147    insulin lispro (HumALOG/ADMELOG) 100 units/mL subcutaneous injection 2-12 Units, 2-12 Units, Subcutaneous, TID AC, 2 Units at 07/06/25 1149 **AND** Fingerstick Glucose (POCT), , , TID AC, Jelena Guzmana, DO    lisinopril (ZESTRIL) tablet 10 mg, 10 mg, Oral, Daily, Jelena Guzmana, DO, 10 mg at 07/07/25 0833    LORazepam (ATIVAN) injection 1 mg, 1 mg, Intravenous, Once PRN, Jelena Gonzalez, DO, 1 mg at 07/05/25 1406    magnesium Oxide (MAG-OX) tablet 400 mg, 400 mg, Oral, BID, Pranav Hardy MD, 400 mg at 07/07/25 0929    meclizine (ANTIVERT) tablet 25 mg, 25 mg, Oral, Q8H PRN, Jelena Gonzalez, DO    montelukast (SINGULAIR) tablet 10 mg, 10 mg, Oral, HS, Jelena Gonzalez DO, 10 mg at 07/06/25 2148    potassium chloride (Klor-Con M20) CR tablet 40 mEq, 40 mEq, Oral, BID, Pranav Hardy MD, 40 mEq at 07/07/25 0929    sucralfate (CARAFATE) tablet 1 g, 1 g, Oral, 4x Daily, Jelena Gonzalez DO, 1 g at 07/07/25 0833    ticagrelor (BRILINTA) tablet 90 mg, 90 mg, Oral, Q12H JUN, Jelena Gonzalez DO, 90 mg at 07/07/25 0846    trimethobenzamide (TIGAN) IM injection 200 mg, 200 mg, Intramuscular, Q6H PRN, Jelena Gonzalez DO, 200 mg at 07/04/25    [3]   Past Medical History:  Diagnosis Date    Allergic     Arthritis     Asthma     Bipolar affective disorder, currently depressed, moderate (HCC) 2018    Chest pain, unspecified 2019    Cyst of ovary, right     Depression     Diabetes mellitus (HCC) 10/10/2018    type 2    Endometriosis     Fractures 22    My right hand    Heart murmur 1988    Hepatitis C     Hepatitis C virus infection cured after antiviral drug therapy     History of transfusion     Hypertension     Infectious viral hepatitis 01    Migraines     Morbid obesity with BMI of 40.0-44.9, adult (HCC)     Obesity     Pulmonary artery congenital abnormality     Spleen enlarged     Status post surgical removal of both fallopian tubes     Varicella    [4]   Past Surgical History:  Procedure Laterality Date    CARDIAC CATHETERIZATION      no CAD 10days, 4 weeks 22months old     CARDIAC CATHETERIZATION N/A 2023    Procedure: Cardiac Coronary Angiogram;  Surgeon: Marcela Lr DO;  Location: [facility];  Service: Cardiology    CARDIAC CATHETERIZATION Left 2023    Procedure: Cardiac Left Heart Cath;  Surgeon: Marcela Lr DO;  Location: [facility];  Service: Cardiology    CARDIAC CATHETERIZATION  2023    Procedure: Cardiac catheterization;  Surgeon: Marcela Lr DO;  Location: [facility];  Service: Cardiology     SECTION, LOW TRANSVERSE      CHOLECYSTECTOMY      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 5    CORONARY STENT PLACEMENT      IR LOWER EXTREMITY ANGIOGRAM  2023    LIVER BIOPSY      LIVER BIOPSY      PERIPHERAL ANGIOGRAM  23    STERNAL WIRE REMOVAL  2022    THROMBECTOMY W/ EMBOLECTOMY Right 2023    Procedure: Right femoral exposure Right iliac embolectomy w/ #4 Ted catheter Aortogram Right EIA stent w/ 7x29mm VBX;  Surgeon: Jody Hodges MD;  Location: [facility];  Service: Vascular    TUBAL LIGATION Bilateral  2020    VSD REPAIR      As a child    WOUND DEBRIDEMENT Right 02/11/2023    Procedure: Right Groin Wound Washout, Pulse Lavage, Wound Vac placement;  Surgeon: Jelani Avila DO;  Location: [facility];  Service: Vascular   [5]   Family History  Problem Relation Name Age of Onset    Hypertension Mother Patritica     Migraines Mother Patritica     JENNY disease Mother Patritica     Depression Mother Patritica     Hyperlipidemia Mother Patritica     Diabetes Mother Patritica     Diabetes Father Justin     Hypertension Father Justin     Kidney failure Father Justin     Heart attack Father Justin     Arthritis Father Justin     Stroke Father Justin     Glaucoma Father Justin     Kidney disease Father Justin     Polycystic kidney disease Paternal Grandmother Alison     Stroke Paternal Grandmother Alison     Heart disease Paternal Grandmother Alison     Kidney disease Paternal Grandmother Alison     Arthritis Sister Melodie     Asthma Sister Melodie     Thyroid disease Sister Melodie     Diabetes Sister Mleodie     Arthritis Maternal Grandmother Irina     Breast cancer Maternal Grandmother Irina     Diabetes Maternal Grandmother Irina     Hypertension Maternal Grandmother Irina     Heart Valve Disease Maternal Grandmother Irina     Cancer Maternal Grandmother Irina         Skin cancer    Learning disabilities Cousin Marisol     Learning disabilities Sister Ana     Diabetes Sister Ana     ADD / ADHD Cousin Estelle     Lung cancer Brother Valdo     Cancer Brother Valdo         Lung cancer    Diabetes Maternal Grandfather Ankit     Hypertension Maternal Grandfather Ankit     JENNY disease Maternal Grandfather Ankit     Stroke Maternal Grandfather Ankit     Cancer Maternal Grandfather Ankit         Skin cancer    Cancer Paternal Uncle Brandon         Skin cancer    Aneurysm Maternal Aunt Olinda    [6]   Social History  Socioeconomic History    Marital status: Single   Tobacco Use    Smoking status: Every Day      Current packs/day: 0.00     Average packs/day: 0.5 packs/day for 10.0 years (5.0 ttl pk-yrs)     Types: Cigarettes     Start date: 2013     Last attempt to quit: 2023     Years since quittin.4    Smokeless tobacco: Never   Vaping Use    Vaping status: Never Used   Substance and Sexual Activity    Alcohol use: Yes     Alcohol/week: 3.0 standard drinks of alcohol     Types: 3 Standard drinks or equivalent per week     Comment: I drink socially    Drug use: Not Currently     Comment: hx of THC use for migraines    Sexual activity: Yes     Partners: Male     Birth control/protection: Female Sterilization     Social Drivers of Health     Food Insecurity: No Food Insecurity (2025)    Nursing - Inadequate Food Risk Classification     Worried About Running Out of Food in the Last Year: Sometimes true     Ran Out of Food in the Last Year: Sometimes true     Ran Out of Food in the Last Year: Never true   Recent Concern: Food Insecurity - Food Insecurity Present (2025)    Nursing - Inadequate Food Risk Classification     Worried About Running Out of Food in the Last Year: Sometimes true     Ran Out of Food in the Last Year: Sometimes true   Transportation Needs: No Transportation Needs (2025)    Nursing - Transportation Risk Classification     Lack of Transportation: No    Received from [facility]   Intimate Partner Violence: Unknown (2025)    Nursing IPS     Physically Hurt by Someone: No     Hurt or Threatened by Someone: No   Housing Stability: Unknown (2025)    Nursing: Inadequate Housing Risk Classification     Unable to Pay for Housing in the Last Year: No     Has Housing: No

## 2025-07-07 NOTE — PLAN OF CARE
Problem: PAIN - ADULT  Goal: Verbalizes/displays adequate comfort level or baseline comfort level  Description: Interventions:  - Encourage patient to monitor pain and request assistance  - Assess pain using appropriate pain scale  - Administer analgesics as ordered based on type and severity of pain and evaluate response  - Implement non-pharmacological measures as appropriate and evaluate response  - Consider cultural and social influences on pain and pain management  - Notify physician/advanced practitioner if interventions unsuccessful or patient reports new pain  - Educate patient/family on pain management process including their role and importance of  reporting pain   - Provide non-pharmacologic/complimentary pain relief interventions  Outcome: Progressing     Problem: INFECTION - ADULT  Goal: Absence or prevention of progression during hospitalization  Description: INTERVENTIONS:  - Assess and monitor for signs and symptoms of infection  - Monitor lab/diagnostic results  - Monitor all insertion sites, i.e. indwelling lines, tubes, and drains  - Monitor endotracheal if appropriate and nasal secretions for changes in amount and color  - [place] appropriate cooling/warming therapies per order  - Administer medications as ordered  - Instruct and encourage patient and family to use good hand hygiene technique  - Identify and instruct in appropriate isolation precautions for identified infection/condition  Outcome: Progressing  Goal: Absence of fever/infection during neutropenic period  Description: INTERVENTIONS:  - Monitor WBC  - Perform strict hand hygiene  - Limit to healthy visitors only  - No plants, dried, fresh or silk flowers with acuna in patient room  Outcome: Progressing     Problem: SAFETY ADULT  Goal: Patient will remain free of falls  Description: INTERVENTIONS:  - Educate patient/family on patient safety including physical limitations  - Instruct patient to call for assistance with activity   -  Consider consulting OT/PT to assist with strengthening/mobility based on AM PAC & JH-HLM score  - Consult OT/PT to assist with strengthening/mobility   - Keep Call bell within reach  - Keep bed low and locked with side rails adjusted as appropriate  - Keep care items and personal belongings within reach  - Initiate and maintain comfort rounds  - Make Fall Risk Sign visible to staff  - Offer Toileting every 2 Hours, in advance of need  - Initiate/Maintain bed alarm  - Obtain necessary fall risk management equipment: bed alarm  - Apply yellow socks and bracelet for high fall risk patients  - Consider moving patient to room near nurses station  Outcome: Progressing  Goal: Maintain or return to baseline ADL function  Description: INTERVENTIONS:  -  Assess patient's ability to carry out ADLs; assess patient's baseline for ADL function and identify physical deficits which impact ability to perform ADLs (bathing, care of mouth/teeth, toileting, grooming, dressing, etc.)  - Assess/evaluate cause of self-care deficits   - Assess range of motion  - Assess patient's mobility; develop plan if impaired  - Assess patient's need for assistive devices and provide as appropriate  - Encourage maximum independence but intervene and supervise when necessary  - Involve family in performance of ADLs  - Assess for home care needs following discharge   - Consider OT consult to assist with ADL evaluation and planning for discharge  - Provide patient education as appropriate  - Monitor functional capacity and physical performance, use of AM PAC & JH-HLM   - Monitor gait, balance and fatigue with ambulation    Outcome: Progressing  Goal: Maintains/Returns to pre admission functional level  Description: INTERVENTIONS:  - Perform AM-PAC 6 Click Basic Mobility/ Daily Activity assessment daily.  - Set and communicate daily mobility goal to care team and patient/family/caregiver.   - Collaborate with rehabilitation services on mobility goals if  consulted  - Perform Range of Motion 3 times a day.  - Reposition patient every 2 hours.  - Dangle patient 3 times a day  - Stand patient 3 times a day  - Ambulate patient 3 times a day  - Out of bed to chair 3 times a day   - Out of bed for meals 3 times a day  - Out of bed for toileting  - Record patient progress and toleration of activity level   Outcome: Progressing     Problem: DISCHARGE PLANNING  Goal: Discharge to home or other facility with appropriate resources  Description: INTERVENTIONS:  - Identify barriers to discharge w/patient and caregiver  - Arrange for needed discharge resources and transportation as appropriate  - Identify discharge learning needs (meds, wound care, etc.)  - Arrange for interpretive services to assist at discharge as needed  - Refer to Case Management Department for coordinating discharge planning if the patient needs post-hospital services based on physician/advanced practitioner order or complex needs related to functional status, cognitive ability, or social support system  Outcome: Progressing     Problem: Knowledge Deficit  Goal: Patient/family/caregiver demonstrates understanding of disease process, treatment plan, medications, and discharge instructions  Description: Complete learning assessment and assess knowledge base.  Interventions:  - Provide teaching at level of understanding  - Provide teaching via preferred learning methods  Outcome: Progressing     Problem: Neurological Deficit  Goal: Neurological status is stable or improving  Description: Interventions:  - Monitor and assess patient's level of consciousness, motor function, sensory function, and level of assistance needed for ADLs.   - Monitor and report changes from baseline. Collaborate with interdisciplinary team to initiate plan and implement interventions as ordered.   - Provide and maintain a safe environment.  - Consider seizure precautions.  - Consider fall precautions.  - Consider aspiration  precautions.  - Consider bleeding precautions.  Outcome: Progressing     Problem: Activity Intolerance/Impaired Mobility  Goal: Mobility/activity is maintained at optimum level for patient  Description: Interventions:  - Assess and monitor patient  barriers to mobility and need for assistive/adaptive devices.  - Assess patient's emotional response to limitations.  - Collaborate with interdisciplinary team and initiate plans and interventions as ordered.  - Encourage independent activity per ability.  - Maintain proper body alignment.  - Perform active/passive rom as tolerated/ordered.  - Plan activities to conserve energy.  - Turn patient as appropriate  Outcome: Progressing     Problem: Communication Impairment  Goal: Ability to express needs and understand communication  Description: Assess patient's communication skills and ability to understand information.  Patient will demonstrate use of effective communication techniques, alternative methods of communication and understanding even if not able to speak.     - Encourage communication and provide alternate methods of communication as needed.  - Collaborate with case management/ for discharge needs.  - Include patient/family/caregiver in decisions related to communication.  Outcome: Progressing     Problem: Potential for Aspiration  Goal: Non-ventilated patient's risk of aspiration is minimized  Description: Assess and monitor vital signs, respiratory status, and labs (WBC).  Monitor for signs of aspiration (tachypnea, cough, rales, wheezing, cyanosis, fever).    - Assess and monitor patient's ability to swallow.  - Place patient up in chair to eat if possible.  - HOB up at 90 degrees to eat if unable to get patient up into chair.  - Supervise patient during oral intake.   - Instruct patient/ family to take small bites.  - Instruct patient/ family to take small single sips when taking liquids.  - Follow patient-specific strategies generated by  speech pathologist.  Outcome: Progressing  Goal: Ventilated patient's risk of aspiration is minimized  Description: Assess and monitor vital signs, respiratory status, airway cuff pressure, and labs (WBC).  Monitor for signs of aspiration (tachypnea, cough, rales, wheezing, cyanosis, fever).    - Elevate head of bed 30 degrees if patient has tube feeding.  - Monitor tube feeding.  Outcome: Progressing     Problem: Nutrition  Goal: Nutrition/Hydration status is improving  Description: Monitor and assess patient's nutrition/hydration status for malnutrition (ex- brittle hair, bruises, dry skin, pale skin and conjunctiva, muscle wasting, smooth red tongue, and disorientation). Collaborate with interdisciplinary team and initiate plan and interventions as ordered.  Monitor patient's weight and dietary intake as ordered or per policy. Utilize nutrition screening tool and intervene per policy. Determine patient's food preferences and provide high-protein, high-caloric foods as appropriate.     - Assist patient with eating.  - Allow adequate time for meals.  - Encourage patient to take dietary supplement as ordered.  - Collaborate with clinical nutritionist.  - Include patient/family/caregiver in decisions related to nutrition.  Outcome: Progressing

## 2025-07-07 NOTE — ASSESSMENT & PLAN NOTE
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 S/p ASA and Plavix load. Was in NCC receiving HTS and permissive HTN. Due to worsening sx and requirement of HTS bolus, transferred to [facility] for neurology eval.   - 7/1 CTA head and neck: acute R PICA infarct, new R vertebral artery occlusion, high grade stenosis involving intradural R vertebral artery; stable stent from previous aortic coarctation repair  - 7/2 MRI brain: acute R inferior cerebellum wo hemorrhage  - 7/2 MRA head: focal severe stenosis vs occlusion of intracranial R vertebral artery  - 7/2 echo: mildly increased wall thickness, EF 55%, no PFO.   - 7/3 CT head: evolving R cerebellar infarct with persistent mass effect  - 7/5 MRI vessel wall: evolution of recent R PICA infarct with worsened petechial cortical hemorrhage. Diffuse wall thickening in R vertebral artery with occlusive thrombus, likely represents reactive change related to vertebral artery dissection. Vasculitis less likely  - 7/6 CT CAP: pending    Plan:  - Neurology consulted, appreciate recs   - DAPT x 21 days followed by ASA monotherapy   - currently ASA 81, Brillinta 90 BID   - permissive HTN up to systolics 200   - lipitor 40    - dvt ppx heparin SQ   - hypercoagulable workup in 6-8 weeks  - per NCC, recommendation of P2Y12 level check on 7/7 PM or 7/8AM.    - ordered for 7/8 AM  - TERESA scheduled Tuesday 7/8 @10AM   - NPO ordered

## 2025-07-07 NOTE — OCCUPATIONAL THERAPY NOTE
"  Occupational Therapy Progress Note     Patient Name: Jaycee Can  Today's Date: 7/7/2025  Problem List  Principal Problem:    Stroke (cerebrum) ([facility])  Active Problems:    Aortic coarctation    T2DM (type 2 diabetes mellitus) ([facility])    Bipolar disorder ([facility])    HTN (hypertension)    Neuropathy    GERD (gastroesophageal reflux disease)    Asthma          07/07/25 0844   OT Last Visit   OT Visit Date 07/07/25   Note Type   Note type Evaluation   Pain Assessment   Pain Assessment Tool 0-10   Pain Score No Pain   Restrictions/Precautions   Weight Bearing Precautions Per Order No   Other Precautions Chair Alarm;Bed Alarm;Fall Risk   Home Living   Type of Home House   Home Layout Two level  (FFOS to second floor)   Bathroom Shower/Tub Tub/shower unit   Bathroom Toilet Standard   Bathroom Equipment Other (Comment)  (Pt denies)   Home Equipment Other (Comment)  (Pt denies)   Prior Function   Level of [place] Independent with ADLs;Independent with functional mobility;Independent with IADLS   Lives With Friend(s);Daughter  (Pt reports living with her daughter and her friend and her 2 kids and her niece.)   Receives Help From Friend(s)   IADLs Independent with driving;Independent with meal prep;Independent with medication management   Falls in the last 6 months 0   Vocational 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   Intrinsic Gratification Pt enjoys spending time with family and friends   General   Family/Caregiver Present No   Subjective   Subjective \"I work as a PCA but my job is easier than the PCA's here.\"   ADL   Where Assessed Edge of bed   Eating Assistance 7  Independent   Grooming Assistance 5  Supervision/Setup   UB Bathing Assistance 5  Supervision/Setup   LB Bathing " Assistance 5  Supervision/Setup   UB Dressing Assistance 5  Supervision/Setup   LB Dressing Assistance 5  Supervision/Setup   Toileting Assistance  5  Supervision/Setup   Functional Assistance 5  Supervision/Setup   Bed Mobility   Supine to Sit 5  Supervision   Sit to Supine Unable to assess   Additional Comments Pt lying supine in bed upon therapist's arrival and seated OOB in chair at end of session with alarm activated and all needs within reach.   Transfers   Sit to Stand 5  Supervision   Additional items Verbal cues   Stand to Sit 5  Supervision   Additional items Verbal cues;Armrests   Additional Comments x1 STS   Functional Mobility   Functional Mobility 5  Supervision   Additional Comments Pt able to complete household distance fxnl mobility with S without AD.   Additional items   (without AD)   Balance   Static Sitting Normal   Dynamic Sitting Normal   Static Standing Fair +   Dynamic Standing Fair   Ambulatory Fair   Activity Tolerance   Activity Tolerance Patient tolerated treatment well   Medical Staff Made Aware OT, Charity, PTLuis Fernando, and SPTTriston due to Pt's medical complexity and multiple comorbidities.   Nurse Made Aware RN clearance prior to session   RUE Assessment   RUE Assessment WFL   LUE Assessment   LUE Assessment WFL   Hand Function   Gross Motor Coordination Functional   Fine Motor Coordination Functional   Sensation   Light Touch No apparent deficits   Cognition   Overall Cognitive Status WFL   Arousal/Participation Alert;Responsive;Cooperative   Attention Within functional limits   Orientation Level Oriented X4   Memory Within functional limits   Following Commands Follows all commands and directions without difficulty   Comments Pt pleasant and cooperative.   Assessment   Assessment Pt is a 35 y.o. female seen for OT evaluation s/p admission to [facility] on 7/3/2025 due to headache. Pt diagnosed with Right PICA acute infarct as shown on CT scan. Pt has a significant PMH  impacting occupational performance including:  Allergic, Arthritis, Asthma, Bipolar affective disorder, currently depressed, moderate (HCC), Chest pain, unspecified, Cyst of ovary, right, Depression, Diabetes mellitus (HCC), Endometriosis, Fractures, Heart murmur, Hepatitis C, Hepatitis C virus infection cured after antiviral drug therapy, History of transfusion, Hypertension, Infectious viral hepatitis, Migraines, Morbid obesity with BMI of 40.0-44.9, adult (HCC), Obesity, Pulmonary artery congenital abnormality, Spleen enlarged, Status post surgical removal of both fallopian tubes, and Varicella. Pt with active OT evaluation and treatment orders and activity orders. PTA, pt living with her daughter and her friend and friend's kids in a two-story house with bedroom and bathroom on 2nd floor, bathroom has a tub/shower with no grab bars or shower chair and a standard toilet with no grab bars. Pt reports I with ADLs, IADLs, and fnxl mobility PTA. Pt agreeable and willing to participate in OT evaluation. During evaluation, pt required S for grooming, UB bathing, UB dressing, LB dressing, LB bathing, and toileting; S for supine to sit bed mobility, STS transfer, stand to sit transfer and fxnl mobility. Pt required VC for safety. RN clearance prior to session and Pt seated OOB in chair at the end of session with alarm activated and all needs within reach. No further acute OT needs identified at this time. Recommend continued active ADL participation and mobilization with hospital staff while in the hospital to increase pt’s endurance and strength upon D/C. From OT standpoint, recommend D/C to home with family support when medically cleared. D/C pt from OT caseload at this time.   Goals   Patient Goals to go home   Plan   OT Treatment Day 0   OT Frequency Eval only   Discharge Recommendation   Rehab Resource Intensity Level, OT No post-acute rehabilitation needs   AM-PAC Daily Activity Inpatient   Lower Body Dressing 4    Bathing 4   Toileting 4   Upper Body Dressing 4   Grooming 4   Eating 4   Daily Activity Raw Score 24   Daily Activity Standardized Score (Calc for Raw Score >=11) 57.54   AM-PAC Applied Cognition Inpatient   Following a Speech/Presentation 4   Understanding Ordinary Conversation 4   Taking Medications 4   Remembering Where Things Are Placed or Put Away 4   Remembering List of 4-5 Errands 4   Taking Care of Complicated Tasks 4   Applied Cognition Raw Score 24   Applied Cognition Standardized Score 62.21   Barthel Index   Feeding 10   Bathing 5   Grooming Score 5   Dressing Score 10   Bladder Score 10   Bowels Score 10   Toilet Use Score 10   Transfers (Bed/Chair) Score 10   Mobility (Level Surface) Score 15   Stairs Score 10   B[place] Index Score 95   Modified [place] Scale   Modified [place] Scale 1   End of Consult   Patient Position at End of Consult Bedside chair;Bed/Chair alarm activated;All needs within reach   Nurse Communication Nurse aware of consult     The patient's raw score on the AM-PAC Daily Activity Inpatient Short Form is 24. A raw score of greater than or equal to 19 suggests the patient may benefit from discharge to home. Please refer to the recommendation of the Occupational Therapist for safe discharge planning.     Luis Fernando Hector, OTS

## 2025-07-08 ENCOUNTER — TELEPHONE (OUTPATIENT)
Age: 35
End: 2025-07-08

## 2025-07-08 ENCOUNTER — ANESTHESIA EVENT (INPATIENT)
Dept: NON INVASIVE DIAGNOSTICS | Facility: HOSPITAL | Age: 35
DRG: 045 | End: 2025-07-08
Payer: COMMERCIAL

## 2025-07-08 VITALS
HEART RATE: 97 BPM | WEIGHT: 225 LBS | RESPIRATION RATE: 18 BRPM | OXYGEN SATURATION: 98 % | TEMPERATURE: 98.2 F | HEIGHT: 61 IN | SYSTOLIC BLOOD PRESSURE: 150 MMHG | BODY MASS INDEX: 42.48 KG/M2 | DIASTOLIC BLOOD PRESSURE: 80 MMHG

## 2025-07-08 LAB
ALBUMIN SERPL BCG-MCNC: 3.8 G/DL (ref 3.5–5)
ALP SERPL-CCNC: 59 U/L (ref 34–104)
ALT SERPL W P-5'-P-CCNC: 39 U/L (ref 7–52)
ANION GAP SERPL CALCULATED.3IONS-SCNC: 7 MMOL/L (ref 4–13)
AORTIC ROOT: 2.3 CM
AST SERPL W P-5'-P-CCNC: 30 U/L (ref 13–39)
AV MEAN PRESS GRAD SYS DOP V1V2: 5 MMHG
AV PEAK GRADIENT: 11 MMHG
AV VMAX SYS DOP: 1.7 M/S
B2 GLYCOPROT1 IGA SER-ACNC: <2 U/ML
B2 GLYCOPROT1 IGG SER-ACNC: <2 U/ML
B2 GLYCOPROT1 IGM SER-ACNC: <2 U/ML
BASOPHILS # BLD AUTO: 0.03 THOUSANDS/ÂΜL (ref 0–0.1)
BASOPHILS NFR BLD AUTO: 0 % (ref 0–1)
BILIRUB SERPL-MCNC: 0.62 MG/DL (ref 0.2–1)
BUN SERPL-MCNC: 11 MG/DL (ref 5–25)
C-ANCA TITR SER IF: NORMAL TITER
CALCIUM SERPL-MCNC: 8.8 MG/DL (ref 8.4–10.2)
CARDIOLIPIN IGA SER IA-ACNC: <2 APL-U/ML
CARDIOLIPIN IGG SER IA-ACNC: <2 GPL-U/ML
CARDIOLIPIN IGM SER IA-ACNC: <2 MPL-U/ML
CHLORIDE SERPL-SCNC: 107 MMOL/L (ref 96–108)
CO2 SERPL-SCNC: 23 MMOL/L (ref 21–32)
CREAT SERPL-MCNC: 0.47 MG/DL (ref 0.6–1.3)
DOP CALC AO VTI: 32.3 CM
EOSINOPHIL # BLD AUTO: 0.14 THOUSAND/ÂΜL (ref 0–0.61)
EOSINOPHIL NFR BLD AUTO: 1 % (ref 0–6)
ERYTHROCYTE [DISTWIDTH] IN BLOOD BY AUTOMATED COUNT: 15.4 % (ref 11.6–15.1)
GFR SERPL CREATININE-BSD FRML MDRD: 126 ML/MIN/1.73SQ M
GLUCOSE SERPL-MCNC: 102 MG/DL (ref 65–140)
GLUCOSE SERPL-MCNC: 103 MG/DL (ref 65–140)
GLUCOSE SERPL-MCNC: 97 MG/DL (ref 65–140)
HCT VFR BLD AUTO: 36.1 % (ref 34.8–46.1)
HGB BLD-MCNC: 11.9 G/DL (ref 11.5–15.4)
IMM GRANULOCYTES # BLD AUTO: 0.06 THOUSAND/UL (ref 0–0.2)
IMM GRANULOCYTES NFR BLD AUTO: 1 % (ref 0–2)
LYMPHOCYTES # BLD AUTO: 2.62 THOUSANDS/ÂΜL (ref 0.6–4.47)
LYMPHOCYTES NFR BLD AUTO: 24 % (ref 14–44)
MAGNESIUM SERPL-MCNC: 1.9 MG/DL (ref 1.9–2.7)
MAIN PULMONARY ARTERY: 1.8 CM
MCH RBC QN AUTO: 25.9 PG (ref 26.8–34.3)
MCHC RBC AUTO-ENTMCNC: 33 G/DL (ref 31.4–37.4)
MCV RBC AUTO: 79 FL (ref 82–98)
MONOCYTES # BLD AUTO: 0.9 THOUSAND/ÂΜL (ref 0.17–1.22)
MONOCYTES NFR BLD AUTO: 8 % (ref 4–12)
MYELOPEROXIDASE AB SER IA-ACNC: <0.2 UNITS (ref 0–0.9)
NEUTROPHILS # BLD AUTO: 7.39 THOUSANDS/ÂΜL (ref 1.85–7.62)
NEUTS SEG NFR BLD AUTO: 66 % (ref 43–75)
NRBC BLD AUTO-RTO: 0 /100 WBCS
P-ANCA ATYPICAL TITR SER IF: NORMAL TITER
P-ANCA TITR SER IF: NORMAL TITER
PA ADP BLD-ACNC: 96 PRU (ref 194–418)
PHOSPHATE SERPL-MCNC: 4 MG/DL (ref 2.7–4.5)
PLATELET # BLD AUTO: 237 THOUSANDS/UL (ref 149–390)
PMV BLD AUTO: 10.6 FL (ref 8.9–12.7)
POTASSIUM SERPL-SCNC: 3.6 MMOL/L (ref 3.5–5.3)
PROT SERPL-MCNC: 6.2 G/DL (ref 6.4–8.4)
PROTEINASE3 AB SER IA-ACNC: <0.2 UNITS (ref 0–0.9)
PS-PROTHROM CMPLX IGG SERPL IA-ACNC: 11 U
PS-PROTHROM CMPLX IGM SERPL IA-ACNC: <9 U
PV PV: 1.8 M/S
PV VTI: 46 CM
RBC # BLD AUTO: 4.59 MILLION/UL (ref 3.81–5.12)
SERVICE CMNT-IMP: NORMAL
SL CV LV EF: 55
SODIUM SERPL-SCNC: 137 MMOL/L (ref 135–147)
WBC # BLD AUTO: 11.14 THOUSAND/UL (ref 4.31–10.16)

## 2025-07-08 PROCEDURE — 85025 COMPLETE CBC W/AUTO DIFF WBC: CPT

## 2025-07-08 PROCEDURE — 80053 COMPREHEN METABOLIC PANEL: CPT

## 2025-07-08 PROCEDURE — 83735 ASSAY OF MAGNESIUM: CPT

## 2025-07-08 PROCEDURE — 84100 ASSAY OF PHOSPHORUS: CPT

## 2025-07-08 PROCEDURE — 82948 REAGENT STRIP/BLOOD GLUCOSE: CPT

## 2025-07-08 PROCEDURE — 93312 ECHO TRANSESOPHAGEAL: CPT | Performed by: INTERNAL MEDICINE

## 2025-07-08 PROCEDURE — B24BZZ4 ULTRASONOGRAPHY OF HEART WITH AORTA, TRANSESOPHAGEAL: ICD-10-PCS | Performed by: STUDENT IN AN ORGANIZED HEALTH CARE EDUCATION/TRAINING PROGRAM

## 2025-07-08 PROCEDURE — 93312 ECHO TRANSESOPHAGEAL: CPT

## 2025-07-08 PROCEDURE — 85576 BLOOD PLATELET AGGREGATION: CPT

## 2025-07-08 PROCEDURE — 99239 HOSP IP/OBS DSCHRG MGMT >30: CPT | Performed by: STUDENT IN AN ORGANIZED HEALTH CARE EDUCATION/TRAINING PROGRAM

## 2025-07-08 RX ORDER — SODIUM CHLORIDE 9 MG/ML
INJECTION, SOLUTION INTRAVENOUS CONTINUOUS PRN
Status: DISCONTINUED | OUTPATIENT
Start: 2025-07-08 | End: 2025-07-08

## 2025-07-08 RX ORDER — MECLIZINE HYDROCHLORIDE 25 MG/1
25 TABLET ORAL EVERY 8 HOURS PRN
Qty: 12 TABLET | Refills: 0 | Status: SHIPPED | OUTPATIENT
Start: 2025-07-08 | End: 2025-07-14

## 2025-07-08 RX ORDER — ATORVASTATIN CALCIUM 40 MG/1
40 TABLET, FILM COATED ORAL EVERY EVENING
Qty: 30 TABLET | Refills: 0 | Status: SHIPPED | OUTPATIENT
Start: 2025-07-08

## 2025-07-08 RX ORDER — LIDOCAINE HYDROCHLORIDE 10 MG/ML
INJECTION, SOLUTION EPIDURAL; INFILTRATION; INTRACAUDAL; PERINEURAL AS NEEDED
Status: DISCONTINUED | OUTPATIENT
Start: 2025-07-08 | End: 2025-07-08

## 2025-07-08 RX ORDER — TICAGRELOR 90 MG/1
90 TABLET, FILM COATED ORAL EVERY 12 HOURS SCHEDULED
Qty: 42 TABLET | Refills: 0 | Status: SHIPPED | OUTPATIENT
Start: 2025-07-08 | End: 2025-07-29

## 2025-07-08 RX ORDER — PROPOFOL 10 MG/ML
INJECTION, EMULSION INTRAVENOUS CONTINUOUS PRN
Status: DISCONTINUED | OUTPATIENT
Start: 2025-07-08 | End: 2025-07-08

## 2025-07-08 RX ORDER — PROPOFOL 10 MG/ML
INJECTION, EMULSION INTRAVENOUS AS NEEDED
Status: DISCONTINUED | OUTPATIENT
Start: 2025-07-08 | End: 2025-07-08

## 2025-07-08 RX ORDER — LISINOPRIL 10 MG/1
10 TABLET ORAL DAILY
Qty: 30 TABLET | Refills: 0 | Status: SHIPPED | OUTPATIENT
Start: 2025-07-09

## 2025-07-08 RX ORDER — TICAGRELOR 90 MG/1
90 TABLET, FILM COATED ORAL EVERY 12 HOURS SCHEDULED
Qty: 60 TABLET | Refills: 0 | Status: CANCELLED | OUTPATIENT
Start: 2025-07-08

## 2025-07-08 RX ADMIN — PROPOFOL 50 MG: 10 INJECTION, EMULSION INTRAVENOUS at 10:35

## 2025-07-08 RX ADMIN — PROPOFOL 20 MG: 10 INJECTION, EMULSION INTRAVENOUS at 11:08

## 2025-07-08 RX ADMIN — ATORVASTATIN CALCIUM 40 MG: 40 TABLET, FILM COATED ORAL at 16:51

## 2025-07-08 RX ADMIN — HEPARIN SODIUM 5000 UNITS: 5000 INJECTION, SOLUTION INTRAVENOUS; SUBCUTANEOUS at 14:24

## 2025-07-08 RX ADMIN — NOREPINEPHRINE BITARTRATE 8 MCG: 1 INJECTION, SOLUTION, CONCENTRATE INTRAVENOUS at 11:02

## 2025-07-08 RX ADMIN — LISINOPRIL 10 MG: 10 TABLET ORAL at 09:26

## 2025-07-08 RX ADMIN — PROPOFOL 20 MG: 10 INJECTION, EMULSION INTRAVENOUS at 11:05

## 2025-07-08 RX ADMIN — TICAGRELOR 90 MG: 90 TABLET ORAL at 09:29

## 2025-07-08 RX ADMIN — PROPOFOL 20 MG: 10 INJECTION, EMULSION INTRAVENOUS at 11:12

## 2025-07-08 RX ADMIN — ASPIRIN 81 MG: 81 TABLET ORAL at 09:26

## 2025-07-08 RX ADMIN — PROPOFOL 30 MG: 10 INJECTION, EMULSION INTRAVENOUS at 10:40

## 2025-07-08 RX ADMIN — SODIUM CHLORIDE: 0.9 INJECTION, SOLUTION INTRAVENOUS at 10:05

## 2025-07-08 RX ADMIN — CITALOPRAM HYDROBROMIDE 10 MG: 10 TABLET ORAL at 09:26

## 2025-07-08 RX ADMIN — SUCRALFATE 1 G: 1 TABLET ORAL at 16:51

## 2025-07-08 RX ADMIN — HEPARIN SODIUM 5000 UNITS: 5000 INJECTION, SOLUTION INTRAVENOUS; SUBCUTANEOUS at 05:12

## 2025-07-08 RX ADMIN — NOREPINEPHRINE BITARTRATE 8 MCG: 1 INJECTION, SOLUTION, CONCENTRATE INTRAVENOUS at 11:31

## 2025-07-08 RX ADMIN — SUCRALFATE 1 G: 1 TABLET ORAL at 14:22

## 2025-07-08 RX ADMIN — SUCRALFATE 1 G: 1 TABLET ORAL at 09:26

## 2025-07-08 RX ADMIN — PROPOFOL 20 MG: 10 INJECTION, EMULSION INTRAVENOUS at 10:42

## 2025-07-08 RX ADMIN — LIDOCAINE HYDROCHLORIDE 50 MG: 10 INJECTION, SOLUTION EPIDURAL; INFILTRATION; INTRACAUDAL; PERINEURAL at 10:35

## 2025-07-08 RX ADMIN — PROPOFOL 100 MCG/KG/MIN: 10 INJECTION, EMULSION INTRAVENOUS at 10:35

## 2025-07-08 NOTE — DISCHARGE INSTR - AVS FIRST PAGE
Continue aspirin and Brilinta for 21 days and then switch to aspirin monotherapy  No NSAID medication such as naproxen until you follow-up with neurology/cardiology  Recommend discussion about Zio patch or other cardiac event monitor with [facility] cardiologist.   Neurology service will reach out to you regarding outpatient follow-up, if you do not hear from them in a week, please talk to your PCP about neurology follow-up  Outpatient follow-up for hypercoagulable workup recommended  No heavy lifting for 1 month  Return to work in 2 weeks

## 2025-07-08 NOTE — DISCHARGE SUMMARY
Discharge Summary - Hospitalist   Name: Jaycee Can 35 y.o. female I MRN: [id number]  [facility] I Date of Admission: 7/3/2025   Date of Service: 7/8/2025 I Hospital Day: 5     Assessment & Plan  Stroke (cerebrum) ([facility])  - Initially presented 7/1 for HA, nausea, dizziness in setting of acute R PICA infarct with R vertebral artery occlusion. S/p ASA and Plavix load. Was in NCC receiving HTS and permissive HTN. Due to worsening sx and requirement of HTS bolus, transferred to [facility] for neurology eval.   - 7/1 CTA head and neck: acute R PICA infarct, new R vertebral artery occlusion, high grade stenosis involving intradural R vertebral artery; stable stent from previous aortic coarctation repair  - 7/2 MRI brain: acute R inferior cerebellum wo hemorrhage  - 7/2 MRA head: focal severe stenosis vs occlusion of intracranial R vertebral artery  - 7/2 echo: mildly increased wall thickness, EF 55%, no PFO.   - 7/3 CT head: evolving R cerebellar infarct with persistent mass effect  - 7/5 MRI vessel wall: evolution of recent R PICA infarct with worsened petechial cortical hemorrhage. Diffuse wall thickening in R vertebral artery with occlusive thrombus, likely represents reactive change related to vertebral artery dissection. Vasculitis less likely  - 7/6 CT CAP with no evidence of malignancy within the limitations of the study    Plan:  - Neurology consulted, appreciate recs   - DAPT x 21 days followed by ASA monotherapy   - currently ASA 81, Brillinta 90 BID   - permissive HTN up to systolics 200   - lipitor 40    - dvt ppx heparin SQ   - hypercoagulable workup in 6-8 weeks  - per NCC, recommendation of P2Y12 level check on 7/7 PM or 7/8AM.    - ordered for 7/8 AM  - TERESA scheduled Tuesday 7/8 @10AM   - No significant abnormalities on TERESA, patient deemed appropriate for discharge  T2DM (type 2 diabetes mellitus) ([facility])  Lab Results   Component Value Date    HGBA1C 9.4 (H) 07/02/2025       Recent Labs      07/07/25  1040 07/07/25  1628 07/08/25  0607 07/08/25  1322   POCGLU 180* 115 103 97       Blood Sugar Average: Last 72 hrs:  (P) 147.9193038922646735  Home meds: jardiance 10, metformin 1000 BID, lantus 25u HS, ozempic 0.25 weekly    Plan:  - currently on lantus 25 u HS  - SSI  HTN (hypertension)  - home meds: lisinopril 30    Plan:  - lisinopril 10 for permissive HTN  Aortic coarctation  S/p coarctation gore-darlin patch repair and PDA ligation (1988), PA band placement (1988), VSD closure, PA band take down, PA plasty (1989), recurrent coartcation balloon dilation (1995), and catheterization (2004)   Bipolar disorder (HCC)  Only on citalopram 10 for depression    - continue  Neuropathy  On gabapentin 100   GERD (gastroesophageal reflux disease)  On Carafate QID  Asthma  On singulair 10    - added PRN albuterol     Medical Problems       Resolved Problems  Date Reviewed: 7/3/2025   None       Discharging Physician / Practitioner: Sean Davis DO  PCP: REJI Ferrell  Admission Date:   Admission Orders (From admission, onward)       Ordered        07/03/25 2021  Inpatient Admission  Once                          Discharge Date: 07/08/25    Next Steps for Physician/AP Assuming Care:  Outpatient neurology follow-up  Follow-up hypercoagulable panel; repeat panel in 6 to 8 weeks  Recommend discussion with your [facility] cardiologist regarding Zio patch or cardiac event monitoring  No NSAID medication such as naproxen until follow-up with neurology/cardiology  No heavy lifting for 1 month  Return to work in 2 weeks    Test Results Pending at Discharge (will require follow up):  CONSTANCE screen w/reflex cascade  B2 Glycoprotein antibodies  Cardiolipin antibody  Cryoglobulin    Medication Changes for Discharge & Rationale:   Increase Lipitor to 40 mg at bedtime  Decrease lisinopril to 10 mg daily  Brilinta 90 mg p.o. twice daily started.  To be continued with aspirin for 21 days after which aspirin is to be  continued as monotherapy  Meclizine as needed dizziness  See after visit summary for reconciled discharge medications provided to patient and/or family.     Consultations During Hospital Stay:  Neurology  ICU  PMR    Procedures Performed:   N/A    Significant Findings / Test Results: (Result paraphrased by myself, see chart for full details)  CTA Head and neck 7/1- CT Brain: Hypodensity involving the inferior right cerebellar hemisphere, most compatible with an acute/recent right PICA distribution infarct. No intracranial hemorrhage noted. Recommend for evaluation with brain MRI without contrast. CT Angiography: New occlusion of the right vertebral artery from its origin, and throughout the V1 segment, with reconstitution at the level of the proximal V2 segment, with mild caliber change/attenuation of the right V2 segment, which may be due to proximal occlusion, but underlying dissection cannot be excluded. High-grade stenosis involving the intradural right vertebral artery beyond the origin of the right PICA. Likely patent but attenuated proximal right PICA, but the distal right PICA is likely occluded.  MRI brain 7/2- Recent infarct right inferior cerebellum without evidence of hemorrhage.   CT head 7/4-Redemonstration of right cerebellar subacute infarct with associated mass effect and partial effacement of the fourth ventricle, not significantly intervally changed. No parenchymal hemorrhage identified. No new infarct is seen. No hydrocephalus. Other findings as above. Overall no significant interval change. Follow-up as clinically warranted.  MRI vessel wall imaging 7/5- Evolution of recent infarct in right cerebellum PICA territory with enhancement and worsened petechial cortical hemorrhage. Persistent compressive mass effect on fourth ventricle. No obstructive hydrocephalus. Diffuse wall thickening and enhancement in visualized right vertebral artery V2, V3, and V4 segments with short-segment intraluminal  occlusive thrombus in right post-PICA V4 segment with distal reconstitution which likely represents a component of reactive change related to vertebral artery dissection with short-segment occlusive intraluminal thrombus. Vasculitis felt less likely. Unchanged chronic lacunar infarct in left corona radiata.  CT chest, abdomen, pelvis 7/6-Type B aortic dissection from the level of the renal arteries to the right common iliac artery, as detailed above, unchanged from 11/21/2023. The left renal artery arises from the false lumen which is patent; no radiographic signs of left renal vascular insufficiency. Patent distal aortic arch stent graft. Patent right external iliac artery stent. Slowly enlarging right adnexal multicystic mass with calcifications. Slow growth and calcifications raise the possibility of a teratoma, but as fat is not demonstrated, the lesion remains indeterminate. No findings to suspect metastatic disease in the chest, abdomen, or pelvis. Splenomegaly  TERESA 7/8/25- No diagnostic echocardiographic evidence of intracardiac shunt or intracardiac thrombus.     Incidental Findings:   N/A     Hospital Course:   Jaycee Can is a 35 y.o. female patient who originally presented to the hospital on 7/3/2025 due to CVA.  She initially presented to [facility] on 7/1 with headache, nausea, dizziness, and was found to have right cerebellar hypodensity on CT.  CTA showed right vertebral artery occlusion and high-grade stenosis.  Symptoms had been going on for some time so she was deemed not to be an adequate candidate for TNK or thrombectomy.  MRI brain 7/2 confirmed right inferior cerebellum infarct.  She was loaded with DAPT and recommended for transfer to [facility] for neurocritical care management.  While while awaiting transfer for neurocritical care, she was given hypertonic saline.  She arrived to [facility] on 7/3 where she received hypertonic saline with a bolus.  Repeat imaging generally stable and MR vessel  "wall imaging showed some cortical petechial hemorrhage that neurology feels is expected given the size of her stroke.    After she was deemed appropriate for transfer to the floor, she was switched to Brilinta with plans to do 28 days of DAPT before continuing aspirin monotherapy.  She tolerated this and TERESA did not show any structural cardiac defect.  She underwent a CT chest, abdomen, and pelvis which showed multiple incidental findings including known aortic dissection which was unchanged since 2023.  No obvious malignancy noted.  She was started on a hypercoagulable workup, some components of which are still pending at this time.  She is to follow-up with stroke neurology after discharge and will have a repeat hypercoagulable workup done in the outpatient setting.  She was discharged home in reasonably good health.  She is to avoid heavy lifting for 1 month and the return to work in 2 weeks.  She was recommended for cardiac event monitoring through her [facility] cardiologist.    Please see above list of diagnoses and related plan for additional information.     Discharge Day Visit / Exam:   Subjective: Resting comfortably in bed, no acute distress  Vitals: Blood Pressure: 150/80 (07/08/25 1030)  Pulse: 97 (07/08/25 1030)  Temperature: 98.2 °F (36.8 °C) (07/08/25 0652)  Temp Source: Oral (07/07/25 2156)  Respirations: 18 (07/08/25 0652)  Height: 5' 1\" (154.9 cm) (07/08/25 1030)  Weight - Scale: 102 kg (225 lb) (07/08/25 1030)  SpO2: 98 % (07/08/25 0652)  Physical Exam  Vitals reviewed.   Constitutional:       General: She is not in acute distress.     Appearance: She is obese. She is not ill-appearing, toxic-appearing or diaphoretic.   HENT:      Mouth/Throat:      Mouth: Mucous membranes are moist.     Eyes:      General: No scleral icterus.     Pupils: Pupils are equal, round, and reactive to light.       Cardiovascular:      Rate and Rhythm: Normal rate.      Pulses: Normal pulses.      Heart sounds: No murmur " heard.  Pulmonary:      Effort: No respiratory distress.      Breath sounds: No wheezing.   Abdominal:      General: Abdomen is flat. There is no distension.      Tenderness: There is no abdominal tenderness. There is no guarding or rebound.     Musculoskeletal:      Right lower leg: No edema.      Left lower leg: No edema.     Skin:     General: Skin is warm and dry.     Neurological:      General: No focal deficit present.      Mental Status: She is alert and oriented to person, place, and time.     Psychiatric:         Mood and Affect: Mood normal.         Behavior: Behavior normal.       Discussion with Family: Patient declined call to .     Discharge instructions/Information to patient and family:   See after visit summary for information provided to patient and family.      Provisions for Follow-Up Care:  See after visit summary for information related to follow-up care and any pertinent home health orders.      Mobility at time of Discharge:   Basic Mobility Inpatient Raw Score: 18  JH-HLM Goal: 6: Walk 10 steps or more  JH-HLM Achieved: 6: Walk 10 steps or more  HLM Goal achieved. Continue to encourage appropriate mobility.     Disposition:   Home    Planned Readmission: No    Administrative Statements   Discharge Statement:  I have spent a total time of 45 minutes in caring for this patient on the day of the visit/encounter. >30 minutes of time was spent on: Documenting in the medical record.    **Please Note: This note may have been constructed using a voice recognition system**

## 2025-07-08 NOTE — ANESTHESIA POSTPROCEDURE EVALUATION
Post-Op Assessment Note    CV Status:  Stable    Pain management: adequate       Mental Status:  Alert and awake   Hydration Status:  Euvolemic   PONV Controlled:  Controlled   Airway Patency:  Patent     Post Op Vitals Reviewed: Yes    No anethesia notable event occurred.    Staff: Anesthesiologist           Vitals:    07/08/25 0652   BP: 134/84   Pulse: 85   Resp: 18   Temp: 98.2 °F (36.8 °C)   SpO2: 98%

## 2025-07-08 NOTE — ASSESSMENT & PLAN NOTE
Lab Results   Component Value Date    HGBA1C 9.4 (H) 07/02/2025       Recent Labs     07/07/25  1040 07/07/25  1628 07/08/25  0607 07/08/25  1322   POCGLU 180* 115 103 97       Blood Sugar Average: Last 72 hrs:  (P) [id number]  Home meds: jardiance 10, metformin 1000 BID, lantus 25u HS, ozempic 0.25 weekly    Plan:  - currently on lantus 25 u HS  - SSI

## 2025-07-08 NOTE — PLAN OF CARE
Problem: PAIN - ADULT  Goal: Verbalizes/displays adequate comfort level or baseline comfort level  Description: Interventions:  - Encourage patient to monitor pain and request assistance  - Assess pain using appropriate pain scale  - Administer analgesics as ordered based on type and severity of pain and evaluate response  - Implement non-pharmacological measures as appropriate and evaluate response  - Consider cultural and social influences on pain and pain management  - Notify physician/advanced practitioner if interventions unsuccessful or patient reports new pain  - Educate patient/family on pain management process including their role and importance of  reporting pain   - Provide non-pharmacologic/complimentary pain relief interventions  7/8/2025 0035 by Sherly Ramon  Outcome: Progressing  7/8/2025 0035 by Sherly Ramon  Outcome: Progressing     Problem: INFECTION - ADULT  Goal: Absence or prevention of progression during hospitalization  Description: INTERVENTIONS:  - Assess and monitor for signs and symptoms of infection  - Monitor lab/diagnostic results  - Monitor all insertion sites, i.e. indwelling lines, tubes, and drains  - Monitor endotracheal if appropriate and nasal secretions for changes in amount and color  - [place] appropriate cooling/warming therapies per order  - Administer medications as ordered  - Instruct and encourage patient and family to use good hand hygiene technique  - Identify and instruct in appropriate isolation precautions for identified infection/condition  7/8/2025 0035 by Sherly Ramon  Outcome: Progressing  7/8/2025 0035 by Sherly Ramon  Outcome: Progressing     Problem: SAFETY ADULT  Goal: Patient will remain free of falls  Description: INTERVENTIONS:  - Educate patient/family on patient safety including physical limitations  - Instruct patient to call for assistance with activity   - Consider consulting OT/PT to assist with strengthening/mobility based on AM PAC  & JH-HLM score  - Consult OT/PT to assist with strengthening/mobility   - Keep Call bell within reach  - Keep bed low and locked with side rails adjusted as appropriate  - Keep care items and personal belongings within reach  - Initiate and maintain comfort rounds  - Make Fall Risk Sign visible to staff  - Offer Toileting every 2 Hours, in advance of need  - Initiate/Maintain bed alarm  - Obtain necessary fall risk management equipment: non skid footwear  - Apply yellow socks and bracelet for high fall risk patients  - Consider moving patient to room near nurses station  7/8/2025 0035 by Sherly Ramon  Outcome: Progressing  7/8/2025 0035 by Sherly Ramon  Outcome: Progressing  Goal: Maintain or return to baseline ADL function  Description: INTERVENTIONS:  -  Assess patient's ability to carry out ADLs; assess patient's baseline for ADL function and identify physical deficits which impact ability to perform ADLs (bathing, care of mouth/teeth, toileting, grooming, dressing, etc.)  - Assess/evaluate cause of self-care deficits   - Assess range of motion  - Assess patient's mobility; develop plan if impaired  - Assess patient's need for assistive devices and provide as appropriate  - Encourage maximum independence but intervene and supervise when necessary  - Involve family in performance of ADLs  - Assess for home care needs following discharge   - Consider OT consult to assist with ADL evaluation and planning for discharge  - Provide patient education as appropriate  - Monitor functional capacity and physical performance, use of AM PAC & JH-HLM   - Monitor gait, balance and fatigue with ambulation    7/8/2025 0035 by Sherly Ramon  Outcome: Progressing  7/8/2025 0035 by Sherly Ramon  Outcome: Progressing  Goal: Maintains/Returns to pre admission functional level  Description: INTERVENTIONS:  - Perform AM-PAC 6 Click Basic Mobility/ Daily Activity assessment daily.  - Set and communicate daily mobility goal to  care team and patient/family/caregiver.   - Collaborate with rehabilitation services on mobility goals if consulted  - Perform Range of Motion 2 times a day.  - Reposition patient every 2 hours.  - Dangle patient 2 times a day  - Stand patient 3 times a day  - Ambulate patient 3 times a day  - Out of bed to chair 3 times a day   - Out of bed for meals 2 times a day  - Out of bed for toileting  - Record patient progress and toleration of activity level   7/8/2025 0035 by Sherly Ramon  Outcome: Progressing  7/8/2025 0035 by Sherly Ramon  Outcome: Progressing     Problem: DISCHARGE PLANNING  Goal: Discharge to home or other facility with appropriate resources  Description: INTERVENTIONS:  - Identify barriers to discharge w/patient and caregiver  - Arrange for needed discharge resources and transportation as appropriate  - Identify discharge learning needs (meds, wound care, etc.)  - Arrange for interpretive services to assist at discharge as needed  - Refer to Case Management Department for coordinating discharge planning if the patient needs post-hospital services based on physician/advanced practitioner order or complex needs related to functional status, cognitive ability, or social support system  7/8/2025 0035 by Sherly Ramon  Outcome: Progressing  7/8/2025 0035 by Sherly Ramon  Outcome: Progressing     Problem: Knowledge Deficit  Goal: Patient/family/caregiver demonstrates understanding of disease process, treatment plan, medications, and discharge instructions  Description: Complete learning assessment and assess knowledge base.  Interventions:  - Provide teaching at level of understanding  - Provide teaching via preferred learning methods  7/8/2025 0035 by Sherly Ramon  Outcome: Progressing  7/8/2025 0035 by Sherly Ramon  Outcome: Progressing     Problem: Neurological Deficit  Goal: Neurological status is stable or improving  Description: Interventions:  - Monitor and assess patient's level of  consciousness, motor function, sensory function, and level of assistance needed for ADLs.   - Monitor and report changes from baseline. Collaborate with interdisciplinary team to initiate plan and implement interventions as ordered.   - Provide and maintain a safe environment.  - Consider seizure precautions.  - Consider fall precautions.  - Consider aspiration precautions.  - Consider bleeding precautions.  7/8/2025 0035 by Sherly Ramon  Outcome: Progressing  7/8/2025 0035 by Sherly Ramon  Outcome: Progressing     Problem: Activity Intolerance/Impaired Mobility  Goal: Mobility/activity is maintained at optimum level for patient  Description: Interventions:  - Assess and monitor patient  barriers to mobility and need for assistive/adaptive devices.  - Assess patient's emotional response to limitations.  - Collaborate with interdisciplinary team and initiate plans and interventions as ordered.  - Encourage independent activity per ability.  - Maintain proper body alignment.  - Perform active/passive rom as tolerated/ordered.  - Plan activities to conserve energy.  - Turn patient as appropriate  7/8/2025 0035 by Sherly Ramon  Outcome: Progressing  7/8/2025 0035 by Sherly Ramon  Outcome: Progressing     Problem: Communication Impairment  Goal: Ability to express needs and understand communication  Description: Assess patient's communication skills and ability to understand information.  Patient will demonstrate use of effective communication techniques, alternative methods of communication and understanding even if not able to speak.     - Encourage communication and provide alternate methods of communication as needed.  - Collaborate with case management/ for discharge needs.  - Include patient/family/caregiver in decisions related to communication.  7/8/2025 0035 by Sherly Ramon  Outcome: Progressing  7/8/2025 0035 by Sherly Ramon  Outcome: Progressing     Problem: Potential for  Aspiration  Goal: Non-ventilated patient's risk of aspiration is minimized  Description: Assess and monitor vital signs, respiratory status, and labs (WBC).  Monitor for signs of aspiration (tachypnea, cough, rales, wheezing, cyanosis, fever).    - Assess and monitor patient's ability to swallow.  - Place patient up in chair to eat if possible.  - HOB up at 90 degrees to eat if unable to get patient up into chair.  - Supervise patient during oral intake.   - Instruct patient/ family to take small bites.  - Instruct patient/ family to take small single sips when taking liquids.  - Follow patient-specific strategies generated by speech pathologist.  7/8/2025 0035 by Sherly Ramon  Outcome: Progressing  7/8/2025 0035 by Sherly Ramon  Outcome: Progressing     Problem: Nutrition  Goal: Nutrition/Hydration status is improving  Description: Monitor and assess patient's nutrition/hydration status for malnutrition (ex- brittle hair, bruises, dry skin, pale skin and conjunctiva, muscle wasting, smooth red tongue, and disorientation). Collaborate with interdisciplinary team and initiate plan and interventions as ordered.  Monitor patient's weight and dietary intake as ordered or per policy. Utilize nutrition screening tool and intervene per policy. Determine patient's food preferences and provide high-protein, high-caloric foods as appropriate.     - Assist patient with eating.  - Allow adequate time for meals.  - Encourage patient to take dietary supplement as ordered.  - Collaborate with clinical nutritionist.  - Include patient/family/caregiver in decisions related to nutrition.  7/8/2025 0035 by Sherly Ramon  Outcome: Progressing  7/8/2025 0035 by Sherly Ramon  Outcome: Progressing

## 2025-07-08 NOTE — ASSESSMENT & PLAN NOTE
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 S/p ASA and Plavix load. Was in NCC receiving HTS and permissive HTN. Due to worsening sx and requirement of HTS bolus, transferred to [facility] for neurology eval.   - 7/1 CTA head and neck: acute R PICA infarct, new R vertebral artery occlusion, high grade stenosis involving intradural R vertebral artery; stable stent from previous aortic coarctation repair  - 7/2 MRI brain: acute R inferior cerebellum wo hemorrhage  - 7/2 MRA head: focal severe stenosis vs occlusion of intracranial R vertebral artery  - 7/2 echo: mildly increased wall thickness, EF 55%, no PFO.   - 7/3 CT head: evolving R cerebellar infarct with persistent mass effect  - 7/5 MRI vessel wall: evolution of recent R PICA infarct with worsened petechial cortical hemorrhage. Diffuse wall thickening in R vertebral artery with occlusive thrombus, likely represents reactive change related to vertebral artery dissection. Vasculitis less likely  - 7/6 CT CAP with no evidence of malignancy within the limitations of the study    Plan:  - Neurology consulted, appreciate recs   - DAPT x 21 days followed by ASA monotherapy   - currently ASA 81, Brillinta 90 BID   - permissive HTN up to systolics 200   - lipitor 40    - dvt ppx heparin SQ   - hypercoagulable workup in 6-8 weeks  - per NCC, recommendation of P2Y12 level check on 7/7 PM or 7/8AM.    - ordered for 7/8 AM  - TERESA scheduled Tuesday 7/8 @10AM   - No significant abnormalities on TERESA, patient deemed appropriate for discharge

## 2025-07-08 NOTE — ANESTHESIA POSTPROCEDURE EVALUATION
Post-Op Assessment Note    CV Status:  Stable  Pain Score: 0    Pain management: adequate       Mental Status:  Awake, sleepy and arousable   Hydration Status:  Euvolemic   PONV Controlled:  None   Airway Patency:  Patent  Two or more mitigation strategies used for obstructive sleep apnea   Post Op Vitals Reviewed: Yes    No anethesia notable event occurred.    Staff: Anesthesiologist, CRNA   Comments: report given to RN; VSS; RA          Last Filed PACU Vitals:  Vitals Value Taken Time   Temp     Pulse 78    /56    Resp 18    SpO2 97

## 2025-07-08 NOTE — TELEPHONE ENCOUNTER
STILL ADMITTED;7/3/2025 - present (5 days)  [facility]    1ST ATTEMPT,     VIA [employer]     Thank you,     Tasha       U/ DANIA BAXTER/ STROKE     ----- Message from An REJI Orona sent at 7/8/2025  2:56 PM EDT -----  Regarding: HFU  Jaycee Can will need follow-up in in 6 weeks with neurovascular team for Other = ATTENDING in 60 minute appointment. They will not require outpatient neurological testing.

## 2025-07-08 NOTE — ANESTHESIA PREPROCEDURE EVALUATION
Procedure:  TERESA    Relevant Problems   CARDIO   (+) Aortic coarctation   (+) 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HCC)   (+) HTN (hypertension)      ENDO   (+) T2DM (type 2 diabetes mellitus) (HCC)      GI/HEPATIC   (+) Chronic hepatitis C without hepatic coma (HCC)   (+) GERD (gastroesophageal reflux disease)   (+) Hepatitis C      NEURO/PSYCH   (+) Acute headache   (+) Anxiety and depression   (+) Continuous opioid dependence (HCC)   (+) Stroke (cerebrum) (HCC)      PULMONARY   (+) Asthma   (+) Dyspnea, unspecified type   (+) Mild intermittent asthma without complication      Other   (+) Splenomegaly        Physical Exam    Airway     Mallampati score: II  TM Distance: >3 FB  Neck ROM: full      Cardiovascular  Cardiovascular exam normal    Dental   No notable dental hx     Pulmonary  Pulmonary exam normal     Neurological  - normal exam    Other Findings  post-pubertal.      Anesthesia Plan  ASA Score- 4     Anesthesia Type- IV sedation with anesthesia with ASA Monitors.         Additional Monitors:     Airway Plan:            Plan Factors-Exercise tolerance (METS): >4 METS.    Chart reviewed. EKG reviewed. Imaging results reviewed. Existing labs reviewed. Patient summary reviewed.    Patient is a current smoker.  Patient instructed to abstain from smoking on day of procedure. Patient did not smoke on day of surgery.            Induction- intravenous.    Postoperative Plan- .   Monitoring Plan - Monitoring plan - standard ASA monitoring  Post Operative Pain Plan - non-opiod analgesics    Perioperative Resuscitation Plan - Level 1 - Full Code.       Informed Consent- Anesthetic plan and risks discussed with patient.        NPO Status:  No vitals data found for the desired time range.

## 2025-07-08 NOTE — QUICK NOTE
TERESA completed and official read pending. If TERESA without significant findings, then recommend patient follow up outpatient with her Cardiologist for Zio patch/loop recorder placement for further workup of embolic stroke workup along with hypercoagulable panel in 6-8 weeks. Continue DAPT with aspirin 81 mg daily and Brilinta 90 mg BID x 21 days, then transition to aspirin monotherapy. Continue atorvastatin 40 mg. PT/OT/ST. Monitor neuro exam; notify with any changes. Medical management and supportive care per primary team, including correction of any metabolic or infectious disturbances. No further inpatient neurology recommendations at this time. Please call with any questions. Case and treatment plan reviewed with attending neurologist, Dr. Goldstein. Please see attending attestation for any further recommendations.        Jaycee Can will need follow-up in in 6 weeks with neurovascular team for Other in 60 minute appointment. They will not require outpatient neurological testing.

## 2025-07-08 NOTE — RESTORATIVE TECHNICIAN NOTE
Restorative Technician Note      Patient Name: Jaycee Can     Note Type: Mobility  Patient Position Upon Consult: Supine  Activity Performed: Ambulated; Stood; Dangled  Assistive Device: Other (Comment) (none)  Education Provided: Yes  Patient Position at End of Consult: Supine; All needs within reach; Other (comment) (Per nursing pt signed No Bed/Chair alarm waiver.)    Carlos Alberto LEES, Restorative Technician,

## 2025-07-09 ENCOUNTER — PATIENT OUTREACH (OUTPATIENT)
Dept: CASE MANAGEMENT | Facility: OTHER | Age: 35
End: 2025-07-09

## 2025-07-09 LAB
F2 GENE MUT ANL BLD/T: NORMAL
F5 GENE MUT ANL BLD/T: NORMAL
Lab: NORMAL
Lab: NORMAL
MISCELLANEOUS LAB TEST RESULT: NORMAL

## 2025-07-09 NOTE — PROGRESS NOTES
HRR referral received. Chart reviewed.  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  She discharged home to self care.  I spoke with Kathy who does not feel she is at baseline. She does not have her energy back. I advised her that is normal after hospitalization.  She denies any stroke like symptoms. We reviewed the signs and she was advised to call 911 if they occur.  She will make her follow up appointments she reports. She has transportation.  We reviewed her medications.   She has my contact information. She did consent to another call.

## 2025-07-09 NOTE — UTILIZATION REVIEW
NOTIFICATION OF ADMISSION DISCHARGE   [facility]. Please be advised that this patient has been discharge from our facility. Below you will find the admission and discharge date and time including the patient’s disposition.   UTILIZATION REVIEW CONTACT:  Utilization Review Assistants  Network Utilization Review Department  Phone: [phone] x carefully listen to the prompts. All voicemails are confidential.  Email: [email or web address]     ADMISSION INFORMATION  PRESENTATION DATE: 7/3/2025  7:54 PM  OBERVATION ADMISSION DATE: N/A  INPATIENT ADMISSION DATE: 7/3/25  7:54 PM   DISCHARGE DATE: 7/8/2025  6:03 PM   DISPOSITION:Home/Self Care    Network Utilization Review Department  ATTENTION: Please call with any questions or concerns to [phone] and carefully listen to the prompts so that you are directed to the right person. All voicemails are confidential.   For Discharge needs, contact Care Management DC Support Team at [phone] opt. 2  Send all requests for admission clinical reviews, approved or denied determinations and any other requests to dedicated fax number below belonging to the campus where the patient is receiving treatment. List of dedicated fax numbers for the Facilities:  FACILITY NAME UR FAX NUMBER   ADMISSION DENIALS (Administrative/Medical Necessity) [phone]   DISCHARGE SUPPORT TEAM ([facility]) [phone]   PARENT CHILD HEALTH (Maternity/NICU/Pediatrics) [phone]   [facility] [phone]   [facility] [phone]   [facility] [phone]   [facility] [phone]   [facility] [phone]   [facility] [phone]   [facility] [phone]   [facility] [phone]   [facility]  [facility] [phone]   [facility] [phone]   [facility] [phone]   [facility] [phone]

## 2025-07-10 ENCOUNTER — TELEPHONE (OUTPATIENT)
Dept: NEUROLOGY | Facility: CLINIC | Age: 35
End: 2025-07-10

## 2025-07-10 NOTE — TELEPHONE ENCOUNTER
Pt called to schedule HFU for November 19th at 12pm in [place] with Dr. Judge. Pt was added high priority on wait list.

## 2025-07-10 NOTE — TELEPHONE ENCOUNTER
Follow Up Call Neurology s/p CVA 5 day:  Hospitalization:  07/03/25-07/08/2025    S.w patient who reports doing well with no new or worsening stroke like symptoms .     Patient has successfully scheduled with PCP.    Medications: Reviewed DAPT with patient she is compliant and aware of instructions. Will continue with asa 81mg and Brilinta 90mg for the 21 days then continue with ASA 81 monotherapy.     She doyle snot feel like she needs PT/OT or ST and said when seen in hospital they also said she probably didn't need to continue.     Patient did not have any questions and will call to schedule neurology hfu

## 2025-07-11 LAB — CRYOGLOB SER QL 1D COLD INC: POSITIVE

## 2025-07-14 ENCOUNTER — TELEPHONE (OUTPATIENT)
Dept: FAMILY MEDICINE CLINIC | Facility: CLINIC | Age: 35
End: 2025-07-14

## 2025-07-14 ENCOUNTER — OFFICE VISIT (OUTPATIENT)
Dept: FAMILY MEDICINE CLINIC | Facility: CLINIC | Age: 35
End: 2025-07-14
Payer: COMMERCIAL

## 2025-07-14 VITALS
HEART RATE: 107 BPM | SYSTOLIC BLOOD PRESSURE: 128 MMHG | BODY MASS INDEX: 40.22 KG/M2 | WEIGHT: 213 LBS | DIASTOLIC BLOOD PRESSURE: 80 MMHG | TEMPERATURE: 98 F | HEIGHT: 61 IN | OXYGEN SATURATION: 98 %

## 2025-07-14 DIAGNOSIS — Z76.89 ENCOUNTER FOR SUPPORT AND COORDINATION OF TRANSITION OF CARE: Primary | ICD-10-CM

## 2025-07-14 DIAGNOSIS — I10 HYPERTENSION, UNSPECIFIED TYPE: ICD-10-CM

## 2025-07-14 DIAGNOSIS — I63.211 CEREBROVASCULAR ACCIDENT (CVA) DUE TO OCCLUSION OF RIGHT VERTEBRAL ARTERY (HCC): ICD-10-CM

## 2025-07-14 LAB
B2 GLYCOPROT1 IGA SERPL IA-ACNC: 3.1
B2 GLYCOPROT1 IGG SERPL IA-ACNC: 1.1
B2 GLYCOPROT1 IGM SERPL IA-ACNC: <2.4
CARDIOLIPIN IGA SER IA-ACNC: 3.9
CARDIOLIPIN IGG SER IA-ACNC: 44
CARDIOLIPIN IGM SER IA-ACNC: 4.2
DSDNA IGG SERPL IA-ACNC: 1.6 IU/ML (ref ?–15)
NUCLEAR IGG SER IA-RTO: 0.2 RATIO (ref ?–1)

## 2025-07-14 PROCEDURE — 99495 TRANSJ CARE MGMT MOD F2F 14D: CPT | Performed by: NURSE PRACTITIONER

## 2025-07-14 RX ORDER — BLOOD PRESSURE TEST KIT
KIT MISCELLANEOUS DAILY
Qty: 1 KIT | Refills: 0 | Status: SHIPPED | OUTPATIENT
Start: 2025-07-14

## 2025-07-14 NOTE — ASSESSMENT & PLAN NOTE
- no symptoms at this time. Did have vomiting in the hospital. It resolved and then she had a small episode of vomiting this morning- things it is related to starting her period last night as this happens on her cycle sometimes.   She has no abdominal pain, nausea, vomiting since. She has no neurological symptoms. Her neurological exam is completely normal.   S/s of when to return to ER reviewed.   Orders:  •  Blood Pressure KIT; Use in the morning  •  AMB E-CONSULT TO HEMATOLOGY

## 2025-07-14 NOTE — PROGRESS NOTES
Transition of Care Visit:  Name: Jaycee Can      : 1990      MRN: [id number]  Encounter Provider: REJI Ferrell  Encounter Date: 2025   Encounter department: [facility]    Assessment & Plan  Encounter for support and coordination of transition of care  7/3/2025 through 2025 with the admitting diagnosis of CVA.  She initially presented to [facility] on  with headache, nausea, dizziness, and was found to have right cerebellar hypodensity on CT.  CTA showed right vertebral artery occlusion and high-grade stenosis.  Symptoms had been going on for some time so she was deemed not to be an adequate candidate for TNK or thrombectomy.  MRI brain  confirmed right inferior cerebellum infarct.  She was loaded with DAPT and recommended for transfer to [facility] for neurocritical care management.  While while awaiting transfer for neurocritical care, she was given hypertonic saline.  She arrived to [facility] on 7/3 where she received hypertonic saline with a bolus.  Repeat imaging generally stable and MR vessel wall imaging showed some cortical petechial hemorrhage that neurology feels is expected given the size of her stroke.     After she was deemed appropriate for transfer to the floor, she was switched to Brilinta with plans to do 28 days of DAPT before continuing aspirin monotherapy.  She tolerated this and TERESA did not show any structural cardiac defect.  She underwent a CT chest, abdomen, and pelvis which showed multiple incidental findings including known aortic dissection which was unchanged since .  No obvious malignancy noted.  She was started on a hypercoagulable workup, some components of which are still pending at this time.  She is to follow-up with stroke neurology after discharge and will have a repeat hypercoagulable workup done in the outpatient setting.  She was discharged home in reasonably good health.  She is to avoid heavy lifting for 1 month  and the return to work in 2 weeks.  She was recommended for cardiac event monitoring through her [facility] cardiologist.     Medication Changes:   · Increase Lipitor to 40 mg at bedtime; low fat diet as instructed.   · Decrease lisinopril to 10 mg daily, blood pressure is stable today. Given RX To get a blood pressure cuff to monitor at home.   · Brilinta 90 mg p.o. twice daily started.  To be continued with aspirin for 21 days after which aspirin is to be continued as monotherapy   · Meclizine as needed dizziness- only needed to use this one time last week and she has not had any dizziness since.       · Outpatient neurology follow-up   · Follow-up hypercoagulable panel; repeat panel in 6 to 8 weeks, referral for e-consult to hem/onc placed for their recommendations and to see if they would like to see her for follow up in the office given her hx.   · Recommend discussion with your [facility] cardiologist regarding Zio patch or cardiac event monitoring   · No NSAID medication such as naproxen, motrin, aleve etc. until follow-up with neurology/cardiology   · No heavy lifting for 1 month   · Return to work in 2 weeks       Stopped her Ozempic due to side effects, has labs for visit in 2 weeks. Will determine medication changes at next appointment.        Cerebrovascular accident (CVA) due to occlusion of right vertebral artery (HCC)  - no symptoms at this time. Did have vomiting in the hospital. It resolved and then she had a small episode of vomiting this morning- things it is related to starting her period last night as this happens on her cycle sometimes.   She has no abdominal pain, nausea, vomiting since. She has no neurological symptoms. Her neurological exam is completely normal.   S/s of when to return to ER reviewed.   Orders:  •  Blood Pressure KIT; Use in the morning  •  AMB E-CONSULT TO HEMATOLOGY    Hypertension, unspecified type    Orders:  •  Blood Pressure KIT; Use in the morning         History of Present  "Illness     Transitional Care Management Review:   Jaycee Can is a 35 y.o. female here for TCM follow up.     During the TCM phone call patient stated:  TCM Call (since 6/30/2025)     Date and time call was made  7/7/2025  7:08 AM    Hospital care reviewed  Records reviewed    Patient was hospitialized at  [facility]    Date of Admission  07/01/25    Date of discharge  07/03/25    Diagnosis  CVA (cerebral vascular accident)    Disposition  Home    Were the patients medications reviewed and updated  Yes      TCM Call (since 6/30/2025)     Scheduled for follow up?  Yes    Did you obtain your prescribed medications  Yes    Do you need help managing your prescriptions or medications  No    Is transportation to your appointment needed  No    I have advised the patient to call PCP with any new or worsening symptoms  KATHLEEN Mo        HPI  Review of Systems   Constitutional:  Negative for chills and fever.   HENT:  Negative for ear pain and sore throat.    Eyes:  Negative for pain and visual disturbance.   Respiratory:  Negative for cough and shortness of breath.    Cardiovascular:  Negative for chest pain and palpitations.   Gastrointestinal:  Negative for abdominal pain and vomiting.   Genitourinary:  Negative for dysuria and hematuria.   Musculoskeletal:  Negative for arthralgias and back pain.   Skin:  Negative for color change and rash.   Neurological:  Negative for seizures and syncope.   All other systems reviewed and are negative.    Objective   /80   Pulse (!) 107   Temp 98 °F (36.7 °C)   Ht 5' 1\" (1.549 m)   Wt 96.6 kg (213 lb)   LMP 06/20/2025 (Approximate)   SpO2 98%   BMI 40.25 kg/m²     Physical Exam  Vitals and nursing note reviewed.   Constitutional:       General: She is not in acute distress.     Appearance: She is well-developed.   HENT:      Head: Normocephalic and atraumatic.     Eyes:      General:         Right eye: No discharge.         Left eye: No discharge.    "   Extraocular Movements: Extraocular movements intact.      Conjunctiva/sclera: Conjunctivae normal.      Pupils: Pupils are equal, round, and reactive to light.       Cardiovascular:      Rate and Rhythm: Normal rate and regular rhythm.      Pulses: Normal pulses.      Heart sounds: Normal heart sounds. No murmur heard.  Pulmonary:      Effort: Pulmonary effort is normal. No respiratory distress.      Breath sounds: Normal breath sounds.   Abdominal:      Palpations: Abdomen is soft.      Tenderness: There is no abdominal tenderness.     Musculoskeletal:         General: No swelling.      Cervical back: Neck supple.     Skin:     General: Skin is warm and dry.      Capillary Refill: Capillary refill takes less than 2 seconds.     Neurological:      General: No focal deficit present.      Mental Status: She is alert and oriented to person, place, and time. Mental status is at baseline.      Cranial Nerves: No cranial nerve deficit.      Sensory: No sensory deficit.      Motor: No weakness.      Coordination: Coordination normal.      Gait: Gait normal.     Psychiatric:         Mood and Affect: Mood normal.         Behavior: Behavior normal.         Thought Content: Thought content normal.         Judgment: Judgment normal.       Medications have been reviewed by provider in current encounter

## 2025-07-14 NOTE — TELEPHONE ENCOUNTER
----- Message from REJI Munoz sent at 7/14/2025  1:53 PM EDT -----  Please let patient know she is to continue with asa 81mg and Brilinta 90mg for the 21 days then continue with ASA 81 monotherapy.

## 2025-07-14 NOTE — PROGRESS NOTES
E-Consult unable to be completed.  Request too complex for E-Consult. Please place ambulatory referral for patient to be seen in person.    Young patient with stroke, anticardiolipin IgG is positive at 42.    Staff - Please schedule visit with MD only ASAP.

## 2025-07-16 ENCOUNTER — PATIENT OUTREACH (OUTPATIENT)
Dept: CASE MANAGEMENT | Facility: OTHER | Age: 35
End: 2025-07-16

## 2025-07-17 ENCOUNTER — TELEPHONE (OUTPATIENT)
Age: 35
End: 2025-07-17

## 2025-07-17 ENCOUNTER — TELEPHONE (OUTPATIENT)
Dept: FAMILY MEDICINE CLINIC | Facility: CLINIC | Age: 35
End: 2025-07-17

## 2025-07-17 NOTE — TELEPHONE ENCOUNTER
Pt is still vomiting since Monday and zofran is not helping, pt doesn't want to go back to the hospital please advise

## 2025-07-17 NOTE — TELEPHONE ENCOUNTER
Left voicemailmail for pt to give office a call back..    Please relay message.         Kathy Can contacted ALIX Garcia MA  LB    7/17/25  2:40 PM  Note     Pt is still vomiting since Monday and zofran is not helping, pt doesn't want to go back to the hospital please advise          7/17/25  2:40 PM  Therese Brito MA routed this conversation to [facility]     7/17/25  3:27 PM  Naty Singleton MA routed this conversation to REJI Ferrell CRNP to [facility]         7/17/25  3:48 PM  Due to hx, I would advise she return to ER for evaluation.

## 2025-07-19 ENCOUNTER — APPOINTMENT (EMERGENCY)
Dept: CT IMAGING | Facility: HOSPITAL | Age: 35
End: 2025-07-19
Payer: COMMERCIAL

## 2025-07-19 ENCOUNTER — HOSPITAL ENCOUNTER (EMERGENCY)
Facility: HOSPITAL | Age: 35
Discharge: HOME | End: 2025-07-19
Attending: EMERGENCY MEDICINE | Admitting: EMERGENCY MEDICINE
Payer: COMMERCIAL

## 2025-07-19 VITALS
WEIGHT: 213 LBS | DIASTOLIC BLOOD PRESSURE: 68 MMHG | HEART RATE: 107 BPM | RESPIRATION RATE: 20 BRPM | BODY MASS INDEX: 40.22 KG/M2 | SYSTOLIC BLOOD PRESSURE: 155 MMHG | OXYGEN SATURATION: 98 % | TEMPERATURE: 98.3 F | HEIGHT: 61 IN

## 2025-07-19 DIAGNOSIS — R29.90 STROKE-LIKE SYMPTOMS: Primary | ICD-10-CM

## 2025-07-19 DIAGNOSIS — E86.0 DEHYDRATION: ICD-10-CM

## 2025-07-19 DIAGNOSIS — Z86.73 HISTORY OF RECENT STROKE: ICD-10-CM

## 2025-07-19 DIAGNOSIS — R11.2 NAUSEA & VOMITING: ICD-10-CM

## 2025-07-19 PROBLEM — H53.2 DIPLOPIA: Status: ACTIVE | Noted: 2025-07-19

## 2025-07-19 LAB
ANION GAP SERPL CALCULATED.3IONS-SCNC: 20 MMOL/L (ref 4–13)
APTT PPP: 24 SECONDS (ref 23–34)
BUN SERPL-MCNC: 17 MG/DL (ref 5–25)
CALCIUM SERPL-MCNC: 10.1 MG/DL (ref 8.4–10.2)
CARDIAC TROPONIN I PNL SERPL HS: 4 NG/L (ref ?–50)
CHLORIDE SERPL-SCNC: 94 MMOL/L (ref 96–108)
CO2 SERPL-SCNC: 22 MMOL/L (ref 21–32)
CREAT SERPL-MCNC: 0.76 MG/DL (ref 0.6–1.3)
ERYTHROCYTE [DISTWIDTH] IN BLOOD BY AUTOMATED COUNT: 14.7 % (ref 11.6–15.1)
GFR SERPL CREATININE-BSD FRML MDRD: 100 ML/MIN/1.73SQ M
GLUCOSE SERPL-MCNC: 266 MG/DL (ref 65–140)
GLUCOSE SERPL-MCNC: 274 MG/DL (ref 65–140)
HCT VFR BLD AUTO: 47.8 % (ref 34.8–46.1)
HGB BLD-MCNC: 15.7 G/DL (ref 11.5–15.4)
INR PPP: 1.05 (ref 0.85–1.19)
MCH RBC QN AUTO: 25.9 PG (ref 26.8–34.3)
MCHC RBC AUTO-ENTMCNC: 32.8 G/DL (ref 31.4–37.4)
MCV RBC AUTO: 79 FL (ref 82–98)
PLATELET # BLD AUTO: 415 THOUSANDS/UL (ref 149–390)
PMV BLD AUTO: 10.6 FL (ref 8.9–12.7)
POTASSIUM SERPL-SCNC: 3.6 MMOL/L (ref 3.5–5.3)
PROTHROMBIN TIME: 14.2 SECONDS (ref 12.3–15)
RBC # BLD AUTO: 6.06 MILLION/UL (ref 3.81–5.12)
SODIUM SERPL-SCNC: 136 MMOL/L (ref 135–147)
WBC # BLD AUTO: 13.55 THOUSAND/UL (ref 4.31–10.16)

## 2025-07-19 PROCEDURE — 85610 PROTHROMBIN TIME: CPT | Performed by: EMERGENCY MEDICINE

## 2025-07-19 PROCEDURE — 36415 COLL VENOUS BLD VENIPUNCTURE: CPT | Performed by: EMERGENCY MEDICINE

## 2025-07-19 PROCEDURE — 99244 OFF/OP CNSLTJ NEW/EST MOD 40: CPT | Performed by: PSYCHIATRY & NEUROLOGY

## 2025-07-19 PROCEDURE — 99285 EMERGENCY DEPT VISIT HI MDM: CPT

## 2025-07-19 PROCEDURE — 70496 CT ANGIOGRAPHY HEAD: CPT

## 2025-07-19 PROCEDURE — 85730 THROMBOPLASTIN TIME PARTIAL: CPT | Performed by: EMERGENCY MEDICINE

## 2025-07-19 PROCEDURE — 82948 REAGENT STRIP/BLOOD GLUCOSE: CPT

## 2025-07-19 PROCEDURE — 84484 ASSAY OF TROPONIN QUANT: CPT | Performed by: EMERGENCY MEDICINE

## 2025-07-19 PROCEDURE — 93005 ELECTROCARDIOGRAM TRACING: CPT

## 2025-07-19 PROCEDURE — 85027 COMPLETE CBC AUTOMATED: CPT | Performed by: EMERGENCY MEDICINE

## 2025-07-19 PROCEDURE — 99291 CRITICAL CARE FIRST HOUR: CPT | Performed by: EMERGENCY MEDICINE

## 2025-07-19 PROCEDURE — 96374 THER/PROPH/DIAG INJ IV PUSH: CPT

## 2025-07-19 PROCEDURE — 70498 CT ANGIOGRAPHY NECK: CPT

## 2025-07-19 PROCEDURE — 96361 HYDRATE IV INFUSION ADD-ON: CPT

## 2025-07-19 PROCEDURE — 80048 BASIC METABOLIC PNL TOTAL CA: CPT | Performed by: EMERGENCY MEDICINE

## 2025-07-19 PROCEDURE — 96375 TX/PRO/DX INJ NEW DRUG ADDON: CPT

## 2025-07-19 RX ORDER — METOCLOPRAMIDE HYDROCHLORIDE 5 MG/ML
10 INJECTION INTRAMUSCULAR; INTRAVENOUS ONCE
Status: COMPLETED | OUTPATIENT
Start: 2025-07-19 | End: 2025-07-19

## 2025-07-19 RX ORDER — DIPHENHYDRAMINE HYDROCHLORIDE 50 MG/ML
25 INJECTION, SOLUTION INTRAMUSCULAR; INTRAVENOUS ONCE
Status: COMPLETED | OUTPATIENT
Start: 2025-07-19 | End: 2025-07-19

## 2025-07-19 RX ORDER — PROCHLORPERAZINE MALEATE 10 MG
10 TABLET ORAL 2 TIMES DAILY PRN
Qty: 10 TABLET | Refills: 0 | Status: SHIPPED | OUTPATIENT
Start: 2025-07-19

## 2025-07-19 RX ORDER — ONDANSETRON 2 MG/ML
4 INJECTION INTRAMUSCULAR; INTRAVENOUS ONCE
Status: COMPLETED | OUTPATIENT
Start: 2025-07-19 | End: 2025-07-19

## 2025-07-19 RX ADMIN — METOCLOPRAMIDE 10 MG: 5 INJECTION, SOLUTION INTRAMUSCULAR; INTRAVENOUS at 11:24

## 2025-07-19 RX ADMIN — ONDANSETRON 4 MG: 2 INJECTION INTRAMUSCULAR; INTRAVENOUS at 11:01

## 2025-07-19 RX ADMIN — IOHEXOL 75 ML: 350 INJECTION, SOLUTION INTRAVENOUS at 11:11

## 2025-07-19 RX ADMIN — SODIUM CHLORIDE 1000 ML: 0.9 INJECTION, SOLUTION INTRAVENOUS at 11:29

## 2025-07-19 RX ADMIN — DIPHENHYDRAMINE HYDROCHLORIDE 25 MG: 50 INJECTION, SOLUTION INTRAMUSCULAR; INTRAVENOUS at 11:24

## 2025-07-19 NOTE — ASSESSMENT & PLAN NOTE
Intermittent, lasting few seconds.  - Almost certainly related to recent right cerebellar stroke.  - Similarly patient's nausea is likely related to recent cerebellar stroke.  - Would continue antiemetics.  - Although not endorsing currently, patient is also certainly at increased risk exacerbation of underlying migraine disorder given recent stroke.  Given good response to antiemetic, would recommend migraine cocktail should patient develop migraine-like phenomenon.  -No clear need for any additional neuro imaging or workup at this time.  - Okay to discharge from a neurologic standpoint.  - Would provide prescription for antiemetics, preferably ondansetron ODT.  - Please call with question/concerns  - Patient already has appointment with neurology scheduled for November.

## 2025-07-19 NOTE — ED PROVIDER NOTES
Time reflects when diagnosis was documented in both MDM as applicable and the Disposition within this note       Time User Action Codes Description Comment    7/19/2025 10:59 AM Quique Crenshaw [R29.90] Stroke-like symptoms     7/19/2025  1:23 PM Quique Crenshaw [E86.0] Dehydration     7/19/2025  1:23 PM Quique Crenshaw [R11.2] Nausea & vomiting     7/19/2025  1:33 PM Quique Crenshaw [Z86.73] History of recent stroke           ED Disposition       ED Disposition   Discharge    Condition   Stable    Date/Time   Sat Jul 19, 2025  1:22 PM    Comment   Jaycee JAFFE Juan José discharge to home/self care.                   Assessment & Plan       Medical Decision Making  Patient with recent admission for right-sided cerebellar CVA, coarctation of aorta, presents with nausea, vomiting over the last 5 days.  Patient states today she woke up and developed intermittent double vision out of both eyes.  It is not currently present.    Patient NIH of 0.  She has no neurologic deficits however given her complaints double vision which has been intermittent, as well as recent CVA stroke alert was called out of abundance of caution and concern for worsening CVA, hemorrhagic conversion etc.    Discussed case with stroke neurologist on-call, Dr. Whittaker, recommended migraine therapy as this could worsen pre-existing migraines.    Patient did feel better after therapy in the emergency department.  Heart rate improved with IV fluid.  Remained slightly tachycardic but she says that this is not unusual for her.  She exhibited no neurologic symptoms while she was here.  No acute intervention per Dr. Whittaker, patient is stable for outpatient treatment with antiemetics and she is comfortable with that plan with very strict return precautions.    Problems Addressed:  Dehydration: acute illness or injury  History of recent stroke: chronic illness or injury with severe exacerbation, progression, or side effects of  treatment  Nausea & vomiting: acute illness or injury  Stroke-like symptoms: acute illness or injury    Amount and/or Complexity of Data Reviewed  Labs: ordered. Decision-making details documented in ED Course.  Radiology: ordered and independent interpretation performed. Decision-making details documented in ED Course.    Risk  Prescription drug management.  Decision regarding hospitalization.        ED Course as of 07/19/25 1400   Sat Jul 19, 2025   1101 Made stroke alert due to recent history and intermittent symptoms, however NIH is currently 0   1140 Not a candidate for TNK   1241 Discussed with Dr Whittaker; likely from prior cva, ct unchanged. Patient feeling better. She is stable for discharge from neuro standpoint. Will give additional IV fluid for dehydration   1359 Critical Care Time Statement: Upon my evaluation, this patient had a high probability of imminent or life-threatening deterioration due to stroke like symptoms, which required my direct attention, intervention, and personal management.  I spent a total of 40 minutes directly providing critical care services, including interpretation of complex medical databases, evaluating for the presence of life-threatening injuries or illnesses, and complex medical decision making (to support/prevent further life-threatening deterioration).. This time is exclusive of procedures, teaching, treating other patients, family meetings, and any prior time recorded by providers other than myself.           Medications   sodium chloride 0.9 % bolus 1,000 mL (0 mL Intravenous Stopped 7/19/25 1329)   ondansetron (ZOFRAN) injection 4 mg (4 mg Intravenous Given 7/19/25 1101)   metoclopramide (REGLAN) injection 10 mg (10 mg Intravenous Given 7/19/25 1124)   diphenhydrAMINE (BENADRYL) injection 25 mg (25 mg Intravenous Given 7/19/25 1124)   iohexol (OMNIPAQUE) 350 MG/ML injection (MULTI-DOSE) 75 mL (75 mL Intravenous Given 7/19/25 1111)       ED Risk Strat Scores          Stroke Assessment       Row Name 07/19/25 1139             NIH Stroke Scale    Interval Baseline      Level of Consciousness (1a.) 0      LOC Questions (1b.) 0      LOC Commands (1c.) 0      Best Gaze (2.) 0      Visual (3.) 0      Facial Palsy (4.) 0      Motor Arm, Left (5a.) 0      Motor Arm, Right (5b.) 0      Motor Leg, Left (6a.) 0      Motor Leg, Right (6b.) 0      Limb Ataxia (7.) 0      Sensory (8.) 0      Best Language (9.) 0      Dysarthria (10.) 0      Extinction and Inattention (11.) (Formerly Neglect) 0      Total 0                    Flowsheet Row Most Recent Value   Thrombolytic Decision Options    Thrombolytic Decision Patient not a candidate.   Patient is not a candidate options Bleeding risk., Recent significant trauma/stroke.                    No data recorded        SBIRT 20yo+      Flowsheet Row Most Recent Value   Initial Alcohol Screen: US AUDIT-C     1. How often do you have a drink containing alcohol? 0 Filed at: 07/19/2025 1151   2. How many drinks containing alcohol do you have on a typical day you are drinking?  0 Filed at: 07/19/2025 1151   3b. FEMALE Any Age, or MALE 65+: How often do you have 4 or more drinks on one occassion? 0 Filed at: 07/19/2025 1151   Audit-C Score 0 Filed at: 07/19/2025 1151   JENNIFER: How many times in the past year have you...    Used an illegal drug or used a prescription medication for non-medical reasons? Never Filed at: 07/19/2025 1151                            History of Present Illness       Chief Complaint   Patient presents with    Vomiting    Hyperventilating    Diplopia       Past Medical History[1]   Past Surgical History[2]   Family History[3]   Social History[4]   E-Cigarette/Vaping    E-Cigarette Use Never User       E-Cigarette/Vaping Substances    Nicotine No     THC No     CBD No     Flavoring No       I have reviewed and agree with the history as documented.       Vomiting      Review of Systems   Gastrointestinal:  Positive for vomiting.            Objective       ED Triage Vitals   Temperature Pulse Blood Pressure Respirations SpO2 Patient Position - Orthostatic VS   07/19/25 1043 07/19/25 1043 07/19/25 1043 07/19/25 1043 07/19/25 1043 07/19/25 1113   97.7 °F (36.5 °C) (!) 118 142/89 (!) 26 98 % Lying      Temp Source Heart Rate Source BP Location FiO2 (%) Pain Score    07/19/25 1043 07/19/25 1043 07/19/25 1043 -- 07/19/25 1043    Temporal Monitor Left arm  No Pain      Vitals      Date and Time Temp Pulse SpO2 Resp BP Pain Score FACES Pain Rating User   07/19/25 1348 98.3 °F (36.8 °C) 107 98 % 20 155/68 -- -- JZ   07/19/25 1330 -- 107 96 % 20 159/69 -- -- JZ   07/19/25 1315 -- 106 96 % 20 152/63 -- -- JZ   07/19/25 1300 -- 109 96 % 20 153/62 -- -- JZ   07/19/25 1245 -- 106 99 % 20 174/73 -- -- JZ   07/19/25 1230 -- 108 98 % 20 143/68 -- -- JZ   07/19/25 1215 -- 102 98 % 18 145/70 -- -- JZ   07/19/25 1200 -- 102 98 % 18 161/80 -- -- JZ   07/19/25 1145 -- 106 95 % 19 165/79 -- -- JZ   07/19/25 1143 -- 106 94 % 20 162/80 -- -- JZ   07/19/25 1128 -- 110 98 % 22 161/88 -- -- JZ   07/19/25 1113 -- 109 99 % 22 157/72 -- -- JZ   07/19/25 1058 98 °F (36.7 °C) 106 98 % 26 141/79 -- -- TAB   07/19/25 1043 97.7 °F (36.5 °C) 118 98 % 26 142/89 No Pain -- JCS            Physical Exam  Vitals and nursing note reviewed.   Constitutional:       Appearance: Normal appearance. She is well-developed. She is ill-appearing.   HENT:      Head: Normocephalic and atraumatic.      Mouth/Throat:      Mouth: Mucous membranes are dry.     Eyes:      Conjunctiva/sclera: Conjunctivae normal.      Pupils: Pupils are equal, round, and reactive to light.     Neck:      Trachea: No tracheal deviation.     Cardiovascular:      Rate and Rhythm: Normal rate and regular rhythm.      Heart sounds: Normal heart sounds. No murmur heard.  Pulmonary:      Effort: Pulmonary effort is normal. No respiratory distress.      Breath sounds: Normal breath sounds. No wheezing or rales.   Abdominal:       General: Bowel sounds are normal. There is no distension.      Palpations: Abdomen is soft.      Tenderness: There is no abdominal tenderness.     Musculoskeletal:         General: No deformity.      Cervical back: Normal range of motion and neck supple.     Skin:     General: Skin is warm and dry.      Capillary Refill: Capillary refill takes less than 2 seconds.     Neurological:      General: No focal deficit present.      Mental Status: She is alert and oriented to person, place, and time.      Sensory: No sensory deficit.      Comments: See NIH for more detailed neuro exam   Psychiatric:         Mood and Affect: Mood normal.         Judgment: Judgment normal.         Results Reviewed       Procedure Component Value Units Date/Time    HS Troponin I 4hr [[id number]]     Lab Status: No result Specimen: Blood     Protime-INR [[id number]]  (Normal) Collected: 07/19/25 1101    Lab Status: Final result Specimen: Blood from Arm, Left Updated: 07/19/25 1141     Protime 14.2 seconds      INR 1.05    Narrative:      INR Therapeutic Range    Indication                                             INR Range      Atrial Fibrillation                                               2.0-3.0  Hypercoagulable State                                    2.0.2.3  Left Ventricular Asist Device                            2.0-3.0  Mechanical Heart Valve                                  -    Aortic(with afib, MI, embolism, HF, LA enlargement,    and/or coagulopathy)                                     2.0-3.0 (2.5-3.5)     Mitral                                                             2.5-3.5  Prosthetic/Bioprosthetic Heart Valve               2.0-3.0  Venous thromboembolism (VTE: VT, PE        2.0-3.0    APTT [[id number]]  (Normal) Collected: 07/19/25 1101    Lab Status: Final result Specimen: Blood from Arm, Left Updated: 07/19/25 1141     PTT 24 seconds     HS Troponin I 2hr [[id number]]     Lab Status: No result Specimen: Blood      HS Troponin 0hr (reflex protocol) [[id number]]  (Normal) Collected: 07/19/25 1101    Lab Status: Final result Specimen: Blood from Arm, Left Updated: 07/19/25 1129     hs TnI 0hr 4 ng/L     Basic metabolic panel [[id number]]  (Abnormal) Collected: 07/19/25 1101    Lab Status: Final result Specimen: Blood from Arm, Left Updated: 07/19/25 1121     Sodium 136 mmol/L      Potassium 3.6 mmol/L      Chloride 94 mmol/L      CO2 22 mmol/L      ANION GAP 20 mmol/L      BUN 17 mg/dL      Creatinine 0.76 mg/dL      Glucose 266 mg/dL      Calcium 10.1 mg/dL      eGFR 100 ml/min/1.73sq m     Narrative:      National Kid[employer] guidelines for Chronic Kidney Disease (CKD):     Stage 1 with normal or high GFR (GFR > 90 mL/min/1.73 square meters)    Stage 2 Mild CKD (GFR = 60-89 mL/min/1.73 square meters)    Stage 3A Moderate CKD (GFR = 45-59 mL/min/1.73 square meters)    Stage 3B Moderate CKD (GFR = 30-44 mL/min/1.73 square meters)    Stage 4 Severe CKD (GFR = 15-29 mL/min/1.73 square meters)    Stage 5 End Stage CKD (GFR <15 mL/min/1.73 square meters)  Note: GFR calculation is accurate only with a steady state creatinine    CBC and Platelet [[id number]]  (Abnormal) Collected: 07/19/25 1101    Lab Status: Final result Specimen: Blood from Arm, Left Updated: 07/19/25 1109     WBC 13.55 Thousand/uL      RBC 6.06 Million/uL      Hemoglobin 15.7 g/dL      Hematocrit 47.8 %      MCV 79 fL      MCH 25.9 pg      MCHC 32.8 g/dL      RDW 14.7 %      Platelets 415 Thousands/uL      MPV 10.6 fL     Fingerstick Glucose (POCT) [[id number]]  (Abnormal) Collected: 07/19/25 1059    Lab Status: Final result Specimen: Blood Updated: 07/19/25 1100     POC Glucose 274 mg/dl             CTA stroke alert (head/neck)   Final Interpretation by Adonay Gan MD (07/19 1200)   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 Reconstitution at the proximal V2 segment with caliber  change/attenuation through the V2 and proximal V3 segments. The more distal V3 segment is    mildly decreased in caliber compared to prior. Findings may be due to proximal occlusion although underlying dissection cannot be excluded.      Stable high-grade stenosis of the right intradural vertebral artery post PICA takeoff. Right PICA is attenuated and possibly occluded distally.      Otherwise no large vessel occlusion, proximal high-grade stenosis. Stable mild asymmetric decreased caliber of the left intracranial ICA compared to the right, nonspecific      Noncalcified atheromatous plaque versus circumferential wall thickening at the left carotid bifurcation. Differential includes vasculitis.      Stable prominent left level 1B lymph nodes measuring up to 0.9 cm. Clinical correlation advised.               Findings were directly communicated with Noman Whittaker  at approximately 11:41 a.m on 7/19/2025, via [employer] secure chat.      Workstation performed: [id number]         CT stroke alert brain   Final Interpretation by Adonay Gan MD (07/19 1150)      Continued evolution of subacute right PICA territory infarct with improved edema. Subtle areas of hyperdensity in this region may represent petechial hemorrhage as noted on prior MRI. Improved mass effect on the fourth ventricle and fourth ventricular    outflow tract.      Findings were directly communicated with Noman Whittaker   at approximately 11:27 a.m on 7/19/2025, via [employer].      Workstation performed: [id number]             ECG 12 Lead Documentation Only    Date/Time: 7/19/2025 11:39 AM    Performed by: Quique Crenshaw DO  Authorized by: Quique Crenshaw DO    Indications / Diagnosis:  Nausea, stroke  ECG reviewed by me, the ED Provider: yes    Patient location:  ED  Previous ECG:     Previous ECG:  Compared to current    Similarity:  No change  Interpretation:     Interpretation: non-specific    Rate:     ECG rate:  107    ECG rate  assessment: normal    Rhythm:     Rhythm: sinus tachycardia    Conduction:     Conduction: abnormal      Abnormal conduction: complete RBBB    Comments:      No evidence of acute cardiac ischemia      ED Medication and Procedure Management   Prior to Admission Medications   Prescriptions Last Dose Informant Patient Reported? Taking?   ALPRAZolam (XANAX) 0.5 mg tablet   No No   Sig: Take 1 tablet (0.5 mg total) by mouth daily at bedtime as needed for anxiety   Aspirin Low Dose 81 MG EC tablet   No No   Sig: TAKE 1 TABLET BY MOUTH DAILY. DO NOT START BEFORE 2023   Blood Pressure KIT   No No   Sig: Use in the morning   Blood Pressure Monitoring (B-D ASSURE BPM/AUTO WRIST CUFF) MISC  Self No No   Sig: Check blood pressure prior to each OB visit, or as directed by your physician.   Continuous Blood Gluc  (FreeStyle Gautam 14 Day [place]) KVNG   No No   Sig: Use with gautam sensor   Continuous Glucose Sensor (FreeStyle Gautam 3 Sensor) MISC   No No   Sig: Use 1 sensor each to be changed every 14 days   Empagliflozin (JARDIANCE) 10 MG TABS tablet   No No   Sig: Take 1 tablet (10 mg total) by mouth daily   Insulin Pen Needle (B-D UF III MINI PEN NEEDLES) 31G X 5 MM MISC   No No   Sig: Inject under the skin daily at bedtime   Multiple Vitamin (Multi Vitamin Daily) TABS   No No   Sig: Take 1 tablet by mouth in the morning   acetaminophen (TYLENOL) 500 mg tablet  Self Yes No   Sig: Take 1,000 mg by mouth   albuterol (2.5 mg/3 mL) 0.083 % nebulizer solution   No No   Sig: Take 3 mL (2.5 mg total) by nebulization every 6 (six) hours as needed for wheezing or shortness of breath   albuterol (Ventolin HFA) 90 mcg/act inhaler   No No   Sig: Inhale 2 puffs every 4 (four) hours as needed for wheezing or shortness of breath   atorvastatin (LIPITOR) 40 mg tablet   No No   Sig: Take 1 tablet (40 mg total) by mouth every evening   azelastine (ASTELIN) 0.1 % nasal spray   No No   Si spray into each nostril 2 (two)  times a day Use in each nostril as directed   citalopram (CeleXA) 10 mg tablet   No No   Sig: Take 1 tablet (10 mg total) by mouth daily   cyclobenzaprine (FLEXERIL) 5 mg tablet   No No   Sig: Take 1 tablet (5 mg total) by mouth 3 (three) times a day as needed for muscle spasms for up to 12 doses   fexofenadine (ALLEGRA) 180 MG tablet   No No   Sig: Take 1 tablet (180 mg total) by mouth daily   gabapentin (NEURONTIN) 100 mg capsule   No No   Sig: Take 1 capsule (100 mg total) by mouth daily at bedtime   insulin glargine (LANTUS) 100 units/mL subcutaneous injection   No No   Sig: Inject 25 Units under the skin daily at bedtime   lisinopril (ZESTRIL) 10 mg tablet   No No   Sig: Take 1 tablet (10 mg total) by mouth daily   meclizine (ANTIVERT) 25 mg tablet   No No   Sig: Take 1 tablet (25 mg total) by mouth every 8 (eight) hours as needed for dizziness for up to 4 days   metFORMIN (GLUCOPHAGE) 1000 MG tablet   No No   Sig: Take 1 tablet (1,000 mg total) by mouth 2 (two) times a day with meals   montelukast (SINGULAIR) 10 mg tablet   No No   Sig: Take 1 tablet (10 mg total) by mouth daily at bedtime   ondansetron (ZOFRAN-ODT) 4 mg disintegrating tablet   No No   Sig: Take 1 tablet (4 mg total) by mouth every 6 (six) hours as needed for nausea for up to 8 doses   ticagrelor (BRILINTA) 90 MG   No No   Sig: Take 1 tablet (90 mg total) by mouth every 12 (twelve) hours for 21 days      Facility-Administered Medications: None     Discharge Medication List as of 7/19/2025  1:45 PM        START taking these medications    Details   prochlorperazine (COMPAZINE) 10 mg tablet Take 1 tablet (10 mg total) by mouth 2 (two) times a day as needed for nausea or vomiting, Starting Sat 7/19/2025, Normal           CONTINUE these medications which have NOT CHANGED    Details   acetaminophen (TYLENOL) 500 mg tablet Take 1,000 mg by mouth, Starting Wed 5/6/2020, Historical Med      albuterol (2.5 mg/3 mL) 0.083 % nebulizer solution Take 3 mL  (2.5 mg total) by nebulization every 6 (six) hours as needed for wheezing or shortness of breath, Starting Tue 7/1/2025, Normal      albuterol (Ventolin HFA) 90 mcg/act inhaler Inhale 2 puffs every 4 (four) hours as needed for wheezing or shortness of breath, Starting Tue 7/1/2025, Normal      ALPRAZolam (XANAX) 0.5 mg tablet Take 1 tablet (0.5 mg total) by mouth daily at bedtime as needed for anxiety, Starting Tue 7/1/2025, Normal      Aspirin Low Dose 81 MG EC tablet TAKE 1 TABLET BY MOUTH DAILY. DO NOT START BEFORE FEBRYARY 1, 2023, Normal      atorvastatin (LIPITOR) 40 mg tablet Take 1 tablet (40 mg total) by mouth every evening, Starting Tue 7/8/2025, Normal      azelastine (ASTELIN) 0.1 % nasal spray 1 spray into each nostril 2 (two) times a day Use in each nostril as directed, Starting Mon 3/3/2025, Normal      Blood Pressure KIT Use in the morning, Starting Mon 7/14/2025, Print      Blood Pressure Monitoring (B-D ASSURE BPM/AUTO WRIST CUFF) MISC Check blood pressure prior to each OB visit, or as directed by your physician., Normal      citalopram (CeleXA) 10 mg tablet Take 1 tablet (10 mg total) by mouth daily, Starting Tue 7/1/2025, Normal      Continuous Blood Gluc  (FreeStyle Gautam 14 Day [place]) KVNG Use with gautam sensor, Normal      Continuous Glucose Sensor (FreeStyle Gautam 3 Sensor) MISC Use 1 sensor each to be changed every 14 days, Normal      cyclobenzaprine (FLEXERIL) 5 mg tablet Take 1 tablet (5 mg total) by mouth 3 (three) times a day as needed for muscle spasms for up to 12 doses, Starting Tue 7/1/2025, Normal      Empagliflozin (JARDIANCE) 10 MG TABS tablet Take 1 tablet (10 mg total) by mouth daily, Starting Tue 7/1/2025, Until Sat 1/17/2026, Normal      fexofenadine (ALLEGRA) 180 MG tablet Take 1 tablet (180 mg total) by mouth daily, Starting Tue 7/1/2025, Normal      gabapentin (NEURONTIN) 100 mg capsule Take 1 capsule (100 mg total) by mouth daily at bedtime, Starting Tue  7/1/2025, Normal      insulin glargine (LANTUS) 100 units/mL subcutaneous injection Inject 25 Units under the skin daily at bedtime, Starting Tue 7/1/2025, Until Mon 12/8/2025, Normal      Insulin Pen Needle (B-D UF III MINI PEN NEEDLES) 31G X 5 MM MISC Inject under the skin daily at bedtime, Starting Tue 7/1/2025, Normal      lisinopril (ZESTRIL) 10 mg tablet Take 1 tablet (10 mg total) by mouth daily, Starting Wed 7/9/2025, Normal      meclizine (ANTIVERT) 25 mg tablet Take 1 tablet (25 mg total) by mouth every 8 (eight) hours as needed for dizziness for up to 4 days, Starting Tue 7/8/2025, Until Mon 7/14/2025 at 2359, Normal      metFORMIN (GLUCOPHAGE) 1000 MG tablet Take 1 tablet (1,000 mg total) by mouth 2 (two) times a day with meals, Starting Tue 7/1/2025, Normal      montelukast (SINGULAIR) 10 mg tablet Take 1 tablet (10 mg total) by mouth daily at bedtime, Starting Tue 7/1/2025, Normal      Multiple Vitamin (Multi Vitamin Daily) TABS Take 1 tablet by mouth in the morning, Starting Tue 7/1/2025, Normal      ondansetron (ZOFRAN-ODT) 4 mg disintegrating tablet Take 1 tablet (4 mg total) by mouth every 6 (six) hours as needed for nausea for up to 8 doses, Starting Tue 7/1/2025, Normal      ticagrelor (BRILINTA) 90 MG Take 1 tablet (90 mg total) by mouth every 12 (twelve) hours for 21 days, Starting Tue 7/8/2025, Until Tue 7/29/2025, Normal           No discharge procedures on file.  ED SEPSIS DOCUMENTATION   Time reflects when diagnosis was documented in both MDM as applicable and the Disposition within this note       Time User Action Codes Description Comment    7/19/2025 10:59 AM Quique Crenshaw [R29.90] Stroke-like symptoms     7/19/2025  1:23 PM Quique Crenshaw [E86.0] Dehydration     7/19/2025  1:23 PM Flower, Quique Add [R11.2] Nausea & vomiting     7/19/2025  1:33 PM Quique Crenshaw Add [Z86.73] History of recent stroke                      Quique Crenshaw DO  07/19/25 3944        [1]   Past Medical History:  Diagnosis Date    Allergic     Arthritis     Asthma     Bipolar affective disorder, currently depressed, moderate (HCC) 2018    Chest pain, unspecified 2019    Cyst of ovary, right     Depression     Diabetes mellitus (HCC) 10/10/2018    type 2    Endometriosis     Fractures 22    My right hand    Heart murmur 1988    Hepatitis C     Hepatitis C virus infection cured after antiviral drug therapy     History of transfusion     Hypertension     Infectious viral hepatitis 01    Migraines     Morbid obesity with BMI of 40.0-44.9, adult ([facility])     Obesity     Pulmonary artery congenital abnormality     Spleen enlarged     Status post surgical removal of both fallopian tubes     Varicella    [2]   Past Surgical History:  Procedure Laterality Date    CARDIAC CATHETERIZATION      no CAD 10days, 4 weeks 22months old     CARDIAC CATHETERIZATION N/A 2023    Procedure: Cardiac Coronary Angiogram;  Surgeon: Marcela Lr DO;  Location: [facility];  Service: Cardiology    CARDIAC CATHETERIZATION Left 2023    Procedure: Cardiac Left Heart Cath;  Surgeon: Marcela Lr DO;  Location: [facility];  Service: Cardiology    CARDIAC CATHETERIZATION  2023    Procedure: Cardiac catheterization;  Surgeon: Marcela Lr DO;  Location: [facility];  Service: Cardiology     SECTION, LOW TRANSVERSE      CHOLECYSTECTOMY      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 5    CORONARY STENT PLACEMENT      IR LOWER EXTREMITY ANGIOGRAM  2023    LIVER BIOPSY      LIVER BIOPSY      PERIPHERAL ANGIOGRAM  23    STERNAL WIRE REMOVAL  2022    THROMBECTOMY W/ EMBOLECTOMY Right 2023    Procedure: Right femoral exposure Right iliac embolectomy w/ #4 Ted catheter Aortogram Right EIA stent w/ 7x29mm VBX;  Surgeon: Jody Hodges MD;  Location: [facility];  Service: Vascular    TUBAL LIGATION Bilateral  2020    VSD REPAIR      As a child    WOUND DEBRIDEMENT Right 02/11/2023    Procedure: Right Groin Wound Washout, Pulse Lavage, Wound Vac placement;  Surgeon: Jelani Avila DO;  Location: [facility];  Service: Vascular   [3]   Family History  Problem Relation Name Age of Onset    Hypertension Mother Patritica     Migraines Mother Patritica     JENNY disease Mother Patritica     Depression Mother Patritica     Hyperlipidemia Mother Patritica     Diabetes Mother Patritica     Diabetes Father Justin     Hypertension Father Justin     Kidney failure Father Justin     Heart attack Father Justin     Arthritis Father Justin     Stroke Father Justin     Glaucoma Father Justin     Kidney disease Father Justin     Polycystic kidney disease Paternal Grandmother Alison     Stroke Paternal Grandmother Alison     Heart disease Paternal Grandmother Alison     Kidney disease Paternal Grandmother Alison     Arthritis Sister Melodie     Asthma Sister Melodie     Thyroid disease Sister Melodie     Diabetes Sister Melodie     Arthritis Maternal Grandmother Irina     Breast cancer Maternal Grandmother Irina     Diabetes Maternal Grandmother Irina     Hypertension Maternal Grandmother Irina     Heart Valve Disease Maternal Grandmother Irina     Cancer Maternal Grandmother Irina         Skin cancer    Learning disabilities Cousin Marisol     Learning disabilities Sister Ana     Diabetes Sister Ana     ADD / ADHD Cousin Estelle     Lung cancer Brother Valdo     Cancer Brother Valdo         Lung cancer    Diabetes Maternal Grandfather Ankit     Hypertension Maternal Grandfather Ankit     JENNY disease Maternal Grandfather Ankit     Stroke Maternal Grandfather Ankit     Cancer Maternal Grandfather Ankit         Skin cancer    Cancer Paternal Uncle Brandon         Skin cancer    Aneurysm Maternal Aunt Olinda    [4]   Social History  Tobacco Use    Smoking status: Every Day     Current packs/day: 0.00     Average packs/day: 0.5  packs/day for 10.0 years (5.0 ttl pk-yrs)     Types: Cigarettes     Start date: 2013     Last attempt to quit: 2023     Years since quittin.5    Smokeless tobacco: Never   Vaping Use    Vaping status: Never Used   Substance Use Topics    Alcohol use: Yes     Alcohol/week: 3.0 standard drinks of alcohol     Types: 3 Standard drinks or equivalent per week     Comment: I drink socially    Drug use: Not Currently     Comment: hx of THC use for migraines        Quique Crenshaw, DO  25 [phone]

## 2025-07-19 NOTE — CONSULTS
Consultation - Neurology   Name: Jaycee Can 35 y.o. female I MRN: [id number]  Unit/Bed#: ED 28 I Date of Admission: 7/19/2025   Date of Service: 7/19/2025 I Hospital Day: 0   Consult to Neurology  Consult performed by: Noman Whittaker DO  Consult ordered by: Quique Crenshaw DO        Physician Requesting Evaluation: Quique Crenshaw DO   Reason for Evaluation / Principal Problem: Stroke alert    Assessment & Plan  Diplopia  Intermittent, lasting few seconds.  - Almost certainly related to recent right cerebellar stroke.  - Similarly patient's nausea is likely related to recent cerebellar stroke.  - Would continue antiemetics.  - Although not endorsing currently, patient is also certainly at increased risk exacerbation of underlying migraine disorder given recent stroke.  Given good response to antiemetic, would recommend migraine cocktail should patient develop migraine-like phenomenon.  -No clear need for any additional neuro imaging or workup at this time.  - Okay to discharge from a neurologic standpoint.  - Would provide prescription for antiemetics, preferably ondansetron ODT.  - Please call with question/concerns  - Patient already has appointment with neurology scheduled for November.          History of Present Illness   Jaycee Can is a 35 y.o. 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 with intermittent blurry vision.  -Blurry vision is intermittent, lasting a couple seconds, able to be overcome with refocusing.  - Patient denies any new sensorimotor symptoms or headaches since stroke.  -Does report intermittent dizziness and nausea  since stroke.  - Patient denies any dizziness or nausea currently, since getting antiemetic medications.    - 141/79 on arrival  .  Called by  regarding stroke alert at 11 AM, neuro response was immediate.    - NIHSS 0  - LKW n/a (no deficits)      CTH & CTA were unremarkable for any acute pathology, LVO, or other IR target.   - CT head demonstrated normal evolution and improvement of recent right cerebellar stroke.  - CTA head and neck was relatively stable compared to prior     IV thrombolysis was not offered due to recent stroke within 3 months.    During most recent admission found to be Plavix nonresponder.  - Aspirin Plavix transition to aspirin Brilinta  - Continued on Lipitor 40 mg daily        Review of Systems     Medical History Review: I have reviewed the patient's PMH, PSH, Social History, Family History, Meds, and Allergies     Objective :  Temp:  [97.7 °F (36.5 °C)-98 °F (36.7 °C)] 98 °F (36.7 °C)  HR:  [106-118] 106  BP: (141-142)/(79-89) 141/79  Resp:  [26] 26  SpO2:  [98 %] 98 %  O2 Device: None (Room air)    Physical ExamNeurological Exam      Lab Results: I have reviewed the following results:I have personally reviewed pertinent reports.    Recent Labs     07/19/25  1101   WBC 13.55*   HGB 15.7*   HCT 47.8*   *     Imaging Results Review: I personally reviewed the following image studies/reports in PACS and discussed pertinent findings with Radiology: CT head and CT angio head and neck. My interpretation of the radiology images/reports is: As above.  Other Study Results Review: No additional pertinent studies reviewed.    VTE Prophylaxis: VTE covered by:    None       Administrative Statements   VIRTUAL CARE DOCUMENTATION:     1. This service was provided via Telemedicine using [employer] Kit     2. Parties in the room with patient during teleconsult Patient only    3. Confidentiality My office door was closed     4. Participants No one else was in the room    5. Patient  acknowledged consent and understanding of privacy and security of the  Telemedicine consult. I informed the patient that I have reviewed their record in Epic and presented the opportunity for them to ask any questions regarding the visit today.  The patient agreed to participate.    6. I have spent a total time of 35 minutes in caring for this patient on the day of the visit/encounter including Diagnostic results, Prognosis, Instructions for management, Patient and family education, Risk factor reductions, Impressions, Counseling / Coordination of care, Documenting in the medical record, Reviewing/placing orders in the medical record (including tests, medications, and/or procedures), Obtaining or reviewing history  , and Communicating with other healthcare professionals , not including the time spent for establishing the audio/video connection.

## 2025-07-21 ENCOUNTER — CONSULT (OUTPATIENT)
Dept: HEMATOLOGY ONCOLOGY | Facility: CLINIC | Age: 35
End: 2025-07-21
Payer: COMMERCIAL

## 2025-07-21 ENCOUNTER — PATIENT OUTREACH (OUTPATIENT)
Dept: CASE MANAGEMENT | Facility: HOSPITAL | Age: 35
End: 2025-07-21

## 2025-07-21 VITALS
BODY MASS INDEX: 38.71 KG/M2 | OXYGEN SATURATION: 98 % | RESPIRATION RATE: 14 BRPM | HEART RATE: 111 BPM | HEIGHT: 61 IN | DIASTOLIC BLOOD PRESSURE: 70 MMHG | SYSTOLIC BLOOD PRESSURE: 100 MMHG | WEIGHT: 205 LBS | TEMPERATURE: 96.5 F

## 2025-07-21 DIAGNOSIS — I63.011 CEREBROVASCULAR ACCIDENT (CVA) DUE TO THROMBOSIS OF RIGHT VERTEBRAL ARTERY (HCC): Primary | ICD-10-CM

## 2025-07-21 DIAGNOSIS — Q25.1 AORTIC COARCTATION: ICD-10-CM

## 2025-07-21 DIAGNOSIS — Q25.1 VSD (VENTRICULAR SEPTAL DEFECT) AND COARCTATION OF AORTA: ICD-10-CM

## 2025-07-21 DIAGNOSIS — Q21.0 VSD (VENTRICULAR SEPTAL DEFECT) AND COARCTATION OF AORTA: ICD-10-CM

## 2025-07-21 DIAGNOSIS — R16.1 SPLENOMEGALY: ICD-10-CM

## 2025-07-21 DIAGNOSIS — B18.2 CHRONIC HEPATITIS C WITHOUT HEPATIC COMA (HCC): ICD-10-CM

## 2025-07-21 DIAGNOSIS — I74.5 EXTERNAL ILIAC ARTERY THROMBOSIS (HCC): ICD-10-CM

## 2025-07-21 DIAGNOSIS — E66.01 MORBID OBESITY (HCC): ICD-10-CM

## 2025-07-21 PROCEDURE — 99245 OFF/OP CONSLTJ NEW/EST HI 55: CPT | Performed by: INTERNAL MEDICINE

## 2025-07-21 NOTE — PROGRESS NOTES
This RNCM covering for case owner RNCM.    ADT alert received via IB. Chart review performed.  Pt seen in [facility] on 7/19/25 for Stroke-like symptoms, vomiting, hyperventilating, Diplopia.    Pt had recent admission 7/3-7/8/25 for R sided CVA, coarctation of aorta. Pt presented with waking having double vision in both eyes. Pt felt better after IVF, no neuro deficits, taccy.    Attempted outreach to pt, left voicemail with contact name and number to return call.    Note routed to case team RNCM.

## 2025-07-21 NOTE — ASSESSMENT & PLAN NOTE
Ambulatory referral to hematology to consider repeat hypercoagulable testing in young patient with recent a recent CVA.    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, iliac artery thrombosis with thrombectomy, CVA, hepatitis C due to blood transfusion as infant, DM2, HTN, asthma, hidradenitis suppurativa, bipolar disorder, anxiety, depression, endometriosis, neuropathy, obesity, and splenomegaly who presented for initial hematology ambulatory referral on 7/21/2025 after she was recently found to have a new acute right PICA infarct with vertebral artery occlusion treated medically with DAPT (aspirin + ticagrelor) x28 days f/b aspirin monotherapy, TERESA was negative, and hypercoagulable and other testing was performed showing 1/3 anti-cardiolipin antibodies (IgG) was slightly elevated though notably an APL panel drawn at the same time which also included all three anti-cardiolipin antibodies was 0/3 positive (7/4/2025).  Prothrombin gene, FVL, SPEP, and autoimmune work up was negative.    Overall, low suspicion for hypercoagulable distoder.  We suspect that her elevated anti-cardiolipin IgG antibodies was due to her CVA.  However, we do recommend confirmatory testing >12 weeks from her initial testing to confirm her elevations were transient as a persistent IgM cardiolipin Ab carries a moderate-risk profile for APLS.  Given her complex anatomic and surgical history from birth, secondary prevention for CVA should be determined by her neurology and cardiovascular teams if she is confirmed to not have a hypercoagulable syndrome.    Summary of Recommendations:  Ordered anti-cardiolipin antibodies to be drawn after 10/26/2025  Office follow up early-October  Anti-platelet and anti-coagulation treatment per neurology and cardiovascular teams    Orders:    Cardiolipin antibody;  Future

## 2025-07-21 NOTE — PROGRESS NOTES
Name: Jaycee Can      : 1990      MRN: [id number]  Encounter Provider: Alix Garcia DO  Encounter Date: 2025   Encounter department: [facility]  :  Assessment & Plan  Cerebrovascular accident (CVA) due to thrombosis of right vertebral artery (HCC)  VSD (ventricular septal defect) and coarctation of aorta  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HCC)  Morbid obesity (HCC)  Ambulatory referral to hematology to consider repeat hypercoagulable testing in young patient with recent a recent CVA.    35 yoF with PMHx of aortic coarctation w/ VSD w/p repair at 10 days old followed by recurrent coarctation requiring stenting ([facility] 2020), right femoral artery thrombosis due to cardiac cath, iliac artery thrombosis with thrombectomy, CVA, hepatitis C due to blood transfusion as infant, DM2, HTN, asthma, hidradenitis suppurativa, bipolar disorder, anxiety, depression, endometriosis, neuropathy, obesity, and splenomegaly who presented for initial hematology ambulatory referral on 2025 after she was recently found to have a new acute right PICA infarct with vertebral artery occlusion treated medically with DAPT (aspirin + ticagrelor) x28 days f/b aspirin monotherapy, TERESA was negative, and hypercoagulable and other testing was performed showing 1/3 anti-cardiolipin antibodies (IgG) was slightly elevated though notably an APL panel drawn at the same time which also included all three anti-cardiolipin antibodies was 0/3 positive (2025).  Prothrombin gene, FVL, SPEP, and autoimmune work up was negative.    Overall, low suspicion for hypercoagulable distoder.  We suspect that her elevated anti-cardiolipin IgG antibodies was due to her CVA.  However, we do recommend confirmatory testing >12 weeks from her initial testing to confirm her elevations were transient as a persistent IgM cardiolipin Ab carries a moderate-risk profile for APLS.  Given her complex anatomic  and surgical history from birth, secondary prevention for CVA should be determined by her neurology and cardiovascular teams if she is confirmed to not have a hypercoagulable syndrome.    Summary of Recommendations:  Ordered anti-cardiolipin antibodies to be drawn after 10/26/2025  Office follow up early-October  Anti-platelet and anti-coagulation treatment per neurology and cardiovascular teams    Orders:    Cardiolipin antibody; Future    Chronic hepatitis C without hepatic coma (HCC)  Patient developed hepatitis C from blood transfusion as an infant s/p full course of anti-viral therapy with most recent HCV PCR Quant negative on 7/7/2021.  Suspect that elevated cryoglobulins is due to her prior hepatitis C infection.  Cryoglobulin levels are typically decreased but persistent even after successful anti-viral therapy.       Splenomegaly  CT CAP (7/6/2025) shows splenomegaly of 15.5 cm without focal lesions, without lymphadenopathy, and with an unremarkable liver.  This has been roughly stable compared to our oldest known imaging of CT Abd/Pelvis (10/10/2018) showing a 15.3 cm spleen.  Differential could include prior infection, infarction, autoimmune, hematologic malignancies, and infiltrative disorders.  Baseline WBC is 9-11 with normal diff.  Monitor for now         Return in about 15 weeks (around 11/3/2025) for Office Visit, Labs - See Treatment Plan.    History of Present Illness   Chief Complaint   Patient presents with    New Patient Visit     Jaycee Can is a 35 y.o. female with PMHx of aortic coarctation w/ VSD w/p repair at 10 days old followed by recurrent coarctation requiring stenting ([facility] 2020), right femoral artery thrombosis due to cardiac cath, iliac artery thrombosis with thrombectomy, CVA, hepatitis C due to blood transfusion as infant, DM2, HTN, asthma, hidradenitis suppurativa, bipolar disorder, anxiety, depression, endometriosis, neuropathy, obesity, and splenomegaly who presented  for initial hematology ambulatory referral on 7/21/2025.    Recently she experienced acute-onset headache, dizziness, right-sided vision changes, and nausea prompting her to go to [facility] ED where she was admitted (7/1-7/3 [place], 7/3-7/8 [place]) and found to have a new acute right PICA infarct with vertebral artery occlusion.  Her prior aortic coarctation stenting was stable.  Neurology recommended medical management with DAPT (aspirin + ticagrelor) x28 days f/b aspirin monotherapy, TERESA was negative, and hypercoagulable and other testing was performed and resulted as follows (7/4/2025).    Beta-2 glycoprotein Ab wnl  Anti-cardiolipin Ab 1/3 positive: IgG (high 44, nml <40), IgA wnl, IgM wnl  Separate antiphospholipid Ab Panel: was negative including 0/3 anti-cardiolipin ab  Antiphosphatidylserine IgM wnl  Prothrombin gene mutation negative  Factor V Leiden negative  Cryoglobulin positive  SPEP no monoclonal bands  Autoimmune: CONSTANCE, RF, c/p-ANCA, and complement negative  Iron Panel: sat 12, ferritin 55, TIBC 335, iron level 41    She saw her PCP on 7/14/2025 for hospital follow up who referred her to hematology to consider repeat hypercoagulable testing.    Subsequently, she experienced five days of N/V with intermittent double vision prompting her to go to the ED on 7/19/2025 where her NIH score was 0 but due to intermittent double vision, CTA Head/Neck was unchanged from her prior study, neurology was consulted, recommended therapy for migraines, and she was discharged.    Most recent labs are at the time of this ED visit (7/19/2025) show hemoconcentration from her baseline with WBC 13.6, Hb 15.7, and  with INR 1.05, SCr 0.76, glucose 266, and other aspects of BMP roughly wnl.    Review of Systems  ROS was performed and is negative except as indicated in HPI.    Medical History Reviewed by provider this encounter:     .  Past Medical History   Past Medical History[1]  Past Surgical History[2]  Family  History[3]   reports that she has been smoking cigarettes. She started smoking about 12 years ago. She has a 5 pack-year smoking history. She has never used smokeless tobacco. She reports current alcohol use of about 3.0 standard drinks of alcohol per week. She reports that she does not currently use drugs.  Current Outpatient Medications   Medication Instructions    acetaminophen (TYLENOL) 1,000 mg    albuterol (Ventolin HFA) 90 mcg/act inhaler 2 puffs, Inhalation, Every 4 hours PRN    albuterol 2.5 mg, Nebulization, Every 6 hours PRN    ALPRAZolam (XANAX) 0.5 mg, Oral, Daily at bedtime PRN    Aspirin Low Dose 81 MG EC tablet TAKE 1 TABLET BY MOUTH DAILY. DO NOT START BEFORE FEBRYARY 1, 2023    atorvastatin (LIPITOR) 40 mg, Oral, Every evening    azelastine (ASTELIN) 0.1 % nasal spray 1 spray, Nasal, 2 times daily, Use in each nostril as directed    Blood Pressure KIT Does not apply, Daily    Blood Pressure Monitoring (B-D ASSURE BPM/AUTO WRIST CUFF) MISC Check blood pressure prior to each OB visit, or as directed by your physician.    citalopram (CELEXA) 10 mg, Oral, Daily    Continuous Blood Gluc  (FreeStyle Gautam 14 Day [place]) KVNG Use with gautam sensor    Continuous Glucose Sensor (FreeStyle Gautam 3 Sensor) MISC Use 1 sensor each to be changed every 14 days    cyclobenzaprine (FLEXERIL) 5 mg, Oral, 3 times daily PRN    Empagliflozin (JARDIANCE) 10 mg, Oral, Daily    fexofenadine (ALLEGRA) 180 mg, Oral, Daily    gabapentin (NEURONTIN) 100 mg, Oral, Daily at bedtime    insulin glargine (LANTUS) 25 Units, Subcutaneous, Daily at bedtime    Insulin Pen Needle (B-D UF III MINI PEN NEEDLES) 31G X 5 MM MISC Subcutaneous, Daily at bedtime    lisinopril (ZESTRIL) 10 mg, Oral, Daily    meclizine (ANTIVERT) 25 mg, Oral, Every 8 hours PRN    metFORMIN (GLUCOPHAGE) 1,000 mg, Oral, 2 times daily with meals    montelukast (SINGULAIR) 10 mg, Oral, Daily at bedtime    Multiple Vitamin (Multi Vitamin Daily) TABS 1  "tablet, Oral, Daily    ondansetron (ZOFRAN-ODT) 4 mg, Oral, Every 6 hours PRN    prochlorperazine (COMPAZINE) 10 mg, Oral, 2 times daily PRN    ticagrelor (BRILINTA) 90 mg, Oral, Every 12 hours scheduled   Allergies[4]   Medications Ordered Prior to Encounter[5]   Social History[6]      Objective   /70 (BP Location: Left arm, Patient Position: Sitting, Cuff Size: Large)   Pulse (!) 111   Temp (!) 96.5 °F (35.8 °C) (Temporal)   Resp 14   Ht 5' 1\" (1.549 m)   Wt 93 kg (205 lb)   LMP 06/20/2025 (Approximate)   SpO2 98%   BMI 38.73 kg/m²     Pain Screening:  Pain Score: 0-No pain    ECOG ECOG Performance Status: 0 - Fully active, able to carry on all pre-disease performance without restriction     Physical Exam  General: short stature, WDWN, well-appearing, no acute distress  HEENT: wide neck, PERRLA, moist mucosa, atraumatic  Respiratory: CTAB w/o wheezes, rales, or rhonchi, no increased work of breathing  Cardiovascular: RRR w/o murmurs, 2+ radial and pedal pulses, no b/l LE edema  Abdomen obese, soft, non-tender, non-distended, no hepatomegaly or splenomegaly  /Rectal: deferred  Musculoskeletal: moves all four extremities with normal strength and ROM  Integumentary: warm, dry, no rash or bruising  Neurological: no gross or focal deficits identified, normal sensation  Psychiatric: pleasant and cooperative with normal mood, affect, and cognition    Labs: I have reviewed the following labs:  Lab Results   Component Value Date/Time    WBC 13.55 (H) 07/19/2025 11:01 AM    RBC 6.06 (H) 07/19/2025 11:01 AM    Hemoglobin 15.7 (H) 07/19/2025 11:01 AM    Hematocrit 47.8 (H) 07/19/2025 11:01 AM    MCV 79 (L) 07/19/2025 11:01 AM    MCH 25.9 (L) 07/19/2025 11:01 AM    RDW 14.7 07/19/2025 11:01 AM    Platelets 415 (H) 07/19/2025 11:01 AM    Segmented % 66 07/08/2025 05:35 AM    Lymphocytes % 24 07/08/2025 05:35 AM    Monocytes % 8 07/08/2025 05:35 AM    Eosinophils Relative 1 07/08/2025 05:35 AM    Basophils " Relative 0 07/08/2025 05:35 AM    Immature Grans % 1 07/08/2025 05:35 AM    Absolute Neutrophils 7.39 07/08/2025 05:35 AM     Lab Results   Component Value Date/Time    Potassium 3.6 07/19/2025 11:01 AM    Chloride 94 (L) 07/19/2025 11:01 AM    CO2 22 07/19/2025 11:01 AM    BUN 17 07/19/2025 11:01 AM    Creatinine 0.76 07/19/2025 11:01 AM    Glucose, Fasting 156 (H) 04/07/2025 10:19 AM    Calcium 10.1 07/19/2025 11:01 AM    AST 30 07/08/2025 05:35 AM    ALT 39 07/08/2025 05:35 AM    Alkaline Phosphatase 59 07/08/2025 05:35 AM    Total Protein 6.2 (L) 07/08/2025 05:35 AM    Albumin 3.8 07/08/2025 05:35 AM    Total Bilirubin 0.62 07/08/2025 05:35 AM    eGFR 100 07/19/2025 11:01 AM     I have spent a total time of 60 minutes in caring for this patient on the day of the visit/encounter including reviewing diagnostic results, prognosis, risks and benefits of tx options; instructions for management; patient and family education; risk factor reductions; impressions; counseling / coordination of care; documenting in the medical record; reviewing/placing orders in the medical record; obtaining or reviewing history; and communicating with other healthcare professionals.    This note has been generated by voice recognition software system. Therefore, there may be spelling, grammar, and or syntax errors. Please contact if questions arise.     Additional recommendations to follow per attending, Dr. Garcia.    Bebeto White DO, PGY5  Hematology/Oncology Fellow         [1]   Past Medical History:  Diagnosis Date    Allergic     Arthritis     Asthma     Bipolar affective disorder, currently depressed, moderate (HCC) 12/17/2018    Chest pain, unspecified 07/24/2019    Cyst of ovary, right     Depression     Diabetes mellitus (HCC) 10/10/2018    type 2    Endometriosis     Fractures 08/25/24    My right hand    Heart murmur 1988    Hepatitis C     Hepatitis C virus infection cured after antiviral drug therapy     History of  transfusion     Hypertension     Infectious viral hepatitis 01    Migraines     Morbid obesity with BMI of 40.0-44.9, adult (HCC)     Obesity     Pulmonary artery congenital abnormality     Spleen enlarged     Status post surgical removal of both fallopian tubes     Varicella    [2]   Past Surgical History:  Procedure Laterality Date    CARDIAC CATHETERIZATION      no CAD 10days, 4 weeks 22months old     CARDIAC CATHETERIZATION N/A 2023    Procedure: Cardiac Coronary Angiogram;  Surgeon: Marcela Lr DO;  Location: [facility];  Service: Cardiology    CARDIAC CATHETERIZATION Left 2023    Procedure: Cardiac Left Heart Cath;  Surgeon: Marcela Lr DO;  Location: [facility];  Service: Cardiology    CARDIAC CATHETERIZATION  2023    Procedure: Cardiac catheterization;  Surgeon: Marcela Lr DO;  Location: [facility];  Service: Cardiology     SECTION, LOW TRANSVERSE      CHOLECYSTECTOMY      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 5    CORONARY STENT PLACEMENT      IR LOWER EXTREMITY ANGIOGRAM  2023    LIVER BIOPSY      LIVER BIOPSY      PERIPHERAL ANGIOGRAM  23    STERNAL WIRE REMOVAL  2022    THROMBECTOMY W/ EMBOLECTOMY Right 2023    Procedure: Right femoral exposure Right iliac embolectomy w/ #4 Ted catheter Aortogram Right EIA stent w/ 7x29mm VBX;  Surgeon: Jody Hodges MD;  Location: [facility];  Service: Vascular    TUBAL LIGATION Bilateral     VSD REPAIR      As a child    WOUND DEBRIDEMENT Right 2023    Procedure: Right Groin Wound Washout, Pulse Lavage, Wound Vac placement;  Surgeon: Jelani Avila DO;  Location: [facility];  Service: Vascular   [3]   Family History  Problem Relation Name Age of Onset    Hypertension Mother Patritica     Migraines Mother Patritica     JENNY disease Mother Patritica     Depression Mother Patritica     Hyperlipidemia Mother Patritica     Diabetes Mother  Patritica     Diabetes Father Justin     Hypertension Father Justin     Kidney failure Father Justin     Heart attack Father Justin     Arthritis Father Justin     Stroke Father Justin     Glaucoma Father Justin     Kidney disease Father Justin     Polycystic kidney disease Paternal Grandmother Alison     Stroke Paternal Grandmother Alison     Heart disease Paternal Grandmother Alison     Kidney disease Paternal Grandmother Alison     Arthritis Sister Melodie     Asthma Sister Melodie     Thyroid disease Sister Melodie     Diabetes Sister Melodie     Arthritis Maternal Grandmother Irina     Breast cancer Maternal Grandmother Irina     Diabetes Maternal Grandmother Irina     Hypertension Maternal Grandmother Irina     Heart Valve Disease Maternal Grandmother Irina     Cancer Maternal Grandmother Irina         Skin cancer    Learning disabilities Cousin Marisol     Learning disabilities Sister Ana     Diabetes Sister Ana     ADD / ADHD Cousin Estelle     Lung cancer Brother Valdo     Cancer Brother Valdo         Lung cancer    Diabetes Maternal Grandfather Ankit     Hypertension Maternal Grandfather Ankit     JENNY disease Maternal Grandfather Ankit     Stroke Maternal Grandfather Ankit     Cancer Maternal Grandfather Ankit         Skin cancer    Cancer Paternal Uncle Brandon         Skin cancer    Aneurysm Maternal Aunt Olinda    [4]   Allergies  Allergen Reactions    Prednisone Swelling    Bactrim [Sulfamethoxazole-Trimethoprim] Hives    Corticosteroids Swelling     Pt states this does not cause problems breathing, she just has generalized swelling    Cortisone Swelling     Generalized; no impairment with breathing reported      Medical Tape Hives     Allergic to Paper Tape    Other Hives     Paper tape    Sulfa Antibiotics Hives   [5]   Current Outpatient Medications on File Prior to Visit   Medication Sig Dispense Refill    acetaminophen (TYLENOL) 500 mg tablet Take 1,000 mg by mouth      albuterol (2.5  mg/3 mL) 0.083 % nebulizer solution Take 3 mL (2.5 mg total) by nebulization every 6 (six) hours as needed for wheezing or shortness of breath 180 mL 2    albuterol (Ventolin HFA) 90 mcg/act inhaler Inhale 2 puffs every 4 (four) hours as needed for wheezing or shortness of breath 18 g 2    ALPRAZolam (XANAX) 0.5 mg tablet Take 1 tablet (0.5 mg total) by mouth daily at bedtime as needed for anxiety 15 tablet 0    Aspirin Low Dose 81 MG EC tablet TAKE 1 TABLET BY MOUTH DAILY. DO NOT START BEFORE FEBRYARY 1, 2023 90 tablet 0    atorvastatin (LIPITOR) 40 mg tablet Take 1 tablet (40 mg total) by mouth every evening 30 tablet 0    azelastine (ASTELIN) 0.1 % nasal spray 1 spray into each nostril 2 (two) times a day Use in each nostril as directed 30 mL 3    Blood Pressure KIT Use in the morning 1 kit 0    Blood Pressure Monitoring (B-D ASSURE BPM/AUTO WRIST CUFF) MISC Check blood pressure prior to each OB visit, or as directed by your physician. 1 each 0    citalopram (CeleXA) 10 mg tablet Take 1 tablet (10 mg total) by mouth daily 90 tablet 1    Continuous Blood Gluc  (FreeStyle Gautam 14 Day [place]) KVNG Use with gautam sensor 1 each 3    Continuous Glucose Sensor (FreeStyle Gautam 3 Sensor) MISC Use 1 sensor each to be changed every 14 days 2 each 5    cyclobenzaprine (FLEXERIL) 5 mg tablet Take 1 tablet (5 mg total) by mouth 3 (three) times a day as needed for muscle spasms for up to 12 doses 12 tablet 0    Empagliflozin (JARDIANCE) 10 MG TABS tablet Take 1 tablet (10 mg total) by mouth daily 100 tablet 1    fexofenadine (ALLEGRA) 180 MG tablet Take 1 tablet (180 mg total) by mouth daily 30 tablet 5    gabapentin (NEURONTIN) 100 mg capsule Take 1 capsule (100 mg total) by mouth daily at bedtime 90 capsule 1    insulin glargine (LANTUS) 100 units/mL subcutaneous injection Inject 25 Units under the skin daily at bedtime 10 mL 3    Insulin Pen Needle (B-D UF III MINI PEN NEEDLES) 31G X 5 MM MISC Inject under the skin  daily at bedtime 100 each 1    lisinopril (ZESTRIL) 10 mg tablet Take 1 tablet (10 mg total) by mouth daily 30 tablet 0    meclizine (ANTIVERT) 25 mg tablet Take 1 tablet (25 mg total) by mouth every 8 (eight) hours as needed for dizziness for up to 4 days 12 tablet 0    metFORMIN (GLUCOPHAGE) 1000 MG tablet Take 1 tablet (1,000 mg total) by mouth 2 (two) times a day with meals 200 tablet 1    montelukast (SINGULAIR) 10 mg tablet Take 1 tablet (10 mg total) by mouth daily at bedtime 90 tablet 1    Multiple Vitamin (Multi Vitamin Daily) TABS Take 1 tablet by mouth in the morning 100 tablet 2    ondansetron (ZOFRAN-ODT) 4 mg disintegrating tablet Take 1 tablet (4 mg total) by mouth every 6 (six) hours as needed for nausea for up to 8 doses 8 tablet 0    prochlorperazine (COMPAZINE) 10 mg tablet Take 1 tablet (10 mg total) by mouth 2 (two) times a day as needed for nausea or vomiting 10 tablet 0    ticagrelor (BRILINTA) 90 MG Take 1 tablet (90 mg total) by mouth every 12 (twelve) hours for 21 days 42 tablet 0     No current facility-administered medications on file prior to visit.   [6]   Social History  Tobacco Use    Smoking status: Every Day     Current packs/day: 0.00     Average packs/day: 0.5 packs/day for 10.0 years (5.0 ttl pk-yrs)     Types: Cigarettes     Start date: 2013     Last attempt to quit: 2023     Years since quittin.5    Smokeless tobacco: Never   Vaping Use    Vaping status: Never Used   Substance and Sexual Activity    Alcohol use: Yes     Alcohol/week: 3.0 standard drinks of alcohol     Types: 3 Standard drinks or equivalent per week     Comment: I drink socially    Drug use: Not Currently     Comment: hx of THC use for migraines    Sexual activity: Yes     Partners: Male     Birth control/protection: Female Sterilization

## 2025-07-21 NOTE — ASSESSMENT & PLAN NOTE
Patient developed hepatitis C from blood transfusion as an infant s/p full course of anti-viral therapy with most recent HCV PCR Quant negative on 7/7/2021.  Suspect that elevated cryoglobulins is due to her prior hepatitis C infection.  Cryoglobulin levels are typically decreased but persistent even after successful anti-viral therapy.

## 2025-07-21 NOTE — ASSESSMENT & PLAN NOTE
CT CAP (7/6/2025) shows splenomegaly of 15.5 cm without focal lesions, without lymphadenopathy, and with an unremarkable liver.  This has been roughly stable compared to our oldest known imaging of CT Abd/Pelvis (10/10/2018) showing a 15.3 cm spleen.  Differential could include prior infection, infarction, autoimmune, hematologic malignancies, and infiltrative disorders.  Baseline WBC is 9-11 with normal diff.  Monitor for now

## 2025-07-22 LAB
ATRIAL RATE: 105 BPM
ATRIAL RATE: 107 BPM
P AXIS: 47 DEGREES
P AXIS: 73 DEGREES
PR INTERVAL: 136 MS
PR INTERVAL: 178 MS
QRS AXIS: 42 DEGREES
QRS AXIS: 68 DEGREES
QRSD INTERVAL: 156 MS
QRSD INTERVAL: 158 MS
QT INTERVAL: 396 MS
QT INTERVAL: 406 MS
QTC INTERVAL: 528 MS
QTC INTERVAL: 536 MS
T WAVE AXIS: 28 DEGREES
T WAVE AXIS: 40 DEGREES
VENTRICULAR RATE: 105 BPM
VENTRICULAR RATE: 107 BPM

## 2025-07-22 PROCEDURE — 93010 ELECTROCARDIOGRAM REPORT: CPT | Performed by: INTERNAL MEDICINE

## 2025-07-24 ENCOUNTER — OFFICE VISIT (OUTPATIENT)
Dept: FAMILY MEDICINE CLINIC | Facility: CLINIC | Age: 35
End: 2025-07-24
Payer: COMMERCIAL

## 2025-07-24 VITALS
HEART RATE: 91 BPM | BODY MASS INDEX: 39.08 KG/M2 | TEMPERATURE: 98 F | HEIGHT: 61 IN | OXYGEN SATURATION: 98 % | WEIGHT: 207 LBS | SYSTOLIC BLOOD PRESSURE: 128 MMHG | DIASTOLIC BLOOD PRESSURE: 74 MMHG

## 2025-07-24 DIAGNOSIS — Z23 ENCOUNTER FOR IMMUNIZATION: ICD-10-CM

## 2025-07-24 DIAGNOSIS — R29.90 STROKE-LIKE SYMPTOMS: Primary | ICD-10-CM

## 2025-07-24 PROCEDURE — 99213 OFFICE O/P EST LOW 20 MIN: CPT | Performed by: FAMILY MEDICINE

## 2025-07-24 NOTE — PROGRESS NOTES
"Assessment/Plan:    No problem-specific Assessment & Plan notes found for this encounter.       Diagnoses and all orders for this visit:    Stroke-like symptoms  Comments:  resolved    Encounter for immunization          PHQ-2/9 Depression Screening    Little interest or pleasure in doing things: 2 - more than half the days  Feeling down, depressed, or hopeless: 2 - more than half the days  Trouble falling or staying asleep, or sleeping too much: 2 - more than half the days  Feeling tired or having little energy: 2 - more than half the days  Poor appetite or overeatin - more than half the days  Feeling bad about yourself - or that you are a failure or have let yourself or your family down: 2 - more than half the days  Trouble concentrating on things, such as reading the newspaper or watching television: 2 - more than half the days  Moving or speaking so slowly that other people could have noticed. Or the opposite - being so fidgety or restless that you have been moving around a lot more than usual: 2 - more than half the days  Thoughts that you would be better off dead, or of hurting yourself in some way: 2 - more than half the days            Subjective:      Patient ID: Jaycee Can is a 35 y.o. female.    ER follow up for stroke-like symptoms, pt did not have a new stroke, pt has no residule weakness, pt was found to have no intrinsic clotting issues, she is currently on blood thinners, pt is doing well today,       The following portions of the patient's history were reviewed and updated as appropriate: allergies, current medications, past family history, past medical history, past social history, past surgical history, and problem list.    Review of Systems   Neurological:  Negative for seizures, facial asymmetry, speech difficulty, weakness, light-headedness, numbness and headaches.         Objective:    /74   Pulse 91   Temp 98 °F (36.7 °C)   Ht 5' 1\" (1.549 m)   Wt 93.9 kg (207 lb)   LMP " 06/20/2025 (Approximate)   SpO2 98%   BMI 39.11 kg/m²      Physical Exam  Vitals and nursing note reviewed.   Constitutional:       General: She is not in acute distress.     Appearance: Normal appearance. She is not ill-appearing or diaphoretic.   HENT:      Head: Normocephalic and atraumatic.     Eyes:      Conjunctiva/sclera: Conjunctivae normal.       Cardiovascular:      Rate and Rhythm: Normal rate and regular rhythm.      Heart sounds: Normal heart sounds. No murmur heard.  Pulmonary:      Effort: Pulmonary effort is normal. No respiratory distress.      Breath sounds: Normal breath sounds. No wheezing, rhonchi or rales.   Abdominal:      General: There is no distension.      Palpations: Abdomen is soft. There is no mass.      Tenderness: There is no abdominal tenderness. There is no guarding or rebound.     Musculoskeletal:      Cervical back: Normal range of motion and neck supple. No rigidity.      Right lower leg: No edema.      Left lower leg: No edema.   Lymphadenopathy:      Cervical: No cervical adenopathy.     Neurological:      General: No focal deficit present.      Mental Status: She is alert and oriented to person, place, and time.      Sensory: No sensory deficit.      Motor: No weakness.      Coordination: Coordination normal.      Gait: Gait normal.     Psychiatric:         Mood and Affect: Mood normal.         Behavior: Behavior normal.         Thought Content: Thought content normal.         Judgment: Judgment normal.

## 2025-07-28 ENCOUNTER — PATIENT OUTREACH (OUTPATIENT)
Dept: CASE MANAGEMENT | Facility: OTHER | Age: 35
End: 2025-07-28

## 2025-07-31 ENCOUNTER — OFFICE VISIT (OUTPATIENT)
Dept: FAMILY MEDICINE CLINIC | Facility: CLINIC | Age: 35
End: 2025-07-31
Payer: COMMERCIAL

## 2025-07-31 VITALS
TEMPERATURE: 97 F | BODY MASS INDEX: 40.97 KG/M2 | WEIGHT: 217 LBS | HEIGHT: 61 IN | HEART RATE: 81 BPM | OXYGEN SATURATION: 97 % | SYSTOLIC BLOOD PRESSURE: 112 MMHG | DIASTOLIC BLOOD PRESSURE: 70 MMHG

## 2025-07-31 DIAGNOSIS — Z79.4 TYPE 2 DIABETES MELLITUS WITH HYPERGLYCEMIA, WITH LONG-TERM CURRENT USE OF INSULIN (HCC): Primary | ICD-10-CM

## 2025-07-31 DIAGNOSIS — R42 VERTIGO: ICD-10-CM

## 2025-07-31 DIAGNOSIS — E11.65 TYPE 2 DIABETES MELLITUS WITH HYPERGLYCEMIA, WITH LONG-TERM CURRENT USE OF INSULIN (HCC): Primary | ICD-10-CM

## 2025-07-31 PROCEDURE — 99214 OFFICE O/P EST MOD 30 MIN: CPT | Performed by: NURSE PRACTITIONER

## 2025-08-01 ENCOUNTER — TELEPHONE (OUTPATIENT)
Dept: FAMILY MEDICINE CLINIC | Facility: CLINIC | Age: 35
End: 2025-08-01

## 2025-08-12 ENCOUNTER — TELEPHONE (OUTPATIENT)
Age: 35
End: 2025-08-12

## 2025-08-15 PROCEDURE — G0145 SCR C/V CYTO,THINLAYER,RESCR: HCPCS | Performed by: OBSTETRICS & GYNECOLOGY

## (undated) DEVICE — GLOVE INDICATOR PI UNDERGLOVE SZ 8 BLUE

## (undated) DEVICE — GUIDEWIRE WHOLEY HI TORQUE INTERM MOD J .035 145CM

## (undated) DEVICE — TRAY FOLEY 16FR URIMETER SURESTEP

## (undated) DEVICE — SUT SILK 2-0 30 IN A305H

## (undated) DEVICE — GLOVE SRG BIOGEL 9

## (undated) DEVICE — WET SKIN PREP TRAY: Brand: MEDLINE INDUSTRIES, INC.

## (undated) DEVICE — CATH F4 INF JR 4 100CM: Brand: INFINITI

## (undated) DEVICE — INTENDED FOR TISSUE SEPARATION, AND OTHER PROCEDURES THAT REQUIRE A SHARP SURGICAL BLADE TO PUNCTURE OR CUT.: Brand: BARD-PARKER ® CARBON RIB-BACK BLADES

## (undated) DEVICE — TR BAND RADIAL ARTERY COMPRESSION DEVICE: Brand: TR BAND

## (undated) DEVICE — PRESTO™ INFLATION DEVICE: Brand: PRESTO

## (undated) DEVICE — ADHESIVE SKIN HIGH VISCOSITY EXOFIN 1ML

## (undated) DEVICE — CATH SOFT-VU 5FR 65CM SOS OMNI-0

## (undated) DEVICE — LIGACLIP MCA MULTIPLE CLIP APPLIERS, 20 SMALL CLIPS: Brand: LIGACLIP

## (undated) DEVICE — ASTOUND FABRIC REINFORCED SURGICAL GOWN: Brand: CONVERTORS

## (undated) DEVICE — GLOVE INDICATOR PI UNDERGLOVE SZ 6.5 BLUE

## (undated) DEVICE — PETRI DISH STERILE

## (undated) DEVICE — THE SIMPULSE SOLO SYSTEM WITH ULTREX RETRACTABLE SPLASH SHIELD TIP: Brand: SIMPULSE SOLO

## (undated) DEVICE — GLOVE SRG BIOGEL 7.5

## (undated) DEVICE — BETHLEHEM UNIVERSAL MINOR GEN: Brand: CARDINAL HEALTH

## (undated) DEVICE — 1200CC GUARDIAN II: Brand: GUARDIAN

## (undated) DEVICE — 3000CC GUARDIAN II: Brand: GUARDIAN

## (undated) DEVICE — GUIDEWIRE WHOLEY HI TORQUE INTERM MOD J.035 260CM

## (undated) DEVICE — STERILE BETHLEHEM FEM POP PACK: Brand: CARDINAL HEALTH

## (undated) DEVICE — CATH DIAG 5FR IMPULSE 100CM FR4

## (undated) DEVICE — IV CATH INTROCAN 18G X 1 1/4 SAFETY

## (undated) DEVICE — DRAPE C-ARM BAG/DOME XR ELSTIC OPEN LF

## (undated) DEVICE — SYRINGE 1ML SLIP TIP

## (undated) DEVICE — CHLORAPREP HI-LITE 26ML ORANGE

## (undated) DEVICE — SUT SILK 4-0 30 IN A303H

## (undated) DEVICE — Device

## (undated) DEVICE — GLOVE SRG BIOGEL 7

## (undated) DEVICE — SUT PROLENE 5-0 C-1/C-1 36 IN 8720H

## (undated) DEVICE — SURGIFOAM 8.5 X 12.5

## (undated) DEVICE — TUBING SMOKE EVAC W/FILTRATION DEVICE PLUMEPORT ACTIV

## (undated) DEVICE — BAG DECANTER

## (undated) DEVICE — CATH DIAG 5FR IMPULSE 100CM FL4

## (undated) DEVICE — FOGARTY ARTERIAL EMBOLECTOMY CATHETER 3F 80CM: Brand: FOGARTY

## (undated) DEVICE — 4 F TEMPO AQUA 0.038  65CM VER: Brand: TEMPO AQUA

## (undated) DEVICE — PINNACLE INTRODUCER SHEATH: Brand: PINNACLE

## (undated) DEVICE — SUT PROLENE 6-0 BV130 30 IN 8709H

## (undated) DEVICE — GLOVE SRG BIOGEL ECLIPSE 6

## (undated) DEVICE — GLOVE SRG BIOGEL 8.5

## (undated) DEVICE — GLIDESHEATH SLENDER STAINLESS STEEL KIT: Brand: GLIDESHEATH SLENDER

## (undated) DEVICE — GUIDEWIRE VASC .035 260CM 15CM TAP ST

## (undated) DEVICE — VAC DRESSING SENSATRAC SMALL

## (undated) DEVICE — FOGARTY ARTERIAL EMBOLECTOMY CATHETER 4F 80CM: Brand: FOGARTY

## (undated) DEVICE — INTENDED FOR TISSUE SEPARATION, AND OTHER PROCEDURES THAT REQUIRE A SHARP SURGICAL BLADE TO PUNCTURE OR CUT.: Brand: BARD-PARKER SAFETY BLADES SIZE 15, STERILE

## (undated) DEVICE — Device: Brand: PROWATER

## (undated) DEVICE — MEDI-VAC YANK SUCT HNDL W/TPRD BULBOUS TIP: Brand: CARDINAL HEALTH

## (undated) DEVICE — RADIFOCUS OPTITORQUE ANGIOGRAPHIC CATHETER: Brand: OPTITORQUE

## (undated) DEVICE — SYRINGE 3ML LL

## (undated) DEVICE — 3M™ IOBAN™ 2 ANTIMICROBIAL INCISE DRAPE 6650EZ: Brand: IOBAN™ 2

## (undated) DEVICE — VAC CANISTER 500ML

## (undated) DEVICE — HP FLOW SWITCH

## (undated) DEVICE — MICROPUNCTURE 501

## (undated) DEVICE — CATH DIAG 5FR IMPULSE 125CM MPA2

## (undated) DEVICE — SUT SILK 3-0 30 IN A304H

## (undated) DEVICE — SUT MONOCRYL 4-0 PS-2 27 IN Y426H

## (undated) DEVICE — PINNACLE R/O II INTRODUCER SHEATH WITH RADIOPAQUE MARKER: Brand: PINNACLE